# Patient Record
Sex: MALE | Race: ASIAN | NOT HISPANIC OR LATINO | URBAN - METROPOLITAN AREA
[De-identification: names, ages, dates, MRNs, and addresses within clinical notes are randomized per-mention and may not be internally consistent; named-entity substitution may affect disease eponyms.]

---

## 2018-01-16 ENCOUNTER — INPATIENT (INPATIENT)
Facility: HOSPITAL | Age: 63
LOS: 6 days | Discharge: HOME | End: 2018-01-23
Attending: INTERNAL MEDICINE | Admitting: INTERNAL MEDICINE
Payer: MEDICAID

## 2018-01-16 VITALS
TEMPERATURE: 98 F | OXYGEN SATURATION: 99 % | DIASTOLIC BLOOD PRESSURE: 73 MMHG | RESPIRATION RATE: 18 BRPM | HEART RATE: 99 BPM | SYSTOLIC BLOOD PRESSURE: 142 MMHG

## 2018-01-16 DIAGNOSIS — M25.469 EFFUSION, UNSPECIFIED KNEE: ICD-10-CM

## 2018-01-16 LAB
ALBUMIN SERPL ELPH-MCNC: 3.7 G/DL — SIGNIFICANT CHANGE UP (ref 3.3–5)
ALP SERPL-CCNC: 82 U/L — SIGNIFICANT CHANGE UP (ref 40–120)
ALT FLD-CCNC: 11 U/L — SIGNIFICANT CHANGE UP (ref 4–41)
AST SERPL-CCNC: 10 U/L — SIGNIFICANT CHANGE UP (ref 4–40)
BASOPHILS # BLD AUTO: 0.05 K/UL — SIGNIFICANT CHANGE UP (ref 0–0.2)
BASOPHILS NFR BLD AUTO: 0.4 % — SIGNIFICANT CHANGE UP (ref 0–2)
BILIRUB SERPL-MCNC: 0.4 MG/DL — SIGNIFICANT CHANGE UP (ref 0.2–1.2)
BODY FLUID TYPE: SIGNIFICANT CHANGE UP
BUN SERPL-MCNC: 37 MG/DL — HIGH (ref 7–23)
CALCIUM SERPL-MCNC: 8.8 MG/DL — SIGNIFICANT CHANGE UP (ref 8.4–10.5)
CHLORIDE SERPL-SCNC: 93 MMOL/L — LOW (ref 98–107)
CLARITY SPEC: SIGNIFICANT CHANGE UP
CO2 SERPL-SCNC: 27 MMOL/L — SIGNIFICANT CHANGE UP (ref 22–31)
COLOR FLD: YELLOW — SIGNIFICANT CHANGE UP
CREAT SERPL-MCNC: 2.86 MG/DL — HIGH (ref 0.5–1.3)
CRP SERPL-MCNC: 157.9 MG/L — HIGH
CRYSTALS FLD MICRO: SIGNIFICANT CHANGE UP
EOSINOPHIL # BLD AUTO: 0.07 K/UL — SIGNIFICANT CHANGE UP (ref 0–0.5)
EOSINOPHIL # FLD: 1 % — SIGNIFICANT CHANGE UP
EOSINOPHIL NFR BLD AUTO: 0.5 % — SIGNIFICANT CHANGE UP (ref 0–6)
ERYTHROCYTE [SEDIMENTATION RATE] IN BLOOD: 59 MM/HR — HIGH (ref 1–15)
GLUCOSE FLD-MCNC: 225 MG/DL — SIGNIFICANT CHANGE UP
GLUCOSE SERPL-MCNC: 221 MG/DL — HIGH (ref 70–99)
GRAM STN FLD: SIGNIFICANT CHANGE UP
HCT VFR BLD CALC: 42.2 % — SIGNIFICANT CHANGE UP (ref 39–50)
HGB BLD-MCNC: 14.6 G/DL — SIGNIFICANT CHANGE UP (ref 13–17)
IMM GRANULOCYTES # BLD AUTO: 0.05 # — SIGNIFICANT CHANGE UP
IMM GRANULOCYTES NFR BLD AUTO: 0.4 % — SIGNIFICANT CHANGE UP (ref 0–1.5)
LYMPHOCYTES # BLD AUTO: 15.7 % — SIGNIFICANT CHANGE UP (ref 13–44)
LYMPHOCYTES # BLD AUTO: 2.09 K/UL — SIGNIFICANT CHANGE UP (ref 1–3.3)
LYMPHOCYTES NFR FLD: 3 % — SIGNIFICANT CHANGE UP
MACROPHAGES # FLD: 5 % — SIGNIFICANT CHANGE UP
MCHC RBC-ENTMCNC: 31.2 PG — SIGNIFICANT CHANGE UP (ref 27–34)
MCHC RBC-ENTMCNC: 34.6 % — SIGNIFICANT CHANGE UP (ref 32–36)
MCV RBC AUTO: 90.2 FL — SIGNIFICANT CHANGE UP (ref 80–100)
MONOCYTES # BLD AUTO: 1.03 K/UL — HIGH (ref 0–0.9)
MONOCYTES # FLD: 26 % — SIGNIFICANT CHANGE UP
MONOCYTES NFR BLD AUTO: 7.8 % — SIGNIFICANT CHANGE UP (ref 2–14)
NEUTROPHILS # BLD AUTO: 10 K/UL — HIGH (ref 1.8–7.4)
NEUTROPHILS NFR BLD AUTO: 75.2 % — SIGNIFICANT CHANGE UP (ref 43–77)
NEUTS SEG NFR FLD MANUAL: 65 % — SIGNIFICANT CHANGE UP
NRBC # FLD: 0 — SIGNIFICANT CHANGE UP
PLATELET # BLD AUTO: 186 K/UL — SIGNIFICANT CHANGE UP (ref 150–400)
PMV BLD: 10 FL — SIGNIFICANT CHANGE UP (ref 7–13)
POTASSIUM SERPL-MCNC: 3.3 MMOL/L — LOW (ref 3.5–5.3)
POTASSIUM SERPL-SCNC: 3.3 MMOL/L — LOW (ref 3.5–5.3)
PROT FLD-MCNC: 3.6 G/DL — SIGNIFICANT CHANGE UP
PROT SERPL-MCNC: 7 G/DL — SIGNIFICANT CHANGE UP (ref 6–8.3)
RBC # BLD: 4.68 M/UL — SIGNIFICANT CHANGE UP (ref 4.2–5.8)
RBC # FLD: 13.7 % — SIGNIFICANT CHANGE UP (ref 10.3–14.5)
RCV VOL RI: 0 CELL/UL — SIGNIFICANT CHANGE UP (ref 0–5)
SODIUM SERPL-SCNC: 135 MMOL/L — SIGNIFICANT CHANGE UP (ref 135–145)
SPECIMEN SOURCE: SIGNIFICANT CHANGE UP
TOTAL CELLS COUNTED, BODY FLUID: 100 CELLS — SIGNIFICANT CHANGE UP
TOTAL NUCLEATED CELL COUNT, BODY FLUID: 1728 CELL/UL — HIGH (ref 0–5)
URATE SERPL-MCNC: 12.2 MG/DL — HIGH (ref 3.4–8.8)
WBC # BLD: 13.29 K/UL — HIGH (ref 3.8–10.5)
WBC # FLD AUTO: 13.29 K/UL — HIGH (ref 3.8–10.5)

## 2018-01-16 PROCEDURE — 73564 X-RAY EXAM KNEE 4 OR MORE: CPT | Mod: 26,50

## 2018-01-16 PROCEDURE — 99223 1ST HOSP IP/OBS HIGH 75: CPT

## 2018-01-16 RX ORDER — MORPHINE SULFATE 50 MG/1
4 CAPSULE, EXTENDED RELEASE ORAL ONCE
Qty: 0 | Refills: 0 | Status: DISCONTINUED | OUTPATIENT
Start: 2018-01-16 | End: 2018-01-16

## 2018-01-16 RX ADMIN — MORPHINE SULFATE 4 MILLIGRAM(S): 50 CAPSULE, EXTENDED RELEASE ORAL at 20:26

## 2018-01-16 RX ADMIN — MORPHINE SULFATE 4 MILLIGRAM(S): 50 CAPSULE, EXTENDED RELEASE ORAL at 16:10

## 2018-01-16 RX ADMIN — MORPHINE SULFATE 4 MILLIGRAM(S): 50 CAPSULE, EXTENDED RELEASE ORAL at 21:34

## 2018-01-16 RX ADMIN — Medication 60 MILLIGRAM(S): at 21:38

## 2018-01-16 NOTE — ED PROVIDER NOTE - SHIFT CHANGE DETAILS
I have signed over this patient to the above attending physician. Pertinent history, physical exam findings and workup thus far in the ED have been discussed. The pending tests and plan, including labs, tap results were signed over.  All questions from the above attending physician have been answered.

## 2018-01-16 NOTE — ED PROVIDER NOTE - CARE PLAN
Principal Discharge DX:	Knee swelling Principal Discharge DX:	Knee swelling  Assessment and plan of treatment:	admit for worsening knee pain  probable gout vs inflammatory changes  admit for inability to ambulate

## 2018-01-16 NOTE — ED PROVIDER NOTE - CONSTITUTIONAL, MLM
normal... Well appearing, well nourished, awake, alert, oriented to person, place, time/situation and in moderate apparent distress.

## 2018-01-16 NOTE — ED ADULT NURSE NOTE - OBJECTIVE STATEMENT
Patient received to rm 18 a&ox3 and ambulatory at baseline with c/o left knee and feet swelling that has been getting worse. Pt left knee is visibly swollen. Pt has hx of HTN. 20g IV placed in rt ac, labs sent, meds administered. Will continue to monitor.

## 2018-01-16 NOTE — ED ADULT NURSE REASSESSMENT NOTE - NS ED NURSE REASSESS COMMENT FT1
Pt. received into room 18A, A&O x3, Pt. is Arabic speaking but understands English. vitals stable. c/o pain to L knee. meds given as ordered. will continue to monitor. ST

## 2018-01-16 NOTE — ED PROVIDER NOTE - MEDICAL DECISION MAKING DETAILS
Left knee pain tenderness increased warmth with decreased ROM. WIll get Labs, Xray, ESR, CRP concern for septic joint, gouty arthritis.

## 2018-01-16 NOTE — ED PROVIDER NOTE - PLAN OF CARE
admit for worsening knee pain  probable gout vs inflammatory changes  admit for inability to ambulate

## 2018-01-16 NOTE — ED PROVIDER NOTE - ATTENDING CONTRIBUTION TO CARE
62m, c/o 4 days of left knee pain and swelling. no h/o gout, no h/o knee or other joint pain/swelling. no f/c. exam, vs wnl, nad. hs and lungs normal, abdo benign, left knee not erythematous, diffusely tender, decreased ROM, moderate joint effusion. rest of extremity normal. moderate PTP for septic knee. will get labs, tap.

## 2018-01-16 NOTE — ED PROVIDER NOTE - OBJECTIVE STATEMENT
62 YOM pmh DM, HTN, CABG, ?CHF, CVA with mild residual left extremity weakness presents to ed with Left lower knee pain and swelling x 4 days. Pt denies any fevers, chills, body aches. No hx of gout. Pt complaining of difficulty walking and bending the left knee. Pt also has mild right knee pain.

## 2018-01-16 NOTE — ED PROVIDER NOTE - MUSCULOSKELETAL, MLM
Left knee pain and tenderness with moderate effusion, increased warmth. Limited ROM secondary to pain. Mild right knee tenderness FROM.

## 2018-01-17 DIAGNOSIS — Z90.49 ACQUIRED ABSENCE OF OTHER SPECIFIED PARTS OF DIGESTIVE TRACT: Chronic | ICD-10-CM

## 2018-01-17 DIAGNOSIS — Z95.1 PRESENCE OF AORTOCORONARY BYPASS GRAFT: Chronic | ICD-10-CM

## 2018-01-17 DIAGNOSIS — I25.10 ATHEROSCLEROTIC HEART DISEASE OF NATIVE CORONARY ARTERY WITHOUT ANGINA PECTORIS: ICD-10-CM

## 2018-01-17 DIAGNOSIS — N18.9 CHRONIC KIDNEY DISEASE, UNSPECIFIED: ICD-10-CM

## 2018-01-17 DIAGNOSIS — M19.90 UNSPECIFIED OSTEOARTHRITIS, UNSPECIFIED SITE: ICD-10-CM

## 2018-01-17 DIAGNOSIS — I50.22 CHRONIC SYSTOLIC (CONGESTIVE) HEART FAILURE: ICD-10-CM

## 2018-01-17 DIAGNOSIS — E11.8 TYPE 2 DIABETES MELLITUS WITH UNSPECIFIED COMPLICATIONS: ICD-10-CM

## 2018-01-17 LAB
ALBUMIN SERPL ELPH-MCNC: 3.7 G/DL — SIGNIFICANT CHANGE UP (ref 3.3–5)
ALP SERPL-CCNC: 78 U/L — SIGNIFICANT CHANGE UP (ref 40–120)
ALT FLD-CCNC: 9 U/L — SIGNIFICANT CHANGE UP (ref 4–41)
AST SERPL-CCNC: 16 U/L — SIGNIFICANT CHANGE UP (ref 4–40)
BASOPHILS # BLD AUTO: 0.01 K/UL — SIGNIFICANT CHANGE UP (ref 0–0.2)
BASOPHILS NFR BLD AUTO: 0.1 % — SIGNIFICANT CHANGE UP (ref 0–2)
BILIRUB SERPL-MCNC: 0.4 MG/DL — SIGNIFICANT CHANGE UP (ref 0.2–1.2)
BUN SERPL-MCNC: 40 MG/DL — HIGH (ref 7–23)
BUN SERPL-MCNC: 46 MG/DL — HIGH (ref 7–23)
CALCIUM SERPL-MCNC: 9.2 MG/DL — SIGNIFICANT CHANGE UP (ref 8.4–10.5)
CALCIUM SERPL-MCNC: 9.4 MG/DL — SIGNIFICANT CHANGE UP (ref 8.4–10.5)
CHLORIDE SERPL-SCNC: 94 MMOL/L — LOW (ref 98–107)
CHLORIDE SERPL-SCNC: 96 MMOL/L — LOW (ref 98–107)
CO2 SERPL-SCNC: 24 MMOL/L — SIGNIFICANT CHANGE UP (ref 22–31)
CO2 SERPL-SCNC: 25 MMOL/L — SIGNIFICANT CHANGE UP (ref 22–31)
CREAT SERPL-MCNC: 2.74 MG/DL — HIGH (ref 0.5–1.3)
CREAT SERPL-MCNC: 2.76 MG/DL — HIGH (ref 0.5–1.3)
EOSINOPHIL # BLD AUTO: 0 K/UL — SIGNIFICANT CHANGE UP (ref 0–0.5)
EOSINOPHIL NFR BLD AUTO: 0 % — SIGNIFICANT CHANGE UP (ref 0–6)
GLUCOSE BLDC GLUCOMTR-MCNC: 164 MG/DL — HIGH (ref 70–99)
GLUCOSE BLDC GLUCOMTR-MCNC: 192 MG/DL — HIGH (ref 70–99)
GLUCOSE BLDC GLUCOMTR-MCNC: 229 MG/DL — HIGH (ref 70–99)
GLUCOSE SERPL-MCNC: 200 MG/DL — HIGH (ref 70–99)
GLUCOSE SERPL-MCNC: 226 MG/DL — HIGH (ref 70–99)
HCT VFR BLD CALC: 44.3 % — SIGNIFICANT CHANGE UP (ref 39–50)
HCT VFR BLD CALC: 44.4 % — SIGNIFICANT CHANGE UP (ref 39–50)
HGB BLD-MCNC: 15.1 G/DL — SIGNIFICANT CHANGE UP (ref 13–17)
HGB BLD-MCNC: 15.1 G/DL — SIGNIFICANT CHANGE UP (ref 13–17)
IMM GRANULOCYTES # BLD AUTO: 0.07 # — SIGNIFICANT CHANGE UP
IMM GRANULOCYTES NFR BLD AUTO: 0.4 % — SIGNIFICANT CHANGE UP (ref 0–1.5)
LYMPHOCYTES # BLD AUTO: 1.23 K/UL — SIGNIFICANT CHANGE UP (ref 1–3.3)
LYMPHOCYTES # BLD AUTO: 7.7 % — LOW (ref 13–44)
MAGNESIUM SERPL-MCNC: 1.9 MG/DL — SIGNIFICANT CHANGE UP (ref 1.6–2.6)
MAGNESIUM SERPL-MCNC: 2 MG/DL — SIGNIFICANT CHANGE UP (ref 1.6–2.6)
MCHC RBC-ENTMCNC: 30.4 PG — SIGNIFICANT CHANGE UP (ref 27–34)
MCHC RBC-ENTMCNC: 30.4 PG — SIGNIFICANT CHANGE UP (ref 27–34)
MCHC RBC-ENTMCNC: 34 % — SIGNIFICANT CHANGE UP (ref 32–36)
MCHC RBC-ENTMCNC: 34.1 % — SIGNIFICANT CHANGE UP (ref 32–36)
MCV RBC AUTO: 89.1 FL — SIGNIFICANT CHANGE UP (ref 80–100)
MCV RBC AUTO: 89.3 FL — SIGNIFICANT CHANGE UP (ref 80–100)
MONOCYTES # BLD AUTO: 0.49 K/UL — SIGNIFICANT CHANGE UP (ref 0–0.9)
MONOCYTES NFR BLD AUTO: 3 % — SIGNIFICANT CHANGE UP (ref 2–14)
NEUTROPHILS # BLD AUTO: 14.27 K/UL — HIGH (ref 1.8–7.4)
NEUTROPHILS NFR BLD AUTO: 88.8 % — HIGH (ref 43–77)
NRBC # FLD: 0 — SIGNIFICANT CHANGE UP
NRBC # FLD: 0 — SIGNIFICANT CHANGE UP
PHOSPHATE SERPL-MCNC: 2.9 MG/DL — SIGNIFICANT CHANGE UP (ref 2.5–4.5)
PLATELET # BLD AUTO: 199 K/UL — SIGNIFICANT CHANGE UP (ref 150–400)
PLATELET # BLD AUTO: 226 K/UL — SIGNIFICANT CHANGE UP (ref 150–400)
PMV BLD: 10.1 FL — SIGNIFICANT CHANGE UP (ref 7–13)
PMV BLD: 10.4 FL — SIGNIFICANT CHANGE UP (ref 7–13)
POTASSIUM SERPL-MCNC: 3.7 MMOL/L — SIGNIFICANT CHANGE UP (ref 3.5–5.3)
POTASSIUM SERPL-MCNC: 4.1 MMOL/L — SIGNIFICANT CHANGE UP (ref 3.5–5.3)
POTASSIUM SERPL-SCNC: 3.7 MMOL/L — SIGNIFICANT CHANGE UP (ref 3.5–5.3)
POTASSIUM SERPL-SCNC: 4.1 MMOL/L — SIGNIFICANT CHANGE UP (ref 3.5–5.3)
PROT SERPL-MCNC: 7.2 G/DL — SIGNIFICANT CHANGE UP (ref 6–8.3)
RBC # BLD: 4.97 M/UL — SIGNIFICANT CHANGE UP (ref 4.2–5.8)
RBC # BLD: 4.97 M/UL — SIGNIFICANT CHANGE UP (ref 4.2–5.8)
RBC # FLD: 13.6 % — SIGNIFICANT CHANGE UP (ref 10.3–14.5)
RBC # FLD: 13.6 % — SIGNIFICANT CHANGE UP (ref 10.3–14.5)
SODIUM SERPL-SCNC: 136 MMOL/L — SIGNIFICANT CHANGE UP (ref 135–145)
SODIUM SERPL-SCNC: 138 MMOL/L — SIGNIFICANT CHANGE UP (ref 135–145)
WBC # BLD: 13.9 K/UL — HIGH (ref 3.8–10.5)
WBC # BLD: 16.07 K/UL — HIGH (ref 3.8–10.5)
WBC # FLD AUTO: 13.9 K/UL — HIGH (ref 3.8–10.5)
WBC # FLD AUTO: 16.07 K/UL — HIGH (ref 3.8–10.5)

## 2018-01-17 RX ORDER — CLOPIDOGREL BISULFATE 75 MG/1
75 TABLET, FILM COATED ORAL DAILY
Qty: 0 | Refills: 0 | Status: DISCONTINUED | OUTPATIENT
Start: 2018-01-17 | End: 2018-01-19

## 2018-01-17 RX ORDER — INSULIN LISPRO 100/ML
VIAL (ML) SUBCUTANEOUS
Qty: 0 | Refills: 0 | Status: DISCONTINUED | OUTPATIENT
Start: 2018-01-17 | End: 2018-01-23

## 2018-01-17 RX ORDER — DEXTROSE 50 % IN WATER 50 %
25 SYRINGE (ML) INTRAVENOUS ONCE
Qty: 0 | Refills: 0 | Status: DISCONTINUED | OUTPATIENT
Start: 2018-01-17 | End: 2018-01-23

## 2018-01-17 RX ORDER — DEXTROSE 50 % IN WATER 50 %
12.5 SYRINGE (ML) INTRAVENOUS ONCE
Qty: 0 | Refills: 0 | Status: DISCONTINUED | OUTPATIENT
Start: 2018-01-17 | End: 2018-01-23

## 2018-01-17 RX ORDER — DEXTROSE 50 % IN WATER 50 %
25 SYRINGE (ML) INTRAVENOUS ONCE
Qty: 0 | Refills: 0 | Status: DISCONTINUED | OUTPATIENT
Start: 2018-01-17 | End: 2018-01-18

## 2018-01-17 RX ORDER — CARVEDILOL PHOSPHATE 80 MG/1
12.5 CAPSULE, EXTENDED RELEASE ORAL EVERY 12 HOURS
Qty: 0 | Refills: 0 | Status: DISCONTINUED | OUTPATIENT
Start: 2018-01-17 | End: 2018-01-23

## 2018-01-17 RX ORDER — ASPIRIN/CALCIUM CARB/MAGNESIUM 324 MG
81 TABLET ORAL DAILY
Qty: 0 | Refills: 0 | Status: DISCONTINUED | OUTPATIENT
Start: 2018-01-17 | End: 2018-01-19

## 2018-01-17 RX ORDER — DEXTROSE 50 % IN WATER 50 %
1 SYRINGE (ML) INTRAVENOUS ONCE
Qty: 0 | Refills: 0 | Status: DISCONTINUED | OUTPATIENT
Start: 2018-01-17 | End: 2018-01-23

## 2018-01-17 RX ORDER — ISOSORBIDE DINITRATE 5 MG/1
0 TABLET ORAL
Qty: 0 | Refills: 0 | COMMUNITY

## 2018-01-17 RX ORDER — INSULIN LISPRO 100/ML
VIAL (ML) SUBCUTANEOUS AT BEDTIME
Qty: 0 | Refills: 0 | Status: DISCONTINUED | OUTPATIENT
Start: 2018-01-17 | End: 2018-01-23

## 2018-01-17 RX ORDER — HYDROMORPHONE HYDROCHLORIDE 2 MG/ML
1 INJECTION INTRAMUSCULAR; INTRAVENOUS; SUBCUTANEOUS EVERY 6 HOURS
Qty: 0 | Refills: 0 | Status: DISCONTINUED | OUTPATIENT
Start: 2018-01-17 | End: 2018-01-23

## 2018-01-17 RX ORDER — GLUCAGON INJECTION, SOLUTION 0.5 MG/.1ML
1 INJECTION, SOLUTION SUBCUTANEOUS ONCE
Qty: 0 | Refills: 0 | Status: DISCONTINUED | OUTPATIENT
Start: 2018-01-17 | End: 2018-01-23

## 2018-01-17 RX ORDER — OXYCODONE AND ACETAMINOPHEN 5; 325 MG/1; MG/1
1 TABLET ORAL EVERY 6 HOURS
Qty: 0 | Refills: 0 | Status: DISCONTINUED | OUTPATIENT
Start: 2018-01-17 | End: 2018-01-23

## 2018-01-17 RX ORDER — HYDRALAZINE HCL 50 MG
100 TABLET ORAL EVERY 8 HOURS
Qty: 0 | Refills: 0 | Status: DISCONTINUED | OUTPATIENT
Start: 2018-01-17 | End: 2018-01-23

## 2018-01-17 RX ORDER — HEPARIN SODIUM 5000 [USP'U]/ML
5000 INJECTION INTRAVENOUS; SUBCUTANEOUS EVERY 8 HOURS
Qty: 0 | Refills: 0 | Status: DISCONTINUED | OUTPATIENT
Start: 2018-01-17 | End: 2018-01-19

## 2018-01-17 RX ADMIN — CARVEDILOL PHOSPHATE 12.5 MILLIGRAM(S): 80 CAPSULE, EXTENDED RELEASE ORAL at 06:07

## 2018-01-17 RX ADMIN — Medication 2: at 13:54

## 2018-01-17 RX ADMIN — Medication 100 MILLIGRAM(S): at 13:55

## 2018-01-17 RX ADMIN — HEPARIN SODIUM 5000 UNIT(S): 5000 INJECTION INTRAVENOUS; SUBCUTANEOUS at 23:51

## 2018-01-17 RX ADMIN — Medication 10 MILLIGRAM(S): at 06:07

## 2018-01-17 RX ADMIN — Medication 10 MILLIGRAM(S): at 19:33

## 2018-01-17 RX ADMIN — CLOPIDOGREL BISULFATE 75 MILLIGRAM(S): 75 TABLET, FILM COATED ORAL at 13:54

## 2018-01-17 RX ADMIN — CARVEDILOL PHOSPHATE 12.5 MILLIGRAM(S): 80 CAPSULE, EXTENDED RELEASE ORAL at 19:33

## 2018-01-17 RX ADMIN — HEPARIN SODIUM 5000 UNIT(S): 5000 INJECTION INTRAVENOUS; SUBCUTANEOUS at 16:38

## 2018-01-17 RX ADMIN — Medication 81 MILLIGRAM(S): at 13:54

## 2018-01-17 RX ADMIN — Medication: at 09:42

## 2018-01-17 RX ADMIN — Medication 100 MILLIGRAM(S): at 06:07

## 2018-01-17 RX ADMIN — Medication 30 MILLIGRAM(S): at 06:14

## 2018-01-17 RX ADMIN — Medication 1: at 17:54

## 2018-01-17 RX ADMIN — HEPARIN SODIUM 5000 UNIT(S): 5000 INJECTION INTRAVENOUS; SUBCUTANEOUS at 06:07

## 2018-01-17 RX ADMIN — Medication 100 MILLIGRAM(S): at 23:51

## 2018-01-17 NOTE — H&P ADULT - HISTORY OF PRESENT ILLNESS
61 y/o M with CAD s/p CABG, CHF, CKD, DM, HTN, HLD, BPH p/w L knee pain and swelling.  Pt noticed pain and swelling of L knee starting 3 days ago.  Pain worsened, and made it difficult for patient to ambulate.  Pt reports joint felt warm.  He did not have subjective fever or chills.  No other joints affected.  No prior similar symptoms.  No recent injury to LLE.      In the ED, pt was noted to have knee effusion, and arthrocentesis was performed with removal of 80 ml of fluid.  Pt was given morphine for pain which provided temporary relief, and prednisone. 61 y/o M with CAD s/p CABG, CHF, CKD, CVA with residual L sided weakness, DM, HTN, HLD, BPH p/w L knee pain and swelling.  Pt noticed pain and swelling of L knee starting 3 days ago.  Pain worsened, and made it difficult for patient to ambulate.  Pt reports joint felt warm.  He did not have subjective fever or chills.  No other joints affected.  No prior similar symptoms.  No recent injury to LLE.      In the ED, pt was noted to have knee effusion, and arthrocentesis was performed with removal of 80 ml of fluid.  Pt was given morphine for pain which provided temporary relief, and prednisone.

## 2018-01-17 NOTE — H&P ADULT - NSHPREVIEWOFSYSTEMS_GEN_ALL_CORE
REVIEW OF SYSTEMS    General:  Negative  Skin/Breast:  Negative  Ophthalmologic:  Negative  ENMT:  Negative  Respiratory and Thorax:  Negative  Cardiovascular:  Negative  Gastrointestinal:  Negative  Genitourinary:  Negative  Musculoskeletal:  acute L knee pain.  Chronic R knee pain  Neurological:  Negative  Psychiatric:  Negative  Hematology/Lymphatics:  Negative	  Endocrine:	  Negative  Allergic/Immunologic:	 Negative

## 2018-01-17 NOTE — H&P ADULT - PROBLEM SELECTOR PLAN 3
- Will need to confirm dose of torsemide and isosorbide with pt's pharmacy  - continue coreg Pt follows with CHF specialist at [facility].  - Will need to confirm dose of torsemide and isosorbide with pt's pharmacy.  Will order torsemide 10mg q12 for now.  - continue coreg

## 2018-01-17 NOTE — H&P ADULT - NSHPSOCIALHISTORY_GEN_ALL_CORE
former smoker, 3PPD, quit several years ago  Lives with wife and daughter former smoker, 3PPD, quit several years ago  Lives with wife and daughter  Ambulates with cane

## 2018-01-17 NOTE — H&P ADULT - NSHPLABSRESULTS_GEN_ALL_CORE
01-16    135  |  93<L>  |  37<H>  ----------------------------<  221<H>  3.3<L>   |  27  |  2.86<H>    Ca    8.8      16 Jan 2018 16:00    TPro  7.0  /  Alb  3.7  /  TBili  0.4  /  DBili  x   /  AST  10  /  ALT  11  /  AlkPhos  82  01-16                            14.6   13.29 )-----------( 186      ( 16 Jan 2018 16:00 )             42.2    < from: Xray Knee 4 Views, Bilateral (01.16.18 @ 15:32) >    No tracking soft tissue gas collections, radiopaque foreign bodies, or   gross radiographic evidence for osteomyelitis.    Nonspecific bilateral knee joint effusions left greater than right. Joint   fluid aspiration may provide additional diagnostic information as   warranted.    No fractures or dislocations.     Right patellofemoral and medial tibiofemoral compartment predominant and   mild left patellofemoral osteoarthritic change manifest predominantly by   presence of tiny joint margin osteophytes and degenerative subchondral   cystic changes. No joint margin erosions.     Generalized osteopenia otherwise no discrete lytic or blastic lesions.    Vascular calcifications.     < end of copied text > 01-16    135  |  93<L>  |  37<H>  ----------------------------<  221<H>  3.3<L>   |  27  |  2.86<H>    Ca    8.8      16 Jan 2018 16:00    TPro  7.0  /  Alb  3.7  /  TBili  0.4  /  DBili  x   /  AST  10  /  ALT  11  /  AlkPhos  82  01-16                            14.6   13.29 )-----------( 186      ( 16 Jan 2018 16:00 )             42.2        < from: Xray Knee 4 Views, Bilateral (01.16.18 @ 15:32) >    No tracking soft tissue gas collections, radiopaque foreign bodies, or   gross radiographic evidence for osteomyelitis.    Nonspecific bilateral knee joint effusions left greater than right. Joint   fluid aspiration may provide additional diagnostic information as   warranted.    No fractures or dislocations.     Right patellofemoral and medial tibiofemoral compartment predominant and   mild left patellofemoral osteoarthritic change manifest predominantly by   presence of tiny joint margin osteophytes and degenerative subchondral   cystic changes. No joint margin erosions.     Generalized osteopenia otherwise no discrete lytic or blastic lesions.    Vascular calcifications.     < end of copied text >

## 2018-01-17 NOTE — PROGRESS NOTE ADULT - SUBJECTIVE AND OBJECTIVE BOX
patient not known to me, assigned this AM by Dr Juarez to assume care  chart reviewed and events thus far noted  admitted overnight by full time hospitalist service  likely gout  already improved with prednisone  PT evaluation for disposition

## 2018-01-17 NOTE — H&P ADULT - PMH
BPH (benign prostatic hyperplasia)    CAD (coronary artery disease)    CHF (congestive heart failure)    Diabetes    HTN (hypertension)    Hyperlipidemia, unspecified hyperlipidemia type

## 2018-01-17 NOTE — H&P ADULT - NSHPPHYSICALEXAM_GEN_ALL_CORE
Vital Signs Last 24 Hrs  T(C): 36.9 (17 Jan 2018 00:08), Max: 36.9 (16 Jan 2018 23:29)  T(F): 98.4 (17 Jan 2018 00:08), Max: 98.4 (16 Jan 2018 23:29)  HR: 98 (17 Jan 2018 00:08) (72 - 99)  BP: 134/74 (17 Jan 2018 00:08) (120/71 - 142/73)  BP(mean): --  RR: 18 (17 Jan 2018 00:08) (18 - 18)  SpO2: 99% (17 Jan 2018 00:08) (99% - 100%)    PHYSICAL EXAM:  GENERAL: No Acute Distress  HEAD:  Atraumatic, Normocephalic  EYES: PERRL, conjunctiva and sclera clear  ENMT: Moist mucous membranes   NECK: Supple  CHEST/LUNG: Clear to auscultation bilaterally  HEART: Regular rate and rhythm; No murmurs, rubs, or gallops  ABDOMEN: Soft, Nontender, Nondistended; Bowel sounds normal  EXTREMITIES:   No clubbing, cyanosis, or pedal edema  VASCULAR: Normal DP pulses  PSYCH: Normal Affect, Normal Behavior  NEUROLOGY:   - Mental status A&O x 3,  SKIN: No rashes or lesions  MUSCULOSKELETAL: L knee s/p arthrocentesis, remains tender and warm, but no further effusion.

## 2018-01-17 NOTE — H&P ADULT - PROBLEM SELECTOR PLAN 1
Acute monoarticular arthritis.  Possibly new onset gout or CPPD.  Low suspicion for septic arthritis given low cell count  - will f/u synovial fluid cultures, and crystal analysis  - prednisone 30mg daily  - pain control with dilaudid/oxycodone.  Will avoid morphine 2/2 renal failure

## 2018-01-18 ENCOUNTER — TRANSCRIPTION ENCOUNTER (OUTPATIENT)
Age: 63
End: 2018-01-18

## 2018-01-18 DIAGNOSIS — D72.829 ELEVATED WHITE BLOOD CELL COUNT, UNSPECIFIED: ICD-10-CM

## 2018-01-18 DIAGNOSIS — N18.4 CHRONIC KIDNEY DISEASE, STAGE 4 (SEVERE): ICD-10-CM

## 2018-01-18 LAB
BUN SERPL-MCNC: 53 MG/DL — HIGH (ref 7–23)
CALCIUM SERPL-MCNC: 9.3 MG/DL — SIGNIFICANT CHANGE UP (ref 8.4–10.5)
CHLORIDE SERPL-SCNC: 95 MMOL/L — LOW (ref 98–107)
CO2 SERPL-SCNC: 22 MMOL/L — SIGNIFICANT CHANGE UP (ref 22–31)
CREAT SERPL-MCNC: 2.79 MG/DL — HIGH (ref 0.5–1.3)
GLUCOSE BLDC GLUCOMTR-MCNC: 133 MG/DL — HIGH (ref 70–99)
GLUCOSE BLDC GLUCOMTR-MCNC: 149 MG/DL — HIGH (ref 70–99)
GLUCOSE BLDC GLUCOMTR-MCNC: 154 MG/DL — HIGH (ref 70–99)
GLUCOSE BLDC GLUCOMTR-MCNC: 235 MG/DL — HIGH (ref 70–99)
GLUCOSE SERPL-MCNC: 139 MG/DL — HIGH (ref 70–99)
HBA1C BLD-MCNC: 6.9 % — HIGH (ref 4–5.6)
HCT VFR BLD CALC: 44.2 % — SIGNIFICANT CHANGE UP (ref 39–50)
HGB BLD-MCNC: 15.3 G/DL — SIGNIFICANT CHANGE UP (ref 13–17)
MCHC RBC-ENTMCNC: 30.5 PG — SIGNIFICANT CHANGE UP (ref 27–34)
MCHC RBC-ENTMCNC: 34.6 % — SIGNIFICANT CHANGE UP (ref 32–36)
MCV RBC AUTO: 88.2 FL — SIGNIFICANT CHANGE UP (ref 80–100)
NRBC # FLD: 0 — SIGNIFICANT CHANGE UP
PLATELET # BLD AUTO: 242 K/UL — SIGNIFICANT CHANGE UP (ref 150–400)
PMV BLD: 10.5 FL — SIGNIFICANT CHANGE UP (ref 7–13)
POTASSIUM SERPL-MCNC: 4.6 MMOL/L — SIGNIFICANT CHANGE UP (ref 3.5–5.3)
POTASSIUM SERPL-SCNC: 4.6 MMOL/L — SIGNIFICANT CHANGE UP (ref 3.5–5.3)
RBC # BLD: 5.01 M/UL — SIGNIFICANT CHANGE UP (ref 4.2–5.8)
RBC # FLD: 13.6 % — SIGNIFICANT CHANGE UP (ref 10.3–14.5)
SODIUM SERPL-SCNC: 135 MMOL/L — SIGNIFICANT CHANGE UP (ref 135–145)
WBC # BLD: 24.2 K/UL — HIGH (ref 3.8–10.5)
WBC # FLD AUTO: 24.2 K/UL — HIGH (ref 3.8–10.5)

## 2018-01-18 RX ORDER — SIMETHICONE 80 MG/1
80 TABLET, CHEWABLE ORAL THREE TIMES A DAY
Qty: 0 | Refills: 0 | Status: DISCONTINUED | OUTPATIENT
Start: 2018-01-18 | End: 2018-01-23

## 2018-01-18 RX ADMIN — Medication 100 MILLIGRAM(S): at 22:04

## 2018-01-18 RX ADMIN — CLOPIDOGREL BISULFATE 75 MILLIGRAM(S): 75 TABLET, FILM COATED ORAL at 11:23

## 2018-01-18 RX ADMIN — Medication 10 MILLIGRAM(S): at 19:11

## 2018-01-18 RX ADMIN — SIMETHICONE 80 MILLIGRAM(S): 80 TABLET, CHEWABLE ORAL at 07:03

## 2018-01-18 RX ADMIN — CARVEDILOL PHOSPHATE 12.5 MILLIGRAM(S): 80 CAPSULE, EXTENDED RELEASE ORAL at 06:52

## 2018-01-18 RX ADMIN — HEPARIN SODIUM 5000 UNIT(S): 5000 INJECTION INTRAVENOUS; SUBCUTANEOUS at 06:52

## 2018-01-18 RX ADMIN — Medication 2: at 13:19

## 2018-01-18 RX ADMIN — Medication 81 MILLIGRAM(S): at 11:23

## 2018-01-18 RX ADMIN — HEPARIN SODIUM 5000 UNIT(S): 5000 INJECTION INTRAVENOUS; SUBCUTANEOUS at 13:06

## 2018-01-18 RX ADMIN — Medication 1: at 17:52

## 2018-01-18 RX ADMIN — HEPARIN SODIUM 5000 UNIT(S): 5000 INJECTION INTRAVENOUS; SUBCUTANEOUS at 22:03

## 2018-01-18 RX ADMIN — CARVEDILOL PHOSPHATE 12.5 MILLIGRAM(S): 80 CAPSULE, EXTENDED RELEASE ORAL at 17:24

## 2018-01-18 RX ADMIN — Medication 10 MILLIGRAM(S): at 06:52

## 2018-01-18 RX ADMIN — Medication 30 MILLIGRAM(S): at 06:52

## 2018-01-18 RX ADMIN — Medication 100 MILLIGRAM(S): at 06:52

## 2018-01-18 RX ADMIN — Medication 100 MILLIGRAM(S): at 13:05

## 2018-01-18 NOTE — DISCHARGE NOTE ADULT - PATIENT PORTAL LINK FT
“You can access the [employer] Patient Portal, offered by [facility], by registering with the following website: [email or web address]”

## 2018-01-18 NOTE — PROGRESS NOTE ADULT - SUBJECTIVE AND OBJECTIVE BOX
Patient is a 62y old  Male who presents with a chief complaint of L knee pain (17 Jan 2018 01:22)      SUBJECTIVE / OVERNIGHT EVENTS: Overnight events noted    ROS:  Resp: No cough or sputum production  CVS: No CP or palpitations or orthopnea  GI: no N/V/D  : no dysuria, hematuria  Neuro: no weakness or paresthesias  Heme: No petechiae or easy bruising  Msk: No joint pain or swelling  Skin: No rash or itching  All other ROS negative       MEDICATIONS  (STANDING):  aspirin  chewable 81 milliGRAM(s) Oral daily  carvedilol 12.5 milliGRAM(s) Oral every 12 hours  clopidogrel Tablet 75 milliGRAM(s) Oral daily  dextrose 50% Injectable 12.5 Gram(s) IV Push once  dextrose 50% Injectable 25 Gram(s) IV Push once  heparin  Injectable 5000 Unit(s) SubCutaneous every 8 hours  hydrALAZINE 100 milliGRAM(s) Oral every 8 hours  insulin lispro (HumaLOG) corrective regimen sliding scale   SubCutaneous three times a day before meals  insulin lispro (HumaLOG) corrective regimen sliding scale   SubCutaneous at bedtime  torsemide 10 milliGRAM(s) Oral two times a day    MEDICATIONS  (PRN):  dextrose Gel 1 Dose(s) Oral once PRN Blood Glucose LESS THAN 70 milliGRAM(s)/deciliter  glucagon  Injectable 1 milliGRAM(s) IntraMuscular once PRN Glucose LESS THAN 70 milligrams/deciliter  HYDROmorphone  Injectable 1 milliGRAM(s) IV Push every 6 hours PRN Severe Pain (7 - 10)  oxyCODONE    5 mG/acetaminophen 325 mG 1 Tablet(s) Oral every 6 hours PRN Moderate Pain (4 - 6)  simethicone 80 milliGRAM(s) Chew three times a day PRN Gas        CAPILLARY BLOOD GLUCOSE      POCT Blood Glucose.: 235 mg/dL (18 Jan 2018 11:50)  POCT Blood Glucose.: 133 mg/dL (18 Jan 2018 08:54)  POCT Blood Glucose.: 164 mg/dL (17 Jan 2018 22:24)  POCT Blood Glucose.: 192 mg/dL (17 Jan 2018 17:45)    I&O's Summary      PHYSICAL EXAM:  GENERAL: NAD, well-developed  HEAD:  Atraumatic, Normocephalic  EYES: EOMI, PERRLA, conjunctiva and sclera clear  NECK: Supple, No JVD  CHEST/LUNG: no rhonchi, no wheeze, clear to auscultation bilaterally  HEART: S1 S2; No rubs or gallops, no murmurs  ABDOMEN: Soft, Nontender, Nondistended; Bowel sounds present  EXTREMITIES:  No clubbing or cyanosis, + Peripheral Pulses,  no edema  left knee much less swollen  less tender  PSYCH: AO x 3 appropriate affect  NEUROLOGY: non-focal, motor and sensory systems intact  SKIN: No rashes or lesions    LABS:                        15.3   24.20 )-----------( 242      ( 18 Jan 2018 06:18 )             44.2     01-18    135  |  95<L>  |  53<H>  ----------------------------<  139<H>  4.6   |  22  |  2.79<H>    Ca    9.3      18 Jan 2018 06:18  Phos  2.9     01-17  Mg     2.0     01-17    TPro  7.2  /  Alb  3.7  /  TBili  0.4  /  DBili  x   /  AST  16  /  ALT  9   /  AlkPhos  78  01-17                Consultant(s) Notes Reviewed:      Care Discussed with Consultants/Other Providers:    Contact Number, Dr Ching [phone]

## 2018-01-18 NOTE — DISCHARGE NOTE ADULT - SECONDARY DIAGNOSIS.
Knee swelling Type 2 diabetes mellitus with complication, without long-term current use of insulin HTN (hypertension) Hyperlipidemia, unspecified hyperlipidemia type CAD (coronary artery disease)

## 2018-01-18 NOTE — PROGRESS NOTE ADULT - PROBLEM SELECTOR PLAN 4
resume home meds  glucose will settle down in a few days no acute exacerbation noted  continue home meds on discharge

## 2018-01-18 NOTE — PROGRESS NOTE ADULT - PROBLEM SELECTOR PLAN 5
stage 4 CKD  will follow up with renal as outpatient resume home meds  glucose will settle down in a few days

## 2018-01-18 NOTE — DISCHARGE NOTE ADULT - PLAN OF CARE
improved with tolerable pain control and increased range of motion You had a knee aspiration in the emergency department.  The fluid was reviewed and you were given 3 days of steroids to assist in the arthritic findings and to assist in healing and pain reduction. Follow up with your PCP for further evaluation of knee swelling and or pain. Continue consistent carbohydrate diet.  Monitor blood glucose levels throughout the day before meals and at bedtime.  Record blood sugars and bring to outpatient providers appointment in order to be reviewed by your doctor for management modifications.  Be aware of diabetes management symptoms including feeling cool and clammy may be related to low glucose levels.  Feeling hot and dry may indicate high glucose levels.  If  you feel these symptoms, check your blood sugar.  Make regular podiatry appointments in order to have feet checked for wounds and toe nails cut by a doctor to prevent infections. Continue blood pressure medication regimen as directed. Monitor for any visual changes, headaches or dizziness.  Monitor blood pressure regularly.  Follow up with your PCP for further mangement for pj blood pressure. Continue cholesterol control medications. Continue DASH diet. Follow up with your PCP within 1 week of discharge for further management and monitoring of lipid and cholesterol panels. Continue aspirin and plavix as you were previously taking.  Follow up with your cardiologist within 2 weeks of discharge.

## 2018-01-18 NOTE — DISCHARGE NOTE ADULT - HOSPITAL COURSE
61 y/o M with CAD, DM, HTN, CVA, BPH, CKD p/w L knee pain and swelling with arthrocentesis showing some crystals, and nucleated cell count of 1700.      Problem/Plan - 1:  ·  Problem: Acute arthritis.  Plan: likely secondary to gout  few crystals seen in fluid  gram stain and culture negative  s/p 3 doses of prednisone  discontinue on discharge   will follow up with PCP on discharge.      Problem/Plan - 2:  ·  Problem: Leukocytosis. Plan: secondary to prednisone  will resolve off steroids.     Problem/Plan - 3:  ·  Problem: Coronary artery disease involving native coronary artery of native heart without angina pectoris. Plan: - continue Plavix and ASA.     Problem/Plan - 4:  ·  Problem: Chronic systolic congestive heart failure. Plan: no acute exacerbation noted  continue home meds on discharge.     Problem/Plan - 5:  ·  Problem: Type 2 diabetes mellitus with complication, without long-term current use of insulin. Plan: resume home meds  glucose will settle down in a few days.     Problem/Plan - 6:  Problem: CKD (chronic kidney disease), stage IV. Plan: stage 4 CKD  will follow up with renal as outpatient.    Attending Attestation:   disposition per PT eval (no skilled PT needs).    dispo; home with no skilled PT needs - patient supplied with rolling walker 63 y/o M with CAD, DM, HTN, CVA, BPH, CKD p/w L knee pain and swelling with arthrocentesis showing some crystals, and nucleated cell count of 1700.      Acute arthritis.  Plan: likely secondary to gout  few crystals seen in fluid  gram stain and culture negative  s/p 3 doses of prednisone  discontinue on discharge   will follow up with PCP on discharge.       Leukocytosis. Plan: secondary to prednisone  will resolve off steroids.  -resolved      Coronary artery disease involving native coronary artery of native heart without angina pectoris. Plan: - continue Plavix and ASA.     Chronic systolic congestive heart failure. Plan: no acute exacerbation noted  continue home meds on discharge.     Type 2 diabetes mellitus with complication, without long-term current use of insulin. Plan: resume home meds  glucose will settle down in a few days.    CKD (chronic kidney disease), stage IV. Plan: stage 4 CKD  will follow up with renal as outpatient.          dispo; home with no skilled PT needs - patient supplied with rolling walker

## 2018-01-18 NOTE — DISCHARGE NOTE ADULT - HOME CARE AGENCY
[place] [phone] for RN, PT SW. Resumption of care with [facility]  fopr 49hrs aide services

## 2018-01-18 NOTE — PROGRESS NOTE ADULT - PROBLEM SELECTOR PROBLEM 4
Type 2 diabetes mellitus with complication, without long-term current use of insulin Chronic systolic congestive heart failure

## 2018-01-18 NOTE — DISCHARGE NOTE ADULT - MEDICATION SUMMARY - MEDICATIONS TO TAKE
I will START or STAY ON the medications listed below when I get home from the hospital:    aspirin 81 mg oral tablet  -- 1 tab(s) by mouth once a day  -- Indication: For CAD (coronary artery disease)    oxyCODONE-acetaminophen 5 mg-325 mg oral tablet  -- 1 tab(s) by mouth every 6 hours, As needed, Moderate Pain (4 - 6) MDD:4 tablets  -- Indication: For Knee pain    tamsulosin 0.4 mg oral capsule  -- 1 cap(s) by mouth once a day  -- Indication: For BPH (benign prostatic hyperplasia)    isosorbide mononitrate 30 mg oral tablet, extended release  -- 1 tab(s) by mouth once a day (in the morning)  -- Indication: For HTN (hypertension)    gabapentin 800 mg oral tablet  -- 1 tab(s) by mouth 3 times a day  -- Indication: For neuropathic pain    glimepiride 2 mg oral tablet  -- 1 tab(s) by mouth once a day  -- Indication: For Type 2 diabetes mellitus with complication, without long-term current use of insulin    atorvastatin 40 mg oral tablet  -- 1 tab(s) by mouth once a day  -- Indication: For Hyperlipidemia, unspecified hyperlipidemia type    Plavix 75 mg oral tablet  -- 1 tab(s) by mouth once a day  -- Indication: For Coronary artery disease involving native coronary artery of native heart without angina pectoris    Coreg 12.5 mg oral tablet  -- 1 tab(s) by mouth 2 times a day  -- Indication: For HTN (hypertension)    torsemide 10 mg oral tablet  -- 1 tab(s) by mouth 2 times a day  -- Indication: For CHF (congestive heart failure)    docusate sodium 100 mg oral capsule  -- 1 cap(s) by mouth 3 times a day  -- Indication: For Constipation    montelukast 10 mg oral tablet  -- 1 tab(s) by mouth once a day  -- Indication: For Allergy    Klor-Con 10 mEq oral tablet, extended release  -- 1 tab(s) by mouth once a day  -- Indication: For Supplement    hydrALAZINE 100 mg oral tablet  -- 1 tab(s) by mouth 3 times a day  -- Indication: For Hypertension I will START or STAY ON the medications listed below when I get home from the hospital:    Protonix   -- 40 milligram(s) by mouth 2 times a day   -- Indication: For Ulcer    Sucralfate  -- 1 gram(s) by mouth 2 times a day   -- Indication: For Ulcer    oxyCODONE-acetaminophen 5 mg-325 mg oral tablet  -- 1 tab(s) by mouth every 6 hours, As needed, Moderate Pain (4 - 6) MDD:4 tablets  -- Indication: For Knee pain    aspirin 81 mg oral tablet  -- 1 tab(s) by mouth once a day  -- Indication: For CAD (coronary artery disease)    tamsulosin 0.4 mg oral capsule  -- 1 cap(s) by mouth once a day  -- Indication: For BPH (benign prostatic hyperplasia)    isosorbide mononitrate 30 mg oral tablet, extended release  -- 1 tab(s) by mouth once a day (in the morning)  -- Indication: For HTN (hypertension)    gabapentin 800 mg oral tablet  -- 1 tab(s) by mouth 3 times a day  -- Indication: For neuropathic pain    glimepiride 2 mg oral tablet  -- 1 tab(s) by mouth once a day  -- Indication: For Type 2 diabetes mellitus with complication, without long-term current use of insulin    atorvastatin 40 mg oral tablet  -- 1 tab(s) by mouth once a day  -- Indication: For Hyperlipidemia, unspecified hyperlipidemia type    Plavix 75 mg oral tablet  -- 1 tab(s) by mouth once a day  -- Indication: For Coronary artery disease involving native coronary artery of native heart without angina pectoris    Coreg 12.5 mg oral tablet  -- 1 tab(s) by mouth 2 times a day  -- Indication: For HTN (hypertension)    torsemide 10 mg oral tablet  -- 1 tab(s) by mouth 2 times a day  -- Indication: For CHF (congestive heart failure)    docusate sodium 100 mg oral capsule  -- 1 cap(s) by mouth 3 times a day  -- Indication: For Constipation    montelukast 10 mg oral tablet  -- 1 tab(s) by mouth once a day  -- Indication: For Allergy    Klor-Con 10 mEq oral tablet, extended release  -- 1 tab(s) by mouth once a day  -- Indication: For Supplement    hydrALAZINE 100 mg oral tablet  -- 1 tab(s) by mouth 3 times a day  -- Indication: For Hypertension I will START or STAY ON the medications listed below when I get home from the hospital:    aspirin 81 mg oral tablet  -- 1 tab(s) by mouth once a day  -- Indication: For CAD (coronary artery disease)    oxyCODONE-acetaminophen 5 mg-325 mg oral tablet  -- 1 tab(s) by mouth every 6 hours, As needed, Moderate Pain (4 - 6) MDD:4 tablets  -- Indication: For Knee pain    tamsulosin 0.4 mg oral capsule  -- 1 cap(s) by mouth once a day  -- Indication: For BPH (benign prostatic hyperplasia)    isosorbide mononitrate 30 mg oral tablet, extended release  -- 1 tab(s) by mouth once a day (in the morning)  -- Indication: For HTN (hypertension)    gabapentin 800 mg oral tablet  -- 1 tab(s) by mouth 3 times a day  -- Indication: For neuropathic pain    glimepiride 2 mg oral tablet  -- 1 tab(s) by mouth once a day  -- Indication: For Type 2 diabetes mellitus with complication, without long-term current use of insulin    atorvastatin 40 mg oral tablet  -- 1 tab(s) by mouth once a day  -- Indication: For Hyperlipidemia, unspecified hyperlipidemia type    Plavix 75 mg oral tablet  -- 1 tab(s) by mouth once a day  -- Indication: For Coronary artery disease involving native coronary artery of native heart without angina pectoris    Coreg 12.5 mg oral tablet  -- 1 tab(s) by mouth 2 times a day  -- Indication: For HTN (hypertension)    torsemide 10 mg oral tablet  -- 1 tab(s) by mouth 2 times a day  -- Indication: For CHF (congestive heart failure)    docusate sodium 100 mg oral capsule  -- 1 cap(s) by mouth 3 times a day  -- Indication: For Constipation    montelukast 10 mg oral tablet  -- 1 tab(s) by mouth once a day  -- Indication: For Allergy    Klor-Con 10 mEq oral tablet, extended release  -- 1 tab(s) by mouth once a day  -- Indication: For Supplement    Carafate 1 g oral tablet  -- 1 tab(s) by mouth 2 times a day MDD:2  -- Do not take dairy products, antacids, or iron preparations within one hour of this medication.  It is very important that you take or use this exactly as directed.  Do not skip doses or discontinue unless directed by your doctor.  Take medication on an empty stomach 1 hour before or 2 to 3 hours after a meal unless otherwise directed by your doctor.    -- Indication: For Ulcer     Protonix 40 mg oral delayed release tablet  -- 40 tab(s) by mouth 2 times a day MDD:1  -- It is very important that you take or use this exactly as directed.  Do not skip doses or discontinue unless directed by your doctor.  Obtain medical advice before taking any non-prescription drugs as some may affect the action of this medication.  Swallow whole.  Do not crush.    -- Indication: For Ulcer     hydrALAZINE 100 mg oral tablet  -- 1 tab(s) by mouth 3 times a day  -- Indication: For Hypertension

## 2018-01-18 NOTE — PROGRESS NOTE ADULT - PROBLEM SELECTOR PROBLEM 2
Coronary artery disease involving native coronary artery of native heart without angina pectoris Leukocytosis

## 2018-01-18 NOTE — PROGRESS NOTE ADULT - ASSESSMENT
61 y/o M with CAD, DM, HTN, CVA, BPH, CKD p/w L knee pain and swelling with arthrocentesis showing some crystals, and nucleated cell count of 1700.

## 2018-01-18 NOTE — DISCHARGE NOTE ADULT - CARE PLAN
Principal Discharge DX:	Acute arthritis  Goal:	improved with tolerable pain control and increased range of motion  Assessment and plan of treatment:	You had a knee aspiration in the emergency department.  The fluid was reviewed and you were given 3 days of steroids to assist in the arthritic findings and to assist in healing and pain reduction.  Secondary Diagnosis:	Knee swelling  Assessment and plan of treatment:	Follow up with your PCP for further evaluation of knee swelling and or pain.  Secondary Diagnosis:	Type 2 diabetes mellitus with complication, without long-term current use of insulin  Assessment and plan of treatment:	Continue consistent carbohydrate diet.  Monitor blood glucose levels throughout the day before meals and at bedtime.  Record blood sugars and bring to outpatient providers appointment in order to be reviewed by your doctor for management modifications.  Be aware of diabetes management symptoms including feeling cool and clammy may be related to low glucose levels.  Feeling hot and dry may indicate high glucose levels.  If  you feel these symptoms, check your blood sugar.  Make regular podiatry appointments in order to have feet checked for wounds and toe nails cut by a doctor to prevent infections.  Secondary Diagnosis:	HTN (hypertension)  Assessment and plan of treatment:	Continue blood pressure medication regimen as directed. Monitor for any visual changes, headaches or dizziness.  Monitor blood pressure regularly.  Follow up with your PCP for further mangement for pj blood pressure.  Secondary Diagnosis:	Hyperlipidemia, unspecified hyperlipidemia type  Assessment and plan of treatment:	Continue cholesterol control medications. Continue DASH diet. Follow up with your PCP within 1 week of discharge for further management and monitoring of lipid and cholesterol panels.  Secondary Diagnosis:	CAD (coronary artery disease)  Assessment and plan of treatment:	Continue aspirin and plavix as you were previously taking.  Follow up with your cardiologist within 2 weeks of discharge.

## 2018-01-18 NOTE — PROGRESS NOTE ADULT - PROBLEM SELECTOR PROBLEM 3
Chronic systolic congestive heart failure Coronary artery disease involving native coronary artery of native heart without angina pectoris

## 2018-01-18 NOTE — PROGRESS NOTE ADULT - PROBLEM SELECTOR PROBLEM 5
CKD (chronic kidney disease), stage IV Type 2 diabetes mellitus with complication, without long-term current use of insulin

## 2018-01-19 DIAGNOSIS — K92.0 HEMATEMESIS: ICD-10-CM

## 2018-01-19 DIAGNOSIS — K92.2 GASTROINTESTINAL HEMORRHAGE, UNSPECIFIED: ICD-10-CM

## 2018-01-19 LAB
BLD GP AB SCN SERPL QL: NEGATIVE — SIGNIFICANT CHANGE UP
BUN SERPL-MCNC: 63 MG/DL — HIGH (ref 7–23)
CALCIUM SERPL-MCNC: 9.2 MG/DL — SIGNIFICANT CHANGE UP (ref 8.4–10.5)
CHLORIDE SERPL-SCNC: 94 MMOL/L — LOW (ref 98–107)
CO2 SERPL-SCNC: 25 MMOL/L — SIGNIFICANT CHANGE UP (ref 22–31)
CREAT SERPL-MCNC: 2.61 MG/DL — HIGH (ref 0.5–1.3)
GLUCOSE BLDC GLUCOMTR-MCNC: 147 MG/DL — HIGH (ref 70–99)
GLUCOSE BLDC GLUCOMTR-MCNC: 169 MG/DL — HIGH (ref 70–99)
GLUCOSE BLDC GLUCOMTR-MCNC: 175 MG/DL — HIGH (ref 70–99)
GLUCOSE BLDC GLUCOMTR-MCNC: 175 MG/DL — HIGH (ref 70–99)
GLUCOSE SERPL-MCNC: 146 MG/DL — HIGH (ref 70–99)
HCT VFR BLD CALC: 45.1 % — SIGNIFICANT CHANGE UP (ref 39–50)
HCT VFR BLD CALC: 51.2 % — HIGH (ref 39–50)
HGB BLD-MCNC: 15.3 G/DL — SIGNIFICANT CHANGE UP (ref 13–17)
HGB BLD-MCNC: 17.4 G/DL — HIGH (ref 13–17)
MCHC RBC-ENTMCNC: 30.1 PG — SIGNIFICANT CHANGE UP (ref 27–34)
MCHC RBC-ENTMCNC: 30.1 PG — SIGNIFICANT CHANGE UP (ref 27–34)
MCHC RBC-ENTMCNC: 33.9 % — SIGNIFICANT CHANGE UP (ref 32–36)
MCHC RBC-ENTMCNC: 34 % — SIGNIFICANT CHANGE UP (ref 32–36)
MCV RBC AUTO: 88.6 FL — SIGNIFICANT CHANGE UP (ref 80–100)
MCV RBC AUTO: 88.8 FL — SIGNIFICANT CHANGE UP (ref 80–100)
NRBC # FLD: 0 — SIGNIFICANT CHANGE UP
NRBC # FLD: 0 — SIGNIFICANT CHANGE UP
PLATELET # BLD AUTO: 265 K/UL — SIGNIFICANT CHANGE UP (ref 150–400)
PLATELET # BLD AUTO: 291 K/UL — SIGNIFICANT CHANGE UP (ref 150–400)
PMV BLD: 10 FL — SIGNIFICANT CHANGE UP (ref 7–13)
PMV BLD: 9.6 FL — SIGNIFICANT CHANGE UP (ref 7–13)
POTASSIUM SERPL-MCNC: 3.7 MMOL/L — SIGNIFICANT CHANGE UP (ref 3.5–5.3)
POTASSIUM SERPL-SCNC: 3.7 MMOL/L — SIGNIFICANT CHANGE UP (ref 3.5–5.3)
RBC # BLD: 5.08 M/UL — SIGNIFICANT CHANGE UP (ref 4.2–5.8)
RBC # BLD: 5.78 M/UL — SIGNIFICANT CHANGE UP (ref 4.2–5.8)
RBC # FLD: 13.6 % — SIGNIFICANT CHANGE UP (ref 10.3–14.5)
RBC # FLD: 13.8 % — SIGNIFICANT CHANGE UP (ref 10.3–14.5)
RH IG SCN BLD-IMP: POSITIVE — SIGNIFICANT CHANGE UP
SODIUM SERPL-SCNC: 138 MMOL/L — SIGNIFICANT CHANGE UP (ref 135–145)
WBC # BLD: 20.51 K/UL — HIGH (ref 3.8–10.5)
WBC # BLD: 21.31 K/UL — HIGH (ref 3.8–10.5)
WBC # FLD AUTO: 20.51 K/UL — HIGH (ref 3.8–10.5)
WBC # FLD AUTO: 21.31 K/UL — HIGH (ref 3.8–10.5)

## 2018-01-19 PROCEDURE — 74176 CT ABD & PELVIS W/O CONTRAST: CPT | Mod: 26

## 2018-01-19 PROCEDURE — 74019 RADEX ABDOMEN 2 VIEWS: CPT | Mod: 26

## 2018-01-19 RX ORDER — PANTOPRAZOLE SODIUM 20 MG/1
40 TABLET, DELAYED RELEASE ORAL
Qty: 0 | Refills: 0 | Status: DISCONTINUED | OUTPATIENT
Start: 2018-01-20 | End: 2018-01-23

## 2018-01-19 RX ORDER — PANTOPRAZOLE SODIUM 20 MG/1
40 TABLET, DELAYED RELEASE ORAL
Qty: 0 | Refills: 0 | Status: DISCONTINUED | OUTPATIENT
Start: 2018-01-19 | End: 2018-01-19

## 2018-01-19 RX ORDER — PANTOPRAZOLE SODIUM 20 MG/1
80 TABLET, DELAYED RELEASE ORAL ONCE
Qty: 0 | Refills: 0 | Status: COMPLETED | OUTPATIENT
Start: 2018-01-19 | End: 2018-01-19

## 2018-01-19 RX ORDER — ONDANSETRON 8 MG/1
4 TABLET, FILM COATED ORAL ONCE
Qty: 0 | Refills: 0 | Status: COMPLETED | OUTPATIENT
Start: 2018-01-19 | End: 2018-01-19

## 2018-01-19 RX ADMIN — HYDROMORPHONE HYDROCHLORIDE 1 MILLIGRAM(S): 2 INJECTION INTRAMUSCULAR; INTRAVENOUS; SUBCUTANEOUS at 09:35

## 2018-01-19 RX ADMIN — HYDROMORPHONE HYDROCHLORIDE 1 MILLIGRAM(S): 2 INJECTION INTRAMUSCULAR; INTRAVENOUS; SUBCUTANEOUS at 17:30

## 2018-01-19 RX ADMIN — Medication 81 MILLIGRAM(S): at 14:09

## 2018-01-19 RX ADMIN — Medication 10 MILLIGRAM(S): at 17:50

## 2018-01-19 RX ADMIN — Medication 1: at 17:51

## 2018-01-19 RX ADMIN — HYDROMORPHONE HYDROCHLORIDE 1 MILLIGRAM(S): 2 INJECTION INTRAMUSCULAR; INTRAVENOUS; SUBCUTANEOUS at 09:20

## 2018-01-19 RX ADMIN — HYDROMORPHONE HYDROCHLORIDE 1 MILLIGRAM(S): 2 INJECTION INTRAMUSCULAR; INTRAVENOUS; SUBCUTANEOUS at 17:13

## 2018-01-19 RX ADMIN — Medication 100 MILLIGRAM(S): at 22:07

## 2018-01-19 RX ADMIN — HEPARIN SODIUM 5000 UNIT(S): 5000 INJECTION INTRAVENOUS; SUBCUTANEOUS at 05:52

## 2018-01-19 RX ADMIN — Medication 10 MILLIGRAM(S): at 05:53

## 2018-01-19 RX ADMIN — SIMETHICONE 80 MILLIGRAM(S): 80 TABLET, CHEWABLE ORAL at 05:57

## 2018-01-19 RX ADMIN — Medication 100 MILLIGRAM(S): at 05:53

## 2018-01-19 RX ADMIN — CARVEDILOL PHOSPHATE 12.5 MILLIGRAM(S): 80 CAPSULE, EXTENDED RELEASE ORAL at 17:50

## 2018-01-19 RX ADMIN — Medication 100 MILLIGRAM(S): at 14:09

## 2018-01-19 RX ADMIN — Medication: at 13:54

## 2018-01-19 RX ADMIN — PANTOPRAZOLE SODIUM 80 MILLIGRAM(S): 20 TABLET, DELAYED RELEASE ORAL at 14:48

## 2018-01-19 RX ADMIN — CLOPIDOGREL BISULFATE 75 MILLIGRAM(S): 75 TABLET, FILM COATED ORAL at 14:09

## 2018-01-19 RX ADMIN — CARVEDILOL PHOSPHATE 12.5 MILLIGRAM(S): 80 CAPSULE, EXTENDED RELEASE ORAL at 05:53

## 2018-01-19 RX ADMIN — ONDANSETRON 4 MILLIGRAM(S): 8 TABLET, FILM COATED ORAL at 09:20

## 2018-01-19 NOTE — PROGRESS NOTE ADULT - PROBLEM SELECTOR PLAN 2
likely secondary to gout  few crystals seen in fluid  gram stain and culture negative  s/p 3 doses of prednisone  discontinue on discharge   will follow up with PCP on discharge

## 2018-01-19 NOTE — PROGRESS NOTE ADULT - SUBJECTIVE AND OBJECTIVE BOX
Patient is a 62y old  Male who presents with a chief complaint of L knee pain    SUBJECTIVE / OVERNIGHT EVENTS: Overnight events noted    ROS:  Resp: No cough or sputum production  CVS: No CP or palpitations or orthopnea  GI: positive for coffee ground emesis x 2 episodes  : no dysuria, hematuria  Neuro: no weakness or paresthesias  Heme: No petechiae or easy bruising  Msk: No joint pain or swelling, left knee no pain today  Skin: No rash or itching  All other ROS negative       MEDICATIONS  (STANDING):  carvedilol 12.5 milliGRAM(s) Oral every 12 hours  dextrose 50% Injectable 12.5 Gram(s) IV Push once  dextrose 50% Injectable 25 Gram(s) IV Push once  hydrALAZINE 100 milliGRAM(s) Oral every 8 hours  insulin lispro (HumaLOG) corrective regimen sliding scale   SubCutaneous three times a day before meals  insulin lispro (HumaLOG) corrective regimen sliding scale   SubCutaneous at bedtime  pantoprazole  Injectable 40 milliGRAM(s) IV Push two times a day  torsemide 10 milliGRAM(s) Oral two times a day    MEDICATIONS  (PRN):  dextrose Gel 1 Dose(s) Oral once PRN Blood Glucose LESS THAN 70 milliGRAM(s)/deciliter  glucagon  Injectable 1 milliGRAM(s) IntraMuscular once PRN Glucose LESS THAN 70 milligrams/deciliter  HYDROmorphone  Injectable 1 milliGRAM(s) IV Push every 6 hours PRN Severe Pain (7 - 10)  oxyCODONE    5 mG/acetaminophen 325 mG 1 Tablet(s) Oral every 6 hours PRN Moderate Pain (4 - 6)  simethicone 80 milliGRAM(s) Chew three times a day PRN Gas        CAPILLARY BLOOD GLUCOSE      POCT Blood Glucose.: 169 mg/dL (19 Jan 2018 12:04)  POCT Blood Glucose.: 147 mg/dL (19 Jan 2018 09:27)  POCT Blood Glucose.: 149 mg/dL (18 Jan 2018 23:19)  POCT Blood Glucose.: 154 mg/dL (18 Jan 2018 17:47)    I&O's Summary    18 Jan 2018 07:01  -  19 Jan 2018 07:00  --------------------------------------------------------  IN: 0 mL / OUT: 500 mL / NET: -500 mL        PHYSICAL EXAM:  GENERAL: NAD, well-developed in some distress secondary to nausea  HEAD:  Atraumatic, Normocephalic  EYES: EOMI, PERRLA, conjunctiva and sclera clear  NECK: Supple, No JVD  CHEST/LUNG: no rhonchi, no wheeze, clear to auscultation bilaterally  HEART: S1 S2; No rubs or gallops, no murmurs  ABDOMEN: Soft, Nontender, Nondistended; Bowel sounds present  EXTREMITIES:  No clubbing or cyanosis, + Peripheral Pulses,  no edema  PSYCH: AO x 3 appropriate affect  NEUROLOGY: non-focal, motor and sensory systems intact  SKIN: No rashes or lesions    LABS:                        15.3   21.31 )-----------( 265      ( 18 Jan 2018 21:40 )             45.1     01-18    138  |  94<L>  |  63<H>  ----------------------------<  146<H>  3.7   |  25  |  2.61<H>    Ca    9.2      18 Jan 2018 21:40  Phos  2.9     01-17  Mg     2.0     01-17    TPro  7.2  /  Alb  3.7  /  TBili  0.4  /  DBili  x   /  AST  16  /  ALT  9   /  AlkPhos  78  01-17      Consultant(s) Notes Reviewed:      Care Discussed with Consultants/Other Providers:    Contact Number, Dr Ching [phone] Patient is a 62y old  Male who presents with a chief complaint of L knee pain    SUBJECTIVE / OVERNIGHT EVENTS: Overnight events noted    ROS:  Resp: No cough or sputum production  CVS: No CP or palpitations or orthopnea  GI: positive for coffee ground emesis x 2 episodes  : no dysuria, hematuria  Neuro: no weakness or paresthesias  Heme: No petechiae or easy bruising  Msk: No joint pain or swelling, left knee no pain today  Skin: No rash or itching  All other ROS negative       MEDICATIONS  (STANDING):  carvedilol 12.5 milliGRAM(s) Oral every 12 hours  dextrose 50% Injectable 12.5 Gram(s) IV Push once  dextrose 50% Injectable 25 Gram(s) IV Push once  hydrALAZINE 100 milliGRAM(s) Oral every 8 hours  insulin lispro (HumaLOG) corrective regimen sliding scale   SubCutaneous three times a day before meals  insulin lispro (HumaLOG) corrective regimen sliding scale   SubCutaneous at bedtime  pantoprazole  Injectable 40 milliGRAM(s) IV Push two times a day  torsemide 10 milliGRAM(s) Oral two times a day    MEDICATIONS  (PRN):  dextrose Gel 1 Dose(s) Oral once PRN Blood Glucose LESS THAN 70 milliGRAM(s)/deciliter  glucagon  Injectable 1 milliGRAM(s) IntraMuscular once PRN Glucose LESS THAN 70 milligrams/deciliter  HYDROmorphone  Injectable 1 milliGRAM(s) IV Push every 6 hours PRN Severe Pain (7 - 10)  oxyCODONE    5 mG/acetaminophen 325 mG 1 Tablet(s) Oral every 6 hours PRN Moderate Pain (4 - 6)  simethicone 80 milliGRAM(s) Chew three times a day PRN Gas        CAPILLARY BLOOD GLUCOSE      POCT Blood Glucose.: 169 mg/dL (19 Jan 2018 12:04)  POCT Blood Glucose.: 147 mg/dL (19 Jan 2018 09:27)  POCT Blood Glucose.: 149 mg/dL (18 Jan 2018 23:19)  POCT Blood Glucose.: 154 mg/dL (18 Jan 2018 17:47)    I&O's Summary    18 Jan 2018 07:01  -  19 Jan 2018 07:00  --------------------------------------------------------  IN: 0 mL / OUT: 500 mL / NET: -500 mL        PHYSICAL EXAM:  GENERAL: NAD, well-developed in some distress secondary to nausea  HEAD:  Atraumatic, Normocephalic  EYES: EOMI, PERRLA, conjunctiva and sclera clear  NECK: Supple, No JVD  CHEST/LUNG: no rhonchi, no wheeze, clear to auscultation bilaterally  HEART: S1 S2; No rubs or gallops, no murmurs  ABDOMEN: Soft, Nontender, ++ distension; Bowel sounds present  EXTREMITIES:  No clubbing or cyanosis, + Peripheral Pulses,  no edema  PSYCH: AO x 3 appropriate affect  NEUROLOGY: non-focal, motor and sensory systems intact  SKIN: No rashes or lesions    LABS:                        15.3   21.31 )-----------( 265      ( 18 Jan 2018 21:40 )             45.1     01-18    138  |  94<L>  |  63<H>  ----------------------------<  146<H>  3.7   |  25  |  2.61<H>    Ca    9.2      18 Jan 2018 21:40  Phos  2.9     01-17  Mg     2.0     01-17    TPro  7.2  /  Alb  3.7  /  TBili  0.4  /  DBili  x   /  AST  16  /  ALT  9   /  AlkPhos  78  01-17      Consultant(s) Notes Reviewed:      Care Discussed with Consultants/Other Providers:    Contact Number, Dr Ching [phone]

## 2018-01-19 NOTE — CONSULT NOTE ADULT - PROBLEM SELECTOR RECOMMENDATION 9
? unclear if coffee ground versus ileus  agree with iv proton pump inhibitor  hold asa/plavix  npo  CT abd/pelvis r/o ileus   will follow

## 2018-01-19 NOTE — CONSULT NOTE ADULT - SUBJECTIVE AND OBJECTIVE BOX
[facility]  Johnathan Galeano PA-C  [address] [place] [id number]  [phone]      Chief Complaint:  Patient is a 62y old  Male who presents with a chief complaint of L knee pain (18 Jan 2018 18:31)      HPI:    Allergies:  No Known Allergies      Medications:  carvedilol 12.5 milliGRAM(s) Oral every 12 hours  dextrose 50% Injectable 12.5 Gram(s) IV Push once  dextrose 50% Injectable 25 Gram(s) IV Push once  dextrose Gel 1 Dose(s) Oral once PRN  glucagon  Injectable 1 milliGRAM(s) IntraMuscular once PRN  hydrALAZINE 100 milliGRAM(s) Oral every 8 hours  HYDROmorphone  Injectable 1 milliGRAM(s) IV Push every 6 hours PRN  insulin lispro (HumaLOG) corrective regimen sliding scale   SubCutaneous three times a day before meals  insulin lispro (HumaLOG) corrective regimen sliding scale   SubCutaneous at bedtime  oxyCODONE    5 mG/acetaminophen 325 mG 1 Tablet(s) Oral every 6 hours PRN  simethicone 80 milliGRAM(s) Chew three times a day PRN  torsemide 10 milliGRAM(s) Oral two times a day      PMHX/PSHX:  BPH (benign prostatic hyperplasia)  CHF (congestive heart failure)  Hyperlipidemia, unspecified hyperlipidemia type  Diabetes  CAD (coronary artery disease)  HTN (hypertension)  S/P appendectomy  S/P CABG (coronary artery bypass graft)      Family history:  No pertinent family history in first degree relatives      Social History:     ROS:     General:  No wt loss, fevers, chills, night sweats, fatigue,   Eyes:  Good vision, no reported pain  ENT:  No sore throat, pain, runny nose, dysphagia  CV:  No pain, palpitations, hypo/hypertension  Resp:  No dyspnea, cough, tachypnea, wheezing  GI:  No pain, No nausea, No vomiting, No diarrhea, No constipation, No weight loss, No fever, No pruritis, No rectal bleeding, No tarry stools, No dysphagia,  :  No pain, bleeding, incontinence, nocturia  Muscle:  No pain, weakness  Neuro:  No weakness, tingling, memory problems  Psych:  No fatigue, insomnia, mood problems, depression  Endocrine:  No polyuria, polydipsia, cold/heat intolerance  Heme:  No petechiae, ecchymosis, easy bruisability  Skin:  No rash, tattoos, scars, edema      PHYSICAL EXAM:   Vital Signs:  Vital Signs Last 24 Hrs  T(C): 36.6 (19 Jan 2018 17:09), Max: 37.1 (18 Jan 2018 22:00)  T(F): 97.9 (19 Jan 2018 17:09), Max: 98.7 (18 Jan 2018 22:00)  HR: 95 (19 Jan 2018 17:09) (80 - 95)  BP: 117/74 (19 Jan 2018 17:09) (117/74 - 140/81)  BP(mean): --  RR: 17 (19 Jan 2018 17:09) (16 - 18)  SpO2: 96% (19 Jan 2018 17:09) (96% - 100%)  Daily     Daily     GENERAL:  Appears stated age, well-groomed, well-nourished, no distress  HEENT:  NC/AT,  conjunctivae clear and pink, no thyromegaly, nodules, adenopathy, no JVD, sclera -anicteric  CHEST:  Full & symmetric excursion, no increased effort, breath sounds clear  HEART:  Regular rhythm, S1, S2, no murmur/rub/S3/S4, no abdominal bruit, no edema  ABDOMEN:  Soft, non-tender, non-distended, normoactive bowel sounds,  no masses ,no hepato-splenomegaly, no signs of chronic liver disease  EXTEREMITIES:  no cyanosis,clubbing or edema  SKIN:  No rash/erythema/ecchymoses/petechiae/wounds/abscess/warm/dry  NEURO:  Alert, oriented, no asterixis, no tremor, no encephalopathy    LABS:                        17.4   20.51 )-----------( 291      ( 19 Jan 2018 14:06 )             51.2     01-18    138  |  94<L>  |  63<H>  ----------------------------<  146<H>  3.7   |  25  |  2.61<H>    Ca    9.2      18 Jan 2018 21:40                Imaging:

## 2018-01-20 LAB
BUN SERPL-MCNC: 79 MG/DL — HIGH (ref 7–23)
CALCIUM SERPL-MCNC: 8.5 MG/DL — SIGNIFICANT CHANGE UP (ref 8.4–10.5)
CHLORIDE SERPL-SCNC: 97 MMOL/L — LOW (ref 98–107)
CO2 SERPL-SCNC: 27 MMOL/L — SIGNIFICANT CHANGE UP (ref 22–31)
CREAT SERPL-MCNC: 3.17 MG/DL — HIGH (ref 0.5–1.3)
GLUCOSE BLDC GLUCOMTR-MCNC: 142 MG/DL — HIGH (ref 70–99)
GLUCOSE BLDC GLUCOMTR-MCNC: 149 MG/DL — HIGH (ref 70–99)
GLUCOSE BLDC GLUCOMTR-MCNC: 177 MG/DL — HIGH (ref 70–99)
GLUCOSE BLDC GLUCOMTR-MCNC: 233 MG/DL — HIGH (ref 70–99)
GLUCOSE SERPL-MCNC: 193 MG/DL — HIGH (ref 70–99)
HCT VFR BLD CALC: 46 % — SIGNIFICANT CHANGE UP (ref 39–50)
HGB BLD-MCNC: 16 G/DL — SIGNIFICANT CHANGE UP (ref 13–17)
MCHC RBC-ENTMCNC: 31.4 PG — SIGNIFICANT CHANGE UP (ref 27–34)
MCHC RBC-ENTMCNC: 34.8 % — SIGNIFICANT CHANGE UP (ref 32–36)
MCV RBC AUTO: 90.2 FL — SIGNIFICANT CHANGE UP (ref 80–100)
NRBC # FLD: 0 — SIGNIFICANT CHANGE UP
PLATELET # BLD AUTO: 245 K/UL — SIGNIFICANT CHANGE UP (ref 150–400)
PMV BLD: 9.9 FL — SIGNIFICANT CHANGE UP (ref 7–13)
POTASSIUM SERPL-MCNC: 3.3 MMOL/L — LOW (ref 3.5–5.3)
POTASSIUM SERPL-SCNC: 3.3 MMOL/L — LOW (ref 3.5–5.3)
RBC # BLD: 5.1 M/UL — SIGNIFICANT CHANGE UP (ref 4.2–5.8)
RBC # FLD: 13.8 % — SIGNIFICANT CHANGE UP (ref 10.3–14.5)
SODIUM SERPL-SCNC: 143 MMOL/L — SIGNIFICANT CHANGE UP (ref 135–145)
WBC # BLD: 24.49 K/UL — HIGH (ref 3.8–10.5)
WBC # FLD AUTO: 24.49 K/UL — HIGH (ref 3.8–10.5)

## 2018-01-20 RX ORDER — HEPARIN SODIUM 5000 [USP'U]/ML
5000 INJECTION INTRAVENOUS; SUBCUTANEOUS EVERY 8 HOURS
Qty: 0 | Refills: 0 | Status: DISCONTINUED | OUTPATIENT
Start: 2018-01-20 | End: 2018-01-23

## 2018-01-20 RX ORDER — SODIUM CHLORIDE 9 MG/ML
1000 INJECTION INTRAMUSCULAR; INTRAVENOUS; SUBCUTANEOUS
Qty: 0 | Refills: 0 | Status: DISCONTINUED | OUTPATIENT
Start: 2018-01-20 | End: 2018-01-21

## 2018-01-20 RX ADMIN — SODIUM CHLORIDE 75 MILLILITER(S): 9 INJECTION INTRAMUSCULAR; INTRAVENOUS; SUBCUTANEOUS at 09:16

## 2018-01-20 RX ADMIN — SIMETHICONE 80 MILLIGRAM(S): 80 TABLET, CHEWABLE ORAL at 21:24

## 2018-01-20 RX ADMIN — HEPARIN SODIUM 5000 UNIT(S): 5000 INJECTION INTRAVENOUS; SUBCUTANEOUS at 21:24

## 2018-01-20 RX ADMIN — CARVEDILOL PHOSPHATE 12.5 MILLIGRAM(S): 80 CAPSULE, EXTENDED RELEASE ORAL at 05:41

## 2018-01-20 RX ADMIN — Medication 10 MILLIGRAM(S): at 17:32

## 2018-01-20 RX ADMIN — HEPARIN SODIUM 5000 UNIT(S): 5000 INJECTION INTRAVENOUS; SUBCUTANEOUS at 13:06

## 2018-01-20 RX ADMIN — PANTOPRAZOLE SODIUM 40 MILLIGRAM(S): 20 TABLET, DELAYED RELEASE ORAL at 05:42

## 2018-01-20 RX ADMIN — PANTOPRAZOLE SODIUM 40 MILLIGRAM(S): 20 TABLET, DELAYED RELEASE ORAL at 17:32

## 2018-01-20 RX ADMIN — SODIUM CHLORIDE 75 MILLILITER(S): 9 INJECTION INTRAMUSCULAR; INTRAVENOUS; SUBCUTANEOUS at 17:33

## 2018-01-20 RX ADMIN — Medication 10 MILLIGRAM(S): at 05:41

## 2018-01-20 RX ADMIN — Medication 100 MILLIGRAM(S): at 05:42

## 2018-01-20 RX ADMIN — SIMETHICONE 80 MILLIGRAM(S): 80 TABLET, CHEWABLE ORAL at 12:14

## 2018-01-20 RX ADMIN — Medication 2: at 12:14

## 2018-01-20 RX ADMIN — SODIUM CHLORIDE 75 MILLILITER(S): 9 INJECTION INTRAMUSCULAR; INTRAVENOUS; SUBCUTANEOUS at 21:24

## 2018-01-20 RX ADMIN — Medication 100 MILLIGRAM(S): at 21:24

## 2018-01-20 RX ADMIN — CARVEDILOL PHOSPHATE 12.5 MILLIGRAM(S): 80 CAPSULE, EXTENDED RELEASE ORAL at 17:32

## 2018-01-20 RX ADMIN — Medication 100 MILLIGRAM(S): at 12:13

## 2018-01-20 NOTE — PROGRESS NOTE ADULT - PROBLEM SELECTOR PLAN 7
SHERRY on stage 4 CKD  likely secondary to vomiting and dehydration  started on IV NS  will follow up with renal as outpatient

## 2018-01-20 NOTE — PROGRESS NOTE ADULT - SUBJECTIVE AND OBJECTIVE BOX
[facility]  Johnathan Galeano PA-C  [address] [place] [address] [phone]      INTERVAL HPI/OVERNIGHT EVENTS:    no vomiting or abdomen pain  c/o hiccups    MEDICATIONS  (STANDING):  carvedilol 12.5 milliGRAM(s) Oral every 12 hours  dextrose 50% Injectable 12.5 Gram(s) IV Push once  dextrose 50% Injectable 25 Gram(s) IV Push once  heparin  Injectable 5000 Unit(s) SubCutaneous every 8 hours  hydrALAZINE 100 milliGRAM(s) Oral every 8 hours  insulin lispro (HumaLOG) corrective regimen sliding scale   SubCutaneous three times a day before meals  insulin lispro (HumaLOG) corrective regimen sliding scale   SubCutaneous at bedtime  pantoprazole  Injectable 40 milliGRAM(s) IV Push two times a day  sodium chloride 0.9%. 1000 milliLiter(s) (75 mL/Hr) IV Continuous <Continuous>  torsemide 10 milliGRAM(s) Oral two times a day    MEDICATIONS  (PRN):  dextrose Gel 1 Dose(s) Oral once PRN Blood Glucose LESS THAN 70 milliGRAM(s)/deciliter  glucagon  Injectable 1 milliGRAM(s) IntraMuscular once PRN Glucose LESS THAN 70 milligrams/deciliter  HYDROmorphone  Injectable 1 milliGRAM(s) IV Push every 6 hours PRN Severe Pain (7 - 10)  oxyCODONE    5 mG/acetaminophen 325 mG 1 Tablet(s) Oral every 6 hours PRN Moderate Pain (4 - 6)  simethicone 80 milliGRAM(s) Chew three times a day PRN Gas      Allergies    No Known Allergies    Intolerances        ROS:   General:  No wt loss, fevers, chills, night sweats, fatigue,   Eyes:  Good vision, no reported pain  ENT:  No sore throat, pain, runny nose, dysphagia  CV:  No pain, palpitations, hypo/hypertension  Resp:  No dyspnea, cough, tachypnea, wheezing  GI:  No pain, No nausea, No vomiting, No diarrhea, No constipation, No weight loss, No fever, No pruritis, No rectal bleeding, No tarry stools, No dysphagia,  :  No pain, bleeding, incontinence, nocturia  Muscle:  No pain, weakness  Neuro:  No weakness, tingling, memory problems  Psych:  No fatigue, insomnia, mood problems, depression  Endocrine:  No polyuria, polydipsia, cold/heat intolerance  Heme:  No petechiae, ecchymosis, easy bruisability  Skin:  No rash, tattoos, scars, edema      PHYSICAL EXAM:   Vital Signs:  Vital Signs Last 24 Hrs  T(C): 36.3 (20 Jan 2018 12:12), Max: 36.8 (20 Jan 2018 01:08)  T(F): 97.4 (20 Jan 2018 12:12), Max: 98.2 (20 Jan 2018 01:08)  HR: 91 (20 Jan 2018 12:12) (54 - 95)  BP: 114/72 (20 Jan 2018 12:12) (111/61 - 137/65)  BP(mean): --  RR: 18 (20 Jan 2018 12:12) (17 - 18)  SpO2: 100% (20 Jan 2018 12:12) (94% - 100%)  Daily     Daily     GENERAL:  Appears stated age, well-groomed, well-nourished, no distress  HEENT:  NC/AT,  conjunctivae clear and pink, no thyromegaly, nodules, adenopathy, no JVD, sclera -anicteric  CHEST:  Full & symmetric excursion, no increased effort, breath sounds clear  HEART:  Regular rhythm, S1, S2, no murmur/rub/S3/S4, no abdominal bruit, no edema  ABDOMEN:  Soft, non-tender, non-distended, normoactive bowel sounds,  no masses ,no hepato-splenomegaly, no signs of chronic liver disease  EXTEREMITIES:  no cyanosis,clubbing or edema  SKIN:  No rash/erythema/ecchymoses/petechiae/wounds/abscess/warm/dry  NEURO:  Alert, oriented, no asterixis, no tremor, no encephalopathy      LABS:                        16.0   24.49 )-----------( 245      ( 20 Jan 2018 05:30 )             46.0     01-20    143  |  97<L>  |  79<H>  ----------------------------<  193<H>  3.3<L>   |  27  |  3.17<H>    Ca    8.5      20 Jan 2018 05:30            RADIOLOGY & ADDITIONAL TESTS:

## 2018-01-20 NOTE — PROGRESS NOTE ADULT - SUBJECTIVE AND OBJECTIVE BOX
Patient is a 62y old  Male who presents with a chief complaint of L knee pain     SUBJECTIVE / OVERNIGHT EVENTS: Overnight events noted  appears to be more comfortable     ROS:  Resp: No cough or sputum production  CVS: No chest pain or palpitations or orthopnea  GI: no N/V/D, no BM yet  : no dysuria, hematuria  Neuro: no weakness or paresthesias  Heme: No petechiae or easy bruising  Msk: No joint pain or swelling  Skin: No rash or itching  All other ROS negative       MEDICATIONS  (STANDING):  carvedilol 12.5 milliGRAM(s) Oral every 12 hours  dextrose 50% Injectable 12.5 Gram(s) IV Push once  dextrose 50% Injectable 25 Gram(s) IV Push once  heparin  Injectable 5000 Unit(s) SubCutaneous every 8 hours  hydrALAZINE 100 milliGRAM(s) Oral every 8 hours  insulin lispro (HumaLOG) corrective regimen sliding scale   SubCutaneous three times a day before meals  insulin lispro (HumaLOG) corrective regimen sliding scale   SubCutaneous at bedtime  pantoprazole  Injectable 40 milliGRAM(s) IV Push two times a day  sodium chloride 0.9%. 1000 milliLiter(s) (75 mL/Hr) IV Continuous <Continuous>  torsemide 10 milliGRAM(s) Oral two times a day    MEDICATIONS  (PRN):  dextrose Gel 1 Dose(s) Oral once PRN Blood Glucose LESS THAN 70 milliGRAM(s)/deciliter  glucagon  Injectable 1 milliGRAM(s) IntraMuscular once PRN Glucose LESS THAN 70 milligrams/deciliter  HYDROmorphone  Injectable 1 milliGRAM(s) IV Push every 6 hours PRN Severe Pain (7 - 10)  oxyCODONE    5 mG/acetaminophen 325 mG 1 Tablet(s) Oral every 6 hours PRN Moderate Pain (4 - 6)  simethicone 80 milliGRAM(s) Chew three times a day PRN Gas        CAPILLARY BLOOD GLUCOSE      POCT Blood Glucose.: 149 mg/dL (20 Jan 2018 16:38)  POCT Blood Glucose.: 233 mg/dL (20 Jan 2018 12:04)  POCT Blood Glucose.: 177 mg/dL (20 Jan 2018 05:22)  POCT Blood Glucose.: 175 mg/dL (19 Jan 2018 22:03)  POCT Blood Glucose.: 175 mg/dL (19 Jan 2018 17:35)    I&O's Summary    19 Jan 2018 07:01  -  20 Jan 2018 07:00  --------------------------------------------------------  IN: 0 mL / OUT: 500 mL / NET: -500 mL    PHYSICAL EXAM:  GENERAL: NAD, well-developed in no distress  HEAD:  Atraumatic, Normocephalic  EYES: EOMI, PERRLA, conjunctiva and sclera clear  NECK: Supple, No JVD  CHEST/LUNG: no rhonchi, no wheeze, clear to auscultation bilaterally  HEART: S1 S2; No rubs or gallops, no murmurs  ABDOMEN: Soft, Nontender, less distension; Bowel sounds hypoactive  EXTREMITIES:  No clubbing or cyanosis, + Peripheral Pulses,  no edema  PSYCH: AO x 3 appropriate affect  NEUROLOGY: non-focal, motor and sensory systems intact  SKIN: No rashes or lesions  left knee not inflamed    LABS:                        16.0   24.49 )-----------( 245      ( 20 Jan 2018 05:30 )             46.0     01-20    143  |  97<L>  |  79<H>  ----------------------------<  193<H>  3.3<L>   |  27  |  3.17<H>    Ca    8.5      20 Jan 2018 05:30                  Consultant(s) Notes Reviewed:      Care Discussed with Consultants/Other Providers:    Contact Number, Dr Ching [phone]

## 2018-01-21 LAB
BUN SERPL-MCNC: 64 MG/DL — HIGH (ref 7–23)
CALCIUM SERPL-MCNC: 8.4 MG/DL — SIGNIFICANT CHANGE UP (ref 8.4–10.5)
CHLORIDE SERPL-SCNC: 98 MMOL/L — SIGNIFICANT CHANGE UP (ref 98–107)
CO2 SERPL-SCNC: 27 MMOL/L — SIGNIFICANT CHANGE UP (ref 22–31)
CREAT SERPL-MCNC: 2.79 MG/DL — HIGH (ref 0.5–1.3)
GLUCOSE BLDC GLUCOMTR-MCNC: 131 MG/DL — HIGH (ref 70–99)
GLUCOSE BLDC GLUCOMTR-MCNC: 132 MG/DL — HIGH (ref 70–99)
GLUCOSE BLDC GLUCOMTR-MCNC: 166 MG/DL — HIGH (ref 70–99)
GLUCOSE BLDC GLUCOMTR-MCNC: 197 MG/DL — HIGH (ref 70–99)
GLUCOSE SERPL-MCNC: 143 MG/DL — HIGH (ref 70–99)
HCT VFR BLD CALC: 45.7 % — SIGNIFICANT CHANGE UP (ref 39–50)
HGB BLD-MCNC: 15.6 G/DL — SIGNIFICANT CHANGE UP (ref 13–17)
MCHC RBC-ENTMCNC: 31.4 PG — SIGNIFICANT CHANGE UP (ref 27–34)
MCHC RBC-ENTMCNC: 34.1 % — SIGNIFICANT CHANGE UP (ref 32–36)
MCV RBC AUTO: 92 FL — SIGNIFICANT CHANGE UP (ref 80–100)
NRBC # FLD: 0 — SIGNIFICANT CHANGE UP
PLATELET # BLD AUTO: 227 K/UL — SIGNIFICANT CHANGE UP (ref 150–400)
PMV BLD: 10 FL — SIGNIFICANT CHANGE UP (ref 7–13)
POTASSIUM SERPL-MCNC: 3.4 MMOL/L — LOW (ref 3.5–5.3)
POTASSIUM SERPL-SCNC: 3.4 MMOL/L — LOW (ref 3.5–5.3)
RBC # BLD: 4.97 M/UL — SIGNIFICANT CHANGE UP (ref 4.2–5.8)
RBC # FLD: 13.7 % — SIGNIFICANT CHANGE UP (ref 10.3–14.5)
SODIUM SERPL-SCNC: 141 MMOL/L — SIGNIFICANT CHANGE UP (ref 135–145)
WBC # BLD: 19.86 K/UL — HIGH (ref 3.8–10.5)
WBC # FLD AUTO: 19.86 K/UL — HIGH (ref 3.8–10.5)

## 2018-01-21 RX ORDER — SODIUM CHLORIDE 9 MG/ML
1000 INJECTION INTRAMUSCULAR; INTRAVENOUS; SUBCUTANEOUS
Qty: 0 | Refills: 0 | Status: DISCONTINUED | OUTPATIENT
Start: 2018-01-21 | End: 2018-01-22

## 2018-01-21 RX ORDER — POTASSIUM CHLORIDE 20 MEQ
40 PACKET (EA) ORAL ONCE
Qty: 0 | Refills: 0 | Status: COMPLETED | OUTPATIENT
Start: 2018-01-21 | End: 2018-01-21

## 2018-01-21 RX ORDER — METOCLOPRAMIDE HCL 10 MG
5 TABLET ORAL ONCE
Qty: 0 | Refills: 0 | Status: COMPLETED | OUTPATIENT
Start: 2018-01-21 | End: 2018-01-21

## 2018-01-21 RX ADMIN — Medication 100 MILLIGRAM(S): at 05:27

## 2018-01-21 RX ADMIN — HEPARIN SODIUM 5000 UNIT(S): 5000 INJECTION INTRAVENOUS; SUBCUTANEOUS at 05:29

## 2018-01-21 RX ADMIN — PANTOPRAZOLE SODIUM 40 MILLIGRAM(S): 20 TABLET, DELAYED RELEASE ORAL at 05:30

## 2018-01-21 RX ADMIN — HEPARIN SODIUM 5000 UNIT(S): 5000 INJECTION INTRAVENOUS; SUBCUTANEOUS at 14:19

## 2018-01-21 RX ADMIN — HEPARIN SODIUM 5000 UNIT(S): 5000 INJECTION INTRAVENOUS; SUBCUTANEOUS at 21:15

## 2018-01-21 RX ADMIN — Medication 1: at 17:16

## 2018-01-21 RX ADMIN — Medication 1: at 08:47

## 2018-01-21 RX ADMIN — Medication 40 MILLIEQUIVALENT(S): at 14:19

## 2018-01-21 RX ADMIN — Medication 100 MILLIGRAM(S): at 14:19

## 2018-01-21 RX ADMIN — SODIUM CHLORIDE 50 MILLILITER(S): 9 INJECTION INTRAMUSCULAR; INTRAVENOUS; SUBCUTANEOUS at 21:15

## 2018-01-21 RX ADMIN — Medication 10 MILLIGRAM(S): at 05:27

## 2018-01-21 RX ADMIN — CARVEDILOL PHOSPHATE 12.5 MILLIGRAM(S): 80 CAPSULE, EXTENDED RELEASE ORAL at 05:27

## 2018-01-21 RX ADMIN — Medication 100 MILLIGRAM(S): at 21:15

## 2018-01-21 RX ADMIN — Medication 10 MILLIGRAM(S): at 17:16

## 2018-01-21 RX ADMIN — SODIUM CHLORIDE 75 MILLILITER(S): 9 INJECTION INTRAMUSCULAR; INTRAVENOUS; SUBCUTANEOUS at 05:27

## 2018-01-21 RX ADMIN — SODIUM CHLORIDE 50 MILLILITER(S): 9 INJECTION INTRAMUSCULAR; INTRAVENOUS; SUBCUTANEOUS at 10:00

## 2018-01-21 RX ADMIN — Medication 5 MILLIGRAM(S): at 15:30

## 2018-01-21 RX ADMIN — CARVEDILOL PHOSPHATE 12.5 MILLIGRAM(S): 80 CAPSULE, EXTENDED RELEASE ORAL at 17:16

## 2018-01-21 RX ADMIN — PANTOPRAZOLE SODIUM 40 MILLIGRAM(S): 20 TABLET, DELAYED RELEASE ORAL at 17:15

## 2018-01-21 NOTE — PROGRESS NOTE ADULT - SUBJECTIVE AND OBJECTIVE BOX
[facility]  Johnathan Galeano PA-C  [address] [place] [address] [phone]      INTERVAL HPI/OVERNIGHT EVENTS:    no vomiting or abdomen pain  c/o hiccups    MEDICATIONS  (STANDING):  carvedilol 12.5 milliGRAM(s) Oral every 12 hours  dextrose 50% Injectable 12.5 Gram(s) IV Push once  dextrose 50% Injectable 25 Gram(s) IV Push once  heparin  Injectable 5000 Unit(s) SubCutaneous every 8 hours  hydrALAZINE 100 milliGRAM(s) Oral every 8 hours  insulin lispro (HumaLOG) corrective regimen sliding scale   SubCutaneous three times a day before meals  insulin lispro (HumaLOG) corrective regimen sliding scale   SubCutaneous at bedtime  pantoprazole  Injectable 40 milliGRAM(s) IV Push two times a day  sodium chloride 0.9%. 1000 milliLiter(s) (75 mL/Hr) IV Continuous <Continuous>  torsemide 10 milliGRAM(s) Oral two times a day    MEDICATIONS  (PRN):  dextrose Gel 1 Dose(s) Oral once PRN Blood Glucose LESS THAN 70 milliGRAM(s)/deciliter  glucagon  Injectable 1 milliGRAM(s) IntraMuscular once PRN Glucose LESS THAN 70 milligrams/deciliter  HYDROmorphone  Injectable 1 milliGRAM(s) IV Push every 6 hours PRN Severe Pain (7 - 10)  oxyCODONE    5 mG/acetaminophen 325 mG 1 Tablet(s) Oral every 6 hours PRN Moderate Pain (4 - 6)  simethicone 80 milliGRAM(s) Chew three times a day PRN Gas      Allergies    No Known Allergies    Intolerances        ROS:   General:  No wt loss, fevers, chills, night sweats, fatigue,   Eyes:  Good vision, no reported pain  ENT:  No sore throat, pain, runny nose, dysphagia  CV:  No pain, palpitations, hypo/hypertension  Resp:  No dyspnea, cough, tachypnea, wheezing  GI:  No pain, No nausea, No vomiting, No diarrhea, No constipation, No weight loss, No fever, No pruritis, No rectal bleeding, No tarry stools, No dysphagia,  :  No pain, bleeding, incontinence, nocturia  Muscle:  No pain, weakness  Neuro:  No weakness, tingling, memory problems  Psych:  No fatigue, insomnia, mood problems, depression  Endocrine:  No polyuria, polydipsia, cold/heat intolerance  Heme:  No petechiae, ecchymosis, easy bruisability  Skin:  No rash, tattoos, scars, edema      PHYSICAL EXAM:   Vital Signs:  Vital Signs Last 24 Hrs  T(C): 36.3 (20 Jan 2018 12:12), Max: 36.8 (20 Jan 2018 01:08)  T(F): 97.4 (20 Jan 2018 12:12), Max: 98.2 (20 Jan 2018 01:08)  HR: 91 (20 Jan 2018 12:12) (54 - 95)  BP: 114/72 (20 Jan 2018 12:12) (111/61 - 137/65)  BP(mean): --  RR: 18 (20 Jan 2018 12:12) (17 - 18)  SpO2: 100% (20 Jan 2018 12:12) (94% - 100%)  Daily     Daily     GENERAL:  Appears stated age, well-groomed, well-nourished, no distress  HEENT:  NC/AT,  conjunctivae clear and pink, no thyromegaly, nodules, adenopathy, no JVD, sclera -anicteric  CHEST:  Full & symmetric excursion, no increased effort, breath sounds clear  HEART:  Regular rhythm, S1, S2, no murmur/rub/S3/S4, no abdominal bruit, no edema  ABDOMEN:  Soft, non-tender, non-distended, normoactive bowel sounds,  no masses ,no hepato-splenomegaly, no signs of chronic liver disease  EXTEREMITIES:  no cyanosis,clubbing or edema  SKIN:  No rash/erythema/ecchymoses/petechiae/wounds/abscess/warm/dry  NEURO:  Alert, oriented, no asterixis, no tremor, no encephalopathy      LABS:                        16.0   24.49 )-----------( 245      ( 20 Jan 2018 05:30 )             46.0     01-20    143  |  97<L>  |  79<H>  ----------------------------<  193<H>  3.3<L>   |  27  |  3.17<H>    Ca    8.5      20 Jan 2018 05:30            RADIOLOGY & ADDITIONAL TESTS:

## 2018-01-21 NOTE — PROGRESS NOTE ADULT - PROBLEM SELECTOR PLAN 7
SHERRY on stage 4 CKD resolving  creatinine close to baseline  reduce IVF to 50ml/hr  likely secondary to vomiting and dehydration SHERRY on stage 4 CKD resolving  creatinine close to baseline  reduce IVF to 50ml/hr  likely secondary to vomiting and dehydration  replete hypokalemia

## 2018-01-21 NOTE — PROGRESS NOTE ADULT - SUBJECTIVE AND OBJECTIVE BOX
Patient is a 62y old  Male who presents with a chief complaint of L knee pain (18 Jan 2018 18:31)    SUBJECTIVE / OVERNIGHT EVENTS: Overnight events noted      ROS:  Resp: No cough or sputum production  CVS: No chest pain or palpitations or orthopnea  GI: no N/V/D + BM dark no diarrhea  : no dysuria, hematuria  Neuro: no weakness or paresthesias  Heme: No petechiae or easy bruising  Msk: No joint pain or swelling  Skin: No rash or itching  All other ROS negative       MEDICATIONS  (STANDING):  carvedilol 12.5 milliGRAM(s) Oral every 12 hours  dextrose 50% Injectable 12.5 Gram(s) IV Push once  dextrose 50% Injectable 25 Gram(s) IV Push once  heparin  Injectable 5000 Unit(s) SubCutaneous every 8 hours  hydrALAZINE 100 milliGRAM(s) Oral every 8 hours  insulin lispro (HumaLOG) corrective regimen sliding scale   SubCutaneous three times a day before meals  insulin lispro (HumaLOG) corrective regimen sliding scale   SubCutaneous at bedtime  pantoprazole  Injectable 40 milliGRAM(s) IV Push two times a day  sodium chloride 0.9%. 1000 milliLiter(s) (50 mL/Hr) IV Continuous <Continuous>  torsemide 10 milliGRAM(s) Oral two times a day    MEDICATIONS  (PRN):  dextrose Gel 1 Dose(s) Oral once PRN Blood Glucose LESS THAN 70 milliGRAM(s)/deciliter  glucagon  Injectable 1 milliGRAM(s) IntraMuscular once PRN Glucose LESS THAN 70 milligrams/deciliter  HYDROmorphone  Injectable 1 milliGRAM(s) IV Push every 6 hours PRN Severe Pain (7 - 10)  oxyCODONE    5 mG/acetaminophen 325 mG 1 Tablet(s) Oral every 6 hours PRN Moderate Pain (4 - 6)  simethicone 80 milliGRAM(s) Chew three times a day PRN Gas        CAPILLARY BLOOD GLUCOSE      POCT Blood Glucose.: 197 mg/dL (21 Jan 2018 08:43)  POCT Blood Glucose.: 142 mg/dL (20 Jan 2018 21:35)  POCT Blood Glucose.: 149 mg/dL (20 Jan 2018 16:38)  POCT Blood Glucose.: 233 mg/dL (20 Jan 2018 12:04)    I&O's Summary    20 Jan 2018 07:01  -  21 Jan 2018 07:00  --------------------------------------------------------  IN: 0 mL / OUT: 1200 mL / NET: -1200 mL      PHYSICAL EXAM:  GENERAL: NAD, well-developed in no distress  HEAD:  Atraumatic, Normocephalic  EYES: EOMI, PERRLA, conjunctiva and sclera clear  NECK: Supple, No JVD  CHEST/LUNG: no rhonchi, no wheeze, clear to auscultation bilaterally  HEART: S1 S2; No rubs or gallops, no murmurs  ABDOMEN: Soft, Nontender, less distension; Bowel sounds present  EXTREMITIES:  No clubbing or cyanosis, + Peripheral Pulses,  no edema  PSYCH: AO x 3 appropriate affect  NEUROLOGY: non-focal, motor and sensory systems intact  SKIN: No rashes or lesions  left knee not inflamed    LABS:                        15.6   19.86 )-----------( 227      ( 21 Jan 2018 06:00 )             45.7     01-21    141  |  98  |  64<H>  ----------------------------<  143<H>  3.4<L>   |  27  |  2.79<H>    Ca    8.4      21 Jan 2018 06:46                  Consultant(s) Notes Reviewed:      Care Discussed with Consultants/Other Providers:    Contact Number, Dr Ching [phone]

## 2018-01-22 LAB
BACTERIA FLD CULT: SIGNIFICANT CHANGE UP
BUN SERPL-MCNC: 39 MG/DL — HIGH (ref 7–23)
BUN SERPL-MCNC: 43 MG/DL — HIGH (ref 7–23)
CALCIUM SERPL-MCNC: 6.8 MG/DL — LOW (ref 8.4–10.5)
CALCIUM SERPL-MCNC: 8.4 MG/DL — SIGNIFICANT CHANGE UP (ref 8.4–10.5)
CHLORIDE SERPL-SCNC: 105 MMOL/L — SIGNIFICANT CHANGE UP (ref 98–107)
CHLORIDE SERPL-SCNC: 111 MMOL/L — HIGH (ref 98–107)
CO2 SERPL-SCNC: 21 MMOL/L — LOW (ref 22–31)
CO2 SERPL-SCNC: 26 MMOL/L — SIGNIFICANT CHANGE UP (ref 22–31)
CREAT SERPL-MCNC: 2 MG/DL — HIGH (ref 0.5–1.3)
CREAT SERPL-MCNC: 2.33 MG/DL — HIGH (ref 0.5–1.3)
GLUCOSE BLDC GLUCOMTR-MCNC: 128 MG/DL — HIGH (ref 70–99)
GLUCOSE BLDC GLUCOMTR-MCNC: 129 MG/DL — HIGH (ref 70–99)
GLUCOSE BLDC GLUCOMTR-MCNC: 134 MG/DL — HIGH (ref 70–99)
GLUCOSE BLDC GLUCOMTR-MCNC: 178 MG/DL — HIGH (ref 70–99)
GLUCOSE SERPL-MCNC: 125 MG/DL — HIGH (ref 70–99)
GLUCOSE SERPL-MCNC: 143 MG/DL — HIGH (ref 70–99)
HCT VFR BLD CALC: 34.7 % — LOW (ref 39–50)
HGB BLD-MCNC: 11.5 G/DL — LOW (ref 13–17)
INR BLD: 1.26 — HIGH (ref 0.88–1.17)
MAGNESIUM SERPL-MCNC: 1.8 MG/DL — SIGNIFICANT CHANGE UP (ref 1.6–2.6)
MCHC RBC-ENTMCNC: 30 PG — SIGNIFICANT CHANGE UP (ref 27–34)
MCHC RBC-ENTMCNC: 33.1 % — SIGNIFICANT CHANGE UP (ref 32–36)
MCV RBC AUTO: 90.6 FL — SIGNIFICANT CHANGE UP (ref 80–100)
NRBC # FLD: 0 — SIGNIFICANT CHANGE UP
PHOSPHATE SERPL-MCNC: 2.9 MG/DL — SIGNIFICANT CHANGE UP (ref 2.5–4.5)
PLATELET # BLD AUTO: 175 K/UL — SIGNIFICANT CHANGE UP (ref 150–400)
PMV BLD: 9.6 FL — SIGNIFICANT CHANGE UP (ref 7–13)
POTASSIUM SERPL-MCNC: 3.1 MMOL/L — LOW (ref 3.5–5.3)
POTASSIUM SERPL-MCNC: 3.6 MMOL/L — SIGNIFICANT CHANGE UP (ref 3.5–5.3)
POTASSIUM SERPL-SCNC: 3.1 MMOL/L — LOW (ref 3.5–5.3)
POTASSIUM SERPL-SCNC: 3.6 MMOL/L — SIGNIFICANT CHANGE UP (ref 3.5–5.3)
PROTHROM AB SERPL-ACNC: 14.6 SEC — HIGH (ref 9.8–13.1)
RBC # BLD: 3.83 M/UL — LOW (ref 4.2–5.8)
RBC # FLD: 13.6 % — SIGNIFICANT CHANGE UP (ref 10.3–14.5)
SODIUM SERPL-SCNC: 147 MMOL/L — HIGH (ref 135–145)
SODIUM SERPL-SCNC: 147 MMOL/L — HIGH (ref 135–145)
WBC # BLD: 11.63 K/UL — HIGH (ref 3.8–10.5)
WBC # FLD AUTO: 11.63 K/UL — HIGH (ref 3.8–10.5)

## 2018-01-22 PROCEDURE — 93282 PRGRMG EVAL IMPLANTABLE DFB: CPT | Mod: 26

## 2018-01-22 RX ORDER — CHLORPROMAZINE HCL 10 MG
50 TABLET ORAL ONCE
Qty: 0 | Refills: 0 | Status: COMPLETED | OUTPATIENT
Start: 2018-01-22 | End: 2018-01-22

## 2018-01-22 RX ORDER — LANOLIN ALCOHOL/MO/W.PET/CERES
3 CREAM (GRAM) TOPICAL AT BEDTIME
Qty: 0 | Refills: 0 | Status: DISCONTINUED | OUTPATIENT
Start: 2018-01-22 | End: 2018-01-23

## 2018-01-22 RX ORDER — METOCLOPRAMIDE HCL 10 MG
5 TABLET ORAL ONCE
Qty: 0 | Refills: 0 | Status: COMPLETED | OUTPATIENT
Start: 2018-01-22 | End: 2018-01-22

## 2018-01-22 RX ADMIN — CARVEDILOL PHOSPHATE 12.5 MILLIGRAM(S): 80 CAPSULE, EXTENDED RELEASE ORAL at 05:24

## 2018-01-22 RX ADMIN — HEPARIN SODIUM 5000 UNIT(S): 5000 INJECTION INTRAVENOUS; SUBCUTANEOUS at 05:24

## 2018-01-22 RX ADMIN — PANTOPRAZOLE SODIUM 40 MILLIGRAM(S): 20 TABLET, DELAYED RELEASE ORAL at 17:56

## 2018-01-22 RX ADMIN — CARVEDILOL PHOSPHATE 12.5 MILLIGRAM(S): 80 CAPSULE, EXTENDED RELEASE ORAL at 17:55

## 2018-01-22 RX ADMIN — OXYCODONE AND ACETAMINOPHEN 1 TABLET(S): 5; 325 TABLET ORAL at 23:37

## 2018-01-22 RX ADMIN — HEPARIN SODIUM 5000 UNIT(S): 5000 INJECTION INTRAVENOUS; SUBCUTANEOUS at 22:58

## 2018-01-22 RX ADMIN — OXYCODONE AND ACETAMINOPHEN 1 TABLET(S): 5; 325 TABLET ORAL at 23:07

## 2018-01-22 RX ADMIN — Medication 100 MILLIGRAM(S): at 05:24

## 2018-01-22 RX ADMIN — Medication 10 MILLIGRAM(S): at 05:25

## 2018-01-22 RX ADMIN — Medication 10 MILLIGRAM(S): at 17:55

## 2018-01-22 RX ADMIN — Medication 3 MILLIGRAM(S): at 02:25

## 2018-01-22 RX ADMIN — Medication 100 MILLIGRAM(S): at 13:33

## 2018-01-22 RX ADMIN — Medication 50 MILLIGRAM(S): at 13:34

## 2018-01-22 RX ADMIN — PANTOPRAZOLE SODIUM 40 MILLIGRAM(S): 20 TABLET, DELAYED RELEASE ORAL at 05:25

## 2018-01-22 RX ADMIN — Medication 100 MILLIGRAM(S): at 22:58

## 2018-01-22 RX ADMIN — Medication 5 MILLIGRAM(S): at 02:26

## 2018-01-22 RX ADMIN — Medication 3 MILLIGRAM(S): at 22:58

## 2018-01-22 RX ADMIN — HEPARIN SODIUM 5000 UNIT(S): 5000 INJECTION INTRAVENOUS; SUBCUTANEOUS at 13:33

## 2018-01-22 NOTE — PROGRESS NOTE ADULT - SUBJECTIVE AND OBJECTIVE BOX
[facility]  Johnathan Galeano PA-C  [address] [place] [address] [phone]      INTERVAL HPI/OVERNIGHT EVENTS:    patient arrived to endoscopy but noted to have aicd which had no documentation of interrogation in past 6 months  called family, unsure of brand of aicd for hospital interrogation     MEDICATIONS  (STANDING):  carvedilol 12.5 milliGRAM(s) Oral every 12 hours  dextrose 50% Injectable 12.5 Gram(s) IV Push once  dextrose 50% Injectable 25 Gram(s) IV Push once  heparin  Injectable 5000 Unit(s) SubCutaneous every 8 hours  hydrALAZINE 100 milliGRAM(s) Oral every 8 hours  insulin lispro (HumaLOG) corrective regimen sliding scale   SubCutaneous three times a day before meals  insulin lispro (HumaLOG) corrective regimen sliding scale   SubCutaneous at bedtime  pantoprazole  Injectable 40 milliGRAM(s) IV Push two times a day  sodium chloride 0.9%. 1000 milliLiter(s) (75 mL/Hr) IV Continuous <Continuous>  torsemide 10 milliGRAM(s) Oral two times a day    MEDICATIONS  (PRN):  dextrose Gel 1 Dose(s) Oral once PRN Blood Glucose LESS THAN 70 milliGRAM(s)/deciliter  glucagon  Injectable 1 milliGRAM(s) IntraMuscular once PRN Glucose LESS THAN 70 milligrams/deciliter  HYDROmorphone  Injectable 1 milliGRAM(s) IV Push every 6 hours PRN Severe Pain (7 - 10)  oxyCODONE    5 mG/acetaminophen 325 mG 1 Tablet(s) Oral every 6 hours PRN Moderate Pain (4 - 6)  simethicone 80 milliGRAM(s) Chew three times a day PRN Gas      Allergies    No Known Allergies    Intolerances        ROS:   General:  No wt loss, fevers, chills, night sweats, fatigue,   Eyes:  Good vision, no reported pain  ENT:  No sore throat, pain, runny nose, dysphagia  CV:  No pain, palpitations, hypo/hypertension  Resp:  No dyspnea, cough, tachypnea, wheezing  GI:  No pain, No nausea, No vomiting, No diarrhea, No constipation, No weight loss, No fever, No pruritis, No rectal bleeding, No tarry stools, No dysphagia,  :  No pain, bleeding, incontinence, nocturia  Muscle:  No pain, weakness  Neuro:  No weakness, tingling, memory problems  Psych:  No fatigue, insomnia, mood problems, depression  Endocrine:  No polyuria, polydipsia, cold/heat intolerance  Heme:  No petechiae, ecchymosis, easy bruisability  Skin:  No rash, tattoos, scars, edema      PHYSICAL EXAM:   Vital Signs:  Vital Signs Last 24 Hrs  T(C): 36.3 (20 Jan 2018 12:12), Max: 36.8 (20 Jan 2018 01:08)  T(F): 97.4 (20 Jan 2018 12:12), Max: 98.2 (20 Jan 2018 01:08)  HR: 91 (20 Jan 2018 12:12) (54 - 95)  BP: 114/72 (20 Jan 2018 12:12) (111/61 - 137/65)  BP(mean): --  RR: 18 (20 Jan 2018 12:12) (17 - 18)  SpO2: 100% (20 Jan 2018 12:12) (94% - 100%)  Daily     Daily     GENERAL:  Appears stated age, well-groomed, well-nourished, no distress  HEENT:  NC/AT,  conjunctivae clear and pink, no thyromegaly, nodules, adenopathy, no JVD, sclera -anicteric  CHEST:  Full & symmetric excursion, no increased effort, breath sounds clear  HEART:  Regular rhythm, S1, S2, no murmur/rub/S3/S4, no abdominal bruit, no edema  ABDOMEN:  Soft, non-tender, non-distended, normoactive bowel sounds,  no masses ,no hepato-splenomegaly, no signs of chronic liver disease  EXTEREMITIES:  no cyanosis,clubbing or edema  SKIN:  No rash/erythema/ecchymoses/petechiae/wounds/abscess/warm/dry  NEURO:  Alert, oriented, no asterixis, no tremor, no encephalopathy      LABS:                        16.0   24.49 )-----------( 245      ( 20 Jan 2018 05:30 )             46.0     01-20    143  |  97<L>  |  79<H>  ----------------------------<  193<H>  3.3<L>   |  27  |  3.17<H>    Ca    8.5      20 Jan 2018 05:30            RADIOLOGY & ADDITIONAL TESTS:

## 2018-01-22 NOTE — PROGRESS NOTE ADULT - SUBJECTIVE AND OBJECTIVE BOX
Patient is a 62y old  Male who presents with a chief complaint of L knee pain (18 Jan 2018 18:31)      SUBJECTIVE / OVERNIGHT EVENTS: Overnight events noted  no fever    ROS:  Resp: No cough or sputum production  CVS: No chest pain or palpitations or orthopnea  GI: no N/V/D  : no dysuria, hematuria  Neuro: no weakness or paresthesias  Heme: No petechiae or easy bruising  Msk: No joint pain or swelling  Skin: No rash or itching  All other ROS negative       MEDICATIONS  (STANDING):  carvedilol 12.5 milliGRAM(s) Oral every 12 hours  chlorproMAZINE    Tablet 50 milliGRAM(s) Oral once  dextrose 50% Injectable 12.5 Gram(s) IV Push once  dextrose 50% Injectable 25 Gram(s) IV Push once  heparin  Injectable 5000 Unit(s) SubCutaneous every 8 hours  hydrALAZINE 100 milliGRAM(s) Oral every 8 hours  insulin lispro (HumaLOG) corrective regimen sliding scale   SubCutaneous three times a day before meals  insulin lispro (HumaLOG) corrective regimen sliding scale   SubCutaneous at bedtime  melatonin 3 milliGRAM(s) Oral at bedtime  pantoprazole  Injectable 40 milliGRAM(s) IV Push two times a day  torsemide 10 milliGRAM(s) Oral two times a day    MEDICATIONS  (PRN):  dextrose Gel 1 Dose(s) Oral once PRN Blood Glucose LESS THAN 70 milliGRAM(s)/deciliter  glucagon  Injectable 1 milliGRAM(s) IntraMuscular once PRN Glucose LESS THAN 70 milligrams/deciliter  HYDROmorphone  Injectable 1 milliGRAM(s) IV Push every 6 hours PRN Severe Pain (7 - 10)  oxyCODONE    5 mG/acetaminophen 325 mG 1 Tablet(s) Oral every 6 hours PRN Moderate Pain (4 - 6)  simethicone 80 milliGRAM(s) Chew three times a day PRN Gas        CAPILLARY BLOOD GLUCOSE      POCT Blood Glucose.: 134 mg/dL (22 Jan 2018 07:54)  POCT Blood Glucose.: 132 mg/dL (21 Jan 2018 21:10)  POCT Blood Glucose.: 166 mg/dL (21 Jan 2018 16:51)  POCT Blood Glucose.: 131 mg/dL (21 Jan 2018 12:00)    I&O's Summary    21 Jan 2018 07:01  -  22 Jan 2018 07:00  --------------------------------------------------------  IN: 0 mL / OUT: 300 mL / NET: -300 mL    PHYSICAL EXAM:  GENERAL: NAD, well-developed in no distress  HEAD:  Atraumatic, Normocephalic  EYES: EOMI, PERRLA, conjunctiva and sclera clear  NECK: Supple, No JVD  CHEST/LUNG: no rhonchi, no wheeze, clear to auscultation bilaterally  HEART: S1 S2; No rubs or gallops, no murmurs  ABDOMEN: Soft, Nontender, less distension; Bowel sounds present  EXTREMITIES:  No clubbing or cyanosis, + Peripheral Pulses,  no edema  PSYCH: AO x 3 appropriate affect  NEUROLOGY: non-focal, motor and sensory systems intact  SKIN: No rashes or lesions  left knee not inflamed    LABS:                        11.5   11.63 )-----------( 175      ( 22 Jan 2018 06:18 )             34.7     01-22    147<H>  |  105  |  43<H>  ----------------------------<  143<H>  3.6   |  26  |  2.33<H>    Ca    8.4      22 Jan 2018 08:46  Phos  2.9     01-22  Mg     1.8     01-22      PT/INR - ( 22 Jan 2018 06:18 )   PT: 14.6 SEC;   INR: 1.26          Consultant(s) Notes Reviewed:      Care Discussed with Consultants/Other Providers:    Contact Number, Dr Ching [phone]

## 2018-01-22 NOTE — PROGRESS NOTE ADULT - ASSESSMENT
63 y/o M with CAD, DM, HTN, CVA, BPH, CKD p/w L knee pain and swelling with arthrocentesis showing some crystals, and nucleated cell count of 1700.

## 2018-01-22 NOTE — CHART NOTE - NSCHARTNOTEFT_GEN_A_CORE
ELECTROPHYSIOLOGY  Device Interrogation Performed                                  Date/Time: 1/22/2018  13:50  :            [employer] single chamber ICD               Model:                  Evera XT                  Mode:          VVI                   Rate:     40      Ventricular Lead(s):  RV Lead: R wave amplitude:        19.5                  mv          Impedence:      456             Ohms      Threshold:    1.0            V@       0.4      ms   LV Lead:  R wave amplitude:                          mv          Impedence:                   Ohms      Threshold:                V@             ms     Battery Status:                     Good                    Underlying Rhythm:    Sinus rhythm 60's    Events/Observation: No high ventricular rate events;  No high atrial rate events;   possible fluid accumulation in 2017 June-December; one 12 beats NSVT on 4/24/2017     Impression/Plan:  p/w GIB; not pacer dependent  Normal sensing and pacing via iterative testing.  Excellent threshold capture.  No reprogramming.

## 2018-01-22 NOTE — PROVIDER CONTACT NOTE (OTHER) - ASSESSMENT
Patient alert and oriented x4, complaining of hiccups x several days. No distress at this time. Hiccups noted. States he "wants to go home" if symptoms cannot be controlled. Vital signs stable, no distress noted.

## 2018-01-23 VITALS
SYSTOLIC BLOOD PRESSURE: 118 MMHG | RESPIRATION RATE: 18 BRPM | DIASTOLIC BLOOD PRESSURE: 69 MMHG | TEMPERATURE: 98 F | OXYGEN SATURATION: 97 % | HEART RATE: 60 BPM

## 2018-01-23 LAB
BUN SERPL-MCNC: 35 MG/DL — HIGH (ref 7–23)
CALCIUM SERPL-MCNC: 8.4 MG/DL — SIGNIFICANT CHANGE UP (ref 8.4–10.5)
CHLORIDE SERPL-SCNC: 102 MMOL/L — SIGNIFICANT CHANGE UP (ref 98–107)
CO2 SERPL-SCNC: 26 MMOL/L — SIGNIFICANT CHANGE UP (ref 22–31)
CREAT SERPL-MCNC: 2.13 MG/DL — HIGH (ref 0.5–1.3)
GLUCOSE BLDC GLUCOMTR-MCNC: 105 MG/DL — HIGH (ref 70–99)
GLUCOSE BLDC GLUCOMTR-MCNC: 111 MG/DL — HIGH (ref 70–99)
GLUCOSE BLDC GLUCOMTR-MCNC: 128 MG/DL — HIGH (ref 70–99)
GLUCOSE SERPL-MCNC: 123 MG/DL — HIGH (ref 70–99)
HCT VFR BLD CALC: 41.4 % — SIGNIFICANT CHANGE UP (ref 39–50)
HGB BLD-MCNC: 13.8 G/DL — SIGNIFICANT CHANGE UP (ref 13–17)
MAGNESIUM SERPL-MCNC: 1.8 MG/DL — SIGNIFICANT CHANGE UP (ref 1.6–2.6)
MCHC RBC-ENTMCNC: 30.1 PG — SIGNIFICANT CHANGE UP (ref 27–34)
MCHC RBC-ENTMCNC: 33.3 % — SIGNIFICANT CHANGE UP (ref 32–36)
MCV RBC AUTO: 90.2 FL — SIGNIFICANT CHANGE UP (ref 80–100)
NRBC # FLD: 0 — SIGNIFICANT CHANGE UP
PHOSPHATE SERPL-MCNC: 2.9 MG/DL — SIGNIFICANT CHANGE UP (ref 2.5–4.5)
PLATELET # BLD AUTO: 209 K/UL — SIGNIFICANT CHANGE UP (ref 150–400)
PMV BLD: 9.7 FL — SIGNIFICANT CHANGE UP (ref 7–13)
POTASSIUM SERPL-MCNC: 3.7 MMOL/L — SIGNIFICANT CHANGE UP (ref 3.5–5.3)
POTASSIUM SERPL-SCNC: 3.7 MMOL/L — SIGNIFICANT CHANGE UP (ref 3.5–5.3)
RBC # BLD: 4.59 M/UL — SIGNIFICANT CHANGE UP (ref 4.2–5.8)
RBC # FLD: 13.2 % — SIGNIFICANT CHANGE UP (ref 10.3–14.5)
SODIUM SERPL-SCNC: 141 MMOL/L — SIGNIFICANT CHANGE UP (ref 135–145)
WBC # BLD: 10.43 K/UL — SIGNIFICANT CHANGE UP (ref 3.8–10.5)
WBC # FLD AUTO: 10.43 K/UL — SIGNIFICANT CHANGE UP (ref 3.8–10.5)

## 2018-01-23 PROCEDURE — 88312 SPECIAL STAINS GROUP 1: CPT | Mod: 26

## 2018-01-23 PROCEDURE — 88305 TISSUE EXAM BY PATHOLOGIST: CPT | Mod: 26

## 2018-01-23 RX ORDER — PANTOPRAZOLE SODIUM 20 MG/1
40 TABLET, DELAYED RELEASE ORAL
Qty: 2400 | Refills: 0 | OUTPATIENT
Start: 2018-01-23 | End: 2018-02-21

## 2018-01-23 RX ORDER — SUCRALFATE 1 G
1 TABLET ORAL
Qty: 60 | Refills: 0 | OUTPATIENT
Start: 2018-01-23 | End: 2018-02-21

## 2018-01-23 RX ORDER — PANTOPRAZOLE SODIUM 20 MG/1
40 TABLET, DELAYED RELEASE ORAL
Qty: 2400 | Refills: 0
Start: 2018-01-23 | End: 2018-02-21

## 2018-01-23 RX ORDER — SUCRALFATE 1 G
1 TABLET ORAL
Qty: 60 | Refills: 0
Start: 2018-01-23 | End: 2018-02-21

## 2018-01-23 RX ORDER — SUCRALFATE 1 G
1 TABLET ORAL
Qty: 0 | Refills: 0 | Status: DISCONTINUED | OUTPATIENT
Start: 2018-01-23 | End: 2018-01-23

## 2018-01-23 RX ADMIN — Medication 1 GRAM(S): at 18:18

## 2018-01-23 RX ADMIN — Medication 10 MILLIGRAM(S): at 07:02

## 2018-01-23 RX ADMIN — Medication 10 MILLIGRAM(S): at 17:43

## 2018-01-23 RX ADMIN — CARVEDILOL PHOSPHATE 12.5 MILLIGRAM(S): 80 CAPSULE, EXTENDED RELEASE ORAL at 07:02

## 2018-01-23 RX ADMIN — PANTOPRAZOLE SODIUM 40 MILLIGRAM(S): 20 TABLET, DELAYED RELEASE ORAL at 07:03

## 2018-01-23 RX ADMIN — HEPARIN SODIUM 5000 UNIT(S): 5000 INJECTION INTRAVENOUS; SUBCUTANEOUS at 07:02

## 2018-01-23 RX ADMIN — Medication 100 MILLIGRAM(S): at 07:02

## 2018-01-23 RX ADMIN — PANTOPRAZOLE SODIUM 40 MILLIGRAM(S): 20 TABLET, DELAYED RELEASE ORAL at 17:43

## 2018-01-23 RX ADMIN — CARVEDILOL PHOSPHATE 12.5 MILLIGRAM(S): 80 CAPSULE, EXTENDED RELEASE ORAL at 17:43

## 2018-01-23 NOTE — PROGRESS NOTE ADULT - SUBJECTIVE AND OBJECTIVE BOX
Patient is a 62y old  Male who presents with a chief complaint of L knee pain (18 Jan 2018 18:31)    SUBJECTIVE / OVERNIGHT EVENTS: Overnight events noted  states he wants to go home  no nausea, vomiting     ROS:  Resp: No cough or sputum production  CVS: No chest pain or palpitations or orthopnea  GI: no N/V/D  : no dysuria, hematuria  Neuro: no weakness or paresthesias  Heme: No petechiae or easy bruising  Msk: No joint pain or swelling  Skin: No rash or itching  All other ROS negative       MEDICATIONS  (STANDING):  carvedilol 12.5 milliGRAM(s) Oral every 12 hours  dextrose 50% Injectable 12.5 Gram(s) IV Push once  dextrose 50% Injectable 25 Gram(s) IV Push once  heparin  Injectable 5000 Unit(s) SubCutaneous every 8 hours  hydrALAZINE 100 milliGRAM(s) Oral every 8 hours  insulin lispro (HumaLOG) corrective regimen sliding scale   SubCutaneous three times a day before meals  insulin lispro (HumaLOG) corrective regimen sliding scale   SubCutaneous at bedtime  melatonin 3 milliGRAM(s) Oral at bedtime  pantoprazole  Injectable 40 milliGRAM(s) IV Push two times a day  sucralfate 1 Gram(s) Oral four times a day  torsemide 10 milliGRAM(s) Oral two times a day    MEDICATIONS  (PRN):  dextrose Gel 1 Dose(s) Oral once PRN Blood Glucose LESS THAN 70 milliGRAM(s)/deciliter  glucagon  Injectable 1 milliGRAM(s) IntraMuscular once PRN Glucose LESS THAN 70 milligrams/deciliter  HYDROmorphone  Injectable 1 milliGRAM(s) IV Push every 6 hours PRN Severe Pain (7 - 10)  oxyCODONE    5 mG/acetaminophen 325 mG 1 Tablet(s) Oral every 6 hours PRN Moderate Pain (4 - 6)  simethicone 80 milliGRAM(s) Chew three times a day PRN Gas        CAPILLARY BLOOD GLUCOSE      POCT Blood Glucose.: 105 mg/dL (23 Jan 2018 12:03)  POCT Blood Glucose.: 111 mg/dL (23 Jan 2018 07:41)  POCT Blood Glucose.: 178 mg/dL (22 Jan 2018 21:13)  POCT Blood Glucose.: 129 mg/dL (22 Jan 2018 16:40)    I&O's Summary    PHYSICAL EXAM:  GENERAL: NAD, well-developed in no distress  HEAD:  Atraumatic, Normocephalic  EYES: EOMI, PERRLA, conjunctiva and sclera clear  NECK: Supple, No JVD  CHEST/LUNG: no rhonchi, no wheeze, clear to auscultation bilaterally  HEART: S1 S2; No rubs or gallops, no murmurs  ABDOMEN: Soft, Nontender, less distension; Bowel sounds present  EXTREMITIES:  No clubbing or cyanosis, + Peripheral Pulses,  no edema  PSYCH: AO x 3 appropriate affect  NEUROLOGY: non-focal, motor and sensory systems intact  SKIN: No rashes or lesions  left knee not inflamed    LABS:                        13.8   10.43 )-----------( 209      ( 23 Jan 2018 05:30 )             41.4     01-23    141  |  102  |  35<H>  ----------------------------<  123<H>  3.7   |  26  |  2.13<H>    Ca    8.4      23 Jan 2018 05:30  Phos  2.9     01-23  Mg     1.8     01-23      PT/INR - ( 22 Jan 2018 06:18 )   PT: 14.6 SEC;   INR: 1.26                      Consultant(s) Notes Reviewed:      Care Discussed with Consultants/Other Providers:    Contact Number, Dr Ching [phone]

## 2018-01-23 NOTE — PROGRESS NOTE ADULT - PROVIDER SPECIALTY LIST ADULT
Gastroenterology
Gastroenterology
Internal Medicine
Gastroenterology

## 2018-01-23 NOTE — PROGRESS NOTE ADULT - PROBLEM SELECTOR PLAN 1
return to floor  aicd interrogation pending  clear liquid diet  npo p mn  push enteroscopy rescheduled for tuesday
ct scan reviewed  dilated stomach and duodenum  push enteroscopy monday  npo  iv fluid  cont ppi
ct scan reviewed  dilated stomach and duodenum  push enteroscopy monday  npo  iv fluid  cont proton pump inhibitor  discussed with dtr
CT abd negative SBO  positive for ileus  GI follow up   will follow recommendations
CT abd negative SBO  positive for ileus  GI follow up   will follow recommendations  keep on clears today  EGD tomorrow to r/o stricture
EGD noted  no stricture  Hb stable  cleared by GI for discharge   PPI BID and carafate
and ileus  likely resolving  will await EGD result  if negative advance diet to low residue and discontinue home tomorrow
likely secondary to gout  few crystals seen in fluid  gram stain and culture negative  s/p 3 doses of prednisone  discontinue on discharge   will follow up with PCP on discharge
likely secondary to prednisone induced gastritis  discontinued already  hold asa, Plavix, sq heparin  AXR d/w GI ? SBO  keep NPO await official read  IV protonix 80 stat then 40 po BID  GI evaluation  CT abdominal r/o SBO

## 2018-01-23 NOTE — PROGRESS NOTE ADULT - ATTENDING COMMENTS
disposition per PT eval (no skilled PT needs)
discussed with patient in detail, all questions answered
discussed with patient in detail, all questions answered  D/w ADS NP

## 2018-01-23 NOTE — PROGRESS NOTE ADULT - PROBLEM SELECTOR PROBLEM 6
Type 2 diabetes mellitus with complication, without long-term current use of insulin
CKD (chronic kidney disease), stage IV

## 2018-01-23 NOTE — PROGRESS NOTE ADULT - PROBLEM SELECTOR PROBLEM 1
Coffee ground emesis
Acute arthritis
UGI bleed

## 2018-01-23 NOTE — PROGRESS NOTE ADULT - PROBLEM SELECTOR PLAN 6
resume home meds  glucose will settle down in a few days
resume home meds  glucose will settle down in a few days
resume home meds on discharge   finger sticks with short acting insulin sliding scale for now
stage 4 CKD  will follow up with renal as outpatient

## 2018-01-26 LAB — SURGICAL PATHOLOGY STUDY: SIGNIFICANT CHANGE UP

## 2018-06-05 NOTE — PATIENT PROFILE ADULT. - CAREGIVER
[facility] 
 
 
 [place] [address] [address] [address] [address] [phone] Patient: Jesi Tilley MRN: [id number] TFB:[id number] Visit Information Date & Time Provider Department Dept. Phone Encounter #  
 6/5/2018  8:45 AM 20331[facility] [id number] Follow-up Instructions Return for Patient will keep regular follow Up . Your Appointments 6/14/2018 11:00 AM  
Follow Up with Grayson Whyte MD  
[facility] ([facility]) Appt Note: 1 year follow up [place] [address] [address] [address] [phone] [place] [phone]  
  
    
 8/27/2018  3:30 PM  
Follow Up with [id number] Caldera,  [address] (--) Appt Note: 3 months follow up Davy [id number] [place] [id number] [phone]  
  
   
 Davy [address] [place] [phone] Upcoming Health Maintenance Date Due Pneumococcal 19-64 Highest Risk (1 of 3 - PCV13) 9/11/1974 DTaP/Tdap/Td series (1 - Tdap) 9/11/1976 ZOSTER VACCINE AGE 60> 7/11/2015 BREAST CANCER SCRN MAMMOGRAM 12/8/2016 Influenza Age 5 to Adult 8/1/2018 COLONOSCOPY 5/26/2020 Allergies as of 6/5/2018  Review Complete On: 4/0/8248 By: Kiran Sahni. Joe Pereyra LPN Severity Noted Reaction Type Reactions Omnicef [Cefdinir] High 07/26/2011    Hives Keflex [Cephalexin] Medium 07/21/2017    Hives Codeine  06/23/2011    Other (comments) Severe headache Ketek [Telithromycin]  09/21/2017    Other (comments) Passed out Levaquin [Levofloxacin]  10/12/2012    Unknown (comments) Did not tolerate Morphine  06/23/2011    Other (comments) Severe headache  
 Sulfa (Sulfonamide Antibiotics)  06/23/2011    Hives Topamax [Topiramate]  01/11/2016    Other (comments) Made blood vessels red and pain in eyes Current Immunizations  Reviewed on 12/4/2017 Name Date Hep A Vaccine (Adult) 12/4/2017, 5/30/2017 Influenza Vaccine (Quad) PF 12/4/2017, 9/28/2016 Not reviewed this visit You Were Diagnosed With   
  
 Codes Comments Cough    -  Primary ICD-10-CM: I28 ICD-9-CM: 786.2 Scabies exposure     ICD-10-CM: Z20.89 ICD-9-CM: V01.89 Well woman exam     ICD-10-CM: W76.871 ICD-9-CM: V72.31 Encounter for screening mammogram for breast cancer     ICD-10-CM: Z12.31 
ICD-9-CM: V76.12 Prediabetes     ICD-10-CM: R73.03 
ICD-9-CM: 790.29 Malignant neoplasm of overlapping sites of body of uterus ([facility] [address]     ICD-10-CM: C54.8 ICD-9-CM: 182.8 Scabies     ICD-10-CM: B86 
ICD-9-CM: 133.0 Vitals BP Pulse Temp Resp Height(growth percentile) Weight(growth percentile) 139/84 (BP 1 Location: Right arm, BP Patient Position: Sitting) 80 97 °F (36.1 °C) (Oral) 17 5' 8\" (1.727 m) 238 lb (108 kg) BMI OB Status Smoking Status 36.19 kg/m2 Hysterectomy Former Smoker BMI and BSA Data Body Mass Index Body Surface Area  
 36.19 kg/m 2 2.28 m 2 Preferred Pharmacy Pharmacy Name Phone [id number] [address] [facility]. [facility], [address] [facility] [phone] Your Updated Medication List  
  
   
This list is accurate as of 6/5/18  9:23 AM.  Always use your most recent med list.  
  
  
  
  
 ALIGN 4 mg Cap Generic drug:  Bifidobacterium Infantis Take  by mouth daily. atorvastatin 10 mg tablet Commonly known as:  LIPITOR  
TAKE 1 TABLET BY MOUTH EVERY DAY  
  
 benzonatate 200 mg capsule Commonly known as:  TESSALON Take 1 Cap by mouth three (3) times daily as needed for Cough for up to 7 days. Blood-Glucose Meter monitoring kit Check blood sugar TID PRN with meals and/or bouts of dizziness  
  
 chlorpheniramine-HYDROcodone 10-8 mg/5 mL suspension Commonly known as:  Cheryn Smoke Take 5 mL by mouth nightly as needed for Cough or Congestion. Max Daily Amount: 5 mL. clobetasol 0.05 % ointment Commonly known as:  Tressia Lara Apply  to affected area two (2) times daily as needed for Skin Irritation or Itching. EPINEPHrine 0.3 mg/0.3 mL injection Commonly known as:  AUVI-Q  
0.3 mL by IntraMUSCular route once as needed for Anaphylaxis for up to 1 dose. glucose blood VI test strips strip Commonly known as:  blood glucose test  
Patient to check blood sugar TID PRN  
  
 * ivermectin 0.5 % Lotn Commonly known as:  SKLICE  
1 g by Apply Externally route once for 1 dose. * ivermectin 3 mg tablet Commonly known as:  STROMECTOL Take 8 tabs for one dose and then a second dose in two weeks. ketoconazole 2 % topical cream  
Commonly known as:  NIZORAL Apply thin layer to affected area bid Lancets Misc Check blood sugar TID PRN with meals and dizziness  
  
 levothyroxine 112 mcg tablet Commonly known as:  SYNTHROID Take 1 Tab by mouth Daily (before breakfast). nystatin-triamcinolone 100,000-0.1 unit/gram-% ointment Commonly known as:  Kolton Shank Apply to affected area bid OTHER Azelaic/Finast/Flutic/Minox(Scalp) 5/0.1% Apply to affected area of scalp daily 30 ml  
  
 permethrin 5 % topical cream  
Commonly known as:  ACTICIN  
apply sparingly as directed * Notice: This list has 2 medication(s) that are the same as other medications prescribed for you. Read the directions carefully, and ask your doctor or other care provider to review them with you. Prescriptions Printed Refills  
 chlorpheniramine-HYDROcodone (TUSSIONEX) 10-8 mg/5 mL suspension 0 Sig: Take 5 mL by mouth nightly as needed for Cough or Congestion. Max Daily Amount: 5 mL. Class: Print Route: Oral  
  
Prescriptions Sent to Pharmacy Refills  
 ivermectin (SKLICE) 0.5 % lotn 1 Si g by Apply Externally route once for 1 dose. Class: Normal  
 Pharmacy: [id number] Ph #: [phone] Route: Apply Externally  
 permethrin (ACTICIN) 5 % topical cream 1 Sig: apply sparingly as directed Class: Normal  
 Pharmacy: [id number] Ph #: [phone]  
 benzonatate (TESSALON) 200 mg capsule 0 Sig: Take 1 Cap by mouth three (3) times daily as needed for Cough for up to 7 days. Class: Normal  
 Pharmacy: [facility] Ph #: [phone] Route: Oral  
 ivermectin (STROMECTOL) 3 mg tablet 1 Sig: Take 8 tabs for one dose and then a second dose in two weeks. Class: Normal  
 Pharmacy: [facility] Ph #: [phone] Follow-up Instructions Return for Patient will keep regular follow Up . Patient Instructions Please contact our office if you have any questions about your visit today. 1.) Use cough medications as needed. 2.) Keep regular follow up. Introducing [facility]! Dear Omar Fulton: Thank you for requesting a [employer] account. Our records indicate that you already have an active [employer] account. You can access your account anytime at [email or web address] Did you know that you can access your hospital and ER discharge instructions at any time in [employer]? You can also review all of your test results from your hospital stay or ER visit. Additional Information If you have questions, please visit the Frequently Asked Questions section of the [employer] website at [email or web address] Remember, [employer] is NOT to be used for urgent needs. For medical emergencies, dial 911. Now available from your iPhone and Android! Please provide this summary of care documentation to your next provider. Your primary care clinician is listed as ELVIN BRITTON. If you have any questions after today's visit, please call [phone]. Declines

## 2018-06-14 ENCOUNTER — INPATIENT (INPATIENT)
Facility: HOSPITAL | Age: 63
LOS: 13 days | Discharge: NURSING, REHAB OR LONG-TERM CARE | End: 2018-06-28
Attending: HOSPITALIST | Admitting: HOSPITALIST
Payer: MEDICAID

## 2018-06-14 VITALS
DIASTOLIC BLOOD PRESSURE: 68 MMHG | TEMPERATURE: 98 F | HEART RATE: 79 BPM | SYSTOLIC BLOOD PRESSURE: 133 MMHG | RESPIRATION RATE: 17 BRPM | OXYGEN SATURATION: 97 %

## 2018-06-14 DIAGNOSIS — S82.102A UNSPECIFIED FRACTURE OF UPPER END OF LEFT TIBIA, INITIAL ENCOUNTER FOR CLOSED FRACTURE: ICD-10-CM

## 2018-06-14 DIAGNOSIS — Z95.1 PRESENCE OF AORTOCORONARY BYPASS GRAFT: Chronic | ICD-10-CM

## 2018-06-14 DIAGNOSIS — Z90.49 ACQUIRED ABSENCE OF OTHER SPECIFIED PARTS OF DIGESTIVE TRACT: Chronic | ICD-10-CM

## 2018-06-14 LAB
ALBUMIN SERPL ELPH-MCNC: 4.5 G/DL — SIGNIFICANT CHANGE UP (ref 3.3–5)
ALP SERPL-CCNC: 114 U/L — SIGNIFICANT CHANGE UP (ref 40–120)
ALT FLD-CCNC: 19 U/L — SIGNIFICANT CHANGE UP (ref 4–41)
AST SERPL-CCNC: 25 U/L — SIGNIFICANT CHANGE UP (ref 4–40)
BASOPHILS # BLD AUTO: 0.02 K/UL — SIGNIFICANT CHANGE UP (ref 0–0.2)
BASOPHILS NFR BLD AUTO: 0.1 % — SIGNIFICANT CHANGE UP (ref 0–2)
BILIRUB SERPL-MCNC: 0.6 MG/DL — SIGNIFICANT CHANGE UP (ref 0.2–1.2)
BUN SERPL-MCNC: 47 MG/DL — HIGH (ref 7–23)
CALCIUM SERPL-MCNC: 9.4 MG/DL — SIGNIFICANT CHANGE UP (ref 8.4–10.5)
CHLORIDE SERPL-SCNC: 93 MMOL/L — LOW (ref 98–107)
CO2 SERPL-SCNC: 27 MMOL/L — SIGNIFICANT CHANGE UP (ref 22–31)
CREAT SERPL-MCNC: 2.97 MG/DL — HIGH (ref 0.5–1.3)
EOSINOPHIL # BLD AUTO: 0.14 K/UL — SIGNIFICANT CHANGE UP (ref 0–0.5)
EOSINOPHIL NFR BLD AUTO: 0.9 % — SIGNIFICANT CHANGE UP (ref 0–6)
GLUCOSE SERPL-MCNC: 105 MG/DL — HIGH (ref 70–99)
HCT VFR BLD CALC: 43.8 % — SIGNIFICANT CHANGE UP (ref 39–50)
HGB BLD-MCNC: 14.6 G/DL — SIGNIFICANT CHANGE UP (ref 13–17)
IMM GRANULOCYTES # BLD AUTO: 0.06 # — SIGNIFICANT CHANGE UP
IMM GRANULOCYTES NFR BLD AUTO: 0.4 % — SIGNIFICANT CHANGE UP (ref 0–1.5)
LYMPHOCYTES # BLD AUTO: 1.69 K/UL — SIGNIFICANT CHANGE UP (ref 1–3.3)
LYMPHOCYTES # BLD AUTO: 11.2 % — LOW (ref 13–44)
MCHC RBC-ENTMCNC: 29.7 PG — SIGNIFICANT CHANGE UP (ref 27–34)
MCHC RBC-ENTMCNC: 33.3 % — SIGNIFICANT CHANGE UP (ref 32–36)
MCV RBC AUTO: 89.2 FL — SIGNIFICANT CHANGE UP (ref 80–100)
MONOCYTES # BLD AUTO: 0.82 K/UL — SIGNIFICANT CHANGE UP (ref 0–0.9)
MONOCYTES NFR BLD AUTO: 5.4 % — SIGNIFICANT CHANGE UP (ref 2–14)
NEUTROPHILS # BLD AUTO: 12.35 K/UL — HIGH (ref 1.8–7.4)
NEUTROPHILS NFR BLD AUTO: 82 % — HIGH (ref 43–77)
NRBC # FLD: 0 — SIGNIFICANT CHANGE UP
PLATELET # BLD AUTO: 157 K/UL — SIGNIFICANT CHANGE UP (ref 150–400)
PMV BLD: 10 FL — SIGNIFICANT CHANGE UP (ref 7–13)
POTASSIUM SERPL-MCNC: 4.4 MMOL/L — SIGNIFICANT CHANGE UP (ref 3.5–5.3)
POTASSIUM SERPL-SCNC: 4.4 MMOL/L — SIGNIFICANT CHANGE UP (ref 3.5–5.3)
PROT SERPL-MCNC: 7.3 G/DL — SIGNIFICANT CHANGE UP (ref 6–8.3)
RBC # BLD: 4.91 M/UL — SIGNIFICANT CHANGE UP (ref 4.2–5.8)
RBC # FLD: 13.4 % — SIGNIFICANT CHANGE UP (ref 10.3–14.5)
SODIUM SERPL-SCNC: 135 MMOL/L — SIGNIFICANT CHANGE UP (ref 135–145)
WBC # BLD: 15.08 K/UL — HIGH (ref 3.8–10.5)
WBC # FLD AUTO: 15.08 K/UL — HIGH (ref 3.8–10.5)

## 2018-06-14 PROCEDURE — 73552 X-RAY EXAM OF FEMUR 2/>: CPT | Mod: 26,LT

## 2018-06-14 PROCEDURE — 99223 1ST HOSP IP/OBS HIGH 75: CPT

## 2018-06-14 PROCEDURE — 73130 X-RAY EXAM OF HAND: CPT | Mod: 26,LT

## 2018-06-14 PROCEDURE — 73562 X-RAY EXAM OF KNEE 3: CPT | Mod: 26,LT

## 2018-06-14 PROCEDURE — 72170 X-RAY EXAM OF PELVIS: CPT | Mod: 26

## 2018-06-14 RX ORDER — TETANUS TOXOID, REDUCED DIPHTHERIA TOXOID AND ACELLULAR PERTUSSIS VACCINE, ADSORBED 5; 2.5; 8; 8; 2.5 [IU]/.5ML; [IU]/.5ML; UG/.5ML; UG/.5ML; UG/.5ML
0.5 SUSPENSION INTRAMUSCULAR ONCE
Qty: 0 | Refills: 0 | Status: COMPLETED | OUTPATIENT
Start: 2018-06-14 | End: 2018-06-14

## 2018-06-14 RX ORDER — OXYCODONE AND ACETAMINOPHEN 5; 325 MG/1; MG/1
1 TABLET ORAL ONCE
Qty: 0 | Refills: 0 | Status: DISCONTINUED | OUTPATIENT
Start: 2018-06-14 | End: 2018-06-14

## 2018-06-14 RX ADMIN — OXYCODONE AND ACETAMINOPHEN 1 TABLET(S): 5; 325 TABLET ORAL at 22:06

## 2018-06-14 RX ADMIN — OXYCODONE AND ACETAMINOPHEN 1 TABLET(S): 5; 325 TABLET ORAL at 20:36

## 2018-06-14 RX ADMIN — TETANUS TOXOID, REDUCED DIPHTHERIA TOXOID AND ACELLULAR PERTUSSIS VACCINE, ADSORBED 0.5 MILLILITER(S): 5; 2.5; 8; 8; 2.5 SUSPENSION INTRAMUSCULAR at 22:06

## 2018-06-14 NOTE — H&P ADULT - HISTORY OF PRESENT ILLNESS
62 y.o. man with history of DM II, CAD s/p CABG, CVA with left sided residual weakness who came to the ER for evaluation of left knee pain s/p fall. Patient states that while exiting a vehicle with his cane, he slipped and fell and injured his left knee. Thereafter, he started to experience 10/10 sharp knee pain radiating up the leg. In ER, patient had imaging study which showed no acute fractures and patient was admitted to the medicine service for pain control. No additional complaints at this time.

## 2018-06-14 NOTE — ED PROVIDER NOTE - ATTENDING CONTRIBUTION TO CARE
[place]: 63 yo male with a h/o CKD, DM, Heart failure, CVA W L sided weakness walks with a walker at baseline s/p mechanical fall c/o left hip and knee pain. NO head trauma or LOC. NO back or neck pain. No chest pain or SOB. No abdominal pain. NO weakness or paresthesias. left sided hip and knee pain worse with movement. . No headache, nausea or vomiting. Exam: GENERAL: well appearing, NAD, HEENT: MMM, PERRLA, CARDIO: +S1/S2, no murmurs, rubs or gallops, LUNGS: CTA B/L, no wheezing, rales or rhonchi, ABD: soft, nontender, BSx4 quadrants, no guarding or rigidity. EXT: + bony TTP of left greater trochanter, + TTP of proximal tibia on left, ROM limited by pain 2+ distal pulses x 4 extremities. VNI distally, FROM of left hand and wrist. no TTP. NEURO: AxOx3, . SKIN: + superficial abrasion to left palm. no active bleeding. A/P- 63 yo male s/p mechanical fall with limited mobility at baseline. Likely fractures. will obtain xrays, give pain control and likely admit since pt unable to ambulate safely.

## 2018-06-14 NOTE — ED PROVIDER NOTE - OBJECTIVE STATEMENT
63 Y/O M PMH "Kidney Issues," DM, Heart failure, CVA W L sided weakness walks with a walker C/O L hip and knee pain after his cane slipped. He fell on his left side and was able to brace himself with his left hand. He called EMS and did not ambulate since. He states his L hip and knee pain are 10/10 in severity. He denies any other symptoms such as CP, SOB, ABD PN, N/V/D/Dizz or any other acute symptoms.

## 2018-06-14 NOTE — H&P ADULT - NSHPPHYSICALEXAM_GEN_ALL_CORE
Vital Signs Last 24 Hrs  T(C): 36.7 (15 Antonio 2018 02:03), Max: 36.7 (14 Jun 2018 18:22)  T(F): 98 (15 Antonio 2018 02:03), Max: 98.1 (14 Jun 2018 18:22)  HR: 94 (15 Antonio 2018 02:03) (76 - 94)  BP: 131/74 (15 Antonio 2018 02:03) (131/74 - 133/78)  BP(mean): --  RR: 17 (15 Antonio 2018 02:03) (17 - 18)  SpO2: 95% (15 Antonio 2018 02:03) (95% - 100%)

## 2018-06-14 NOTE — ED PROVIDER NOTE - MEDICAL DECISION MAKING DETAILS
63 Y/O M PMH "Kidney Issues," DM, Heart failure, CVA W L sided weakness walks with a walker C/O L hip and knee pain after his cane slipped. Acute pain control attempted, x rays ordered to evaluate for acute fracture.

## 2018-06-14 NOTE — ED ADULT NURSE NOTE - OBJECTIVE STATEMENT
pt a*ox3 amb to intake c.o knee pain, +rom, no deformities, nad noted , vs as stated resps even and unlabored medicated per orders will ctm closely.

## 2018-06-14 NOTE — ED ADULT TRIAGE NOTE - CHIEF COMPLAINT QUOTE
pt s/p mechanical fall at 3pm c/o left knee and left palm pain. pt denies cp, sob, and dizziness at present and time of incident.

## 2018-06-14 NOTE — H&P ADULT - PROBLEM SELECTOR PLAN 1
- s/p mechanical fall  - Continue with leg brace for now  -  F/u official CT scan report  - Ambulate with assistance  - Fall precaution  -  Pain control with tylenol and morphine for mild to severe pain.

## 2018-06-14 NOTE — ED PROVIDER NOTE - CARE PLAN
Principal Discharge DX:	Closed fracture of proximal end of left tibia, unspecified fracture morphology, initial encounter  Secondary Diagnosis:	Inability to ambulate due to knee  Secondary Diagnosis:	CVA, old, hemiparesis

## 2018-06-15 DIAGNOSIS — M79.605 PAIN IN LEFT LEG: ICD-10-CM

## 2018-06-15 DIAGNOSIS — Z29.9 ENCOUNTER FOR PROPHYLACTIC MEASURES, UNSPECIFIED: ICD-10-CM

## 2018-06-15 DIAGNOSIS — N40.0 BENIGN PROSTATIC HYPERPLASIA WITHOUT LOWER URINARY TRACT SYMPTOMS: ICD-10-CM

## 2018-06-15 DIAGNOSIS — E11.22 TYPE 2 DIABETES MELLITUS WITH DIABETIC CHRONIC KIDNEY DISEASE: ICD-10-CM

## 2018-06-15 DIAGNOSIS — I10 ESSENTIAL (PRIMARY) HYPERTENSION: ICD-10-CM

## 2018-06-15 DIAGNOSIS — E78.5 HYPERLIPIDEMIA, UNSPECIFIED: ICD-10-CM

## 2018-06-15 DIAGNOSIS — I25.10 ATHEROSCLEROTIC HEART DISEASE OF NATIVE CORONARY ARTERY WITHOUT ANGINA PECTORIS: ICD-10-CM

## 2018-06-15 PROBLEM — E11.9 TYPE 2 DIABETES MELLITUS WITHOUT COMPLICATIONS: Chronic | Status: ACTIVE | Noted: 2018-01-17

## 2018-06-15 PROBLEM — I50.9 HEART FAILURE, UNSPECIFIED: Chronic | Status: ACTIVE | Noted: 2018-01-17

## 2018-06-15 LAB
BASOPHILS # BLD AUTO: 0.05 K/UL — SIGNIFICANT CHANGE UP (ref 0–0.2)
BASOPHILS NFR BLD AUTO: 0.4 % — SIGNIFICANT CHANGE UP (ref 0–2)
BUN SERPL-MCNC: 49 MG/DL — HIGH (ref 7–23)
CALCIUM SERPL-MCNC: 9.1 MG/DL — SIGNIFICANT CHANGE UP (ref 8.4–10.5)
CHLORIDE SERPL-SCNC: 95 MMOL/L — LOW (ref 98–107)
CO2 SERPL-SCNC: 26 MMOL/L — SIGNIFICANT CHANGE UP (ref 22–31)
CREAT SERPL-MCNC: 2.9 MG/DL — HIGH (ref 0.5–1.3)
EOSINOPHIL # BLD AUTO: 0.44 K/UL — SIGNIFICANT CHANGE UP (ref 0–0.5)
EOSINOPHIL NFR BLD AUTO: 3.9 % — SIGNIFICANT CHANGE UP (ref 0–6)
GLUCOSE BLDC GLUCOMTR-MCNC: 104 MG/DL — HIGH (ref 70–99)
GLUCOSE SERPL-MCNC: 114 MG/DL — HIGH (ref 70–99)
HBA1C BLD-MCNC: 6.5 % — HIGH (ref 4–5.6)
HCT VFR BLD CALC: 43.3 % — SIGNIFICANT CHANGE UP (ref 39–50)
HGB BLD-MCNC: 14.7 G/DL — SIGNIFICANT CHANGE UP (ref 13–17)
IMM GRANULOCYTES # BLD AUTO: 0.04 # — SIGNIFICANT CHANGE UP
IMM GRANULOCYTES NFR BLD AUTO: 0.4 % — SIGNIFICANT CHANGE UP (ref 0–1.5)
LYMPHOCYTES # BLD AUTO: 1.59 K/UL — SIGNIFICANT CHANGE UP (ref 1–3.3)
LYMPHOCYTES # BLD AUTO: 13.9 % — SIGNIFICANT CHANGE UP (ref 13–44)
MCHC RBC-ENTMCNC: 30.5 PG — SIGNIFICANT CHANGE UP (ref 27–34)
MCHC RBC-ENTMCNC: 33.9 % — SIGNIFICANT CHANGE UP (ref 32–36)
MCV RBC AUTO: 89.8 FL — SIGNIFICANT CHANGE UP (ref 80–100)
MONOCYTES # BLD AUTO: 0.68 K/UL — SIGNIFICANT CHANGE UP (ref 0–0.9)
MONOCYTES NFR BLD AUTO: 6 % — SIGNIFICANT CHANGE UP (ref 2–14)
NEUTROPHILS # BLD AUTO: 8.6 K/UL — HIGH (ref 1.8–7.4)
NEUTROPHILS NFR BLD AUTO: 75.4 % — SIGNIFICANT CHANGE UP (ref 43–77)
NRBC # FLD: 0 — SIGNIFICANT CHANGE UP
PLATELET # BLD AUTO: 154 K/UL — SIGNIFICANT CHANGE UP (ref 150–400)
PMV BLD: 10.4 FL — SIGNIFICANT CHANGE UP (ref 7–13)
POTASSIUM SERPL-MCNC: 4.1 MMOL/L — SIGNIFICANT CHANGE UP (ref 3.5–5.3)
POTASSIUM SERPL-SCNC: 4.1 MMOL/L — SIGNIFICANT CHANGE UP (ref 3.5–5.3)
RBC # BLD: 4.82 M/UL — SIGNIFICANT CHANGE UP (ref 4.2–5.8)
RBC # FLD: 13.4 % — SIGNIFICANT CHANGE UP (ref 10.3–14.5)
SODIUM SERPL-SCNC: 136 MMOL/L — SIGNIFICANT CHANGE UP (ref 135–145)
WBC # BLD: 11.4 K/UL — HIGH (ref 3.8–10.5)
WBC # FLD AUTO: 11.4 K/UL — HIGH (ref 3.8–10.5)

## 2018-06-15 PROCEDURE — 99233 SBSQ HOSP IP/OBS HIGH 50: CPT

## 2018-06-15 PROCEDURE — 76377 3D RENDER W/INTRP POSTPROCES: CPT | Mod: 26

## 2018-06-15 PROCEDURE — 72192 CT PELVIS W/O DYE: CPT | Mod: 26

## 2018-06-15 PROCEDURE — 73590 X-RAY EXAM OF LOWER LEG: CPT | Mod: 26,LT

## 2018-06-15 RX ORDER — INSULIN LISPRO 100/ML
VIAL (ML) SUBCUTANEOUS
Qty: 0 | Refills: 0 | Status: DISCONTINUED | OUTPATIENT
Start: 2018-06-15 | End: 2018-06-28

## 2018-06-15 RX ORDER — TAMSULOSIN HYDROCHLORIDE 0.4 MG/1
0.4 CAPSULE ORAL AT BEDTIME
Qty: 0 | Refills: 0 | Status: DISCONTINUED | OUTPATIENT
Start: 2018-06-15 | End: 2018-06-28

## 2018-06-15 RX ORDER — POTASSIUM CHLORIDE 20 MEQ
10 PACKET (EA) ORAL DAILY
Qty: 0 | Refills: 0 | Status: DISCONTINUED | OUTPATIENT
Start: 2018-06-15 | End: 2018-06-19

## 2018-06-15 RX ORDER — ISOSORBIDE MONONITRATE 60 MG/1
30 TABLET, EXTENDED RELEASE ORAL DAILY
Qty: 0 | Refills: 0 | Status: DISCONTINUED | OUTPATIENT
Start: 2018-06-15 | End: 2018-06-28

## 2018-06-15 RX ORDER — MONTELUKAST 4 MG/1
10 TABLET, CHEWABLE ORAL DAILY
Qty: 0 | Refills: 0 | Status: DISCONTINUED | OUTPATIENT
Start: 2018-06-15 | End: 2018-06-28

## 2018-06-15 RX ORDER — HYDROMORPHONE HYDROCHLORIDE 2 MG/ML
1 INJECTION INTRAMUSCULAR; INTRAVENOUS; SUBCUTANEOUS EVERY 4 HOURS
Qty: 0 | Refills: 0 | Status: DISCONTINUED | OUTPATIENT
Start: 2018-06-15 | End: 2018-06-15

## 2018-06-15 RX ORDER — HYDROMORPHONE HYDROCHLORIDE 2 MG/ML
0.5 INJECTION INTRAMUSCULAR; INTRAVENOUS; SUBCUTANEOUS EVERY 4 HOURS
Qty: 0 | Refills: 0 | Status: DISCONTINUED | OUTPATIENT
Start: 2018-06-15 | End: 2018-06-15

## 2018-06-15 RX ORDER — SENNA PLUS 8.6 MG/1
2 TABLET ORAL AT BEDTIME
Qty: 0 | Refills: 0 | Status: DISCONTINUED | OUTPATIENT
Start: 2018-06-15 | End: 2018-06-28

## 2018-06-15 RX ORDER — GABAPENTIN 400 MG/1
800 CAPSULE ORAL THREE TIMES A DAY
Qty: 0 | Refills: 0 | Status: DISCONTINUED | OUTPATIENT
Start: 2018-06-15 | End: 2018-06-16

## 2018-06-15 RX ORDER — DEXTROSE 50 % IN WATER 50 %
15 SYRINGE (ML) INTRAVENOUS ONCE
Qty: 0 | Refills: 0 | Status: DISCONTINUED | OUTPATIENT
Start: 2018-06-15 | End: 2018-06-28

## 2018-06-15 RX ORDER — GLUCAGON INJECTION, SOLUTION 0.5 MG/.1ML
1 INJECTION, SOLUTION SUBCUTANEOUS ONCE
Qty: 0 | Refills: 0 | Status: DISCONTINUED | OUTPATIENT
Start: 2018-06-15 | End: 2018-06-28

## 2018-06-15 RX ORDER — DOCUSATE SODIUM 100 MG
100 CAPSULE ORAL THREE TIMES A DAY
Qty: 0 | Refills: 0 | Status: DISCONTINUED | OUTPATIENT
Start: 2018-06-15 | End: 2018-06-28

## 2018-06-15 RX ORDER — DEXTROSE 50 % IN WATER 50 %
25 SYRINGE (ML) INTRAVENOUS ONCE
Qty: 0 | Refills: 0 | Status: DISCONTINUED | OUTPATIENT
Start: 2018-06-15 | End: 2018-06-28

## 2018-06-15 RX ORDER — CARVEDILOL PHOSPHATE 80 MG/1
12.5 CAPSULE, EXTENDED RELEASE ORAL EVERY 12 HOURS
Qty: 0 | Refills: 0 | Status: DISCONTINUED | OUTPATIENT
Start: 2018-06-15 | End: 2018-06-19

## 2018-06-15 RX ORDER — ASPIRIN/CALCIUM CARB/MAGNESIUM 324 MG
81 TABLET ORAL DAILY
Qty: 0 | Refills: 0 | Status: DISCONTINUED | OUTPATIENT
Start: 2018-06-15 | End: 2018-06-28

## 2018-06-15 RX ORDER — POLYETHYLENE GLYCOL 3350 17 G/17G
17 POWDER, FOR SOLUTION ORAL DAILY
Qty: 0 | Refills: 0 | Status: DISCONTINUED | OUTPATIENT
Start: 2018-06-15 | End: 2018-06-28

## 2018-06-15 RX ORDER — HEPARIN SODIUM 5000 [USP'U]/ML
5000 INJECTION INTRAVENOUS; SUBCUTANEOUS EVERY 12 HOURS
Qty: 0 | Refills: 0 | Status: DISCONTINUED | OUTPATIENT
Start: 2018-06-15 | End: 2018-06-15

## 2018-06-15 RX ORDER — ATORVASTATIN CALCIUM 80 MG/1
40 TABLET, FILM COATED ORAL AT BEDTIME
Qty: 0 | Refills: 0 | Status: DISCONTINUED | OUTPATIENT
Start: 2018-06-15 | End: 2018-06-28

## 2018-06-15 RX ORDER — CLOPIDOGREL BISULFATE 75 MG/1
75 TABLET, FILM COATED ORAL DAILY
Qty: 0 | Refills: 0 | Status: DISCONTINUED | OUTPATIENT
Start: 2018-06-15 | End: 2018-06-28

## 2018-06-15 RX ORDER — OXYCODONE HYDROCHLORIDE 5 MG/1
10 TABLET ORAL EVERY 4 HOURS
Qty: 0 | Refills: 0 | Status: DISCONTINUED | OUTPATIENT
Start: 2018-06-15 | End: 2018-06-19

## 2018-06-15 RX ORDER — HYDRALAZINE HCL 50 MG
100 TABLET ORAL THREE TIMES A DAY
Qty: 0 | Refills: 0 | Status: DISCONTINUED | OUTPATIENT
Start: 2018-06-15 | End: 2018-06-20

## 2018-06-15 RX ORDER — SODIUM CHLORIDE 9 MG/ML
1000 INJECTION, SOLUTION INTRAVENOUS
Qty: 0 | Refills: 0 | Status: DISCONTINUED | OUTPATIENT
Start: 2018-06-15 | End: 2018-06-20

## 2018-06-15 RX ORDER — SUCRALFATE 1 G
1 TABLET ORAL
Qty: 0 | Refills: 0 | Status: DISCONTINUED | OUTPATIENT
Start: 2018-06-15 | End: 2018-06-28

## 2018-06-15 RX ORDER — DEXTROSE 50 % IN WATER 50 %
12.5 SYRINGE (ML) INTRAVENOUS ONCE
Qty: 0 | Refills: 0 | Status: DISCONTINUED | OUTPATIENT
Start: 2018-06-15 | End: 2018-06-28

## 2018-06-15 RX ORDER — PANTOPRAZOLE SODIUM 20 MG/1
40 TABLET, DELAYED RELEASE ORAL
Qty: 0 | Refills: 0 | Status: DISCONTINUED | OUTPATIENT
Start: 2018-06-15 | End: 2018-06-28

## 2018-06-15 RX ORDER — HEPARIN SODIUM 5000 [USP'U]/ML
5000 INJECTION INTRAVENOUS; SUBCUTANEOUS EVERY 8 HOURS
Qty: 0 | Refills: 0 | Status: DISCONTINUED | OUTPATIENT
Start: 2018-06-15 | End: 2018-06-15

## 2018-06-15 RX ORDER — OXYCODONE HYDROCHLORIDE 5 MG/1
5 TABLET ORAL EVERY 4 HOURS
Qty: 0 | Refills: 0 | Status: DISCONTINUED | OUTPATIENT
Start: 2018-06-15 | End: 2018-06-19

## 2018-06-15 RX ORDER — ACETAMINOPHEN 500 MG
325 TABLET ORAL EVERY 4 HOURS
Qty: 0 | Refills: 0 | Status: DISCONTINUED | OUTPATIENT
Start: 2018-06-15 | End: 2018-06-28

## 2018-06-15 RX ADMIN — GABAPENTIN 800 MILLIGRAM(S): 400 CAPSULE ORAL at 06:06

## 2018-06-15 RX ADMIN — Medication 325 MILLIGRAM(S): at 12:45

## 2018-06-15 RX ADMIN — Medication 100 MILLIGRAM(S): at 06:05

## 2018-06-15 RX ADMIN — Medication 81 MILLIGRAM(S): at 11:53

## 2018-06-15 RX ADMIN — CARVEDILOL PHOSPHATE 12.5 MILLIGRAM(S): 80 CAPSULE, EXTENDED RELEASE ORAL at 06:06

## 2018-06-15 RX ADMIN — Medication 10 MILLIGRAM(S): at 18:31

## 2018-06-15 RX ADMIN — Medication 100 MILLIGRAM(S): at 14:47

## 2018-06-15 RX ADMIN — TAMSULOSIN HYDROCHLORIDE 0.4 MILLIGRAM(S): 0.4 CAPSULE ORAL at 21:39

## 2018-06-15 RX ADMIN — Medication 1 GRAM(S): at 18:31

## 2018-06-15 RX ADMIN — HYDROMORPHONE HYDROCHLORIDE 0.5 MILLIGRAM(S): 2 INJECTION INTRAMUSCULAR; INTRAVENOUS; SUBCUTANEOUS at 03:48

## 2018-06-15 RX ADMIN — CARVEDILOL PHOSPHATE 12.5 MILLIGRAM(S): 80 CAPSULE, EXTENDED RELEASE ORAL at 18:31

## 2018-06-15 RX ADMIN — ISOSORBIDE MONONITRATE 30 MILLIGRAM(S): 60 TABLET, EXTENDED RELEASE ORAL at 11:53

## 2018-06-15 RX ADMIN — Medication 10 MILLIEQUIVALENT(S): at 11:53

## 2018-06-15 RX ADMIN — PANTOPRAZOLE SODIUM 40 MILLIGRAM(S): 20 TABLET, DELAYED RELEASE ORAL at 06:06

## 2018-06-15 RX ADMIN — Medication 1 GRAM(S): at 06:06

## 2018-06-15 RX ADMIN — Medication 10 MILLIGRAM(S): at 06:06

## 2018-06-15 RX ADMIN — HYDROMORPHONE HYDROCHLORIDE 0.5 MILLIGRAM(S): 2 INJECTION INTRAMUSCULAR; INTRAVENOUS; SUBCUTANEOUS at 03:08

## 2018-06-15 RX ADMIN — MONTELUKAST 10 MILLIGRAM(S): 4 TABLET, CHEWABLE ORAL at 11:53

## 2018-06-15 RX ADMIN — OXYCODONE HYDROCHLORIDE 10 MILLIGRAM(S): 5 TABLET ORAL at 14:46

## 2018-06-15 RX ADMIN — OXYCODONE HYDROCHLORIDE 10 MILLIGRAM(S): 5 TABLET ORAL at 15:30

## 2018-06-15 RX ADMIN — Medication 100 MILLIGRAM(S): at 21:39

## 2018-06-15 RX ADMIN — Medication 100 MILLIGRAM(S): at 06:06

## 2018-06-15 RX ADMIN — OXYCODONE HYDROCHLORIDE 10 MILLIGRAM(S): 5 TABLET ORAL at 22:41

## 2018-06-15 RX ADMIN — GABAPENTIN 800 MILLIGRAM(S): 400 CAPSULE ORAL at 14:47

## 2018-06-15 RX ADMIN — HEPARIN SODIUM 5000 UNIT(S): 5000 INJECTION INTRAVENOUS; SUBCUTANEOUS at 18:31

## 2018-06-15 RX ADMIN — GABAPENTIN 800 MILLIGRAM(S): 400 CAPSULE ORAL at 21:39

## 2018-06-15 RX ADMIN — HEPARIN SODIUM 5000 UNIT(S): 5000 INJECTION INTRAVENOUS; SUBCUTANEOUS at 06:06

## 2018-06-15 RX ADMIN — ATORVASTATIN CALCIUM 40 MILLIGRAM(S): 80 TABLET, FILM COATED ORAL at 21:39

## 2018-06-15 RX ADMIN — OXYCODONE HYDROCHLORIDE 10 MILLIGRAM(S): 5 TABLET ORAL at 21:52

## 2018-06-15 RX ADMIN — CLOPIDOGREL BISULFATE 75 MILLIGRAM(S): 75 TABLET, FILM COATED ORAL at 11:53

## 2018-06-15 RX ADMIN — Medication 325 MILLIGRAM(S): at 11:53

## 2018-06-15 NOTE — PROGRESS NOTE ADULT - SUBJECTIVE AND OBJECTIVE BOX
Patient is a 62y old  Male who presents with a chief complaint of Left leg pain    SUBJECTIVE / OVERNIGHT EVENTS:    Patient states his whole leg still hurts  Hasn't ambulated   No CP, SOB, f/c, nausea/vomiting  tolerating diet    MEDICATIONS  (STANDING):  aspirin enteric coated 81 milliGRAM(s) Oral daily  atorvastatin 40 milliGRAM(s) Oral at bedtime  carvedilol 12.5 milliGRAM(s) Oral every 12 hours  clopidogrel Tablet 75 milliGRAM(s) Oral daily  docusate sodium 100 milliGRAM(s) Oral three times a day  gabapentin 800 milliGRAM(s) Oral three times a day  hydrALAZINE 100 milliGRAM(s) Oral three times a day  insulin lispro (HumaLOG) corrective regimen sliding scale   SubCutaneous three times a day before meals  isosorbide   mononitrate ER Tablet (IMDUR) 30 milliGRAM(s) Oral daily  montelukast 10 milliGRAM(s) Oral daily  pantoprazole    Tablet 40 milliGRAM(s) Oral before breakfast  potassium chloride    Tablet ER 10 milliEquivalent(s) Oral daily  sucralfate 1 Gram(s) Oral two times a day  tamsulosin 0.4 milliGRAM(s) Oral at bedtime  torsemide 10 milliGRAM(s) Oral two times a day    MEDICATIONS  (PRN):  acetaminophen   Tablet. 325 milliGRAM(s) Oral every 4 hours PRN Mild Pain (1 - 3)  dextrose 40% Gel 15 Gram(s) Oral once PRN Blood Glucose LESS THAN 70 milliGRAM(s)/deciliter  glucagon  Injectable 1 milliGRAM(s) IntraMuscular once PRN Glucose LESS THAN 70 milligrams/deciliter  HYDROmorphone  Injectable 0.5 milliGRAM(s) IV Push every 4 hours PRN Moderate Pain (4 - 6)    T(C): 36.6 (06-15-18 @ 11:50), Max: 36.7 (06-14-18 @ 18:22)  HR: 89 (06-15-18 @ 11:50) (76 - 94)  BP: 128/76 (06-15-18 @ 11:50) (118/63 - 133/78)  RR: 19 (06-15-18 @ 11:50) (17 - 19)  SpO2: 97% (06-15-18 @ 11:50) (95% - 100%)    CAPILLARY BLOOD GLUCOSE  POCT Blood Glucose.: 141 mg/dL (15 Antonio 2018 11:51)  POCT Blood Glucose.: 104 mg/dL (15 Antonio 2018 07:30)    PHYSICAL EXAM:  GENERAL: NAD, was sleeping on my arrival   HEAD:  Atraumatic, Normocephalic  EYES: conjunctiva and sclera clear  NECK: Supple, No JVD  CHEST/LUNG: Clear to auscultation bilaterally; No wheeze  HEART: Regular rate and rhythm; No murmurs, rubs, or gallops  ABDOMEN: Soft, Nontender, Nondistended; Bowel sounds present  EXTREMITIES:   warm and well perfused, No clubbing, cyanosis, or edema  PSYCH: AAOx3  NEUROLOGY: L sided LE weakness, full passive ROM of knee, no hip pain on external rotation, no skin tears/lacs, mild L joint effusion and TTP  SKIN: No rashes or lesions    LABS:                        14.7   11.40 )-----------( 154      ( 15 Antonio 2018 06:43 )             43.3     06-15    136  |  95<L>  |  49<H>  ----------------------------<  114<H>  4.1   |  26  |  2.90<H>    Ca    9.1      15 Antonio 2018 06:43    TPro  7.3  /  Alb  4.5  /  TBili  0.6  /  DBili  x   /  AST  25  /  ALT  19  /  AlkPhos  114  06-14    CT pelvis:  FINDINGS: No acute fracture or dislocation is demonstrated. There is mild   bilateral hip degenerative arthrosis. There is lower lumbar spondylosis   with mild anterolisthesis of L4 on L5 and mild retrolisthesis of L5 on   S1. There is severe left foraminal narrowing at L4-L5 and L5-S1.    There are vascular calcifications. There is diverticulosis.      Xray Knee and Pelvis     Cortical irregularity of the medial aspect of the proximal tibia without   significant overlying soft tissue swelling.  Joint space maintained.  Tricompartmental joint space narrowing and osteophyte formations.      No acute fracture or dislocation.  Joint space maintained.  Osteophyte formations in the left hip joint.  Vascular calcification.  Lucent lesion in the right lesser trochanter, unchanged from 1/19/2018.      Consultant(s) Notes Reviewed:  ortho    Care Discussed with Consultants/Other Providers: ortho PA

## 2018-06-15 NOTE — CONSULT NOTE ADULT - SUBJECTIVE AND OBJECTIVE BOX
61 yo M  Hx DMII, CVA w left sided weakness, CAD s/p CABG  c/o above s/p mechanical fall yesterday. Pt is community ambulator with cane. Pt co diffuse pain around left knee XR left knee negative for fx/ dislocation. Pt on ASA plavix     < from: Xray Knee 3 Views, Left (06.14.18 @ 21:00) >    EXAM:  RAD KNEE 3 VIEWS LEFT        PROCEDURE DATE:  Jun 14 2018         INTERPRETATION:  CLINICAL INFORMATION: Status post fall.    TECHNIQUE: 4 views of left knee.    COMPARISON: Left knee radiograph from 1/16/2018.    FINDINGS:     Cortical irregularity of the medial aspect of the proximal tibia without   significant overlying soft tissue swelling.  Joint space maintained.  Tricompartmental joint space narrowing and osteophyte formations.    IMPRESSION:     Cortical irregularity of the medial aspect of the proximal tibia without   significant overlying soft tissue swelling. Correlate with point   tenderness.      < end of copied text >      Left knee +swelling/ effusion skin intact no erythema or bruising                    Good ROM without pain non specific tenderness no point tenderness over bony prominences or joint line                     no pain with axial load negative anterior/posterior drawer sign,                    EHL/GC/TA 5/5 sensory intact, DP pulses 2+

## 2018-06-15 NOTE — PHYSICAL THERAPY INITIAL EVALUATION ADULT - GENERAL OBSERVATIONS, REHAB EVAL
Consult received, chart reviewed. Patient received supine in bed, NAD. Patient agreed to Evaluation from Physical Therapist.

## 2018-06-15 NOTE — PHYSICAL THERAPY INITIAL EVALUATION ADULT - CRITERIA FOR SKILLED THERAPEUTIC INTERVENTIONS
therapy frequency/anticipated discharge recommendation/predicted duration of therapy intervention/impairments found/risk reduction/prevention/rehab potential

## 2018-06-15 NOTE — CHART NOTE - NSCHARTNOTEFT_GEN_A_CORE
Patient seen, leg appears grossly normal, no gross deformity.  Pain is all on Left.  Patient states pain is "all over".  Imaging reviewed.  Medial tibial abnormality, pain present there on exam.  Ortho called and kindly evaluated patient state no need for immobilizer at this time, OK to WBAT.  Possibly has mild effussion/hemathrosis.  Can f/u with ortho as OP.

## 2018-06-15 NOTE — PROGRESS NOTE ADULT - PROBLEM SELECTOR PLAN 1
Patient with mechanical fall, no head trauma or LOC; had CT pelvis and x rays of hip and knee all negative   -no need for knee immobilizer, this was discussed with ortho   -fall precaution  -pain control with Tylenol and oxycodone  -f/u PT eval, WBAT, pt amenable to rehab

## 2018-06-15 NOTE — CONSULT NOTE ADULT - ASSESSMENT
A/P left knee pain likely hemearthrosis        WBAT         ICE, ELEVATE< COMPRESS with ace prn, REST         Pain meds as tolerated        If pain persists follow up Dr Go as outpt [phone]

## 2018-06-15 NOTE — PROGRESS NOTE ADULT - ASSESSMENT
61 yo M ambulatory with cane with DM2, CAD s/p CABG, history of CVA w/ residual L sided weakness s/p mechanical fall when getting out of car.

## 2018-06-15 NOTE — PHYSICAL THERAPY INITIAL EVALUATION ADULT - PERTINENT HX OF CURRENT PROBLEM, REHAB EVAL
Pt. admitted for left leg pain s/p fall. Per documentation, CT pelvis and x rays of hip and knee all negative. PMH of DM II, CAD s/p CABG, CVA with left sided residual weakness.

## 2018-06-15 NOTE — PHYSICAL THERAPY INITIAL EVALUATION ADULT - ADDITIONAL COMMENTS
Pt. reports owning DME of straight cane, rolling walker. Pt. reports he uses the cane outdoors and rolling walker indoors.     Pt. was left supine in bed post PT Evaluation, NAD. THOMAS Castellano made aware of pt. status and participation in PT.

## 2018-06-16 LAB
BUN SERPL-MCNC: 55 MG/DL — HIGH (ref 7–23)
CALCIUM SERPL-MCNC: 9.1 MG/DL — SIGNIFICANT CHANGE UP (ref 8.4–10.5)
CHLORIDE SERPL-SCNC: 95 MMOL/L — LOW (ref 98–107)
CO2 SERPL-SCNC: 25 MMOL/L — SIGNIFICANT CHANGE UP (ref 22–31)
CREAT SERPL-MCNC: 3.01 MG/DL — HIGH (ref 0.5–1.3)
GLUCOSE SERPL-MCNC: 123 MG/DL — HIGH (ref 70–99)
HBA1C BLD-MCNC: 6.4 % — HIGH (ref 4–5.6)
HCT VFR BLD CALC: 43.9 % — SIGNIFICANT CHANGE UP (ref 39–50)
HGB BLD-MCNC: 14.9 G/DL — SIGNIFICANT CHANGE UP (ref 13–17)
MCHC RBC-ENTMCNC: 29.5 PG — SIGNIFICANT CHANGE UP (ref 27–34)
MCHC RBC-ENTMCNC: 33.9 % — SIGNIFICANT CHANGE UP (ref 32–36)
MCV RBC AUTO: 86.9 FL — SIGNIFICANT CHANGE UP (ref 80–100)
NRBC # FLD: 0 — SIGNIFICANT CHANGE UP
PLATELET # BLD AUTO: 135 K/UL — LOW (ref 150–400)
PMV BLD: 10.3 FL — SIGNIFICANT CHANGE UP (ref 7–13)
POTASSIUM SERPL-MCNC: 4 MMOL/L — SIGNIFICANT CHANGE UP (ref 3.5–5.3)
POTASSIUM SERPL-SCNC: 4 MMOL/L — SIGNIFICANT CHANGE UP (ref 3.5–5.3)
RBC # BLD: 5.05 M/UL — SIGNIFICANT CHANGE UP (ref 4.2–5.8)
RBC # FLD: 13.8 % — SIGNIFICANT CHANGE UP (ref 10.3–14.5)
SODIUM SERPL-SCNC: 137 MMOL/L — SIGNIFICANT CHANGE UP (ref 135–145)
WBC # BLD: 12.35 K/UL — HIGH (ref 3.8–10.5)
WBC # FLD AUTO: 12.35 K/UL — HIGH (ref 3.8–10.5)

## 2018-06-16 PROCEDURE — 99233 SBSQ HOSP IP/OBS HIGH 50: CPT

## 2018-06-16 RX ORDER — GABAPENTIN 400 MG/1
300 CAPSULE ORAL
Qty: 0 | Refills: 0 | Status: DISCONTINUED | OUTPATIENT
Start: 2018-06-16 | End: 2018-06-28

## 2018-06-16 RX ADMIN — PANTOPRAZOLE SODIUM 40 MILLIGRAM(S): 20 TABLET, DELAYED RELEASE ORAL at 06:42

## 2018-06-16 RX ADMIN — Medication 100 MILLIGRAM(S): at 13:23

## 2018-06-16 RX ADMIN — OXYCODONE HYDROCHLORIDE 10 MILLIGRAM(S): 5 TABLET ORAL at 07:44

## 2018-06-16 RX ADMIN — Medication 81 MILLIGRAM(S): at 13:23

## 2018-06-16 RX ADMIN — Medication 100 MILLIGRAM(S): at 05:31

## 2018-06-16 RX ADMIN — MONTELUKAST 10 MILLIGRAM(S): 4 TABLET, CHEWABLE ORAL at 13:23

## 2018-06-16 RX ADMIN — Medication 10 MILLIEQUIVALENT(S): at 13:23

## 2018-06-16 RX ADMIN — CARVEDILOL PHOSPHATE 12.5 MILLIGRAM(S): 80 CAPSULE, EXTENDED RELEASE ORAL at 05:31

## 2018-06-16 RX ADMIN — SENNA PLUS 2 TABLET(S): 8.6 TABLET ORAL at 21:29

## 2018-06-16 RX ADMIN — GABAPENTIN 800 MILLIGRAM(S): 400 CAPSULE ORAL at 05:30

## 2018-06-16 RX ADMIN — Medication 2: at 12:25

## 2018-06-16 RX ADMIN — Medication 1 GRAM(S): at 05:32

## 2018-06-16 RX ADMIN — OXYCODONE HYDROCHLORIDE 10 MILLIGRAM(S): 5 TABLET ORAL at 04:10

## 2018-06-16 RX ADMIN — OXYCODONE HYDROCHLORIDE 10 MILLIGRAM(S): 5 TABLET ORAL at 03:16

## 2018-06-16 RX ADMIN — ISOSORBIDE MONONITRATE 30 MILLIGRAM(S): 60 TABLET, EXTENDED RELEASE ORAL at 13:23

## 2018-06-16 RX ADMIN — OXYCODONE HYDROCHLORIDE 10 MILLIGRAM(S): 5 TABLET ORAL at 18:00

## 2018-06-16 RX ADMIN — Medication 100 MILLIGRAM(S): at 05:32

## 2018-06-16 RX ADMIN — CARVEDILOL PHOSPHATE 12.5 MILLIGRAM(S): 80 CAPSULE, EXTENDED RELEASE ORAL at 17:18

## 2018-06-16 RX ADMIN — ATORVASTATIN CALCIUM 40 MILLIGRAM(S): 80 TABLET, FILM COATED ORAL at 21:28

## 2018-06-16 RX ADMIN — OXYCODONE HYDROCHLORIDE 10 MILLIGRAM(S): 5 TABLET ORAL at 17:19

## 2018-06-16 RX ADMIN — CLOPIDOGREL BISULFATE 75 MILLIGRAM(S): 75 TABLET, FILM COATED ORAL at 13:22

## 2018-06-16 RX ADMIN — GABAPENTIN 800 MILLIGRAM(S): 400 CAPSULE ORAL at 13:22

## 2018-06-16 RX ADMIN — Medication 10 MILLIGRAM(S): at 05:32

## 2018-06-16 RX ADMIN — OXYCODONE HYDROCHLORIDE 10 MILLIGRAM(S): 5 TABLET ORAL at 21:56

## 2018-06-16 RX ADMIN — Medication 1 GRAM(S): at 17:18

## 2018-06-16 RX ADMIN — OXYCODONE HYDROCHLORIDE 10 MILLIGRAM(S): 5 TABLET ORAL at 21:26

## 2018-06-16 RX ADMIN — Medication 10 MILLIGRAM(S): at 17:18

## 2018-06-16 RX ADMIN — POLYETHYLENE GLYCOL 3350 17 GRAM(S): 17 POWDER, FOR SOLUTION ORAL at 13:24

## 2018-06-16 RX ADMIN — OXYCODONE HYDROCHLORIDE 10 MILLIGRAM(S): 5 TABLET ORAL at 08:30

## 2018-06-16 RX ADMIN — Medication 100 MILLIGRAM(S): at 21:28

## 2018-06-16 RX ADMIN — TAMSULOSIN HYDROCHLORIDE 0.4 MILLIGRAM(S): 0.4 CAPSULE ORAL at 21:28

## 2018-06-16 NOTE — PROGRESS NOTE ADULT - SUBJECTIVE AND OBJECTIVE BOX
Patient is a 62y old  Male who presents with a chief complaint of Left leg pain (14 Jun 2018 23:41)        SUBJECTIVE / OVERNIGHT EVENTS: Still complaining of left knee pain and inability to ambulate. States ice pack somewhat helpful.      MEDICATIONS  (STANDING):  aspirin enteric coated 81 milliGRAM(s) Oral daily  atorvastatin 40 milliGRAM(s) Oral at bedtime  carvedilol 12.5 milliGRAM(s) Oral every 12 hours  clopidogrel Tablet 75 milliGRAM(s) Oral daily  dextrose 5%. 1000 milliLiter(s) (50 mL/Hr) IV Continuous <Continuous>  dextrose 50% Injectable 12.5 Gram(s) IV Push once  dextrose 50% Injectable 25 Gram(s) IV Push once  dextrose 50% Injectable 25 Gram(s) IV Push once  docusate sodium 100 milliGRAM(s) Oral three times a day  gabapentin 800 milliGRAM(s) Oral three times a day  hydrALAZINE 100 milliGRAM(s) Oral three times a day  insulin lispro (HumaLOG) corrective regimen sliding scale   SubCutaneous three times a day before meals  isosorbide   mononitrate ER Tablet (IMDUR) 30 milliGRAM(s) Oral daily  montelukast 10 milliGRAM(s) Oral daily  pantoprazole    Tablet 40 milliGRAM(s) Oral before breakfast  polyethylene glycol 3350 17 Gram(s) Oral daily  potassium chloride    Tablet ER 10 milliEquivalent(s) Oral daily  senna 2 Tablet(s) Oral at bedtime  sucralfate 1 Gram(s) Oral two times a day  tamsulosin 0.4 milliGRAM(s) Oral at bedtime  torsemide 10 milliGRAM(s) Oral two times a day    MEDICATIONS  (PRN):  acetaminophen   Tablet. 325 milliGRAM(s) Oral every 4 hours PRN Mild Pain (1 - 3)  dextrose 40% Gel 15 Gram(s) Oral once PRN Blood Glucose LESS THAN 70 milliGRAM(s)/deciliter  glucagon  Injectable 1 milliGRAM(s) IntraMuscular once PRN Glucose LESS THAN 70 milligrams/deciliter  oxyCODONE    IR 10 milliGRAM(s) Oral every 4 hours PRN Severe Pain (7 - 10)  oxyCODONE    IR 5 milliGRAM(s) Oral every 4 hours PRN Moderate Pain (4 - 6)      Vital Signs Last 24 Hrs  T(C): 37.1 (16 Jun 2018 05:12), Max: 37.1 (16 Jun 2018 05:12)  T(F): 98.7 (16 Jun 2018 05:12), Max: 98.7 (16 Jun 2018 05:12)  HR: 96 (16 Jun 2018 05:12) (88 - 96)  BP: 111/52 (16 Jun 2018 05:12) (111/52 - 131/76)  BP(mean): --  RR: 17 (16 Jun 2018 05:12) (17 - 19)  SpO2: 96% (16 Jun 2018 05:12) (96% - 97%)  CAPILLARY BLOOD GLUCOSE      POCT Blood Glucose.: 122 mg/dL (16 Jun 2018 07:35)  POCT Blood Glucose.: 130 mg/dL (15 Antonio 2018 22:22)  POCT Blood Glucose.: 118 mg/dL (15 Antonio 2018 18:30)  POCT Blood Glucose.: 141 mg/dL (15 Antonio 2018 11:51)    I&O's Summary        PHYSICAL EXAM  GENERAL: NAD, well-developed  HEAD:  Atraumatic, Normocephalic  EYES: EOMI, PERRLA, conjunctiva and sclera clear  NECK: Supple, No JVD  CHEST/LUNG: Clear to auscultation bilaterally; No wheeze  HEART: Regular rate and rhythm; No murmurs, rubs, or gallops  ABDOMEN: Soft, Nontender, Nondistended; Bowel sounds present  EXTREMITIES:  2+ Peripheral Pulses, No clubbing, cyanosis, or edema  MSK: pt unable to straighten LLE      LABS:                        14.9   12.35 )-----------( 135      ( 16 Jun 2018 07:05 )             43.9     06-16    137  |  95<L>  |  55<H>  ----------------------------<  123<H>  4.0   |  25  |  3.01<H>    Ca    9.1      16 Jun 2018 07:05    TPro  7.3  /  Alb  4.5  /  TBili  0.6  /  DBili  x   /  AST  25  /  ALT  19  /  AlkPhos  114  06-14                RADIOLOGY & ADDITIONAL TESTS:    Imaging Personally Reviewed:  Consultant(s) Notes Reviewed:    Care Discussed with Consultants/Other Providers:

## 2018-06-16 NOTE — PROGRESS NOTE ADULT - ASSESSMENT
63 yo M ambulatory with cane with DM2, CAD s/p CABG, history of CVA w/ residual L sided weakness s/p mechanical fall when getting out of car.

## 2018-06-17 LAB
BUN SERPL-MCNC: 70 MG/DL — HIGH (ref 7–23)
CALCIUM SERPL-MCNC: 9.1 MG/DL — SIGNIFICANT CHANGE UP (ref 8.4–10.5)
CHLORIDE SERPL-SCNC: 94 MMOL/L — LOW (ref 98–107)
CO2 SERPL-SCNC: 21 MMOL/L — LOW (ref 22–31)
CREAT SERPL-MCNC: 3.77 MG/DL — HIGH (ref 0.5–1.3)
GLUCOSE SERPL-MCNC: 139 MG/DL — HIGH (ref 70–99)
HCT VFR BLD CALC: 41.2 % — SIGNIFICANT CHANGE UP (ref 39–50)
HGB BLD-MCNC: 13.7 G/DL — SIGNIFICANT CHANGE UP (ref 13–17)
MAGNESIUM SERPL-MCNC: 2 MG/DL — SIGNIFICANT CHANGE UP (ref 1.6–2.6)
MCHC RBC-ENTMCNC: 30.2 PG — SIGNIFICANT CHANGE UP (ref 27–34)
MCHC RBC-ENTMCNC: 33.3 % — SIGNIFICANT CHANGE UP (ref 32–36)
MCV RBC AUTO: 90.9 FL — SIGNIFICANT CHANGE UP (ref 80–100)
NRBC # FLD: 0 — SIGNIFICANT CHANGE UP
PHOSPHATE SERPL-MCNC: 3.8 MG/DL — SIGNIFICANT CHANGE UP (ref 2.5–4.5)
PLATELET # BLD AUTO: 135 K/UL — LOW (ref 150–400)
PMV BLD: 10.1 FL — SIGNIFICANT CHANGE UP (ref 7–13)
POTASSIUM SERPL-MCNC: 4.3 MMOL/L — SIGNIFICANT CHANGE UP (ref 3.5–5.3)
POTASSIUM SERPL-SCNC: 4.3 MMOL/L — SIGNIFICANT CHANGE UP (ref 3.5–5.3)
RBC # BLD: 4.53 M/UL — SIGNIFICANT CHANGE UP (ref 4.2–5.8)
RBC # FLD: 13.9 % — SIGNIFICANT CHANGE UP (ref 10.3–14.5)
SODIUM SERPL-SCNC: 136 MMOL/L — SIGNIFICANT CHANGE UP (ref 135–145)
WBC # BLD: 16.12 K/UL — HIGH (ref 3.8–10.5)
WBC # FLD AUTO: 16.12 K/UL — HIGH (ref 3.8–10.5)

## 2018-06-17 PROCEDURE — 99233 SBSQ HOSP IP/OBS HIGH 50: CPT

## 2018-06-17 RX ORDER — LEVALBUTEROL 1.25 MG/.5ML
0.63 SOLUTION, CONCENTRATE RESPIRATORY (INHALATION) ONCE
Qty: 0 | Refills: 0 | Status: COMPLETED | OUTPATIENT
Start: 2018-06-17 | End: 2018-06-18

## 2018-06-17 RX ADMIN — POLYETHYLENE GLYCOL 3350 17 GRAM(S): 17 POWDER, FOR SOLUTION ORAL at 13:20

## 2018-06-17 RX ADMIN — CLOPIDOGREL BISULFATE 75 MILLIGRAM(S): 75 TABLET, FILM COATED ORAL at 13:20

## 2018-06-17 RX ADMIN — Medication 1 GRAM(S): at 17:22

## 2018-06-17 RX ADMIN — ATORVASTATIN CALCIUM 40 MILLIGRAM(S): 80 TABLET, FILM COATED ORAL at 23:02

## 2018-06-17 RX ADMIN — Medication 100 MILLIGRAM(S): at 13:20

## 2018-06-17 RX ADMIN — Medication 2: at 12:26

## 2018-06-17 RX ADMIN — Medication 10 MILLIEQUIVALENT(S): at 13:20

## 2018-06-17 RX ADMIN — Medication 100 MILLIGRAM(S): at 23:01

## 2018-06-17 RX ADMIN — CARVEDILOL PHOSPHATE 12.5 MILLIGRAM(S): 80 CAPSULE, EXTENDED RELEASE ORAL at 17:22

## 2018-06-17 RX ADMIN — Medication 10 MILLIGRAM(S): at 17:22

## 2018-06-17 RX ADMIN — OXYCODONE HYDROCHLORIDE 5 MILLIGRAM(S): 5 TABLET ORAL at 06:53

## 2018-06-17 RX ADMIN — OXYCODONE HYDROCHLORIDE 5 MILLIGRAM(S): 5 TABLET ORAL at 06:23

## 2018-06-17 RX ADMIN — PANTOPRAZOLE SODIUM 40 MILLIGRAM(S): 20 TABLET, DELAYED RELEASE ORAL at 06:25

## 2018-06-17 RX ADMIN — Medication 2: at 17:24

## 2018-06-17 RX ADMIN — GABAPENTIN 300 MILLIGRAM(S): 400 CAPSULE ORAL at 17:22

## 2018-06-17 RX ADMIN — TAMSULOSIN HYDROCHLORIDE 0.4 MILLIGRAM(S): 0.4 CAPSULE ORAL at 23:02

## 2018-06-17 RX ADMIN — OXYCODONE HYDROCHLORIDE 10 MILLIGRAM(S): 5 TABLET ORAL at 17:31

## 2018-06-17 RX ADMIN — Medication 100 MILLIGRAM(S): at 06:23

## 2018-06-17 RX ADMIN — Medication 1 GRAM(S): at 06:27

## 2018-06-17 RX ADMIN — MONTELUKAST 10 MILLIGRAM(S): 4 TABLET, CHEWABLE ORAL at 13:20

## 2018-06-17 RX ADMIN — SENNA PLUS 2 TABLET(S): 8.6 TABLET ORAL at 23:02

## 2018-06-17 RX ADMIN — GABAPENTIN 300 MILLIGRAM(S): 400 CAPSULE ORAL at 06:25

## 2018-06-17 RX ADMIN — Medication 81 MILLIGRAM(S): at 13:20

## 2018-06-17 NOTE — PROGRESS NOTE ADULT - SUBJECTIVE AND OBJECTIVE BOX
Patient is a 62y old  Male who presents with a chief complaint of Left leg pain (14 Jun 2018 23:41)        SUBJECTIVE / OVERNIGHT EVENTS: No event overnight. Pt slept well. No reported change in symptoms.      MEDICATIONS  (STANDING):  aspirin enteric coated 81 milliGRAM(s) Oral daily  atorvastatin 40 milliGRAM(s) Oral at bedtime  carvedilol 12.5 milliGRAM(s) Oral every 12 hours  clopidogrel Tablet 75 milliGRAM(s) Oral daily  dextrose 5%. 1000 milliLiter(s) (50 mL/Hr) IV Continuous <Continuous>  dextrose 50% Injectable 12.5 Gram(s) IV Push once  dextrose 50% Injectable 25 Gram(s) IV Push once  dextrose 50% Injectable 25 Gram(s) IV Push once  docusate sodium 100 milliGRAM(s) Oral three times a day  gabapentin 300 milliGRAM(s) Oral two times a day  hydrALAZINE 100 milliGRAM(s) Oral three times a day  insulin lispro (HumaLOG) corrective regimen sliding scale   SubCutaneous three times a day before meals  isosorbide   mononitrate ER Tablet (IMDUR) 30 milliGRAM(s) Oral daily  montelukast 10 milliGRAM(s) Oral daily  pantoprazole    Tablet 40 milliGRAM(s) Oral before breakfast  polyethylene glycol 3350 17 Gram(s) Oral daily  potassium chloride    Tablet ER 10 milliEquivalent(s) Oral daily  senna 2 Tablet(s) Oral at bedtime  sucralfate 1 Gram(s) Oral two times a day  tamsulosin 0.4 milliGRAM(s) Oral at bedtime  torsemide 10 milliGRAM(s) Oral two times a day    MEDICATIONS  (PRN):  acetaminophen   Tablet. 325 milliGRAM(s) Oral every 4 hours PRN Mild Pain (1 - 3)  dextrose 40% Gel 15 Gram(s) Oral once PRN Blood Glucose LESS THAN 70 milliGRAM(s)/deciliter  glucagon  Injectable 1 milliGRAM(s) IntraMuscular once PRN Glucose LESS THAN 70 milligrams/deciliter  oxyCODONE    IR 10 milliGRAM(s) Oral every 4 hours PRN Severe Pain (7 - 10)  oxyCODONE    IR 5 milliGRAM(s) Oral every 4 hours PRN Moderate Pain (4 - 6)      Vital Signs Last 24 Hrs  T(C): 37.4 (17 Jun 2018 06:14), Max: 37.4 (17 Jun 2018 06:14)  T(F): 99.4 (17 Jun 2018 06:14), Max: 99.4 (17 Jun 2018 06:14)  HR: 106 (17 Jun 2018 06:14) (98 - 107)  BP: 104/53 (17 Jun 2018 06:14) (104/53 - 117/59)  BP(mean): --  RR: 17 (17 Jun 2018 06:14) (16 - 17)  SpO2: 95% (17 Jun 2018 06:14) (95% - 96%)  CAPILLARY BLOOD GLUCOSE      POCT Blood Glucose.: 172 mg/dL (17 Jun 2018 11:51)  POCT Blood Glucose.: 137 mg/dL (17 Jun 2018 08:17)  POCT Blood Glucose.: 165 mg/dL (16 Jun 2018 21:25)  POCT Blood Glucose.: 140 mg/dL (16 Jun 2018 16:47)    I&O's Summary        PHYSICAL EXAM  GENERAL: NAD, well-developed  HEAD:  Atraumatic, Normocephalic  EYES: EOMI, PERRLA, conjunctiva and sclera clear  NECK: Supple, No JVD  CHEST/LUNG: Clear to auscultation bilaterally; No wheeze  HEART: Regular rate and rhythm; No murmurs, rubs, or gallops  ABDOMEN: Soft, Nontender, Nondistended; Bowel sounds present  EXTREMITIES:  2+ Peripheral Pulses, No clubbing, cyanosis, or edema  MSK: pt can extend his LLE fully while in bed    LABS:                        13.7   16.12 )-----------( 135      ( 17 Jun 2018 07:37 )             41.2     06-17    136  |  94<L>  |  70<H>  ----------------------------<  139<H>  4.3   |  21<L>  |  3.77<H>    Ca    9.1      17 Jun 2018 07:37  Phos  3.8     06-17  Mg     2.0     06-17                  RADIOLOGY & ADDITIONAL TESTS:    Imaging Personally Reviewed:  Consultant(s) Notes Reviewed:    Care Discussed with Consultants/Other Providers:

## 2018-06-18 DIAGNOSIS — N17.9 ACUTE KIDNEY FAILURE, UNSPECIFIED: ICD-10-CM

## 2018-06-18 LAB
ALBUMIN SERPL ELPH-MCNC: 3.5 G/DL — SIGNIFICANT CHANGE UP (ref 3.3–5)
ALP SERPL-CCNC: 80 U/L — SIGNIFICANT CHANGE UP (ref 40–120)
ALT FLD-CCNC: 11 U/L — SIGNIFICANT CHANGE UP (ref 4–41)
APTT BLD: 21 SEC — LOW (ref 27.5–37.4)
AST SERPL-CCNC: 25 U/L — SIGNIFICANT CHANGE UP (ref 4–40)
B PERT DNA SPEC QL NAA+PROBE: SIGNIFICANT CHANGE UP
BASE EXCESS BLDV CALC-SCNC: -3 MMOL/L — SIGNIFICANT CHANGE UP
BILIRUB SERPL-MCNC: 1.2 MG/DL — SIGNIFICANT CHANGE UP (ref 0.2–1.2)
BLOOD GAS VENOUS - CREATININE: 5.67 MG/DL — HIGH (ref 0.5–1.3)
BUN SERPL-MCNC: 91 MG/DL — HIGH (ref 7–23)
BUN SERPL-MCNC: 99 MG/DL — HIGH (ref 7–23)
C PNEUM DNA SPEC QL NAA+PROBE: NOT DETECTED — SIGNIFICANT CHANGE UP
CALCIUM SERPL-MCNC: 8.8 MG/DL — SIGNIFICANT CHANGE UP (ref 8.4–10.5)
CALCIUM SERPL-MCNC: 9 MG/DL — SIGNIFICANT CHANGE UP (ref 8.4–10.5)
CHLORIDE BLDV-SCNC: 99 MMOL/L — SIGNIFICANT CHANGE UP (ref 96–108)
CHLORIDE SERPL-SCNC: 91 MMOL/L — LOW (ref 98–107)
CHLORIDE SERPL-SCNC: 94 MMOL/L — LOW (ref 98–107)
CHLORIDE UR-SCNC: < 20 MMOL/L — SIGNIFICANT CHANGE UP
CO2 SERPL-SCNC: 21 MMOL/L — LOW (ref 22–31)
CO2 SERPL-SCNC: 23 MMOL/L — SIGNIFICANT CHANGE UP (ref 22–31)
CREAT ?TM UR-MCNC: 131.5 MG/DL — SIGNIFICANT CHANGE UP
CREAT SERPL-MCNC: 4.7 MG/DL — HIGH (ref 0.5–1.3)
CREAT SERPL-MCNC: 5.3 MG/DL — HIGH (ref 0.5–1.3)
FLUAV H1 2009 PAND RNA SPEC QL NAA+PROBE: NOT DETECTED — SIGNIFICANT CHANGE UP
FLUAV H1 RNA SPEC QL NAA+PROBE: NOT DETECTED — SIGNIFICANT CHANGE UP
FLUAV H3 RNA SPEC QL NAA+PROBE: NOT DETECTED — SIGNIFICANT CHANGE UP
FLUAV SUBTYP SPEC NAA+PROBE: SIGNIFICANT CHANGE UP
FLUBV RNA SPEC QL NAA+PROBE: NOT DETECTED — SIGNIFICANT CHANGE UP
GAS PNL BLDV: 127 MMOL/L — LOW (ref 136–146)
GLUCOSE BLDV-MCNC: 128 — HIGH (ref 70–99)
GLUCOSE SERPL-MCNC: 126 MG/DL — HIGH (ref 70–99)
GLUCOSE SERPL-MCNC: 145 MG/DL — HIGH (ref 70–99)
HADV DNA SPEC QL NAA+PROBE: NOT DETECTED — SIGNIFICANT CHANGE UP
HCO3 BLDV-SCNC: 22 MMOL/L — SIGNIFICANT CHANGE UP (ref 20–27)
HCOV 229E RNA SPEC QL NAA+PROBE: NOT DETECTED — SIGNIFICANT CHANGE UP
HCOV HKU1 RNA SPEC QL NAA+PROBE: NOT DETECTED — SIGNIFICANT CHANGE UP
HCOV NL63 RNA SPEC QL NAA+PROBE: NOT DETECTED — SIGNIFICANT CHANGE UP
HCOV OC43 RNA SPEC QL NAA+PROBE: NOT DETECTED — SIGNIFICANT CHANGE UP
HCT VFR BLD CALC: 37.7 % — LOW (ref 39–50)
HCT VFR BLD CALC: 39 % — SIGNIFICANT CHANGE UP (ref 39–50)
HCT VFR BLDV CALC: 40 % — SIGNIFICANT CHANGE UP (ref 39–51)
HGB BLD-MCNC: 12.8 G/DL — LOW (ref 13–17)
HGB BLD-MCNC: 13 G/DL — SIGNIFICANT CHANGE UP (ref 13–17)
HGB BLDV-MCNC: 13 G/DL — SIGNIFICANT CHANGE UP (ref 13–17)
HMPV RNA SPEC QL NAA+PROBE: NOT DETECTED — SIGNIFICANT CHANGE UP
HPIV1 RNA SPEC QL NAA+PROBE: NOT DETECTED — SIGNIFICANT CHANGE UP
HPIV2 RNA SPEC QL NAA+PROBE: NOT DETECTED — SIGNIFICANT CHANGE UP
HPIV3 RNA SPEC QL NAA+PROBE: NOT DETECTED — SIGNIFICANT CHANGE UP
HPIV4 RNA SPEC QL NAA+PROBE: NOT DETECTED — SIGNIFICANT CHANGE UP
INR BLD: 1.07 — SIGNIFICANT CHANGE UP (ref 0.88–1.17)
LACTATE BLDV-MCNC: 2 MMOL/L — SIGNIFICANT CHANGE UP (ref 0.5–2)
M PNEUMO DNA SPEC QL NAA+PROBE: NOT DETECTED — SIGNIFICANT CHANGE UP
MAGNESIUM SERPL-MCNC: 2 MG/DL — SIGNIFICANT CHANGE UP (ref 1.6–2.6)
MAGNESIUM SERPL-MCNC: 2 MG/DL — SIGNIFICANT CHANGE UP (ref 1.6–2.6)
MCHC RBC-ENTMCNC: 29.8 PG — SIGNIFICANT CHANGE UP (ref 27–34)
MCHC RBC-ENTMCNC: 30.2 PG — SIGNIFICANT CHANGE UP (ref 27–34)
MCHC RBC-ENTMCNC: 32.8 % — SIGNIFICANT CHANGE UP (ref 32–36)
MCHC RBC-ENTMCNC: 34.5 % — SIGNIFICANT CHANGE UP (ref 32–36)
MCV RBC AUTO: 87.7 FL — SIGNIFICANT CHANGE UP (ref 80–100)
MCV RBC AUTO: 90.9 FL — SIGNIFICANT CHANGE UP (ref 80–100)
NRBC # FLD: 0 — SIGNIFICANT CHANGE UP
NRBC # FLD: 0 — SIGNIFICANT CHANGE UP
PCO2 BLDV: 39 MMHG — LOW (ref 41–51)
PH BLDV: 7.36 PH — SIGNIFICANT CHANGE UP (ref 7.32–7.43)
PHOSPHATE SERPL-MCNC: 3.9 MG/DL — SIGNIFICANT CHANGE UP (ref 2.5–4.5)
PHOSPHATE SERPL-MCNC: 5 MG/DL — HIGH (ref 2.5–4.5)
PLATELET # BLD AUTO: 126 K/UL — LOW (ref 150–400)
PLATELET # BLD AUTO: 145 K/UL — LOW (ref 150–400)
PMV BLD: 10.4 FL — SIGNIFICANT CHANGE UP (ref 7–13)
PMV BLD: 10.5 FL — SIGNIFICANT CHANGE UP (ref 7–13)
PO2 BLDV: 43 MMHG — HIGH (ref 35–40)
POTASSIUM BLDV-SCNC: 4 MMOL/L — SIGNIFICANT CHANGE UP (ref 3.4–4.5)
POTASSIUM SERPL-MCNC: 4.1 MMOL/L — SIGNIFICANT CHANGE UP (ref 3.5–5.3)
POTASSIUM SERPL-MCNC: 4.5 MMOL/L — SIGNIFICANT CHANGE UP (ref 3.5–5.3)
POTASSIUM SERPL-SCNC: 4.1 MMOL/L — SIGNIFICANT CHANGE UP (ref 3.5–5.3)
POTASSIUM SERPL-SCNC: 4.5 MMOL/L — SIGNIFICANT CHANGE UP (ref 3.5–5.3)
POTASSIUM UR-SCNC: 39.5 MMOL/L — SIGNIFICANT CHANGE UP
PROT SERPL-MCNC: 6.9 G/DL — SIGNIFICANT CHANGE UP (ref 6–8.3)
PROTHROM AB SERPL-ACNC: 12.3 SEC — SIGNIFICANT CHANGE UP (ref 9.8–13.1)
RBC # BLD: 4.29 M/UL — SIGNIFICANT CHANGE UP (ref 4.2–5.8)
RBC # BLD: 4.3 M/UL — SIGNIFICANT CHANGE UP (ref 4.2–5.8)
RBC # FLD: 14 % — SIGNIFICANT CHANGE UP (ref 10.3–14.5)
RBC # FLD: 14.1 % — SIGNIFICANT CHANGE UP (ref 10.3–14.5)
RSV RNA SPEC QL NAA+PROBE: NOT DETECTED — SIGNIFICANT CHANGE UP
RV+EV RNA SPEC QL NAA+PROBE: NOT DETECTED — SIGNIFICANT CHANGE UP
SAO2 % BLDV: 73.8 % — SIGNIFICANT CHANGE UP (ref 60–85)
SODIUM SERPL-SCNC: 131 MMOL/L — LOW (ref 135–145)
SODIUM SERPL-SCNC: 134 MMOL/L — LOW (ref 135–145)
SODIUM UR-SCNC: < 20 MMOL/L — SIGNIFICANT CHANGE UP
UUN UR-MCNC: 559.9 MG/DL — SIGNIFICANT CHANGE UP
WBC # BLD: 16.02 K/UL — HIGH (ref 3.8–10.5)
WBC # BLD: 16.33 K/UL — HIGH (ref 3.8–10.5)
WBC # FLD AUTO: 16.02 K/UL — HIGH (ref 3.8–10.5)
WBC # FLD AUTO: 16.33 K/UL — HIGH (ref 3.8–10.5)

## 2018-06-18 PROCEDURE — 70450 CT HEAD/BRAIN W/O DYE: CPT | Mod: 26

## 2018-06-18 PROCEDURE — 71045 X-RAY EXAM CHEST 1 VIEW: CPT | Mod: 26

## 2018-06-18 PROCEDURE — 99233 SBSQ HOSP IP/OBS HIGH 50: CPT

## 2018-06-18 RX ORDER — SODIUM CHLORIDE 9 MG/ML
1000 INJECTION INTRAMUSCULAR; INTRAVENOUS; SUBCUTANEOUS
Qty: 0 | Refills: 0 | Status: DISCONTINUED | OUTPATIENT
Start: 2018-06-18 | End: 2018-06-18

## 2018-06-18 RX ORDER — IPRATROPIUM/ALBUTEROL SULFATE 18-103MCG
3 AEROSOL WITH ADAPTER (GRAM) INHALATION EVERY 6 HOURS
Qty: 0 | Refills: 0 | Status: DISCONTINUED | OUTPATIENT
Start: 2018-06-18 | End: 2018-06-24

## 2018-06-18 RX ORDER — SOD SULF/SODIUM/NAHCO3/KCL/PEG
1000 SOLUTION, RECONSTITUTED, ORAL ORAL ONCE
Qty: 0 | Refills: 0 | Status: COMPLETED | OUTPATIENT
Start: 2018-06-18 | End: 2018-06-18

## 2018-06-18 RX ORDER — SODIUM CHLORIDE 9 MG/ML
1000 INJECTION INTRAMUSCULAR; INTRAVENOUS; SUBCUTANEOUS
Qty: 0 | Refills: 0 | Status: DISCONTINUED | OUTPATIENT
Start: 2018-06-18 | End: 2018-06-19

## 2018-06-18 RX ORDER — ACETAMINOPHEN 500 MG
650 TABLET ORAL EVERY 6 HOURS
Qty: 0 | Refills: 0 | Status: DISCONTINUED | OUTPATIENT
Start: 2018-06-18 | End: 2018-06-28

## 2018-06-18 RX ADMIN — MONTELUKAST 10 MILLIGRAM(S): 4 TABLET, CHEWABLE ORAL at 12:43

## 2018-06-18 RX ADMIN — SODIUM CHLORIDE 100 MILLILITER(S): 9 INJECTION INTRAMUSCULAR; INTRAVENOUS; SUBCUTANEOUS at 21:05

## 2018-06-18 RX ADMIN — LEVALBUTEROL 0.63 MILLIGRAM(S): 1.25 SOLUTION, CONCENTRATE RESPIRATORY (INHALATION) at 00:26

## 2018-06-18 RX ADMIN — Medication 81 MILLIGRAM(S): at 12:43

## 2018-06-18 RX ADMIN — POLYETHYLENE GLYCOL 3350 17 GRAM(S): 17 POWDER, FOR SOLUTION ORAL at 12:41

## 2018-06-18 RX ADMIN — Medication 1 GRAM(S): at 06:06

## 2018-06-18 RX ADMIN — Medication 100 MILLIGRAM(S): at 21:23

## 2018-06-18 RX ADMIN — OXYCODONE HYDROCHLORIDE 10 MILLIGRAM(S): 5 TABLET ORAL at 06:34

## 2018-06-18 RX ADMIN — TAMSULOSIN HYDROCHLORIDE 0.4 MILLIGRAM(S): 0.4 CAPSULE ORAL at 21:23

## 2018-06-18 RX ADMIN — ATORVASTATIN CALCIUM 40 MILLIGRAM(S): 80 TABLET, FILM COATED ORAL at 21:23

## 2018-06-18 RX ADMIN — GABAPENTIN 300 MILLIGRAM(S): 400 CAPSULE ORAL at 06:06

## 2018-06-18 RX ADMIN — OXYCODONE HYDROCHLORIDE 10 MILLIGRAM(S): 5 TABLET ORAL at 12:39

## 2018-06-18 RX ADMIN — PANTOPRAZOLE SODIUM 40 MILLIGRAM(S): 20 TABLET, DELAYED RELEASE ORAL at 06:09

## 2018-06-18 RX ADMIN — Medication 1000 MILLILITER(S): at 15:48

## 2018-06-18 RX ADMIN — Medication 100 MILLIGRAM(S): at 06:06

## 2018-06-18 RX ADMIN — Medication 100 MILLIGRAM(S): at 12:42

## 2018-06-18 RX ADMIN — Medication 650 MILLIGRAM(S): at 21:24

## 2018-06-18 RX ADMIN — CLOPIDOGREL BISULFATE 75 MILLIGRAM(S): 75 TABLET, FILM COATED ORAL at 12:42

## 2018-06-18 RX ADMIN — Medication 10 MILLIEQUIVALENT(S): at 12:42

## 2018-06-18 RX ADMIN — OXYCODONE HYDROCHLORIDE 10 MILLIGRAM(S): 5 TABLET ORAL at 13:21

## 2018-06-18 RX ADMIN — OXYCODONE HYDROCHLORIDE 10 MILLIGRAM(S): 5 TABLET ORAL at 06:04

## 2018-06-18 RX ADMIN — Medication 2: at 12:41

## 2018-06-18 RX ADMIN — SENNA PLUS 2 TABLET(S): 8.6 TABLET ORAL at 21:23

## 2018-06-18 NOTE — PROGRESS NOTE ADULT - PROBLEM SELECTOR PLAN 1
Worsening Cr while here, taking opiates/no BM, concern for retention  -bladder scan 800+, s/p straight cath w/ ~700 out  -continue with straight cath protocol  -c/w flomax  -address constipation

## 2018-06-18 NOTE — CHART NOTE - NSCHARTNOTEFT_GEN_A_CORE
ADS NIGHT COVERAGE:    Notified by RN that pt c/o SOB and during assessment, heard wheezing. Pt seen at bedside. Pt admits to feeling short of breath, denies any other acute symptoms. Pt only has hx of CHF, no asthma or COPD. No respiratory issues noted upon admission. No previous CXR noted.     T(C): 37.4 (17 Jun 2018 21:26), Max: 37.4 (17 Jun 2018 06:14)  T(F): 99.3 (17 Jun 2018 21:26), Max: 99.4 (17 Jun 2018 06:14)  HR: 72 (18 Jun 2018 00:26) (72 - 106)  BP: 102/64 (17 Jun 2018 21:26) (102/64 - 112/60)  RR: 17 (17 Jun 2018 21:26) (16 - 118)  SpO2: 95% (18 Jun 2018 00:26) (95% - 96%)    Gen: Pt sitting upright in bed, mild distress, increased work of breathing noted  Card: Tachycardic, regular rhythm  Lungs: Expiratory wheezing heard B/L    -Xopenex x1 ordered  -CXR ordered   -NC O2 2L     Will continue to monitor.  RUBEN Rahman ADS PA-C  [id number] ADS NIGHT COVERAGE:    Notified by RN that pt c/o SOB and during assessment, heard wheezing. Pt seen at bedside. Pt admits to feeling short of breath, denies any other acute symptoms. Pt only has hx of CHF, no asthma or COPD. No respiratory issues noted upon admission. No previous CXR noted.     T(C): 37.4 (17 Jun 2018 21:26), Max: 37.4 (17 Jun 2018 06:14)  T(F): 99.3 (17 Jun 2018 21:26), Max: 99.4 (17 Jun 2018 06:14)  HR: 72 (18 Jun 2018 00:26) (72 - 106)  BP: 102/64 (17 Jun 2018 21:26) (102/64 - 112/60)  RR: 17 (17 Jun 2018 21:26) (16 - 118)  SpO2: 95% (18 Jun 2018 00:26) (95% - 96%)    Gen: Pt sitting upright in bed, mild distress, increased work of breathing noted  Card: Tachycardic, regular rhythm  Lungs: Expiratory wheezing heard B/L, no rales or rhonchi heard    -Xopenex x1 ordered  -CXR ordered   -NC O2 2L     Will continue to monitor.  RUBEN Rahmna ADS PA-C  [id number]

## 2018-06-18 NOTE — PROGRESS NOTE ADULT - SUBJECTIVE AND OBJECTIVE BOX
Patient is a 63y old  Male who presents with a chief complaint of Left leg pain    SUBJECTIVE / OVERNIGHT EVENTS:      Pain is a little better   Still having trouble bending, has not been OOB  States intermitent SOB due to "the bad weather" but denies allergies or asthma    MEDICATIONS  (STANDING):  aspirin enteric coated 81 milliGRAM(s) Oral daily  atorvastatin 40 milliGRAM(s) Oral at bedtime  carvedilol 12.5 milliGRAM(s) Oral every 12 hours  clopidogrel Tablet 75 milliGRAM(s) Oral daily  dextrose 5%. 1000 milliLiter(s) (50 mL/Hr) IV Continuous <Continuous>  dextrose 50% Injectable 12.5 Gram(s) IV Push once  dextrose 50% Injectable 25 Gram(s) IV Push once  dextrose 50% Injectable 25 Gram(s) IV Push once  docusate sodium 100 milliGRAM(s) Oral three times a day  gabapentin 300 milliGRAM(s) Oral two times a day  hydrALAZINE 100 milliGRAM(s) Oral three times a day  insulin lispro (HumaLOG) corrective regimen sliding scale   SubCutaneous three times a day before meals  isosorbide   mononitrate ER Tablet (IMDUR) 30 milliGRAM(s) Oral daily  montelukast 10 milliGRAM(s) Oral daily  pantoprazole    Tablet 40 milliGRAM(s) Oral before breakfast  polyethylene glycol 3350 17 Gram(s) Oral daily  polyethylene glycol/electrolyte Solution 1000 milliLiter(s) Oral once  potassium chloride    Tablet ER 10 milliEquivalent(s) Oral daily  senna 2 Tablet(s) Oral at bedtime  sucralfate 1 Gram(s) Oral two times a day  tamsulosin 0.4 milliGRAM(s) Oral at bedtime  torsemide 10 milliGRAM(s) Oral two times a day    MEDICATIONS  (PRN):  acetaminophen   Tablet. 325 milliGRAM(s) Oral every 4 hours PRN Mild Pain (1 - 3)  dextrose 40% Gel 15 Gram(s) Oral once PRN Blood Glucose LESS THAN 70 milliGRAM(s)/deciliter  glucagon  Injectable 1 milliGRAM(s) IntraMuscular once PRN Glucose LESS THAN 70 milligrams/deciliter  oxyCODONE    IR 10 milliGRAM(s) Oral every 4 hours PRN Severe Pain (7 - 10)  oxyCODONE    IR 5 milliGRAM(s) Oral every 4 hours PRN Moderate Pain (4 - 6)    T(C): 36.6 (06-18-18 @ 12:44), Max: 37.4 (06-17-18 @ 21:26)  HR: 94 (06-18-18 @ 12:44) (72 - 99)  BP: 107/56 (06-18-18 @ 12:44) (102/64 - 112/60)  RR: 17 (06-18-18 @ 12:44) (16 - 17)  SpO2: 97% (06-18-18 @ 12:44) (95% - 97%)    CAPILLARY BLOOD GLUCOSE  POCT Blood Glucose.: 199 mg/dL (18 Jun 2018 11:53)  POCT Blood Glucose.: 147 mg/dL (18 Jun 2018 07:43)  POCT Blood Glucose.: 167 mg/dL (17 Jun 2018 22:02)  POCT Blood Glucose.: 166 mg/dL (17 Jun 2018 17:23)    I&O's Summary    18 Jun 2018 07:01  -  18 Jun 2018 14:21  --------------------------------------------------------  IN: 0 mL / OUT: 720 mL / NET: -720 mL      PHYSICAL EXAM:  GENERAL: NAD  HEAD:  Atraumatic, Normocephalic  EYES: conjunctiva and sclera clear  NECK: Supple, No JVD  CHEST/LUNG: Clear to auscultation bilaterally; No wheeze  HEART: Regular rate and rhythm; No murmurs, rubs, or gallops  ABDOMEN: Soft, Nontender, Nondistended; Bowel sounds present  EXTREMITIES:   warm and well perfused, No clubbing, cyanosis, or edema  PSYCH: AAOx3  NEUROLOGY: L sided LE weakness, full passive ROM of knee, no hip pain on external rotation, no skin tears/lacs, mild L joint effusion and TTP  SKIN: No rashes or lesions      LABS:                        13.0   16.02 )-----------( 126      ( 18 Jun 2018 05:40 )             37.7     06-18    134<L>  |  94<L>  |  91<H>  ----------------------------<  145<H>  4.1   |  23  |  4.70<H>    Ca    8.8      18 Jun 2018 05:40  Phos  3.9     06-18  Mg     2.0     06-18

## 2018-06-18 NOTE — PROGRESS NOTE ADULT - PROBLEM SELECTOR PLAN 2
Patient with mechanical fall, no head trauma or LOC; had CT pelvis and x rays of hip and knee all negative   -no need for knee immobilizer, this was discussed with ortho   -fall precaution  -pain control with Tylenol and oxycodone & bowel regimen   -f/u PT eval, WBAT, pt amenable to rehab

## 2018-06-18 NOTE — CHART NOTE - NSCHARTNOTEFT_GEN_A_CORE
Patient is a 63y old  Male who presents with a chief complaint of Left leg pain (14 Jun 2018 23:41)    Called by RN to evaluate pt lethargic, wheezing    Vital Signs Last 24 Hrs  T(C): 36.6 (18 Jun 2018 12:44), Max: 37.4 (17 Jun 2018 21:26)  T(F): 97.9 (18 Jun 2018 12:44), Max: 99.3 (17 Jun 2018 21:26)  HR: 94 (18 Jun 2018 12:44) (72 - 99)  BP: 107/56 (18 Jun 2018 12:44) (102/64 - 107/56)  BP(mean): --  RR: 17 (18 Jun 2018 12:44) (17 - 17)  SpO2: 97% (18 Jun 2018 12:44) (95% - 97%)      PT/INR - ( 18 Jun 2018 19:00 )   PT: 12.3 SEC;   INR: 1.07          PTT - ( 18 Jun 2018 19:00 )  PTT:21.0 SEC                        12.8   16.33 )-----------( 145      ( 18 Jun 2018 19:00 )             39.0     06-18    134<L>  |  94<L>  |  91<H>  ----------------------------<  145<H>  4.1   |  23  |  4.70<H>    Ca    8.8      18 Jun 2018 05:40  Phos  3.9     06-18  Mg     2.0     06-18                                CAPILLARY BLOOD GLUCOSE      POCT Blood Glucose.: 123 mg/dL (18 Jun 2018 18:45)  POCT Blood Glucose.: 128 mg/dL (18 Jun 2018 17:21)  POCT Blood Glucose.: 199 mg/dL (18 Jun 2018 11:53)  POCT Blood Glucose.: 147 mg/dL (18 Jun 2018 07:43)  POCT Blood Glucose.: 167 mg/dL (17 Jun 2018 22:02)    acetaminophen   Tablet 650 milliGRAM(s) Oral every 6 hours PRN  acetaminophen   Tablet. 325 milliGRAM(s) Oral every 4 hours PRN  ALBUTerol/ipratropium for Nebulization 3 milliLiter(s) Nebulizer every 6 hours  aspirin enteric coated 81 milliGRAM(s) Oral daily  atorvastatin 40 milliGRAM(s) Oral at bedtime  carvedilol 12.5 milliGRAM(s) Oral every 12 hours  clopidogrel Tablet 75 milliGRAM(s) Oral daily  dextrose 40% Gel 15 Gram(s) Oral once PRN  dextrose 5%. 1000 milliLiter(s) IV Continuous <Continuous>  dextrose 50% Injectable 12.5 Gram(s) IV Push once  dextrose 50% Injectable 25 Gram(s) IV Push once  dextrose 50% Injectable 25 Gram(s) IV Push once  docusate sodium 100 milliGRAM(s) Oral three times a day  gabapentin 300 milliGRAM(s) Oral two times a day  glucagon  Injectable 1 milliGRAM(s) IntraMuscular once PRN  hydrALAZINE 100 milliGRAM(s) Oral three times a day  insulin lispro (HumaLOG) corrective regimen sliding scale   SubCutaneous three times a day before meals  isosorbide   mononitrate ER Tablet (IMDUR) 30 milliGRAM(s) Oral daily  montelukast 10 milliGRAM(s) Oral daily  oxyCODONE    IR 10 milliGRAM(s) Oral every 4 hours PRN  oxyCODONE    IR 5 milliGRAM(s) Oral every 4 hours PRN  pantoprazole    Tablet 40 milliGRAM(s) Oral before breakfast  polyethylene glycol 3350 17 Gram(s) Oral daily  potassium chloride    Tablet ER 10 milliEquivalent(s) Oral daily  senna 2 Tablet(s) Oral at bedtime  sodium chloride 0.9%. 1000 milliLiter(s) IV Continuous <Continuous>  sucralfate 1 Gram(s) Oral two times a day  tamsulosin 0.4 milliGRAM(s) Oral at bedtime      RADIOLOGY :    Assessment: Patient 63y with Closed fracture of proximal end of left tibia  BPH (benign prostatic hyperplasia)  CHF (congestive heart failure)  Hyperlipidemia, unspecified hyperlipidemia type  Diabetes  CAD (coronary artery disease)  HTN (hypertension)  S/P appendectomy  S/P CABG (coronary artery bypass graft)    Assessment:  Patient arousable, follows simple commands.  C/o right knee pain. Right knee with limited range of motion.  Lungs, b/l wheeze noted. Oxygen saturation 97% on 2L nasal cannula.   Patient noted clammy.    -CODE STROKE called by RN for change in mental status. Neurology team to bedside, DO NOT recommend a CT head at this time.    Plan:  - Dr San aware  - Labs ordered - CXR ordered  - Duonebs ordered ATC  - rectal temperature requested - Temperature 101.6F - tylenol ordered  - Feurea calculated at 22% - IVF ordered 100cc/her x 10 hours   - Bladder scan < 100cc  - RVP ordered Patient is a 63y old  Male who presents with a chief complaint of Left leg pain (14 Jun 2018 23:41)    Called by RN to evaluate pt lethargic, wheezing    Vital Signs Last 24 Hrs  T(C): 36.6 (18 Jun 2018 12:44), Max: 37.4 (17 Jun 2018 21:26)  T(F): 97.9 (18 Jun 2018 12:44), Max: 99.3 (17 Jun 2018 21:26)  HR: 94 (18 Jun 2018 12:44) (72 - 99)  BP: 107/56 (18 Jun 2018 12:44) (102/64 - 107/56)  BP(mean): --  RR: 17 (18 Jun 2018 12:44) (17 - 17)  SpO2: 97% (18 Jun 2018 12:44) (95% - 97%)      PT/INR - ( 18 Jun 2018 19:00 )   PT: 12.3 SEC;   INR: 1.07          PTT - ( 18 Jun 2018 19:00 )  PTT:21.0 SEC                        12.8   16.33 )-----------( 145      ( 18 Jun 2018 19:00 )             39.0     06-18    134<L>  |  94<L>  |  91<H>  ----------------------------<  145<H>  4.1   |  23  |  4.70<H>    Ca    8.8      18 Jun 2018 05:40  Phos  3.9     06-18  Mg     2.0     06-18                                CAPILLARY BLOOD GLUCOSE      POCT Blood Glucose.: 123 mg/dL (18 Jun 2018 18:45)  POCT Blood Glucose.: 128 mg/dL (18 Jun 2018 17:21)  POCT Blood Glucose.: 199 mg/dL (18 Jun 2018 11:53)  POCT Blood Glucose.: 147 mg/dL (18 Jun 2018 07:43)  POCT Blood Glucose.: 167 mg/dL (17 Jun 2018 22:02)    acetaminophen   Tablet 650 milliGRAM(s) Oral every 6 hours PRN  acetaminophen   Tablet. 325 milliGRAM(s) Oral every 4 hours PRN  ALBUTerol/ipratropium for Nebulization 3 milliLiter(s) Nebulizer every 6 hours  aspirin enteric coated 81 milliGRAM(s) Oral daily  atorvastatin 40 milliGRAM(s) Oral at bedtime  carvedilol 12.5 milliGRAM(s) Oral every 12 hours  clopidogrel Tablet 75 milliGRAM(s) Oral daily  dextrose 40% Gel 15 Gram(s) Oral once PRN  dextrose 5%. 1000 milliLiter(s) IV Continuous <Continuous>  dextrose 50% Injectable 12.5 Gram(s) IV Push once  dextrose 50% Injectable 25 Gram(s) IV Push once  dextrose 50% Injectable 25 Gram(s) IV Push once  docusate sodium 100 milliGRAM(s) Oral three times a day  gabapentin 300 milliGRAM(s) Oral two times a day  glucagon  Injectable 1 milliGRAM(s) IntraMuscular once PRN  hydrALAZINE 100 milliGRAM(s) Oral three times a day  insulin lispro (HumaLOG) corrective regimen sliding scale   SubCutaneous three times a day before meals  isosorbide   mononitrate ER Tablet (IMDUR) 30 milliGRAM(s) Oral daily  montelukast 10 milliGRAM(s) Oral daily  oxyCODONE    IR 10 milliGRAM(s) Oral every 4 hours PRN  oxyCODONE    IR 5 milliGRAM(s) Oral every 4 hours PRN  pantoprazole    Tablet 40 milliGRAM(s) Oral before breakfast  polyethylene glycol 3350 17 Gram(s) Oral daily  potassium chloride    Tablet ER 10 milliEquivalent(s) Oral daily  senna 2 Tablet(s) Oral at bedtime  sodium chloride 0.9%. 1000 milliLiter(s) IV Continuous <Continuous>  sucralfate 1 Gram(s) Oral two times a day  tamsulosin 0.4 milliGRAM(s) Oral at bedtime      RADIOLOGY :    Assessment: Patient 63y with Closed fracture of proximal end of left tibia  BPH (benign prostatic hyperplasia)  CHF (congestive heart failure)  Hyperlipidemia, unspecified hyperlipidemia type  Diabetes  CAD (coronary artery disease)  HTN (hypertension)  S/P appendectomy  S/P CABG (coronary artery bypass graft)    Assessment:  Patient arousable, follows simple commands.  C/o right knee pain. Right knee with limited range of motion.  Lungs, b/l wheeze noted. Oxygen saturation 97% on 2L nasal cannula.   Patient noted clammy.    -CODE STROKE called by RN for change in mental status. Neurology team to bedside, DO NOT recommend a CT head at this time.    Plan:  - Dr San aware  - Labs ordered - CXR ordered  - Duonebs ordered ATC  - rectal temperature requested - Temperature 101.6F - tylenol ordered  - Feurea calculated at 22.8% - IVF ordered 100cc/her x 10 hours   - Bladder scan < 100cc  - RVP ordered

## 2018-06-18 NOTE — CONSULT NOTE ADULT - ASSESSMENT
62 y.o. man with history of DM II, CAD s/p CABG, CHF, CVA with left sided residual weakness admitted for evaluation of left knee pain s/p fall p/w AMS. Pt is diaphoretic, tachycardic, tachypneic O2 sats in Low 90's on nasal canula, febrile to 101.6, wheezing, with crackles and decreased breath sounds on the left, generalized weakness and lethargy. No focal neurologic deficits. Pt with  leukocytosis, hyponatremia, uremia , and elevated creatinine on lab work.   NIHSS 10   Impression: Multifactorial metabolic encephalopathy. Pt possibly having CHF exacerbation vs PNA, with multiple metabolic derangements.     Plan:  -Infectious workup per medicine  - Correct hyponatremia and uremia  - CTH if pt remains lethargic or developed focal neurologic deficits. 62 y.o. man with history of DM II, CAD s/p CABG, CHF, chronic R MCA, right HERNAN, and left cerebellar infarcts with left sided residual weakness admitted for evaluation of left knee pain s/p fall p/w AMS. Pt is diaphoretic, tachycardic, tachypneic O2 sats in Low 90's on nasal canula, febrile to 101.6, wheezing, with crackles and decreased breath sounds on the left, generalized weakness and lethargy. No focal neurologic deficits. Pt with  leukocytosis, hyponatremia, uremia , and elevated creatinine on lab work.   NIHSS 10 MRS 2  Impression: Multifactorial metabolic encephalopathy. Pt possibly having CHF exacerbation vs PNA, with multiple metabolic derangements.     Plan:  -Infectious workup per medicine  - Correct hyponatremia and uremia  - CTH if pt remains lethargic or developed focal neurologic deficits.

## 2018-06-19 DIAGNOSIS — G93.40 ENCEPHALOPATHY, UNSPECIFIED: ICD-10-CM

## 2018-06-19 DIAGNOSIS — R50.9 FEVER, UNSPECIFIED: ICD-10-CM

## 2018-06-19 LAB
ALBUMIN SERPL ELPH-MCNC: 3.1 G/DL — LOW (ref 3.3–5)
ALP SERPL-CCNC: 95 U/L — SIGNIFICANT CHANGE UP (ref 40–120)
ALT FLD-CCNC: 17 U/L — SIGNIFICANT CHANGE UP (ref 4–41)
APPEARANCE UR: CLEAR — SIGNIFICANT CHANGE UP
AST SERPL-CCNC: 29 U/L — SIGNIFICANT CHANGE UP (ref 4–40)
BILIRUB SERPL-MCNC: 1.2 MG/DL — SIGNIFICANT CHANGE UP (ref 0.2–1.2)
BILIRUB UR-MCNC: NEGATIVE — SIGNIFICANT CHANGE UP
BLOOD UR QL VISUAL: NEGATIVE — SIGNIFICANT CHANGE UP
BUN SERPL-MCNC: 107 MG/DL — HIGH (ref 7–23)
CALCIUM SERPL-MCNC: 8.7 MG/DL — SIGNIFICANT CHANGE UP (ref 8.4–10.5)
CHLORIDE SERPL-SCNC: 91 MMOL/L — LOW (ref 98–107)
CO2 SERPL-SCNC: 20 MMOL/L — LOW (ref 22–31)
COLOR SPEC: YELLOW — SIGNIFICANT CHANGE UP
CREAT SERPL-MCNC: 5.45 MG/DL — HIGH (ref 0.5–1.3)
GLUCOSE SERPL-MCNC: 142 MG/DL — HIGH (ref 70–99)
GLUCOSE UR-MCNC: NEGATIVE — SIGNIFICANT CHANGE UP
HCT VFR BLD CALC: 36.3 % — LOW (ref 39–50)
HGB BLD-MCNC: 12.2 G/DL — LOW (ref 13–17)
KETONES UR-MCNC: NEGATIVE — SIGNIFICANT CHANGE UP
LEUKOCYTE ESTERASE UR-ACNC: NEGATIVE — SIGNIFICANT CHANGE UP
MCHC RBC-ENTMCNC: 30 PG — SIGNIFICANT CHANGE UP (ref 27–34)
MCHC RBC-ENTMCNC: 33.6 % — SIGNIFICANT CHANGE UP (ref 32–36)
MCV RBC AUTO: 89.4 FL — SIGNIFICANT CHANGE UP (ref 80–100)
NITRITE UR-MCNC: NEGATIVE — SIGNIFICANT CHANGE UP
NRBC # FLD: 0 — SIGNIFICANT CHANGE UP
PH UR: 6 — SIGNIFICANT CHANGE UP (ref 4.6–8)
PLATELET # BLD AUTO: 134 K/UL — LOW (ref 150–400)
PMV BLD: 10.3 FL — SIGNIFICANT CHANGE UP (ref 7–13)
POTASSIUM SERPL-MCNC: 4.1 MMOL/L — SIGNIFICANT CHANGE UP (ref 3.5–5.3)
POTASSIUM SERPL-SCNC: 4.1 MMOL/L — SIGNIFICANT CHANGE UP (ref 3.5–5.3)
PROT SERPL-MCNC: 6.5 G/DL — SIGNIFICANT CHANGE UP (ref 6–8.3)
PROT UR-MCNC: 30 MG/DL — HIGH
RBC # BLD: 4.06 M/UL — LOW (ref 4.2–5.8)
RBC # FLD: 14.1 % — SIGNIFICANT CHANGE UP (ref 10.3–14.5)
RBC CASTS # UR COMP ASSIST: SIGNIFICANT CHANGE UP (ref 0–?)
SODIUM SERPL-SCNC: 134 MMOL/L — LOW (ref 135–145)
SP GR SPEC: 1.01 — SIGNIFICANT CHANGE UP (ref 1–1.04)
SPECIMEN SOURCE: SIGNIFICANT CHANGE UP
UROBILINOGEN FLD QL: NORMAL MG/DL — SIGNIFICANT CHANGE UP
WBC # BLD: 13.16 K/UL — HIGH (ref 3.8–10.5)
WBC # FLD AUTO: 13.16 K/UL — HIGH (ref 3.8–10.5)

## 2018-06-19 PROCEDURE — 93971 EXTREMITY STUDY: CPT | Mod: 26,LT

## 2018-06-19 PROCEDURE — 99233 SBSQ HOSP IP/OBS HIGH 50: CPT

## 2018-06-19 RX ORDER — ACETAMINOPHEN 500 MG
1000 TABLET ORAL ONCE
Qty: 0 | Refills: 0 | Status: COMPLETED | OUTPATIENT
Start: 2018-06-19 | End: 2018-06-19

## 2018-06-19 RX ORDER — ACETAMINOPHEN 500 MG
1000 TABLET ORAL ONCE
Qty: 0 | Refills: 0 | Status: DISCONTINUED | OUTPATIENT
Start: 2018-06-19 | End: 2018-06-19

## 2018-06-19 RX ORDER — SODIUM CHLORIDE 9 MG/ML
500 INJECTION INTRAMUSCULAR; INTRAVENOUS; SUBCUTANEOUS ONCE
Qty: 0 | Refills: 0 | Status: COMPLETED | OUTPATIENT
Start: 2018-06-19 | End: 2018-06-19

## 2018-06-19 RX ORDER — CARVEDILOL PHOSPHATE 80 MG/1
12.5 CAPSULE, EXTENDED RELEASE ORAL EVERY 12 HOURS
Qty: 0 | Refills: 0 | Status: DISCONTINUED | OUTPATIENT
Start: 2018-06-19 | End: 2018-06-26

## 2018-06-19 RX ADMIN — MONTELUKAST 10 MILLIGRAM(S): 4 TABLET, CHEWABLE ORAL at 12:19

## 2018-06-19 RX ADMIN — Medication 100 MILLIGRAM(S): at 06:25

## 2018-06-19 RX ADMIN — Medication 1 GRAM(S): at 17:09

## 2018-06-19 RX ADMIN — Medication 1000 MILLIGRAM(S): at 18:49

## 2018-06-19 RX ADMIN — TAMSULOSIN HYDROCHLORIDE 0.4 MILLIGRAM(S): 0.4 CAPSULE ORAL at 22:24

## 2018-06-19 RX ADMIN — Medication 2: at 12:17

## 2018-06-19 RX ADMIN — Medication 6: at 17:09

## 2018-06-19 RX ADMIN — GABAPENTIN 300 MILLIGRAM(S): 400 CAPSULE ORAL at 17:09

## 2018-06-19 RX ADMIN — CLOPIDOGREL BISULFATE 75 MILLIGRAM(S): 75 TABLET, FILM COATED ORAL at 12:19

## 2018-06-19 RX ADMIN — Medication 2: at 08:29

## 2018-06-19 RX ADMIN — Medication 100 MILLIGRAM(S): at 22:24

## 2018-06-19 RX ADMIN — CARVEDILOL PHOSPHATE 12.5 MILLIGRAM(S): 80 CAPSULE, EXTENDED RELEASE ORAL at 17:09

## 2018-06-19 RX ADMIN — Medication 1 GRAM(S): at 06:27

## 2018-06-19 RX ADMIN — Medication 400 MILLIGRAM(S): at 18:09

## 2018-06-19 RX ADMIN — Medication 10 MILLIEQUIVALENT(S): at 12:19

## 2018-06-19 RX ADMIN — Medication 1000 MILLIGRAM(S): at 06:55

## 2018-06-19 RX ADMIN — SENNA PLUS 2 TABLET(S): 8.6 TABLET ORAL at 22:24

## 2018-06-19 RX ADMIN — SODIUM CHLORIDE 100 MILLILITER(S): 9 INJECTION INTRAMUSCULAR; INTRAVENOUS; SUBCUTANEOUS at 15:37

## 2018-06-19 RX ADMIN — Medication 81 MILLIGRAM(S): at 12:19

## 2018-06-19 RX ADMIN — ATORVASTATIN CALCIUM 40 MILLIGRAM(S): 80 TABLET, FILM COATED ORAL at 22:24

## 2018-06-19 RX ADMIN — Medication 3 MILLILITER(S): at 10:05

## 2018-06-19 RX ADMIN — Medication 3 MILLILITER(S): at 15:56

## 2018-06-19 RX ADMIN — Medication 3 MILLILITER(S): at 21:07

## 2018-06-19 RX ADMIN — GABAPENTIN 300 MILLIGRAM(S): 400 CAPSULE ORAL at 06:26

## 2018-06-19 RX ADMIN — PANTOPRAZOLE SODIUM 40 MILLIGRAM(S): 20 TABLET, DELAYED RELEASE ORAL at 06:26

## 2018-06-19 RX ADMIN — Medication 3 MILLILITER(S): at 04:24

## 2018-06-19 RX ADMIN — Medication 400 MILLIGRAM(S): at 06:25

## 2018-06-19 NOTE — PROGRESS NOTE ADULT - PROBLEM SELECTOR PLAN 2
Patient confused last night in context of fever. stroke code called   -CTH w/ old strokes  -doubt knew CVA, f/u neuro Patient confused last night in context of fever. stroke code called   -CTH w/ old strokes  -doubt knew CVA, neuro rec MRI and tele given extensive history

## 2018-06-19 NOTE — CHART NOTE - NSCHARTNOTEFT_GEN_A_CORE
ADS NIGHT COVERAGE:    S/w pt's family at bedside as per their request for update on pt's condition. All questions answered. Code stroke was called on pt earlier due to AMS.     Pt remained drowsy but arousable at time of exam.   Card: RRR  Lungs: CTA B/L  Neuro: A&Ox4, No slurred speech, no facial drooping noted. Pinpoint pupils noted B/L. Strength on RUE, RLE 5/5, LUE LLE 4/5. Sensation intact B/L.     -CTH ordered and neuro checks as pt's lethargy had not significantly improved and as per family request seeing as pt has hx of 2 old CVAs. CTH results reviewed - negative.     Will continue to monitor.  RUBEN DENT PA-C  [id number]

## 2018-06-19 NOTE — PROGRESS NOTE ADULT - PROBLEM SELECTOR PLAN 4
Patient with mechanical fall, no head trauma or LOC; had CT pelvis and x rays of hip and knee all negative   -no need for knee immobilizer, this was discussed with ortho   -fall precaution  -pain control with Tylenol   -off opiates 2/2 confusion   -f/u PT eval, WBAT, pt amenable to rehab

## 2018-06-19 NOTE — PROVIDER CONTACT NOTE (OTHER) - SITUATION
Pt presenting with increased lethargy, pinpoint fixed pupils, & LE weakness status post code stroke.

## 2018-06-19 NOTE — PROGRESS NOTE ADULT - PROBLEM SELECTOR PLAN 3
Worsening Cr while here, taking opiates/no BM, concern for retention  -bladder scan 800+, s/p straight cath w/ ~700 out  -continue with straight cath protocol  -c/w flomax  -holding diuretics  -c/w IVF Worsening Cr while here, taking opiates/no BM, concern for retention  -bladder scan 800+, s/p straight cath w/ ~700 out  -ya placed for rentention  -c/w flomax  -holding diuretics  -c/w IVF

## 2018-06-19 NOTE — PROGRESS NOTE ADULT - SUBJECTIVE AND OBJECTIVE BOX
Patient is a 63y old  Male who presents with a chief complaint of Left leg pain    SUBJECTIVE / OVERNIGHT EVENTS:    States L leg pain  No CP, no SOB, no nausea/vomiting  reports intermittent cough  states had BMs yesterday    MEDICATIONS  (STANDING):  ALBUTerol/ipratropium for Nebulization 3 milliLiter(s) Nebulizer every 6 hours  aspirin enteric coated 81 milliGRAM(s) Oral daily  atorvastatin 40 milliGRAM(s) Oral at bedtime  carvedilol 12.5 milliGRAM(s) Oral every 12 hours  clopidogrel Tablet 75 milliGRAM(s) Oral daily  dextrose 5%. 1000 milliLiter(s) (50 mL/Hr) IV Continuous <Continuous>  dextrose 50% Injectable 12.5 Gram(s) IV Push once  dextrose 50% Injectable 25 Gram(s) IV Push once  dextrose 50% Injectable 25 Gram(s) IV Push once  docusate sodium 100 milliGRAM(s) Oral three times a day  gabapentin 300 milliGRAM(s) Oral two times a day  hydrALAZINE 100 milliGRAM(s) Oral three times a day  insulin lispro (HumaLOG) corrective regimen sliding scale   SubCutaneous three times a day before meals  isosorbide   mononitrate ER Tablet (IMDUR) 30 milliGRAM(s) Oral daily  montelukast 10 milliGRAM(s) Oral daily  pantoprazole    Tablet 40 milliGRAM(s) Oral before breakfast  polyethylene glycol 3350 17 Gram(s) Oral daily  potassium chloride    Tablet ER 10 milliEquivalent(s) Oral daily  senna 2 Tablet(s) Oral at bedtime  sucralfate 1 Gram(s) Oral two times a day  tamsulosin 0.4 milliGRAM(s) Oral at bedtime    MEDICATIONS  (PRN):  acetaminophen   Tablet 650 milliGRAM(s) Oral every 6 hours PRN For Temp greater than 38 C (100.4 F)  acetaminophen   Tablet. 325 milliGRAM(s) Oral every 4 hours PRN Mild Pain (1 - 3)  dextrose 40% Gel 15 Gram(s) Oral once PRN Blood Glucose LESS THAN 70 milliGRAM(s)/deciliter  glucagon  Injectable 1 milliGRAM(s) IntraMuscular once PRN Glucose LESS THAN 70 milligrams/deciliter    T(C): 36.6 (18 @ 12:19), Max: 38.7 (18 @ 18:45)  HR: 90 (18 @ 15:56) (70 - 103)  BP: 103/65 (18 @ 12:19) (102/56 - 116/63)  RR: 17 (18 @ 12:19) (17 - 17)  SpO2: 98% (18 @ 12:19) (95% - 98%)    CAPILLARY BLOOD GLUCOSE  123 (2018 19:36)  POCT Blood Glucose.: 161 mg/dL (2018 11:56)  POCT Blood Glucose.: 153 mg/dL (2018 07:49)  POCT Blood Glucose.: 123 mg/dL (2018 18:45)  POCT Blood Glucose.: 128 mg/dL (2018 17:21)      I&O's Summary    2018 07:01  -  2018 07:00  --------------------------------------------------------  IN: 0 mL / OUT: 1070 mL / NET: -1070 mL      PHYSICAL EXAM:  GENERAL: NAD  HEAD:  Atraumatic, Normocephalic  EYES: conjunctiva and sclera clear  NECK: Supple, No JVD  CHEST/LUNG: Clear to auscultation bilaterally; No wheeze  HEART: Regular rate and rhythm; No murmurs, rubs, or gallops  ABDOMEN: Soft, Nontender, Nondistended; Bowel sounds present  EXTREMITIES:   warm and well perfused, No clubbing, cyanosis, or edema  PSYCH: AAOx3  NEUROLOGY: L sided LE weakness, lmited ROM of knee, no hip pain on external rotation, no skin tears/lacs, larger L joint effusion and TTP  SKIN: No rashes or lesions    LABS:                        12.2   13.16 )-----------( 134      ( 2018 05:50 )             36.3         134<L>  |  91<L>  |  107<H>  ----------------------------<  142<H>  4.1   |  20<L>  |  5.45<H>    Ca    8.7      2018 05:50  Phos  5.0     -18  Mg     2.0     -18    TPro  6.5  /  Alb  3.1<L>  /  TBili  1.2  /  DBili  x   /  AST  29  /  ALT  17  /  AlkPhos  95  06-19    PT/INR - ( 2018 19:00 )   PT: 12.3 SEC;   INR: 1.07          PTT - ( 2018 19:00 )  PTT:21.0 SEC      Urinalysis Basic - ( 2018 12:00 )    Color: YELLOW / Appearance: CLEAR / S.013 / pH: 6.0  Gluc: NEGATIVE / Ketone: NEGATIVE  / Bili: NEGATIVE / Urobili: NORMAL mg/dL   Blood: NEGATIVE / Protein: 30 mg/dL / Nitrite: NEGATIVE   Leuk Esterase: NEGATIVE / RBC: 0-2 / WBC x   Sq Epi: x / Non Sq Epi: x / Bacteria: x      Microbiology:   [facility]  @ 19:00  pH: 7.36/pCO2: 39/pO2: 43/HCO3: 22/lactate: 2.0    Imaging Personally Reviewed:    Consultant(s) Notes Reviewed:  neuro     Care Discussed with Consultants/Other Providers:

## 2018-06-19 NOTE — PROGRESS NOTE ADULT - PROBLEM SELECTOR PLAN 1
Developed fever yesterday evening a/w encephalopathy,  today patient only reports L knee pain, which is enlarged from prior.  Has mild cough, no sore throat, no runny nose.  No dysuria.    Last few days with SOB and wheezing--RVP neg.  -f/u blood culture  -UA and CXR unremarkable  -RVP negative  -check CT chest for source of fever given resp symptoms  -f/u with ortho re: enlarging L knee effusion

## 2018-06-19 NOTE — PROVIDER CONTACT NOTE (OTHER) - ASSESSMENT
Pt presented with fixed pinpoint pupils and lethargy. A&O x3, disoriented to situation. Pt's eyes not accommodating when RN conducting neuro exam.

## 2018-06-19 NOTE — PROVIDER CONTACT NOTE (OTHER) - BACKGROUND
Pt admitted 6/14/18 with closed fracture of proximal end of left tibia. PMH of BPH, CHF, Hyperlipidemia, Diabetes, CAD, HTN.

## 2018-06-20 DIAGNOSIS — N17.9 ACUTE KIDNEY FAILURE, UNSPECIFIED: ICD-10-CM

## 2018-06-20 LAB
BUN SERPL-MCNC: 115 MG/DL — HIGH (ref 7–23)
CALCIUM SERPL-MCNC: 9 MG/DL — SIGNIFICANT CHANGE UP (ref 8.4–10.5)
CHLORIDE SERPL-SCNC: 92 MMOL/L — LOW (ref 98–107)
CHLORIDE UR-SCNC: < 20 MMOL/L — SIGNIFICANT CHANGE UP
CK SERPL-CCNC: 239 U/L — HIGH (ref 30–200)
CO2 SERPL-SCNC: 20 MMOL/L — LOW (ref 22–31)
CREAT ?TM UR-MCNC: 76.2 MG/DL — SIGNIFICANT CHANGE UP
CREAT SERPL-MCNC: 5.75 MG/DL — HIGH (ref 0.5–1.3)
GLUCOSE SERPL-MCNC: 141 MG/DL — HIGH (ref 70–99)
HCT VFR BLD CALC: 35 % — LOW (ref 39–50)
HGB BLD-MCNC: 11.9 G/DL — LOW (ref 13–17)
MCHC RBC-ENTMCNC: 30 PG — SIGNIFICANT CHANGE UP (ref 27–34)
MCHC RBC-ENTMCNC: 34 % — SIGNIFICANT CHANGE UP (ref 32–36)
MCV RBC AUTO: 88.2 FL — SIGNIFICANT CHANGE UP (ref 80–100)
NRBC # FLD: 0 — SIGNIFICANT CHANGE UP
PLATELET # BLD AUTO: 151 K/UL — SIGNIFICANT CHANGE UP (ref 150–400)
PMV BLD: 10.7 FL — SIGNIFICANT CHANGE UP (ref 7–13)
POTASSIUM SERPL-MCNC: 3.8 MMOL/L — SIGNIFICANT CHANGE UP (ref 3.5–5.3)
POTASSIUM SERPL-SCNC: 3.8 MMOL/L — SIGNIFICANT CHANGE UP (ref 3.5–5.3)
POTASSIUM UR-SCNC: 26.5 MMOL/L — SIGNIFICANT CHANGE UP
RBC # BLD: 3.97 M/UL — LOW (ref 4.2–5.8)
RBC # FLD: 14.1 % — SIGNIFICANT CHANGE UP (ref 10.3–14.5)
SODIUM SERPL-SCNC: 134 MMOL/L — LOW (ref 135–145)
SODIUM UR-SCNC: 22 MMOL/L — SIGNIFICANT CHANGE UP
URATE SERPL-MCNC: 11.4 MG/DL — HIGH (ref 3.4–8.8)
URATE UR-MCNC: 12.5 MG/DL — SIGNIFICANT CHANGE UP
UUN UR-MCNC: 639.2 MG/DL — SIGNIFICANT CHANGE UP
WBC # BLD: 13.16 K/UL — HIGH (ref 3.8–10.5)
WBC # FLD AUTO: 13.16 K/UL — HIGH (ref 3.8–10.5)

## 2018-06-20 PROCEDURE — 76770 US EXAM ABDO BACK WALL COMP: CPT | Mod: 26

## 2018-06-20 PROCEDURE — 71250 CT THORAX DX C-: CPT | Mod: 26

## 2018-06-20 PROCEDURE — 99254 IP/OBS CNSLTJ NEW/EST MOD 60: CPT | Mod: GC

## 2018-06-20 PROCEDURE — 99233 SBSQ HOSP IP/OBS HIGH 50: CPT

## 2018-06-20 RX ORDER — HYDRALAZINE HCL 50 MG
50 TABLET ORAL EVERY 8 HOURS
Qty: 0 | Refills: 0 | Status: DISCONTINUED | OUTPATIENT
Start: 2018-06-20 | End: 2018-06-28

## 2018-06-20 RX ORDER — OXYCODONE HYDROCHLORIDE 5 MG/1
5 TABLET ORAL EVERY 8 HOURS
Qty: 0 | Refills: 0 | Status: DISCONTINUED | OUTPATIENT
Start: 2018-06-20 | End: 2018-06-27

## 2018-06-20 RX ORDER — OXYCODONE HYDROCHLORIDE 5 MG/1
5 TABLET ORAL ONCE
Qty: 0 | Refills: 0 | Status: DISCONTINUED | OUTPATIENT
Start: 2018-06-20 | End: 2018-06-20

## 2018-06-20 RX ORDER — HEPARIN SODIUM 5000 [USP'U]/ML
5000 INJECTION INTRAVENOUS; SUBCUTANEOUS
Qty: 0 | Refills: 0 | Status: COMPLETED | OUTPATIENT
Start: 2018-06-20 | End: 2018-06-20

## 2018-06-20 RX ORDER — SODIUM CHLORIDE 9 MG/ML
1000 INJECTION INTRAMUSCULAR; INTRAVENOUS; SUBCUTANEOUS
Qty: 0 | Refills: 0 | Status: DISCONTINUED | OUTPATIENT
Start: 2018-06-20 | End: 2018-06-21

## 2018-06-20 RX ADMIN — Medication 100 MILLIGRAM(S): at 13:24

## 2018-06-20 RX ADMIN — Medication 3 MILLILITER(S): at 09:14

## 2018-06-20 RX ADMIN — Medication 325 MILLIGRAM(S): at 13:24

## 2018-06-20 RX ADMIN — OXYCODONE HYDROCHLORIDE 5 MILLIGRAM(S): 5 TABLET ORAL at 08:35

## 2018-06-20 RX ADMIN — SODIUM CHLORIDE 100 MILLILITER(S): 9 INJECTION INTRAMUSCULAR; INTRAVENOUS; SUBCUTANEOUS at 21:11

## 2018-06-20 RX ADMIN — Medication 325 MILLIGRAM(S): at 05:47

## 2018-06-20 RX ADMIN — ATORVASTATIN CALCIUM 40 MILLIGRAM(S): 80 TABLET, FILM COATED ORAL at 21:11

## 2018-06-20 RX ADMIN — CLOPIDOGREL BISULFATE 75 MILLIGRAM(S): 75 TABLET, FILM COATED ORAL at 13:24

## 2018-06-20 RX ADMIN — Medication 2: at 08:22

## 2018-06-20 RX ADMIN — GABAPENTIN 300 MILLIGRAM(S): 400 CAPSULE ORAL at 17:53

## 2018-06-20 RX ADMIN — OXYCODONE HYDROCHLORIDE 5 MILLIGRAM(S): 5 TABLET ORAL at 22:02

## 2018-06-20 RX ADMIN — Medication 100 MILLIGRAM(S): at 21:11

## 2018-06-20 RX ADMIN — OXYCODONE HYDROCHLORIDE 5 MILLIGRAM(S): 5 TABLET ORAL at 21:11

## 2018-06-20 RX ADMIN — Medication 325 MILLIGRAM(S): at 13:54

## 2018-06-20 RX ADMIN — Medication 100 MILLIGRAM(S): at 05:47

## 2018-06-20 RX ADMIN — Medication 50 MILLIGRAM(S): at 21:11

## 2018-06-20 RX ADMIN — GABAPENTIN 300 MILLIGRAM(S): 400 CAPSULE ORAL at 05:47

## 2018-06-20 RX ADMIN — Medication 3 MILLILITER(S): at 22:18

## 2018-06-20 RX ADMIN — Medication 1 GRAM(S): at 05:47

## 2018-06-20 RX ADMIN — Medication 1 GRAM(S): at 17:53

## 2018-06-20 RX ADMIN — Medication 3 MILLILITER(S): at 16:10

## 2018-06-20 RX ADMIN — CARVEDILOL PHOSPHATE 12.5 MILLIGRAM(S): 80 CAPSULE, EXTENDED RELEASE ORAL at 17:53

## 2018-06-20 RX ADMIN — OXYCODONE HYDROCHLORIDE 5 MILLIGRAM(S): 5 TABLET ORAL at 09:05

## 2018-06-20 RX ADMIN — Medication 81 MILLIGRAM(S): at 13:24

## 2018-06-20 RX ADMIN — HEPARIN SODIUM 5000 UNIT(S): 5000 INJECTION INTRAVENOUS; SUBCUTANEOUS at 21:11

## 2018-06-20 RX ADMIN — Medication 30 MILLIGRAM(S): at 17:53

## 2018-06-20 RX ADMIN — SODIUM CHLORIDE 100 MILLILITER(S): 9 INJECTION INTRAMUSCULAR; INTRAVENOUS; SUBCUTANEOUS at 13:53

## 2018-06-20 RX ADMIN — PANTOPRAZOLE SODIUM 40 MILLIGRAM(S): 20 TABLET, DELAYED RELEASE ORAL at 05:50

## 2018-06-20 RX ADMIN — TAMSULOSIN HYDROCHLORIDE 0.4 MILLIGRAM(S): 0.4 CAPSULE ORAL at 21:11

## 2018-06-20 RX ADMIN — SENNA PLUS 2 TABLET(S): 8.6 TABLET ORAL at 21:11

## 2018-06-20 RX ADMIN — CARVEDILOL PHOSPHATE 12.5 MILLIGRAM(S): 80 CAPSULE, EXTENDED RELEASE ORAL at 05:47

## 2018-06-20 RX ADMIN — Medication 325 MILLIGRAM(S): at 06:45

## 2018-06-20 RX ADMIN — Medication 3 MILLILITER(S): at 04:17

## 2018-06-20 RX ADMIN — ISOSORBIDE MONONITRATE 30 MILLIGRAM(S): 60 TABLET, EXTENDED RELEASE ORAL at 13:24

## 2018-06-20 RX ADMIN — Medication 100 MILLIGRAM(S): at 08:35

## 2018-06-20 RX ADMIN — MONTELUKAST 10 MILLIGRAM(S): 4 TABLET, CHEWABLE ORAL at 13:24

## 2018-06-20 NOTE — CONSULT NOTE ADULT - PROBLEM SELECTOR RECOMMENDATION 9
SHERRY on CKD 4 with SCr trending 2.9->5.7 over the course of admission since 6/14 and worsening uremia. Pt reportedly had AMS and ? slurred speech on 6/18 prompting code stroke w/o evidence of new CVA though MRI pending. Pt is currently A&Ox3 w/o hyperkalemia or volume overload. No indication for dialysis at this time. Etiology of SHERRY likely 2/2 obstructive nephropathy as pt was found to have 700 cc's on straight cath on 6/18. Torres catheter placed around noon yesterday w/o accurate tracked urine output. Given hx of BPH and history concerning for the same, should have urologic eval and renal/bladder US. Pt also noted to have transient hypotension episode 96/50 last night which could have contributed to kidney injury via ATN, now hemodynamically stable. FeUrea on admission was less consistent with obstructive picture at 22.9% and may have been more prerenal at that time. Would repeat FeUrea now.   -start strict I/O's.  -renal and bladder US.  -c/w Torres catheter.  -Urology evaluation  -repeat U/A, urine urea, urine lytes, urine creatinine now.  -infectious workup per primary team with CT chest pending.    Plan pending discussion with attending:  Mahad Farmer  Medicine night admit, PGY-2  Pager [phone] / [phone] SHERRY on CKD 4 with SCr trending 2.9->5.7 over the course of admission since 6/14 and worsening uremia. Pt reportedly had AMS and ? slurred speech on 6/18 prompting code stroke w/o evidence of new CVA though MRI pending. Pt is currently A&Ox3 w/o hyperkalemia or volume overload. No indication for dialysis at this time. Etiology of SHERRY likely 2/2 obstructive nephropathy as pt was found to have 700 cc's on straight cath on 6/18. Torres catheter placed around noon yesterday w/o accurate tracked urine output. Given hx of BPH and history concerning for the same, should have urologic eval and renal/bladder US. Pt also noted to have transient hypotension episode 96/50 last night which could have contributed to kidney injury via ATN, now hemodynamically stable. FeUrea on admission was less consistent with obstructive picture at 22.9% and may have been more prerenal at that time. Would repeat FeUrea now. Pt has hx of CHF though no TTE seen in system; unlikely cardiorenal as no edema on exam or evidence of volume overload generally.  -start strict I/O's.  -renal and bladder US.  -c/w Torres catheter.  -Urology evaluation  -repeat U/A, urine urea, urine lytes, urine creatinine now.  -infectious workup per primary team with CT chest pending.  -trend Mg and phos with BMP's.     Plan pending discussion with attending:  Mahad Farmer  Medicine nephrology elective, PGY-2  Pager [phone] / [phone] SHERRY on CKD 4 with SCr trending 2.9->5.7 over the course of admission since 6/14 and worsening uremia. Pt reportedly had AMS and ? slurred speech on 6/18 prompting code stroke w/o evidence of new CVA though MRI pending. Pt is currently A&Ox3 w/o hyperkalemia or volume overload. No indication for dialysis at this time. Etiology of SHERRY likely 2/2 obstructive nephropathy as pt was found to have 700 cc's on straight cath on 6/18. Torres catheter placed around noon yesterday w/o accurate tracked urine output. Given hx of BPH and history concerning for the same, should have urologic eval and renal/bladder US. Pt also noted to have transient hypotension episode 96/50 last night which could have contributed to kidney injury via ATN, now hemodynamically stable. FeUrea on admission was less consistent with obstructive picture at 22.9% and may have been more prerenal at that time. Would repeat FeUrea now. Pt has hx of CHF though no TTE seen in system; unlikely cardiorenal as no edema on exam or evidence of volume overload generally except 1+ LLE edema likely 2/2 trauma (no DVT on US).  -start strict I/O's.  -renal and bladder US.  -c/w Torres catheter.  -Urology evaluation  -repeat U/A, urine urea, urine lytes, urine creatinine now.  -infectious workup per primary team with CT chest pending.  -trend Mg and phos with BMP's.     Plan pending discussion with attending:  Mahad Farmer  Medicine nephrology elective, PGY-2  Pager [phone] / [phone] SHERRY on CKD 4 with SCr trending 2.9->5.7 over the course of admission since 6/14 and worsening uremia. Pt reportedly had AMS and ? slurred speech on 6/18 prompting code stroke w/o evidence of new CVA though MRI pending. Pt is currently A&Ox3 w/o hyperkalemia or volume overload. No indication for dialysis at this time. Etiology of SHERRY likely 2/2 obstructive nephropathy as pt was found to have 700 cc's on straight cath on 6/18. Torres catheter placed around noon yesterday w/o accurate tracked urine output. Given hx of BPH and history concerning for the same, should have urologic eval and renal/bladder US. Pt also noted to have transient hypotension episode 96/50 last night which could have contributed to kidney injury via ATN, now hemodynamically stable. FeUrea on admission was less consistent with obstructive picture at 22.9% and may have been more prerenal at that time. Would repeat FeUrea now. Pt has hx of CHF though no TTE seen in system; unlikely cardiorenal as no edema on exam or evidence of volume overload generally except 1+ LLE edema likely 2/2 trauma (no DVT on US).  -start strict I/O's.  -renal and bladder US.  -c/w Torres catheter.  -Urology evaluation  -repeat U/A, urine urea, urine lytes, urine creatinine now.  -infectious workup per primary team with CT chest pending.  -trend Mg and phos with BMP's.   -agree with continuing to hold home torsemide 10mg BID for now (was on 6/15-18).    Plan pending discussion with attending:  Mahad Farmer  Medicine nephrology elective, PGY-2  Pager [phone] / [phone]

## 2018-06-20 NOTE — PROGRESS NOTE ADULT - PROBLEM SELECTOR PLAN 4
Patient with mechanical fall, no head trauma or LOC; had CT pelvis and x rays of hip and knee all negative   -no need for knee immobilizer, this was discussed with ortho   -fall precaution  -pain control with Tylenol   -off opiates 2/2 confusion   -f/u PT eval, WBAT, pt amenable to rehab Patient with mechanical fall, no head trauma or LOC; had CT pelvis and x rays of hip and knee all negative   -no need for knee immobilizer, this was discussed with ortho   -fall precaution  -pain control with Tylenol, low dose opiates   -f/u PT eval, WBAT, pt amenable to rehab

## 2018-06-20 NOTE — CONSULT NOTE ADULT - SUBJECTIVE AND OBJECTIVE BOX
[facility] Division of Kidney Diseases & Hypertension  INITIAL CONSULT NOTE  [phone]--------------------------------------------------------------------------------  HPI:  Pt is a 64yo M with PMH CKD 4, BPH, CHF, CAD s/p CABG, CVA with residual L-sided weakness, HTN, DM2 (A1C 6.4%), HLD admitted 6/14 for pain control for LLE pain after mechanical fall getting out of a car. Nephrology is consulted today for worsening serum BUN and Cr since admission. The patient reports that he called EMS after the fall because he was unable to ambulate 2/2 pain. Imaging in the hospital has not shown acute fracture, but orthopedics was concerned for hemarthrosis. Hospital course was further complicated by fevers, ? respiratory distress treated with Duoneb, though no clear infectious source at this time. He also had a code stroke called for dysarthia and ? AMS, per neurology likely 2/2 multifactorial metabolic encephalopathy in the setting of fever with MRI pending.     Regarding his kidney function, his SCr has uptrend 2.97 to 5.75 since admission, and BUN similarly 47 to 115. The patient has had no overt bleeding, chest pain, or confusion today but endorses fatigue. His baseline SCr in January 2018 appears to have been 2.0-2.3. For further workup of his worsening kidney function, a bladder scan was complaint 6/18 showing 800 cc volume with subsequent straight cath with 800 cc's out. A Torres was placed yesterday around noon. I/O's do not appear to be accurately recorded since Torres placement. Clear yellow urine is present in Torres bag. The patient denies using NSAID's recently and denies recent changes to his outpatient medications or starting new medications generally. He does report that he sometimes has difficulty starting and stopping his stream and confirms a prior diagnosis of BPH.     PAST HISTORY  --------------------------------------------------------------------------------  PAST MEDICAL & SURGICAL HISTORY:  BPH (benign prostatic hyperplasia)  CHF (congestive heart failure)  Hyperlipidemia, unspecified hyperlipidemia type  Diabetes  CAD (coronary artery disease)  HTN (hypertension)  S/P appendectomy  S/P CABG (coronary artery bypass graft)    FAMILY HISTORY:  No pertinent family history in first degree relatives    PAST SOCIAL HISTORY:    ALLERGIES & MEDICATIONS  --------------------------------------------------------------------------------  Allergies    No Known Allergies    Intolerances      Standing Inpatient Medications  ALBUTerol/ipratropium for Nebulization 3 milliLiter(s) Nebulizer every 6 hours  aspirin enteric coated 81 milliGRAM(s) Oral daily  atorvastatin 40 milliGRAM(s) Oral at bedtime  carvedilol 12.5 milliGRAM(s) Oral every 12 hours  clopidogrel Tablet 75 milliGRAM(s) Oral daily  dextrose 5%. 1000 milliLiter(s) IV Continuous <Continuous>  dextrose 50% Injectable 12.5 Gram(s) IV Push once  dextrose 50% Injectable 25 Gram(s) IV Push once  dextrose 50% Injectable 25 Gram(s) IV Push once  docusate sodium 100 milliGRAM(s) Oral three times a day  gabapentin 300 milliGRAM(s) Oral two times a day  hydrALAZINE 100 milliGRAM(s) Oral three times a day  insulin lispro (HumaLOG) corrective regimen sliding scale   SubCutaneous three times a day before meals  isosorbide   mononitrate ER Tablet (IMDUR) 30 milliGRAM(s) Oral daily  montelukast 10 milliGRAM(s) Oral daily  pantoprazole    Tablet 40 milliGRAM(s) Oral before breakfast  polyethylene glycol 3350 17 Gram(s) Oral daily  senna 2 Tablet(s) Oral at bedtime  sucralfate 1 Gram(s) Oral two times a day  tamsulosin 0.4 milliGRAM(s) Oral at bedtime    PRN Inpatient Medications  acetaminophen   Tablet 650 milliGRAM(s) Oral every 6 hours PRN  acetaminophen   Tablet. 325 milliGRAM(s) Oral every 4 hours PRN  dextrose 40% Gel 15 Gram(s) Oral once PRN  glucagon  Injectable 1 milliGRAM(s) IntraMuscular once PRN      REVIEW OF SYSTEMS  --------------------------------------------------------------------------------  Gen: +fatigue; o/w No  fevers/chills, weakness  Skin: No rashes  Head/Eyes/Ears/Mouth: No headache; Normal hearing; Normal vision w/o blurriness;   Respiratory: No dyspnea, cough, wheezing, hemoptysis  CV: No chest pain,   GI: No abdominal pain, diarrhea, constipation, nausea, vomiting  : +prior difficulty urinating, Torres x 1 day; o/w No increased frequency, dysuria, hematuria, nocturia  MSK: +L knee pain, L shoulder pain, L arm swelling; o/w No joint pain/swelling;   Neuro: +chronic L-sided weakness; o/w No dizziness/lightheadedness, weakness, seizures    All other systems were reviewed and are negative, except as noted.    VITALS/PHYSICAL EXAM  --------------------------------------------------------------------------------  T(C): 36.9 (06-20-18 @ 05:41), Max: 36.9 (06-20-18 @ 05:41)  HR: 93 (06-20-18 @ 09:14) (81 - 99)  BP: 113/65 (06-20-18 @ 08:34) (96/50 - 113/65)  RR: 18 (06-20-18 @ 05:41) (17 - 18)  SpO2: 96% (06-20-18 @ 09:14) (96% - 100%)  Wt(kg): --    Physical Exam:  	Gen: NAD, well-appearing, lying in bed with HOB elevated in NAD, breathing comfortably  	HEENT: PERRL, supple neck, poor dentition  	Pulm: end-expiratory wheezes RML and RUL  	CV:  S1S2,   	Abd: +BS, soft, no rigidity, no guarding  	Ext: 1+ LLE edema; pain with L knee and L shoulder active/passive motion; swollen L knee slightly warm compared to right  	Neuro: No focal deficits  	Skin: well-healed sternotomy scar; o/w Warm, without rashes  	Vascular access: piv's    LABS/STUDIES  --------------------------------------------------------------------------------              11.9   13.16 >-----------<  151      [06-20-18 @ 06:30]              35.0     134  |  92  |  115  ----------------------------<  141      [06-20-18 @ 06:30]  3.8   |  20  |  5.75        Ca     9.0     [06-20-18 @ 06:30]      Mg     2.0     [06-18-18 @ 19:00]      Phos  5.0     [06-18-18 @ 19:00]    TPro  6.5  /  Alb  3.1  /  TBili  1.2  /  DBili  x   /  AST  29  /  ALT  17  /  AlkPhos  95  [06-19-18 @ 05:50]    PT/INR: PT 12.3 , INR 1.07       [06-18-18 @ 19:00]  PTT: 21.0       [06-18-18 @ 19:00]    Uric acid 11.4      [06-20-18 @ 06:30]    Creatinine Trend:  SCr 5.75 [06-20 @ 06:30]  SCr 5.45 [06-19 @ 05:50]  SCr 5.30 [06-18 @ 19:00]  SCr 4.70 [06-18 @ 05:40]  SCr 3.77 [06-17 @ 07:37]    Urinalysis - [06-19-18 @ 12:00]      Color YELLOW / Appearance CLEAR / SG 1.013 / pH 6.0      Gluc NEGATIVE / Ketone NEGATIVE  / Bili NEGATIVE / Urobili NORMAL       Blood NEGATIVE / Protein 30 / Leuk Est NEGATIVE / Nitrite NEGATIVE      RBC 0-2 / WBC  / Hyaline  / Gran  / Sq Epi  / Non Sq Epi  / Bacteria     Urine Creatinine 131.50      [06-18-18 @ 11:58]  Urine Sodium < 20      [06-18-18 @ 11:58]  Urine Urea Nitrogen 559.9      [06-18-18 @ 11:58]  Urine Potassium 39.5      [06-18-18 @ 11:58]  Urine Chloride < 20      [06-18-18 @ 11:58]    HbA1c 6.4      [06-16-18 @ 07:05] [facility] Division of Kidney Diseases & Hypertension  INITIAL CONSULT NOTE  [phone]--------------------------------------------------------------------------------  HPI:  Pt is a 64yo M with PMH CKD 4, BPH, CHF, CAD s/p CABG, CVA with residual L-sided weakness, HTN, DM2 (A1C 6.4%), HLD admitted 6/14 for pain control for LLE pain after mechanical fall getting out of a car. Nephrology is consulted today for worsening serum BUN and Cr since admission. The patient reports that he called EMS after the fall because he was unable to ambulate 2/2 pain. Imaging in the hospital has not shown acute fracture, but orthopedics was concerned for hemarthrosis. Hospital course was further complicated by fevers, ? respiratory distress treated with Duoneb, though no clear infectious source at this time. He also had a code stroke called for dysarthia and ? AMS, per neurology likely 2/2 multifactorial metabolic encephalopathy in the setting of fever with MRI pending.     Regarding his kidney function, his SCr has uptrended 2.97 to 5.75 since admission, and BUN similarly 47 to 115. The patient has had no overt bleeding, chest pain, or confusion today but endorses fatigue. His baseline SCr in January 2018 appears to have been 2.0-2.3. For further workup of his worsening kidney function, a bladder scan was completed 6/18 showing 800 cc volume with subsequent straight cath with 700 cc's out. A Torres was placed yesterday around noon. I/O's do not appear to be accurately recorded since Torres placement. Clear yellow urine is present in Torres bag. The patient denies using NSAID's recently and denies recent changes to his outpatient medications or starting new medications generally. He does report that he sometimes has difficulty starting and stopping his stream and confirms a prior diagnosis of BPH.     PAST HISTORY  --------------------------------------------------------------------------------  PAST MEDICAL & SURGICAL HISTORY:  BPH (benign prostatic hyperplasia)  CHF (congestive heart failure)  Hyperlipidemia, unspecified hyperlipidemia type  Diabetes  CAD (coronary artery disease)  HTN (hypertension)  S/P appendectomy  S/P CABG (coronary artery bypass graft)    FAMILY HISTORY:  No pertinent family history in first degree relatives    PAST SOCIAL HISTORY:    ALLERGIES & MEDICATIONS  --------------------------------------------------------------------------------  Allergies    No Known Allergies    Intolerances      Standing Inpatient Medications  ALBUTerol/ipratropium for Nebulization 3 milliLiter(s) Nebulizer every 6 hours  aspirin enteric coated 81 milliGRAM(s) Oral daily  atorvastatin 40 milliGRAM(s) Oral at bedtime  carvedilol 12.5 milliGRAM(s) Oral every 12 hours  clopidogrel Tablet 75 milliGRAM(s) Oral daily  dextrose 5%. 1000 milliLiter(s) IV Continuous <Continuous>  dextrose 50% Injectable 12.5 Gram(s) IV Push once  dextrose 50% Injectable 25 Gram(s) IV Push once  dextrose 50% Injectable 25 Gram(s) IV Push once  docusate sodium 100 milliGRAM(s) Oral three times a day  gabapentin 300 milliGRAM(s) Oral two times a day  hydrALAZINE 100 milliGRAM(s) Oral three times a day  insulin lispro (HumaLOG) corrective regimen sliding scale   SubCutaneous three times a day before meals  isosorbide   mononitrate ER Tablet (IMDUR) 30 milliGRAM(s) Oral daily  montelukast 10 milliGRAM(s) Oral daily  pantoprazole    Tablet 40 milliGRAM(s) Oral before breakfast  polyethylene glycol 3350 17 Gram(s) Oral daily  senna 2 Tablet(s) Oral at bedtime  sucralfate 1 Gram(s) Oral two times a day  tamsulosin 0.4 milliGRAM(s) Oral at bedtime    PRN Inpatient Medications  acetaminophen   Tablet 650 milliGRAM(s) Oral every 6 hours PRN  acetaminophen   Tablet. 325 milliGRAM(s) Oral every 4 hours PRN  dextrose 40% Gel 15 Gram(s) Oral once PRN  glucagon  Injectable 1 milliGRAM(s) IntraMuscular once PRN      REVIEW OF SYSTEMS  --------------------------------------------------------------------------------  Gen: +fatigue; o/w No  fevers/chills, weakness  Skin: No rashes  Head/Eyes/Ears/Mouth: No headache; Normal hearing; Normal vision w/o blurriness;   Respiratory: No dyspnea, cough, wheezing, hemoptysis  CV: No chest pain,   GI: No abdominal pain, diarrhea, constipation, nausea, vomiting  : +prior difficulty urinating, Torres x 1 day; o/w No increased frequency, dysuria, hematuria, nocturia  MSK: +L knee pain, L shoulder pain, L arm swelling; o/w No joint pain/swelling;   Neuro: +chronic L-sided weakness; o/w No dizziness/lightheadedness, weakness, seizures    All other systems were reviewed and are negative, except as noted.    VITALS/PHYSICAL EXAM  --------------------------------------------------------------------------------  T(C): 36.9 (06-20-18 @ 05:41), Max: 36.9 (06-20-18 @ 05:41)  HR: 93 (06-20-18 @ 09:14) (81 - 99)  BP: 113/65 (06-20-18 @ 08:34) (96/50 - 113/65)  RR: 18 (06-20-18 @ 05:41) (17 - 18)  SpO2: 96% (06-20-18 @ 09:14) (96% - 100%)  Wt(kg): --    Physical Exam:  	Gen: NAD, well-appearing, lying in bed with HOB elevated in NAD, breathing comfortably  	HEENT: PERRL, supple neck, poor dentition  	Pulm: end-expiratory wheezes RML and RUL  	CV:  S1S2,   	Abd: +BS, soft, no rigidity, no guarding  	Ext: 1+ LLE edema; pain with L knee and L shoulder active/passive motion; swollen L knee slightly warm compared to right  	Neuro: No focal deficits  	Skin: well-healed sternotomy scar; o/w Warm, without rashes  	Vascular access: piv's    LABS/STUDIES  --------------------------------------------------------------------------------              11.9   13.16 >-----------<  151      [06-20-18 @ 06:30]              35.0     134  |  92  |  115  ----------------------------<  141      [06-20-18 @ 06:30]  3.8   |  20  |  5.75        Ca     9.0     [06-20-18 @ 06:30]      Mg     2.0     [06-18-18 @ 19:00]      Phos  5.0     [06-18-18 @ 19:00]    TPro  6.5  /  Alb  3.1  /  TBili  1.2  /  DBili  x   /  AST  29  /  ALT  17  /  AlkPhos  95  [06-19-18 @ 05:50]    PT/INR: PT 12.3 , INR 1.07       [06-18-18 @ 19:00]  PTT: 21.0       [06-18-18 @ 19:00]    Uric acid 11.4      [06-20-18 @ 06:30]    Creatinine Trend:  SCr 5.75 [06-20 @ 06:30]  SCr 5.45 [06-19 @ 05:50]  SCr 5.30 [06-18 @ 19:00]  SCr 4.70 [06-18 @ 05:40]  SCr 3.77 [06-17 @ 07:37]    Urinalysis - [06-19-18 @ 12:00]      Color YELLOW / Appearance CLEAR / SG 1.013 / pH 6.0      Gluc NEGATIVE / Ketone NEGATIVE  / Bili NEGATIVE / Urobili NORMAL       Blood NEGATIVE / Protein 30 / Leuk Est NEGATIVE / Nitrite NEGATIVE      RBC 0-2 / WBC  / Hyaline  / Gran  / Sq Epi  / Non Sq Epi  / Bacteria     Urine Creatinine 131.50      [06-18-18 @ 11:58]  Urine Sodium < 20      [06-18-18 @ 11:58]  Urine Urea Nitrogen 559.9      [06-18-18 @ 11:58]  Urine Potassium 39.5      [06-18-18 @ 11:58]  Urine Chloride < 20      [06-18-18 @ 11:58]    HbA1c 6.4      [06-16-18 @ 07:05] [facility] Division of Kidney Diseases & Hypertension  INITIAL CONSULT NOTE  [phone]--------------------------------------------------------------------------------  HPI:  Pt is a 62yo M with PMH CKD 4, BPH, CHF, CAD s/p CABG, CVA with residual L-sided weakness, HTN, DM2 (A1C 6.4%), HLD admitted 6/14 for pain control for LLE pain after mechanical fall getting out of a car. Nephrology is consulted today for worsening serum BUN and Cr since admission. The patient reports that he called EMS after the fall because he was unable to ambulate 2/2 pain. Imaging in the hospital has not shown acute fracture, but orthopedics was concerned for hemarthrosis. Hospital course was further complicated by fevers, ? respiratory distress treated with Duoneb, though no clear infectious source at this time. He also had a code stroke called for dysarthia and ? AMS, per neurology likely 2/2 multifactorial metabolic encephalopathy in the setting of fever with MRI pending.     Regarding his kidney function, his SCr has uptrended 2.97 to 5.75 since admission, and BUN similarly 47 to 115. The patient has had no overt bleeding, chest pain, or confusion today but endorses fatigue. His baseline SCr in January 2018 appears to have been 2.0-2.3. For further workup of his worsening kidney function, a bladder scan was completed 6/18 showing 800 cc volume with subsequent straight cath with 700 cc's out. A Torres was placed yesterday around noon. I/O's do not appear to be accurately recorded since Torres placement. Clear yellow urine is present in Torres bag. The patient denies using NSAID's recently and denies recent changes to his outpatient medications or starting new medications generally. He does report that he sometimes has difficulty starting and stopping his stream and confirms a prior diagnosis of BPH.     PAST HISTORY  --------------------------------------------------------------------------------  PAST MEDICAL & SURGICAL HISTORY:  BPH (benign prostatic hyperplasia)  CHF (congestive heart failure)  Hyperlipidemia, unspecified hyperlipidemia type  Diabetes  CAD (coronary artery disease)  HTN (hypertension)  S/P appendectomy  S/P CABG (coronary artery bypass graft)    FAMILY HISTORY:  No pertinent family history in first degree relatives    PAST SOCIAL HISTORY:  Lives with family  Non-smoker  No history of alcohol use  No history of substance abuse    ALLERGIES & MEDICATIONS  --------------------------------------------------------------------------------  Allergies    No Known Allergies    Intolerances      Standing Inpatient Medications  ALBUTerol/ipratropium for Nebulization 3 milliLiter(s) Nebulizer every 6 hours  aspirin enteric coated 81 milliGRAM(s) Oral daily  atorvastatin 40 milliGRAM(s) Oral at bedtime  carvedilol 12.5 milliGRAM(s) Oral every 12 hours  clopidogrel Tablet 75 milliGRAM(s) Oral daily  dextrose 5%. 1000 milliLiter(s) IV Continuous <Continuous>  dextrose 50% Injectable 12.5 Gram(s) IV Push once  dextrose 50% Injectable 25 Gram(s) IV Push once  dextrose 50% Injectable 25 Gram(s) IV Push once  docusate sodium 100 milliGRAM(s) Oral three times a day  gabapentin 300 milliGRAM(s) Oral two times a day  hydrALAZINE 100 milliGRAM(s) Oral three times a day  insulin lispro (HumaLOG) corrective regimen sliding scale   SubCutaneous three times a day before meals  isosorbide   mononitrate ER Tablet (IMDUR) 30 milliGRAM(s) Oral daily  montelukast 10 milliGRAM(s) Oral daily  pantoprazole    Tablet 40 milliGRAM(s) Oral before breakfast  polyethylene glycol 3350 17 Gram(s) Oral daily  senna 2 Tablet(s) Oral at bedtime  sucralfate 1 Gram(s) Oral two times a day  tamsulosin 0.4 milliGRAM(s) Oral at bedtime    PRN Inpatient Medications  acetaminophen   Tablet 650 milliGRAM(s) Oral every 6 hours PRN  acetaminophen   Tablet. 325 milliGRAM(s) Oral every 4 hours PRN  dextrose 40% Gel 15 Gram(s) Oral once PRN  glucagon  Injectable 1 milliGRAM(s) IntraMuscular once PRN      REVIEW OF SYSTEMS  --------------------------------------------------------------------------------  Gen: +fatigue; o/w No  fevers/chills, weakness  Skin: No rashes  Head/Eyes/Ears/Mouth: No headache; Normal hearing; Normal vision w/o blurriness;   Respiratory: No dyspnea, cough, wheezing, hemoptysis  CV: No chest pain,   GI: No abdominal pain, diarrhea, constipation, nausea, vomiting  : +prior difficulty urinating, Torres x 1 day; o/w No increased frequency, dysuria, hematuria, nocturia  MSK: +L knee pain, L shoulder pain, L arm swelling; o/w No joint pain/swelling;   Neuro: +chronic L-sided weakness; o/w No dizziness/lightheadedness, weakness, seizures    All other systems were reviewed and are negative, except as noted.    VITALS/PHYSICAL EXAM  --------------------------------------------------------------------------------  T(C): 36.9 (06-20-18 @ 05:41), Max: 36.9 (06-20-18 @ 05:41)  HR: 93 (06-20-18 @ 09:14) (81 - 99)  BP: 113/65 (06-20-18 @ 08:34) (96/50 - 113/65)  RR: 18 (06-20-18 @ 05:41) (17 - 18)  SpO2: 96% (06-20-18 @ 09:14) (96% - 100%)  Wt(kg): --    Physical Exam:  	Gen: NAD, well-appearing, lying in bed with HOB elevated in NAD, breathing comfortably  	HEENT: PERRL, supple neck, poor dentition  	Pulm: end-expiratory wheezes RML and RUL  	CV:  S1S2,   	Abd: +BS, soft, no rigidity, no guarding  	Ext: 1+ LLE edema; pain with L knee and L shoulder active/passive motion; swollen L knee slightly warm compared to right  	Neuro: No focal deficits  	Skin: well-healed sternotomy scar; o/w Warm, without rashes  	Vascular access: piv's    LABS/STUDIES  --------------------------------------------------------------------------------              11.9   13.16 >-----------<  151      [06-20-18 @ 06:30]              35.0     134  |  92  |  115  ----------------------------<  141      [06-20-18 @ 06:30]  3.8   |  20  |  5.75        Ca     9.0     [06-20-18 @ 06:30]      Mg     2.0     [06-18-18 @ 19:00]      Phos  5.0     [06-18-18 @ 19:00]    TPro  6.5  /  Alb  3.1  /  TBili  1.2  /  DBili  x   /  AST  29  /  ALT  17  /  AlkPhos  95  [06-19-18 @ 05:50]    PT/INR: PT 12.3 , INR 1.07       [06-18-18 @ 19:00]  PTT: 21.0       [06-18-18 @ 19:00]    Uric acid 11.4      [06-20-18 @ 06:30]    Creatinine Trend:  SCr 5.75 [06-20 @ 06:30]  SCr 5.45 [06-19 @ 05:50]  SCr 5.30 [06-18 @ 19:00]  SCr 4.70 [06-18 @ 05:40]  SCr 3.77 [06-17 @ 07:37]    Urinalysis - [06-19-18 @ 12:00]      Color YELLOW / Appearance CLEAR / SG 1.013 / pH 6.0      Gluc NEGATIVE / Ketone NEGATIVE  / Bili NEGATIVE / Urobili NORMAL       Blood NEGATIVE / Protein 30 / Leuk Est NEGATIVE / Nitrite NEGATIVE      RBC 0-2 / WBC  / Hyaline  / Gran  / Sq Epi  / Non Sq Epi  / Bacteria     Urine Creatinine 131.50      [06-18-18 @ 11:58]  Urine Sodium < 20      [06-18-18 @ 11:58]  Urine Urea Nitrogen 559.9      [06-18-18 @ 11:58]  Urine Potassium 39.5      [06-18-18 @ 11:58]  Urine Chloride < 20      [06-18-18 @ 11:58]    HbA1c 6.4      [06-16-18 @ 07:05] [facility] Division of Kidney Diseases & Hypertension  INITIAL CONSULT NOTE  [phone]--------------------------------------------------------------------------------  HPI:  Pt is a 62yo M with PMH CKD 4, BPH, CHF, CAD s/p CABG, CVA with residual L-sided weakness, HTN, DM2 (A1C 6.4%), HLD admitted 6/14 for pain control for LLE pain after mechanical fall getting out of a car. Nephrology is consulted today for worsening serum BUN and Cr since admission. The patient reports that he called EMS after the fall because he was unable to ambulate 2/2 pain. Imaging in the hospital has not shown acute fracture, but orthopedics was concerned for hemarthrosis. Hospital course was further complicated by fevers, ? respiratory distress treated with Duoneb, though no clear infectious source at this time. He also had a code stroke called for dysarthia and ? AMS, per neurology likely 2/2 multifactorial metabolic encephalopathy in the setting of fever with MRI pending.     Regarding his kidney function, his SCr has uptrended 2.97 to 5.75 since admission, and BUN similarly 47 to 115. The patient has had no overt bleeding, chest pain, or confusion today but endorses fatigue. His baseline SCr in January 2018 appears to have been 2.0-2.3. For further workup of his worsening kidney function, a bladder scan was completed 6/18 showing 800 cc volume with subsequent straight cath with 700 cc's out. A Torres was placed yesterday around noon. I/O's do not appear to be accurately recorded since Torres placement. Clear yellow urine is present in Torres bag. The patient denies using NSAID's recently and denies recent changes to his outpatient medications or starting new medications generally. He does report that he sometimes has difficulty starting and stopping his stream and confirms a prior diagnosis of BPH.     He was continued on outpatient torsemide dose 10mg BID from 6/15-6/18, now held.     PAST HISTORY  --------------------------------------------------------------------------------  PAST MEDICAL & SURGICAL HISTORY:  BPH (benign prostatic hyperplasia)  CHF (congestive heart failure)  Hyperlipidemia, unspecified hyperlipidemia type  Diabetes  CAD (coronary artery disease)  HTN (hypertension)  S/P appendectomy  S/P CABG (coronary artery bypass graft)    FAMILY HISTORY:  No pertinent family history in first degree relatives    PAST SOCIAL HISTORY:  Lives with family  Non-smoker  No history of alcohol use  No history of substance abuse    ALLERGIES & MEDICATIONS  --------------------------------------------------------------------------------  Allergies    No Known Allergies    Intolerances      Standing Inpatient Medications  ALBUTerol/ipratropium for Nebulization 3 milliLiter(s) Nebulizer every 6 hours  aspirin enteric coated 81 milliGRAM(s) Oral daily  atorvastatin 40 milliGRAM(s) Oral at bedtime  carvedilol 12.5 milliGRAM(s) Oral every 12 hours  clopidogrel Tablet 75 milliGRAM(s) Oral daily  dextrose 5%. 1000 milliLiter(s) IV Continuous <Continuous>  dextrose 50% Injectable 12.5 Gram(s) IV Push once  dextrose 50% Injectable 25 Gram(s) IV Push once  dextrose 50% Injectable 25 Gram(s) IV Push once  docusate sodium 100 milliGRAM(s) Oral three times a day  gabapentin 300 milliGRAM(s) Oral two times a day  hydrALAZINE 100 milliGRAM(s) Oral three times a day  insulin lispro (HumaLOG) corrective regimen sliding scale   SubCutaneous three times a day before meals  isosorbide   mononitrate ER Tablet (IMDUR) 30 milliGRAM(s) Oral daily  montelukast 10 milliGRAM(s) Oral daily  pantoprazole    Tablet 40 milliGRAM(s) Oral before breakfast  polyethylene glycol 3350 17 Gram(s) Oral daily  senna 2 Tablet(s) Oral at bedtime  sucralfate 1 Gram(s) Oral two times a day  tamsulosin 0.4 milliGRAM(s) Oral at bedtime    PRN Inpatient Medications  acetaminophen   Tablet 650 milliGRAM(s) Oral every 6 hours PRN  acetaminophen   Tablet. 325 milliGRAM(s) Oral every 4 hours PRN  dextrose 40% Gel 15 Gram(s) Oral once PRN  glucagon  Injectable 1 milliGRAM(s) IntraMuscular once PRN      REVIEW OF SYSTEMS  --------------------------------------------------------------------------------  Gen: +fatigue; o/w No  fevers/chills, weakness  Skin: No rashes  Head/Eyes/Ears/Mouth: No headache; Normal hearing; Normal vision w/o blurriness;   Respiratory: No dyspnea, cough, wheezing, hemoptysis  CV: No chest pain,   GI: No abdominal pain, diarrhea, constipation, nausea, vomiting  : +prior difficulty urinating, Torres x 1 day; o/w No increased frequency, dysuria, hematuria, nocturia  MSK: +L knee pain, L shoulder pain, L arm swelling; o/w No joint pain/swelling;   Neuro: +chronic L-sided weakness; o/w No dizziness/lightheadedness, weakness, seizures    All other systems were reviewed and are negative, except as noted.    VITALS/PHYSICAL EXAM  --------------------------------------------------------------------------------  T(C): 36.9 (06-20-18 @ 05:41), Max: 36.9 (06-20-18 @ 05:41)  HR: 93 (06-20-18 @ 09:14) (81 - 99)  BP: 113/65 (06-20-18 @ 08:34) (96/50 - 113/65)  RR: 18 (06-20-18 @ 05:41) (17 - 18)  SpO2: 96% (06-20-18 @ 09:14) (96% - 100%)  Wt(kg): --    Physical Exam:  	Gen: NAD, well-appearing, lying in bed with HOB elevated in NAD, breathing comfortably  	HEENT: PERRL, supple neck, poor dentition  	Pulm: end-expiratory wheezes RML and RUL  	CV:  S1S2,   	Abd: +BS, soft, no rigidity, no guarding  	Ext: 1+ LLE edema; pain with L knee and L shoulder active/passive motion; swollen L knee slightly warm compared to right  	Neuro: No focal deficits  	Skin: well-healed sternotomy scar; o/w Warm, without rashes  	Vascular access: piv's    LABS/STUDIES  --------------------------------------------------------------------------------              11.9   13.16 >-----------<  151      [06-20-18 @ 06:30]              35.0     134  |  92  |  115  ----------------------------<  141      [06-20-18 @ 06:30]  3.8   |  20  |  5.75        Ca     9.0     [06-20-18 @ 06:30]      Mg     2.0     [06-18-18 @ 19:00]      Phos  5.0     [06-18-18 @ 19:00]    TPro  6.5  /  Alb  3.1  /  TBili  1.2  /  DBili  x   /  AST  29  /  ALT  17  /  AlkPhos  95  [06-19-18 @ 05:50]    PT/INR: PT 12.3 , INR 1.07       [06-18-18 @ 19:00]  PTT: 21.0       [06-18-18 @ 19:00]    Uric acid 11.4      [06-20-18 @ 06:30]    Creatinine Trend:  SCr 5.75 [06-20 @ 06:30]  SCr 5.45 [06-19 @ 05:50]  SCr 5.30 [06-18 @ 19:00]  SCr 4.70 [06-18 @ 05:40]  SCr 3.77 [06-17 @ 07:37]    Urinalysis - [06-19-18 @ 12:00]      Color YELLOW / Appearance CLEAR / SG 1.013 / pH 6.0      Gluc NEGATIVE / Ketone NEGATIVE  / Bili NEGATIVE / Urobili NORMAL       Blood NEGATIVE / Protein 30 / Leuk Est NEGATIVE / Nitrite NEGATIVE      RBC 0-2 / WBC  / Hyaline  / Gran  / Sq Epi  / Non Sq Epi  / Bacteria     Urine Creatinine 131.50      [06-18-18 @ 11:58]  Urine Sodium < 20      [06-18-18 @ 11:58]  Urine Urea Nitrogen 559.9      [06-18-18 @ 11:58]  Urine Potassium 39.5      [06-18-18 @ 11:58]  Urine Chloride < 20      [06-18-18 @ 11:58]    HbA1c 6.4      [06-16-18 @ 07:05]

## 2018-06-20 NOTE — PROGRESS NOTE ADULT - SUBJECTIVE AND OBJECTIVE BOX
ORTHO     Called by primary team to re-evaluate pt co worsening left knee pain and increased swelling as well as right knee pain/swelling/ bilateral ankle pain and left shoulder pain. Pt had fever x 1 afebrile at this time.  Pt getting CT chest for resp sx. Pt s/p code stroke recently    Vital Signs Last 24 Hrs  T(C): 36.6 (20 Jun 2018 13:16), Max: 36.9 (20 Jun 2018 05:41)  T(F): 97.9 (20 Jun 2018 13:16), Max: 98.5 (20 Jun 2018 05:41)  HR: 95 (20 Jun 2018 16:10) (81 - 99)  BP: 115/70 (20 Jun 2018 13:16) (96/50 - 115/70)  BP(mean): --  RR: 17 (20 Jun 2018 13:16) (17 - 18)  SpO2: 96% (20 Jun 2018 16:10) (94% - 100%)    PE : left knee with effusion,  flexion to 75  with some pain, no point tenderness        right knee now with effusion with some pain, no point tenderness         Bilateral ankles with mild swelling L>R no point tenderness , some pain with ROM         Left shoulder no swelling forward flexion, abduction internal and external rotation full without pain               Labs :                      11.9   13.16 )-----------( 151      ( 20 Jun 2018 06:30 )             35.0                 06-20    134<L>  |  92<L>  |  115<H>  ----------------------------<  141<H>  3.8   |  20<L>  |  5.75<H>    Ca    9.0      20 Jun 2018 06:30  Phos  5.0     06-18  Mg     2.0     06-18    TPro  6.5  /  Alb  3.1<L>  /  TBili  1.2  /  DBili  x   /  AST  29  /  ALT  17  /  AlkPhos  95  06-19          A/P:  Given polyarthralgia and hx of traumatic effusion would recommend  rheumatology evaluation ,  favor non infectious  etiology  at this time , please consider obtaining serum uric acis level and ESR/CRP            PT- WBAT            Continue asa/plavix            Pain Control            D/W [facility] attending             Above discussed with primary team

## 2018-06-20 NOTE — PROGRESS NOTE ADULT - PROBLEM SELECTOR PLAN 2
Patient confused 6/18 in context of fever. stroke code called   -[facility] w/ old strokes  -doubt knew CVA, neuro rec MRI and tele given extensive history Patient confused 6/18 in context of fever. stroke code called   -[facility] w/ old strokes  -doubt knew CVA, neuro rec MRI and tele given extensive history  -improved back to prior baseline since

## 2018-06-20 NOTE — PROGRESS NOTE ADULT - PROBLEM SELECTOR PLAN 1
Developed fever 6/18 evening a/w encephalopathy,  patient only reports L knee pain, which is enlarged from prior.  Has mild cough, no sore throat, no runny nose.  No dysuria.    Last few days with SOB and wheezing--RVP neg.  -f/u blood culture, NG x 24 hour  -UA and CXR unremarkable  -RVP negative  -check CT chest for source of fever given resp symptoms  -f/u with ortho re: enlarging L knee effusion,  + hemarthrosis; gout flare, will start pred 40 mg (no NSAIDs 2/2 renal failure, no colchicine for same) Developed fever 6/18 evening a/w encephalopathy,  patient only reports L knee pain, which is enlarged from prior.  Has mild cough, no sore throat, no runny nose.  No dysuria.    Last few days with SOB and wheezing--RVP neg.  -f/u blood culture, NG x 24 hour  -UA and CXR unremarkable  -RVP negative  -check CT chest for source of fever given resp symptoms  -f/u with ortho re: enlarging L knee effusion,  + hemarthrosis; gout flare, will start pred 30 mg (no NSAIDs 2/2 renal failure, no colchicine for same)

## 2018-06-20 NOTE — CONSULT NOTE ADULT - ASSESSMENT
64yo M with PMH CKD 4, BPH, CHF, CAD s/p CABG, CVA with residual L-sided weakness, HTN, DM2 (A1C 6.4%), HLD admitted 6/14 for pain control for LLE pain after mechanical fall getting out of a car, nephrology consulted for HSERRY on CKD 64yo M with PMH CKD 4, BPH, CHF, CAD s/p CABG, CVA with residual L-sided weakness, HTN, DM2 (A1C 6.4%), HLD admitted 6/14 for pain control for LLE pain after mechanical fall getting out of a car, nephrology consulted for SHERRY on CKD with recent bladder US concerning for obstructive process.

## 2018-06-20 NOTE — PROGRESS NOTE ADULT - PROBLEM SELECTOR PLAN 3
Worsening Cr while here, taking opiates/no BM, concern for retention; s/p bowel regimen but still retaining, s/p ya placement on 6/19  -ya placed for rentention  -c/w flomax  -holding diuretics  -c/w IVF  -uric acid related renal dysfxn  -check renal US Worsening Cr while here, taking opiates/no BM, concern for retention; s/p bowel regimen w/ BMs but still retaining, s/p ya placement on 6/19; urine lytes initially were pre-renal (FeUrea 22%)  -ya placed for rentention  -c/w flomax  -holding diuretics  -c/w IVF  -uric acid related renal dysfxn  -check renal US  -will also add a CK to r/o rhabdo related SHERRY as pt w/ diffuse myalgias however UA neg for blood  -renal consulted as etiology is strange and seems to be progressing despite fluid challenge and relief of obstruction w/ ya

## 2018-06-20 NOTE — PROGRESS NOTE ADULT - SUBJECTIVE AND OBJECTIVE BOX
Patient is a 63y old  Male who presents with a chief complaint of Left leg pain    SUBJECTIVE / OVERNIGHT EVENTS:    Patient states still w/ diffuse body pain  no cough, CP, SOB  per PCA having BM    MEDICATIONS  (STANDING):  ALBUTerol/ipratropium for Nebulization 3 milliLiter(s) Nebulizer every 6 hours  aspirin enteric coated 81 milliGRAM(s) Oral daily  atorvastatin 40 milliGRAM(s) Oral at bedtime  carvedilol 12.5 milliGRAM(s) Oral every 12 hours  clopidogrel Tablet 75 milliGRAM(s) Oral daily  dextrose 5%. 1000 milliLiter(s) (50 mL/Hr) IV Continuous <Continuous>  dextrose 50% Injectable 12.5 Gram(s) IV Push once  dextrose 50% Injectable 25 Gram(s) IV Push once  dextrose 50% Injectable 25 Gram(s) IV Push once  docusate sodium 100 milliGRAM(s) Oral three times a day  gabapentin 300 milliGRAM(s) Oral two times a day  hydrALAZINE 100 milliGRAM(s) Oral three times a day  insulin lispro (HumaLOG) corrective regimen sliding scale   SubCutaneous three times a day before meals  isosorbide   mononitrate ER Tablet (IMDUR) 30 milliGRAM(s) Oral daily  montelukast 10 milliGRAM(s) Oral daily  pantoprazole    Tablet 40 milliGRAM(s) Oral before breakfast  polyethylene glycol 3350 17 Gram(s) Oral daily  senna 2 Tablet(s) Oral at bedtime  sucralfate 1 Gram(s) Oral two times a day  tamsulosin 0.4 milliGRAM(s) Oral at bedtime    MEDICATIONS  (PRN):  acetaminophen   Tablet 650 milliGRAM(s) Oral every 6 hours PRN For Temp greater than 38 C (100.4 F)  acetaminophen   Tablet. 325 milliGRAM(s) Oral every 4 hours PRN Mild Pain (1 - 3)  dextrose 40% Gel 15 Gram(s) Oral once PRN Blood Glucose LESS THAN 70 milliGRAM(s)/deciliter  glucagon  Injectable 1 milliGRAM(s) IntraMuscular once PRN Glucose LESS THAN 70 milligrams/deciliter    T(C): 36.9 (18 @ 05:41), Max: 36.9 (18 @ 05:41)  HR: 93 (18 @ 09:14) (81 - 99)  BP: 113/65 (18 @ 08:34) (96/50 - 113/65)  RR: 18 (18 @ 05:41) (17 - 18)  SpO2: 96% (18 @ 09:14) (96% - 100%)    CAPILLARY BLOOD GLUCOSE  POCT Blood Glucose.: 167 mg/dL (2018 07:38)  POCT Blood Glucose.: 190 mg/dL (2018 20:56)  POCT Blood Glucose.: 251 mg/dL (2018 17:02)  POCT Blood Glucose.: 161 mg/dL (2018 11:56)    PHYSICAL EXAM:  GENERAL: NAD, well-developed  HEAD:  Atraumatic, Normocephalic  EYES: EOMI, PERRLA, conjunctiva and sclera clear  NECK: Supple, No JVD  CHEST/LUNG: Clear to auscultation bilaterally; No wheeze  HEART: Regular rate and rhythm; No murmurs, rubs, or gallops  ABDOMEN: Soft, Nontender, Nondistended; Bowel sounds present  EXTREMITIES:   warm and well perfused, No clubbing, cyanosis, or edema  PSYCH: AAOx3  NEUROLOGY: non-focal  SKIN: No rashes or lesions    LABS:                        11.9   13.16 )-----------( 151      ( 2018 06:30 )             35.0     06-20    134<L>  |  92<L>  |  115<H>  ----------------------------<  141<H>  3.8   |  20<L>  |  5.75<H>    Ca    9.0      2018 06:30  Phos  5.0     06-18  Mg     2.0     -18    TPro  6.5  /  Alb  3.1<L>  /  TBili  1.2  /  DBili  x   /  AST  29  /  ALT  17  /  AlkPhos  95  06-19    PT/INR - ( 2018 19:00 )   PT: 12.3 SEC;   INR: 1.07          PTT - ( 2018 19:00 )  PTT:21.0 SEC      Urinalysis Basic - ( 2018 12:00 )    Color: YELLOW / Appearance: CLEAR / S.013 / pH: 6.0  Gluc: NEGATIVE / Ketone: NEGATIVE  / Bili: NEGATIVE / Urobili: NORMAL mg/dL   Blood: NEGATIVE / Protein: 30 mg/dL / Nitrite: NEGATIVE   Leuk Esterase: NEGATIVE / RBC: 0-2 / WBC x   Sq Epi: x / Non Sq Epi: x / Bacteria: x      Microbiology:   [facility]  @ 19:00  pH: 7.36/pCO2: 39/pO2: 43/HCO3: 22/lactate: 2.0      RADIOLOGY & ADDITIONAL TESTS:    Imaging Personally Reviewed:    Consultant(s) Notes Reviewed:      Care Discussed with Consultants/Other Providers: Patient is a 63y old  Male who presents with a chief complaint of Left leg pain    SUBJECTIVE / OVERNIGHT EVENTS:    Patient states still w/ diffuse body pain  no cough, CP, SOB  per PCA having BM    MEDICATIONS  (STANDING):  ALBUTerol/ipratropium for Nebulization 3 milliLiter(s) Nebulizer every 6 hours  aspirin enteric coated 81 milliGRAM(s) Oral daily  atorvastatin 40 milliGRAM(s) Oral at bedtime  carvedilol 12.5 milliGRAM(s) Oral every 12 hours  clopidogrel Tablet 75 milliGRAM(s) Oral daily  dextrose 5%. 1000 milliLiter(s) (50 mL/Hr) IV Continuous <Continuous>  dextrose 50% Injectable 12.5 Gram(s) IV Push once  dextrose 50% Injectable 25 Gram(s) IV Push once  dextrose 50% Injectable 25 Gram(s) IV Push once  docusate sodium 100 milliGRAM(s) Oral three times a day  gabapentin 300 milliGRAM(s) Oral two times a day  hydrALAZINE 100 milliGRAM(s) Oral three times a day  insulin lispro (HumaLOG) corrective regimen sliding scale   SubCutaneous three times a day before meals  isosorbide   mononitrate ER Tablet (IMDUR) 30 milliGRAM(s) Oral daily  montelukast 10 milliGRAM(s) Oral daily  pantoprazole    Tablet 40 milliGRAM(s) Oral before breakfast  polyethylene glycol 3350 17 Gram(s) Oral daily  senna 2 Tablet(s) Oral at bedtime  sucralfate 1 Gram(s) Oral two times a day  tamsulosin 0.4 milliGRAM(s) Oral at bedtime    MEDICATIONS  (PRN):  acetaminophen   Tablet 650 milliGRAM(s) Oral every 6 hours PRN For Temp greater than 38 C (100.4 F)  acetaminophen   Tablet. 325 milliGRAM(s) Oral every 4 hours PRN Mild Pain (1 - 3)  dextrose 40% Gel 15 Gram(s) Oral once PRN Blood Glucose LESS THAN 70 milliGRAM(s)/deciliter  glucagon  Injectable 1 milliGRAM(s) IntraMuscular once PRN Glucose LESS THAN 70 milligrams/deciliter    T(C): 36.9 (18 @ 05:41), Max: 36.9 (18 @ 05:41)  HR: 93 (18 @ 09:14) (81 - 99)  BP: 113/65 (18 @ 08:34) (96/50 - 113/65)  RR: 18 (18 @ 05:41) (17 - 18)  SpO2: 96% (18 @ 09:14) (96% - 100%)    CAPILLARY BLOOD GLUCOSE  POCT Blood Glucose.: 167 mg/dL (2018 07:38)  POCT Blood Glucose.: 190 mg/dL (2018 20:56)  POCT Blood Glucose.: 251 mg/dL (2018 17:02)  POCT Blood Glucose.: 161 mg/dL (2018 11:56)    PHYSICAL EXAM:  GENERAL: NAD  HEAD:  Atraumatic, Normocephalic  EYES: conjunctiva and sclera clear  NECK: Supple, No JVD  CHEST/LUNG: Clear to auscultation bilaterally; No wheeze  HEART: Regular rate and rhythm; No murmurs, rubs, or gallops  ABDOMEN: Soft, Nontender, Nondistended; Bowel sounds present  EXTREMITIES:   warm and well perfused, No clubbing, cyanosis, or edema  PSYCH: AAOx3  NEUROLOGY: L sided LE weakness, limited ROM of knee, no hip pain on external rotation, no skin tears/lacs, bilateral knee effusions, L>R, TTP   SKIN: No rashes or lesions      LABS:                        11.9   13.16 )-----------( 151      ( 2018 06:30 )             35.0     06-20    134<L>  |  92<L>  |  115<H>  ----------------------------<  141<H>  3.8   |  20<L>  |  5.75<H>    Ca    9.0      2018 06:30  Phos  5.0     06-18  Mg     2.0     06-18    TPro  6.5  /  Alb  3.1<L>  /  TBili  1.2  /  DBili  x   /  AST  29  /  ALT  17  /  AlkPhos  95  06-19    PT/INR - ( 2018 19:00 )   PT: 12.3 SEC;   INR: 1.07          PTT - ( 2018 19:00 )  PTT:21.0 SEC      Urinalysis Basic - ( 2018 12:00 )    Color: YELLOW / Appearance: CLEAR / S.013 / pH: 6.0  Gluc: NEGATIVE / Ketone: NEGATIVE  / Bili: NEGATIVE / Urobili: NORMAL mg/dL   Blood: NEGATIVE / Protein: 30 mg/dL / Nitrite: NEGATIVE   Leuk Esterase: NEGATIVE / RBC: 0-2 / WBC x   Sq Epi: x / Non Sq Epi: x / Bacteria: x      Microbiology:   [facility]  @ 19:00  pH: 7.36/pCO2: 39/pO2: 43/HCO3: 22/lactate: 2.0        Consultant(s) Notes Reviewed:      Care Discussed with Consultants/Other Providers:

## 2018-06-20 NOTE — PROGRESS NOTE ADULT - ASSESSMENT
63 yo M ambulatory with cane with DM2, CAD s/p CABG, history of CVA w/ residual L sided weakness s/p mechanical fall when getting out of car. 63 yo M ambulatory with cane with DM2, CAD s/p CABG, history of CVA w/ residual L sided weakness s/p mechanical fall when getting out of car course complicated by SHERRY on CKD with urinary retention s/p ya, toxic metabolic encephalopathy in setting of fever and opiates, fever w/ thus far negative infectious w/u possibly 2/2 gout flare.

## 2018-06-21 LAB
BODY FLUID TYPE: SIGNIFICANT CHANGE UP
BODY FLUID TYPE: SIGNIFICANT CHANGE UP
BUN SERPL-MCNC: 112 MG/DL — HIGH (ref 7–23)
CALCIUM SERPL-MCNC: 8.5 MG/DL — SIGNIFICANT CHANGE UP (ref 8.4–10.5)
CHLORIDE SERPL-SCNC: 93 MMOL/L — LOW (ref 98–107)
CLARITY SPEC: SIGNIFICANT CHANGE UP
CLARITY SPEC: SIGNIFICANT CHANGE UP
CO2 SERPL-SCNC: 18 MMOL/L — LOW (ref 22–31)
COLOR FLD: YELLOW — SIGNIFICANT CHANGE UP
COLOR FLD: YELLOW — SIGNIFICANT CHANGE UP
CREAT SERPL-MCNC: 5.03 MG/DL — HIGH (ref 0.5–1.3)
CRP SERPL-MCNC: 313.8 MG/L — HIGH
CRYSTALS FLD MICRO: PRESENT — SIGNIFICANT CHANGE UP
CRYSTALS FLD MICRO: SIGNIFICANT CHANGE UP
ERYTHROCYTE [SEDIMENTATION RATE] IN BLOOD: 85 MM/HR — HIGH (ref 1–15)
GLUCOSE SERPL-MCNC: 208 MG/DL — HIGH (ref 70–99)
GRAM STN FLD: SIGNIFICANT CHANGE UP
GRAM STN FLD: SIGNIFICANT CHANGE UP
HCT VFR BLD CALC: 32.2 % — LOW (ref 39–50)
HGB BLD-MCNC: 11 G/DL — LOW (ref 13–17)
MCHC RBC-ENTMCNC: 29.6 PG — SIGNIFICANT CHANGE UP (ref 27–34)
MCHC RBC-ENTMCNC: 34.2 % — SIGNIFICANT CHANGE UP (ref 32–36)
MCV RBC AUTO: 86.6 FL — SIGNIFICANT CHANGE UP (ref 80–100)
MONOCYTES # FLD: 10 % — SIGNIFICANT CHANGE UP
MONOCYTES # FLD: 9 % — SIGNIFICANT CHANGE UP
NEUTS SEG NFR FLD MANUAL: 90 % — SIGNIFICANT CHANGE UP
NEUTS SEG NFR FLD MANUAL: 91 % — SIGNIFICANT CHANGE UP
NRBC # FLD: 0 — SIGNIFICANT CHANGE UP
PLATELET # BLD AUTO: 174 K/UL — SIGNIFICANT CHANGE UP (ref 150–400)
PMV BLD: 10.8 FL — SIGNIFICANT CHANGE UP (ref 7–13)
POTASSIUM SERPL-MCNC: 4.2 MMOL/L — SIGNIFICANT CHANGE UP (ref 3.5–5.3)
POTASSIUM SERPL-SCNC: 4.2 MMOL/L — SIGNIFICANT CHANGE UP (ref 3.5–5.3)
RBC # BLD: 3.72 M/UL — LOW (ref 4.2–5.8)
RBC # FLD: 13.9 % — SIGNIFICANT CHANGE UP (ref 10.3–14.5)
RCV VOL RI: 4000 CELL/UL — HIGH (ref 0–5)
RCV VOL RI: 8000 CELL/UL — HIGH (ref 0–5)
SODIUM SERPL-SCNC: 132 MMOL/L — LOW (ref 135–145)
SPECIMEN SOURCE: SIGNIFICANT CHANGE UP
SPECIMEN SOURCE: SIGNIFICANT CHANGE UP
TOTAL CELLS COUNTED, BODY FLUID: 100 CELLS — SIGNIFICANT CHANGE UP
TOTAL CELLS COUNTED, BODY FLUID: 100 CELLS — SIGNIFICANT CHANGE UP
TOTAL NUCLEATED CELL COUNT, BODY FLUID: 9506 CELL/UL — HIGH (ref 0–5)
TOTAL NUCLEATED CELL COUNT, BODY FLUID: HIGH CELL/UL (ref 0–5)
URATE SERPL-MCNC: 11.6 MG/DL — HIGH (ref 3.4–8.8)
WBC # BLD: 10.29 K/UL — SIGNIFICANT CHANGE UP (ref 3.8–10.5)
WBC # FLD AUTO: 10.29 K/UL — SIGNIFICANT CHANGE UP (ref 3.8–10.5)

## 2018-06-21 PROCEDURE — 73030 X-RAY EXAM OF SHOULDER: CPT | Mod: 26,LT

## 2018-06-21 PROCEDURE — 99233 SBSQ HOSP IP/OBS HIGH 50: CPT

## 2018-06-21 PROCEDURE — 73562 X-RAY EXAM OF KNEE 3: CPT | Mod: 26,RT

## 2018-06-21 PROCEDURE — 99255 IP/OBS CONSLTJ NEW/EST HI 80: CPT | Mod: 25,GC

## 2018-06-21 PROCEDURE — 73610 X-RAY EXAM OF ANKLE: CPT | Mod: 26,50

## 2018-06-21 PROCEDURE — 20610 DRAIN/INJ JOINT/BURSA W/O US: CPT | Mod: 50,GC

## 2018-06-21 PROCEDURE — 99233 SBSQ HOSP IP/OBS HIGH 50: CPT | Mod: GC

## 2018-06-21 PROCEDURE — 88108 CYTOPATH CONCENTRATE TECH: CPT | Mod: 26

## 2018-06-21 RX ORDER — SODIUM CHLORIDE 9 MG/ML
1000 INJECTION INTRAMUSCULAR; INTRAVENOUS; SUBCUTANEOUS
Qty: 0 | Refills: 0 | Status: DISCONTINUED | OUTPATIENT
Start: 2018-06-21 | End: 2018-06-22

## 2018-06-21 RX ADMIN — CARVEDILOL PHOSPHATE 12.5 MILLIGRAM(S): 80 CAPSULE, EXTENDED RELEASE ORAL at 05:58

## 2018-06-21 RX ADMIN — ATORVASTATIN CALCIUM 40 MILLIGRAM(S): 80 TABLET, FILM COATED ORAL at 23:09

## 2018-06-21 RX ADMIN — ISOSORBIDE MONONITRATE 30 MILLIGRAM(S): 60 TABLET, EXTENDED RELEASE ORAL at 12:03

## 2018-06-21 RX ADMIN — MONTELUKAST 10 MILLIGRAM(S): 4 TABLET, CHEWABLE ORAL at 12:04

## 2018-06-21 RX ADMIN — CARVEDILOL PHOSPHATE 12.5 MILLIGRAM(S): 80 CAPSULE, EXTENDED RELEASE ORAL at 17:09

## 2018-06-21 RX ADMIN — SODIUM CHLORIDE 100 MILLILITER(S): 9 INJECTION INTRAMUSCULAR; INTRAVENOUS; SUBCUTANEOUS at 08:25

## 2018-06-21 RX ADMIN — Medication 20 MILLIGRAM(S): at 23:09

## 2018-06-21 RX ADMIN — Medication 50 MILLIGRAM(S): at 05:58

## 2018-06-21 RX ADMIN — Medication 81 MILLIGRAM(S): at 12:03

## 2018-06-21 RX ADMIN — Medication 30 MILLIGRAM(S): at 05:59

## 2018-06-21 RX ADMIN — PANTOPRAZOLE SODIUM 40 MILLIGRAM(S): 20 TABLET, DELAYED RELEASE ORAL at 05:58

## 2018-06-21 RX ADMIN — TAMSULOSIN HYDROCHLORIDE 0.4 MILLIGRAM(S): 0.4 CAPSULE ORAL at 23:10

## 2018-06-21 RX ADMIN — Medication 2: at 08:25

## 2018-06-21 RX ADMIN — Medication 1 GRAM(S): at 17:09

## 2018-06-21 RX ADMIN — SODIUM CHLORIDE 50 MILLILITER(S): 9 INJECTION INTRAMUSCULAR; INTRAVENOUS; SUBCUTANEOUS at 11:00

## 2018-06-21 RX ADMIN — SENNA PLUS 2 TABLET(S): 8.6 TABLET ORAL at 23:09

## 2018-06-21 RX ADMIN — CLOPIDOGREL BISULFATE 75 MILLIGRAM(S): 75 TABLET, FILM COATED ORAL at 12:04

## 2018-06-21 RX ADMIN — Medication 100 MILLIGRAM(S): at 05:58

## 2018-06-21 RX ADMIN — Medication 3 MILLILITER(S): at 16:11

## 2018-06-21 RX ADMIN — Medication 100 MILLIGRAM(S): at 23:09

## 2018-06-21 RX ADMIN — Medication 3 MILLILITER(S): at 21:42

## 2018-06-21 RX ADMIN — Medication 3 MILLILITER(S): at 03:51

## 2018-06-21 RX ADMIN — Medication 50 MILLIGRAM(S): at 23:09

## 2018-06-21 RX ADMIN — GABAPENTIN 300 MILLIGRAM(S): 400 CAPSULE ORAL at 17:09

## 2018-06-21 RX ADMIN — Medication 50 MILLIGRAM(S): at 14:00

## 2018-06-21 RX ADMIN — OXYCODONE HYDROCHLORIDE 5 MILLIGRAM(S): 5 TABLET ORAL at 07:00

## 2018-06-21 RX ADMIN — OXYCODONE HYDROCHLORIDE 5 MILLIGRAM(S): 5 TABLET ORAL at 06:04

## 2018-06-21 RX ADMIN — GABAPENTIN 300 MILLIGRAM(S): 400 CAPSULE ORAL at 05:58

## 2018-06-21 RX ADMIN — Medication 100 MILLIGRAM(S): at 14:31

## 2018-06-21 RX ADMIN — Medication 1 GRAM(S): at 05:59

## 2018-06-21 RX ADMIN — POLYETHYLENE GLYCOL 3350 17 GRAM(S): 17 POWDER, FOR SOLUTION ORAL at 12:04

## 2018-06-21 RX ADMIN — Medication 2: at 17:30

## 2018-06-21 NOTE — CONSULT NOTE ADULT - ASSESSMENT
63M w/ hx of gout, CVA, CAD p/w fall at home found to have L knee swelling and pain initially progressing to polyarticular joint pains involving shoulders, knees and ankles.   Pt has hyperuricemia w/ uric acid off 11.6, elevated inflammatory markers, and now also with SHERRY on CKD.   Pt started on Prednisone by medicine yesterday and notes improvement of his joint pains     Xrays of shoulders knees and ankle reviewed.     s/p diagnostic and therapeutic arthrocentesis of L knee today- removal of 75 cc of straw colored fluid- see Procedure note. Synovial fluid sent to lab for analysis     *recs pending d/w attending 63M w/ hx of gout, CVA, CAD p/w fall at home found to have L knee swelling and pain initially progressing to polyarticular joint pains involving shoulders, knees and ankles.   Pt has hyperuricemia w/ uric acid off 11.6, elevated inflammatory markers, and now also with SHERRY on CKD.   Pt started on Prednisone by medicine yesterday and notes improvement of his joint pains     Xrays of shoulders knees and ankle reviewed.     s/p diagnostic and therapeutic arthrocentesis of L knee today- removal of 75 cc of straw colored fluid- see Procedure note. Synovial fluid sent to lab for analysis.   Cell ct  9506 w/ intracellular MSU crystals seen on eval under microscope. Concern for gout flare.     On follow up evaluation w/ attending, diagnostic and therapeutic arthrocentesis of R knee w/ removal of 43 of straw colored fluid w/ intraarticular corticosteroid injection Depomedrol 40mg.     Pt s/p Prednisone 30mg x 2 doses. As pt has polyarticular involvement, would rec  - Solumedrol 20mg IV x 1 dose tomorrow AM.   - Will reevaluate and rec further Prednisone taper       will follow with you

## 2018-06-21 NOTE — PROGRESS NOTE ADULT - PROBLEM SELECTOR PLAN 2
Patient confused 6/18 in context of fever. stroke code called   -[facility]H w/ old strokes  -doubt knew CVA, neuro rec MRI and tele given extensive history  -improved back to prior baseline since

## 2018-06-21 NOTE — PROGRESS NOTE ADULT - PROBLEM SELECTOR PLAN 4
Patient with mechanical fall, no head trauma or LOC; had CT pelvis and x rays of hip and knee all negative   -no need for knee immobilizer, this was discussed with ortho   -fall precaution  -pain control with Tylenol, low dose opiates   -f/u PT eval, WBAT, pt amenable to rehab

## 2018-06-21 NOTE — PROGRESS NOTE ADULT - ASSESSMENT
63 yo M ambulatory with cane with DM2, CAD s/p CABG, history of CVA w/ residual L sided weakness s/p mechanical fall when getting out of car course complicated by SHERRY on CKD with urinary retention s/p ya, toxic metabolic encephalopathy in setting of fever and opiates, fever w/ thus far negative infectious w/u possibly 2/2 gout flare.

## 2018-06-21 NOTE — CONSULT NOTE ADULT - SUBJECTIVE AND OBJECTIVE BOX
INGE TERRANCE  [id number]    HISTORY OF PRESENT ILLNESS:  Pt is a 63yoM hx of CAD sp CABG, gout, HTN p/w L knee pain x s/p fall. Rheum called for eval of shoulder and knee pains x 6 days.    Pt states that 7 days ago, he had fall when trying to get out of his car and landed on both knees and due to pain, came to the hospital. Around the same time, he started having pain in both of his shoulders. States he did not fall on his shoulders. Also reports b/l ankle pain  Says he has had a history of gout and has been treating with Allopurinol (states he was treating 1 week ago w/ Allopurinol by his PMD)He has been told he has elevated uric acid levels and needs to be on long-term treatment but pt states he only takes Allopurinol when he has flares.   Subsequently states that his most recent flare was in December.   Denies any fevers/chills.  Does report he had red meat about 4 days ago, denies any alcohol or seafood intake.       pt has had imaging done of knee, shoulders and ankles. Had T 100.5 on 6/19/18. Seen by ortho, no intervention recommended.  Started on Pred 30mg by medicine and reports improvement today.     PAST MEDICAL & SURGICAL HISTORY:  BPH (benign prostatic hyperplasia)  CHF (congestive heart failure)  Hyperlipidemia, unspecified hyperlipidemia type  Diabetes  CAD (coronary artery disease)  HTN (hypertension)  S/P appendectomy  S/P CABG (coronary artery bypass graft)      Review of Systems:  Gen:  No fevers/chills, weight loss  CVS: No chest pain/palpitations  Resp: No SOB/wheezing  GI: No N/V/C/D/abdominal pain  MSK: +shoulder pain +knee pain +ankle pain   Skin: No new rashes  Neuro: No headaches    MEDICATIONS  (STANDING):  ALBUTerol/ipratropium for Nebulization 3 milliLiter(s) Nebulizer every 6 hours  aspirin enteric coated 81 milliGRAM(s) Oral daily  atorvastatin 40 milliGRAM(s) Oral at bedtime  carvedilol 12.5 milliGRAM(s) Oral every 12 hours  clopidogrel Tablet 75 milliGRAM(s) Oral daily  dextrose 50% Injectable 12.5 Gram(s) IV Push once  dextrose 50% Injectable 25 Gram(s) IV Push once  dextrose 50% Injectable 25 Gram(s) IV Push once  docusate sodium 100 milliGRAM(s) Oral three times a day  gabapentin 300 milliGRAM(s) Oral two times a day  hydrALAZINE 50 milliGRAM(s) Oral every 8 hours  insulin lispro (HumaLOG) corrective regimen sliding scale   SubCutaneous three times a day before meals  isosorbide   mononitrate ER Tablet (IMDUR) 30 milliGRAM(s) Oral daily  montelukast 10 milliGRAM(s) Oral daily  pantoprazole    Tablet 40 milliGRAM(s) Oral before breakfast  polyethylene glycol 3350 17 Gram(s) Oral daily  predniSONE   Tablet 30 milliGRAM(s) Oral daily  senna 2 Tablet(s) Oral at bedtime  sodium chloride 0.9%. 1000 milliLiter(s) (50 mL/Hr) IV Continuous <Continuous>  sucralfate 1 Gram(s) Oral two times a day  tamsulosin 0.4 milliGRAM(s) Oral at bedtime    MEDICATIONS  (PRN):  acetaminophen   Tablet 650 milliGRAM(s) Oral every 6 hours PRN For Temp greater than 38 C (100.4 F)  acetaminophen   Tablet. 325 milliGRAM(s) Oral every 4 hours PRN Mild Pain (1 - 3)  dextrose 40% Gel 15 Gram(s) Oral once PRN Blood Glucose LESS THAN 70 milliGRAM(s)/deciliter  glucagon  Injectable 1 milliGRAM(s) IntraMuscular once PRN Glucose LESS THAN 70 milligrams/deciliter  oxyCODONE    IR 5 milliGRAM(s) Oral every 8 hours PRN Severe Pain (7 - 10)      Allergies    No Known Allergies    Intolerances        PERTINENT MEDICATION HISTORY:    SOCIAL HISTORY: No toxic habits. Lives at home w/ his wife       FAMILY HISTORY:  No pertinent family history in first degree relatives      Vital Signs Last 24 Hrs  T(C): 36.7 (21 Jun 2018 13:19), Max: 36.7 (20 Jun 2018 17:50)  T(F): 98 (21 Jun 2018 13:19), Max: 98 (20 Jun 2018 17:50)  HR: 81 (21 Jun 2018 13:19) (80 - 96)  BP: 121/68 (21 Jun 2018 13:19) (106/73 - 121/68)  BP(mean): --  RR: 17 (21 Jun 2018 13:19) (17 - 18)  SpO2: 99% (21 Jun 2018 13:19) (95% - 99%)    Physical Exam:  General: No apparent distress  HEENT: EOMI, MMM  CVS: +S1/S2, RRR  Resp: CTA b/l. No crackles/wheezing  GI: Soft, NT/ND +BS  MSK:  Shoulders: pt with pain on palpation of shoulders w/ decreased ROM 2/2 pain  Elbows: wnl  Wrists: wnl  MCPs: wnl  PIPs: wnl  DIPs: wnl   Hips: wnl  Knees: b/l knees w/ effusions, L >R, cool to touch but with pain on flexion/extension   Ankle: pts ankles warm b/l, with mild pain on inversion and eversion   Neuro: AAOx3  Skin: no visible rashes    LABS:                        11.0   10.29 )-----------( 174      ( 21 Jun 2018 05:45 )             32.2     06-21    132<L>  |  93<L>  |  112<H>  ----------------------------<  208<H>  4.2   |  18<L>  |  5.03<H>    Ca    8.5      21 Jun 2018 05:45    Uric Acid, Serum (06.21.18 @ 05:45)    Uric Acid, Serum: 11.6 mg/dL      RADIOLOGY & ADDITIONAL STUDIES:  < from: Xray Shoulder 2 Views, Left (06.21.18 @ 09:55) >  IMPRESSION:  No fractures, dislocations, or AC separation.    Preserved joint spaces and smooth articular margins.    Maintained subacromial and coracoclavicular spaces.    No destructive osseous lesions or periostealreaction.    No periarticular soft tissue calcifications.    Sternal wires, some with discontinuity, mediastinal surgical clips and   left chest wall AICD present.    < end of copied text >    < from: Xray Knee 3 Views, Right (06.21.18 @ 09:53) >    IMPRESSION:  No tracking soft tissue gas collections, radiopaque foreign bodies, or   gross radiographic evidence for osteomyelitis.    Nonspecific large knee joint effusion.    No fractures or dislocations.    Mild tricompartment osteoarthritic change manifest predominantly by   presence of small joint margin osteophytes. Joint spaces otherwise   maintained and no joint margin erosions or intra-articular or   periarticular calcifications.    Generalized osteopenia otherwise no discrete lytic or blastic lesions.    Scant vascular calcifications.    < end of copied text >    < from: Xray Knee 3 Views, Left (06.14.18 @ 21:00) >  IMPRESSION:     Cortical irregularity of the medial aspect of the proximal tibia without   significant overlying soft tissue swelling. Correlate with point   tenderness.      < end of copied text > INGE CARLOS  [id number]    HISTORY OF PRESENT ILLNESS:  Pt is a 63yoM hx of CAD sp CABG, gout, HTN, CVA w/ L sided weakness p/w L knee pain x s/p fall. Rheum called for eval of shoulder and knee pains x 6 days.    Pt states that 7 days ago, he had fall when trying to get out of his car and landed on both knees and due to pain, came to the hospital. Around the same time, he started having pain in both of his shoulders. States he did not fall on his shoulders. Also reports b/l ankle pain  Says he has had a history of gout and has been treating with Allopurinol (states he was treating 1 week ago w/ Allopurinol by his PMD)He has been told he has elevated uric acid levels and needs to be on long-term treatment but pt states he only takes Allopurinol when he has flares.   Subsequently states that his most recent flare was in December.   Denies any fevers/chills.  Does report he had red meat about 4 days ago, denies any alcohol or seafood intake.     pt has had imaging done of knee, shoulders and ankles. Had T 100.5 on 6/19/18. Seen by ortho, no intervention recommended.  Code stroke called on 6/18 for AMS, CT Head negative, seen by neurology as well.   Pt also with progressive SHERRY on CKD with creatinine increased from ~3 to ~5, followed by nephrology   Started on Pred 30mg by medicine yesterday and reports improvement today.     PAST MEDICAL & SURGICAL HISTORY:  BPH (benign prostatic hyperplasia)  CHF (congestive heart failure)  Hyperlipidemia, unspecified hyperlipidemia type  Diabetes  CAD (coronary artery disease)  HTN (hypertension)  S/P appendectomy  S/P CABG (coronary artery bypass graft)      Review of Systems:  Gen:  No fevers/chills, weight loss  CVS: No chest pain/palpitations  Resp: No SOB/wheezing  GI: No N/V/C/D/abdominal pain  MSK: +shoulder pain +knee pain +ankle pain   Skin: No new rashes  Neuro: No headaches    MEDICATIONS  (STANDING):  ALBUTerol/ipratropium for Nebulization 3 milliLiter(s) Nebulizer every 6 hours  aspirin enteric coated 81 milliGRAM(s) Oral daily  atorvastatin 40 milliGRAM(s) Oral at bedtime  carvedilol 12.5 milliGRAM(s) Oral every 12 hours  clopidogrel Tablet 75 milliGRAM(s) Oral daily  dextrose 50% Injectable 12.5 Gram(s) IV Push once  dextrose 50% Injectable 25 Gram(s) IV Push once  dextrose 50% Injectable 25 Gram(s) IV Push once  docusate sodium 100 milliGRAM(s) Oral three times a day  gabapentin 300 milliGRAM(s) Oral two times a day  hydrALAZINE 50 milliGRAM(s) Oral every 8 hours  insulin lispro (HumaLOG) corrective regimen sliding scale   SubCutaneous three times a day before meals  isosorbide   mononitrate ER Tablet (IMDUR) 30 milliGRAM(s) Oral daily  montelukast 10 milliGRAM(s) Oral daily  pantoprazole    Tablet 40 milliGRAM(s) Oral before breakfast  polyethylene glycol 3350 17 Gram(s) Oral daily  predniSONE   Tablet 30 milliGRAM(s) Oral daily  senna 2 Tablet(s) Oral at bedtime  sodium chloride 0.9%. 1000 milliLiter(s) (50 mL/Hr) IV Continuous <Continuous>  sucralfate 1 Gram(s) Oral two times a day  tamsulosin 0.4 milliGRAM(s) Oral at bedtime    MEDICATIONS  (PRN):  acetaminophen   Tablet 650 milliGRAM(s) Oral every 6 hours PRN For Temp greater than 38 C (100.4 F)  acetaminophen   Tablet. 325 milliGRAM(s) Oral every 4 hours PRN Mild Pain (1 - 3)  dextrose 40% Gel 15 Gram(s) Oral once PRN Blood Glucose LESS THAN 70 milliGRAM(s)/deciliter  glucagon  Injectable 1 milliGRAM(s) IntraMuscular once PRN Glucose LESS THAN 70 milligrams/deciliter  oxyCODONE    IR 5 milliGRAM(s) Oral every 8 hours PRN Severe Pain (7 - 10)      Allergies    No Known Allergies    Intolerances        PERTINENT MEDICATION HISTORY:    SOCIAL HISTORY: No toxic habits. Lives at home w/ his wife       FAMILY HISTORY:  No pertinent family history in first degree relatives      Vital Signs Last 24 Hrs  T(C): 36.7 (21 Jun 2018 13:19), Max: 36.7 (20 Jun 2018 17:50)  T(F): 98 (21 Jun 2018 13:19), Max: 98 (20 Jun 2018 17:50)  HR: 81 (21 Jun 2018 13:19) (80 - 96)  BP: 121/68 (21 Jun 2018 13:19) (106/73 - 121/68)  BP(mean): --  RR: 17 (21 Jun 2018 13:19) (17 - 18)  SpO2: 99% (21 Jun 2018 13:19) (95% - 99%)    Physical Exam:  General: No apparent distress  HEENT: EOMI, MMM  CVS: +S1/S2, RRR  Resp: CTA b/l. No crackles/wheezing  GI: Soft, NT/ND +BS  MSK:  Shoulders: pt with mild pain on palpation of shoulders. Has full ROM on R shoulder, L shoulder has limited abduction from weakness from previous stroke.   Elbows: wnl  Wrists: wnl  MCPs: wnl  PIPs: wnl  DIPs: wnl   Hips: wnl  Knees: b/l knees w/ effusions, L >R, cool to touch but with pain on flexion/extension   Ankle: pts ankles warm b/l, with mild pain on inversion and eversion   Neuro: AAOx3  Skin: no visible rashes    LABS:                        11.0   10.29 )-----------( 174      ( 21 Jun 2018 05:45 )             32.2     06-21    132<L>  |  93<L>  |  112<H>  ----------------------------<  208<H>  4.2   |  18<L>  |  5.03<H>    Ca    8.5      21 Jun 2018 05:45    Uric Acid, Serum (06.21.18 @ 05:45)    Uric Acid, Serum: 11.6 mg/dL      RADIOLOGY & ADDITIONAL STUDIES:  < from: Xray Shoulder 2 Views, Left (06.21.18 @ 09:55) >  IMPRESSION:  No fractures, dislocations, or AC separation.    Preserved joint spaces and smooth articular margins.    Maintained subacromial and coracoclavicular spaces.    No destructive osseous lesions or periostealreaction.    No periarticular soft tissue calcifications.    Sternal wires, some with discontinuity, mediastinal surgical clips and   left chest wall AICD present.    < end of copied text >    < from: Xray Knee 3 Views, Right (06.21.18 @ 09:53) >    IMPRESSION:  No tracking soft tissue gas collections, radiopaque foreign bodies, or   gross radiographic evidence for osteomyelitis.    Nonspecific large knee joint effusion.    No fractures or dislocations.    Mild tricompartment osteoarthritic change manifest predominantly by   presence of small joint margin osteophytes. Joint spaces otherwise   maintained and no joint margin erosions or intra-articular or   periarticular calcifications.    Generalized osteopenia otherwise no discrete lytic or blastic lesions.    Scant vascular calcifications.    < end of copied text >    < from: Xray Knee 3 Views, Left (06.14.18 @ 21:00) >  IMPRESSION:     Cortical irregularity of the medial aspect of the proximal tibia without   significant overlying soft tissue swelling. Correlate with point   tenderness.      < end of copied text >

## 2018-06-21 NOTE — PROGRESS NOTE ADULT - PROBLEM SELECTOR PLAN 3
Worsening Cr while here, taking opiates/no BM, concern for retention; s/p bowel regimen w/ BMs but still retaining, s/p ya placement on 6/19; urine lytes initially were pre-renal (FeUrea 22%)  -ya placed for rentention  -c/w flomax  -holding diuretics  -c/w IVF  -renal US neg hydro  -CK neg  -renal c/s appreciated

## 2018-06-21 NOTE — PROGRESS NOTE ADULT - PROBLEM SELECTOR PLAN 1
Developed fever 6/18 evening a/w encephalopathy,  patient only reports L knee pain, which is enlarged from prior.  Has mild cough, no sore throat, no runny nose.  No dysuria.    Last few days with SOB and wheezing--RVP neg.  This point fever presumed 2/2 gout flare vs viral URI (not detected on RVP)  -f/u blood culture, NG x 48 hour  -UA and CXR unremarkable; CT chest w/ LAD no prior for comparison  -RVP negative  -ortho evaluated re: enlarging L knee effusion,  + hemarthrosis, no intervention  -rheum eval pending--started on prednisone 30 mg on 6/20, pain better

## 2018-06-21 NOTE — PROGRESS NOTE ADULT - PROBLEM SELECTOR PLAN 1
pending exam, pt at XR Etiology of SHERRY likely multifactorial, both obstructive nephropathy and possibly prerenal etiologies. Unlikely cardiorenal as not overloaded on exam. Likely obstructive component because had 700 cc's retained on 6/18. May also have infectious etiology leading to transient borderline hypotension on 6/19 with possible ATN. Urine studies show low urine Na, more consistent with prerenal etiology, so fluids were started. Serum Cr improved with fluids.  -c/w IV fluids at 100cc/hr.   -daily BMP's  -c/w Torres catheter as pt was retaining on 6/18.   -infectious vs rheumatologic workup pending given elevated ESR and CRP. No plan for arthrocentesis per most recent orthopedics note.     Plan pending discussion with attending:  Mahad Farmer  Medicine nephrology elective, PGY-2  Pager [phone] / [phone] Etiology of SHERRY likely multifactorial, both obstructive nephropathy and possibly prerenal etiologies. Unlikely cardiorenal as not overloaded on exam. Likely obstructive component because had 700 cc's retained on 6/18. May also have infectious etiology leading to transient borderline hypotension on 6/19 with possible ATN. Urine studies show low urine Na, more consistent with prerenal etiology, so fluids were started. Serum Cr improved with fluids.  -c/w IV fluids at 50cc/hr.   -daily BMP's  -c/w Torres catheter as pt was retaining on 6/18.   -infectious vs rheumatologic workup pending given elevated ESR and CRP. No plan for arthrocentesis per most recent orthopedics note.     Plan pending discussion with attending:  Mahad Farmer  Medicine nephrology elective, PGY-2  Pager [phone] / [phone] Etiology of SHERRY likely multifactorial, both obstructive nephropathy and possibly prerenal etiologies. Unlikely cardiorenal as not overloaded on exam. Likely obstructive component because had 700 cc's retained on 6/18. May also have infectious etiology leading to transient borderline hypotension on 6/19 with possible mild ischemic ATN. Urine studies show low urine Na, more consistent with prerenal etiology, so fluids were started. Serum Cr improved with fluids.  -c/w IV fluids at 50cc/hr.   -daily BMP's  -c/w Torres catheter as pt was retaining on 6/18.     Plan pending discussion with attending:  Mahad Farmer  Medicine nephrology elective, PGY-2  Pager [phone] / [phone]

## 2018-06-21 NOTE — PROGRESS NOTE ADULT - ASSESSMENT
64yo M with PMH CKD 4, BPH, CHF, CAD s/p CABG, CVA with residual L-sided weakness, HTN, DM2 (A1C 6.4%), HLD admitted 6/14 for pain control for LLE pain after mechanical fall getting out of a car, nephrology consulted for SHERRY on CKD with recent bladder US concerning for obstructive process, FeNa 1.1% with low urine Na.

## 2018-06-21 NOTE — PROCEDURE NOTE - ADDITIONAL PROCEDURE DETAILS
Lidocaine Lot#: [id number], exp 04/21 Arthocentesis of L knee removal of 75 cc fluid: Lidocaine Lot#: [id number], exp 04/21  Arthrocentesis of R knee removal of 43 cc fluid: Lidocaine Lot # [id number] exp 01/21, Depomedrol  Lot #[id number] exp 10/2018

## 2018-06-21 NOTE — PROGRESS NOTE ADULT - SUBJECTIVE AND OBJECTIVE BOX
Patient is a 63y old  Male who presents with a chief complaint of Left leg pain    SUBJECTIVE / OVERNIGHT EVENTS:    Pain is slightly better today, still worst in knees  No CP, SOB, f/c, n/v    MEDICATIONS  (STANDING):  ALBUTerol/ipratropium for Nebulization 3 milliLiter(s) Nebulizer every 6 hours  aspirin enteric coated 81 milliGRAM(s) Oral daily  atorvastatin 40 milliGRAM(s) Oral at bedtime  carvedilol 12.5 milliGRAM(s) Oral every 12 hours  clopidogrel Tablet 75 milliGRAM(s) Oral daily  docusate sodium 100 milliGRAM(s) Oral three times a day  gabapentin 300 milliGRAM(s) Oral two times a day  hydrALAZINE 50 milliGRAM(s) Oral every 8 hours  insulin lispro (HumaLOG) corrective regimen sliding scale   SubCutaneous three times a day before meals  isosorbide   mononitrate ER Tablet (IMDUR) 30 milliGRAM(s) Oral daily  montelukast 10 milliGRAM(s) Oral daily  pantoprazole    Tablet 40 milliGRAM(s) Oral before breakfast  polyethylene glycol 3350 17 Gram(s) Oral daily  predniSONE   Tablet 30 milliGRAM(s) Oral daily  senna 2 Tablet(s) Oral at bedtime  sodium chloride 0.9%. 1000 milliLiter(s) (50 mL/Hr) IV Continuous <Continuous>  sucralfate 1 Gram(s) Oral two times a day  tamsulosin 0.4 milliGRAM(s) Oral at bedtime    MEDICATIONS  (PRN):  acetaminophen   Tablet 650 milliGRAM(s) Oral every 6 hours PRN For Temp greater than 38 C (100.4 F)  acetaminophen   Tablet. 325 milliGRAM(s) Oral every 4 hours PRN Mild Pain (1 - 3)  dextrose 40% Gel 15 Gram(s) Oral once PRN Blood Glucose LESS THAN 70 milliGRAM(s)/deciliter  glucagon  Injectable 1 milliGRAM(s) IntraMuscular once PRN Glucose LESS THAN 70 milligrams/deciliter  oxyCODONE    IR 5 milliGRAM(s) Oral every 8 hours PRN Severe Pain (7 - 10)    T(C): 36.6 (18 @ 21:07), Max: 36.7 (18 @ 17:50)  HR: 90 (06-21-18 @ 03:51) (89 - 96)  BP: 114/61 (18 @ 21:07) (106/73 - 115/70)  RR: 18 (18 @ 21:07) (17 - 18)  SpO2: 98% (18 @ 03:51) (94% - 98%)    CAPILLARY BLOOD GLUCOSE  POCT Blood Glucose.: 179 mg/dL (2018 07:29)  POCT Blood Glucose.: 160 mg/dL (2018 21:34)  POCT Blood Glucose.: 128 mg/dL (2018 17:35)  POCT Blood Glucose.: 153 mg/dL (2018 11:55)    I&O's Summary    2018 07:01  -  2018 07:00  --------------------------------------------------------  IN: 0 mL / OUT: 2000 mL / NET: -2000 mL    PHYSICAL EXAM:  GENERAL: NAD  HEAD:  Atraumatic, Normocephalic  EYES: conjunctiva and sclera clear  NECK: Supple, No JVD  CHEST/LUNG: Clear to auscultation bilaterally; No wheeze  HEART: Regular rate and rhythm; No murmurs, rubs, or gallops  ABDOMEN: Soft, Nontender, Nondistended; Bowel sounds present  EXTREMITIES:   warm and well perfused, No clubbing, cyanosis, or edema  PSYCH: AAOx3  NEUROLOGY: generaliized upper body weakness, LLE weakness, limited ROM of knee, no hip pain on external rotation, no skin tears/lacs, bilateral knee effusions, L>R, TTP   SKIN: No rashes or lesions    LABS:                        11.0   10.29 )-----------( 174      ( 2018 05:45 )             32.2     -    132<L>  |  93<L>  |  112<H>  ----------------------------<  208<H>  4.2   |  18<L>  |  5.03<H>    Ca    8.5      2018 05:45      CARDIAC MARKERS ( 2018 06:30 )  x     / x     / 239 u/L / x     / x          Urinalysis Basic - ( 2018 12:00 )  Color: YELLOW / Appearance: CLEAR / S.013 / pH: 6.0  Gluc: NEGATIVE / Ketone: NEGATIVE  / Bili: NEGATIVE / Urobili: NORMAL mg/dL   Blood: NEGATIVE / Protein: 30 mg/dL / Nitrite: NEGATIVE   Leuk Esterase: NEGATIVE / RBC: 0-2 / WBC x   Sq Epi: x / Non Sq Epi: x / Bacteria: x      Consultant(s) Notes Reviewed:  ortho, renal    Care Discussed with Consultants/Other Providers: Dr. Abraham

## 2018-06-21 NOTE — PROGRESS NOTE ADULT - SUBJECTIVE AND OBJECTIVE BOX
Orthopedic Surgery Progress Note  S: Continues to have pain in bilateral knees, L>R, L shoulder, and bilateral ankles. No nausea, vomiting, chest pain, shortness of breath, lightheadedness, or dizziness. Denies fever/chills. Patient has not ambulated since his hospitalization 1 week ago.    O:  Vital Signs Last 24 Hrs  T(C): 36.6 (20 Jun 2018 21:07), Max: 36.7 (20 Jun 2018 08:34)  T(F): 97.8 (20 Jun 2018 21:07), Max: 98 (20 Jun 2018 08:34)  HR: 90 (21 Jun 2018 03:51) (89 - 96)  BP: 114/61 (20 Jun 2018 21:07) (106/73 - 115/70)  RR: 18 (20 Jun 2018 21:07) (17 - 18)  SpO2: 98% (21 Jun 2018 03:51) (94% - 100%)    Gen: NAD  RLE  R knee PROM 0-65 with pain on flexion  Non-TTP  R ankle with good PROM, minimal pain  Fires TA/EHL/GS  WWP distally, DP palpable    LLE  R knee PROM 0-65 with pain on flexion  Non-TTP  R ankle with good PROM, minimal pain  Fires TA/EHL/GS  WWP distally, DP palpable                        11.0   10.29 )-----------( 174      ( 21 Jun 2018 05:45 )             32.2                         11.9   13.16 )-----------( 151      ( 20 Jun 2018 06:30 )             35.0     06-20    134<L>  |  92<L>  |  115<H>  ----------------------------<  141<H>  3.8   |  20<L>  |  5.75<H>      A/P:  Given polyarthralgia and hx of traumatic effusion would recommend  rheumatology evaluation ,  favor non infectious  etiology  at this time , please consider obtaining serum uric acis level and ESR/CRP            PT- WBAT            Continue asa/plavix            Pain Control            D/W Dr [facility] attending             Above discussed with primary team     Jamie Warren MD Orthopedic Surgery Progress Note  S: Continues to have pain in bilateral knees, L>R, L shoulder, and bilateral ankles. No nausea, vomiting, chest pain, shortness of breath, lightheadedness, or dizziness. Denies fever/chills. Patient has not ambulated since his hospitalization 1 week ago.    O:  Vital Signs Last 24 Hrs  T(C): 36.6 (20 Jun 2018 21:07), Max: 36.7 (20 Jun 2018 08:34)  T(F): 97.8 (20 Jun 2018 21:07), Max: 98 (20 Jun 2018 08:34)  HR: 90 (21 Jun 2018 03:51) (89 - 96)  BP: 114/61 (20 Jun 2018 21:07) (106/73 - 115/70)  RR: 18 (20 Jun 2018 21:07) (17 - 18)  SpO2: 98% (21 Jun 2018 03:51) (94% - 100%)    Gen: NAD  RLE  R knee PROM 0-65 with pain on flexion  Mild effusion, no erythema  Non-TTP  R ankle with good PROM, minimal pain  Fires TA/EHL/GS  WWP distally, DP palpable    LLE  R knee PROM 0-65 with pain on flexion  Mild effusion, no erythema  Non-TTP  R ankle with good PROM, minimal pain  Fires TA/EHL/GS, but with residual weakness due to prior stroke  WWP distally, DP palpable    LUE  Shoulder with full painless PROM  Fires EPL/FDP/IO  SILT in R/M/U dist  WWP distally, radial pulse palpable                        11.0   10.29 )-----------( 174      ( 21 Jun 2018 05:45 )             32.2                         11.9   13.16 )-----------( 151      ( 20 Jun 2018 06:30 )             35.0     06-20    134<L>  |  92<L>  |  115<H>  ----------------------------<  141<H>  3.8   |  20<L>  |  5.75<H>    CRP: 313  ESR pending  uric acid 11.6    A/P:  62M with polyarthralgia and hx of traumatic effusion, favor non infectious etiology at this time              PT/WBAT            Continue asa/plavix            Pain Control            FU xrays of L shoulder, R knee, bilateral ankles           No orthopaedic intervention at this time           Recommend rheum evaluation           FU ESR   	    Jamie Warren MD

## 2018-06-21 NOTE — CONSULT NOTE ADULT - ATTENDING COMMENTS
Seen and examined on rounds with housestaff.  Patient with a history of multiple cortical strokes presenting with lethargy and encephalopathy last night.  Currently reporting persisting slurring of speech over the last 2 days.  Denies new weakness or language dysfunction.  In light of vascular risk factors including multiple cortical strokes, would check an MRI brain, MRA head and neck, telemetry to monitor for irregular heart rhythm.  Reports lower extremity pain possibly secondary to fall, can try IV tylenol for pain relief.
patient seen and examined by me personally with family at beside   agree with a/p as above   patient aware of our plan and in agreement  now s/p bilateral knee arthrocentesis, injection of steroids into R knee successful  continue as above   will f/u in AM
I saw and evaluated the patient.  On my examination he had wheezing and CT scan had come back without any evidence of pulmonary edema.  I doubt that this is a volume overloaded state.  On the other hand the constellation of: decreased po intake, borderline low bp, cool extremities, history of diuretic use, low FeNa argues in favor of a hypovolemic state.  I agree with a continued trial of fluid.  If no improvement will send additional work up at that time.  Presently there is no indication for for RRT.  Patient is non oliguric.

## 2018-06-21 NOTE — PROGRESS NOTE ADULT - SUBJECTIVE AND OBJECTIVE BOX
[facility] Division of Kidney Diseases & Hypertension  FOLLOW UP NOTE  [phone]--------------------------------------------------------------------------------  Chief Complaint:Closed fracture of proximal end of left tibia      24 hour events/subjective:  Patient at XR, exam pending      PAST HISTORY  --------------------------------------------------------------------------------  No significant changes to PMH, PSH, FHx, SHx, unless otherwise noted    ALLERGIES & MEDICATIONS  --------------------------------------------------------------------------------  Allergies    No Known Allergies    Intolerances      Standing Inpatient Medications  ALBUTerol/ipratropium for Nebulization 3 milliLiter(s) Nebulizer every 6 hours  aspirin enteric coated 81 milliGRAM(s) Oral daily  atorvastatin 40 milliGRAM(s) Oral at bedtime  carvedilol 12.5 milliGRAM(s) Oral every 12 hours  clopidogrel Tablet 75 milliGRAM(s) Oral daily  dextrose 50% Injectable 12.5 Gram(s) IV Push once  dextrose 50% Injectable 25 Gram(s) IV Push once  dextrose 50% Injectable 25 Gram(s) IV Push once  docusate sodium 100 milliGRAM(s) Oral three times a day  gabapentin 300 milliGRAM(s) Oral two times a day  hydrALAZINE 50 milliGRAM(s) Oral every 8 hours  insulin lispro (HumaLOG) corrective regimen sliding scale   SubCutaneous three times a day before meals  isosorbide   mononitrate ER Tablet (IMDUR) 30 milliGRAM(s) Oral daily  montelukast 10 milliGRAM(s) Oral daily  pantoprazole    Tablet 40 milliGRAM(s) Oral before breakfast  polyethylene glycol 3350 17 Gram(s) Oral daily  predniSONE   Tablet 30 milliGRAM(s) Oral daily  senna 2 Tablet(s) Oral at bedtime  sodium chloride 0.9%. 1000 milliLiter(s) IV Continuous <Continuous>  sucralfate 1 Gram(s) Oral two times a day  tamsulosin 0.4 milliGRAM(s) Oral at bedtime    PRN Inpatient Medications  acetaminophen   Tablet 650 milliGRAM(s) Oral every 6 hours PRN  acetaminophen   Tablet. 325 milliGRAM(s) Oral every 4 hours PRN  dextrose 40% Gel 15 Gram(s) Oral once PRN  glucagon  Injectable 1 milliGRAM(s) IntraMuscular once PRN  oxyCODONE    IR 5 milliGRAM(s) Oral every 8 hours PRN      REVIEW OF SYSTEMS  --------------------------------------------------------------------------------  **      All other systems were reviewed and are negative, except as noted.    VITALS/PHYSICAL EXAM  --------------------------------------------------------------------------------  T(C): 36.6 (06-20-18 @ 21:07), Max: 36.7 (06-20-18 @ 17:50)  HR: 90 (06-21-18 @ 03:51) (89 - 96)  BP: 114/61 (06-20-18 @ 21:07) (106/73 - 115/70)  RR: 18 (06-20-18 @ 21:07) (17 - 18)  SpO2: 98% (06-21-18 @ 03:51) (94% - 98%)  Wt(kg): --        06-20-18 @ 07:01  -  06-21-18 @ 07:00  --------------------------------------------------------  IN: 0 mL / OUT: 2000 mL / NET: -2000 mL      Physical Exam:  ***      LABS/STUDIES  --------------------------------------------------------------------------------              11.0   10.29 >-----------<  174      [06-21-18 @ 05:45]              32.2     132  |  93  |  112  ----------------------------<  208      [06-21-18 @ 05:45]  4.2   |  18  |  5.03        Ca     8.5     [06-21-18 @ 05:45]          Uric acid 11.6      [06-21-18 @ 05:45]        [06-20-18 @ 06:30]    Creatinine Trend:  SCr 5.03 [06-21 @ 05:45]  SCr 5.75 [06-20 @ 06:30]  SCr 5.45 [06-19 @ 05:50]  SCr 5.30 [06-18 @ 19:00]  SCr 4.70 [06-18 @ 05:40]    Urinalysis - [06-19-18 @ 12:00]      Color YELLOW / Appearance CLEAR / SG 1.013 / pH 6.0      Gluc NEGATIVE / Ketone NEGATIVE  / Bili NEGATIVE / Urobili NORMAL       Blood NEGATIVE / Protein 30 / Leuk Est NEGATIVE / Nitrite NEGATIVE      RBC 0-2 / WBC  / Hyaline  / Gran  / Sq Epi  / Non Sq Epi  / Bacteria     Urine Creatinine 76.20      [06-20-18 @ 13:00]  Urine Sodium 22      [06-20-18 @ 13:00]  Urine Urea Nitrogen 639.2      [06-20-18 @ 13:00]  Urine Potassium 26.5      [06-20-18 @ 13:00]  Urine Chloride < 20      [06-20-18 @ 13:00]    HbA1c 6.4      [06-16-18 @ 07:05] [facility] Division of Kidney Diseases & Hypertension  FOLLOW UP NOTE  [phone]--------------------------------------------------------------------------------  Chief Complaint: Closed fracture of proximal end of left tibia      24 hour events/subjective:  Patient seen and examined on return from XR. He feels better today than yesterday with no fatigue. He has had no issues with his Torres catheter and denies worsening pain. O/W ROS as below.     PAST HISTORY  --------------------------------------------------------------------------------  No significant changes to PMH, PSH, FHx, SHx, unless otherwise noted    ALLERGIES & MEDICATIONS  --------------------------------------------------------------------------------  Allergies  No Known Allergies    Intolerances: n/a    Standing Inpatient Medications  ALBUTerol/ipratropium for Nebulization 3 milliLiter(s) Nebulizer every 6 hours  aspirin enteric coated 81 milliGRAM(s) Oral daily  atorvastatin 40 milliGRAM(s) Oral at bedtime  carvedilol 12.5 milliGRAM(s) Oral every 12 hours  clopidogrel Tablet 75 milliGRAM(s) Oral daily  dextrose 50% Injectable 12.5 Gram(s) IV Push once  dextrose 50% Injectable 25 Gram(s) IV Push once  dextrose 50% Injectable 25 Gram(s) IV Push once  docusate sodium 100 milliGRAM(s) Oral three times a day  gabapentin 300 milliGRAM(s) Oral two times a day  hydrALAZINE 50 milliGRAM(s) Oral every 8 hours  insulin lispro (HumaLOG) corrective regimen sliding scale   SubCutaneous three times a day before meals  isosorbide   mononitrate ER Tablet (IMDUR) 30 milliGRAM(s) Oral daily  montelukast 10 milliGRAM(s) Oral daily  pantoprazole    Tablet 40 milliGRAM(s) Oral before breakfast  polyethylene glycol 3350 17 Gram(s) Oral daily  predniSONE   Tablet 30 milliGRAM(s) Oral daily  senna 2 Tablet(s) Oral at bedtime  sodium chloride 0.9%. 1000 milliLiter(s) IV Continuous <Continuous>  sucralfate 1 Gram(s) Oral two times a day  tamsulosin 0.4 milliGRAM(s) Oral at bedtime    PRN Inpatient Medications  acetaminophen   Tablet 650 milliGRAM(s) Oral every 6 hours PRN  acetaminophen   Tablet. 325 milliGRAM(s) Oral every 4 hours PRN  dextrose 40% Gel 15 Gram(s) Oral once PRN  glucagon  Injectable 1 milliGRAM(s) IntraMuscular once PRN  oxyCODONE    IR 5 milliGRAM(s) Oral every 8 hours PRN      REVIEW OF SYSTEMS  --------------------------------------------------------------------------------  Gen: resolved fatigue; o/w No  fevers/chills, weakness  Skin: No rashes  Head/Eyes/Ears/Mouth: No headache; Normal hearing; Normal vision w/o blurriness;   Respiratory: No dyspnea, cough, wheezing, hemoptysis  CV: No chest pain,   GI: No abdominal pain, diarrhea, constipation, nausea, vomiting  : +hx difficulty urinating, Torres x 2 days; o/w No increased frequency, dysuria, hematuria, nocturia  MSK: +L knee pain, L shoulder pain, L arm swelling; o/w No joint pain/swelling;   Neuro: +chronic L-sided weakness; o/w No dizziness/lightheadedness, weakness, seizures    All other systems were reviewed and are negative, except as noted.    VITALS/PHYSICAL EXAM  --------------------------------------------------------------------------------  T(C): 36.6 (06-20-18 @ 21:07), Max: 36.7 (06-20-18 @ 17:50)  HR: 90 (06-21-18 @ 03:51) (89 - 96)  BP: 114/61 (06-20-18 @ 21:07) (106/73 - 115/70)  RR: 18 (06-20-18 @ 21:07) (17 - 18)  SpO2: 98% (06-21-18 @ 03:51) (94% - 98%)  Wt(kg): --        06-20-18 @ 07:01  -  06-21-18 @ 07:00  --------------------------------------------------------  IN: 0 mL / OUT: 2000 mL / NET: -2000 mL      Physical Exam:  	Gen: NAD, well-appearing, lying in bed with HOB elevated in NAD, breathing comfortably  	HEENT: PERRL, supple neck, poor dentition  	Pulm: end-expiratory wheezes RML and RUL  	CV:  S1S2,   	Abd: +BS, soft, no rigidity, no guarding  	Ext: 1+ LLE edema; pain with L knee and L shoulder active/passive motion  	Neuro: No focal deficits  	Skin: well-healed sternotomy scar; o/w Warm, without rashes  	Vascular access: piv's    LABS/STUDIES  --------------------------------------------------------------------------------              11.0   10.29 >-----------<  174      [06-21-18 @ 05:45]              32.2     132  |  93  |  112  ----------------------------<  208      [06-21-18 @ 05:45]  4.2   |  18  |  5.03        Ca     8.5     [06-21-18 @ 05:45]          Uric acid 11.6      [06-21-18 @ 05:45]        [06-20-18 @ 06:30]    Creatinine Trend:  SCr 5.03 [06-21 @ 05:45]  SCr 5.75 [06-20 @ 06:30]  SCr 5.45 [06-19 @ 05:50]  SCr 5.30 [06-18 @ 19:00]  SCr 4.70 [06-18 @ 05:40]    Urinalysis - [06-19-18 @ 12:00]      Color YELLOW / Appearance CLEAR / SG 1.013 / pH 6.0      Gluc NEGATIVE / Ketone NEGATIVE  / Bili NEGATIVE / Urobili NORMAL       Blood NEGATIVE / Protein 30 / Leuk Est NEGATIVE / Nitrite NEGATIVE      RBC 0-2 / WBC  / Hyaline  / Gran  / Sq Epi  / Non Sq Epi  / Bacteria     Urine Creatinine 76.20      [06-20-18 @ 13:00]  Urine Sodium 22      [06-20-18 @ 13:00]  Urine Urea Nitrogen 639.2      [06-20-18 @ 13:00]  Urine Potassium 26.5      [06-20-18 @ 13:00]  Urine Chloride < 20      [06-20-18 @ 13:00]    HbA1c 6.4      [06-16-18 @ 07:05] [facility] Division of Kidney Diseases & Hypertension  FOLLOW UP NOTE  [phone]--------------------------------------------------------------------------------  Chief Complaint: Closed fracture of proximal end of left tibia      24 hour events/subjective:  Patient seen and examined on return from XR. He feels better today than yesterday with no fatigue. He has had no issues with his Torres catheter and denies worsening pain. ROS as below.     PAST HISTORY  --------------------------------------------------------------------------------  No significant changes to PMH, PSH, FHx, SHx, unless otherwise noted    ALLERGIES & MEDICATIONS  --------------------------------------------------------------------------------  Allergies  No Known Allergies    Intolerances: n/a    Standing Inpatient Medications  ALBUTerol/ipratropium for Nebulization 3 milliLiter(s) Nebulizer every 6 hours  aspirin enteric coated 81 milliGRAM(s) Oral daily  atorvastatin 40 milliGRAM(s) Oral at bedtime  carvedilol 12.5 milliGRAM(s) Oral every 12 hours  clopidogrel Tablet 75 milliGRAM(s) Oral daily  dextrose 50% Injectable 12.5 Gram(s) IV Push once  dextrose 50% Injectable 25 Gram(s) IV Push once  dextrose 50% Injectable 25 Gram(s) IV Push once  docusate sodium 100 milliGRAM(s) Oral three times a day  gabapentin 300 milliGRAM(s) Oral two times a day  hydrALAZINE 50 milliGRAM(s) Oral every 8 hours  insulin lispro (HumaLOG) corrective regimen sliding scale   SubCutaneous three times a day before meals  isosorbide   mononitrate ER Tablet (IMDUR) 30 milliGRAM(s) Oral daily  montelukast 10 milliGRAM(s) Oral daily  pantoprazole    Tablet 40 milliGRAM(s) Oral before breakfast  polyethylene glycol 3350 17 Gram(s) Oral daily  predniSONE   Tablet 30 milliGRAM(s) Oral daily  senna 2 Tablet(s) Oral at bedtime  sodium chloride 0.9%. 1000 milliLiter(s) IV Continuous <Continuous>  sucralfate 1 Gram(s) Oral two times a day  tamsulosin 0.4 milliGRAM(s) Oral at bedtime    PRN Inpatient Medications  acetaminophen   Tablet 650 milliGRAM(s) Oral every 6 hours PRN  acetaminophen   Tablet. 325 milliGRAM(s) Oral every 4 hours PRN  dextrose 40% Gel 15 Gram(s) Oral once PRN  glucagon  Injectable 1 milliGRAM(s) IntraMuscular once PRN  oxyCODONE    IR 5 milliGRAM(s) Oral every 8 hours PRN      REVIEW OF SYSTEMS  --------------------------------------------------------------------------------  Gen: resolved fatigue; o/w No  fevers/chills, weakness  Skin: No rashes  Head/Eyes/Ears/Mouth: No headache; Normal hearing; Normal vision w/o blurriness;   Respiratory: No dyspnea  CV: No chest pain,   GI: No abdominal pain, diarrhea, constipation, nausea, vomiting  : +hx difficulty urinating, Torres x 2 days; o/w No increased frequency, dysuria, hematuria, nocturia  MSK: +L knee pain, L shoulder pain, L arm swelling; o/w No joint pain/swelling;   Neuro: +chronic L-sided weakness; o/w No dizziness/lightheadedness, weakness, seizures    All other systems were reviewed and are negative, except as noted.    VITALS/PHYSICAL EXAM  --------------------------------------------------------------------------------  T(C): 36.6 (06-20-18 @ 21:07), Max: 36.7 (06-20-18 @ 17:50)  HR: 90 (06-21-18 @ 03:51) (89 - 96)  BP: 114/61 (06-20-18 @ 21:07) (106/73 - 115/70)  RR: 18 (06-20-18 @ 21:07) (17 - 18)  SpO2: 98% (06-21-18 @ 03:51) (94% - 98%)  Wt(kg): --        06-20-18 @ 07:01  -  06-21-18 @ 07:00  --------------------------------------------------------  IN: 0 mL / OUT: 2000 mL / NET: -2000 mL      Physical Exam:    	Gen: NAD, well-appearing, lying in bed with HOB elevated in NAD, breathing comfortably  	HEENT: PERRL, supple neck, poor dentition  	Pulm: end-expiratory wheezes RML and RUL  	CV:  S1S2,   	Abd: +BS, soft, no rigidity, no guarding  	Ext: 1+ LLE edema; pain with L knee and L shoulder active/passive motion  	Skin: well-healed sternotomy scar; o/w Warm, without rashes  	Vascular access: piv's    LABS/STUDIES  --------------------------------------------------------------------------------              11.0   10.29 >-----------<  174      [06-21-18 @ 05:45]              32.2     132  |  93  |  112  ----------------------------<  208      [06-21-18 @ 05:45]  4.2   |  18  |  5.03        Ca     8.5     [06-21-18 @ 05:45]          Uric acid 11.6      [06-21-18 @ 05:45]        [06-20-18 @ 06:30]    Creatinine Trend:  SCr 5.03 [06-21 @ 05:45]  SCr 5.75 [06-20 @ 06:30]  SCr 5.45 [06-19 @ 05:50]  SCr 5.30 [06-18 @ 19:00]  SCr 4.70 [06-18 @ 05:40]    Urinalysis - [06-19-18 @ 12:00]      Color YELLOW / Appearance CLEAR / SG 1.013 / pH 6.0      Gluc NEGATIVE / Ketone NEGATIVE  / Bili NEGATIVE / Urobili NORMAL       Blood NEGATIVE / Protein 30 / Leuk Est NEGATIVE / Nitrite NEGATIVE      RBC 0-2 / WBC  / Hyaline  / Gran  / Sq Epi  / Non Sq Epi  / Bacteria     Urine Creatinine 76.20      [06-20-18 @ 13:00]  Urine Sodium 22      [06-20-18 @ 13:00]  Urine Urea Nitrogen 639.2      [06-20-18 @ 13:00]  Urine Potassium 26.5      [06-20-18 @ 13:00]  Urine Chloride < 20      [06-20-18 @ 13:00]    HbA1c 6.4      [06-16-18 @ 07:05]

## 2018-06-22 DIAGNOSIS — N17.0 ACUTE KIDNEY FAILURE WITH TUBULAR NECROSIS: ICD-10-CM

## 2018-06-22 DIAGNOSIS — E87.2 ACIDOSIS: ICD-10-CM

## 2018-06-22 DIAGNOSIS — M10.39 GOUT DUE TO RENAL IMPAIRMENT, MULTIPLE SITES: ICD-10-CM

## 2018-06-22 LAB
BUN SERPL-MCNC: 114 MG/DL — HIGH (ref 7–23)
CALCIUM SERPL-MCNC: 8.9 MG/DL — SIGNIFICANT CHANGE UP (ref 8.4–10.5)
CHLORIDE SERPL-SCNC: 96 MMOL/L — LOW (ref 98–107)
CO2 SERPL-SCNC: 16 MMOL/L — LOW (ref 22–31)
CREAT SERPL-MCNC: 4.18 MG/DL — HIGH (ref 0.5–1.3)
GLUCOSE SERPL-MCNC: 235 MG/DL — HIGH (ref 70–99)
HCT VFR BLD CALC: 33.3 % — LOW (ref 39–50)
HGB BLD-MCNC: 11.3 G/DL — LOW (ref 13–17)
MCHC RBC-ENTMCNC: 29.4 PG — SIGNIFICANT CHANGE UP (ref 27–34)
MCHC RBC-ENTMCNC: 33.9 % — SIGNIFICANT CHANGE UP (ref 32–36)
MCV RBC AUTO: 86.7 FL — SIGNIFICANT CHANGE UP (ref 80–100)
NRBC # FLD: 0 — SIGNIFICANT CHANGE UP
PLATELET # BLD AUTO: 199 K/UL — SIGNIFICANT CHANGE UP (ref 150–400)
PMV BLD: 11 FL — SIGNIFICANT CHANGE UP (ref 7–13)
POTASSIUM SERPL-MCNC: 4.4 MMOL/L — SIGNIFICANT CHANGE UP (ref 3.5–5.3)
POTASSIUM SERPL-SCNC: 4.4 MMOL/L — SIGNIFICANT CHANGE UP (ref 3.5–5.3)
RBC # BLD: 3.84 M/UL — LOW (ref 4.2–5.8)
RBC # FLD: 13.9 % — SIGNIFICANT CHANGE UP (ref 10.3–14.5)
RHEUMATOID FACT SERPL-ACNC: 14 IU/ML — HIGH (ref 0–13)
SODIUM SERPL-SCNC: 133 MMOL/L — LOW (ref 135–145)
WBC # BLD: 8.97 K/UL — SIGNIFICANT CHANGE UP (ref 3.8–10.5)
WBC # FLD AUTO: 8.97 K/UL — SIGNIFICANT CHANGE UP (ref 3.8–10.5)

## 2018-06-22 PROCEDURE — 99233 SBSQ HOSP IP/OBS HIGH 50: CPT | Mod: GC

## 2018-06-22 PROCEDURE — 99233 SBSQ HOSP IP/OBS HIGH 50: CPT

## 2018-06-22 RX ORDER — SODIUM CHLORIDE 9 MG/ML
1000 INJECTION INTRAMUSCULAR; INTRAVENOUS; SUBCUTANEOUS
Qty: 0 | Refills: 0 | Status: DISCONTINUED | OUTPATIENT
Start: 2018-06-22 | End: 2018-06-24

## 2018-06-22 RX ORDER — HEPARIN SODIUM 5000 [USP'U]/ML
5000 INJECTION INTRAVENOUS; SUBCUTANEOUS EVERY 12 HOURS
Qty: 0 | Refills: 0 | Status: DISCONTINUED | OUTPATIENT
Start: 2018-06-22 | End: 2018-06-28

## 2018-06-22 RX ADMIN — Medication 3 MILLILITER(S): at 15:08

## 2018-06-22 RX ADMIN — Medication 1 GRAM(S): at 17:27

## 2018-06-22 RX ADMIN — CLOPIDOGREL BISULFATE 75 MILLIGRAM(S): 75 TABLET, FILM COATED ORAL at 12:45

## 2018-06-22 RX ADMIN — MONTELUKAST 10 MILLIGRAM(S): 4 TABLET, CHEWABLE ORAL at 12:46

## 2018-06-22 RX ADMIN — Medication 50 MILLIGRAM(S): at 05:25

## 2018-06-22 RX ADMIN — OXYCODONE HYDROCHLORIDE 5 MILLIGRAM(S): 5 TABLET ORAL at 15:11

## 2018-06-22 RX ADMIN — Medication 50 MILLIGRAM(S): at 15:12

## 2018-06-22 RX ADMIN — SODIUM CHLORIDE 50 MILLILITER(S): 9 INJECTION INTRAMUSCULAR; INTRAVENOUS; SUBCUTANEOUS at 16:04

## 2018-06-22 RX ADMIN — SENNA PLUS 2 TABLET(S): 8.6 TABLET ORAL at 22:09

## 2018-06-22 RX ADMIN — OXYCODONE HYDROCHLORIDE 5 MILLIGRAM(S): 5 TABLET ORAL at 06:53

## 2018-06-22 RX ADMIN — Medication 100 MILLIGRAM(S): at 05:25

## 2018-06-22 RX ADMIN — Medication 6: at 09:20

## 2018-06-22 RX ADMIN — Medication 3 MILLILITER(S): at 03:57

## 2018-06-22 RX ADMIN — Medication 100 MILLIGRAM(S): at 15:12

## 2018-06-22 RX ADMIN — Medication 81 MILLIGRAM(S): at 12:45

## 2018-06-22 RX ADMIN — POLYETHYLENE GLYCOL 3350 17 GRAM(S): 17 POWDER, FOR SOLUTION ORAL at 12:46

## 2018-06-22 RX ADMIN — GABAPENTIN 300 MILLIGRAM(S): 400 CAPSULE ORAL at 17:27

## 2018-06-22 RX ADMIN — CARVEDILOL PHOSPHATE 12.5 MILLIGRAM(S): 80 CAPSULE, EXTENDED RELEASE ORAL at 05:25

## 2018-06-22 RX ADMIN — Medication 100 MILLIGRAM(S): at 22:09

## 2018-06-22 RX ADMIN — Medication 4: at 12:45

## 2018-06-22 RX ADMIN — PANTOPRAZOLE SODIUM 40 MILLIGRAM(S): 20 TABLET, DELAYED RELEASE ORAL at 05:26

## 2018-06-22 RX ADMIN — Medication 1 GRAM(S): at 05:25

## 2018-06-22 RX ADMIN — Medication 50 MILLIGRAM(S): at 22:09

## 2018-06-22 RX ADMIN — Medication 3 MILLILITER(S): at 21:26

## 2018-06-22 RX ADMIN — ISOSORBIDE MONONITRATE 30 MILLIGRAM(S): 60 TABLET, EXTENDED RELEASE ORAL at 15:11

## 2018-06-22 RX ADMIN — GABAPENTIN 300 MILLIGRAM(S): 400 CAPSULE ORAL at 05:25

## 2018-06-22 RX ADMIN — Medication 3 MILLILITER(S): at 09:37

## 2018-06-22 RX ADMIN — CARVEDILOL PHOSPHATE 12.5 MILLIGRAM(S): 80 CAPSULE, EXTENDED RELEASE ORAL at 17:27

## 2018-06-22 RX ADMIN — ATORVASTATIN CALCIUM 40 MILLIGRAM(S): 80 TABLET, FILM COATED ORAL at 22:09

## 2018-06-22 RX ADMIN — OXYCODONE HYDROCHLORIDE 5 MILLIGRAM(S): 5 TABLET ORAL at 07:23

## 2018-06-22 RX ADMIN — Medication 6: at 17:27

## 2018-06-22 RX ADMIN — OXYCODONE HYDROCHLORIDE 5 MILLIGRAM(S): 5 TABLET ORAL at 15:41

## 2018-06-22 RX ADMIN — TAMSULOSIN HYDROCHLORIDE 0.4 MILLIGRAM(S): 0.4 CAPSULE ORAL at 22:09

## 2018-06-22 NOTE — PROGRESS NOTE ADULT - SUBJECTIVE AND OBJECTIVE BOX
Patient is a 63y old  Male who presents with a chief complaint of Left leg pain    SUBJECTIVE / OVERNIGHT EVENTS:    Feels better  no fever, no CP, no SOB  no nausea/vomting    MEDICATIONS  (STANDING):  ALBUTerol/ipratropium for Nebulization 3 milliLiter(s) Nebulizer every 6 hours  aspirin enteric coated 81 milliGRAM(s) Oral daily  atorvastatin 40 milliGRAM(s) Oral at bedtime  carvedilol 12.5 milliGRAM(s) Oral every 12 hours  clopidogrel Tablet 75 milliGRAM(s) Oral daily  dextrose 50% Injectable 12.5 Gram(s) IV Push once  dextrose 50% Injectable 25 Gram(s) IV Push once  dextrose 50% Injectable 25 Gram(s) IV Push once  docusate sodium 100 milliGRAM(s) Oral three times a day  gabapentin 300 milliGRAM(s) Oral two times a day  hydrALAZINE 50 milliGRAM(s) Oral every 8 hours  insulin lispro (HumaLOG) corrective regimen sliding scale   SubCutaneous three times a day before meals  isosorbide   mononitrate ER Tablet (IMDUR) 30 milliGRAM(s) Oral daily  montelukast 10 milliGRAM(s) Oral daily  pantoprazole    Tablet 40 milliGRAM(s) Oral before breakfast  polyethylene glycol 3350 17 Gram(s) Oral daily  senna 2 Tablet(s) Oral at bedtime  sodium chloride 0.9%. 1000 milliLiter(s) (50 mL/Hr) IV Continuous <Continuous>  sucralfate 1 Gram(s) Oral two times a day  tamsulosin 0.4 milliGRAM(s) Oral at bedtime    MEDICATIONS  (PRN):  acetaminophen   Tablet 650 milliGRAM(s) Oral every 6 hours PRN For Temp greater than 38 C (100.4 F)  acetaminophen   Tablet. 325 milliGRAM(s) Oral every 4 hours PRN Mild Pain (1 - 3)  dextrose 40% Gel 15 Gram(s) Oral once PRN Blood Glucose LESS THAN 70 milliGRAM(s)/deciliter  glucagon  Injectable 1 milliGRAM(s) IntraMuscular once PRN Glucose LESS THAN 70 milligrams/deciliter  oxyCODONE    IR 5 milliGRAM(s) Oral every 8 hours PRN Severe Pain (7 - 10)    T(C): 36.3 (06-22-18 @ 14:14), Max: 36.6 (06-21-18 @ 21:16)  HR: 70 (06-22-18 @ 15:09) (62 - 93)  BP: 117/68 (06-22-18 @ 14:14) (106/69 - 118/59)  RR: 18 (06-22-18 @ 14:14) (17 - 18)  SpO2: 98% (06-22-18 @ 15:09) (96% - 100%)    CAPILLARY BLOOD GLUCOSE  POCT Blood Glucose.: 227 mg/dL (22 Jun 2018 12:04)  POCT Blood Glucose.: 252 mg/dL (22 Jun 2018 09:04)  POCT Blood Glucose.: 214 mg/dL (21 Jun 2018 21:08)  POCT Blood Glucose.: 187 mg/dL (21 Jun 2018 17:12)    I&O's Summary    21 Jun 2018 07:01  -  22 Jun 2018 07:00  --------------------------------------------------------  IN: 600 mL / OUT: 1500 mL / NET: -900 mL    PHYSICAL EXAM:  GENERAL: NAD  HEAD:  Atraumatic, Normocephalic  EYES: conjunctiva and sclera clear  NECK: Supple, No JVD, upper airway wheeze auscultated best over neck  CHEST/LUNG: Clear to auscultation bilaterally; No wheeze  HEART: Regular rate and rhythm; No murmurs, rubs, or gallops  ABDOMEN: Soft, Nontender, Nondistended; Bowel sounds present  EXTREMITIES:   warm and well perfused, No clubbing, cyanosis, or edema  PSYCH: AAOx3  NEUROLOGY: generalized upper body weakness, LLE weakness, limited ROM of knee, no hip pain on external rotation, no skin tears/lacs, bilateral knee effusions, L>R, TTP   SKIN: No rashes or lesions    LABS:                        11.3   8.97  )-----------( 199      ( 22 Jun 2018 05:15 )             33.3     06-22    133<L>  |  96<L>  |  114<H>  ----------------------------<  235<H>  4.4   |  16<L>  |  4.18<H>    Ca    8.9      22 Jun 2018 05:15        Consultant(s) Notes Reviewed:  rheum, renal    Care Discussed with Consultants/Other Providers: Dr. Abraham

## 2018-06-22 NOTE — PROGRESS NOTE ADULT - PROBLEM SELECTOR PLAN 1
Patient with SHERRY in setting of obstructive uropathy and low BP. Latest Scr. has improved to 4.18 today. Patient is non oliguric. Urine sediment under microscopy showed presence of muddy brown casts which is suggestive of ATN. Patient with ischemic ATN from low BP? Can stop IV hydration and Sc.r is improving and patient is eating. Monitor BMP daily. Avoid nephrotoxins. Patient with SHERRY in setting of obstructive uropathy and low BP. Latest Scr. has improved to 4.18 today. Patient is non oliguric. Urine sediment under microscopy showed presence of muddy brown casts which is suggestive of ATN. Patient with ischemic ATN from low BP with hypovolemia? Can stop IV hydration as Sc.r is improving and patient is eating. Monitor BMP daily. Avoid nephrotoxins.

## 2018-06-22 NOTE — PROGRESS NOTE ADULT - ASSESSMENT
64yo M with PMH CKD 4, BPH, CHF, CAD s/p CABG, CVA with residual L-sided weakness, HTN, DM2 (A1C 6.4%), HLD admitted 6/14 for pain control for LLE pain after mechanical fall getting out of a car, nephrology consulted for SHERRY on CKD in setting of obstructive uropathy, low BP, and decreased PO intake.

## 2018-06-22 NOTE — PROGRESS NOTE ADULT - SUBJECTIVE AND OBJECTIVE BOX
[facility] Division of Kidney Diseases & Hypertension  FOLLOW UP NOTE  [phone]--------------------------------------------------------------------------------    HPI: 62yo M with PMH CKD 4, BPH, CHF, CAD s/p CABG, CVA with residual L-sided weakness, HTN, DM2 (A1C 6.4%), HLD admitted 6/14 for pain control for LLE pain after mechanical fall getting out of a car. Nephrology was consulted for elevated creatinine. His baseline SCr in January 2018 appears to have been 2.0-2.3. However Scr on admission was 2.97 which then worsened to 5.75 on 6/20/18. Patient was started on IV fluids and Sc.r improved to 4.18 today. Currently patient still has complaint of left knee pain but has slightly improved from yesterday.    PAST HISTORY  --------------------------------------------------------------------------------  No significant changes to PMH, PSH, FHx, SHx, unless otherwise noted    ALLERGIES & MEDICATIONS  --------------------------------------------------------------------------------  Allergies    No Known Allergies    Intolerances      Standing Inpatient Medications  ALBUTerol/ipratropium for Nebulization 3 milliLiter(s) Nebulizer every 6 hours  aspirin enteric coated 81 milliGRAM(s) Oral daily  atorvastatin 40 milliGRAM(s) Oral at bedtime  carvedilol 12.5 milliGRAM(s) Oral every 12 hours  clopidogrel Tablet 75 milliGRAM(s) Oral daily  dextrose 50% Injectable 12.5 Gram(s) IV Push once  dextrose 50% Injectable 25 Gram(s) IV Push once  dextrose 50% Injectable 25 Gram(s) IV Push once  docusate sodium 100 milliGRAM(s) Oral three times a day  gabapentin 300 milliGRAM(s) Oral two times a day  hydrALAZINE 50 milliGRAM(s) Oral every 8 hours  insulin lispro (HumaLOG) corrective regimen sliding scale   SubCutaneous three times a day before meals  isosorbide   mononitrate ER Tablet (IMDUR) 30 milliGRAM(s) Oral daily  montelukast 10 milliGRAM(s) Oral daily  pantoprazole    Tablet 40 milliGRAM(s) Oral before breakfast  polyethylene glycol 3350 17 Gram(s) Oral daily  senna 2 Tablet(s) Oral at bedtime  sodium chloride 0.9%. 1000 milliLiter(s) IV Continuous <Continuous>  sucralfate 1 Gram(s) Oral two times a day  tamsulosin 0.4 milliGRAM(s) Oral at bedtime    PRN Inpatient Medications  acetaminophen   Tablet 650 milliGRAM(s) Oral every 6 hours PRN  acetaminophen   Tablet. 325 milliGRAM(s) Oral every 4 hours PRN  dextrose 40% Gel 15 Gram(s) Oral once PRN  glucagon  Injectable 1 milliGRAM(s) IntraMuscular once PRN  oxyCODONE    IR 5 milliGRAM(s) Oral every 8 hours PRN      REVIEW OF SYSTEMS  --------------------------------------------------------------------------------  Gen: No  fevers/chills  Head/Eyes/Ears/Mouth: No headache  Respiratory: No dyspnea  CV: No chest pain  GI: No abdominal pain, nausea, vomiting  MSK:  ; left knee pain +; no edema      All other systems were reviewed and are negative, except as noted.    VITALS/PHYSICAL EXAM  --------------------------------------------------------------------------------  T(C): 36.4 (06-22-18 @ 05:23), Max: 36.7 (06-21-18 @ 13:19)  HR: 75 (06-22-18 @ 09:37) (62 - 93)  BP: 114/64 (06-22-18 @ 05:23) (106/69 - 121/68)  RR: 18 (06-22-18 @ 05:23) (17 - 18)  SpO2: 98% (06-22-18 @ 09:37) (96% - 99%)  Wt(kg): --        06-21-18 @ 07:01  -  06-22-18 @ 07:00  --------------------------------------------------------  IN: 600 mL / OUT: 1500 mL / NET: -900 mL      Physical Exam:  	Gen: NAD, well-appearing  	HEENT: PERRL, supple neck, clear oropharynx  	Pulm: CTA B/L  	CV: RRR, S1S2;  	Back: No spinal or CVA tenderness  	Abd: +BS, soft, nontender/nondistended  	: No suprapubic tenderness                      Extremities: no bilateral LE edema noted.                       Neuro: No focal deficits, intact gait  	Skin: Warm, without rashes  	Vascular access:    LABS/STUDIES  --------------------------------------------------------------------------------              11.3   8.97  >-----------<  199      [06-22-18 @ 05:15]              33.3     133  |  96  |  114  ----------------------------<  235      [06-22-18 @ 05:15]  4.4   |  16  |  4.18        Ca     8.9     [06-22-18 @ 05:15]          Uric acid 11.6      [06-21-18 @ 05:45]    Creatinine Trend:  SCr 4.18 [06-22 @ 05:15]  SCr 5.03 [06-21 @ 05:45]  SCr 5.75 [06-20 @ 06:30]  SCr 5.45 [06-19 @ 05:50]  SCr 5.30 [06-18 @ 19:00]    Urinalysis - [06-19-18 @ 12:00]      Color YELLOW / Appearance CLEAR / SG 1.013 / pH 6.0      Gluc NEGATIVE / Ketone NEGATIVE  / Bili NEGATIVE / Urobili NORMAL       Blood NEGATIVE / Protein 30 / Leuk Est NEGATIVE / Nitrite NEGATIVE      RBC 0-2 / WBC  / Hyaline  / Gran  / Sq Epi  / Non Sq Epi  / Bacteria     Urine Creatinine 76.20      [06-20-18 @ 13:00]  Urine Sodium 22      [06-20-18 @ 13:00]  Urine Urea Nitrogen 639.2      [06-20-18 @ 13:00]  Urine Potassium 26.5      [06-20-18 @ 13:00]  Urine Chloride < 20      [06-20-18 @ 13:00]    HbA1c 6.4      [06-16-18 @ 07:05]      Rheumatoid Factor 14.0      [06-22-18 @ 05:15] [facility] Division of Kidney Diseases & Hypertension  FOLLOW UP NOTE  [phone]--------------------------------------------------------------------------------    HPI: 64yo M with PMH CKD 4, BPH, CHF, CAD s/p CABG, CVA with residual L-sided weakness, HTN, DM2 (A1C 6.4%), HLD admitted 6/14 for pain control for LLE pain after mechanical fall getting out of a car. Nephrology was consulted for elevated creatinine. His baseline SCr in January 2018 appears to have been 2.0-2.3. However Scr on admission was 2.97 which then worsened to 5.75 on 6/20/18. Patient was started on IV fluids and Sc.r improved to 4.18 today. Currently patient still has complaint of left knee pain but has slightly improved from yesterday.    PAST HISTORY  --------------------------------------------------------------------------------  No significant changes to PMH, PSH, FHx, SHx, unless otherwise noted    ALLERGIES & MEDICATIONS  --------------------------------------------------------------------------------  Allergies    No Known Allergies    Intolerances      Standing Inpatient Medications  ALBUTerol/ipratropium for Nebulization 3 milliLiter(s) Nebulizer every 6 hours  aspirin enteric coated 81 milliGRAM(s) Oral daily  atorvastatin 40 milliGRAM(s) Oral at bedtime  carvedilol 12.5 milliGRAM(s) Oral every 12 hours  clopidogrel Tablet 75 milliGRAM(s) Oral daily  dextrose 50% Injectable 12.5 Gram(s) IV Push once  dextrose 50% Injectable 25 Gram(s) IV Push once  dextrose 50% Injectable 25 Gram(s) IV Push once  docusate sodium 100 milliGRAM(s) Oral three times a day  gabapentin 300 milliGRAM(s) Oral two times a day  hydrALAZINE 50 milliGRAM(s) Oral every 8 hours  insulin lispro (HumaLOG) corrective regimen sliding scale   SubCutaneous three times a day before meals  isosorbide   mononitrate ER Tablet (IMDUR) 30 milliGRAM(s) Oral daily  montelukast 10 milliGRAM(s) Oral daily  pantoprazole    Tablet 40 milliGRAM(s) Oral before breakfast  polyethylene glycol 3350 17 Gram(s) Oral daily  senna 2 Tablet(s) Oral at bedtime  sodium chloride 0.9%. 1000 milliLiter(s) IV Continuous <Continuous>  sucralfate 1 Gram(s) Oral two times a day  tamsulosin 0.4 milliGRAM(s) Oral at bedtime    PRN Inpatient Medications  acetaminophen   Tablet 650 milliGRAM(s) Oral every 6 hours PRN  acetaminophen   Tablet. 325 milliGRAM(s) Oral every 4 hours PRN  dextrose 40% Gel 15 Gram(s) Oral once PRN  glucagon  Injectable 1 milliGRAM(s) IntraMuscular once PRN  oxyCODONE    IR 5 milliGRAM(s) Oral every 8 hours PRN      REVIEW OF SYSTEMS  --------------------------------------------------------------------------------  Gen: No  fevers/chills  Head/Eyes/Ears/Mouth: No headache  Respiratory: No dyspnea  CV: No chest pain  GI: No abdominal pain, nausea, vomiting  MSK:  ; left knee pain +; no edema      All other systems were reviewed and are negative, except as noted.    VITALS/PHYSICAL EXAM  --------------------------------------------------------------------------------  T(C): 36.4 (06-22-18 @ 05:23), Max: 36.7 (06-21-18 @ 13:19)  HR: 75 (06-22-18 @ 09:37) (62 - 93)  BP: 114/64 (06-22-18 @ 05:23) (106/69 - 121/68)  RR: 18 (06-22-18 @ 05:23) (17 - 18)  SpO2: 98% (06-22-18 @ 09:37) (96% - 99%)  Wt(kg): --        06-21-18 @ 07:01  -  06-22-18 @ 07:00  --------------------------------------------------------  IN: 600 mL / OUT: 1500 mL / NET: -900 mL      Physical Exam:  	Gen: NAD, well-appearing  	HEENT: no JVD  	Pulm: B/L wheezes heard over lung areas.   	CV: RRR, S1S2;  	Abd: +BS, soft  	: Torres catheter present.                Extremities: Trace bilateral LE edema noted. =    LABS/STUDIES  --------------------------------------------------------------------------------              11.3   8.97  >-----------<  199      [06-22-18 @ 05:15]              33.3     133  |  96  |  114  ----------------------------<  235      [06-22-18 @ 05:15]  4.4   |  16  |  4.18        Ca     8.9     [06-22-18 @ 05:15]          Uric acid 11.6      [06-21-18 @ 05:45]    Creatinine Trend:  SCr 4.18 [06-22 @ 05:15]  SCr 5.03 [06-21 @ 05:45]  SCr 5.75 [06-20 @ 06:30]  SCr 5.45 [06-19 @ 05:50]  SCr 5.30 [06-18 @ 19:00]    Urinalysis - [06-19-18 @ 12:00]      Color YELLOW / Appearance CLEAR / SG 1.013 / pH 6.0      Gluc NEGATIVE / Ketone NEGATIVE  / Bili NEGATIVE / Urobili NORMAL       Blood NEGATIVE / Protein 30 / Leuk Est NEGATIVE / Nitrite NEGATIVE      RBC 0-2 / WBC  / Hyaline  / Gran  / Sq Epi  / Non Sq Epi  / Bacteria     Urine Creatinine 76.20      [06-20-18 @ 13:00]  Urine Sodium 22      [06-20-18 @ 13:00]  Urine Urea Nitrogen 639.2      [06-20-18 @ 13:00]  Urine Potassium 26.5      [06-20-18 @ 13:00]  Urine Chloride < 20      [06-20-18 @ 13:00]    HbA1c 6.4      [06-16-18 @ 07:05]      Rheumatoid Factor 14.0      [06-22-18 @ 05:15] [facility] Division of Kidney Diseases & Hypertension  FOLLOW UP NOTE  [phone]--------------------------------------------------------------------------------    HPI: 64yo M with PMH CKD 4, BPH, CHF, CAD s/p CABG, CVA with residual L-sided weakness, HTN, DM2 (A1C 6.4%), HLD admitted 6/14 for pain control for LLE pain after mechanical fall getting out of a car. Nephrology was consulted for elevated creatinine. His baseline SCr in January 2018 appears to have been 2.0-2.3. However Scr on admission was 2.97 which then worsened to 5.75 on 6/20/18. Patient was started on IV fluids and Sc.r improved to 4.18 today. Currently patient still has complaint of left knee pain but has slightly improved from yesterday.    PAST HISTORY  --------------------------------------------------------------------------------  No significant changes to PMH, PSH, FHx, SHx, unless otherwise noted    ALLERGIES & MEDICATIONS  --------------------------------------------------------------------------------  Allergies    No Known Allergies    Intolerances      Standing Inpatient Medications  ALBUTerol/ipratropium for Nebulization 3 milliLiter(s) Nebulizer every 6 hours  aspirin enteric coated 81 milliGRAM(s) Oral daily  atorvastatin 40 milliGRAM(s) Oral at bedtime  carvedilol 12.5 milliGRAM(s) Oral every 12 hours  clopidogrel Tablet 75 milliGRAM(s) Oral daily  dextrose 50% Injectable 12.5 Gram(s) IV Push once  dextrose 50% Injectable 25 Gram(s) IV Push once  dextrose 50% Injectable 25 Gram(s) IV Push once  docusate sodium 100 milliGRAM(s) Oral three times a day  gabapentin 300 milliGRAM(s) Oral two times a day  hydrALAZINE 50 milliGRAM(s) Oral every 8 hours  insulin lispro (HumaLOG) corrective regimen sliding scale   SubCutaneous three times a day before meals  isosorbide   mononitrate ER Tablet (IMDUR) 30 milliGRAM(s) Oral daily  montelukast 10 milliGRAM(s) Oral daily  pantoprazole    Tablet 40 milliGRAM(s) Oral before breakfast  polyethylene glycol 3350 17 Gram(s) Oral daily  senna 2 Tablet(s) Oral at bedtime  sodium chloride 0.9%. 1000 milliLiter(s) IV Continuous <Continuous>  sucralfate 1 Gram(s) Oral two times a day  tamsulosin 0.4 milliGRAM(s) Oral at bedtime    PRN Inpatient Medications  acetaminophen   Tablet 650 milliGRAM(s) Oral every 6 hours PRN  acetaminophen   Tablet. 325 milliGRAM(s) Oral every 4 hours PRN  dextrose 40% Gel 15 Gram(s) Oral once PRN  glucagon  Injectable 1 milliGRAM(s) IntraMuscular once PRN  oxyCODONE    IR 5 milliGRAM(s) Oral every 8 hours PRN      REVIEW OF SYSTEMS  --------------------------------------------------------------------------------  Gen: No  fevers/chills  Head/Eyes/Ears/Mouth: No headache  Respiratory: No dyspnea  CV: No chest pain  GI: No abdominal pain, nausea, vomiting  MSK:  ; left knee pain +; no edema      All other systems were reviewed and are negative, except as noted.    VITALS/PHYSICAL EXAM  --------------------------------------------------------------------------------  T(C): 36.4 (06-22-18 @ 05:23), Max: 36.7 (06-21-18 @ 13:19)  HR: 75 (06-22-18 @ 09:37) (62 - 93)  BP: 114/64 (06-22-18 @ 05:23) (106/69 - 121/68)  RR: 18 (06-22-18 @ 05:23) (17 - 18)  SpO2: 98% (06-22-18 @ 09:37) (96% - 99%)  Wt(kg): --        06-21-18 @ 07:01  -  06-22-18 @ 07:00  --------------------------------------------------------  IN: 600 mL / OUT: 1500 mL / NET: -900 mL      Physical Exam:  	Gen: NAD, well-appearing  	HEENT: no JVD  	Pulm: B/L wheezes heard over lung areas.   	CV: RRR, S1S2;  	Abd: +BS, soft  	: Torres catheter present.                Extremities: Trace bilateral LE edema noted.    LABS/STUDIES  --------------------------------------------------------------------------------              11.3   8.97  >-----------<  199      [06-22-18 @ 05:15]              33.3     133  |  96  |  114  ----------------------------<  235      [06-22-18 @ 05:15]  4.4   |  16  |  4.18        Ca     8.9     [06-22-18 @ 05:15]          Uric acid 11.6      [06-21-18 @ 05:45]    Creatinine Trend:  SCr 4.18 [06-22 @ 05:15]  SCr 5.03 [06-21 @ 05:45]  SCr 5.75 [06-20 @ 06:30]  SCr 5.45 [06-19 @ 05:50]  SCr 5.30 [06-18 @ 19:00]    Urinalysis - [06-19-18 @ 12:00]      Color YELLOW / Appearance CLEAR / SG 1.013 / pH 6.0      Gluc NEGATIVE / Ketone NEGATIVE  / Bili NEGATIVE / Urobili NORMAL       Blood NEGATIVE / Protein 30 / Leuk Est NEGATIVE / Nitrite NEGATIVE      RBC 0-2 / WBC  / Hyaline  / Gran  / Sq Epi  / Non Sq Epi  / Bacteria     Urine Creatinine 76.20      [06-20-18 @ 13:00]  Urine Sodium 22      [06-20-18 @ 13:00]  Urine Urea Nitrogen 639.2      [06-20-18 @ 13:00]  Urine Potassium 26.5      [06-20-18 @ 13:00]  Urine Chloride < 20      [06-20-18 @ 13:00]    HbA1c 6.4      [06-16-18 @ 07:05]      Rheumatoid Factor 14.0      [06-22-18 @ 05:15]

## 2018-06-22 NOTE — PROGRESS NOTE ADULT - PROBLEM SELECTOR PLAN 1
Developed fever 6/18 evening a/w encephalopathy,  patient only reports L knee pain, which had enlarged from prior.  Has mild cough, no sore throat, no runny nose.  No dysuria.  New SOB and wheezing--RVP neg.  This point fever presumed 2/2 gout flare.    -f/u blood culture, NG x 48 hour  -UA and CXR unremarkable; CT chest w/ LAD no prior for comparison  -RVP negative  -ortho evaluated re: enlarging L knee effusion, no intervention  -rheum eval apprecaited s/p b/l knee athrocentesis c/w gout, started on prednisone 30 mg on 6/20 and 6/21 & solumedrol 20 mg x 1 on 6/22--pending recs re: steroids Developed fever 6/18 evening a/w encephalopathy,  patient only reports L knee pain, which had enlarged from prior.  At this point fever presumed 2/2 gout flare.    -f/u blood culture, NG x 48 hour  -UA and CXR unremarkable; CT chest w/ LAD no prior for comparison (needs OP interval f/u 1-3 months)   -RVP negative  -ortho evaluated re: enlarging L knee effusion, no intervention  -rheum eval apprecaited s/p b/l knee athrocentesis c/w gout, started on prednisone 30 mg on 6/20 and 6/21 & solumedrol 20 mg x 1 on 6/22--pending recs re: steroids duration/amount Developed fever 6/18 evening a/w encephalopathy,  patient only reports L knee pain, which had enlarged from prior.  At this point fever presumed 2/2 gout flare.    -f/u blood culture, NG x 48 hour  -UA and CXR unremarkable; CT chest w/ LAD no prior for comparison (needs OP interval f/u 1-3 months)   -RVP negative  -ortho evaluated re: enlarging L knee effusion, no intervention  -rheum eval appreciated s/p b/l knee arthrocentesis c/w gout, started on prednisone 30 mg on 6/20 and 6/21 & solumedrol 20 mg x 1 on 6/22--pending recs re: steroids duration/amount

## 2018-06-22 NOTE — PROGRESS NOTE ADULT - ASSESSMENT
63 yo M ambulatory with cane with DM2, CAD s/p CABG, history of CVA w/ residual L sided weakness s/p mechanical fall when getting out of car course complicated by SHERRY on CKD with urinary retention s/p ya, toxic metabolic encephalopathy in setting of fever and opiates, fever w/ thus far negative infectious thought 2/2 polyarthritic gout flare.

## 2018-06-22 NOTE — PROGRESS NOTE ADULT - SUBJECTIVE AND OBJECTIVE BOX
Orthopedic Surgery Progress Note  S: Pain is well controlled.  Arthocentesis by rheum yesterday with crystals. Pain improving with steroids.    O:  Vital Signs Last 24 Hrs  T(C): 36.4 (22 Jun 2018 05:23), Max: 36.7 (21 Jun 2018 07:30)  T(F): 97.5 (22 Jun 2018 05:23), Max: 98 (21 Jun 2018 07:30)  HR: 62 (22 Jun 2018 05:23) (62 - 93)  BP: 114/64 (22 Jun 2018 05:23) (106/69 - 121/68)  RR: 18 (22 Jun 2018 05:23) (17 - 18)  SpO2: 97% (22 Jun 2018 05:23) (95% - 99%)    Gen: NAD  RLE  R knee PROM 0-75 with pain on max flexion  Mild effusion, no erythema  Non-TTP  R ankle with good PROM, minimal pain  Fires TA/EHL/GS  WWP distally, DP palpable    LLE  R knee PROM 0-70 with pain on max flexion  Mild effusion, no erythema  Non-TTP  R ankle with good PROM, minimal pain  Fires TA/EHL/GS, but with residual weakness due to prior stroke  WWP distally, DP palpable    LUE  Shoulder with full painless PROM  Fires EPL/FDP/IO  SILT in R/M/U dist  WWP distally, radial pulse palpable                        11.0   10.29 )-----------( 174      ( 21 Jun 2018 05:45 )             32.2     06-21    132<L>  |  93<L>  |  112<H>  ----------------------------<  208<H>  4.2   |  18<L>  |  5.03<H>    A/P:  62M with polyarthralgia, arthrocentesis of knee by rheum + for crystals              PT/WBAT            Pain Control            No orthopaedic intervention at this time           Please contact service with further questions about this patient    Jamie Warren MD

## 2018-06-22 NOTE — PROGRESS NOTE ADULT - PROBLEM SELECTOR PLAN 2
In setting of renal failure: Serum CO2 noted to be low at 16. Check VBG to assess pH. Monitor serum CO2 level.

## 2018-06-22 NOTE — PROGRESS NOTE ADULT - ASSESSMENT
63M w/ hx of gout, CVA, CAD p/w fall at home found to have L knee swelling and pain initially progressing to polyarticular joint pains involving shoulders, knees and ankles.   Pt has hyperuricemia w/ uric acid off 11.6, elevated inflammatory markers, and now also with SHERRY on CKD.     b/l knee arthrocentesis performed on 6/21/18  L knee: removal of 75cc of fluid, cell ct [id number] w/ MSU crystals seen, c/w gout flare  R knee: removal of 43cc of fluid, [place] [id number], crystals pending, likely same process as L knee. IACS injection given    Pt started on Prednisone on 6/20- received Pred 30mg x 2 doses, Solumedrol 20mg IV this AM w/ mild improvement of joint pains     *final recs pending d/w attending 63M w/ hx of gout, CVA, CAD p/w fall at home found to have L knee swelling and pain initially progressing to polyarticular joint pains involving shoulders, knees and ankles.   Pt has hyperuricemia w/ uric acid off 11.6, elevated inflammatory markers, and now also with SHERRY on CKD.     b/l knee arthrocentesis performed on 6/21/18  L knee: removal of 75cc of fluid, cell ct [id number] w/ MSU crystals seen, c/w gout flare  R knee: removal of 43cc of fluid, cell [place] [id number], crystals pending, likely same process as L knee. IACS injection given    Pt started on Prednisone on 6/20- received Pred 30mg x 2 doses, Solumedrol 20mg IV this AM w/ mild improvement of joint pains     Would c/w Prednisone taper as follows:  - Pred 20mg x 5 days, 15mg x 5 days, 10mg x 5 days, 5mg x 5 days  - Physical Therapy- OOB and encourage ambulation    Pt can f/u outpt for appropriate gout management in 2-3 weeks after discharge- [address] [place] [address] [address] [place] [address] , # [phone]    will sign off, please recall if needed

## 2018-06-22 NOTE — PROGRESS NOTE ADULT - PROBLEM SELECTOR PLAN 2
Worsening Cr while here, taking opiates/no BM, concern for retention; s/p bowel regimen w/ BMs but still retaining, s/p ya placement on 6/19; urine lytes initially pre-renal (FeUrea 22%)  -ya placed for rentention  -c/w flomax  -holding diuretics  -c/w IVF to end by tomorrow  -renal US neg hydro  -renal c/s appreciated  -attempt TOV tomorrow Worsening Cr while here, taking opiates/no BM, concern for retention; s/p bowel regimen w/ BMs but still retaining urine, s/p ya placement on 6/19; urine lytes pre-renal (FeUrea 22%) started on IVF w/ minimal response; renal spun urine w/ muddy brown cast c/w ATN  -ya placed for rentention, consider TOV tomorrow if Cr still downtrending  -c/w flomax  -holding diuretics  -c/w IVF to end by tomorrow  -renal US neg hydro  -renal c/s appreciated

## 2018-06-22 NOTE — PROGRESS NOTE ADULT - SUBJECTIVE AND OBJECTIVE BOX
INGE TERRANCE  [id number]    INTERVAL HPI/OVERNIGHT EVENTS:  No acute events overnight. Pt reports feeling slightly better today with his joint pains, nicol R knee. s/p b/l knee arthrocentesis yesterday   No fevers/chills     MEDICATIONS  (STANDING):  ALBUTerol/ipratropium for Nebulization 3 milliLiter(s) Nebulizer every 6 hours  aspirin enteric coated 81 milliGRAM(s) Oral daily  atorvastatin 40 milliGRAM(s) Oral at bedtime  carvedilol 12.5 milliGRAM(s) Oral every 12 hours  clopidogrel Tablet 75 milliGRAM(s) Oral daily  dextrose 50% Injectable 12.5 Gram(s) IV Push once  dextrose 50% Injectable 25 Gram(s) IV Push once  dextrose 50% Injectable 25 Gram(s) IV Push once  docusate sodium 100 milliGRAM(s) Oral three times a day  gabapentin 300 milliGRAM(s) Oral two times a day  hydrALAZINE 50 milliGRAM(s) Oral every 8 hours  insulin lispro (HumaLOG) corrective regimen sliding scale   SubCutaneous three times a day before meals  isosorbide   mononitrate ER Tablet (IMDUR) 30 milliGRAM(s) Oral daily  montelukast 10 milliGRAM(s) Oral daily  pantoprazole    Tablet 40 milliGRAM(s) Oral before breakfast  polyethylene glycol 3350 17 Gram(s) Oral daily  senna 2 Tablet(s) Oral at bedtime  sodium chloride 0.9%. 1000 milliLiter(s) (50 mL/Hr) IV Continuous <Continuous>  sucralfate 1 Gram(s) Oral two times a day  tamsulosin 0.4 milliGRAM(s) Oral at bedtime    MEDICATIONS  (PRN):  acetaminophen   Tablet 650 milliGRAM(s) Oral every 6 hours PRN For Temp greater than 38 C (100.4 F)  acetaminophen   Tablet. 325 milliGRAM(s) Oral every 4 hours PRN Mild Pain (1 - 3)  dextrose 40% Gel 15 Gram(s) Oral once PRN Blood Glucose LESS THAN 70 milliGRAM(s)/deciliter  glucagon  Injectable 1 milliGRAM(s) IntraMuscular once PRN Glucose LESS THAN 70 milligrams/deciliter  oxyCODONE    IR 5 milliGRAM(s) Oral every 8 hours PRN Severe Pain (7 - 10)      Allergies    No Known Allergies    Intolerances        Review of Systems:   General: No fevers/chills  MSK: b/l knees less improved especially on R side. L side also with decreased pain     Vital Signs Last 24 Hrs  T(C): 36.4 (22 Jun 2018 05:23), Max: 36.6 (21 Jun 2018 21:16)  T(F): 97.5 (22 Jun 2018 05:23), Max: 97.9 (21 Jun 2018 21:16)  HR: 75 (22 Jun 2018 09:37) (62 - 93)  BP: 114/64 (22 Jun 2018 05:23) (106/69 - 118/59)  BP(mean): --  RR: 18 (22 Jun 2018 05:23) (17 - 18)  SpO2: 98% (22 Jun 2018 09:37) (96% - 98%)    Physical Exam:  General: NAD  MSK: b/l knees cool to touch with small effusion noted b/l knees. Mild pain on flexion/extension but improved compared to yesterday. b/l ankles  to palpation   Neuro: AAOx3    LABS:                        11.3   8.97  )-----------( 199      ( 22 Jun 2018 05:15 )             33.3     06-22    133<L>  |  96<L>  |  114<H>  ----------------------------<  235<H>  4.4   |  16<L>  |  4.18<H>    Ca    8.9      22 Jun 2018 05:15    L knee:  Cell Count, Body Fluid (06.21.18 @ 13:46)    Body Fluid Type: SYNOVIAL    Color - Body Fluid: YELLOW    Clarity Body Fluid: CLOUDY    Total Nucleated Cell Count, Body Fluid: 9506 cell/uL    Total RBC Count: 8000: Red Cell count correlates with the number and proportion of  cells on cytospin preparation. cell/uL  Crystals, Body Fluid: PRESENT: Needle shaped crystals, consistent with urate crystals  readily seen. Please correlate with relevant clinical and  laboratory data.  MELINA Veliz MD  06/21/18 1916:  [facility] previously reported as: PRESENT    LEFT FOR PATHOLOGIST REVIEW. (06.21.18 @ 13:46)    R knee:  Cell Count, Body Fluid (06.21.18 @ 15:47)    Body Fluid Type: SYNOVIAL    Color - Body Fluid: YELLOW    Clarity Body Fluid: TURBID    Total Nucleated Cell Count, Body Fluid: 94405 cell/uL    Total RBC Count: 4000: Red Cell count correlates with the number and proportion of  cells on cytospin preparation. cell/uL            RADIOLOGY & ADDITIONAL TESTS:

## 2018-06-22 NOTE — PROGRESS NOTE ADULT - PROBLEM SELECTOR PLAN 3
Patient confused 6/18 in context of fever. stroke code called   -CTH w/ old strokes  -doubt knew CVA, neuro rec MRI and tele given extensive history--call neuro re: utility of MRI at this point Patient confused 6/18 in context of fever. stroke code called, seen by neuro  -CTH w/ old strokes  -doubt knew CVA, neuro rec MRI and tele given extensive history--need to d/w neuro re: utility of MRI at this point

## 2018-06-23 LAB
BACTERIA BLD CULT: SIGNIFICANT CHANGE UP
BASE EXCESS BLDV CALC-SCNC: -6.1 MMOL/L — SIGNIFICANT CHANGE UP
BASOPHILS # BLD AUTO: 0.01 K/UL — SIGNIFICANT CHANGE UP (ref 0–0.2)
BASOPHILS NFR BLD AUTO: 0.1 % — SIGNIFICANT CHANGE UP (ref 0–2)
BUN SERPL-MCNC: 109 MG/DL — HIGH (ref 7–23)
CALCIUM SERPL-MCNC: 8.7 MG/DL — SIGNIFICANT CHANGE UP (ref 8.4–10.5)
CHLORIDE SERPL-SCNC: 101 MMOL/L — SIGNIFICANT CHANGE UP (ref 98–107)
CO2 SERPL-SCNC: 18 MMOL/L — LOW (ref 22–31)
CREAT SERPL-MCNC: 3.81 MG/DL — HIGH (ref 0.5–1.3)
EOSINOPHIL # BLD AUTO: 0.03 K/UL — SIGNIFICANT CHANGE UP (ref 0–0.5)
EOSINOPHIL NFR BLD AUTO: 0.2 % — SIGNIFICANT CHANGE UP (ref 0–6)
GAS PNL BLDV: 134 MMOL/L — LOW (ref 136–146)
GLUCOSE BLDV-MCNC: 156 — HIGH (ref 70–99)
GLUCOSE SERPL-MCNC: 174 MG/DL — HIGH (ref 70–99)
HCO3 BLDV-SCNC: 19 MMOL/L — LOW (ref 20–27)
HCT VFR BLD CALC: 35.8 % — LOW (ref 39–50)
HCT VFR BLDV CALC: 38.9 % — LOW (ref 39–51)
HGB BLD-MCNC: 12.1 G/DL — LOW (ref 13–17)
HGB BLDV-MCNC: 12.7 G/DL — LOW (ref 13–17)
IMM GRANULOCYTES # BLD AUTO: 0.07 # — SIGNIFICANT CHANGE UP
IMM GRANULOCYTES NFR BLD AUTO: 0.6 % — SIGNIFICANT CHANGE UP (ref 0–1.5)
LYMPHOCYTES # BLD AUTO: 1.39 K/UL — SIGNIFICANT CHANGE UP (ref 1–3.3)
LYMPHOCYTES # BLD AUTO: 11.3 % — LOW (ref 13–44)
MAGNESIUM SERPL-MCNC: 2.4 MG/DL — SIGNIFICANT CHANGE UP (ref 1.6–2.6)
MCHC RBC-ENTMCNC: 29.5 PG — SIGNIFICANT CHANGE UP (ref 27–34)
MCHC RBC-ENTMCNC: 33.8 % — SIGNIFICANT CHANGE UP (ref 32–36)
MCV RBC AUTO: 87.3 FL — SIGNIFICANT CHANGE UP (ref 80–100)
MONOCYTES # BLD AUTO: 0.95 K/UL — HIGH (ref 0–0.9)
MONOCYTES NFR BLD AUTO: 7.7 % — SIGNIFICANT CHANGE UP (ref 2–14)
NEUTROPHILS # BLD AUTO: 9.88 K/UL — HIGH (ref 1.8–7.4)
NEUTROPHILS NFR BLD AUTO: 80.1 % — HIGH (ref 43–77)
NRBC # FLD: 0 — SIGNIFICANT CHANGE UP
PCO2 BLDV: 39 MMHG — LOW (ref 41–51)
PH BLDV: 7.31 PH — LOW (ref 7.32–7.43)
PLATELET # BLD AUTO: 255 K/UL — SIGNIFICANT CHANGE UP (ref 150–400)
PMV BLD: 10.5 FL — SIGNIFICANT CHANGE UP (ref 7–13)
PO2 BLDV: 46 MMHG — HIGH (ref 35–40)
POTASSIUM BLDV-SCNC: 4.1 MMOL/L — SIGNIFICANT CHANGE UP (ref 3.4–4.5)
POTASSIUM SERPL-MCNC: 4.4 MMOL/L — SIGNIFICANT CHANGE UP (ref 3.5–5.3)
POTASSIUM SERPL-SCNC: 4.4 MMOL/L — SIGNIFICANT CHANGE UP (ref 3.5–5.3)
RBC # BLD: 4.1 M/UL — LOW (ref 4.2–5.8)
RBC # FLD: 13.9 % — SIGNIFICANT CHANGE UP (ref 10.3–14.5)
SAO2 % BLDV: 72.4 % — SIGNIFICANT CHANGE UP (ref 60–85)
SODIUM SERPL-SCNC: 135 MMOL/L — SIGNIFICANT CHANGE UP (ref 135–145)
WBC # BLD: 12.33 K/UL — HIGH (ref 3.8–10.5)
WBC # FLD AUTO: 12.33 K/UL — HIGH (ref 3.8–10.5)

## 2018-06-23 PROCEDURE — 99233 SBSQ HOSP IP/OBS HIGH 50: CPT

## 2018-06-23 RX ORDER — ACETAMINOPHEN 500 MG
1000 TABLET ORAL ONCE
Qty: 0 | Refills: 0 | Status: COMPLETED | OUTPATIENT
Start: 2018-06-23 | End: 2018-06-23

## 2018-06-23 RX ADMIN — Medication 50 MILLIGRAM(S): at 12:54

## 2018-06-23 RX ADMIN — Medication 100 MILLIGRAM(S): at 21:12

## 2018-06-23 RX ADMIN — Medication 100 MILLIGRAM(S): at 07:01

## 2018-06-23 RX ADMIN — Medication 400 MILLIGRAM(S): at 14:01

## 2018-06-23 RX ADMIN — ATORVASTATIN CALCIUM 40 MILLIGRAM(S): 80 TABLET, FILM COATED ORAL at 21:12

## 2018-06-23 RX ADMIN — PANTOPRAZOLE SODIUM 40 MILLIGRAM(S): 20 TABLET, DELAYED RELEASE ORAL at 07:01

## 2018-06-23 RX ADMIN — Medication 81 MILLIGRAM(S): at 12:56

## 2018-06-23 RX ADMIN — ISOSORBIDE MONONITRATE 30 MILLIGRAM(S): 60 TABLET, EXTENDED RELEASE ORAL at 12:56

## 2018-06-23 RX ADMIN — Medication 1 GRAM(S): at 07:01

## 2018-06-23 RX ADMIN — GABAPENTIN 300 MILLIGRAM(S): 400 CAPSULE ORAL at 17:29

## 2018-06-23 RX ADMIN — SENNA PLUS 2 TABLET(S): 8.6 TABLET ORAL at 21:12

## 2018-06-23 RX ADMIN — Medication 2: at 08:14

## 2018-06-23 RX ADMIN — GABAPENTIN 300 MILLIGRAM(S): 400 CAPSULE ORAL at 07:01

## 2018-06-23 RX ADMIN — Medication 3 MILLILITER(S): at 22:00

## 2018-06-23 RX ADMIN — Medication 50 MILLIGRAM(S): at 07:01

## 2018-06-23 RX ADMIN — OXYCODONE HYDROCHLORIDE 5 MILLIGRAM(S): 5 TABLET ORAL at 19:30

## 2018-06-23 RX ADMIN — Medication 50 MILLIGRAM(S): at 21:12

## 2018-06-23 RX ADMIN — OXYCODONE HYDROCHLORIDE 5 MILLIGRAM(S): 5 TABLET ORAL at 02:56

## 2018-06-23 RX ADMIN — OXYCODONE HYDROCHLORIDE 5 MILLIGRAM(S): 5 TABLET ORAL at 18:50

## 2018-06-23 RX ADMIN — OXYCODONE HYDROCHLORIDE 5 MILLIGRAM(S): 5 TABLET ORAL at 10:49

## 2018-06-23 RX ADMIN — Medication 4: at 17:27

## 2018-06-23 RX ADMIN — Medication 3 MILLILITER(S): at 03:07

## 2018-06-23 RX ADMIN — Medication 3 MILLILITER(S): at 10:05

## 2018-06-23 RX ADMIN — HEPARIN SODIUM 5000 UNIT(S): 5000 INJECTION INTRAVENOUS; SUBCUTANEOUS at 17:29

## 2018-06-23 RX ADMIN — OXYCODONE HYDROCHLORIDE 5 MILLIGRAM(S): 5 TABLET ORAL at 02:25

## 2018-06-23 RX ADMIN — MONTELUKAST 10 MILLIGRAM(S): 4 TABLET, CHEWABLE ORAL at 12:57

## 2018-06-23 RX ADMIN — OXYCODONE HYDROCHLORIDE 5 MILLIGRAM(S): 5 TABLET ORAL at 11:50

## 2018-06-23 RX ADMIN — CARVEDILOL PHOSPHATE 12.5 MILLIGRAM(S): 80 CAPSULE, EXTENDED RELEASE ORAL at 17:30

## 2018-06-23 RX ADMIN — Medication 20 MILLIGRAM(S): at 07:01

## 2018-06-23 RX ADMIN — HEPARIN SODIUM 5000 UNIT(S): 5000 INJECTION INTRAVENOUS; SUBCUTANEOUS at 07:01

## 2018-06-23 RX ADMIN — Medication 3 MILLILITER(S): at 15:22

## 2018-06-23 RX ADMIN — Medication 2: at 11:59

## 2018-06-23 RX ADMIN — CLOPIDOGREL BISULFATE 75 MILLIGRAM(S): 75 TABLET, FILM COATED ORAL at 12:57

## 2018-06-23 RX ADMIN — Medication 1000 MILLIGRAM(S): at 14:58

## 2018-06-23 RX ADMIN — Medication 1 GRAM(S): at 17:29

## 2018-06-23 RX ADMIN — CARVEDILOL PHOSPHATE 12.5 MILLIGRAM(S): 80 CAPSULE, EXTENDED RELEASE ORAL at 07:01

## 2018-06-23 RX ADMIN — TAMSULOSIN HYDROCHLORIDE 0.4 MILLIGRAM(S): 0.4 CAPSULE ORAL at 21:12

## 2018-06-23 NOTE — PROGRESS NOTE ADULT - PROBLEM SELECTOR PLAN 1
Developed fever 6/18 evening a/w encephalopathy,  patient only reports L knee pain, which had enlarged from prior.  At this point fever presumed 2/2 gout flare.    -f/u blood culture, NG x 48 hour  -UA and CXR unremarkable; CT chest w/ LAD no prior for comparison (needs OP interval f/u 1-3 months)   -RVP negative  -ortho evaluated re: enlarging L knee effusion, no intervention  -rheum eval appreciated s/p b/l knee arthrocentesis c/w gout, started on prednisone 30 mg on 6/20 and 6/21 & solumedrol 20 mg x 1 on 6/22 now on taper as per rheum

## 2018-06-23 NOTE — PROGRESS NOTE ADULT - PROBLEM SELECTOR PLAN 2
Cr improving   Pt w/ ATN now resolving. Renal spun urine w/ muddy brown cast c/w ATN. Initial injury unclear pre-renal (FeUrea 22%) vs obstructive pt improving after ya placed 6/19; and prior pt was started on IVF w/ minimal response;   -Will plan for TOV tonight  -c/w flomax  -renal US neg hydro  - Diuretics currently being held. No further IVF at this time

## 2018-06-23 NOTE — PROGRESS NOTE ADULT - ASSESSMENT
61 yo M ambulatory with cane with DM2, CAD s/p CABG, history of CVA w/ residual L sided weakness s/p mechanical fall when getting out of car course complicated by SHERRY on CKD with urinary retention s/p ya, toxic metabolic encephalopathy in setting of fever and opiates, fever w/ thus far negative infectious thought 2/2 polyarthritic gout flare.

## 2018-06-23 NOTE — PROGRESS NOTE ADULT - PROBLEM SELECTOR PLAN 3
Patient was confused 6/18 in context of fever. stroke code called, seen by neuro  -CTH w/ old strokes. Mental status now back to baseline  -doubt knew CVA, neuro rec MRI and tele given extensive history--need to f/u neuro re: utility of MRI at this point

## 2018-06-23 NOTE — PROGRESS NOTE ADULT - SUBJECTIVE AND OBJECTIVE BOX
Patient is a 63y old  Male who presents with a chief complaint of Left leg pain (14 Jun 2018 23:41)      SUBJECTIVE / OVERNIGHT EVENTS: No overnight events. Pt reports he has constant L knee pain w/ poor ROM. He reports R knee pain and ankle pain improved but L knee still hurts alot. Also complaining of L shoulder discomfort. Reports he is getting weaker daily. Eating. Having BMs. Denies chest pain, palpitations, dyspnea    MEDICATIONS  (STANDING): reviewed  ALBUTerol/ipratropium for Nebulization 3 milliLiter(s) Nebulizer every 6 hours  aspirin enteric coated 81 milliGRAM(s) Oral daily  atorvastatin 40 milliGRAM(s) Oral at bedtime  carvedilol 12.5 milliGRAM(s) Oral every 12 hours  clopidogrel Tablet 75 milliGRAM(s) Oral daily  dextrose 50% Injectable 12.5 Gram(s) IV Push once  dextrose 50% Injectable 25 Gram(s) IV Push once  dextrose 50% Injectable 25 Gram(s) IV Push once  docusate sodium 100 milliGRAM(s) Oral three times a day  gabapentin 300 milliGRAM(s) Oral two times a day  heparin  Injectable 5000 Unit(s) SubCutaneous every 12 hours  hydrALAZINE 50 milliGRAM(s) Oral every 8 hours  insulin lispro (HumaLOG) corrective regimen sliding scale   SubCutaneous three times a day before meals  isosorbide   mononitrate ER Tablet (IMDUR) 30 milliGRAM(s) Oral daily  montelukast 10 milliGRAM(s) Oral daily  pantoprazole    Tablet 40 milliGRAM(s) Oral before breakfast  polyethylene glycol 3350 17 Gram(s) Oral daily  predniSONE   Tablet 20 milliGRAM(s) Oral daily  senna 2 Tablet(s) Oral at bedtime  sodium chloride 0.9%. 1000 milliLiter(s) (50 mL/Hr) IV Continuous <Continuous>  sucralfate 1 Gram(s) Oral two times a day  tamsulosin 0.4 milliGRAM(s) Oral at bedtime    MEDICATIONS  (PRN):  acetaminophen   Tablet 650 milliGRAM(s) Oral every 6 hours PRN For Temp greater than 38 C (100.4 F)  acetaminophen   Tablet. 325 milliGRAM(s) Oral every 4 hours PRN Mild Pain (1 - 3)  dextrose 40% Gel 15 Gram(s) Oral once PRN Blood Glucose LESS THAN 70 milliGRAM(s)/deciliter  glucagon  Injectable 1 milliGRAM(s) IntraMuscular once PRN Glucose LESS THAN 70 milligrams/deciliter  oxyCODONE    IR 5 milliGRAM(s) Oral every 8 hours PRN Severe Pain (7 - 10)        CAPILLARY BLOOD GLUCOSE      POCT Blood Glucose.: 208 mg/dL (23 Jun 2018 17:07)  POCT Blood Glucose.: 187 mg/dL (23 Jun 2018 11:53)  POCT Blood Glucose.: 156 mg/dL (23 Jun 2018 07:35)  POCT Blood Glucose.: 178 mg/dL (22 Jun 2018 20:52)    I&O's Summary    22 Jun 2018 07:01  -  23 Jun 2018 07:00  --------------------------------------------------------  IN: 450 mL / OUT: 2500 mL / NET: -2050 mL    23 Jun 2018 07:01  -  23 Jun 2018 17:32  --------------------------------------------------------  IN: 0 mL / OUT: 925 mL / NET: -925 mL    Vital Signs Last 24 Hrs  T(C): 36.6 (23 Jun 2018 12:33), Max: 36.6 (23 Jun 2018 12:33)  T(F): 97.9 (23 Jun 2018 12:33), Max: 97.9 (23 Jun 2018 12:33)  HR: 78 (23 Jun 2018 15:22) (67 - 83)  BP: 118/68 (23 Jun 2018 12:33) (114/59 - 123/79)  BP(mean): --  RR: 18 (23 Jun 2018 12:33) (18 - 18)  SpO2: 98% (23 Jun 2018 12:33) (96% - 100%)    PHYSICAL EXAM:  GENERAL: Well-developed, well nourished, No acute distress  HEENT: EOMI, conjunctiva and sclera clear  NECK: Supple, No JVD, no LAD  CHEST/LUNG: Clear to auscultation bilaterally; no wheezes, rhonchi, or rales  HEART: S1/S2, Regular rate and rhythm  ABDOMEN: Nondistended, NABS, soft,  nontender  EXTREMITIES: L knee edematous no erythema or warmth + effusion, ROM limited by pain but able to flex, L ankle mildly edematous non- tender to palpation. R knee FROM, R shoulder able to abduct and raise above head good ROM,   2+ Peripheral Pulses, no clubbing, cyanosis, or edema  PSYCH: AAOx3, normal affect  NEUROLOGY: CN II-XII intact, 5/5 strength in upper and lower extremities  SKIN: No rashes or lesions    LABS:  Cr downtrending                        12.1   12.33 )-----------( 255      ( 23 Jun 2018 06:57 )             35.8     06-23    135  |  101  |  109<H>  ----------------------------<  174<H>  4.4   |  18<L>  |  3.81<H>    Ca    8.7      23 Jun 2018 06:57  Mg     2.4     06-23        RADIOLOGY & ADDITIONAL TESTS:    Imaging Personally Reviewed: Xray L shoulder, R knee reviewed    Consultant(s) Notes Reviewed:  Rheum. neurology, nephrology    Care Discussed with Consultants/Other Providers:

## 2018-06-23 NOTE — PROGRESS NOTE ADULT - PROBLEM SELECTOR PLAN 4
Knee pain likely 2/2 gout  Patient with mechanical fall, no head trauma or LOC; had CT pelvis and x rays of hip and knee all negative   -no need for knee immobilizer, this was discussed with ortho   -fall precaution  -pain control with Tylenol, low dose opiates   -f/u PT eval, WBAT, pt amenable to rehab

## 2018-06-24 DIAGNOSIS — I50.9 HEART FAILURE, UNSPECIFIED: ICD-10-CM

## 2018-06-24 LAB
BUN SERPL-MCNC: 100 MG/DL — HIGH (ref 7–23)
CALCIUM SERPL-MCNC: 8.6 MG/DL — SIGNIFICANT CHANGE UP (ref 8.4–10.5)
CHLORIDE SERPL-SCNC: 100 MMOL/L — SIGNIFICANT CHANGE UP (ref 98–107)
CO2 SERPL-SCNC: 18 MMOL/L — LOW (ref 22–31)
CREAT SERPL-MCNC: 3.35 MG/DL — HIGH (ref 0.5–1.3)
GLUCOSE SERPL-MCNC: 228 MG/DL — HIGH (ref 70–99)
HCT VFR BLD CALC: 37.7 % — LOW (ref 39–50)
HGB BLD-MCNC: 12.4 G/DL — LOW (ref 13–17)
MAGNESIUM SERPL-MCNC: 2.3 MG/DL — SIGNIFICANT CHANGE UP (ref 1.6–2.6)
MCHC RBC-ENTMCNC: 30 PG — SIGNIFICANT CHANGE UP (ref 27–34)
MCHC RBC-ENTMCNC: 32.9 % — SIGNIFICANT CHANGE UP (ref 32–36)
MCV RBC AUTO: 91.3 FL — SIGNIFICANT CHANGE UP (ref 80–100)
NRBC # FLD: 0 — SIGNIFICANT CHANGE UP
PLATELET # BLD AUTO: 277 K/UL — SIGNIFICANT CHANGE UP (ref 150–400)
PMV BLD: 10.5 FL — SIGNIFICANT CHANGE UP (ref 7–13)
POTASSIUM SERPL-MCNC: 4.2 MMOL/L — SIGNIFICANT CHANGE UP (ref 3.5–5.3)
POTASSIUM SERPL-SCNC: 4.2 MMOL/L — SIGNIFICANT CHANGE UP (ref 3.5–5.3)
RBC # BLD: 4.13 M/UL — LOW (ref 4.2–5.8)
RBC # FLD: 13.8 % — SIGNIFICANT CHANGE UP (ref 10.3–14.5)
SODIUM SERPL-SCNC: 135 MMOL/L — SIGNIFICANT CHANGE UP (ref 135–145)
WBC # BLD: 10.04 K/UL — SIGNIFICANT CHANGE UP (ref 3.8–10.5)
WBC # FLD AUTO: 10.04 K/UL — SIGNIFICANT CHANGE UP (ref 3.8–10.5)

## 2018-06-24 PROCEDURE — 99233 SBSQ HOSP IP/OBS HIGH 50: CPT

## 2018-06-24 RX ORDER — IPRATROPIUM/ALBUTEROL SULFATE 18-103MCG
3 AEROSOL WITH ADAPTER (GRAM) INHALATION EVERY 4 HOURS
Qty: 0 | Refills: 0 | Status: DISCONTINUED | OUTPATIENT
Start: 2018-06-24 | End: 2018-06-28

## 2018-06-24 RX ADMIN — Medication 100 MILLIGRAM(S): at 05:28

## 2018-06-24 RX ADMIN — Medication 1 GRAM(S): at 05:28

## 2018-06-24 RX ADMIN — OXYCODONE HYDROCHLORIDE 5 MILLIGRAM(S): 5 TABLET ORAL at 07:01

## 2018-06-24 RX ADMIN — Medication 3 MILLILITER(S): at 04:03

## 2018-06-24 RX ADMIN — TAMSULOSIN HYDROCHLORIDE 0.4 MILLIGRAM(S): 0.4 CAPSULE ORAL at 22:08

## 2018-06-24 RX ADMIN — SENNA PLUS 2 TABLET(S): 8.6 TABLET ORAL at 22:08

## 2018-06-24 RX ADMIN — PANTOPRAZOLE SODIUM 40 MILLIGRAM(S): 20 TABLET, DELAYED RELEASE ORAL at 05:28

## 2018-06-24 RX ADMIN — GABAPENTIN 300 MILLIGRAM(S): 400 CAPSULE ORAL at 05:28

## 2018-06-24 RX ADMIN — Medication 100 MILLIGRAM(S): at 12:21

## 2018-06-24 RX ADMIN — ISOSORBIDE MONONITRATE 30 MILLIGRAM(S): 60 TABLET, EXTENDED RELEASE ORAL at 12:23

## 2018-06-24 RX ADMIN — Medication 1 GRAM(S): at 17:31

## 2018-06-24 RX ADMIN — POLYETHYLENE GLYCOL 3350 17 GRAM(S): 17 POWDER, FOR SOLUTION ORAL at 12:21

## 2018-06-24 RX ADMIN — CARVEDILOL PHOSPHATE 12.5 MILLIGRAM(S): 80 CAPSULE, EXTENDED RELEASE ORAL at 05:28

## 2018-06-24 RX ADMIN — OXYCODONE HYDROCHLORIDE 5 MILLIGRAM(S): 5 TABLET ORAL at 15:48

## 2018-06-24 RX ADMIN — OXYCODONE HYDROCHLORIDE 5 MILLIGRAM(S): 5 TABLET ORAL at 07:30

## 2018-06-24 RX ADMIN — Medication 100 MILLIGRAM(S): at 22:08

## 2018-06-24 RX ADMIN — Medication 50 MILLIGRAM(S): at 05:28

## 2018-06-24 RX ADMIN — Medication 20 MILLIGRAM(S): at 17:31

## 2018-06-24 RX ADMIN — CARVEDILOL PHOSPHATE 12.5 MILLIGRAM(S): 80 CAPSULE, EXTENDED RELEASE ORAL at 17:30

## 2018-06-24 RX ADMIN — GABAPENTIN 300 MILLIGRAM(S): 400 CAPSULE ORAL at 17:31

## 2018-06-24 RX ADMIN — CLOPIDOGREL BISULFATE 75 MILLIGRAM(S): 75 TABLET, FILM COATED ORAL at 12:23

## 2018-06-24 RX ADMIN — Medication 3 MILLILITER(S): at 13:09

## 2018-06-24 RX ADMIN — OXYCODONE HYDROCHLORIDE 5 MILLIGRAM(S): 5 TABLET ORAL at 16:48

## 2018-06-24 RX ADMIN — MONTELUKAST 10 MILLIGRAM(S): 4 TABLET, CHEWABLE ORAL at 12:22

## 2018-06-24 RX ADMIN — HEPARIN SODIUM 5000 UNIT(S): 5000 INJECTION INTRAVENOUS; SUBCUTANEOUS at 17:31

## 2018-06-24 RX ADMIN — Medication 3 MILLILITER(S): at 21:31

## 2018-06-24 RX ADMIN — Medication 10 MILLIGRAM(S): at 17:29

## 2018-06-24 RX ADMIN — Medication 2: at 17:28

## 2018-06-24 RX ADMIN — Medication 50 MILLIGRAM(S): at 22:08

## 2018-06-24 RX ADMIN — Medication 3 MILLILITER(S): at 16:55

## 2018-06-24 RX ADMIN — Medication 81 MILLIGRAM(S): at 12:23

## 2018-06-24 RX ADMIN — Medication 4: at 12:17

## 2018-06-24 RX ADMIN — Medication 50 MILLIGRAM(S): at 12:22

## 2018-06-24 RX ADMIN — Medication 2: at 08:21

## 2018-06-24 RX ADMIN — HEPARIN SODIUM 5000 UNIT(S): 5000 INJECTION INTRAVENOUS; SUBCUTANEOUS at 05:28

## 2018-06-24 RX ADMIN — ATORVASTATIN CALCIUM 40 MILLIGRAM(S): 80 TABLET, FILM COATED ORAL at 22:08

## 2018-06-24 RX ADMIN — Medication 20 MILLIGRAM(S): at 05:28

## 2018-06-24 RX ADMIN — Medication 3 MILLILITER(S): at 09:38

## 2018-06-24 NOTE — PROGRESS NOTE ADULT - PROBLEM SELECTOR PROBLEM 9
Hyperlipidemia, unspecified hyperlipidemia type Type 2 diabetes mellitus with stage 4 chronic kidney disease, without long-term current use of insulin

## 2018-06-24 NOTE — PROGRESS NOTE ADULT - SUBJECTIVE AND OBJECTIVE BOX
Patient is a 63y old  Male who presents with a chief complaint of Left leg pain (14 Jun 2018 23:41)      SUBJECTIVE / OVERNIGHT EVENTS: Passed TOV overnight. Still complaining of L knee pain. Eating well.     MEDICATIONS  (STANDING):  ALBUTerol/ipratropium for Nebulization 3 milliLiter(s) Nebulizer every 4 hours  aspirin enteric coated 81 milliGRAM(s) Oral daily  atorvastatin 40 milliGRAM(s) Oral at bedtime  carvedilol 12.5 milliGRAM(s) Oral every 12 hours  clopidogrel Tablet 75 milliGRAM(s) Oral daily  dextrose 50% Injectable 12.5 Gram(s) IV Push once  dextrose 50% Injectable 25 Gram(s) IV Push once  dextrose 50% Injectable 25 Gram(s) IV Push once  docusate sodium 100 milliGRAM(s) Oral three times a day  gabapentin 300 milliGRAM(s) Oral two times a day  heparin  Injectable 5000 Unit(s) SubCutaneous every 12 hours  hydrALAZINE 50 milliGRAM(s) Oral every 8 hours  insulin lispro (HumaLOG) corrective regimen sliding scale   SubCutaneous three times a day before meals  isosorbide   mononitrate ER Tablet (IMDUR) 30 milliGRAM(s) Oral daily  montelukast 10 milliGRAM(s) Oral daily  pantoprazole    Tablet 40 milliGRAM(s) Oral before breakfast  polyethylene glycol 3350 17 Gram(s) Oral daily  predniSONE   Tablet 20 milliGRAM(s) Oral daily  senna 2 Tablet(s) Oral at bedtime  sucralfate 1 Gram(s) Oral two times a day  tamsulosin 0.4 milliGRAM(s) Oral at bedtime  torsemide 10 milliGRAM(s) Oral two times a day    MEDICATIONS  (PRN):  acetaminophen   Tablet 650 milliGRAM(s) Oral every 6 hours PRN For Temp greater than 38 C (100.4 F)  acetaminophen   Tablet. 325 milliGRAM(s) Oral every 4 hours PRN Mild Pain (1 - 3)  dextrose 40% Gel 15 Gram(s) Oral once PRN Blood Glucose LESS THAN 70 milliGRAM(s)/deciliter  glucagon  Injectable 1 milliGRAM(s) IntraMuscular once PRN Glucose LESS THAN 70 milligrams/deciliter  oxyCODONE    IR 5 milliGRAM(s) Oral every 8 hours PRN Severe Pain (7 - 10)        CAPILLARY BLOOD GLUCOSE      POCT Blood Glucose.: 205 mg/dL (24 Jun 2018 11:59)  POCT Blood Glucose.: 173 mg/dL (24 Jun 2018 07:31)  POCT Blood Glucose.: 178 mg/dL (23 Jun 2018 21:08)  POCT Blood Glucose.: 208 mg/dL (23 Jun 2018 17:07)    I&O's Summary    23 Jun 2018 07:01  -  24 Jun 2018 07:00  --------------------------------------------------------  IN: 440 mL / OUT: 2700 mL / NET: -2260 mL    24 Jun 2018 07:01  -  24 Jun 2018 15:42  --------------------------------------------------------  IN: 640 mL / OUT: 900 mL / NET: -260 mL    Vital Signs Last 24 Hrs  T(C): 36.4 (24 Jun 2018 12:22), Max: 36.5 (23 Jun 2018 21:05)  T(F): 97.6 (24 Jun 2018 12:22), Max: 97.7 (23 Jun 2018 21:05)  HR: 78 (24 Jun 2018 13:09) (66 - 87)  BP: 136/82 (24 Jun 2018 12:22) (112/60 - 136/82)  BP(mean): --  RR: 18 (24 Jun 2018 12:22) (17 - 18)  SpO2: 99% (24 Jun 2018 13:09) (96% - 100%) 2L NC    PHYSICAL EXAM:  GENERAL: Well-developed, well nourished, No acute distress  HEAD:  Atraumatic, Normocephalic  EYES: EOMI,  conjunctiva and sclera clear  NECK: Supple, No JVD, no LAD  CHEST/LUNG: no tachypnea or resp distress, b/l wheezing  HEART: S1/S2, Regular rate and rhythm;   ABDOMEN: Nondistended, NABS, soft,  nontender, no organomegaly, no peritoneal signs  EXTREMITIES:  L knee edematous, poor ROM, no erythema or warmth, 2+ Peripheral Pulses, no clubbing, cyanosis  PSYCH: AAOx3, normal affect  NEUROLOGY: moving extremities purposefully except LLE limited by pain      LABS: reviewed                        12.4   10.04 )-----------( 277      ( 24 Jun 2018 04:59 )             37.7     06-24    135  |  100  |  100<H>  ----------------------------<  228<H>  4.2   |  18<L>  |  3.35<H>    Ca    8.6      24 Jun 2018 04:59  Mg     2.3     06-24    Microbiology  Culture - Body Fluid with Gram Stain (06.21.18 @ 17:26)    Gram Stain:   NOS^No Organisms Seen  WBC^White Blood Cells  QNTY CELLS IN GRAM STAIN: MANY (4+)    Culture - Body Fluid:   NO ORGANISMS ISOLATED AT 24 HOURS  NO ORGANISMS ISOLATED AT 48 HRS.    Specimen Source: SYNOVIAL FLUID    Culture - Body Fluid with Gram Stain (06.21.18 @ 14:40)    Gram Stain:   NOS^No Organisms Seen  WBC^White Blood Cells  QNTY CELLS IN GRAM STAIN: MANY (4+)    Culture - Body Fluid:   NO ORGANISMS ISOLATED AT 24 HOURS  NO ORGANISMS ISOLATED AT 48 HRS.    Specimen Source: SYNOVIAL FLUID        RADIOLOGY & ADDITIONAL TESTS:    Imaging Personally Reviewed:    Culture - Blood (06.18.18 @ 21:01)    Culture - Blood:   NO ORGANISMS ISOLATED    Specimen Source: BLOOD    Consultant(s) Notes Reviewed:      Care Discussed with Consultants/Other Providers: Patient is a 63y old  Male who presents with a chief complaint of Left leg pain (14 Jun 2018 23:41)      SUBJECTIVE / OVERNIGHT EVENTS: Passed TOV overnight. Still complaining of L knee pain. Eating well.     MEDICATIONS  (STANDING):  ALBUTerol/ipratropium for Nebulization 3 milliLiter(s) Nebulizer every 4 hours  aspirin enteric coated 81 milliGRAM(s) Oral daily  atorvastatin 40 milliGRAM(s) Oral at bedtime  carvedilol 12.5 milliGRAM(s) Oral every 12 hours  clopidogrel Tablet 75 milliGRAM(s) Oral daily  dextrose 50% Injectable 12.5 Gram(s) IV Push once  dextrose 50% Injectable 25 Gram(s) IV Push once  dextrose 50% Injectable 25 Gram(s) IV Push once  docusate sodium 100 milliGRAM(s) Oral three times a day  gabapentin 300 milliGRAM(s) Oral two times a day  heparin  Injectable 5000 Unit(s) SubCutaneous every 12 hours  hydrALAZINE 50 milliGRAM(s) Oral every 8 hours  insulin lispro (HumaLOG) corrective regimen sliding scale   SubCutaneous three times a day before meals  isosorbide   mononitrate ER Tablet (IMDUR) 30 milliGRAM(s) Oral daily  montelukast 10 milliGRAM(s) Oral daily  pantoprazole    Tablet 40 milliGRAM(s) Oral before breakfast  polyethylene glycol 3350 17 Gram(s) Oral daily  predniSONE   Tablet 20 milliGRAM(s) Oral daily  senna 2 Tablet(s) Oral at bedtime  sucralfate 1 Gram(s) Oral two times a day  tamsulosin 0.4 milliGRAM(s) Oral at bedtime  torsemide 10 milliGRAM(s) Oral two times a day    MEDICATIONS  (PRN):  acetaminophen   Tablet 650 milliGRAM(s) Oral every 6 hours PRN For Temp greater than 38 C (100.4 F)  acetaminophen   Tablet. 325 milliGRAM(s) Oral every 4 hours PRN Mild Pain (1 - 3)  dextrose 40% Gel 15 Gram(s) Oral once PRN Blood Glucose LESS THAN 70 milliGRAM(s)/deciliter  glucagon  Injectable 1 milliGRAM(s) IntraMuscular once PRN Glucose LESS THAN 70 milligrams/deciliter  oxyCODONE    IR 5 milliGRAM(s) Oral every 8 hours PRN Severe Pain (7 - 10)        CAPILLARY BLOOD GLUCOSE      POCT Blood Glucose.: 205 mg/dL (24 Jun 2018 11:59)  POCT Blood Glucose.: 173 mg/dL (24 Jun 2018 07:31)  POCT Blood Glucose.: 178 mg/dL (23 Jun 2018 21:08)  POCT Blood Glucose.: 208 mg/dL (23 Jun 2018 17:07)    I&O's Summary    23 Jun 2018 07:01  -  24 Jun 2018 07:00  --------------------------------------------------------  IN: 440 mL / OUT: 2700 mL / NET: -2260 mL    24 Jun 2018 07:01  -  24 Jun 2018 15:42  --------------------------------------------------------  IN: 640 mL / OUT: 900 mL / NET: -260 mL    Vital Signs Last 24 Hrs  T(C): 36.4 (24 Jun 2018 12:22), Max: 36.5 (23 Jun 2018 21:05)  T(F): 97.6 (24 Jun 2018 12:22), Max: 97.7 (23 Jun 2018 21:05)  HR: 78 (24 Jun 2018 13:09) (66 - 87)  BP: 136/82 (24 Jun 2018 12:22) (112/60 - 136/82)  BP(mean): --  RR: 18 (24 Jun 2018 12:22) (17 - 18)  SpO2: 99% (24 Jun 2018 13:09) (96% - 100%) 2L NC    PHYSICAL EXAM:  GENERAL: Well-developed, well nourished, No acute distress  HEAD:  Atraumatic, Normocephalic  EYES: EOMI,  conjunctiva and sclera clear  NECK: Supple, No JVD, no LAD  CHEST/LUNG: no tachypnea or resp distress, b/l wheezing  HEART: S1/S2, Regular rate and rhythm;   ABDOMEN: Nondistended, NABS, soft,  nontender, no organomegaly, no peritoneal signs  EXTREMITIES:  L knee edematous, poor ROM, no erythema or warmth, 2+ Peripheral Pulses, no clubbing, cyanosis  PSYCH: normal affect  NEUROLOGY: moving extremities purposefully except LLE limited by pain      LABS: reviewed                        12.4   10.04 )-----------( 277      ( 24 Jun 2018 04:59 )             37.7     06-24    135  |  100  |  100<H>  ----------------------------<  228<H>  4.2   |  18<L>  |  3.35<H>    Ca    8.6      24 Jun 2018 04:59  Mg     2.3     06-24    Microbiology  Culture - Body Fluid with Gram Stain (06.21.18 @ 17:26)    Gram Stain:   NOS^No Organisms Seen  WBC^White Blood Cells  QNTY CELLS IN GRAM STAIN: MANY (4+)    Culture - Body Fluid:   NO ORGANISMS ISOLATED AT 24 HOURS  NO ORGANISMS ISOLATED AT 48 HRS.    Specimen Source: SYNOVIAL FLUID    Culture - Body Fluid with Gram Stain (06.21.18 @ 14:40)    Gram Stain:   NOS^No Organisms Seen  WBC^White Blood Cells  QNTY CELLS IN GRAM STAIN: MANY (4+)    Culture - Body Fluid:   NO ORGANISMS ISOLATED AT 24 HOURS  NO ORGANISMS ISOLATED AT 48 HRS.    Specimen Source: SYNOVIAL FLUID        RADIOLOGY & ADDITIONAL TESTS:    Imaging Personally Reviewed:    Culture - Blood (06.18.18 @ 21:01)    Culture - Blood:   NO ORGANISMS ISOLATED    Specimen Source: BLOOD    Consultant(s) Notes Reviewed:      Care Discussed with Consultants/Other Providers:

## 2018-06-24 NOTE — PROGRESS NOTE ADULT - PROBLEM SELECTOR PROBLEM 8
Type 2 diabetes mellitus with stage 4 chronic kidney disease, without long-term current use of insulin Coronary artery disease involving native coronary artery of native heart without angina pectoris

## 2018-06-24 NOTE — PROGRESS NOTE ADULT - PROBLEM SELECTOR PLAN 1
Developed fever 6/18 evening a/w encephalopathy,  patient only reports L knee pain, which had enlarged from prior.  At this point fever presumed 2/2 gout flare.    -Blood cxs and synovial fluid cx neg to date  -UA and CXR unremarkable; CT chest w/ LAD no prior for comparison (needs OP interval f/u 1-3 months)   -RVP negative  -ortho evaluated re: enlarging L knee effusion, no intervention  -rheum eval appreciated s/p b/l knee arthrocentesis c/w gout, started on prednisone taper as per rheum. Rheum recommends restarting taper today to see if it may help L knee pain. Pt s/p steroid injection in b/l knees w/o much improvement in L knee

## 2018-06-24 NOTE — PROGRESS NOTE ADULT - PROBLEM SELECTOR PLAN 4
Knee pain likely 2/2 gout  Patient with mechanical fall, no head trauma or LOC; had CT pelvis and x rays of hip and knee all negative   -Rheum recommends restarting taper today to see if it may help L knee pain. Pt s/p steroid injection in b/l knees w/o much improvement in L knee  -no need for knee immobilizer, this was discussed with ortho   -fall precaution  -pain control with Tylenol, low dose opiates   -f/u PT eval, WBAT, pt amenable to rehab Cr improving   Pt w/ ATN now resolving. Renal spun urine w/ muddy brown cast c/w ATN. Initial injury unclear pre-renal (FeUrea 22%) vs obstructive pt improving after ya placed 6/19;  -c/w flomax. Ya removed and pt passed TOV  -renal US neg hydro  - Diuretics resumed today as pt wheezing and now requiring supplemental O2

## 2018-06-24 NOTE — PROGRESS NOTE ADULT - PROBLEM SELECTOR PROBLEM 5
Benign prostatic hyperplasia, unspecified whether lower urinary tract symptoms present Encephalopathy

## 2018-06-24 NOTE — PROGRESS NOTE ADULT - PROBLEM SELECTOR PLAN 6
- Continue with home antihypertensive treatment, decreased hydral due to soft BP  - Low sodium diet -c/w tamsulosin  - Pt passed TOV. Monitor urine output

## 2018-06-24 NOTE — PROGRESS NOTE ADULT - PROBLEM SELECTOR PLAN 9
- Continue with statin therapy  - Low cholesterol diet Well controlled. HbA1c <7%  - FSBS with moderate dose lispro coverage Well controlled. HbA1c <7%  - FGs likely elevated in setting of prednisone. Monitor for now. May need to add lantus  - FSBS with moderate dose lispro coverage

## 2018-06-24 NOTE — PROGRESS NOTE ADULT - PROBLEM SELECTOR PLAN 2
Cr improving   Pt w/ ATN now resolving. Renal spun urine w/ muddy brown cast c/w ATN. Initial injury unclear pre-renal (FeUrea 22%) vs obstructive pt improving after ya placed 6/19;  -c/w flomax. Ya removed and pt passed TOV  -renal US neg hydro  - Diuretics resumed today as pt wheezing and now requiring supplemental O2 Knee pain likely 2/2 gout  Patient with mechanical fall, no head trauma or LOC; had CT pelvis and x rays of hip and knee all negative   -Rheum recommends restarting taper today to see if it may help L knee pain. Pt s/p steroid injection in b/l knees w/o much improvement in L knee  -no need for knee immobilizer, this was discussed with ortho   -fall precaution  -pain control with Tylenol, low dose opiates   -f/u PT eval, WBAT, pt amenable to rehab

## 2018-06-24 NOTE — PROGRESS NOTE ADULT - PROBLEM SELECTOR PLAN 5
-c/w tamsulosin  - Pt passed TOV. Monitor urine output Patient was confused 6/18 in context of fever. stroke code called, seen by neuro  -CTH w/ old strokes. Mental status now back to baseline  -doubt knew CVA, neuro rec MRI and tele given extensive history--need to f/u neuro re: utility of MRI at this point

## 2018-06-24 NOTE — PROGRESS NOTE ADULT - PROBLEM SELECTOR PLAN 3
Patient was confused 6/18 in context of fever. stroke code called, seen by neuro  -CTH w/ old strokes. Mental status now back to baseline  -doubt knew CVA, neuro rec MRI and tele given extensive history--need to f/u neuro re: utility of MRI at this point Unknown if pt has systolic or diastolic CHF  Plan to resume home torsemide as pt now on supplemental O2 and b/l wheezing, suggesting mild exacerbation  Wean O2 as tolerated  Monitor Cr

## 2018-06-24 NOTE — PROGRESS NOTE ADULT - PROBLEM SELECTOR PROBLEM 6
Essential hypertension Benign prostatic hyperplasia, unspecified whether lower urinary tract symptoms present

## 2018-06-24 NOTE — PROGRESS NOTE ADULT - PROBLEM SELECTOR PLAN 7
-c/w asa and plavix - Continue with home antihypertensive treatment, decreased hydral due to soft BP  - Low sodium diet

## 2018-06-25 LAB
BUN SERPL-MCNC: 89 MG/DL — HIGH (ref 7–23)
CALCIUM SERPL-MCNC: 8.8 MG/DL — SIGNIFICANT CHANGE UP (ref 8.4–10.5)
CCP AB SER-ACNC: SIGNIFICANT CHANGE UP
CHLORIDE SERPL-SCNC: 101 MMOL/L — SIGNIFICANT CHANGE UP (ref 98–107)
CO2 SERPL-SCNC: 19 MMOL/L — LOW (ref 22–31)
CREAT SERPL-MCNC: 3.1 MG/DL — HIGH (ref 0.5–1.3)
GLUCOSE BLDC GLUCOMTR-MCNC: 141 MG/DL — HIGH (ref 70–99)
GLUCOSE BLDC GLUCOMTR-MCNC: 149 MG/DL — HIGH (ref 70–99)
GLUCOSE SERPL-MCNC: 208 MG/DL — HIGH (ref 70–99)
HCT VFR BLD CALC: 38.8 % — LOW (ref 39–50)
HGB BLD-MCNC: 12.7 G/DL — LOW (ref 13–17)
MAGNESIUM SERPL-MCNC: 2 MG/DL — SIGNIFICANT CHANGE UP (ref 1.6–2.6)
MCHC RBC-ENTMCNC: 29.9 PG — SIGNIFICANT CHANGE UP (ref 27–34)
MCHC RBC-ENTMCNC: 32.7 % — SIGNIFICANT CHANGE UP (ref 32–36)
MCV RBC AUTO: 91.3 FL — SIGNIFICANT CHANGE UP (ref 80–100)
NRBC # FLD: 0 — SIGNIFICANT CHANGE UP
PLATELET # BLD AUTO: 310 K/UL — SIGNIFICANT CHANGE UP (ref 150–400)
PMV BLD: 10.5 FL — SIGNIFICANT CHANGE UP (ref 7–13)
POTASSIUM SERPL-MCNC: 4.6 MMOL/L — SIGNIFICANT CHANGE UP (ref 3.5–5.3)
POTASSIUM SERPL-SCNC: 4.6 MMOL/L — SIGNIFICANT CHANGE UP (ref 3.5–5.3)
RBC # BLD: 4.25 M/UL — SIGNIFICANT CHANGE UP (ref 4.2–5.8)
RBC # FLD: 13.6 % — SIGNIFICANT CHANGE UP (ref 10.3–14.5)
SODIUM SERPL-SCNC: 137 MMOL/L — SIGNIFICANT CHANGE UP (ref 135–145)
WBC # BLD: 10.47 K/UL — SIGNIFICANT CHANGE UP (ref 3.8–10.5)
WBC # FLD AUTO: 10.47 K/UL — SIGNIFICANT CHANGE UP (ref 3.8–10.5)

## 2018-06-25 PROCEDURE — 70450 CT HEAD/BRAIN W/O DYE: CPT | Mod: 26

## 2018-06-25 PROCEDURE — 99233 SBSQ HOSP IP/OBS HIGH 50: CPT

## 2018-06-25 RX ADMIN — Medication 50 MILLIGRAM(S): at 13:43

## 2018-06-25 RX ADMIN — Medication 50 MILLIGRAM(S): at 06:03

## 2018-06-25 RX ADMIN — Medication 3 MILLILITER(S): at 21:04

## 2018-06-25 RX ADMIN — Medication 10 MILLIGRAM(S): at 18:38

## 2018-06-25 RX ADMIN — OXYCODONE HYDROCHLORIDE 5 MILLIGRAM(S): 5 TABLET ORAL at 06:33

## 2018-06-25 RX ADMIN — ATORVASTATIN CALCIUM 40 MILLIGRAM(S): 80 TABLET, FILM COATED ORAL at 23:55

## 2018-06-25 RX ADMIN — GABAPENTIN 300 MILLIGRAM(S): 400 CAPSULE ORAL at 06:03

## 2018-06-25 RX ADMIN — Medication 1 GRAM(S): at 06:03

## 2018-06-25 RX ADMIN — Medication 100 MILLIGRAM(S): at 06:03

## 2018-06-25 RX ADMIN — HEPARIN SODIUM 5000 UNIT(S): 5000 INJECTION INTRAVENOUS; SUBCUTANEOUS at 18:37

## 2018-06-25 RX ADMIN — CARVEDILOL PHOSPHATE 12.5 MILLIGRAM(S): 80 CAPSULE, EXTENDED RELEASE ORAL at 18:38

## 2018-06-25 RX ADMIN — CARVEDILOL PHOSPHATE 12.5 MILLIGRAM(S): 80 CAPSULE, EXTENDED RELEASE ORAL at 06:04

## 2018-06-25 RX ADMIN — OXYCODONE HYDROCHLORIDE 5 MILLIGRAM(S): 5 TABLET ORAL at 06:03

## 2018-06-25 RX ADMIN — Medication 3 MILLILITER(S): at 01:17

## 2018-06-25 RX ADMIN — Medication 3 MILLILITER(S): at 04:47

## 2018-06-25 RX ADMIN — MONTELUKAST 10 MILLIGRAM(S): 4 TABLET, CHEWABLE ORAL at 12:50

## 2018-06-25 RX ADMIN — Medication 3 MILLILITER(S): at 08:53

## 2018-06-25 RX ADMIN — Medication 10 MILLIGRAM(S): at 06:04

## 2018-06-25 RX ADMIN — CLOPIDOGREL BISULFATE 75 MILLIGRAM(S): 75 TABLET, FILM COATED ORAL at 12:50

## 2018-06-25 RX ADMIN — POLYETHYLENE GLYCOL 3350 17 GRAM(S): 17 POWDER, FOR SOLUTION ORAL at 12:50

## 2018-06-25 RX ADMIN — GABAPENTIN 300 MILLIGRAM(S): 400 CAPSULE ORAL at 18:37

## 2018-06-25 RX ADMIN — TAMSULOSIN HYDROCHLORIDE 0.4 MILLIGRAM(S): 0.4 CAPSULE ORAL at 23:55

## 2018-06-25 RX ADMIN — ISOSORBIDE MONONITRATE 30 MILLIGRAM(S): 60 TABLET, EXTENDED RELEASE ORAL at 12:50

## 2018-06-25 RX ADMIN — Medication 100 MILLIGRAM(S): at 13:43

## 2018-06-25 RX ADMIN — Medication 3 MILLILITER(S): at 16:23

## 2018-06-25 RX ADMIN — OXYCODONE HYDROCHLORIDE 5 MILLIGRAM(S): 5 TABLET ORAL at 23:56

## 2018-06-25 RX ADMIN — Medication 81 MILLIGRAM(S): at 12:50

## 2018-06-25 RX ADMIN — Medication 4: at 12:49

## 2018-06-25 RX ADMIN — Medication 3 MILLILITER(S): at 12:09

## 2018-06-25 RX ADMIN — Medication 30 MILLIGRAM(S): at 06:36

## 2018-06-25 RX ADMIN — HEPARIN SODIUM 5000 UNIT(S): 5000 INJECTION INTRAVENOUS; SUBCUTANEOUS at 06:04

## 2018-06-25 RX ADMIN — Medication 50 MILLIGRAM(S): at 23:55

## 2018-06-25 RX ADMIN — Medication 4: at 08:10

## 2018-06-25 RX ADMIN — Medication 1 GRAM(S): at 18:38

## 2018-06-25 RX ADMIN — PANTOPRAZOLE SODIUM 40 MILLIGRAM(S): 20 TABLET, DELAYED RELEASE ORAL at 06:04

## 2018-06-25 NOTE — PROGRESS NOTE ADULT - SUBJECTIVE AND OBJECTIVE BOX
Patient is a 63y old  Male who presents with a chief complaint of Left leg pain (14 Jun 2018 23:41)      SUBJECTIVE / OVERNIGHT EVENTS: feels well, denies SOB or CP. had run of SVT overnight, asymptomatic. voiding freely, knee pain much improved.     ROS:  No HA/DZ  No Vision changes   No CP, SOB  No N/V/D  No Edema  No Rash  NO weakness, numbness    MEDICATIONS  (STANDING):  ALBUTerol/ipratropium for Nebulization 3 milliLiter(s) Nebulizer every 4 hours  aspirin enteric coated 81 milliGRAM(s) Oral daily  atorvastatin 40 milliGRAM(s) Oral at bedtime  carvedilol 12.5 milliGRAM(s) Oral every 12 hours  clopidogrel Tablet 75 milliGRAM(s) Oral daily  dextrose 50% Injectable 12.5 Gram(s) IV Push once  dextrose 50% Injectable 25 Gram(s) IV Push once  dextrose 50% Injectable 25 Gram(s) IV Push once  docusate sodium 100 milliGRAM(s) Oral three times a day  gabapentin 300 milliGRAM(s) Oral two times a day  heparin  Injectable 5000 Unit(s) SubCutaneous every 12 hours  hydrALAZINE 50 milliGRAM(s) Oral every 8 hours  insulin lispro (HumaLOG) corrective regimen sliding scale   SubCutaneous three times a day before meals  isosorbide   mononitrate ER Tablet (IMDUR) 30 milliGRAM(s) Oral daily  montelukast 10 milliGRAM(s) Oral daily  pantoprazole    Tablet 40 milliGRAM(s) Oral before breakfast  polyethylene glycol 3350 17 Gram(s) Oral daily  predniSONE   Tablet 30 milliGRAM(s) Oral daily  senna 2 Tablet(s) Oral at bedtime  sucralfate 1 Gram(s) Oral two times a day  tamsulosin 0.4 milliGRAM(s) Oral at bedtime  torsemide 10 milliGRAM(s) Oral two times a day    MEDICATIONS  (PRN):  acetaminophen   Tablet 650 milliGRAM(s) Oral every 6 hours PRN For Temp greater than 38 C (100.4 F)  acetaminophen   Tablet. 325 milliGRAM(s) Oral every 4 hours PRN Mild Pain (1 - 3)  dextrose 40% Gel 15 Gram(s) Oral once PRN Blood Glucose LESS THAN 70 milliGRAM(s)/deciliter  glucagon  Injectable 1 milliGRAM(s) IntraMuscular once PRN Glucose LESS THAN 70 milligrams/deciliter  oxyCODONE    IR 5 milliGRAM(s) Oral every 8 hours PRN Severe Pain (7 - 10)      T(C): 36.4 (06-25-18 @ 12:48)  HR: 86 (06-25-18 @ 12:48)  BP: 128/73 (06-25-18 @ 12:48)  RR: 18 (06-25-18 @ 12:48)  SpO2: 98% (06-25-18 @ 12:48)  CAPILLARY BLOOD GLUCOSE      POCT Blood Glucose.: 216 mg/dL (25 Jun 2018 11:59)  POCT Blood Glucose.: 213 mg/dL (25 Jun 2018 07:41)  POCT Blood Glucose.: 185 mg/dL (24 Jun 2018 20:35)  POCT Blood Glucose.: 189 mg/dL (24 Jun 2018 17:07)    I&O's Summary    24 Jun 2018 07:01  -  25 Jun 2018 07:00  --------------------------------------------------------  IN: 1130 mL / OUT: 1875 mL / NET: -745 mL        PHYSICAL EXAM:  GENERAL: NAD, well-developed, AOx3  HEAD:  Atraumatic, Normocephalic  EYES: EOMI, PERRL, conjunctiva and sclera clear  NECK: Supple, No JVD  CHEST/LUNG: Clear to auscultation bilaterally  HEART: Regular rate and rhythm; No murmurs, rubs, or gallops, No Edema  ABDOMEN: Soft, Nontender, Nondistended; Bowel sounds present  EXTREMITIES:  2+ Peripheral Pulses, No clubbing, cyanosis  PSYCH: No SI/HI  NEUROLOGY: non-focal  SKIN: No rashes or lesions  Mild knee warmth b/l, intact ROM     LABS:                        12.7   10.47 )-----------( 310      ( 25 Jun 2018 05:16 )             38.8     06-25    137  |  101  |  89<H>  ----------------------------<  208<H>  4.6   |  19<L>  |  3.10<H>    Ca    8.8      25 Jun 2018 05:16  Mg     2.0     06-25                    RADIOLOGY & ADDITIONAL TESTS:    Imaging Personally Reviewed: CT chest - s/p AICD, + lymphadenopathy     Consultant(s) Notes Reviewed:      Care Discussed with Consultants/Other Providers:

## 2018-06-25 NOTE — PROGRESS NOTE ADULT - PROBLEM SELECTOR PLAN 3
Unknown if pt has systolic or diastolic CHF  resumed home torsemide as pt was on supplemental O2 and b/l wheezing, suggesting mild CHF exacerbation  Wean O2 as tolerated  also with run of of SVT, hx of AICD, cards follow up today   Monitor Cr

## 2018-06-25 NOTE — PROGRESS NOTE ADULT - PROBLEM SELECTOR PLAN 7
- Continue with home antihypertensive treatment, decreased hydral due to soft BP  - Low sodium diet  - controlled

## 2018-06-25 NOTE — CHART NOTE - NSCHARTNOTEFT_GEN_A_CORE
Spoke with neurology. MRI/MRA head/neck not needed. Repeat CT head recommended/ordered. Will continue to monitor.

## 2018-06-25 NOTE — PROGRESS NOTE ADULT - PROBLEM SELECTOR PLAN 2
Knee pain likely 2/2 gout  Patient with mechanical fall, no head trauma or LOC; had CT pelvis and x rays of hip and knee all negative   -Rheum recommends steroid taper  -no need for knee immobilizer, this was discussed with ortho   -fall precaution  -pain control with Tylenol, low dose opiates   -for rehab

## 2018-06-25 NOTE — DIETITIAN INITIAL EVALUATION ADULT. - OTHER INFO
Pt seen for Length of stay. Pt 62 yo male appears alert, oriented. Per Pt his appetite usually good; No chew/swallow problem voiced; no nausea/vomiting/diarrhea reported @ present. At home Pt avoids Salt & sugar reported. Pt also stated she lost weight: ~26# 2/2 rehab stay. Food preferences discussed. Better food choices discussed as well. RDN remains available, Pt made aware. Pt seen for Length of stay. Pt 62 yo male appears alert, oriented. Per Pt his appetite usually good; No chew/swallow problem voiced; no nausea/vomiting/diarrhea reported @ present. At home Pt tries to avoid salt & sugar reported. Pt also stated he lost weight: ~26# 2/2 rehab stay. Food preferences discussed. Better food choices discussed as well. RDN remains available, Pt made aware.

## 2018-06-25 NOTE — DIETITIAN INITIAL EVALUATION ADULT. - NS AS NUTRI INTERV MEALS SNACK
Diets modified for specific foods and ingredients/Other (specify)/1. Suggest: Continue current PO diet order which appears appropriate @ present;             2. Encourage & assist Pt with meals; Monitor PO diet tolerance;             3. Monitor labs, weights, hydration status;

## 2018-06-25 NOTE — PROGRESS NOTE ADULT - PROBLEM SELECTOR PLAN 5
Patient was confused 6/18 in context of fever. stroke code called, seen by neuro  -CTH w/ old strokes. Mental status now back to baseline wo any focal s/s   -doubt new CVA, neuro rec MRI and tele given extensive history  - MRI DCs due to AICD - will DW neuro if needs further w/u

## 2018-06-25 NOTE — DIETITIAN INITIAL EVALUATION ADULT. - PERTINENT LABORATORY DATA
(6/25) H/H 12.7/38.8 L, BUN 89 H, Creat 3.10 H, Glu 208 H;            (6/19) Albumin 3.1 L;        (6/16) HbA1c 6.4% H

## 2018-06-25 NOTE — PROGRESS NOTE ADULT - PROBLEM SELECTOR PLAN 4
Cr improving   Pt w/ ATN now resolving. Renal spun urine w/ muddy brown cast c/w ATN. Initial injury unclear pre-renal (FeUrea 22%) vs obstructive. pt improving after Torres placed 6/19;  -c/w flomax. Torres removed and pt passed TOV  -renal US neg hydro  - Diuretics resumed as pt wheezing and now requiring supplemental O2  - retention likely due to known BPH - outpt  follow up

## 2018-06-25 NOTE — PROGRESS NOTE ADULT - PROBLEM SELECTOR PLAN 1
with associated fever, but infx w/u unremarkable  s/p knee tap consistent with gout s/p intraarticular steroid injection with minimal relief, started om steroid taper now with improvement of flare symptoms. cw steroid taper per rheum. fever likely due to inflammatory process, monitor off abx. jorge ortho input - no interventions

## 2018-06-26 LAB
BUN SERPL-MCNC: 82 MG/DL — HIGH (ref 7–23)
CALCIUM SERPL-MCNC: 8.9 MG/DL — SIGNIFICANT CHANGE UP (ref 8.4–10.5)
CHLORIDE SERPL-SCNC: 97 MMOL/L — LOW (ref 98–107)
CO2 SERPL-SCNC: 19 MMOL/L — LOW (ref 22–31)
CREAT SERPL-MCNC: 2.93 MG/DL — HIGH (ref 0.5–1.3)
GLUCOSE BLDC GLUCOMTR-MCNC: 172 MG/DL — HIGH (ref 70–99)
GLUCOSE BLDC GLUCOMTR-MCNC: 193 MG/DL — HIGH (ref 70–99)
GLUCOSE BLDC GLUCOMTR-MCNC: 207 MG/DL — HIGH (ref 70–99)
GLUCOSE BLDC GLUCOMTR-MCNC: 207 MG/DL — HIGH (ref 70–99)
GLUCOSE SERPL-MCNC: 120 MG/DL — HIGH (ref 70–99)
HCT VFR BLD CALC: 40.2 % — SIGNIFICANT CHANGE UP (ref 39–50)
HGB BLD-MCNC: 13.7 G/DL — SIGNIFICANT CHANGE UP (ref 13–17)
MAGNESIUM SERPL-MCNC: 1.9 MG/DL — SIGNIFICANT CHANGE UP (ref 1.6–2.6)
MCHC RBC-ENTMCNC: 29.6 PG — SIGNIFICANT CHANGE UP (ref 27–34)
MCHC RBC-ENTMCNC: 34.1 % — SIGNIFICANT CHANGE UP (ref 32–36)
MCV RBC AUTO: 86.8 FL — SIGNIFICANT CHANGE UP (ref 80–100)
NRBC # FLD: 0 — SIGNIFICANT CHANGE UP
PLATELET # BLD AUTO: 311 K/UL — SIGNIFICANT CHANGE UP (ref 150–400)
PMV BLD: 9.8 FL — SIGNIFICANT CHANGE UP (ref 7–13)
POTASSIUM SERPL-MCNC: 4.3 MMOL/L — SIGNIFICANT CHANGE UP (ref 3.5–5.3)
POTASSIUM SERPL-SCNC: 4.3 MMOL/L — SIGNIFICANT CHANGE UP (ref 3.5–5.3)
RBC # BLD: 4.63 M/UL — SIGNIFICANT CHANGE UP (ref 4.2–5.8)
RBC # FLD: 13.6 % — SIGNIFICANT CHANGE UP (ref 10.3–14.5)
SODIUM SERPL-SCNC: 136 MMOL/L — SIGNIFICANT CHANGE UP (ref 135–145)
WBC # BLD: 14.69 K/UL — HIGH (ref 3.8–10.5)
WBC # FLD AUTO: 14.69 K/UL — HIGH (ref 3.8–10.5)

## 2018-06-26 PROCEDURE — 99232 SBSQ HOSP IP/OBS MODERATE 35: CPT | Mod: GC

## 2018-06-26 PROCEDURE — 99233 SBSQ HOSP IP/OBS HIGH 50: CPT

## 2018-06-26 PROCEDURE — 93282 PRGRMG EVAL IMPLANTABLE DFB: CPT | Mod: 26,GC

## 2018-06-26 RX ORDER — CARVEDILOL PHOSPHATE 80 MG/1
25 CAPSULE, EXTENDED RELEASE ORAL EVERY 12 HOURS
Qty: 0 | Refills: 0 | Status: DISCONTINUED | OUTPATIENT
Start: 2018-06-26 | End: 2018-06-28

## 2018-06-26 RX ADMIN — Medication 4: at 17:35

## 2018-06-26 RX ADMIN — CARVEDILOL PHOSPHATE 12.5 MILLIGRAM(S): 80 CAPSULE, EXTENDED RELEASE ORAL at 06:37

## 2018-06-26 RX ADMIN — Medication 3 MILLILITER(S): at 21:02

## 2018-06-26 RX ADMIN — Medication 3 MILLILITER(S): at 04:46

## 2018-06-26 RX ADMIN — ATORVASTATIN CALCIUM 40 MILLIGRAM(S): 80 TABLET, FILM COATED ORAL at 23:02

## 2018-06-26 RX ADMIN — Medication 1 GRAM(S): at 06:37

## 2018-06-26 RX ADMIN — GABAPENTIN 300 MILLIGRAM(S): 400 CAPSULE ORAL at 17:32

## 2018-06-26 RX ADMIN — Medication 81 MILLIGRAM(S): at 13:02

## 2018-06-26 RX ADMIN — Medication 50 MILLIGRAM(S): at 06:37

## 2018-06-26 RX ADMIN — OXYCODONE HYDROCHLORIDE 5 MILLIGRAM(S): 5 TABLET ORAL at 07:30

## 2018-06-26 RX ADMIN — GABAPENTIN 300 MILLIGRAM(S): 400 CAPSULE ORAL at 06:37

## 2018-06-26 RX ADMIN — CARVEDILOL PHOSPHATE 25 MILLIGRAM(S): 80 CAPSULE, EXTENDED RELEASE ORAL at 17:32

## 2018-06-26 RX ADMIN — HEPARIN SODIUM 5000 UNIT(S): 5000 INJECTION INTRAVENOUS; SUBCUTANEOUS at 06:38

## 2018-06-26 RX ADMIN — Medication 1 GRAM(S): at 17:32

## 2018-06-26 RX ADMIN — Medication 3 MILLILITER(S): at 00:05

## 2018-06-26 RX ADMIN — CLOPIDOGREL BISULFATE 75 MILLIGRAM(S): 75 TABLET, FILM COATED ORAL at 13:03

## 2018-06-26 RX ADMIN — Medication 10 MILLIGRAM(S): at 17:33

## 2018-06-26 RX ADMIN — TAMSULOSIN HYDROCHLORIDE 0.4 MILLIGRAM(S): 0.4 CAPSULE ORAL at 23:02

## 2018-06-26 RX ADMIN — Medication 30 MILLIGRAM(S): at 06:37

## 2018-06-26 RX ADMIN — OXYCODONE HYDROCHLORIDE 5 MILLIGRAM(S): 5 TABLET ORAL at 07:39

## 2018-06-26 RX ADMIN — Medication 50 MILLIGRAM(S): at 23:02

## 2018-06-26 RX ADMIN — Medication 4: at 13:01

## 2018-06-26 RX ADMIN — Medication 2: at 08:32

## 2018-06-26 RX ADMIN — HEPARIN SODIUM 5000 UNIT(S): 5000 INJECTION INTRAVENOUS; SUBCUTANEOUS at 17:32

## 2018-06-26 RX ADMIN — ISOSORBIDE MONONITRATE 30 MILLIGRAM(S): 60 TABLET, EXTENDED RELEASE ORAL at 13:02

## 2018-06-26 RX ADMIN — OXYCODONE HYDROCHLORIDE 5 MILLIGRAM(S): 5 TABLET ORAL at 00:26

## 2018-06-26 RX ADMIN — Medication 10 MILLIGRAM(S): at 06:37

## 2018-06-26 RX ADMIN — PANTOPRAZOLE SODIUM 40 MILLIGRAM(S): 20 TABLET, DELAYED RELEASE ORAL at 06:37

## 2018-06-26 RX ADMIN — Medication 3 MILLILITER(S): at 09:24

## 2018-06-26 RX ADMIN — Medication 50 MILLIGRAM(S): at 13:03

## 2018-06-26 RX ADMIN — Medication 3 MILLILITER(S): at 16:17

## 2018-06-26 RX ADMIN — Medication 3 MILLILITER(S): at 12:55

## 2018-06-26 RX ADMIN — MONTELUKAST 10 MILLIGRAM(S): 4 TABLET, CHEWABLE ORAL at 13:02

## 2018-06-26 NOTE — CONSULT NOTE ADULT - CONSULT REASON
AT
Altered mental status: Code stroke
SHERRY on CKD
left knee pain x 1 d
shoulder and knee pain x 6 days

## 2018-06-26 NOTE — PROGRESS NOTE ADULT - PROBLEM SELECTOR PLAN 5
Patient was confused 6/18 in context of fever. stroke code called, seen by neuro  -CTH w/ old strokes. Mental status now back to baseline wo any focal s/s   -doubt new CVA, neuro rec MRI but unable due to AICD - rec repeat CTH which is neg for acute process. no further w/u per neuro cw Antiplt regimen

## 2018-06-26 NOTE — PROGRESS NOTE ADULT - PROBLEM SELECTOR PLAN 1
with associated fever, but infx w/u unremarkable  s/p knee tap consistent with gout s/p intraarticular steroid injection with minimal relief, started on steroid taper now with improvement of flare symptoms. cw steroid taper per rheum. fever likely due to inflammatory process, monitor off abx. jorge ortho input - no interventions with associated fever, but infx w/u unremarkable  s/p knee tap consistent with gout s/p intraarticular steroid injection with minimal relief, started on steroid taper now with improvement of flare symptoms. cw steroid taper per rheum. fever likely due to inflammatory process, monitor off abx, leukocytosis reactive to steroids. jorge ortho input - no interventions

## 2018-06-26 NOTE — CONSULT NOTE ADULT - SUBJECTIVE AND OBJECTIVE BOX
CHIEF COMPLAINT:Patient is a 63y old  Male who presents with a chief complaint of Left leg pain (14 Jun 2018 23:41)      HISTORY OF PRESENT ILLNESS:  63 yo M ambulatory with cane with DM2, CAD s/p CABG, history of CVA w/ residual L sided weakness s/p mechanical fall when getting out of car course complicated by SHERRY on CKD with urinary retention s/p ya, toxic metabolic encephalopathy in setting of fever and opiates, fever w/ thus far negative infectious thought 2/2 polyarthritic gout flare. noted to have tachycardia , cardiology is called for eval  pt denies any cp or sob     PAST MEDICAL & SURGICAL HISTORY:  BPH (benign prostatic hyperplasia)  CHF (congestive heart failure)  Hyperlipidemia, unspecified hyperlipidemia type  Diabetes  CAD (coronary artery disease)  HTN (hypertension)  S/P appendectomy  S/P CABG (coronary artery bypass graft)          MEDICATIONS:  aspirin enteric coated 81 milliGRAM(s) Oral daily  carvedilol 12.5 milliGRAM(s) Oral every 12 hours  clopidogrel Tablet 75 milliGRAM(s) Oral daily  heparin  Injectable 5000 Unit(s) SubCutaneous every 12 hours  hydrALAZINE 50 milliGRAM(s) Oral every 8 hours  isosorbide   mononitrate ER Tablet (IMDUR) 30 milliGRAM(s) Oral daily  tamsulosin 0.4 milliGRAM(s) Oral at bedtime  torsemide 10 milliGRAM(s) Oral two times a day      ALBUTerol/ipratropium for Nebulization 3 milliLiter(s) Nebulizer every 4 hours  montelukast 10 milliGRAM(s) Oral daily    acetaminophen   Tablet 650 milliGRAM(s) Oral every 6 hours PRN  acetaminophen   Tablet. 325 milliGRAM(s) Oral every 4 hours PRN  gabapentin 300 milliGRAM(s) Oral two times a day  oxyCODONE    IR 5 milliGRAM(s) Oral every 8 hours PRN    docusate sodium 100 milliGRAM(s) Oral three times a day  pantoprazole    Tablet 40 milliGRAM(s) Oral before breakfast  polyethylene glycol 3350 17 Gram(s) Oral daily  senna 2 Tablet(s) Oral at bedtime  sucralfate 1 Gram(s) Oral two times a day    atorvastatin 40 milliGRAM(s) Oral at bedtime  dextrose 40% Gel 15 Gram(s) Oral once PRN  dextrose 50% Injectable 12.5 Gram(s) IV Push once  dextrose 50% Injectable 25 Gram(s) IV Push once  dextrose 50% Injectable 25 Gram(s) IV Push once  glucagon  Injectable 1 milliGRAM(s) IntraMuscular once PRN  insulin lispro (HumaLOG) corrective regimen sliding scale   SubCutaneous three times a day before meals  predniSONE   Tablet 30 milliGRAM(s) Oral daily        FAMILY HISTORY:  No pertinent family history in first degree relatives      Non-contributory    SOCIAL HISTORY:    No tobacco, drugs or etoh    Allergies    No Known Allergies    Intolerances    	    REVIEW OF SYSTEMS:  as above  The rest of the 14 points ROS reviewed and except above they are unremarkable.        PHYSICAL EXAM:  T(C): 36.4 (06-26-18 @ 05:26), Max: 36.6 (06-25-18 @ 21:22)  HR: 90 (06-26-18 @ 05:26) (75 - 90)  BP: 124/76 (06-26-18 @ 05:26) (124/76 - 128/73)  RR: 17 (06-26-18 @ 05:26) (17 - 18)  SpO2: 100% (06-26-18 @ 05:26) (95% - 100%)  Wt(kg): --  I&O's Summary      JVP: Normal  Neck: supple  Lung: clear   CV: S1 S2 , Murmur:  Abd: soft  Ext: No edema  neuro: Awake / alert  Psych: flat affect  Skin: normal     LABS/DATA:    TELEMETRY: 	sinus, PVCs , episode of atrial tachycardia     ECG:  	   	  CARDIAC MARKERS:                                      13.7   14.69 )-----------( 311      ( 26 Jun 2018 06:00 )             40.2     06-26    136  |  97<L>  |  82<H>  ----------------------------<  120<H>  4.3   |  19<L>  |  2.93<H>    Ca    8.9      26 Jun 2018 06:00  Mg     1.9     06-26      proBNP:   Lipid Profile:   HgA1c:   TSH:

## 2018-06-26 NOTE — PROGRESS NOTE ADULT - SUBJECTIVE AND OBJECTIVE BOX
Patient is a 63y old  Male who presents with a chief complaint of Left leg pain (14 Jun 2018 23:41)      SUBJECTIVE / OVERNIGHT EVENTS: feels well, no further Tele events. knee much continues to improve. remains afebrile     ROS:  No HA/DZ  No Vision changes   No CP, SOB  No N/V/D  No Edema  No Rash  NO weakness, numbness    MEDICATIONS  (STANDING):  ALBUTerol/ipratropium for Nebulization 3 milliLiter(s) Nebulizer every 4 hours  aspirin enteric coated 81 milliGRAM(s) Oral daily  atorvastatin 40 milliGRAM(s) Oral at bedtime  carvedilol 12.5 milliGRAM(s) Oral every 12 hours  clopidogrel Tablet 75 milliGRAM(s) Oral daily  dextrose 50% Injectable 12.5 Gram(s) IV Push once  dextrose 50% Injectable 25 Gram(s) IV Push once  dextrose 50% Injectable 25 Gram(s) IV Push once  docusate sodium 100 milliGRAM(s) Oral three times a day  gabapentin 300 milliGRAM(s) Oral two times a day  heparin  Injectable 5000 Unit(s) SubCutaneous every 12 hours  hydrALAZINE 50 milliGRAM(s) Oral every 8 hours  insulin lispro (HumaLOG) corrective regimen sliding scale   SubCutaneous three times a day before meals  isosorbide   mononitrate ER Tablet (IMDUR) 30 milliGRAM(s) Oral daily  montelukast 10 milliGRAM(s) Oral daily  pantoprazole    Tablet 40 milliGRAM(s) Oral before breakfast  polyethylene glycol 3350 17 Gram(s) Oral daily  predniSONE   Tablet 30 milliGRAM(s) Oral daily  senna 2 Tablet(s) Oral at bedtime  sucralfate 1 Gram(s) Oral two times a day  tamsulosin 0.4 milliGRAM(s) Oral at bedtime  torsemide 10 milliGRAM(s) Oral two times a day    MEDICATIONS  (PRN):  acetaminophen   Tablet 650 milliGRAM(s) Oral every 6 hours PRN For Temp greater than 38 C (100.4 F)  acetaminophen   Tablet. 325 milliGRAM(s) Oral every 4 hours PRN Mild Pain (1 - 3)  dextrose 40% Gel 15 Gram(s) Oral once PRN Blood Glucose LESS THAN 70 milliGRAM(s)/deciliter  glucagon  Injectable 1 milliGRAM(s) IntraMuscular once PRN Glucose LESS THAN 70 milligrams/deciliter  oxyCODONE    IR 5 milliGRAM(s) Oral every 8 hours PRN Severe Pain (7 - 10)      T(C): 36.4 (06-26-18 @ 05:26)  HR: 82 (06-26-18 @ 09:24)  BP: 124/76 (06-26-18 @ 05:26)  RR: 17 (06-26-18 @ 05:26)  SpO2: 100% (06-26-18 @ 05:26)  CAPILLARY BLOOD GLUCOSE      POCT Blood Glucose.: 193 mg/dL (26 Jun 2018 07:54)  POCT Blood Glucose.: 149 mg/dL (25 Jun 2018 21:20)  POCT Blood Glucose.: 141 mg/dL (25 Jun 2018 18:02)  POCT Blood Glucose.: 216 mg/dL (25 Jun 2018 11:59)    I&O's Summary      PHYSICAL EXAM:  GENERAL: NAD, well-developed, AOx3  HEAD:  Atraumatic, Normocephalic  EYES: EOMI, PERRL, conjunctiva and sclera clear  NECK: Supple, No JVD  CHEST/LUNG: Clear to auscultation bilaterally  HEART: Regular rate and rhythm; No murmurs, rubs, or gallops, No Edema  ABDOMEN: Soft, Nontender, Nondistended; Bowel sounds present  EXTREMITIES:  2+ Peripheral Pulses, No clubbing, cyanosis  PSYCH: No SI/HI  NEUROLOGY: non-focal  SKIN: No rashes or lesions    LABS:                        13.7   14.69 )-----------( 311      ( 26 Jun 2018 06:00 )             40.2     06-26    136  |  97<L>  |  82<H>  ----------------------------<  120<H>  4.3   |  19<L>  |  2.93<H>    Ca    8.9      26 Jun 2018 06:00  Mg     1.9     06-26                    RADIOLOGY & ADDITIONAL TESTS:    Imaging Personally Reviewed: CTH - old MCA infarct     Consultant(s) Notes Reviewed:      Care Discussed with Consultants/Other Providers: Cards - Dr Hernandez

## 2018-06-26 NOTE — CONSULT NOTE ADULT - ASSESSMENT
Episode of PAT  stable  uptitrate BB    CHF chronic unspecified   cont BB  off ace/arb due to padilla   appears euvolemic   obtain echo to eval EF suspect to be low    HTN  stable    DM  Monitor finger stick. Insulin coverage. Diabetic education and Diabetic diet. Consider nutrition consultation.     cad history of cabg  cont asa  statin Episode of PAT  stable  uptitrate BB    CHF chronic systolic   cont BB  off ace/arb due to padilla   appears euvolemic   obtain echo   ep eval to interrogate his ICD    HTN  stable    DM  Monitor finger stick. Insulin coverage. Diabetic education and Diabetic diet. Consider nutrition consultation.     cad history of cabg  cont asa  statin

## 2018-06-26 NOTE — CHART NOTE - NSCHARTNOTEFT_GEN_A_CORE
ELECTROPHYSIOLOGY  Device Interrogation Performed                                  Date/Time:   06/26/2018  :         [employer]onic           Model:    Single lead ICD      Mode:    VVI             Rate:   40 bpm      Ventricular Lead(s):  RV Lead: R wave amplitude:    17.3 mv          Impedence:  456 Ohms      Threshold: 0.25 V@  0.4 ms        Battery Status:   Good              Underlying Rhythm:    sinus rhythm, HR 70s     V pacing<0.1 % of the time     Events/Observation:  NSVT 4 beats, on 06/06/2018    Impression/Plan:  Normal ICD function.   Normal sensing and pacing via iterative testing. Good battery status. Excellent threshold capture.  No reprogramming.       SVT HR 150s noted on telemetry was too short to be detected by ICD Device.  No other recent events.     Cont current management.      BEBO Chávez EP ELECTROPHYSIOLOGY  Device Interrogation Performed                       Date/Time:   06/26/2018  :    [employer]tronic           Model:    Single lead ICD      Mode:    VVI             Rate:   40 bpm      Ventricular Lead(s):  RV Lead: R wave amplitude:    17.3 mv      Impedence:  456 Ohms      Threshold: 0.25 V@  0.4 ms        Battery Status:   Good              Underlying Rhythm:    sinus rhythm, HR 70s     V pacing<0.1 % of the time     Events/Observation:  NSVT 4 beats, on 06/06/2018    Impression/Plan:  Normal ICD function.   Normal sensing and pacing via iterative testing. Good battery status. Excellent threshold capture.  No reprogramming.       SVT HR 150s noted on telemetry was too short to be detected by ICD Device.  No other recent events.     Cont current management.      BEBO Chávez EP ELECTROPHYSIOLOGY  Device Interrogation Performed                       Date/Time:   06/26/2018  :    [employer]onic           Model:    Single lead ICD      Mode:    VVI             Rate:   40 bpm      Ventricular Lead(s):  RV Lead: R wave amplitude:    17.3 mv      Impedence:  456 Ohms      Threshold: 0.25 V@  0.4 ms        Battery Status:   Good              Underlying Rhythm:    sinus rhythm, HR 70s     V pacing<0.1 % of the time     Events/Observation:  NSVT 4 beats, on 06/06/2018    Impression/Plan:  Normal ICD function.   Normal sensing and pacing via iterative testing. Good battery status. Excellent threshold capture.  No reprogramming.       SVT HR 150s noted on telemetry was too short to be detected by ICD Device.  No other recent events.   Optivol level elevated c/w fluid overload.      Cont current management.      BEBO Chávez EP ELECTROPHYSIOLOGY  Device Interrogation Performed                       Date/Time:   06/26/2018  :    [employer]onic           Model:    Single lead ICD      Mode:    VVI             Rate:   40 bpm      Ventricular Lead(s):  RV Lead: R wave amplitude:    17.3 mv      Impedence:  456 Ohms      Threshold: 0.25 V@  0.4 ms        Battery Status:   Good              Underlying Rhythm:    sinus rhythm, HR 70s     V pacing<0.1 % of the time     Events/Observation:  NSVT 4 beats, on 06/06/2018    Impression/Plan:  Normal ICD function.   Normal sensing and pacing via iterative testing. Good battery status. Excellent threshold capture.  No reprogramming.       SVT HR 150s noted on telemetry was too short to be detected by ICD Device.  No other recent events.   Optivol level elevated c/w fluid overload.      Cont current management.      BEBO Peck MD

## 2018-06-26 NOTE — PROGRESS NOTE ADULT - ASSESSMENT
62yo M with PMH CKD 4, BPH, CHF, CAD s/p CABG, CVA with residual L-sided weakness, HTN, DM2 (A1C 6.4%), HLD admitted 6/14 for pain control for LLE pain after mechanical fall getting out of a car, nephrology consulted for SHERRY on CKD in setting of obstructive uropathy, low BP, and decreased PO intake.

## 2018-06-26 NOTE — PROGRESS NOTE ADULT - PROBLEM SELECTOR PLAN 2
In setting of renal failure: Serum CO2 noted to be low at 19 which is improving. Monitor serum CO2 level.

## 2018-06-26 NOTE — PROGRESS NOTE ADULT - SUBJECTIVE AND OBJECTIVE BOX
[facility] Division of Kidney Diseases & Hypertension  FOLLOW UP NOTE  [phone]--------------------------------------------------------------------------------    HPI: 62yo M with PMH CKD 4, BPH, CHF, CAD s/p CABG, CVA with residual L-sided weakness, HTN, DM2 (A1C 6.4%), HLD admitted 6/14 for pain control for LLE pain after mechanical fall getting out of a car. Nephrology was consulted for elevated creatinine. His baseline SCr in January 2018 appears to have been 2.0-2.3. However Scr on admission was 2.97 which then worsened to 5.75 on 6/20/18. Patient was started on IV fluids and Sc.r improved to 2.93 today. Currently patient denies c/o SOB, CP, abdominal pain, nausea, vomiting    PAST HISTORY  --------------------------------------------------------------------------------  No significant changes to PMH, PSH, FHx, SHx, unless otherwise noted    ALLERGIES & MEDICATIONS  --------------------------------------------------------------------------------  Allergies    No Known Allergies    Intolerances      Standing Inpatient Medications  ALBUTerol/ipratropium for Nebulization 3 milliLiter(s) Nebulizer every 4 hours  aspirin enteric coated 81 milliGRAM(s) Oral daily  atorvastatin 40 milliGRAM(s) Oral at bedtime  carvedilol 25 milliGRAM(s) Oral every 12 hours  clopidogrel Tablet 75 milliGRAM(s) Oral daily  dextrose 50% Injectable 12.5 Gram(s) IV Push once  dextrose 50% Injectable 25 Gram(s) IV Push once  dextrose 50% Injectable 25 Gram(s) IV Push once  docusate sodium 100 milliGRAM(s) Oral three times a day  gabapentin 300 milliGRAM(s) Oral two times a day  heparin  Injectable 5000 Unit(s) SubCutaneous every 12 hours  hydrALAZINE 50 milliGRAM(s) Oral every 8 hours  insulin lispro (HumaLOG) corrective regimen sliding scale   SubCutaneous three times a day before meals  isosorbide   mononitrate ER Tablet (IMDUR) 30 milliGRAM(s) Oral daily  montelukast 10 milliGRAM(s) Oral daily  pantoprazole    Tablet 40 milliGRAM(s) Oral before breakfast  polyethylene glycol 3350 17 Gram(s) Oral daily  predniSONE   Tablet 30 milliGRAM(s) Oral daily  senna 2 Tablet(s) Oral at bedtime  sucralfate 1 Gram(s) Oral two times a day  tamsulosin 0.4 milliGRAM(s) Oral at bedtime  torsemide 10 milliGRAM(s) Oral two times a day    PRN Inpatient Medications  acetaminophen   Tablet 650 milliGRAM(s) Oral every 6 hours PRN  acetaminophen   Tablet. 325 milliGRAM(s) Oral every 4 hours PRN  dextrose 40% Gel 15 Gram(s) Oral once PRN  glucagon  Injectable 1 milliGRAM(s) IntraMuscular once PRN  oxyCODONE    IR 5 milliGRAM(s) Oral every 8 hours PRN      REVIEW OF SYSTEMS  --------------------------------------------------------------------------------  Gen: No  fevers/chills  Head/Eyes/Ears/Mouth: No headache  Respiratory: No dyspnea  CV: No chest pain  GI: No abdominal pain, nausea, vomiting  MSK:  ; no edema      VITALS/PHYSICAL EXAM  --------------------------------------------------------------------------------  T(C): 36.4 (06-26-18 @ 05:26), Max: 36.6 (06-25-18 @ 21:22)  HR: 84 (06-26-18 @ 13:03) (75 - 90)  BP: 127/72 (06-26-18 @ 13:03) (124/76 - 128/73)  RR: 17 (06-26-18 @ 05:26) (17 - 18)  SpO2: 100% (06-26-18 @ 05:26) (95% - 100%)  Wt(kg): --        Physical Exam:  	  Gen: NAD, well-appearing  	HEENT: no JVD  	Pulm: B/L wheezes heard over lung areas; improved from before    	CV: RRR, S1S2;  	Abd: +BS, soft               Extremities: No LE edema noted.      LABS/STUDIES  --------------------------------------------------------------------------------              13.7   14.69 >-----------<  311      [06-26-18 @ 06:00]              40.2     136  |  97  |  82  ----------------------------<  120      [06-26-18 @ 06:00]  4.3   |  19  |  2.93        Ca     8.9     [06-26-18 @ 06:00]      Mg     1.9     [06-26-18 @ 06:00]            Creatinine Trend:  SCr 2.93 [06-26 @ 06:00]  SCr 3.10 [06-25 @ 05:16]  SCr 3.35 [06-24 @ 04:59]  SCr 3.81 [06-23 @ 06:57]  SCr 4.18 [06-22 @ 05:15]      Urine Creatinine 76.20      [06-20-18 @ 13:00]  Urine Sodium 22      [06-20-18 @ 13:00]  Urine Urea Nitrogen 639.2      [06-20-18 @ 13:00]  Urine Potassium 26.5      [06-20-18 @ 13:00]  Urine Chloride < 20      [06-20-18 @ 13:00]        Rheumatoid Factor 14.0      [06-22-18 @ 05:15]

## 2018-06-26 NOTE — PROGRESS NOTE ADULT - PROBLEM SELECTOR PLAN 4
superimposed on CKD 4 due to obstructive uropathy   Cr improving - close to baseline   ATN now resolving.   -c/w Flomax. Torres removed and pt passed TOV  -renal US neg hydro  - Diuretics resumed as pt wheezing and now requiring supplemental O2 - renal fx remains stable - no ace-i / arb due to CKD  - retention likely due to known BPH - outpt  follow up

## 2018-06-26 NOTE — PROGRESS NOTE ADULT - PROBLEM SELECTOR PLAN 1
Patient with SHERRY in setting of obstructive uropathy and low BP. Latest Scr. has improved to 2.93 today. Patient is non oliguric. Urine sediment under microscopy showed presence of muddy brown casts which is suggestive of ATN. Patient with ischemic ATN from low BP with hypovolemia? Monitor BMP daily. Avoid nephrotoxins.

## 2018-06-26 NOTE — PROGRESS NOTE ADULT - PROBLEM SELECTOR PLAN 3
Unknown if pt has systolic or diastolic CHF - TTE pending   resumed home torsemide as pt was on supplemental O2 and b/l wheezing, suggesting mild CHF exacerbation  Weaned O2 and now appears clinically euvolemic  run of of SVT, hx of AICD, cards follow up appreciated - up-titrate coreg to 25 BID - called EP for device interrogation - awaiting TTE

## 2018-06-27 ENCOUNTER — TRANSCRIPTION ENCOUNTER (OUTPATIENT)
Age: 63
End: 2018-06-27

## 2018-06-27 LAB
BACTERIA FLD CULT: SIGNIFICANT CHANGE UP
BACTERIA FLD CULT: SIGNIFICANT CHANGE UP
BASOPHILS # BLD AUTO: 0.01 K/UL — SIGNIFICANT CHANGE UP (ref 0–0.2)
BASOPHILS NFR BLD AUTO: 0.1 % — SIGNIFICANT CHANGE UP (ref 0–2)
BUN SERPL-MCNC: 85 MG/DL — HIGH (ref 7–23)
CALCIUM SERPL-MCNC: 8.8 MG/DL — SIGNIFICANT CHANGE UP (ref 8.4–10.5)
CHLORIDE SERPL-SCNC: 100 MMOL/L — SIGNIFICANT CHANGE UP (ref 98–107)
CO2 SERPL-SCNC: 19 MMOL/L — LOW (ref 22–31)
CREAT SERPL-MCNC: 2.86 MG/DL — HIGH (ref 0.5–1.3)
EOSINOPHIL # BLD AUTO: 0.21 K/UL — SIGNIFICANT CHANGE UP (ref 0–0.5)
EOSINOPHIL NFR BLD AUTO: 1.2 % — SIGNIFICANT CHANGE UP (ref 0–6)
GLUCOSE BLDC GLUCOMTR-MCNC: 146 MG/DL — HIGH (ref 70–99)
GLUCOSE BLDC GLUCOMTR-MCNC: 196 MG/DL — HIGH (ref 70–99)
GLUCOSE BLDC GLUCOMTR-MCNC: 216 MG/DL — HIGH (ref 70–99)
GLUCOSE BLDC GLUCOMTR-MCNC: 250 MG/DL — HIGH (ref 70–99)
GLUCOSE SERPL-MCNC: 188 MG/DL — HIGH (ref 70–99)
HCT VFR BLD CALC: 39.4 % — SIGNIFICANT CHANGE UP (ref 39–50)
HGB BLD-MCNC: 13.4 G/DL — SIGNIFICANT CHANGE UP (ref 13–17)
IMM GRANULOCYTES # BLD AUTO: 0.2 # — SIGNIFICANT CHANGE UP
IMM GRANULOCYTES NFR BLD AUTO: 1.2 % — SIGNIFICANT CHANGE UP (ref 0–1.5)
LYMPHOCYTES # BLD AUTO: 12.2 % — LOW (ref 13–44)
LYMPHOCYTES # BLD AUTO: 2.05 K/UL — SIGNIFICANT CHANGE UP (ref 1–3.3)
MAGNESIUM SERPL-MCNC: 1.7 MG/DL — SIGNIFICANT CHANGE UP (ref 1.6–2.6)
MCHC RBC-ENTMCNC: 29.2 PG — SIGNIFICANT CHANGE UP (ref 27–34)
MCHC RBC-ENTMCNC: 34 % — SIGNIFICANT CHANGE UP (ref 32–36)
MCV RBC AUTO: 85.8 FL — SIGNIFICANT CHANGE UP (ref 80–100)
MONOCYTES # BLD AUTO: 1.06 K/UL — HIGH (ref 0–0.9)
MONOCYTES NFR BLD AUTO: 6.3 % — SIGNIFICANT CHANGE UP (ref 2–14)
NEUTROPHILS # BLD AUTO: 13.32 K/UL — HIGH (ref 1.8–7.4)
NEUTROPHILS NFR BLD AUTO: 79 % — HIGH (ref 43–77)
NRBC # FLD: 0 — SIGNIFICANT CHANGE UP
PLATELET # BLD AUTO: 335 K/UL — SIGNIFICANT CHANGE UP (ref 150–400)
PMV BLD: 10 FL — SIGNIFICANT CHANGE UP (ref 7–13)
POTASSIUM SERPL-MCNC: 4.2 MMOL/L — SIGNIFICANT CHANGE UP (ref 3.5–5.3)
POTASSIUM SERPL-SCNC: 4.2 MMOL/L — SIGNIFICANT CHANGE UP (ref 3.5–5.3)
RBC # BLD: 4.59 M/UL — SIGNIFICANT CHANGE UP (ref 4.2–5.8)
RBC # FLD: 13.7 % — SIGNIFICANT CHANGE UP (ref 10.3–14.5)
SODIUM SERPL-SCNC: 136 MMOL/L — SIGNIFICANT CHANGE UP (ref 135–145)
WBC # BLD: 16.85 K/UL — HIGH (ref 3.8–10.5)
WBC # FLD AUTO: 16.85 K/UL — HIGH (ref 3.8–10.5)

## 2018-06-27 PROCEDURE — 99233 SBSQ HOSP IP/OBS HIGH 50: CPT

## 2018-06-27 RX ORDER — MAGNESIUM OXIDE 400 MG ORAL TABLET 241.3 MG
400 TABLET ORAL
Qty: 0 | Refills: 0 | Status: COMPLETED | OUTPATIENT
Start: 2018-06-27 | End: 2018-06-27

## 2018-06-27 RX ADMIN — ATORVASTATIN CALCIUM 40 MILLIGRAM(S): 80 TABLET, FILM COATED ORAL at 22:27

## 2018-06-27 RX ADMIN — MAGNESIUM OXIDE 400 MG ORAL TABLET 400 MILLIGRAM(S): 241.3 TABLET ORAL at 17:44

## 2018-06-27 RX ADMIN — Medication 1 GRAM(S): at 06:09

## 2018-06-27 RX ADMIN — CARVEDILOL PHOSPHATE 25 MILLIGRAM(S): 80 CAPSULE, EXTENDED RELEASE ORAL at 18:27

## 2018-06-27 RX ADMIN — Medication 3 MILLILITER(S): at 21:59

## 2018-06-27 RX ADMIN — Medication 81 MILLIGRAM(S): at 12:32

## 2018-06-27 RX ADMIN — MONTELUKAST 10 MILLIGRAM(S): 4 TABLET, CHEWABLE ORAL at 12:32

## 2018-06-27 RX ADMIN — MAGNESIUM OXIDE 400 MG ORAL TABLET 400 MILLIGRAM(S): 241.3 TABLET ORAL at 12:32

## 2018-06-27 RX ADMIN — Medication 50 MILLIGRAM(S): at 22:27

## 2018-06-27 RX ADMIN — Medication 4: at 17:45

## 2018-06-27 RX ADMIN — Medication 10 MILLIGRAM(S): at 17:45

## 2018-06-27 RX ADMIN — GABAPENTIN 300 MILLIGRAM(S): 400 CAPSULE ORAL at 17:45

## 2018-06-27 RX ADMIN — TAMSULOSIN HYDROCHLORIDE 0.4 MILLIGRAM(S): 0.4 CAPSULE ORAL at 22:27

## 2018-06-27 RX ADMIN — CLOPIDOGREL BISULFATE 75 MILLIGRAM(S): 75 TABLET, FILM COATED ORAL at 12:32

## 2018-06-27 RX ADMIN — HEPARIN SODIUM 5000 UNIT(S): 5000 INJECTION INTRAVENOUS; SUBCUTANEOUS at 17:45

## 2018-06-27 RX ADMIN — Medication 50 MILLIGRAM(S): at 12:34

## 2018-06-27 RX ADMIN — OXYCODONE HYDROCHLORIDE 5 MILLIGRAM(S): 5 TABLET ORAL at 06:08

## 2018-06-27 RX ADMIN — Medication 10 MILLIGRAM(S): at 06:08

## 2018-06-27 RX ADMIN — GABAPENTIN 300 MILLIGRAM(S): 400 CAPSULE ORAL at 06:08

## 2018-06-27 RX ADMIN — Medication 3 MILLILITER(S): at 12:13

## 2018-06-27 RX ADMIN — Medication 1 GRAM(S): at 17:45

## 2018-06-27 RX ADMIN — Medication 2: at 08:27

## 2018-06-27 RX ADMIN — Medication 3 MILLILITER(S): at 08:18

## 2018-06-27 RX ADMIN — OXYCODONE HYDROCHLORIDE 5 MILLIGRAM(S): 5 TABLET ORAL at 22:28

## 2018-06-27 RX ADMIN — Medication 4: at 12:31

## 2018-06-27 RX ADMIN — HEPARIN SODIUM 5000 UNIT(S): 5000 INJECTION INTRAVENOUS; SUBCUTANEOUS at 06:09

## 2018-06-27 RX ADMIN — OXYCODONE HYDROCHLORIDE 5 MILLIGRAM(S): 5 TABLET ORAL at 06:38

## 2018-06-27 RX ADMIN — Medication 30 MILLIGRAM(S): at 06:09

## 2018-06-27 RX ADMIN — Medication 3 MILLILITER(S): at 16:05

## 2018-06-27 RX ADMIN — PANTOPRAZOLE SODIUM 40 MILLIGRAM(S): 20 TABLET, DELAYED RELEASE ORAL at 06:09

## 2018-06-27 RX ADMIN — OXYCODONE HYDROCHLORIDE 5 MILLIGRAM(S): 5 TABLET ORAL at 22:58

## 2018-06-27 RX ADMIN — ISOSORBIDE MONONITRATE 30 MILLIGRAM(S): 60 TABLET, EXTENDED RELEASE ORAL at 12:32

## 2018-06-27 NOTE — DISCHARGE NOTE ADULT - MEDICATION SUMMARY - MEDICATIONS TO TAKE
I will START or STAY ON the medications listed below when I get home from the hospital:    predniSONE  -- 30 mg by mouth daily x 1 more day, then  20mg by mouth daily x 5 days, then  15mg by mouth daily x 5 days, then  10mg by mouth daily x 5 days, then  5mg by mouth daily x 5 days then stop  -- Indication: For gout    acetaminophen 325 mg oral tablet  -- 1 tab(s) by mouth every 4 hours, As needed, Mild Pain (1 - 3)  -- Indication: For Pain    aspirin 81 mg oral delayed release tablet  -- 1 tab(s) by mouth once a day  -- Indication: For CAD    tamsulosin 0.4 mg oral capsule  -- 1 cap(s) by mouth once a day  -- Indication: For BPH    isosorbide mononitrate 30 mg oral tablet, extended release  -- 1 tab(s) by mouth once a day (in the morning)  -- Indication: For Angina    gabapentin 300 mg oral capsule  -- 1 cap(s) by mouth 2 times a day  -- Indication: For neuropathy    glimepiride 2 mg oral tablet  -- 1 tab(s) by mouth once a day  -- Indication: For DM    atorvastatin 40 mg oral tablet  -- 1 tab(s) by mouth once a day  -- Indication: For Hyperlipidemia,    Plavix 75 mg oral tablet  -- 1 tab(s) by mouth once a day  -- Indication: For CAD    carvedilol 25 mg oral tablet  -- 1 tab(s) by mouth every 12 hours  -- Indication: For HTN    ipratropium-albuterol 0.5 mg-2.5 mg/3 mLinhalation solution  -- 3 milliliter(s) inhaled every 4 hours, As Needed  -- Indication: For Asthma    torsemide 10 mg oral tablet  -- 1 tab(s) by mouth 2 times a day  -- Indication: For CHF    senna oral tablet  -- 2 tab(s) by mouth once a day (at bedtime)  -- Indication: For Constipation    docusate sodium 100 mg oral capsule  -- 1 cap(s) by mouth 3 times a day  -- Indication: For Constipation    montelukast 10 mg oral tablet  -- 1 tab(s) by mouth once a day  -- Indication: For Asthma    Klor-Con 10 mEq oral tablet, extended release  -- 1 tab(s) by mouth once a day  -- Indication: For supplement while on Torsemide    Carafate 1 g oral tablet  -- 1 tab(s) by mouth 2 times a day MDD:2  -- Do not take dairy products, antacids, or iron preparations within one hour of this medication.  It is very important that you take or use this exactly as directed.  Do not skip doses or discontinue unless directed by your doctor.  Take medication on an empty stomach 1 hour before or 2 to 3 hours after a meal unless otherwise directed by your doctor.    -- Indication: For GERD    Protonix 40 mg oral delayed release tablet  -- 40 tab(s) by mouth 2 times a day MDD:1  -- It is very important that you take or use this exactly as directed.  Do not skip doses or discontinue unless directed by your doctor.  Obtain medical advice before taking any non-prescription drugs as some may affect the action of this medication.  Swallow whole.  Do not crush.    -- Indication: For GERD    hydrALAZINE 50 mg oral tablet  -- 1 tab(s) by mouth every 8 hours  -- Indication: For HTN

## 2018-06-27 NOTE — DISCHARGE NOTE ADULT - MEDICATION SUMMARY - MEDICATIONS TO CHANGE
I will SWITCH the dose or number of times a day I take the medications listed below when I get home from the hospital:    hydrALAZINE 100 mg oral tablet  -- 1 tab(s) by mouth 3 times a day    gabapentin 800 mg oral tablet  -- 1 tab(s) by mouth 3 times a day    Coreg 12.5 mg oral tablet  -- 1 tab(s) by mouth 2 times a day

## 2018-06-27 NOTE — DISCHARGE NOTE ADULT - CARE PLAN
Principal Discharge DX:	Polyarticular gout  Goal:	complete Prednisone taper  Assessment and plan of treatment:	follow up with your PMD in one week - call for appointment  Secondary Diagnosis:	SHERRY (acute kidney injury)  Secondary Diagnosis:	CHF (congestive heart failure)  Secondary Diagnosis:	BPH (benign prostatic hyperplasia)

## 2018-06-27 NOTE — DISCHARGE NOTE ADULT - HOSPITAL COURSE
63 yo M ambulatory with cane with DM2, CAD s/p CABG, history of CVA w/ residual L sided weakness s/p mechanical fall when getting out of car course complicated by SHERRY on CKD with urinary retention s/p Torres toxic metabolic encephalopathy in setting of fever and opiates, fever w/ thus far negative infectious thought 2/2 polyarthritic gout flare.      Problem/Plan - 1:  ·  Problem: Acute gout due to renal impairment involving multiple sites.  Plan: with associated fever, but infx w/u unremarkable  s/p knee tap consistent with gout s/p intraarticular steroid injection with minimal relief, started on steroid taper now with improvement of flare symptoms. cw steroid taper per rheum. fever likely due to inflammatory process, monitor off abx, leukocytosis reactive to steroids. jorge ortho input - no interventions.      Problem/Plan - 2:  ·  Problem: Acute leg pain, left.  Plan: Knee pain likely 2/2 gout  Patient with mechanical fall, no head trauma or LOC; had CT pelvis and x rays of hip and knee all negative   -Rheum recommends steroid taper  -no need for knee immobilizer, this was discussed with ortho   -fall precaution  -pain control with Tylenol, low dose opiates   -for rehab.      Problem/Plan - 3:  ·  Problem: Chronic congestive heart failure, unspecified heart failure type.  Plan: Unknown if pt has systolic or diastolic CHF - TTE pending   resumed home torsemide as pt was on supplemental O2 and b/l wheezing, suggesting mild CHF exacerbation  Weaned O2 and now appears clinically euvolemic  run of of SVT, hx of AICD, cards follow up appreciated - up-titrate coreg to 25 BID - s/p EP for device interrogation with normal findings - awaiting TTE - can be as out-pt.     Problem/Plan - 4:  ·  Problem: ATN (acute tubular necrosis).  Plan: superimposed on CKD 4 due to obstructive uropathy   Cr improving - close to baseline   ATN now resolving.   -c/w Flomax. Torres removed and pt passed TOV  -renal US neg hydro  - Diuretics resumed as pt wheezing and now requiring supplemental O2 - renal fx remains stable - no ace-i / arb due to CKD  - retention likely due to known BPH - outpt  follow up.      Problem/Plan - 5:  ·  Problem: Encephalopathy.  Plan: Patient was confused 6/18 in context of fever. stroke code called, seen by neuro  -CTH w/ old strokes. Mental status now back to baseline wo any focal s/s   -doubt new CVA, neuro rec MRI but unable due to AICD - rec repeat CTH which is neg for acute process. no further w/u per neuro cw Anti-plt regimen.     Problem/Plan - 6:  Problem: Benign prostatic hyperplasia, unspecified whether lower urinary tract symptoms present. Plan: -c/w tamsulosin  - Pt passed TOV. Monitor urine output  outpt  follow up.     Problem/Plan - 7:  ·  Problem: Essential hypertension.  Plan: - controlled.      Problem/Plan - 8:  ·  Problem: Coronary artery disease involving native coronary artery of native heart without angina pectoris.  Plan: -c/w asa and Plavix.      Problem/Plan - 9:  ·  Problem: Type 2 diabetes mellitus with stage 4 chronic kidney disease, without long-term current use of insulin.  Plan: Well controlled. HbA1c <7%  - overall well controlled, will monitor FS on steroids.      Problem/Plan - 10:  Problem: Hyperlipidemia, unspecified hyperlipidemia type. Plan; - Continue with statin therapy  - Low cholesterol diet. 61 yo M ambulatory with cane with DM2, CAD s/p CABG, history of CVA w/ residual L sided weakness s/p mechanical fall when getting out of car course complicated by SHERRY on CKD with urinary retention s/p Ya toxic metabolic encephalopathy in setting of fever and opiates, fever w/ thus far negative infectious thought 2/2 polyarthritic gout flare.   + Left knee pain S/P fall- no fractures on imaging, WBAT/elevate, pending D/C to rehab  + SHERRY- renal following (house), likely secondary to ATN and obstructive uropathy secondary to BPH, S/P ya with positive TOV, started on Flomax, S/P IVF with improvement in renal function  + Constipation- S/P Moviprep and bowel regimen, improved  + AMS/lethargy- neuro following (house), S/P code stroke 6/18 with negative CT head, unable to do MRI given ICD, likely metabolic encephalopathy, repeated CT head -- no acute changes   + Polyarticular gout flare- rheum and ortho following (house), S/P bilateral knee arthrocentesis, on Prednisone taper  + Urinary retention- S/P ya placement 6/19  > chronic systolic CHF, on PO torsemide   + SVT/PAT on tele- h/o AICD, increased Cored to 25 BID, ECHO     EKG: NSR  WBC: 15.08  BUN/Cr: 47/2.97  RVP: Negative    6/14 X-ray left hand: No acute fracture or dislocation. Well-corticated osseous structure adjacent to the ulnar styloid process likely represents old fracture.  6/14 X-ray left femur: No acute fracture or dislocation.  6/14 X-ray left knee: Cortical irregularity of the medial aspect of the proximal tibia without   significant overlying soft tissue swelling. Correlate with point tenderness.  6/14 X-ray pelvis: No acute fracture or dislocation.    6/15 X-ray left tibia/fibula: No tracking soft tissue gas collections, radiopaque foreign bodies, or gross radiographic evidence for osteomyelitis. No fractures, dislocations, or osseous or joint deformities. Mild patellofemoral compartment degenerative change. Preserved tibiofemoral and tibiotalar joint spaces. No joint margin erosions. Patellar and calcaneal enthesophytes. No discrete lytic or blastic lesions.  6/15 CT pelvis: No acute fracture or dislocation.    6/18 CXR: Low lung volumes. No focal lung consolidation or pleural effusion seen.  6/18 CT head: No acute intracranial hemorrhage, mass effect or midline shift. Chronic infarcts noted.    6/19 LLE doppler: No evidence of left lower extremity deep venous thrombosis. Left calf   veins not identified.    6/20 Renal US: No hydronephrosis.  6/20 CT chest: Mediastinal lymphadenopathy of indeterminate etiology. The differential   is broad and includes both benign as well as malignant entities. If there are previous, outside CT exams of the chest, they should be submitted for comparison. In the absence of any outside imaging, a repeat CT chest with contrast should be performed in 1-3 months. No pneumonia.    6/21 X-ray left shoulder: No fractures, dislocations, or AC separation. Preserved joint spaces and smooth articular margins. Maintained subacromial and coracoclavicular spaces. No destructive osseous lesions or periosteal reaction. No periarticular soft tissue calcifications. Sternal wires, some with discontinuity, mediastinal surgical clips and left chest wall AICD present.  6/21 X-ray right knee: No tracking soft tissue gas collections, radiopaque foreign bodies, or gross radiographic evidence for osteomyelitis. Nonspecific large knee joint effusion. No fractures or dislocations. Mild tricompartment osteoarthritic change manifest predominantly by presence of small joint margin osteophytes. Joint spaces otherwise maintained and no joint margin erosions or intra-articular or periarticular calcifications. Generalized osteopenia otherwise no discrete lytic or blastic lesions. Scant vascular calcifications.  6/21 X-ray ankles: Mild soft tissue swelling. No tracking soft tissue gas collections, radiopaque foreign bodies, or gross radiographic evidence for osteomyelitis. Appearance of chronic osseous fragmentation adjacent to both medial malleolar tips left more conspicuous than right. Small left plantar and posterior calcaneal enthesophytes. Prominent upturned hypertrophic bone formation at the posterior superior aspect of the right calcaneus apparent on the lateral view could be related to an old "tongue" type avulsion fracture malunion deformity. Associated slightly convexly bulged overlying soft tissue contour with thickened distal Achilles tendon shadow. No dislocations or acute appearing fractures. Bilaterally congruent ankle mortises with smooth and intact talar domes. Preserved visualized midfoot and hindfoot joint spaces and no joint margin erosions. Vascular calcifications.    6/25 Repeat CT head (unable to do MRI given ICD); No change from 6/18/2018 in remote right MCA and left cerebellar  infarcts,  right lateral ventricle ex vacuo enlargement, volume loss and microvascular disease, no hemorrhage or midline shift.  6/26 ICD interrogation: Normal ICD function.    Discussed with MD - pt stable for discharge to rehab, pt with no complaints, discharge medications reviewed with MD.

## 2018-06-27 NOTE — PROGRESS NOTE ADULT - SUBJECTIVE AND OBJECTIVE BOX
Subjective: Patient seen and examined. No new events except as noted.     SUBJECTIVE/ROS:  feels better       MEDICATIONS:  MEDICATIONS  (STANDING):  ALBUTerol/ipratropium for Nebulization 3 milliLiter(s) Nebulizer every 4 hours  aspirin enteric coated 81 milliGRAM(s) Oral daily  atorvastatin 40 milliGRAM(s) Oral at bedtime  carvedilol 25 milliGRAM(s) Oral every 12 hours  clopidogrel Tablet 75 milliGRAM(s) Oral daily  dextrose 50% Injectable 12.5 Gram(s) IV Push once  dextrose 50% Injectable 25 Gram(s) IV Push once  dextrose 50% Injectable 25 Gram(s) IV Push once  docusate sodium 100 milliGRAM(s) Oral three times a day  gabapentin 300 milliGRAM(s) Oral two times a day  heparin  Injectable 5000 Unit(s) SubCutaneous every 12 hours  hydrALAZINE 50 milliGRAM(s) Oral every 8 hours  insulin lispro (HumaLOG) corrective regimen sliding scale   SubCutaneous three times a day before meals  isosorbide   mononitrate ER Tablet (IMDUR) 30 milliGRAM(s) Oral daily  montelukast 10 milliGRAM(s) Oral daily  pantoprazole    Tablet 40 milliGRAM(s) Oral before breakfast  polyethylene glycol 3350 17 Gram(s) Oral daily  predniSONE   Tablet 30 milliGRAM(s) Oral daily  senna 2 Tablet(s) Oral at bedtime  sucralfate 1 Gram(s) Oral two times a day  tamsulosin 0.4 milliGRAM(s) Oral at bedtime  torsemide 10 milliGRAM(s) Oral two times a day      PHYSICAL EXAM:  T(C): 36.6 (06-27-18 @ 06:03), Max: 36.7 (06-26-18 @ 17:37)  HR: 82 (06-27-18 @ 06:03) (73 - 93)  BP: 106/66 (06-27-18 @ 06:03) (106/66 - 131/73)  RR: 17 (06-27-18 @ 06:03) (17 - 18)  SpO2: 98% (06-27-18 @ 06:03) (98% - 100%)  Wt(kg): --  I&O's Summary      JVP: Normal  Neck: supple  Lung: clear   CV: S1 S2 , Murmur:  Abd: soft  Ext: No edema  neuro: Awake / alert  Psych: flat affect  Skin: normal     LABS/DATA:    CARDIAC MARKERS:                                13.4   16.85 )-----------( 335      ( 27 Jun 2018 07:00 )             39.4     06-26    136  |  97<L>  |  82<H>  ----------------------------<  120<H>  4.3   |  19<L>  |  2.93<H>    Ca    8.9      26 Jun 2018 06:00  Mg     1.9     06-26      proBNP:   Lipid Profile:   HgA1c:   TSH:     TELE:  EKG:

## 2018-06-27 NOTE — PROGRESS NOTE ADULT - ASSESSMENT
Episode of PAT  stable  cont BB    CHF chronic systolic   cont BB  off ace/arb due to padilla   appears euvolemic   obtain echo   ICD interrogation noted     HTN  stable    DM  Monitor finger stick. Insulin coverage. Diabetic education and Diabetic diet. Consider nutrition consultation.     cad history of cabg  cont asa  statin

## 2018-06-27 NOTE — PROGRESS NOTE ADULT - PROBLEM SELECTOR PLAN 1
with associated fever, but infx w/u unremarkable  s/p knee tap consistent with gout s/p intraarticular steroid injection with minimal relief, started on steroid taper now with improvement of flare symptoms. cw steroid taper per rheum. fever likely due to inflammatory process, monitor off abx, leukocytosis reactive to steroids. jorge ortho input - no interventions

## 2018-06-27 NOTE — DISCHARGE NOTE ADULT - COMMUNITY RESOURCES
[facility] address: [address] [address] tel # [phone], 6pm  via [facility] tel # [phone]

## 2018-06-27 NOTE — DISCHARGE NOTE ADULT - PATIENT PORTAL LINK FT
You can access the [facility], offered by [facility], by registering with the following website: [email or web address]

## 2018-06-27 NOTE — DISCHARGE NOTE ADULT - ADDITIONAL INSTRUCTIONS
follow up with your PMD in one week - call for appointment  follow up with Cardiology in one week - call for appointment  follow up with Rheumatology in one week - call for appointment follow up with your PMD in one week - call for appointment  follow up with Cardiology in one week - call for appointment  follow up with Rheumatology in one week - call for appointment    you will need an Echocardiogram - call your PMD for appointment

## 2018-06-27 NOTE — PROGRESS NOTE ADULT - SUBJECTIVE AND OBJECTIVE BOX
Patient is a 63y old  Male who presents with a chief complaint of Left leg pain (14 Jun 2018 23:41)      SUBJECTIVE / OVERNIGHT EVENTS: no events feels well. knee pain almost resolved. urinating wo issues. remains afebrile     ROS:  No HA/DZ  No Vision changes   No CP, SOB  No N/V/D  No Edema  No Rash  NO weakness, numbness    MEDICATIONS  (STANDING):  ALBUTerol/ipratropium for Nebulization 3 milliLiter(s) Nebulizer every 4 hours  aspirin enteric coated 81 milliGRAM(s) Oral daily  atorvastatin 40 milliGRAM(s) Oral at bedtime  carvedilol 25 milliGRAM(s) Oral every 12 hours  clopidogrel Tablet 75 milliGRAM(s) Oral daily  dextrose 50% Injectable 12.5 Gram(s) IV Push once  dextrose 50% Injectable 25 Gram(s) IV Push once  dextrose 50% Injectable 25 Gram(s) IV Push once  docusate sodium 100 milliGRAM(s) Oral three times a day  gabapentin 300 milliGRAM(s) Oral two times a day  heparin  Injectable 5000 Unit(s) SubCutaneous every 12 hours  hydrALAZINE 50 milliGRAM(s) Oral every 8 hours  insulin lispro (HumaLOG) corrective regimen sliding scale   SubCutaneous three times a day before meals  isosorbide   mononitrate ER Tablet (IMDUR) 30 milliGRAM(s) Oral daily  magnesium oxide 400 milliGRAM(s) Oral three times a day with meals  montelukast 10 milliGRAM(s) Oral daily  pantoprazole    Tablet 40 milliGRAM(s) Oral before breakfast  polyethylene glycol 3350 17 Gram(s) Oral daily  predniSONE   Tablet 30 milliGRAM(s) Oral daily  senna 2 Tablet(s) Oral at bedtime  sucralfate 1 Gram(s) Oral two times a day  tamsulosin 0.4 milliGRAM(s) Oral at bedtime  torsemide 10 milliGRAM(s) Oral two times a day    MEDICATIONS  (PRN):  acetaminophen   Tablet 650 milliGRAM(s) Oral every 6 hours PRN For Temp greater than 38 C (100.4 F)  acetaminophen   Tablet. 325 milliGRAM(s) Oral every 4 hours PRN Mild Pain (1 - 3)  dextrose 40% Gel 15 Gram(s) Oral once PRN Blood Glucose LESS THAN 70 milliGRAM(s)/deciliter  glucagon  Injectable 1 milliGRAM(s) IntraMuscular once PRN Glucose LESS THAN 70 milligrams/deciliter  oxyCODONE    IR 5 milliGRAM(s) Oral every 8 hours PRN Severe Pain (7 - 10)      T(C): 36.6 (06-27-18 @ 06:03)  HR: 72 (06-27-18 @ 08:18)  BP: 106/66 (06-27-18 @ 06:03)  RR: 17 (06-27-18 @ 06:03)  SpO2: 97% (06-27-18 @ 08:18)  CAPILLARY BLOOD GLUCOSE      POCT Blood Glucose.: 196 mg/dL (27 Jun 2018 07:38)  POCT Blood Glucose.: 172 mg/dL (26 Jun 2018 21:09)  POCT Blood Glucose.: 207 mg/dL (26 Jun 2018 17:06)  POCT Blood Glucose.: 207 mg/dL (26 Jun 2018 12:03)    I&O's Summary      PHYSICAL EXAM:  GENERAL: NAD, well-developed, AOx3  HEAD:  Atraumatic, Normocephalic  EYES: EOMI, PERRL, conjunctiva and sclera clear  NECK: Supple, No JVD  CHEST/LUNG: Clear to auscultation bilaterally  HEART: Regular rate and rhythm; No murmurs, rubs, or gallops, No Edema  ABDOMEN: Soft, Nontender, Nondistended; Bowel sounds present  EXTREMITIES:  2+ Peripheral Pulses, No clubbing, cyanosis  PSYCH: No SI/HI  NEUROLOGY: non-focal  SKIN: No rashes or lesions    LABS:                        13.4   16.85 )-----------( 335      ( 27 Jun 2018 07:00 )             39.4     06-27    136  |  100  |  85<H>  ----------------------------<  188<H>  4.2   |  19<L>  |  2.86<H>    Ca    8.8      27 Jun 2018 07:00  Mg     1.7     06-27                    RADIOLOGY & ADDITIONAL TESTS:    Imaging Personally Reviewed:    Consultant(s) Notes Reviewed:      Care Discussed with Consultants/Other Providers:

## 2018-06-27 NOTE — PROGRESS NOTE ADULT - PROBLEM SELECTOR PLAN 3
Unknown if pt has systolic or diastolic CHF - TTE pending   resumed home torsemide as pt was on supplemental O2 and b/l wheezing, suggesting mild CHF exacerbation  Weaned O2 and now appears clinically euvolemic  run of of SVT, hx of AICD, cards follow up appreciated - up-titrate coreg to 25 BID - called EP for device interrogation - awaiting TTE Unknown if pt has systolic or diastolic CHF - TTE pending   resumed home torsemide as pt was on supplemental O2 and b/l wheezing, suggesting mild CHF exacerbation  Weaned O2 and now appears clinically euvolemic  run of of SVT, hx of AICD, cards follow up appreciated - up-titrate coreg to 25 BID - s/p EP for device interrogation with normal findings - awaiting TTE - can be as out-pt

## 2018-06-27 NOTE — DISCHARGE NOTE ADULT - PROVIDER TOKENS
TOKEN:'[id number]',FREE:[LAST:[PMD],PHONE:[(   )    -],FAX:[(   )    -],ADDRESS:[call for appointment]]

## 2018-06-27 NOTE — DISCHARGE NOTE ADULT - CARE PROVIDER_API CALL
Miles Hernandez), Cardiovascular Disease; Internal Medicine  [address] [address]  Phone: [phone]  Fax: [phone]    PMD,   call for appointment  Phone: (   )    -  Fax: (   )    -

## 2018-06-27 NOTE — PROGRESS NOTE ADULT - PROBLEM SELECTOR PLAN 5
Patient was confused 6/18 in context of fever. stroke code called, seen by neuro  -CTH w/ old strokes. Mental status now back to baseline wo any focal s/s   -doubt new CVA, neuro rec MRI but unable due to AICD - rec repeat CTH which is neg for acute process. no further w/u per neuro cw Antiplt regimen Patient was confused 6/18 in context of fever. stroke code called, seen by neuro  -CTH w/ old strokes. Mental status now back to baseline wo any focal s/s   -doubt new CVA, neuro rec MRI but unable due to AICD - rec repeat CTH which is neg for acute process. no further w/u per neuro cw Anti-plt regimen

## 2018-06-28 VITALS
DIASTOLIC BLOOD PRESSURE: 69 MMHG | RESPIRATION RATE: 17 BRPM | OXYGEN SATURATION: 100 % | SYSTOLIC BLOOD PRESSURE: 114 MMHG | TEMPERATURE: 98 F | HEART RATE: 88 BPM

## 2018-06-28 LAB
BUN SERPL-MCNC: 85 MG/DL — HIGH (ref 7–23)
CALCIUM SERPL-MCNC: 8.6 MG/DL — SIGNIFICANT CHANGE UP (ref 8.4–10.5)
CHLORIDE SERPL-SCNC: 95 MMOL/L — LOW (ref 98–107)
CO2 SERPL-SCNC: 20 MMOL/L — LOW (ref 22–31)
CREAT SERPL-MCNC: 2.9 MG/DL — HIGH (ref 0.5–1.3)
GLUCOSE BLDC GLUCOMTR-MCNC: 159 MG/DL — HIGH (ref 70–99)
GLUCOSE BLDC GLUCOMTR-MCNC: 206 MG/DL — HIGH (ref 70–99)
GLUCOSE BLDC GLUCOMTR-MCNC: 252 MG/DL — HIGH (ref 70–99)
GLUCOSE SERPL-MCNC: 148 MG/DL — HIGH (ref 70–99)
HCT VFR BLD CALC: 40.2 % — SIGNIFICANT CHANGE UP (ref 39–50)
HGB BLD-MCNC: 13.8 G/DL — SIGNIFICANT CHANGE UP (ref 13–17)
MAGNESIUM SERPL-MCNC: 1.5 MG/DL — LOW (ref 1.6–2.6)
MCHC RBC-ENTMCNC: 29.3 PG — SIGNIFICANT CHANGE UP (ref 27–34)
MCHC RBC-ENTMCNC: 34.3 % — SIGNIFICANT CHANGE UP (ref 32–36)
MCV RBC AUTO: 85.4 FL — SIGNIFICANT CHANGE UP (ref 80–100)
NRBC # FLD: 0 — SIGNIFICANT CHANGE UP
PLATELET # BLD AUTO: 332 K/UL — SIGNIFICANT CHANGE UP (ref 150–400)
PMV BLD: 9.7 FL — SIGNIFICANT CHANGE UP (ref 7–13)
POTASSIUM SERPL-MCNC: 3.8 MMOL/L — SIGNIFICANT CHANGE UP (ref 3.5–5.3)
POTASSIUM SERPL-SCNC: 3.8 MMOL/L — SIGNIFICANT CHANGE UP (ref 3.5–5.3)
RBC # BLD: 4.71 M/UL — SIGNIFICANT CHANGE UP (ref 4.2–5.8)
RBC # FLD: 13.9 % — SIGNIFICANT CHANGE UP (ref 10.3–14.5)
SODIUM SERPL-SCNC: 134 MMOL/L — LOW (ref 135–145)
WBC # BLD: 18.28 K/UL — HIGH (ref 3.8–10.5)
WBC # FLD AUTO: 18.28 K/UL — HIGH (ref 3.8–10.5)

## 2018-06-28 PROCEDURE — 99239 HOSP IP/OBS DSCHRG MGMT >30: CPT

## 2018-06-28 PROCEDURE — 93282 PRGRMG EVAL IMPLANTABLE DFB: CPT | Mod: 26,GC

## 2018-06-28 RX ORDER — MAGNESIUM OXIDE 400 MG ORAL TABLET 241.3 MG
400 TABLET ORAL
Qty: 0 | Refills: 0 | Status: DISCONTINUED | OUTPATIENT
Start: 2018-06-28 | End: 2018-06-28

## 2018-06-28 RX ORDER — CARVEDILOL PHOSPHATE 80 MG/1
1 CAPSULE, EXTENDED RELEASE ORAL
Qty: 0 | Refills: 0 | DISCHARGE
Start: 2018-06-28

## 2018-06-28 RX ORDER — ACETAMINOPHEN 500 MG
1 TABLET ORAL
Qty: 0 | Refills: 0 | DISCHARGE
Start: 2018-06-28

## 2018-06-28 RX ORDER — HYDRALAZINE HCL 50 MG
1 TABLET ORAL
Qty: 0 | Refills: 0 | COMMUNITY

## 2018-06-28 RX ORDER — CARVEDILOL PHOSPHATE 80 MG/1
1 CAPSULE, EXTENDED RELEASE ORAL
Qty: 0 | Refills: 0 | COMMUNITY

## 2018-06-28 RX ORDER — ASPIRIN/CALCIUM CARB/MAGNESIUM 324 MG
1 TABLET ORAL
Qty: 0 | Refills: 0 | COMMUNITY

## 2018-06-28 RX ORDER — SENNA PLUS 8.6 MG/1
2 TABLET ORAL
Qty: 0 | Refills: 0 | DISCHARGE
Start: 2018-06-28

## 2018-06-28 RX ORDER — GABAPENTIN 400 MG/1
1 CAPSULE ORAL
Qty: 0 | Refills: 0 | DISCHARGE
Start: 2018-06-28

## 2018-06-28 RX ORDER — IPRATROPIUM/ALBUTEROL SULFATE 18-103MCG
3 AEROSOL WITH ADAPTER (GRAM) INHALATION
Qty: 0 | Refills: 0 | DISCHARGE
Start: 2018-06-28

## 2018-06-28 RX ORDER — HYDRALAZINE HCL 50 MG
1 TABLET ORAL
Qty: 0 | Refills: 0 | DISCHARGE
Start: 2018-06-28

## 2018-06-28 RX ORDER — GABAPENTIN 400 MG/1
1 CAPSULE ORAL
Qty: 0 | Refills: 0 | COMMUNITY

## 2018-06-28 RX ORDER — ASPIRIN/CALCIUM CARB/MAGNESIUM 324 MG
1 TABLET ORAL
Qty: 0 | Refills: 0 | DISCHARGE
Start: 2018-06-28

## 2018-06-28 RX ADMIN — PANTOPRAZOLE SODIUM 40 MILLIGRAM(S): 20 TABLET, DELAYED RELEASE ORAL at 06:00

## 2018-06-28 RX ADMIN — Medication 10 MILLIGRAM(S): at 18:24

## 2018-06-28 RX ADMIN — Medication 3 MILLILITER(S): at 00:44

## 2018-06-28 RX ADMIN — CARVEDILOL PHOSPHATE 25 MILLIGRAM(S): 80 CAPSULE, EXTENDED RELEASE ORAL at 06:00

## 2018-06-28 RX ADMIN — MAGNESIUM OXIDE 400 MG ORAL TABLET 400 MILLIGRAM(S): 241.3 TABLET ORAL at 18:23

## 2018-06-28 RX ADMIN — GABAPENTIN 300 MILLIGRAM(S): 400 CAPSULE ORAL at 06:00

## 2018-06-28 RX ADMIN — ISOSORBIDE MONONITRATE 30 MILLIGRAM(S): 60 TABLET, EXTENDED RELEASE ORAL at 12:38

## 2018-06-28 RX ADMIN — CLOPIDOGREL BISULFATE 75 MILLIGRAM(S): 75 TABLET, FILM COATED ORAL at 12:38

## 2018-06-28 RX ADMIN — HEPARIN SODIUM 5000 UNIT(S): 5000 INJECTION INTRAVENOUS; SUBCUTANEOUS at 18:23

## 2018-06-28 RX ADMIN — CARVEDILOL PHOSPHATE 25 MILLIGRAM(S): 80 CAPSULE, EXTENDED RELEASE ORAL at 18:23

## 2018-06-28 RX ADMIN — MONTELUKAST 10 MILLIGRAM(S): 4 TABLET, CHEWABLE ORAL at 12:38

## 2018-06-28 RX ADMIN — Medication 3 MILLILITER(S): at 17:30

## 2018-06-28 RX ADMIN — Medication 3 MILLILITER(S): at 13:11

## 2018-06-28 RX ADMIN — Medication 81 MILLIGRAM(S): at 12:38

## 2018-06-28 RX ADMIN — Medication 3 MILLILITER(S): at 09:06

## 2018-06-28 RX ADMIN — Medication 1 GRAM(S): at 06:00

## 2018-06-28 RX ADMIN — Medication 1 GRAM(S): at 18:24

## 2018-06-28 RX ADMIN — Medication 10 MILLIGRAM(S): at 08:22

## 2018-06-28 RX ADMIN — HEPARIN SODIUM 5000 UNIT(S): 5000 INJECTION INTRAVENOUS; SUBCUTANEOUS at 06:00

## 2018-06-28 RX ADMIN — Medication 30 MILLIGRAM(S): at 08:00

## 2018-06-28 RX ADMIN — Medication 6: at 18:22

## 2018-06-28 RX ADMIN — MAGNESIUM OXIDE 400 MG ORAL TABLET 400 MILLIGRAM(S): 241.3 TABLET ORAL at 12:38

## 2018-06-28 RX ADMIN — Medication 3 MILLILITER(S): at 04:37

## 2018-06-28 RX ADMIN — GABAPENTIN 300 MILLIGRAM(S): 400 CAPSULE ORAL at 18:24

## 2018-06-28 RX ADMIN — Medication 4: at 12:37

## 2018-06-28 RX ADMIN — Medication 2: at 08:20

## 2018-06-28 NOTE — PROGRESS NOTE ADULT - SUBJECTIVE AND OBJECTIVE BOX
Subjective: Patient seen and examined. No new events except as noted.     SUBJECTIVE/ROS:  feels ok       MEDICATIONS:  MEDICATIONS  (STANDING):  ALBUTerol/ipratropium for Nebulization 3 milliLiter(s) Nebulizer every 4 hours  aspirin enteric coated 81 milliGRAM(s) Oral daily  atorvastatin 40 milliGRAM(s) Oral at bedtime  carvedilol 25 milliGRAM(s) Oral every 12 hours  clopidogrel Tablet 75 milliGRAM(s) Oral daily  dextrose 50% Injectable 12.5 Gram(s) IV Push once  dextrose 50% Injectable 25 Gram(s) IV Push once  dextrose 50% Injectable 25 Gram(s) IV Push once  docusate sodium 100 milliGRAM(s) Oral three times a day  gabapentin 300 milliGRAM(s) Oral two times a day  heparin  Injectable 5000 Unit(s) SubCutaneous every 12 hours  hydrALAZINE 50 milliGRAM(s) Oral every 8 hours  insulin lispro (HumaLOG) corrective regimen sliding scale   SubCutaneous three times a day before meals  isosorbide   mononitrate ER Tablet (IMDUR) 30 milliGRAM(s) Oral daily  montelukast 10 milliGRAM(s) Oral daily  pantoprazole    Tablet 40 milliGRAM(s) Oral before breakfast  polyethylene glycol 3350 17 Gram(s) Oral daily  predniSONE   Tablet 30 milliGRAM(s) Oral daily  senna 2 Tablet(s) Oral at bedtime  sucralfate 1 Gram(s) Oral two times a day  tamsulosin 0.4 milliGRAM(s) Oral at bedtime  torsemide 10 milliGRAM(s) Oral two times a day      PHYSICAL EXAM:  T(C): 36.4 (06-27-18 @ 21:00), Max: 36.8 (06-27-18 @ 13:25)  HR: 76 (06-28-18 @ 09:08) (62 - 98)  BP: 116/67 (06-28-18 @ 08:23) (113/71 - 127/64)  RR: 18 (06-28-18 @ 05:48) (17 - 18)  SpO2: 97% (06-28-18 @ 09:08) (95% - 100%)  Wt(kg): --  I&O's Summary    27 Jun 2018 07:01  -  28 Jun 2018 07:00  --------------------------------------------------------  IN: 0 mL / OUT: 400 mL / NET: -400 mL    28 Jun 2018 07:01  -  28 Jun 2018 10:26  --------------------------------------------------------  IN: 0 mL / OUT: 125 mL / NET: -125 mL          JVP: Normal  Neck: supple  Lung: clear   CV: S1 S2 , Murmur:  Abd: soft  Ext: No edema  neuro: Awake / alert  Psych: flat affect  Skin: normal       LABS/DATA:    CARDIAC MARKERS:                                13.8   18.28 )-----------( 332      ( 28 Jun 2018 07:24 )             40.2     06-28    134<L>  |  95<L>  |  85<H>  ----------------------------<  148<H>  3.8   |  20<L>  |  2.90<H>    Ca    8.6      28 Jun 2018 07:24  Mg     1.5     06-28      proBNP:   Lipid Profile:   HgA1c:   TSH:     TELE:  EKG:

## 2018-06-28 NOTE — PROGRESS NOTE ADULT - PROBLEM SELECTOR PLAN 3
Unknown if pt has systolic or diastolic CHF - TTE pending   resumed home torsemide as pt was on supplemental O2 and b/l wheezing, suggesting mild CHF exacerbation  Weaned O2 and now appears clinically euvolemic  run of of SVT, hx of AICD, cards follow up appreciated - up-titrate coreg to 25 BID - s/p EP for device interrogation with normal findings - awaiting TTE - can be as out-pt

## 2018-06-28 NOTE — PROGRESS NOTE ADULT - PROBLEM SELECTOR PROBLEM 1
Acute leg pain, left
Acute kidney injury superimposed on chronic kidney disease
Acute gout due to renal impairment involving multiple sites
Acute kidney injury superimposed on chronic kidney disease
Acute leg pain, left
Fever, unspecified fever cause
Fever, unspecified fever cause
SHERRY (acute kidney injury)
Acute kidney injury superimposed on chronic kidney disease
Acute gout due to renal impairment involving multiple sites
Acute gout due to renal impairment involving multiple sites
Acute leg pain, left
Fever, unspecified fever cause
Acute gout due to renal impairment involving multiple sites
Acute gout due to renal impairment involving multiple sites

## 2018-06-28 NOTE — PROGRESS NOTE ADULT - PROVIDER SPECIALTY LIST ADULT
Cardiology
Cardiology
Hospitalist
Nephrology
Nephrology
Orthopedics
Rheumatology
Hospitalist
Hospitalist
Nephrology
Hospitalist

## 2018-06-28 NOTE — PROGRESS NOTE ADULT - ATTENDING COMMENTS
Dispo to rehab - per staff pt not participating much - reinforced to pt the importance of participating in rehab. dw CM and SW - will follow
Dispo to rehab  - dw CM and SW - awaiting placement   Time spent on Dc planning 35 min
Dispo to rehab - per staff pt not participating much - reinforced to pt the importance of participating in rehab. dw CM and SW - will follow  Time spent on Dc planning 35 min

## 2018-06-28 NOTE — PROGRESS NOTE ADULT - PROBLEM SELECTOR PLAN 5
Patient was confused 6/18 in context of fever. stroke code called, seen by neuro  -CTH w/ old strokes. Mental status now back to baseline wo any focal s/s   -doubt new CVA, neuro rec MRI but unable due to AICD - rec repeat CTH which is neg for acute process. no further w/u per neuro cw Anti-plt regimen

## 2018-06-28 NOTE — PROGRESS NOTE ADULT - ASSESSMENT
Episode of PAT  stable  cont BB  on tele now has frequent PACs    CHF chronic systolic   cont BB  off ace/arb due to padilla   appears euvolemic   obtain echo   ICD interrogation noted     HTN  stable    DM  Monitor finger stick. Insulin coverage. Diabetic education and Diabetic diet. Consider nutrition consultation.     cad history of cabg  cont asa  statin

## 2018-06-28 NOTE — PROGRESS NOTE ADULT - SUBJECTIVE AND OBJECTIVE BOX
Patient is a 63y old  Male who presents with a chief complaint of Left leg pain (14 Jun 2018 23:41)      SUBJECTIVE / OVERNIGHT EVENTS: feels well, no complaints - remains afebrile   Tele - PACs    ROS:  No HA/DZ  No Vision changes   No CP, SOB  No N/V/D  No Edema  No Rash  NO weakness, numbness    MEDICATIONS  (STANDING):  ALBUTerol/ipratropium for Nebulization 3 milliLiter(s) Nebulizer every 4 hours  aspirin enteric coated 81 milliGRAM(s) Oral daily  atorvastatin 40 milliGRAM(s) Oral at bedtime  carvedilol 25 milliGRAM(s) Oral every 12 hours  clopidogrel Tablet 75 milliGRAM(s) Oral daily  dextrose 50% Injectable 12.5 Gram(s) IV Push once  dextrose 50% Injectable 25 Gram(s) IV Push once  dextrose 50% Injectable 25 Gram(s) IV Push once  docusate sodium 100 milliGRAM(s) Oral three times a day  gabapentin 300 milliGRAM(s) Oral two times a day  heparin  Injectable 5000 Unit(s) SubCutaneous every 12 hours  hydrALAZINE 50 milliGRAM(s) Oral every 8 hours  insulin lispro (HumaLOG) corrective regimen sliding scale   SubCutaneous three times a day before meals  isosorbide   mononitrate ER Tablet (IMDUR) 30 milliGRAM(s) Oral daily  montelukast 10 milliGRAM(s) Oral daily  pantoprazole    Tablet 40 milliGRAM(s) Oral before breakfast  polyethylene glycol 3350 17 Gram(s) Oral daily  predniSONE   Tablet 30 milliGRAM(s) Oral daily  senna 2 Tablet(s) Oral at bedtime  sucralfate 1 Gram(s) Oral two times a day  tamsulosin 0.4 milliGRAM(s) Oral at bedtime  torsemide 10 milliGRAM(s) Oral two times a day    MEDICATIONS  (PRN):  acetaminophen   Tablet 650 milliGRAM(s) Oral every 6 hours PRN For Temp greater than 38 C (100.4 F)  acetaminophen   Tablet. 325 milliGRAM(s) Oral every 4 hours PRN Mild Pain (1 - 3)  dextrose 40% Gel 15 Gram(s) Oral once PRN Blood Glucose LESS THAN 70 milliGRAM(s)/deciliter  glucagon  Injectable 1 milliGRAM(s) IntraMuscular once PRN Glucose LESS THAN 70 milligrams/deciliter      T(C): 36.4 (06-27-18 @ 21:00)  HR: 76 (06-28-18 @ 09:08)  BP: 116/67 (06-28-18 @ 08:23)  RR: 18 (06-28-18 @ 05:48)  SpO2: 97% (06-28-18 @ 09:08)  CAPILLARY BLOOD GLUCOSE      POCT Blood Glucose.: 159 mg/dL (28 Jun 2018 07:29)  POCT Blood Glucose.: 146 mg/dL (27 Jun 2018 21:05)  POCT Blood Glucose.: 250 mg/dL (27 Jun 2018 16:47)  POCT Blood Glucose.: 216 mg/dL (27 Jun 2018 12:25)    I&O's Summary    27 Jun 2018 07:01  -  28 Jun 2018 07:00  --------------------------------------------------------  IN: 0 mL / OUT: 400 mL / NET: -400 mL    28 Jun 2018 07:01  -  28 Jun 2018 10:45  --------------------------------------------------------  IN: 0 mL / OUT: 125 mL / NET: -125 mL        PHYSICAL EXAM:  GENERAL: NAD, well-developed, AOx3  HEAD:  Atraumatic, Normocephalic  EYES: EOMI, PERRL, conjunctiva and sclera clear  NECK: Supple, No JVD  CHEST/LUNG: Clear to auscultation bilaterally  HEART: Regular rate and rhythm; No murmurs, rubs, or gallops, No Edema  ABDOMEN: Soft, Nontender, Nondistended; Bowel sounds present  EXTREMITIES:  2+ Peripheral Pulses, No clubbing, cyanosis  PSYCH: No SI/HI  NEUROLOGY: non-focal  SKIN: No rashes or lesions    LABS:                        13.8   18.28 )-----------( 332      ( 28 Jun 2018 07:24 )             40.2     06-28    134<L>  |  95<L>  |  85<H>  ----------------------------<  148<H>  3.8   |  20<L>  |  2.90<H>    Ca    8.6      28 Jun 2018 07:24  Mg     1.5     06-28                    RADIOLOGY & ADDITIONAL TESTS:    Imaging Personally Reviewed:    Consultant(s) Notes Reviewed:      Care Discussed with Consultants/Other Providers: betina Hernandez

## 2018-09-08 NOTE — PROGRESS NOTE ADULT - PROBLEM SELECTOR PLAN 6
abdominal pain - Continue with home antihypertensive treatment, decreased hydral due to soft BP  - Low sodium diet

## 2020-02-21 NOTE — PATIENT PROFILE ADULT. - NS PRO OT REFERRAL QUES 2 YN
[FreeTextEntry1] : 79M with L knee OA and degenerative meniscus.  \par -Pt given script for PT\par -offered steroid injection, patient states pain is not that severe to warrant anything invasive\par -follow up in 2 months
no

## 2021-08-19 NOTE — PROVIDER CONTACT NOTE (OTHER) - ACTION/TREATMENT ORDERED:
PATIENT IS S/P [facility] ADMISSION FOR CHEST PAIN    DATE OF DISCHARGE - 8/7/2021    REVIEWED HOSPITAL COURSE, DISCHARGE INSTUCTIONS AND MEDICATIONS    PATIENT FEELS BETTER  DENIES ANY CP, SOB, PALPITATIONS  NO NVD    CARDIOLOGY FOLLOW UP IS SCHEDULED cont to monitor

## 2021-09-01 ENCOUNTER — OUTPATIENT (OUTPATIENT)
Dept: OUTPATIENT SERVICES | Facility: HOSPITAL | Age: 66
LOS: 1 days | End: 2021-09-01
Payer: MEDICARE

## 2021-09-01 DIAGNOSIS — Z95.1 PRESENCE OF AORTOCORONARY BYPASS GRAFT: Chronic | ICD-10-CM

## 2021-09-01 DIAGNOSIS — Z90.49 ACQUIRED ABSENCE OF OTHER SPECIFIED PARTS OF DIGESTIVE TRACT: Chronic | ICD-10-CM

## 2021-09-10 ENCOUNTER — INPATIENT (INPATIENT)
Facility: HOSPITAL | Age: 66
LOS: 3 days | Discharge: HOME | End: 2021-09-14
Attending: STUDENT IN AN ORGANIZED HEALTH CARE EDUCATION/TRAINING PROGRAM | Admitting: STUDENT IN AN ORGANIZED HEALTH CARE EDUCATION/TRAINING PROGRAM
Payer: MEDICARE

## 2021-09-10 VITALS
DIASTOLIC BLOOD PRESSURE: 58 MMHG | RESPIRATION RATE: 18 BRPM | HEIGHT: 69 IN | OXYGEN SATURATION: 98 % | TEMPERATURE: 98 F | HEART RATE: 88 BPM | SYSTOLIC BLOOD PRESSURE: 101 MMHG

## 2021-09-10 DIAGNOSIS — N18.9 CHRONIC KIDNEY DISEASE, UNSPECIFIED: ICD-10-CM

## 2021-09-10 DIAGNOSIS — Z90.49 ACQUIRED ABSENCE OF OTHER SPECIFIED PARTS OF DIGESTIVE TRACT: Chronic | ICD-10-CM

## 2021-09-10 DIAGNOSIS — Z29.9 ENCOUNTER FOR PROPHYLACTIC MEASURES, UNSPECIFIED: ICD-10-CM

## 2021-09-10 DIAGNOSIS — R77.8 OTHER SPECIFIED ABNORMALITIES OF PLASMA PROTEINS: ICD-10-CM

## 2021-09-10 DIAGNOSIS — I50.9 HEART FAILURE, UNSPECIFIED: ICD-10-CM

## 2021-09-10 DIAGNOSIS — I63.9 CEREBRAL INFARCTION, UNSPECIFIED: ICD-10-CM

## 2021-09-10 DIAGNOSIS — R56.9 UNSPECIFIED CONVULSIONS: ICD-10-CM

## 2021-09-10 DIAGNOSIS — I50.20 UNSPECIFIED SYSTOLIC (CONGESTIVE) HEART FAILURE: ICD-10-CM

## 2021-09-10 DIAGNOSIS — E87.1 HYPO-OSMOLALITY AND HYPONATREMIA: ICD-10-CM

## 2021-09-10 DIAGNOSIS — D72.829 ELEVATED WHITE BLOOD CELL COUNT, UNSPECIFIED: ICD-10-CM

## 2021-09-10 DIAGNOSIS — Z95.1 PRESENCE OF AORTOCORONARY BYPASS GRAFT: Chronic | ICD-10-CM

## 2021-09-10 DIAGNOSIS — E11.9 TYPE 2 DIABETES MELLITUS WITHOUT COMPLICATIONS: ICD-10-CM

## 2021-09-10 LAB
ALBUMIN SERPL ELPH-MCNC: 3.6 G/DL — SIGNIFICANT CHANGE UP (ref 3.3–5)
ALP SERPL-CCNC: 75 U/L — SIGNIFICANT CHANGE UP (ref 40–120)
ALT FLD-CCNC: 27 U/L — SIGNIFICANT CHANGE UP (ref 4–41)
ANION GAP SERPL CALC-SCNC: 13 MMOL/L — SIGNIFICANT CHANGE UP (ref 7–14)
APPEARANCE UR: CLEAR — SIGNIFICANT CHANGE UP
AST SERPL-CCNC: 35 U/L — SIGNIFICANT CHANGE UP (ref 4–40)
BACTERIA # UR AUTO: NEGATIVE — SIGNIFICANT CHANGE UP
BASOPHILS # BLD AUTO: 0.07 K/UL — SIGNIFICANT CHANGE UP (ref 0–0.2)
BASOPHILS NFR BLD AUTO: 0.4 % — SIGNIFICANT CHANGE UP (ref 0–2)
BILIRUB SERPL-MCNC: 0.4 MG/DL — SIGNIFICANT CHANGE UP (ref 0.2–1.2)
BILIRUB UR-MCNC: NEGATIVE — SIGNIFICANT CHANGE UP
BUN SERPL-MCNC: 28 MG/DL — HIGH (ref 7–23)
CALCIUM SERPL-MCNC: 8.7 MG/DL — SIGNIFICANT CHANGE UP (ref 8.4–10.5)
CHLORIDE SERPL-SCNC: 92 MMOL/L — LOW (ref 98–107)
CO2 SERPL-SCNC: 21 MMOL/L — LOW (ref 22–31)
COLOR SPEC: YELLOW — SIGNIFICANT CHANGE UP
CREAT SERPL-MCNC: 2.54 MG/DL — HIGH (ref 0.5–1.3)
DIFF PNL FLD: ABNORMAL
EOSINOPHIL # BLD AUTO: 0.13 K/UL — SIGNIFICANT CHANGE UP (ref 0–0.5)
EOSINOPHIL NFR BLD AUTO: 0.7 % — SIGNIFICANT CHANGE UP (ref 0–6)
EPI CELLS # UR: 1 /HPF — SIGNIFICANT CHANGE UP (ref 0–5)
GLUCOSE SERPL-MCNC: 139 MG/DL — HIGH (ref 70–99)
GLUCOSE UR QL: NEGATIVE — SIGNIFICANT CHANGE UP
HCT VFR BLD CALC: 31.6 % — LOW (ref 39–50)
HGB BLD-MCNC: 10.9 G/DL — LOW (ref 13–17)
HYALINE CASTS # UR AUTO: 2 /LPF — SIGNIFICANT CHANGE UP (ref 0–7)
IANC: 14.34 K/UL — HIGH (ref 1.5–8.5)
IMM GRANULOCYTES NFR BLD AUTO: 0.9 % — SIGNIFICANT CHANGE UP (ref 0–1.5)
KETONES UR-MCNC: ABNORMAL
LEUKOCYTE ESTERASE UR-ACNC: NEGATIVE — SIGNIFICANT CHANGE UP
LYMPHOCYTES # BLD AUTO: 1.66 K/UL — SIGNIFICANT CHANGE UP (ref 1–3.3)
LYMPHOCYTES # BLD AUTO: 9.5 % — LOW (ref 13–44)
MAGNESIUM SERPL-MCNC: 1.3 MG/DL — LOW (ref 1.6–2.6)
MCHC RBC-ENTMCNC: 31.1 PG — SIGNIFICANT CHANGE UP (ref 27–34)
MCHC RBC-ENTMCNC: 34.5 GM/DL — SIGNIFICANT CHANGE UP (ref 32–36)
MCV RBC AUTO: 90.3 FL — SIGNIFICANT CHANGE UP (ref 80–100)
MONOCYTES # BLD AUTO: 1.18 K/UL — HIGH (ref 0–0.9)
MONOCYTES NFR BLD AUTO: 6.7 % — SIGNIFICANT CHANGE UP (ref 2–14)
NEUTROPHILS # BLD AUTO: 14.34 K/UL — HIGH (ref 1.8–7.4)
NEUTROPHILS NFR BLD AUTO: 81.8 % — HIGH (ref 43–77)
NITRITE UR-MCNC: NEGATIVE — SIGNIFICANT CHANGE UP
NRBC # BLD: 0 /100 WBCS — SIGNIFICANT CHANGE UP
NRBC # FLD: 0 K/UL — SIGNIFICANT CHANGE UP
NT-PROBNP SERPL-SCNC: 5957 PG/ML — HIGH
PH UR: 6 — SIGNIFICANT CHANGE UP (ref 5–8)
PHOSPHATE SERPL-MCNC: 3.1 MG/DL — SIGNIFICANT CHANGE UP (ref 2.5–4.5)
PLATELET # BLD AUTO: 213 K/UL — SIGNIFICANT CHANGE UP (ref 150–400)
POTASSIUM SERPL-MCNC: 4.3 MMOL/L — SIGNIFICANT CHANGE UP (ref 3.5–5.3)
POTASSIUM SERPL-SCNC: 4.3 MMOL/L — SIGNIFICANT CHANGE UP (ref 3.5–5.3)
PROT SERPL-MCNC: 5.9 G/DL — LOW (ref 6–8.3)
PROT UR-MCNC: ABNORMAL
RBC # BLD: 3.5 M/UL — LOW (ref 4.2–5.8)
RBC # FLD: 15.9 % — HIGH (ref 10.3–14.5)
RBC CASTS # UR COMP ASSIST: 3 /HPF — SIGNIFICANT CHANGE UP (ref 0–4)
SARS-COV-2 RNA SPEC QL NAA+PROBE: SIGNIFICANT CHANGE UP
SODIUM SERPL-SCNC: 126 MMOL/L — LOW (ref 135–145)
SP GR SPEC: 1.01 — SIGNIFICANT CHANGE UP (ref 1–1.05)
TROPONIN T, HIGH SENSITIVITY RESULT: 101 NG/L — CRITICAL HIGH
TROPONIN T, HIGH SENSITIVITY RESULT: 95 NG/L — CRITICAL HIGH
UROBILINOGEN FLD QL: SIGNIFICANT CHANGE UP
WBC # BLD: 17.53 K/UL — HIGH (ref 3.8–10.5)
WBC # FLD AUTO: 17.53 K/UL — HIGH (ref 3.8–10.5)
WBC UR QL: 1 /HPF — SIGNIFICANT CHANGE UP (ref 0–5)

## 2021-09-10 PROCEDURE — 99285 EMERGENCY DEPT VISIT HI MDM: CPT

## 2021-09-10 PROCEDURE — 71045 X-RAY EXAM CHEST 1 VIEW: CPT | Mod: 26

## 2021-09-10 RX ORDER — POTASSIUM CHLORIDE 20 MEQ
1 PACKET (EA) ORAL
Qty: 0 | Refills: 0 | DISCHARGE

## 2021-09-10 RX ORDER — BUDESONIDE AND FORMOTEROL FUMARATE DIHYDRATE 160; 4.5 UG/1; UG/1
2 AEROSOL RESPIRATORY (INHALATION)
Refills: 0 | Status: DISCONTINUED | OUTPATIENT
Start: 2021-09-10 | End: 2021-09-14

## 2021-09-10 RX ORDER — INSULIN LISPRO 100/ML
VIAL (ML) SUBCUTANEOUS
Refills: 0 | Status: DISCONTINUED | OUTPATIENT
Start: 2021-09-10 | End: 2021-09-14

## 2021-09-10 RX ORDER — GLUCAGON INJECTION, SOLUTION 0.5 MG/.1ML
1 INJECTION, SOLUTION SUBCUTANEOUS ONCE
Refills: 0 | Status: DISCONTINUED | OUTPATIENT
Start: 2021-09-10 | End: 2021-09-14

## 2021-09-10 RX ORDER — ISOSORBIDE MONONITRATE 60 MG/1
30 TABLET, EXTENDED RELEASE ORAL DAILY
Refills: 0 | Status: DISCONTINUED | OUTPATIENT
Start: 2021-09-10 | End: 2021-09-10

## 2021-09-10 RX ORDER — DEXTROSE 50 % IN WATER 50 %
15 SYRINGE (ML) INTRAVENOUS ONCE
Refills: 0 | Status: DISCONTINUED | OUTPATIENT
Start: 2021-09-10 | End: 2021-09-14

## 2021-09-10 RX ORDER — SPIRONOLACTONE 25 MG/1
25 TABLET, FILM COATED ORAL DAILY
Refills: 0 | Status: DISCONTINUED | OUTPATIENT
Start: 2021-09-10 | End: 2021-09-14

## 2021-09-10 RX ORDER — CARVEDILOL PHOSPHATE 80 MG/1
3.12 CAPSULE, EXTENDED RELEASE ORAL EVERY 12 HOURS
Refills: 0 | Status: DISCONTINUED | OUTPATIENT
Start: 2021-09-10 | End: 2021-09-14

## 2021-09-10 RX ORDER — ALLOPURINOL 300 MG
300 TABLET ORAL DAILY
Refills: 0 | Status: DISCONTINUED | OUTPATIENT
Start: 2021-09-10 | End: 2021-09-11

## 2021-09-10 RX ORDER — DEXTROSE 50 % IN WATER 50 %
25 SYRINGE (ML) INTRAVENOUS ONCE
Refills: 0 | Status: DISCONTINUED | OUTPATIENT
Start: 2021-09-10 | End: 2021-09-14

## 2021-09-10 RX ORDER — ISOSORBIDE MONONITRATE 60 MG/1
30 TABLET, EXTENDED RELEASE ORAL DAILY
Refills: 0 | Status: DISCONTINUED | OUTPATIENT
Start: 2021-09-10 | End: 2021-09-14

## 2021-09-10 RX ORDER — MONTELUKAST 4 MG/1
1 TABLET, CHEWABLE ORAL
Qty: 0 | Refills: 0 | DISCHARGE

## 2021-09-10 RX ORDER — LEVOTHYROXINE SODIUM 125 MCG
25 TABLET ORAL DAILY
Refills: 0 | Status: DISCONTINUED | OUTPATIENT
Start: 2021-09-10 | End: 2021-09-14

## 2021-09-10 RX ORDER — ATORVASTATIN CALCIUM 80 MG/1
20 TABLET, FILM COATED ORAL AT BEDTIME
Refills: 0 | Status: DISCONTINUED | OUTPATIENT
Start: 2021-09-10 | End: 2021-09-14

## 2021-09-10 RX ORDER — ATORVASTATIN CALCIUM 80 MG/1
1 TABLET, FILM COATED ORAL
Qty: 0 | Refills: 0 | DISCHARGE

## 2021-09-10 RX ORDER — SODIUM CHLORIDE 9 MG/ML
1000 INJECTION, SOLUTION INTRAVENOUS
Refills: 0 | Status: DISCONTINUED | OUTPATIENT
Start: 2021-09-10 | End: 2021-09-14

## 2021-09-10 RX ORDER — FUROSEMIDE 40 MG
40 TABLET ORAL ONCE
Refills: 0 | Status: COMPLETED | OUTPATIENT
Start: 2021-09-10 | End: 2021-09-10

## 2021-09-10 RX ORDER — ALBUTEROL 90 UG/1
2 AEROSOL, METERED ORAL EVERY 6 HOURS
Refills: 0 | Status: DISCONTINUED | OUTPATIENT
Start: 2021-09-10 | End: 2021-09-14

## 2021-09-10 RX ORDER — SPIRONOLACTONE 25 MG/1
25 TABLET, FILM COATED ORAL DAILY
Refills: 0 | Status: DISCONTINUED | OUTPATIENT
Start: 2021-09-10 | End: 2021-09-10

## 2021-09-10 RX ADMIN — Medication 40 MILLIGRAM(S): at 19:30

## 2021-09-10 RX ADMIN — Medication 300 MILLIGRAM(S): at 22:32

## 2021-09-10 RX ADMIN — ATORVASTATIN CALCIUM 20 MILLIGRAM(S): 80 TABLET, FILM COATED ORAL at 22:32

## 2021-09-10 NOTE — H&P ADULT - NSHPSOCIALHISTORY_GEN_ALL_CORE
tobacco use: former, quit 7 years ago  alcohol use denies  drug use denies    lives at home w/family

## 2021-09-10 NOTE — H&P ADULT - NSHPLABSRESULTS_GEN_ALL_CORE
LABS:                          10.9   17.53 )-----------( 213      ( 10 Sep 2021 17:36 )             31.6     -10    126<L>  |  92<L>  |  28<H>  ----------------------------<  139<H>  4.3   |  21<L>  |  2.54<H>    Ca    8.7      10 Sep 2021 17:36  Phos  3.1     -10  Mg     1.30     09-10    TPro  5.9<L>  /  Alb  3.6  /  TBili  0.4  /  DBili  x   /  AST  35  /  ALT  27  /  AlkPhos  75  10    LIVER FUNCTIONS - ( 10 Sep 2021 17:36 )  Alb: 3.6 g/dL / Pro: 5.9 g/dL / ALK PHOS: 75 U/L / ALT: 27 U/L / AST: 35 U/L / GGT: x             Urinalysis Basic - ( 10 Sep 2021 19:20 )    Color: Yellow / Appearance: Clear / S.013 / pH: x  Gluc: x / Ketone: Trace  / Bili: Negative / Urobili: <2 mg/dL   Blood: x / Protein: 30 mg/dL / Nitrite: Negative   Leuk Esterase: Negative / RBC: 3 /HPF / WBC 1 /HPF   Sq Epi: x / Non Sq Epi: 1 /HPF / Bacteria: Negative    < from: Xray Chest 1 View- PORTABLE-Urgent (Xray Chest 1 View- PORTABLE-Urgent .) (09.10.21 @ 18:56) >      INTERPRETATION:  No focal consolidations    ******PRELIMINARY REPORT******      < end of copied text >    EKG: NSR 88; LABS:                          10.9   17.53 )-----------( 213      ( 10 Sep 2021 17:36 )             31.6     -10    126<L>  |  92<L>  |  28<H>  ----------------------------<  139<H>  4.3   |  21<L>  |  2.54<H>    Ca    8.7      10 Sep 2021 17:36  Phos  3.1     -10  Mg     1.30     09-10    TPro  5.9<L>  /  Alb  3.6  /  TBili  0.4  /  DBili  x   /  AST  35  /  ALT  27  /  AlkPhos  75  10    LIVER FUNCTIONS - ( 10 Sep 2021 17:36 )  Alb: 3.6 g/dL / Pro: 5.9 g/dL / ALK PHOS: 75 U/L / ALT: 27 U/L / AST: 35 U/L / GGT: x             Urinalysis Basic - ( 10 Sep 2021 19:20 )    Color: Yellow / Appearance: Clear / S.013 / pH: x  Gluc: x / Ketone: Trace  / Bili: Negative / Urobili: <2 mg/dL   Blood: x / Protein: 30 mg/dL / Nitrite: Negative   Leuk Esterase: Negative / RBC: 3 /HPF / WBC 1 /HPF   Sq Epi: x / Non Sq Epi: 1 /HPF / Bacteria: Negative    < from: Xray Chest 1 View- PORTABLE-Urgent (Xray Chest 1 View- PORTABLE-Urgent .) (09.10.21 @ 18:56) >      INTERPRETATION:  No focal consolidations    ******PRELIMINARY REPORT******      < end of copied text >    EKG: NSR 88; LAD; normal LA; prolonged QRS w/non-specific IVCD; 1mm ST elevation V2-V4; TWI I, aVL LABS:                        10. )-----------( 213      ( 10 Sep 2021 17:36 )             31.6     -10    126<L>  |  92<L>  |  28<H>  ----------------------------<  139<H>  4.3   |  21<L>  |  2.54<H>    Ca    8.7      10 Sep 2021 17:36  Phos  3.1     09-10  Mg     1.30     09-10    TPro  5.9<L>  /  Alb  3.6  /  TBili  0.4  /  DBili  x   /  AST  35  /  ALT  27  /  AlkPhos  75  10    LIVER FUNCTIONS - ( 10 Sep 2021 17:36 )  Alb: 3.6 g/dL / Pro: 5.9 g/dL / ALK PHOS: 75 U/L / ALT: 27 U/L / AST: 35 U/L / GGT: x           Troponin T, High Sensitivity Result: 95 ng/L (09.10.21 @ 19:20)  Troponin T, High Sensitivity Result: 101 ng/L (09.10.21 @ 17:36)    Creatine Kinase, Serum: 57 U/L (21 @ 01:38)  CKMB Mass Assay (21 @ 01:38)    CKMB Units: 4.8 ng/mL    CPK Mass Assay %: 8.4 %    Serum Pro-Brain Natriuretic Peptide: 5957 pg/mL (09.10.21 @ 17:36)    02:47 - VBG - pH: 7.38  | pCO2: 39    | pO2: 32    | Lactate: 1.4      Urinalysis Basic - ( 10 Sep 2021 19:20 )  Color: Yellow / Appearance: Clear / S.013 / pH: x  Gluc: x / Ketone: Trace  / Bili: Negative / Urobili: <2 mg/dL   Blood: x / Protein: 30 mg/dL / Nitrite: Negative   Leuk Esterase: Negative / RBC: 3 /HPF / WBC 1 /HPF   Sq Epi: x / Non Sq Epi: 1 /HPF / Bacteria: Negative    Sodium, Random Urine: <20 mmol/L (21 @ 00:32)  Osmolality, Random Urine: 248 mosm/kg (21 @ 00:32)    CXR personally reviewed - no focal consolidations, RLL linear ?atelectasis; L CW cardiac device    EKG: NSR 88; LAD; normal AR; prolonged QRS w/non-specific IVCD; 1mm ST elevation V2-V4; TWI I, aVL LABS:                        10. )-----------( 213      ( 10 Sep 2021 17:36 )             31.6     -10    126<L>  |  92<L>  |  28<H>  ----------------------------<  139<H>  4.3   |  21<L>  |  2.54<H>    Ca    8.7      10 Sep 2021 17:36  Phos  3.1     09-10  Mg     1.30     09-10    TPro  5.9<L>  /  Alb  3.6  /  TBili  0.4  /  DBili  x   /  AST  35  /  ALT  27  /  AlkPhos  75  10    LIVER FUNCTIONS - ( 10 Sep 2021 17:36 )  Alb: 3.6 g/dL / Pro: 5.9 g/dL / ALK PHOS: 75 U/L / ALT: 27 U/L / AST: 35 U/L / GGT: x           Troponin T, High Sensitivity Result: 95 ng/L (09.10.21 @ 19:20)  Troponin T, High Sensitivity Result: 101 ng/L (09.10.21 @ 17:36)    Creatine Kinase, Serum: 57 U/L (21 @ 01:38)  CKMB Mass Assay (21 @ 01:38)    CKMB Units: 4.8 ng/mL    CPK Mass Assay %: 8.4 %    Serum Pro-Brain Natriuretic Peptide: 5957 pg/mL (09.10.21 @ 17:36)    02:47 - VBG - pH: 7.38  | pCO2: 39    | pO2: 32    | Lactate: 1.4      Urinalysis Basic - ( 10 Sep 2021 19:20 )  Color: Yellow / Appearance: Clear / S.013 / pH: x  Gluc: x / Ketone: Trace  / Bili: Negative / Urobili: <2 mg/dL   Blood: x / Protein: 30 mg/dL / Nitrite: Negative   Leuk Esterase: Negative / RBC: 3 /HPF / WBC 1 /HPF   Sq Epi: x / Non Sq Epi: 1 /HPF / Bacteria: Negative    Sodium, Random Urine: <20 mmol/L (21 @ 00:32)  Osmolality, Random Urine: 248 mosm/kg (21 @ 00:32)    CXR personally reviewed - no focal consolidations, RLL linear ?atelectasis; L CW cardiac device    EKG personally reviewed: NSR 88, PVCs; LAD; normal MO; prolonged QRS w/non-specific IVCD; ST elevation V2-V4; TWI I, aVL; QTc 521ms (no prior EKGs available for comparison)

## 2021-09-10 NOTE — H&P ADULT - HISTORY OF PRESENT ILLNESS
History obtained from patient, patient's daughters, and records in pt's physical chart ([facility] d/c paperwork)    66M w/CABG, HFrEF (15-20% per outside records) s/p AICD on 2LNC at home, CKD, CVA w/residual L-sided weakness, and gout coming in for b/l LE weakness w/LE tremors that pt noted earlier today. States he was in the shower when he experienced sudden onset LE tremors and had to lower himself down to the floor without head trauma; daughters went to see him and found him on the floor with his eyes rolling back, unresponsive w/LUE tremors for ~4 minutes (unsure if there were LLE tremors); following this, pt was responsive but confused and zoning in and out. Prior to this episode, pt denies any chest pain, palpitations, or dyspnea. Additionally denies any recent fevers, cough, abd pain n/v/d. Of note, daughter reports pt had a similar episode several years ago, at which time pt was admitted to [facility] for a neuro workup, which was negative.    Records from [facility] reviewed (in physical chart): Pt was hospitalized from 9/3-9/10 for AHRF 2/2 ADHF w/new home O2 requirements; additionally was noted to have significant leukocytosis w/imaging showing PNA for which he was treated w/vancomycin and cefepime and discharged on cefpodoxime/doxycycline for 7 additional days. Course also significant for hyponatremia (132 on admission, 128 on d/c).    In the ED, afebrile, HR 80s, , SaO2 98 on 2LNC. s/p lasix 40mg IVP History obtained from patient, patient's daughters, and records in pt's physical chart ([facility] d/c paperwork). Offered pt  services for Arabic; pt declined and asked to speak in English.    66M w/CABG, HFrEF (15-20% per outside records) s/p AICD on 2LNC at home, CKD, CVA w/residual L-sided weakness, and gout coming in for b/l LE weakness w/LE tremors that pt noted earlier today. States he was in the shower when he experienced sudden onset LE tremors and had to lower himself down to the floor without head trauma; daughters went to see him and found him on the floor with his eyes rolling back, unresponsive w/LUE tremors for ~4 minutes (unsure if there were LLE tremors); following this, pt was responsive but confused and zoning in and out. Prior to this episode, pt denies any chest pain, palpitations, or dyspnea. Additionally denies any recent fevers, cough, abd pain n/v/d. Of note, daughter reports pt had a similar episode several years ago, at which time pt was admitted to [facility] for a neuro workup, which was negative.    Records from [facility] reviewed (in physical chart): Pt was hospitalized from 9/3-9/10 for AHRF 2/2 ADHF w/new home O2 requirements; additionally was noted to have significant leukocytosis w/imaging showing PNA for which he was treated w/vancomycin and cefepime and discharged on cefpodoxime/doxycycline for 7 additional days. Course also significant for hyponatremia (132 on admission, 128 on d/c).    In the ED, afebrile, HR 80s, , SaO2 98 on 2LNC. s/p lasix 40mg IVP

## 2021-09-10 NOTE — H&P ADULT - ASSESSMENT
66M w/CABG, HFrEF (15-20% per outside records) s/p AICD on 2LNC at home, CKD, CVA w/residual L-sided weakness, and gout, discharged earlier today from [facility] for AHRF 2/2 ADHF and PNA, coming in for b/l LE weakness followed by seizure-like activity w/post-ictal period likely 2/2 hyponatremia vs. less likely intracranial pathology given current neuro exam w/course also significant for leukocytosis without localizing infectious source

## 2021-09-10 NOTE — ED ADULT NURSE REASSESSMENT NOTE - NS ED NURSE REASSESS COMMENT FT1
Break cover RN. pt arrives with 20 g iv placed in L ac, by EMS, no redness swelling noted, flushing well. Placed on CM, NSR. Pt denies chest pain reports weakness. Will continue to monitor.

## 2021-09-10 NOTE — H&P ADULT - PROBLEM SELECTOR PLAN 2
likely 2/2 hypervolemia given crackles at lung bases w/elevated BNP 6k (from 5k on admission 9/3 at [facility]); Na trend at [facility]: 132 (9/9/21) and 128 (9/10/21); given c/f hyponatremia induced seizure, consider 50cc/ bolus of hypertonic saline  - s/p lasix 40 IVP in ED  - continue lasix 40 IVP QD  - fluid restrict 1L  - pt not on ACE/ARB  - nephro consult likely 2/2 hypervolemia given crackles at lung bases w/elevated BNP 6k (from 5k on admission 9/3 at [facility]); Na trend at [facility]: 132 (9/9/21) and 128 (9/10/21); given c/f hyponatremia induced seizure, consider 50cc/ bolus of hypertonic saline  - s/p lasix 40 IVP in ED  - continue lasix 40 IVP QD  - fluid restrict 1L  - urine studies though pt on diuretics  - pt not on ACE/ARB  - nephro consult likely 2/2 hypervolemia given crackles at lung bases w/elevated BNP 6k (from 5k on admission 9/3 at [facility]); Na trend at [facility]: 132 (9/9/21) and 128 (9/10/21); given c/f hyponatremia induced seizure, consider 50cc/ bolus of hypertonic saline  - s/p lasix 40 IVP in ED  - continue lasix 40 IVP QD  - fluid restrict 1L  - urine studies though pt on diuretics  - pt not on ACE/ARB  - Q6h BMP  - nephro consult likely 2/2 hypervolemia given crackles at lung bases w/elevated BNP 6k (from 5k on admission 9/3 at [facility]); Na trend at [facility]: 132 (9/9/21) and 128 (9/10/21)  - s/p lasix 40 IVP in ED  - continue lasix 40 IVP QD  - fluid restrict 1L  - urine studies though pt on diuretics  - pt not on ACE/ARB  - Q6h BMP  - nephro consult likely 2/2 hypervolemia given crackles at lung bases w/elevated BNP 6k (from 5k on admission 9/3 at [facility]); Na trend at [facility]: 132 (9/9/21) and 128 (9/10/21)  - s/p lasix 40 IVP in ED  - continue lasix 40 IVP QD  - fluid restrict 1L  - urine studies though pt on diuretics  - pt not on ACE/ARB  - Q6h BMP  - nephro consult in AM if no improvement in Na w/diuresis

## 2021-09-10 NOTE — ED ADULT TRIAGE NOTE - CHIEF COMPLAINT QUOTE
brought in from home by ems from home for syncopal episode. pt was discharged from [facility] 2 hours ago after stay for hyperkalemia. pt states "felt weak and trembling in the shower., but I held the brigette in the shower." denies fall or injury. denies LOC. as per ems was hypotensive 80/50. 20 g left ac placed by ems, received 150ml nc. ems fs 175. hx heart failure, multiple MIs, multiple TIA, pacemaker, copd on 2LNC at baseline. pt has left sided residual weakness and facial droop from prior stroke. no new neuro deficits. brought in from home by ems from home for syncopal episode. pt was discharged from [facility] 2 hours ago after stay for hyperkalemia. pt states "felt weak and trembling in the shower., but I held the brigette in the shower." denies fall or injury. denies LOC. as per ems was hypotensive 80/50. 20 g left ac placed by ems, received 150ml ns. ems fs 175. hx heart failure, multiple MIs, multiple TIA, pacemaker, copd on 2LNC at baseline. pt has left sided residual weakness and facial droop from prior stroke. no new neuro deficits.

## 2021-09-10 NOTE — H&P ADULT - ATTENDING COMMENTS
66M w/HFrEF s/p AICD/CABG, on chronic O2, CKD, CVA with L residual weakness, presenting with LE weakness/sz-like activity. CT head still pending, called CT tech, patient to go for CT shortly, f/u imaging. 66M w/HFrEF (EF 15-20%)s/p AICD/CABG, CKD, CVA with L residual weakness, DM, HTN, HLD, BPH, presenting with LE weakness/sz-like activity. CT head still pending, called CT tech, patient to go for CT shortly, f/u imaging.     OSH paperwork in chart reviewed. Patient admitted 9/4-9/10/21 for shortness of breath with associated dizziness upon standing, diagnosed with acute respiratory failure w/hypoxia requiring BiPAP secondary to acute on chronic CHF and SHERRY superimposed on CKD3. Patient was diagnosed with PNA, treatedwith vanc/cefepime and discharged on cefpodoxime/doxycycline as well as home O2; strep PNA, legionella and MSSA/MRSA reportedly negative. Patient has a prior history of hyponatremia. Patient reportedly awaiting a heart transplant at [facility]. Labs reviewed: Cr range 2.57->2.43, Mg 1.1, Na 131->128, H/H 10.6/32.6. WBC 12.74->15.12 (labs from 9/8-9/10 only, reportedly WBC27.9->13.4) [employer]  #[id number] utilized.    66M w/HFrEF (EF 15-20%)s/p AICD/CABG, CKD, CVA with L residual weakness, DM, HTN, HLD, BPH, presenting with LE weakness/sz-like activity. Patient denies any falls, states may recall not responding to family for some time but denies LOC or head trauma. CT head still pending, called CT tech, patient reportedly to go for CT shortly, f/u imaging, restart APT.     OSH paperwork in chart reviewed. Patient admitted 9/4-9/10/21 for shortness of breath with associated dizziness upon standing, diagnosed with acute respiratory failure w/hypoxia requiring BiPAP secondary to acute on chronic CHF and SHERRY superimposed on CKD3. Patient was diagnosed with PNA, treatedwith vanc/cefepime and discharged on cefpodoxime/doxycycline as well as home O2; strep PNA, legionella and MSSA/MRSA reportedly negative. Patient has a prior history of hyponatremia. Patient reportedly awaiting a heart transplant at [facility]. Labs reviewed: Cr range 2.57->2.43, Mg 1.1, Na 131->128, H/H 10.6/32.6. WBC 12.74->15.12 (labs from 9/8-9/10 only, reportedly WBC27.9->13.4)    Per patient, no longer on transplant list due to ?SHERRY on CKD? [employer]  #[id number] utilized.    66M w/HFrEF (EF 15-20%)s/p AICD/CABG, CKD, CVA with L residual weakness, on DAPT (no cardiac interventions>1yr), NIDDM2, HTN, HLD, BPH, presenting with LE weakness/sz-like activity. Patient denies any falls, states may recall not responding to family for some time but denies LOC or head trauma. CT head still pending, called CT tech, patient reportedly to go for CT shortly, f/u imaging, restart APT.     OSH paperwork in chart reviewed. Patient admitted 9/4-9/10/21 for shortness of breath with associated dizziness upon standing, diagnosed with acute respiratory failure w/hypoxia requiring BiPAP secondary to acute on chronic CHF and SHERRY superimposed on CKD3. Patient was diagnosed with PNA, treatedwith vanc/cefepime and discharged on cefpodoxime/doxycycline as well as home O2; strep PNA, legionella and MSSA/MRSA reportedly negative. Patient has a prior history of hyponatremia. Patient reportedly awaiting a heart transplant at [facility]. Labs reviewed: Cr range 2.57->2.43, Mg 1.1, Na 131->128, H/H 10.6/32.6. WBC 12.74->15.12 (labs from 9/8-9/10 only, reportedly WBC27.9->13.4)    Per patient, no longer on transplant list due to ?SHERRY on CKD? [employer]  #[id number] utilized.    66M w/HFrEF (EF 15-20%)s/p AICD/CABG, CKD, CVA with L residual weakness, on DAPT (no cardiac interventions>1yr), NIDDM2, HTN, HLD, BPH, presenting with LE weakness/sz-like activity. Patient denies any falls, states may recall not responding to family for some time but denies LOC or head trauma. CT head still pending, called CT tech, patient reportedly to go for CT shortly, f/u imaging, restart APT. Appreciate CCU recs, f/u cards recs this AM.    OSH paperwork in chart reviewed. Patient admitted 9/4-9/10/21 for shortness of breath with associated dizziness upon standing, diagnosed with acute respiratory failure w/hypoxia requiring BiPAP secondary to acute on chronic CHF and SHERRY superimposed on CKD3. Patient was diagnosed with PNA, treatedwith vanc/cefepime and discharged on cefpodoxime/doxycycline as well as home O2; strep PNA, legionella and MSSA/MRSA reportedly negative. Patient has a prior history of hyponatremia. Patient reportedly awaiting a heart transplant at [facility]. Labs reviewed: Cr range 2.57->2.43, Mg 1.1, Na 131->128, H/H 10.6/32.6. WBC 12.74->15.12 (labs from 9/8-9/10 only, reportedly WBC27.9->13.4)    Per patient, no longer on transplant list due to ?SHERRY on CKD?

## 2021-09-10 NOTE — H&P ADULT - NSHPREVIEWOFSYSTEMS_GEN_ALL_CORE
REVIEW OF SYSTEMS:    CONSTITUTIONAL: (+) weakness; No fevers or chills  EYES: No visual changes, blurry vision  ENT: No throat pain ; No rhinorrhea  NECK: No pain or stiffness  RESPIRATORY: No cough, wheezing; No shortness of breath  CARDIOVASCULAR: No chest pain or palpitations  GASTROINTESTINAL: No abdominal or epigastric pain. No nausea, vomiting, or hematemesis; No diarrhea or constipation. No melena or hematochezia.  GENITOURINARY: No dysuria, frequency or hematuria  NEUROLOGICAL: No numbness or weakness  SKIN: No itching, rashes  Psychiatric: No anxiety, depression, SI

## 2021-09-10 NOTE — ED PROVIDER NOTE - CLINICAL SUMMARY MEDICAL DECISION MAKING FREE TEXT BOX
ID: [id number] - 66 male, Hx: HTN, HLD, CHF - presents with onset of generalized weakness that began today. Pt reports he was in the shower when he all of a sudden could lift himself up from the shower seat. Denies any syncopal event (contrary to triage note), headache, head trauma. Exam, presentation, and history concerning for anemia vs. SHERRY/Dehydration/electrolyte dyscrasias vs. atypical MI; eval is NOT consistent with ICH (no blunt trauma on exam, no endorsement of trauma/falls), CVA (CN2-12, PERRL, EOMI, 5/5 Strength in B/L UE and LE). Plan: CBC, CMP, EKG, CXR, Trop, UA, UCx, Re-eval. VSS, non-toxic.

## 2021-09-10 NOTE — H&P ADULT - PROBLEM SELECTOR PLAN 7
on glimepiride at home  - [facility] inpatient on glimepiride at home  - ISS inpatient  - A1c in AM NIDDM2  on glimepiride at home  - ISS inpatient  - A1c in AM

## 2021-09-10 NOTE — H&P ADULT - PROBLEM SELECTOR PLAN 1
described as LUE twitching, eyes rolling back, unresponsive for ~4 minutes w/subsequent post-ictal period; hx of previous R MCA stroke; likely triggered by electrolyte derangements; currently neuro exam at baseline A&Ox3, 5/5 strength in all extremities except LUE w/4/5 strength (known residual weakness from previous CVA; low concern for new acute CVA  - hyponatremia management as below  - CT Head to r/o other acute intracranial pathology though low suspcion  - hold AC/antiplatelets pending CTH  - EEG  - if persistent seizures, will need neuro c/s described as LUE twitching, eyes rolling back, unresponsive for ~4 minutes w/subsequent post-ictal period; hx of previous R MCA stroke; likely triggered by electrolyte derangements; currently neuro exam at baseline A&Ox3, 5/5 strength in all extremities except LUE w/4/5 strength (known residual weakness from previous CVA; low concern for new acute CVA  - hyponatremia management as below  - CT Head to r/o other acute intracranial pathology though low suspcion  - hold AC/antiplatelets pending CTH  - EEG  - bedside swallow eval  - if persistent seizures, will need neuro c/s described as LUE twitching, eyes rolling back, unresponsive for ~4 minutes w/subsequent post-ictal period; hx of previous R MCA stroke; possibly triggered by electrolyte derangements; currently neuro exam at baseline A&Ox3, 5/5 strength in all extremities except LUE w/4/5 strength (known residual weakness from previous CVA; low concern for new acute CVA  - hyponatremia management as below  - CT Head to r/o other acute intracranial pathology though low suspicion  - hold AC/antiplatelets pending CTH  - EEG  - bedside swallow eval  - if persistent seizures, will need neuro c/s

## 2021-09-10 NOTE — ED PROVIDER NOTE - NSICDXPASTMEDICALHX_GEN_ALL_CORE_FT
PAST MEDICAL HISTORY:  BPH (benign prostatic hyperplasia)     CAD (coronary artery disease)     CHF (congestive heart failure)     Diabetes     HTN (hypertension)     Hyperlipidemia, unspecified hyperlipidemia type

## 2021-09-10 NOTE — ED PROVIDER NOTE - NS ED ROS FT
Constitution: No Fever or chills, No Weight Loss,   Eyes: No visual changes  HEENT: No cough, No Discharge, No Rhinorrhea, No URI symptoms  Cardio: No Chest pain, No Palpitations, No Dyspnea  Resp: No SOB, No Wheezing  GI: No abdominal pain, No Nausea, No Vomiting, No Constipation, No Diarrhea  : No burning upon urination, trouble urinating, no foul odor from urine  MSK: No Back pain, No Numbness, No Tingling, No Weakness  Neuro: (+) Generalized Weakness; No Headache  Skin: No rashes, No Bruising, No Swelling

## 2021-09-10 NOTE — H&P ADULT - PROBLEM/PLAN-3
Term  AGA    -Low intermediate TSB, 7.5 @ 38 hrs  -Weight loss 8 %. Breastfeeding and now supplementing with ~ 20-40 ml of formula.   DISPLAY PLAN FREE TEXT

## 2021-09-10 NOTE — H&P ADULT - NSHPPHYSICALEXAM_GEN_ALL_CORE
VITALS:   T(C): 36.6 (09-10-21 @ 18:37), Max: 36.6 (09-10-21 @ 18:37)  HR: 88 (09-10-21 @ 16:27) (88 - 88)  BP: 107/70 (09-10-21 @ 16:27) (101/58 - 107/70)  RR: 17 (09-10-21 @ 16:27) (17 - 18)  SpO2: 98% (09-10-21 @ 16:27) (98% - 98%)    GENERAL: NAD, lying in bed comfortably  EYES: EOMI, conjunctiva and sclera clear  ENT: Moist mucous membranes  NECK: Supple, No JVD  CHEST/LUNG: breathing on RA; Speaking in full sentences; normal WOB; crackles in b/l bases; No wheezing  HEART: Regular rate and rhythm; No murmurs  ABDOMEN: BSx4; Soft, nontender, nondistended  EXTREMITIES:  2+ Peripheral Pulses, brisk capillary refill. No cyanosis or edema  NERVOUS SYSTEM:  A&Ox3, follows commands; answers questions appropriately; 5/5 strength in b/l LE; 5/5 strength in RUE; 4/5 strength LUE  SKIN: No rashes or lesions  Psych: Normal speech, normal behavior, normal affect VITALS:   T(C): 36.6 (09-10-21 @ 18:37), Max: 36.6 (09-10-21 @ 18:37)  HR: 88 (09-10-21 @ 16:27) (88 - 88)  BP: 107/70 (09-10-21 @ 16:27) (101/58 - 107/70)  RR: 17 (09-10-21 @ 16:27) (17 - 18)  SpO2: 98% (09-10-21 @ 16:27) (98% - 98%)    GENERAL: NAD, lying in bed comfortably  EYES: EOMI, conjunctiva and sclera clear  ENT: Moist mucous membranes  NECK: Supple, No JVD  CHEST/LUNG: breathing on RA; Speaking in full sentences; normal WOB; crackles in b/l bases; No wheezing  HEART: Regular rate and rhythm; No murmurs  ABDOMEN: BSx4; Soft, nontender, nondistended  EXTREMITIES:  2+ Peripheral Pulses, brisk capillary refill. No cyanosis or edema  NERVOUS SYSTEM:  A&Ox3, follows commands; answers questions appropriately; 5/5 strength in b/l LE; 5/5 strength in RUE; 4/5 strength LUE; sensation grossly intact; FTN, should shrug LUE limited; CN II-XII grossly intact  SKIN: No rashes or lesions  Psych: Normal speech, normal behavior, normal affect, [attending exam: A&Oxperson, place, year, month reportedly "August"]

## 2021-09-10 NOTE — H&P ADULT - PROBLEM SELECTOR PLAN 8
continue home ASA/Plavix pending CTH results (if no ICH, can restart)  - on home rosuvastatin; continue atorvastatin inpatient

## 2021-09-10 NOTE — H&P ADULT - PROBLEM SELECTOR PLAN 3
17k without fever, tachycardia, tachypnea; no cough, abd pain, n/v/d, dysuria to suggest infectious etiology; pt was admitted to [facility] and noted to have PNA s/p tx; CXR today clear  - continue to trend  - monitor off abx; if febrile-> BCs and start empiric coverage 17k without fever, tachycardia, tachypnea; no cough, abd pain, n/v/d, dysuria to suggest infectious etiology; pt was admitted to [facility] and noted to have PNA s/p tx; CXR today clear  - pt was to complete total of 7d course of abx w/cefpodoxime and doxy per dc paperwork; will continue ceftriaxone and azithro while inpatient

## 2021-09-10 NOTE — ED PROVIDER NOTE - EKG ADDITIONAL QUESTION - PERFORMED INDEPENDENT VISUALIZATION
33 yo F with PMH significant for Migraine. Depression admitted to 5N for eval. of worsening of depression and passive suicidal ideation.    # Depression/Passive Suicidal ideation/ Psych problems  - Manage as per primary psych team     # Suprapubic pain  # UA contaminated but WBC > 50 and Pt is c/o subjective fever and suprapubic pain  - continue with keflex   - FU with UC    # Hx of Migraine  - Pt reported that she takes meds daily  for migraine but don't remember name  - C/w Home meds, pt reported that friend will bring medicine tomorrow or Pt will call to obtain info about meds name and dose     # DVT Ppx  - Encourage Ambulation
35 yo F with PMH significant for Migraine. Depression admitted to 5N for eval. of worsening of depression and passive suicidal ideation.    # Depression/Passive Suicidal ideation/ Psych problems  - Manage as per primary psych team     # Suprapubic pain  # UA contaminated but WBC > 50 and Pt is c/o subjective fever and suprapubic pain  - continue with keflex 3/5, stop after 5 days   - UC normal     # Hx of Migraine  - Pt reported that she takes meds daily  for migraine but don't remember name  - C/w Home meds, pt reported that friend will bring medicine tomorrow or Pt will call to obtain info about meds name and dose     # DVT Ppx  - Encourage Ambulation    #DIspo- Signing off call with questions or reconsult if needed
Yes

## 2021-09-10 NOTE — ED PROVIDER NOTE - OBJECTIVE STATEMENT
ID: [id number] - 66 male, Hx: HTN, HLD, CHF - presents with onset of generalized weakness that began today. Pt reports he was in the shower when he all of a sudden could lift himself up from the shower seat. Denies any syncopal event (contrary to triage note), headache, head trauma. Denies any preceding CP, SOB, abdominal pain, nausea, vomiting, diarrhea, constipation, bloody stools, dysuric symptoms. Does not know his meds or any A/C use.  ID: [id number] - 66 male, Hx: HTN, HLD, CHF, CABG - presents with onset of generalized weakness that began today. Pt reports he was in the shower when he all of a sudden could lift himself up from the shower seat. Denies any syncopal event (contrary to triage note), headache, head trauma. Denies any preceding CP, SOB, abdominal pain, nausea, vomiting, diarrhea, constipation, bloody stools, dysuric symptoms. Does not know his meds or any A/C use.    PMD: MD Roxana

## 2021-09-10 NOTE — H&P ADULT - PROBLEM SELECTOR PLAN 4
hypervolemic on exam w/crackles at b/l bases; pro-BNP 6k from 5k on 9/3 at [facility]  - lasix 40mg IVP BID  - strict I&Os  - continue home aldactone and isordil  - hold hydralazine for now, SBP 100s; can re-add as able  - pt not on a BB (unclear why) or ACE/ARB potentially due to SHERRY on CKD at [facility] hypervolemic on exam w/crackles at b/l bases; pro-BNP 6k from 5k on 9/3 at [facility]  - lasix 40mg IVP BID  - strict I&Os  - continue home aldactone and isordil  - hold hydralazine for now, SBP 100s; can re-add as able  - pt not on a BB (unclear why) or ACE/ARB potentially due to SHERRY on CKD at [facility]  - cardiology in AM (seen by Dr. Hernandez previous admission) hypervolemic on exam w/crackles at b/l bases; pro-BNP 6k from 5k on 9/3 at [facility]  - lasix 40mg IVP BID  - strict I&Os  - continue home aldactone, isordil, and coreg  - hold hydralazine for now, SBP 100s; can re-add as able  - pt not on ACE/ARB potentially due to SHERRY on CKD at [facility]  - cardiology in AM (seen by Dr. Hernandez previous admission) hypervolemic on exam w/crackles at b/l bases; pro-BNP 6k from 5k on 9/3 at [facility]  - lasix 40mg IVP BID  - strict I&Os  - continue home aldactone, isordil, and coreg  - hold hydralazine for now, SBP 100s; can re-add as able  - pt not on ACE/ARB potentially due to SHERRY on CKD at [facility]  - cardiology c/s

## 2021-09-10 NOTE — H&P ADULT - PROBLEM SELECTOR PLAN 5
101-> 95; without chest pain or worsening dyspnea; EKG showing VALENTINE V2-V4 iso non-specific IVCD (no previous to compare); however, given pt without CP, trop are likely iso demand ischemia iso CAD; EKG findings likely old given pt without CP and downtrending troponin 101-> 95; without chest pain or worsening dyspnea; EKG showing 1mm VALENTINE V2-V4 iso non-specific IVCD (no previous to compare); however, given pt without CP, trop are likely iso demand ischemia iso CAD; EKG findings likely old given pt without CP and downtrending troponin  - TSH, lipids in AM  - will call cards given EKG findings without any old to compare against 101-> 95; without chest pain or worsening dyspnea; EKG showing 2mm VALENTINE V2-V4 iso non-specific IVCD (no previous to compare); however, given pt without CP, trop are likely iso demand ischemia iso CAD; EKG findings likely old given pt without CP and downtrending troponin  - TSH, lipids in AM  - will call cards given EKG findings without any old to compare against

## 2021-09-10 NOTE — ED PROVIDER NOTE - PHYSICAL EXAMINATION
GEN - NAD; non-toxic; A+Ox3, speaking full sentences, unsteady gait   HENT - NC/AT, No visible Ecchymosis, No Abrasions, No Lac/Tears, MMM, no discharge  EYES - EOMI, PERRL, no conjunctival pallor, no scleral icterus  NECK - Neck supple, No LAD, No Swelling  PULM - CTA B/L,  symmetric breath sounds  CV -  S1 S2, no murmurs 2+ Pulses B/L UE  GI - (-) Mclean's, (-) Rovsings, (-) McBurneys; NT/ND, soft, no guarding, no rebound, no masses    MSK/EXT- no edema, no gross deformity, warm and well perfused, no calf tenderness/swelling/erythema   SKIN - no rash or bruising  NEUROLOGIC - alert, CN2-12 intact, sensation intact, moving all 4 ext with 5/5 Strength GEN - NAD; non-toxic; A+Ox3, speaking full sentences, unsteady gait   HENT - NC/AT, No visible Ecchymosis, No Abrasions, No Lac/Tears, MMM, no discharge  EYES - EOMI, PERRL, no conjunctival pallor, no scleral icterus  NECK - Neck supple, No LAD, No Swelling  PULM - CTA B/L,  symmetric breath sounds  CV -  S1 S2, no murmurs 2+ Pulses B/L UE  GI - (-) Mclean's, (-) Rovsings, (-) McBurneys; NT/ND, soft, no guarding, no rebound, no masses    MSK/EXT- no edema, no gross deformity, warm and well perfused, no calf tenderness/swelling/erythema   SKIN - no rash or bruising  NEUROLOGIC - alert, CN2-12 intact, sensation intact, moving RUE/RLE 5/5 Strength; LUE LLE with 4/5 Strength (baseline as per pt).

## 2021-09-10 NOTE — ED ADULT NURSE NOTE - CHIEF COMPLAINT QUOTE
brought in from home by ems from home for syncopal episode. pt was discharged from [facility] 2 hours ago after stay for hyperkalemia. pt states "felt weak and trembling in the shower., but I held the brigette in the shower." denies fall or injury. denies LOC. as per ems was hypotensive 80/50. 20 g left ac placed by ems, received 150ml ns. ems fs 175. hx heart failure, multiple MIs, multiple TIA, pacemaker, copd on 2LNC at baseline. pt has left sided residual weakness and facial droop from prior stroke. no new neuro deficits.

## 2021-09-10 NOTE — ED PROVIDER NOTE - PROGRESS NOTE DETAILS
Resident Mckayla: preliminary evaluation concerning for CHF Exacerbation and Hyponatremia - will diurese. Leukocytosis without any focal infectious symptoms endorsed. Will wait on Urine to tx - as rectal negative for acute fever. BCx and UCx obtained. Case endorsed to Hospitalist (MD Sukhjinder) on Tele.

## 2021-09-10 NOTE — ED ADULT NURSE NOTE - OBJECTIVE STATEMENT
Pt. is A&Ox3 brought in by EMS from home for syncope episode. Pt. was d/c from [facility] 2 hours ago for hyperkalemia. States he was showering, felt weak, and trembling in shower. States he did not fall and not on blood thinners. Denies LOC. As per EMS, pt. hypotensive on arrival. Noted 20G on L AC, flushing without resistance, dressing dry and intact. History of HF, TIA, pacemaker, and COPD on 2L NC at home. Pt. has left sided residual weakness and facial droop from prior stroke. no new neuro deficits. Pt. placed on CM, HR 88. Safety maintained.

## 2021-09-10 NOTE — ED PROVIDER NOTE - ATTENDING CONTRIBUTION TO CARE
DR. IYER, ATTENDING MD-  I performed a face to face bedside interview with the patient regarding history of present illness, review of symptoms and past medical history. I completed an independent physical exam.  I have discussed the patient's plan of care with the resident.   Documentation as above in the note.    67 y/o male h/o chf copd tia's bibems with generalized fatigue and weakness.  No syncope contrary to triage note.  Pt states he was recently dc from [facility] after admission for hyperkalemia.  Eval for anemia, lyte abn, occult infection, acs.  Obtain cbc cmp trop ekg cxr covid swab likely admission for further care and evaluation.

## 2021-09-10 NOTE — H&P ADULT - PROBLEM SELECTOR PLAN 6
Cr upon d/c at [facility] 2.5, currently at baseline  - avoid nephrotoxins CKD4  Cr upon d/c at [facility] 2.5, currently at baseline  - avoid nephrotoxins SHERRY on CKD3; Cr upon d/c at [facility] 2.5, continue to monitor/trend  - avoid nephrotoxins, renally dose meds SHERRY on CKD3; Cr upon d/c at [facility] 2.5, continue to monitor/trend  - avoid nephrotoxins, renally dose meds  - allopurinol on hold given recent hospitalization noting SHERRY on CKD3 with stable renal function since discharge

## 2021-09-11 DIAGNOSIS — R56.9 UNSPECIFIED CONVULSIONS: ICD-10-CM

## 2021-09-11 LAB
ANION GAP SERPL CALC-SCNC: 14 MMOL/L — SIGNIFICANT CHANGE UP (ref 7–14)
ANION GAP SERPL CALC-SCNC: 17 MMOL/L — HIGH (ref 7–14)
ANION GAP SERPL CALC-SCNC: 19 MMOL/L — HIGH (ref 7–14)
BASE EXCESS BLDV CALC-SCNC: -1.8 MMOL/L — SIGNIFICANT CHANGE UP (ref -2–3)
BLOOD GAS VENOUS COMPREHENSIVE RESULT: SIGNIFICANT CHANGE UP
BUN SERPL-MCNC: 29 MG/DL — HIGH (ref 7–23)
BUN SERPL-MCNC: 29 MG/DL — HIGH (ref 7–23)
BUN SERPL-MCNC: 31 MG/DL — HIGH (ref 7–23)
CALCIUM SERPL-MCNC: 9.2 MG/DL — SIGNIFICANT CHANGE UP (ref 8.4–10.5)
CALCIUM SERPL-MCNC: 9.3 MG/DL — SIGNIFICANT CHANGE UP (ref 8.4–10.5)
CALCIUM SERPL-MCNC: 9.5 MG/DL — SIGNIFICANT CHANGE UP (ref 8.4–10.5)
CHLORIDE BLDV-SCNC: 96 MMOL/L — SIGNIFICANT CHANGE UP (ref 96–108)
CHLORIDE SERPL-SCNC: 93 MMOL/L — LOW (ref 98–107)
CHLORIDE SERPL-SCNC: 93 MMOL/L — LOW (ref 98–107)
CHLORIDE SERPL-SCNC: 95 MMOL/L — LOW (ref 98–107)
CO2 BLDV-SCNC: 24.3 MMOL/L — SIGNIFICANT CHANGE UP (ref 22–26)
CO2 SERPL-SCNC: 19 MMOL/L — LOW (ref 22–31)
CO2 SERPL-SCNC: 21 MMOL/L — LOW (ref 22–31)
CO2 SERPL-SCNC: 22 MMOL/L — SIGNIFICANT CHANGE UP (ref 22–31)
CREAT SERPL-MCNC: 2.47 MG/DL — HIGH (ref 0.5–1.3)
CREAT SERPL-MCNC: 2.6 MG/DL — HIGH (ref 0.5–1.3)
CREAT SERPL-MCNC: 2.87 MG/DL — HIGH (ref 0.5–1.3)
CULTURE RESULTS: NO GROWTH — SIGNIFICANT CHANGE UP
GAS PNL BLDV: 124 MMOL/L — LOW (ref 136–145)
GLUCOSE BLDV-MCNC: 104 MG/DL — HIGH (ref 70–99)
GLUCOSE SERPL-MCNC: 107 MG/DL — HIGH (ref 70–99)
GLUCOSE SERPL-MCNC: 124 MG/DL — HIGH (ref 70–99)
GLUCOSE SERPL-MCNC: 205 MG/DL — HIGH (ref 70–99)
HCO3 BLDV-SCNC: 23 MMOL/L — SIGNIFICANT CHANGE UP (ref 22–29)
HCT VFR BLD CALC: 32.7 % — LOW (ref 39–50)
HCT VFR BLDA CALC: 38 % — LOW (ref 39–51)
HGB BLD CALC-MCNC: 12.5 G/DL — LOW (ref 13–17)
HGB BLD-MCNC: 11.2 G/DL — LOW (ref 13–17)
LACTATE BLDV-MCNC: 1.4 MMOL/L — SIGNIFICANT CHANGE UP (ref 0.5–2)
MAGNESIUM SERPL-MCNC: 1.2 MG/DL — LOW (ref 1.6–2.6)
MAGNESIUM SERPL-MCNC: 1.8 MG/DL — SIGNIFICANT CHANGE UP (ref 1.6–2.6)
MAGNESIUM SERPL-MCNC: 1.8 MG/DL — SIGNIFICANT CHANGE UP (ref 1.6–2.6)
MCHC RBC-ENTMCNC: 30.4 PG — SIGNIFICANT CHANGE UP (ref 27–34)
MCHC RBC-ENTMCNC: 34.3 GM/DL — SIGNIFICANT CHANGE UP (ref 32–36)
MCV RBC AUTO: 88.9 FL — SIGNIFICANT CHANGE UP (ref 80–100)
NRBC # BLD: 0 /100 WBCS — SIGNIFICANT CHANGE UP
NRBC # FLD: 0 K/UL — SIGNIFICANT CHANGE UP
OSMOLALITY UR: 248 MOSM/KG — SIGNIFICANT CHANGE UP (ref 50–1200)
PCO2 BLDV: 39 MMHG — LOW (ref 42–55)
PH BLDV: 7.38 — SIGNIFICANT CHANGE UP (ref 7.32–7.43)
PHOSPHATE SERPL-MCNC: 2.9 MG/DL — SIGNIFICANT CHANGE UP (ref 2.5–4.5)
PHOSPHATE SERPL-MCNC: 3.5 MG/DL — SIGNIFICANT CHANGE UP (ref 2.5–4.5)
PHOSPHATE SERPL-MCNC: 3.6 MG/DL — SIGNIFICANT CHANGE UP (ref 2.5–4.5)
PLATELET # BLD AUTO: 219 K/UL — SIGNIFICANT CHANGE UP (ref 150–400)
PO2 BLDV: 32 MMHG — SIGNIFICANT CHANGE UP
POTASSIUM BLDV-SCNC: 3.8 MMOL/L — SIGNIFICANT CHANGE UP (ref 3.5–5.1)
POTASSIUM SERPL-MCNC: 3.7 MMOL/L — SIGNIFICANT CHANGE UP (ref 3.5–5.3)
POTASSIUM SERPL-MCNC: 3.9 MMOL/L — SIGNIFICANT CHANGE UP (ref 3.5–5.3)
POTASSIUM SERPL-MCNC: 4 MMOL/L — SIGNIFICANT CHANGE UP (ref 3.5–5.3)
POTASSIUM SERPL-SCNC: 3.7 MMOL/L — SIGNIFICANT CHANGE UP (ref 3.5–5.3)
POTASSIUM SERPL-SCNC: 3.9 MMOL/L — SIGNIFICANT CHANGE UP (ref 3.5–5.3)
POTASSIUM SERPL-SCNC: 4 MMOL/L — SIGNIFICANT CHANGE UP (ref 3.5–5.3)
RBC # BLD: 3.68 M/UL — LOW (ref 4.2–5.8)
RBC # FLD: 15.6 % — HIGH (ref 10.3–14.5)
SAO2 % BLDV: 49.4 % — SIGNIFICANT CHANGE UP
SODIUM SERPL-SCNC: 129 MMOL/L — LOW (ref 135–145)
SODIUM SERPL-SCNC: 130 MMOL/L — LOW (ref 135–145)
SODIUM SERPL-SCNC: 134 MMOL/L — LOW (ref 135–145)
SODIUM UR-SCNC: <20 MMOL/L — SIGNIFICANT CHANGE UP
SPECIMEN SOURCE: SIGNIFICANT CHANGE UP
WBC # BLD: 14.42 K/UL — HIGH (ref 3.8–10.5)
WBC # FLD AUTO: 14.42 K/UL — HIGH (ref 3.8–10.5)

## 2021-09-11 PROCEDURE — 70450 CT HEAD/BRAIN W/O DYE: CPT | Mod: 26

## 2021-09-11 PROCEDURE — 99223 1ST HOSP IP/OBS HIGH 75: CPT

## 2021-09-11 PROCEDURE — 12345: CPT | Mod: NC

## 2021-09-11 RX ORDER — INFLUENZA VIRUS VACCINE 15; 15; 15; 15 UG/.5ML; UG/.5ML; UG/.5ML; UG/.5ML
0.5 SUSPENSION INTRAMUSCULAR ONCE
Refills: 0 | Status: DISCONTINUED | OUTPATIENT
Start: 2021-09-11 | End: 2021-09-14

## 2021-09-11 RX ORDER — MAGNESIUM SULFATE 500 MG/ML
2 VIAL (ML) INJECTION ONCE
Refills: 0 | Status: COMPLETED | OUTPATIENT
Start: 2021-09-11 | End: 2021-09-11

## 2021-09-11 RX ORDER — INSULIN LISPRO 100/ML
VIAL (ML) SUBCUTANEOUS AT BEDTIME
Refills: 0 | Status: DISCONTINUED | OUTPATIENT
Start: 2021-09-11 | End: 2021-09-14

## 2021-09-11 RX ORDER — ASPIRIN/CALCIUM CARB/MAGNESIUM 324 MG
81 TABLET ORAL DAILY
Refills: 0 | Status: DISCONTINUED | OUTPATIENT
Start: 2021-09-11 | End: 2021-09-14

## 2021-09-11 RX ORDER — AZITHROMYCIN 500 MG/1
500 TABLET, FILM COATED ORAL DAILY
Refills: 0 | Status: COMPLETED | OUTPATIENT
Start: 2021-09-11 | End: 2021-09-13

## 2021-09-11 RX ORDER — UREA 15 G
15 POWDER IN PACKET (EA) ORAL
Refills: 0 | Status: COMPLETED | OUTPATIENT
Start: 2021-09-11 | End: 2021-09-14

## 2021-09-11 RX ORDER — CEFTRIAXONE 500 MG/1
1000 INJECTION, POWDER, FOR SOLUTION INTRAMUSCULAR; INTRAVENOUS EVERY 24 HOURS
Refills: 0 | Status: COMPLETED | OUTPATIENT
Start: 2021-09-11 | End: 2021-09-13

## 2021-09-11 RX ADMIN — SPIRONOLACTONE 25 MILLIGRAM(S): 25 TABLET, FILM COATED ORAL at 08:47

## 2021-09-11 RX ADMIN — CEFTRIAXONE 100 MILLIGRAM(S): 500 INJECTION, POWDER, FOR SOLUTION INTRAMUSCULAR; INTRAVENOUS at 01:59

## 2021-09-11 RX ADMIN — AZITHROMYCIN 500 MILLIGRAM(S): 500 TABLET, FILM COATED ORAL at 18:23

## 2021-09-11 RX ADMIN — ATORVASTATIN CALCIUM 20 MILLIGRAM(S): 80 TABLET, FILM COATED ORAL at 21:46

## 2021-09-11 RX ADMIN — Medication 50 GRAM(S): at 05:51

## 2021-09-11 RX ADMIN — BUDESONIDE AND FORMOTEROL FUMARATE DIHYDRATE 2 PUFF(S): 160; 4.5 AEROSOL RESPIRATORY (INHALATION) at 21:46

## 2021-09-11 RX ADMIN — Medication 25 MICROGRAM(S): at 05:51

## 2021-09-11 RX ADMIN — Medication 1: at 13:07

## 2021-09-11 RX ADMIN — Medication 15 GRAM(S): at 18:19

## 2021-09-11 RX ADMIN — CARVEDILOL PHOSPHATE 3.12 MILLIGRAM(S): 80 CAPSULE, EXTENDED RELEASE ORAL at 18:24

## 2021-09-11 RX ADMIN — BUDESONIDE AND FORMOTEROL FUMARATE DIHYDRATE 2 PUFF(S): 160; 4.5 AEROSOL RESPIRATORY (INHALATION) at 08:53

## 2021-09-11 RX ADMIN — CARVEDILOL PHOSPHATE 3.12 MILLIGRAM(S): 80 CAPSULE, EXTENDED RELEASE ORAL at 08:46

## 2021-09-11 NOTE — CONSULT NOTE ADULT - SUBJECTIVE AND OBJECTIVE BOX
CHIEF COMPLAINT:Patient is a 66y old  Male who presents with a chief complaint of weakness (10 Sep 2021 22:00)      HISTORY OF PRESENT ILLNESS:    66 male with history as below, HTN, DM II, CAD s/p CABG 2014 HFrEF (15-20% per outside records) s/p AICD, CKD, CVA w/ residual L-sided weakness, gout, and recent hospital admission at outside hospital and was discharged recenlty from [facility] for ADHF and PNA, coming in for b/l LE weakness followed by seizure-like activity w/ post-ictal period? .  labs remarkable for leukocytosis, hyponatremia, hypomagnesia s/p 40 lasix ivp x 1 in ED. Cardiology called for concern for elevated troponin, ekg and hypotension  pt appears comfortable   denies any chest pain, sob, palpitation, dizziness or syncope.        PAST MEDICAL & SURGICAL HISTORY:  HTN (hypertension)    CAD (coronary artery disease)    Diabetes    Hyperlipidemia, unspecified hyperlipidemia type    CHF (congestive heart failure)    BPH (benign prostatic hyperplasia)    S/P CABG (coronary artery bypass graft)    S/P appendectomy            MEDICATIONS:  carvedilol 3.125 milliGRAM(s) Oral every 12 hours  isosorbide   mononitrate ER Tablet (IMDUR) 30 milliGRAM(s) Oral daily  spironolactone 25 milliGRAM(s) Oral daily    azithromycin   Tablet 500 milliGRAM(s) Oral daily  cefTRIAXone   IVPB 1000 milliGRAM(s) IV Intermittent every 24 hours    ALBUTerol    90 MICROgram(s) HFA Inhaler 2 Puff(s) Inhalation every 6 hours PRN  budesonide 160 MICROgram(s)/formoterol 4.5 MICROgram(s) Inhaler 2 Puff(s) Inhalation two times a day        atorvastatin 20 milliGRAM(s) Oral at bedtime  dextrose 40% Gel 15 Gram(s) Oral once  dextrose 50% Injectable 25 Gram(s) IV Push once  glucagon  Injectable 1 milliGRAM(s) IntraMuscular once  insulin lispro (ADMELOG) corrective regimen sliding scale   SubCutaneous at bedtime  insulin lispro (ADMELOG) corrective regimen sliding scale   SubCutaneous three times a day before meals  levothyroxine 25 MICROGram(s) Oral daily    dextrose 5%. 1000 milliLiter(s) IV Continuous <Continuous>  influenza   Vaccine 0.5 milliLiter(s) IntraMuscular once      FAMILY HISTORY:  No pertinent family history in first degree relatives        Non-contributory    SOCIAL HISTORY:    No tobacco, drugs or etoh    Allergies    No Known Allergies    Intolerances    	    REVIEW OF SYSTEMS:  as above  The rest of the 14 points ROS reviewed and except above they are unremarkable.        PHYSICAL EXAM:  T(C): 36.8 (09-11-21 @ 05:30), Max: 36.8 (09-10-21 @ 22:34)  HR: 107 (09-11-21 @ 08:45) (88 - 107)  BP: 112/64 (09-11-21 @ 08:45) (88/54 - 126/65)  RR: 16 (09-11-21 @ 08:45) (16 - 18)  SpO2: 100% (09-11-21 @ 08:45) (98% - 100%)  Wt(kg): --  I&O's Summary    11 Sep 2021 07:01  -  11 Sep 2021 11:13  --------------------------------------------------------  IN: 150 mL / OUT: 300 mL / NET: -150 mL        Appearance: Normal	  HEENT:   no gross abnormality   Cardiovascular: Normal S1 S2,    Murmur:   Neck: JVP normal  Respiratory: Lungs clear   Gastrointestinal:  Soft, Non-tender  Skin: normal   Neuro: No gross deficits.   Psychiatry:  Mood & affect flat  Ext: No edema    LABS/DATA:    TELEMETRY: 	    ECG:  	sinus, PVCs   	  CARDIAC MARKERS:                        95 <<== 09-10-21 @ 19:20                101 <<== 09-10-21 @ 17:36                              10.9   17.53 )-----------( 213      ( 10 Sep 2021 17:36 )             31.6     09-11    129<L>  |  93<L>  |  29<H>  ----------------------------<  107<H>  4.0   |  19<L>  |  2.47<H>    Ca    9.2      11 Sep 2021 01:33  Phos  3.5     09-11  Mg     1.20     09-11    TPro  5.9<L>  /  Alb  3.6  /  TBili  0.4  /  DBili  x   /  AST  35  /  ALT  27  /  AlkPhos  75  09-10    proBNP: Serum Pro-Brain Natriuretic Peptide: 5957 pg/mL (09-10 @ 17:36)    Lipid Profile:   HgA1c:   TSH:

## 2021-09-11 NOTE — PROGRESS NOTE ADULT - PROBLEM SELECTOR PLAN 4
hypervolemic on exam w/crackles at b/l bases; pro-BNP 5957 from 5k on 9/3 at [facility]  -Cards consult appreciated  - strict I&Os  - continue home aldactone, isordil, and coreg, hold lasix (given a dose in ED)  - hold hydralazine for now, SBP 100s; can re-add as able  - pt not on ACE/ARB potentially due to SHERRY on CKD at [facility]  - EP to interrogate ICD on Monday  -Echo  -resume asa -H/o CAD s/p CABG 2014 HFrEF (15-20% per outside records) s/p AICD, CKD, CVA w/ residual L-sided   -pro-BNP 5957 from 5k on 9/3 at [facility]  -Cards consult appreciated  - strict I&Os  - continue home aldactone, isordil, and coreg, hold lasix (given iv lasix 40mg in ED), appear to close to euvolemia, so would not diurese aggressively per cardiology  - hold hydralazine for now, SBP 100s; can re-add as able  - pt not on ACE/ARB potentially due to SHERRY on CKD at [facility]  - EP to interrogate ICD on Monday  -Echo  -resume asa

## 2021-09-11 NOTE — PROGRESS NOTE ADULT - PROBLEM SELECTOR PLAN 2
likely 2/2 hypervolemia given crackles at lung bases w/elevated BNP 6k (from 5k on admission 9/3 at [facility]); Na trend at [facility]: 132 (9/9/21) and 128 (9/10/21)  - s/p lasix 40 IVP in ED  -BP soft, no peripheral edema, Hold lasix  -trend BMP   -start urena likely 2/2 hypervolemia given crackles at lung bases w/elevated BNP 6k (from 5k on admission 9/3 at [facility]); Na trend at [facility]: 132 (9/9/21) and 128 (9/10/21)  - s/p lasix 40 IV x1 in ED  -BP soft, no peripheral edema, Hold lasix  -trend BMP   -start urena 15 g bid for 3 days

## 2021-09-11 NOTE — PATIENT PROFILE ADULT - NSPROMEDSBROUGHTTOHOSP_GEN_A_NUR
Bedside report received from CDU nurse. Assumed care of pt. Pt awake, laying in bed. A/Ox4, VSS. No complaints at this time. Cup of ice water given. Pt ambulated to bathroom with stand by assistance. POC reviewed and white board updated. Tele box on. Monitor room notified. Pt is in a-fib in the 70's. Call light in reach. Bed locked in lowest position with 2 upper bed rails up. Pt is refusing bed alarm. Pt educated on safety precautions and his need for a bed alarm. Pt continues to refuse. Charge RN up dated. While RN was reviewing plan of care, pt was not sure why he was transferring to this floor. RN educated pt on CDU floor and his need to transfer to telemetry floor. Pt verbalized understanding but stated that he is going home tomorrow. RN explained to pt that it would be up to the attending physician tomorrow to discharge him or not. Pt adamant that he is going home tomorrow. Pt was given extensive education on plan of care related to his CHF exacerbation and what the goals of care are for him and the echocardiogram that would need to be completed prior to discharge. Pt verbalized understanding but was reluctant to accept teaching.      no

## 2021-09-11 NOTE — PROGRESS NOTE ADULT - PROBLEM SELECTOR PLAN 6
SHERRY on CKD3; Cr upon d/c at [facility] 2.5, continue to monitor/trend  - avoid nephrotoxins, renally dose meds  - allopurinol on hold given recent hospitalization noting SHERRY on CKD3 with stable renal function since discharge SHERRY on CKD3; Cr upon d/c at [facility] 2.5, continue to monitor/trend  - avoid nephrotoxins, renally dose meds  - allopurinol on hold given recent hospitalization w/ sherry on ckd3

## 2021-09-11 NOTE — PHYSICAL THERAPY INITIAL EVALUATION ADULT - ADDITIONAL COMMENTS
Patient lives with wife and daughter in private home, there are 3 steps to enter and no steps to bedroom/bathroom. Patient was independent in dressing but required assist with bathing. Patient was using rolling walker prior to admission for ambulation. Patient states his family is home to assist if needed. Patient owns commode, grab bars, shower chair.

## 2021-09-11 NOTE — PROGRESS NOTE ADULT - PROBLEM SELECTOR PLAN 5
101-> 95; without chest pain or worsening dyspnea; EKG showing 2mm VALENTINE V2-V4 iso non-specific IVCD (no previous to compare); however, given pt without CP, trop are likely iso demand ischemia iso CAD; EKG findings likely old given pt without CP and downtrending troponin  - cardiology consult appreciated, resume asa 81mg  -BP soft, resume low dose hydralazine if BP improves  -check Echo

## 2021-09-11 NOTE — PROGRESS NOTE ADULT - PROBLEM SELECTOR PLAN 1
described as LUE twitching, eyes rolling back, unresponsive for ~4 minutes w/subsequent post-ictal period; hx of previous R MCA stroke; possibly triggered by electrolyte derangements; currently neuro exam at baseline A&Ox3, 5/5 strength in all extremities except LUE w/4/5 strength (known residual weakness from previous CVA; low concern for new acute CVA  - hyponatremia management   - CT Head chronic R MCA, no acute path, resume asa   - EEG  - regular diet  - if persistent seizures, will need neuro c/s described as LUE twitching, eyes rolling back, unresponsive for ~4 minutes w/subsequent post-ictal period; hx of previous R MCA stroke; possibly triggered by electrolyte derangements; currently neuro exam at baseline A&Ox3, 5/5 strength in all extremities except LUE w/4/5 strength (known residual weakness from previous CVA; low concern for new acute CVA  - hyponatremia management   - CT Head chronic R MCA, no acute path, resume asa   -vEEG  - regular diet  - if persistent seizures, will need neuro c/s  -PT recs home with home PT on DC

## 2021-09-11 NOTE — CHART NOTE - NSCHARTNOTEFT_GEN_A_CORE
67yo M w/ CAD s/p CABG, HFrEF (15-20% per outside records) s/p AICD, CKD, CVA w/ residual L-sided weakness, gout, and recent hospital admission at outside hospital and was discharged earlier today from [facility] for ADHF and PNA, coming in for b/l LE weakness followed by seizure-like activity w/ post-ictal period? .  labs remarkable for leukocytosis, hyponatremia, hypomagnesia s/p 40 lasix ivp x 1 in ED. Cardiology called for concern for elevated troponin, ekg and hypotension    # elevated troponin  ACS unlikely, no CP, no ekg changes. Cardiac enzymes elevated HsT 101->95 most likely demand in setting of CKD and CHF   Unclear etiology of symptoms, r/o metabolic derangements and neuro causes. replace Mg >2,  Currently symptoms resolved. Patient denies any chest pain or SOB. Lying comfortably in stretcher with no complaints. Appears near euvolemic in no respiratory distress, sPO2 97% on room air however is hyponatremic may be 2/2 hypervolemia in setting of ckd and chf, would recc not to diurese aggressively s/p lasix ivp x 1 in ED.      # Hypotension 88/56  resolved, I just re-checked /67 check proper cuff placement  f/u vbg w/ lactate  denies any complaints    Patient last seen by Dr. shore     Please call with any questions or if situation changes, # [phone]

## 2021-09-11 NOTE — PHYSICAL THERAPY INITIAL EVALUATION ADULT - PERTINENT HX OF CURRENT PROBLEM, REHAB EVAL
66M w/CABG, HFrEF (15-20% per outside records) s/p AICD on 2LNC at home, CKD, CVA w/residual L-sided weakness, and gout, [facility] for [facility]HRF 2/2 ADHF and PNA, coming in for b/l LE weakness followed by seizure-like activity w/post-ictal period likely 2/2 hyponatremia vs. less likely intracranial pathology given current neuro exam w/course also significant for leukocytosis without localizing infectious source

## 2021-09-11 NOTE — CONSULT NOTE ADULT - ASSESSMENT
? Sz  ? due to electrolyte abnormality   rule out pseudo sz from tachyarrhythmia such as VT   please call EP for ICD interrogation   neurology eval   EEG  head CT    chronic systolic CHF  stable clinically  warm to touch  obtain echo   cont BB  hydralazine on hold  can resume low dose if BP stable  off ace /arb due to CKD    CKD   stable crt     hyponatremia   consider renal eval     cad history of CABG  low dose asa recommended   cont statin   cont BB    PNA  consider chest CT  on anbx   plan as per primary team      Advanced care planning was discussed with patient and family.  Risks, benefits and alternatives of the cardiac treatments and medical therapy including procedures were discussed in detail and all questions were answered. Importance of compliance with medical therapy and lifestyle modification to improve cardiovascular health were addressed. Appropriate forms and patient educational materials were reviewed. 30 minutes face to face spent.

## 2021-09-11 NOTE — PROGRESS NOTE ADULT - PROBLEM SELECTOR PLAN 3
17k without fever, tachycardia, tachypnea; no cough, abd pain, n/v/d, dysuria to suggest infectious etiology; pt was admitted to [facility] and noted to have PNA s/p tx;  - pt was to complete total of 7d course of abx w/cefpodoxime and doxy per dc paperwork; will continue ceftriaxone and azithro while inpatient  -CXR possible right infiltrate  will get CT chest 17k without fever, tachycardia, tachypnea; no cough, abd pain, n/v/d, dysuria to suggest infectious etiology; pt was admitted to [facility] and noted to have PNA s/p tx;  - pt was to complete total of 7d course of abx w/cefpodoxime and doxy per dc paperwork; will continue ceftriaxone and azithro while inpatient  -CXR ? right infiltrate  will get CT chest

## 2021-09-11 NOTE — PROGRESS NOTE ADULT - SUBJECTIVE AND OBJECTIVE BOX
Dr. Melissa Peguero  Pager [phone]    PROGRESS NOTE:     Patient is a 66y old  Male who presents with a chief complaint of weakness (11 Sep 2021 11:12)      SUBJECTIVE / OVERNIGHT EVENTS: pt denies chest pain or sob, c/o leg weakness and wobbly after discharge from [facility]  ADDITIONAL REVIEW OF SYSTEMS: afebrile     MEDICATIONS  (STANDING):  aspirin  chewable 81 milliGRAM(s) Oral daily  atorvastatin 20 milliGRAM(s) Oral at bedtime  azithromycin   Tablet 500 milliGRAM(s) Oral daily  budesonide 160 MICROgram(s)/formoterol 4.5 MICROgram(s) Inhaler 2 Puff(s) Inhalation two times a day  carvedilol 3.125 milliGRAM(s) Oral every 12 hours  cefTRIAXone   IVPB 1000 milliGRAM(s) IV Intermittent every 24 hours  dextrose 40% Gel 15 Gram(s) Oral once  dextrose 5%. 1000 milliLiter(s) (50 mL/Hr) IV Continuous <Continuous>  dextrose 50% Injectable 25 Gram(s) IV Push once  glucagon  Injectable 1 milliGRAM(s) IntraMuscular once  influenza   Vaccine 0.5 milliLiter(s) IntraMuscular once  insulin lispro (ADMELOG) corrective regimen sliding scale   SubCutaneous at bedtime  insulin lispro (ADMELOG) corrective regimen sliding scale   SubCutaneous three times a day before meals  isosorbide   mononitrate ER Tablet (IMDUR) 30 milliGRAM(s) Oral daily  levothyroxine 25 MICROGram(s) Oral daily  spironolactone 25 milliGRAM(s) Oral daily    MEDICATIONS  (PRN):  ALBUTerol    90 MICROgram(s) HFA Inhaler 2 Puff(s) Inhalation every 6 hours PRN Shortness of Breath and/or Wheezing      CAPILLARY BLOOD GLUCOSE      POCT Blood Glucose.: 177 mg/dL (11 Sep 2021 12:33)  POCT Blood Glucose.: 105 mg/dL (11 Sep 2021 08:30)  POCT Blood Glucose.: 113 mg/dL (10 Sep 2021 23:05)    I&O's Summary    11 Sep 2021 07:01  -  11 Sep 2021 16:03  --------------------------------------------------------  IN: 150 mL / OUT: 300 mL / NET: -150 mL        PHYSICAL EXAM:  Vital Signs Last 24 Hrs  T(C): 36.8 (11 Sep 2021 12:35), Max: 36.8 (10 Sep 2021 22:34)  T(F): 98.2 (11 Sep 2021 12:35), Max: 98.2 (10 Sep 2021 22:34)  HR: 99 (11 Sep 2021 12:35) (88 - 107)  BP: 106/63 (11 Sep 2021 12:35) (88/54 - 126/65)  BP(mean): 82 (10 Sep 2021 16:27) (82 - 82)  RR: 17 (11 Sep 2021 12:35) (16 - 18)  SpO2: 100% (11 Sep 2021 12:35) (98% - 100%)  CONSTITUTIONAL: NAD, well-developed  chest: left upper chest ICD  RESPIRATORY: Normal respiratory effort; lungs mainly clear  CARDIOVASCULAR: Regular rate and rhythm, normal S1 and S2, no murmur/rub/gallop; No lower extremity edema; Peripheral pulses are 2+ bilaterally  ABDOMEN: Nontender to palpation, normoactive bowel sounds, no rebound/guarding;  MUSCULOSKELETAL: no clubbing or cyanosis of digits; no joint swelling or tenderness to palpation  PSYCH: A+O to person, place, and time; affect appropriate    LABS:                        11.2   14.42 )-----------( 219      ( 11 Sep 2021 12:06 )             32.7     09-11    130<L>  |  95<L>  |  29<H>  ----------------------------<  205<H>  3.7   |  21<L>  |  2.60<H>    Ca    9.3      11 Sep 2021 12:06  Phos  2.9     09-11  Mg     1.80     09-11    TPro  5.9<L>  /  Alb  3.6  /  TBili  0.4  /  DBili  x   /  AST  35  /  ALT  27  /  AlkPhos  75  09-10      CARDIAC MARKERS ( 11 Sep 2021 01:38 )  x     / x     / 57 U/L / x     / 4.8 ng/mL      Urinalysis Basic - ( 10 Sep 2021 19:20 )    Color: Yellow / Appearance: Clear / S.013 / pH: x  Gluc: x / Ketone: Trace  / Bili: Negative / Urobili: <2 mg/dL   Blood: x / Protein: 30 mg/dL / Nitrite: Negative   Leuk Esterase: Negative / RBC: 3 /HPF / WBC 1 /HPF   Sq Epi: x / Non Sq Epi: 1 /HPF / Bacteria: Negative          RADIOLOGY & ADDITIONAL TESTS:  Results Reviewed:   Imaging Personally Reviewed:  r< from: CT Head No Cont (21 @ 11:09) >  IMPRESSION:  Chronic right MCA territory infarction.  No acute hemorrhage or mass effect.    < from: Xray Chest 1 View- PORTABLE-Urgent (Xray Chest 1 View- PORTABLE-Urgent .) (09.10.21 @ 18:56) >  IMPRESSION:  Questionable right infiltrate. Follow-up study is recommended as clinically warranted.    Electrocardiogram Personally Reviewed:    COORDINATION OF CARE:  Care Discussed with Consultants/Other Providers [Y/N]: Dr. Hernandez, cardiology consult  Prior or Outpatient Records Reviewed [Y/N]:   Dr. Melissa Peguero  Pager [phone]    PROGRESS NOTE:     Patient is a 66y old  Male who presents with a chief complaint of weakness (11 Sep 2021 11:12)      SUBJECTIVE / OVERNIGHT EVENTS: pt denies chest pain or sob, c/o leg weakness and wobbly after discharge from [facility]  ADDITIONAL REVIEW OF SYSTEMS: afebrile     MEDICATIONS  (STANDING):  aspirin  chewable 81 milliGRAM(s) Oral daily  atorvastatin 20 milliGRAM(s) Oral at bedtime  azithromycin   Tablet 500 milliGRAM(s) Oral daily  budesonide 160 MICROgram(s)/formoterol 4.5 MICROgram(s) Inhaler 2 Puff(s) Inhalation two times a day  carvedilol 3.125 milliGRAM(s) Oral every 12 hours  cefTRIAXone   IVPB 1000 milliGRAM(s) IV Intermittent every 24 hours  dextrose 40% Gel 15 Gram(s) Oral once  dextrose 5%. 1000 milliLiter(s) (50 mL/Hr) IV Continuous <Continuous>  dextrose 50% Injectable 25 Gram(s) IV Push once  glucagon  Injectable 1 milliGRAM(s) IntraMuscular once  influenza   Vaccine 0.5 milliLiter(s) IntraMuscular once  insulin lispro (ADMELOG) corrective regimen sliding scale   SubCutaneous at bedtime  insulin lispro (ADMELOG) corrective regimen sliding scale   SubCutaneous three times a day before meals  isosorbide   mononitrate ER Tablet (IMDUR) 30 milliGRAM(s) Oral daily  levothyroxine 25 MICROGram(s) Oral daily  spironolactone 25 milliGRAM(s) Oral daily    MEDICATIONS  (PRN):  ALBUTerol    90 MICROgram(s) HFA Inhaler 2 Puff(s) Inhalation every 6 hours PRN Shortness of Breath and/or Wheezing      CAPILLARY BLOOD GLUCOSE      POCT Blood Glucose.: 177 mg/dL (11 Sep 2021 12:33)  POCT Blood Glucose.: 105 mg/dL (11 Sep 2021 08:30)  POCT Blood Glucose.: 113 mg/dL (10 Sep 2021 23:05)    I&O's Summary    11 Sep 2021 07:01  -  11 Sep 2021 16:03  --------------------------------------------------------  IN: 150 mL / OUT: 300 mL / NET: -150 mL        PHYSICAL EXAM:  Vital Signs Last 24 Hrs  T(C): 36.8 (11 Sep 2021 12:35), Max: 36.8 (10 Sep 2021 22:34)  T(F): 98.2 (11 Sep 2021 12:35), Max: 98.2 (10 Sep 2021 22:34)  HR: 99 (11 Sep 2021 12:35) (88 - 107)  BP: 106/63 (11 Sep 2021 12:35) (88/54 - 126/65)  BP(mean): 82 (10 Sep 2021 16:27) (82 - 82)  RR: 17 (11 Sep 2021 12:35) (16 - 18)  SpO2: 100% (11 Sep 2021 12:35) (98% - 100%)  CONSTITUTIONAL: NAD, well-developed  chest: left upper chest ICD  RESPIRATORY: Normal respiratory effort; lungs mainly clear  CARDIOVASCULAR: Regular rate and rhythm, normal S1 and S2, no murmur/rub/gallop; No lower extremity edema; Peripheral pulses are 2+ bilaterally  ABDOMEN: Nontender to palpation, normoactive bowel sounds, no rebound/guarding;  MUSCULOSKELETAL: no clubbing or cyanosis of digits; no joint swelling or tenderness to palpation  PSYCH: A+O to person, place, and time; affect appropriate  Neuro: residual left sided weakness    LABS:                        11.2   14.42 )-----------( 219      ( 11 Sep 2021 12:06 )             32.7     09-11    130<L>  |  95<L>  |  29<H>  ----------------------------<  205<H>  3.7   |  21<L>  |  2.60<H>    Ca    9.3      11 Sep 2021 12:06  Phos  2.9     09-11  Mg     1.80     09-11    TPro  5.9<L>  /  Alb  3.6  /  TBili  0.4  /  DBili  x   /  AST  35  /  ALT  27  /  AlkPhos  75  09-10      CARDIAC MARKERS ( 11 Sep 2021 01:38 )  x     / x     / 57 U/L / x     / 4.8 ng/mL      Urinalysis Basic - ( 10 Sep 2021 19:20 )    Color: Yellow / Appearance: Clear / S.013 / pH: x  Gluc: x / Ketone: Trace  / Bili: Negative / Urobili: <2 mg/dL   Blood: x / Protein: 30 mg/dL / Nitrite: Negative   Leuk Esterase: Negative / RBC: 3 /HPF / WBC 1 /HPF   Sq Epi: x / Non Sq Epi: 1 /HPF / Bacteria: Negative          RADIOLOGY & ADDITIONAL TESTS:  Results Reviewed:   Imaging Personally Reviewed:  r< from: CT Head No Cont (21 @ 11:09) >  IMPRESSION:  Chronic right MCA territory infarction.  No acute hemorrhage or mass effect.    < from: Xray Chest 1 View- PORTABLE-Urgent (Xray Chest 1 View- PORTABLE-Urgent .) (09.10.21 @ 18:56) >  IMPRESSION:  Questionable right infiltrate. Follow-up study is recommended as clinically warranted.    Electrocardiogram Personally Reviewed:    COORDINATION OF CARE:  Care Discussed with Consultants/Other Providers [Y/N]: Dr. Hernandez, cardiology consult  Prior or Outpatient Records Reviewed [Y/N]:

## 2021-09-12 LAB
ANION GAP SERPL CALC-SCNC: 16 MMOL/L — HIGH (ref 7–14)
ANION GAP SERPL CALC-SCNC: 17 MMOL/L — HIGH (ref 7–14)
BUN SERPL-MCNC: 56 MG/DL — HIGH (ref 7–23)
BUN SERPL-MCNC: 71 MG/DL — HIGH (ref 7–23)
CALCIUM SERPL-MCNC: 9.1 MG/DL — SIGNIFICANT CHANGE UP (ref 8.4–10.5)
CALCIUM SERPL-MCNC: 9.4 MG/DL — SIGNIFICANT CHANGE UP (ref 8.4–10.5)
CHLORIDE SERPL-SCNC: 94 MMOL/L — LOW (ref 98–107)
CHLORIDE SERPL-SCNC: 96 MMOL/L — LOW (ref 98–107)
CO2 SERPL-SCNC: 19 MMOL/L — LOW (ref 22–31)
CO2 SERPL-SCNC: 21 MMOL/L — LOW (ref 22–31)
CREAT SERPL-MCNC: 2.59 MG/DL — HIGH (ref 0.5–1.3)
CREAT SERPL-MCNC: 2.78 MG/DL — HIGH (ref 0.5–1.3)
GLUCOSE SERPL-MCNC: 130 MG/DL — HIGH (ref 70–99)
GLUCOSE SERPL-MCNC: 163 MG/DL — HIGH (ref 70–99)
HCT VFR BLD CALC: 34.1 % — LOW (ref 39–50)
HGB BLD-MCNC: 11.7 G/DL — LOW (ref 13–17)
LEGIONELLA AG UR QL: NEGATIVE — SIGNIFICANT CHANGE UP
MAGNESIUM SERPL-MCNC: 1.7 MG/DL — SIGNIFICANT CHANGE UP (ref 1.6–2.6)
MAGNESIUM SERPL-MCNC: 1.7 MG/DL — SIGNIFICANT CHANGE UP (ref 1.6–2.6)
MCHC RBC-ENTMCNC: 30.8 PG — SIGNIFICANT CHANGE UP (ref 27–34)
MCHC RBC-ENTMCNC: 34.3 GM/DL — SIGNIFICANT CHANGE UP (ref 32–36)
MCV RBC AUTO: 89.7 FL — SIGNIFICANT CHANGE UP (ref 80–100)
NRBC # BLD: 0 /100 WBCS — SIGNIFICANT CHANGE UP
NRBC # FLD: 0 K/UL — SIGNIFICANT CHANGE UP
PHOSPHATE SERPL-MCNC: 3.4 MG/DL — SIGNIFICANT CHANGE UP (ref 2.5–4.5)
PHOSPHATE SERPL-MCNC: 3.4 MG/DL — SIGNIFICANT CHANGE UP (ref 2.5–4.5)
PLATELET # BLD AUTO: 244 K/UL — SIGNIFICANT CHANGE UP (ref 150–400)
POTASSIUM SERPL-MCNC: 3.8 MMOL/L — SIGNIFICANT CHANGE UP (ref 3.5–5.3)
POTASSIUM SERPL-MCNC: 3.9 MMOL/L — SIGNIFICANT CHANGE UP (ref 3.5–5.3)
POTASSIUM SERPL-SCNC: 3.8 MMOL/L — SIGNIFICANT CHANGE UP (ref 3.5–5.3)
POTASSIUM SERPL-SCNC: 3.9 MMOL/L — SIGNIFICANT CHANGE UP (ref 3.5–5.3)
RBC # BLD: 3.8 M/UL — LOW (ref 4.2–5.8)
RBC # FLD: 16.1 % — HIGH (ref 10.3–14.5)
SODIUM SERPL-SCNC: 131 MMOL/L — LOW (ref 135–145)
SODIUM SERPL-SCNC: 132 MMOL/L — LOW (ref 135–145)
WBC # BLD: 13.89 K/UL — HIGH (ref 3.8–10.5)
WBC # FLD AUTO: 13.89 K/UL — HIGH (ref 3.8–10.5)

## 2021-09-12 PROCEDURE — 99233 SBSQ HOSP IP/OBS HIGH 50: CPT

## 2021-09-12 PROCEDURE — 71250 CT THORAX DX C-: CPT | Mod: 26

## 2021-09-12 RX ADMIN — ISOSORBIDE MONONITRATE 30 MILLIGRAM(S): 60 TABLET, EXTENDED RELEASE ORAL at 11:58

## 2021-09-12 RX ADMIN — ATORVASTATIN CALCIUM 20 MILLIGRAM(S): 80 TABLET, FILM COATED ORAL at 20:55

## 2021-09-12 RX ADMIN — SPIRONOLACTONE 25 MILLIGRAM(S): 25 TABLET, FILM COATED ORAL at 05:13

## 2021-09-12 RX ADMIN — CARVEDILOL PHOSPHATE 3.12 MILLIGRAM(S): 80 CAPSULE, EXTENDED RELEASE ORAL at 17:25

## 2021-09-12 RX ADMIN — Medication 81 MILLIGRAM(S): at 11:58

## 2021-09-12 RX ADMIN — BUDESONIDE AND FORMOTEROL FUMARATE DIHYDRATE 2 PUFF(S): 160; 4.5 AEROSOL RESPIRATORY (INHALATION) at 20:55

## 2021-09-12 RX ADMIN — Medication 25 MICROGRAM(S): at 05:13

## 2021-09-12 RX ADMIN — CEFTRIAXONE 100 MILLIGRAM(S): 500 INJECTION, POWDER, FOR SOLUTION INTRAMUSCULAR; INTRAVENOUS at 01:10

## 2021-09-12 RX ADMIN — BUDESONIDE AND FORMOTEROL FUMARATE DIHYDRATE 2 PUFF(S): 160; 4.5 AEROSOL RESPIRATORY (INHALATION) at 10:43

## 2021-09-12 RX ADMIN — CARVEDILOL PHOSPHATE 3.12 MILLIGRAM(S): 80 CAPSULE, EXTENDED RELEASE ORAL at 05:13

## 2021-09-12 RX ADMIN — AZITHROMYCIN 500 MILLIGRAM(S): 500 TABLET, FILM COATED ORAL at 11:57

## 2021-09-12 RX ADMIN — Medication 15 GRAM(S): at 17:24

## 2021-09-12 RX ADMIN — Medication 15 GRAM(S): at 05:14

## 2021-09-12 NOTE — PROGRESS NOTE ADULT - PROBLEM SELECTOR PLAN 2
likely 2/2 hypervolemia given crackles at lung bases w/elevated BNP 6k (from 5k on admission 9/3 at [facility]); Na trend at [facility]: 132 (9/9/21) and 128 (9/10/21)  - s/p lasix 40 IV x1 in ED  -BP soft, no peripheral edema, Hold lasix, Pt reports he takes torsemide 50mg bid at home  -trend BMP   -start urena 15 g bid for 3 days

## 2021-09-12 NOTE — PROGRESS NOTE ADULT - SUBJECTIVE AND OBJECTIVE BOX
Dr. Melissa Peguero  Pager [phone]    PROGRESS NOTE:     Patient is a 66y old  Male who presents with a chief complaint of weakness (12 Sep 2021 07:30)      SUBJECTIVE / OVERNIGHT EVENTS: pt feels better, leg weakness improved  ADDITIONAL REVIEW OF SYSTEMS: denies chest pain, breathing okay on RA    MEDICATIONS  (STANDING):  aspirin  chewable 81 milliGRAM(s) Oral daily  atorvastatin 20 milliGRAM(s) Oral at bedtime  azithromycin   Tablet 500 milliGRAM(s) Oral daily  budesonide 160 MICROgram(s)/formoterol 4.5 MICROgram(s) Inhaler 2 Puff(s) Inhalation two times a day  carvedilol 3.125 milliGRAM(s) Oral every 12 hours  cefTRIAXone   IVPB 1000 milliGRAM(s) IV Intermittent every 24 hours  dextrose 40% Gel 15 Gram(s) Oral once  dextrose 5%. 1000 milliLiter(s) (50 mL/Hr) IV Continuous <Continuous>  dextrose 50% Injectable 25 Gram(s) IV Push once  glucagon  Injectable 1 milliGRAM(s) IntraMuscular once  influenza   Vaccine 0.5 milliLiter(s) IntraMuscular once  insulin lispro (ADMELOG) corrective regimen sliding scale   SubCutaneous at bedtime  insulin lispro (ADMELOG) corrective regimen sliding scale   SubCutaneous three times a day before meals  isosorbide   mononitrate ER Tablet (IMDUR) 30 milliGRAM(s) Oral daily  levothyroxine 25 MICROGram(s) Oral daily  spironolactone 25 milliGRAM(s) Oral daily  urea Oral Powder 15 Gram(s) Oral two times a day    MEDICATIONS  (PRN):  ALBUTerol    90 MICROgram(s) HFA Inhaler 2 Puff(s) Inhalation every 6 hours PRN Shortness of Breath and/or Wheezing      CAPILLARY BLOOD GLUCOSE      POCT Blood Glucose.: 122 mg/dL (12 Sep 2021 08:34)  POCT Blood Glucose.: 164 mg/dL (11 Sep 2021 21:26)  POCT Blood Glucose.: 134 mg/dL (11 Sep 2021 18:03)  POCT Blood Glucose.: 177 mg/dL (11 Sep 2021 12:33)    I&O's Summary    11 Sep 2021 07:01  -  12 Sep 2021 07:00  --------------------------------------------------------  IN: 150 mL / OUT: 300 mL / NET: -150 mL    12 Sep 2021 07:01  -  12 Sep 2021 11:01  --------------------------------------------------------  IN: 0 mL / OUT: 150 mL / NET: -150 mL        PHYSICAL EXAM:  Vital Signs Last 24 Hrs  T(C): 36.7 (12 Sep 2021 05:00), Max: 36.8 (11 Sep 2021 12:35)  T(F): 98.1 (12 Sep 2021 05:00), Max: 98.2 (11 Sep 2021 12:35)  HR: 100 (12 Sep 2021 05:00) (97 - 101)  BP: 113/63 (12 Sep 2021 05:00) (104/61 - 113/63)  BP(mean): --  RR: 18 (12 Sep 2021 05:00) (17 - 18)  SpO2: 100% (12 Sep 2021 05:00) (98% - 100%)  CONSTITUTIONAL: NAD, well-developed  chest: left upper chest ICD  RESPIRATORY: Normal respiratory effort; lungs mainly clear, few basilar crackle  CARDIOVASCULAR: Regular rate and rhythm, normal S1 and S2, no murmur/rub/gallop; No lower extremity edema; Peripheral pulses are 2+ bilaterally  ABDOMEN: Nontender to palpation, normoactive bowel sounds, no rebound/guarding;  MUSCULOSKELETAL: no clubbing or cyanosis of digits; no joint swelling or tenderness to palpation  PSYCH: A+O to person, place, and time; affect appropriate  Neuro: residual left sided weakness      LABS:                        11.7   13.89 )-----------( 244      ( 12 Sep 2021 07:31 )             34.1     09-12    132<L>  |  96<L>  |  71<H>  ----------------------------<  130<H>  3.8   |  19<L>  |  2.59<H>    Ca    9.4      12 Sep 2021 08:27  Phos  3.4     09-12  Mg     1.70     09-12    TPro  5.9<L>  /  Alb  3.6  /  TBili  0.4  /  DBili  x   /  AST  35  /  ALT  27  /  AlkPhos  75  09-10      CARDIAC MARKERS ( 11 Sep 2021 01:38 )  x     / x     / 57 U/L / x     / 4.8 ng/mL      Urinalysis Basic - ( 10 Sep 2021 19:20 )    Color: Yellow / Appearance: Clear / S.013 / pH: x  Gluc: x / Ketone: Trace  / Bili: Negative / Urobili: <2 mg/dL   Blood: x / Protein: 30 mg/dL / Nitrite: Negative   Leuk Esterase: Negative / RBC: 3 /HPF / WBC 1 /HPF   Sq Epi: x / Non Sq Epi: 1 /HPF / Bacteria: Negative        Culture - Blood (collected 10 Sep 2021 21:05)  Source: .Blood Blood-Venous  Preliminary Report (11 Sep 2021 22:02):    No growth to date.    Culture - Blood (collected 10 Sep 2021 21:01)  Source: .Blood Blood-Peripheral  Preliminary Report (11 Sep 2021 22:02):    No growth to date.    Culture - Urine (collected 10 Sep 2021 20:00)  Source: Clean Catch Clean Catch (Midstream)  Final Report (11 Sep 2021 17:52):    No growth        RADIOLOGY & ADDITIONAL TESTS:  Results Reviewed:   Imaging Personally Reviewed:  Electrocardiogram Personally Reviewed:    COORDINATION OF CARE:  Care Discussed with Consultants/Other Providers [Y/N]:  Prior or Outpatient Records Reviewed [Y/N]:

## 2021-09-12 NOTE — PROGRESS NOTE ADULT - PROBLEM SELECTOR PLAN 3
17k without fever, tachycardia, tachypnea; no cough, abd pain, n/v/d, dysuria to suggest infectious etiology; pt was admitted to [facility] and noted to have PNA s/p tx;  - pt was to complete total of 7d course of abx w/cefpodoxime and doxy per dc paperwork; will continue ceftriaxone and azithro while inpt, urine legionella neg  -CXR ? right infiltrate, ordered CT chest

## 2021-09-12 NOTE — PROGRESS NOTE ADULT - PROBLEM SELECTOR PLAN 4
-H/o CAD s/p CABG 2014 HFrEF (15-20% per outside records) s/p AICD, CKD, CVA w/ residual L-sided   -pro-BNP 5957 from 5k on 9/3 at [facility]  -Cards consult appreciated  - strict I&Os  - continue home aldactone, isordil, and coreg, hold lasix (given iv lasix 40mg in ED), appear to close to euvolemia, so would not diurese aggressively per cardiology  - hold hydralazine for now, SBP 100s; can re-add as able  - pt not on ACE/ARB potentially due to SHERRY on CKD at [facility]  - EP to interrogate ICD on Monday  -Echo pending  -resume asa

## 2021-09-12 NOTE — PROGRESS NOTE ADULT - PROBLEM SELECTOR PLAN 6
SHERRY on CKD3; Cr upon d/c at [facility] 2.5, continue to monitor/trend  - avoid nephrotoxins, renally dose meds  - allopurinol on hold given recent hospitalization w/ sherry on ckd3

## 2021-09-12 NOTE — PROGRESS NOTE ADULT - SUBJECTIVE AND OBJECTIVE BOX
DATE OF SERVICE: 09-12-21 @ 07:30    Subjective: Patient seen and examined. No new events except as noted.     SUBJECTIVE/ROS:  feels better  No chest pain, dyspnea, palpitation, or dizziness.       MEDICATIONS:  MEDICATIONS  (STANDING):  aspirin  chewable 81 milliGRAM(s) Oral daily  atorvastatin 20 milliGRAM(s) Oral at bedtime  azithromycin   Tablet 500 milliGRAM(s) Oral daily  budesonide 160 MICROgram(s)/formoterol 4.5 MICROgram(s) Inhaler 2 Puff(s) Inhalation two times a day  carvedilol 3.125 milliGRAM(s) Oral every 12 hours  cefTRIAXone   IVPB 1000 milliGRAM(s) IV Intermittent every 24 hours  dextrose 40% Gel 15 Gram(s) Oral once  dextrose 5%. 1000 milliLiter(s) (50 mL/Hr) IV Continuous <Continuous>  dextrose 50% Injectable 25 Gram(s) IV Push once  glucagon  Injectable 1 milliGRAM(s) IntraMuscular once  influenza   Vaccine 0.5 milliLiter(s) IntraMuscular once  insulin lispro (ADMELOG) corrective regimen sliding scale   SubCutaneous at bedtime  insulin lispro (ADMELOG) corrective regimen sliding scale   SubCutaneous three times a day before meals  isosorbide   mononitrate ER Tablet (IMDUR) 30 milliGRAM(s) Oral daily  levothyroxine 25 MICROGram(s) Oral daily  spironolactone 25 milliGRAM(s) Oral daily  urea Oral Powder 15 Gram(s) Oral two times a day      PHYSICAL EXAM:  T(C): 36.7 (09-12-21 @ 05:00), Max: 36.8 (09-11-21 @ 12:35)  HR: 100 (09-12-21 @ 05:00) (97 - 107)  BP: 113/63 (09-12-21 @ 05:00) (104/61 - 113/63)  RR: 18 (09-12-21 @ 05:00) (16 - 18)  SpO2: 100% (09-12-21 @ 05:00) (98% - 100%)  Wt(kg): --  I&O's Summary    11 Sep 2021 07:01  -  12 Sep 2021 07:00  --------------------------------------------------------  IN: 150 mL / OUT: 300 mL / NET: -150 mL            JVP: Normal  Neck: supple  Lung: clear   CV: S1 S2 , Murmur:  Abd: soft  Ext: No edema  neuro: Awake / alert  Psych: flat affect  Skin: normal``    LABS/DATA:    CARDIAC MARKERS:  CARDIAC MARKERS ( 11 Sep 2021 01:38 )  x     / x     / 57 U/L / x     / 4.8 ng/mL                                11.2   14.42 )-----------( 219      ( 11 Sep 2021 12:06 )             32.7     09-12    131<L>  |  94<L>  |  56<H>  ----------------------------<  163<H>  3.9   |  21<L>  |  2.78<H>    Ca    9.1      12 Sep 2021 00:23  Phos  3.4     09-12  Mg     1.70     09-12    TPro  5.9<L>  /  Alb  3.6  /  TBili  0.4  /  DBili  x   /  AST  35  /  ALT  27  /  AlkPhos  75  09-10    proBNP:   Lipid Profile:   HgA1c:   TSH:     TELE:  EKG:      < from: CT Head No Cont (09.11.21 @ 11:09) >    IMPRESSION:  Chronic right MCA territory infarction.  No acute hemorrhage or mass effect.    --- End of Report ---    < end of copied text >

## 2021-09-12 NOTE — PROGRESS NOTE ADULT - PROBLEM SELECTOR PLAN 1
described as LUE twitching, eyes rolling back, unresponsive for ~4 minutes w/subsequent post-ictal period; hx of previous R MCA stroke; possibly triggered by electrolyte derangements; currently neuro exam at baseline A&Ox3, 5/5 strength in all extremities except LUE w/4/5 strength (known residual weakness from previous CVA; low concern for new acute CVA  -pt denies syncope/tongue biting/urinary incontinence, doubt seizure activity, pending EEG  - hyponatremia management as below  - CT Head chronic R MCA, no acute path, resume asa  - hold off on neuro consult pending EEG  -PT recs home with home PT on DC

## 2021-09-13 LAB
A1C WITH ESTIMATED AVERAGE GLUCOSE RESULT: 6.8 % — HIGH (ref 4–5.6)
ANION GAP SERPL CALC-SCNC: 12 MMOL/L — SIGNIFICANT CHANGE UP (ref 7–14)
BUN SERPL-MCNC: 92 MG/DL — HIGH (ref 7–23)
CALCIUM SERPL-MCNC: 9.8 MG/DL — SIGNIFICANT CHANGE UP (ref 8.4–10.5)
CHLORIDE SERPL-SCNC: 99 MMOL/L — SIGNIFICANT CHANGE UP (ref 98–107)
CO2 SERPL-SCNC: 21 MMOL/L — LOW (ref 22–31)
CREAT SERPL-MCNC: 2.69 MG/DL — HIGH (ref 0.5–1.3)
ESTIMATED AVERAGE GLUCOSE: 148 — SIGNIFICANT CHANGE UP
GLUCOSE BLDC GLUCOMTR-MCNC: 128 MG/DL — HIGH (ref 70–99)
GLUCOSE BLDC GLUCOMTR-MCNC: 218 MG/DL — HIGH (ref 70–99)
GLUCOSE SERPL-MCNC: 172 MG/DL — HIGH (ref 70–99)
HCT VFR BLD CALC: 36.4 % — LOW (ref 39–50)
HGB BLD-MCNC: 12 G/DL — LOW (ref 13–17)
MAGNESIUM SERPL-MCNC: 1.8 MG/DL — SIGNIFICANT CHANGE UP (ref 1.6–2.6)
MCHC RBC-ENTMCNC: 30.2 PG — SIGNIFICANT CHANGE UP (ref 27–34)
MCHC RBC-ENTMCNC: 33 GM/DL — SIGNIFICANT CHANGE UP (ref 32–36)
MCV RBC AUTO: 91.7 FL — SIGNIFICANT CHANGE UP (ref 80–100)
NRBC # BLD: 0 /100 WBCS — SIGNIFICANT CHANGE UP
NRBC # FLD: 0 K/UL — SIGNIFICANT CHANGE UP
PHOSPHATE SERPL-MCNC: 3 MG/DL — SIGNIFICANT CHANGE UP (ref 2.5–4.5)
PLATELET # BLD AUTO: 258 K/UL — SIGNIFICANT CHANGE UP (ref 150–400)
POTASSIUM SERPL-MCNC: 4 MMOL/L — SIGNIFICANT CHANGE UP (ref 3.5–5.3)
POTASSIUM SERPL-SCNC: 4 MMOL/L — SIGNIFICANT CHANGE UP (ref 3.5–5.3)
RBC # BLD: 3.97 M/UL — LOW (ref 4.2–5.8)
RBC # FLD: 16 % — HIGH (ref 10.3–14.5)
SODIUM SERPL-SCNC: 132 MMOL/L — LOW (ref 135–145)
WBC # BLD: 13.4 K/UL — HIGH (ref 3.8–10.5)
WBC # FLD AUTO: 13.4 K/UL — HIGH (ref 3.8–10.5)

## 2021-09-13 PROCEDURE — 93306 TTE W/DOPPLER COMPLETE: CPT | Mod: 26

## 2021-09-13 PROCEDURE — 93281 PM DEVICE PROGR EVAL MULTI: CPT | Mod: 26

## 2021-09-13 PROCEDURE — 99233 SBSQ HOSP IP/OBS HIGH 50: CPT

## 2021-09-13 RX ORDER — HEPARIN SODIUM 5000 [USP'U]/ML
5000 INJECTION INTRAVENOUS; SUBCUTANEOUS EVERY 12 HOURS
Refills: 0 | Status: DISCONTINUED | OUTPATIENT
Start: 2021-09-13 | End: 2021-09-14

## 2021-09-13 RX ORDER — MAGNESIUM SULFATE 500 MG/ML
2 VIAL (ML) INJECTION ONCE
Refills: 0 | Status: COMPLETED | OUTPATIENT
Start: 2021-09-13 | End: 2021-09-13

## 2021-09-13 RX ADMIN — Medication 1: at 12:51

## 2021-09-13 RX ADMIN — BUDESONIDE AND FORMOTEROL FUMARATE DIHYDRATE 2 PUFF(S): 160; 4.5 AEROSOL RESPIRATORY (INHALATION) at 21:20

## 2021-09-13 RX ADMIN — ATORVASTATIN CALCIUM 20 MILLIGRAM(S): 80 TABLET, FILM COATED ORAL at 21:20

## 2021-09-13 RX ADMIN — CARVEDILOL PHOSPHATE 3.12 MILLIGRAM(S): 80 CAPSULE, EXTENDED RELEASE ORAL at 17:36

## 2021-09-13 RX ADMIN — CARVEDILOL PHOSPHATE 3.12 MILLIGRAM(S): 80 CAPSULE, EXTENDED RELEASE ORAL at 05:05

## 2021-09-13 RX ADMIN — BUDESONIDE AND FORMOTEROL FUMARATE DIHYDRATE 2 PUFF(S): 160; 4.5 AEROSOL RESPIRATORY (INHALATION) at 10:50

## 2021-09-13 RX ADMIN — HEPARIN SODIUM 5000 UNIT(S): 5000 INJECTION INTRAVENOUS; SUBCUTANEOUS at 18:19

## 2021-09-13 RX ADMIN — Medication 25 MICROGRAM(S): at 05:05

## 2021-09-13 RX ADMIN — Medication 15 GRAM(S): at 17:34

## 2021-09-13 RX ADMIN — AZITHROMYCIN 500 MILLIGRAM(S): 500 TABLET, FILM COATED ORAL at 12:58

## 2021-09-13 RX ADMIN — CEFTRIAXONE 100 MILLIGRAM(S): 500 INJECTION, POWDER, FOR SOLUTION INTRAMUSCULAR; INTRAVENOUS at 01:09

## 2021-09-13 RX ADMIN — Medication 15 GRAM(S): at 05:06

## 2021-09-13 RX ADMIN — Medication 50 GRAM(S): at 16:11

## 2021-09-13 RX ADMIN — Medication 81 MILLIGRAM(S): at 12:58

## 2021-09-13 NOTE — PROGRESS NOTE ADULT - PROBLEM SELECTOR PLAN 3
17k without fever, tachycardia, tachypnea; no cough, abd pain, n/v/d, dysuria to suggest infectious etiology; pt was admitted to [facility] and noted to have PNA s/p tx;  - pt was to complete total of 7d course of abx w/cefpodoxime and doxy per dc paperwork; will continue ceftriaxone and azithro while inpt, urine legionella neg  -CXR ? right infiltrate -> f/u CT chest 17k without fever, tachycardia, tachypnea; no cough, abd pain, n/v/d, dysuria to suggest infectious etiology; pt was admitted to [facility] and noted to have PNA s/p tx;  - pt was to complete total of 7d course of abx w/cefpodoxime and doxy per dc paperwork; Now has completed  ceftriaxone and azithro while inpt  -CXR ? right infiltrate -> CT chest w/ RUL opacity

## 2021-09-13 NOTE — PROGRESS NOTE ADULT - SUBJECTIVE AND OBJECTIVE BOX
DATE OF SERVICE: 09-13-21 @ 09:52    Subjective: Patient seen and examined. No new events except as noted.     SUBJECTIVE/ROS:        MEDICATIONS:  MEDICATIONS  (STANDING):  aspirin  chewable 81 milliGRAM(s) Oral daily  atorvastatin 20 milliGRAM(s) Oral at bedtime  azithromycin   Tablet 500 milliGRAM(s) Oral daily  budesonide 160 MICROgram(s)/formoterol 4.5 MICROgram(s) Inhaler 2 Puff(s) Inhalation two times a day  carvedilol 3.125 milliGRAM(s) Oral every 12 hours  dextrose 40% Gel 15 Gram(s) Oral once  dextrose 5%. 1000 milliLiter(s) (50 mL/Hr) IV Continuous <Continuous>  dextrose 50% Injectable 25 Gram(s) IV Push once  glucagon  Injectable 1 milliGRAM(s) IntraMuscular once  influenza   Vaccine 0.5 milliLiter(s) IntraMuscular once  insulin lispro (ADMELOG) corrective regimen sliding scale   SubCutaneous at bedtime  insulin lispro (ADMELOG) corrective regimen sliding scale   SubCutaneous three times a day before meals  isosorbide   mononitrate ER Tablet (IMDUR) 30 milliGRAM(s) Oral daily  levothyroxine 25 MICROGram(s) Oral daily  spironolactone 25 milliGRAM(s) Oral daily  urea Oral Powder 15 Gram(s) Oral two times a day      PHYSICAL EXAM:  T(C): 36.7 (09-13-21 @ 05:00), Max: 36.8 (09-12-21 @ 17:25)  HR: 86 (09-13-21 @ 05:00) (82 - 97)  BP: 103/61 (09-13-21 @ 05:00) (100/61 - 116/60)  RR: 18 (09-13-21 @ 05:00) (18 - 18)  SpO2: 99% (09-13-21 @ 05:00) (98% - 99%)  Wt(kg): --  I&O's Summary    12 Sep 2021 07:01  -  13 Sep 2021 07:00  --------------------------------------------------------  IN: 972 mL / OUT: 1000 mL / NET: -28 mL    13 Sep 2021 07:01  -  13 Sep 2021 09:52  --------------------------------------------------------  IN: 0 mL / OUT: 150 mL / NET: -150 mL            JVP: Normal  Neck: supple  Lung: clear   CV: S1 S2 , Murmur:  Abd: soft  Ext: No edema  neuro: Awake / alert  Psych: flat affect  Skin: normal``    LABS/DATA:    CARDIAC MARKERS:  CARDIAC MARKERS ( 11 Sep 2021 01:38 )  x     / x     / 57 U/L / x     / 4.8 ng/mL                                11.7   13.89 )-----------( 244      ( 12 Sep 2021 07:31 )             34.1     09-12    132<L>  |  96<L>  |  71<H>  ----------------------------<  130<H>  3.8   |  19<L>  |  2.59<H>    Ca    9.4      12 Sep 2021 08:27  Phos  3.4     09-12  Mg     1.70     09-12      proBNP:   Lipid Profile:   HgA1c:   TSH:     TELE:  EKG:

## 2021-09-13 NOTE — PROGRESS NOTE ADULT - PROBLEM SELECTOR PLAN 2
likely 2/2 hypervolemia given crackles at lung bases w/elevated BNP 6k (from 5k on admission 9/3 at [facility]); Na trend at [facility]: 132 (9/9/21) and 128 (9/10/21)  - s/p lasix 40 IV x1 in ED  -BP soft, no peripheral edema, Hold lasix, Pt reports he takes torsemide 50mg bid at home  -trend BMP   -start urena 15 g bid for 3 days set to complete 9/14

## 2021-09-13 NOTE — PROGRESS NOTE ADULT - SUBJECTIVE AND OBJECTIVE BOX
**************************************************************  Gustavo Kerr, PGY3  Internal Medicine   pager: [phone] [facility]: [phone]  ***************************************************************    PROGRESS NOTE:     Patient is a 66y old  Male who presents with a chief complaint of weakness (13 Sep 2021 09:52)  SUBJECTIVE / OVERNIGHT EVENTS:  -seen and examined at bedside. Patient this morning says he is getting stronger, and does not feel as weak as he did upon arrival. Denies any abdominal pain, nausea, vomiting, shortness of breath, chest pain currently.     ADDITIONAL REVIEW OF SYSTEMS: 10 point ROS negative except per HPI    MEDICATIONS  (STANDING):  aspirin  chewable 81 milliGRAM(s) Oral daily  atorvastatin 20 milliGRAM(s) Oral at bedtime  azithromycin   Tablet 500 milliGRAM(s) Oral daily  budesonide 160 MICROgram(s)/formoterol 4.5 MICROgram(s) Inhaler 2 Puff(s) Inhalation two times a day  carvedilol 3.125 milliGRAM(s) Oral every 12 hours  dextrose 40% Gel 15 Gram(s) Oral once  dextrose 5%. 1000 milliLiter(s) (50 mL/Hr) IV Continuous <Continuous>  dextrose 50% Injectable 25 Gram(s) IV Push once  glucagon  Injectable 1 milliGRAM(s) IntraMuscular once  influenza   Vaccine 0.5 milliLiter(s) IntraMuscular once  insulin lispro (ADMELOG) corrective regimen sliding scale   SubCutaneous at bedtime  insulin lispro (ADMELOG) corrective regimen sliding scale   SubCutaneous three times a day before meals  isosorbide   mononitrate ER Tablet (IMDUR) 30 milliGRAM(s) Oral daily  levothyroxine 25 MICROGram(s) Oral daily  spironolactone 25 milliGRAM(s) Oral daily  urea Oral Powder 15 Gram(s) Oral two times a day    MEDICATIONS  (PRN):  ALBUTerol    90 MICROgram(s) HFA Inhaler 2 Puff(s) Inhalation every 6 hours PRN Shortness of Breath and/or Wheezing      CAPILLARY BLOOD GLUCOSE      POCT Blood Glucose.: 129 mg/dL (13 Sep 2021 09:10)  POCT Blood Glucose.: 116 mg/dL (12 Sep 2021 22:06)  POCT Blood Glucose.: 101 mg/dL (12 Sep 2021 17:17)  POCT Blood Glucose.: 112 mg/dL (12 Sep 2021 12:38)    I&O's Summary    12 Sep 2021 07:01  -  13 Sep 2021 07:00  --------------------------------------------------------  IN: 972 mL / OUT: 1000 mL / NET: -28 mL    13 Sep 2021 07:01  -  13 Sep 2021 10:55  --------------------------------------------------------  IN: 0 mL / OUT: 150 mL / NET: -150 mL        PHYSICAL EXAM:  Vital Signs Last 24 Hrs  T(C): 36.7 (13 Sep 2021 05:00), Max: 36.8 (12 Sep 2021 17:25)  T(F): 98.1 (13 Sep 2021 05:00), Max: 98.2 (12 Sep 2021 17:25)  HR: 86 (13 Sep 2021 05:00) (82 - 97)  BP: 103/61 (13 Sep 2021 05:00) (100/61 - 116/60)  BP(mean): --  RR: 18 (13 Sep 2021 05:00) (18 - 18)  SpO2: 99% (13 Sep 2021 05:00) (98% - 99%)    CONSTITUTIONAL: NAD, well-developed  chest: left upper chest ICD  RESPIRATORY: Normal respiratory effort; lungs mainly clear  CARDIOVASCULAR: Regular rate and rhythm, normal S1 and S2, no murmur/rub/gallop; No lower extremity edema; Peripheral pulses are 2+ bilaterally  ABDOMEN: Nontender to palpation, normoactive bowel sounds, no rebound/guarding;  MUSCULOSKELETAL: no clubbing or cyanosis of digits; no joint swelling or tenderness to palpation  PSYCH: A+O to person, place, and time; affect appropriate  Neuro: residual left sided weakness    LABS:                        12.0   13.40 )-----------( 258      ( 13 Sep 2021 10:26 )             36.4     09-13    132<L>  |  99  |  92<H>  ----------------------------<  172<H>  4.0   |  21<L>  |  2.69<H>    Ca    9.8      13 Sep 2021 10:26  Phos  3.0     09-13  Mg     1.80     09-13                Culture - Blood (collected 10 Sep 2021 21:05)  Source: .Blood Blood-Venous  Preliminary Report (11 Sep 2021 22:02):    No growth to date.    Culture - Blood (collected 10 Sep 2021 21:01)  Source: .Blood Blood-Peripheral  Preliminary Report (11 Sep 2021 22:02):    No growth to date.    Culture - Urine (collected 10 Sep 2021 20:00)  Source: Clean Catch Clean Catch (Midstream)  Final Report (11 Sep 2021 17:52):    No growth        RADIOLOGY & ADDITIONAL TESTS:  Results Reviewed:   Imaging Personally Reviewed:  Electrocardiogram Personally Reviewed:    COORDINATION OF CARE:  Care Discussed with Consultants/Other Providers [Y/N]:  Prior or Outpatient Records Reviewed [Y/N]:

## 2021-09-13 NOTE — PROGRESS NOTE ADULT - PROBLEM SELECTOR PLAN 6
CKD3; Cr upon d/c at [facility] 2.5, continue to monitor/trend, In 2018, patient had Cr 2.5-3.1  - avoid nephrotoxins, renally dose meds  - allopurinol on hold given recent hospitalization w/ padilla on ckd3 CKD Stage 4; Cr upon d/c at [facility] 2.5, continue to monitor/trend, In 2018, patient had Cr 2.5-3.1  - avoid nephrotoxins, renally dose meds  - allopurinol on hold given recent hospitalization w/ padilla on ckd4

## 2021-09-13 NOTE — PROGRESS NOTE ADULT - PROBLEM SELECTOR PLAN 1
described as LUE twitching, eyes rolling back, unresponsive for ~4 minutes w/subsequent post-ictal period; hx of previous R MCA stroke; possibly triggered by electrolyte derangements; currently neuro exam at baseline A&Ox3, 5/5 strength in all extremities except LUE w/4/5 strength (known residual weakness from previous CVA; low concern for new acute CVA  - pt denies syncope/tongue biting/urinary incontinence, doubt seizure activity, pending EEG  - hyponatremia management as below  - CT Head chronic R MCA, no acute path, resume asa  - hold off on neuro consult pending EEG  - PT recs home with home PT on DC

## 2021-09-13 NOTE — CHART NOTE - NSCHARTNOTEFT_GEN_A_CORE
EEG reviewed until ~1700.    No seizures recorded.    Final report to be completed at the completion of the study tomorrow morning.      Cameron Reilly MD  Epilepsy Fellow    Reading Room: [phone]  On Call Service After Hours: [phone]

## 2021-09-13 NOTE — PROGRESS NOTE ADULT - PROBLEM SELECTOR PLAN 9
DVT ppx: SQH pending CTH results  Diet: DASH/CC w/1L fluid restriction  Dispo- pending CT chest, and ICD interrogation DVT ppx: SQH pending CTH results  Diet: DASH/CC w/1L fluid restriction  Dispo- pending ICD interrogation and improvement in hyponatremia  Handoff: Called Dr. Schmidt's office at [address] [place] [address], and made appoinment for 9/17 1015AM and w/ cardiology (Dr. Hernandez) 9/20 1pm. Awaiting callback from Dr. Schmidt's office to give update.

## 2021-09-13 NOTE — PROCEDURE NOTE - ADDITIONAL PROCEDURE DETAILS
Device sensing and pacing well.  Ventricular capture threshold evaluated via iterative testing.   One VHR episode x 7 beats most c/w PAT given no change in morphology.  Treated VT/VF -> 0 epiosdes  Optivol fluid level is not elevated.  No reprogramming.

## 2021-09-13 NOTE — PROGRESS NOTE ADULT - PROBLEM SELECTOR PLAN 4
-H/o CAD s/p CABG 2014 HFrEF (15-20% per outside records) s/p AICD, CKD, CVA w/ residual L-sided   -pro-BNP 5957 from 5k on 9/3 at [facility]  -Cards consult appreciated  - strict I&Os  - continue home aldactone, isordil, and coreg, hold lasix (given iv lasix 40mg in ED), appear to close to euvolemia, so would not diurese aggressively per cardiology  - hold hydralazine for now, SBP 100s; can re-add as able  - pt not on ACE/ARB potentially due to SHERRY on CKD at [facility]  - EP to interrogate ICD on Monday  -Echo pending  -resume asa -H/o CAD s/p CABG 2014 HFrEF (15-20% per outside records) s/p AICD, CKD, CVA w/ residual L-sided   -pro-BNP 5957 from 5k on 9/3 at [facility]  -Cards consult appreciated  - strict I&Os  - continue home aldactone, isordil, and coreg, hold lasix (given iv lasix 40mg in ED), appear to close to euvolemia, so would not diurese aggressively per cardiology  - hold hydralazine for now, SBP 100s; can re-add as able  - pt not on ACE/ARB potentially due to SHERRY on CKD at [facility]  - called EP on 9/13 to interrogate ICD   - Echo pending  - resume asa -H/o CAD s/p CABG 2014 HFrEF (15-20% per outside records) s/p AICD, CKD, CVA w/ residual L-sided   -pro-BNP 5957 from 5k on 9/3 at [facility]  -Cards consult appreciated  - strict I&Os  - continue home aldactone, isordil, and coreg, hold lasix (given iv lasix 40mg in ED), appear to close to euvolemia, so would not diurese aggressively per cardiology  - hold hydralazine for now, SBP 100s; can re-add as able  - pt not on ACE/ARB potentially due to SHERRY on CKD at [facility]  - called EP on 9/13 to interrogate ICD   - Echo w/ global LV dysfunction, EF 21  - resume asa

## 2021-09-14 ENCOUNTER — TRANSCRIPTION ENCOUNTER (OUTPATIENT)
Age: 66
End: 2021-09-14

## 2021-09-14 VITALS
HEART RATE: 95 BPM | DIASTOLIC BLOOD PRESSURE: 61 MMHG | TEMPERATURE: 98 F | OXYGEN SATURATION: 100 % | RESPIRATION RATE: 19 BRPM | SYSTOLIC BLOOD PRESSURE: 102 MMHG

## 2021-09-14 LAB
ANION GAP SERPL CALC-SCNC: 15 MMOL/L — HIGH (ref 7–14)
BUN SERPL-MCNC: 90 MG/DL — HIGH (ref 7–23)
CALCIUM SERPL-MCNC: 9.4 MG/DL — SIGNIFICANT CHANGE UP (ref 8.4–10.5)
CHLORIDE SERPL-SCNC: 100 MMOL/L — SIGNIFICANT CHANGE UP (ref 98–107)
CO2 SERPL-SCNC: 19 MMOL/L — LOW (ref 22–31)
CREAT SERPL-MCNC: 2.57 MG/DL — HIGH (ref 0.5–1.3)
GLUCOSE BLDC GLUCOMTR-MCNC: 129 MG/DL — HIGH (ref 70–99)
GLUCOSE BLDC GLUCOMTR-MCNC: 133 MG/DL — HIGH (ref 70–99)
GLUCOSE BLDC GLUCOMTR-MCNC: 135 MG/DL — HIGH (ref 70–99)
GLUCOSE SERPL-MCNC: 105 MG/DL — HIGH (ref 70–99)
HCT VFR BLD CALC: 35.2 % — LOW (ref 39–50)
HGB BLD-MCNC: 11.5 G/DL — LOW (ref 13–17)
MAGNESIUM SERPL-MCNC: 2.1 MG/DL — SIGNIFICANT CHANGE UP (ref 1.6–2.6)
MCHC RBC-ENTMCNC: 30.7 PG — SIGNIFICANT CHANGE UP (ref 27–34)
MCHC RBC-ENTMCNC: 32.7 GM/DL — SIGNIFICANT CHANGE UP (ref 32–36)
MCV RBC AUTO: 93.9 FL — SIGNIFICANT CHANGE UP (ref 80–100)
NRBC # BLD: 0 /100 WBCS — SIGNIFICANT CHANGE UP
NRBC # FLD: 0 K/UL — SIGNIFICANT CHANGE UP
PHOSPHATE SERPL-MCNC: 3.1 MG/DL — SIGNIFICANT CHANGE UP (ref 2.5–4.5)
PLATELET # BLD AUTO: 246 K/UL — SIGNIFICANT CHANGE UP (ref 150–400)
POTASSIUM SERPL-MCNC: 3.8 MMOL/L — SIGNIFICANT CHANGE UP (ref 3.5–5.3)
POTASSIUM SERPL-SCNC: 3.8 MMOL/L — SIGNIFICANT CHANGE UP (ref 3.5–5.3)
RBC # BLD: 3.75 M/UL — LOW (ref 4.2–5.8)
RBC # FLD: 16.1 % — HIGH (ref 10.3–14.5)
SODIUM SERPL-SCNC: 134 MMOL/L — LOW (ref 135–145)
WBC # BLD: 11.55 K/UL — HIGH (ref 3.8–10.5)
WBC # FLD AUTO: 11.55 K/UL — HIGH (ref 3.8–10.5)

## 2021-09-14 PROCEDURE — 99239 HOSP IP/OBS DSCHRG MGMT >30: CPT

## 2021-09-14 RX ORDER — ALLOPURINOL 300 MG
1 TABLET ORAL
Qty: 0 | Refills: 0 | DISCHARGE

## 2021-09-14 RX ORDER — LEVOTHYROXINE SODIUM 125 MCG
1 TABLET ORAL
Qty: 0 | Refills: 0 | DISCHARGE
Start: 2021-09-14

## 2021-09-14 RX ORDER — ALBUTEROL 90 UG/1
2 AEROSOL, METERED ORAL
Qty: 0 | Refills: 0 | DISCHARGE
Start: 2021-09-14

## 2021-09-14 RX ORDER — CARVEDILOL PHOSPHATE 80 MG/1
1 CAPSULE, EXTENDED RELEASE ORAL
Qty: 0 | Refills: 0 | DISCHARGE
Start: 2021-09-14

## 2021-09-14 RX ORDER — BUDESONIDE AND FORMOTEROL FUMARATE DIHYDRATE 160; 4.5 UG/1; UG/1
2 AEROSOL RESPIRATORY (INHALATION)
Qty: 0 | Refills: 0 | DISCHARGE

## 2021-09-14 RX ORDER — BUDESONIDE AND FORMOTEROL FUMARATE DIHYDRATE 160; 4.5 UG/1; UG/1
0 AEROSOL RESPIRATORY (INHALATION)
Qty: 0 | Refills: 0 | DISCHARGE
Start: 2021-09-14

## 2021-09-14 RX ORDER — ISOSORBIDE MONONITRATE 60 MG/1
1 TABLET, EXTENDED RELEASE ORAL
Qty: 0 | Refills: 0 | DISCHARGE

## 2021-09-14 RX ORDER — CEFPODOXIME PROXETIL 100 MG
1 TABLET ORAL
Qty: 0 | Refills: 0 | DISCHARGE

## 2021-09-14 RX ORDER — SPIRONOLACTONE 25 MG/1
1 TABLET, FILM COATED ORAL
Qty: 0 | Refills: 0 | DISCHARGE
Start: 2021-09-14

## 2021-09-14 RX ORDER — SPIRONOLACTONE 25 MG/1
1 TABLET, FILM COATED ORAL
Qty: 0 | Refills: 0 | DISCHARGE

## 2021-09-14 RX ORDER — LEVOTHYROXINE SODIUM 125 MCG
1 TABLET ORAL
Qty: 0 | Refills: 0 | DISCHARGE

## 2021-09-14 RX ORDER — ALBUTEROL 90 UG/1
2 AEROSOL, METERED ORAL
Qty: 0 | Refills: 0 | DISCHARGE

## 2021-09-14 RX ORDER — BUDESONIDE AND FORMOTEROL FUMARATE DIHYDRATE 160; 4.5 UG/1; UG/1
1 AEROSOL RESPIRATORY (INHALATION)
Qty: 0 | Refills: 0 | DISCHARGE
Start: 2021-09-14

## 2021-09-14 RX ADMIN — CARVEDILOL PHOSPHATE 3.12 MILLIGRAM(S): 80 CAPSULE, EXTENDED RELEASE ORAL at 05:31

## 2021-09-14 RX ADMIN — HEPARIN SODIUM 5000 UNIT(S): 5000 INJECTION INTRAVENOUS; SUBCUTANEOUS at 05:31

## 2021-09-14 RX ADMIN — BUDESONIDE AND FORMOTEROL FUMARATE DIHYDRATE 2 PUFF(S): 160; 4.5 AEROSOL RESPIRATORY (INHALATION) at 08:51

## 2021-09-14 RX ADMIN — CARVEDILOL PHOSPHATE 3.12 MILLIGRAM(S): 80 CAPSULE, EXTENDED RELEASE ORAL at 17:59

## 2021-09-14 RX ADMIN — Medication 81 MILLIGRAM(S): at 12:43

## 2021-09-14 RX ADMIN — Medication 15 GRAM(S): at 05:31

## 2021-09-14 RX ADMIN — Medication 25 MICROGRAM(S): at 05:31

## 2021-09-14 RX ADMIN — HEPARIN SODIUM 5000 UNIT(S): 5000 INJECTION INTRAVENOUS; SUBCUTANEOUS at 17:58

## 2021-09-14 RX ADMIN — SPIRONOLACTONE 25 MILLIGRAM(S): 25 TABLET, FILM COATED ORAL at 13:11

## 2021-09-14 RX ADMIN — ISOSORBIDE MONONITRATE 30 MILLIGRAM(S): 60 TABLET, EXTENDED RELEASE ORAL at 15:55

## 2021-09-14 NOTE — PROGRESS NOTE ADULT - SUBJECTIVE AND OBJECTIVE BOX
**************************************************************  Gustavo Kerr, PGY3  Internal Medicine   pager: NS: [phone] [facility]: [phone]  ***************************************************************    PROGRESS NOTE:     Patient is a 66y old  Male who presents with a chief complaint of weakness (14 Sep 2021 07:01)  SUBJECTIVE / OVERNIGHT EVENTS:  -Seen and examined at bedside. No acute events overnight. This morning patient reports he is less weak, denies shortness of breath, headache, nausea, vomiting, abdominal pain.     ADDITIONAL REVIEW OF SYSTEMS: 10 point ROS negative except per HPI    MEDICATIONS  (STANDING):  aspirin  chewable 81 milliGRAM(s) Oral daily  atorvastatin 20 milliGRAM(s) Oral at bedtime  budesonide 160 MICROgram(s)/formoterol 4.5 MICROgram(s) Inhaler 2 Puff(s) Inhalation two times a day  carvedilol 3.125 milliGRAM(s) Oral every 12 hours  dextrose 40% Gel 15 Gram(s) Oral once  dextrose 5%. 1000 milliLiter(s) (50 mL/Hr) IV Continuous <Continuous>  dextrose 50% Injectable 25 Gram(s) IV Push once  glucagon  Injectable 1 milliGRAM(s) IntraMuscular once  heparin   Injectable 5000 Unit(s) SubCutaneous every 12 hours  influenza   Vaccine 0.5 milliLiter(s) IntraMuscular once  insulin lispro (ADMELOG) corrective regimen sliding scale   SubCutaneous at bedtime  insulin lispro (ADMELOG) corrective regimen sliding scale   SubCutaneous three times a day before meals  isosorbide   mononitrate ER Tablet (IMDUR) 30 milliGRAM(s) Oral daily  levothyroxine 25 MICROGram(s) Oral daily  spironolactone 25 milliGRAM(s) Oral daily    MEDICATIONS  (PRN):  ALBUTerol    90 MICROgram(s) HFA Inhaler 2 Puff(s) Inhalation every 6 hours PRN Shortness of Breath and/or Wheezing      CAPILLARY BLOOD GLUCOSE      POCT Blood Glucose.: 129 mg/dL (14 Sep 2021 08:26)  POCT Blood Glucose.: 218 mg/dL (13 Sep 2021 21:24)  POCT Blood Glucose.: 128 mg/dL (13 Sep 2021 17:17)  POCT Blood Glucose.: 198 mg/dL (13 Sep 2021 12:07)    I&O's Summary    13 Sep 2021 07:01  -  14 Sep 2021 07:00  --------------------------------------------------------  IN: 980 mL / OUT: 1350 mL / NET: -370 mL        PHYSICAL EXAM:  Vital Signs Last 24 Hrs  T(C): 36.8 (14 Sep 2021 05:30), Max: 36.8 (14 Sep 2021 05:30)  T(F): 98.2 (14 Sep 2021 05:30), Max: 98.2 (14 Sep 2021 05:30)  HR: 83 (14 Sep 2021 05:30) (76 - 100)  BP: 104/58 (14 Sep 2021 05:30) (100/69 - 107/65)  BP(mean): --  RR: 18 (14 Sep 2021 05:30) (18 - 18)  SpO2: 99% (14 Sep 2021 05:30) (99% - 99%)    CONSTITUTIONAL: NAD, well-developed  chest: left upper chest ICD  RESPIRATORY: Normal respiratory effort; lungs mainly clear  CARDIOVASCULAR: Regular rate and rhythm, normal S1 and S2, no murmur/rub/gallop; No lower extremity edema; Peripheral pulses are 2+ bilaterally  ABDOMEN: Nontender to palpation, normoactive bowel sounds, no rebound/guarding;  MUSCULOSKELETAL: no clubbing or cyanosis of digits; no joint swelling or tenderness to palpation. LLE strength 4/5 and RLE strength 5/5.   PSYCH: A+O to person, place, and time; affect appropriate  Neuro: Less side weakness unchanged from prior    LABS:                        11.5   11.55 )-----------( 246      ( 14 Sep 2021 06:46 )             35.2     09-14    134<L>  |  100  |  90<H>  ----------------------------<  105<H>  3.8   |  19<L>  |  2.57<H>    Ca    9.4      14 Sep 2021 06:46  Phos  3.1     09-14  Mg     2.10     09-14                  RADIOLOGY & ADDITIONAL TESTS:  Results Reviewed: EEG  Imaging Personally Reviewed: TTE      COORDINATION OF CARE:  Care Discussed with Consultants/Other Providers [Y/N]:  Prior or Outpatient Records Reviewed [Y/N]:   **************************************************************  Gustavo Kerr, PGY3  Internal Medicine   pager: NS: [phone] [facility]: [phone]  ***************************************************************    PROGRESS NOTE:     Patient is a 66y old  Male who presents with a chief complaint of weakness (14 Sep 2021 07:01)  SUBJECTIVE / OVERNIGHT EVENTS:  -Seen and examined at bedside. No acute events overnight. This morning patient reports he is less weak, denies shortness of breath, headache, nausea, vomiting, abdominal pain.     ADDITIONAL REVIEW OF SYSTEMS: 10 point ROS negative except per HPI    MEDICATIONS  (STANDING):  aspirin  chewable 81 milliGRAM(s) Oral daily  atorvastatin 20 milliGRAM(s) Oral at bedtime  budesonide 160 MICROgram(s)/formoterol 4.5 MICROgram(s) Inhaler 2 Puff(s) Inhalation two times a day  carvedilol 3.125 milliGRAM(s) Oral every 12 hours  dextrose 40% Gel 15 Gram(s) Oral once  dextrose 5%. 1000 milliLiter(s) (50 mL/Hr) IV Continuous <Continuous>  dextrose 50% Injectable 25 Gram(s) IV Push once  glucagon  Injectable 1 milliGRAM(s) IntraMuscular once  heparin   Injectable 5000 Unit(s) SubCutaneous every 12 hours  influenza   Vaccine 0.5 milliLiter(s) IntraMuscular once  insulin lispro (ADMELOG) corrective regimen sliding scale   SubCutaneous at bedtime  insulin lispro (ADMELOG) corrective regimen sliding scale   SubCutaneous three times a day before meals  isosorbide   mononitrate ER Tablet (IMDUR) 30 milliGRAM(s) Oral daily  levothyroxine 25 MICROGram(s) Oral daily  spironolactone 25 milliGRAM(s) Oral daily    MEDICATIONS  (PRN):  ALBUTerol    90 MICROgram(s) HFA Inhaler 2 Puff(s) Inhalation every 6 hours PRN Shortness of Breath and/or Wheezing      CAPILLARY BLOOD GLUCOSE      POCT Blood Glucose.: 129 mg/dL (14 Sep 2021 08:26)  POCT Blood Glucose.: 218 mg/dL (13 Sep 2021 21:24)  POCT Blood Glucose.: 128 mg/dL (13 Sep 2021 17:17)  POCT Blood Glucose.: 198 mg/dL (13 Sep 2021 12:07)    I&O's Summary    13 Sep 2021 07:01  -  14 Sep 2021 07:00  --------------------------------------------------------  IN: 980 mL / OUT: 1350 mL / NET: -370 mL        PHYSICAL EXAM:  Vital Signs Last 24 Hrs  T(C): 36.8 (14 Sep 2021 05:30), Max: 36.8 (14 Sep 2021 05:30)  T(F): 98.2 (14 Sep 2021 05:30), Max: 98.2 (14 Sep 2021 05:30)  HR: 83 (14 Sep 2021 05:30) (76 - 100)  BP: 104/58 (14 Sep 2021 05:30) (100/69 - 107/65)  BP(mean): --  RR: 18 (14 Sep 2021 05:30) (18 - 18)  SpO2: 99% (14 Sep 2021 05:30) (99% - 99%)    CONSTITUTIONAL: NAD, well-developed  chest: left upper chest ICD  RESPIRATORY: Normal respiratory effort; lungs mainly clear  CARDIOVASCULAR: Regular rate and rhythm, normal S1 and S2, no murmur/rub/gallop; No lower extremity edema; Peripheral pulses are 2+ bilaterally  ABDOMEN: Nontender to palpation, normoactive bowel sounds, no rebound/guarding;  MUSCULOSKELETAL: no clubbing or cyanosis of digits; no joint swelling or tenderness to palpation. LLE strength 4/5 and RLE strength 5/5.   PSYCH: A+O to person, place, and time; affect appropriate  Neuro: Left side weakness unchanged from prior    LABS:                        11.5   11.55 )-----------( 246      ( 14 Sep 2021 06:46 )             35.2     09-14    134<L>  |  100  |  90<H>  ----------------------------<  105<H>  3.8   |  19<L>  |  2.57<H>    Ca    9.4      14 Sep 2021 06:46  Phos  3.1     09-14  Mg     2.10     09-14      RADIOLOGY & ADDITIONAL TESTS:    < from: TTE with Doppler (w/Cont) (09.13.21 @ 09:11) >  CONCLUSIONS:  1. Normal trileaflet aortic valve. Minimal aortic  regurgitation.  2. Mildly dilated left atrium.  LA volume index = 41 cc/m2.  3. Mild left ventricular enlargement.  4. Endocardial visualization enhanced with intravenous  injection of echo contrast (Definity). Severe global left  ventricular systolic dysfunction.  5. Moderate diastolic dysfunction (Stage II).  6. Right ventricular enlargement with normal right  ventricular systolic function.      Results Reviewed: EEG  Imaging Personally Reviewed: TTE      COORDINATION OF CARE:  Care Discussed with Consultants/Other Providers [Y/N]:  Prior or Outpatient Records Reviewed [Y/N]:

## 2021-09-14 NOTE — PROGRESS NOTE ADULT - ASSESSMENT
66M w/CABG, HFrEF (15-20% per outside records) s/p AICD on 2LNC at home, CKD, CVA w/residual L-sided weakness, and gout, discharged from [facility] for AHRF 2/2 ADHF and PNA, coming in for b/l LE weakness followed by seizure-like activity w/post-ictal period likely 2/2 hyponatremia vs. less likely intracranial pathology (negative EEG) vs less likely cardiac etiology (TTE unchanged, AICD interrogation w/o any events)
? Sz  ? due to electrolyte abnormality   rule out pseudo sz from tachyarrhythmia such as VT   please call EP for ICD interrogation   neurology eval   EEG  head CT noted old infarct     chronic systolic CHF  stable clinically  warm to touch  obtain echo   cont BB  hydralazine on hold  can resume low dose if BP stable  off ace /arb due to CKD    CKD   stable crt     hyponatremia   consider renal eval     cad history of CABG  low dose asa recommended   cont statin   cont BB    PNA  consider chest CT  on anbx   plan as per primary team      Advanced care planning was discussed with patient and family.  Risks, benefits and alternatives of the cardiac treatments and medical therapy including procedures were discussed in detail and all questions were answered. Importance of compliance with medical therapy and lifestyle modification to improve cardiovascular health were addressed. Appropriate forms and patient educational materials were reviewed. 30 minutes face to face spent.  
? Sz  ICD interrogation revealed no evidence of VT or Vfib  fu with neurology     chronic systolic CHF  stable clinically  warm to touch  obtain echo   cont BB  off ace /arb due to CKD    CKD   stable crt     cad history of CABG  low dose asa recommended   cont statin   cont BB        
? Sz  ? due to electrolyte abnormality   rule out pseudo sz from tachyarrhythmia such as VT   please call EP for ICD interrogation   neurology eval   EEG  head CT noted old infarct     chronic systolic CHF  stable clinically  warm to touch  obtain echo   cont BB  hydralazine on hold  can resume low dose if BP stable  off ace /arb due to CKD    CKD   stable crt     hyponatremia   consider renal eval     cad history of CABG  low dose asa recommended   cont statin   cont BB    PNA  consider chest CT  on anbx   plan as per primary team     
66M w/CABG, HFrEF (15-20% per outside records) s/p AICD on 2LNC at home, CKD, CVA w/residual L-sided weakness, and gout, discharged earlier today from [facility] for AHRF 2/2 ADHF and PNA, coming in for b/l LE weakness followed by seizure-like activity w/post-ictal period likely 2/2 hyponatremia vs. less likely intracranial pathology given current neuro exam w/course also significant for leukocytosis without localizing infectious source
66M w/CABG, HFrEF (15-20% per outside records) s/p AICD on 2LNC at home, CKD, CVA w/residual L-sided weakness, and gout, discharged earlier today from [facility] for AHRF 2/2 ADHF and PNA, coming in for b/l LE weakness followed by seizure-like activity w/post-ictal period likely 2/2 hyponatremia vs. less likely intracranial pathology given current neuro exam w/course also significant for leukocytosis without localizing infectious source
66M w/CABG, HFrEF (15-20% per outside records) s/p AICD on 2LNC at home, CKD, CVA w/residual L-sided weakness, and gout, discharged from [facility] for AHRF 2/2 ADHF and PNA, coming in for b/l LE weakness followed by seizure-like activity w/post-ictal period likely 2/2 hyponatremia vs. less likely intracranial pathology. Found to have leukocytosis without localizing infectious source, now pending CT chest.

## 2021-09-14 NOTE — PROGRESS NOTE ADULT - PROBLEM SELECTOR PLAN 4
-H/o CAD s/p CABG 2014 HFrEF (15-20% per outside records) s/p AICD, CKD, CVA w/ residual L-sided   -pro-BNP 5957 from 5k on 9/3 at [facility]  -Cards consult appreciated  - strict I&Os  - continue home aldactone, isordil, and coreg, hold lasix (given iv lasix 40mg in ED), appear to close to euvolemia, so would not diurese aggressively per cardiology  - hold hydralazine for now, SBP 100s; can re-add as able  - pt not on ACE/ARB potentially due to SHERRY on CKD at [facility]  - appreciate EP assistance: no events on AICD, interrogated 9/14  - Echo w/ global LV dysfunction, EF 21  - resume asa -H/o CAD s/p CABG 2014 HFrEF (15-20% per outside records) s/p AICD, CKD, CVA w/ residual L-sided   -pro-BNP 5957 from 5k on 9/3 at [facility]  -Cards consult appreciated  - strict I&Os  - continue home aldactone, isordil, and coreg, hold lasix (given iv lasix 40mg in ED), appear to close to euvolemia, so would not diurese aggressively per cardiology  - hold hydralazine for now, SBP 100s; can re-add as able  - pt not on ACE/ARB potentially due to SHRERY on CKD at [facility]  - appreciate EP assistance: no events on AICD, interrogated 9/14  - TTE revewed- global LV systolic dysfunction, EF 21%, stage II diastolic dysfunction

## 2021-09-14 NOTE — PROGRESS NOTE ADULT - PROBLEM SELECTOR PLAN 9
DVT ppx: SQH  Diet: DASH/CC w/1L fluid restriction  Dispo-  DC w/ home PT on 9/14  Handoff: Called Dr. Schmidt's office at [address] [place] [address], and made appoinment for 9/17 1015AM and I spoke with Dr Schmidt on 9/14 for update. Patient also has f/u w/ cardiology (Dr. Hernandez) 9/20 1pm. DVT ppx: SQH  Diet: DASH/CC w/1L fluid restriction  Dispo-  DC w/ home PT on 9/14  Handoff: Called Dr. Schmidt's office at [address] [place] [address], and made appoinment for 9/17 1015AM and I spoke with Dr Schmidt on 9/14 for update. Patient also has f/u w/ cardiology (Dr. Hernandez) 9/20 at 1pm.

## 2021-09-14 NOTE — DISCHARGE NOTE PROVIDER - NSDCCPTREATMENT_GEN_ALL_CORE_FT
PRINCIPAL PROCEDURE  Procedure: CT head wo con  Findings and Treatment: FINDINGS:  Right parietal encephalomalacia with ex vacuo dilatation of the right lateral ventricle. There is prominence of the ventricles and sulci associated with age-related volume loss. Consider MRI as clinically warranted.  Chronic right MCA territory infarct. Chronic left inferior cerebellar hemisphere infarct.  No acute hemorrhage, edema or mass effect.  The calvarium is intact. The visualized intraorbital compartments, paranasal sinuses and tympanomastoid cavities appear free of acute disease.  IMPRESSION:  Chronic right MCA territory infarction.  No acute hemorrhage or mass effect.  --- End of Report ---        SECONDARY PROCEDURE  Procedure: Transthoracic echo  Findings and Treatment: Dimensions:     Normal Values:  LA:     4.2 cm    2.0 - 4.0 cm  Ao:     3.7 cm    2.0 - 3.8 cm  SEPTUM: 0.8 cm    0.6 - 1.2 cm  PWT:    1.0 cm    0.6 - 1.1 cm  LVIDd:  6.0 cm    3.0 - 5.6 cm  LVIDs:  5.4 cm    1.8 - 4.0 cm  Derived Variables:  LVMI: 114 g/m2  RWT: 0.33  Fractional short: 10 %  Ejection Fraction (Teicholtz): 21 %  ------------------------------------------------------------------------  OBSERVATIONS:  Mitral Valve: Normal mitral valve. Mild mitral  regurgitation.  Aortic Root: Normal aortic root.  Aortic Valve: Normal trileaflet aortic valve. Minimal  aortic regurgitation.  Left Atrium: Mildly dilated left atrium.  LA volume index =  41 cc/m2.  Left Ventricle: Endocardial visualization enhanced with  intravenous injection of echo contrast (Definity). Severe  global left ventricular systolic dysfunction. Mild left  ventricular enlargement. Moderate diastolic dysfunction  (Stage II).  Right Heart: Normal right atrium. Right ventricular  enlargement with normal right ventricular systolic  function. Normal tricuspid valve. Mild tricuspid  regurgitation. Normal pulmonic valve.  Pericardium/PleuraNormal pericardium with no pericardial  effusion.  ------------------------------------------------------------------------  CONCLUSIONS:  1. Normal trileaflet aortic valve. Minimal aortic  regurgitation.  2. Mildly dilated left atrium.  LA volume index = 41 cc/m2.  3. Mild left ventricular enlargement.  4. Endocardial visualization enhanced with intravenous  injection of echo contrast (Definity). Severe global left  ventricular systolic dysfunction.  5. Moderate diastolic dysfunction (Stage II).  6. Right ventricular enlargement with normal right

## 2021-09-14 NOTE — DISCHARGE NOTE NURSING/CASE MANAGEMENT/SOCIAL WORK - CONTRAINDICATIONS & PRECAUTIONS (SELECT ALL THAT APPLY)
Problem: Patient Centered Care  Goal: Patient preferences are identified and integrated in the patient's plan of care  Description  Interventions:  - What would you like us to know as we care for you?  I had a stroke 10 years ago    - Provide timely, comp ADULT  Goal: Verbalizes/displays adequate comfort level or patient's stated pain goal  Description  INTERVENTIONS:  - Encourage pt to monitor pain and request assistance  - Assess pain using appropriate pain scale  - Administer analgesics based on type and Patient/surrogate refused vaccine...

## 2021-09-14 NOTE — DISCHARGE NOTE NURSING/CASE MANAGEMENT/SOCIAL WORK - NSDCVIVACCINE_GEN_ALL_CORE_FT
Tdap; 14-Jun-2018 22:06; Anjelica Hoff (RN); [employer]; [id number]; IntraMuscular; Deltoid Right.; 0.5 milliLiter(s); VIS (VIS Published: 09-May-2013, VIS Presented: 14-Jun-2018);

## 2021-09-14 NOTE — PROGRESS NOTE ADULT - PROBLEM SELECTOR PLAN 3
17k without fever, tachycardia, tachypnea; no cough, abd pain, n/v/d, dysuria to suggest infectious etiology; pt was admitted to [facility] and noted to have PNA s/p tx;  - pt was to complete total of 7d course of abx w/cefpodoxime and doxy per dc paperwork; Now has completed  ceftriaxone and azithro while inpt  -CXR ? right infiltrate -> CT chest w/ RUL opacity

## 2021-09-14 NOTE — PROGRESS NOTE ADULT - PROBLEM SELECTOR PROBLEM 4
HFrEF (heart failure with reduced ejection fraction)

## 2021-09-14 NOTE — PROGRESS NOTE ADULT - PROBLEM SELECTOR PLAN 6
CKD Stage 4; Cr upon d/c at [facility] 2.5, continue to monitor/trend, In 2018, patient had Cr 2.5-3.1  - avoid nephrotoxins, renally dose meds  - allopurinol on hold given recent hospitalization w/ padilla on ckd4

## 2021-09-14 NOTE — PROGRESS NOTE ADULT - PROBLEM SELECTOR PROBLEM 8
Cerebrovascular accident (CVA)

## 2021-09-14 NOTE — PROVIDER CONTACT NOTE (OTHER) - ACTION/TREATMENT ORDERED:
hold morning dose of spironolactone, will cont to monitor
Provider made aware. will come and see patient at bedside
Hold morning dose of spironolactone

## 2021-09-14 NOTE — PROGRESS NOTE ADULT - SUBJECTIVE AND OBJECTIVE BOX
DATE OF SERVICE: 09-14-21 @ 07:01    Subjective: Patient seen and examined. No new events except as noted.     SUBJECTIVE/ROS:  nad       MEDICATIONS:  MEDICATIONS  (STANDING):  aspirin  chewable 81 milliGRAM(s) Oral daily  atorvastatin 20 milliGRAM(s) Oral at bedtime  budesonide 160 MICROgram(s)/formoterol 4.5 MICROgram(s) Inhaler 2 Puff(s) Inhalation two times a day  carvedilol 3.125 milliGRAM(s) Oral every 12 hours  dextrose 40% Gel 15 Gram(s) Oral once  dextrose 5%. 1000 milliLiter(s) (50 mL/Hr) IV Continuous <Continuous>  dextrose 50% Injectable 25 Gram(s) IV Push once  glucagon  Injectable 1 milliGRAM(s) IntraMuscular once  heparin   Injectable 5000 Unit(s) SubCutaneous every 12 hours  influenza   Vaccine 0.5 milliLiter(s) IntraMuscular once  insulin lispro (ADMELOG) corrective regimen sliding scale   SubCutaneous at bedtime  insulin lispro (ADMELOG) corrective regimen sliding scale   SubCutaneous three times a day before meals  isosorbide   mononitrate ER Tablet (IMDUR) 30 milliGRAM(s) Oral daily  levothyroxine 25 MICROGram(s) Oral daily  spironolactone 25 milliGRAM(s) Oral daily      PHYSICAL EXAM:  T(C): 36.8 (09-14-21 @ 05:30), Max: 36.8 (09-14-21 @ 05:30)  HR: 83 (09-14-21 @ 05:30) (76 - 100)  BP: 104/58 (09-14-21 @ 05:30) (100/69 - 107/65)  RR: 18 (09-14-21 @ 05:30) (18 - 18)  SpO2: 99% (09-14-21 @ 05:30) (99% - 99%)  Wt(kg): --  I&O's Summary    13 Sep 2021 07:01  -  14 Sep 2021 07:00  --------------------------------------------------------  IN: 980 mL / OUT: 1350 mL / NET: -370 mL        Weight (kg): 73.4 (09-14 @ 05:30)      JVP: Normal  Neck: supple  Lung: clear   CV: S1 S2 , Murmur:  Abd: soft  Ext: No edema  neuro: Awake / alert  Psych: flat affect  Skin: normal``    LABS/DATA:    CARDIAC MARKERS:                                12.0   13.40 )-----------( 258      ( 13 Sep 2021 10:26 )             36.4     09-13    132<L>  |  99  |  92<H>  ----------------------------<  172<H>  4.0   |  21<L>  |  2.69<H>    Ca    9.8      13 Sep 2021 10:26  Phos  3.0     09-13  Mg     1.80     09-13      proBNP:   Lipid Profile:   HgA1c:   TSH:     TELE:  EKG:      < from: TTE with Doppler (w/Cont) (09.13.21 @ 09:11) >  CONCLUSIONS:  1. Normal trileaflet aortic valve. Minimal aortic  regurgitation.  2. Mildly dilated left atrium.  LA volume index = 41 cc/m2.  3. Mild left ventricular enlargement.  4. Endocardial visualization enhanced with intravenous  injection of echo contrast (Definity). Severe global left  ventricular systolic dysfunction.  5. Moderate diastolic dysfunction (Stage II).  6. Right ventricular enlargement with normal right  ventricular systolic function.  ------------------------------------------------------------------------  Confirmed on  9/13/2021 - 14:27:35 by JEREMY Torrez  ------------------------------------------------------------------------    < end of copied text >

## 2021-09-14 NOTE — PROGRESS NOTE ADULT - ATTENDING COMMENTS
66M w/CABG, chronic systolic CHF (EF 21%) s/p AICD on 2LNC PRN at home, CKD Stage IV, CVA w/residual L-sided weakness, and gout, discharged from [facility] for AHRF 2/2 ADHF and PNA, coming in for b/l LE weakness followed by seizure-like activity w/post-ictal period likely 2/2 hyponatremia vs. less likely intracranial pathology. Found to have leukocytosis without localizing infectious source.    -Cards following for CHF management, pt clinically euvolemic, diuretics on hold  -CKD Stage IV stable, monitor Cr, renally dose all meds  -Will complete 7-day course of abx today with CTX and azithromycin for CAP  -EEG pending to r/o seizure; CTH with no acute changes  -EP consulted for ICD interrogation    D/w Dr. Kerr
66M w/CABG, chronic systolic CHF (EF 21%) s/p AICD on 2LNC PRN at home, CKD Stage IV, CVA w/residual L-sided weakness, and gout, discharged from [facility] for AHRF 2/2 ADHF and PNA, coming in for b/l LE weakness followed by seizure-like activity w/post-ictal period likely 2/2 hyponatremia vs. less likely intracranial pathology. Found to have leukocytosis without localizing infectious source.    -Cards following for CHF management, pt clinically euvolemic, diuretics on hold, will continue to hold on discharge; ICD interrogation 9/13 showed optivol fluid level is not elevated  -CKD Stage IV stable, monitor Cr, renally dose all meds  -Completed 7-day course of abx with CTX and azithromycin for CAP  -EEG negative for seizure; CTH with no acute changes  -GOC d/w patient at bedside today with Dr. Kerr- patient wishes to remain full code, appointed his daughter as his HCP- will have forms filled out prior to d/c today    D/w Dr. Kerr

## 2021-09-14 NOTE — DISCHARGE NOTE NURSING/CASE MANAGEMENT/SOCIAL WORK - PATIENT PORTAL LINK FT
You can access the FollowMyHealth Patient Portal offered by [facility] by registering at the following website: [email or web address]. By joining [employer]’s FollowMyHealth portal, you will also be able to view your health information using other applications (apps) compatible with our system.

## 2021-09-14 NOTE — PROGRESS NOTE ADULT - CONVERSATION DETAILS
Discussed resuscitation and intubation with patient in Bangla. I described that if his heart were to stop beating, resuscitation would include chest compressions and intubation and placing patient on a ventilator in attempts to bring the patient back to life. Described that this is a painful procedure and may be unsuccessful. Patient able to describe understanding and desires to remain FULL-CODE.     Additionally spoke to patient about Health Care Proxy, and he wishes to list his daughter, Ketty Lewis as HCP.

## 2021-09-14 NOTE — DISCHARGE NOTE PROVIDER - NSDCFUADDAPPT_GEN_ALL_CORE_FT
Follow up with Dr. Schmidt's office [address] [place] [address], and made appoinment for 9/17 1015AM   follow up with Cardiology, Dr. Hernandez 9/20 1pm.

## 2021-09-14 NOTE — PROGRESS NOTE ADULT - PROBLEM SELECTOR PLAN 2
likely 2/2 hypervolemia given crackles at lung bases w/elevated BNP 6k (from 5k on admission 9/3 at [facility]); Na trend at [facility]: 132 (9/9/21) and 128 (9/10/21)  - s/p lasix 40 IV x1 in ED  -BP soft, no peripheral edema, Hold lasix, Pt reports he takes torsemide 50mg bid at home  -trend BMP   -complete urena 15 g bid for 3 days set to complete 9/14

## 2021-09-14 NOTE — EEG REPORT - NS EEG TEXT BOX
REPORT OF CONTINUOUS VIDEO EEG   [facility]: [address], [place] [address], Ph#: [phone] [facility]: [address], [place] [address], Ph#: [phone] Office: [address] [place] [address] Ph#: [phone]  Patient Name: INGE CARLOS   Age: 66 year, : 1955 MRN #: -, Jose: [facility] A501 A Referring Physician: LEONARDO GERONIMO EEG #: [id number]  Study Date: 2021   Start Time: 3:38:50 PM    End Date: 21         End Time: 800 AM    Study Duration: ~16.5H  Study Information:  EEG Recording Technique: The patient underwent continuous Video-EEG monitoring, using Telemetry System hardware on the XLTek Digital System. EEG and video data were stored on a computer hard drive with important events saved in digital archive files. The material was reviewed by a physician (electroencephalographer / epileptologist) on a daily basis. Donis and seizure detection algorithms were utilized and reviewed. An EEG Technician attended to the patient, and was available throughout daytime work hours.  The epilepsy center neurologist was available in person or on call 24-hours per day.  EEG Placement and Labeling of Electrodes: The EEG was performed utilizing 20 channel referential EEG connections (coronal over temporal over parasagittal montage) using all standard 10-20 electrode placements with EKG, with additional electrodes placed in the inferior temporal region using the modified 10-10 montage electrode placements for elective admissions, or if deemed necessary. Recording was at a sampling rate of 256 samples per second per channel. Time synchronized digital video recording was done simultaneously with EEG recording. A low light infrared camera was used for low light recording.   History:  VEEG AT BEDSIDE 501 A 56 YEAR OLD MALE  COR: AWAKE / DROWSY P/W: HEART FAILURE PMH: BPH, CHF, HLD, DIABETES, CAD, HTN, CABG, HFrEF, AICD, CKD HV:NOT COMPLETED DUE TO AGE PHOTIC: COMPLETED A&OX4 CONCERN FOR SEIZURE  Medication Lipitor Proventil BUDESONIDE Synthroid Imdur ALDACTONE INFLUENZA VACCINE Coreg ZITHROMAX Rocephin AdmeLOG  Interpretation:  [Abbreviation Key:  PDR=alpha rhythm/posterior dominant rhythm. A-P=anterior posterior gradient.  Amplitude: ‘very low’:<20; ‘low’:20-50; ‘medium’:; ‘high’:>200uV.  Persistence for periodic/rhythmic patterns (% of epoch) ‘rare’:<1%; ‘occasional’:1-10%; ‘frequent’:10-50%; ‘abundant’:50-90%; ‘continuous’:>90%.  Persistence for sporadic discharges: ‘rare’:<1/hr; ‘occasional’:1/min-1/hr; ‘frequent’:>1/min; ‘abundant’:>1/10 sec.  GRDA=generalized rhythmic delta activity, LRDA=lateralized rhythmic delta activity, TIRDA=temporal intermittent rhythmic delta activity, FIRDA=frontal intermittent rhythmic activity. LPD=PLED=lateralized periodic discharges, GPD=generalized periodic discharges, BiPDs=BiPLEDs=bilateral independent periodic epileptiform discharges, SIRPID=stimulus induced rhythmic, periodic, or ictal appearing discharges.  Modifiers: +F=with fast component, +S=with spike component, +R=with rhythmic component.  S-B=burst suppression pattern.  PFA: paroxysmal fast activity. Max=maximal. N1-drowsy, N2-stage II sleep, N3-slow wave sleep.  HV=hyperventilation, PS=photic stimulation]   Daily EEG Visual Analysis  FINDINGS: The background was continuous, spontaneously variable and reactive. During wakefulness, the posterior dominant rhythm consisted of symmetric, well-modulated 8.5 Hz activity, with amplitude to 30 uV, that attenuated to eye opening. Low amplitude frontal beta was noted in wakefulness.    Background Slowing: Generalized slowing: None was present. Focal Slowing: None was present.  Sleep Background: Drowsiness was characterized by fragmentation, attenuation, and slowing of the background activity.   Sleep was characterized by the presence of vertex waves, symmetric spindles, and K-complexes.  Other Non-Epileptiform Findings: None were present.  Interictal Epileptiform Activity:  None were present.  Events: Clinical events: None recorded. Seizures: None recorded.  Activation Procedures:  Hyperventilation was not performed.   Photic stimulation was not performed.  Artifacts: Intermittent myogenic and movement artifacts were noted.  ECG: The heart rate on single channel ECG was predominantly between 100-110 BPM.  EEG Summary / Classification:  Normal EEG in the awake / drowsy / asleep states.  EEG Impression / Clinical Correlate:  Normal prolonged EEG study. No epileptic pattern or seizure seen.  Preliminary Fellow Report, final report pending attending review  Cameron Reilly MD Epilepsy Fellow  Reading Room: [phone] On Call Service After Hours: [phone]    REPORT OF CONTINUOUS VIDEO EEG   [facility]: [address], [place] [address], Ph#: [phone] [facility]: [address], [place] [address], Ph#: [phone] Office: [address] [place] [address] Ph#: [phone]  Patient Name: INGE CARLOS   Age: 66 year, : 1955 MRN #: -, Jose: [facility] A501 A Referring Physician: LEONARDO GERONIMO EEG #: [id number]  Study Date: 2021   Start Time: 3:38:50 PM    End Date: 21         End Time: 800 AM    Study Duration: ~16.5H  Study Information:  EEG Recording Technique: The patient underwent continuous Video-EEG monitoring, using Telemetry System hardware on the XLTek Digital System. EEG and video data were stored on a computer hard drive with important events saved in digital archive files. The material was reviewed by a physician (electroencephalographer / epileptologist) on a daily basis. Donis and seizure detection algorithms were utilized and reviewed. An EEG Technician attended to the patient, and was available throughout daytime work hours.  The epilepsy center neurologist was available in person or on call 24-hours per day.  EEG Placement and Labeling of Electrodes: The EEG was performed utilizing 20 channel referential EEG connections (coronal over temporal over parasagittal montage) using all standard 10-20 electrode placements with EKG, with additional electrodes placed in the inferior temporal region using the modified 10-10 montage electrode placements for elective admissions, or if deemed necessary. Recording was at a sampling rate of 256 samples per second per channel. Time synchronized digital video recording was done simultaneously with EEG recording. A low light infrared camera was used for low light recording.   History:  VEEG AT BEDSIDE 501 A 56 YEAR OLD MALE  COR: AWAKE / DROWSY P/W: HEART FAILURE PMH: BPH, CHF, HLD, DIABETES, CAD, HTN, CABG, HFrEF, AICD, CKD HV:NOT COMPLETED DUE TO AGE PHOTIC: COMPLETED A&OX4 CONCERN FOR SEIZURE  Medication Lipitor Proventil BUDESONIDE Synthroid Imdur ALDACTONE INFLUENZA VACCINE Coreg ZITHROMAX Rocephin AdmeLOG  Interpretation:  [Abbreviation Key:  PDR=alpha rhythm/posterior dominant rhythm. A-P=anterior posterior gradient.  Amplitude: ‘very low’:<20; ‘low’:20-50; ‘medium’:; ‘high’:>200uV.  Persistence for periodic/rhythmic patterns (% of epoch) ‘rare’:<1%; ‘occasional’:1-10%; ‘frequent’:10-50%; ‘abundant’:50-90%; ‘continuous’:>90%.  Persistence for sporadic discharges: ‘rare’:<1/hr; ‘occasional’:1/min-1/hr; ‘frequent’:>1/min; ‘abundant’:>1/10 sec.  GRDA=generalized rhythmic delta activity, LRDA=lateralized rhythmic delta activity, TIRDA=temporal intermittent rhythmic delta activity, FIRDA=frontal intermittent rhythmic activity. LPD=PLED=lateralized periodic discharges, GPD=generalized periodic discharges, BiPDs=BiPLEDs=bilateral independent periodic epileptiform discharges, SIRPID=stimulus induced rhythmic, periodic, or ictal appearing discharges.  Modifiers: +F=with fast component, +S=with spike component, +R=with rhythmic component.  S-B=burst suppression pattern.  PFA: paroxysmal fast activity. Max=maximal. N1-drowsy, N2-stage II sleep, N3-slow wave sleep.  HV=hyperventilation, PS=photic stimulation]   Daily EEG Visual Analysis  FINDINGS: The background was continuous, spontaneously variable and reactive. During wakefulness, the posterior dominant rhythm consisted of symmetric, well-modulated 8.5 Hz activity, with amplitude to 30 uV, that attenuated to eye opening. Low amplitude frontal beta was noted in wakefulness.    Background Slowing: Generalized slowing: None was present. Focal Slowing: None was present.  Sleep Background: Drowsiness was characterized by fragmentation, attenuation, and slowing of the background activity.   Sleep was characterized by the presence of vertex waves, symmetric spindles, and K-complexes.  Other Non-Epileptiform Findings: None were present.  Interictal Epileptiform Activity:  None were present.  Events: Clinical events: None recorded. Seizures: None recorded.  Activation Procedures:  Hyperventilation was not performed.   Photic stimulation was not performed.  Artifacts: Intermittent myogenic and movement artifacts were noted.  ECG: The heart rate on single channel ECG was predominantly between 100-110 BPM.  EEG Summary / Classification:  Normal EEG in the awake / drowsy / asleep states.  EEG Impression / Clinical Correlate:  Normal prolonged EEG study. No epileptic pattern or seizure seen.   Cameron Reilly MD Epilepsy Fellow  Reading Room: [phone] On Call Service After Hours: [phone]

## 2021-09-14 NOTE — PROGRESS NOTE ADULT - PROBLEM SELECTOR PLAN 8
continue home ASA, hold plavix for now  -[facility] no acute path, old R MCA infarct  - on home rosuvastatin; continue atorvastatin inpatient
continue home ASA, hold plavix for now  -[facility] no acute path, old R MCA infarct  - on home rosuvastatin; continue atorvastatin inpatient
continue home ASA, hold plavix for now  -[facility] no acute path, old R MCA infarct  - on home rosuvastatin; continue atorvastatin inpatient
continue home ASA, hold plavix for now  -[facility] no acute path, old R MCA infarct  - on home rosuvastatin; continue atorvastatin inpatient

## 2021-09-14 NOTE — PROGRESS NOTE ADULT - PROBLEM SELECTOR PLAN 5
101-> 95; without chest pain or worsening dyspnea; EKG showing 2mm VALENTINE V2-V4 iso non-specific IVCD (no previous to compare); however, given pt without CP, trop are likely iso demand ischemia iso CAD; EKG findings likely old given pt without CP and downtrending troponin  - cardiology consult appreciated, resume asa 81mg  -BP soft, resume low dose hydralazine if BP improve

## 2021-09-14 NOTE — DISCHARGE NOTE NURSING/CASE MANAGEMENT/SOCIAL WORK - NSDCFUADDAPPT_GEN_ALL_CORE_FT
Follow up with Dr. Schmidt's office [address] [place] [address], and made appoinment for 9/17 1015AM   follow up with Cardiology, Dr. Hernandez 9/20 1pm.

## 2021-09-14 NOTE — DISCHARGE NOTE PROVIDER - NSDCMRMEDTOKEN_GEN_ALL_CORE_FT
Albuterol (Eqv-ProAir HFA) 90 mcg/inh inhalation aerosol: 2 puff(s) inhaled every 6 hours, As Needed  allopurinol 300 mg oral tablet: 1 tab(s) orally once a day  aspirin 81 mg oral delayed release tablet: 1 tab(s) orally once a day  carvedilol 3.125 mg oral tablet: 1 tab(s) orally 2 times a day  cefpodoxime 200 mg oral tablet: 1 tab(s) orally every 12 hours  docusate sodium 100 mg oral capsule: 1 cap(s) orally 3 times a day  doxycycline hyclate 100 mg oral capsule: 1 cap(s) orally 2 times a day  ezetimibe 10 mg oral tablet: 1 tab(s) orally once a day  glimepiride 2 mg oral tablet: 1 tab(s) orally once a day  hydrALAZINE 50 mg oral tablet: 1 tab(s) orally every 8 hours  ipratropium-albuterol 0.5 mg-2.5 mg/3 mLinhalation solution: 3 milliliter(s) inhaled every 4 hours, As Needed  isosorbide mononitrate 30 mg oral tablet, extended release: 1 tab(s) orally once a day (in the morning)  isosorbide mononitrate 30 mg oral tablet, extended release: 1 tab(s) orally once a day (in the morning)  levothyroxine 25 mcg (0.025 mg) oral capsule: 1 cap(s) orally once a day  Plavix 75 mg oral tablet: 1 tab(s) orally once a day  rosuvastatin 5 mg oral tablet: 1 tab(s) orally once a day  senna oral tablet: 2 tab(s) orally once a day (at bedtime)  spironolactone 25 mg oral tablet: 1 tab(s) orally once a day  Symbicort 160 mcg-4.5 mcg/inh inhalation aerosol: 2 puff(s) inhaled 2 times a day  tamsulosin 0.4 mg oral capsule: 1 cap(s) orally once a day  torsemide 20 mg oral tablet: 2 tab(s) orally once a day   albuterol 90 mcg/inh inhalation aerosol: 2 puff(s) inhaled every 6 hours, As needed, Shortness of Breath and/or Wheezing  aspirin 81 mg oral delayed release tablet: 1 tab(s) orally once a day  budesonide-formoterol 160 mcg-4.5 mcg/inh inhalation aerosol:  inhaled   carvedilol 3.125 mg oral tablet: 1 tab(s) orally every 12 hours  docusate sodium 100 mg oral capsule: 1 cap(s) orally 3 times a day  doxycycline hyclate 100 mg oral capsule: 1 cap(s) orally 2 times a day  ezetimibe 10 mg oral tablet: 1 tab(s) orally once a day  glimepiride 2 mg oral tablet: 1 tab(s) orally once a day  isosorbide mononitrate 30 mg oral tablet, extended release: 1 tab(s) orally once a day (in the morning)  levothyroxine 25 mcg (0.025 mg) oral tablet: 1 tab(s) orally once a day  Plavix 75 mg oral tablet: 1 tab(s) orally once a day  rosuvastatin 5 mg oral tablet: 1 tab(s) orally once a day  senna oral tablet: 2 tab(s) orally once a day (at bedtime)  spironolactone 25 mg oral tablet: 1 tab(s) orally once a day  tamsulosin 0.4 mg oral capsule: 1 cap(s) orally once a day   albuterol 90 mcg/inh inhalation aerosol: 2 puff(s) inhaled every 6 hours, As needed, Shortness of Breath and/or Wheezing  aspirin 81 mg oral delayed release tablet: 1 tab(s) orally once a day  budesonide-formoterol 160 mcg-4.5 mcg/inh inhalation aerosol:  inhaled   carvedilol 3.125 mg oral tablet: 1 tab(s) orally every 12 hours  docusate sodium 100 mg oral capsule: 1 cap(s) orally 3 times a day  ezetimibe 10 mg oral tablet: 1 tab(s) orally once a day  glimepiride 2 mg oral tablet: 1 tab(s) orally once a day  isosorbide mononitrate 30 mg oral tablet, extended release: 1 tab(s) orally once a day (in the morning)  levothyroxine 25 mcg (0.025 mg) oral tablet: 1 tab(s) orally once a day  Plavix 75 mg oral tablet: 1 tab(s) orally once a day  rosuvastatin 5 mg oral tablet: 1 tab(s) orally once a day  senna oral tablet: 2 tab(s) orally once a day (at bedtime)  spironolactone 25 mg oral tablet: 1 tab(s) orally once a day  tamsulosin 0.4 mg oral capsule: 1 cap(s) orally once a day

## 2021-09-14 NOTE — DISCHARGE NOTE PROVIDER - CARE PROVIDER_API CALL
Kelvin Schmidt  INTERNAL MEDICINE  [address] [place] [address]  Phone: [phone]  Fax: [phone]  Established Patient  Follow Up Time: 1 week    Miles Hernandez  CARDIOVASCULAR DISEASE  [address]  [place] [address]  Phone: [phone]  Fax: [phone]  Follow Up Time: 1 week

## 2021-09-14 NOTE — PROVIDER CONTACT NOTE (OTHER) - ASSESSMENT
Pt resting in bed, asymptomatic at this time
BP 88/54, asymptomatic
Pt resting in bed, asymptomatic at this time

## 2021-09-14 NOTE — PROGRESS NOTE ADULT - PROBLEM SELECTOR PLAN 7
NIDDM2  on glimepiride at home  - [facility] inpatient NIDDM2, A1c 6.8%  on glimepiride at home- holding while inpatient  - [facility] inpatient

## 2021-09-14 NOTE — DISCHARGE NOTE PROVIDER - PROVIDER TOKENS
PROVIDER:[TOKEN:[[id number]],FOLLOWUP:[1 week],ESTABLISHEDPATIENT:[T]],PROVIDER:[TOKEN:[[id number]],FOLLOWUP:[1 week]]

## 2021-09-14 NOTE — DISCHARGE NOTE PROVIDER - HOSPITAL COURSE
66M w/CABG, HFrEF (15-20% per outside records) s/p AICD on 2LNC at home, CKD, CVA w/residual L-sided weakness, and gout coming in for b/l LE weakness w/LE tremors that pt noted earlier today. States he was in the shower when he experienced sudden onset LE tremors and had to lower himself down to the floor without head trauma; daughters went to see him and found him on the floor with his eyes rolling back, unresponsive w/LUE tremors for ~4 minutes (unsure if there were LLE tremors); following this, pt was responsive but confused and zoning in and out.    Records from [facility] reviewed (in physical chart): Pt was hospitalized from 9/3-9/10 for AHRF 2/2 ADHF w/new home O2 requirements; additionally was noted to have significant leukocytosis w/imaging showing PNA for which he was treated w/vancomycin and cefepime and discharged on cefpodoxime/doxycycline for 7 additional days. Course also significant for hyponatremia (132 on admission, 128 on d/c).    He was admitted for hyponatremia workup and management and workup of seizures. Hyponatremia was corrected and he is s/p UreNa. ICD was interrogated and had no significant events and optivol fluid level was not elevated. EEG was done and was negative for seizures. CT head had no acute changes.  He was found to have leukocytosis, and completed 7-day course of ctx and azithromycin for CAP.     In terms of his CHF management, he remained euvolemic but diuretics were on hold and will be held until he sees cardiology and PCP (signout given to PCP Dr. Schmidt).     Patient has follow up with Dr. Schmidt's at [address] [place] [address] on 9/17 1015AM and w/ cardiology (Dr. Hernandez) 9/20 1pm.

## 2021-09-14 NOTE — DISCHARGE NOTE PROVIDER - NSDCCPCAREPLAN_GEN_ALL_CORE_FT
PRINCIPAL DISCHARGE DIAGNOSIS  Diagnosis: Hyponatremia  Assessment and Plan of Treatment: You had low sodium levels to 126, likely from excess fluid in your body. We gave you lasix to remove fluid from your body. We also then gave you some salt in order to imrove your sodium to a safe level. We think this is the reason why you had weakness in your legs and why you seemed to have a seizure. We also did a CT scan of the brain and an EEG, both of which were normal.   -Please follow up with your primary care doctor, Dr. Schmidt. You have an appointment on 9/17 at 1015AM.      SECONDARY DISCHARGE DIAGNOSES  Diagnosis: HFrEF (heart failure with reduced ejection fraction)  Assessment and Plan of Treatment: For your heart failure management, we got a repeat echocardiogram (sonogram) which was unchanged. We also checked your AICD device to see if there were any events that may have caused you to seize and this was normal.  -We have been holding your water pills, torsemide and your blood pressure medication, hydralazine because of low blood pressure. Please follow up with your primary care doctor to see if you are able to recontinue it, depending on your blood pressure.  -You have a follow up with cardiology, Dr. Hernandez on Monday 9/20.

## 2021-09-14 NOTE — PROGRESS NOTE ADULT - PROBLEM SELECTOR PLAN 1
described as LUE twitching, eyes rolling back, unresponsive for ~4 minutes w/subsequent post-ictal period; hx of previous R MCA stroke; possibly triggered by electrolyte derangements; currently neuro exam at baseline A&Ox3, 5/5 strength in all extremities except LUE w/4/5 strength (known residual weakness from previous CVA; low concern for new acute CVA  - hyponatremia management as below  - CT Head chronic R MCA, no acute path, resume asa  - EEG without any seizures  - PT recs home with home PT on DC

## 2021-09-15 LAB
CULTURE RESULTS: SIGNIFICANT CHANGE UP
CULTURE RESULTS: SIGNIFICANT CHANGE UP
SPECIMEN SOURCE: SIGNIFICANT CHANGE UP
SPECIMEN SOURCE: SIGNIFICANT CHANGE UP

## 2021-09-15 NOTE — EEG REPORT - NS EEG TEXT BOX
REPORT OF CONTINUOUS VIDEO EEG   [facility]: [address], [place] [address], Ph#: [phone] [facility]: [address], [place] [address], Ph#: [phone] Office: [address] [place] [address], [place] [address] Ph#: [phone]  Patient Name: INGE CARLOS   Age: 66 year, : 1955 MRN #: -, Jose: [facility] [id number] A Referring Physician: LEONARDO GERONIMO EEG #: [id number]  Study Date: 2021   Start Time: 830    End Date: 21         End Time: 1720    Study Duration: ~4H (off electrodes ~915 to ~1345)  Study Information:  EEG Recording Technique: The patient underwent continuous Video-EEG monitoring, using Telemetry System hardware on the XLTek Digital System. EEG and video data were stored on a computer hard drive with important events saved in digital archive files. The material was reviewed by a physician (electroencephalographer / epileptologist) on a daily basis. Donis and seizure detection algorithms were utilized and reviewed. An EEG Technician attended to the patient, and was available throughout daytime work hours.  The epilepsy center neurologist was available in person or on call 24-hours per day.  EEG Placement and Labeling of Electrodes: The EEG was performed utilizing 20 channel referential EEG connections (coronal over temporal over parasagittal montage) using all standard 10-20 electrode placements with EKG, with additional electrodes placed in the inferior temporal region using the modified 10-10 montage electrode placements for elective admissions, or if deemed necessary. Recording was at a sampling rate of 256 samples per second per channel. Time synchronized digital video recording was done simultaneously with EEG recording. A low light infrared camera was used for low light recording.   History:  VEEG AT BEDSIDE 501 A 56 YEAR OLD MALE  COR: AWAKE / DROWSY P/W: HEART FAILURE PMH: BPH, CHF, HLD, DIABETES, CAD, HTN, CABG, HFrEF, AICD, CKD HV:NOT COMPLETED DUE TO AGE PHOTIC: COMPLETED A&OX4 CONCERN FOR SEIZURE  Medication Lipitor Proventil BUDESONIDE Synthroid Imdur ALDACTONE INFLUENZA VACCINE Coreg ZITHROMAX Rocephin AdmeLOG  Interpretation:  [Abbreviation Key:  PDR=alpha rhythm/posterior dominant rhythm. A-P=anterior posterior gradient.  Amplitude: ‘very low’:<20; ‘low’:20-50; ‘medium’:; ‘high’:>200uV.  Persistence for periodic/rhythmic patterns (% of epoch) ‘rare’:<1%; ‘occasional’:1-10%; ‘frequent’:10-50%; ‘abundant’:50-90%; ‘continuous’:>90%.  Persistence for sporadic discharges: ‘rare’:<1/hr; ‘occasional’:1/min-1/hr; ‘frequent’:>1/min; ‘abundant’:>1/10 sec.  GRDA=generalized rhythmic delta activity, LRDA=lateralized rhythmic delta activity, TIRDA=temporal intermittent rhythmic delta activity, FIRDA=frontal intermittent rhythmic activity. LPD=PLED=lateralized periodic discharges, GPD=generalized periodic discharges, BiPDs=BiPLEDs=bilateral independent periodic epileptiform discharges, SIRPID=stimulus induced rhythmic, periodic, or ictal appearing discharges.  Modifiers: +F=with fast component, +S=with spike component, +R=with rhythmic component.  S-B=burst suppression pattern.  PFA: paroxysmal fast activity. Max=maximal. N1-drowsy, N2-stage II sleep, N3-slow wave sleep.  HV=hyperventilation, PS=photic stimulation]   Daily EEG Visual Analysis  FINDINGS: The background was continuous, spontaneously variable and reactive. During wakefulness, the posterior dominant rhythm consisted of symmetric, well-modulated 8.5 Hz activity, with amplitude to 30 uV, that attenuated to eye opening. Low amplitude frontal beta was noted in wakefulness.    Background Slowing: Generalized slowing: None was present. Focal Slowing: None was present.  Sleep Background: Drowsiness was characterized by fragmentation, attenuation, and slowing of the background activity.   Stage II sleep transients were not recorded.  Other Non-Epileptiform Findings: None were present.  Interictal Epileptiform Activity:  None were present.  Events: Clinical events: None recorded. Seizures: None recorded.  Activation Procedures:  Hyperventilation was not performed.   Photic stimulation was not performed.  Artifacts: Intermittent myogenic and movement artifacts were noted.  ECG: The heart rate on single channel ECG was predominantly between 100-110 BPM.  EEG Summary / Classification:  Normal EEG in the awake / drowsy states.  EEG Impression / Clinical Correlate:  Normal prolonged EEG study. No epileptic pattern or seizure seen.   Cameron Reilly MD Epilepsy Fellow  Reading Room: [phone] On Call Service After Hours: [phone]

## 2021-09-20 DIAGNOSIS — Z71.89 OTHER SPECIFIED COUNSELING: ICD-10-CM

## 2021-10-01 PROCEDURE — G9005: CPT

## 2021-10-18 ENCOUNTER — INPATIENT (INPATIENT)
Facility: HOSPITAL | Age: 66
LOS: 4 days | Discharge: HOME HEALTH | End: 2021-10-23
Attending: STUDENT IN AN ORGANIZED HEALTH CARE EDUCATION/TRAINING PROGRAM | Admitting: STUDENT IN AN ORGANIZED HEALTH CARE EDUCATION/TRAINING PROGRAM
Payer: MEDICARE

## 2021-10-18 VITALS
HEART RATE: 110 BPM | RESPIRATION RATE: 16 BRPM | SYSTOLIC BLOOD PRESSURE: 124 MMHG | OXYGEN SATURATION: 100 % | DIASTOLIC BLOOD PRESSURE: 77 MMHG | HEIGHT: 69 IN

## 2021-10-18 DIAGNOSIS — A41.9 SEPSIS, UNSPECIFIED ORGANISM: ICD-10-CM

## 2021-10-18 DIAGNOSIS — Z29.9 ENCOUNTER FOR PROPHYLACTIC MEASURES, UNSPECIFIED: ICD-10-CM

## 2021-10-18 DIAGNOSIS — J18.9 PNEUMONIA, UNSPECIFIED ORGANISM: ICD-10-CM

## 2021-10-18 DIAGNOSIS — J44.1 CHRONIC OBSTRUCTIVE PULMONARY DISEASE WITH (ACUTE) EXACERBATION: ICD-10-CM

## 2021-10-18 DIAGNOSIS — D64.9 ANEMIA, UNSPECIFIED: ICD-10-CM

## 2021-10-18 DIAGNOSIS — R77.8 OTHER SPECIFIED ABNORMALITIES OF PLASMA PROTEINS: ICD-10-CM

## 2021-10-18 DIAGNOSIS — I50.20 UNSPECIFIED SYSTOLIC (CONGESTIVE) HEART FAILURE: ICD-10-CM

## 2021-10-18 DIAGNOSIS — D72.829 ELEVATED WHITE BLOOD CELL COUNT, UNSPECIFIED: ICD-10-CM

## 2021-10-18 DIAGNOSIS — N18.9 CHRONIC KIDNEY DISEASE, UNSPECIFIED: ICD-10-CM

## 2021-10-18 DIAGNOSIS — Z95.1 PRESENCE OF AORTOCORONARY BYPASS GRAFT: Chronic | ICD-10-CM

## 2021-10-18 DIAGNOSIS — Z79.899 OTHER LONG TERM (CURRENT) DRUG THERAPY: ICD-10-CM

## 2021-10-18 DIAGNOSIS — Z90.49 ACQUIRED ABSENCE OF OTHER SPECIFIED PARTS OF DIGESTIVE TRACT: Chronic | ICD-10-CM

## 2021-10-18 DIAGNOSIS — E11.9 TYPE 2 DIABETES MELLITUS WITHOUT COMPLICATIONS: ICD-10-CM

## 2021-10-18 LAB
ALBUMIN SERPL ELPH-MCNC: 3.4 G/DL — SIGNIFICANT CHANGE UP (ref 3.3–5)
ALP SERPL-CCNC: 145 U/L — HIGH (ref 40–120)
ALT FLD-CCNC: 23 U/L — SIGNIFICANT CHANGE UP (ref 4–41)
ANION GAP SERPL CALC-SCNC: 14 MMOL/L — SIGNIFICANT CHANGE UP (ref 7–14)
ANION GAP SERPL CALC-SCNC: 16 MMOL/L — HIGH (ref 7–14)
APPEARANCE UR: CLEAR — SIGNIFICANT CHANGE UP
AST SERPL-CCNC: 20 U/L — SIGNIFICANT CHANGE UP (ref 4–40)
B PERT DNA SPEC QL NAA+PROBE: SIGNIFICANT CHANGE UP
B PERT+PARAPERT DNA PNL SPEC NAA+PROBE: SIGNIFICANT CHANGE UP
BACTERIA # UR AUTO: NEGATIVE — SIGNIFICANT CHANGE UP
BASE EXCESS BLDV CALC-SCNC: -1 MMOL/L — SIGNIFICANT CHANGE UP (ref -2–3)
BASOPHILS # BLD AUTO: 0.02 K/UL — SIGNIFICANT CHANGE UP (ref 0–0.2)
BASOPHILS # BLD AUTO: 0.05 K/UL — SIGNIFICANT CHANGE UP (ref 0–0.2)
BASOPHILS NFR BLD AUTO: 0.1 % — SIGNIFICANT CHANGE UP (ref 0–2)
BASOPHILS NFR BLD AUTO: 0.3 % — SIGNIFICANT CHANGE UP (ref 0–2)
BILIRUB SERPL-MCNC: 0.4 MG/DL — SIGNIFICANT CHANGE UP (ref 0.2–1.2)
BILIRUB UR-MCNC: NEGATIVE — SIGNIFICANT CHANGE UP
BLOOD GAS VENOUS COMPREHENSIVE RESULT: SIGNIFICANT CHANGE UP
BORDETELLA PARAPERTUSSIS (RAPRVP): SIGNIFICANT CHANGE UP
BUN SERPL-MCNC: 36 MG/DL — HIGH (ref 7–23)
BUN SERPL-MCNC: 38 MG/DL — HIGH (ref 7–23)
C PNEUM DNA SPEC QL NAA+PROBE: SIGNIFICANT CHANGE UP
CA-I SERPL-SCNC: 1.23 MMOL/L — SIGNIFICANT CHANGE UP (ref 1.15–1.33)
CALCIUM SERPL-MCNC: 8.6 MG/DL — SIGNIFICANT CHANGE UP (ref 8.4–10.5)
CALCIUM SERPL-MCNC: 9.1 MG/DL — SIGNIFICANT CHANGE UP (ref 8.4–10.5)
CHLORIDE BLDV-SCNC: 97 MMOL/L — SIGNIFICANT CHANGE UP (ref 96–108)
CHLORIDE SERPL-SCNC: 94 MMOL/L — LOW (ref 98–107)
CHLORIDE SERPL-SCNC: 94 MMOL/L — LOW (ref 98–107)
CO2 BLDV-SCNC: 28 MMOL/L — HIGH (ref 22–26)
CO2 SERPL-SCNC: 21 MMOL/L — LOW (ref 22–31)
CO2 SERPL-SCNC: 23 MMOL/L — SIGNIFICANT CHANGE UP (ref 22–31)
COLOR SPEC: COLORLESS — SIGNIFICANT CHANGE UP
COVID-19 SPIKE DOMAIN AB INTERP: POSITIVE
COVID-19 SPIKE DOMAIN ANTIBODY RESULT: 84.7 U/ML — HIGH
CREAT SERPL-MCNC: 2.55 MG/DL — HIGH (ref 0.5–1.3)
CREAT SERPL-MCNC: 2.57 MG/DL — HIGH (ref 0.5–1.3)
DIFF PNL FLD: NEGATIVE — SIGNIFICANT CHANGE UP
EOSINOPHIL # BLD AUTO: 0.02 K/UL — SIGNIFICANT CHANGE UP (ref 0–0.5)
EOSINOPHIL # BLD AUTO: 1.24 K/UL — HIGH (ref 0–0.5)
EOSINOPHIL NFR BLD AUTO: 0.1 % — SIGNIFICANT CHANGE UP (ref 0–6)
EOSINOPHIL NFR BLD AUTO: 8 % — HIGH (ref 0–6)
EPI CELLS # UR: SIGNIFICANT CHANGE UP
FERRITIN SERPL-MCNC: 196 NG/ML — SIGNIFICANT CHANGE UP (ref 30–400)
FLUAV SUBTYP SPEC NAA+PROBE: SIGNIFICANT CHANGE UP
FLUBV RNA SPEC QL NAA+PROBE: SIGNIFICANT CHANGE UP
FOLATE SERPL-MCNC: 17 NG/ML — SIGNIFICANT CHANGE UP (ref 3.1–17.5)
GAS PNL BLDV: 131 MMOL/L — LOW (ref 136–145)
GAS PNL BLDV: SIGNIFICANT CHANGE UP
GLUCOSE BLDC GLUCOMTR-MCNC: 146 MG/DL — HIGH (ref 70–99)
GLUCOSE BLDC GLUCOMTR-MCNC: 218 MG/DL — HIGH (ref 70–99)
GLUCOSE BLDC GLUCOMTR-MCNC: 224 MG/DL — HIGH (ref 70–99)
GLUCOSE BLDC GLUCOMTR-MCNC: 228 MG/DL — HIGH (ref 70–99)
GLUCOSE BLDC GLUCOMTR-MCNC: 247 MG/DL — HIGH (ref 70–99)
GLUCOSE BLDV-MCNC: 262 MG/DL — HIGH (ref 70–99)
GLUCOSE SERPL-MCNC: 148 MG/DL — HIGH (ref 70–99)
GLUCOSE SERPL-MCNC: 215 MG/DL — HIGH (ref 70–99)
GLUCOSE UR QL: NEGATIVE — SIGNIFICANT CHANGE UP
HADV DNA SPEC QL NAA+PROBE: SIGNIFICANT CHANGE UP
HCO3 BLDV-SCNC: 26 MMOL/L — SIGNIFICANT CHANGE UP (ref 22–29)
HCOV 229E RNA SPEC QL NAA+PROBE: SIGNIFICANT CHANGE UP
HCOV HKU1 RNA SPEC QL NAA+PROBE: SIGNIFICANT CHANGE UP
HCOV NL63 RNA SPEC QL NAA+PROBE: SIGNIFICANT CHANGE UP
HCOV OC43 RNA SPEC QL NAA+PROBE: SIGNIFICANT CHANGE UP
HCT VFR BLD CALC: 28.3 % — LOW (ref 39–50)
HCT VFR BLD CALC: 29.5 % — LOW (ref 39–50)
HCT VFR BLDA CALC: 29 % — LOW (ref 39–51)
HCV AB S/CO SERPL IA: 0.33 S/CO — SIGNIFICANT CHANGE UP (ref 0–0.99)
HCV AB SERPL-IMP: SIGNIFICANT CHANGE UP
HGB BLD CALC-MCNC: 9.8 G/DL — LOW (ref 13–17)
HGB BLD-MCNC: 9.2 G/DL — LOW (ref 13–17)
HGB BLD-MCNC: 9.5 G/DL — LOW (ref 13–17)
HMPV RNA SPEC QL NAA+PROBE: SIGNIFICANT CHANGE UP
HPIV1 RNA SPEC QL NAA+PROBE: SIGNIFICANT CHANGE UP
HPIV2 RNA SPEC QL NAA+PROBE: SIGNIFICANT CHANGE UP
HPIV3 RNA SPEC QL NAA+PROBE: SIGNIFICANT CHANGE UP
HPIV4 RNA SPEC QL NAA+PROBE: SIGNIFICANT CHANGE UP
IANC: 11.78 K/UL — HIGH (ref 1.5–8.5)
IANC: 12.57 K/UL — HIGH (ref 1.5–8.5)
IMM GRANULOCYTES NFR BLD AUTO: 0.4 % — SIGNIFICANT CHANGE UP (ref 0–1.5)
IMM GRANULOCYTES NFR BLD AUTO: 0.5 % — SIGNIFICANT CHANGE UP (ref 0–1.5)
IRON SATN MFR SERPL: 12 % — LOW (ref 14–50)
IRON SATN MFR SERPL: 24 UG/DL — LOW (ref 45–165)
KETONES UR-MCNC: NEGATIVE — SIGNIFICANT CHANGE UP
LACTATE BLDV-MCNC: 1.2 MMOL/L — SIGNIFICANT CHANGE UP (ref 0.5–2)
LEGIONELLA AG UR QL: NEGATIVE — SIGNIFICANT CHANGE UP
LEUKOCYTE ESTERASE UR-ACNC: NEGATIVE — SIGNIFICANT CHANGE UP
LYMPHOCYTES # BLD AUTO: 0.7 K/UL — LOW (ref 1–3.3)
LYMPHOCYTES # BLD AUTO: 1.24 K/UL — SIGNIFICANT CHANGE UP (ref 1–3.3)
LYMPHOCYTES # BLD AUTO: 5.2 % — LOW (ref 13–44)
LYMPHOCYTES # BLD AUTO: 8 % — LOW (ref 13–44)
M PNEUMO DNA SPEC QL NAA+PROBE: SIGNIFICANT CHANGE UP
MAGNESIUM SERPL-MCNC: 1.7 MG/DL — SIGNIFICANT CHANGE UP (ref 1.6–2.6)
MCHC RBC-ENTMCNC: 30.7 PG — SIGNIFICANT CHANGE UP (ref 27–34)
MCHC RBC-ENTMCNC: 30.9 PG — SIGNIFICANT CHANGE UP (ref 27–34)
MCHC RBC-ENTMCNC: 32.2 GM/DL — SIGNIFICANT CHANGE UP (ref 32–36)
MCHC RBC-ENTMCNC: 32.5 GM/DL — SIGNIFICANT CHANGE UP (ref 32–36)
MCV RBC AUTO: 95 FL — SIGNIFICANT CHANGE UP (ref 80–100)
MCV RBC AUTO: 95.5 FL — SIGNIFICANT CHANGE UP (ref 80–100)
MONOCYTES # BLD AUTO: 0.1 K/UL — SIGNIFICANT CHANGE UP (ref 0–0.9)
MONOCYTES # BLD AUTO: 1.15 K/UL — HIGH (ref 0–0.9)
MONOCYTES NFR BLD AUTO: 0.7 % — LOW (ref 2–14)
MONOCYTES NFR BLD AUTO: 7.4 % — SIGNIFICANT CHANGE UP (ref 2–14)
NEUTROPHILS # BLD AUTO: 11.78 K/UL — HIGH (ref 1.8–7.4)
NEUTROPHILS # BLD AUTO: 12.57 K/UL — HIGH (ref 1.8–7.4)
NEUTROPHILS NFR BLD AUTO: 75.8 % — SIGNIFICANT CHANGE UP (ref 43–77)
NEUTROPHILS NFR BLD AUTO: 93.5 % — HIGH (ref 43–77)
NITRITE UR-MCNC: NEGATIVE — SIGNIFICANT CHANGE UP
NRBC # BLD: 0 /100 WBCS — SIGNIFICANT CHANGE UP
NRBC # BLD: 0 /100 WBCS — SIGNIFICANT CHANGE UP
NRBC # FLD: 0 K/UL — SIGNIFICANT CHANGE UP
NRBC # FLD: 0 K/UL — SIGNIFICANT CHANGE UP
NT-PROBNP SERPL-SCNC: 8177 PG/ML — HIGH
PCO2 BLDV: 56 MMHG — HIGH (ref 42–55)
PH BLDV: 7.28 — LOW (ref 7.32–7.43)
PH UR: 6 — SIGNIFICANT CHANGE UP (ref 5–8)
PHOSPHATE SERPL-MCNC: 4.1 MG/DL — SIGNIFICANT CHANGE UP (ref 2.5–4.5)
PLATELET # BLD AUTO: 220 K/UL — SIGNIFICANT CHANGE UP (ref 150–400)
PLATELET # BLD AUTO: 243 K/UL — SIGNIFICANT CHANGE UP (ref 150–400)
PO2 BLDV: 43 MMHG — SIGNIFICANT CHANGE UP
POTASSIUM BLDV-SCNC: 4.2 MMOL/L — SIGNIFICANT CHANGE UP (ref 3.5–5.1)
POTASSIUM SERPL-MCNC: 4 MMOL/L — SIGNIFICANT CHANGE UP (ref 3.5–5.3)
POTASSIUM SERPL-MCNC: 4.1 MMOL/L — SIGNIFICANT CHANGE UP (ref 3.5–5.3)
POTASSIUM SERPL-SCNC: 4 MMOL/L — SIGNIFICANT CHANGE UP (ref 3.5–5.3)
POTASSIUM SERPL-SCNC: 4.1 MMOL/L — SIGNIFICANT CHANGE UP (ref 3.5–5.3)
PROCALCITONIN SERPL-MCNC: 0.19 NG/ML — HIGH (ref 0.02–0.1)
PROT SERPL-MCNC: 6.3 G/DL — SIGNIFICANT CHANGE UP (ref 6–8.3)
PROT UR-MCNC: ABNORMAL
RAPID RVP RESULT: SIGNIFICANT CHANGE UP
RBC # BLD: 2.98 M/UL — LOW (ref 4.2–5.8)
RBC # BLD: 3.09 M/UL — LOW (ref 4.2–5.8)
RBC # FLD: 16.7 % — HIGH (ref 10.3–14.5)
RBC # FLD: 16.8 % — HIGH (ref 10.3–14.5)
RBC CASTS # UR COMP ASSIST: 0 /HPF — SIGNIFICANT CHANGE UP (ref 0–4)
RSV RNA SPEC QL NAA+PROBE: SIGNIFICANT CHANGE UP
RV+EV RNA SPEC QL NAA+PROBE: SIGNIFICANT CHANGE UP
SAO2 % BLDV: 66.9 % — SIGNIFICANT CHANGE UP
SARS-COV-2 IGG+IGM SERPL QL IA: 84.7 U/ML — HIGH
SARS-COV-2 IGG+IGM SERPL QL IA: POSITIVE
SARS-COV-2 RNA SPEC QL NAA+PROBE: SIGNIFICANT CHANGE UP
SODIUM SERPL-SCNC: 131 MMOL/L — LOW (ref 135–145)
SODIUM SERPL-SCNC: 131 MMOL/L — LOW (ref 135–145)
SP GR SPEC: 1.01 — SIGNIFICANT CHANGE UP (ref 1–1.05)
TIBC SERPL-MCNC: 193 UG/DL — LOW (ref 220–430)
TROPONIN T, HIGH SENSITIVITY RESULT: 90 NG/L — CRITICAL HIGH
TROPONIN T, HIGH SENSITIVITY RESULT: 91 NG/L — CRITICAL HIGH
UIBC SERPL-MCNC: 169 UG/DL — SIGNIFICANT CHANGE UP (ref 110–370)
UROBILINOGEN FLD QL: SIGNIFICANT CHANGE UP
VANCOMYCIN FLD-MCNC: 16.5 UG/ML — SIGNIFICANT CHANGE UP
VIT B12 SERPL-MCNC: 1320 PG/ML — HIGH (ref 200–900)
WBC # BLD: 13.46 K/UL — HIGH (ref 3.8–10.5)
WBC # BLD: 15.53 K/UL — HIGH (ref 3.8–10.5)
WBC # FLD AUTO: 13.46 K/UL — HIGH (ref 3.8–10.5)
WBC # FLD AUTO: 15.53 K/UL — HIGH (ref 3.8–10.5)
WBC UR QL: 0 /HPF — SIGNIFICANT CHANGE UP (ref 0–5)

## 2021-10-18 PROCEDURE — 99223 1ST HOSP IP/OBS HIGH 75: CPT

## 2021-10-18 PROCEDURE — 71250 CT THORAX DX C-: CPT | Mod: 26

## 2021-10-18 PROCEDURE — 93010 ELECTROCARDIOGRAM REPORT: CPT

## 2021-10-18 PROCEDURE — 99285 EMERGENCY DEPT VISIT HI MDM: CPT | Mod: 25

## 2021-10-18 PROCEDURE — 71045 X-RAY EXAM CHEST 1 VIEW: CPT | Mod: 26

## 2021-10-18 RX ORDER — ASPIRIN/CALCIUM CARB/MAGNESIUM 324 MG
81 TABLET ORAL DAILY
Refills: 0 | Status: DISCONTINUED | OUTPATIENT
Start: 2021-10-18 | End: 2021-10-23

## 2021-10-18 RX ORDER — CLOPIDOGREL BISULFATE 75 MG/1
75 TABLET, FILM COATED ORAL DAILY
Refills: 0 | Status: DISCONTINUED | OUTPATIENT
Start: 2021-10-18 | End: 2021-10-23

## 2021-10-18 RX ORDER — FUROSEMIDE 40 MG
40 TABLET ORAL ONCE
Refills: 0 | Status: COMPLETED | OUTPATIENT
Start: 2021-10-18 | End: 2021-10-18

## 2021-10-18 RX ORDER — INSULIN LISPRO 100/ML
VIAL (ML) SUBCUTANEOUS AT BEDTIME
Refills: 0 | Status: DISCONTINUED | OUTPATIENT
Start: 2021-10-18 | End: 2021-10-22

## 2021-10-18 RX ORDER — ACETAMINOPHEN 500 MG
650 TABLET ORAL EVERY 6 HOURS
Refills: 0 | Status: DISCONTINUED | OUTPATIENT
Start: 2021-10-18 | End: 2021-10-23

## 2021-10-18 RX ORDER — PIPERACILLIN AND TAZOBACTAM 4; .5 G/20ML; G/20ML
3.38 INJECTION, POWDER, LYOPHILIZED, FOR SOLUTION INTRAVENOUS ONCE
Refills: 0 | Status: DISCONTINUED | OUTPATIENT
Start: 2021-10-18 | End: 2021-10-18

## 2021-10-18 RX ORDER — IPRATROPIUM/ALBUTEROL SULFATE 18-103MCG
3 AEROSOL WITH ADAPTER (GRAM) INHALATION
Refills: 0 | Status: COMPLETED | OUTPATIENT
Start: 2021-10-18 | End: 2021-10-18

## 2021-10-18 RX ORDER — ACETAMINOPHEN 500 MG
1000 TABLET ORAL ONCE
Refills: 0 | Status: COMPLETED | OUTPATIENT
Start: 2021-10-18 | End: 2021-10-18

## 2021-10-18 RX ORDER — INSULIN LISPRO 100/ML
VIAL (ML) SUBCUTANEOUS
Refills: 0 | Status: DISCONTINUED | OUTPATIENT
Start: 2021-10-18 | End: 2021-10-22

## 2021-10-18 RX ORDER — PIPERACILLIN AND TAZOBACTAM 4; .5 G/20ML; G/20ML
INJECTION, POWDER, LYOPHILIZED, FOR SOLUTION INTRAVENOUS
Refills: 0 | Status: DISCONTINUED | OUTPATIENT
Start: 2021-10-18 | End: 2021-10-18

## 2021-10-18 RX ORDER — AZITHROMYCIN 500 MG/1
500 TABLET, FILM COATED ORAL ONCE
Refills: 0 | Status: COMPLETED | OUTPATIENT
Start: 2021-10-18 | End: 2021-10-18

## 2021-10-18 RX ORDER — LANOLIN ALCOHOL/MO/W.PET/CERES
3 CREAM (GRAM) TOPICAL AT BEDTIME
Refills: 0 | Status: DISCONTINUED | OUTPATIENT
Start: 2021-10-18 | End: 2021-10-23

## 2021-10-18 RX ORDER — DEXTROSE 50 % IN WATER 50 %
25 SYRINGE (ML) INTRAVENOUS ONCE
Refills: 0 | Status: DISCONTINUED | OUTPATIENT
Start: 2021-10-18 | End: 2021-10-23

## 2021-10-18 RX ORDER — HEPARIN SODIUM 5000 [USP'U]/ML
5000 INJECTION INTRAVENOUS; SUBCUTANEOUS EVERY 12 HOURS
Refills: 0 | Status: DISCONTINUED | OUTPATIENT
Start: 2021-10-18 | End: 2021-10-23

## 2021-10-18 RX ORDER — VANCOMYCIN HCL 1 G
1000 VIAL (EA) INTRAVENOUS ONCE
Refills: 0 | Status: COMPLETED | OUTPATIENT
Start: 2021-10-18 | End: 2021-10-18

## 2021-10-18 RX ORDER — ONDANSETRON 8 MG/1
4 TABLET, FILM COATED ORAL EVERY 8 HOURS
Refills: 0 | Status: DISCONTINUED | OUTPATIENT
Start: 2021-10-18 | End: 2021-10-19

## 2021-10-18 RX ORDER — SODIUM CHLORIDE 9 MG/ML
1000 INJECTION, SOLUTION INTRAVENOUS
Refills: 0 | Status: DISCONTINUED | OUTPATIENT
Start: 2021-10-18 | End: 2021-10-23

## 2021-10-18 RX ORDER — TAMSULOSIN HYDROCHLORIDE 0.4 MG/1
0.4 CAPSULE ORAL AT BEDTIME
Refills: 0 | Status: DISCONTINUED | OUTPATIENT
Start: 2021-10-18 | End: 2021-10-23

## 2021-10-18 RX ORDER — FUROSEMIDE 40 MG
80 TABLET ORAL DAILY
Refills: 0 | Status: DISCONTINUED | OUTPATIENT
Start: 2021-10-18 | End: 2021-10-18

## 2021-10-18 RX ORDER — LEVOTHYROXINE SODIUM 125 MCG
25 TABLET ORAL DAILY
Refills: 0 | Status: DISCONTINUED | OUTPATIENT
Start: 2021-10-18 | End: 2021-10-23

## 2021-10-18 RX ORDER — SENNA PLUS 8.6 MG/1
2 TABLET ORAL AT BEDTIME
Refills: 0 | Status: DISCONTINUED | OUTPATIENT
Start: 2021-10-18 | End: 2021-10-23

## 2021-10-18 RX ORDER — PIPERACILLIN AND TAZOBACTAM 4; .5 G/20ML; G/20ML
3.38 INJECTION, POWDER, LYOPHILIZED, FOR SOLUTION INTRAVENOUS EVERY 8 HOURS
Refills: 0 | Status: DISCONTINUED | OUTPATIENT
Start: 2021-10-18 | End: 2021-10-23

## 2021-10-18 RX ORDER — ATORVASTATIN CALCIUM 80 MG/1
20 TABLET, FILM COATED ORAL AT BEDTIME
Refills: 0 | Status: DISCONTINUED | OUTPATIENT
Start: 2021-10-18 | End: 2021-10-23

## 2021-10-18 RX ORDER — HYDRALAZINE HCL 50 MG
50 TABLET ORAL THREE TIMES A DAY
Refills: 0 | Status: DISCONTINUED | OUTPATIENT
Start: 2021-10-18 | End: 2021-10-23

## 2021-10-18 RX ORDER — IPRATROPIUM/ALBUTEROL SULFATE 18-103MCG
3 AEROSOL WITH ADAPTER (GRAM) INHALATION EVERY 6 HOURS
Refills: 0 | Status: DISCONTINUED | OUTPATIENT
Start: 2021-10-18 | End: 2021-10-20

## 2021-10-18 RX ORDER — SPIRONOLACTONE 25 MG/1
25 TABLET, FILM COATED ORAL DAILY
Refills: 0 | Status: DISCONTINUED | OUTPATIENT
Start: 2021-10-18 | End: 2021-10-22

## 2021-10-18 RX ORDER — DEXTROSE 50 % IN WATER 50 %
12.5 SYRINGE (ML) INTRAVENOUS ONCE
Refills: 0 | Status: DISCONTINUED | OUTPATIENT
Start: 2021-10-18 | End: 2021-10-23

## 2021-10-18 RX ORDER — GLUCAGON INJECTION, SOLUTION 0.5 MG/.1ML
1 INJECTION, SOLUTION SUBCUTANEOUS ONCE
Refills: 0 | Status: DISCONTINUED | OUTPATIENT
Start: 2021-10-18 | End: 2021-10-23

## 2021-10-18 RX ORDER — AZITHROMYCIN 500 MG/1
TABLET, FILM COATED ORAL
Refills: 0 | Status: DISCONTINUED | OUTPATIENT
Start: 2021-10-18 | End: 2021-10-19

## 2021-10-18 RX ORDER — ISOSORBIDE MONONITRATE 60 MG/1
30 TABLET, EXTENDED RELEASE ORAL DAILY
Refills: 0 | Status: DISCONTINUED | OUTPATIENT
Start: 2021-10-18 | End: 2021-10-23

## 2021-10-18 RX ORDER — BUDESONIDE AND FORMOTEROL FUMARATE DIHYDRATE 160; 4.5 UG/1; UG/1
2 AEROSOL RESPIRATORY (INHALATION)
Refills: 0 | Status: DISCONTINUED | OUTPATIENT
Start: 2021-10-18 | End: 2021-10-23

## 2021-10-18 RX ORDER — PIPERACILLIN AND TAZOBACTAM 4; .5 G/20ML; G/20ML
3.38 INJECTION, POWDER, LYOPHILIZED, FOR SOLUTION INTRAVENOUS ONCE
Refills: 0 | Status: COMPLETED | OUTPATIENT
Start: 2021-10-18 | End: 2021-10-18

## 2021-10-18 RX ORDER — CARVEDILOL PHOSPHATE 80 MG/1
3.12 CAPSULE, EXTENDED RELEASE ORAL EVERY 12 HOURS
Refills: 0 | Status: DISCONTINUED | OUTPATIENT
Start: 2021-10-18 | End: 2021-10-21

## 2021-10-18 RX ORDER — AZITHROMYCIN 500 MG/1
500 TABLET, FILM COATED ORAL EVERY 24 HOURS
Refills: 0 | Status: DISCONTINUED | OUTPATIENT
Start: 2021-10-19 | End: 2021-10-19

## 2021-10-18 RX ORDER — DEXTROSE 50 % IN WATER 50 %
15 SYRINGE (ML) INTRAVENOUS ONCE
Refills: 0 | Status: DISCONTINUED | OUTPATIENT
Start: 2021-10-18 | End: 2021-10-23

## 2021-10-18 RX ADMIN — Medication 3 MILLILITER(S): at 11:28

## 2021-10-18 RX ADMIN — Medication 250 MILLIGRAM(S): at 03:46

## 2021-10-18 RX ADMIN — SENNA PLUS 2 TABLET(S): 8.6 TABLET ORAL at 22:57

## 2021-10-18 RX ADMIN — Medication 1000 MILLIGRAM(S): at 10:30

## 2021-10-18 RX ADMIN — Medication 2: at 12:29

## 2021-10-18 RX ADMIN — Medication 20 MILLIGRAM(S): at 23:05

## 2021-10-18 RX ADMIN — PIPERACILLIN AND TAZOBACTAM 25 GRAM(S): 4; .5 INJECTION, POWDER, LYOPHILIZED, FOR SOLUTION INTRAVENOUS at 12:48

## 2021-10-18 RX ADMIN — Medication 40 MILLIGRAM(S): at 00:55

## 2021-10-18 RX ADMIN — Medication 3 MILLILITER(S): at 01:02

## 2021-10-18 RX ADMIN — Medication 50 MILLIGRAM(S): at 22:57

## 2021-10-18 RX ADMIN — HEPARIN SODIUM 5000 UNIT(S): 5000 INJECTION INTRAVENOUS; SUBCUTANEOUS at 07:04

## 2021-10-18 RX ADMIN — Medication 40 MILLIGRAM(S): at 00:54

## 2021-10-18 RX ADMIN — Medication 80 MILLIGRAM(S): at 13:18

## 2021-10-18 RX ADMIN — Medication 3 MILLILITER(S): at 17:38

## 2021-10-18 RX ADMIN — ATORVASTATIN CALCIUM 20 MILLIGRAM(S): 80 TABLET, FILM COATED ORAL at 22:57

## 2021-10-18 RX ADMIN — BUDESONIDE AND FORMOTEROL FUMARATE DIHYDRATE 2 PUFF(S): 160; 4.5 AEROSOL RESPIRATORY (INHALATION) at 23:37

## 2021-10-18 RX ADMIN — BUDESONIDE AND FORMOTEROL FUMARATE DIHYDRATE 2 PUFF(S): 160; 4.5 AEROSOL RESPIRATORY (INHALATION) at 10:29

## 2021-10-18 RX ADMIN — Medication 3 MILLILITER(S): at 23:05

## 2021-10-18 RX ADMIN — Medication 3 MILLILITER(S): at 00:47

## 2021-10-18 RX ADMIN — CLOPIDOGREL BISULFATE 75 MILLIGRAM(S): 75 TABLET, FILM COATED ORAL at 11:19

## 2021-10-18 RX ADMIN — Medication 25 MICROGRAM(S): at 07:04

## 2021-10-18 RX ADMIN — Medication 3 MILLILITER(S): at 01:18

## 2021-10-18 RX ADMIN — PIPERACILLIN AND TAZOBACTAM 25 GRAM(S): 4; .5 INJECTION, POWDER, LYOPHILIZED, FOR SOLUTION INTRAVENOUS at 22:58

## 2021-10-18 RX ADMIN — HEPARIN SODIUM 5000 UNIT(S): 5000 INJECTION INTRAVENOUS; SUBCUTANEOUS at 17:59

## 2021-10-18 RX ADMIN — PIPERACILLIN AND TAZOBACTAM 200 GRAM(S): 4; .5 INJECTION, POWDER, LYOPHILIZED, FOR SOLUTION INTRAVENOUS at 03:14

## 2021-10-18 RX ADMIN — CARVEDILOL PHOSPHATE 3.12 MILLIGRAM(S): 80 CAPSULE, EXTENDED RELEASE ORAL at 19:34

## 2021-10-18 RX ADMIN — TAMSULOSIN HYDROCHLORIDE 0.4 MILLIGRAM(S): 0.4 CAPSULE ORAL at 22:57

## 2021-10-18 RX ADMIN — AZITHROMYCIN 500 MILLIGRAM(S): 500 TABLET, FILM COATED ORAL at 07:55

## 2021-10-18 RX ADMIN — Medication 81 MILLIGRAM(S): at 11:28

## 2021-10-18 RX ADMIN — Medication 400 MILLIGRAM(S): at 01:18

## 2021-10-18 RX ADMIN — Medication 2: at 08:25

## 2021-10-18 RX ADMIN — Medication 2: at 17:53

## 2021-10-18 RX ADMIN — ISOSORBIDE MONONITRATE 30 MILLIGRAM(S): 60 TABLET, EXTENDED RELEASE ORAL at 11:19

## 2021-10-18 NOTE — PROGRESS NOTE ADULT - PROBLEM SELECTOR PLAN 5
91--> 90. Troponin leak likely in setting of demand ischemia and CKD. Denies active chest pain or worsening of SOB.   -Telemetry monitoring  -Will hold hydralazine at this time due to soft BPs On glimepiride 2 mg at home. A1C 6.8 on last admission  -Low dose ISS  -FSG q meals and bedtime

## 2021-10-18 NOTE — ED ADULT NURSE NOTE - NSIMPLEMENTINTERV_GEN_ALL_ED
Implemented All Fall with Harm Risk Interventions:  [place] to call system. Call bell, personal items and telephone within reach. Instruct patient to call for assistance. Room bathroom lighting operational. Non-slip footwear when patient is off stretcher. Physically safe environment: no spills, clutter or unnecessary equipment. Stretcher in lowest position, wheels locked, appropriate side rails in place. Provide visual cue, wrist band, yellow gown, etc. Monitor gait and stability. Monitor for mental status changes and reorient to person, place, and time. Review medications for side effects contributing to fall risk. Reinforce activity limits and safety measures with patient and family. Provide visual clues: red socks.

## 2021-10-18 NOTE — PROGRESS NOTE ADULT - PROBLEM SELECTOR PLAN 2
Admitted with temp 100.3 rectal with tachypneic, found to septic 2/2 ADHF vs PNA vs COPD exacerbation.  -Trial of BiPAP to NC in AM  #Plan above for HF  #Plan below for COPD exacerbation  #PNA  - Worsening leukocytosis in comparison to last admission. CXR non-specific b/l opacities.  - Patient was prescribed a 7 day course of cefuroxime but continued to spike fevers with non-productive cough  - Continue Vanc by level, Zosyn (renally dosed) and azithromycin. F/u procal and CT Chest  - F/u MRSA swab  - F/u Legionella urine antigen  - F/u UA/UCx Admitted with temp 100.3 rectal with tachypneic, found to septic 2/2 ADHF vs PNA.  -Trial of BiPAP on admission. Recurrent PNA raising suspicion for possible post obstructive process or aspiration PNA given complaints of chest tightness with eating/drinking.  #Plan above for HF  #PNA  - Worsening leukocytosis in comparison to last admission. CXR non-specific b/l opacities.  - Patient was prescribed a 7 day course of cefuroxime but continued to spike fevers with non-productive cough  - Continue Vanc by level, Zosyn (renally dosed) and azithromycin.   - F/u CT Chest read  - Speech eval and treat  - F/u MRSA swab  - Sputum cultures if able  - F/u Legionella urine antigen

## 2021-10-18 NOTE — H&P ADULT - ASSESSMENT
66M w/CABG, HFrEF (15-20% per outside records) s/p AICD on 2LNC at home, COPD/asthma, CKD, CVA w/residual L-sided weakness, and gout, recently prescribed PO antibiotics for presumed PNA, coming in for generalized weakness, fevers and UE and LE extremity swelling.

## 2021-10-18 NOTE — CONSULT NOTE ADULT - SUBJECTIVE AND OBJECTIVE BOX
Date of Admission:    CHIEF COMPLAINT:    HISTORY OF PRESENT ILLNESS:      Allergies    No Known Allergies    Intolerances    	    MEDICATIONS:  aspirin enteric coated 81 milliGRAM(s) Oral daily  carvedilol 3.125 milliGRAM(s) Oral every 12 hours  clopidogrel Tablet 75 milliGRAM(s) Oral daily  furosemide   Injectable 80 milliGRAM(s) IV Push daily  heparin   Injectable 5000 Unit(s) SubCutaneous every 12 hours  isosorbide   mononitrate ER Tablet (IMDUR) 30 milliGRAM(s) Oral daily  tamsulosin 0.4 milliGRAM(s) Oral at bedtime    azithromycin  IVPB      piperacillin/tazobactam IVPB.. 3.375 Gram(s) IV Intermittent every 8 hours    albuterol/ipratropium for Nebulization 3 milliLiter(s) Nebulizer every 6 hours  budesonide 160 MICROgram(s)/formoterol 4.5 MICROgram(s) Inhaler 2 Puff(s) Inhalation two times a day    acetaminophen   Tablet .. 650 milliGRAM(s) Oral every 6 hours PRN  melatonin 3 milliGRAM(s) Oral at bedtime PRN  ondansetron Injectable 4 milliGRAM(s) IV Push every 8 hours PRN    aluminum hydroxide/magnesium hydroxide/simethicone Suspension 30 milliLiter(s) Oral every 4 hours PRN  senna 2 Tablet(s) Oral at bedtime    atorvastatin 20 milliGRAM(s) Oral at bedtime  dextrose 40% Gel 15 Gram(s) Oral once  dextrose 50% Injectable 25 Gram(s) IV Push once  dextrose 50% Injectable 12.5 Gram(s) IV Push once  dextrose 50% Injectable 25 Gram(s) IV Push once  glucagon  Injectable 1 milliGRAM(s) IntraMuscular once  insulin lispro (ADMELOG) corrective regimen sliding scale   SubCutaneous three times a day before meals  insulin lispro (ADMELOG) corrective regimen sliding scale   SubCutaneous at bedtime  levothyroxine 25 MICROGram(s) Oral daily    dextrose 5%. 1000 milliLiter(s) IV Continuous <Continuous>  dextrose 5%. 1000 milliLiter(s) IV Continuous <Continuous>      PAST MEDICAL & SURGICAL HISTORY:  HTN (hypertension)    CAD (coronary artery disease)    Diabetes    Hyperlipidemia, unspecified hyperlipidemia type    CHF (congestive heart failure)    BPH (benign prostatic hyperplasia)    S/P CABG (coronary artery bypass graft)    S/P appendectomy    HOME MEDS:  	levothyroxine 25 mcg (0.025 mg) oral tablet: Last Dose Taken:  , 1 tab(s) orally once a day  · 	spironolactone 25 mg oral tablet: Last Dose Taken:  , 1 tab(s) orally once a day  · 	budesonide-formoterol 160 mcg-4.5 mcg/inh inhalation aerosol: Last Dose Taken:  ,  inhaled   · 	albuterol 90 mcg/inh inhalation aerosol: Last Dose Taken:  , 2 puff(s) inhaled every 6 hours, As needed, Shortness of Breath and/or Wheezing  · 	carvedilol 3.125 mg oral tablet: Last Dose Taken:  , 1 tab(s) orally every 12 hours  · 	senna oral tablet: Last Dose Taken:  , 2 tab(s) orally once a day (at bedtime)  · 	aspirin 81 mg oral delayed release tablet: Last Dose Taken:  , 1 tab(s) orally once a day  · 	Plavix 75 mg oral tablet: Last Dose Taken:  , 1 tab(s) orally once a day  · 	glimepiride 2 mg oral tablet: Last Dose Taken:  , 1 tab(s) orally once a day  · 	tamsulosin 0.4 mg oral capsule: Last Dose Taken:  , 1 cap(s) orally once a day  · 	docusate sodium 100 mg oral capsule: Last Dose Taken:  , 1 cap(s) orally 3 times a day  · 	ezetimibe 10 mg oral tablet: Last Dose Taken:  , 1 tab(s) orally once a day  · 	isosorbide mononitrate 30 mg oral tablet, extended release: Last Dose Taken:  , 1 tab(s) orally once a day (in the morning)  · 	rosuvastatin 5 mg oral tablet: Last Dose Taken:  , 1 tab(s) orally once a day  · 	hydrALAZINE 50 mg oral tablet: Last Dose Taken:  , 1 tab(s) orally 3 times a day  · 	torsemide 20 mg oral tablet: Last Dose Taken:  , 1 tab(s) orally 2 times a day    FAMILY HISTORY:      SOCIAL HISTORY:    [ ] Non-smoker  [ ] Smoker  [ ] Alcohol      REVIEW OF SYSTEMS:  See HPI. Otherwise, 10 point ROS done and otherwise negative.      T(C): 36.9 (10-18-21 @ 12:59), Max: 37.9 (10-18-21 @ 00:50)  HR: 88 (10-18-21 @ 12:59) (88 - 112)  BP: 106/72 (10-18-21 @ 12:59) (103/79 - 131/103)  RR: 20 (10-18-21 @ 12:59) (15 - 28)  SpO2: 100% (10-18-21 @ 12:59) (100% - 100%)  Wt(kg): --  I&O's Summary      Physical Exam:  General: NAD  Cardiovascular: Normal S1 S2, No JVD, No murmurs, No edema  Respiratory: decreased breath sounds  Gastrointestinal:  Soft, Non-tender, + BS	  Skin: warm and dry, No rashes, No ecchymoses, No cyanosis	  Extremities:  No clubbing, cyanosis or edema  Vascular: Peripheral pulses palpable 2+ bilaterally    LABS:	   	    CBC Full  -  ( 18 Oct 2021 07:34 )  WBC Count : 13.46 K/uL  Hemoglobin : 9.2 g/dL  Hematocrit : 28.3 %  Platelet Count - Automated : 220 K/uL  Mean Cell Volume : 95.0 fL  Mean Cell Hemoglobin : 30.9 pg  Mean Cell Hemoglobin Concentration : 32.5 gm/dL  Auto Neutrophil # : 12.57 K/uL  Auto Lymphocyte # : 0.70 K/uL  Auto Monocyte # : 0.10 K/uL  Auto Eosinophil # : 0.02 K/uL  Auto Basophil # : 0.02 K/uL  Auto Neutrophil % : 93.5 %  Auto Lymphocyte % : 5.2 %  Auto Monocyte % : 0.7 %  Auto Eosinophil % : 0.1 %  Auto Basophil % : 0.1 %    10-18    131<L>  |  94<L>  |  38<H>  ----------------------------<  215<H>  4.1   |  21<L>  |  2.55<H>  10-18    131<L>  |  94<L>  |  36<H>  ----------------------------<  148<H>  4.0   |  23  |  2.57<H>    Ca    8.6      18 Oct 2021 07:34  Ca    9.1      18 Oct 2021 01:38  Phos  4.1     10-18  Mg     1.70     10-18    TPro  6.3  /  Alb  3.4  /  TBili  0.4  /  DBili  x   /  AST  20  /  ALT  23  /  AlkPhos  145<H>  10-18      proBNP: Serum Pro-Brain Natriuretic Peptide: 8177 pg/mL (10-18 @ 01:38)    < from: TTE with Doppler (w/Cont) (09.13.21 @ 09:11) >    Patient name: INGE CARLOS  YOB: 1955   Age: 66 (M)   MR#: [id number]  Study Date: 9/13/2021  [facility]  Study quality: Technically good  Referring Physician: Juan Slaughter MD  Blood Pressure: 107/54 mmHg  Height: 175 cm  Weight: 73 kg  BSA: 1.9 m2  ------------------------------------------------------------------------  PROCEDURE: Transthoracic echocardiogram with 2-D, M-Mode  and complete spectral and color flow Doppler.  Intravenous ultrasoundenhancing agent was administered for  improved left ventricular endocardial border definition.  Following the intravenous injection of ultrasound enhancing  agent, harmonic imaging was performed.  INDICATION: Cardiomyopathy, unspecified (I42.9)  ------------------------------------------------------------------------  DIMENSIONS:  Dimensions:     Normal Values:  LA:     4.2 cm    2.0 - 4.0 cm  Ao:     3.7 cm    2.0 - 3.8 cm  SEPTUM: 0.8 cm    0.6 - 1.2 cm  PWT:    1.0 cm    0.6 - 1.1 cm  LVIDd:  6.0 cm    3.0 - 5.6 cm  LVIDs:  5.4 cm    1.8 - 4.0 cm  Derived Variables:  LVMI: 114 g/m2  RWT: 0.33  Fractional short: 10 %  Ejection Fraction (Teicholtz): 21 %  ------------------------------------------------------------------------  OBSERVATIONS:  Mitral Valve: Normal mitral valve. Mild mitral  regurgitation.  Aortic Root: Normal aortic root.  Aortic Valve: Normal trileaflet aortic valve. Minimal  aortic regurgitation.  Left Atrium: Mildly dilated left atrium.  LA volume index =  41 cc/m2.  Left Ventricle: Endocardial visualization enhanced with  intravenous injection of echo contrast (Definity). Severe  global left ventricular systolic dysfunction. Mild left  ventricular enlargement. Moderate diastolic dysfunction  (Stage II).  Right Heart: Normal right atrium. Right ventricular  enlargement with normal right ventricular systolic  function. Normal tricuspid valve. Mild tricuspid  regurgitation. Normal pulmonic valve.  Pericardium/PleuraNormal pericardium with no pericardial  effusion.  ------------------------------------------------------------------------  CONCLUSIONS:  1. Normal trileaflet aortic valve. Minimal aortic  regurgitation.  2. Mildly dilated left atrium.  LA volume index = 41 cc/m2.  3. Mild left ventricular enlargement.  4. Endocardial visualization enhanced with intravenous  injection of echo contrast (Definity). Severe global left  ventricular systolic dysfunction.  5. Moderate diastolic dysfunction (Stage II).  6. Right ventricular enlargement with normal right  ventricular systolic function.  ------------------------------------------------------------------------  Confirmed on  9/13/2021 - 14:27:35 by JEREMY Torrez  ------------------------------------------------    < end of copied text >   Date of Admission:  10/18/21  CHIEF COMPLAINT:  shortness of breath  HISTORY OF PRESENT ILLNESS:  66M CABG on ASA/Plavix, HFrEF (21% 9/20201 TTE) s/p AICD on 2LNC at home, CKD, CVA with mild left deficits, asthma/COPD (on 2L NC), recurrent PNA infections presents with acute worsening SOB x 1 day. Patient was having 4-5 days of fever, pleuritic chest pain, cough, SOB, wheezing. Patient was prescribed 7 day course of PO Cefuroxime by PCP which patient took for 3-4 days with minimal relief of respiratory symptoms. On day of admission, patient felt extremely weak with sensation of something stuck in his throat, worsening SOB and b/l LE and UE swelling. At that point, daughter called EMS who brought patient to ED. Per EMS, was placed on NRB satting 92%, then transitioned to CPAP with improvement in hypoxia and respiratory distress. In ED, patient placed on BiPAP with improvement in respiratory symptoms    Allergies    No Known Allergies    Intolerances    	    MEDICATIONS:  aspirin enteric coated 81 milliGRAM(s) Oral daily  carvedilol 3.125 milliGRAM(s) Oral every 12 hours  clopidogrel Tablet 75 milliGRAM(s) Oral daily  furosemide   Injectable 80 milliGRAM(s) IV Push daily  heparin   Injectable 5000 Unit(s) SubCutaneous every 12 hours  isosorbide   mononitrate ER Tablet (IMDUR) 30 milliGRAM(s) Oral daily  tamsulosin 0.4 milliGRAM(s) Oral at bedtime    azithromycin  IVPB      piperacillin/tazobactam IVPB.. 3.375 Gram(s) IV Intermittent every 8 hours    albuterol/ipratropium for Nebulization 3 milliLiter(s) Nebulizer every 6 hours  budesonide 160 MICROgram(s)/formoterol 4.5 MICROgram(s) Inhaler 2 Puff(s) Inhalation two times a day    acetaminophen   Tablet .. 650 milliGRAM(s) Oral every 6 hours PRN  melatonin 3 milliGRAM(s) Oral at bedtime PRN  ondansetron Injectable 4 milliGRAM(s) IV Push every 8 hours PRN    aluminum hydroxide/magnesium hydroxide/simethicone Suspension 30 milliLiter(s) Oral every 4 hours PRN  senna 2 Tablet(s) Oral at bedtime    atorvastatin 20 milliGRAM(s) Oral at bedtime  dextrose 40% Gel 15 Gram(s) Oral once  dextrose 50% Injectable 25 Gram(s) IV Push once  dextrose 50% Injectable 12.5 Gram(s) IV Push once  dextrose 50% Injectable 25 Gram(s) IV Push once  glucagon  Injectable 1 milliGRAM(s) IntraMuscular once  insulin lispro (ADMELOG) corrective regimen sliding scale   SubCutaneous three times a day before meals  insulin lispro (ADMELOG) corrective regimen sliding scale   SubCutaneous at bedtime  levothyroxine 25 MICROGram(s) Oral daily    dextrose 5%. 1000 milliLiter(s) IV Continuous <Continuous>  dextrose 5%. 1000 milliLiter(s) IV Continuous <Continuous>      PAST MEDICAL & SURGICAL HISTORY:  HTN (hypertension)    CAD (coronary artery disease)    Diabetes    Hyperlipidemia, unspecified hyperlipidemia type    CHF (congestive heart failure)    BPH (benign prostatic hyperplasia)    S/P CABG (coronary artery bypass graft)    S/P appendectomy    HOME MEDS:  	levothyroxine 25 mcg (0.025 mg) oral tablet: Last Dose Taken:  , 1 tab(s) orally once a day  · 	spironolactone 25 mg oral tablet: Last Dose Taken:  , 1 tab(s) orally once a day  · 	budesonide-formoterol 160 mcg-4.5 mcg/inh inhalation aerosol: Last Dose Taken:  ,  inhaled   · 	albuterol 90 mcg/inh inhalation aerosol: Last Dose Taken:  , 2 puff(s) inhaled every 6 hours, As needed, Shortness of Breath and/or Wheezing  · 	carvedilol 3.125 mg oral tablet: Last Dose Taken:  , 1 tab(s) orally every 12 hours  · 	senna oral tablet: Last Dose Taken:  , 2 tab(s) orally once a day (at bedtime)  · 	aspirin 81 mg oral delayed release tablet: Last Dose Taken:  , 1 tab(s) orally once a day  · 	Plavix 75 mg oral tablet: Last Dose Taken:  , 1 tab(s) orally once a day  · 	glimepiride 2 mg oral tablet: Last Dose Taken:  , 1 tab(s) orally once a day  · 	tamsulosin 0.4 mg oral capsule: Last Dose Taken:  , 1 cap(s) orally once a day  · 	docusate sodium 100 mg oral capsule: Last Dose Taken:  , 1 cap(s) orally 3 times a day  · 	ezetimibe 10 mg oral tablet: Last Dose Taken:  , 1 tab(s) orally once a day  · 	isosorbide mononitrate 30 mg oral tablet, extended release: Last Dose Taken:  , 1 tab(s) orally once a day (in the morning)  · 	rosuvastatin 5 mg oral tablet: Last Dose Taken:  , 1 tab(s) orally once a day  · 	hydrALAZINE 50 mg oral tablet: Last Dose Taken:  , 1 tab(s) orally 3 times a day  · 	torsemide 20 mg oral tablet: Last Dose Taken:  , 1 tab(s) orally 2 times a day    FAMILY HISTORY:      SOCIAL HISTORY:    [ ] Non-smoker  [ ] Smoker  [ ] Alcohol      REVIEW OF SYSTEMS:  See HPI. Otherwise, 10 point ROS done and otherwise negative.      T(C): 36.9 (10-18-21 @ 12:59), Max: 37.9 (10-18-21 @ 00:50)  HR: 88 (10-18-21 @ 12:59) (88 - 112)  BP: 106/72 (10-18-21 @ 12:59) (103/79 - 131/103)  RR: 20 (10-18-21 @ 12:59) (15 - 28)  SpO2: 100% (10-18-21 @ 12:59) (100% - 100%)  Wt(kg): --  I&O's Summary      Physical Exam:  General: distress due to breathing  Cardiovascular: Normal S1 S2, No JVD, No murmurs, No edema  Respiratory: wheeze  Gastrointestinal:  Soft, Non-tender, + BS	  Skin: warm and dry, No rashes, No ecchymoses, No cyanosis	  Extremities:  No clubbing, cyanosis or edema  Vascular: Peripheral pulses palpable 2+ bilaterally    LABS:	   	    CBC Full  -  ( 18 Oct 2021 07:34 )  WBC Count : 13.46 K/uL  Hemoglobin : 9.2 g/dL  Hematocrit : 28.3 %  Platelet Count - Automated : 220 K/uL  Mean Cell Volume : 95.0 fL  Mean Cell Hemoglobin : 30.9 pg  Mean Cell Hemoglobin Concentration : 32.5 gm/dL  Auto Neutrophil # : 12.57 K/uL  Auto Lymphocyte # : 0.70 K/uL  Auto Monocyte # : 0.10 K/uL  Auto Eosinophil # : 0.02 K/uL  Auto Basophil # : 0.02 K/uL  Auto Neutrophil % : 93.5 %  Auto Lymphocyte % : 5.2 %  Auto Monocyte % : 0.7 %  Auto Eosinophil % : 0.1 %  Auto Basophil % : 0.1 %    10-18    131<L>  |  94<L>  |  38<H>  ----------------------------<  215<H>  4.1   |  21<L>  |  2.55<H>  10-18    131<L>  |  94<L>  |  36<H>  ----------------------------<  148<H>  4.0   |  23  |  2.57<H>    Ca    8.6      18 Oct 2021 07:34  Ca    9.1      18 Oct 2021 01:38  Phos  4.1     10-18  Mg     1.70     10-18    TPro  6.3  /  Alb  3.4  /  TBili  0.4  /  DBili  x   /  AST  20  /  ALT  23  /  AlkPhos  145<H>  10-18      proBNP: Serum Pro-Brain Natriuretic Peptide: 8177 pg/mL (10-18 @ 01:38)    < from: TTE with Doppler (w/Cont) (09.13.21 @ 09:11) >    Patient name: INGE CARLOS  YOB: 1955   Age: 66 (M)   MR#: [id number]  Study Date: 9/13/2021  [facility]  Study quality: Technically good  Referring Physician: Juan Slaughter MD  Blood Pressure: 107/54 mmHg  Height: 175 cm  Weight: 73 kg  BSA: 1.9 m2  ------------------------------------------------------------------------  PROCEDURE: Transthoracic echocardiogram with 2-D, M-Mode  and complete spectral and color flow Doppler.  Intravenous ultrasoundenhancing agent was administered for  improved left ventricular endocardial border definition.  Following the intravenous injection of ultrasound enhancing  agent, harmonic imaging was performed.  INDICATION: Cardiomyopathy, unspecified (I42.9)  ------------------------------------------------------------------------  DIMENSIONS:  Dimensions:     Normal Values:  LA:     4.2 cm    2.0 - 4.0 cm  Ao:     3.7 cm    2.0 - 3.8 cm  SEPTUM: 0.8 cm    0.6 - 1.2 cm  PWT:    1.0 cm    0.6 - 1.1 cm  LVIDd:  6.0 cm    3.0 - 5.6 cm  LVIDs:  5.4 cm    1.8 - 4.0 cm  Derived Variables:  LVMI: 114 g/m2  RWT: 0.33  Fractional short: 10 %  Ejection Fraction (Teicholtz): 21 %  ------------------------------------------------------------------------  OBSERVATIONS:  Mitral Valve: Normal mitral valve. Mild mitral  regurgitation.  Aortic Root: Normal aortic root.  Aortic Valve: Normal trileaflet aortic valve. Minimal  aortic regurgitation.  Left Atrium: Mildly dilated left atrium.  LA volume index =  41 cc/m2.  Left Ventricle: Endocardial visualization enhanced with  intravenous injection of echo contrast (Definity). Severe  global left ventricular systolic dysfunction. Mild left  ventricular enlargement. Moderate diastolic dysfunction  (Stage II).  Right Heart: Normal right atrium. Right ventricular  enlargement with normal right ventricular systolic  function. Normal tricuspid valve. Mild tricuspid  regurgitation. Normal pulmonic valve.  Pericardium/PleuraNormal pericardium with no pericardial  effusion.  ------------------------------------------------------------------------  CONCLUSIONS:  1. Normal trileaflet aortic valve. Minimal aortic  regurgitation.  2. Mildly dilated left atrium.  LA volume index = 41 cc/m2.  3. Mild left ventricular enlargement.  4. Endocardial visualization enhanced with intravenous  injection of echo contrast (Definity). Severe global left  ventricular systolic dysfunction.  5. Moderate diastolic dysfunction (Stage II).  6. Right ventricular enlargement with normal right  ventricular systolic function.  ------------------------------------------------------------------------  Confirmed on  9/13/2021 - 14:27:35 by Shena Inman M.D. RPVI  ------------------------------------------------    < end of copied text >

## 2021-10-18 NOTE — PROGRESS NOTE ADULT - SUBJECTIVE AND OBJECTIVE BOX
Pt using accessory muscles to breath on exam in 10L nonrebreather. Able to complete sentences. Oriented x 3, however significantly dyspneic while speaking. States he has had no recent changes to his medications and was adherent with the antibiotics started as an outpatient for COPD exac (vs PNA?) as well as adherent with his home torsemide of 10 mg BID. Reports he has not had significant urine output while being in the ED. Was on BIPAP overnight.      Pt using accessory muscles to breath on exam in 10L nonrebreather. Able to complete sentences. Oriented x 3, however significantly dyspneic while speaking. States he has had no recent changes to his medications and was adherent with the antibiotics started as an outpatient for COPD exac (vs PNA?) as well as adherent with his home torsemide of 10 mg BID. Reports he has not had significant urine output while being in the ED. Was on BIPAP overnight. He is complaining of chest tightness when he swallows. No overt coughing when he tries to eat however.     11am 10/18: Attempted to reach patient's daughter Ketty Lewis at [phone] however no answer and voicemail was full. Unclear exactly how much torsemide patient was taking at home- he reports 20 mg BID, however there is a discrepancy between what was prescribed (100mg tablets?).     Vital Signs Last 24 Hrs  T(C): 36.7 (18 Oct 2021 11:24), Max: 37.9 (18 Oct 2021 00:50)  T(F): 98.1 (18 Oct 2021 11:24), Max: 100.3 (18 Oct 2021 00:50)  HR: 91 (18 Oct 2021 11:24) (91 - 112)  BP: 113/72 (18 Oct 2021 11:24) (103/79 - 131/103)  BP(mean): 94 (18 Oct 2021 01:06) (94 - 94)  RR: 24 (18 Oct 2021 11:24) (15 - 28)  SpO2: 100% (18 Oct 2021 11:24) (100% - 100%)    CONSTITUTIONAL: Well-groomed, in mod distress on 10L nonrebreather   EYES: No conjunctival or scleral injection, non-icteric; PERRLA and symmetric  ENMT: No external nasal lesions; nasal mucosa not inflamed;  no pharyngeal injection or exudates, oral mucosa dry, hoarse speech  NECK: Trachea midline without palpable neck mass; thyroid not enlarged and non-tender, JVD visualized  RESPIRATORY: Using accessory muscles to breath on 10L, crackles auscletated in anteriorly lung fields diffusely and faint exp wheeze diffusely.  CARDIOVASCULAR: +S1S2, RRR, no M/G/R; no carotid bruits; pedal pulses full and symmetric; warm extremities; RLE and RUE asymmetric and enlarged compared to L side   GASTROINTESTINAL: No palpable masses or tenderness, +BS throughout, no rebound/guarding; protuberant abdomen  MUSCULOSKELETAL: No digital clubbing or cyanosis; slightly diminshed strength of RUE and RLE (residual deficits from prior CVA per pt)  SKIN: No rashes or ulcers noted; no subcutaneous nodules or induration palpable  NEUROLOGIC: CN II-XII grossly intact  PSYCHIATRIC: A+O x 3; mood and affect appropriate; appropriate insight and judgment        LABS:                        9.2    13.46 )-----------( 220      ( 18 Oct 2021 07:34 )             28.3     10-18    131<L>  |  94<L>  |  38<H>  ----------------------------<  215<H>  4.1   |  21<L>  |  2.55<H>    Ca    8.6      18 Oct 2021 07:34  Phos  4.1     10-18  Mg     1.70     10-18    TPro  6.3  /  Alb  3.4  /  TBili  0.4  /  DBili  x   /  AST  20  /  ALT  23  /  AlkPhos  145<H>  10-18          Urinalysis Basic - ( 18 Oct 2021 07:34 )    Color: Colorless / Appearance: Clear / S.009 / pH: x  Gluc: x / Ketone: Negative  / Bili: Negative / Urobili: <2 mg/dL   Blood: x / Protein: Trace / Nitrite: Negative   Leuk Esterase: Negative / RBC: 0 /HPF / WBC 0 /HPF   Sq Epi: x / Non Sq Epi: Occasional / Bacteria: Negative    Troponin peaked at 91.    Blood Gas Profile - Venous (10.18.21 @ 11:05)   pH, Venous: 7.28   pCO2, Venous: 56 mmHg   pO2, Venous: 43 mmHg   HCO3, Venous: 26 mmol/L   Base Excess, Venous: -1.0 mmol/L   Oxygen Saturation, Venous: 66.9 %   Total CO2, Venous: 28.0 mmol/L     RADIOLOGY & ADDITIONAL TESTS:    Imaging Personally Reviewed:  CXR w signifiant pulmonary edema. CT chest read pending, however ground glass opacities present diffusely and worse from Sept CT chest. No overt lobar consolidation. Small pleural effusion on L side.

## 2021-10-18 NOTE — ED PROVIDER NOTE - CLINICAL SUMMARY MEDICAL DECISION MAKING FREE TEXT BOX
Adarsh, PGY3 - 66M pmh includes CABG, HFrEF (15-20% per outside records) s/p AICD on 2LNC at home, CKD, CVA, CHF/COPD, reportedly chronic pnas last treated 1 wk ago, p/w SOB x 1 day. Ddx includes CHF exacerbation, COPD exacerbation, pneumonia, ACS. Plan: labs, cxr, steroids, duonebs, lasix, reeval, TBA

## 2021-10-18 NOTE — H&P ADULT - PROBLEM SELECTOR PLAN 5
On glimepiride 2 mg at home. A1C 6.8 on last admission  -Low dose ISS  -FSG q meals and bedtime 91--> 90. Troponin leak likely in setting of demand ischemia and CKD. Denies active chest pain or worsening of SOB.   -Telemetry monitoring  -Will hold hydralazine at this time due to soft BPs

## 2021-10-18 NOTE — H&P ADULT - NSHPPHYSICALEXAM_GEN_ALL_CORE
PHYSICAL EXAM:  GENERAL: NAD male, on BiPAP  HEAD: Atraumatic, Normocephalic  EYES: EOMI, PERRL, conjunctiva and sclera clear  ENMT: on BiPAP  NECK: Supple, No JVD  CHEST/LUNG: Coarse breath sounds b/l  HEART: Regular rate and rhythm; S1 and S2; No murmurs, rubs, or gallops  ABDOMEN: Soft, Nontender, Nondistended: Bowel sounds present  EXTREMITIES: Palpable Peripheral Pulses, No clubbing, cyanosis, or edema  LYMPH: No lymphadenopathy noted  SKIN: No rashes or lesions  MSK: no joint swelling or erythema  NEURO: AOx3, non focal exam

## 2021-10-18 NOTE — H&P ADULT - NSHPLABSRESULTS_GEN_ALL_CORE
9.5    15.53 )-----------( 243      ( 18 Oct 2021 01:38 )             29.5     10-18    131<L>  |  94<L>  |  36<H>  ----------------------------<  148<H>  4.0   |  23  |  2.57<H>    Ca    9.1      18 Oct 2021 01:38    TPro  6.3  /  Alb  3.4  /  TBili  0.4  /  DBili  x   /  AST  20  /  ALT  23  /  AlkPhos  145<H>  10-18    Troponin T, High Sensitivity Result: 90  Troponin T, High Sensitivity Result: 91  Serum Pro-Brain Natriuretic Peptide: 8177    Blood Gas Profile - Venous (10.18.21 @ 01:38)   pH, Venous: 7.29   pCO2, Venous: 49 mmHg   pO2, Venous: 43 mmHg   HCO3, Venous: 24 mmol/L   Base Excess, Venous: -3.1 mmol/L   Oxygen Saturation, Venous: 70.2 %   Total CO2, Venous: 25.1 mmol/L   Blood Gas Venous - Lactate: 1.1 mmol/L (10.18.21 @ 01:38)    Xray Chest 1 View- PORTABLE-Urgent (10.18.21 @ 01:18)   FINDINGS:    Cardiac silhouette is normal. Left chest wall AICD CABG.    Nonspecific patchy bilateral opacities. No pneumothorax    Unremarkable skeletal structures.    IMPRESSION:    Nonspecific patchy bilateral opacities

## 2021-10-18 NOTE — SWALLOW BEDSIDE ASSESSMENT ADULT - ORAL PHASE
Within functional limits For mechanical soft solid though cleared with liquid wash/Within functional limits/Palatal stasis/Lingual stasis

## 2021-10-18 NOTE — ED PROVIDER NOTE - OBJECTIVE STATEMENT
66M CABG, HFrEF (15-20% per outside records) s/p AICD on 2LNC at home, CKD, CVA p/w SOB today, a/w some chest discomfort (pt points towards throat). As per EMS, was placed on NRB satting 92%, then transitioned to CPAP with improvement in hypoxia and respiratory distress. Pt reportedly with h/o "chronic pneumonias," just completed course of abx last week.  Also reportedly with h/o asthma/COPD, initially had some wheezing on EMS exam.     PCP Kelvin Schmidt 66M CABG, HFrEF (15-20% per outside records) s/p AICD on 2LNC at home, CKD, CVA p/w SOB today, a/w some chest discomfort (pt points towards throat). As per EMS, was placed on NRB satting 92%, then transitioned to CPAP with improvement in hypoxia and respiratory distress. Pt reportedly with h/o "chronic pneumonias," just started a course of abx last week.  Also reportedly with h/o asthma/COPD, initially had some wheezing on EMS exam.     PCP Kelvin Schmidt 66M CABG on ASA/Plavix, HFrEF (15-20% per outside records) s/p AICD on 2LNC at home, CKD, CVA, asthma/COPD p/w SOB today, a/w some chest discomfort (pt points towards throat). As per EMS, was placed on NRB satting 92%, then transitioned to CPAP with improvement in hypoxia and respiratory distress. Pt reportedly with h/o "chronic pneumonias," just started a course of abx last week.  Also reportedly with h/o asthma/COPD, initially had some wheezing on EMS exam.     Further collateral later obtained from daughter after initial evalulation. Reports fever x 3-4 days (100 degrees), was prescribed Cefuroxime, taking for 3-4 days. Today pt wasn't feeling "overall pain," also felt some CP described as "food stuck in the esophagus." SOB and leg swelling x 2 days. Uses home O2 nightly (unsure how much) due to h/o CHF and multiple recent hospital admission. Vaccinated for COVID. Daughter states pt has been on BIPAP before.    PCP Kelvin Schmidt 66M CABG on ASA/Plavix, HFrEF (15-20% per outside records) s/p AICD on 2LNC at home, CKD, CVA, asthma/COPD p/w SOB today, a/w some chest discomfort (pt points towards throat). As per EMS, was placed on NRB satting 92%, then transitioned to CPAP with improvement in hypoxia and respiratory distress. Pt reportedly with h/o "chronic pneumonias," just started a course of abx last week.  Also reportedly with h/o asthma/COPD, initially had some wheezing on EMS exam.     Further collateral later obtained from daughter after initial evaluation. Reports fever x 3-4 days (100 degrees), was prescribed Cefuroxime, taking for 3-4 days. Today pt was feeling "overall pain," also felt some CP described as "food stuck in the esophagus." SOB and leg swelling x 2 days. Uses home O2 nightly (unsure how much) due to h/o CHF and multiple recent hospital admission. Vaccinated for COVID. Daughter states pt has been on BIPAP before.    PCP Kelvin Schmidt

## 2021-10-18 NOTE — H&P ADULT - ATTENDING COMMENTS
65 yo M with PMhx of CAD, HFrEF, HTN, CKD 4, DMII, and hypothyroidism presented to the ED with worsening SOB, admitted for acute hypoxic respiratory failure and sepsis 2/2 PNA.     #Acute hypoxic respiratory failure   -Multifactorial in etiology, including COPD exacerbation, possible PNA, and CHF   -C/w zosyn,vanco and azithromycin. F/u with MRSA PCR, sputum culture, and legionella antigen   -Pt also has fever, cough, and worsening, concerning for COPD exacerbation. C/w steroid 40 mg x4 days   -S/p lasix, monitor off diuretics for now as pt euvolemic on exam   -aspiration precaution   -Wean off BiPAP     #Sepsis   low grade fever, tachycardia, and leukocytosis with opacities on CXR, concerning for PNA   -C/w broad abx as above. F/u with MRSA PCR, sputum culture, and legionella antigen   -COVID/RVP were negative   -F/u with bcx  -De-escalate abx based on cultures

## 2021-10-18 NOTE — PROGRESS NOTE ADULT - PROBLEM SELECTOR PLAN 4
Normocytic anemia on labs  F/u iron studies  F/u B12, folate -CKD Stage 4; Cr upon d/c at 2.57, continue to monitor/trend, In 2018, patient had Cr 2.5-3.1  - avoid nephrotoxins, renally dose meds

## 2021-10-18 NOTE — ED PROVIDER NOTE - PHYSICAL EXAMINATION
I have reviewed the triage vital signs.  Const: AAOx3, in NAD  Eyes: no conjunctival injection  HENT: NCAT, Neck supple, oral mucosa moist  CV: RRR, +S1, S2  Resp: moderate respiratory distress on CPAP, bibasilar rales  GI: Abdomen soft, NTND, no guarding  Extremities: Mild peripheral edema,  2+ radial and DP pulses  Skin: Warm, well perfused, no rash  MSK: No gross deformities appreciated  Neuro: No focal sensory or motor deficits  Psych: Appropriate mood and affect

## 2021-10-18 NOTE — H&P ADULT - NSHPREVIEWOFSYSTEMS_GEN_ALL_CORE
REVIEW OF SYSTEMS:    CONSTITUTIONAL: +Generalized weakness, fevers, chills, fatigue. No night sweats, chills, weight loss, loss of appetite  HEENT: No headache, visual changes, hearing changes, dysphagia or odynophagia    NECK: No neck pain or stiffness  RESPIRATORY: + SOB, cough, wheezing. No hemoptysis or sputum production   CARDIOVASCULAR: + Chest pain. No palpitations, orthopnea  GASTROINTESTINAL: No abdominal pain, nausea, vomiting, hematemesis, diarrhea or constipation. No melena or hematochezia.  GENITOURINARY: No dysuria, frequency or hematuria  NEUROLOGICAL: No numbness, weakness, paresthesias  MSK: No joint swelling or pain  HEME: No easy bruising, bleeding or LAD  SKIN: No itching, rashes  PSYCH: No mood changes, no SI/HI

## 2021-10-18 NOTE — PROGRESS NOTE ADULT - PROBLEM SELECTOR PLAN 6
-CKD Stage 4; Cr upon d/c at 2.57, continue to monitor/trend, In 2018, patient had Cr 2.5-3.1  - avoid nephrotoxins, renally dose meds DVT ppx: HSQ  Diet: DASH/CC  Dispo: PT

## 2021-10-18 NOTE — H&P ADULT - PROBLEM SELECTOR PLAN 4
-CKD Stage 4; Cr upon d/c at 2.57, continue to monitor/trend, In 2018, patient had Cr 2.5-3.1  - avoid nephrotoxins, renally dose meds F/u iron studies  F/u B12, folate Normocytic anemia on labs  F/u iron studies  F/u B12, folate

## 2021-10-18 NOTE — SWALLOW BEDSIDE ASSESSMENT ADULT - ADDITIONAL RECOMMENDATIONS
1. ENT Consult is recommended at MD's discretion given patient's harsh vocal quality. 2. Monitor respiratory status closely as patient may be at risk for aspiration due to swallowing/breathing coordination due to high oxygen requirement.   3. This department to follow up as schedule permits.

## 2021-10-18 NOTE — H&P ADULT - HISTORY OF PRESENT ILLNESS
66M CABG on ASA/Plavix, HFrEF (15-20% per outside records) s/p AICD on 2LNC at home, CKD, CVA, asthma/COPD p/w SOB today, a/w some chest discomfort (pt points towards throat). As per EMS, was placed on NRB satting 92%, then transitioned to CPAP with improvement in hypoxia and respiratory distress. Pt reportedly with h/o "chronic pneumonias," just started a course of abx last week.  Also reportedly with h/o asthma/COPD, initially had some wheezing on EMS exam.     	Further collateral later obtained from daughter after initial evaluation. Reports fever x 3-4 days (100 degrees), was prescribed Cefuroxime, taking for 3-4 days. Today pt was feeling "overall pain," also felt some CP described as "food stuck in the esophagus." SOB and leg swelling x 2 days. Uses home O2 nightly (unsure how much) due to h/o CHF and multiple recent hospital admission. Vaccinated for COVID. Daughter states pt has been on BIPAP before.    	PCP Kelvin Schmidt 66M CABG on ASA/Plavix, HFrEF (21% 9/20201 TTE) s/p AICD on 2LNC at home, CKD, CVA with mild left deficits, asthma/COPD (on 2L NC), recurrent PNA infections presents with acute worsening SOB x 1 day. Patient was having 4-5 days of fever, pleuritic chest pain, cough, SOB, wheezing. Patient was prescribed 7 day course of PO Cefuroxime by PCP which patient took for 3-4 days with minimal relief of respiratory symptoms. On day of admission, patient felt extremely weak with sensation of something stuck in his throat, worsening SOB and b/l LE and UE swelling. At that point, daughter called EMS who brought patient to ED. Per EMS, was placed on NRB satting 92%, then transitioned to CPAP with improvement in hypoxia and respiratory distress. In ED, patient placed on BiPAP 66M CABG on ASA/Plavix, HFrEF (21% 9/20201 TTE) s/p AICD on 2LNC at home, CKD, CVA with mild left deficits, asthma/COPD (on 2L NC), recurrent PNA infections presents with acute worsening SOB x 1 day. Patient was having 4-5 days of fever, pleuritic chest pain, cough, SOB, wheezing. Patient was prescribed 7 day course of PO Cefuroxime by PCP which patient took for 3-4 days with minimal relief of respiratory symptoms. On day of admission, patient felt extremely weak with sensation of something stuck in his throat, worsening SOB and b/l LE and UE swelling. At that point, daughter called EMS who brought patient to ED. Per EMS, was placed on NRB satting 92%, then transitioned to CPAP with improvement in hypoxia and respiratory distress. In ED, patient placed on BiPAP with improvement in respiratory symptoms

## 2021-10-18 NOTE — PROGRESS NOTE ADULT - ASSESSMENT
66M w/CABG, HFrEF (15-20% per outside records) s/p AICD on 2LNC at home, COPD/asthma, CKD, CVA w/residual L-sided weakness, and gout, recently prescribed PO antibiotics for presumed PNA, coming in for generalized weakness, fevers and UE and LE extremity swelling.    66M w/CABG, HFrEF (15-20% per outside records) s/p AICD on 2LNC at home, COPD/asthma, CKD, CVA w/residual L-sided weakness, and gout, recently prescribed PO antibiotics for presumed PNA, coming in for generalized weakness, fevers and UE and LE extremity swelling. He is being treated for HFrEF exacerbation as well as recurrent PNA.

## 2021-10-18 NOTE — ED PROVIDER NOTE - ATTENDING CONTRIBUTION TO CARE
65 y/o M with h/o CAD s/p CABG, HFrEF (15-20% per outside records) s/p AICD on 2LNC at home, CKD, stroke w/ residual L sided weakness, and gout BIB EMS for respiratory distress.  Per EMS, pt started on abx last week by PMD for pneumonia.  Pt with progressively worsening SOB and today family noted labored breathing prompting EMS to be called.  On arrival, pt noted to be hypoxic despite NRB and subsequently started on CPAP with improvement in hypoxia and work of breathing.  Pt also endorsing substernal chest tightness, which he had reported to daughter as "different" from previous cardiac pain.  No fever, vomiting, leg pain or swelling.  Pt is sitting in stretcher, awake and alert, nontoxic.  Low grade fever, tachycardia, mild tachypnea with subcostal retractions.  CPAP transitioned to BIPAP at bedside.  Lungs with decreased air entry throughout, diffuse exp wheeze and bibasilar rales.  Cards nl S1/S2, tachy reg, no MRG.  Abd soft obese ntnd.  Trace pedal edema, no unilateral swelling or calf tenderness.  Plan for labs, incl cardiacs, blood gas, blood cx, cxr, will give duonebs and steroid given wheeze and decreased air entry with ?h/o asthma/copd per old records, diruese, broaden abx coverage for poss worsening pna.  Plan for admission.

## 2021-10-18 NOTE — PROGRESS NOTE ADULT - PROBLEM SELECTOR PLAN 7
On glimepiride 2 mg at home. A1C 6.8 on last admission  -Low dose ISS  -FSG q meals and bedtime - Pharmacy unable to verify exactly what dose of torsemide pt was taking. Attempted to reach [facility] in [place] however their "system is down" and cannot verify. Daughter unable to reach as above.

## 2021-10-18 NOTE — PROGRESS NOTE ADULT - PROBLEM SELECTOR PLAN 3
S/p IV Solumedrol.  -Will continue bronchodilators  -Will hold off on steroids given active infection Normocytic anemia on labs 2/2 iron deficiency.  - Consider IV iron supplementation once patient's PNA clears given HFrEF diagnosis.

## 2021-10-18 NOTE — H&P ADULT - PROBLEM SELECTOR PLAN 7
Will clarify if patient taking Lasix, Aldactone and Hydralazine in AM On glimepiride 2 mg at home. A1C 6.8 on last admission  -Low dose ISS  -FSG q meals and bedtime

## 2021-10-18 NOTE — H&P ADULT - PROBLEM SELECTOR PLAN 6
07-Nov-2019 DVT ppx: HSQ  Diet: DASH/CC  Dispo: PT -CKD Stage 4; Cr upon d/c at 2.57, continue to monitor/trend, In 2018, patient had Cr 2.5-3.1  - avoid nephrotoxins, renally dose meds

## 2021-10-18 NOTE — ED ADULT TRIAGE NOTE - CHIEF COMPLAINT QUOTE
Pt. brought directly to tx/ area # 10 by EMS. Pt. is on CPAP. Had respiratory distress secondary to pneumonia. NAD noted. Pt. was reported to be 84 % R/A at the home. Unable to get temperature in triage.

## 2021-10-18 NOTE — H&P ADULT - PROBLEM SELECTOR PLAN 1
Worsening leukocytosis in comparison to last admission. CXR non-specific b/l opacities. S/p IV Solumedrol,   -Patient was prescribed a 7 day course of cefuroxime but continued to spike fevers with non-productive cough  -CT Chest Non contrast to address b/l opacities  -IV Azithromycin and Ceftriaxone -H/o CAD s/p CABG 2014 HFrEF (15-20% per outside records) s/p AICD, CKD, CVA w/ residual L-sided   -pro-BNP 8177, up from 5957 on last admissin  -Cards consult appreciated  - strict I&Os, daily weights  - Will c/w home isordil, and coreg  - Per daughter, aldactone was discontinued but unclear if restarted. Will need to clarify in AM  - S/p IV Lasix 40 IV push. Will hold off on further torsemide for now.   - hold hydralazine for now due to soft BPs  - pt not on ACE/ARB potentially due to CKD  - Echo w/ global LV dysfunction, EF 21  - C/w ASA 81

## 2021-10-18 NOTE — ED ADULT NURSE NOTE - OBJECTIVE STATEMENT
break coverage RN - Pt received as notification to room 10, A&Ox3 Hx of CABG, HF s/p AICD on 2LNC at home, CKD, CVA p/w SOB today, a/w some chest discomfort. As per EMS, was placed on NRB satting 92%, then transitioned to CPAP with improvement in hypoxia and respiratory distress. Pt reportedly with h/o "chronic pneumonias," just completed course of abx last week.  Also reportedly with h/o asthma/COPD, initially had some wheezing on EMS exam. arrives tachypneic, placed on BiPAP upon arrival to room. Sinus tachycardiac on cardiac monitor. 18G IV placed to R AC. O2 sat on BiPAP 100%. Labs drawn and sent. Will continue to monitor.

## 2021-10-18 NOTE — H&P ADULT - PROBLEM SELECTOR PLAN 3
91--> 90. Troponin leak likely in setting of demand ischemia and CKD. Denies active chest pain or worsening of SOB.   -Telemetry monitoring  -Will hold hydralazine at this time due to soft BPs S/p IV Solumedrol.  -Will continue bronchodilators  -Will hold off on steroids given active infection

## 2021-10-18 NOTE — ED PROVIDER NOTE - NS ED MD DISPO ADMITTING SERVICE
MED Island Pedicle Flap-Requiring Vessel Identification Text: The defect edges were debeveled with a #15 scalpel blade.  Given the location of the defect, shape of the defect and the proximity to free margins an island pedicle advancement flap was deemed most appropriate.  Using a sterile surgical marker, an appropriate advancement flap was drawn, based on the axial vessel mentioned above, incorporating the defect, outlining the appropriate donor tissue and placing the expected incisions within the relaxed skin tension lines where possible.    The area thus outlined was incised deep to adipose tissue with a #15 scalpel blade.  The skin margins were undermined to an appropriate distance in all directions around the primary defect and laterally outward around the island pedicle utilizing iris scissors.  There was minimal undermining beneath the pedicle flap.

## 2021-10-18 NOTE — CONSULT NOTE ADULT - ASSESSMENT
66M CABG on ASA/Plavix, HFrEF (21% 9/20201 TTE) s/p AICD on 2LNC at home, CKD, CVA with mild left deficits, asthma/COPD (on 2L NC), recurrent PNA infections presents with acute worsening SOB x 1 day. Patient was having 4-5 days of fever, pleuritic chest pain, cough, SOB, wheezing. Patient was prescribed 7 day course of PO Cefuroxime by PCP which patient took for 3-4 days with minimal relief of respiratory symptoms. On day of admission, patient felt extremely weak with sensation of something stuck in his throat, worsening SOB and b/l LE and UE swelling. At that point, daughter called EMS who brought patient to ED. Per EMS, was placed on NRB satting 92%, then transitioned to CPAP with improvement in hypoxia and respiratory distress. In ED, patient placed on BiPAP with improvement in respiratory symptoms    PLAN:  Acute Decompensated HF in Setting of possible PNA   66M CABG on ASA/Plavix, HFrEF (21% 9/20201 TTE) s/p AICD on 2LNC at home, CKD, CVA with mild left deficits, asthma/COPD (on 2L NC), recurrent PNA infections presents with acute worsening SOB x 1 day. Patient was having 4-5 days of fever, pleuritic chest pain, cough, SOB, wheezing. Patient was prescribed 7 day course of PO Cefuroxime by PCP which patient took for 3-4 days with minimal relief of respiratory symptoms. On day of admission, patient felt extremely weak with sensation of something stuck in his throat, worsening SOB and b/l LE and UE swelling. At that point, daughter called EMS who brought patient to ED. Per EMS, was placed on NRB satting 92%, then transitioned to CPAP with improvement in hypoxia and respiratory distress. In ED, patient placed on BiPAP with improvement in respiratory symptoms    PLAN:  SOB  -patient does not appear grossly volume overloaded, likely his SOB is precipitated by underlying infection  -he remains SOB using accessory muscles to breath  -JVD is slightly elevated  -lungs are with wheezing throughout  -would continue his heart failure GDMT   -coreg 3.125 bid  -torsemide 2omg po bid  -isordil 30 po qd  -hydral 50 tid  -vero 25 qd  ECHO reviewed, EF is 21%, patient has increased workload 2/2 to underlying infection, serum pro BNP is elevated to >8000 and lactate is normal at 1.2  -he is requiring supplemental O2 , would titrate down the O2 to nasal canula as tolerated  -CXR reviewed    CAD/CABG:  -continue asa and plavix and crestor 5    COPD, now with PNA  -CXR reviewed  -would obtain a chest CT  -support oxygenation  -f/u with cultures  -continue to treat with antibiotics  -RVP panel and COVID are negative   66M CABG on ASA/Plavix, HFrEF (21% 9/20201 TTE) s/p AICD on 2LNC at home, CKD, CVA with mild left deficits, asthma/COPD (on 2L NC), recurrent PNA infections presents with acute worsening SOB x 1 day. Patient was having 4-5 days of fever, pleuritic chest pain, cough, SOB, wheezing. Patient was prescribed 7 day course of PO Cefuroxime by PCP which patient took for 3-4 days with minimal relief of respiratory symptoms. On day of admission, patient felt extremely weak with sensation of something stuck in his throat, worsening SOB and b/l LE and UE swelling. At that point, daughter called EMS who brought patient to ED. Per EMS, was placed on NRB satting 92%, then transitioned to CPAP with improvement in hypoxia and respiratory distress. In ED, patient placed on BiPAP with improvement in respiratory symptoms    PLAN:  SOB  -he remains SOB using accessory muscles to breath  -JVD is slightly elevated  -lungs are with wheezing throughout  -would continue his heart failure GDMT   -coreg 3.125 bid  -torsemide 2omg po bid  -isordil 30 po qd  -hydral 50 tid  -vero 25 qd  ECHO reviewed, EF is 21%, patient has increased workload 2/2 to underlying infection, serum pro BNP is elevated to >8000 and lactate is normal at 1.2  -he is requiring supplemental O2 , would titrate down the O2 to nasal canula as tolerated  -CXR reviewed    CAD/CABG:  -continue asa and plavix and crestor 5    COPD, now with PNA  -CXR reviewed  -would obtain a chest CT  -support oxygenation  -f/u with cultures  -continue to treat with antibiotics  -RVP panel and COVID are negative

## 2021-10-18 NOTE — H&P ADULT - PROBLEM SELECTOR PLAN 2
-H/o CAD s/p CABG 2014 HFrEF (15-20% per outside records) s/p AICD, CKD, CVA w/ residual L-sided   -pro-BNP 8177, up from 5957 on last admissin  -Cards consult appreciated  - strict I&Os, daily weights  - Will c/w home isordil, and coreg  - Per daughter, aldactone was discontinued but unclear if restarted. Will need to clarify in AM  - S/p IV Lasix 40 IV push Need to clarify Lasix dose in AM   - hold hydralazine for now due to soft BPs  - pt not on ACE/ARB potentially due to CKD  - Echo w/ global LV dysfunction, EF 21  - C/w ASA 81 Admitted with temp 100.3 rectal with tachypneic, found to septic 2/2 ADHF vs PNA vs COPD exacerbation.  -Plan above for HF  #PNA  - Worsening leukocytosis in comparison to last admission. CXR non-specific b/l opacities.  - Patient was prescribed a 7 day course of cefuroxime but continued to spike fevers with non-productive cough  - Continue Vanc and Zosyn. F/u procal and CT Chest  - Plan below for COPD exacerbation Admitted with temp 100.3 rectal with tachypneic, found to septic 2/2 ADHF vs PNA vs COPD exacerbation.  -Trial of BiPAP to NC in AM  #Plan above for HF  #Plan below for COPD exacerbation  #PNA  - Worsening leukocytosis in comparison to last admission. CXR non-specific b/l opacities.  - Patient was prescribed a 7 day course of cefuroxime but continued to spike fevers with non-productive cough  - Continue Vanc and Zosyn. F/u procal and CT Chest  - Plan below for COPD exacerbation Admitted with temp 100.3 rectal with tachypneic, found to septic 2/2 ADHF vs PNA vs COPD exacerbation.  -Trial of BiPAP to NC in AM  #Plan above for HF  #Plan below for COPD exacerbation  #PNA  - Worsening leukocytosis in comparison to last admission. CXR non-specific b/l opacities.  - Patient was prescribed a 7 day course of cefuroxime but continued to spike fevers with non-productive cough  - Continue Vanc by level, Zosyn (renally dosed) and azithromycin. F/u procal and CT Chest  -F/u MRSA swab  -F/u Legionella urine antigen  - Plan below for COPD exacerbation Admitted with temp 100.3 rectal with tachypneic, found to septic 2/2 ADHF vs PNA vs COPD exacerbation.  -Trial of BiPAP to NC in AM  #Plan above for HF  #Plan below for COPD exacerbation  #PNA  - Worsening leukocytosis in comparison to last admission. CXR non-specific b/l opacities.  - Patient was prescribed a 7 day course of cefuroxime but continued to spike fevers with non-productive cough  - Continue Vanc by level, Zosyn (renally dosed) and azithromycin. F/u procal and CT Chest  - F/u MRSA swab  - F/u Legionella urine antigen  - F/u UA/UCx

## 2021-10-18 NOTE — PROGRESS NOTE ADULT - PROBLEM SELECTOR PLAN 1
-H/o CAD s/p CABG 2014 HFrEF (15-20% per outside records) s/p AICD, CKD, CVA w/ residual L-sided   -pro-BNP 8177, up from 5957 on last admissin  -Cards consult appreciated  - strict I&Os, daily weights  - Will c/w home isordil, and coreg  - Per daughter, aldactone was discontinued but unclear if restarted. Will need to clarify in AM  - S/p IV Lasix 40 IV push. Will hold off on further torsemide for now.   - hold hydralazine for now due to soft BPs  - pt not on ACE/ARB potentially due to CKD  - Echo w/ global LV dysfunction, EF 21  - C/w ASA 81 -H/o CAD s/p CABG 2014 HFrEF (15-20% per outside records) s/p AICD, CKD, CVA w/ residual L-sided   -pro-BNP 8177, up from 5957 on last admission  - Troponins peaked   -Cards consult appreciated  - strict I&Os, daily standing weights  - Will c/w home isordil, and coreg  - Per daughter, aldactone was discontinued but unclear if restarted. Per DC summary, this was started at time of discharge in Sept 2021  - S/p IV Lasix 40 IV push, I/O data unavailable, but pt reports no significant urine output, CXR w significant pulm edema.   - F/u repeat VBG this am  - Redose lasix 80 mg IV once 10/12 given lack of response to 40 mg, and track UOP.  - hold hydralazine for now due to soft BPs  - pt not on ACE/ARB potentially due to CKD  - Echo w/ global LV dysfunction, EF 21  - C/w ASA 81

## 2021-10-19 LAB
ANION GAP SERPL CALC-SCNC: 13 MMOL/L — SIGNIFICANT CHANGE UP (ref 7–14)
BUN SERPL-MCNC: 39 MG/DL — HIGH (ref 7–23)
CALCIUM SERPL-MCNC: 9 MG/DL — SIGNIFICANT CHANGE UP (ref 8.4–10.5)
CHLORIDE SERPL-SCNC: 96 MMOL/L — LOW (ref 98–107)
CO2 SERPL-SCNC: 26 MMOL/L — SIGNIFICANT CHANGE UP (ref 22–31)
CREAT SERPL-MCNC: 2.59 MG/DL — HIGH (ref 0.5–1.3)
CULTURE RESULTS: NO GROWTH — SIGNIFICANT CHANGE UP
GLUCOSE BLDC GLUCOMTR-MCNC: 104 MG/DL — HIGH (ref 70–99)
GLUCOSE BLDC GLUCOMTR-MCNC: 166 MG/DL — HIGH (ref 70–99)
GLUCOSE BLDC GLUCOMTR-MCNC: 211 MG/DL — HIGH (ref 70–99)
GLUCOSE BLDC GLUCOMTR-MCNC: 252 MG/DL — HIGH (ref 70–99)
GLUCOSE SERPL-MCNC: 114 MG/DL — HIGH (ref 70–99)
HCT VFR BLD CALC: 27.6 % — LOW (ref 39–50)
HGB BLD-MCNC: 8.8 G/DL — LOW (ref 13–17)
MAGNESIUM SERPL-MCNC: 1.8 MG/DL — SIGNIFICANT CHANGE UP (ref 1.6–2.6)
MCHC RBC-ENTMCNC: 31 PG — SIGNIFICANT CHANGE UP (ref 27–34)
MCHC RBC-ENTMCNC: 31.9 GM/DL — LOW (ref 32–36)
MCV RBC AUTO: 97.2 FL — SIGNIFICANT CHANGE UP (ref 80–100)
NRBC # BLD: 0 /100 WBCS — SIGNIFICANT CHANGE UP
NRBC # FLD: 0 K/UL — SIGNIFICANT CHANGE UP
PHOSPHATE SERPL-MCNC: 5 MG/DL — HIGH (ref 2.5–4.5)
PLATELET # BLD AUTO: 232 K/UL — SIGNIFICANT CHANGE UP (ref 150–400)
POTASSIUM SERPL-MCNC: 4 MMOL/L — SIGNIFICANT CHANGE UP (ref 3.5–5.3)
POTASSIUM SERPL-SCNC: 4 MMOL/L — SIGNIFICANT CHANGE UP (ref 3.5–5.3)
RBC # BLD: 2.84 M/UL — LOW (ref 4.2–5.8)
RBC # FLD: 16.7 % — HIGH (ref 10.3–14.5)
SODIUM SERPL-SCNC: 135 MMOL/L — SIGNIFICANT CHANGE UP (ref 135–145)
SPECIMEN SOURCE: SIGNIFICANT CHANGE UP
WBC # BLD: 12.86 K/UL — HIGH (ref 3.8–10.5)
WBC # FLD AUTO: 12.86 K/UL — HIGH (ref 3.8–10.5)

## 2021-10-19 PROCEDURE — 99233 SBSQ HOSP IP/OBS HIGH 50: CPT

## 2021-10-19 PROCEDURE — 99223 1ST HOSP IP/OBS HIGH 75: CPT

## 2021-10-19 RX ORDER — BUMETANIDE 0.25 MG/ML
2 INJECTION INTRAMUSCULAR; INTRAVENOUS
Refills: 0 | Status: DISCONTINUED | OUTPATIENT
Start: 2021-10-19 | End: 2021-10-19

## 2021-10-19 RX ORDER — BUMETANIDE 0.25 MG/ML
2 INJECTION INTRAMUSCULAR; INTRAVENOUS
Refills: 0 | Status: DISCONTINUED | OUTPATIENT
Start: 2021-10-19 | End: 2021-10-20

## 2021-10-19 RX ADMIN — ATORVASTATIN CALCIUM 20 MILLIGRAM(S): 80 TABLET, FILM COATED ORAL at 22:09

## 2021-10-19 RX ADMIN — Medication 3 MILLILITER(S): at 12:02

## 2021-10-19 RX ADMIN — PIPERACILLIN AND TAZOBACTAM 25 GRAM(S): 4; .5 INJECTION, POWDER, LYOPHILIZED, FOR SOLUTION INTRAVENOUS at 17:43

## 2021-10-19 RX ADMIN — Medication 3 MILLILITER(S): at 22:15

## 2021-10-19 RX ADMIN — Medication 40 MILLIGRAM(S): at 06:08

## 2021-10-19 RX ADMIN — Medication 50 MILLIGRAM(S): at 22:09

## 2021-10-19 RX ADMIN — PIPERACILLIN AND TAZOBACTAM 25 GRAM(S): 4; .5 INJECTION, POWDER, LYOPHILIZED, FOR SOLUTION INTRAVENOUS at 09:37

## 2021-10-19 RX ADMIN — Medication 50 MILLIGRAM(S): at 13:45

## 2021-10-19 RX ADMIN — AZITHROMYCIN 255 MILLIGRAM(S): 500 TABLET, FILM COATED ORAL at 07:14

## 2021-10-19 RX ADMIN — Medication 20 MILLIGRAM(S): at 07:14

## 2021-10-19 RX ADMIN — Medication 3: at 12:11

## 2021-10-19 RX ADMIN — CLOPIDOGREL BISULFATE 75 MILLIGRAM(S): 75 TABLET, FILM COATED ORAL at 12:02

## 2021-10-19 RX ADMIN — BUDESONIDE AND FORMOTEROL FUMARATE DIHYDRATE 2 PUFF(S): 160; 4.5 AEROSOL RESPIRATORY (INHALATION) at 09:37

## 2021-10-19 RX ADMIN — BUMETANIDE 2 MILLIGRAM(S): 0.25 INJECTION INTRAMUSCULAR; INTRAVENOUS at 12:03

## 2021-10-19 RX ADMIN — SENNA PLUS 2 TABLET(S): 8.6 TABLET ORAL at 22:10

## 2021-10-19 RX ADMIN — Medication 50 MILLIGRAM(S): at 07:14

## 2021-10-19 RX ADMIN — Medication 25 MICROGRAM(S): at 06:08

## 2021-10-19 RX ADMIN — Medication 3 MILLILITER(S): at 17:44

## 2021-10-19 RX ADMIN — BUMETANIDE 2 MILLIGRAM(S): 0.25 INJECTION INTRAMUSCULAR; INTRAVENOUS at 17:44

## 2021-10-19 RX ADMIN — Medication 81 MILLIGRAM(S): at 12:02

## 2021-10-19 RX ADMIN — Medication 2: at 17:44

## 2021-10-19 RX ADMIN — TAMSULOSIN HYDROCHLORIDE 0.4 MILLIGRAM(S): 0.4 CAPSULE ORAL at 22:10

## 2021-10-19 RX ADMIN — HEPARIN SODIUM 5000 UNIT(S): 5000 INJECTION INTRAVENOUS; SUBCUTANEOUS at 17:43

## 2021-10-19 RX ADMIN — HEPARIN SODIUM 5000 UNIT(S): 5000 INJECTION INTRAVENOUS; SUBCUTANEOUS at 06:09

## 2021-10-19 RX ADMIN — Medication 3 MILLILITER(S): at 06:10

## 2021-10-19 RX ADMIN — PIPERACILLIN AND TAZOBACTAM 25 GRAM(S): 4; .5 INJECTION, POWDER, LYOPHILIZED, FOR SOLUTION INTRAVENOUS at 23:18

## 2021-10-19 RX ADMIN — SPIRONOLACTONE 25 MILLIGRAM(S): 25 TABLET, FILM COATED ORAL at 07:14

## 2021-10-19 NOTE — PROGRESS NOTE ADULT - PROBLEM SELECTOR PLAN 4
-CKD Stage 4; Cr upon d/c at 2.57, continue to monitor/trend, In 2018, patient had Cr 2.5-3.1  - avoid nephrotoxins, renally dose meds

## 2021-10-19 NOTE — CONSULT NOTE ADULT - SUBJECTIVE AND OBJECTIVE BOX
CHIEF COMPLAINT:Patient is a 66y old  Male who presents with a chief complaint of PNA, HF exacerbation (18 Oct 2021 13:23)      HISTORY OF PRESENT ILLNESS:  66 male wiht history of cad s/p cabg , chronic systolic CHF s/p ICd, CKd, CVA copd, history of PNA infections, admitted with sob, fever, cp , cough , wheezing   found to have hypoxic respiratory failure in respiratory distress placed on BiPAP in ED with improvement   no cp   no dizziness  no syncope     PAST MEDICAL & SURGICAL HISTORY:  HTN (hypertension)    CAD (coronary artery disease)    Diabetes    Hyperlipidemia, unspecified hyperlipidemia type    CHF (congestive heart failure)    BPH (benign prostatic hyperplasia)    S/P CABG (coronary artery bypass graft)    S/P appendectomy            MEDICATIONS:  aspirin enteric coated 81 milliGRAM(s) Oral daily  carvedilol 3.125 milliGRAM(s) Oral every 12 hours  clopidogrel Tablet 75 milliGRAM(s) Oral daily  heparin   Injectable 5000 Unit(s) SubCutaneous every 12 hours  hydrALAZINE 50 milliGRAM(s) Oral three times a day  isosorbide   mononitrate ER Tablet (IMDUR) 30 milliGRAM(s) Oral daily  spironolactone 25 milliGRAM(s) Oral daily  tamsulosin 0.4 milliGRAM(s) Oral at bedtime  torsemide 20 milliGRAM(s) Oral two times a day    azithromycin  IVPB      azithromycin  IVPB 500 milliGRAM(s) IV Intermittent every 24 hours  piperacillin/tazobactam IVPB.. 3.375 Gram(s) IV Intermittent every 8 hours    albuterol/ipratropium for Nebulization 3 milliLiter(s) Nebulizer every 6 hours  budesonide 160 MICROgram(s)/formoterol 4.5 MICROgram(s) Inhaler 2 Puff(s) Inhalation two times a day    acetaminophen   Tablet .. 650 milliGRAM(s) Oral every 6 hours PRN  melatonin 3 milliGRAM(s) Oral at bedtime PRN  ondansetron Injectable 4 milliGRAM(s) IV Push every 8 hours PRN    aluminum hydroxide/magnesium hydroxide/simethicone Suspension 30 milliLiter(s) Oral every 4 hours PRN  senna 2 Tablet(s) Oral at bedtime    atorvastatin 20 milliGRAM(s) Oral at bedtime  dextrose 40% Gel 15 Gram(s) Oral once  dextrose 50% Injectable 25 Gram(s) IV Push once  dextrose 50% Injectable 12.5 Gram(s) IV Push once  dextrose 50% Injectable 25 Gram(s) IV Push once  glucagon  Injectable 1 milliGRAM(s) IntraMuscular once  insulin lispro (ADMELOG) corrective regimen sliding scale   SubCutaneous three times a day before meals  insulin lispro (ADMELOG) corrective regimen sliding scale   SubCutaneous at bedtime  levothyroxine 25 MICROGram(s) Oral daily  predniSONE   Tablet 40 milliGRAM(s) Oral daily    dextrose 5%. 1000 milliLiter(s) IV Continuous <Continuous>  dextrose 5%. 1000 milliLiter(s) IV Continuous <Continuous>      FAMILY HISTORY:      Non-contributory    SOCIAL HISTORY:    No tobacco, drugs or etoh    Allergies    No Known Allergies    Intolerances    	    REVIEW OF SYSTEMS:  as above  The rest of the 14 points ROS reviewed and except above they are unremarkable.        PHYSICAL EXAM:  T(C): 36.5 (10-19-21 @ 06:00), Max: 37.2 (10-18-21 @ 08:29)  HR: 92 (10-19-21 @ 07:12) (55 - 94)  BP: 107/67 (10-19-21 @ 07:12) (102/71 - 118/73)  RR: 15 (10-19-21 @ 06:00) (14 - 28)  SpO2: 100% (10-19-21 @ 06:00) (100% - 100%)  Wt(kg): --  I&O's Summary    18 Oct 2021 07:01  -  19 Oct 2021 07:00  --------------------------------------------------------  IN: 0 mL / OUT: 1300 mL / NET: -1300 mL        JVP: Normal  Neck: supple  Lung: crackles   CV: S1 S2 , Murmur:  Abd: soft  Ext: No edema  neuro: Awake / alert  Psych: flat affect  Skin: normal      LABS/DATA:    TELEMETRY: 	    ECG:  	   	  CARDIAC MARKERS:    < from: TTE with Doppler (w/Cont) (09.13.21 @ 09:11) >  CONCLUSIONS:  1. Normal trileaflet aortic valve. Minimal aortic  regurgitation.  2. Mildly dilated left atrium.  LA volume index = 41 cc/m2.  3. Mild left ventricular enlargement.  4. Endocardial visualization enhanced with intravenous  injection of echo contrast (Definity). Severe global left  ventricular systolic dysfunction.  5. Moderate diastolic dysfunction (Stage II).  6. Right ventricular enlargement with normal right  ventricular systolic function.  ------------------------------------------------------------------------  Confirmed on  9/13/2021 - 14:27:35 by Shena Inman M.D. RPVI  ------------------------------------------------------------------------    < end of copied text >                      90 <<== 10-18-21 @ 04:01                91 <<== 10-18-21 @ 01:38                              8.8    12.86 )-----------( 232      ( 19 Oct 2021 05:42 )             27.6     10-19    135  |  96<L>  |  39<H>  ----------------------------<  114<H>  4.0   |  26  |  2.59<H>    Ca    9.0      19 Oct 2021 05:42  Phos  5.0     10-19  Mg     1.80     10-19    TPro  6.3  /  Alb  3.4  /  TBili  0.4  /  DBili  x   /  AST  20  /  ALT  23  /  AlkPhos  145<H>  10-18    proBNP: Serum Pro-Brain Natriuretic Peptide: 8177 pg/mL (10-18 @ 01:38)    Lipid Profile:   HgA1c:   TSH:

## 2021-10-19 NOTE — CONSULT NOTE ADULT - ASSESSMENT
acute hypoxic respiratory failure  due to acute CHF secondary to PNA    acute systolic CHF  cont current meds  change diuretics to IV bumex 2 mg bid  fu with CHF eval and recommendation   s/p ICD   cont BB  cont hydralazine/nitrates    CAD history of cabg   on dapt  on statin   eventual ischemic eval , stress test can be considered    CKd  monitor lytes, crt    Dm II  Monitor finger stick. Insulin coverage. Diabetic education and Diabetic diet. Consider nutrition consultation.    PNA  on anbx  plan as per primary team

## 2021-10-19 NOTE — PROGRESS NOTE ADULT - ASSESSMENT
66M CABG on ASA/Plavix, HFrEF (21% 9/20201 TTE) s/p AICD on 2LNC at home, CKD, CVA with mild left deficits, asthma/COPD (on 2L NC), recurrent PNA infections presents with acute worsening SOB x 1 day. Patient was having 4-5 days of fever, pleuritic chest pain, cough, SOB, wheezing. Patient was prescribed 7 day course of PO Cefuroxime by PCP which patient took for 3-4 days with minimal relief of respiratory symptoms. On day of admission, patient felt extremely weak with sensation of something stuck in his throat, worsening SOB and b/l LE and UE swelling. At that point, daughter called EMS who brought patient to ED. Per EMS, was placed on NRB satting 92%, then transitioned to CPAP with improvement in hypoxia and respiratory distress. In ED, patient placed on BiPAP with improvement in respiratory symptoms  Transitioned to 6 liter nasal cannula with improvement in symptoms.    PLAN:  SOB  -patient with underlying infection with some improvement today, weaned to 6 liter nasal cannula  -JVP 8 cm  -lungs with bibasilar crackles, CT with pulm edema, bilateral pleural effusions and ground glass opacities  -would continue his heart failure GDMT B/P 105/71 and  bpm  -coreg 3.125 bid  -started on bumex 2 mg IV BID this am, monitor BUN/creat  -isordil 30 po qd  -hydral 50 tid  -BUN/creat increased to 39/ 2.59 from 38/2.55 and is on vero 25 qd- may need to hold  - serum pro BNP 10/18 was 8177, lactate 1.2  ECHO reviewed, EF is 21%,     CAD/CABG:  - no active chest pain  -continue asa and plavix and crestor 5    COPD, now with PNA  -CXR reviewed  -chest CT with pulm edema, ground glass opacities, bilateral pleural effusions  -support oxygenation  -f/u with cultures  -continue to treat with antibiotics  -RVP panel and COVID are negative    Further recommendations to follow

## 2021-10-19 NOTE — CONSULT NOTE ADULT - SUBJECTIVE AND OBJECTIVE BOX
CC: Shortness of breath    HPI:  66M CABG on ASA/Plavix, HFrEF (21%  TTE) s/p AICD on 2LNC at home, CKD, CVA with mild left deficits, asthma/COPD (on 2L NC), recurrent PNA infections presents with acute worsening SOB x 1 day. Patient was having 4-5 days of fever, pleuritic chest pain, cough, SOB, wheezing. Patient was prescribed 7 day course of PO Cefuroxime by PCP which patient took for 3-4 days with minimal relief of respiratory symptoms. On day of admission, patient felt extremely weak with sensation of something stuck in his throat, worsening SOB and b/l LE and UE swelling. At that point, daughter called EMS who brought patient to ED. Per EMS, was placed on NRB satting 92%, then transitioned to CPAP with improvement in hypoxia and respiratory distress. In ED, patient placed on BiPAP with improvement in respiratory symptoms (18 Oct 2021 04:49)    Patient seen and examined at bedside. He has been titrated down to NC. Pulmonary consulted for evaluation of recurrent pneumonias. Patient reports that he has been hospitalized at multiple hospitals for respiratory distress and reports that at one hospital he was on "life support" for 20 days. He endorses having a fever at home. He was given 3-4 days of antibiotics without improvement, prompting him to come to the hospital. He reports that he currently feels much better. He does endorse eating a salty diet at home and has had some issues with swallowing.    PAST MEDICAL & SURGICAL HISTORY:  HTN (hypertension)    CAD (coronary artery disease)    Diabetes    Hyperlipidemia, unspecified hyperlipidemia type    CHF (congestive heart failure)    BPH (benign prostatic hyperplasia)    S/P CABG (coronary artery bypass graft)    S/P appendectomy        FAMILY HISTORY: Non-contributory      SOCIAL HISTORY:  Smoking: Former smoker, quit.  EtOH Use: None reported  Marital Status:   Occupation: Retired after having CVA at 38 years-old  Exposures: None reported  Recent Travel: None reported    Allergies    No Known Allergies    Intolerances        HOME MEDICATIONS:    REVIEW OF SYSTEMS:  Constitutional: No fevers or chills  currently.  Eyes: No itching or discharge from the eyes  ENT: No ear pain. No ear discharge. No nasal congestion. +Difficulty swallowing  CV: No chest pain. No palpitations. No lightheadedness or dizziness.   Resp: +Dyspnea, improved. +Cough  GI: No nausea. No vomiting. No diarrhea.  MSK: No joint pain or pain in any extremities  Integumentary: No skin lesions. No pedal edema.  Neurological: +L side weakness, stable. No sensory changes.  [x] All other systems negative  [ ] Unable to assess ROS because ________    OBJECTIVE:  ICU Vital Signs Last 24 Hrs  T(C): 36.5 (19 Oct 2021 06:00), Max: 36.9 (18 Oct 2021 12:59)  T(F): 97.7 (19 Oct 2021 06:00), Max: 98.4 (18 Oct 2021 12:59)  HR: 100 (19 Oct 2021 11:42) (55 - 100)  BP: 97/57 (19 Oct 2021 08:56) (97/57 - 118/73)  BP(mean): --  ABP: --  ABP(mean): --  RR: 15 (19 Oct 2021 08:56) (14 - 20)  SpO2: 100% (19 Oct 2021 11:42) (98% - 100%)        10-18 @ 07:01  -  10-19 @ 07:00  --------------------------------------------------------  IN: 0 mL / OUT: 1300 mL / NET: -1300 mL      CAPILLARY BLOOD GLUCOSE      POCT Blood Glucose.: 252 mg/dL (19 Oct 2021 11:39)      PHYSICAL EXAM:  General: Awake, alert, oriented X 3.   HEENT: Atraumatic, normocephalic.   Lymph Nodes: No palpable lymphadenopathy  Neck: No JVD. No carotid bruit.   Respiratory: Normal chest expansion. Bilateral crackles.  Cardiovascular: S1 S2 normal. No murmurs, rubs or gallops.   Abdomen: Soft, non-tender, non-distended. No organomegaly.  Extremities: Warm to touch. Peripheral pulse palpable. No pedal edema.   Skin: No rashes or skin lesions  Neurological: +Weakness L side. No new focal deficits.  Psychiatry: Appropriate mood and affect.    HOSPITAL MEDICATIONS:  MEDICATIONS  (STANDING):  albuterol/ipratropium for Nebulization 3 milliLiter(s) Nebulizer every 6 hours  aspirin enteric coated 81 milliGRAM(s) Oral daily  atorvastatin 20 milliGRAM(s) Oral at bedtime  azithromycin  IVPB      azithromycin  IVPB 500 milliGRAM(s) IV Intermittent every 24 hours  budesonide 160 MICROgram(s)/formoterol 4.5 MICROgram(s) Inhaler 2 Puff(s) Inhalation two times a day  buMETAnide Injectable 2 milliGRAM(s) IV Push two times a day  carvedilol 3.125 milliGRAM(s) Oral every 12 hours  clopidogrel Tablet 75 milliGRAM(s) Oral daily  dextrose 40% Gel 15 Gram(s) Oral once  dextrose 5%. 1000 milliLiter(s) (50 mL/Hr) IV Continuous <Continuous>  dextrose 5%. 1000 milliLiter(s) (100 mL/Hr) IV Continuous <Continuous>  dextrose 50% Injectable 25 Gram(s) IV Push once  dextrose 50% Injectable 12.5 Gram(s) IV Push once  dextrose 50% Injectable 25 Gram(s) IV Push once  glucagon  Injectable 1 milliGRAM(s) IntraMuscular once  heparin   Injectable 5000 Unit(s) SubCutaneous every 12 hours  hydrALAZINE 50 milliGRAM(s) Oral three times a day  insulin lispro (ADMELOG) corrective regimen sliding scale   SubCutaneous three times a day before meals  insulin lispro (ADMELOG) corrective regimen sliding scale   SubCutaneous at bedtime  isosorbide   mononitrate ER Tablet (IMDUR) 30 milliGRAM(s) Oral daily  levothyroxine 25 MICROGram(s) Oral daily  piperacillin/tazobactam IVPB.. 3.375 Gram(s) IV Intermittent every 8 hours  predniSONE   Tablet 40 milliGRAM(s) Oral daily  senna 2 Tablet(s) Oral at bedtime  spironolactone 25 milliGRAM(s) Oral daily  tamsulosin 0.4 milliGRAM(s) Oral at bedtime    MEDICATIONS  (PRN):  acetaminophen   Tablet .. 650 milliGRAM(s) Oral every 6 hours PRN Temp greater or equal to 38C (100.4F), Mild Pain (1 - 3)  aluminum hydroxide/magnesium hydroxide/simethicone Suspension 30 milliLiter(s) Oral every 4 hours PRN Dyspepsia  melatonin 3 milliGRAM(s) Oral at bedtime PRN Insomnia  ondansetron Injectable 4 milliGRAM(s) IV Push every 8 hours PRN Nausea and/or Vomiting      LABS:                        8.8    12.86 )-----------( 232      ( 19 Oct 2021 05:42 )             27.6     10-19    135  |  96<L>  |  39<H>  ----------------------------<  114<H>  4.0   |  26  |  2.59<H>    Ca    9.0      19 Oct 2021 05:42  Phos  5.0     10-19  Mg     1.80     10-19    TPro  6.3  /  Alb  3.4  /  TBili  0.4  /  DBili  x   /  AST  20  /  ALT  23  /  AlkPhos  145<H>  10-18      Urinalysis Basic - ( 18 Oct 2021 07:34 )    Color: Colorless / Appearance: Clear / S.009 / pH: x  Gluc: x / Ketone: Negative  / Bili: Negative / Urobili: <2 mg/dL   Blood: x / Protein: Trace / Nitrite: Negative   Leuk Esterase: Negative / RBC: 0 /HPF / WBC 0 /HPF   Sq Epi: x / Non Sq Epi: Occasional / Bacteria: Negative        Venous Blood Gas:  10-18 @ 11:05  7.28/56/43/26/66.9  VBG Lactate: 1.2  Venous Blood Gas:  10-18 @ 01:38  7.29/49/43/24/70.2  VBG Lactate: 1.1      MICROBIOLOGY:     RADIOLOGY:  [x] Reviewed and interpreted by me  CT Chest 10/18: Bilateral ground glass opacities with some consolidation on the R, interlobular septal thickening. Areas of mosaic attenuation. Mediastinal lymphadenopathy (my read)    Point of Care Ultrasound Findings;    PFT:    EKG:

## 2021-10-19 NOTE — ED ADULT NURSE REASSESSMENT NOTE - NS ED NURSE REASSESS COMMENT FT1
Break cover RN. PT spo2 100 % on NRBM. Respirations even and unlabored. PT resting in bed, appears to be in NAD, comfortable, Call bell within reach will continue to monitor.
Pt arrives back from ct scan was on NRB, pt tachypneic placed back on bipap. ,repeat vbg drawn.  pt with call bell in hand .awaiting floor bed.
Pt in room 10. Pt is calm and resting. Pt currently on BIPAP; tolerating well. VSS. Will CTM.
Pt tolerating NRB o2 sat 100 %.  b/p wnl pt is afebrile denies chest pain.
VS as noted, NP made aware of pt. blood pressure. As per NP Olivia Burrell, reschedule medication for 7 am and reassess blood pressure.
patient A&O4, appears to be resting in bed comfortably. No complaints at this time. BP medication given, tolerated well. Vital signs as charted, IV site clean dry and intact. Breathing even and unlabored, pallor/diaphoresis not noted. will continue to monitor.
patient received from night RN at 0800, patient appears to be resting in bed comfortably. No complaints at this time. Breathing even and unlabored, pallor/diaphoresis not noted. O2 sat 98% on NRB mask, tolerating well. Denies chest pain, shortness of breath, nausea or vomiting. IV site clean dry and intact. All safety maintained, will continue to monitor.
pt awake and alert x 3 on bipap pt resting does not look tachypneic with bipap in place . pt awaiting floor bed .
patient A&O4, resting in bed comfortably, no complaints noted at this time. Vital signs as charted, medication administered per EMAR and tolerated well. Breathing even and unlabored, pallor/diaphoresis not noted. IV site clean dry and intact. continuing to monitor, all safety maintained.
patient appears to be resting in bed comfortably, no complaints at this time, calm and cooperative. Denies chest pain, shortness of breath, nausea or vomiting. IV site clean dry and intact. Medication administered and tolerated well per EMAR. Will continue to monitor.

## 2021-10-19 NOTE — PROGRESS NOTE ADULT - PROBLEM SELECTOR PLAN 7
- Pharmacy unable to verify exactly what dose of torsemide pt was taking. Attempted to reach [facility] in [place] however their "system is down" and cannot verify. Daughter unable to reach as above. - Pharmacy unable to verify exactly what dose of torsemide pt was taking. Attempted to reach [facility] in [place] however their "system is down" and cannot verify. Daughter confirms it was 20 mg torsemide BID at home.

## 2021-10-19 NOTE — PROGRESS NOTE ADULT - PROBLEM SELECTOR PLAN 1
-H/o CAD s/p CABG 2014 HFrEF (15-20% per outside records) s/p AICD, CKD, CVA w/ residual L-sided   -pro-BNP 8177, up from 5957 on last admission  - Troponins peaked   -Cards consult appreciated  - strict I&Os, daily standing weights  - Will c/w home isordil, and coreg  - Per daughter, aldactone was discontinued but unclear if restarted. Per DC summary, this was started at time of discharge in Sept 2021  - S/p IV Lasix 40 IV push, I/O data unavailable, but pt reports no significant urine output, CXR w significant pulm edema.   - F/u repeat VBG this am  - Redose lasix 80 mg IV once 10/12 given lack of response to 40 mg, and track UOP.  - hold hydralazine for now due to soft BPs  - pt not on ACE/ARB potentially due to CKD  - Echo w/ global LV dysfunction, EF 21  - C/w ASA 81 -H/o CAD s/p CABG 2014 HFrEF (15-20% per outside records) s/p AICD, CKD, CVA w/ residual L-sided   -pro-BNP 8177, up from 5957 on last admission  - Troponins peaked   -Cards consulted   - strict I&Os, daily standing weights  - Will c/w home isordil, hydralazine, and coreg  - Per daughter, aldactone was discontinued but unclear if restarted. Per DC summary, this was started at time of discharge in Sept 2021  - S/p IV Lasix 40 IV push, I/O data unavailable, but pt reports no significant urine output, CXR w significant pulm edema. Responded to 80 mg IV lasix. Cards recommended transitioning to 2mg bumex BID IV on 10/19.  - Cont to closely monitor BUN/Cr while on IV bumex  - pt not on ACE/ARB potentially due to CKD  - Echo w/ global LV dysfunction, EF 21  - C/w ASA 81

## 2021-10-19 NOTE — PROGRESS NOTE ADULT - SUBJECTIVE AND OBJECTIVE BOX
Pt alert and oriented in bed. Still in ED. Did not require Bipap overnight. Currently on 7 L nrb. "Feeling better." Frustrated that he keeps getting recurrent PNA and requiring hospitalization.     10/19 10 am: Able to reach daughter Ketty Lewis to obtain collateral. States her dad is adherent with his medications, including the torsemide and cefuroxime taht he was on in the days prior to admission. Was started on oxygen after discharge from his last hospitalization in Sept. Uses 2L nightly o2. Utilizes walker at home for the past 3 years. Pt was also hospitalized at [facility] about 2 months ago in the ICU for PNA. He does not have an outpatient Pulmonologist   Pt alert and oriented in bed. Still in ED. Did not require Bipap overnight. Currently on 7 L nrb. "Feeling better." Did not pass speech study, therefore still on dysphagia diet. No longer having chest tightness. Frustrated that he keeps getting recurrent PNA and requiring hospitalization.     10/19 10 am: Able to reach daughter Ketty Lewis to obtain collateral. States her dad is adherent with his medications, including the torsemide and cefuroxime that he was on in the days prior to admission. Was started on oxygen after discharge from his last hospitalization in Sept. Uses 2L nightly o2. Utilizes walker at home for the past 3 years. Pt was also hospitalized at [facility] about 2 months ago in the ICU for PNA. He does not have an outpatient Pulmonologist.   Pt alert and oriented in bed. Still in ED. Did not require Bipap overnight. Currently on 7 L nrb. "Feeling better." Did not pass speech study, therefore still on dysphagia diet. No longer having chest tightness. Frustrated that he keeps getting recurrent PNA and requiring hospitalization.     10/19 10 am: Able to reach daughter Ketty Lewis to obtain collateral. States her dad is adherent with his medications, including the torsemide and cefuroxime that he was on in the days prior to admission. Was started on oxygen after discharge from his last hospitalization in Sept. Uses 2L nightly o2. Utilizes walker at home for the past 3 years. Pt was also hospitalized at [facility] about 2 months ago in the ICU for PNA. He does not have an outpatient Pulmonologist.    10/19 Phone call to pt's PCP (Dr. Morillo) [phone] to obtain additional PMH. Office is closed on Tues.     Vital Signs Last 24 Hrs  T(C): 36.5 (19 Oct 2021 06:00), Max: 36.9 (18 Oct 2021 12:59)  T(F): 97.7 (19 Oct 2021 06:00), Max: 98.4 (18 Oct 2021 12:59)  HR: 99 (19 Oct 2021 08:56) (55 - 99)  BP: 97/57 (19 Oct 2021 08:56) (97/57 - 118/73)  BP(mean): --  RR: 15 (19 Oct 2021 08:56) (14 - 24)  SpO2: 98% (19 Oct 2021 08:56) (98% - 100%)    CONSTITUTIONAL: Well-groomed, in no apparent distress, speaking in full sentences, improved from prior  EYES: No conjunctival or scleral injection, non-icteric; PERRLA and symmetric  ENMT: No external nasal lesions; nasal mucosa not inflamed; no pharyngeal injection or exudates  NECK: Trachea midline without palpable neck mass, unable to appreciate JVD  RESPIRATORY: Currently on 7L o2, diffuse crackles in anterior and posterior lung fields, wheezing improved.   CARDIOVASCULAR: +S1S2, RRR, no M/G/R; no carotid bruits; pedal pulses full and symmetric, warm extremities, LUE and LLE w non pitting edema  GASTROINTESTINAL: No palpable masses or tenderness, +BS throughout, no rebound/guarding  MUSCULOSKELETAL: No digital clubbing or cyanosis;  SKIN: No rashes or ulcers noted; no subcutaneous nodules or induration palpable  NEUROLOGIC: CN II-XII grossly intact  PSYCHIATRIC: A+O x 3; mood and affect appropriate; appropriate insight and judgment     Pt alert and oriented in bed. Still in ED. Did not require Bipap overnight. Currently on 7 L nrb. "Feeling better." Did not pass speech study, therefore still on dysphagia diet. No longer having chest tightness. Frustrated that he keeps getting recurrent PNA and requiring hospitalization.     10/19 10 am: Able to reach daughter Ketty Lewis to obtain collateral. States her dad is adherent with his medications, including the torsemide and cefuroxime that he was on in the days prior to admission. Was started on oxygen after discharge from his last hospitalization in Sept. Uses 2L nightly o2. Utilizes walker at home for the past 3 years. Pt was also hospitalized at [facility] about 2 months ago in the ICU for PNA. He does not have an outpatient Pulmonologist.    10/19 Phone call to pt's PCP (Dr. Morillo) [phone] to obtain additional PMH. Office is closed on Tues.     Vital Signs Last 24 Hrs  T(C): 36.5 (19 Oct 2021 06:00), Max: 36.9 (18 Oct 2021 12:59)  T(F): 97.7 (19 Oct 2021 06:00), Max: 98.4 (18 Oct 2021 12:59)  HR: 99 (19 Oct 2021 08:56) (55 - 99)  BP: 97/57 (19 Oct 2021 08:56) (97/57 - 118/73)  BP(mean): --  RR: 15 (19 Oct 2021 08:56) (14 - 24)  SpO2: 98% (19 Oct 2021 08:56) (98% - 100%)    CONSTITUTIONAL: Well-groomed, in no apparent distress, speaking in full sentences without dyspea, improved from prior  EYES: No conjunctival or scleral injection, non-icteric; PERRLA and symmetric  ENMT: No external nasal lesions; nasal mucosa not inflamed; no pharyngeal injection or exudates  NECK: Trachea midline without palpable neck mass, unable to appreciate JVD  RESPIRATORY: Currently on 7L o2, diffuse crackles in anterior and posterior lung fields, wheezing improved.   CARDIOVASCULAR: +S1S2, RRR, no M/G/R; no carotid bruits; pedal pulses full and symmetric, warm extremities, LUE and LLE w non pitting edema  GASTROINTESTINAL: No palpable masses or tenderness, +BS throughout, no rebound/guarding  MUSCULOSKELETAL: No digital clubbing or cyanosis;  SKIN: No rashes or ulcers noted; no subcutaneous nodules or induration palpable  NEUROLOGIC: CN II-XII grossly intact  PSYCHIATRIC: A+O x 3; mood and affect appropriate; appropriate insight and judgment

## 2021-10-19 NOTE — PROGRESS NOTE ADULT - SUBJECTIVE AND OBJECTIVE BOX
Subjective:    Medications:  acetaminophen   Tablet .. 650 milliGRAM(s) Oral every 6 hours PRN  albuterol/ipratropium for Nebulization 3 milliLiter(s) Nebulizer every 6 hours  aluminum hydroxide/magnesium hydroxide/simethicone Suspension 30 milliLiter(s) Oral every 4 hours PRN  aspirin enteric coated 81 milliGRAM(s) Oral daily  atorvastatin 20 milliGRAM(s) Oral at bedtime  azithromycin  IVPB      azithromycin  IVPB 500 milliGRAM(s) IV Intermittent every 24 hours  budesonide 160 MICROgram(s)/formoterol 4.5 MICROgram(s) Inhaler 2 Puff(s) Inhalation two times a day  buMETAnide Injectable 2 milliGRAM(s) IV Push two times a day  carvedilol 3.125 milliGRAM(s) Oral every 12 hours  clopidogrel Tablet 75 milliGRAM(s) Oral daily  dextrose 40% Gel 15 Gram(s) Oral once  dextrose 5%. 1000 milliLiter(s) IV Continuous <Continuous>  dextrose 5%. 1000 milliLiter(s) IV Continuous <Continuous>  dextrose 50% Injectable 25 Gram(s) IV Push once  dextrose 50% Injectable 12.5 Gram(s) IV Push once  dextrose 50% Injectable 25 Gram(s) IV Push once  glucagon  Injectable 1 milliGRAM(s) IntraMuscular once  heparin   Injectable 5000 Unit(s) SubCutaneous every 12 hours  hydrALAZINE 50 milliGRAM(s) Oral three times a day  insulin lispro (ADMELOG) corrective regimen sliding scale   SubCutaneous three times a day before meals  insulin lispro (ADMELOG) corrective regimen sliding scale   SubCutaneous at bedtime  isosorbide   mononitrate ER Tablet (IMDUR) 30 milliGRAM(s) Oral daily  levothyroxine 25 MICROGram(s) Oral daily  melatonin 3 milliGRAM(s) Oral at bedtime PRN  ondansetron Injectable 4 milliGRAM(s) IV Push every 8 hours PRN  piperacillin/tazobactam IVPB.. 3.375 Gram(s) IV Intermittent every 8 hours  predniSONE   Tablet 40 milliGRAM(s) Oral daily  senna 2 Tablet(s) Oral at bedtime  spironolactone 25 milliGRAM(s) Oral daily  tamsulosin 0.4 milliGRAM(s) Oral at bedtime      Physical Exam:    Vitals:  Vital Signs Last 24 Hrs  T(C): 36.5 (19 Oct 2021 06:00), Max: 36.9 (18 Oct 2021 12:59)  T(F): 97.7 (19 Oct 2021 06:00), Max: 98.4 (18 Oct 2021 12:59)  HR: 99 (19 Oct 2021 08:56) (55 - 99)  BP: 97/57 (19 Oct 2021 08:56) (97/57 - 118/73)  BP(mean): --  RR: 15 (19 Oct 2021 08:56) (14 - 28)  SpO2: 98% (19 Oct 2021 08:56) (98% - 100%)    Daily     Daily     I&O's Summary    18 Oct 2021 07:01  -  19 Oct 2021 07:00  --------------------------------------------------------  IN: 0 mL / OUT: 1300 mL / NET: -1300 mL        Tele:    General: No distress. Comfortable.  HEENT: EOM intact.  Neck: Neck supple. JVP not elevated. No masses  Chest: Clear to auscultation bilaterally  CV: RRR, Normal S1 and S2. No murmurs, rub, or gallops. Radial pulses normal. No LE edema  Abdomen: Soft, non-distended, non-tender  Skin: No rashes or skin breakdown  Neurology: Alert and oriented times three. Sensation intact  Psych: Affect normal    Labs:                        8.8    12.86 )-----------( 232      ( 19 Oct 2021 05:42 )             27.6     10-19    135  |  96<L>  |  39<H>  ----------------------------<  114<H>  4.0   |  26  |  2.59<H>    Ca    9.0      19 Oct 2021 05:42  Phos  5.0     10-19  Mg     1.80     10-19    TPro  6.3  /  Alb  3.4  /  TBili  0.4  /  DBili  x   /  AST  20  /  ALT  23  /  AlkPhos  145<H>  10-18          Serum Pro-Brain Natriuretic Peptide: 8177 pg/mL (10-18 @ 01:38)        < from: TTE with Doppler (w/Cont) (09.13.21 @ 09:11) >  Patient name: INGE CARLOS  YOB: 1955   Age: 66 (M)   MR#: [id number]  Study Date: 9/13/2021  [facility]  Study quality: Technically good  Referring Physician: Juan Slaughter MD  Blood Pressure: 107/54 mmHg  Height: 175 cm  Weight: 73 kg  BSA: 1.9 m2  ------------------------------------------------------------------------  PROCEDURE: Transthoracic echocardiogram with 2-D, M-Mode  and complete spectral and color flow Doppler.  Intravenous ultrasoundenhancing agent was administered for  improved left ventricular endocardial border definition.  Following the intravenous injection of ultrasound enhancing  agent, harmonic imaging was performed.  INDICATION: Cardiomyopathy, unspecified (I42.9)  ------------------------------------------------------------------------  DIMENSIONS:  Dimensions:     Normal Values:  LA:     4.2 cm    2.0 - 4.0 cm  Ao:     3.7 cm    2.0 - 3.8 cm  SEPTUM: 0.8 cm    0.6 - 1.2 cm  PWT:    1.0 cm    0.6 - 1.1 cm  LVIDd:  6.0 cm    3.0 - 5.6 cm  LVIDs:  5.4 cm    1.8 - 4.0 cm  Derived Variables:  LVMI: 114 g/m2  RWT: 0.33  Fractional short: 10 %  Ejection Fraction (Ranjeeticholtz): 21 %  ------------------------------------------------------------------------  OBSERVATIONS:  Mitral Valve: Normal mitral valve. Mild mitral  regurgitation.  Aortic Root: Normal aortic root.  Aortic Valve: Normal trileaflet aortic valve. Minimal  aortic regurgitation.  Left Atrium: Mildly dilated left atrium.  LA volume index =  41 cc/m2.  Left Ventricle: Endocardial visualization enhanced with  intravenous injection of echo contrast (Definity). Severe  global left ventricular systolic dysfunction. Mild left  ventricular enlargement. Moderate diastolic dysfunction  (Stage II).  Right Heart: Normal right atrium. Right ventricular  enlargement with normal right ventricular systolic  function. Normal tricuspid valve. Mild tricuspid  regurgitation. Normal pulmonic valve.  Pericardium/PleuraNormal pericardium with no pericardial  effusion.  ------------------------------------------------------------------------  CONCLUSIONS:  1. Normal trileaflet aortic valve. Minimal aortic  regurgitation.  2. Mildly dilated left atrium.  LA volume index = 41 cc/m2.  3. Mild left ventricular enlargement.  4. Endocardial visualization enhanced with intravenous  injection of echo contrast (Definity). Severe global left  ventricular systolic dysfunction.  5. Moderate diastolic dysfunction (Stage II).  6. Right ventricular enlargement with normal right  ventricular systolic function.  ------------------------------------------------------------------------  Confirmed on  9/13/2021 - 14:27:35 by JREEMY Torrez  ----------------------------------------------------------    < from: Xray Chest 1 View- PORTABLE-Urgent (10.18.21 @ 01:18) >  EXAM:  XR CHEST PORTABLE URGENT 1V        PROCEDURE DATE:  Oct 18 2021         INTERPRETATION:  CLINICAL INFORMATION: Chest Pain.    TECHNIQUE: Frontal radiograph of the chest    COMPARISON: 9/10/2021.    FINDINGS:    Cardiac silhouette is normal. Left chest wall AICD CABG.    Nonspecific patchy bilateral opacities. No pneumothorax    Unremarkable skeletal structures.    IMPRESSION: Bilateral mild increase in lung markings consistent with mild congestion unchanged from the last exam.             Subjective: Patient seen in ED. States he is feeling better today and was weaned from NRM to 6 liter N/C. Denies chest pain, palpitations and no orthopnea last night.   CT 10/18/21 with pulm edema, B/L pleural effusions.     Medications:  acetaminophen   Tablet .. 650 milliGRAM(s) Oral every 6 hours PRN  albuterol/ipratropium for Nebulization 3 milliLiter(s) Nebulizer every 6 hours  aluminum hydroxide/magnesium hydroxide/simethicone Suspension 30 milliLiter(s) Oral every 4 hours PRN  aspirin enteric coated 81 milliGRAM(s) Oral daily  atorvastatin 20 milliGRAM(s) Oral at bedtime  azithromycin  IVPB      azithromycin  IVPB 500 milliGRAM(s) IV Intermittent every 24 hours  budesonide 160 MICROgram(s)/formoterol 4.5 MICROgram(s) Inhaler 2 Puff(s) Inhalation two times a day  buMETAnide Injectable 2 milliGRAM(s) IV Push two times a day  carvedilol 3.125 milliGRAM(s) Oral every 12 hours  clopidogrel Tablet 75 milliGRAM(s) Oral daily  dextrose 40% Gel 15 Gram(s) Oral once  dextrose 5%. 1000 milliLiter(s) IV Continuous <Continuous>  dextrose 5%. 1000 milliLiter(s) IV Continuous <Continuous>  dextrose 50% Injectable 25 Gram(s) IV Push once  dextrose 50% Injectable 12.5 Gram(s) IV Push once  dextrose 50% Injectable 25 Gram(s) IV Push once  glucagon  Injectable 1 milliGRAM(s) IntraMuscular once  heparin   Injectable 5000 Unit(s) SubCutaneous every 12 hours  hydrALAZINE 50 milliGRAM(s) Oral three times a day  insulin lispro (ADMELOG) corrective regimen sliding scale   SubCutaneous three times a day before meals  insulin lispro (ADMELOG) corrective regimen sliding scale   SubCutaneous at bedtime  isosorbide   mononitrate ER Tablet (IMDUR) 30 milliGRAM(s) Oral daily  levothyroxine 25 MICROGram(s) Oral daily  melatonin 3 milliGRAM(s) Oral at bedtime PRN  ondansetron Injectable 4 milliGRAM(s) IV Push every 8 hours PRN  piperacillin/tazobactam IVPB.. 3.375 Gram(s) IV Intermittent every 8 hours  predniSONE   Tablet 40 milliGRAM(s) Oral daily  senna 2 Tablet(s) Oral at bedtime  spironolactone 25 milliGRAM(s) Oral daily  tamsulosin 0.4 milliGRAM(s) Oral at bedtime    Vital Signs Last 24 Hrs  T(C): 36.5 (19 Oct 2021 06:00), Max: 36.9 (18 Oct 2021 12:59)  T(F): 97.7 (19 Oct 2021 06:00), Max: 98.4 (18 Oct 2021 12:59)  HR: 99 (19 Oct 2021 08:56) (55 - 99)  BP: 97/57 (19 Oct 2021 08:56) (97/57 - 118/73)  BP(mean): --  RR: 15 (19 Oct 2021 08:56) (14 - 28)  SpO2: 98% (19 Oct 2021 08:56) (98% - 100%)    Daily     Daily     I&O's Summary    18 Oct 2021 07:01  -  19 Oct 2021 07:00  --------------------------------------------------------  IN: 0 mL / OUT: 1300 mL / NET: -1300 mL          General: No distress. Comfortable.  HEENT: normocephalic  Neck: Neck supple. JVP not elevated.   Chest: Bibasilar crackles with 100% NRM to 6 liter NC  CV: RRR, Normal S1 and S2.  Radial pulses normal. No LE edema  Abdomen: Soft, non-distended, non-tender  Skin: No rashes or skin breakdown  Neurology: Alert and oriented times three. Sensation intact  Psych: Affect normal    Labs:                        8.8    12.86 )-----------( 232      ( 19 Oct 2021 05:42 )             27.6     10-19    135  |  96<L>  |  39<H>  ----------------------------<  114<H>  4.0   |  26  |  2.59<H>    Ca    9.0      19 Oct 2021 05:42  Phos  5.0     10-19  Mg     1.80     10-19    TPro  6.3  /  Alb  3.4  /  TBili  0.4  /  DBili  x   /  AST  20  /  ALT  23  /  AlkPhos  145<H>  10-18          Serum Pro-Brain Natriuretic Peptide: 8177 pg/mL (10-18 @ 01:38)        < from: TTE with Doppler (w/Cont) (09.13.21 @ 09:11) >  Patient name: INGE CARLOS  YOB: 1955   Age: 66 (M)   MR#: [id number]  Study Date: 9/13/2021  [facility]  Study quality: Technically good  Referring Physician: Juan Slaughter MD  Blood Pressure: 107/54 mmHg  Height: 175 cm  Weight: 73 kg  BSA: 1.9 m2  ------------------------------------------------------------------------  PROCEDURE: Transthoracic echocardiogram with 2-D, M-Mode  and complete spectral and color flow Doppler.  Intravenous ultrasoundenhancing agent was administered for  improved left ventricular endocardial border definition.  Following the intravenous injection of ultrasound enhancing  agent, harmonic imaging was performed.  INDICATION: Cardiomyopathy, unspecified (I42.9)  ------------------------------------------------------------------------  DIMENSIONS:  Dimensions:     Normal Values:  LA:     4.2 cm    2.0 - 4.0 cm  Ao:     3.7 cm    2.0 - 3.8 cm  SEPTUM: 0.8 cm    0.6 - 1.2 cm  PWT:    1.0 cm    0.6 - 1.1 cm  LVIDd:  6.0 cm    3.0 - 5.6 cm  LVIDs:  5.4 cm    1.8 - 4.0 cm  Derived Variables:  LVMI: 114 g/m2  RWT: 0.33  Fractional short: 10 %  Ejection Fraction (Teicholtz): 21 %  ------------------------------------------------------------------------  OBSERVATIONS:  Mitral Valve: Normal mitral valve. Mild mitral  regurgitation.  Aortic Root: Normal aortic root.  Aortic Valve: Normal trileaflet aortic valve. Minimal  aortic regurgitation.  Left Atrium: Mildly dilated left atrium.  LA volume index =  41 cc/m2.  Left Ventricle: Endocardial visualization enhanced with  intravenous injection of echo contrast (Definity). Severe  global left ventricular systolic dysfunction. Mild left  ventricular enlargement. Moderate diastolic dysfunction  (Stage II).  Right Heart: Normal right atrium. Right ventricular  enlargement with normal right ventricular systolic  function. Normal tricuspid valve. Mild tricuspid  regurgitation. Normal pulmonic valve.  Pericardium/PleuraNormal pericardium with no pericardial  effusion.  ------------------------------------------------------------------------  CONCLUSIONS:  1. Normal trileaflet aortic valve. Minimal aortic  regurgitation.  2. Mildly dilated left atrium.  LA volume index = 41 cc/m2.  3. Mild left ventricular enlargement.  4. Endocardial visualization enhanced with intravenous  injection of echo contrast (Definity). Severe global left  ventricular systolic dysfunction.  5. Moderate diastolic dysfunction (Stage II).  6. Right ventricular enlargement with normal right  ventricular systolic function.  ------------------------------------------------------------------------  Confirmed on  9/13/2021 - 14:27:35 by JEREMY Torrez  ----------------------------------------------------------    < from: Xray Chest 1 View- PORTABLE-Urgent (10.18.21 @ 01:18) >  EXAM:  XR CHEST PORTABLE URGENT 1V        PROCEDURE DATE:  Oct 18 2021         INTERPRETATION:  CLINICAL INFORMATION: Chest Pain.    TECHNIQUE: Frontal radiograph of the chest    COMPARISON: 9/10/2021.    FINDINGS:    Cardiac silhouette is normal. Left chest wall AICD CABG.    Nonspecific patchy bilateral opacities. No pneumothorax    Unremarkable skeletal structures.    IMPRESSION: Bilateral mild increase in lung markings consistent with mild congestion unchanged from the last exam.    < from: CT Chest No Cont (10.18.21 @ 10:27) >  EXAM:  CT CHEST        PROCEDURE DATE:  Oct 18 2021         INTERPRETATION:  CLINICAL INFORMATION: Pneumonia.    COMPARISON: Chest CT 9/12/2021.    CONTRAST/COMPLICATIONS:  IV Contrast: None  Oral Contrast: None  Complications: None    PROCEDURE:  CT of the Chest was performed.  Sagittal and coronal reformats were performed.    FINDINGS:    LUNGS AND AIRWAYS: Patent central airways, with the examination performed during the expiratory phase of respiration.  There are groundglass opacities with more consolidative nodular opacities in the right upper lobe and superior segment of the right lower lobe, majority of which are new since September 12, 2021. There is interlobular septal thickening and diffuse foci of mosaic attenuation. There is diffuse bronchiolar wall thickening.  PLEURA: Trace bilateral pleural effusions.  MEDIASTINUM AND MORTEZA: Multiple enlarged mediastinal lymph nodes have mildly increased in size when compared to the prior study. The largest is a prevascular lymph node which measures 2.6 x 2.0 cm.  VESSELS: Atherosclerotic calcifications of the aorta and at the origin of the great vessels.  HEART: Enlarged. No pericardial effusion. Status post median sternotomy and CABG.  CHEST WALL AND LOWER NECK: Left chest wall ICD is seen with the distal lead tip in the right ventricle.  VISUALIZED UPPER ABDOMEN: Within normal limits.  BONES: Degenerative changes.    IMPRESSION:  Findings of pulmonary edema including cardiomegaly, groundglass opacities, trace bilateral pleural effusions, and diffuse interlobular septal thickening. Presence of more consolidative opacities in the right upper and lower lobes may also be on the basis of pulmonary edema however superimposed infection is not excluded. Recommend follow-up chest CT in 3 months for further characterization.    Mediastinal lymphadenopathy, with many lymph nodes slightly increased in size when compared to prior, may also be on the basis of pulmonary edema. Recommend attention on follow-up chest CT.    Persistent areas of mosaic attenuation, likely on the basis of chronic small airway disease and air trapping.              < end of copied text >

## 2021-10-19 NOTE — PROGRESS NOTE ADULT - ASSESSMENT
66M w/CABG, HFrEF (15-20% per outside records) s/p AICD on 2LNC at home, COPD/asthma, CKD, CVA w/residual L-sided weakness, and gout, recently prescribed PO antibiotics for presumed PNA, coming in for generalized weakness, fevers and UE and LE extremity swelling. He is being treated for HFrEF exacerbation as well as recurrent PNA.          66M w/CABG, HFrEF (15-20% per outside records) s/p AICD on 2LNC at home, COPD/asthma, CKD, CVA w/residual L-sided weakness, and gout, recently prescribed PO antibiotics for presumed PNA, coming in for generalized weakness, fevers and UE and LE extremity swelling. He is being treated for HFrEF exacerbation as well as recurrent PNA. Suspected that the PNA triggered the CHF exacerbation. Also being treated empirically for COPD exacerbation given significant wheezing on exam on admission. Pulm consulted given recurrent PNA and hospitalizations as well as CT chest findings.         66M w/CABG, HFrEF (15-20% per outside records) s/p AICD on 2LNC at home, COPD/asthma, CKD, CVA w/residual L-sided weakness, and gout, recently prescribed PO antibiotics for presumed PNA, coming in for generalized weakness, fevers and UE and LE extremity swelling. He is being treated for HFrEF exacerbation as well as recurrent PNA. Previously taking torsemide 20 mg BID prior to admission. Suspected that the PNA triggered the CHF exacerbation. Also being treated empirically for COPD exacerbation given significant wheezing on exam on admission. Pulm consulted given recurrent PNA and hospitalizations as well as CT chest findings.

## 2021-10-19 NOTE — PROGRESS NOTE ADULT - ATTENDING COMMENTS
Acutely decompensated systolic HF with pulmonary edema, likely because of outpatient underdosing of diuretics.  Increase Bumex to 4 mg iv bid.  Continue other HF meds.  He is a patient of Dr. Kathy Buckner at [facility]. He should f/u with him after d/c.

## 2021-10-19 NOTE — CONSULT NOTE ADULT - ATTENDING COMMENTS
66 year-old M with PMH HFrEF, CVA, COPD, CKD, CAD s/p CABG, p/w acute hypoxic respiratory failure. Pt sx and CT more suggestive of HF along with his elevated BNP and rapid improvement from yesterday. Agree with cont diuresis and his HF medications. Already transitioned from bipap to NC and appears comfortable. Ct does show focal areas of GGO that may be compatible w/ infection and can cont abx for a short course of tx. f/u cx.  Pt reports at least 6 episodes of respiratory failure in the past 2 years w/o clear trigger but agree with optimizing cardiac status but also f/u whether recurrent aspiration plays a role. f/u final swallow eval. No wheezing on exam now and 5 day course of pred will likely suffice    -cont diuresis as per Cardiology  - f/u cx  - complete course of abx for poss PNA  - cont symbicort and prn BD tx  - pred 40mg x 5 days  - bipap prn but if comfortable can maintain on NC  - f/u swallow eval  Outpt f/u for PFTs

## 2021-10-19 NOTE — PROGRESS NOTE ADULT - PROBLEM SELECTOR PLAN 2
Admitted with temp 100.3 rectal with tachypneic, found to septic 2/2 ADHF vs PNA.  -Trial of BiPAP on admission. Recurrent PNA raising suspicion for possible post obstructive process or aspiration PNA given complaints of chest tightness with eating/drinking.  #Plan above for HF  #PNA  - Worsening leukocytosis in comparison to last admission. CXR non-specific b/l opacities.  - Patient was prescribed a 7 day course of cefuroxime but continued to spike fevers with non-productive cough  - Continue Vanc by level, Zosyn (renally dosed) and azithromycin.   - F/u CT Chest read  - Speech eval and treat  - F/u MRSA swab  - Sputum cultures if able  - F/u Legionella urine antigen Admitted with temp 100.3 rectal with tachypneic, found to septic 2/2 ADHF vs PNA.  -Trial of BiPAP on admission. Recurrent PNA raising suspicion for possible post obstructive process or aspiration PNA given complaints of chest tightness with eating/drinking.  #Plan above for HF  #PNA  - Worsening leukocytosis in comparison to last admission. CXR non-specific b/l opacities.  - Patient was prescribed a 7 day course of cefuroxime but continued to spike fevers with non-productive cough  - Continue Vanc by level, Zosyn (renally dosed). Disc azithro 10/19.  - F/u CT Chest read  - Speech eval and treat  - F/u MRSA swab ***  - Sputum cultures if able  - F/u Legionella urine antigen Admitted with temp 100.3 rectal with tachypneic, found to septic 2/2 ADHF vs PNA.  -Trial of BiPAP on admission. Recurrent PNA raising suspicion for possible post obstructive process or aspiration PNA given complaints of chest tightness with eating/drinking. Blood cultures w NGTD. RVP/covid neg.  #Plan above for HF  #PNA  - Worsening leukocytosis in comparison to last admission. CXR non-specific b/l opacities.  - Patient was prescribed a 7 day course of cefuroxime but continued to spike fevers with non-productive cough  - Continue Vanc by level, Zosyn (renally dosed). Disc azithro 10/19.  - F/u CT Chest read  - Speech eval and treat  - Sputum cultures if able  - Pulm consult

## 2021-10-19 NOTE — CONSULT NOTE ADULT - ASSESSMENT
VIEW ALL (Pediatric) 66 year-old M with PMH HFrEF, CVA, COPD, CKD, CAD s/p CABG, and multiple hospitalizations over the past year who presented to the hospital with shortness of breath, admitted for hypoxic respiratory failure in the setting of PNA vs HF. Pulmonary consulted for evaluation of recurrent pneumonia and abnormal CT Chest.    Hypoxic Respiratory Failure  - Likely multifactorial, potentially in the setting of infection and acute on chronic HF  - Patient has improved dramatically with antibiotics and diuresis, would continue diuretics per HF  - CT Chest could be indicative of fluid overload given interlobular septal thickening and groundglass, would repeat CT Chest in 6-8 weeks after discharge  - Mediastinal lymphadenopathy could be reactive, would repeat imaging as above in 6-8 weeks  - Legionella antigen negative, would consider discontinuing azithromycin  - Agree with speech and swallow evaluation  - Wean O2 as tolerated, titrated down to 3L NC at bedside  - Patient should follow up with pulmonary after discharge. Please email: [email or web address] to setup an appointment prior to discharge. Include the patient's name, , MRN and contact information in the email.      [facility]  [address] [place] [address]  [phone]    Herve Sneed, PGY-6  Pulmonary and Critical Care Medicine  [id number]

## 2021-10-19 NOTE — PROGRESS NOTE ADULT - PROBLEM SELECTOR PLAN 3
Normocytic anemia on labs 2/2 iron deficiency.  - Consider IV iron supplementation once patient's PNA clears given HFrEF diagnosis.

## 2021-10-19 NOTE — PROGRESS NOTE ADULT - CONVERSATION DETAILS
Utilized  (Sunshine) to discuss GOC. Specifically addressed code status with him. He is requesting full code status at this time. Understands that he can change his mind at any time.

## 2021-10-20 DIAGNOSIS — J44.1 CHRONIC OBSTRUCTIVE PULMONARY DISEASE WITH (ACUTE) EXACERBATION: ICD-10-CM

## 2021-10-20 LAB
ALBUMIN SERPL ELPH-MCNC: 3.6 G/DL — SIGNIFICANT CHANGE UP (ref 3.3–5)
ALP SERPL-CCNC: 103 U/L — SIGNIFICANT CHANGE UP (ref 40–120)
ALT FLD-CCNC: 19 U/L — SIGNIFICANT CHANGE UP (ref 4–41)
ANION GAP SERPL CALC-SCNC: 13 MMOL/L — SIGNIFICANT CHANGE UP (ref 7–14)
ANION GAP SERPL CALC-SCNC: 17 MMOL/L — HIGH (ref 7–14)
AST SERPL-CCNC: 21 U/L — SIGNIFICANT CHANGE UP (ref 4–40)
BILIRUB DIRECT SERPL-MCNC: <0.2 MG/DL — SIGNIFICANT CHANGE UP (ref 0–0.2)
BILIRUB INDIRECT FLD-MCNC: >0.2 MG/DL — SIGNIFICANT CHANGE UP (ref 0–1)
BILIRUB SERPL-MCNC: 0.4 MG/DL — SIGNIFICANT CHANGE UP (ref 0.2–1.2)
BUN SERPL-MCNC: 38 MG/DL — HIGH (ref 7–23)
BUN SERPL-MCNC: 38 MG/DL — HIGH (ref 7–23)
CALCIUM SERPL-MCNC: 8.7 MG/DL — SIGNIFICANT CHANGE UP (ref 8.4–10.5)
CALCIUM SERPL-MCNC: 9 MG/DL — SIGNIFICANT CHANGE UP (ref 8.4–10.5)
CHLORIDE SERPL-SCNC: 92 MMOL/L — LOW (ref 98–107)
CHLORIDE SERPL-SCNC: 92 MMOL/L — LOW (ref 98–107)
CO2 SERPL-SCNC: 24 MMOL/L — SIGNIFICANT CHANGE UP (ref 22–31)
CO2 SERPL-SCNC: 26 MMOL/L — SIGNIFICANT CHANGE UP (ref 22–31)
CREAT SERPL-MCNC: 2.41 MG/DL — HIGH (ref 0.5–1.3)
CREAT SERPL-MCNC: 2.44 MG/DL — HIGH (ref 0.5–1.3)
GLUCOSE BLDC GLUCOMTR-MCNC: 219 MG/DL — HIGH (ref 70–99)
GLUCOSE BLDC GLUCOMTR-MCNC: 235 MG/DL — HIGH (ref 70–99)
GLUCOSE BLDC GLUCOMTR-MCNC: 243 MG/DL — HIGH (ref 70–99)
GLUCOSE BLDC GLUCOMTR-MCNC: 252 MG/DL — HIGH (ref 70–99)
GLUCOSE SERPL-MCNC: 143 MG/DL — HIGH (ref 70–99)
GLUCOSE SERPL-MCNC: 255 MG/DL — HIGH (ref 70–99)
HCT VFR BLD CALC: 31.2 % — LOW (ref 39–50)
HGB BLD-MCNC: 9.9 G/DL — LOW (ref 13–17)
LACTATE SERPL-SCNC: 1.6 MMOL/L — SIGNIFICANT CHANGE UP (ref 0.5–2)
LACTATE SERPL-SCNC: 2.5 MMOL/L — HIGH (ref 0.5–2)
MAGNESIUM SERPL-MCNC: 1.8 MG/DL — SIGNIFICANT CHANGE UP (ref 1.6–2.6)
MAGNESIUM SERPL-MCNC: 2 MG/DL — SIGNIFICANT CHANGE UP (ref 1.6–2.6)
MCHC RBC-ENTMCNC: 30 PG — SIGNIFICANT CHANGE UP (ref 27–34)
MCHC RBC-ENTMCNC: 31.7 GM/DL — LOW (ref 32–36)
MCV RBC AUTO: 94.5 FL — SIGNIFICANT CHANGE UP (ref 80–100)
MRSA PCR RESULT.: SIGNIFICANT CHANGE UP
NRBC # BLD: 0 /100 WBCS — SIGNIFICANT CHANGE UP
NRBC # FLD: 0 K/UL — SIGNIFICANT CHANGE UP
PHOSPHATE SERPL-MCNC: 3.2 MG/DL — SIGNIFICANT CHANGE UP (ref 2.5–4.5)
PHOSPHATE SERPL-MCNC: 3.7 MG/DL — SIGNIFICANT CHANGE UP (ref 2.5–4.5)
PLATELET # BLD AUTO: 279 K/UL — SIGNIFICANT CHANGE UP (ref 150–400)
POTASSIUM SERPL-MCNC: 3.8 MMOL/L — SIGNIFICANT CHANGE UP (ref 3.5–5.3)
POTASSIUM SERPL-MCNC: 4.2 MMOL/L — SIGNIFICANT CHANGE UP (ref 3.5–5.3)
POTASSIUM SERPL-SCNC: 3.8 MMOL/L — SIGNIFICANT CHANGE UP (ref 3.5–5.3)
POTASSIUM SERPL-SCNC: 4.2 MMOL/L — SIGNIFICANT CHANGE UP (ref 3.5–5.3)
PROT SERPL-MCNC: 6.3 G/DL — SIGNIFICANT CHANGE UP (ref 6–8.3)
RBC # BLD: 3.3 M/UL — LOW (ref 4.2–5.8)
RBC # FLD: 16.7 % — HIGH (ref 10.3–14.5)
S AUREUS DNA NOSE QL NAA+PROBE: SIGNIFICANT CHANGE UP
SODIUM SERPL-SCNC: 131 MMOL/L — LOW (ref 135–145)
SODIUM SERPL-SCNC: 133 MMOL/L — LOW (ref 135–145)
WBC # BLD: 12.98 K/UL — HIGH (ref 3.8–10.5)
WBC # FLD AUTO: 12.98 K/UL — HIGH (ref 3.8–10.5)

## 2021-10-20 PROCEDURE — 99233 SBSQ HOSP IP/OBS HIGH 50: CPT

## 2021-10-20 RX ORDER — IPRATROPIUM/ALBUTEROL SULFATE 18-103MCG
3 AEROSOL WITH ADAPTER (GRAM) INHALATION EVERY 12 HOURS
Refills: 0 | Status: DISCONTINUED | OUTPATIENT
Start: 2021-10-20 | End: 2021-10-23

## 2021-10-20 RX ORDER — FERROUS SULFATE 325(65) MG
325 TABLET ORAL
Refills: 0 | Status: DISCONTINUED | OUTPATIENT
Start: 2021-10-20 | End: 2021-10-23

## 2021-10-20 RX ORDER — MAGNESIUM SULFATE 500 MG/ML
1 VIAL (ML) INJECTION ONCE
Refills: 0 | Status: DISCONTINUED | OUTPATIENT
Start: 2021-10-20 | End: 2021-10-20

## 2021-10-20 RX ORDER — BUMETANIDE 0.25 MG/ML
2 INJECTION INTRAMUSCULAR; INTRAVENOUS
Refills: 0 | Status: DISCONTINUED | OUTPATIENT
Start: 2021-10-20 | End: 2021-10-23

## 2021-10-20 RX ORDER — MAGNESIUM SULFATE 500 MG/ML
1 VIAL (ML) INJECTION ONCE
Refills: 0 | Status: COMPLETED | OUTPATIENT
Start: 2021-10-20 | End: 2021-10-20

## 2021-10-20 RX ORDER — POTASSIUM CHLORIDE 20 MEQ
40 PACKET (EA) ORAL ONCE
Refills: 0 | Status: COMPLETED | OUTPATIENT
Start: 2021-10-20 | End: 2021-10-20

## 2021-10-20 RX ADMIN — PIPERACILLIN AND TAZOBACTAM 25 GRAM(S): 4; .5 INJECTION, POWDER, LYOPHILIZED, FOR SOLUTION INTRAVENOUS at 12:31

## 2021-10-20 RX ADMIN — ISOSORBIDE MONONITRATE 30 MILLIGRAM(S): 60 TABLET, EXTENDED RELEASE ORAL at 12:29

## 2021-10-20 RX ADMIN — HEPARIN SODIUM 5000 UNIT(S): 5000 INJECTION INTRAVENOUS; SUBCUTANEOUS at 05:20

## 2021-10-20 RX ADMIN — Medication 50 MILLIGRAM(S): at 09:15

## 2021-10-20 RX ADMIN — Medication 325 MILLIGRAM(S): at 12:47

## 2021-10-20 RX ADMIN — Medication 650 MILLIGRAM(S): at 14:45

## 2021-10-20 RX ADMIN — Medication 81 MILLIGRAM(S): at 12:28

## 2021-10-20 RX ADMIN — Medication 3 MILLILITER(S): at 03:52

## 2021-10-20 RX ADMIN — BUDESONIDE AND FORMOTEROL FUMARATE DIHYDRATE 2 PUFF(S): 160; 4.5 AEROSOL RESPIRATORY (INHALATION) at 09:08

## 2021-10-20 RX ADMIN — Medication 2: at 09:19

## 2021-10-20 RX ADMIN — BUMETANIDE 2 MILLIGRAM(S): 0.25 INJECTION INTRAMUSCULAR; INTRAVENOUS at 05:14

## 2021-10-20 RX ADMIN — CARVEDILOL PHOSPHATE 3.12 MILLIGRAM(S): 80 CAPSULE, EXTENDED RELEASE ORAL at 21:56

## 2021-10-20 RX ADMIN — SENNA PLUS 2 TABLET(S): 8.6 TABLET ORAL at 21:55

## 2021-10-20 RX ADMIN — CLOPIDOGREL BISULFATE 75 MILLIGRAM(S): 75 TABLET, FILM COATED ORAL at 12:29

## 2021-10-20 RX ADMIN — PIPERACILLIN AND TAZOBACTAM 25 GRAM(S): 4; .5 INJECTION, POWDER, LYOPHILIZED, FOR SOLUTION INTRAVENOUS at 20:20

## 2021-10-20 RX ADMIN — CARVEDILOL PHOSPHATE 3.12 MILLIGRAM(S): 80 CAPSULE, EXTENDED RELEASE ORAL at 09:14

## 2021-10-20 RX ADMIN — Medication 650 MILLIGRAM(S): at 13:24

## 2021-10-20 RX ADMIN — BUMETANIDE 2 MILLIGRAM(S): 0.25 INJECTION INTRAMUSCULAR; INTRAVENOUS at 17:41

## 2021-10-20 RX ADMIN — Medication 650 MILLIGRAM(S): at 21:56

## 2021-10-20 RX ADMIN — Medication 3 MILLILITER(S): at 09:08

## 2021-10-20 RX ADMIN — Medication 3: at 17:50

## 2021-10-20 RX ADMIN — Medication 25 MICROGRAM(S): at 05:19

## 2021-10-20 RX ADMIN — SPIRONOLACTONE 25 MILLIGRAM(S): 25 TABLET, FILM COATED ORAL at 09:15

## 2021-10-20 RX ADMIN — Medication 40 MILLIGRAM(S): at 05:19

## 2021-10-20 RX ADMIN — HEPARIN SODIUM 5000 UNIT(S): 5000 INJECTION INTRAVENOUS; SUBCUTANEOUS at 17:04

## 2021-10-20 RX ADMIN — BUDESONIDE AND FORMOTEROL FUMARATE DIHYDRATE 2 PUFF(S): 160; 4.5 AEROSOL RESPIRATORY (INHALATION) at 21:57

## 2021-10-20 RX ADMIN — TAMSULOSIN HYDROCHLORIDE 0.4 MILLIGRAM(S): 0.4 CAPSULE ORAL at 21:57

## 2021-10-20 RX ADMIN — PIPERACILLIN AND TAZOBACTAM 25 GRAM(S): 4; .5 INJECTION, POWDER, LYOPHILIZED, FOR SOLUTION INTRAVENOUS at 04:40

## 2021-10-20 RX ADMIN — Medication 50 MILLIGRAM(S): at 21:56

## 2021-10-20 RX ADMIN — Medication 40 MILLIEQUIVALENT(S): at 14:37

## 2021-10-20 RX ADMIN — Medication 100 GRAM(S): at 16:57

## 2021-10-20 RX ADMIN — Medication 3 MILLILITER(S): at 19:09

## 2021-10-20 RX ADMIN — Medication 2: at 12:40

## 2021-10-20 RX ADMIN — ATORVASTATIN CALCIUM 20 MILLIGRAM(S): 80 TABLET, FILM COATED ORAL at 21:57

## 2021-10-20 RX ADMIN — Medication 50 MILLIGRAM(S): at 13:25

## 2021-10-20 RX ADMIN — Medication 3 MILLIGRAM(S): at 21:55

## 2021-10-20 NOTE — PROGRESS NOTE ADULT - ASSESSMENT
acute hypoxic respiratory failure  due to acute CHF secondary to PNA    acute systolic CHF  cont current meds  IV bumex   fu with CHF recommendation   s/p ICD   cont BB  cont hydralazine/nitrates    CAD history of cabg   on dapt  on statin   eventual ischemic eval , stress test can be considered as outpt given PNA     CKd  monitor lytes, crt    Dm II  Monitor finger stick. Insulin coverage. Diabetic education and Diabetic diet. Consider nutrition consultation.    PNA  on anbx  plan as per primary team

## 2021-10-20 NOTE — PROGRESS NOTE ADULT - SUBJECTIVE AND OBJECTIVE BOX
Pt alert and oriented in bed. Still in ED. Did not require Bipap overnight. Currently recieving duoneb treatment. HR persistently elevated in the 100 range overnight. UOP 2500 cc.  L nrb. "Feeling better." Continues on dysphagia diet. Denies any chest pain. Has not yet gotten out of bed since being hospitalized.     10/19 Phone call to pt's PCP (Dr. Morillo) [phone] to obtain additional PMH. Office is closed on Tues.    Vital Signs Last 24 Hrs  T(C): 36.5 (19 Oct 2021 06:00), Max: 36.9 (18 Oct 2021 12:59)  T(F): 97.7 (19 Oct 2021 06:00), Max: 98.4 (18 Oct 2021 12:59)  HR: 99 (19 Oct 2021 08:56) (55 - 99)  BP: 97/57 (19 Oct 2021 08:56) (97/57 - 118/73)  BP(mean): --  RR: 15 (19 Oct 2021 08:56) (14 - 24)  SpO2: 98% (19 Oct 2021 08:56) (98% - 100%)    CONSTITUTIONAL: Well-groomed, in no apparent distress, dyspnea while speaking, improved from prior  EYES: No conjunctival or scleral injection, non-icteric; PERRLA and symmetric  ENMT: No external nasal lesions; nasal mucosa not inflamed; no pharyngeal injection or exudates  NECK: Trachea midline without palpable neck mass, unable to appreciate JVD  RESPIRATORY: diffuse crackles in anterior and posterior lung fields, wheezing improved.   CARDIOVASCULAR: +S1S2, RRR, no M/G/R; no carotid bruits; pedal pulses full and symmetric, warm extremities, LUE and LLE w non pitting edema  GASTROINTESTINAL: No palpable masses or tenderness, +BS throughout, no rebound/guarding  MUSCULOSKELETAL: No digital clubbing or cyanosis;  SKIN: No rashes or ulcers noted; no subcutaneous nodules or induration palpable  NEUROLOGIC: CN II-XII grossly intact  PSYCHIATRIC: A+O x 3; mood and affect appropriate; appropriate insight and judgment      < from: CT Chest No Cont (10.18.21 @ 10:27) >    There are groundglass opacities with more consolidative nodular opacities in the right upper lobe and superior segment of the right lower lobe, majority of which are new since September 12, 2021. There is interlobular septal thickening and diffuse foci of mosaic attenuation. There is diffuse bronchiolar wall thickening.  PLEURA: Trace bilateral pleural effusions.  MEDIASTINUM AND MORTEZA: Multiple enlarged mediastinal lymph nodes have mildly increased in size when compared to the prior study. The largest is a prevascular lymph node which measures 2.6 x 2.0 cm.  VESSELS: Atherosclerotic calcifications of the aorta and at the origin of the great vessels.  HEART: Enlarged. No pericardial effusion. Status post median sternotomy and CABG.  CHEST WALL AND LOWER NECK: Left chest wall ICD is seen with the distal lead tip in the right ventricle.  VISUALIZED UPPER ABDOMEN: Within normal limits.  BONES: Degenerative changes.    IMPRESSION:  Findings of pulmonary edema including cardiomegaly, groundglass opacities, trace bilateral pleural effusions, and diffuse interlobular septal thickening. Presence of more consolidative opacities in the right upper and lower lobes may also be on the basis of pulmonary edema however superimposed infection is not excluded. Recommend follow-up chest CT in 3 months for further characterization.    Mediastinal lymphadenopathy, with many lymph nodes slightly increased in size when compared to prior, may also be on the basis of pulmonary edema. Recommend attention on follow-up chest CT.    Persistent areas of mosaic attenuation, likely on the basis of chronic small airway disease and air trapping.      < end of copied text >

## 2021-10-20 NOTE — PROGRESS NOTE ADULT - SUBJECTIVE AND OBJECTIVE BOX
CHIEF COMPLAINT: PNA, HF    Interval Events: Feeling much better this AM, sitting up in bed eating breakfast. Denies shortness of breath.    REVIEW OF SYSTEMS:  Constitutional: No fevers or chills.   Eyes: No itching or discharge from the eyes  ENT: No ear pain. No ear discharge. No nasal congestion.   CV: No chest pain. No palpitations. No lightheadedness or dizziness.   Resp: No dyspnea at rest. No pleuritic pain.  GI: No nausea. No vomiting. No diarrhea.  MSK: No joint pain or pain in any extremities  Integumentary: No skin lesions. No pedal edema.  Neurological: No gross motor weakness. No sensory changes.  [x] All other systems negative  [ ] Unable to assess ROS because ________    OBJECTIVE:  ICU Vital Signs Last 24 Hrs  T(C): 36.7 (20 Oct 2021 09:00), Max: 36.8 (19 Oct 2021 22:07)  T(F): 98.1 (20 Oct 2021 09:00), Max: 98.2 (19 Oct 2021 22:07)  HR: 110 (20 Oct 2021 10:21) (90 - 111)  BP: 132/63 (20 Oct 2021 09:00) (102/67 - 132/63)  BP(mean): --  ABP: --  ABP(mean): --  RR: 18 (20 Oct 2021 09:00) (17 - 19)  SpO2: 99% (20 Oct 2021 10:21) (99% - 100%)        10-19 @ 07:01  -  10-20 @ 07:00  --------------------------------------------------------  IN: 0 mL / OUT: 1200 mL / NET: -1200 mL      CAPILLARY BLOOD GLUCOSE      POCT Blood Glucose.: 219 mg/dL (20 Oct 2021 12:39)      PHYSICAL EXAM:  General: Awake, alert, oriented X 3.   HEENT: Atraumatic, normocephalic.   Lymph Nodes: No palpable lymphadenopathy  Neck: No JVD. No carotid bruit.   Respiratory: Normal chest expansion. Bibasilar crackles.  Cardiovascular: S1 S2 normal. No murmurs, rubs or gallops.   Abdomen: Soft, non-tender, non-distended. No organomegaly.  Extremities: Warm to touch. Peripheral pulse palpable. No pedal edema.   Skin: No rashes or skin lesions  Neurological: Motor and sensory examination equal and normal in all four extremities.  Psychiatry: Appropriate mood and affect.    HOSPITAL MEDICATIONS:  MEDICATIONS  (STANDING):  albuterol/ipratropium for Nebulization 3 milliLiter(s) Nebulizer every 12 hours  aspirin enteric coated 81 milliGRAM(s) Oral daily  atorvastatin 20 milliGRAM(s) Oral at bedtime  budesonide 160 MICROgram(s)/formoterol 4.5 MICROgram(s) Inhaler 2 Puff(s) Inhalation two times a day  buMETAnide Injectable 2 milliGRAM(s) IV Push two times a day  carvedilol 3.125 milliGRAM(s) Oral every 12 hours  clopidogrel Tablet 75 milliGRAM(s) Oral daily  dextrose 40% Gel 15 Gram(s) Oral once  dextrose 5%. 1000 milliLiter(s) (50 mL/Hr) IV Continuous <Continuous>  dextrose 5%. 1000 milliLiter(s) (100 mL/Hr) IV Continuous <Continuous>  dextrose 50% Injectable 25 Gram(s) IV Push once  dextrose 50% Injectable 12.5 Gram(s) IV Push once  dextrose 50% Injectable 25 Gram(s) IV Push once  ferrous    sulfate 325 milliGRAM(s) Oral <User Schedule>  glucagon  Injectable 1 milliGRAM(s) IntraMuscular once  heparin   Injectable 5000 Unit(s) SubCutaneous every 12 hours  hydrALAZINE 50 milliGRAM(s) Oral three times a day  insulin lispro (ADMELOG) corrective regimen sliding scale   SubCutaneous three times a day before meals  insulin lispro (ADMELOG) corrective regimen sliding scale   SubCutaneous at bedtime  isosorbide   mononitrate ER Tablet (IMDUR) 30 milliGRAM(s) Oral daily  levothyroxine 25 MICROGram(s) Oral daily  magnesium sulfate Injectable 1 Gram(s) IV Push once  piperacillin/tazobactam IVPB.. 3.375 Gram(s) IV Intermittent every 8 hours  predniSONE   Tablet 40 milliGRAM(s) Oral daily  senna 2 Tablet(s) Oral at bedtime  spironolactone 25 milliGRAM(s) Oral daily  tamsulosin 0.4 milliGRAM(s) Oral at bedtime    MEDICATIONS  (PRN):  acetaminophen   Tablet .. 650 milliGRAM(s) Oral every 6 hours PRN Temp greater or equal to 38C (100.4F), Mild Pain (1 - 3)  aluminum hydroxide/magnesium hydroxide/simethicone Suspension 30 milliLiter(s) Oral every 4 hours PRN Dyspepsia  melatonin 3 milliGRAM(s) Oral at bedtime PRN Insomnia      LABS:                        9.9    12.98 )-----------( 279      ( 20 Oct 2021 06:24 )             31.2     10-20    133<L>  |  92<L>  |  38<H>  ----------------------------<  143<H>  3.8   |  24  |  2.41<H>    Ca    9.0      20 Oct 2021 06:24  Phos  3.7     10-20  Mg     1.80     10-20                MICROBIOLOGY:     RADIOLOGY:  [ ] Reviewed and interpreted by me    Point of Care Ultrasound Findings:    PFT:    EKG:

## 2021-10-20 NOTE — PROGRESS NOTE ADULT - ASSESSMENT
66M CABG on ASA/Plavix, HFrEF (21% 9/20201 TTE) s/p AICD on 2LNC at home, CKD, CVA with mild left deficits, asthma/COPD (on 2L NC), recurrent PNA infections presents with acute worsening SOB x 1 day. Patient was having 4-5 days of fever, pleuritic chest pain, cough, SOB, wheezing. Patient was prescribed 7 day course of PO Cefuroxime by PCP which patient took for 3-4 days with minimal relief of respiratory symptoms. On day of admission, patient felt extremely weak with sensation of something stuck in his throat, worsening SOB and b/l LE and UE swelling. At that point, daughter called EMS who brought patient to ED. Per EMS, was placed on NRB satting 92%, then transitioned to CPAP with improvement in hypoxia and respiratory distress. In ED, patient placed on BiPAP with improvement in respiratory symptoms  Transitioned to 6 liter nasal cannula with improvement in symptoms, currently on 2L.    PLAN:  Acute systolic CHF exacerbation/ Pneumonia  -patient with underlying infection with  improvement today, weaned to 2 liter nasal cannula  -JVP 7 cm  -lungs with bibasilar crackles, CT with pulm edema, bilateral pleural effusions and ground glass opacities  -would continue his heart failure GDMT B/P 132/63  and   bpm, sinus tachy with PVCs and triplets  - Mg 1 mg IVP ordered today  - keep K>4 and Mg >2  - daily weight  -coreg 3.125 bid  -continue  bumex 2 mg IV BID, monitor BUN/creat  -isordil 30 po qd  -hydral 50 tid  -BUN/creat decreased  to 38/ 2.41 from 39/2.59    - would continue  vero 25 qd  - serum pro BNP 10/18 was 8177, lactate 1.2  - ECHO reviewed, EF is 21%,     CAD/CABG:  - no active chest pain  -continue asa and Plavix and Crestor 5    COPD, now with PNA  -CXR reviewed  -chest CT with pulm edema, ground glass opacities, bilateral pleural effusions  -support oxygenation  -f/u with cultures  -continue to treat with antibiotics  -RVP panel and COVID are negative    Further recommendations to follow   66M CABG on ASA/Plavix, HFrEF (21% 9/20201 TTE) s/p AICD on 2LNC at home, CKD, CVA with mild left deficits, asthma/COPD (on 2L NC), recurrent PNA infections presents with acute worsening SOB x 1 day. Patient was having 4-5 days of fever, pleuritic chest pain, cough, SOB, wheezing. Patient was prescribed 7 day course of PO Cefuroxime by PCP which patient took for 3-4 days with minimal relief of respiratory symptoms. On day of admission, patient felt extremely weak with sensation of something stuck in his throat, worsening SOB and b/l LE and UE swelling. At that point, daughter called EMS who brought patient to ED. Per EMS, was placed on NRB satting 92%, then transitioned to CPAP with improvement in hypoxia and respiratory distress. In ED, patient placed on BiPAP with improvement in respiratory symptoms  Transitioned to 6 liter nasal cannula with improvement in symptoms, currently on 2L.    PLAN:  1 Acute systolic CHF exacerbation  -patient with underlying infection with  improvement today, weaned to 2 liter nasal cannula  -JVP 7 cm  -lungs with bibasilar crackles, CT with pulm edema, bilateral pleural effusions and ground glass opacities  -would continue his heart failure GDMT B/P 132/63  and   bpm, sinus tachy with PVCs and triplets  - Mg 1 mg IVP ordered today  - keep K>4 and Mg >2  - daily weight  -coreg 3.125 bid  -continue  bumex 2 mg IV BID, monitor BUN/creat  -isordil 30 po qd  -hydral 50 tid  -BUN/creat decreased  to 38/ 2.41 from 39/2.59    - would continue  vero 25 qd  - serum pro BNP 10/18 was 8177, lactate 1.2  - please repeat Pro BNP tomorrow   - ECHO reviewed, EF is 21%,     2 CAD/CABG:  - no active chest pain  -continue asa and Plavix and Crestor 5    3 COPD, now with PNA  - CXR reviewed  -chest CT with pulm edema, ground glass opacities, bilateral pleural effusions  -support oxygenation  - all  cultures are negative up to date   -continue to treat with antibiotics  -RVP panel and COVID are negative    Further recommendations to follow   66M CABG on ASA/Plavix, HFrEF (21% 9/20201 TTE) s/p AICD on 2LNC at home, CKD, CVA with mild left deficits, asthma/COPD (on 2L NC), recurrent PNA infections presents with acute worsening SOB x 1 day. Patient was having 4-5 days of fever, pleuritic chest pain, cough, SOB, wheezing. Patient was prescribed 7 day course of PO Cefuroxime by PCP which patient took for 3-4 days with minimal relief of respiratory symptoms. On day of admission, patient felt extremely weak with sensation of something stuck in his throat, worsening SOB and b/l LE and UE swelling. At that point, daughter called EMS who brought patient to ED. Per EMS, was placed on NRB satting 92%, then transitioned to CPAP with improvement in hypoxia and respiratory distress. In ED, patient placed on BiPAP with improvement in respiratory symptoms  Transitioned to 6 liter nasal cannula with improvement in symptoms, currently on 2L.    PLAN:  1 Acute systolic CHF exacerbation  -patient with underlying infection with  improvement today, weaned to 2 liter nasal cannula  -JVP 7 cm  -lungs with bibasilar crackles, CT with pulm edema, bilateral pleural effusions and ground glass opacities  -would continue his heart failure GDMT B/P 132/63  and   bpm, sinus tachy with PVCs and triplets  - ECHO reviewed, EF is 21%, LVIDd:  6.0 cm    - Mg 1 mg IVP and KCL 40 mcg ordered today  - keep K>4 and Mg >2  - daily weight  -coreg 3.125 bid  -continue  bumex 2 mg IV BID, monitor BUN/creat  -continue Imdur  30 po qd, hydralazine  50 tid  - BUN/creat decreased  to 38/ 2.41 from 39/2.59    - would continue  vero 25 qd  - serum pro BNP 10/18 was 8177, lactate 1.2  - please repeat Pro BNP tomorrow       2 CAD/CABG:  - no active chest pain  -continue asa and Plavix and Crestor 5    3 COPD, now with PNA  - CXR reviewed  - chest CT with pulm edema, ground glass opacities, bilateral pleural effusions  - support oxygenation   all  cultures are negative up to date   -continue to treat with antibiotics  -RVP panel and COVID are negative  - Pulmonary following      Further recommendations to follow   66M CABG on ASA/Plavix, HFrEF (21% 9/20201 TTE) s/p AICD on 2LNC at home, CKD, CVA with mild left deficits, asthma/COPD (on 2L NC), recurrent PNA infections presents with acute worsening SOB x 1 day. Patient was having 4-5 days of fever, pleuritic chest pain, cough, SOB, wheezing. Patient was prescribed 7 day course of PO Cefuroxime by PCP which patient took for 3-4 days with minimal relief of respiratory symptoms. On day of admission, patient felt extremely weak with sensation of something stuck in his throat, worsening SOB and b/l LE and UE swelling. At that point, daughter called EMS who brought patient to ED. Per EMS, was placed on NRB satting 92%, then transitioned to CPAP with improvement in hypoxia and respiratory distress. In ED, patient placed on BiPAP with improvement in respiratory symptoms  Transitioned to 6 liter nasal cannula with improvement in symptoms, currently on 2L.    PLAN:  1 Acute systolic CHF exacerbation  -patient with underlying infection with  improvement today, weaned to 2 liter nasal cannula--> off O2 by 5 pm  -JVP 7 cm  - lungs with bibasilar crackles, CT with pulm edema, bilateral pleural effusions and ground glass opacities  - would continue his heart failure GDMT B/P 132/63  and   bpm, sinus tachy with PVCs and triplets  - ECHO reviewed, EF is 21%, LVIDd:  6.0 cm    - Mg 1 mg IVP and KCL 40 mcg ordered today  - keep K>4 and Mg >2  - daily weight  -coreg 3.125 bid  -continue  bumex 2 mg IV BID,  - renal function improving with  diuretics, BUN/creat decreased  to 38/ 2.41 from 39/2.59    -continue Imdur  30 po qd, hydralazine  50 tid  - would continue  vero 25 qd  - serum pro BNP 10/18 was 8177, lactate 1.2  - please repeat Pro BNP tomorrow         2 CAD/CABG:  - no active chest pain  -continue asa and Plavix and Crestor 5  - no need for ischemic work up at this time     3 COPD, now with PNA  - CXR reviewed  - chest CT with pulm edema, ground glass opacities, bilateral pleural effusions  - support oxygenation   all  cultures are negative up to date   -continue to treat with antibiotics  -RVP panel and COVID are negative  - Pulmonary following      Further recommendations to follow

## 2021-10-20 NOTE — PROGRESS NOTE ADULT - SUBJECTIVE AND OBJECTIVE BOX
DATE OF SERVICE: 10-20-21 @ 06:39    Subjective: Patient seen and examined. No new events except as noted.     SUBJECTIVE/ROS:    feels better today     MEDICATIONS:  MEDICATIONS  (STANDING):  albuterol/ipratropium for Nebulization 3 milliLiter(s) Nebulizer every 6 hours  aspirin enteric coated 81 milliGRAM(s) Oral daily  atorvastatin 20 milliGRAM(s) Oral at bedtime  budesonide 160 MICROgram(s)/formoterol 4.5 MICROgram(s) Inhaler 2 Puff(s) Inhalation two times a day  buMETAnide Injectable 2 milliGRAM(s) IV Push two times a day  carvedilol 3.125 milliGRAM(s) Oral every 12 hours  clopidogrel Tablet 75 milliGRAM(s) Oral daily  dextrose 40% Gel 15 Gram(s) Oral once  dextrose 5%. 1000 milliLiter(s) (50 mL/Hr) IV Continuous <Continuous>  dextrose 5%. 1000 milliLiter(s) (100 mL/Hr) IV Continuous <Continuous>  dextrose 50% Injectable 25 Gram(s) IV Push once  dextrose 50% Injectable 12.5 Gram(s) IV Push once  dextrose 50% Injectable 25 Gram(s) IV Push once  glucagon  Injectable 1 milliGRAM(s) IntraMuscular once  heparin   Injectable 5000 Unit(s) SubCutaneous every 12 hours  hydrALAZINE 50 milliGRAM(s) Oral three times a day  insulin lispro (ADMELOG) corrective regimen sliding scale   SubCutaneous three times a day before meals  insulin lispro (ADMELOG) corrective regimen sliding scale   SubCutaneous at bedtime  isosorbide   mononitrate ER Tablet (IMDUR) 30 milliGRAM(s) Oral daily  levothyroxine 25 MICROGram(s) Oral daily  piperacillin/tazobactam IVPB.. 3.375 Gram(s) IV Intermittent every 8 hours  predniSONE   Tablet 40 milliGRAM(s) Oral daily  senna 2 Tablet(s) Oral at bedtime  spironolactone 25 milliGRAM(s) Oral daily  tamsulosin 0.4 milliGRAM(s) Oral at bedtime      PHYSICAL EXAM:  T(C): 36.6 (10-20-21 @ 05:00), Max: 36.8 (10-19-21 @ 22:07)  HR: 108 (10-20-21 @ 05:00) (88 - 108)  BP: 102/67 (10-20-21 @ 05:00) (97/57 - 118/78)  RR: 18 (10-20-21 @ 05:00) (15 - 19)  SpO2: 100% (10-20-21 @ 05:00) (98% - 100%)  Wt(kg): --  I&O's Summary    18 Oct 2021 07:01  -  19 Oct 2021 07:00  --------------------------------------------------------  IN: 0 mL / OUT: 1300 mL / NET: -1300 mL    19 Oct 2021 07:01  -  20 Oct 2021 06:39  --------------------------------------------------------  IN: 0 mL / OUT: 1200 mL / NET: -1200 mL            JVP: Normal  Neck: supple  Lung: clear   CV: S1 S2 , Murmur:  Abd: soft  Ext: No edema  neuro: Awake / alert  Psych: flat affect  Skin: normal``    LABS/DATA:    CARDIAC MARKERS:                                8.8    12.86 )-----------( 232      ( 19 Oct 2021 05:42 )             27.6     10-19    135  |  96<L>  |  39<H>  ----------------------------<  114<H>  4.0   |  26  |  2.59<H>    Ca    9.0      19 Oct 2021 05:42  Phos  5.0     10-19  Mg     1.80     10-19      proBNP:   Lipid Profile:   HgA1c:   TSH:     TELE:  EKG:

## 2021-10-20 NOTE — PROGRESS NOTE ADULT - PROBLEM SELECTOR PLAN 3
Will decr frequency of duonebs to q 12 hours given ongoing tachycardia and improved wheezing.   - On day 4/5 of prednisone 40 mg daily

## 2021-10-20 NOTE — PHYSICAL THERAPY INITIAL EVALUATION ADULT - DIAGNOSIS, PT EVAL
Pt admitted for PNA and Heart Failure Exacerbation; pt presents with decreased strength, decreased balance, and decreased aerobic capacity/endurance.

## 2021-10-20 NOTE — PROGRESS NOTE ADULT - PROBLEM SELECTOR PLAN 4
Normocytic anemia on labs 2/2 iron deficiency.  - Oral iron initiated q 48 hrs. Consider IV iron supplementation once patient's PNA clears given HFrEF diagnosis.

## 2021-10-20 NOTE — PROGRESS NOTE ADULT - PROBLEM SELECTOR PLAN 2
Admitted with temp 100.3 rectal with tachypneic, found to septic 2/2 ADHF vs PNA. Patient was prescribed a 7 day course of cefuroxime but continued to spike fevers with non-productive coughHas been afebrile since admission.  Last utilized bipap 10/18. Recurrent PNA raising suspicion for possible post obstructive process or aspiration PNA given complaints of chest tightness with eating/drinking. Blood cultures w NGTD. RVP/covid neg. Swallow eval positive. Pt currently on dysphagia diet.  -CT chest wo concerning for GGO throughout; no cavitating lesions. Mediastinal LAD. Results are above.    #Plan above for HF  #Leukocytosis, POA  #PNA  - Discont vanc (though pt does have MRSA risk factors given hx of PNA in the past 3 months, his CT findings are not consistent with MRSA PNA and nephrotoxicity risk is high for vanc/zosyn combo in the setting of known CKD requiring aggressive diuresis)  - Cont zosyn for a 7 day course (day 4). Significant risk factors for pseudomonas given hx.  - Disc azithro 10/19.  - Speech eval and treat  - Sputum cultures ordered, not done  - Pulm consulted

## 2021-10-20 NOTE — PROGRESS NOTE ADULT - SUBJECTIVE AND OBJECTIVE BOX
Patient is seen and examined sitting at the bed. States he feels better, currently on 2L O2 sat 98% down  from 6L. No fever for 24hrs    Medications:  acetaminophen   Tablet .. 650 milliGRAM(s) Oral every 6 hours PRN  albuterol/ipratropium for Nebulization 3 milliLiter(s) Nebulizer every 6 hours  aluminum hydroxide/magnesium hydroxide/simethicone Suspension 30 milliLiter(s) Oral every 4 hours PRN  aspirin enteric coated 81 milliGRAM(s) Oral daily  atorvastatin 20 milliGRAM(s) Oral at bedtime  budesonide 160 MICROgram(s)/formoterol 4.5 MICROgram(s) Inhaler 2 Puff(s) Inhalation two times a day  buMETAnide Injectable 2 milliGRAM(s) IV Push two times a day  carvedilol 3.125 milliGRAM(s) Oral every 12 hours  clopidogrel Tablet 75 milliGRAM(s) Oral daily  dextrose 40% Gel 15 Gram(s) Oral once  dextrose 5%. 1000 milliLiter(s) IV Continuous <Continuous>  dextrose 5%. 1000 milliLiter(s) IV Continuous <Continuous>  dextrose 50% Injectable 25 Gram(s) IV Push once  dextrose 50% Injectable 12.5 Gram(s) IV Push once  dextrose 50% Injectable 25 Gram(s) IV Push once  ferrous    sulfate 325 milliGRAM(s) Oral <User Schedule>  glucagon  Injectable 1 milliGRAM(s) IntraMuscular once  heparin   Injectable 5000 Unit(s) SubCutaneous every 12 hours  hydrALAZINE 50 milliGRAM(s) Oral three times a day  insulin lispro (ADMELOG) corrective regimen sliding scale   SubCutaneous three times a day before meals  insulin lispro (ADMELOG) corrective regimen sliding scale   SubCutaneous at bedtime  isosorbide   mononitrate ER Tablet (IMDUR) 30 milliGRAM(s) Oral daily  levothyroxine 25 MICROGram(s) Oral daily  magnesium sulfate Injectable 1 Gram(s) IV Push once  melatonin 3 milliGRAM(s) Oral at bedtime PRN  piperacillin/tazobactam IVPB.. 3.375 Gram(s) IV Intermittent every 8 hours  predniSONE   Tablet 40 milliGRAM(s) Oral daily  senna 2 Tablet(s) Oral at bedtime  spironolactone 25 milliGRAM(s) Oral daily  tamsulosin 0.4 milliGRAM(s) Oral at bedtime      Vitals:  Vital Signs Last 24 Hrs  T(C): 36.7 (20 Oct 2021 09:00), Max: 36.8 (19 Oct 2021 22:07)  T(F): 98.1 (20 Oct 2021 09:00), Max: 98.2 (19 Oct 2021 22:07)  HR: 108 (20 Oct 2021 09:00) (90 - 108)  BP: 132/63 (20 Oct 2021 09:00) (102/67 - 132/63)  BP(mean): --  RR: 18 (20 Oct 2021 09:00) (17 - 19)  SpO2: 99% (20 Oct 2021 09:00) (99% - 100%)    Daily     Daily Weight in k.3 (20 Oct 2021 05:00)    I&O's Detail    19 Oct 2021 07:01  -  20 Oct 2021 07:00  --------------------------------------------------------  IN:  Total IN: 0 mL    OUT:    Voided (mL): 1200 mL  Total OUT: 1200 mL    Total NET: -1200 mL          Physical Exam:     General: No distress. Comfortable.  HEENT: EOM intact.  Neck: Neck supple. JVP not elevated. No masses  Chest: Bibasilar crackles b/l  CV: Normal S1 and S2. No murmurs, rub, or gallops. Radial pulses normal.  Abdomen: Soft, non-distended, non-tender  Skin: No rashes or skin breakdown  Neurology: Alert and oriented times three. Sensation intact  Psych: Affect normal    Labs:                        9.9    12.98 )-----------( 279      ( 20 Oct 2021 06:24 )             31.2     10-20    133<L>  |  92<L>  |  38<H>  ----------------------------<  143<H>  3.8   |  24  |  2.41<H>    Ca    9.0      20 Oct 2021 06:24  Phos  3.7     10-20  Mg     1.80     10-20         TTE with Doppler (w/Cont) (21 @ 09:11) >  ------------------------------------------------------------------------  DIMENSIONS:  Dimensions:     Normal Values:  LA:     4.2 cm    2.0 - 4.0 cm  Ao:     3.7 cm    2.0 - 3.8 cm  SEPTUM: 0.8 cm    0.6 - 1.2 cm  PWT:    1.0 cm    0.6 - 1.1 cm  LVIDd:  6.0 cm    3.0 - 5.6 cm  LVIDs:  5.4 cm    1.8 - 4.0 cm  Derived Variables:  LVMI: 114 g/m2  RWT: 0.33  Fractional short: 10 %  Ejection Fraction (Teicholtz): 21 %  ------------------------------------------------------------------------  OBSERVATIONS:  Mitral Valve: Normal mitral valve. Mild mitral  regurgitation.  Aortic Root: Normal aortic root.  Aortic Valve: Normal trileaflet aortic valve. Minimal  aortic regurgitation.  Left Atrium: Mildly dilated left atrium.  LA volume index =  41 cc/m2.  Left Ventricle: Endocardial visualization enhanced with  intravenous injection of echo contrast (Definity). Severe  global left ventricular systolic dysfunction. Mild left  ventricular enlargement. Moderate diastolic dysfunction  (Stage II).  Right Heart: Normal right atrium. Right ventricular  enlargement with normal right ventricular systolic  function. Normal tricuspid valve. Mild tricuspid  regurgitation. Normal pulmonic valve.  Pericardium/PleuraNormal pericardium with no pericardial  effusion.  ------------------------------------------------------------------------  CONCLUSIONS:  1. Normal trileaflet aortic valve. Minimal aortic  regurgitation.  2. Mildly dilated left atrium.  LA volume index = 41 cc/m2.  3. Mild left ventricular enlargement.  4. Endocardial visualization enhanced with intravenous  injection of echo contrast (Definity). Severe global left  ventricular systolic dysfunction.  5. Moderate diastolic dysfunction (Stage II).  6. Right ventricular enlargement with normal right  ventricular systolic function.         CT Chest No Cont (10.18.21 @ 10:27) >      LUNGS AND AIRWAYS: Patent central airways, with the examination performed during the expiratory phase of respiration.  There are groundglass opacities with more consolidative nodular opacities in the right upper lobe and superior segment of the right lower lobe, majority of which are new since 2021. There is interlobular septal thickening and diffuse foci of mosaic attenuation. There is diffuse bronchiolar wall thickening.  PLEURA: Trace bilateral pleural effusions.  MEDIASTINUM AND MORTEZA: Multiple enlarged mediastinal lymph nodes have mildly increased in size when compared to the prior study. The largest is a prevascular lymph node which measures 2.6 x 2.0 cm.  VESSELS: Atherosclerotic calcifications of the aorta and at the origin of the great vessels.  HEART: Enlarged. No pericardial effusion. Status post median sternotomy and CABG.  CHEST WALL AND LOWER NECK: Left chest wall ICD is seen with the distal lead tip in the right ventricle.  VISUALIZED UPPER ABDOMEN: Within normal limits.  BONES: Degenerative changes.    IMPRESSION:  Findings of pulmonary edema including cardiomegaly, groundglass opacities, trace bilateral pleural effusions, and diffuse interlobular septal thickening. Presence of more consolidative opacities in the right upper and lower lobes may also be on the basis of pulmonary edema however superimposed infection is not excluded. Recommend follow-up chest CT in 3 months for further characterization.    Mediastinal lymphadenopathy, with many lymph nodes slightly increased in size when compared to prior, may also be on the basis of pulmonary edema. Recommend attention on follow-up chest CT.    Persistent areas of mosaic attenuation, likely on the basis of chronic small airway disease and air trapping.      Serum Pro-Brain Natriuretic Peptide (10.18.21 @ 01:38)    Serum Pro-Brain Natriuretic Peptide: 8177:

## 2021-10-20 NOTE — SWALLOW BEDSIDE ASSESSMENT ADULT - ASR SWALLOW RECOMMEND DIAG
Cinesophogram is recommended given patient history of recurrent pneumonia, recent chest imagine and leukocytosis./VFSS/MBS Cinesophogram is recommended given patient history of recurrent pneumonia, recent chest imaging and leukocytosis./VFSS/MBS

## 2021-10-20 NOTE — SWALLOW BEDSIDE ASSESSMENT ADULT - DIET PRIOR TO ADMI
Regular diet with thin liquids, per patient report
Regular solids with thin liquids, per patient report.

## 2021-10-20 NOTE — PHYSICAL THERAPY INITIAL EVALUATION ADULT - PATIENT PROFILE REVIEW, REHAB EVAL
No Active Activity Order; spoke with CAROLE BONDS and RN Robbie and RN Golden Buckner prior to PT evaluation--> Pt OK for PT consult/OOB activity./yes

## 2021-10-20 NOTE — PROGRESS NOTE ADULT - ASSESSMENT
66 year-old M with PMH HFrEF, CVA, COPD, CKD, CAD s/p CABG, and multiple hospitalizations over the past year who presented to the hospital with shortness of breath, admitted for hypoxic respiratory failure in the setting of PNA vs HF. Pulmonary consulted for evaluation of recurrent pneumonia and abnormal CT Chest.    Hypoxic Respiratory Failure  - Likely multifactorial, potentially in the setting of infection and acute on chronic HF  - Patient has improved dramatically with antibiotics and diuresis, would continue diuretics per HF  - CT Chest could be indicative of fluid overload given interlobular septal thickening and groundglass, would repeat CT Chest in 6-8 weeks after discharge  - Mediastinal lymphadenopathy could be reactive, would repeat imaging as above in 6-8 weeks  - Agree with speech and swallow evaluation  - Wean O2 as tolerated  - Patient should follow up with pulmonary after discharge. Please email: [email or web address] to setup an appointment prior to discharge. Include the patient's name, , MRN and contact information in the email.      Pulmonary/Sleep Clinic  [address] [place] [address]  [phone]    Herve Sneed, PGY-6  Pulmonary and Critical Care Medicine  [id number] 66 year-old M with PMH HFrEF, CVA, COPD, CKD, CAD s/p CABG, and multiple hospitalizations over the past year who presented to the hospital with shortness of breath, admitted for hypoxic respiratory failure in the setting of PNA vs HF. Pulmonary consulted for evaluation of recurrent pneumonia and abnormal CT Chest.    Hypoxic Respiratory Failure  - Likely multifactorial, potentially in the setting of infection and acute on chronic HF  - Patient has improved dramatically with antibiotics and diuresis, would continue diuretics per HF  - Can use 5 day course of prednisone 40mg  - CT Chest could be indicative of fluid overload given interlobular septal thickening and groundglass, would repeat CT Chest in 6-8 weeks after discharge  - Mediastinal lymphadenopathy could be reactive, would repeat imaging as above in 6-8 weeks  - Agree with speech and swallow evaluation  - Wean O2 as tolerated  - Patient should follow up with pulmonary after discharge. Please email: [email or web address] to setup an appointment prior to discharge. Include the patient's name, , MRN and contact information in the email.      Pulmonary/Sleep Clinic  [address] [place] [address]  [phone]    Herve Sneed, PGY-6  Pulmonary and Critical Care Medicine  [id number]

## 2021-10-20 NOTE — PROGRESS NOTE ADULT - ATTENDING COMMENTS
Agree with above  History of vasculopathy a/w CVA at young age (with residual L deficits), ICM a/w HFrEF (21% s/p ICD), ?COPD (ex-smoker) p/w cough and fever  Has had recurrent admissions for ?pneumonia  CT shows ground-glass opacities with apical scarring and some evidence of tree-in-bud (but less than seen on prior exams)  Symptomatically improving with antibiotics and diuresis  Short course of steroids  Would get repeat imaging to document resolution of opacities and lymphadenopathy  Pulmonary F/U as O/P  Titrate FiO2 as tolerated

## 2021-10-20 NOTE — SWALLOW BEDSIDE ASSESSMENT ADULT - SWALLOW EVAL: DIAGNOSIS
1. Mild oral stage deficits given thin liquids, nectar thickened liquids, puree and mechanical soft solid marked by adequate bolus containment, bolus manipulation, prolonged mastication with adequate ability to break down solid textures and adequate anterior-posterior transport. Patient observed with increased work of breathing during mastication of mechanical soft solid and expressed to SLP feeling 'short of breath'. Piecemeal swallow observed for mechanical soft (~2 swallows). 2. Pharyngeal stage marked by observed initiation of the pharyngeal swallow trigger and hyolaryngeal excursion judged via laryngeal palpation. Patient observed with wet vocal quality and delayed cough repsonse following thin liquids, possibly indicative of aspiration/penetration. No overt s/sx of aspiration/penetration observed for nectar thickened liquids, puree and mechanical soft solids.
1. Mild oral stage deficits given thin liquids, nectar thickened liquids, honey thickened liquids, puree and mechanical soft solid marked by adequate bolus containment, bolus manipulation, timely mastication with ability to break down solid textures, and adequate anterior-posterior transport. Mild lingual and palatal residue observed post primary swallow for mechanical soft solid though cleared with clinician cue to initiation liquid wash. 2. Pharyngeal phase marked by observed initiation of the pharyngeal swallow trigger and hyolaryngeal excursion judged via laryngeal palpation. Patient observed with multiple swallows and wet vocal quality following thin and nectar thickened liquid trials, possibly indicative of pharyngeal residue vs laryngeal aspiration/penetration. No overt s/sx of laryngeal aspiration/penetration observed for puree, mechanical soft solids, honey thickened liquids.

## 2021-10-20 NOTE — SWALLOW BEDSIDE ASSESSMENT ADULT - ORAL PHASE
Within functional limits Increased work of breathing/inability coordinating respiration/swallow/Decreased anterior-posterior movement of the bolus/Delayed oral transit time

## 2021-10-20 NOTE — PROGRESS NOTE ADULT - ASSESSMENT
66M w/CABG, HFrEF (15-20%) s/p AICD on 2LNC at home, COPD/asthma, CKD, CVA w/residual L-sided weakness, and gout, recently prescribed PO antibiotics for presumed PNA prior to admission, admitted for generalized weakness, fevers and UE and LE extremity swelling. He is being treated for HFrEF exacerbation as well as recurrent PNA. Previously taking torsemide 20 mg BID prior to admission. Suspected that the PNA triggered the CHF exacerbation as pt has been adherent w HF medications. Also being treated empirically for COPD exacerbation given significant wheezing on exam on admission. Pulm consulted given recurrent PNA and hospitalizations as well as CT chest findings. AHF consulted. Cardiology also consulted.

## 2021-10-20 NOTE — PROGRESS NOTE ADULT - PROBLEM SELECTOR PLAN 7
DVT ppx: HSQ  Diet: DASH/CC  Dispo: PT  Attn at follow up: Needs repeat CT chest in 2022. See pulm -Please email: [email or web address] to setup an appointment prior to discharge. Include the patient's name, , MRN and contact information in the email.

## 2021-10-20 NOTE — PROGRESS NOTE ADULT - PROBLEM SELECTOR PLAN 1
-H/o CAD s/p CABG 2014 HFrEF (15-20% per outside records) s/p AICD, CKD, CVA w/ residual L-sided. Last LHC done at [facility] ("many years ago" per pt).  pro-BNP 8177, up from 5957 on last admission. Troponins peaked at 90.  - Cards consulted, no current plans for ischemic work up this admission   - AHF consulted, appreciate recs.  - Concerning that pt is still tachycardic in the 100s with a new AG 10/20.   - Check lactic acid, BMP 6 pm  - strict I&Os, daily standing weights  - Will c/w home isordil, hydralazine, and coreg  - S/p IV Lasix 40 IV push, I/O data unavailable, but pt reports no significant urine output, CXR w significant pulm edema. Responded to 80 mg IV lasix. Cards recommended transitioning to 2mg bumex BID IV on 10/19.  - Cont to closely monitor BUN/Cr while on IV bumex  - pt not on ACE/ARB potentially due to CKD  - Echo w/ global LV dysfunction, EF 21  - C/w ASA 81

## 2021-10-20 NOTE — PHYSICAL THERAPY INITIAL EVALUATION ADULT - ADDITIONAL COMMENTS
Pt reports that he lives in a private house with his wife and daughter with 3 steps to enter; (+)bilateral handrails; bedroom/bathroom is on the first floor. Prior to hospital admission pt ambulated both independently and with assistance using a rolling walker. Pt was getting Home PT 3x/week and denies any recent falls. He uses O2 @ night    Pt left comfortable seated @edge of bed, NAD, all lines intact, all precautions maintained, with call bell in reach, and RN aware of PT evaluation.

## 2021-10-20 NOTE — SWALLOW BEDSIDE ASSESSMENT ADULT - COMMENTS
As per Pulmonology Note on 10/19/21, "66 year-old M with PMH HFrEF, CVA, COPD, CKD, CAD s/p CABG, and multiple hospitalizations over the past year who presented to the hospital with shortness of breath, admitted for hypoxic respiratory failure in the setting of PNA vs HF. Pulmonary consulted for evaluation of recurrent pneumonia and abnormal CT Chest."    CT Chest 10/18/21: "Findings of pulmonary edema including cardiomegaly, groundglass opacities, trace bilateral pleural effusions, and diffuse interlobular septal thickening. Presence of more consolidative opacities in the right upper and lower lobes may also be on the basis of pulmonary edema however superimposed infection is not excluded"    WBC Elevated     Patient is familiar to this department as the patient was seen on 10/18/21 for a clinical assessment of swallow function in the EDU with recommendation of puree and honey thickened liquids. Please see full report for details.     Patient received upright in bed, awake and alert for a re-assessment of swallow function with supplemental oxygen in place via nasal canula (2L). Patient observed with harsh vocal quality prior to provision of PO trials. Patient continues to endorse globus sensation.
As per Hospitalist Note on 10/18/21, "66M w/CABG, HFrEF (15-20% per outside records) s/p AICD on 2LNC at home, COPD/asthma, CKD, CVA w/residual L-sided weakness, and gout, recently prescribed PO antibiotics for presumed PNA, coming in for generalized weakness, fevers and UE and LE extremity swelling. He is being treated for HFrEF exacerbation as well as recurrent PNA. "    WBC elevated, XR Chest 10/18/21: 'Bilateral mild increase in lung markings consistent with mild congestion unchanged from the last exam.'    Patient received upright in bed, awake and alert for a clinical assessment of swallow function. Patient with non-rebreather on at time of evaluation; RN placed patient on 6L of O2 via nasal canula prior to provision of PO trials. SpO2 remained between 98% and 100% throughout ~15 minute evaluation. Patient endorsed chest pain and globus sensation when swallowing solids. Patient also observed with harsh vocal quality.

## 2021-10-20 NOTE — PHYSICAL THERAPY INITIAL EVALUATION ADULT - PRECAUTIONS/LIMITATIONS, REHAB EVAL
+2L of O2 through nasal cannula/cardiac precautions/fall precautions/oxygen therapy device and L/min

## 2021-10-21 LAB
ANION GAP SERPL CALC-SCNC: 15 MMOL/L — HIGH (ref 7–14)
BUN SERPL-MCNC: 35 MG/DL — HIGH (ref 7–23)
CALCIUM SERPL-MCNC: 8.8 MG/DL — SIGNIFICANT CHANGE UP (ref 8.4–10.5)
CHLORIDE SERPL-SCNC: 95 MMOL/L — LOW (ref 98–107)
CO2 SERPL-SCNC: 26 MMOL/L — SIGNIFICANT CHANGE UP (ref 22–31)
CREAT ?TM UR-MCNC: 25 MG/DL — SIGNIFICANT CHANGE UP
CREAT SERPL-MCNC: 2.35 MG/DL — HIGH (ref 0.5–1.3)
GLUCOSE BLDC GLUCOMTR-MCNC: 196 MG/DL — HIGH (ref 70–99)
GLUCOSE BLDC GLUCOMTR-MCNC: 267 MG/DL — HIGH (ref 70–99)
GLUCOSE BLDC GLUCOMTR-MCNC: 287 MG/DL — HIGH (ref 70–99)
GLUCOSE BLDC GLUCOMTR-MCNC: 295 MG/DL — HIGH (ref 70–99)
GLUCOSE SERPL-MCNC: 131 MG/DL — HIGH (ref 70–99)
HCT VFR BLD CALC: 32.1 % — LOW (ref 39–50)
HGB BLD-MCNC: 9.9 G/DL — LOW (ref 13–17)
MAGNESIUM SERPL-MCNC: 1.9 MG/DL — SIGNIFICANT CHANGE UP (ref 1.6–2.6)
MCHC RBC-ENTMCNC: 29.9 PG — SIGNIFICANT CHANGE UP (ref 27–34)
MCHC RBC-ENTMCNC: 30.8 GM/DL — LOW (ref 32–36)
MCV RBC AUTO: 97 FL — SIGNIFICANT CHANGE UP (ref 80–100)
NRBC # BLD: 0 /100 WBCS — SIGNIFICANT CHANGE UP
NRBC # FLD: 0 K/UL — SIGNIFICANT CHANGE UP
NT-PROBNP SERPL-SCNC: HIGH PG/ML
OSMOLALITY UR: 223 MOSM/KG — SIGNIFICANT CHANGE UP (ref 50–1200)
PHOSPHATE SERPL-MCNC: 3 MG/DL — SIGNIFICANT CHANGE UP (ref 2.5–4.5)
PLATELET # BLD AUTO: 288 K/UL — SIGNIFICANT CHANGE UP (ref 150–400)
POTASSIUM SERPL-MCNC: 3.5 MMOL/L — SIGNIFICANT CHANGE UP (ref 3.5–5.3)
POTASSIUM SERPL-SCNC: 3.5 MMOL/L — SIGNIFICANT CHANGE UP (ref 3.5–5.3)
RBC # BLD: 3.31 M/UL — LOW (ref 4.2–5.8)
RBC # FLD: 16.8 % — HIGH (ref 10.3–14.5)
SODIUM SERPL-SCNC: 136 MMOL/L — SIGNIFICANT CHANGE UP (ref 135–145)
SODIUM UR-SCNC: 69 MMOL/L — SIGNIFICANT CHANGE UP
WBC # BLD: 10.77 K/UL — HIGH (ref 3.8–10.5)
WBC # FLD AUTO: 10.77 K/UL — HIGH (ref 3.8–10.5)

## 2021-10-21 PROCEDURE — 99233 SBSQ HOSP IP/OBS HIGH 50: CPT

## 2021-10-21 PROCEDURE — 74230 X-RAY XM SWLNG FUNCJ C+: CPT | Mod: 26

## 2021-10-21 RX ORDER — POTASSIUM CHLORIDE 20 MEQ
40 PACKET (EA) ORAL EVERY 4 HOURS
Refills: 0 | Status: COMPLETED | OUTPATIENT
Start: 2021-10-21 | End: 2021-10-21

## 2021-10-21 RX ORDER — MAGNESIUM OXIDE 400 MG ORAL TABLET 241.3 MG
400 TABLET ORAL DAILY
Refills: 0 | Status: DISCONTINUED | OUTPATIENT
Start: 2021-10-22 | End: 2021-10-23

## 2021-10-21 RX ORDER — MAGNESIUM SULFATE 500 MG/ML
1 VIAL (ML) INJECTION ONCE
Refills: 0 | Status: COMPLETED | OUTPATIENT
Start: 2021-10-21 | End: 2021-10-21

## 2021-10-21 RX ORDER — MAGNESIUM SULFATE 500 MG/ML
2 VIAL (ML) INJECTION ONCE
Refills: 0 | Status: DISCONTINUED | OUTPATIENT
Start: 2021-10-21 | End: 2021-10-21

## 2021-10-21 RX ORDER — CARVEDILOL PHOSPHATE 80 MG/1
3.12 CAPSULE, EXTENDED RELEASE ORAL EVERY 12 HOURS
Refills: 0 | Status: DISCONTINUED | OUTPATIENT
Start: 2021-10-21 | End: 2021-10-23

## 2021-10-21 RX ADMIN — Medication 50 MILLIGRAM(S): at 06:19

## 2021-10-21 RX ADMIN — Medication 40 MILLIGRAM(S): at 06:19

## 2021-10-21 RX ADMIN — BUDESONIDE AND FORMOTEROL FUMARATE DIHYDRATE 2 PUFF(S): 160; 4.5 AEROSOL RESPIRATORY (INHALATION) at 10:10

## 2021-10-21 RX ADMIN — HEPARIN SODIUM 5000 UNIT(S): 5000 INJECTION INTRAVENOUS; SUBCUTANEOUS at 17:49

## 2021-10-21 RX ADMIN — BUMETANIDE 2 MILLIGRAM(S): 0.25 INJECTION INTRAMUSCULAR; INTRAVENOUS at 06:18

## 2021-10-21 RX ADMIN — Medication 325 MILLIGRAM(S): at 11:46

## 2021-10-21 RX ADMIN — Medication 50 MILLIGRAM(S): at 14:45

## 2021-10-21 RX ADMIN — PIPERACILLIN AND TAZOBACTAM 25 GRAM(S): 4; .5 INJECTION, POWDER, LYOPHILIZED, FOR SOLUTION INTRAVENOUS at 20:58

## 2021-10-21 RX ADMIN — Medication 30 MILLILITER(S): at 20:57

## 2021-10-21 RX ADMIN — Medication 3 MILLILITER(S): at 22:46

## 2021-10-21 RX ADMIN — Medication 1: at 10:07

## 2021-10-21 RX ADMIN — BUDESONIDE AND FORMOTEROL FUMARATE DIHYDRATE 2 PUFF(S): 160; 4.5 AEROSOL RESPIRATORY (INHALATION) at 21:00

## 2021-10-21 RX ADMIN — PIPERACILLIN AND TAZOBACTAM 25 GRAM(S): 4; .5 INJECTION, POWDER, LYOPHILIZED, FOR SOLUTION INTRAVENOUS at 03:55

## 2021-10-21 RX ADMIN — Medication 81 MILLIGRAM(S): at 11:47

## 2021-10-21 RX ADMIN — Medication 3 MILLILITER(S): at 09:59

## 2021-10-21 RX ADMIN — Medication 3: at 12:57

## 2021-10-21 RX ADMIN — SENNA PLUS 2 TABLET(S): 8.6 TABLET ORAL at 21:03

## 2021-10-21 RX ADMIN — TAMSULOSIN HYDROCHLORIDE 0.4 MILLIGRAM(S): 0.4 CAPSULE ORAL at 21:04

## 2021-10-21 RX ADMIN — HEPARIN SODIUM 5000 UNIT(S): 5000 INJECTION INTRAVENOUS; SUBCUTANEOUS at 06:20

## 2021-10-21 RX ADMIN — CLOPIDOGREL BISULFATE 75 MILLIGRAM(S): 75 TABLET, FILM COATED ORAL at 11:47

## 2021-10-21 RX ADMIN — Medication 50 MILLIGRAM(S): at 21:04

## 2021-10-21 RX ADMIN — CARVEDILOL PHOSPHATE 3.12 MILLIGRAM(S): 80 CAPSULE, EXTENDED RELEASE ORAL at 17:46

## 2021-10-21 RX ADMIN — Medication 40 MILLIEQUIVALENT(S): at 14:45

## 2021-10-21 RX ADMIN — PIPERACILLIN AND TAZOBACTAM 25 GRAM(S): 4; .5 INJECTION, POWDER, LYOPHILIZED, FOR SOLUTION INTRAVENOUS at 11:46

## 2021-10-21 RX ADMIN — Medication 1: at 23:02

## 2021-10-21 RX ADMIN — Medication 100 GRAM(S): at 13:00

## 2021-10-21 RX ADMIN — SPIRONOLACTONE 25 MILLIGRAM(S): 25 TABLET, FILM COATED ORAL at 06:20

## 2021-10-21 RX ADMIN — BUMETANIDE 2 MILLIGRAM(S): 0.25 INJECTION INTRAMUSCULAR; INTRAVENOUS at 17:47

## 2021-10-21 RX ADMIN — Medication 40 MILLIEQUIVALENT(S): at 10:10

## 2021-10-21 RX ADMIN — Medication 25 MICROGRAM(S): at 06:20

## 2021-10-21 RX ADMIN — ATORVASTATIN CALCIUM 20 MILLIGRAM(S): 80 TABLET, FILM COATED ORAL at 21:03

## 2021-10-21 RX ADMIN — Medication 100 GRAM(S): at 11:47

## 2021-10-21 RX ADMIN — Medication 30 MILLILITER(S): at 14:45

## 2021-10-21 RX ADMIN — Medication 3: at 17:48

## 2021-10-21 NOTE — PROGRESS NOTE ADULT - PROBLEM SELECTOR PLAN 3
Will decr frequency of duonebs to q 12 hours given ongoing tachycardia and improved wheezing.   - On day 5/5 of prednisone 40 mg daily

## 2021-10-21 NOTE — PROGRESS NOTE ADULT - SUBJECTIVE AND OBJECTIVE BOX
DATE OF SERVICE: 10-21-21 @ 06:38    Subjective: Patient seen and examined. No new events except as noted.     SUBJECTIVE/ROS:  feels better       MEDICATIONS:  MEDICATIONS  (STANDING):  albuterol/ipratropium for Nebulization 3 milliLiter(s) Nebulizer every 12 hours  aspirin enteric coated 81 milliGRAM(s) Oral daily  atorvastatin 20 milliGRAM(s) Oral at bedtime  budesonide 160 MICROgram(s)/formoterol 4.5 MICROgram(s) Inhaler 2 Puff(s) Inhalation two times a day  buMETAnide Injectable 2 milliGRAM(s) IV Push two times a day  carvedilol 3.125 milliGRAM(s) Oral every 12 hours  clopidogrel Tablet 75 milliGRAM(s) Oral daily  dextrose 40% Gel 15 Gram(s) Oral once  dextrose 5%. 1000 milliLiter(s) (50 mL/Hr) IV Continuous <Continuous>  dextrose 5%. 1000 milliLiter(s) (100 mL/Hr) IV Continuous <Continuous>  dextrose 50% Injectable 25 Gram(s) IV Push once  dextrose 50% Injectable 25 Gram(s) IV Push once  dextrose 50% Injectable 12.5 Gram(s) IV Push once  ferrous    sulfate 325 milliGRAM(s) Oral <User Schedule>  glucagon  Injectable 1 milliGRAM(s) IntraMuscular once  heparin   Injectable 5000 Unit(s) SubCutaneous every 12 hours  hydrALAZINE 50 milliGRAM(s) Oral three times a day  insulin lispro (ADMELOG) corrective regimen sliding scale   SubCutaneous three times a day before meals  insulin lispro (ADMELOG) corrective regimen sliding scale   SubCutaneous at bedtime  isosorbide   mononitrate ER Tablet (IMDUR) 30 milliGRAM(s) Oral daily  levothyroxine 25 MICROGram(s) Oral daily  piperacillin/tazobactam IVPB.. 3.375 Gram(s) IV Intermittent every 8 hours  predniSONE   Tablet 40 milliGRAM(s) Oral daily  senna 2 Tablet(s) Oral at bedtime  spironolactone 25 milliGRAM(s) Oral daily  tamsulosin 0.4 milliGRAM(s) Oral at bedtime      PHYSICAL EXAM:  T(C): 36.7 (10-20-21 @ 21:54), Max: 36.9 (10-20-21 @ 13:00)  HR: 106 (10-20-21 @ 21:54) (92 - 111)  BP: 123/66 (10-20-21 @ 21:54) (107/70 - 132/63)  RR: 18 (10-20-21 @ 21:54) (18 - 18)  SpO2: 100% (10-20-21 @ 21:54) (98% - 100%)  Wt(kg): --  I&O's Summary    19 Oct 2021 07:01  -  20 Oct 2021 07:00  --------------------------------------------------------  IN: 0 mL / OUT: 1200 mL / NET: -1200 mL    20 Oct 2021 07:01  -  21 Oct 2021 06:38  --------------------------------------------------------  IN: 350 mL / OUT: 400 mL / NET: -50 mL            JVP: Normal  Neck: supple  Lung: clear   CV: S1 S2 , Murmur:  Abd: soft  Ext: No edema  neuro: Awake / alert  Psych: flat affect  Skin: normal``    LABS/DATA:    CARDIAC MARKERS:                                9.9    12.98 )-----------( 279      ( 20 Oct 2021 06:24 )             31.2     10-20    131<L>  |  92<L>  |  38<H>  ----------------------------<  255<H>  4.2   |  26  |  2.44<H>    Ca    8.7      20 Oct 2021 18:07  Phos  3.2     10-20  Mg     2.00     10-20    TPro  6.3  /  Alb  3.6  /  TBili  0.4  /  DBili  <0.2  /  AST  21  /  ALT  19  /  AlkPhos  103  10-20    proBNP:   Lipid Profile:   HgA1c:   TSH:     TELE:  EKG:

## 2021-10-21 NOTE — SWALLOW VFSS/MBS ASSESSMENT ADULT - COMMENTS
As per Pulmonology Fellow Note 10/20/21, "66 year-old M with PMH HFrEF, CVA, COPD, CKD, CAD s/p CABG, and multiple hospitalizations over the past year who presented to the hospital with shortness of breath, admitted for hypoxic respiratory failure in the setting of PNA vs HF. Pulmonary consulted for evaluation of recurrent pneumonia and abnormal CT Chest."    Patient is familiar to this department as the patient has been seen for a clinical assessment of swallow function on 10/18/21 in the EDU with recommendation of a puree diet with honey thickened liquids and a re-assessment of swallow function on 10/20/21 on 4S with recommendation of a puree diet with nectar thickened liquids and a cinesophogram. Please refer to full reports for details.     Patient received upright in Radiology Suite, awake, alert and communicative with supplemental oxygen in place via nasal canula. Patient denies symptoms of dysphagia.

## 2021-10-21 NOTE — SWALLOW VFSS/MBS ASSESSMENT ADULT - DIAGNOSTIC IMPRESSIONS
1. Mild oral stage deficits given thin liquids, nectar thickened liquids, honey thickened liquids, puree and mechanical soft texture marked by adequate bolus containment, adequate bolus manipulation, prolonged mastication with adequate ability to break down solid textures and adequate anterior-posterior transport. Trace/mild lingual/palatal stasis observed post primary swallow given mechanical soft solid, though cleared with spontaneous dry swallow.   2. Mild pharyngeal stage deficits given thin liquids, nectar thickened liquids, honey thickened liquids, puree, and mechanical soft marked by adequate initiation of the pharyngeal swallow trigger, reduce tongue base retraction resulting in trace/mild residue at the base of tongue and valleculae, adequate epiglottic deflection, adequate hyolaryngeal excursion, adequate laryngeal elevation, adequate pharyngeal constriction and adequate laryngeal vestibule closure. Trace/mild pharyngeal clearance deficits noted at the base of tongue and valleculae, however spontaneous re-swallow noted to clear pharyngeal residue. There was Trace Laryngeal Penetration during the swallow given Nectar Thickened Liquids and Thin Liquids with retrieval and adequate airway protection maintained. There was No Aspiration observed before, during or after the swallow.

## 2021-10-21 NOTE — PROGRESS NOTE ADULT - PROBLEM SELECTOR PLAN 1
-H/o CAD s/p CABG 2014 HFrEF (15-20% per outside records) s/p AICD, CKD, CVA w/ residual L-sided. Last LHC done at [facility] ("many years ago" per pt).  [id number] on 10/21<- pro-BNP 8177, up from 5957 on last admission. Troponins peaked at 90. Lactic acidosis on 10/20 now resolved.  - Cards consulted, no current plans for ischemic work up this admission   - AHF consulted, appreciate recs.  - Daily BMP, mag. Replete lytes to a goal mag of 2, K of 4.  - strict I&Os, daily standing weights  - Will c/w home isordil, hydralazine, and coreg  - S/p IV Lasix 40 IV push, I/O data unavailable, but pt reports no significant urine output, CXR w significant pulm edema. Responded to 80 mg IV lasix. Cards recommended transitioning to 2mg bumex BID IV on 10/19.  - Cont to closely monitor BUN/Cr while on IV bumex  - pt not on ACE/ARB potentially due to CKD  - Echo w/ global LV dysfunction, EF 21  - C/w ASA 81 -H/o CAD s/p CABG 2014 HFrEF (15-20% per outside records) s/p AICD, CKD, CVA w/ residual L-sided. Last LHC done at [facility] ("many years ago" per pt).  87095 on 10/21<- pro-BNP 8177, up from 5957 on last admission. Troponins peaked at 90. Lactic acidosis on 10/20 now resolved.  - Cards consulted, no current plans for ischemic work up this admission   - AHF consulted, appreciate recs.  - Daily BMP, mag. Replete lytes to a goal mag of 2, K of 4.  - strict I&Os, daily standing weights  - Will c/w home isordil, hydralazine, and coreg  - S/p IV Lasix 40 IV push, I/O data unavailable, but pt reports no significant urine output, CXR w significant pulm edema. Responded to 80 mg IV lasix. Cards recommended transitioning to 2mg bumex BID IV on 10/19, responding well with 4500cc UOP 10/20.  - Cont to closely monitor BUN/Cr while on IV bumex  - pt not on ACE/ARB potentially due to CKD  - Echo w/ global LV dysfunction, EF 21  - C/w ASA 81

## 2021-10-21 NOTE — PROGRESS NOTE ADULT - ASSESSMENT
acute hypoxic respiratory failure  due to acute CHF secondary to PNA    acute systolic CHF  cont current meds  IV bumex as ordered   fu with CHF recommendation   s/p ICD   cont BB  cont hydralazine/nitrates    CAD history of cabg   on dapt  on statin   eventual ischemic eval , stress test can be considered as outpt given PNA     CKd  monitor lytes, crt    Dm II  Monitor finger stick. Insulin coverage. Diabetic education and Diabetic diet. Consider nutrition consultation.    PNA  on anbx  plan as per primary team   fu with pulm

## 2021-10-21 NOTE — PROGRESS NOTE ADULT - PROBLEM SELECTOR PLAN 2
Admitted with temp 100.3 rectal with tachypneic, found to septic 2/2 ADHF vs PNA. Patient was prescribed a 7 day course of cefuroxime but continued to spike fevers with non-productive coughHas been afebrile since admission.  Last utilized bipap 10/18. Recurrent PNA raising suspicion for possible post obstructive process or aspiration PNA given complaints of chest tightness with eating/drinking. Blood cultures w NGTD. RVP/covid neg. Swallow eval positive. Pt currently on dysphagia diet.  -CT chest wo concerning for GGO throughout; no cavitating lesions. Mediastinal LAD. Results are above.    #Plan above for HF  #Leukocytosis, POA  #PNA  - Discont vanc (though pt does have MRSA risk factors given hx of PNA in the past 3 months, his CT findings are not consistent with MRSA PNA and nephrotoxicity risk is high for vanc/zosyn combo in the setting of known CKD requiring aggressive diuresis)  - Cont zosyn for a 7 day course (day 4). Significant risk factors for pseudomonas given hx.  - Disc azithro 10/19.  - Speech eval and treat  - Sputum cultures ordered, not done  - Pulm consulted Admitted with temp 100.3 rectal with tachypneic, found to septic 2/2 ADHF vs PNA. Patient was prescribed a 7 day course of cefuroxime but continued to spike fevers with non-productive coughHas been afebrile since admission.  Last utilized bipap 10/18. Recurrent PNA raising suspicion for possible post obstructive process or aspiration PNA given complaints of chest tightness with eating/drinking. Blood cultures w NGTD. RVP/covid neg. Swallow eval positive. Pt currently on dysphagia diet.  -CT chest wo concerning for GGO throughout; no cavitating lesions. Mediastinal LAD. Results are above.    #Plan above for HF  #Leukocytosis, POA, improving  #PNA  - Discont vanc (though pt does have MRSA risk factors given hx of PNA in the past 3 months, his CT findings are not consistent with MRSA PNA and nephrotoxicity risk is high for vanc/zosyn combo in the setting of known CKD requiring aggressive diuresis)  - Cont zosyn for a 7 day course (day 5). Significant risk factors for pseudomonas given hx.  - Disc azithro 10/19.  - Speech eval and treat  - Sputum cultures ordered, not done  - Pulm consulted, signed off

## 2021-10-21 NOTE — PROGRESS NOTE ADULT - SUBJECTIVE AND OBJECTIVE BOX
Medications:  acetaminophen   Tablet .. 650 milliGRAM(s) Oral every 6 hours PRN  albuterol/ipratropium for Nebulization 3 milliLiter(s) Nebulizer every 12 hours  aluminum hydroxide/magnesium hydroxide/simethicone Suspension 30 milliLiter(s) Oral every 4 hours PRN  aspirin enteric coated 81 milliGRAM(s) Oral daily  atorvastatin 20 milliGRAM(s) Oral at bedtime  budesonide 160 MICROgram(s)/formoterol 4.5 MICROgram(s) Inhaler 2 Puff(s) Inhalation two times a day  buMETAnide Injectable 2 milliGRAM(s) IV Push two times a day  carvedilol 3.125 milliGRAM(s) Oral every 12 hours  clopidogrel Tablet 75 milliGRAM(s) Oral daily  dextrose 40% Gel 15 Gram(s) Oral once  dextrose 5%. 1000 milliLiter(s) IV Continuous <Continuous>  dextrose 5%. 1000 milliLiter(s) IV Continuous <Continuous>  dextrose 50% Injectable 25 Gram(s) IV Push once  dextrose 50% Injectable 12.5 Gram(s) IV Push once  dextrose 50% Injectable 25 Gram(s) IV Push once  ferrous    sulfate 325 milliGRAM(s) Oral <User Schedule>  glucagon  Injectable 1 milliGRAM(s) IntraMuscular once  heparin   Injectable 5000 Unit(s) SubCutaneous every 12 hours  hydrALAZINE 50 milliGRAM(s) Oral three times a day  insulin lispro (ADMELOG) corrective regimen sliding scale   SubCutaneous three times a day before meals  insulin lispro (ADMELOG) corrective regimen sliding scale   SubCutaneous at bedtime  isosorbide   mononitrate ER Tablet (IMDUR) 30 milliGRAM(s) Oral daily  levothyroxine 25 MICROGram(s) Oral daily  melatonin 3 milliGRAM(s) Oral at bedtime PRN  piperacillin/tazobactam IVPB.. 3.375 Gram(s) IV Intermittent every 8 hours  potassium chloride    Tablet ER 40 milliEquivalent(s) Oral every 4 hours  predniSONE   Tablet 40 milliGRAM(s) Oral daily  senna 2 Tablet(s) Oral at bedtime  spironolactone 25 milliGRAM(s) Oral daily  tamsulosin 0.4 milliGRAM(s) Oral at bedtime      Vitals:  Vital Signs Last 24 Hrs  T(C): 36.3 (21 Oct 2021 10:15), Max: 36.9 (20 Oct 2021 13:00)  T(F): 97.3 (21 Oct 2021 10:15), Max: 98.5 (20 Oct 2021 13:00)  HR: 95 (21 Oct 2021 10:15) (92 - 106)  BP: 105/65 (21 Oct 2021 10:15) (105/65 - 123/66)  BP(mean): --  RR: 18 (21 Oct 2021 10:15) (18 - 18)  SpO2: 100% (21 Oct 2021 10:15) (98% - 100%)    Daily     Daily Weight in k.4 (21 Oct 2021 06:15)    I&O's Detail    20 Oct 2021 07:01  -  21 Oct 2021 07:00  --------------------------------------------------------  IN:    Oral Fluid: 350 mL  Total IN: 350 mL    OUT:    Voided (mL): 2400 mL  Total OUT: 2400 mL    Total NET: -2050 mL      21 Oct 2021 07:01  -  21 Oct 2021 10:23  --------------------------------------------------------  IN:  Total IN: 0 mL    OUT:    Voided (mL): 250 mL  Total OUT: 250 mL    Total NET: -250 mL        Patient is seen and examined sitting on the bed, NAD. Was on O2 last night. Currently is off oxygen sat 98% on RA.   Physical Exam:     General: No distress. Comfortable.  HEENT: EOM intact.  Neck: Neck supple. JVP not elevated. No masses  Chest: Bibasilar crackles b/l at bases  CV: Normal S1 and S2. No murmurs, rub, or gallops. Radial pulses normal.  Abdomen: Soft, non-distended, non-tender  Skin: No rashes or skin breakdown  Peripheral: trace of b/l LE edema   Neurology: Alert and oriented times three. Sensation intact  Psych: Affect normal    Labs:                        9.9    10.77 )-----------( 288      ( 21 Oct 2021 06:55 )             32.1     10-    136  |  95<L>  |  35<H>  ----------------------------<  131<H>  3.5   |  26  |  2.35<H>    Ca    8.8      21 Oct 2021 06:55  Phos  3.0     10-  Mg     1.90     10-21    TPro  6.3  /  Alb  3.6  /  TBili  0.4  /  DBili  <0.2  /  AST    /  ALT  19  /  AlkPhos  103  10-20      Serum Pro-Brain Natriuretic Peptide (10.21.21 @ 06:55)    Serum Pro-Brain Natriuretic Peptide: [id number]:     < from: TTE with Doppler (w/Cont) (21 @ 09:11) >    ------------------------------------------------------------------------  DIMENSIONS:  Dimensions:     Normal Values:  LA:     4.2 cm    2.0 - 4.0 cm  Ao:     3.7 cm    2.0 - 3.8 cm  SEPTUM: 0.8 cm    0.6 - 1.2 cm  PWT:    1.0 cm    0.6 - 1.1 cm  LVIDd:  6.0 cm    3.0 - 5.6 cm  LVIDs:  5.4 cm    1.8 - 4.0 cm  Derived Variables:  LVMI: 114 g/m2  RWT: 0.33  Fractional short: 10 %  Ejection Fraction (Teicholtz): 21 %  ------------------------------------------------------------------------  OBSERVATIONS:  Mitral Valve: Normal mitral valve. Mild mitral  regurgitation.  Aortic Root: Normal aortic root.  Aortic Valve: Normal trileaflet aortic valve. Minimal  aortic regurgitation.  Left Atrium: Mildly dilated left atrium.  LA volume index =  41 cc/m2.  Left Ventricle: Endocardial visualization enhanced with  intravenous injection of echo contrast (Definity). Severe  global left ventricular systolic dysfunction. Mild left  ventricular enlargement. Moderate diastolic dysfunction  (Stage II).  Right Heart: Normal right atrium. Right ventricular  enlargement with normal right ventricular systolic  function. Normal tricuspid valve. Mild tricuspid  regurgitation. Normal pulmonic valve.  Pericardium/PleuraNormal pericardium with no pericardial  effusion.  ------------------------------------------------------------------------  CONCLUSIONS:  1. Normal trileaflet aortic valve. Minimal aortic  regurgitation.  2. Mildly dilated left atrium.  LA volume index = 41 cc/m2.  3. Mild left ventricular enlargement.  4. Endocardial visualization enhanced with intravenous  injection of echo contrast (Definity). Severe global left  ventricular systolic dysfunction.  5. Moderate diastolic dysfunction (Stage II).  6. Right ventricular enlargement with normal right  ventricular systolic function.  ------------------------------------------------------------------------  Confirmed on  2021 - 14:27:35 by JEREMY Torrez  ----------------------------------    < end of copied text >      < from: CT Chest No Cont (10.18.21 @ 10:27) >  FINDINGS:    LUNGS AND AIRWAYS: Patent central airways, with the examination performed during the expiratory phase of respiration.  There are groundglass opacities with more consolidative nodular opacities in the right upper lobe and superior segment of the right lower lobe, majority of which are new since 2021. There is interlobular septal thickening and diffuse foci of mosaic attenuation. There is diffuse bronchiolar wall thickening.  PLEURA: Trace bilateral pleural effusions.  MEDIASTINUM AND MORTEZA: Multiple enlarged mediastinal lymph nodes have mildly increased in size when compared to the prior study. The largest is a prevascular lymph node which measures 2.6 x 2.0 cm.  VESSELS: Atherosclerotic calcifications of the aorta and at the origin of the great vessels.  HEART: Enlarged. No pericardial effusion. Status post median sternotomy and CABG.  CHEST WALL AND LOWER NECK: Left chest wall ICD is seen with the distal lead tip in the right ventricle.  VISUALIZED UPPER ABDOMEN: Within normal limits.  BONES: Degenerative changes.    IMPRESSION:  Findings of pulmonary edema including cardiomegaly, groundglass opacities, trace bilateral pleural effusions, and diffuse interlobular septal thickening. Presence of more consolidative opacities in the right upper and lower lobes may also be on the basis of pulmonary edema however superimposed infection is not excluded. Recommend follow-up chest CT in 3 months for further characterization.    Mediastinal lymphadenopathy, with many lymph nodes slightly increased in size when compared to prior, may also be on the basis of pulmonary edema. Recommend attention on follow-up chest CT.    Persistent areas of mosaic attenuation, likely on the basis of chronic small airway disease and air trapping.    --- End of Report ---       Patient is seen and examined sitting on the bed, NAD. Was on O2 last night. Currently is off oxygen sat 98% on RA.       Medications:  acetaminophen   Tablet .. 650 milliGRAM(s) Oral every 6 hours PRN  albuterol/ipratropium for Nebulization 3 milliLiter(s) Nebulizer every 12 hours  aluminum hydroxide/magnesium hydroxide/simethicone Suspension 30 milliLiter(s) Oral every 4 hours PRN  aspirin enteric coated 81 milliGRAM(s) Oral daily  atorvastatin 20 milliGRAM(s) Oral at bedtime  budesonide 160 MICROgram(s)/formoterol 4.5 MICROgram(s) Inhaler 2 Puff(s) Inhalation two times a day  buMETAnide Injectable 2 milliGRAM(s) IV Push two times a day  carvedilol 3.125 milliGRAM(s) Oral every 12 hours  clopidogrel Tablet 75 milliGRAM(s) Oral daily  dextrose 40% Gel 15 Gram(s) Oral once  dextrose 5%. 1000 milliLiter(s) IV Continuous <Continuous>  dextrose 5%. 1000 milliLiter(s) IV Continuous <Continuous>  dextrose 50% Injectable 25 Gram(s) IV Push once  dextrose 50% Injectable 12.5 Gram(s) IV Push once  dextrose 50% Injectable 25 Gram(s) IV Push once  ferrous    sulfate 325 milliGRAM(s) Oral <User Schedule>  glucagon  Injectable 1 milliGRAM(s) IntraMuscular once  heparin   Injectable 5000 Unit(s) SubCutaneous every 12 hours  hydrALAZINE 50 milliGRAM(s) Oral three times a day  insulin lispro (ADMELOG) corrective regimen sliding scale   SubCutaneous three times a day before meals  insulin lispro (ADMELOG) corrective regimen sliding scale   SubCutaneous at bedtime  isosorbide   mononitrate ER Tablet (IMDUR) 30 milliGRAM(s) Oral daily  levothyroxine 25 MICROGram(s) Oral daily  melatonin 3 milliGRAM(s) Oral at bedtime PRN  piperacillin/tazobactam IVPB.. 3.375 Gram(s) IV Intermittent every 8 hours  potassium chloride    Tablet ER 40 milliEquivalent(s) Oral every 4 hours  predniSONE   Tablet 40 milliGRAM(s) Oral daily  senna 2 Tablet(s) Oral at bedtime  spironolactone 25 milliGRAM(s) Oral daily  tamsulosin 0.4 milliGRAM(s) Oral at bedtime      Vitals:  Vital Signs Last 24 Hrs  T(C): 36.3 (21 Oct 2021 10:15), Max: 36.9 (20 Oct 2021 13:00)  T(F): 97.3 (21 Oct 2021 10:15), Max: 98.5 (20 Oct 2021 13:00)  HR: 95 (21 Oct 2021 10:15) (92 - 106)  BP: 105/65 (21 Oct 2021 10:15) (105/65 - 123/66)  BP(mean): --  RR: 18 (21 Oct 2021 10:15) (18 - 18)  SpO2: 100% (21 Oct 2021 10:15) (98% - 100%)    Daily     Daily Weight in k.4 (21 Oct 2021 06:15)    I&O's Detail    20 Oct 2021 07:01  -  21 Oct 2021 07:00  --------------------------------------------------------  IN:    Oral Fluid: 350 mL  Total IN: 350 mL    OUT:    Voided (mL): 2400 mL  Total OUT: 2400 mL    Total NET: -2050 mL      21 Oct 2021 07:01  -  21 Oct 2021 10:23  --------------------------------------------------------  IN:  Total IN: 0 mL    OUT:    Voided (mL): 250 mL  Total OUT: 250 mL    Total NET: -250 mL      Physical Exam:     General: No distress. Comfortable.  HEENT: EOM intact.  Neck: Neck supple. JVP not elevated. No masses  Chest: Bibasilar crackles b/l at bases  CV: Normal S1 and S2. No murmurs, rub, or gallops. Radial pulses normal.  Abdomen: Soft, non-distended, non-tender  Skin: No rashes or skin breakdown  Peripheral: trace of b/l LE edema   Neurology: Alert and oriented times three. Sensation intact  Psych: Affect normal    Labs:                        9.9    10.77 )-----------( 288      ( 21 Oct 2021 06:55 )             32.1     10-    136  |  95<L>  |  35<H>  ----------------------------<  131<H>  3.5   |  26  |  2.35<H>    Ca    8.8      21 Oct 2021 06:55  Phos  3.0     10-  Mg     1.90     10-21    TPro  6.3  /  Alb  3.6  /  TBili  0.4  /  DBili  <0.2  /  AST    /  ALT  19  /  AlkPhos  103  10-20      Serum Pro-Brain Natriuretic Peptide (10.21.21 @ 06:55)    Serum Pro-Brain Natriuretic Peptide: [id number]:     < from: TTE with Doppler (w/Cont) (21 @ 09:11) >    ------------------------------------------------------------------------  DIMENSIONS:  Dimensions:     Normal Values:  LA:     4.2 cm    2.0 - 4.0 cm  Ao:     3.7 cm    2.0 - 3.8 cm  SEPTUM: 0.8 cm    0.6 - 1.2 cm  PWT:    1.0 cm    0.6 - 1.1 cm  LVIDd:  6.0 cm    3.0 - 5.6 cm  LVIDs:  5.4 cm    1.8 - 4.0 cm  Derived Variables:  LVMI: 114 g/m2  RWT: 0.33  Fractional short: 10 %  Ejection Fraction (Teicholtz): 21 %  ------------------------------------------------------------------------  OBSERVATIONS:  Mitral Valve: Normal mitral valve. Mild mitral  regurgitation.  Aortic Root: Normal aortic root.  Aortic Valve: Normal trileaflet aortic valve. Minimal  aortic regurgitation.  Left Atrium: Mildly dilated left atrium.  LA volume index =  41 cc/m2.  Left Ventricle: Endocardial visualization enhanced with  intravenous injection of echo contrast (Definity). Severe  global left ventricular systolic dysfunction. Mild left  ventricular enlargement. Moderate diastolic dysfunction  (Stage II).  Right Heart: Normal right atrium. Right ventricular  enlargement with normal right ventricular systolic  function. Normal tricuspid valve. Mild tricuspid  regurgitation. Normal pulmonic valve.  Pericardium/PleuraNormal pericardium with no pericardial  effusion.  ------------------------------------------------------------------------  CONCLUSIONS:  1. Normal trileaflet aortic valve. Minimal aortic  regurgitation.  2. Mildly dilated left atrium.  LA volume index = 41 cc/m2.  3. Mild left ventricular enlargement.  4. Endocardial visualization enhanced with intravenous  injection of echo contrast (Definity). Severe global left  ventricular systolic dysfunction.  5. Moderate diastolic dysfunction (Stage II).  6. Right ventricular enlargement with normal right  ventricular systolic function.  ------------------------------------------------------------------------  Confirmed on  2021 - 14:27:35 by JREEMY Torrez  ----------------------------------    < end of copied text >      < from: CT Chest No Cont (10.18.21 @ 10:27) >  FINDINGS:    LUNGS AND AIRWAYS: Patent central airways, with the examination performed during the expiratory phase of respiration.  There are groundglass opacities with more consolidative nodular opacities in the right upper lobe and superior segment of the right lower lobe, majority of which are new since 2021. There is interlobular septal thickening and diffuse foci of mosaic attenuation. There is diffuse bronchiolar wall thickening.  PLEURA: Trace bilateral pleural effusions.  MEDIASTINUM AND MORTEZA: Multiple enlarged mediastinal lymph nodes have mildly increased in size when compared to the prior study. The largest is a prevascular lymph node which measures 2.6 x 2.0 cm.  VESSELS: Atherosclerotic calcifications of the aorta and at the origin of the great vessels.  HEART: Enlarged. No pericardial effusion. Status post median sternotomy and CABG.  CHEST WALL AND LOWER NECK: Left chest wall ICD is seen with the distal lead tip in the right ventricle.  VISUALIZED UPPER ABDOMEN: Within normal limits.  BONES: Degenerative changes.    IMPRESSION:  Findings of pulmonary edema including cardiomegaly, groundglass opacities, trace bilateral pleural effusions, and diffuse interlobular septal thickening. Presence of more consolidative opacities in the right upper and lower lobes may also be on the basis of pulmonary edema however superimposed infection is not excluded. Recommend follow-up chest CT in 3 months for further characterization.    Mediastinal lymphadenopathy, with many lymph nodes slightly increased in size when compared to prior, may also be on the basis of pulmonary edema. Recommend attention on follow-up chest CT.    Persistent areas of mosaic attenuation, likely on the basis of chronic small airway disease and air trapping.    --- End of Report ---

## 2021-10-21 NOTE — SWALLOW VFSS/MBS ASSESSMENT ADULT - ORAL PREP COMMENTS
Prolonged though functional mastication of mechanical soft texture with adequate ability to break down texture.

## 2021-10-21 NOTE — PROGRESS NOTE ADULT - SUBJECTIVE AND OBJECTIVE BOX
Pt alert and oriented in bed, now on 2L o2. Did not require Bipap overnight.    ICU Vital Signs Last 24 Hrs  T(C): 36.7 (21 Oct 2021 06:15), Max: 36.9 (20 Oct 2021 13:00)  T(F): 98 (21 Oct 2021 06:15), Max: 98.5 (20 Oct 2021 13:00)  HR: 106 (21 Oct 2021 06:15) (92 - 111)  BP: 113/72 (21 Oct 2021 06:15) (107/70 - 132/63)  RR: 18 (21 Oct 2021 06:15) (18 - 18)  SpO2: 100% (21 Oct 2021 06:15) (98% - 100%)    Serum Pro-Brain Natriuretic Peptide: 68734: Serum Pro-Brain Natriuretic Peptide: 8177: Serum Pro-Brain Natriuretic Peptide: 5957:   CONSTITUTIONAL: Well-groomed, in no apparent distress, dyspnea while speaking, improved from prior  EYES: No conjunctival or scleral injection, non-icteric; PERRLA and symmetric  ENMT: No external nasal lesions; nasal mucosa not inflamed; no pharyngeal injection or exudates  NECK: Trachea midline without palpable neck mass  RESPIRATORY: diffuse crackles in anterior and posterior lung fields, wheezing improved.   CARDIOVASCULAR: +S1S2, RRR, no M/G/R; no carotid bruits; pedal pulses full and symmetric, warm extremities, LUE and LLE w non pitting edema  GASTROINTESTINAL: No palpable masses or tenderness, +BS throughout, no rebound/guarding  MUSCULOSKELETAL: No digital clubbing or cyanosis;  SKIN: No rashes or ulcers noted; no subcutaneous nodules or induration palpable  NEUROLOGIC: CN II-XII grossly intact  PSYCHIATRIC: A+O x 3; mood and affect appropriate; appropriate insight and judgment      < from: CT Chest No Cont (10.18.21 @ 10:27) >    There are groundglass opacities with more consolidative nodular opacities in the right upper lobe and superior segment of the right lower lobe, majority of which are new since September 12, 2021. There is interlobular septal thickening and diffuse foci of mosaic attenuation. There is diffuse bronchiolar wall thickening.  PLEURA: Trace bilateral pleural effusions.  MEDIASTINUM AND MORTEZA: Multiple enlarged mediastinal lymph nodes have mildly increased in size when compared to the prior study. The largest is a prevascular lymph node which measures 2.6 x 2.0 cm.  VESSELS: Atherosclerotic calcifications of the aorta and at the origin of the great vessels.  HEART: Enlarged. No pericardial effusion. Status post median sternotomy and CABG.  CHEST WALL AND LOWER NECK: Left chest wall ICD is seen with the distal lead tip in the right ventricle.  VISUALIZED UPPER ABDOMEN: Within normal limits.  BONES: Degenerative changes.    IMPRESSION:  Findings of pulmonary edema including cardiomegaly, groundglass opacities, trace bilateral pleural effusions, and diffuse interlobular septal thickening. Presence of more consolidative opacities in the right upper and lower lobes may also be on the basis of pulmonary edema however superimposed infection is not excluded. Recommend follow-up chest CT in 3 months for further characterization.    Mediastinal lymphadenopathy, with many lymph nodes slightly increased in size when compared to prior, may also be on the basis of pulmonary edema. Recommend attention on follow-up chest CT.    Persistent areas of mosaic attenuation, likely on the basis of chronic small airway disease and air trapping.      < end of copied text >      ProBNP elevated to 787[address] 10/21/21<--- 8000 on admissino<--- approx 5000 in Sept 2021.   Pt alert and oriented in bed, now off oxygen, eating breakfast (dysphagia diet). Did not require Bipap overnight. Wanting to go home. UOP 4500 cc over 24 hours on 10/20. Still has not yet been seen by PT.     Denies chest pain, cough, abdominal pain, dysuria, fevers, chills.     Phone call to daughter Ketty Lewis 10/21 to udpate 10 am.     T(C): 36.7 (21 Oct 2021 06:15), Max: 36.9 (20 Oct 2021 13:00)  T(F): 98 (21 Oct 2021 06:15), Max: 98.5 (20 Oct 2021 13:00)  HR: 106 (21 Oct 2021 06:15) (92 - 111)  BP: 113/72 (21 Oct 2021 06:15) (107/70 - 132/63)  RR: 18 (21 Oct 2021 06:15) (18 - 18)  SpO2: 100% (21 Oct 2021 06:15) (98% - 100%)    CONSTITUTIONAL: Well-groomed, in no apparent distress, dyspnea while speaking, improved from prior  EYES: No conjunctival or scleral injection, non-icteric; PERRLA and symmetric  ENMT: No external nasal lesions; nasal mucosa not inflamed; no pharyngeal injection or exudates  NECK: Trachea midline without palpable neck mass  RESPIRATORY: diffuse crackles in anterior and posterior lung fields, wheezing improved.   CARDIOVASCULAR: +S1S2, RRR, no M/G/R; no carotid bruits; pedal pulses full and symmetric, warm extremities, LUE and LLE w non pitting edema  GASTROINTESTINAL: No palpable masses or tenderness, +BS throughout, no rebound/guarding  MUSCULOSKELETAL: No digital clubbing or cyanosis;  SKIN: No rashes or ulcers noted; no subcutaneous nodules or induration palpable  NEUROLOGIC: CN II-XII grossly intact  PSYCHIATRIC: A+O x 3; mood and affect appropriate; appropriate insight and judgment    Labs and imaging:   Serum Pro-Brain Natriuretic Peptide 10/21/21: 30614: <-- 8177 on admission. Sept 2021: Serum Pro-Brain Natriuretic Peptide: 5957:                           9.9    10.77 )-----------( 288      ( 21 Oct 2021 06:55 )             32.1     10-21    136  |  95<L>  |  35<H>  ----------------------------<  131<H>  3.5   |  26  |  2.35<H>    Ca    8.8      21 Oct 2021 06:55  Phos  3.0     10-21  Mg     1.90     10-21    TPro  6.3  /  Alb  3.6  /  TBili  0.4  /  DBili  <0.2  /  AST  21  /  ALT  19  /  AlkPhos  103  10-20    < from: CT Chest No Cont (10.18.21 @ 10:27) >    There are groundglass opacities with more consolidative nodular opacities in the right upper lobe and superior segment of the right lower lobe, majority of which are new since September 12, 2021. There is interlobular septal thickening and diffuse foci of mosaic attenuation. There is diffuse bronchiolar wall thickening.  PLEURA: Trace bilateral pleural effusions.  MEDIASTINUM AND MORTEZA: Multiple enlarged mediastinal lymph nodes have mildly increased in size when compared to the prior study. The largest is a prevascular lymph node which measures 2.6 x 2.0 cm.  VESSELS: Atherosclerotic calcifications of the aorta and at the origin of the great vessels.  HEART: Enlarged. No pericardial effusion. Status post median sternotomy and CABG.  CHEST WALL AND LOWER NECK: Left chest wall ICD is seen with the distal lead tip in the right ventricle.  VISUALIZED UPPER ABDOMEN: Within normal limits.  BONES: Degenerative changes.    IMPRESSION:  Findings of pulmonary edema including cardiomegaly, groundglass opacities, trace bilateral pleural effusions, and diffuse interlobular septal thickening. Presence of more consolidative opacities in the right upper and lower lobes may also be on the basis of pulmonary edema however superimposed infection is not excluded. Recommend follow-up chest CT in 3 months for further characterization.    Mediastinal lymphadenopathy, with many lymph nodes slightly increased in size when compared to prior, may also be on the basis of pulmonary edema. Recommend attention on follow-up chest CT.    Persistent areas of mosaic attenuation, likely on the basis of chronic small airway disease and air trapping.      < end of copied text >      ProBNP elevated to 7[address] 10/21/21<--- 8000 on admissino<--- approx 5000 in Sept 2021.   Pt alert and oriented in bed, now off oxygen, eating breakfast (dysphagia diet). Did not require Bipap overnight. Wanting to go home. UOP 4500 cc over 24 hours on 10/20. Still has not yet been seen by PT.     Denies chest pain, cough, abdominal pain, dysuria, fevers, chills.     Phone call to daughter Ketty Lewis 10/21 to udpate 10 am. Updated her on patients clinical improvement. Reinforced the message that he does need to stay for a few more days to complete his course of antibiotics, and is still on IV bumex, diuresing well. She is in agreement.    T(C): 36.7 (21 Oct 2021 06:15), Max: 36.9 (20 Oct 2021 13:00)  T(F): 98 (21 Oct 2021 06:15), Max: 98.5 (20 Oct 2021 13:00)  HR: 106 (21 Oct 2021 06:15) (92 - 111)  BP: 113/72 (21 Oct 2021 06:15) (107/70 - 132/63)  RR: 18 (21 Oct 2021 06:15) (18 - 18)  SpO2: 100% (21 Oct 2021 06:15) (98% - 100%)    CONSTITUTIONAL: Well-groomed, in no apparent distress, dyspnea while speaking, improved from prior  EYES: No conjunctival or scleral injection, non-icteric; PERRLA and symmetric  ENMT: No external nasal lesions; nasal mucosa not inflamed; no pharyngeal injection or exudates  NECK: Trachea midline without palpable neck mass  RESPIRATORY: diffuse crackles in anterior and posterior lung fields, wheezing improved.   CARDIOVASCULAR: +S1S2, RRR, no M/G/R; no carotid bruits; pedal pulses full and symmetric, warm extremities, LUE and LLE w non pitting edema  GASTROINTESTINAL: No palpable masses or tenderness, +BS throughout, no rebound/guarding  MUSCULOSKELETAL: No digital clubbing or cyanosis;  SKIN: No rashes or ulcers noted; no subcutaneous nodules or induration palpable  NEUROLOGIC: CN II-XII grossly intact  PSYCHIATRIC: A+O x 3; mood and affect appropriate; appropriate insight and judgment    Labs and imaging:   Serum Pro-Brain Natriuretic Peptide 10/21/21: 98111: <-- 8177 on admission. Sept 2021: Serum Pro-Brain Natriuretic Peptide: 5957:                           9.9    10.77 )-----------( 288      ( 21 Oct 2021 06:55 )             32.1     10-21    136  |  95<L>  |  35<H>  ----------------------------<  131<H>  3.5   |  26  |  2.35<H>    Ca    8.8      21 Oct 2021 06:55  Phos  3.0     10-21  Mg     1.90     10-21    TPro  6.3  /  Alb  3.6  /  TBili  0.4  /  DBili  <0.2  /  AST  21  /  ALT  19  /  AlkPhos  103  10-20    < from: CT Chest No Cont (10.18.21 @ 10:27) >    There are groundglass opacities with more consolidative nodular opacities in the right upper lobe and superior segment of the right lower lobe, majority of which are new since September 12, 2021. There is interlobular septal thickening and diffuse foci of mosaic attenuation. There is diffuse bronchiolar wall thickening.  PLEURA: Trace bilateral pleural effusions.  MEDIASTINUM AND MORTEZA: Multiple enlarged mediastinal lymph nodes have mildly increased in size when compared to the prior study. The largest is a prevascular lymph node which measures 2.6 x 2.0 cm.  VESSELS: Atherosclerotic calcifications of the aorta and at the origin of the great vessels.  HEART: Enlarged. No pericardial effusion. Status post median sternotomy and CABG.  CHEST WALL AND LOWER NECK: Left chest wall ICD is seen with the distal lead tip in the right ventricle.  VISUALIZED UPPER ABDOMEN: Within normal limits.  BONES: Degenerative changes.    IMPRESSION:  Findings of pulmonary edema including cardiomegaly, groundglass opacities, trace bilateral pleural effusions, and diffuse interlobular septal thickening. Presence of more consolidative opacities in the right upper and lower lobes may also be on the basis of pulmonary edema however superimposed infection is not excluded. Recommend follow-up chest CT in 3 months for further characterization.    Mediastinal lymphadenopathy, with many lymph nodes slightly increased in size when compared to prior, may also be on the basis of pulmonary edema. Recommend attention on follow-up chest CT.    Persistent areas of mosaic attenuation, likely on the basis of chronic small airway disease and air trapping.      < end of copied text >      ProBNP elevated to 59078 10/21/21<--- 8000 on admissino<--- approx 5000 in Sept 2021.

## 2021-10-21 NOTE — PROGRESS NOTE ADULT - PROBLEM SELECTOR PLAN 7
DVT ppx: HSQ  Diet: DASH/CC  Dispo: PT  Attn at follow up: Needs repeat CT chest in 2022. See pulm -Please email: [email or web address] to setup an appointment prior to discharge. Include the patient's name, , MRN and contact information in the email.

## 2021-10-21 NOTE — PROGRESS NOTE ADULT - ASSESSMENT
66M w/CABG, HFrEF (15-20%) s/p AICD on 2LNC at home, COPD/asthma, CKD, CVA w/residual L-sided weakness, and gout, recently prescribed PO antibiotics for presumed PNA prior to admission, admitted for generalized weakness, fevers and UE and LE extremity swelling. He is being treated for HFrEF exacerbation as well as recurrent PNA. Previously taking torsemide 20 mg BID prior to admission, however historically had been on doses much higher than this prescribed by primary Cardiologist. Suspect that the PNA triggered the CHF exacerbation given fevers and leukocytosis on admission. Also being treated empirically for COPD exacerbation given significant wheezing on exam on admission. Pulm consulted given recurrent PNA and hospitalizations as well as CT chest findings. No current plans for bronch, but will need outpatient follow up. AHF consulted, recommending continued IV diuresis. Cardiology also consulted.

## 2021-10-21 NOTE — PROGRESS NOTE ADULT - ASSESSMENT
66M CABG on ASA/Plavix, HFrEF (21% 9/20201 TTE) s/p AICD on 2LNC at home, CKD, CVA with mild left deficits, asthma/COPD (on 2L NC), recurrent PNA infections presents with acute worsening SOB x 1 day. Patient was having 4-5 days of fever, pleuritic chest pain, cough, SOB, wheezing. Patient was prescribed 7 day course of PO Cefuroxime by PCP which patient took for 3-4 days with minimal relief of respiratory symptoms. On day of admission, patient felt extremely weak with sensation of something stuck in his throat, worsening SOB and b/l LE and UE swelling. At that point, daughter called EMS who brought patient to ED. Per EMS, was placed on NRB satting 92%, then transitioned to CPAP with improvement in hypoxia and respiratory distress. In ED, patient placed on BiPAP with improvement in respiratory symptoms  Transitioned to 6 liter nasal cannula with improvement in symptoms, currently on 2L.    PLAN:  1 Acute on chronic  systolic CHF exacerbation/ ICMP  -patient with underlying infection with  improvement today, was on O2 last night. Current is off oxygen sat 96%  - Patient home dose of Torsemide was decreased from 100 mg daily ( was doing well on this dose)  to 20 mg bid as outpatient, unclear why?  - lungs with bibasilar crackles at bases , warm extremities, trace LE edema  - CT with pulm edema, bilateral pleural effusions and ground glass opacities  - ECHO reviewed, EF is 21%, LVIDd:  6.0 cm    - continue his heart failure GDMT,  B/P 105/65  and sinus tachy with PVCs    - Mg 1 mg IVP x2  and KCL 40 mcg x 2  ordered   - started Mg Oxide 400 mg daily   - keep K>4 and Mg >2   - daily weight  -continue  bumex 2 mg IV BID,  - renal function improving with  diuretics, BUN/creat decreased  to 35/ 2.35 from  38/ 2.41  -continue Imdur  30 po qd, hydralazine  50 tid, -coreg 3.125 bid  - would continue  vero 25 qd  - serum pro BNP 10/18 was 8177  -10/21: proBNP 72031      2 CAD/CABG:  - trop 121-->95   - likely in setting of CHF exacerbation,  - no need for ischemic work up at this time, NST can be done as outpt.    - no active chest pain  -continue asa and Plavix and Crestor 5      3 COPD, now with PNA  - CXR reviewed  - chest CT with pulm edema, ground glass opacities, bilateral pleural effusions  - support oxygenation   all  cultures are negative up to date   -continue with antibiotics  - RVP panel and COVID are negative  - Pulmonary following      Further recommendations to follow

## 2021-10-22 ENCOUNTER — TRANSCRIPTION ENCOUNTER (OUTPATIENT)
Age: 66
End: 2021-10-22

## 2021-10-22 LAB
ANION GAP SERPL CALC-SCNC: 14 MMOL/L — SIGNIFICANT CHANGE UP (ref 7–14)
BUN SERPL-MCNC: 35 MG/DL — HIGH (ref 7–23)
CALCIUM SERPL-MCNC: 9.3 MG/DL — SIGNIFICANT CHANGE UP (ref 8.4–10.5)
CHLORIDE SERPL-SCNC: 92 MMOL/L — LOW (ref 98–107)
CO2 SERPL-SCNC: 27 MMOL/L — SIGNIFICANT CHANGE UP (ref 22–31)
CREAT SERPL-MCNC: 2.29 MG/DL — HIGH (ref 0.5–1.3)
GLUCOSE BLDC GLUCOMTR-MCNC: 167 MG/DL — HIGH (ref 70–99)
GLUCOSE BLDC GLUCOMTR-MCNC: 172 MG/DL — HIGH (ref 70–99)
GLUCOSE BLDC GLUCOMTR-MCNC: 209 MG/DL — HIGH (ref 70–99)
GLUCOSE BLDC GLUCOMTR-MCNC: 216 MG/DL — HIGH (ref 70–99)
GLUCOSE SERPL-MCNC: 243 MG/DL — HIGH (ref 70–99)
HCT VFR BLD CALC: 36.6 % — LOW (ref 39–50)
HGB BLD-MCNC: 11.7 G/DL — LOW (ref 13–17)
MAGNESIUM SERPL-MCNC: 2.3 MG/DL — SIGNIFICANT CHANGE UP (ref 1.6–2.6)
MCHC RBC-ENTMCNC: 30.2 PG — SIGNIFICANT CHANGE UP (ref 27–34)
MCHC RBC-ENTMCNC: 32 GM/DL — SIGNIFICANT CHANGE UP (ref 32–36)
MCV RBC AUTO: 94.6 FL — SIGNIFICANT CHANGE UP (ref 80–100)
NRBC # BLD: 0 /100 WBCS — SIGNIFICANT CHANGE UP
NRBC # FLD: 0 K/UL — SIGNIFICANT CHANGE UP
PHOSPHATE SERPL-MCNC: 2 MG/DL — LOW (ref 2.5–4.5)
PLATELET # BLD AUTO: 324 K/UL — SIGNIFICANT CHANGE UP (ref 150–400)
POTASSIUM SERPL-MCNC: 5.1 MMOL/L — SIGNIFICANT CHANGE UP (ref 3.5–5.3)
POTASSIUM SERPL-SCNC: 5.1 MMOL/L — SIGNIFICANT CHANGE UP (ref 3.5–5.3)
RBC # BLD: 3.87 M/UL — LOW (ref 4.2–5.8)
RBC # FLD: 17.1 % — HIGH (ref 10.3–14.5)
SODIUM SERPL-SCNC: 133 MMOL/L — LOW (ref 135–145)
WBC # BLD: 15.62 K/UL — HIGH (ref 3.8–10.5)
WBC # FLD AUTO: 15.62 K/UL — HIGH (ref 3.8–10.5)

## 2021-10-22 PROCEDURE — 99232 SBSQ HOSP IP/OBS MODERATE 35: CPT

## 2021-10-22 PROCEDURE — 93306 TTE W/DOPPLER COMPLETE: CPT | Mod: 26

## 2021-10-22 PROCEDURE — 99233 SBSQ HOSP IP/OBS HIGH 50: CPT

## 2021-10-22 RX ORDER — SPIRONOLACTONE 25 MG/1
25 TABLET, FILM COATED ORAL DAILY
Refills: 0 | Status: DISCONTINUED | OUTPATIENT
Start: 2021-10-22 | End: 2021-10-22

## 2021-10-22 RX ORDER — INSULIN LISPRO 100/ML
VIAL (ML) SUBCUTANEOUS
Refills: 0 | Status: DISCONTINUED | OUTPATIENT
Start: 2021-10-22 | End: 2021-10-23

## 2021-10-22 RX ORDER — SPIRONOLACTONE 25 MG/1
12.5 TABLET, FILM COATED ORAL DAILY
Refills: 0 | Status: DISCONTINUED | OUTPATIENT
Start: 2021-10-23 | End: 2021-10-23

## 2021-10-22 RX ADMIN — SPIRONOLACTONE 25 MILLIGRAM(S): 25 TABLET, FILM COATED ORAL at 15:04

## 2021-10-22 RX ADMIN — BUDESONIDE AND FORMOTEROL FUMARATE DIHYDRATE 2 PUFF(S): 160; 4.5 AEROSOL RESPIRATORY (INHALATION) at 21:21

## 2021-10-22 RX ADMIN — SENNA PLUS 2 TABLET(S): 8.6 TABLET ORAL at 21:25

## 2021-10-22 RX ADMIN — Medication 81 MILLIGRAM(S): at 11:49

## 2021-10-22 RX ADMIN — Medication 1: at 08:45

## 2021-10-22 RX ADMIN — CARVEDILOL PHOSPHATE 3.12 MILLIGRAM(S): 80 CAPSULE, EXTENDED RELEASE ORAL at 18:20

## 2021-10-22 RX ADMIN — PIPERACILLIN AND TAZOBACTAM 25 GRAM(S): 4; .5 INJECTION, POWDER, LYOPHILIZED, FOR SOLUTION INTRAVENOUS at 03:31

## 2021-10-22 RX ADMIN — BUDESONIDE AND FORMOTEROL FUMARATE DIHYDRATE 2 PUFF(S): 160; 4.5 AEROSOL RESPIRATORY (INHALATION) at 08:53

## 2021-10-22 RX ADMIN — CLOPIDOGREL BISULFATE 75 MILLIGRAM(S): 75 TABLET, FILM COATED ORAL at 11:50

## 2021-10-22 RX ADMIN — MAGNESIUM OXIDE 400 MG ORAL TABLET 400 MILLIGRAM(S): 241.3 TABLET ORAL at 11:51

## 2021-10-22 RX ADMIN — BUMETANIDE 2 MILLIGRAM(S): 0.25 INJECTION INTRAMUSCULAR; INTRAVENOUS at 18:19

## 2021-10-22 RX ADMIN — Medication 4: at 18:20

## 2021-10-22 RX ADMIN — ISOSORBIDE MONONITRATE 30 MILLIGRAM(S): 60 TABLET, EXTENDED RELEASE ORAL at 11:50

## 2021-10-22 RX ADMIN — Medication 4: at 12:54

## 2021-10-22 RX ADMIN — Medication 50 MILLIGRAM(S): at 15:04

## 2021-10-22 RX ADMIN — HEPARIN SODIUM 5000 UNIT(S): 5000 INJECTION INTRAVENOUS; SUBCUTANEOUS at 05:15

## 2021-10-22 RX ADMIN — Medication 25 MICROGRAM(S): at 05:14

## 2021-10-22 RX ADMIN — Medication 50 MILLIGRAM(S): at 21:24

## 2021-10-22 RX ADMIN — PIPERACILLIN AND TAZOBACTAM 25 GRAM(S): 4; .5 INJECTION, POWDER, LYOPHILIZED, FOR SOLUTION INTRAVENOUS at 21:22

## 2021-10-22 RX ADMIN — PIPERACILLIN AND TAZOBACTAM 25 GRAM(S): 4; .5 INJECTION, POWDER, LYOPHILIZED, FOR SOLUTION INTRAVENOUS at 12:56

## 2021-10-22 RX ADMIN — BUMETANIDE 2 MILLIGRAM(S): 0.25 INJECTION INTRAMUSCULAR; INTRAVENOUS at 09:27

## 2021-10-22 RX ADMIN — Medication 40 MILLIGRAM(S): at 05:14

## 2021-10-22 RX ADMIN — CARVEDILOL PHOSPHATE 3.12 MILLIGRAM(S): 80 CAPSULE, EXTENDED RELEASE ORAL at 08:50

## 2021-10-22 RX ADMIN — Medication 3 MILLILITER(S): at 09:57

## 2021-10-22 RX ADMIN — Medication 3 MILLILITER(S): at 22:08

## 2021-10-22 RX ADMIN — ATORVASTATIN CALCIUM 20 MILLIGRAM(S): 80 TABLET, FILM COATED ORAL at 21:24

## 2021-10-22 RX ADMIN — TAMSULOSIN HYDROCHLORIDE 0.4 MILLIGRAM(S): 0.4 CAPSULE ORAL at 21:23

## 2021-10-22 RX ADMIN — HEPARIN SODIUM 5000 UNIT(S): 5000 INJECTION INTRAVENOUS; SUBCUTANEOUS at 18:20

## 2021-10-22 NOTE — PROGRESS NOTE ADULT - PROBLEM SELECTOR PLAN 7
DVT ppx: HSQ  Diet: DASH/CC  Dispo: PT  Attn at follow up: Needs repeat CT chest in 2022. See pulm -Please email: [email or web address] to setup an appointment prior to discharge. Include the patient's name, , MRN and contact information in the email.

## 2021-10-22 NOTE — DISCHARGE NOTE PROVIDER - NSDCFUADDAPPT_GEN_ALL_CORE_FT
Pulmonlogy emailed at [email or web address]  on 10/22 for follow up appointment. contact Info for Daughter Joey and patient were provided.

## 2021-10-22 NOTE — PROGRESS NOTE ADULT - SUBJECTIVE AND OBJECTIVE BOX
Pt alert and oriented in bed, now off oxygen, eating breakfast (dysphagia diet). Did not require Bipap overnight. Wanting to go home. UOP 4500 cc over 24 hours on 10/20. Still has not yet been seen by PT.     Denies chest pain, cough, abdominal pain, dysuria, fevers, chills.       ICU Vital Signs Last 24 Hrs  T(C): 36.6 (22 Oct 2021 05:12), Max: 36.6 (22 Oct 2021 05:12)  T(F): 97.8 (22 Oct 2021 05:12), Max: 97.8 (22 Oct 2021 05:12)  HR: 102 (22 Oct 2021 05:12) (79 - 108)  BP: 92/62 (22 Oct 2021 05:12) (92/62 - 122/71)  RR: 18 (22 Oct 2021 05:12) (18 - 18)  SpO2: 99% (22 Oct 2021 05:12) (86% - 100%)      CONSTITUTIONAL: Well-groomed, in no apparent distress, dyspnea while speaking, improved from prior  EYES: No conjunctival or scleral injection, non-icteric; PERRLA and symmetric  ENMT: No external nasal lesions; nasal mucosa not inflamed; no pharyngeal injection or exudates  NECK: Trachea midline without palpable neck mass  RESPIRATORY: diffuse crackles in anterior and posterior lung fields, wheezing improved.   CARDIOVASCULAR: +S1S2, RRR, no M/G/R; no carotid bruits; pedal pulses full and symmetric, warm extremities, LUE and LLE w non pitting edema  GASTROINTESTINAL: No palpable masses or tenderness, +BS throughout, no rebound/guarding  MUSCULOSKELETAL: No digital clubbing or cyanosis;  SKIN: No rashes or ulcers noted; no subcutaneous nodules or induration palpable  NEUROLOGIC: CN II-XII grossly intact  PSYCHIATRIC: A+O x 3; mood and affect appropriate; appropriate insight and judgment    Labs and imaging:   Serum Pro-Brain Natriuretic Peptide 10/21/21: 30760: <-- 8177 on admission. Sept 2021: Serum Pro-Brain Natriuretic Peptide: 5957:                           9.9    10.77 )-----------( 288      ( 21 Oct 2021 06:55 )             32.1     10-21    136  |  95<L>  |  35<H>  ----------------------------<  131<H>  3.5   |  26  |  2.35<H>    Ca    8.8      21 Oct 2021 06:55  Phos  3.0     10-21  Mg     1.90     10-21    TPro  6.3  /  Alb  3.6  /  TBili  0.4  /  DBili  <0.2  /  AST  21  /  ALT  19  /  AlkPhos  103  10-20    < from: CT Chest No Cont (10.18.21 @ 10:27) >    There are groundglass opacities with more consolidative nodular opacities in the right upper lobe and superior segment of the right lower lobe, majority of which are new since September 12, 2021. There is interlobular septal thickening and diffuse foci of mosaic attenuation. There is diffuse bronchiolar wall thickening.  PLEURA: Trace bilateral pleural effusions.  MEDIASTINUM AND MORTEZA: Multiple enlarged mediastinal lymph nodes have mildly increased in size when compared to the prior study. The largest is a prevascular lymph node which measures 2.6 x 2.0 cm.  VESSELS: Atherosclerotic calcifications of the aorta and at the origin of the great vessels.  HEART: Enlarged. No pericardial effusion. Status post median sternotomy and CABG.  CHEST WALL AND LOWER NECK: Left chest wall ICD is seen with the distal lead tip in the right ventricle.  VISUALIZED UPPER ABDOMEN: Within normal limits.  BONES: Degenerative changes.    IMPRESSION:  Findings of pulmonary edema including cardiomegaly, groundglass opacities, trace bilateral pleural effusions, and diffuse interlobular septal thickening. Presence of more consolidative opacities in the right upper and lower lobes may also be on the basis of pulmonary edema however superimposed infection is not excluded. Recommend follow-up chest CT in 3 months for further characterization.    Mediastinal lymphadenopathy, with many lymph nodes slightly increased in size when compared to prior, may also be on the basis of pulmonary edema. Recommend attention on follow-up chest CT.    Persistent areas of mosaic attenuation, likely on the basis of chronic small airway disease and air trapping.      < end of copied text >      ProBNP elevated to [address] 10/21/21<--- 8000 on admissino<--- approx 5000 in Sept 2021.   Pt alert and oriented in bed, now off oxygen, eating breakfast (dysphagia diet). Did not require Bipap overnight. Wanting to go home. UOP 7000 cc over 24 hours on 10/21.     Labs still pending collection this am.    Denies chest pain, cough, abdominal pain, dysuria, fevers, chills.       ICU Vital Signs Last 24 Hrs  T(C): 36.6 (22 Oct 2021 05:12), Max: 36.6 (22 Oct 2021 05:12)  T(F): 97.8 (22 Oct 2021 05:12), Max: 97.8 (22 Oct 2021 05:12)  HR: 102 (22 Oct 2021 05:12) (79 - 108)  BP: 92/62 (22 Oct 2021 05:12) (92/62 - 122/71)  RR: 18 (22 Oct 2021 05:12) (18 - 18)  SpO2: 99% (22 Oct 2021 05:12) (86% - 100%)      CONSTITUTIONAL: Well-groomed, in no apparent distress, dyspnea while speaking, improved from prior  EYES: No conjunctival or scleral injection, non-icteric; PERRLA and symmetric  ENMT: No external nasal lesions; nasal mucosa not inflamed; no pharyngeal injection or exudates  NECK: Trachea midline without palpable neck mass  RESPIRATORY: diffuse crackles in anterior and posterior lung fields, wheezing improved.   CARDIOVASCULAR: +S1S2, RRR, no M/G/R; no carotid bruits; pedal pulses full and symmetric, warm extremities, LUE and LLE w non pitting edema  GASTROINTESTINAL: No palpable masses or tenderness, +BS throughout, no rebound/guarding  MUSCULOSKELETAL: No digital clubbing or cyanosis;  SKIN: No rashes or ulcers noted; no subcutaneous nodules or induration palpable  NEUROLOGIC: CN II-XII grossly intact  PSYCHIATRIC: A+O x 3; mood and affect appropriate; appropriate insight and judgment    Labs and imaging:   Serum Pro-Brain Natriuretic Peptide 10/21/21: 02439: <-- 8177 on admission. Sept 2021: Serum Pro-Brain Natriuretic Peptide: 5957:     TTE 10/21:             CONCLUSIONS:  1. Tethered mitral valve leaflets with normal opening.  Mild-moderate mitral regurgitation.  2. Moderately dilated left atrium.  LA volume index = 47  cc/m2.  3. Normal left ventricular internal dimensions and wall  thicknesses.  4. Severe global left ventricular systolic dysfunction.  5. Right ventricular enlargement with decreased right  ventricular systolic function.  A device wire is noted in  the right heart.  *** Compared with echocardiogram of 9/13/2021, more mitral  regurgitation was noted on the current study. Pt alert and oriented in bed, on 2L, irritable in bed. Wanting to go home-- willing to wait until Saturday to continue diureses and abx but no longer than that. UOP 7000 cc over 24 hours on 10/21. Attempted to discuss goals of care with him again this morning (also addressed this on day of admission with aid of ), but he did not want to discuss at this time. He is aware that leaving the hospital early before being properly diuresed and before completing abx course could result in a readmission.    Labs still pending collection this am.    Denies chest pain, cough, abdominal pain, dysuria, fevers, chills.     Phone call to daughter 10/22: ***      T(C): 36.6 (22 Oct 2021 05:12), Max: 36.6 (22 Oct 2021 05:12)  T(F): 97.8 (22 Oct 2021 05:12), Max: 97.8 (22 Oct 2021 05:12)  HR: 102 (22 Oct 2021 05:12) (79 - 108)  BP: 92/62 (22 Oct 2021 05:12) (92/62 - 122/71)  RR: 18 (22 Oct 2021 05:12) (18 - 18)  SpO2: 99% (22 Oct 2021 05:12) (86% - 100%)      CONSTITUTIONAL: Well-groomed, in no apparent distress, dyspnea while speaking, improved from prior  EYES: No conjunctival or scleral injection, non-icteric; PERRLA and symmetric  ENMT: No external nasal lesions; nasal mucosa not inflamed; no pharyngeal injection or exudates  NECK: Trachea midline without palpable neck mass  RESPIRATORY: diffuse crackles in anterior and posterior lung fields, wheezing improved.   CARDIOVASCULAR: +S1S2, RRR, no M/G/R; no carotid bruits; pedal pulses full and symmetric, warm extremities, LUE and LLE w non pitting edema  GASTROINTESTINAL: No palpable masses or tenderness, +BS throughout, no rebound/guarding  MUSCULOSKELETAL: No digital clubbing or cyanosis;  SKIN: No rashes or ulcers noted; no subcutaneous nodules or induration palpable  NEUROLOGIC: CN II-XII grossly intact  PSYCHIATRIC: A+O x 3; mood and affect appropriate; appropriate insight and judgment    Labs and imaging:   Serum Pro-Brain Natriuretic Peptide 10/21/21: 67503: <-- 8177 on admission. Sept 2021: Serum Pro-Brain Natriuretic Peptide: 5957:     TTE 10/21:             CONCLUSIONS:  1. Tethered mitral valve leaflets with normal opening.  Mild-moderate mitral regurgitation.  2. Moderately dilated left atrium.  LA volume index = 47  cc/m2.  3. Normal left ventricular internal dimensions and wall  thicknesses.  4. Severe global left ventricular systolic dysfunction.  5. Right ventricular enlargement with decreased right  ventricular systolic function.  A device wire is noted in  the right heart.  *** Compared with echocardiogram of 9/13/2021, more mitral  regurgitation was noted on the current study. Pt alert and oriented in bed, on 2L, irritable in bed. Wanting to go home-- willing to wait until Saturday to continue diureses and abx but no longer than that. UOP 7000 cc over 24 hours on 10/21. Attempted to discuss goals of care with him again this morning (also addressed this on day of admission with aid of ), but he did not want to discuss at this time. He is aware that leaving the hospital early before being properly diuresed and before completing abx course could result in a readmission.    Labs still pending collection this am.    Denies chest pain, cough, abdominal pain, dysuria, fevers, chills.     Phone call to daughter 10/22: Explained that patient is ready to leave, recognizing that it could result in readmission. She wants to hold off on consulting Palliative care at this time.       T(C): 36.6 (22 Oct 2021 05:12), Max: 36.6 (22 Oct 2021 05:12)  T(F): 97.8 (22 Oct 2021 05:12), Max: 97.8 (22 Oct 2021 05:12)  HR: 102 (22 Oct 2021 05:12) (79 - 108)  BP: 92/62 (22 Oct 2021 05:12) (92/62 - 122/71)  RR: 18 (22 Oct 2021 05:12) (18 - 18)  SpO2: 99% (22 Oct 2021 05:12) (86% - 100%)      CONSTITUTIONAL: Well-groomed, in no apparent distress, dyspnea while speaking, improved from prior  EYES: No conjunctival or scleral injection, non-icteric; PERRLA and symmetric  ENMT: No external nasal lesions; nasal mucosa not inflamed; no pharyngeal injection or exudates  NECK: Trachea midline without palpable neck mass  RESPIRATORY: diffuse crackles in anterior and posterior lung fields, wheezing improved.   CARDIOVASCULAR: +S1S2, RRR, no M/G/R; no carotid bruits; pedal pulses full and symmetric, warm extremities, LUE and LLE w non pitting edema  GASTROINTESTINAL: No palpable masses or tenderness, +BS throughout, no rebound/guarding  MUSCULOSKELETAL: No digital clubbing or cyanosis;  SKIN: No rashes or ulcers noted; no subcutaneous nodules or induration palpable  NEUROLOGIC: CN II-XII grossly intact  PSYCHIATRIC: A+O x 3; mood and affect appropriate; appropriate insight and judgment    Labs and imaging:   Serum Pro-Brain Natriuretic Peptide 10/21/21: 43043: <-- 8177 on admission. Sept 2021: Serum Pro-Brain Natriuretic Peptide: 5957:     TTE 10/21:             CONCLUSIONS:  1. Tethered mitral valve leaflets with normal opening.  Mild-moderate mitral regurgitation.  2. Moderately dilated left atrium.  LA volume index = 47  cc/m2.  3. Normal left ventricular internal dimensions and wall  thicknesses.  4. Severe global left ventricular systolic dysfunction.  5. Right ventricular enlargement with decreased right  ventricular systolic function.  A device wire is noted in  the right heart.  *** Compared with echocardiogram of 9/13/2021, more mitral  regurgitation was noted on the current study.

## 2021-10-22 NOTE — DISCHARGE NOTE PROVIDER - NSDCCPCAREPLAN_GEN_ALL_CORE_FT
PRINCIPAL DISCHARGE DIAGNOSIS  Diagnosis: Pneumonia  Assessment and Plan of Treatment: You came into the hospital with weakness , fevers and arm and leg swelling. You were seen by the Pulmonary doctor and were treated with IV antibiotics for presumed pneumonia. Blood cultures were negative. You were also treatmed with steroids for 5 days.      SECONDARY DISCHARGE DIAGNOSES  Diagnosis: CHF exacerbation  Assessment and Plan of Treatment: You came into the hospital and were found to have a heart failure exacerbation . An Ultrasound of your heart was done which showed reccurent heart failure with worsening Mitral regurgitation. You were seen by heart failure specialist and were started on IV diurectics.  You have an appointment with  on 11/1/2021 at 10:00am     PRINCIPAL DISCHARGE DIAGNOSIS  Diagnosis: Pneumonia  Assessment and Plan of Treatment: You came into the hospital with weakness , fevers and arm and leg swelling. You were seen by the Pulmonary doctor and were treated with IV antibiotics for presumed pneumonia. Blood cultures were negative. You were also treatmed with steroids for 5 days.  You Did Not Complete the Full Course   and asked to  leave the hospital against medical advice. Patient is A&O x3 and has full capacity to make his or her own medical decisions. Pt was informed of their medical conditions, benefits, and alternatives to treatment as well as the risks of refusing treatment and the seriousness of leaving against medical advice such as the risks of heart attack, stroke, seizure, and even death were fully explained to the patient.  After expressing full understanding, patient then signs out against medical advice witnessed by the nurse.  Attending made aware.        SECONDARY DISCHARGE DIAGNOSES  Diagnosis: CKD (chronic kidney disease)  Assessment and Plan of Treatment: Continue blood pressure, cholesterol and diabetic medications. Goal of hemoglobin A1C (HgbA1C) < 7%.  Avoid nephrotoxic drugs such as nonsteroidal anti-inflammatory agents (NSAIDs).   Please follow up with your nephrologist to monitor your kidney function, continue with low protein and potassium diet.      Diagnosis: Non-insulin dependent type 2 diabetes mellitus  Assessment and Plan of Treatment: Monitor finger sticks pre-meal and bedtime, low salt, fat and carbohydrate diet, minimize glucose intake.  Exercise daily for at least 30 minutes and weight loss.  Follow up with primary care physician and endocrinologist for routine Hemoglobin A1C checks and management.  Follow up with your ophthalmologist for routine yearly vision exams.      Diagnosis: CHF exacerbation  Assessment and Plan of Treatment: You came into the hospital and were found to have a heart failure exacerbation . An Ultrasound of your heart was done which showed reccurent heart failure with worsening Mitral regurgitation. You were seen by heart failure specialist and were started on IV diurectics.  You have an appointment with  on 11/1/2021 at 10:00am     PRINCIPAL DISCHARGE DIAGNOSIS  Diagnosis: Pneumonia  Assessment and Plan of Treatment: You came into the hospital with weakness , fevers and arm and leg swelling. You were seen by the Pulmonary doctor and were treated with IV antibiotics for presumed pneumonia. Blood cultures were negative. You were also treatmed with steroids for 5 days.  will Need  repeat CT Chest in 6-8 weeks after discharge  [facility] in 1-2 weeks, Call For an appointment         SECONDARY DISCHARGE DIAGNOSES  Diagnosis: CHF exacerbation  Assessment and Plan of Treatment: You came into the hospital and were found to have a heart failure exacerbation . An Ultrasound of your heart was done which showed reccurent heart failure with worsening Mitral regurgitation. You were seen by heart failure specialist and were started on IV diurectics.  You have an appointment with  on 11/1/2021 at 10:00am    Diagnosis: CKD (chronic kidney disease)  Assessment and Plan of Treatment: Continue blood pressure, cholesterol and diabetic medications. Goal of hemoglobin A1C (HgbA1C) < 7%.  Avoid nephrotoxic drugs such as nonsteroidal anti-inflammatory agents (NSAIDs).   Please follow up with your nephrologist to monitor your kidney function, continue with low protein and potassium diet.      Diagnosis: Non-insulin dependent type 2 diabetes mellitus  Assessment and Plan of Treatment: Monitor finger sticks pre-meal and bedtime, low salt, fat and carbohydrate diet, minimize glucose intake.  Exercise daily for at least 30 minutes and weight loss.  Follow up with primary care physician and endocrinologist for routine Hemoglobin A1C checks and management.  Follow up with your ophthalmologist for routine yearly vision exams.

## 2021-10-22 NOTE — DISCHARGE NOTE PROVIDER - HOSPITAL COURSE
66M w/CABG, HFrEF (15-20%) s/p AICD on 2LNC at home, COPD/asthma, CKD, CVA w/residual L-sided weakness, and gout, recently prescribed PO antibiotics for presumed PNA prior to admission, admitted for generalized weakness, fevers and UE and LE extremity swelling. He is being treated for HFrEF exacerbation as well as recurrent PNA. Previously taking torsemide 20 mg BID prior to admission, however historically had been on doses much higher than this prescribed by primary Cardiologist. Suspect that the PNA triggered the CHF exacerbation given fevers and leukocytosis on admission. Also being treated empirically for COPD exacerbation given significant wheezing on exam on admission. Pulm consulted given recurrent PNA and hospitalizations as well as CT chest findings. No current plans for bronch, but will need outpatient follow up. AHF consulted, recommending continued IV diuresis. Cardiology also consulted. Plan to transition to Oral Bumex today, s/p Echo: Echo w/ global LV dysfunction, EF 22 and worsening mitral regurgitation  pt Received 5 days of Zosyn  Called by nurse to evaluate patient who wishes to leave the hospital against medical advice. Patient is A&O x3 and has full capacity to make his or her own medical decisions. Pt was informed of their medical conditions, benefits, and alternatives to treatment as well as the risks of refusing treatment and the seriousness of leaving against medical advice such as the risks of heart attack, stroke, seizure, and even death were fully explained to the patient.  After expressing full understanding, patient then signs out against medical advice witnessed by the nurse.  Attending made aware.                                         Dispo: PT  Attn at follow up: Needs repeat CT chest in 2022. See pulm -Please email: [email or web address] to setup an appointment prior to discharge. Include the patient's name, , MRN and contact information in the email.     66M w/CABG, HFrEF (15-20%) s/p AICD on 2LNC at home, COPD/asthma, CKD, CVA w/residual L-sided weakness, and gout, recently prescribed PO antibiotics for presumed PNA prior to admission, admitted for generalized weakness, fevers and UE and LE extremity swelling. He is being treated for HFrEF exacerbation as well as recurrent PNA. Previously taking torsemide 20 mg BID prior to admission, however historically had been on doses much higher than this prescribed by primary Cardiologist. Suspect that the PNA triggered the CHF exacerbation given fevers and leukocytosis on admission. Also being treated empirically for COPD exacerbation given significant wheezing on exam on admission. Pulm consulted given recurrent PNA and hospitalizations as well as CT chest findings. No current plans for bronch, but will need outpatient follow up. AHF consulted, recommending continued IV diuresis. Cardiology also consulted. Pt transitioned to Oral Bumex today, s/p Echo: Echo w/ global LV dysfunction, EF 22 and worsening mitral regurgitation  pt Received 5 days of Zosyn  case discussed with attending, Pt is Stable for Discharge Home  Plan to Follow up with Dr Buckner on  at 10 am   plan for Follow up at [facility] in 1-2 weeks                                         Dispo: PT  Attn at follow up: Needs repeat CT chest in 2022. See pulm -Please email: [email or web address] to setup an appointment prior to discharge. Include the patient's name, , MRN and contact information in the email.

## 2021-10-22 NOTE — PROGRESS NOTE ADULT - PROBLEM SELECTOR PLAN 2
Admitted with temp 100.3 rectal with tachypneic, found to septic 2/2 ADHF vs PNA. Patient was prescribed a 7 day course of cefuroxime but continued to spike fevers with non-productive coughHas been afebrile since admission.  Last utilized bipap 10/18. Recurrent PNA raising suspicion for possible post obstructive process or aspiration PNA given complaints of chest tightness with eating/drinking. Blood cultures w NGTD. RVP/covid neg. Swallow eval positive. Pt currently on dysphagia diet.  -CT chest wo concerning for GGO throughout; no cavitating lesions. Mediastinal LAD. Results are above.    #Plan above for HF  #Leukocytosis, POA, improving  #PNA  - Discont vanc (though pt does have MRSA risk factors given hx of PNA in the past 3 months, his CT findings are not consistent with MRSA PNA and nephrotoxicity risk is high for vanc/zosyn combo in the setting of known CKD requiring aggressive diuresis)  - Cont zosyn for a 7 day course (day 5). Significant risk factors for pseudomonas given hx.  - Disc azithro 10/19.  - Speech eval and treat  - Sputum cultures ordered, not done  - Pulm consulted, signed off Admitted with temp 100.3 rectal with tachypneic, found to septic 2/2 ADHF vs PNA. Patient was prescribed a 7 day course of cefuroxime but continued to spike fevers with non-productive coughHas been afebrile since admission.  Last utilized bipap 10/18. Recurrent PNA raising suspicion for possible post obstructive process or aspiration PNA given complaints of chest tightness with eating/drinking. Blood cultures w NGTD. RVP/covid neg. Swallow eval positive. Pt currently on dysphagia diet.  -CT chest wo concerning for GGO throughout; no cavitating lesions. Mediastinal LAD. Results are above.    #Plan above for HF  #Leukocytosis, POA, improving  #PNA  - Discont vanc (though pt does have MRSA risk factors given hx of PNA in the past 3 months, his CT findings are not consistent with MRSA PNA and nephrotoxicity risk is high for vanc/zosyn combo in the setting of known CKD requiring aggressive diuresis)  - Cont zosyn for a 7 day course (day 5), ,end date 10/24. Significant risk factors for pseudomonas given hx.  - Disc azithro 10/19.  - Speech eval and treat  - Sputum cultures ordered, not done  - Pulm consulted, signed off

## 2021-10-22 NOTE — PROGRESS NOTE ADULT - SUBJECTIVE AND OBJECTIVE BOX
CHIEF COMPLAINT: PNA    Interval Events: No acute events, feels well this AM.    REVIEW OF SYSTEMS:  Constitutional: No fevers or chills.   Eyes: No itching or discharge from the eyes  ENT: No ear pain. No ear discharge. No nasal congestion.   CV: No chest pain. No palpitations. No lightheadedness or dizziness.   Resp: No dyspnea at rest. No dyspnea on exertion. No cough.  GI: No nausea. No vomiting. No diarrhea.  MSK: No joint pain or pain in any extremities  Integumentary: No skin lesions. No pedal edema.  Neurological: No gross motor weakness. No sensory changes.  [x] All other systems negative  [ ] Unable to assess ROS because ________    OBJECTIVE:  ICU Vital Signs Last 24 Hrs  T(C): 36.6 (22 Oct 2021 05:12), Max: 36.6 (22 Oct 2021 05:12)  T(F): 97.8 (22 Oct 2021 05:12), Max: 97.8 (22 Oct 2021 05:12)  HR: 102 (22 Oct 2021 05:12) (79 - 108)  BP: 92/62 (22 Oct 2021 05:12) (92/62 - 122/71)  BP(mean): --  ABP: --  ABP(mean): --  RR: 18 (22 Oct 2021 05:12) (18 - 18)  SpO2: 99% (22 Oct 2021 05:12) (86% - 100%)        10-21 @ 07:01  -  10-22 @ 07:00  --------------------------------------------------------  IN: 1300 mL / OUT: 4100 mL / NET: -2800 mL      CAPILLARY BLOOD GLUCOSE      POCT Blood Glucose.: 172 mg/dL (22 Oct 2021 08:40)      PHYSICAL EXAM:  General: Awake, alert, oriented X 3.   HEENT: Atraumatic, normocephalic.   Lymph Nodes: No palpable lymphadenopathy  Neck: No JVD. No carotid bruit.   Respiratory: Normal chest expansion. Clear anteriorly.  Cardiovascular: S1 S2 normal. No murmurs, rubs or gallops.   Abdomen: Soft, non-tender, non-distended. No organomegaly.  Extremities: Warm to touch. Peripheral pulse palpable. No pedal edema.   Skin: No rashes or skin lesions  Neurological: Motor and sensory examination equal and normal in all four extremities.  Psychiatry: Appropriate mood and affect.    HOSPITAL MEDICATIONS:  MEDICATIONS  (STANDING):  albuterol/ipratropium for Nebulization 3 milliLiter(s) Nebulizer every 12 hours  aspirin enteric coated 81 milliGRAM(s) Oral daily  atorvastatin 20 milliGRAM(s) Oral at bedtime  budesonide 160 MICROgram(s)/formoterol 4.5 MICROgram(s) Inhaler 2 Puff(s) Inhalation two times a day  buMETAnide Injectable 2 milliGRAM(s) IV Push two times a day  carvedilol 3.125 milliGRAM(s) Oral every 12 hours  clopidogrel Tablet 75 milliGRAM(s) Oral daily  dextrose 40% Gel 15 Gram(s) Oral once  dextrose 5%. 1000 milliLiter(s) (50 mL/Hr) IV Continuous <Continuous>  dextrose 5%. 1000 milliLiter(s) (100 mL/Hr) IV Continuous <Continuous>  dextrose 50% Injectable 25 Gram(s) IV Push once  dextrose 50% Injectable 12.5 Gram(s) IV Push once  dextrose 50% Injectable 25 Gram(s) IV Push once  ferrous    sulfate 325 milliGRAM(s) Oral <User Schedule>  glucagon  Injectable 1 milliGRAM(s) IntraMuscular once  heparin   Injectable 5000 Unit(s) SubCutaneous every 12 hours  hydrALAZINE 50 milliGRAM(s) Oral three times a day  insulin lispro (ADMELOG) corrective regimen sliding scale   SubCutaneous three times a day before meals  insulin lispro (ADMELOG) corrective regimen sliding scale   SubCutaneous at bedtime  isosorbide   mononitrate ER Tablet (IMDUR) 30 milliGRAM(s) Oral daily  levothyroxine 25 MICROGram(s) Oral daily  magnesium oxide 400 milliGRAM(s) Oral daily  piperacillin/tazobactam IVPB.. 3.375 Gram(s) IV Intermittent every 8 hours  senna 2 Tablet(s) Oral at bedtime  spironolactone 25 milliGRAM(s) Oral daily  tamsulosin 0.4 milliGRAM(s) Oral at bedtime    MEDICATIONS  (PRN):  acetaminophen   Tablet .. 650 milliGRAM(s) Oral every 6 hours PRN Temp greater or equal to 38C (100.4F), Mild Pain (1 - 3)  aluminum hydroxide/magnesium hydroxide/simethicone Suspension 30 milliLiter(s) Oral every 4 hours PRN Dyspepsia  melatonin 3 milliGRAM(s) Oral at bedtime PRN Insomnia      LABS:                        9.9    10.77 )-----------( 288      ( 21 Oct 2021 06:55 )             32.1     10-21    136  |  95<L>  |  35<H>  ----------------------------<  131<H>  3.5   |  26  |  2.35<H>    Ca    8.8      21 Oct 2021 06:55  Phos  3.0     10-21  Mg     1.90     10-21                MICROBIOLOGY:     RADIOLOGY:  [ ] Reviewed and interpreted by me    Point of Care Ultrasound Findings:    PFT:    EKG:

## 2021-10-22 NOTE — PROGRESS NOTE ADULT - ASSESSMENT
66 year old Male with PMH of HTN, HLD, COPD (home O2 2L) and multiple admissions for PNA, CVA, CKD 3, CABG (ASA/Plavix), HFrEF (22%;LVIDd 6.1) s/p ICD.    Patient presented to ED with c/o fever, pleuritic chest pain, productive cough/wheezing, progressive worsening SOB, cough/wheezing X 5days; prescribed 7 day course of Cefuroxime by PCP which patient took for 3-4 days with minimal relief of respiratory symptoms. He was placed on BiPAP and now transitioned back to 2L. Pulmonary following patient, Chest CT revealed new ground glass opacities/consolidation in RUL and currently on Zosyn (last dose 10/25) and Prednisone 40mg X 5days. BNP increased from 8177 to 12,239 with signs of fluid overload over the last week and started on IV Bumex.      IV Bumex 2mg twice daily   Coreg 3.125mg twice daily   Hydralazine 50mg three times daily   Isosorbide 30mg three times daily   Spironolactone 25 mg daily; Please hold today and will continue to monitor SCr and K.  Strict I/O  Please obtain Daily Standing Weights  Monitor Lytes Replete K>4.0 and Mg>2.0  Management per Primary team  Appreciate Pulmonary/Cardiology Recommendations  Follow up Appointment with Dr. Dobson on     66 year old Male with PMH of HTN, HLD, COPD (home O2 2L) and multiple admissions for PNA, CVA, CKD 3, CABG (ASA/Plavix), HFrEF (22%;LVIDd 6.1) s/p ICD.    Patient presented to ED with c/o fever, pleuritic chest pain, productive cough/wheezing, progressive worsening SOB, cough/wheezing X 5days; prescribed 7 day course of Cefuroxime by PCP which patient took for 3-4 days with minimal relief of respiratory symptoms. He was placed on BiPAP and now transitioned back to 2L. Pulmonary following patient, Chest CT revealed new ground glass opacities/consolidation in RUL and currently on Zosyn (last dose 10/25) and Prednisone 40mg X 5days. BNP increased from 8177 to 12,239 with signs of fluid overload over the last week and started on IV Bumex.      IV Bumex 2mg twice daily; Please switch to PO    Coreg 3.125mg twice daily   Hydralazine 50mg three times daily   Isosorbide 30mg three times daily   Spironolactone 25 mg daily; Please hold today and will continue to monitor SCr and K.  Strict I/O  Please obtain Daily Standing Weights  Monitor Lytes Replete K>4.0 and Mg>2.0  Management per Primary team  Appreciate Pulmonary/Cardiology Recommendations  HF Follow up Appointment with Dr. Buckner at [facility] (confirmed with daughter Ketty)    66 year old Male with PMH of HTN, HLD, COPD (home O2 2L) and multiple admissions for PNA, CVA, CKD 3, CABG (ASA/Plavix), HFrEF (22%;LVIDd 6.1) s/p ICD.    Patient presented to ED with c/o fever, pleuritic chest pain, productive cough/wheezing, progressive worsening SOB, cough/wheezing X 5days; prescribed 7 day course of Cefuroxime by PCP which patient took for 3-4 days with minimal relief of respiratory symptoms. He was placed on BiPAP and now transitioned back to 2L. Pulmonary following patient, Chest CT revealed new ground glass opacities/consolidation in RUL and currently on Zosyn (last dose 10/25) and Prednisone 40mg X 5days. BNP increased from 8177 to 12,239 with signs of fluid overload over the last week and started on IV Bumex.      IV Bumex 2mg twice daily; Please switch to PO    Coreg 3.125mg twice daily   Hydralazine 50mg three times daily   Isosorbide 30mg three times daily   Spironolactone 25 mg daily; Please decrease to 12.5mg (will continue to monitor SCr and K).  Strict I/O  Please obtain Daily Standing Weights  Monitor Lytes Replete K>4.0 and Mg>2.0  Management per Primary team  Appreciate Pulmonary/Cardiology Recommendations  HF Follow up Appointment with Dr. Buckner at [facility] (confirmed with daughter Ketty)

## 2021-10-22 NOTE — PROGRESS NOTE ADULT - SUBJECTIVE AND OBJECTIVE BOX
Interval History:  Patient resting comfortably in bed   Denies CP/SOB/palpitations/dizziness.  No acute events overnight.    Medications:  acetaminophen   Tablet .. 650 milliGRAM(s) Oral every 6 hours PRN  albuterol/ipratropium for Nebulization 3 milliLiter(s) Nebulizer every 12 hours  aluminum hydroxide/magnesium hydroxide/simethicone Suspension 30 milliLiter(s) Oral every 4 hours PRN  aspirin enteric coated 81 milliGRAM(s) Oral daily  atorvastatin 20 milliGRAM(s) Oral at bedtime  budesonide 160 MICROgram(s)/formoterol 4.5 MICROgram(s) Inhaler 2 Puff(s) Inhalation two times a day  buMETAnide Injectable 2 milliGRAM(s) IV Push two times a day  carvedilol 3.125 milliGRAM(s) Oral every 12 hours  clopidogrel Tablet 75 milliGRAM(s) Oral daily  dextrose 40% Gel 15 Gram(s) Oral once  dextrose 5%. 1000 milliLiter(s) IV Continuous <Continuous>  dextrose 5%. 1000 milliLiter(s) IV Continuous <Continuous>  dextrose 50% Injectable 25 Gram(s) IV Push once  dextrose 50% Injectable 12.5 Gram(s) IV Push once  dextrose 50% Injectable 25 Gram(s) IV Push once  ferrous    sulfate 325 milliGRAM(s) Oral <User Schedule>  glucagon  Injectable 1 milliGRAM(s) IntraMuscular once  heparin   Injectable 5000 Unit(s) SubCutaneous every 12 hours  hydrALAZINE 50 milliGRAM(s) Oral three times a day  insulin lispro (ADMELOG) corrective regimen sliding scale   SubCutaneous three times a day before meals  isosorbide   mononitrate ER Tablet (IMDUR) 30 milliGRAM(s) Oral daily  levothyroxine 25 MICROGram(s) Oral daily  magnesium oxide 400 milliGRAM(s) Oral daily  melatonin 3 milliGRAM(s) Oral at bedtime PRN  piperacillin/tazobactam IVPB.. 3.375 Gram(s) IV Intermittent every 8 hours  senna 2 Tablet(s) Oral at bedtime  spironolactone 25 milliGRAM(s) Oral daily  tamsulosin 0.4 milliGRAM(s) Oral at bedtime      Vitals:  T(C): 36.5 (10-22-21 @ 08:20), Max: 36.6 (10-22-21 @ 05:12)  HR: 88 (10-22-21 @ 09:57) (88 - 108)  BP: 100/60 (10-22-21 @ 08:20) (92/62 - 122/71)  BP(mean): --  RR: 18 (10-22-21 @ 08:20) (18 - 18)  SpO2: 99% (10-22-21 @ 08:20) (94% - 100%)    Daily     Daily         I&O's Summary    21 Oct 2021 07:01  -  22 Oct 2021 07:00  --------------------------------------------------------  IN: 1300 mL / OUT: 4100 mL / NET: -2800 mL    22 Oct 2021 07:01  -  22 Oct 2021 11:12  --------------------------------------------------------  IN: 200 mL / OUT: 0 mL / NET: 200 mL        Physical Exam:  Appearance: No Acute Distress  HEENT: PERRL  Neck: No JVD  Cardiovascular: Normal S1 S2, No murmurs/rubs/gallops  Respiratory: Clear to auscultation bilaterally  Gastrointestinal: Soft, Non-tender	  Skin: No cyanosis	  Neurologic: Non-focal  Extremities: No LE edema  Psychiatry: A & O x 3, Mood & affect appropriate    Labs:                        11.7   15.62 )-----------( 324      ( 22 Oct 2021 09:55 )             36.6     10-22    133<L>  |  92<L>  |  35<H>  ----------------------------<  243<H>  5.1   |  27  |  2.29<H>    Ca    9.3      22 Oct 2021 09:55  Phos  2.0     10-22  Mg     2.30     10-22        TELEMETRY:    Echocardiogram:  < from: Transthoracic Echocardiogram (10.22.21 @ 07:39) >  ------------------------------------------------------------------------  DIMENSIONS:  Dimensions:     Normal Values:  LA:     4.6 cm    2.0 - 4.0 cm  Ao:     3.4 cm    2.0 - 3.8 cm  SEPTUM: 0.8 cm    0.6 - 1.2 cm  PWT:   0.8 cm    0.6 - 1.1 cm  LVIDd:  5.9 cm    3.0 - 5.6 cm  LVIDs:  5.3 cm    1.8 - 4.0 cm  Derived Variables:  LVMI: 95 g/m2  RWT: 0.27  Fractional short: 10 %  Ejection Fraction (Velazquez Rule): 22 %  ------------------------------------------------------------------------  OBSERVATIONS:  Mitral Valve: Tethered mitral valve leaflets with normal  opening. Mild-moderate mitral regurgitation.  Aortic Root: Normal aortic root.  Aortic Valve: Calcified trileaflet aortic valve with normal  opening.  Left Atrium: Moderately dilated left atrium.  LA volume  index = 47 cc/m2.  Left Ventricle: Severe global left ventricular systolic  dysfunction. Normal left ventricular internal dimensions  and wall thicknesses.  Right Heart: Normal right atrium. Rightventricular  enlargement with decreased right ventricular systolic  function.  A device wire is noted in the right heart.  Normal tricuspid valve.  Mild tricuspid regurgitation.  Normal pulmonic valve. Mild pulmonic regurgitation.  Pericardium/PleuraNormal pericardium with no pericardial  effusion.  ------------------------------------------------------------------------  CONCLUSIONS:  1. Tethered mitral valve leaflets with normal opening.  Mild-moderate mitral regurgitation.  2. Moderately dilated left atrium.  LA volume index = 47  cc/m2.  3. Normal left ventricular internal dimensions and wall  thicknesses.  4. Severe global left ventricular systolic dysfunction.  5. Right ventricular enlargement with decreased right  ventricular systolic function.  A device wire is noted in  the right heart.  *** Compared with echocardiogram of 9/13/2021, more mitral  regurgitation was noted on the current study.         Interval History:  Patient resting comfortably in bed   Denies CP/SOB/palpitations/dizziness.  States he is feeling much better and would like to go home     No acute events overnight.    Medications:  acetaminophen   Tablet .. 650 milliGRAM(s) Oral every 6 hours PRN  albuterol/ipratropium for Nebulization 3 milliLiter(s) Nebulizer every 12 hours  aluminum hydroxide/magnesium hydroxide/simethicone Suspension 30 milliLiter(s) Oral every 4 hours PRN  aspirin enteric coated 81 milliGRAM(s) Oral daily  atorvastatin 20 milliGRAM(s) Oral at bedtime  budesonide 160 MICROgram(s)/formoterol 4.5 MICROgram(s) Inhaler 2 Puff(s) Inhalation two times a day  buMETAnide Injectable 2 milliGRAM(s) IV Push two times a day  carvedilol 3.125 milliGRAM(s) Oral every 12 hours  clopidogrel Tablet 75 milliGRAM(s) Oral daily  dextrose 40% Gel 15 Gram(s) Oral once  dextrose 5%. 1000 milliLiter(s) IV Continuous <Continuous>  dextrose 5%. 1000 milliLiter(s) IV Continuous <Continuous>  dextrose 50% Injectable 25 Gram(s) IV Push once  dextrose 50% Injectable 12.5 Gram(s) IV Push once  dextrose 50% Injectable 25 Gram(s) IV Push once  ferrous    sulfate 325 milliGRAM(s) Oral <User Schedule>  glucagon  Injectable 1 milliGRAM(s) IntraMuscular once  heparin   Injectable 5000 Unit(s) SubCutaneous every 12 hours  hydrALAZINE 50 milliGRAM(s) Oral three times a day  insulin lispro (ADMELOG) corrective regimen sliding scale   SubCutaneous three times a day before meals  isosorbide   mononitrate ER Tablet (IMDUR) 30 milliGRAM(s) Oral daily  levothyroxine 25 MICROGram(s) Oral daily  magnesium oxide 400 milliGRAM(s) Oral daily  melatonin 3 milliGRAM(s) Oral at bedtime PRN  piperacillin/tazobactam IVPB.. 3.375 Gram(s) IV Intermittent every 8 hours  senna 2 Tablet(s) Oral at bedtime  spironolactone 25 milliGRAM(s) Oral daily  tamsulosin 0.4 milliGRAM(s) Oral at bedtime      Vitals:  T(C): 36.5 (10-22-21 @ 08:20), Max: 36.6 (10-22-21 @ 05:12)  HR: 88 (10-22-21 @ 09:57) (88 - 108)  BP: 100/60 (10-22-21 @ 08:20) (92/62 - 122/71)  BP(mean): --  RR: 18 (10-22-21 @ 08:20) (18 - 18)  SpO2: 99% (10-22-21 @ 08:20) (94% - 100%)    Daily     Daily         I&O's Summary    21 Oct 2021 07:01  -  22 Oct 2021 07:00  --------------------------------------------------------  IN: 1300 mL / OUT: 4100 mL / NET: -2800 mL    22 Oct 2021 07:01  -  22 Oct 2021 11:12  --------------------------------------------------------  IN: 200 mL / OUT: 0 mL / NET: 200 mL        Physical Exam:  Appearance: No Acute Distress  Neck: JVD  Cardiovascular: Normal S1 S2, No murmurs/rubs/gallops  Respiratory: Clear to auscultation diminished  RLL and fine rales LLL bilaterally  Gastrointestinal: Soft, Non-tender	  Skin: No cyanosis	  Neurologic: Non-focal  Extremities: Trace LE edema  Psychiatry: A & O x 3, Mood & affect appropriate    Labs:                        11.7   15.62 )-----------( 324      ( 22 Oct 2021 09:55 )             36.6     10-22    133<L>  |  92<L>  |  35<H>  ----------------------------<  243<H>  5.1   |  27  |  2.29<H>    Ca    9.3      22 Oct 2021 09:55  Phos  2.0     10-22  Mg     2.30     10-22        TELEMETRY:    Echocardiogram:  < from: Transthoracic Echocardiogram (10.22.21 @ 07:39) >  ------------------------------------------------------------------------  DIMENSIONS:  Dimensions:     Normal Values:  LA:     4.6 cm    2.0 - 4.0 cm  Ao:     3.4 cm    2.0 - 3.8 cm  SEPTUM: 0.8 cm    0.6 - 1.2 cm  PWT:   0.8 cm    0.6 - 1.1 cm  LVIDd:  5.9 cm    3.0 - 5.6 cm  LVIDs:  5.3 cm    1.8 - 4.0 cm  Derived Variables:  LVMI: 95 g/m2  RWT: 0.27  Fractional short: 10 %  Ejection Fraction (Velazquez Rule): 22 %  ------------------------------------------------------------------------  OBSERVATIONS:  Mitral Valve: Tethered mitral valve leaflets with normal  opening. Mild-moderate mitral regurgitation.  Aortic Root: Normal aortic root.  Aortic Valve: Calcified trileaflet aortic valve with normal  opening.  Left Atrium: Moderately dilated left atrium.  LA volume  index = 47 cc/m2.  Left Ventricle: Severe global left ventricular systolic  dysfunction. Normal left ventricular internal dimensions  and wall thicknesses.  Right Heart: Normal right atrium. Rightventricular  enlargement with decreased right ventricular systolic  function.  A device wire is noted in the right heart.  Normal tricuspid valve.  Mild tricuspid regurgitation.  Normal pulmonic valve. Mild pulmonic regurgitation.  Pericardium/PleuraNormal pericardium with no pericardial  effusion.  ------------------------------------------------------------------------  CONCLUSIONS:  1. Tethered mitral valve leaflets with normal opening.  Mild-moderate mitral regurgitation.  2. Moderately dilated left atrium.  LA volume index = 47  cc/m2.  3. Normal left ventricular internal dimensions and wall  thicknesses.  4. Severe global left ventricular systolic dysfunction.  5. Right ventricular enlargement with decreased right  ventricular systolic function.  A device wire is noted in  the right heart.  *** Compared with echocardiogram of 9/13/2021, more mitral  regurgitation was noted on the current study.         Interval History:  Patient resting comfortably in bed   Denies CP/SOB/palpitations/dizziness.  States he is feeling much better and would like to go home     No acute events overnight.    Medications:  acetaminophen   Tablet .. 650 milliGRAM(s) Oral every 6 hours PRN  albuterol/ipratropium for Nebulization 3 milliLiter(s) Nebulizer every 12 hours  aluminum hydroxide/magnesium hydroxide/simethicone Suspension 30 milliLiter(s) Oral every 4 hours PRN  aspirin enteric coated 81 milliGRAM(s) Oral daily  atorvastatin 20 milliGRAM(s) Oral at bedtime  budesonide 160 MICROgram(s)/formoterol 4.5 MICROgram(s) Inhaler 2 Puff(s) Inhalation two times a day  buMETAnide Injectable 2 milliGRAM(s) IV Push two times a day  carvedilol 3.125 milliGRAM(s) Oral every 12 hours  clopidogrel Tablet 75 milliGRAM(s) Oral daily  dextrose 40% Gel 15 Gram(s) Oral once  dextrose 5%. 1000 milliLiter(s) IV Continuous <Continuous>  dextrose 5%. 1000 milliLiter(s) IV Continuous <Continuous>  dextrose 50% Injectable 25 Gram(s) IV Push once  dextrose 50% Injectable 12.5 Gram(s) IV Push once  dextrose 50% Injectable 25 Gram(s) IV Push once  ferrous    sulfate 325 milliGRAM(s) Oral <User Schedule>  glucagon  Injectable 1 milliGRAM(s) IntraMuscular once  heparin   Injectable 5000 Unit(s) SubCutaneous every 12 hours  hydrALAZINE 50 milliGRAM(s) Oral three times a day  insulin lispro (ADMELOG) corrective regimen sliding scale   SubCutaneous three times a day before meals  isosorbide   mononitrate ER Tablet (IMDUR) 30 milliGRAM(s) Oral daily  levothyroxine 25 MICROGram(s) Oral daily  magnesium oxide 400 milliGRAM(s) Oral daily  melatonin 3 milliGRAM(s) Oral at bedtime PRN  piperacillin/tazobactam IVPB.. 3.375 Gram(s) IV Intermittent every 8 hours  senna 2 Tablet(s) Oral at bedtime  spironolactone 25 milliGRAM(s) Oral daily  tamsulosin 0.4 milliGRAM(s) Oral at bedtime      Vitals:  T(C): 36.5 (10-22-21 @ 08:20), Max: 36.6 (10-22-21 @ 05:12)  HR: 88 (10-22-21 @ 09:57) (88 - 108)  BP: 100/60 (10-22-21 @ 08:20) (92/62 - 122/71)  BP(mean): --  RR: 18 (10-22-21 @ 08:20) (18 - 18)  SpO2: 99% (10-22-21 @ 08:20) (94% - 100%)    Daily     Daily         I&O's Summary    21 Oct 2021 07:01  -  22 Oct 2021 07:00  --------------------------------------------------------  IN: 1300 mL / OUT: 4100 mL / NET: -2800 mL    22 Oct 2021 07:01  -  22 Oct 2021 11:12  --------------------------------------------------------  IN: 200 mL / OUT: 0 mL / NET: 200 mL        Physical Exam:  Appearance: No Acute Distress  Neck: JVD  Cardiovascular: Normal S1 S2, No murmurs/rubs/gallops  Respiratory: Clear to auscultation diminished  RLL and fine rales LLL bilaterally  Gastrointestinal: Soft, Non-tender	  Skin: No cyanosis	  Neurologic: Non-focal  Extremities: Trace LE edema  Psychiatry: A & O x 3, Mood & affect appropriate    Labs:                        11.7   15.62 )-----------( 324      ( 22 Oct 2021 09:55 )             36.6     10-22    133<L>  |  92<L>  |  35<H>  ----------------------------<  243<H>  5.1   |  27  |  2.29<H>    Ca    9.3      22 Oct 2021 09:55  Phos  2.0     10-22  Mg     2.30     10-22        TELEMETRY: Sinus Rhythm (HR 95-95) with occasional PVCs     Echocardiogram:  < from: Transthoracic Echocardiogram (10.22.21 @ 07:39) >  ------------------------------------------------------------------------  DIMENSIONS:  Dimensions:     Normal Values:  LA:     4.6 cm    2.0 - 4.0 cm  Ao:     3.4 cm    2.0 - 3.8 cm  SEPTUM: 0.8 cm    0.6 - 1.2 cm  PWT:   0.8 cm    0.6 - 1.1 cm  LVIDd:  5.9 cm    3.0 - 5.6 cm  LVIDs:  5.3 cm    1.8 - 4.0 cm  Derived Variables:  LVMI: 95 g/m2  RWT: 0.27  Fractional short: 10 %  Ejection Fraction (Velazquez Rule): 22 %  ------------------------------------------------------------------------  OBSERVATIONS:  Mitral Valve: Tethered mitral valve leaflets with normal  opening. Mild-moderate mitral regurgitation.  Aortic Root: Normal aortic root.  Aortic Valve: Calcified trileaflet aortic valve with normal  opening.  Left Atrium: Moderately dilated left atrium.  LA volume  index = 47 cc/m2.  Left Ventricle: Severe global left ventricular systolic  dysfunction. Normal left ventricular internal dimensions  and wall thicknesses.  Right Heart: Normal right atrium. Rightventricular  enlargement with decreased right ventricular systolic  function.  A device wire is noted in the right heart.  Normal tricuspid valve.  Mild tricuspid regurgitation.  Normal pulmonic valve. Mild pulmonic regurgitation.  Pericardium/PleuraNormal pericardium with no pericardial  effusion.  ------------------------------------------------------------------------  CONCLUSIONS:  1. Tethered mitral valve leaflets with normal opening.  Mild-moderate mitral regurgitation.  2. Moderately dilated left atrium.  LA volume index = 47  cc/m2.  3. Normal left ventricular internal dimensions and wall  thicknesses.  4. Severe global left ventricular systolic dysfunction.  5. Right ventricular enlargement with decreased right  ventricular systolic function.  A device wire is noted in  the right heart.  *** Compared with echocardiogram of 9/13/2021, more mitral  regurgitation was noted on the current study.

## 2021-10-22 NOTE — DISCHARGE NOTE PROVIDER - CARE PROVIDER_API CALL
,   [facility]  Phone: [phone]  Fax: [phone]   )    -  Scheduled Appointment: 11/01/2021 10:00 PM   ,   [facility]  Phone: [phone]  Fax: (   )    -  Scheduled Appointment: 11/01/2021 10:00 AM

## 2021-10-22 NOTE — PROGRESS NOTE ADULT - ATTENDING COMMENTS
66 year-old M with PMH HFrEF, CVA, COPD, CKD, CAD s/p CABG, p/w acute hypoxic respiratory failure. Pt sx and CT more suggestive of HF along with his elevated BNP and rapid improvement from yesterday. Agree with cont diuresis and his HF medications. Remains on ra/NC and appears comfortable.    Pt reports at least 6 episodes of respiratory failure in the past 2 years w/o clear trigger but agree with optimizing cardiac status but has some evidence of dysphagia and cont modified diet and aspiration precautions that may contrubuting to his recurrences.     -cont diuresis as per Cardiology  - complete course of abx for poss PNA  - cont symbicort and prn BD tx  - s/p pred 40mg x 5 d  - can maintain on NC as needed maintain sat 88-92%  - aspiration precautions  Outpt f/u for PFTs, pt offered f/u in Pulmonary clinic here

## 2021-10-22 NOTE — PROGRESS NOTE ADULT - ATTENDING COMMENTS
Change Bumex to 2 mg po bid.  Can d/c home tomorrow from HF standpoint.  He should f/u with Dr. Kathy Buckner at [facility].

## 2021-10-22 NOTE — DISCHARGE NOTE PROVIDER - PROVIDER TOKENS
FREE:[LAST:[],PHONE:[phone]],FAX:[(   )    -],ADDRESS:[[facility] Heart Specialist],SCHEDULEDAPPT:[11/01/2021],SCHEDULEDAPPTTIME:[10:00 PM]] FREE:[LAST:[],PHONE:[phone]],FAX:[(   )    -],ADDRESS:[[facility] Heart Specialist],SCHEDULEDAPPT:[11/01/2021],SCHEDULEDAPPTTIME:[10:00 AM]]

## 2021-10-22 NOTE — PROGRESS NOTE ADULT - PROBLEM SELECTOR PLAN 1
-H/o CAD s/p CABG 2014 HFrEF (15-20% per outside records) s/p AICD, CKD, CVA w/ residual L-sided. Last LHC done at [facility] ("many years ago" per pt).  05444 on 10/21<- pro-BNP 8177, up from 5957 on last admission. Troponins peaked at 90. Lactic acidosis on 10/20 now resolved.  - Cards consulted, no current plans for ischemic work up this admission   - AHF consulted, appreciate recs.  - Daily BMP, mag. Replete lytes to a goal mag of 2, K of 4.  - strict I&Os, daily standing weights  - Will c/w home isordil, hydralazine, and coreg  - S/p IV Lasix 40 IV push, I/O data unavailable, but pt reports no significant urine output, CXR w significant pulm edema. Responded to 80 mg IV lasix. Cards recommended transitioning to 2mg bumex BID IV on 10/19, responding well with 4500cc UOP 10/20.  - Cont to closely monitor BUN/Cr while on IV bumex  - pt not on ACE/ARB potentially due to CKD  - Echo w/ global LV dysfunction, EF 21  - C/w ASA 81 # Moderate mitral regurgitation  -H/o CAD s/p CABG 2014 HFrEF (20%) s/p AICD, CKD, CVA w/ residual L-sided. Last LHC done at [facility] ("many years ago" per pt).  82838 on 10/21<- pro-BNP 8177, up from 5957 on last admission. Troponins peaked at 90. Lactic acidosis on 10/20 now resolved.  - Cards consulted, no current plans for ischemic work up this admission   - AHF consulted, appreciate recs.  - Daily BMP, mag. Replete lytes to a goal mag of 2, K of 4.  - strict I&Os, daily standing weights  - Will c/w home isordil, hydralazine, and coreg  - S/p IV Lasix 40 IV push, I/O data unavailable, but pt reports no significant urine output, CXR w significant pulm edema. Responded to 80 mg IV lasix. Cards recommended transitioning to 2mg bumex BID IV on 10/19, responding well with 4500cc UOP 10/20. 7L UOP 10/22  - Cont to closely monitor BUN/Cr while on IV bumex  - pt not on ACE/ARB potentially due to CKD  - Echo w/ global LV dysfunction, EF 22 and worsening mitral regurg  - C/w ASA 81 # Moderate mitral regurgitation  -H/o CAD s/p CABG 2014 HFrEF (20%) s/p AICD, CKD, CVA w/ residual L-sided. Last LHC done at [facility] ("many years ago" per pt).  01847 on 10/21<- pro-BNP 8177, up from 5957 on last admission. Troponins peaked at 90. Lactic acidosis on 10/20 now resolved.  - Cards consulted, no current plans for ischemic work up this admission   - AHF consulted, appreciate recs.  - Daily BMP, mag. Replete lytes to a goal mag of 2, K of 4.  - strict I&Os, daily standing weights  - Will c/w home isordil, hydralazine, and coreg  - S/p IV Lasix 40 IV push, I/O data unavailable, but pt reports no significant urine output, CXR w significant pulm edema. Responded to 80 mg IV lasix. Cards recommended transitioning to 2mg bumex BID IV on 10/19, responding well with 4500cc UOP 10/20. 7L UOP 10/22.   - **Need oral diuretic plan from F for 10/23-- pt adamant about leaving on Saturday.   - Cont to closely monitor BUN/Cr while on IV bumex  - pt not on ACE/ARB potentially due to CKD  - Echo w/ global LV dysfunction, EF 22 and worsening mitral regurg  - C/w ASA 81

## 2021-10-22 NOTE — DISCHARGE NOTE PROVIDER - NSDCMRMEDTOKEN_GEN_ALL_CORE_FT
albuterol 90 mcg/inh inhalation aerosol: 2 puff(s) inhaled every 6 hours, As needed, Shortness of Breath and/or Wheezing  aspirin 81 mg oral delayed release tablet: 1 tab(s) orally once a day  budesonide-formoterol 160 mcg-4.5 mcg/inh inhalation aerosol:  inhaled   carvedilol 3.125 mg oral tablet: 1 tab(s) orally every 12 hours  docusate sodium 100 mg oral capsule: 1 cap(s) orally 3 times a day  ezetimibe 10 mg oral tablet: 1 tab(s) orally once a day  glimepiride 2 mg oral tablet: 1 tab(s) orally once a day  hydrALAZINE 50 mg oral tablet: 1 tab(s) orally 3 times a day  isosorbide mononitrate 30 mg oral tablet, extended release: 1 tab(s) orally once a day (in the morning)  levothyroxine 25 mcg (0.025 mg) oral tablet: 1 tab(s) orally once a day  Plavix 75 mg oral tablet: 1 tab(s) orally once a day  rosuvastatin 5 mg oral tablet: 1 tab(s) orally once a day  senna oral tablet: 2 tab(s) orally once a day (at bedtime)  spironolactone 25 mg oral tablet: 1 tab(s) orally once a day  tamsulosin 0.4 mg oral capsule: 1 cap(s) orally once a day  torsemide 20 mg oral tablet: 1 tab(s) orally 2 times a day   albuterol 90 mcg/inh inhalation aerosol: 2 puff(s) inhaled every 6 hours, As needed, Shortness of Breath and/or Wheezing  aspirin 81 mg oral delayed release tablet: 1 tab(s) orally once a day  budesonide-formoterol 160 mcg-4.5 mcg/inh inhalation aerosol:  inhaled   bumetanide 2 mg oral tablet: 1 tab(s) orally 2 times a day  carvedilol 3.125 mg oral tablet: 1 tab(s) orally every 12 hours  docusate sodium 100 mg oral capsule: 1 cap(s) orally 3 times a day  ezetimibe 10 mg oral tablet: 1 tab(s) orally once a day  glimepiride 2 mg oral tablet: 1 tab(s) orally once a day  hydrALAZINE 50 mg oral tablet: 1 tab(s) orally 3 times a day  isosorbide mononitrate 30 mg oral tablet, extended release: 1 tab(s) orally once a day (in the morning)  levothyroxine 25 mcg (0.025 mg) oral tablet: 1 tab(s) orally once a day  Plavix 75 mg oral tablet: 1 tab(s) orally once a day  rosuvastatin 5 mg oral tablet: 1 tab(s) orally once a day  senna oral tablet: 2 tab(s) orally once a day (at bedtime)  spironolactone 25 mg oral tablet: 1 tab(s) orally once a day  tamsulosin 0.4 mg oral capsule: 1 cap(s) orally once a day  torsemide 20 mg oral tablet: 1 tab(s) orally 2 times a day   albuterol 90 mcg/inh inhalation aerosol: 2 puff(s) inhaled every 6 hours, As needed, Shortness of Breath and/or Wheezing  aspirin 81 mg oral delayed release tablet: 1 tab(s) orally once a day  budesonide-formoterol 160 mcg-4.5 mcg/inh inhalation aerosol:  inhaled   bumetanide 2 mg oral tablet: 1 tab(s) orally 2 times a day  carvedilol 3.125 mg oral tablet: 1 tab(s) orally every 12 hours  ezetimibe 10 mg oral tablet: 1 tab(s) orally once a day  ferrous sulfate 325 mg (65 mg elemental iron) oral tablet: 1 tab(s) orally every other day  glimepiride 2 mg oral tablet: 1 tab(s) orally once a day  hydrALAZINE 50 mg oral tablet: 1 tab(s) orally 3 times a day  isosorbide dinitrate 30 mg oral tablet: 1 tab(s) orally 3 times a day  levothyroxine 25 mcg (0.025 mg) oral tablet: 1 tab(s) orally once a day  Plavix 75 mg oral tablet: 1 tab(s) orally once a day  rosuvastatin 5 mg oral tablet: 1 tab(s) orally once a day  senna oral tablet: 2 tab(s) orally once a day (at bedtime)  spironolactone 25 mg oral tablet: 0.5 tab(s) orally once a day  tamsulosin 0.4 mg oral capsule: 1 cap(s) orally once a day

## 2021-10-22 NOTE — PROGRESS NOTE ADULT - ASSESSMENT
66 year-old M with PMH HFrEF, CVA, COPD, CKD, CAD s/p CABG, and multiple hospitalizations over the past year who presented to the hospital with shortness of breath, admitted for hypoxic respiratory failure in the setting of PNA vs HF. Pulmonary consulted for evaluation of recurrent pneumonia and abnormal CT Chest.    Hypoxic Respiratory Failure  - Likely multifactorial, potentially in the setting of infection and acute on chronic HF  - Patient has improved dramatically with antibiotics and diuresis, would continue diuretics per HF  - Can use 5 day course of prednisone 40mg  - CT Chest could be indicative of fluid overload given interlobular septal thickening and groundglass, would repeat CT Chest in 6-8 weeks after discharge  - Mediastinal lymphadenopathy could be reactive, would repeat imaging as above in 6-8 weeks  - Appreciate speech and swallow evaluation, follow recommended diet  - Wean O2 as tolerated  - Patient should follow up with pulmonary after discharge. Please email: [email or web address] to setup an appointment prior to discharge. Include the patient's name, , MRN and contact information in the email.      Pulmonary/Sleep Clinic  [address] [place] [address]  [phone]    Pulmonary will sign off. Please call with questions.    Herve Sneed, PGY-6  Pulmonary and Critical Care Medicine  [id number] 66 year-old M with PMH HFrEF, CVA, COPD, CKD, CAD s/p CABG, and multiple hospitalizations over the past year who presented to the hospital with shortness of breath, admitted for hypoxic respiratory failure in the setting of PNA vs HF. Pulmonary consulted for evaluation of recurrent pneumonia and abnormal CT Chest.    Hypoxic Respiratory Failure  - Likely multifactorial, potentially in the setting of infection and acute on chronic HF  - Patient has improved dramatically with antibiotics and diuresis, would continue diuretics per HF  - Can use 5 day course of prednisone 40mg  - CT Chest could be indicative of fluid overload given interlobular septal thickening and groundglass, would repeat CT Chest in 6-8 weeks after discharge  - Mediastinal lymphadenopathy could be reactive, would repeat imaging as above in 6-8 weeks  - Appreciate speech and swallow evaluation, follow recommended diet  - Patient is currently his home O2 (2L)  - Patient should follow up with pulmonary after discharge. Please email: [email or web address] to setup an appointment prior to discharge. Include the patient's name, , MRN and contact information in the email.      Pulmonary/Sleep Clinic  [address] [place] [address]  [phone]    Pulmonary will sign off. Please call with questions.    Herve Sneed, PGY-6  Pulmonary and Critical Care Medicine  [id number]

## 2021-10-22 NOTE — PROGRESS NOTE ADULT - ASSESSMENT
acute hypoxic respiratory failure  due to acute CHF secondary to PNA    acute systolic CHF  cont current meds  IV bumex   fu with CHF recommendation   s/p ICD   cont BB  cont hydralazine/nitrates  repeat echo noted     CAD history of cabg   on dapt  on statin   eventual ischemic eval , stress test can be considered as outpt given PNA     CKd  monitor lytes, crt    Dm II  Monitor finger stick. Insulin coverage. Diabetic education and Diabetic diet. Consider nutrition consultation.    PNA  on anbx  plan as per primary team   fu with pulm

## 2021-10-22 NOTE — PROGRESS NOTE ADULT - SUBJECTIVE AND OBJECTIVE BOX
DATE OF SERVICE: 10-22-21 @ 09:11    Subjective: Patient seen and examined. No new events except as noted.     SUBJECTIVE/ROS:  feels much better       MEDICATIONS:  MEDICATIONS  (STANDING):  albuterol/ipratropium for Nebulization 3 milliLiter(s) Nebulizer every 12 hours  aspirin enteric coated 81 milliGRAM(s) Oral daily  atorvastatin 20 milliGRAM(s) Oral at bedtime  budesonide 160 MICROgram(s)/formoterol 4.5 MICROgram(s) Inhaler 2 Puff(s) Inhalation two times a day  buMETAnide Injectable 2 milliGRAM(s) IV Push two times a day  carvedilol 3.125 milliGRAM(s) Oral every 12 hours  clopidogrel Tablet 75 milliGRAM(s) Oral daily  dextrose 40% Gel 15 Gram(s) Oral once  dextrose 5%. 1000 milliLiter(s) (50 mL/Hr) IV Continuous <Continuous>  dextrose 5%. 1000 milliLiter(s) (100 mL/Hr) IV Continuous <Continuous>  dextrose 50% Injectable 25 Gram(s) IV Push once  dextrose 50% Injectable 12.5 Gram(s) IV Push once  dextrose 50% Injectable 25 Gram(s) IV Push once  ferrous    sulfate 325 milliGRAM(s) Oral <User Schedule>  glucagon  Injectable 1 milliGRAM(s) IntraMuscular once  heparin   Injectable 5000 Unit(s) SubCutaneous every 12 hours  hydrALAZINE 50 milliGRAM(s) Oral three times a day  insulin lispro (ADMELOG) corrective regimen sliding scale   SubCutaneous three times a day before meals  insulin lispro (ADMELOG) corrective regimen sliding scale   SubCutaneous at bedtime  isosorbide   mononitrate ER Tablet (IMDUR) 30 milliGRAM(s) Oral daily  levothyroxine 25 MICROGram(s) Oral daily  magnesium oxide 400 milliGRAM(s) Oral daily  piperacillin/tazobactam IVPB.. 3.375 Gram(s) IV Intermittent every 8 hours  senna 2 Tablet(s) Oral at bedtime  spironolactone 25 milliGRAM(s) Oral daily  tamsulosin 0.4 milliGRAM(s) Oral at bedtime      PHYSICAL EXAM:  T(C): 36.6 (10-22-21 @ 05:12), Max: 36.6 (10-22-21 @ 05:12)  HR: 102 (10-22-21 @ 05:12) (79 - 108)  BP: 92/62 (10-22-21 @ 05:12) (92/62 - 122/71)  RR: 18 (10-22-21 @ 05:12) (18 - 18)  SpO2: 99% (10-22-21 @ 05:12) (86% - 100%)  Wt(kg): --  I&O's Summary    21 Oct 2021 07:01  -  22 Oct 2021 07:00  --------------------------------------------------------  IN: 1300 mL / OUT: 4100 mL / NET: -2800 mL            JVP: Normal  Neck: supple  Lung: clear   CV: S1 S2 , Murmur:  Abd: soft  Ext: No edema  neuro: Awake / alert  Psych: flat affect  Skin: normal``    LABS/DATA:    CARDIAC MARKERS:                                9.9    10.77 )-----------( 288      ( 21 Oct 2021 06:55 )             32.1     10-21    136  |  95<L>  |  35<H>  ----------------------------<  131<H>  3.5   |  26  |  2.35<H>    Ca    8.8      21 Oct 2021 06:55  Phos  3.0     10-21  Mg     1.90     10-21      proBNP:   Lipid Profile:   HgA1c:   TSH:     TELE:  EKG:

## 2021-10-22 NOTE — DISCHARGE NOTE PROVIDER - DISCHARGE DIET
Other Diet Instructions Low Sodium Diet/Consistent Carbohydrate Diabetic Diets/Other Diet Instructions

## 2021-10-23 ENCOUNTER — TRANSCRIPTION ENCOUNTER (OUTPATIENT)
Age: 66
End: 2021-10-23

## 2021-10-23 VITALS — WEIGHT: 166.23 LBS

## 2021-10-23 LAB
ANION GAP SERPL CALC-SCNC: 13 MMOL/L — SIGNIFICANT CHANGE UP (ref 7–14)
BUN SERPL-MCNC: 37 MG/DL — HIGH (ref 7–23)
CALCIUM SERPL-MCNC: 9.4 MG/DL — SIGNIFICANT CHANGE UP (ref 8.4–10.5)
CHLORIDE SERPL-SCNC: 90 MMOL/L — LOW (ref 98–107)
CO2 SERPL-SCNC: 29 MMOL/L — SIGNIFICANT CHANGE UP (ref 22–31)
CREAT SERPL-MCNC: 2.48 MG/DL — HIGH (ref 0.5–1.3)
CULTURE RESULTS: SIGNIFICANT CHANGE UP
CULTURE RESULTS: SIGNIFICANT CHANGE UP
GLUCOSE BLDC GLUCOMTR-MCNC: 141 MG/DL — HIGH (ref 70–99)
GLUCOSE BLDC GLUCOMTR-MCNC: 148 MG/DL — HIGH (ref 70–99)
GLUCOSE SERPL-MCNC: 132 MG/DL — HIGH (ref 70–99)
HCT VFR BLD CALC: 36.3 % — LOW (ref 39–50)
HGB BLD-MCNC: 11.6 G/DL — LOW (ref 13–17)
MAGNESIUM SERPL-MCNC: 2.3 MG/DL — SIGNIFICANT CHANGE UP (ref 1.6–2.6)
MCHC RBC-ENTMCNC: 30.4 PG — SIGNIFICANT CHANGE UP (ref 27–34)
MCHC RBC-ENTMCNC: 32 GM/DL — SIGNIFICANT CHANGE UP (ref 32–36)
MCV RBC AUTO: 95.3 FL — SIGNIFICANT CHANGE UP (ref 80–100)
NRBC # BLD: 0 /100 WBCS — SIGNIFICANT CHANGE UP
NRBC # FLD: 0 K/UL — SIGNIFICANT CHANGE UP
PHOSPHATE SERPL-MCNC: 2.7 MG/DL — SIGNIFICANT CHANGE UP (ref 2.5–4.5)
PLATELET # BLD AUTO: 332 K/UL — SIGNIFICANT CHANGE UP (ref 150–400)
POTASSIUM SERPL-MCNC: 4.4 MMOL/L — SIGNIFICANT CHANGE UP (ref 3.5–5.3)
POTASSIUM SERPL-SCNC: 4.4 MMOL/L — SIGNIFICANT CHANGE UP (ref 3.5–5.3)
RBC # BLD: 3.81 M/UL — LOW (ref 4.2–5.8)
RBC # FLD: 17.1 % — HIGH (ref 10.3–14.5)
SODIUM SERPL-SCNC: 132 MMOL/L — LOW (ref 135–145)
SPECIMEN SOURCE: SIGNIFICANT CHANGE UP
SPECIMEN SOURCE: SIGNIFICANT CHANGE UP
WBC # BLD: 16.2 K/UL — HIGH (ref 3.8–10.5)
WBC # FLD AUTO: 16.2 K/UL — HIGH (ref 3.8–10.5)

## 2021-10-23 PROCEDURE — 99239 HOSP IP/OBS DSCHRG MGMT >30: CPT

## 2021-10-23 RX ORDER — ISOSORBIDE DINITRATE 5 MG/1
30 TABLET ORAL THREE TIMES A DAY
Refills: 0 | Status: DISCONTINUED | OUTPATIENT
Start: 2021-10-23 | End: 2021-10-23

## 2021-10-23 RX ORDER — BUMETANIDE 0.25 MG/ML
2 INJECTION INTRAMUSCULAR; INTRAVENOUS
Refills: 0 | Status: DISCONTINUED | OUTPATIENT
Start: 2021-10-23 | End: 2021-10-23

## 2021-10-23 RX ORDER — ISOSORBIDE MONONITRATE 60 MG/1
1 TABLET, EXTENDED RELEASE ORAL
Qty: 0 | Refills: 0 | DISCHARGE

## 2021-10-23 RX ORDER — DOCUSATE SODIUM 100 MG
1 CAPSULE ORAL
Qty: 0 | Refills: 0 | DISCHARGE

## 2021-10-23 RX ORDER — BUMETANIDE 0.25 MG/ML
1 INJECTION INTRAMUSCULAR; INTRAVENOUS
Qty: 60 | Refills: 0
Start: 2021-10-23 | End: 2021-11-21

## 2021-10-23 RX ORDER — SPIRONOLACTONE 25 MG/1
0.5 TABLET, FILM COATED ORAL
Qty: 15 | Refills: 0
Start: 2021-10-23 | End: 2021-11-21

## 2021-10-23 RX ORDER — FERROUS SULFATE 325(65) MG
1 TABLET ORAL
Qty: 0 | Refills: 0 | DISCHARGE
Start: 2021-10-23

## 2021-10-23 RX ORDER — ISOSORBIDE DINITRATE 5 MG/1
1 TABLET ORAL
Qty: 90 | Refills: 0
Start: 2021-10-23 | End: 2021-11-21

## 2021-10-23 RX ADMIN — Medication 50 MILLIGRAM(S): at 05:53

## 2021-10-23 RX ADMIN — CARVEDILOL PHOSPHATE 3.12 MILLIGRAM(S): 80 CAPSULE, EXTENDED RELEASE ORAL at 05:52

## 2021-10-23 RX ADMIN — Medication 30 MILLILITER(S): at 02:41

## 2021-10-23 RX ADMIN — Medication 325 MILLIGRAM(S): at 09:24

## 2021-10-23 RX ADMIN — SPIRONOLACTONE 12.5 MILLIGRAM(S): 25 TABLET, FILM COATED ORAL at 05:53

## 2021-10-23 RX ADMIN — BUMETANIDE 2 MILLIGRAM(S): 0.25 INJECTION INTRAMUSCULAR; INTRAVENOUS at 05:46

## 2021-10-23 RX ADMIN — BUDESONIDE AND FORMOTEROL FUMARATE DIHYDRATE 2 PUFF(S): 160; 4.5 AEROSOL RESPIRATORY (INHALATION) at 09:25

## 2021-10-23 RX ADMIN — Medication 3 MILLILITER(S): at 10:06

## 2021-10-23 RX ADMIN — Medication 25 MICROGRAM(S): at 05:45

## 2021-10-23 NOTE — PROGRESS NOTE ADULT - PROBLEM SELECTOR PROBLEM 2
Sepsis with acute hypoxic respiratory failure

## 2021-10-23 NOTE — DISCHARGE NOTE NURSING/CASE MANAGEMENT/SOCIAL WORK - PATIENT PORTAL LINK FT
You can access the FollowMyHealth Patient Portal offered by [facility] by registering at the following website: [email or web address]. By joining [facility]’s FollowMyHealth portal, you will also be able to view your health information using other applications (apps) compatible with our system.

## 2021-10-23 NOTE — PROVIDER CONTACT NOTE (OTHER) - REASON
Pt's /67 hold hydralazine, spironolactone, carvedilol
Pt home O2 has no oxygen
Pt had 4 beats of V-tach
Pt's BP 92/62 hold hydralazine, spironolactone, carvedilol, bumex

## 2021-10-23 NOTE — PROVIDER CONTACT NOTE (OTHER) - ASSESSMENT
Pt's BP 92/62, . Pt is asymptomatic and currently resting.
Patient is A&Ox4, refusing to leave by ambulance at 6p.
Pt's /67, . Pt is asymptomatic and currently resting.
Pt had 4 beats of V-tach. Vital signs are stable, /67 and . Pt is asymptomatic and currently resting.

## 2021-10-23 NOTE — DIETITIAN INITIAL EVALUATION ADULT. - PROBLEM SELECTOR PLAN 5
91--> 90. Troponin leak likely in setting of demand ischemia and CKD. Denies active chest pain or worsening of SOB.   -Telemetry monitoring  -Will hold hydralazine at this time due to soft BPs

## 2021-10-23 NOTE — PROGRESS NOTE ADULT - PROVIDER SPECIALTY LIST ADULT
Cardiology
Heart Failure
Hospitalist
Heart Failure
Hospitalist
Pulmonology
Cardiology
Cardiology
Heart Failure
Cardiology
Heart Failure
Pulmonology
Hospitalist

## 2021-10-23 NOTE — PROGRESS NOTE ADULT - PROBLEM SELECTOR PROBLEM 8
Prophylactic measure
Medication management

## 2021-10-23 NOTE — DIETITIAN INITIAL EVALUATION ADULT. - PERTINENT LABORATORY DATA
10-23 Na 132 mmol/L<L> Glu 132 mg/dL<H> K+ 4.4 mmol/L Cr 2.48 mg/dL<H> BUN 37 mg/dL<H> Phos 2.7 mg/dL  10-23 @ 12:52 POCT 148 mg/dL  10-23 @ 09:02 POCT 141 mg/dL  10-22 @ 21:07 POCT 167 mg/dL  10-22 @ 17:40 POCT 209 mg/dL    (9/13/21) A1c- 6.8 (H)

## 2021-10-23 NOTE — DIETITIAN INITIAL EVALUATION ADULT. - OTHER INFO
66M admitted with Pneumonia and PMH of CABG, HFrEF (21% 9/20201 TTE) s/p AICD, CKD, CVA with mild left deficits, asthma/COPD.  Pt AOx4 and reports good po intake ~75% meals per day with No GI distress (nausea/ vomiting/ diarrhea/ constipation.).  Pt with skin intact and no edema per nursing flow sheet. Last BM noted 10/22/21 per flow sheet.  Current wt- 166.2# 910/21/21), UBW- 162# per pt 3 months ago.  Labs reviewed.  A1c- 6.8%. Noted low Fe levels- receiving Fe supplementation.

## 2021-10-23 NOTE — PROVIDER CONTACT NOTE (OTHER) - BACKGROUND
Admitted for Heart failure

## 2021-10-23 NOTE — DIETITIAN INITIAL EVALUATION ADULT. - PERTINENT MEDS FT
MEDICATIONS  (STANDING):  albuterol/ipratropium for Nebulization 3 milliLiter(s) Nebulizer every 12 hours  aspirin enteric coated 81 milliGRAM(s) Oral daily  atorvastatin 20 milliGRAM(s) Oral at bedtime  budesonide 160 MICROgram(s)/formoterol 4.5 MICROgram(s) Inhaler 2 Puff(s) Inhalation two times a day  buMETAnide 2 milliGRAM(s) Oral two times a day  carvedilol 3.125 milliGRAM(s) Oral every 12 hours  clopidogrel Tablet 75 milliGRAM(s) Oral daily  dextrose 40% Gel 15 Gram(s) Oral once  dextrose 5%. 1000 milliLiter(s) (50 mL/Hr) IV Continuous <Continuous>  dextrose 5%. 1000 milliLiter(s) (100 mL/Hr) IV Continuous <Continuous>  dextrose 50% Injectable 25 Gram(s) IV Push once  dextrose 50% Injectable 12.5 Gram(s) IV Push once  dextrose 50% Injectable 25 Gram(s) IV Push once  ferrous    sulfate 325 milliGRAM(s) Oral <User Schedule>  glucagon  Injectable 1 milliGRAM(s) IntraMuscular once  heparin   Injectable 5000 Unit(s) SubCutaneous every 12 hours  hydrALAZINE 50 milliGRAM(s) Oral three times a day  insulin lispro (ADMELOG) corrective regimen sliding scale   SubCutaneous three times a day before meals  isosorbide   dinitrate Tablet (ISORDIL) 30 milliGRAM(s) Oral three times a day  levothyroxine 25 MICROGram(s) Oral daily  magnesium oxide 400 milliGRAM(s) Oral daily  piperacillin/tazobactam IVPB.. 3.375 Gram(s) IV Intermittent every 8 hours  senna 2 Tablet(s) Oral at bedtime  spironolactone 12.5 milliGRAM(s) Oral daily  tamsulosin 0.4 milliGRAM(s) Oral at bedtime

## 2021-10-23 NOTE — DIETITIAN INITIAL EVALUATION ADULT. - PROBLEM SELECTOR PLAN 3
S/p IV Solumedrol.  -Will continue bronchodilators  -Will hold off on steroids given active infection

## 2021-10-23 NOTE — PROGRESS NOTE ADULT - PROBLEM SELECTOR PLAN 1
clinically improved  appreciate cardiology input, cleared for discharge home on po bumex  will need close follow up

## 2021-10-23 NOTE — PROGRESS NOTE ADULT - PROBLEM SELECTOR PLAN 8
- Pharmacy unable to verify exactly what dose of torsemide pt was taking.  Daughter confirms it was 20 mg torsemide BID at home.
- Pharmacy unable to verify exactly what dose of torsemide pt was taking.  Daughter confirms it was 20 mg torsemide BID at home.
DVT ppx: HSQ  Diet: DASH/CC  Dispo: PT
- Pharmacy unable to verify exactly what dose of torsemide pt was taking.  Daughter confirms it was 20 mg torsemide BID at home.
- Pharmacy unable to verify exactly what dose of torsemide pt was taking. Attempted to reach [facility] in [place] however their "system is down" and cannot verify. Daughter confirms it was 20 mg torsemide BID at home.

## 2021-10-23 NOTE — DISCHARGE NOTE NURSING/CASE MANAGEMENT/SOCIAL WORK - NSDCFUADDAPPT_GEN_ALL_CORE_FT
Pulmonlogy emailed at [email or web address]  on 10/22 for follow up appointment. contact Info for Daughter Joey and patient were provided.

## 2021-10-23 NOTE — PROGRESS NOTE ADULT - PROBLEM SELECTOR PLAN 2
completed 5 day course of zosyn  afebrile, elevated WBC secondary to steroids in setting of treatment for COPD exacerbation.  Bcx negative  o/p pulmonary follow up

## 2021-10-23 NOTE — DIETITIAN INITIAL EVALUATION ADULT. - PROBLEM SELECTOR PLAN 1
-H/o CAD s/p CABG 2014 HFrEF (15-20% per outside records) s/p AICD, CKD, CVA w/ residual L-sided   -pro-BNP 8177, up from 5957 on last admissin  -Cards consult appreciated  - strict I&Os, daily weights  - Will c/w home isordil, and coreg  - Per daughter, aldactone was discontinued but unclear if restarted. Will need to clarify in AM  - S/p IV Lasix 40 IV push. Will hold off on further torsemide for now.   - hold hydralazine for now due to soft BPs  - pt not on ACE/ARB potentially due to CKD  - Echo w/ global LV dysfunction, EF 21  - C/w ASA 81

## 2021-10-23 NOTE — PROGRESS NOTE ADULT - SUBJECTIVE AND OBJECTIVE BOX
Medicine Progress Note    Patient is a 66y old  Male who presents with a chief complaint of PNA, HF exacerbation (23 Oct 2021 15:17)      SUBJECTIVE / OVERNIGHT EVENTS: pt is ready to go home    ADDITIONAL REVIEW OF SYSTEMS:    MEDICATIONS  (STANDING):  albuterol/ipratropium for Nebulization 3 milliLiter(s) Nebulizer every 12 hours  aspirin enteric coated 81 milliGRAM(s) Oral daily  atorvastatin 20 milliGRAM(s) Oral at bedtime  budesonide 160 MICROgram(s)/formoterol 4.5 MICROgram(s) Inhaler 2 Puff(s) Inhalation two times a day  buMETAnide 2 milliGRAM(s) Oral two times a day  carvedilol 3.125 milliGRAM(s) Oral every 12 hours  clopidogrel Tablet 75 milliGRAM(s) Oral daily  dextrose 40% Gel 15 Gram(s) Oral once  dextrose 5%. 1000 milliLiter(s) (50 mL/Hr) IV Continuous <Continuous>  dextrose 5%. 1000 milliLiter(s) (100 mL/Hr) IV Continuous <Continuous>  dextrose 50% Injectable 25 Gram(s) IV Push once  dextrose 50% Injectable 12.5 Gram(s) IV Push once  dextrose 50% Injectable 25 Gram(s) IV Push once  ferrous    sulfate 325 milliGRAM(s) Oral <User Schedule>  glucagon  Injectable 1 milliGRAM(s) IntraMuscular once  heparin   Injectable 5000 Unit(s) SubCutaneous every 12 hours  hydrALAZINE 50 milliGRAM(s) Oral three times a day  insulin lispro (ADMELOG) corrective regimen sliding scale   SubCutaneous three times a day before meals  isosorbide   dinitrate Tablet (ISORDIL) 30 milliGRAM(s) Oral three times a day  levothyroxine 25 MICROGram(s) Oral daily  magnesium oxide 400 milliGRAM(s) Oral daily  piperacillin/tazobactam IVPB.. 3.375 Gram(s) IV Intermittent every 8 hours  senna 2 Tablet(s) Oral at bedtime  spironolactone 12.5 milliGRAM(s) Oral daily  tamsulosin 0.4 milliGRAM(s) Oral at bedtime    MEDICATIONS  (PRN):  acetaminophen   Tablet .. 650 milliGRAM(s) Oral every 6 hours PRN Temp greater or equal to 38C (100.4F), Mild Pain (1 - 3)  aluminum hydroxide/magnesium hydroxide/simethicone Suspension 30 milliLiter(s) Oral every 4 hours PRN Dyspepsia  melatonin 3 milliGRAM(s) Oral at bedtime PRN Insomnia    CAPILLARY BLOOD GLUCOSE      POCT Blood Glucose.: 148 mg/dL (23 Oct 2021 12:52)  POCT Blood Glucose.: 141 mg/dL (23 Oct 2021 09:02)  POCT Blood Glucose.: 167 mg/dL (22 Oct 2021 21:07)    I&O's Summary    22 Oct 2021 07:01  -  23 Oct 2021 07:00  --------------------------------------------------------  IN: 750 mL / OUT: 1500 mL / NET: -750 mL        PHYSICAL EXAM:  Vital Signs Last 24 Hrs  T(C): 36.4 (23 Oct 2021 09:42), Max: 36.6 (23 Oct 2021 00:15)  T(F): 97.5 (23 Oct 2021 09:42), Max: 97.9 (23 Oct 2021 00:15)  HR: 86 (23 Oct 2021 09:42) (86 - 101)  BP: 105/67 (23 Oct 2021 09:42) (105/67 - 118/78)  BP(mean): --  RR: 18 (23 Oct 2021 09:42) (18 - 18)  SpO2: 100% (23 Oct 2021 09:42) (99% - 100%)  CONSTITUTIONAL: NAD, well-developed, well-groomed  ENMT: Moist oral mucosa, no pharyngeal injection or exudates; normal dentition  RESPIRATORY: trace bibasilar crackles, occasional expiratory wheeze  CARDIOVASCULAR: Regular rate and rhythm, normal S1 and S2, no murmur/rub/gallop; No lower extremity edema; Peripheral pulses are 2+ bilaterally  ABDOMEN: Nontender to palpation, normoactive bowel sounds, no rebound/guarding; No hepatosplenomegaly  PSYCH: A+O to person, place, and time; affect appropriate  NEUROLOGY: CN 2-12 are intact and symmetric; no gross sensory deficits   SKIN: No rashes; no palpable lesions    LABS:                        11.6   16.20 )-----------( 332      ( 23 Oct 2021 06:57 )             36.3     10-23    132<L>  |  90<L>  |  37<H>  ----------------------------<  132<H>  4.4   |  29  |  2.48<H>    Ca    9.4      23 Oct 2021 06:57  Phos  2.7     10-23  Mg     2.30     10-23                SARS-CoV-2: NotDetec (18 Oct 2021 02:07)  COVID-19 PCR: Jaetec (10 Sep 2021 18:32)      RADIOLOGY & ADDITIONAL TESTS:  Imaging from Last 24 Hours:    Electrocardiogram/QTc Interval:    COORDINATION OF CARE:  Care Discussed with Consultants/Other Providers:

## 2021-10-23 NOTE — PROVIDER CONTACT NOTE (OTHER) - RECOMMENDATIONS
Continue to monitor pt for any further changes in heart rhythm and status
Notify Tele Physician Assistant
hold hydralazine, spironolactone, carvedilol and reschedule it for 9AM
hold hydralazine, spironolactone, carvedilol and reschedule it for 9AM

## 2021-10-23 NOTE — PROVIDER CONTACT NOTE (OTHER) - SITUATION
Pt's BP 92/62 hold hydralazine, spironolactone, carvedilol, bumex
Patient supposed to be discharged on home O2. daughter brought in an empty oxygen tank. daughter and patient educated on hypoxia and the effects. Emelyn Molina Made aware
Pt had 4 beats of V-tach
Pt's /67 hold hydralazine, spironolactone, carvedilol

## 2021-10-23 NOTE — PROGRESS NOTE ADULT - PROBLEM SELECTOR PLAN 7
DVT ppx: HSQ  Diet: DASH/CC  Dispo: PT  Attn at follow up: Needs repeat CT chest in 2022. See pulm -Please email: [email or web address] to setup an appointment prior to discharge. Include the patient's name, , MRN and contact information in the email.

## 2021-10-23 NOTE — CHART NOTE - NSCHARTNOTEFT_GEN_A_CORE
Chart review revealing that patient last seen by Dr. Hernandez (Cardiology) in Sept 2021. Dr. Hernandez consulted for further management recommendations. Consult appreciated.    Emile Lim PA-C  Medicine ACP, pgr [phone]  Available on Microsoft Teams
Notified by RN that pt's daughter brought an empty tank of oxygen to transport her father Home. Pt and daughter unwilling to wait for our own transportation with Oxygen. Pt Understands the risk of profound hypoxia that can potentially be life threatening. She states that its only a short 10 minute ride. she is willing to take the risk.

## 2021-10-23 NOTE — PROGRESS NOTE ADULT - SUBJECTIVE AND OBJECTIVE BOX
DATE OF SERVICE: 10-23-21 @ 09:26    Subjective: Patient seen and examined. No new events except as noted.     SUBJECTIVE/ROS:        MEDICATIONS:  MEDICATIONS  (STANDING):  albuterol/ipratropium for Nebulization 3 milliLiter(s) Nebulizer every 12 hours  aspirin enteric coated 81 milliGRAM(s) Oral daily  atorvastatin 20 milliGRAM(s) Oral at bedtime  budesonide 160 MICROgram(s)/formoterol 4.5 MICROgram(s) Inhaler 2 Puff(s) Inhalation two times a day  buMETAnide 2 milliGRAM(s) Oral two times a day  carvedilol 3.125 milliGRAM(s) Oral every 12 hours  clopidogrel Tablet 75 milliGRAM(s) Oral daily  dextrose 40% Gel 15 Gram(s) Oral once  dextrose 5%. 1000 milliLiter(s) (50 mL/Hr) IV Continuous <Continuous>  dextrose 5%. 1000 milliLiter(s) (100 mL/Hr) IV Continuous <Continuous>  dextrose 50% Injectable 25 Gram(s) IV Push once  dextrose 50% Injectable 12.5 Gram(s) IV Push once  dextrose 50% Injectable 25 Gram(s) IV Push once  ferrous    sulfate 325 milliGRAM(s) Oral <User Schedule>  glucagon  Injectable 1 milliGRAM(s) IntraMuscular once  heparin   Injectable 5000 Unit(s) SubCutaneous every 12 hours  hydrALAZINE 50 milliGRAM(s) Oral three times a day  insulin lispro (ADMELOG) corrective regimen sliding scale   SubCutaneous three times a day before meals  isosorbide   mononitrate ER Tablet (IMDUR) 30 milliGRAM(s) Oral daily  levothyroxine 25 MICROGram(s) Oral daily  magnesium oxide 400 milliGRAM(s) Oral daily  piperacillin/tazobactam IVPB.. 3.375 Gram(s) IV Intermittent every 8 hours  senna 2 Tablet(s) Oral at bedtime  spironolactone 12.5 milliGRAM(s) Oral daily  tamsulosin 0.4 milliGRAM(s) Oral at bedtime      PHYSICAL EXAM:  T(C): 36.3 (10-23-21 @ 05:42), Max: 36.6 (10-22-21 @ 12:19)  HR: 96 (10-23-21 @ 05:42) (85 - 101)  BP: 110/73 (10-23-21 @ 05:42) (105/62 - 119/78)  RR: 18 (10-23-21 @ 05:42) (18 - 18)  SpO2: 100% (10-23-21 @ 05:42) (99% - 100%)  Wt(kg): --  I&O's Summary    22 Oct 2021 07:01  -  23 Oct 2021 07:00  --------------------------------------------------------  IN: 750 mL / OUT: 1500 mL / NET: -750 mL            JVP: Normal  Neck: supple  Lung: clear   CV: S1 S2 , Murmur:  Abd: soft  Ext: No edema  neuro: Awake / alert  Psych: flat affect  Skin: normal``    LABS/DATA:    CARDIAC MARKERS:                                11.6   16.20 )-----------( 332      ( 23 Oct 2021 06:57 )             36.3     10-23    132<L>  |  90<L>  |  37<H>  ----------------------------<  132<H>  4.4   |  29  |  2.48<H>    Ca    9.4      23 Oct 2021 06:57  Phos  2.7     10-23  Mg     2.30     10-23      proBNP:   Lipid Profile:   HgA1c:   TSH:     TELE:  EKG:

## 2021-10-23 NOTE — PROVIDER CONTACT NOTE (OTHER) - ACTION/TREATMENT ORDERED:
Provider made aware. Hydralazine, spironolactone, carvedilol rescheduled for 9PM as per provider's order. Will continue to monitor.
Provider made aware. Okay for patient to leave, patient and daughter both A&Ox4
Provider made aware. Hydralazine, spironolactone, carvedilol rescheduled for 9PM as per provider's order. Administer bumex as ordered per provider. Will continue to monitor.
Provider made aware. Will continue to monitor

## 2021-10-23 NOTE — DIETITIAN INITIAL EVALUATION ADULT. - PROBLEM SELECTOR PLAN 2
Admitted with temp 100.3 rectal with tachypneic, found to septic 2/2 ADHF vs PNA vs COPD exacerbation.  -Trial of BiPAP to NC in AM  #Plan above for HF  #Plan below for COPD exacerbation  #PNA  - Worsening leukocytosis in comparison to last admission. CXR non-specific b/l opacities.  - Patient was prescribed a 7 day course of cefuroxime but continued to spike fevers with non-productive cough  - Continue Vanc by level, Zosyn (renally dosed) and azithromycin. F/u procal and CT Chest  - F/u MRSA swab  - F/u Legionella urine antigen  - F/u UA/UCx

## 2021-10-23 NOTE — PROGRESS NOTE ADULT - PROBLEM SELECTOR PROBLEM 1
HFrEF (heart failure with reduced ejection fraction)

## 2021-10-23 NOTE — PROGRESS NOTE ADULT - REASON FOR ADMISSION
PNA, HF exacerbation

## 2021-10-27 ENCOUNTER — APPOINTMENT (OUTPATIENT)
Dept: CARE COORDINATION | Facility: HOME HEALTH | Age: 66
End: 2021-10-27
Payer: MEDICARE

## 2021-10-27 VITALS
TEMPERATURE: 98.8 F | RESPIRATION RATE: 18 BRPM | SYSTOLIC BLOOD PRESSURE: 120 MMHG | OXYGEN SATURATION: 96 % | HEART RATE: 96 BPM | DIASTOLIC BLOOD PRESSURE: 59 MMHG

## 2021-10-27 DIAGNOSIS — N40.0 BENIGN PROSTATIC HYPERPLASIA WITHOUT LOWER URINARY TRACT SYMPMS: ICD-10-CM

## 2021-10-27 DIAGNOSIS — A41.9 SEPSIS, UNSPECIFIED ORGANISM: ICD-10-CM

## 2021-10-27 DIAGNOSIS — D64.9 ANEMIA, UNSPECIFIED: ICD-10-CM

## 2021-10-27 DIAGNOSIS — E03.9 HYPOTHYROIDISM, UNSPECIFIED: ICD-10-CM

## 2021-10-27 DIAGNOSIS — J18.9 PNEUMONIA, UNSPECIFIED ORGANISM: ICD-10-CM

## 2021-10-27 PROBLEM — Z00.00 ENCOUNTER FOR PREVENTIVE HEALTH EXAMINATION: Status: ACTIVE | Noted: 2021-10-27

## 2021-10-27 PROCEDURE — 99344 HOME/RES VST NEW MOD MDM 60: CPT

## 2021-10-27 RX ORDER — ALBUTEROL SULFATE 90 UG/1
108 (90 BASE) INHALANT RESPIRATORY (INHALATION)
Refills: 0 | Status: ACTIVE | COMMUNITY
Start: 2021-10-27

## 2021-10-27 RX ORDER — CHLORHEXIDINE GLUCONATE 4 %
325 (65 FE) LIQUID (ML) TOPICAL
Refills: 0 | Status: ACTIVE | COMMUNITY
Start: 2021-10-27

## 2021-10-27 RX ORDER — GLIMEPIRIDE 2 MG/1
2 TABLET ORAL
Qty: 90 | Refills: 3 | Status: ACTIVE | COMMUNITY
Start: 2021-10-27

## 2021-10-27 RX ORDER — EZETIMIBE 10 MG/1
10 TABLET ORAL DAILY
Refills: 0 | Status: ACTIVE | COMMUNITY
Start: 2021-10-27

## 2021-10-27 RX ORDER — BUDESONIDE AND FORMOTEROL FUMARATE DIHYDRATE 160; 4.5 UG/1; UG/1
160-4.5 AEROSOL RESPIRATORY (INHALATION)
Refills: 0 | Status: ACTIVE | COMMUNITY
Start: 2021-10-27

## 2021-10-27 NOTE — HISTORY OF PRESENT ILLNESS
[Post-hospitalization from ___ Hospital] : Post-hospitalization from [unfilled] Hospital [Admitted on: ___] : The patient was admitted on [unfilled] [Discharged on ___] : discharged on [unfilled] [Patient Contacted By: ____] : and contacted by [unfilled] [FreeTextEntry2] : 66M w/CABG, HFrEF (15-20%) s/p AICD on 2LNC at home, COPD/asthma, CKD, CVA w/residual L-sided weakness, and gout, recently prescribed PO antibiotics for presumed PNA prior to admission, admitted for generalized weakness, fevers and UE and LE extremity swelling.\par \par Since dc pt states he is feeling well, and denies any SOB, CP, PND, orthopnea.  Wears home o2 to sleep, wife states he does get SOB when lying flat without the oxygen, but with he is fine.  He has been weighing himself daily and has been consistently 156lbs since DC.  F/u appt with Dr. Buckner on 11/1 at 10am.

## 2022-05-11 NOTE — H&P ADULT - ASSESSMENT
No cyanosis, no pallor, no jaundice, no rash 63 y/o M with CAD, DM, HTN, CVA, BPH, CKD p/w L knee pain and swelling with arthrocentesis showing some crystals, and nucleated cell count of 1700.

## 2022-05-21 ENCOUNTER — INPATIENT (INPATIENT)
Facility: HOSPITAL | Age: 67
LOS: 5 days | Discharge: HOME HEALTH | End: 2022-05-27
Attending: INTERNAL MEDICINE | Admitting: INTERNAL MEDICINE
Payer: MEDICARE

## 2022-05-21 VITALS
HEART RATE: 102 BPM | DIASTOLIC BLOOD PRESSURE: 77 MMHG | OXYGEN SATURATION: 87 % | RESPIRATION RATE: 18 BRPM | SYSTOLIC BLOOD PRESSURE: 143 MMHG | HEIGHT: 69 IN | TEMPERATURE: 97 F

## 2022-05-21 DIAGNOSIS — E87.1 HYPO-OSMOLALITY AND HYPONATREMIA: ICD-10-CM

## 2022-05-21 DIAGNOSIS — E11.9 TYPE 2 DIABETES MELLITUS WITHOUT COMPLICATIONS: ICD-10-CM

## 2022-05-21 DIAGNOSIS — Z95.1 PRESENCE OF AORTOCORONARY BYPASS GRAFT: Chronic | ICD-10-CM

## 2022-05-21 DIAGNOSIS — N18.9 CHRONIC KIDNEY DISEASE, UNSPECIFIED: ICD-10-CM

## 2022-05-21 DIAGNOSIS — R77.8 OTHER SPECIFIED ABNORMALITIES OF PLASMA PROTEINS: ICD-10-CM

## 2022-05-21 DIAGNOSIS — Z90.49 ACQUIRED ABSENCE OF OTHER SPECIFIED PARTS OF DIGESTIVE TRACT: Chronic | ICD-10-CM

## 2022-05-21 DIAGNOSIS — Z87.09 PERSONAL HISTORY OF OTHER DISEASES OF THE RESPIRATORY SYSTEM: ICD-10-CM

## 2022-05-21 DIAGNOSIS — R06.00 DYSPNEA, UNSPECIFIED: ICD-10-CM

## 2022-05-21 DIAGNOSIS — I25.10 ATHEROSCLEROTIC HEART DISEASE OF NATIVE CORONARY ARTERY WITHOUT ANGINA PECTORIS: ICD-10-CM

## 2022-05-21 DIAGNOSIS — Z29.9 ENCOUNTER FOR PROPHYLACTIC MEASURES, UNSPECIFIED: ICD-10-CM

## 2022-05-21 DIAGNOSIS — J18.9 PNEUMONIA, UNSPECIFIED ORGANISM: ICD-10-CM

## 2022-05-21 DIAGNOSIS — I50.22 CHRONIC SYSTOLIC (CONGESTIVE) HEART FAILURE: ICD-10-CM

## 2022-05-21 DIAGNOSIS — J44.1 CHRONIC OBSTRUCTIVE PULMONARY DISEASE WITH (ACUTE) EXACERBATION: ICD-10-CM

## 2022-05-21 DIAGNOSIS — Z86.73 PERSONAL HISTORY OF TRANSIENT ISCHEMIC ATTACK (TIA), AND CEREBRAL INFARCTION WITHOUT RESIDUAL DEFICITS: ICD-10-CM

## 2022-05-21 LAB
ALBUMIN SERPL ELPH-MCNC: 3.9 G/DL — SIGNIFICANT CHANGE UP (ref 3.3–5)
ALP SERPL-CCNC: 94 U/L — SIGNIFICANT CHANGE UP (ref 40–120)
ALT FLD-CCNC: 29 U/L — SIGNIFICANT CHANGE UP (ref 4–41)
ANION GAP SERPL CALC-SCNC: 11 MMOL/L — SIGNIFICANT CHANGE UP (ref 7–14)
APTT BLD: 31.4 SEC — SIGNIFICANT CHANGE UP (ref 27–36.3)
AST SERPL-CCNC: 43 U/L — HIGH (ref 4–40)
B PERT DNA SPEC QL NAA+PROBE: SIGNIFICANT CHANGE UP
B PERT+PARAPERT DNA PNL SPEC NAA+PROBE: SIGNIFICANT CHANGE UP
BASOPHILS # BLD AUTO: 0.04 K/UL — SIGNIFICANT CHANGE UP (ref 0–0.2)
BASOPHILS NFR BLD AUTO: 0.3 % — SIGNIFICANT CHANGE UP (ref 0–2)
BILIRUB SERPL-MCNC: 0.7 MG/DL — SIGNIFICANT CHANGE UP (ref 0.2–1.2)
BLOOD GAS ARTERIAL COMPREHENSIVE RESULT: SIGNIFICANT CHANGE UP
BORDETELLA PARAPERTUSSIS (RAPRVP): SIGNIFICANT CHANGE UP
BUN SERPL-MCNC: 56 MG/DL — HIGH (ref 7–23)
C PNEUM DNA SPEC QL NAA+PROBE: SIGNIFICANT CHANGE UP
CALCIUM SERPL-MCNC: 8.8 MG/DL — SIGNIFICANT CHANGE UP (ref 8.4–10.5)
CHLORIDE SERPL-SCNC: 90 MMOL/L — LOW (ref 98–107)
CO2 SERPL-SCNC: 26 MMOL/L — SIGNIFICANT CHANGE UP (ref 22–31)
CREAT SERPL-MCNC: 2.6 MG/DL — HIGH (ref 0.5–1.3)
EGFR: 26 ML/MIN/1.73M2 — LOW
EOSINOPHIL # BLD AUTO: 0.24 K/UL — SIGNIFICANT CHANGE UP (ref 0–0.5)
EOSINOPHIL NFR BLD AUTO: 2 % — SIGNIFICANT CHANGE UP (ref 0–6)
FLUAV SUBTYP SPEC NAA+PROBE: SIGNIFICANT CHANGE UP
FLUBV RNA SPEC QL NAA+PROBE: SIGNIFICANT CHANGE UP
GLUCOSE BLDC GLUCOMTR-MCNC: 168 MG/DL — HIGH (ref 70–99)
GLUCOSE SERPL-MCNC: 161 MG/DL — HIGH (ref 70–99)
HADV DNA SPEC QL NAA+PROBE: SIGNIFICANT CHANGE UP
HCOV 229E RNA SPEC QL NAA+PROBE: SIGNIFICANT CHANGE UP
HCOV HKU1 RNA SPEC QL NAA+PROBE: SIGNIFICANT CHANGE UP
HCOV NL63 RNA SPEC QL NAA+PROBE: SIGNIFICANT CHANGE UP
HCOV OC43 RNA SPEC QL NAA+PROBE: SIGNIFICANT CHANGE UP
HCT VFR BLD CALC: 35 % — LOW (ref 39–50)
HGB BLD-MCNC: 11.2 G/DL — LOW (ref 13–17)
HMPV RNA SPEC QL NAA+PROBE: SIGNIFICANT CHANGE UP
HPIV1 RNA SPEC QL NAA+PROBE: SIGNIFICANT CHANGE UP
HPIV2 RNA SPEC QL NAA+PROBE: SIGNIFICANT CHANGE UP
HPIV3 RNA SPEC QL NAA+PROBE: SIGNIFICANT CHANGE UP
HPIV4 RNA SPEC QL NAA+PROBE: SIGNIFICANT CHANGE UP
IANC: 10.14 K/UL — HIGH (ref 1.8–7.4)
IMM GRANULOCYTES NFR BLD AUTO: 0.4 % — SIGNIFICANT CHANGE UP (ref 0–1.5)
INR BLD: 1.12 RATIO — SIGNIFICANT CHANGE UP (ref 0.88–1.16)
LYMPHOCYTES # BLD AUTO: 0.88 K/UL — LOW (ref 1–3.3)
LYMPHOCYTES # BLD AUTO: 7.3 % — LOW (ref 13–44)
M PNEUMO DNA SPEC QL NAA+PROBE: SIGNIFICANT CHANGE UP
MAGNESIUM SERPL-MCNC: 2.3 MG/DL — SIGNIFICANT CHANGE UP (ref 1.6–2.6)
MCHC RBC-ENTMCNC: 31 PG — SIGNIFICANT CHANGE UP (ref 27–34)
MCHC RBC-ENTMCNC: 32 GM/DL — SIGNIFICANT CHANGE UP (ref 32–36)
MCV RBC AUTO: 97 FL — SIGNIFICANT CHANGE UP (ref 80–100)
MONOCYTES # BLD AUTO: 0.76 K/UL — SIGNIFICANT CHANGE UP (ref 0–0.9)
MONOCYTES NFR BLD AUTO: 6.3 % — SIGNIFICANT CHANGE UP (ref 2–14)
NEUTROPHILS # BLD AUTO: 10.14 K/UL — HIGH (ref 1.8–7.4)
NEUTROPHILS NFR BLD AUTO: 83.7 % — HIGH (ref 43–77)
NRBC # BLD: 0 /100 WBCS — SIGNIFICANT CHANGE UP
NRBC # FLD: 0 K/UL — SIGNIFICANT CHANGE UP
NT-PROBNP SERPL-SCNC: 7518 PG/ML — HIGH
PLATELET # BLD AUTO: 154 K/UL — SIGNIFICANT CHANGE UP (ref 150–400)
POTASSIUM SERPL-MCNC: 4.5 MMOL/L — SIGNIFICANT CHANGE UP (ref 3.5–5.3)
POTASSIUM SERPL-SCNC: 4.5 MMOL/L — SIGNIFICANT CHANGE UP (ref 3.5–5.3)
PROCALCITONIN SERPL-MCNC: 0.05 NG/ML — SIGNIFICANT CHANGE UP (ref 0.02–0.1)
PROT SERPL-MCNC: 7.2 G/DL — SIGNIFICANT CHANGE UP (ref 6–8.3)
PROTHROM AB SERPL-ACNC: 13 SEC — SIGNIFICANT CHANGE UP (ref 10.5–13.4)
RAPID RVP RESULT: SIGNIFICANT CHANGE UP
RBC # BLD: 3.61 M/UL — LOW (ref 4.2–5.8)
RBC # FLD: 15 % — HIGH (ref 10.3–14.5)
RSV RNA SPEC QL NAA+PROBE: SIGNIFICANT CHANGE UP
RV+EV RNA SPEC QL NAA+PROBE: SIGNIFICANT CHANGE UP
SARS-COV-2 RNA SPEC QL NAA+PROBE: SIGNIFICANT CHANGE UP
SODIUM SERPL-SCNC: 127 MMOL/L — LOW (ref 135–145)
TROPONIN T, HIGH SENSITIVITY RESULT: 77 NG/L — CRITICAL HIGH
TROPONIN T, HIGH SENSITIVITY RESULT: 77 NG/L — CRITICAL HIGH
WBC # BLD: 12.11 K/UL — HIGH (ref 3.8–10.5)
WBC # FLD AUTO: 12.11 K/UL — HIGH (ref 3.8–10.5)

## 2022-05-21 PROCEDURE — 99285 EMERGENCY DEPT VISIT HI MDM: CPT | Mod: 25

## 2022-05-21 PROCEDURE — 99223 1ST HOSP IP/OBS HIGH 75: CPT | Mod: GC

## 2022-05-21 PROCEDURE — 71045 X-RAY EXAM CHEST 1 VIEW: CPT | Mod: 26

## 2022-05-21 PROCEDURE — 93010 ELECTROCARDIOGRAM REPORT: CPT

## 2022-05-21 RX ORDER — BUDESONIDE AND FORMOTEROL FUMARATE DIHYDRATE 160; 4.5 UG/1; UG/1
2 AEROSOL RESPIRATORY (INHALATION)
Refills: 0 | Status: DISCONTINUED | OUTPATIENT
Start: 2022-05-21 | End: 2022-05-27

## 2022-05-21 RX ORDER — GABAPENTIN 400 MG/1
200 CAPSULE ORAL THREE TIMES A DAY
Refills: 0 | Status: DISCONTINUED | OUTPATIENT
Start: 2022-05-21 | End: 2022-05-27

## 2022-05-21 RX ORDER — AZITHROMYCIN 500 MG/1
500 TABLET, FILM COATED ORAL ONCE
Refills: 0 | Status: COMPLETED | OUTPATIENT
Start: 2022-05-21 | End: 2022-05-21

## 2022-05-21 RX ORDER — FUROSEMIDE 40 MG
40 TABLET ORAL ONCE
Refills: 0 | Status: COMPLETED | OUTPATIENT
Start: 2022-05-21 | End: 2022-05-21

## 2022-05-21 RX ORDER — ROSUVASTATIN CALCIUM 5 MG/1
1 TABLET ORAL
Qty: 0 | Refills: 0 | DISCHARGE

## 2022-05-21 RX ORDER — TAMSULOSIN HYDROCHLORIDE 0.4 MG/1
0.4 CAPSULE ORAL AT BEDTIME
Refills: 0 | Status: DISCONTINUED | OUTPATIENT
Start: 2022-05-21 | End: 2022-05-21

## 2022-05-21 RX ORDER — HEPARIN SODIUM 5000 [USP'U]/ML
5000 INJECTION INTRAVENOUS; SUBCUTANEOUS EVERY 8 HOURS
Refills: 0 | Status: DISCONTINUED | OUTPATIENT
Start: 2022-05-21 | End: 2022-05-21

## 2022-05-21 RX ORDER — ALBUTEROL 90 UG/1
2 AEROSOL, METERED ORAL EVERY 6 HOURS
Refills: 0 | Status: DISCONTINUED | OUTPATIENT
Start: 2022-05-21 | End: 2022-05-27

## 2022-05-21 RX ORDER — CEFTRIAXONE 500 MG/1
1000 INJECTION, POWDER, FOR SOLUTION INTRAMUSCULAR; INTRAVENOUS ONCE
Refills: 0 | Status: COMPLETED | OUTPATIENT
Start: 2022-05-21 | End: 2022-05-21

## 2022-05-21 RX ORDER — DEXTROSE 50 % IN WATER 50 %
12.5 SYRINGE (ML) INTRAVENOUS ONCE
Refills: 0 | Status: DISCONTINUED | OUTPATIENT
Start: 2022-05-21 | End: 2022-05-27

## 2022-05-21 RX ORDER — DEXTROSE 50 % IN WATER 50 %
25 SYRINGE (ML) INTRAVENOUS ONCE
Refills: 0 | Status: DISCONTINUED | OUTPATIENT
Start: 2022-05-21 | End: 2022-05-27

## 2022-05-21 RX ORDER — ISOSORBIDE DINITRATE 5 MG/1
20 TABLET ORAL THREE TIMES A DAY
Refills: 0 | Status: DISCONTINUED | OUTPATIENT
Start: 2022-05-21 | End: 2022-05-21

## 2022-05-21 RX ORDER — ASPIRIN/CALCIUM CARB/MAGNESIUM 324 MG
81 TABLET ORAL DAILY
Refills: 0 | Status: DISCONTINUED | OUTPATIENT
Start: 2022-05-21 | End: 2022-05-27

## 2022-05-21 RX ORDER — HEPARIN SODIUM 5000 [USP'U]/ML
5000 INJECTION INTRAVENOUS; SUBCUTANEOUS EVERY 8 HOURS
Refills: 0 | Status: DISCONTINUED | OUTPATIENT
Start: 2022-05-21 | End: 2022-05-27

## 2022-05-21 RX ORDER — NITROGLYCERIN 6.5 MG
0.4 CAPSULE, EXTENDED RELEASE ORAL ONCE
Refills: 0 | Status: COMPLETED | OUTPATIENT
Start: 2022-05-21 | End: 2022-05-21

## 2022-05-21 RX ORDER — GLUCAGON INJECTION, SOLUTION 0.5 MG/.1ML
1 INJECTION, SOLUTION SUBCUTANEOUS ONCE
Refills: 0 | Status: DISCONTINUED | OUTPATIENT
Start: 2022-05-21 | End: 2022-05-27

## 2022-05-21 RX ORDER — DEXTROSE 50 % IN WATER 50 %
15 SYRINGE (ML) INTRAVENOUS ONCE
Refills: 0 | Status: DISCONTINUED | OUTPATIENT
Start: 2022-05-21 | End: 2022-05-27

## 2022-05-21 RX ORDER — INSULIN LISPRO 100/ML
VIAL (ML) SUBCUTANEOUS
Refills: 0 | Status: DISCONTINUED | OUTPATIENT
Start: 2022-05-21 | End: 2022-05-27

## 2022-05-21 RX ORDER — ISOSORBIDE DINITRATE 5 MG/1
20 TABLET ORAL THREE TIMES A DAY
Refills: 0 | Status: DISCONTINUED | OUTPATIENT
Start: 2022-05-21 | End: 2022-05-27

## 2022-05-21 RX ORDER — SODIUM CHLORIDE 9 MG/ML
1000 INJECTION, SOLUTION INTRAVENOUS
Refills: 0 | Status: DISCONTINUED | OUTPATIENT
Start: 2022-05-21 | End: 2022-05-27

## 2022-05-21 RX ORDER — CARVEDILOL PHOSPHATE 80 MG/1
3.12 CAPSULE, EXTENDED RELEASE ORAL EVERY 12 HOURS
Refills: 0 | Status: DISCONTINUED | OUTPATIENT
Start: 2022-05-21 | End: 2022-05-27

## 2022-05-21 RX ORDER — SIMETHICONE 80 MG/1
80 TABLET, CHEWABLE ORAL ONCE
Refills: 0 | Status: COMPLETED | OUTPATIENT
Start: 2022-05-21 | End: 2022-05-22

## 2022-05-21 RX ORDER — TAMSULOSIN HYDROCHLORIDE 0.4 MG/1
0.4 CAPSULE ORAL AT BEDTIME
Refills: 0 | Status: DISCONTINUED | OUTPATIENT
Start: 2022-05-21 | End: 2022-05-27

## 2022-05-21 RX ORDER — FAMOTIDINE 10 MG/ML
10 INJECTION INTRAVENOUS DAILY
Refills: 0 | Status: DISCONTINUED | OUTPATIENT
Start: 2022-05-21 | End: 2022-05-27

## 2022-05-21 RX ORDER — CARVEDILOL PHOSPHATE 80 MG/1
3.12 CAPSULE, EXTENDED RELEASE ORAL EVERY 12 HOURS
Refills: 0 | Status: DISCONTINUED | OUTPATIENT
Start: 2022-05-21 | End: 2022-05-21

## 2022-05-21 RX ORDER — ATORVASTATIN CALCIUM 80 MG/1
80 TABLET, FILM COATED ORAL AT BEDTIME
Refills: 0 | Status: DISCONTINUED | OUTPATIENT
Start: 2022-05-21 | End: 2022-05-27

## 2022-05-21 RX ORDER — FERROUS SULFATE 325(65) MG
325 TABLET ORAL DAILY
Refills: 0 | Status: DISCONTINUED | OUTPATIENT
Start: 2022-05-21 | End: 2022-05-27

## 2022-05-21 RX ORDER — LEVOTHYROXINE SODIUM 125 MCG
25 TABLET ORAL DAILY
Refills: 0 | Status: DISCONTINUED | OUTPATIENT
Start: 2022-05-21 | End: 2022-05-27

## 2022-05-21 RX ORDER — FUROSEMIDE 40 MG
40 TABLET ORAL
Refills: 0 | Status: DISCONTINUED | OUTPATIENT
Start: 2022-05-21 | End: 2022-05-22

## 2022-05-21 RX ORDER — CLOPIDOGREL BISULFATE 75 MG/1
75 TABLET, FILM COATED ORAL DAILY
Refills: 0 | Status: DISCONTINUED | OUTPATIENT
Start: 2022-05-21 | End: 2022-05-27

## 2022-05-21 RX ORDER — SPIRONOLACTONE 25 MG/1
25 TABLET, FILM COATED ORAL DAILY
Refills: 0 | Status: DISCONTINUED | OUTPATIENT
Start: 2022-05-21 | End: 2022-05-25

## 2022-05-21 RX ORDER — SENNA PLUS 8.6 MG/1
2 TABLET ORAL AT BEDTIME
Refills: 0 | Status: DISCONTINUED | OUTPATIENT
Start: 2022-05-21 | End: 2022-05-27

## 2022-05-21 RX ADMIN — AZITHROMYCIN 255 MILLIGRAM(S): 500 TABLET, FILM COATED ORAL at 17:47

## 2022-05-21 RX ADMIN — Medication 125 MILLIGRAM(S): at 16:09

## 2022-05-21 RX ADMIN — Medication 1: at 19:54

## 2022-05-21 RX ADMIN — CEFTRIAXONE 100 MILLIGRAM(S): 500 INJECTION, POWDER, FOR SOLUTION INTRAMUSCULAR; INTRAVENOUS at 19:06

## 2022-05-21 RX ADMIN — Medication 0.4 MILLIGRAM(S): at 14:41

## 2022-05-21 RX ADMIN — Medication 40 MILLIGRAM(S): at 18:09

## 2022-05-21 NOTE — H&P ADULT - NSHPSOCIALHISTORY_GEN_ALL_CORE
Lives with  Works/Retired  Ambulates w/  Alcohol:   Tobacco:   Illicit Drug use: Lives with wife and daughter  Alcohol: denied  Tobacco: denied  Illicit Drug use: denied

## 2022-05-21 NOTE — H&P ADULT - PROBLEM SELECTOR PLAN 8
Pt ate applesauce with no issues, denies nausea, has not had an episode of vomiting or diarrhea while here in the ER. Pt continually asks for staff to call her daughter every two minutes, despite having talked to her daughter twice this evening. Pt also continues to ask where the clock is and why she isn't in a room yet. Continual redirection and reassurance given to pt.    On _________ at home    Start Mild SSI  CC diet Hx of CVA w/ L side residual weakness   On Rosuvastatin 5mg at home, ASA 81  - C/w ASA 81  - Start atorvastatin therapeutic interchange Hx of CVA w/ L side residual weakness   On Rosuvastatin 5mg at home, ASA 81  - C/w ASA 81  - Start atorvastatin therapeutic interchange (80)  - hold ezetimibe inpt

## 2022-05-21 NOTE — ED PROVIDER NOTE - OBJECTIVE STATEMENT
65yo M with hx of CABG, HFrEF (15-20%) s/p AICD on 2LNC at home, COPD/asthma, CKD, CVA w/residual L-sided weakness, and gout presenting with complaints of shortness of breath. began having shortness of breath last night with left sided non-radiating chest pain. sob has been progressively worsening since. no cough, fevers, n/v, abd pain. denies orthopnea. has been taking medications as follows. cardiologist is Dr. Buckner at [facility].

## 2022-05-21 NOTE — H&P ADULT - PROBLEM SELECTOR PLAN 1
Patient endorsing dyspnea and L sided chest pain   Differential includes acute on chronic HFrEF, COPD exacerbation, PE, less likely ACS, viral PNA, bacterial PNA  - Patient grossly overloaded on exam with crackles, peripheral edema   - Given LE edema L>R cannot r/o PE  - RVP w/ COVID negative  - On 2LNC at home  - Currently on Bipap   - F/u VA Duplex LLE  - F/u ABG Patient endorsing dyspnea  Differential includes acute on chronic HFrEF, PE, Bacterial PNA  Unlikely COPD, patient without wheezing, RVP negative.   Unlikely   - Patient grossly overloaded on exam with crackles, peripheral edema   - Given LE edema L>R cannot r/o PE  - RVP w/ COVID negative  - On 2LNC at home  - Currently on Bipap   - F/u VA Duplex LLE  - F/u ABG  - Diuresis per below,  - Abx empirically CTX   - f/u Procal. Patient endorsing dyspnea  Differential includes acute on chronic HFrEF, PE, Bacterial PNA  Unlikely COPD, patient without wheezing, RVP negative.   Unlikely   - Patient grossly overloaded on exam with crackles, peripheral edema   - Given LE edema L>R cannot r/o PE  - RVP w/ COVID negative  - On 2LNC at home  - Currently on Bipap   - F/u VA Duplex LLE, if + for DVt, start hep gtt.   - F/u ABG  - Diuresis per below,  - Abx empirically CTX   - f/u Procal.

## 2022-05-21 NOTE — H&P ADULT - HISTORY OF PRESENT ILLNESS
66M PMHx CABG, HFrEF (15-20%) s/p AICD on 2LNC at home, COPD/asthma, CKD, CVA w/residual L-sided weakness, and gout,  presenting for progressively worsening shortness of breath since last night and left sided chest pain. in moderate respiratory distress, hypoxic on baseline 2L. normotensive, with L>R lower extremity edema. concern for CHF exacerbation, COPD exacerbation, pna. will obtain LE doppler to assess for DVT given difference in swelling. ekg unchanged from prior in october 2021. has been on bipap before with improvement. will start bipap, dose of sublingual nitro, labs and admit.    In the ED VS T: 97.6 F HR: 96 (92 - 103) BP: 123/88 (123/81 - 145/89) RR: 17 (17 - 20) SpO2: 100%   Labs notable for WBC 12.11, Trop 77->77, Na 127, Cr 2.6, Pro-BNP 7518  RVP + COVID negative  CXR showed Hazy rightlower lung opacity which may reflect layering effusion, atelectasis, or pneumonia.  Patient given Azithromycin 500mg IVPB, CTX 1g, MethylPrednisolone 125mg IV x 1, Nitroglycerin 0.4 x 1   66M CAD s/p CABG on ASA/Plavix, HFrEF (22% 10/2022 TTE) s/p AICD on 2LNC at home, CKD, CVA with mild left deficits, asthma/COPD (on 2L NC), recurrent PNA infections presents with acute worsening SOB and b/l LE swelling. Patient was in usual state of health until the night prior to presentation, where he was short of breath. He says he noticed worsening of swelling in his lower extremities that are slightly tender. He also noticed a slight cough, but denies any sputum production. He denies any fevers, chills, CP, abdominal pain, changes in urinary or bowel habits.     Patient reports occasional use of albuterol inhaler for asthma, has no smoking hx, no known allergies.     In the ED VS T: 97.6 F HR: 96 (92 - 103) BP: 123/88 (123/81 - 145/89) RR: 18 SpO2: 87% on RA  Patient was placed on BiPAP 10/5 50% and saturated 100%.    Labs notable for WBC 12.11, Trop 77->77, Na 127, Cr 2.6, Pro-BNP 7518  RVP and COVID negative   CXR showed Hazy right lower lung opacity which may reflect layering effusion, atelectasis, or pneumonia.  Patient given Azithromycin 500mg IVPB, CTX 1g, MethylPrednisolone 125mg IV x 1, Nitroglycerin 0.4 x 1, 40 IV lasix.

## 2022-05-21 NOTE — H&P ADULT - PROBLEM SELECTOR PLAN 7
On glimepride 2mg daily at home  - Hold oral antihyperglycemic agents while inpatient  - Start Mild SSI  - CC diet when off BiPap

## 2022-05-21 NOTE — H&P ADULT - ATTENDING COMMENTS
66 y.o. M with multiple medical problems, CAD, CHF EF 20%, s/p AICD, COPD on 2L home O2, CKD, DM2, CVA admitted with dyspnea, CP, hypoxemia, O2 sat 100% on BiPAP.  physical exam showed crackles b/l lungs and leg edema, CXR showed right lower lobe opacity, clinical picture more suggestive of decompensated CHF, also need to r/o DVT/PE and PNA. Will give Lasix 40mg IVP Q12H and monitor on tele, strict  I&O's, daily weight,, check 2D echo, obtain Cardiology consult in AM.  Will continue BiPAP overnight. Will check leg doppler to r/o DVT.  Will continue Ceftriaxone and Zithromax until PNA is ruled out.   (+) hyponatremia 127, hypervolemic,  will give Lasix diuresis and free water restriction. Monitor BMP in AM.     D/W HS

## 2022-05-21 NOTE — H&P ADULT - ASSESSMENT
66M CAD s/p CABG on ASA/Plavix, HFrEF (22% 10/2022 TTE) s/p AICD on 2LNC at home, CKD, CVA with mild left deficits, asthma/COPD (on 2L NC), recurrent PNA infections presents with acute worsening SOB and b/l LE swelling admitted for likely CHF exacerbation vs. PE vs. PNA.

## 2022-05-21 NOTE — H&P ADULT - PROBLEM SELECTOR PLAN 6
CKD 3  B/l SCr ~ 2.5  SCr 2.6 on admission  Avoid nephrotoxic agents  Monitor SCr CKD 3  B/l SCr ~ 2.5  - SCr 2.6 on admission  - Avoid nephrotoxic agents  - Monitor SCr

## 2022-05-21 NOTE — ED PROVIDER NOTE - ATTENDING CONTRIBUTION TO CARE
Attending Statement: I have reviewed and agree with all pertinent clinical information, including history and physical exam and agree with treatment plan of the PA, except as noted.  65yo M with hx of CABG, HFrEF (15-20%) s/p AICD on 2LNC at home, COPD/asthma, CKD, CVA w/residual L-sided weakness, and gout co SOB and CP. Endorse KINNEY, left sided chest pressure, non radiating, constant pain. +Nonexertional. no fever no cough no n/v/d no abdominal pain.  Vital signs noted. +tachycardic, +WOB +retractions. talking in short sentences. hypoxic to 82 on NC, placed on NRB, soft nontender abdomen. no  rebound. no guarding. no sign of trauma. no CVAT no calf tenderness, +pedal edema left greater than right.   plan ekg, labs, cxr, BIPAP, tele monitor, TBA

## 2022-05-21 NOTE — ED ADULT NURSE NOTE - OBJECTIVE STATEMENT
Pt received to RM 8: A&Ox4, PMH of HF on bumex, presents to ED c/o chest pain, cough and SOB. Pt respirations are even and labored, sating at 100% on 15 L NRB, 86% on RA, 20 G to R AC placed, labs sent. Pt received to RM 8: A&Ox4, PMH of HF on bumex, CABG, AICD, COPD/asthma normally on 2 L NC, presents to ED c/o chest pain, cough and SOB. Pt respirations are even and labored, sating at 100% on 15 L NRB, 86% on RA, 20 G to R AC placed, labs sent. Pt on BIPAP now for labored breathing.

## 2022-05-21 NOTE — ED PROVIDER NOTE - CLINICAL SUMMARY MEDICAL DECISION MAKING FREE TEXT BOX
pt with hx of CABG, HFrEF (15-20%) s/p AICD on 2LNC at home, COPD/asthma, CKD, CVA w/residual L-sided weakness, and gout,  presenting for progressively worsening shortness of breath since last night and left sided chest pain. in moderate respiratory distress, hypoxic on baseline 2L. normotensive, with L>R lower extremity edema. concern for CHF exacerbation, COPD exacerbation, pna. will obtain LE doppler to assess for DVT given difference in swelling. ekg unchanged from prior in october 2021. has been on bipap before with improvement. will start bipap, dose of sublingual nitro, labs and admit.

## 2022-05-21 NOTE — H&P ADULT - NSHPREVIEWOFSYSTEMS_GEN_ALL_CORE
CONSTITUTIONAL:  No weight loss, fever, chills, weakness or fatigue.  HEENT:  Eyes:  No visual loss, blurred vision, double vision or yellow sclerae. Ears, Nose, Throat:  No hearing loss, sneezing, congestion, runny nose or sore throat.  SKIN:  No rash or itching.  CARDIOVASCULAR:  No chest pain, chest pressure or chest discomfort. No palpitations.  RESPIRATORY:  No shortness of breath, cough or sputum.  GASTROINTESTINAL:  No anorexia, nausea, vomiting or diarrhea. No abdominal pain or blood.  GENITOURINARY:  Denies hematuria, dysuria.   NEUROLOGICAL:  No headache, dizziness, syncope, paralysis, ataxia, numbness or tingling in the extremities. No change in bowel or bladder control.  MUSCULOSKELETAL:  No muscle, back pain, joint pain or stiffness.  HEMATOLOGIC:  No anemia, bleeding or bruising.  LYMPHATICS:  No enlarged nodes.   PSYCHIATRIC:  No history of depression or anxiety.  ENDOCRINOLOGIC:  No reports of sweating, cold or heat intolerance. No polyuria or polydipsia.  ALLERGIES:  No history of asthma, hives, eczema or rhinitis. CONSTITUTIONAL:  No weight loss, fever, chills, weakness or fatigue.  HEENT:  Eyes:  No visual loss, blurred vision, double vision or yellow sclerae. Ears, Nose, Throat:  No hearing loss, sneezing, congestion, runny nose or sore throat.  SKIN:  No rash or itching.  CARDIOVASCULAR:  No chest pain, chest pressure or chest discomfort. No palpitations.  RESPIRATORY:  + SOB +cough, no sputum.  GASTROINTESTINAL:  + abdominal bloating.   GENITOURINARY:  Denies hematuria, dysuria.   NEUROLOGICAL:  + L sided weakness.   MUSCULOSKELETAL:  No muscle, back pain, joint pain or stiffness.  HEMATOLOGIC:  No anemia, bleeding or bruising.  LYMPHATICS:  No enlarged nodes.   PSYCHIATRIC:  No history of depression or anxiety.  ENDOCRINOLOGIC:  No reports of sweating, cold or heat intolerance. No polyuria or polydipsia.  ALLERGIES:  No history of asthma, hives, eczema or rhinitis.

## 2022-05-21 NOTE — H&P ADULT - NSHPPHYSICALEXAM_GEN_ALL_CORE
GENERAL APPEARANCE: Well developed, NAD  HEENT:  PERRL, EOMI. hearing grossly intact.  NECK: Neck supple, non-tender no lymphadenopathy, masses or thyromegaly.  CARDIAC: Normal S1 and S2. no mrg. RRR  LUNGS: Clear to auscultation B/L, no rales, rhonchi, or wheezing  ABDOMEN: Soft , NTND, bowel sounds normal. No guarding or rebound.   MUSCULOSKELETAL: ROM intact.  No joint erythema or tenderness.   EXTREMITIES: No edema. Peripheral pulses intact.   NEUROLOGICAL: Non focal. Strength and sensation symmetric and intact throughout.   SKIN: Warm and dry , Well perfused  PSYCHIATRIC: AOx3 , Normal mood and affect ICU Vital Signs Last 24 Hrs  T(C): 36.4 (21 May 2022 15:59), Max: 36.4 (21 May 2022 15:59)  T(F): 97.6 (21 May 2022 15:59), Max: 97.6 (21 May 2022 15:59)  HR: 90 (21 May 2022 18:18) (90 - 103)  BP: 121/83 (21 May 2022 18:18) (121/83 - 145/89)  BP(mean): --  ABP: --  ABP(mean): --  RR: 18 (21 May 2022 18:18) (17 - 20)  SpO2: 100% (21 May 2022 18:18) (87% - 100%)    GENERAL APPEARANCE: well developed in mild respiratory distress   HEENT:  PERRL, EOMI. hearing grossly intact.  NECK: Neck supple, non-tender no lymphadenopathy, masses or thyromegaly.  CARDIAC: Normal S1 and S2. no mrg. RRR  LUNGS: Bilateral basilar crackles to mid level posteriorly.   ABDOMEN: Distended, nontender, normal BS.   MUSCULOSKELETAL: ROM intact.  No joint erythema or tenderness.   EXTREMITIES: 1-2+ b/l LE edema, some tenderness, up to mid calves. Cool lower extremities. Cool hands.   NEUROLOGICAL: Right side 5/5 strength. 4/5 strength left upper and lower extremities.   SKIN: Warm and dry , Well perfused  PSYCHIATRIC: AOx3 , Normal mood and affect ICU Vital Signs Last 24 Hrs  T(C): 36.4 (21 May 2022 15:59), Max: 36.4 (21 May 2022 15:59)  T(F): 97.6 (21 May 2022 15:59), Max: 97.6 (21 May 2022 15:59)  HR: 90 (21 May 2022 18:18) (90 - 103)  BP: 121/83 (21 May 2022 18:18) (121/83 - 145/89)  BP(mean): --  ABP: --  ABP(mean): --  RR: 18 (21 May 2022 18:18) (17 - 20)  SpO2: 100% (21 May 2022 18:18) (87% - 100%)    GENERAL APPEARANCE: well developed in mild respiratory distress   HEENT:  PERRL, EOMI. hearing grossly intact.  NECK: Neck supple, non-tender no lymphadenopathy, masses or thyromegaly.  CARDIAC: Normal S1 and S2. no mrg. RRR  LUNGS: Bilateral basilar crackles to mid level posteriorly, L worse than R.    ABDOMEN: Distended, nontender, normal BS.   MUSCULOSKELETAL: ROM intact.  No joint erythema or tenderness.   EXTREMITIES: 1-2+ b/l LE edema: Left worse than right, some tenderness, up to mid calves. Cool lower extremities. Cool hands.   NEUROLOGICAL: Right side 5/5 strength. 4/5 strength left upper and lower extremities.   SKIN: Warm and dry , Well perfused  PSYCHIATRIC: AOx3 , Normal mood and affect

## 2022-05-21 NOTE — ED PROVIDER NOTE - PROGRESS NOTE DETAILS
pt on bipap. pt improved on BIPAP, pending cxr/us/ repeat trop and tba . sign out to night team  creatinine at baseline for pt. Kat Person MD, PGY-2: pt clinically stable, signout given to hospitalist.

## 2022-05-21 NOTE — H&P ADULT - NSHPLABSRESULTS_GEN_ALL_CORE
11.2   12.11 )-----------( 154      ( 21 May 2022 14:33 )             35.0     Auto Eosinophil # 0.24  / Auto Eosinophil % 2.0   / Auto Neutrophil # 10.14 / Auto Neutrophil % 83.7  / BANDS % x        05-21    127<L>  |  90<L>  |  56<H>  ----------------------------<  161<H>  4.5   |  26  |  2.60<H>    Ca    8.8      21 May 2022 14:33  Mg     2.30     05-21  TPro  7.2  /  Alb  3.9  /  TBili  0.7  /  DBili  x   /  AST  43<H>  /  ALT  29  /  AlkPhos  94  05-21    PT/INR - ( 21 May 2022 14:33 )   PT: 13.0 sec;   INR: 1.12 ratio         PTT - ( 21 May 2022 14:33 )  PTT:31.4 sec            RESPIRATORY  VENT:    ABG:     VBG: 11.2   12.11 )-----------( 154      ( 21 May 2022 14:33 )             35.0     Auto Eosinophil # 0.24  / Auto Eosinophil % 2.0   / Auto Neutrophil # 10.14 / Auto Neutrophil % 83.7  / BANDS % x        05-21    127<L>  |  90<L>  |  56<H>  ----------------------------<  161<H>  4.5   |  26  |  2.60<H>    Ca    8.8      21 May 2022 14:33  Mg     2.30     05-21  TPro  7.2  /  Alb  3.9  /  TBili  0.7  /  DBili  x   /  AST  43<H>  /  ALT  29  /  AlkPhos  94  05-21    PT/INR - ( 21 May 2022 14:33 )   PT: 13.0 sec;   INR: 1.12 ratio         PTT - ( 21 May 2022 14:33 )  PTT:31.4 sec    Trop 77--> 77    < from: Xray Chest 1 View- PORTABLE-Urgent (05.21.22 @ 17:07) >      IMPRESSION:    Hazy rightlower lung opacity which may reflect layering effusion,   atelectasis, or pneumonia.    < end of copied text >

## 2022-05-21 NOTE — H&P ADULT - PROBLEM SELECTOR PLAN 9
On Rosuvastatin 5mg at home  Start atorvastatin therapeutic interchange DVT PPx: SubQ hep given CKD    Diet: NPO while on BiPap, once off will start CC/Nephro    Dispo: pending clinical course    Ethics: DVT PPx: SubQ hep given CKD    Diet: NPO while on BiPap, once off will start CC/Nephro    Dispo: pending clinical course

## 2022-05-21 NOTE — H&P ADULT - PROBLEM SELECTOR PLAN 4
Hyponatremia to 127 on admission  Likely i/s/o low PO intake vs hypervolemic hyponatremia given underlying HFrEF/CKD  - Send Serum osm  - Send urine Na, urine osm  - Trend BMP Hyponatremia to 127 on admission  Likely i/s/o Hypervolemic hyponatremia given underlying HFrEF/CKD  - Send Serum osm  - Send urine Na, urine osm  - Trend BMP

## 2022-05-21 NOTE — H&P ADULT - PROBLEM SELECTOR PLAN 2
TTE on 10/22/21 showed EF 22%, mild-mod MR, Severe global LV systolic dysfunction, RV enlargement w/ decreased ventricular systolic function  Patient w/ CAD, S/P CABG  Dyspnea possibly 2/2 acute on chronic HFrEF  Pro-BNP 7518 on admission  - Obtain TTE  - continue home aldactone, isordil, and coreg TTE on 10/22/21 showed EF 22%, mild-mod MR, Severe global LV systolic dysfunction, RV enlargement w/ decreased ventricular systolic function  Patient w/ CAD, S/P CABG  Dyspnea possibly 2/2 acute on chronic HFrEF  Pro-BNP 7518 on admission  - Obtain TTE  - lasix 2IV BID for now, monitor response.   - Strict I/Os.   - continue home aldactone and coreg  - Hold isordil and hydral for now, can resume if needed. TTE on 10/22/21 showed EF 22%, mild-mod MR, Severe global LV systolic dysfunction, RV enlargement w/ decreased ventricular systolic function  Patient w/ CAD, S/P CABG  Dyspnea possibly 2/2 acute on chronic HFrEF  Pro-BNP 7518 on admission  - Obtain TTE  - lasix 40IV BID for now, monitor response. adjust PRN  - 1L fluid restriction when start diet.   - Strict I/Os.   - continue home aldactone and coreg and isordil  - Hold hydral for now, can resume if needed.  - c ardiology consult in AM  - c/w BiPAP for now, wean off as tolerated in AM.

## 2022-05-21 NOTE — ED ADULT NURSE NOTE - NSIMPLEMENTINTERV_GEN_ALL_ED
Implemented All Universal Safety Interventions:  [place] to call system. Call bell, personal items and telephone within reach. Instruct patient to call for assistance. Room bathroom lighting operational. Non-slip footwear when patient is off stretcher. Physically safe environment: no spills, clutter or unnecessary equipment. Stretcher in lowest position, wheels locked, appropriate side rails in place.

## 2022-05-21 NOTE — ED ADULT NURSE REASSESSMENT NOTE - NS ED NURSE REASSESS COMMENT FT1
received report from THOMAS Read. Pt AxOx3 and awake, on BIPAP and tolerating well. O2 sat 100%. RR even and unlabored. Pallor/diaphoresis not noted. Verbalizing improvement in breathing with BIPAP. Pt NSR on monitor. Awaiting results and dispo status.
Received report from Day THOMAS Graves: Pt A&Ox4, No NAD or complaints at this moment, respirations are even and unlabored, CM in progress= NSR, Denies cp, sob, palpitations, dizziness, lightheadedness, n/v/d, fever, chills, cough, HA, blurry vision. Safety precautions implemented as per protocol, awaiting further MD orders, will continue to monitor.

## 2022-05-21 NOTE — H&P ADULT - PROBLEM SELECTOR PLAN 5
TTE on 10/22/21 showed EF 22%, mild-mod MR, Severe global LV systolic dysfunction, RV enlargement w/ decreased ventricular systolic function Troponin mildly elevated on admission, but stable 77->77  Patient has CKD 3  Trend 1 more set of cardiac enzymes, will stop trending if stable Troponin mildly elevated on admission, but stable 77->77  Patient has CKD 3  - unlikely ACS, patient with higher troponin levels in past labs on past admissions. Troponin mildly elevated on admission, but stable 77->77  Patient has CKD 3  - unlikely ACS, patient with higher troponin levels in past labs on past admissions. No need to trend.

## 2022-05-21 NOTE — ED ADULT TRIAGE NOTE - CHIEF COMPLAINT QUOTE
Pt c/o sob, cough, chest pain,  O2 86% RA, diminished lung sounds with labored breathing.  Alb/atro x 1 given.  Non rebreather 15

## 2022-05-21 NOTE — H&P ADULT - PROBLEM SELECTOR PLAN 3
Hyponatremia to 127 on admission  Likely i/s/o low PO intake vs hypervolemic hyponatremia given underlying HFrEF/CKD  Send Serum osm  Send urine Na, urine osm    Trend BMP Patient endorsing increase in cough, but no increase in sputum, favor acute on chronic HFrEF vs COPD exacerbation  Patient on _____ at home   - RVP w/ COVID negative  - S/p Solumedrol, CTX, Azithromycin in ED  - CXR showed possible RLL hazy opacity ?consolidation   - Hold antibiotics for now   - F/u Procalcitonin  - Duonebs prn Patient endorsing increase in cough, but no increase in sputum, favor acute on chronic HFrEF vs COPD exacerbation  Patient on 2L NC at home   - RVP w/ COVID negative  - S/p Solumedrol, CTX, Azithromycin in ED  - CXR showed possible RLL hazy opacity ?consolidation   - Hold antibiotics for now   - F/u Procalcitonin  - Duonebs prn

## 2022-05-21 NOTE — ED PROVIDER NOTE - RESPIRATORY, MLM
diminished breath sounds with upper airway transmission, tachypneic, speaking in short 2-3 word sentences, saturating 83% on baseline 2L O2 using accessory muscles

## 2022-05-22 DIAGNOSIS — J96.91 RESPIRATORY FAILURE, UNSPECIFIED WITH HYPOXIA: ICD-10-CM

## 2022-05-22 LAB
ALBUMIN SERPL ELPH-MCNC: 3.7 G/DL — SIGNIFICANT CHANGE UP (ref 3.3–5)
ALP SERPL-CCNC: 83 U/L — SIGNIFICANT CHANGE UP (ref 40–120)
ALT FLD-CCNC: 25 U/L — SIGNIFICANT CHANGE UP (ref 4–41)
ANION GAP SERPL CALC-SCNC: 13 MMOL/L — SIGNIFICANT CHANGE UP (ref 7–14)
ANION GAP SERPL CALC-SCNC: 20 MMOL/L — HIGH (ref 7–14)
AST SERPL-CCNC: 31 U/L — SIGNIFICANT CHANGE UP (ref 4–40)
BASE EXCESS BLDV CALC-SCNC: 1.4 MMOL/L — SIGNIFICANT CHANGE UP (ref -2–3)
BASE EXCESS BLDV CALC-SCNC: 1.9 MMOL/L — SIGNIFICANT CHANGE UP (ref -2–3)
BASOPHILS # BLD AUTO: 0.01 K/UL — SIGNIFICANT CHANGE UP (ref 0–0.2)
BASOPHILS NFR BLD AUTO: 0.1 % — SIGNIFICANT CHANGE UP (ref 0–2)
BILIRUB SERPL-MCNC: 0.5 MG/DL — SIGNIFICANT CHANGE UP (ref 0.2–1.2)
BLOOD GAS VENOUS COMPREHENSIVE RESULT: SIGNIFICANT CHANGE UP
BUN SERPL-MCNC: 62 MG/DL — HIGH (ref 7–23)
BUN SERPL-MCNC: 70 MG/DL — HIGH (ref 7–23)
CA-I SERPL-SCNC: 1.05 MMOL/L — LOW (ref 1.15–1.33)
CALCIUM SERPL-MCNC: 8.9 MG/DL — SIGNIFICANT CHANGE UP (ref 8.4–10.5)
CALCIUM SERPL-MCNC: 9 MG/DL — SIGNIFICANT CHANGE UP (ref 8.4–10.5)
CHLORIDE BLDV-SCNC: 92 MMOL/L — LOW (ref 96–108)
CHLORIDE BLDV-SCNC: 93 MMOL/L — LOW (ref 96–108)
CHLORIDE SERPL-SCNC: 93 MMOL/L — LOW (ref 98–107)
CHLORIDE SERPL-SCNC: 95 MMOL/L — LOW (ref 98–107)
CO2 BLDV-SCNC: 30 MMOL/L — HIGH (ref 22–26)
CO2 BLDV-SCNC: 32.1 MMOL/L — HIGH (ref 22–26)
CO2 SERPL-SCNC: 19 MMOL/L — LOW (ref 22–31)
CO2 SERPL-SCNC: 26 MMOL/L — SIGNIFICANT CHANGE UP (ref 22–31)
CREAT SERPL-MCNC: 2.57 MG/DL — HIGH (ref 0.5–1.3)
CREAT SERPL-MCNC: 2.81 MG/DL — HIGH (ref 0.5–1.3)
EGFR: 24 ML/MIN/1.73M2 — LOW
EGFR: 27 ML/MIN/1.73M2 — LOW
EOSINOPHIL # BLD AUTO: 0 K/UL — SIGNIFICANT CHANGE UP (ref 0–0.5)
EOSINOPHIL NFR BLD AUTO: 0 % — SIGNIFICANT CHANGE UP (ref 0–6)
GAS PNL BLDV: 127 MMOL/L — LOW (ref 136–145)
GAS PNL BLDV: 131 MMOL/L — LOW (ref 136–145)
GAS PNL BLDV: SIGNIFICANT CHANGE UP
GLUCOSE BLDC GLUCOMTR-MCNC: 131 MG/DL — HIGH (ref 70–99)
GLUCOSE BLDC GLUCOMTR-MCNC: 152 MG/DL — HIGH (ref 70–99)
GLUCOSE BLDC GLUCOMTR-MCNC: 154 MG/DL — HIGH (ref 70–99)
GLUCOSE BLDC GLUCOMTR-MCNC: 177 MG/DL — HIGH (ref 70–99)
GLUCOSE BLDC GLUCOMTR-MCNC: 181 MG/DL — HIGH (ref 70–99)
GLUCOSE BLDC GLUCOMTR-MCNC: 225 MG/DL — HIGH (ref 70–99)
GLUCOSE BLDV-MCNC: 143 MG/DL — HIGH (ref 70–99)
GLUCOSE BLDV-MCNC: 199 MG/DL — HIGH (ref 70–99)
GLUCOSE SERPL-MCNC: 144 MG/DL — HIGH (ref 70–99)
GLUCOSE SERPL-MCNC: 176 MG/DL — HIGH (ref 70–99)
HAV IGM SER-ACNC: SIGNIFICANT CHANGE UP
HBV CORE IGM SER-ACNC: SIGNIFICANT CHANGE UP
HBV SURFACE AG SER-ACNC: SIGNIFICANT CHANGE UP
HCO3 BLDV-SCNC: 28 MMOL/L — SIGNIFICANT CHANGE UP (ref 22–29)
HCO3 BLDV-SCNC: 30 MMOL/L — HIGH (ref 22–29)
HCT VFR BLD CALC: 37.9 % — LOW (ref 39–50)
HCT VFR BLDA CALC: 33 % — LOW (ref 39–51)
HCT VFR BLDA CALC: 37 % — LOW (ref 39–51)
HCV AB S/CO SERPL IA: 0.34 S/CO — SIGNIFICANT CHANGE UP (ref 0–0.99)
HCV AB SERPL-IMP: SIGNIFICANT CHANGE UP
HGB BLD CALC-MCNC: 10.9 G/DL — LOW (ref 13–17)
HGB BLD CALC-MCNC: 12.2 G/DL — LOW (ref 13–17)
HGB BLD-MCNC: 12.1 G/DL — LOW (ref 13–17)
HIV 1+2 AB+HIV1 P24 AG SERPL QL IA: SIGNIFICANT CHANGE UP
IANC: 7.64 K/UL — HIGH (ref 1.8–7.4)
IMM GRANULOCYTES NFR BLD AUTO: 0.3 % — SIGNIFICANT CHANGE UP (ref 0–1.5)
LACTATE BLDV-MCNC: 1.7 MMOL/L — SIGNIFICANT CHANGE UP (ref 0.5–2)
LACTATE BLDV-MCNC: 1.8 MMOL/L — SIGNIFICANT CHANGE UP (ref 0.5–2)
LYMPHOCYTES # BLD AUTO: 0.76 K/UL — LOW (ref 1–3.3)
LYMPHOCYTES # BLD AUTO: 8.9 % — LOW (ref 13–44)
MAGNESIUM SERPL-MCNC: 2.2 MG/DL — SIGNIFICANT CHANGE UP (ref 1.6–2.6)
MAGNESIUM SERPL-MCNC: 2.3 MG/DL — SIGNIFICANT CHANGE UP (ref 1.6–2.6)
MCHC RBC-ENTMCNC: 31 PG — SIGNIFICANT CHANGE UP (ref 27–34)
MCHC RBC-ENTMCNC: 31.9 GM/DL — LOW (ref 32–36)
MCV RBC AUTO: 97.2 FL — SIGNIFICANT CHANGE UP (ref 80–100)
MONOCYTES # BLD AUTO: 0.14 K/UL — SIGNIFICANT CHANGE UP (ref 0–0.9)
MONOCYTES NFR BLD AUTO: 1.6 % — LOW (ref 2–14)
NEUTROPHILS # BLD AUTO: 7.64 K/UL — HIGH (ref 1.8–7.4)
NEUTROPHILS NFR BLD AUTO: 89.1 % — HIGH (ref 43–77)
NRBC # BLD: 0 /100 WBCS — SIGNIFICANT CHANGE UP
NRBC # FLD: 0 K/UL — SIGNIFICANT CHANGE UP
PCO2 BLDV: 55 MMHG — SIGNIFICANT CHANGE UP (ref 42–55)
PCO2 BLDV: 64 MMHG — HIGH (ref 42–55)
PH BLDV: 7.28 — LOW (ref 7.32–7.43)
PH BLDV: 7.32 — SIGNIFICANT CHANGE UP (ref 7.32–7.43)
PHOSPHATE SERPL-MCNC: 3.3 MG/DL — SIGNIFICANT CHANGE UP (ref 2.5–4.5)
PHOSPHATE SERPL-MCNC: 4.5 MG/DL — SIGNIFICANT CHANGE UP (ref 2.5–4.5)
PLATELET # BLD AUTO: 133 K/UL — LOW (ref 150–400)
PO2 BLDV: 43 MMHG — SIGNIFICANT CHANGE UP
PO2 BLDV: 47 MMHG — SIGNIFICANT CHANGE UP
POTASSIUM BLDV-SCNC: 4.1 MMOL/L — SIGNIFICANT CHANGE UP (ref 3.5–5.1)
POTASSIUM BLDV-SCNC: 4.4 MMOL/L — SIGNIFICANT CHANGE UP (ref 3.5–5.1)
POTASSIUM SERPL-MCNC: 4.5 MMOL/L — SIGNIFICANT CHANGE UP (ref 3.5–5.3)
POTASSIUM SERPL-MCNC: 4.7 MMOL/L — SIGNIFICANT CHANGE UP (ref 3.5–5.3)
POTASSIUM SERPL-SCNC: 4.5 MMOL/L — SIGNIFICANT CHANGE UP (ref 3.5–5.3)
POTASSIUM SERPL-SCNC: 4.7 MMOL/L — SIGNIFICANT CHANGE UP (ref 3.5–5.3)
PROT SERPL-MCNC: 7 G/DL — SIGNIFICANT CHANGE UP (ref 6–8.3)
RBC # BLD: 3.9 M/UL — LOW (ref 4.2–5.8)
RBC # FLD: 15.2 % — HIGH (ref 10.3–14.5)
SAO2 % BLDV: 68.1 % — SIGNIFICANT CHANGE UP
SAO2 % BLDV: 73.9 % — SIGNIFICANT CHANGE UP
SODIUM SERPL-SCNC: 132 MMOL/L — LOW (ref 135–145)
SODIUM SERPL-SCNC: 134 MMOL/L — LOW (ref 135–145)
WBC # BLD: 8.58 K/UL — SIGNIFICANT CHANGE UP (ref 3.8–10.5)
WBC # FLD AUTO: 8.58 K/UL — SIGNIFICANT CHANGE UP (ref 3.8–10.5)

## 2022-05-22 PROCEDURE — 99233 SBSQ HOSP IP/OBS HIGH 50: CPT | Mod: GC

## 2022-05-22 PROCEDURE — 71045 X-RAY EXAM CHEST 1 VIEW: CPT | Mod: 26

## 2022-05-22 PROCEDURE — 93306 TTE W/DOPPLER COMPLETE: CPT | Mod: 26

## 2022-05-22 PROCEDURE — 93971 EXTREMITY STUDY: CPT | Mod: 26,LT

## 2022-05-22 PROCEDURE — 73030 X-RAY EXAM OF SHOULDER: CPT | Mod: 26,RT

## 2022-05-22 RX ORDER — IPRATROPIUM/ALBUTEROL SULFATE 18-103MCG
3 AEROSOL WITH ADAPTER (GRAM) INHALATION EVERY 6 HOURS
Refills: 0 | Status: DISCONTINUED | OUTPATIENT
Start: 2022-05-22 | End: 2022-05-27

## 2022-05-22 RX ORDER — FUROSEMIDE 40 MG
80 TABLET ORAL
Refills: 0 | Status: COMPLETED | OUTPATIENT
Start: 2022-05-22 | End: 2022-05-23

## 2022-05-22 RX ORDER — FUROSEMIDE 40 MG
40 TABLET ORAL ONCE
Refills: 0 | Status: COMPLETED | OUTPATIENT
Start: 2022-05-22 | End: 2022-05-22

## 2022-05-22 RX ADMIN — SIMETHICONE 80 MILLIGRAM(S): 80 TABLET, CHEWABLE ORAL at 09:29

## 2022-05-22 RX ADMIN — Medication 1: at 18:20

## 2022-05-22 RX ADMIN — Medication 40 MILLIGRAM(S): at 14:29

## 2022-05-22 RX ADMIN — SENNA PLUS 2 TABLET(S): 8.6 TABLET ORAL at 22:20

## 2022-05-22 RX ADMIN — Medication 40 MILLIGRAM(S): at 14:31

## 2022-05-22 RX ADMIN — HEPARIN SODIUM 5000 UNIT(S): 5000 INJECTION INTRAVENOUS; SUBCUTANEOUS at 22:20

## 2022-05-22 RX ADMIN — HEPARIN SODIUM 5000 UNIT(S): 5000 INJECTION INTRAVENOUS; SUBCUTANEOUS at 07:41

## 2022-05-22 RX ADMIN — GABAPENTIN 200 MILLIGRAM(S): 400 CAPSULE ORAL at 22:19

## 2022-05-22 RX ADMIN — HEPARIN SODIUM 5000 UNIT(S): 5000 INJECTION INTRAVENOUS; SUBCUTANEOUS at 14:32

## 2022-05-22 RX ADMIN — ATORVASTATIN CALCIUM 80 MILLIGRAM(S): 80 TABLET, FILM COATED ORAL at 22:18

## 2022-05-22 RX ADMIN — Medication 3 MILLILITER(S): at 18:00

## 2022-05-22 RX ADMIN — Medication 3 MILLILITER(S): at 22:09

## 2022-05-22 RX ADMIN — BUDESONIDE AND FORMOTEROL FUMARATE DIHYDRATE 2 PUFF(S): 160; 4.5 AEROSOL RESPIRATORY (INHALATION) at 23:30

## 2022-05-22 RX ADMIN — Medication 80 MILLIGRAM(S): at 22:18

## 2022-05-22 RX ADMIN — ISOSORBIDE DINITRATE 20 MILLIGRAM(S): 5 TABLET ORAL at 14:32

## 2022-05-22 RX ADMIN — FAMOTIDINE 10 MILLIGRAM(S): 10 INJECTION INTRAVENOUS at 18:31

## 2022-05-22 RX ADMIN — CARVEDILOL PHOSPHATE 3.12 MILLIGRAM(S): 80 CAPSULE, EXTENDED RELEASE ORAL at 18:31

## 2022-05-22 RX ADMIN — BUDESONIDE AND FORMOTEROL FUMARATE DIHYDRATE 2 PUFF(S): 160; 4.5 AEROSOL RESPIRATORY (INHALATION) at 09:29

## 2022-05-22 RX ADMIN — Medication 81 MILLIGRAM(S): at 18:31

## 2022-05-22 RX ADMIN — CLOPIDOGREL BISULFATE 75 MILLIGRAM(S): 75 TABLET, FILM COATED ORAL at 18:31

## 2022-05-22 RX ADMIN — Medication 325 MILLIGRAM(S): at 18:31

## 2022-05-22 RX ADMIN — Medication 2: at 14:24

## 2022-05-22 RX ADMIN — ISOSORBIDE DINITRATE 20 MILLIGRAM(S): 5 TABLET ORAL at 23:30

## 2022-05-22 RX ADMIN — Medication 3 MILLILITER(S): at 11:23

## 2022-05-22 RX ADMIN — Medication 40 MILLIGRAM(S): at 07:41

## 2022-05-22 RX ADMIN — GABAPENTIN 200 MILLIGRAM(S): 400 CAPSULE ORAL at 14:31

## 2022-05-22 RX ADMIN — TAMSULOSIN HYDROCHLORIDE 0.4 MILLIGRAM(S): 0.4 CAPSULE ORAL at 22:19

## 2022-05-22 NOTE — PROGRESS NOTE ADULT - PROBLEM SELECTOR PLAN 9
DVT PPx: SubQ hep given CKD    Diet: NPO while on BiPap, once off will start CC/Nephro    Dispo: pending clinical course DVT PPx: SubQ hep given CKD    Diet: CC DASH RENAL Diet     Dispo: pending clinical course

## 2022-05-22 NOTE — PATIENT PROFILE ADULT - NSPROHMDIABETMGMTSTRAT_GEN_A_NUR
activity/blood glucose testing/coping strategies/diet modification/insulin therapy/routine screenings/weight management

## 2022-05-22 NOTE — PROGRESS NOTE ADULT - PROBLEM SELECTOR PLAN 1
Patient endorsing dyspnea, O2 87% on RA on arrival.   Differential includes acute on chronic HFrEF, PE, Bacterial PNA  Unlikely COPD, patient without wheezing, RVP negative.   - Patient grossly overloaded on exam with crackles, peripheral edema   - Given LE edema L>R cannot r/o PE  - RVP w/ COVID negative  - On 2LNC at home  - Currently on Bipap   - F/u [facility] Duplex LLE, if + for DVt, start hep gtt.   - F/u ABG  - Diuresis per below,  - Abx empirically CTX   - f/u Procal. Patient endorsing dyspnea, O2 87% on RA on arrival.   Differential includes acute on chronic HFrEF, PE, Bacterial PNA  Unlikely COPD, patient without sputum production. RVP negative. However with wheezing on exam. (on 2L at home)  Patient grossly overloaded on exam with crackles, peripheral edema   Given LE edema L>R cannot r/o PE  - weaned off BiPAP 5/22   - F/u [facility] Duplex LLE, if + for DVt, start hep gtt.   - intermittent VBG/ABG to monitor respiratory status. Some CO2 retention.   - Diuresis per below

## 2022-05-22 NOTE — PROGRESS NOTE ADULT - PROBLEM SELECTOR PLAN 2
TTE on 10/22/21 showed EF 22%, mild-mod MR, Severe global LV systolic dysfunction, RV enlargement w/ decreased ventricular systolic function  Patient w/ CAD, S/P CABG  Dyspnea possibly 2/2 acute on chronic HFrEF  Pro-BNP 7518 on admission  - Obtain TTE  - lasix 40IV BID for now, monitor response. adjust PRN  - 1L fluid restriction when start diet.   - Strict I/Os.   - continue home aldactone and coreg and isordil  - Hold hydral for now, can resume if needed.  - c ardiology consult in AM  - c/w BiPAP for now, wean off as tolerated in AM. TTE on 10/22/21 showed EF 22%, mild-mod MR, Severe global LV systolic dysfunction, RV enlargement w/ decreased ventricular systolic function  Patient w/ CAD, S/P CABG  Dyspnea possibly 2/2 acute on chronic HFrEF  Pro-BNP 7518 on admission  Off bipap 5/22   - f/u TTE (ordered)   - lasix 40IV BID for now, monitor response. adjust PRN  - 1L fluid restriction, DASH diet   - Strict I/Os.   - continue home aldactone and coreg and isordil  - Hold hydral for now, can resume if needed.  - cardiology consulted, will f/u recs

## 2022-05-22 NOTE — PATIENT PROFILE ADULT - FALL HARM RISK - HARM RISK INTERVENTIONS
Assistance with ambulation/Assistance OOB with selected safe patient handling equipment/Communicate Risk of Fall with Harm to all staff/Monitor for mental status changes/Monitor gait and stability/Move patient closer to nurses' station/Provide patient with walking aids - walker, cane, crutches/Reinforce activity limits and safety measures with patient and family/Reorient to person, place and time as needed/Review medications for side effects contributing to fall risk/Sit up slowly, dangle for a short time, stand at bedside before walking/Tailored Fall Risk Interventions/Visual Cue: Yellow wristband and red socks/Bed in lowest position, wheels locked, appropriate side rails in place/Call bell, personal items and telephone in reach/Instruct patient to call for assistance before getting out of bed or chair/Non-slip footwear when patient is out of bed/[place] to call system/Physically safe environment - no spills, clutter or unnecessary equipment/Purposeful Proactive Rounding/Room/bathroom lighting operational, light cord in reach

## 2022-05-22 NOTE — PROGRESS NOTE ADULT - PROBLEM SELECTOR PLAN 7
On glimepride 2mg daily at home  - Hold oral antihyperglycemic agents while inpatient  - Start Mild SSI  - CC diet when off BiPap On glimepride 2mg daily at home  - Hold oral antihyperglycemic agents while inpatient  - Start Mild SSI  - CC DASH RENAl Diet

## 2022-05-22 NOTE — PROGRESS NOTE ADULT - SUBJECTIVE AND OBJECTIVE BOX
Subjective:    INTERVAL HPI/OVERNIGHT EVENTS:  - bipap overnight. FiO@ lowered to 40% from 50% last evening.   No acute events overnight   Afebrile, hemodynamically stable     Telemetry:    T(F): 97.8 (05-22-22 @ 01:00), Max: 97.8 (05-22-22 @ 01:00)  HR: 94 (05-22-22 @ 04:47) (89 - 103)  BP: 133/80 (05-22-22 @ 01:00) (107/92 - 145/89)  RR: 18 (05-22-22 @ 01:00) (17 - 22)  SpO2: 100% (05-22-22 @ 04:47) (87% - 100%)  I&O's Summary        Medications:  ALBUTerol    90 MICROgram(s) HFA Inhaler 2 Puff(s) Inhalation every 6 hours PRN  aspirin enteric coated 81 milliGRAM(s) Oral daily  atorvastatin 80 milliGRAM(s) Oral at bedtime  budesonide 160 MICROgram(s)/formoterol 4.5 MICROgram(s) Inhaler 2 Puff(s) Inhalation two times a day  carvedilol 3.125 milliGRAM(s) Oral every 12 hours  clopidogrel Tablet 75 milliGRAM(s) Oral daily  dextrose 5%. 1000 milliLiter(s) (50 mL/Hr) IV Continuous <Continuous>  dextrose 5%. 1000 milliLiter(s) (100 mL/Hr) IV Continuous <Continuous>  dextrose 50% Injectable 25 Gram(s) IV Push once  dextrose 50% Injectable 12.5 Gram(s) IV Push once  dextrose 50% Injectable 25 Gram(s) IV Push once  dextrose Oral Gel 15 Gram(s) Oral once PRN  famotidine    Tablet 10 milliGRAM(s) Oral daily  ferrous    sulfate 325 milliGRAM(s) Oral daily  furosemide   Injectable 40 milliGRAM(s) IV Push two times a day  gabapentin 200 milliGRAM(s) Oral three times a day  glucagon  Injectable 1 milliGRAM(s) IntraMuscular once  heparin   Injectable 5000 Unit(s) SubCutaneous every 8 hours  insulin lispro (ADMELOG) corrective regimen sliding scale   SubCutaneous three times a day before meals  isosorbide   dinitrate Tablet (ISORDIL) 20 milliGRAM(s) Oral three times a day  levothyroxine 25 MICROGram(s) Oral daily  senna 2 Tablet(s) Oral at bedtime  simethicone 80 milliGRAM(s) Chew once  spironolactone 25 milliGRAM(s) Oral daily  tamsulosin 0.4 milliGRAM(s) Oral at bedtime      PHYSICAL EXAM:  GENERAL APPEARANCE: well developed in mild respiratory distress   HEENT:  PERRL, EOMI. hearing grossly intact.  NECK: Neck supple, non-tender no lymphadenopathy, masses or thyromegaly.  CARDIAC: Normal S1 and S2. no mrg. RRR  LUNGS: Bilateral basilar crackles to mid level posteriorly, L worse than R.    ABDOMEN: Distended, nontender, normal BS.   MUSCULOSKELETAL: ROM intact.  No joint erythema or tenderness.   EXTREMITIES: 1-2+ b/l LE edema: Left worse than right, some tenderness, up to mid calves. Cool lower extremities. Cool hands.   NEUROLOGICAL: Right side 5/5 strength. 4/5 strength left upper and lower extremities.   SKIN: Warm and dry , Well perfused  PSYCHIATRIC: AOx3 , Normal mood and affect    Notable Labs:    Labs:                          11.2   12.11 )-----------( 154      ( 21 May 2022 14:33 )             35.0     05-21    127<L>  |  90<L>  |  56<H>  ----------------------------<  161<H>  4.5   |  26  |  2.60<H>    Ca    8.8      21 May 2022 14:33  Mg     2.30     05-21    TPro  7.2  /  Alb  3.9  /  TBili  0.7  /  DBili  x   /  AST  43<H>  /  ALT  29  /  AlkPhos  94  05-21    LIVER FUNCTIONS - ( 21 May 2022 14:33 )  Alb: 3.9 g/dL / Pro: 7.2 g/dL / ALK PHOS: 94 U/L / ALT: 29 U/L / AST: 43 U/L / GGT: x           PT/INR - ( 21 May 2022 14:33 )   PT: 13.0 sec;   INR: 1.12 ratio         PTT - ( 21 May 2022 14:33 )  PTT:31.4 sec  CAPILLARY BLOOD GLUCOSE      POCT Blood Glucose.: 152 mg/dL (22 May 2022 07:24)  POCT Blood Glucose.: 154 mg/dL (22 May 2022 01:14)  POCT Blood Glucose.: 168 mg/dL (21 May 2022 19:53)  POCT Blood Glucose.: 163 mg/dL (21 May 2022 13:55)    ABG - ( 21 May 2022 18:40 )  pH, Arterial: 7.33  pH, Blood: x     /  pCO2: 52    /  pO2: 173   / HCO3: 27    / Base Excess: 0.8   /  SaO2: 98.8                        Micro:      RADIOLOGY & ADDITIONAL TESTS:    EKG:      Echo:      Xray:      US/CT/MRI:   Subjective:    INTERVAL HPI/OVERNIGHT EVENTS:  - bipap overnight. FiO@ lowered to 40% from 50% last evening.   - This AM, patient off bipap, on 2L NC and saturating %.   - Patient says feels improved, reports R shoulder pain from a fall a few days prior.   - patient requesting diet today  - denies fevers, chills, CP.   No acute events overnight   Afebrile, hemodynamically stable     Telemetry:    T(F): 97.8 (05-22-22 @ 01:00), Max: 97.8 (05-22-22 @ 01:00)  HR: 94 (05-22-22 @ 04:47) (89 - 103)  BP: 133/80 (05-22-22 @ 01:00) (107/92 - 145/89)  RR: 18 (05-22-22 @ 01:00) (17 - 22)  SpO2: 100% (05-22-22 @ 04:47) (87% - 100%)  I&O's Summary        Medications:  ALBUTerol    90 MICROgram(s) HFA Inhaler 2 Puff(s) Inhalation every 6 hours PRN  aspirin enteric coated 81 milliGRAM(s) Oral daily  atorvastatin 80 milliGRAM(s) Oral at bedtime  budesonide 160 MICROgram(s)/formoterol 4.5 MICROgram(s) Inhaler 2 Puff(s) Inhalation two times a day  carvedilol 3.125 milliGRAM(s) Oral every 12 hours  clopidogrel Tablet 75 milliGRAM(s) Oral daily  dextrose 5%. 1000 milliLiter(s) (50 mL/Hr) IV Continuous <Continuous>  dextrose 5%. 1000 milliLiter(s) (100 mL/Hr) IV Continuous <Continuous>  dextrose 50% Injectable 25 Gram(s) IV Push once  dextrose 50% Injectable 12.5 Gram(s) IV Push once  dextrose 50% Injectable 25 Gram(s) IV Push once  dextrose Oral Gel 15 Gram(s) Oral once PRN  famotidine    Tablet 10 milliGRAM(s) Oral daily  ferrous    sulfate 325 milliGRAM(s) Oral daily  furosemide   Injectable 40 milliGRAM(s) IV Push two times a day  gabapentin 200 milliGRAM(s) Oral three times a day  glucagon  Injectable 1 milliGRAM(s) IntraMuscular once  heparin   Injectable 5000 Unit(s) SubCutaneous every 8 hours  insulin lispro (ADMELOG) corrective regimen sliding scale   SubCutaneous three times a day before meals  isosorbide   dinitrate Tablet (ISORDIL) 20 milliGRAM(s) Oral three times a day  levothyroxine 25 MICROGram(s) Oral daily  senna 2 Tablet(s) Oral at bedtime  simethicone 80 milliGRAM(s) Chew once  spironolactone 25 milliGRAM(s) Oral daily  tamsulosin 0.4 milliGRAM(s) Oral at bedtime      PHYSICAL EXAM:  GENERAL APPEARANCE: well developed in mild respiratory distress   HEENT:  PERRL, EOMI. hearing grossly intact.  NECK: Mild JVD appreciated, difficult due to habitus.   CARDIAC: Normal S1 and S2. no mrg. RRR  LUNGS: Scattered expiratory wheezes diffusely. Bilateral basilar crackles to mid level posteriorly  ABDOMEN: Distended, nontender, normal BS.   MUSCULOSKELETAL: ROM intact.  No joint erythema or tenderness.   EXTREMITIES: 1+ b/l LE edema: Left worse than right, some tenderness, up to mid calves.   NEUROLOGICAL: Right side 5/5 strength. 4/5 strength left upper and lower extremities.   PSYCHIATRIC: AOx3 , Normal mood and affect    Notable Labs:    Labs:                          11.2   12.11 )-----------( 154      ( 21 May 2022 14:33 )             35.0     05-21    127<L>  |  90<L>  |  56<H>  ----------------------------<  161<H>  4.5   |  26  |  2.60<H>    Ca    8.8      21 May 2022 14:33  Mg     2.30     05-21    TPro  7.2  /  Alb  3.9  /  TBili  0.7  /  DBili  x   /  AST  43<H>  /  ALT  29  /  AlkPhos  94  05-21    LIVER FUNCTIONS - ( 21 May 2022 14:33 )  Alb: 3.9 g/dL / Pro: 7.2 g/dL / ALK PHOS: 94 U/L / ALT: 29 U/L / AST: 43 U/L / GGT: x           PT/INR - ( 21 May 2022 14:33 )   PT: 13.0 sec;   INR: 1.12 ratio         PTT - ( 21 May 2022 14:33 )  PTT:31.4 sec  CAPILLARY BLOOD GLUCOSE      POCT Blood Glucose.: 152 mg/dL (22 May 2022 07:24)  POCT Blood Glucose.: 154 mg/dL (22 May 2022 01:14)  POCT Blood Glucose.: 168 mg/dL (21 May 2022 19:53)  POCT Blood Glucose.: 163 mg/dL (21 May 2022 13:55)    ABG - ( 21 May 2022 18:40 )  pH, Arterial: 7.33  pH, Blood: x     /  pCO2: 52    /  pO2: 173   / HCO3: 27    / Base Excess: 0.8   /  SaO2: 98.8                        Micro:      RADIOLOGY & ADDITIONAL TESTS:     Repeat CXR pending read 5/22

## 2022-05-22 NOTE — PROGRESS NOTE ADULT - PROBLEM SELECTOR PLAN 3
Patient endorsing increase in cough, but no increase in sputum, favor acute on chronic HFrEF vs COPD exacerbation  Patient on 2L NC at home   - RVP w/ COVID negative  - S/p Solumedrol, CTX, Azithromycin in ED  - CXR showed possible RLL hazy opacity ?consolidation   - Hold antibiotics for now   - F/u Procalcitonin  - Duonebs prn Patient endorsing increase in cough, but no increase in sputum, favor acute on chronic HFrEF vs COPD exacerbation  Patient on 2L NC at home   Some wheezing intermittently on exam.   RVP w/ COVID negative  S/p Solumedrol, CTX, Azithromycin in ED  - CXR showed possible RLL hazy opacity ?consolidation, present on repeat CXR 5/22   - DCed abx 5/22    - Duonebs q6 standing   - Solumedrol 40 x1 on 5/22 and prednisone 40 qd x4d to cover for wheezing

## 2022-05-22 NOTE — PROGRESS NOTE ADULT - PROBLEM SELECTOR PLAN 4
Hyponatremia to 127 on admission  Likely i/s/o Hypervolemic hyponatremia given underlying HFrEF/CKD  - Send Serum osm  - Send urine Na, urine osm  - Trend BMP Resolving w diuresis.   Hyponatremia to 127 on admission  Likely i/s/o Hypervolemic hyponatremia given underlying HFrEF/CKD  - Trend BMP

## 2022-05-23 ENCOUNTER — TRANSCRIPTION ENCOUNTER (OUTPATIENT)
Age: 67
End: 2022-05-23

## 2022-05-23 DIAGNOSIS — N18.9 CHRONIC KIDNEY DISEASE, UNSPECIFIED: ICD-10-CM

## 2022-05-23 LAB
ALBUMIN SERPL ELPH-MCNC: 3.7 G/DL — SIGNIFICANT CHANGE UP (ref 3.3–5)
ALP SERPL-CCNC: 74 U/L — SIGNIFICANT CHANGE UP (ref 40–120)
ALT FLD-CCNC: 19 U/L — SIGNIFICANT CHANGE UP (ref 4–41)
ANION GAP SERPL CALC-SCNC: 14 MMOL/L — SIGNIFICANT CHANGE UP (ref 7–14)
AST SERPL-CCNC: 21 U/L — SIGNIFICANT CHANGE UP (ref 4–40)
BASE EXCESS BLDV CALC-SCNC: 4.8 MMOL/L — HIGH (ref -2–3)
BILIRUB SERPL-MCNC: 0.5 MG/DL — SIGNIFICANT CHANGE UP (ref 0.2–1.2)
BLOOD GAS VENOUS COMPREHENSIVE RESULT: SIGNIFICANT CHANGE UP
BUN SERPL-MCNC: 74 MG/DL — HIGH (ref 7–23)
CALCIUM SERPL-MCNC: 8.8 MG/DL — SIGNIFICANT CHANGE UP (ref 8.4–10.5)
CHLORIDE BLDV-SCNC: 94 MMOL/L — LOW (ref 96–108)
CHLORIDE SERPL-SCNC: 93 MMOL/L — LOW (ref 98–107)
CO2 BLDV-SCNC: 32.9 MMOL/L — HIGH (ref 22–26)
CO2 SERPL-SCNC: 25 MMOL/L — SIGNIFICANT CHANGE UP (ref 22–31)
CREAT SERPL-MCNC: 2.87 MG/DL — HIGH (ref 0.5–1.3)
EGFR: 23 ML/MIN/1.73M2 — LOW
GAS PNL BLDV: 129 MMOL/L — LOW (ref 136–145)
GAS PNL BLDV: SIGNIFICANT CHANGE UP
GLUCOSE BLDC GLUCOMTR-MCNC: 168 MG/DL — HIGH (ref 70–99)
GLUCOSE BLDC GLUCOMTR-MCNC: 178 MG/DL — HIGH (ref 70–99)
GLUCOSE BLDC GLUCOMTR-MCNC: 221 MG/DL — HIGH (ref 70–99)
GLUCOSE BLDV-MCNC: 139 MG/DL — HIGH (ref 70–99)
GLUCOSE SERPL-MCNC: 140 MG/DL — HIGH (ref 70–99)
HCO3 BLDV-SCNC: 31 MMOL/L — HIGH (ref 22–29)
HCT VFR BLD CALC: 35.6 % — LOW (ref 39–50)
HCT VFR BLDA CALC: 34 % — LOW (ref 39–51)
HGB BLD CALC-MCNC: 11.4 G/DL — LOW (ref 13–17)
HGB BLD-MCNC: 11.3 G/DL — LOW (ref 13–17)
LACTATE BLDV-MCNC: 1.5 MMOL/L — SIGNIFICANT CHANGE UP (ref 0.5–2)
MAGNESIUM SERPL-MCNC: 2.2 MG/DL — SIGNIFICANT CHANGE UP (ref 1.6–2.6)
MCHC RBC-ENTMCNC: 30.5 PG — SIGNIFICANT CHANGE UP (ref 27–34)
MCHC RBC-ENTMCNC: 31.7 GM/DL — LOW (ref 32–36)
MCV RBC AUTO: 96.2 FL — SIGNIFICANT CHANGE UP (ref 80–100)
NRBC # BLD: 0 /100 WBCS — SIGNIFICANT CHANGE UP
NRBC # FLD: 0 K/UL — SIGNIFICANT CHANGE UP
PCO2 BLDV: 54 MMHG — SIGNIFICANT CHANGE UP (ref 42–55)
PH BLDV: 7.37 — SIGNIFICANT CHANGE UP (ref 7.32–7.43)
PHOSPHATE SERPL-MCNC: 4.3 MG/DL — SIGNIFICANT CHANGE UP (ref 2.5–4.5)
PLATELET # BLD AUTO: 146 K/UL — LOW (ref 150–400)
PO2 BLDV: 51 MMHG — SIGNIFICANT CHANGE UP
POTASSIUM BLDV-SCNC: 4.3 MMOL/L — SIGNIFICANT CHANGE UP (ref 3.5–5.1)
POTASSIUM SERPL-MCNC: 4.4 MMOL/L — SIGNIFICANT CHANGE UP (ref 3.5–5.3)
POTASSIUM SERPL-SCNC: 4.4 MMOL/L — SIGNIFICANT CHANGE UP (ref 3.5–5.3)
PROT SERPL-MCNC: 6.6 G/DL — SIGNIFICANT CHANGE UP (ref 6–8.3)
RBC # BLD: 3.7 M/UL — LOW (ref 4.2–5.8)
RBC # FLD: 15.2 % — HIGH (ref 10.3–14.5)
SAO2 % BLDV: 80.2 % — SIGNIFICANT CHANGE UP
SODIUM SERPL-SCNC: 132 MMOL/L — LOW (ref 135–145)
WBC # BLD: 13.04 K/UL — HIGH (ref 3.8–10.5)
WBC # FLD AUTO: 13.04 K/UL — HIGH (ref 3.8–10.5)

## 2022-05-23 PROCEDURE — 99233 SBSQ HOSP IP/OBS HIGH 50: CPT | Mod: GC

## 2022-05-23 RX ORDER — GABAPENTIN 400 MG/1
0 CAPSULE ORAL
Qty: 0 | Refills: 2 | DISCHARGE

## 2022-05-23 RX ORDER — HYDRALAZINE HCL 50 MG
25 TABLET ORAL THREE TIMES A DAY
Refills: 0 | Status: DISCONTINUED | OUTPATIENT
Start: 2022-05-23 | End: 2022-05-27

## 2022-05-23 RX ORDER — SPIRONOLACTONE 25 MG/1
1 TABLET, FILM COATED ORAL
Qty: 30 | Refills: 0
Start: 2022-05-23 | End: 2022-06-21

## 2022-05-23 RX ORDER — GABAPENTIN 400 MG/1
2 CAPSULE ORAL
Qty: 0 | Refills: 0 | DISCHARGE
Start: 2022-05-23

## 2022-05-23 RX ADMIN — ISOSORBIDE DINITRATE 20 MILLIGRAM(S): 5 TABLET ORAL at 22:39

## 2022-05-23 RX ADMIN — HEPARIN SODIUM 5000 UNIT(S): 5000 INJECTION INTRAVENOUS; SUBCUTANEOUS at 22:40

## 2022-05-23 RX ADMIN — TAMSULOSIN HYDROCHLORIDE 0.4 MILLIGRAM(S): 0.4 CAPSULE ORAL at 22:40

## 2022-05-23 RX ADMIN — HEPARIN SODIUM 5000 UNIT(S): 5000 INJECTION INTRAVENOUS; SUBCUTANEOUS at 11:16

## 2022-05-23 RX ADMIN — Medication 81 MILLIGRAM(S): at 11:18

## 2022-05-23 RX ADMIN — Medication 3 MILLILITER(S): at 16:48

## 2022-05-23 RX ADMIN — HEPARIN SODIUM 5000 UNIT(S): 5000 INJECTION INTRAVENOUS; SUBCUTANEOUS at 05:41

## 2022-05-23 RX ADMIN — Medication 80 MILLIGRAM(S): at 18:20

## 2022-05-23 RX ADMIN — BUDESONIDE AND FORMOTEROL FUMARATE DIHYDRATE 2 PUFF(S): 160; 4.5 AEROSOL RESPIRATORY (INHALATION) at 11:16

## 2022-05-23 RX ADMIN — Medication 3 MILLILITER(S): at 21:42

## 2022-05-23 RX ADMIN — CLOPIDOGREL BISULFATE 75 MILLIGRAM(S): 75 TABLET, FILM COATED ORAL at 11:18

## 2022-05-23 RX ADMIN — Medication 1: at 18:20

## 2022-05-23 RX ADMIN — Medication 325 MILLIGRAM(S): at 11:18

## 2022-05-23 RX ADMIN — Medication 2: at 13:12

## 2022-05-23 RX ADMIN — ISOSORBIDE DINITRATE 20 MILLIGRAM(S): 5 TABLET ORAL at 11:20

## 2022-05-23 RX ADMIN — ATORVASTATIN CALCIUM 80 MILLIGRAM(S): 80 TABLET, FILM COATED ORAL at 22:40

## 2022-05-23 RX ADMIN — CARVEDILOL PHOSPHATE 3.12 MILLIGRAM(S): 80 CAPSULE, EXTENDED RELEASE ORAL at 05:45

## 2022-05-23 RX ADMIN — CARVEDILOL PHOSPHATE 3.12 MILLIGRAM(S): 80 CAPSULE, EXTENDED RELEASE ORAL at 18:20

## 2022-05-23 RX ADMIN — Medication 40 MILLIGRAM(S): at 05:44

## 2022-05-23 RX ADMIN — Medication 1: at 08:40

## 2022-05-23 RX ADMIN — GABAPENTIN 200 MILLIGRAM(S): 400 CAPSULE ORAL at 11:17

## 2022-05-23 RX ADMIN — Medication 3 MILLILITER(S): at 04:24

## 2022-05-23 RX ADMIN — GABAPENTIN 200 MILLIGRAM(S): 400 CAPSULE ORAL at 05:44

## 2022-05-23 RX ADMIN — GABAPENTIN 200 MILLIGRAM(S): 400 CAPSULE ORAL at 22:39

## 2022-05-23 RX ADMIN — FAMOTIDINE 10 MILLIGRAM(S): 10 INJECTION INTRAVENOUS at 11:19

## 2022-05-23 RX ADMIN — BUDESONIDE AND FORMOTEROL FUMARATE DIHYDRATE 2 PUFF(S): 160; 4.5 AEROSOL RESPIRATORY (INHALATION) at 22:39

## 2022-05-23 RX ADMIN — Medication 80 MILLIGRAM(S): at 05:41

## 2022-05-23 RX ADMIN — Medication 25 MILLIGRAM(S): at 22:40

## 2022-05-23 RX ADMIN — SENNA PLUS 2 TABLET(S): 8.6 TABLET ORAL at 22:40

## 2022-05-23 RX ADMIN — Medication 25 MILLIGRAM(S): at 11:24

## 2022-05-23 RX ADMIN — SPIRONOLACTONE 25 MILLIGRAM(S): 25 TABLET, FILM COATED ORAL at 05:40

## 2022-05-23 RX ADMIN — Medication 3 MILLILITER(S): at 09:52

## 2022-05-23 RX ADMIN — ISOSORBIDE DINITRATE 20 MILLIGRAM(S): 5 TABLET ORAL at 05:40

## 2022-05-23 RX ADMIN — Medication 25 MICROGRAM(S): at 05:46

## 2022-05-23 NOTE — DISCHARGE NOTE PROVIDER - PROVIDER TOKENS
FREE:[LAST:[Roxana],FIRST:[Robin],PHONE:[phone]],FAX:[(   )    -],ADDRESS:[[address] [place] [address]],FOLLOWUP:[1 week],ESTABLISHEDPATIENT:[T]] FREE:[LAST:[Roxana],FIRST:[Robin],PHONE:[phone]],FAX:[(   )    -],ADDRESS:[[address] [place] [address]],FOLLOWUP:[1 week],ESTABLISHEDPATIENT:[T]],PROVIDER:[TOKEN:[[id number]]] FREE:[LAST:[Roxana],FIRST:[Robin],PHONE:[phone]],FAX:[(   )    -],ADDRESS:[[address] [place] [address]],FOLLOWUP:[1 week],ESTABLISHEDPATIENT:[T]],PROVIDER:[TOKEN:[[id number]]]

## 2022-05-23 NOTE — DISCHARGE NOTE PROVIDER - NSDCMRMEDTOKEN_GEN_ALL_CORE_FT
albuterol 90 mcg/inh inhalation aerosol: 2 puff(s) inhaled every 6 hours, As needed, Shortness of Breath and/or Wheezing  aspirin 81 mg oral delayed release tablet: 1 tab(s) orally once a day  budesonide-formoterol 160 mcg-4.5 mcg/inh inhalation aerosol:  inhaled   bumetanide 2 mg oral tablet: 1 tab(s) orally 2 times a day  carvedilol 3.125 mg oral tablet: 1 tab(s) orally every 12 hours  ezetimibe 10 mg oral tablet: 1 tab(s) orally once a day  FAMOTIDINE 40 MG TABLET: TAKE 1 TABLET BY MOUTH EVERY DAY  ferrous sulfate 325 mg (65 mg elemental iron) oral tablet: 1 tab(s) orally every other day  gabapentin 100 mg oral capsule: 2 cap(s) orally 3 times a day  glimepiride 2 mg oral tablet: 1 tab(s) orally once a day  hydrALAZINE 50 mg oral tablet: 1 tab(s) orally 3 times a day  isosorbide dinitrate 30 mg oral tablet: 1 tab(s) orally 3 times a day  levothyroxine 25 mcg (0.025 mg) oral tablet: 1 tab(s) orally once a day  Plavix 75 mg oral tablet: 1 tab(s) orally once a day  ROSUVASTATIN CALCIUM 20 MG TAB: TAKE 1 TABLET ONCE A DAY (AT BEDTIME) ORALLY  senna oral tablet: 2 tab(s) orally once a day (at bedtime)  spironolactone 25 mg oral tablet: 0.5 tab(s) orally once a day  tamsulosin 0.4 mg oral capsule: 1 cap(s) orally once a day   albuterol 90 mcg/inh inhalation aerosol: 2 puff(s) inhaled every 6 hours, As needed, Shortness of Breath and/or Wheezing  Aldactone 25 mg oral tablet: 0.5 tab(s) orally once a day   aspirin 81 mg oral delayed release tablet: 1 tab(s) orally once a day  budesonide-formoterol 160 mcg-4.5 mcg/inh inhalation aerosol: 1  inhaled 2 times a day  bumetanide 1 mg oral tablet: 3 tab(s) orally once a day   carvedilol 3.125 mg oral tablet: 1 tab(s) orally every 12 hours  ezetimibe 10 mg oral tablet: 1 tab(s) orally once a day  FAMOTIDINE 40 MG TABLET: 1 each orally once a day  ferrous sulfate 325 mg (65 mg elemental iron) oral tablet: 1 tab(s) orally every other day  gabapentin 100 mg oral capsule: 2 cap(s) orally 3 times a day  glimepiride 2 mg oral tablet: 1 tab(s) orally once a day  hydrALAZINE 50 mg oral tablet: 1 tab(s) orally 3 times a day  isosorbide dinitrate 30 mg oral tablet: 1 tab(s) orally 3 times a day  levothyroxine 25 mcg (0.025 mg) oral tablet: 1 tab(s) orally once a day  Plavix 75 mg oral tablet: 1 tab(s) orally once a day  ROSUVASTATIN CALCIUM 20 MG TAB: TAKE 1 TABLET ONCE A DAY (AT BEDTIME) ORALLY  senna oral tablet: 2 tab(s) orally once a day (at bedtime)  tamsulosin 0.4 mg oral capsule: 1 cap(s) orally once a day   albuterol 90 mcg/inh inhalation aerosol: 2 puff(s) inhaled every 6 hours, As needed, Shortness of Breath and/or Wheezing  Aldactone 25 mg oral tablet: 0.5 tab(s) orally once a day   aspirin 81 mg oral delayed release tablet: 1 tab(s) orally once a day  budesonide-formoterol 160 mcg-4.5 mcg/inh inhalation aerosol: 1  inhaled 2 times a day  bumetanide 1 mg oral tablet: 3 tab(s) orally once a day   carvedilol 3.125 mg oral tablet: 1 tab(s) orally every 12 hours  ezetimibe 10 mg oral tablet: 1 tab(s) orally once a day  FAMOTIDINE 40 MG TABLET: 1 each orally once a day  ferrous sulfate 325 mg (65 mg elemental iron) oral tablet: 1 tab(s) orally every other day  gabapentin 100 mg oral capsule: 2 cap(s) orally 3 times a day  glimepiride 2 mg oral tablet: 1 tab(s) orally once a day  hydrALAZINE 50 mg oral tablet: 1 tab(s) orally 3 times a day  isosorbide dinitrate 20 mg oral tablet: 1 tab(s) orally 3 times a day  levothyroxine 25 mcg (0.025 mg) oral tablet: 1 tab(s) orally once a day  Plavix 75 mg oral tablet: 1 tab(s) orally once a day  ROSUVASTATIN CALCIUM 20 MG TAB: TAKE 1 TABLET ONCE A DAY (AT BEDTIME) ORALLY  senna oral tablet: 2 tab(s) orally once a day (at bedtime)  tamsulosin 0.4 mg oral capsule: 1 cap(s) orally once a day

## 2022-05-23 NOTE — DISCHARGE NOTE PROVIDER - NSDCCPTREATMENT_GEN_ALL_CORE_FT
PRINCIPAL PROCEDURE  Procedure: US vein extremity lower bilateral  Findings and Treatment: FINDINGS:  There is normal compressibility of the left common femoral, femoral and   popliteal veins.  The contralateral common femoral vein is patent.  Doppler examination shows normal spontaneous and phasic flow.  Limited visualization of the calf veins. No calf vein thrombosis is   detected.  IMPRESSION:  No evidence of left lower extremity deep venous thrombosis.  Limited visualization of the calf veins.         PRINCIPAL PROCEDURE  Procedure: US vein extremity lower bilateral  Findings and Treatment: FINDINGS:  There is normal compressibility of the left common femoral, femoral and   popliteal veins.  The contralateral common femoral vein is patent.  Doppler examination shows normal spontaneous and phasic flow.  Limited visualization of the calf veins. No calf vein thrombosis is   detected.  IMPRESSION:  No evidence of left lower extremity deep venous thrombosis.  Limited visualization of the calf veins.        SECONDARY PROCEDURE  Procedure: Catheterization, right heart  Findings and Treatment:      PRINCIPAL PROCEDURE  Procedure: US vein extremity lower bilateral  Findings and Treatment: FINDINGS:  There is normal compressibility of the left common femoral, femoral and   popliteal veins.  The contralateral common femoral vein is patent.  Doppler examination shows normal spontaneous and phasic flow.  Limited visualization of the calf veins. No calf vein thrombosis is   detected.  IMPRESSION:  No evidence of left lower extremity deep venous thrombosis.  Limited visualization of the calf veins.        SECONDARY PROCEDURE  Procedure: Catheterization, right heart  Findings and Treatment: You underwent a procedure called a right heart catheterization in which a catheter was placed into a vein in your groin and threaded up to the right side of your heart in order to help better elucidate how well your heart was working. Your medications were adjusted, please follow-up with your PCP and Dr. Vogel as an outpatient.

## 2022-05-23 NOTE — PROGRESS NOTE ADULT - PROBLEM SELECTOR PLAN 4
Slightly uptrending SCr w aggressive diuresis; Patient w CKD 3; B/l SCr ~ 2.5  - SCr 2.6 on admission  - Avoid nephrotoxic agents, dose meds per GFR  - Monitor SCr especially during diuresis. Slightly uptrending SCr w aggressive diuresis; Patient w CKD 3; B/l SCr ~ 2.5  - SCr 2.6 on admission  - Avoid nephrotoxic agents, dose meds per GFR  - Monitor SCr especially during diuresis.  - continue diuresis given clinical improvement on diuretics, monitor SCr

## 2022-05-23 NOTE — PROGRESS NOTE ADULT - PROBLEM SELECTOR PLAN 3
Patient endorsing increase in cough, but no increase in sputum, favor acute on chronic HFrEF vs COPD exacerbation  Patient on 2L NC at home   Some wheezing intermittently on exam.   RVP w/ COVID negative  S/p Solumedrol, CTX, Azithromycin in ED  - CXR showed possible RLL hazy opacity ?consolidation, present on repeat CXR 5/22   - DCed abx 5/22    - Duonebs q6 standing   - Solumedrol 40 x1 on 5/22 and prednisone 40 qd x4d to cover for wheezing

## 2022-05-23 NOTE — PROGRESS NOTE ADULT - PROBLEM SELECTOR PLAN 6
Troponin mildly elevated on admission, but stable 77->77  Patient has CKD 3  - unlikely ACS, patient with higher troponin levels in past labs on past admissions. No need to trend. Likely baseline elevated troponin, patient with history of elevated troponin in setting of no concern for ACS.   Troponin mildly elevated on admission, but stable 77->77  Patient has CKD 3  - unlikely ACS, patient with higher troponin levels in past labs on past admissions. No need to trend.

## 2022-05-23 NOTE — DISCHARGE NOTE PROVIDER - NSDCFUADDAPPT_GEN_ALL_CORE_FT
Please follow up with your primary care doctor and cardiologist to review your hospitalization and medications.    Please bring this paperwork to all appointments.

## 2022-05-23 NOTE — PROGRESS NOTE ADULT - PROBLEM SELECTOR PLAN 7
On glimepride 2mg daily at home  - Hold oral antihyperglycemic agents while inpatient  - Start Mild SSI  - CC DASH RENAl Diet On glimepride 2mg daily at home  - Hold oral antihyperglycemic agents while inpatient  - Mild SSI  - CC DASH RENAL Diet

## 2022-05-23 NOTE — DISCHARGE NOTE PROVIDER - CARE PROVIDERS DIRECT ADDRESSES
,DirectAddress_Unknown ,DirectAddress_Unknown,DirectAddress_Unknown ,DirectAddress_Unknown,[email or web address]

## 2022-05-23 NOTE — PROGRESS NOTE ADULT - PROBLEM SELECTOR PLAN 2
TTE on 10/22/21 showed EF 22%, mild-mod MR, Severe global LV systolic dysfunction, RV enlargement w/ decreased ventricular systolic function  Patient w/ CAD, S/P CABG  Dyspnea possibly 2/2 acute on chronic HFrEF  Pro-BNP 7518 on admission  Off bipap 5/22   CXR not much improvement on 5/22   - f/u TTE (ordered)   - lasix increased 5/22 to 80 IV BID   - 1L fluid restriction, DASH diet   - Strict I/Os, daily weights   - continue home aldactone and coreg and isordil  - Hold hydral for now, can resume if needed.  - cardiology consulted, will f/u recs TTE on 10/22/21 showed EF 22%, mild-mod MR, Severe global LV systolic dysfunction, RV enlargement w/ decreased ventricular systolic function  Patient w/ CAD, S/P CABG  Dyspnea possibly 2/2 acute on chronic HFrEF  Pro-BNP 7518 on admission  Off bipap 5/22   CXR not much improvement on 5/22   - f/u TTE (ordered)   - lasix increased 5/22 to 80 IV BID   - 1L fluid restriction, DASH diet   - Strict I/Os, daily weights   - continue home aldactone and coreg and isordil  - Hold hydral for now, can resume if needed.  - cardiology consulted, recs appreciated.

## 2022-05-23 NOTE — DISCHARGE NOTE PROVIDER - HOSPITAL COURSE
HPI:  66M CAD s/p CABG on ASA/Plavix, HFrEF (22% 10/2022 TTE) s/p AICD on 2LNC at home, CKD, CVA with mild left deficits, asthma/COPD (on 2L NC), recurrent PNA infections presents with acute worsening SOB and b/l LE swelling. Patient was in usual state of health until the night prior to presentation, where he was short of breath. He says he noticed worsening of swelling in his lower extremities that are slightly tender. He also noticed a slight cough, but denies any sputum production. He denies any fevers, chills, CP, abdominal pain, changes in urinary or bowel habits.     Patient reports occasional use of albuterol inhaler for asthma, has no smoking hx, no known allergies.     In the ED VS T: 97.6 F HR: 96 (92 - 103) BP: 123/88 (123/81 - 145/89) RR: 18 SpO2: 87% on RA  Patient was placed on BiPAP 10/5 50% and saturated 100%.    Labs notable for WBC 12.11, Trop 77->77, Na 127, Cr 2.6, Pro-BNP 7518  RVP and COVID negative   CXR showed Hazy right lower lung opacity which may reflect layering effusion, atelectasis, or pneumonia.  Patient given Azithromycin 500mg IVPB, CTX 1g, MethylPrednisolone 125mg IV x 1, Nitroglycerin 0.4 x 1, 40 IV lasix.    (21 May 2022 17:54)    Hospital Course:    Patient was given IV diuretics for presumed CHF exacerbation was on BiPAP for respiratory distress and hypoxia. Was weaned off after one day. For COPD, was given duonebs, and 5d course steroids. Was put on abx but DCed after 1d for low suspicion pna. LE Doppler negative for DVTs. SHERRY on CKD monitored inpatient. Cardiology consulted for assistance with diuretics and DC recs.     Patient is hemodynamically stable and med optimized for discharge. Needs cardiology f/u for diuretic regimen adjustments and monitoring. Case d/w attending.    HPI:  66M CAD s/p CABG on ASA/Plavix, HFrEF (22% 10/2022 TTE) s/p AICD on 2LNC at home, CKD, CVA with mild left deficits, asthma/COPD (on 2L NC), recurrent PNA infections presents with acute worsening SOB and b/l LE swelling. Patient was in usual state of health until the night prior to presentation, where he was short of breath. He says he noticed worsening of swelling in his lower extremities that are slightly tender. He also noticed a slight cough, but denies any sputum production. He denies any fevers, chills, CP, abdominal pain, changes in urinary or bowel habits.     Patient reports occasional use of albuterol inhaler for asthma, has no smoking hx, no known allergies.     In the ED VS T: 97.6 F HR: 96 (92 - 103) BP: 123/88 (123/81 - 145/89) RR: 18 SpO2: 87% on RA  Patient was placed on BiPAP 10/5 50% and saturated 100%.    Labs notable for WBC 12.11, Trop 77->77, Na 127, Cr 2.6, Pro-BNP 7518  RVP and COVID negative   CXR showed Hazy right lower lung opacity which may reflect layering effusion, atelectasis, or pneumonia.  Patient given Azithromycin 500mg IVPB, CTX 1g, MethylPrednisolone 125mg IV x 1, Nitroglycerin 0.4 x 1, 40 IV lasix.    (21 May 2022 17:54)    Hospital Course:    Patient was given IV diuretics for presumed CHF exacerbation was on BiPAP for respiratory distress and hypoxia. Was weaned off after one day. For COPD, was given duonebs, and 5d course steroids. Was put on abx but DCed after 1d for low suspicion pna. LE Doppler negative for DVTs. SHERRY on CKD monitored inpatient. Cardiology consulted for assistance with diuretics and DC recs. HF team consulted, RHC performed 5/26 with XXXX findings.    Patient is hemodynamically stable and med optimized for discharge. Needs cardiology f/u for diuretic regimen adjustments and monitoring. Case d/w attending.    HPI:  66M CAD s/p CABG on ASA/Plavix, HFrEF (22% 10/2022 TTE) s/p AICD on 2LNC at home, CKD, CVA with mild left deficits, asthma/COPD (on 2L NC), recurrent PNA infections presents with acute worsening SOB and b/l LE swelling. Patient was in usual state of health until the night prior to presentation, where he was short of breath. He says he noticed worsening of swelling in his lower extremities that are slightly tender. He also noticed a slight cough, but denies any sputum production. He denies any fevers, chills, CP, abdominal pain, changes in urinary or bowel habits.     Patient reports occasional use of albuterol inhaler for asthma, has no smoking hx, no known allergies.     In the ED VS T: 97.6 F HR: 96 (92 - 103) BP: 123/88 (123/81 - 145/89) RR: 18 SpO2: 87% on RA  Patient was placed on BiPAP 10/5 50% and saturated 100%.    Labs notable for WBC 12.11, Trop 77->77, Na 127, Cr 2.6, Pro-BNP 7518  RVP and COVID negative   CXR showed Hazy right lower lung opacity which may reflect layering effusion, atelectasis, or pneumonia.  Patient given Azithromycin 500mg IVPB, CTX 1g, MethylPrednisolone 125mg IV x 1, Nitroglycerin 0.4 x 1, 40 IV lasix.    (21 May 2022 17:54)    Hospital Course:    Patient was given IV diuretics for presumed CHF exacerbation was on BiPAP for respiratory distress and hypoxia. Was weaned off after one day. For COPD, was given duonebs, and 5d course steroids. Was put on antibiotics but discontinued after 1d for low suspicion of pneumonia. LE Doppler negative for DVTs. SHERRY on CKD monitored inpatient. Cardiology consulted for assistance with diuretics and DC recs. HF team consulted, RHC performed 5/26 RA: 9, PCW: 24, CO: 3.97, CI: 2.22, PA SAT: 56%, SVR: 1510. RFV access site. Hydralazine increased to 50mg TID for increased afterload reduction, spironolactone reduced to 12.5mg qd.    Patient is hemodynamically stable and med optimized for discharge. Needs cardiology f/u for diuretic regimen adjustments and monitoring. Case d/w attending.

## 2022-05-23 NOTE — DISCHARGE NOTE PROVIDER - DISCHARGE DIET
DASH Diet/Soft and Bite-Sized Diet/Consistent Carbohydrate Diabetic Diets/Renal Diets (for dialysis)/Other Diet Instructions

## 2022-05-23 NOTE — DISCHARGE NOTE PROVIDER - NSDCCAREPROVSEEN_GEN_ALL_CORE_FT
[facility] Medicine Team 2 [facility] Medicine Team 2  Dr. Hernandez ([facility])  [facility] Heart Failure Team [facility] Medicine Team 2  Dr. Hernandez ([facility])  [facility] Heart Failure Team (Dr. Dobson)

## 2022-05-23 NOTE — PROGRESS NOTE ADULT - SUBJECTIVE AND OBJECTIVE BOX
Subjective:    INTERVAL HPI/OVERNIGHT EVENTS:   - increased lasix to 80 IV BID.   - will f/u cards eval   No acute events overnight  Afebrile, hemodynamically stable     Telemetry:    T(F): 98.2 (05-23-22 @ 05:45), Max: 98.9 (05-22-22 @ 14:00)  HR: 89 (05-23-22 @ 05:45) (86 - 104)  BP: 131/80 (05-23-22 @ 05:45) (109/76 - 133/78)  RR: 16 (05-23-22 @ 05:45) (16 - 22)  SpO2: 96% (05-23-22 @ 05:45) (93% - 100%)  I&O's Summary    22 May 2022 07:01  -  23 May 2022 07:00  --------------------------------------------------------  IN: 0 mL / OUT: 1300 mL / NET: -1300 mL          Medications:  ALBUTerol    90 MICROgram(s) HFA Inhaler 2 Puff(s) Inhalation every 6 hours PRN  albuterol/ipratropium for Nebulization 3 milliLiter(s) Nebulizer every 6 hours  aspirin enteric coated 81 milliGRAM(s) Oral daily  atorvastatin 80 milliGRAM(s) Oral at bedtime  budesonide 160 MICROgram(s)/formoterol 4.5 MICROgram(s) Inhaler 2 Puff(s) Inhalation two times a day  carvedilol 3.125 milliGRAM(s) Oral every 12 hours  clopidogrel Tablet 75 milliGRAM(s) Oral daily  dextrose 5%. 1000 milliLiter(s) (50 mL/Hr) IV Continuous <Continuous>  dextrose 5%. 1000 milliLiter(s) (100 mL/Hr) IV Continuous <Continuous>  dextrose 50% Injectable 25 Gram(s) IV Push once  dextrose 50% Injectable 12.5 Gram(s) IV Push once  dextrose 50% Injectable 25 Gram(s) IV Push once  dextrose Oral Gel 15 Gram(s) Oral once PRN  famotidine    Tablet 10 milliGRAM(s) Oral daily  ferrous    sulfate 325 milliGRAM(s) Oral daily  furosemide   Injectable 80 milliGRAM(s) IV Push two times a day  gabapentin 200 milliGRAM(s) Oral three times a day  glucagon  Injectable 1 milliGRAM(s) IntraMuscular once  heparin   Injectable 5000 Unit(s) SubCutaneous every 8 hours  insulin lispro (ADMELOG) corrective regimen sliding scale   SubCutaneous three times a day before meals  isosorbide   dinitrate Tablet (ISORDIL) 20 milliGRAM(s) Oral three times a day  levothyroxine 25 MICROGram(s) Oral daily  predniSONE   Tablet 40 milliGRAM(s) Oral daily  senna 2 Tablet(s) Oral at bedtime  spironolactone 25 milliGRAM(s) Oral daily  tamsulosin 0.4 milliGRAM(s) Oral at bedtime      PHYSICAL EXAM:  GENERAL APPEARANCE: well developed in mild respiratory distress   HEENT:  PERRL, EOMI. hearing grossly intact.  NECK: Mild JVD appreciated, difficult due to habitus.   CARDIAC: Normal S1 and S2. no mrg. RRR  LUNGS: Scattered expiratory wheezes diffusely. Bilateral basilar crackles to mid level posteriorly  ABDOMEN: Distended, nontender, normal BS.   MUSCULOSKELETAL: ROM intact.  No joint erythema or tenderness.   EXTREMITIES: 1+ b/l LE edema: Left worse than right, some tenderness, up to mid calves.   NEUROLOGICAL: Right side 5/5 strength. 4/5 strength left upper and lower extremities.   PSYCHIATRIC: AOx3 , Normal mood and affect    Notable Labs:    Labs:                          11.3   13.04 )-----------( 146      ( 23 May 2022 06:06 )             35.6     05-23    132<L>  |  93<L>  |  74<H>  ----------------------------<  140<H>  4.4   |  25  |  2.87<H>    Ca    8.8      23 May 2022 06:06  Phos  4.3     05-23  Mg     2.20     05-23    TPro  6.6  /  Alb  3.7  /  TBili  0.5  /  DBili  x   /  AST  21  /  ALT  19  /  AlkPhos  74  05-23    LIVER FUNCTIONS - ( 23 May 2022 06:06 )  Alb: 3.7 g/dL / Pro: 6.6 g/dL / ALK PHOS: 74 U/L / ALT: 19 U/L / AST: 21 U/L / GGT: x           PT/INR - ( 21 May 2022 14:33 )   PT: 13.0 sec;   INR: 1.12 ratio         PTT - ( 21 May 2022 14:33 )  PTT:31.4 sec  CAPILLARY BLOOD GLUCOSE      POCT Blood Glucose.: 181 mg/dL (22 May 2022 22:20)  POCT Blood Glucose.: 177 mg/dL (22 May 2022 18:10)  POCT Blood Glucose.: 225 mg/dL (22 May 2022 14:03)  POCT Blood Glucose.: 131 mg/dL (22 May 2022 09:28)    ABG - ( 21 May 2022 18:40 )  pH, Arterial: 7.33  pH, Blood: x     /  pCO2: 52    /  pO2: 173   / HCO3: 27    / Base Excess: 0.8   /  SaO2: 98.8                        Micro:      RADIOLOGY & ADDITIONAL TESTS:    EKG:      Echo:      Xray:      US/CT/MRI:   Subjective:    INTERVAL HPI/OVERNIGHT EVENTS:   - increased lasix to 80 IV BID.   - will f/u cards eval   - patient off O2, didn't use bipap overnight.   - sitting in chair, eating this AM. No resp distress at baseline.   - denies cough, fever, chill, cp   No acute events overnight  Afebrile, hemodynamically stable     Telemetry:    T(F): 98.2 (05-23-22 @ 05:45), Max: 98.9 (05-22-22 @ 14:00)  HR: 89 (05-23-22 @ 05:45) (86 - 104)  BP: 131/80 (05-23-22 @ 05:45) (109/76 - 133/78)  RR: 16 (05-23-22 @ 05:45) (16 - 22)  SpO2: 96% (05-23-22 @ 05:45) (93% - 100%)  I&O's Summary    22 May 2022 07:01  -  23 May 2022 07:00  --------------------------------------------------------  IN: 0 mL / OUT: 1300 mL / NET: -1300 mL          Medications:  ALBUTerol    90 MICROgram(s) HFA Inhaler 2 Puff(s) Inhalation every 6 hours PRN  albuterol/ipratropium for Nebulization 3 milliLiter(s) Nebulizer every 6 hours  aspirin enteric coated 81 milliGRAM(s) Oral daily  atorvastatin 80 milliGRAM(s) Oral at bedtime  budesonide 160 MICROgram(s)/formoterol 4.5 MICROgram(s) Inhaler 2 Puff(s) Inhalation two times a day  carvedilol 3.125 milliGRAM(s) Oral every 12 hours  clopidogrel Tablet 75 milliGRAM(s) Oral daily  dextrose 5%. 1000 milliLiter(s) (50 mL/Hr) IV Continuous <Continuous>  dextrose 5%. 1000 milliLiter(s) (100 mL/Hr) IV Continuous <Continuous>  dextrose 50% Injectable 25 Gram(s) IV Push once  dextrose 50% Injectable 12.5 Gram(s) IV Push once  dextrose 50% Injectable 25 Gram(s) IV Push once  dextrose Oral Gel 15 Gram(s) Oral once PRN  famotidine    Tablet 10 milliGRAM(s) Oral daily  ferrous    sulfate 325 milliGRAM(s) Oral daily  furosemide   Injectable 80 milliGRAM(s) IV Push two times a day  gabapentin 200 milliGRAM(s) Oral three times a day  glucagon  Injectable 1 milliGRAM(s) IntraMuscular once  heparin   Injectable 5000 Unit(s) SubCutaneous every 8 hours  insulin lispro (ADMELOG) corrective regimen sliding scale   SubCutaneous three times a day before meals  isosorbide   dinitrate Tablet (ISORDIL) 20 milliGRAM(s) Oral three times a day  levothyroxine 25 MICROGram(s) Oral daily  predniSONE   Tablet 40 milliGRAM(s) Oral daily  senna 2 Tablet(s) Oral at bedtime  spironolactone 25 milliGRAM(s) Oral daily  tamsulosin 0.4 milliGRAM(s) Oral at bedtime      PHYSICAL EXAM:  GENERAL APPEARANCE: well developed in mild respiratory distress   HEENT:  PERRL, EOMI. hearing grossly intact.  NECK: Mild JVD appreciated, difficult due to habitus.   CARDIAC: Normal S1 and S2. no mrg. RRR  LUNGS: Bilateral basilar crackles to mid level posteriorly  ABDOMEN: Distended, nontender, normal BS.   MUSCULOSKELETAL: ROM intact.  No joint erythema or tenderness.   EXTREMITIES: 1+ b/l LE edema: Left worse than right, some tenderness, up to mid calves, L worse than R.   NEUROLOGICAL: Right side 5/5 strength. 4/5 strength left upper and lower extremities.   PSYCHIATRIC: AOx3 , Normal mood and affect    Notable Labs:    Labs:                          11.3   13.04 )-----------( 146      ( 23 May 2022 06:06 )             35.6     05-23    132<L>  |  93<L>  |  74<H>  ----------------------------<  140<H>  4.4   |  25  |  2.87<H>    Ca    8.8      23 May 2022 06:06  Phos  4.3     05-23  Mg     2.20     05-23    TPro  6.6  /  Alb  3.7  /  TBili  0.5  /  DBili  x   /  AST  21  /  ALT  19  /  AlkPhos  74  05-23    LIVER FUNCTIONS - ( 23 May 2022 06:06 )  Alb: 3.7 g/dL / Pro: 6.6 g/dL / ALK PHOS: 74 U/L / ALT: 19 U/L / AST: 21 U/L / GGT: x           PT/INR - ( 21 May 2022 14:33 )   PT: 13.0 sec;   INR: 1.12 ratio         PTT - ( 21 May 2022 14:33 )  PTT:31.4 sec  CAPILLARY BLOOD GLUCOSE      POCT Blood Glucose.: 181 mg/dL (22 May 2022 22:20)  POCT Blood Glucose.: 177 mg/dL (22 May 2022 18:10)  POCT Blood Glucose.: 225 mg/dL (22 May 2022 14:03)  POCT Blood Glucose.: 131 mg/dL (22 May 2022 09:28)    ABG - ( 21 May 2022 18:40 )  pH, Arterial: 7.33  pH, Blood: x     /  pCO2: 52    /  pO2: 173   / HCO3: 27    / Base Excess: 0.8   /  SaO2: 98.8                        Micro:      RADIOLOGY & ADDITIONAL TESTS:    < from: Xray Chest 1 View- PORTABLE-Urgent (Xray Chest 1 View- PORTABLE-Urgent .) (05.22.22 @ 12:23) >  IMPRESSION:  Follow-up with bilateral small effusions likely due to CHF.    < end of copied text >  < from: Xray Shoulder 2 Views, Right (05.22.22 @ 18:27) >  OSSEOUS STRUCTURES    Fractures:  None.    JOINTS    Joint Space(s):  Small glenohumeral and acromioclavicular joint   osteophytes are noted.    VISUALIZED LUNGS:  Mild interstitial prominence and haziness is again   seen which may reflect pulmonary congestion in the appropriate setting.    VISUALIZED MEDIASTINUM:  Surgical clips and cardiac AICD lead are noted.    IMPRESSION:  1. No acute osseous abnormalities.    < end of copied text >

## 2022-05-23 NOTE — PROGRESS NOTE ADULT - PROBLEM SELECTOR PLAN 9
DVT PPx: SubQ hep given CKD    Diet: CC DASH RENAL Diet     Dispo: pending clinical course DVT PPx: SubQ hep given CKD    Diet: CC DASH RENAL Diet     Dispo: pending clinical course, transition to home dose diuretics, cardiology guidance.

## 2022-05-23 NOTE — DISCHARGE NOTE PROVIDER - CARE PROVIDER_API CALL
Robin Schmidt  [address] [place] [address]  Phone: [phone]  Fax: (   )    -  Established Patient  Follow Up Time: 1 week   Robin Schmidt  [address] [place]  [address]  Phone: [phone]  Fax: (   )    -  Established Patient  Follow Up Time: 1 week    Miles Hernandez  CARDIOVASCULAR DISEASE  [address] [address]  Phone: [phone]  Fax: [phone]  Follow Up Time:    Robin Schmidt  [address], [place]  [address]  Phone: [phone]  Fax: (   )    -  Established Patient  Follow Up Time: 1 week    Garo Dobson)  Adv [facility]  [address] [place] [address]  Phone: [phone]  Fax: [phone]  Follow Up Time:

## 2022-05-23 NOTE — CONSULT NOTE ADULT - ASSESSMENT
SOB   Acute Systolic CHF   s/p ICD  cont BB  on imdur  add hydralazine  agree with IV lasix for now     CAD  s/p Cabg  cont asa  add statin   cont BB    HTN  cont current meds    DM II  Monitor finger stick. Insulin coverage. Diabetic education and Diabetic diet. Consider nutrition consultation.

## 2022-05-23 NOTE — CONSULT NOTE ADULT - SUBJECTIVE AND OBJECTIVE BOX
CHIEF COMPLAINT:Patient is a 66y old  Male who presents with a chief complaint of CHF exacerbation (23 May 2022 08:22)      HISTORY OF PRESENT ILLNESS:    66 male with history as below,  CAD s/p CABG on ASA/Plavix, HFrEF (22% 10/2022 TTE) s/p AICD on 2LNC at home, CKD, CVA with mild left deficits, asthma/COPD (on 2L NC), recurrent PNA infections presents with acute worsening SOB and b/l LE swelling   no cp   no dizziness  no syncope     PAST MEDICAL & SURGICAL HISTORY:  HTN (hypertension)      CAD (coronary artery disease)      Diabetes      Hyperlipidemia, unspecified hyperlipidemia type      CHF (congestive heart failure)      BPH (benign prostatic hyperplasia)      S/P CABG (coronary artery bypass graft)      S/P appendectomy              MEDICATIONS:  aspirin enteric coated 81 milliGRAM(s) Oral daily  carvedilol 3.125 milliGRAM(s) Oral every 12 hours  clopidogrel Tablet 75 milliGRAM(s) Oral daily  furosemide   Injectable 80 milliGRAM(s) IV Push two times a day  heparin   Injectable 5000 Unit(s) SubCutaneous every 8 hours  isosorbide   dinitrate Tablet (ISORDIL) 20 milliGRAM(s) Oral three times a day  spironolactone 25 milliGRAM(s) Oral daily  tamsulosin 0.4 milliGRAM(s) Oral at bedtime      ALBUTerol    90 MICROgram(s) HFA Inhaler 2 Puff(s) Inhalation every 6 hours PRN  albuterol/ipratropium for Nebulization 3 milliLiter(s) Nebulizer every 6 hours  budesonide 160 MICROgram(s)/formoterol 4.5 MICROgram(s) Inhaler 2 Puff(s) Inhalation two times a day    gabapentin 200 milliGRAM(s) Oral three times a day    famotidine    Tablet 10 milliGRAM(s) Oral daily  senna 2 Tablet(s) Oral at bedtime    atorvastatin 80 milliGRAM(s) Oral at bedtime  dextrose 50% Injectable 25 Gram(s) IV Push once  dextrose 50% Injectable 12.5 Gram(s) IV Push once  dextrose 50% Injectable 25 Gram(s) IV Push once  dextrose Oral Gel 15 Gram(s) Oral once PRN  glucagon  Injectable 1 milliGRAM(s) IntraMuscular once  insulin lispro (ADMELOG) corrective regimen sliding scale   SubCutaneous three times a day before meals  levothyroxine 25 MICROGram(s) Oral daily  predniSONE   Tablet 40 milliGRAM(s) Oral daily    dextrose 5%. 1000 milliLiter(s) IV Continuous <Continuous>  dextrose 5%. 1000 milliLiter(s) IV Continuous <Continuous>  ferrous    sulfate 325 milliGRAM(s) Oral daily      FAMILY HISTORY:      Non-contributory    SOCIAL HISTORY:    No tobacco, drugs or etoh    Allergies    No Known Allergies    Intolerances    	    REVIEW OF SYSTEMS:  as above  The rest of the 14 points ROS reviewed and except above they are unremarkable.        PHYSICAL EXAM:  T(C): 36.8 (05-23-22 @ 05:45), Max: 37.2 (05-22-22 @ 14:00)  HR: 90 (05-23-22 @ 09:52) (86 - 104)  BP: 131/80 (05-23-22 @ 05:45) (124/78 - 133/78)  RR: 16 (05-23-22 @ 05:45) (16 - 22)  SpO2: 97% (05-23-22 @ 09:52) (96% - 100%)  Wt(kg): --  I&O's Summary    22 May 2022 07:01  -  23 May 2022 07:00  --------------------------------------------------------  IN: 0 mL / OUT: 1300 mL / NET: -1300 mL      JVP: Normal  Neck: supple  Lung: pos crackles   CV: S1 S2 , Murmur:  Abd: soft  Ext: No edema  neuro: Awake / alert  Psych: flat affect  Skin: normal      LABS/DATA:    TELEMETRY: 	    ECG:  	   	  CARDIAC MARKERS:                        77 <<== 05-21-22 @ 16:04                77 <<== 05-21-22 @ 14:33                              11.3   13.04 )-----------( 146      ( 23 May 2022 06:06 )             35.6     05-23    132<L>  |  93<L>  |  74<H>  ----------------------------<  140<H>  4.4   |  25  |  2.87<H>    Ca    8.8      23 May 2022 06:06  Phos  4.3     05-23  Mg     2.20     05-23    TPro  6.6  /  Alb  3.7  /  TBili  0.5  /  DBili  x   /  AST  21  /  ALT  19  /  AlkPhos  74  05-23    proBNP: Serum Pro-Brain Natriuretic Peptide: 7518 pg/mL (05-21 @ 14:33)    Lipid Profile:   HgA1c:   TSH:

## 2022-05-23 NOTE — DISCHARGE NOTE PROVIDER - NSDCCPCAREPLAN_GEN_ALL_CORE_FT
PRINCIPAL DISCHARGE DIAGNOSIS  Diagnosis: CHF exacerbation  Assessment and Plan of Treatment: You came to the hospital becase of trouble breathing. This was likely due to fluid in your lungs because of heart failure. You were put on a BiPAP machine (mask for breathing help), and were eventualy taken off. You were given a lot of diuretic medications, which made you lose fluid from urinating it out. You improved dramatically and no longer required extra oxygen or masks. Please return to the ER if you experience sudden shortness of breath, or if you notice you are gaining a pound a day for 2-3 days in a row.  Please take your medications as prescribed and follow with your primary care doctor and cardiologist within 1-2 weeks after discharge.      SECONDARY DISCHARGE DIAGNOSES  Diagnosis: COPD exacerbation  Assessment and Plan of Treatment: You were given nebulizers for your COPD to help treat the occasional wheezing and shortness of breath. Please continue taking your home inhalers for COPD and speak to your doctor about managing your condition.     PRINCIPAL DISCHARGE DIAGNOSIS  Diagnosis: CHF exacerbation  Assessment and Plan of Treatment: You came to the hospital becase of trouble breathing. This was likely due to fluid in your lungs because of heart failure. You were put on a BiPAP machine (mask for breathing help), and were eventualy taken off. You were given a lot of diuretic medications, which made you lose fluid from urinating it out. You improved dramatically and no longer required extra oxygen or masks. Please return to the ER if you experience sudden shortness of breath, or if you notice you are gaining a pound a day for 2-3 days in a row.  Please take your medications as prescribed and follow with your primary care doctor and cardiologist within 1-2 weeks after discharge.      SECONDARY DISCHARGE DIAGNOSES  Diagnosis: COPD exacerbation  Assessment and Plan of Treatment: You were given nebulizers and steroids for your COPD to help treat the occasional wheezing and shortness of breath. Please continue taking your home inhalers for COPD and speak to your doctor about managing your condition.     PRINCIPAL DISCHARGE DIAGNOSIS  Diagnosis: CHF exacerbation  Assessment and Plan of Treatment: You came to the hospital becase of trouble breathing. This was likely due to fluid in your lungs because of heart failure. You were put on a BiPAP machine (mask for breathing help), and were eventualy taken off. You were given a lot of diuretic medications, which made you lose fluid from urinating it out. You improved dramatically and no longer required extra oxygen or masks. Please return to the ER if you experience sudden shortness of breath, or if you notice you are gaining a pound a day for 2-3 days in a row.  Your medications were adjusted (you will now be on 12.5mg spironolactone once daily, hydralazine 50mg three times a day and bumetanide (bumex) 3mg once daily). Please see medication list for further clarification.  Please take your medications as prescribed and follow with your primary care doctor and cardiologist (Dr. Dobson) within 1-2 weeks after discharge.      SECONDARY DISCHARGE DIAGNOSES  Diagnosis: COPD exacerbation  Assessment and Plan of Treatment: You were given nebulizers and steroids for your COPD to help treat the occasional wheezing and shortness of breath. Please continue taking your home inhalers for COPD and speak to your doctor about managing your condition.

## 2022-05-23 NOTE — PROGRESS NOTE ADULT - PROBLEM SELECTOR PLAN 1
Patient endorsing dyspnea, O2 87% on RA on arrival.   Differential includes acute on chronic HFrEF, PE, Bacterial PNA  Unlikely COPD, patient without sputum production. RVP negative. However with wheezing on exam. (on 2L at home)  Patient grossly overloaded on exam with crackles, peripheral edema   Given LE edema L>R cannot r/o PE  - weaned off BiPAP 5/22   - F/u [facility] Duplex LLE, if + for DVt, start hep gtt.   - intermittent VBG/ABG to monitor respiratory status. Some CO2 retention.   - Diuresis per below Resolved 5/23, patient on RA.   Patient endorsed dyspnea, O2 87% on RA on arrival.   Differential includes acute on chronic HFrEF, PE, Bacterial PNA  Unlikely COPD, patient without sputum production. RVP negative. However with wheezing on exam. (on 2L at home)  Patient grossly overloaded on exam with crackles, peripheral edema   Given LE edema L>R cannot r/o PE  - weaned off BiPAP 5/22   - Doppler neg for DVT LE.   - intermittent VBG/ABG to monitor respiratory status. Some CO2 retention.   - Diuresis per below

## 2022-05-24 LAB
ALBUMIN SERPL ELPH-MCNC: 3.6 G/DL — SIGNIFICANT CHANGE UP (ref 3.3–5)
ALP SERPL-CCNC: 66 U/L — SIGNIFICANT CHANGE UP (ref 40–120)
ALT FLD-CCNC: 19 U/L — SIGNIFICANT CHANGE UP (ref 4–41)
ANION GAP SERPL CALC-SCNC: 12 MMOL/L — SIGNIFICANT CHANGE UP (ref 7–14)
AST SERPL-CCNC: 21 U/L — SIGNIFICANT CHANGE UP (ref 4–40)
BASOPHILS # BLD AUTO: 0.01 K/UL — SIGNIFICANT CHANGE UP (ref 0–0.2)
BASOPHILS NFR BLD AUTO: 0.1 % — SIGNIFICANT CHANGE UP (ref 0–2)
BILIRUB SERPL-MCNC: 0.5 MG/DL — SIGNIFICANT CHANGE UP (ref 0.2–1.2)
BUN SERPL-MCNC: 75 MG/DL — HIGH (ref 7–23)
CALCIUM SERPL-MCNC: 8.7 MG/DL — SIGNIFICANT CHANGE UP (ref 8.4–10.5)
CHLORIDE SERPL-SCNC: 91 MMOL/L — LOW (ref 98–107)
CO2 SERPL-SCNC: 29 MMOL/L — SIGNIFICANT CHANGE UP (ref 22–31)
CREAT SERPL-MCNC: 2.89 MG/DL — HIGH (ref 0.5–1.3)
EGFR: 23 ML/MIN/1.73M2 — LOW
EOSINOPHIL # BLD AUTO: 0 K/UL — SIGNIFICANT CHANGE UP (ref 0–0.5)
EOSINOPHIL NFR BLD AUTO: 0 % — SIGNIFICANT CHANGE UP (ref 0–6)
GLUCOSE BLDC GLUCOMTR-MCNC: 121 MG/DL — HIGH (ref 70–99)
GLUCOSE BLDC GLUCOMTR-MCNC: 185 MG/DL — HIGH (ref 70–99)
GLUCOSE BLDC GLUCOMTR-MCNC: 211 MG/DL — HIGH (ref 70–99)
GLUCOSE BLDC GLUCOMTR-MCNC: 228 MG/DL — HIGH (ref 70–99)
GLUCOSE SERPL-MCNC: 107 MG/DL — HIGH (ref 70–99)
HCT VFR BLD CALC: 33.6 % — LOW (ref 39–50)
HGB BLD-MCNC: 11.1 G/DL — LOW (ref 13–17)
IANC: 9.28 K/UL — HIGH (ref 1.8–7.4)
IMM GRANULOCYTES NFR BLD AUTO: 0.4 % — SIGNIFICANT CHANGE UP (ref 0–1.5)
LYMPHOCYTES # BLD AUTO: 1.17 K/UL — SIGNIFICANT CHANGE UP (ref 1–3.3)
LYMPHOCYTES # BLD AUTO: 10.2 % — LOW (ref 13–44)
MAGNESIUM SERPL-MCNC: 2 MG/DL — SIGNIFICANT CHANGE UP (ref 1.6–2.6)
MCHC RBC-ENTMCNC: 31.4 PG — SIGNIFICANT CHANGE UP (ref 27–34)
MCHC RBC-ENTMCNC: 33 GM/DL — SIGNIFICANT CHANGE UP (ref 32–36)
MCV RBC AUTO: 94.9 FL — SIGNIFICANT CHANGE UP (ref 80–100)
MONOCYTES # BLD AUTO: 0.99 K/UL — HIGH (ref 0–0.9)
MONOCYTES NFR BLD AUTO: 8.6 % — SIGNIFICANT CHANGE UP (ref 2–14)
NEUTROPHILS # BLD AUTO: 9.28 K/UL — HIGH (ref 1.8–7.4)
NEUTROPHILS NFR BLD AUTO: 80.7 % — HIGH (ref 43–77)
NRBC # BLD: 0 /100 WBCS — SIGNIFICANT CHANGE UP
NRBC # FLD: 0 K/UL — SIGNIFICANT CHANGE UP
NT-PROBNP SERPL-SCNC: HIGH PG/ML
PHOSPHATE SERPL-MCNC: 3.9 MG/DL — SIGNIFICANT CHANGE UP (ref 2.5–4.5)
PLATELET # BLD AUTO: 154 K/UL — SIGNIFICANT CHANGE UP (ref 150–400)
POTASSIUM SERPL-MCNC: 3.7 MMOL/L — SIGNIFICANT CHANGE UP (ref 3.5–5.3)
POTASSIUM SERPL-SCNC: 3.7 MMOL/L — SIGNIFICANT CHANGE UP (ref 3.5–5.3)
PROT SERPL-MCNC: 6.1 G/DL — SIGNIFICANT CHANGE UP (ref 6–8.3)
RBC # BLD: 3.54 M/UL — LOW (ref 4.2–5.8)
RBC # FLD: 15.3 % — HIGH (ref 10.3–14.5)
SODIUM SERPL-SCNC: 132 MMOL/L — LOW (ref 135–145)
WBC # BLD: 11.5 K/UL — HIGH (ref 3.8–10.5)
WBC # FLD AUTO: 11.5 K/UL — HIGH (ref 3.8–10.5)

## 2022-05-24 PROCEDURE — 99233 SBSQ HOSP IP/OBS HIGH 50: CPT | Mod: GC

## 2022-05-24 RX ORDER — BUMETANIDE 0.25 MG/ML
2 INJECTION INTRAMUSCULAR; INTRAVENOUS EVERY 12 HOURS
Refills: 0 | Status: DISCONTINUED | OUTPATIENT
Start: 2022-05-24 | End: 2022-05-25

## 2022-05-24 RX ORDER — POTASSIUM CHLORIDE 20 MEQ
40 PACKET (EA) ORAL ONCE
Refills: 0 | Status: COMPLETED | OUTPATIENT
Start: 2022-05-24 | End: 2022-05-24

## 2022-05-24 RX ADMIN — ISOSORBIDE DINITRATE 20 MILLIGRAM(S): 5 TABLET ORAL at 19:02

## 2022-05-24 RX ADMIN — Medication 25 MILLIGRAM(S): at 13:15

## 2022-05-24 RX ADMIN — ATORVASTATIN CALCIUM 80 MILLIGRAM(S): 80 TABLET, FILM COATED ORAL at 22:15

## 2022-05-24 RX ADMIN — SENNA PLUS 2 TABLET(S): 8.6 TABLET ORAL at 22:14

## 2022-05-24 RX ADMIN — TAMSULOSIN HYDROCHLORIDE 0.4 MILLIGRAM(S): 0.4 CAPSULE ORAL at 22:15

## 2022-05-24 RX ADMIN — Medication 3 MILLILITER(S): at 14:39

## 2022-05-24 RX ADMIN — GABAPENTIN 200 MILLIGRAM(S): 400 CAPSULE ORAL at 13:15

## 2022-05-24 RX ADMIN — Medication 40 MILLIGRAM(S): at 06:07

## 2022-05-24 RX ADMIN — Medication 2: at 18:57

## 2022-05-24 RX ADMIN — GABAPENTIN 200 MILLIGRAM(S): 400 CAPSULE ORAL at 22:14

## 2022-05-24 RX ADMIN — Medication 81 MILLIGRAM(S): at 12:39

## 2022-05-24 RX ADMIN — Medication 2: at 13:14

## 2022-05-24 RX ADMIN — BUDESONIDE AND FORMOTEROL FUMARATE DIHYDRATE 2 PUFF(S): 160; 4.5 AEROSOL RESPIRATORY (INHALATION) at 09:24

## 2022-05-24 RX ADMIN — Medication 40 MILLIEQUIVALENT(S): at 09:23

## 2022-05-24 RX ADMIN — Medication 3 MILLILITER(S): at 03:21

## 2022-05-24 RX ADMIN — Medication 25 MILLIGRAM(S): at 22:15

## 2022-05-24 RX ADMIN — CARVEDILOL PHOSPHATE 3.12 MILLIGRAM(S): 80 CAPSULE, EXTENDED RELEASE ORAL at 19:00

## 2022-05-24 RX ADMIN — ISOSORBIDE DINITRATE 20 MILLIGRAM(S): 5 TABLET ORAL at 12:39

## 2022-05-24 RX ADMIN — CARVEDILOL PHOSPHATE 3.12 MILLIGRAM(S): 80 CAPSULE, EXTENDED RELEASE ORAL at 06:07

## 2022-05-24 RX ADMIN — HEPARIN SODIUM 5000 UNIT(S): 5000 INJECTION INTRAVENOUS; SUBCUTANEOUS at 22:00

## 2022-05-24 RX ADMIN — CLOPIDOGREL BISULFATE 75 MILLIGRAM(S): 75 TABLET, FILM COATED ORAL at 12:39

## 2022-05-24 RX ADMIN — Medication 3 MILLILITER(S): at 22:44

## 2022-05-24 RX ADMIN — ISOSORBIDE DINITRATE 20 MILLIGRAM(S): 5 TABLET ORAL at 06:06

## 2022-05-24 RX ADMIN — Medication 25 MILLIGRAM(S): at 06:07

## 2022-05-24 RX ADMIN — BUMETANIDE 2 MILLIGRAM(S): 0.25 INJECTION INTRAMUSCULAR; INTRAVENOUS at 19:00

## 2022-05-24 RX ADMIN — HEPARIN SODIUM 5000 UNIT(S): 5000 INJECTION INTRAVENOUS; SUBCUTANEOUS at 06:07

## 2022-05-24 RX ADMIN — Medication 325 MILLIGRAM(S): at 12:39

## 2022-05-24 RX ADMIN — BUDESONIDE AND FORMOTEROL FUMARATE DIHYDRATE 2 PUFF(S): 160; 4.5 AEROSOL RESPIRATORY (INHALATION) at 22:12

## 2022-05-24 RX ADMIN — Medication 25 MICROGRAM(S): at 06:06

## 2022-05-24 RX ADMIN — GABAPENTIN 200 MILLIGRAM(S): 400 CAPSULE ORAL at 06:06

## 2022-05-24 RX ADMIN — SPIRONOLACTONE 25 MILLIGRAM(S): 25 TABLET, FILM COATED ORAL at 06:06

## 2022-05-24 RX ADMIN — HEPARIN SODIUM 5000 UNIT(S): 5000 INJECTION INTRAVENOUS; SUBCUTANEOUS at 13:14

## 2022-05-24 RX ADMIN — FAMOTIDINE 10 MILLIGRAM(S): 10 INJECTION INTRAVENOUS at 12:39

## 2022-05-24 RX ADMIN — Medication 3 MILLILITER(S): at 09:32

## 2022-05-24 NOTE — PROGRESS NOTE ADULT - PROBLEM SELECTOR PLAN 7
On glimepride 2mg daily at home  - Hold oral antihyperglycemic agents while inpatient  - Mild SSI  - CC DASH RENAL Diet

## 2022-05-24 NOTE — PROGRESS NOTE ADULT - SUBJECTIVE AND OBJECTIVE BOX
DATE OF SERVICE: 05-24-22 @ 07:54    Subjective: Patient seen and examined. No new events except as noted.     SUBJECTIVE/ROS:  feels better  less sob       MEDICATIONS:  MEDICATIONS  (STANDING):  albuterol/ipratropium for Nebulization 3 milliLiter(s) Nebulizer every 6 hours  aspirin enteric coated 81 milliGRAM(s) Oral daily  atorvastatin 80 milliGRAM(s) Oral at bedtime  budesonide 160 MICROgram(s)/formoterol 4.5 MICROgram(s) Inhaler 2 Puff(s) Inhalation two times a day  carvedilol 3.125 milliGRAM(s) Oral every 12 hours  clopidogrel Tablet 75 milliGRAM(s) Oral daily  dextrose 5%. 1000 milliLiter(s) (50 mL/Hr) IV Continuous <Continuous>  dextrose 5%. 1000 milliLiter(s) (100 mL/Hr) IV Continuous <Continuous>  dextrose 50% Injectable 25 Gram(s) IV Push once  dextrose 50% Injectable 12.5 Gram(s) IV Push once  dextrose 50% Injectable 25 Gram(s) IV Push once  famotidine    Tablet 10 milliGRAM(s) Oral daily  ferrous    sulfate 325 milliGRAM(s) Oral daily  gabapentin 200 milliGRAM(s) Oral three times a day  glucagon  Injectable 1 milliGRAM(s) IntraMuscular once  heparin   Injectable 5000 Unit(s) SubCutaneous every 8 hours  hydrALAZINE 25 milliGRAM(s) Oral three times a day  insulin lispro (ADMELOG) corrective regimen sliding scale   SubCutaneous three times a day before meals  isosorbide   dinitrate Tablet (ISORDIL) 20 milliGRAM(s) Oral three times a day  levothyroxine 25 MICROGram(s) Oral daily  predniSONE   Tablet 40 milliGRAM(s) Oral daily  senna 2 Tablet(s) Oral at bedtime  spironolactone 25 milliGRAM(s) Oral daily  tamsulosin 0.4 milliGRAM(s) Oral at bedtime      PHYSICAL EXAM:  T(C): 36.4 (05-24-22 @ 06:03), Max: 36.6 (05-23-22 @ 11:12)  HR: 74 (05-24-22 @ 07:27) (74 - 97)  BP: 110/67 (05-24-22 @ 06:03) (108/60 - 114/56)  RR: 19 (05-24-22 @ 06:03) (18 - 19)  SpO2: 98% (05-24-22 @ 07:27) (92% - 100%)  Wt(kg): --  I&O's Summary    23 May 2022 07:01  -  24 May 2022 07:00  --------------------------------------------------------  IN: 0 mL / OUT: 1150 mL / NET: -1150 mL            JVP: Normal  Neck: supple  Lung: pos crackles   CV: S1 S2 , Murmur:  Abd: soft  Ext: No edema  neuro: Awake / alert  Psych: flat affect  Skin: normal``    LABS/DATA:    CARDIAC MARKERS:                                11.1   11.50 )-----------( 154      ( 24 May 2022 06:50 )             33.6     05-24    132<L>  |  91<L>  |  75<H>  ----------------------------<  107<H>  3.7   |  29  |  2.89<H>    Ca    8.7      24 May 2022 06:50  Phos  3.9     05-24  Mg     2.00     05-24    TPro  6.1  /  Alb  3.6  /  TBili  0.5  /  DBili  x   /  AST  21  /  ALT  19  /  AlkPhos  66  05-24    proBNP: Serum Pro-Brain Natriuretic Peptide: 64352 pg/mL (05-24 @ 06:50)    Lipid Profile:   HgA1c:   TSH:     TELE:  EKG:

## 2022-05-24 NOTE — PROGRESS NOTE ADULT - PROBLEM SELECTOR PLAN 4
Slightly uptrending SCr w aggressive diuresis; Patient w CKD 3; B/l SCr ~ 2.5  - SCr 2.6 on admission  - Avoid nephrotoxic agents, dose meds per GFR  - Monitor SCr especially during diuresis.  - continue diuresis given clinical improvement on diuretics, monitor SCr

## 2022-05-24 NOTE — PHYSICAL THERAPY INITIAL EVALUATION ADULT - GAIT DISTANCE, PT EVAL
60 feet. SpO2 83% on room air during ambulation. Pt. denied SOB. Returned to 97% with rest and 1L O2. RN made aware.

## 2022-05-24 NOTE — PROGRESS NOTE ADULT - CONVERSATION DETAILS
Spoke w patient's Ketty Lewis on patient's phone.    Patient is full code.     Discussion was regarding CPR if patient had cardiac arrest. Patient and daughter would want CPR in that setting.    If medical team decided intubation was appropriate due to medical condition, patient and daughter would consent to intubation.     FULL CODE

## 2022-05-24 NOTE — PROGRESS NOTE ADULT - ASSESSMENT
SOB   Acute Systolic CHF   s/p ICD  cont BB  on imdur  cont hydralazine  pt still volume overloaded   though his crt is going a bit , this is possible due to cardiorenal syndrome   his proBNP is high   recommend change diuretics to bumex 2 mg bid     CAD  s/p Cabg  cont asa  add statin   cont BB    HTN  cont current meds    DM II  Monitor finger stick. Insulin coverage. Diabetic education and Diabetic diet. Consider nutrition consultation.

## 2022-05-24 NOTE — PHYSICAL THERAPY INITIAL EVALUATION ADULT - GENERAL OBSERVATIONS, REHAB EVAL
Consult received, chart reviewed. Patient received seated in chair, no apparent distress, +NC, +pulse ox.

## 2022-05-24 NOTE — PROGRESS NOTE ADULT - PROBLEM SELECTOR PLAN 2
TTE on 10/22/21 showed EF 22%, mild-mod MR, Severe global LV systolic dysfunction, RV enlargement w/ decreased ventricular systolic function  Patient w/ CAD, S/P CABG  Dyspnea possibly 2/2 acute on chronic HFrEF  Pro-BNP 7518 on admission  Off bipap 5/22   CXR not much improvement on 5/22   - f/u TTE (ordered)   - lasix increased 5/22 to 80 IV BID   - 1L fluid restriction, DASH diet   - Strict I/Os, daily weights   - continue home aldactone and coreg and isordil  - Hold hydral for now, can resume if needed.  - cardiology consulted, recs appreciated. TTE on 10/22/21 showed EF 22%, mild-mod MR, Severe global LV systolic dysfunction, RV enlargement w/ decreased ventricular systolic function  Patient w/ CAD, S/P CABG  Dyspnea possibly 2/2 acute on chronic HFrEF  Pro-BNP 7518 on admission  Off bipap 5/22   CXR not much improvement on 5/22   - f/u TTE (ordered)   - bumex 2mg PO BID started 5/24  - 1L fluid restriction, DASH diet   - Strict I/Os, daily weights   - continue home aldactone and coreg and isordil  - c/w hydral   - cardiology consulted, recs appreciated.

## 2022-05-24 NOTE — PHYSICAL THERAPY INITIAL EVALUATION ADULT - PERTINENT HX OF CURRENT PROBLEM, REHAB EVAL
Pt. admitted for worsening SOB and bilateral LE swelling. Respiratory failure with hypoxia. Per documentation, LE doppler negative for DVT.

## 2022-05-24 NOTE — PROGRESS NOTE ADULT - SUBJECTIVE AND OBJECTIVE BOX
Subjective:    INTERVAL HPI/OVERNIGHT EVENTS:   - 80 IV lasix last night, will reassess and decide regimen. Decide on PO vs IV.   No acute events overnight  Afebrile, hemodynamically stable     Telemetry:    T(F): 97.5 (05-24-22 @ 06:03), Max: 97.8 (05-23-22 @ 11:12)  HR: 74 (05-24-22 @ 07:27) (74 - 97)  BP: 110/67 (05-24-22 @ 06:03) (108/60 - 114/56)  RR: 19 (05-24-22 @ 06:03) (18 - 19)  SpO2: 98% (05-24-22 @ 07:27) (92% - 100%)  I&O's Summary    23 May 2022 07:01  -  24 May 2022 07:00  --------------------------------------------------------  IN: 0 mL / OUT: 1150 mL / NET: -1150 mL          Medications:  ALBUTerol    90 MICROgram(s) HFA Inhaler 2 Puff(s) Inhalation every 6 hours PRN  albuterol/ipratropium for Nebulization 3 milliLiter(s) Nebulizer every 6 hours  aspirin enteric coated 81 milliGRAM(s) Oral daily  atorvastatin 80 milliGRAM(s) Oral at bedtime  budesonide 160 MICROgram(s)/formoterol 4.5 MICROgram(s) Inhaler 2 Puff(s) Inhalation two times a day  carvedilol 3.125 milliGRAM(s) Oral every 12 hours  clopidogrel Tablet 75 milliGRAM(s) Oral daily  dextrose 5%. 1000 milliLiter(s) (50 mL/Hr) IV Continuous <Continuous>  dextrose 5%. 1000 milliLiter(s) (100 mL/Hr) IV Continuous <Continuous>  dextrose 50% Injectable 25 Gram(s) IV Push once  dextrose 50% Injectable 12.5 Gram(s) IV Push once  dextrose 50% Injectable 25 Gram(s) IV Push once  dextrose Oral Gel 15 Gram(s) Oral once PRN  famotidine    Tablet 10 milliGRAM(s) Oral daily  ferrous    sulfate 325 milliGRAM(s) Oral daily  gabapentin 200 milliGRAM(s) Oral three times a day  glucagon  Injectable 1 milliGRAM(s) IntraMuscular once  heparin   Injectable 5000 Unit(s) SubCutaneous every 8 hours  hydrALAZINE 25 milliGRAM(s) Oral three times a day  insulin lispro (ADMELOG) corrective regimen sliding scale   SubCutaneous three times a day before meals  isosorbide   dinitrate Tablet (ISORDIL) 20 milliGRAM(s) Oral three times a day  levothyroxine 25 MICROGram(s) Oral daily  predniSONE   Tablet 40 milliGRAM(s) Oral daily  senna 2 Tablet(s) Oral at bedtime  spironolactone 25 milliGRAM(s) Oral daily  tamsulosin 0.4 milliGRAM(s) Oral at bedtime      PHYSICAL EXAM:  GENERAL APPEARANCE: well developed in mild respiratory distress   HEENT:  PERRL, EOMI. hearing grossly intact.  NECK: Mild JVD appreciated, difficult due to habitus.   CARDIAC: Normal S1 and S2. no mrg. RRR  LUNGS: Bilateral basilar crackles to mid level posteriorly  ABDOMEN: Distended, nontender, normal BS.   MUSCULOSKELETAL: ROM intact.  No joint erythema or tenderness.   EXTREMITIES: 1+ b/l LE edema: Left worse than right, some tenderness, up to mid calves, L worse than R.   NEUROLOGICAL: Right side 5/5 strength. 4/5 strength left upper and lower extremities.   PSYCHIATRIC: AOx3 , Normal mood and affect    Notable Labs:    Labs:                          11.1   11.50 )-----------( 154      ( 24 May 2022 06:50 )             33.6     05-24    132<L>  |  91<L>  |  75<H>  ----------------------------<  107<H>  3.7   |  29  |  2.89<H>    Ca    8.7      24 May 2022 06:50  Phos  3.9     05-24  Mg     2.00     05-24    TPro  6.1  /  Alb  3.6  /  TBili  0.5  /  DBili  x   /  AST  21  /  ALT  19  /  AlkPhos  66  05-24    LIVER FUNCTIONS - ( 24 May 2022 06:50 )  Alb: 3.6 g/dL / Pro: 6.1 g/dL / ALK PHOS: 66 U/L / ALT: 19 U/L / AST: 21 U/L / GGT: x             CAPILLARY BLOOD GLUCOSE      POCT Blood Glucose.: 178 mg/dL (23 May 2022 18:18)  POCT Blood Glucose.: 221 mg/dL (23 May 2022 12:18)  POCT Blood Glucose.: 168 mg/dL (23 May 2022 08:25)                Micro:      RADIOLOGY & ADDITIONAL TESTS:    NNI   Subjective:    INTERVAL HPI/OVERNIGHT EVENTS:   - 80 IV lasix last night,  - bumex 2 PO BID today  - patient feels well, no n/v/ No CP, SOB at baseline./   No acute events overnight  Afebrile, hemodynamically stable     Telemetry:    T(F): 97.5 (05-24-22 @ 06:03), Max: 97.8 (05-23-22 @ 11:12)  HR: 74 (05-24-22 @ 07:27) (74 - 97)  BP: 110/67 (05-24-22 @ 06:03) (108/60 - 114/56)  RR: 19 (05-24-22 @ 06:03) (18 - 19)  SpO2: 98% (05-24-22 @ 07:27) (92% - 100%)  I&O's Summary    23 May 2022 07:01  -  24 May 2022 07:00  --------------------------------------------------------  IN: 0 mL / OUT: 1150 mL / NET: -1150 mL          Medications:  ALBUTerol    90 MICROgram(s) HFA Inhaler 2 Puff(s) Inhalation every 6 hours PRN  albuterol/ipratropium for Nebulization 3 milliLiter(s) Nebulizer every 6 hours  aspirin enteric coated 81 milliGRAM(s) Oral daily  atorvastatin 80 milliGRAM(s) Oral at bedtime  budesonide 160 MICROgram(s)/formoterol 4.5 MICROgram(s) Inhaler 2 Puff(s) Inhalation two times a day  carvedilol 3.125 milliGRAM(s) Oral every 12 hours  clopidogrel Tablet 75 milliGRAM(s) Oral daily  dextrose 5%. 1000 milliLiter(s) (50 mL/Hr) IV Continuous <Continuous>  dextrose 5%. 1000 milliLiter(s) (100 mL/Hr) IV Continuous <Continuous>  dextrose 50% Injectable 25 Gram(s) IV Push once  dextrose 50% Injectable 12.5 Gram(s) IV Push once  dextrose 50% Injectable 25 Gram(s) IV Push once  dextrose Oral Gel 15 Gram(s) Oral once PRN  famotidine    Tablet 10 milliGRAM(s) Oral daily  ferrous    sulfate 325 milliGRAM(s) Oral daily  gabapentin 200 milliGRAM(s) Oral three times a day  glucagon  Injectable 1 milliGRAM(s) IntraMuscular once  heparin   Injectable 5000 Unit(s) SubCutaneous every 8 hours  hydrALAZINE 25 milliGRAM(s) Oral three times a day  insulin lispro (ADMELOG) corrective regimen sliding scale   SubCutaneous three times a day before meals  isosorbide   dinitrate Tablet (ISORDIL) 20 milliGRAM(s) Oral three times a day  levothyroxine 25 MICROGram(s) Oral daily  predniSONE   Tablet 40 milliGRAM(s) Oral daily  senna 2 Tablet(s) Oral at bedtime  spironolactone 25 milliGRAM(s) Oral daily  tamsulosin 0.4 milliGRAM(s) Oral at bedtime      PHYSICAL EXAM:  GENERAL APPEARANCE: well developed in mild respiratory distress   HEENT:  PERRL, EOMI. hearing grossly intact.  NECK: Mild JVD appreciated, difficult due to habitus.   CARDIAC: Normal S1 and S2. no mrg. RRR  LUNGS: Bilateral basilar crackles to mid level posteriorly, occasional wheezes.   ABDOMEN: Distended, nontender, normal BS.   MUSCULOSKELETAL: ROM intact.  No joint erythema or tenderness.   EXTREMITIES: No edema in LE.    NEUROLOGICAL: Right side 5/5 strength. 4/5 strength left upper and lower extremities.   PSYCHIATRIC: AOx3 , Normal mood and affect    Notable Labs:    Labs:                          11.1   11.50 )-----------( 154      ( 24 May 2022 06:50 )             33.6     05-24    132<L>  |  91<L>  |  75<H>  ----------------------------<  107<H>  3.7   |  29  |  2.89<H>    Ca    8.7      24 May 2022 06:50  Phos  3.9     05-24  Mg     2.00     05-24    TPro  6.1  /  Alb  3.6  /  TBili  0.5  /  DBili  x   /  AST  21  /  ALT  19  /  AlkPhos  66  05-24    LIVER FUNCTIONS - ( 24 May 2022 06:50 )  Alb: 3.6 g/dL / Pro: 6.1 g/dL / ALK PHOS: 66 U/L / ALT: 19 U/L / AST: 21 U/L / GGT: x             CAPILLARY BLOOD GLUCOSE      POCT Blood Glucose.: 178 mg/dL (23 May 2022 18:18)  POCT Blood Glucose.: 221 mg/dL (23 May 2022 12:18)  POCT Blood Glucose.: 168 mg/dL (23 May 2022 08:25)                Micro:      RADIOLOGY & ADDITIONAL TESTS:    NNI   Subjective:    INTERVAL HPI/OVERNIGHT EVENTS:   - 80 IV lasix last night,  - bumex 2 PO BID today  - patient feels well, no n/v/ No CP, SOB at baseline./   No acute events overnight  Afebrile, hemodynamically stable     Telemetry:    T(F): 97.5 (05-24-22 @ 06:03), Max: 97.8 (05-23-22 @ 11:12)  HR: 74 (05-24-22 @ 07:27) (74 - 97)  BP: 110/67 (05-24-22 @ 06:03) (108/60 - 114/56)  RR: 19 (05-24-22 @ 06:03) (18 - 19)  SpO2: 98% (05-24-22 @ 07:27) (92% - 100%)  I&O's Summary    23 May 2022 07:01  -  24 May 2022 07:00  --------------------------------------------------------  IN: 0 mL / OUT: 1150 mL / NET: -1150 mL          Medications:  ALBUTerol    90 MICROgram(s) HFA Inhaler 2 Puff(s) Inhalation every 6 hours PRN  albuterol/ipratropium for Nebulization 3 milliLiter(s) Nebulizer every 6 hours  aspirin enteric coated 81 milliGRAM(s) Oral daily  atorvastatin 80 milliGRAM(s) Oral at bedtime  budesonide 160 MICROgram(s)/formoterol 4.5 MICROgram(s) Inhaler 2 Puff(s) Inhalation two times a day  carvedilol 3.125 milliGRAM(s) Oral every 12 hours  clopidogrel Tablet 75 milliGRAM(s) Oral daily  dextrose 5%. 1000 milliLiter(s) (50 mL/Hr) IV Continuous <Continuous>  dextrose 5%. 1000 milliLiter(s) (100 mL/Hr) IV Continuous <Continuous>  dextrose 50% Injectable 25 Gram(s) IV Push once  dextrose 50% Injectable 12.5 Gram(s) IV Push once  dextrose 50% Injectable 25 Gram(s) IV Push once  dextrose Oral Gel 15 Gram(s) Oral once PRN  famotidine    Tablet 10 milliGRAM(s) Oral daily  ferrous    sulfate 325 milliGRAM(s) Oral daily  gabapentin 200 milliGRAM(s) Oral three times a day  glucagon  Injectable 1 milliGRAM(s) IntraMuscular once  heparin   Injectable 5000 Unit(s) SubCutaneous every 8 hours  hydrALAZINE 25 milliGRAM(s) Oral three times a day  insulin lispro (ADMELOG) corrective regimen sliding scale   SubCutaneous three times a day before meals  isosorbide   dinitrate Tablet (ISORDIL) 20 milliGRAM(s) Oral three times a day  levothyroxine 25 MICROGram(s) Oral daily  predniSONE   Tablet 40 milliGRAM(s) Oral daily  senna 2 Tablet(s) Oral at bedtime  spironolactone 25 milliGRAM(s) Oral daily  tamsulosin 0.4 milliGRAM(s) Oral at bedtime      PHYSICAL EXAM:  GENERAL APPEARANCE: well developed in mild respiratory distress   HEENT:  PERRL, EOMI. hearing grossly intact.  NECK: Mild JVD appreciated, difficult due to habitus.   CARDIAC: Normal S1 and S2. no mrg. RRR  LUNGS: Bilateral basilar crackles to mid level posteriorly, occasional wheezes.   ABDOMEN: Distended, nontender, normal BS.   MUSCULOSKELETAL: ROM intact.  No joint erythema or tenderness.   EXTREMITIES: No edema in LE.    NEUROLOGICAL: Right side 5/5 strength. 4/5 strength left upper and lower extremities.   PSYCHIATRIC: AOx3 , Normal mood and affect    Notable Labs:    Labs:                          11.1   11.50 )-----------( 154      ( 24 May 2022 06:50 )             33.6     05-24    132<L>  |  91<L>  |  75<H>  ----------------------------<  107<H>  3.7   |  29  |  2.89<H>    Ca    8.7      24 May 2022 06:50  Phos  3.9     05-24  Mg     2.00     05-24    TPro  6.1  /  Alb  3.6  /  TBili  0.5  /  DBili  x   /  AST  21  /  ALT  19  /  AlkPhos  66  05-24    LIVER FUNCTIONS - ( 24 May 2022 06:50 )  Alb: 3.6 g/dL / Pro: 6.1 g/dL / ALK PHOS: 66 U/L / ALT: 19 U/L / AST: 21 U/L / GGT: x             CAPILLARY BLOOD GLUCOSE      POCT Blood Glucose.: 178 mg/dL (23 May 2022 18:18)  POCT Blood Glucose.: 221 mg/dL (23 May 2022 12:18)  POCT Blood Glucose.: 168 mg/dL (23 May 2022 08:25)    Micro:    RADIOLOGY & ADDITIONAL TESTS: Reviewed    NNI

## 2022-05-24 NOTE — PROGRESS NOTE ADULT - PROBLEM SELECTOR PLAN 6
Likely baseline elevated troponin, patient with history of elevated troponin in setting of no concern for ACS.   Troponin mildly elevated on admission, but stable 77->77  Patient has CKD 3  - unlikely ACS, patient with higher troponin levels in past labs on past admissions. No need to trend.

## 2022-05-24 NOTE — PHYSICAL THERAPY INITIAL EVALUATION ADULT - ADDITIONAL COMMENTS
Pt. reports ambulating with a rollator. Pt. owns home O2.     Pt. was left seated in chair post PT Evaluation, no apparent distress, all lines intact, call montague within reach. Hermelinda GUZMAN made aware of pt. status and participation in PT.

## 2022-05-24 NOTE — PROGRESS NOTE ADULT - PROBLEM SELECTOR PLAN 9
DVT PPx: SubQ hep given CKD    Diet: CC DASH RENAL Diet     Dispo: pending clinical course, transition to home dose diuretics, cardiology guidance. DVT PPx: SubQ hep given CKD    Diet: CC DASH RENAL Diet     Dispo: pending clinical course, transition to home dose diuretics, cardiology guidance.  STANLEY- ninfa PCP f/u appt made  GOC- DVT PPx: SubQ hep given CKD    Diet: CC DASH RENAL Diet     Dispo: pending clinical course, transition to home dose diuretics, cardiology guidance.  STANLEY- needs PCP f/u appt made  GOC- Full code

## 2022-05-24 NOTE — PROGRESS NOTE ADULT - PROBLEM SELECTOR PLAN 1
Resolved 5/23, patient on RA.   Patient endorsed dyspnea, O2 87% on RA on arrival.   Differential includes acute on chronic HFrEF, PE, Bacterial PNA  Unlikely COPD, patient without sputum production. RVP negative. However with wheezing on exam. (on 2L at home)  Patient grossly overloaded on exam with crackles, peripheral edema   Given LE edema L>R cannot r/o PE  - weaned off BiPAP 5/22   - Doppler neg for DVT LE.   - intermittent VBG/ABG to monitor respiratory status. Some CO2 retention.   - Diuresis per below

## 2022-05-25 LAB
ALBUMIN SERPL ELPH-MCNC: 3.5 G/DL — SIGNIFICANT CHANGE UP (ref 3.3–5)
ALP SERPL-CCNC: 69 U/L — SIGNIFICANT CHANGE UP (ref 40–120)
ALT FLD-CCNC: 19 U/L — SIGNIFICANT CHANGE UP (ref 4–41)
ANION GAP SERPL CALC-SCNC: 11 MMOL/L — SIGNIFICANT CHANGE UP (ref 7–14)
AST SERPL-CCNC: 19 U/L — SIGNIFICANT CHANGE UP (ref 4–40)
BASOPHILS # BLD AUTO: 0.01 K/UL — SIGNIFICANT CHANGE UP (ref 0–0.2)
BASOPHILS NFR BLD AUTO: 0.1 % — SIGNIFICANT CHANGE UP (ref 0–2)
BILIRUB SERPL-MCNC: 0.6 MG/DL — SIGNIFICANT CHANGE UP (ref 0.2–1.2)
BUN SERPL-MCNC: 77 MG/DL — HIGH (ref 7–23)
CALCIUM SERPL-MCNC: 8.9 MG/DL — SIGNIFICANT CHANGE UP (ref 8.4–10.5)
CHLORIDE SERPL-SCNC: 94 MMOL/L — LOW (ref 98–107)
CO2 SERPL-SCNC: 28 MMOL/L — SIGNIFICANT CHANGE UP (ref 22–31)
CREAT SERPL-MCNC: 2.98 MG/DL — HIGH (ref 0.5–1.3)
EGFR: 22 ML/MIN/1.73M2 — LOW
EOSINOPHIL # BLD AUTO: 0.02 K/UL — SIGNIFICANT CHANGE UP (ref 0–0.5)
EOSINOPHIL NFR BLD AUTO: 0.2 % — SIGNIFICANT CHANGE UP (ref 0–6)
GLUCOSE BLDC GLUCOMTR-MCNC: 128 MG/DL — HIGH (ref 70–99)
GLUCOSE BLDC GLUCOMTR-MCNC: 197 MG/DL — HIGH (ref 70–99)
GLUCOSE BLDC GLUCOMTR-MCNC: 201 MG/DL — HIGH (ref 70–99)
GLUCOSE BLDC GLUCOMTR-MCNC: 255 MG/DL — HIGH (ref 70–99)
GLUCOSE SERPL-MCNC: 123 MG/DL — HIGH (ref 70–99)
HCT VFR BLD CALC: 35 % — LOW (ref 39–50)
HGB BLD-MCNC: 11.5 G/DL — LOW (ref 13–17)
IANC: 9.16 K/UL — HIGH (ref 1.8–7.4)
IMM GRANULOCYTES NFR BLD AUTO: 0.5 % — SIGNIFICANT CHANGE UP (ref 0–1.5)
LYMPHOCYTES # BLD AUTO: 1.46 K/UL — SIGNIFICANT CHANGE UP (ref 1–3.3)
LYMPHOCYTES # BLD AUTO: 12.3 % — LOW (ref 13–44)
MAGNESIUM SERPL-MCNC: 2 MG/DL — SIGNIFICANT CHANGE UP (ref 1.6–2.6)
MCHC RBC-ENTMCNC: 31.3 PG — SIGNIFICANT CHANGE UP (ref 27–34)
MCHC RBC-ENTMCNC: 32.9 GM/DL — SIGNIFICANT CHANGE UP (ref 32–36)
MCV RBC AUTO: 95.1 FL — SIGNIFICANT CHANGE UP (ref 80–100)
MONOCYTES # BLD AUTO: 1.12 K/UL — HIGH (ref 0–0.9)
MONOCYTES NFR BLD AUTO: 9.5 % — SIGNIFICANT CHANGE UP (ref 2–14)
NEUTROPHILS # BLD AUTO: 9.16 K/UL — HIGH (ref 1.8–7.4)
NEUTROPHILS NFR BLD AUTO: 77.4 % — HIGH (ref 43–77)
NRBC # BLD: 0 /100 WBCS — SIGNIFICANT CHANGE UP
NRBC # FLD: 0 K/UL — SIGNIFICANT CHANGE UP
PHOSPHATE SERPL-MCNC: 3.3 MG/DL — SIGNIFICANT CHANGE UP (ref 2.5–4.5)
PLATELET # BLD AUTO: 154 K/UL — SIGNIFICANT CHANGE UP (ref 150–400)
POTASSIUM SERPL-MCNC: 4.2 MMOL/L — SIGNIFICANT CHANGE UP (ref 3.5–5.3)
POTASSIUM SERPL-SCNC: 4.2 MMOL/L — SIGNIFICANT CHANGE UP (ref 3.5–5.3)
PROT SERPL-MCNC: 6.1 G/DL — SIGNIFICANT CHANGE UP (ref 6–8.3)
RBC # BLD: 3.68 M/UL — LOW (ref 4.2–5.8)
RBC # FLD: 15.3 % — HIGH (ref 10.3–14.5)
SODIUM SERPL-SCNC: 133 MMOL/L — LOW (ref 135–145)
WBC # BLD: 11.83 K/UL — HIGH (ref 3.8–10.5)
WBC # FLD AUTO: 11.83 K/UL — HIGH (ref 3.8–10.5)

## 2022-05-25 PROCEDURE — 99233 SBSQ HOSP IP/OBS HIGH 50: CPT | Mod: GC

## 2022-05-25 PROCEDURE — 99223 1ST HOSP IP/OBS HIGH 75: CPT

## 2022-05-25 RX ORDER — SPIRONOLACTONE 25 MG/1
12.5 TABLET, FILM COATED ORAL DAILY
Refills: 0 | Status: DISCONTINUED | OUTPATIENT
Start: 2022-05-25 | End: 2022-05-27

## 2022-05-25 RX ORDER — BUMETANIDE 0.25 MG/ML
2 INJECTION INTRAMUSCULAR; INTRAVENOUS EVERY 12 HOURS
Refills: 0 | Status: DISCONTINUED | OUTPATIENT
Start: 2022-05-25 | End: 2022-05-27

## 2022-05-25 RX ADMIN — Medication 81 MILLIGRAM(S): at 12:06

## 2022-05-25 RX ADMIN — ATORVASTATIN CALCIUM 80 MILLIGRAM(S): 80 TABLET, FILM COATED ORAL at 21:21

## 2022-05-25 RX ADMIN — HEPARIN SODIUM 5000 UNIT(S): 5000 INJECTION INTRAVENOUS; SUBCUTANEOUS at 06:43

## 2022-05-25 RX ADMIN — Medication 3: at 18:21

## 2022-05-25 RX ADMIN — TAMSULOSIN HYDROCHLORIDE 0.4 MILLIGRAM(S): 0.4 CAPSULE ORAL at 21:21

## 2022-05-25 RX ADMIN — Medication 25 MICROGRAM(S): at 06:43

## 2022-05-25 RX ADMIN — Medication 3 MILLILITER(S): at 10:48

## 2022-05-25 RX ADMIN — ISOSORBIDE DINITRATE 20 MILLIGRAM(S): 5 TABLET ORAL at 12:05

## 2022-05-25 RX ADMIN — Medication 25 MILLIGRAM(S): at 06:41

## 2022-05-25 RX ADMIN — SPIRONOLACTONE 25 MILLIGRAM(S): 25 TABLET, FILM COATED ORAL at 12:06

## 2022-05-25 RX ADMIN — BUDESONIDE AND FORMOTEROL FUMARATE DIHYDRATE 2 PUFF(S): 160; 4.5 AEROSOL RESPIRATORY (INHALATION) at 09:37

## 2022-05-25 RX ADMIN — BUMETANIDE 2 MILLIGRAM(S): 0.25 INJECTION INTRAMUSCULAR; INTRAVENOUS at 17:53

## 2022-05-25 RX ADMIN — GABAPENTIN 200 MILLIGRAM(S): 400 CAPSULE ORAL at 21:22

## 2022-05-25 RX ADMIN — CARVEDILOL PHOSPHATE 3.12 MILLIGRAM(S): 80 CAPSULE, EXTENDED RELEASE ORAL at 06:42

## 2022-05-25 RX ADMIN — ISOSORBIDE DINITRATE 20 MILLIGRAM(S): 5 TABLET ORAL at 17:34

## 2022-05-25 RX ADMIN — HEPARIN SODIUM 5000 UNIT(S): 5000 INJECTION INTRAVENOUS; SUBCUTANEOUS at 21:21

## 2022-05-25 RX ADMIN — BUDESONIDE AND FORMOTEROL FUMARATE DIHYDRATE 2 PUFF(S): 160; 4.5 AEROSOL RESPIRATORY (INHALATION) at 21:21

## 2022-05-25 RX ADMIN — Medication 3 MILLILITER(S): at 19:17

## 2022-05-25 RX ADMIN — Medication 40 MILLIGRAM(S): at 06:41

## 2022-05-25 RX ADMIN — CLOPIDOGREL BISULFATE 75 MILLIGRAM(S): 75 TABLET, FILM COATED ORAL at 12:06

## 2022-05-25 RX ADMIN — CARVEDILOL PHOSPHATE 3.12 MILLIGRAM(S): 80 CAPSULE, EXTENDED RELEASE ORAL at 17:34

## 2022-05-25 RX ADMIN — GABAPENTIN 200 MILLIGRAM(S): 400 CAPSULE ORAL at 06:41

## 2022-05-25 RX ADMIN — GABAPENTIN 200 MILLIGRAM(S): 400 CAPSULE ORAL at 17:20

## 2022-05-25 RX ADMIN — Medication 25 MILLIGRAM(S): at 17:28

## 2022-05-25 RX ADMIN — ISOSORBIDE DINITRATE 20 MILLIGRAM(S): 5 TABLET ORAL at 06:42

## 2022-05-25 RX ADMIN — Medication 325 MILLIGRAM(S): at 12:05

## 2022-05-25 RX ADMIN — Medication 2: at 12:48

## 2022-05-25 RX ADMIN — FAMOTIDINE 10 MILLIGRAM(S): 10 INJECTION INTRAVENOUS at 12:05

## 2022-05-25 RX ADMIN — Medication 3 MILLILITER(S): at 03:59

## 2022-05-25 RX ADMIN — Medication 25 MILLIGRAM(S): at 21:21

## 2022-05-25 RX ADMIN — Medication 3 MILLILITER(S): at 15:40

## 2022-05-25 RX ADMIN — BUMETANIDE 2 MILLIGRAM(S): 0.25 INJECTION INTRAMUSCULAR; INTRAVENOUS at 06:41

## 2022-05-25 RX ADMIN — HEPARIN SODIUM 5000 UNIT(S): 5000 INJECTION INTRAVENOUS; SUBCUTANEOUS at 17:20

## 2022-05-25 NOTE — DIETITIAN INITIAL EVALUATION ADULT - OTHER INFO
Per chart, pt is 66 year old male PMHx CAD s/p CABG (on ASA/Plavix), HFrEF (22% 10/2022) s/p AICD, CKD, CVA with mild L deficits, asthma/COPD (on 2L NC), recurrent PNA infections, type 2 DM presenting with acute worsening SOB, bilateral LE swelling admitted for likely CHF exacerbation vs. PNA.     Pt on NC at time of visit. Pt confirms NKFA, no chewing/swallowing difficulties. Pt lives at home with wife and daughter, who assist with meal preparation. Pt reports adherence to low sodium diet, monitors daily weights in the morning. Pertinent medications PTA inclusive of spironolactone and glimepiride 2 mg qD. No HbA1c available at this time.     Pt reports good appetite/PO intake in house, tolerating diet; breakfast tray visible with 100% PO intake at time of visit. Pt does not vocalize any food preferences at this time. Pt hyponatremic on admission, now improved; continues on 1.2L fluid restriction. Fingersticks mildly elevated, managed with sliding scale insulin; continues on Prednisone. No noted GI distress, ordered for senna 2 tablets qHS.

## 2022-05-25 NOTE — PROGRESS NOTE ADULT - PROBLEM SELECTOR PLAN 2
TTE on 10/22/21 showed EF 22%, mild-mod MR, Severe global LV systolic dysfunction, RV enlargement w/ decreased ventricular systolic function  Patient w/ CAD, S/P CABG  Dyspnea possibly 2/2 acute on chronic HFrEF  Pro-BNP 7518 on admission  Off bipap 5/22   CXR not much improvement on 5/22   TTE 5/22 EF 15-20%.   - bumex 2mg PO BID started 5/24  - 1L fluid restriction, DASH diet   - Strict I/Os, daily weights   - continue home aldactone and coreg and isordil  - c/w hydral   - cardiology consulted, recs appreciated.  - 5/025 cardiology recommending HF consult given diuresis with worsening SHERRY, will consult and f/u recs Patient w/ CAD, S/P CABG  Pro-BNP 7518 on admission [id number] on 5/24.   Off bipap 5/22   CXR not much improvement on 5/22   TTE 5/22 EF 15-20%.  severe LV dysfunction globally.   GDMT: Home meds carvedilol, spironolactone, lasix. On hydralazine as well. Not on entresto/Ace 2/2 CKD. Not on SGLT2 likely 2/2 CKD.  - bumex 2mg PO BID started 5/24  - 1L fluid restriction, DASH diet   - Strict I/Os, daily weights   - continue home aldactone and coreg and isordil  - c/w hydral   - cardiology consulted, recs appreciated.  - 5/025 cardiology recommending HF consult given diuresis with worsening SHERRY, will consult and f/u recs Patient w/ CAD, S/P CABG  Pro-BNP 7518 on admission [id number] on 5/24.   Off bipap 5/22   CXR not much improvement on 5/22   TTE 5/22 EF 15-20%.  severe LV dysfunction globally.   GDMT: Home meds carvedilol, spironolactone, lasix. On hydralazine as well. Not on entresto/Ace 2/2 CKD. Not on SGLT2 likely 2/2 CKD.  - bumex 2mg PO BID started 5/24  - 1L fluid restriction, DASH diet   - Strict I/Os, daily weights   - continue home aldactone and coreg and isordil  - c/w hydral   - cardiology consulted, recs appreciated.  - 5/025 cardiology recommending HF consult given diuresis with worsening SHERRY, consulted, will f/u recs

## 2022-05-25 NOTE — PROGRESS NOTE ADULT - SUBJECTIVE AND OBJECTIVE BOX
DATE OF SERVICE: 05-25-22 @ 07:00    Subjective: Patient seen and examined. No new events except as noted.     SUBJECTIVE/ROS:        MEDICATIONS:  MEDICATIONS  (STANDING):  albuterol/ipratropium for Nebulization 3 milliLiter(s) Nebulizer every 6 hours  aspirin enteric coated 81 milliGRAM(s) Oral daily  atorvastatin 80 milliGRAM(s) Oral at bedtime  budesonide 160 MICROgram(s)/formoterol 4.5 MICROgram(s) Inhaler 2 Puff(s) Inhalation two times a day  buMETAnide 2 milliGRAM(s) Oral every 12 hours  carvedilol 3.125 milliGRAM(s) Oral every 12 hours  clopidogrel Tablet 75 milliGRAM(s) Oral daily  dextrose 5%. 1000 milliLiter(s) (50 mL/Hr) IV Continuous <Continuous>  dextrose 5%. 1000 milliLiter(s) (100 mL/Hr) IV Continuous <Continuous>  dextrose 50% Injectable 25 Gram(s) IV Push once  dextrose 50% Injectable 12.5 Gram(s) IV Push once  dextrose 50% Injectable 25 Gram(s) IV Push once  famotidine    Tablet 10 milliGRAM(s) Oral daily  ferrous    sulfate 325 milliGRAM(s) Oral daily  gabapentin 200 milliGRAM(s) Oral three times a day  glucagon  Injectable 1 milliGRAM(s) IntraMuscular once  heparin   Injectable 5000 Unit(s) SubCutaneous every 8 hours  hydrALAZINE 25 milliGRAM(s) Oral three times a day  insulin lispro (ADMELOG) corrective regimen sliding scale   SubCutaneous three times a day before meals  isosorbide   dinitrate Tablet (ISORDIL) 20 milliGRAM(s) Oral three times a day  levothyroxine 25 MICROGram(s) Oral daily  predniSONE   Tablet 40 milliGRAM(s) Oral daily  senna 2 Tablet(s) Oral at bedtime  spironolactone 25 milliGRAM(s) Oral daily  tamsulosin 0.4 milliGRAM(s) Oral at bedtime      PHYSICAL EXAM:  T(C): 36.4 (05-25-22 @ 06:15), Max: 36.6 (05-24-22 @ 09:25)  HR: 75 (05-25-22 @ 06:15) (65 - 96)  BP: 109/61 (05-25-22 @ 06:15) (106/62 - 116/68)  RR: 16 (05-25-22 @ 06:15) (16 - 24)  SpO2: 100% (05-25-22 @ 06:15) (83% - 100%)  Wt(kg): --  I&O's Summary    24 May 2022 07:01  -  25 May 2022 07:00  --------------------------------------------------------  IN: 720 mL / OUT: 1900 mL / NET: -1180 mL            JVP: Normal  Neck: supple  Lung: clear   CV: S1 S2 , Murmur:  Abd: soft  Ext: No edema  neuro: Awake / alert  Psych: flat affect  Skin: normal``    LABS/DATA:    CARDIAC MARKERS:                                11.5   11.83 )-----------( 154      ( 25 May 2022 05:54 )             35.0     05-24    132<L>  |  91<L>  |  75<H>  ----------------------------<  107<H>  3.7   |  29  |  2.89<H>    Ca    8.7      24 May 2022 06:50  Phos  3.9     05-24  Mg     2.00     05-24    TPro  6.1  /  Alb  3.6  /  TBili  0.5  /  DBili  x   /  AST  21  /  ALT  19  /  AlkPhos  66  05-24    proBNP:   Lipid Profile:   HgA1c:   TSH:     TELE:  EKG:

## 2022-05-25 NOTE — PROGRESS NOTE ADULT - SUBJECTIVE AND OBJECTIVE BOX
Subjective:    INTERVAL HPI/OVERNIGHT EVENTS:   - on bumex 2mg PO BID since yest.   No acute events overnight  Afebrile, hemodynamically stable     Telemetry:    T(F): 97.6 (05-25-22 @ 06:15), Max: 97.9 (05-24-22 @ 20:59)  HR: 75 (05-25-22 @ 06:15) (65 - 96)  BP: 109/61 (05-25-22 @ 06:15) (106/62 - 116/68)  RR: 16 (05-25-22 @ 06:15) (16 - 24)  SpO2: 100% (05-25-22 @ 06:15) (83% - 100%)  I&O's Summary    24 May 2022 07:01  -  25 May 2022 07:00  --------------------------------------------------------  IN: 720 mL / OUT: 1900 mL / NET: -1180 mL          Medications:  ALBUTerol    90 MICROgram(s) HFA Inhaler 2 Puff(s) Inhalation every 6 hours PRN  albuterol/ipratropium for Nebulization 3 milliLiter(s) Nebulizer every 6 hours  aspirin enteric coated 81 milliGRAM(s) Oral daily  atorvastatin 80 milliGRAM(s) Oral at bedtime  budesonide 160 MICROgram(s)/formoterol 4.5 MICROgram(s) Inhaler 2 Puff(s) Inhalation two times a day  buMETAnide 2 milliGRAM(s) Oral every 12 hours  carvedilol 3.125 milliGRAM(s) Oral every 12 hours  clopidogrel Tablet 75 milliGRAM(s) Oral daily  dextrose 5%. 1000 milliLiter(s) (50 mL/Hr) IV Continuous <Continuous>  dextrose 5%. 1000 milliLiter(s) (100 mL/Hr) IV Continuous <Continuous>  dextrose 50% Injectable 25 Gram(s) IV Push once  dextrose 50% Injectable 12.5 Gram(s) IV Push once  dextrose 50% Injectable 25 Gram(s) IV Push once  dextrose Oral Gel 15 Gram(s) Oral once PRN  famotidine    Tablet 10 milliGRAM(s) Oral daily  ferrous    sulfate 325 milliGRAM(s) Oral daily  gabapentin 200 milliGRAM(s) Oral three times a day  glucagon  Injectable 1 milliGRAM(s) IntraMuscular once  heparin   Injectable 5000 Unit(s) SubCutaneous every 8 hours  hydrALAZINE 25 milliGRAM(s) Oral three times a day  insulin lispro (ADMELOG) corrective regimen sliding scale   SubCutaneous three times a day before meals  isosorbide   dinitrate Tablet (ISORDIL) 20 milliGRAM(s) Oral three times a day  levothyroxine 25 MICROGram(s) Oral daily  predniSONE   Tablet 40 milliGRAM(s) Oral daily  senna 2 Tablet(s) Oral at bedtime  spironolactone 25 milliGRAM(s) Oral daily  tamsulosin 0.4 milliGRAM(s) Oral at bedtime      PHYSICAL EXAM:  GENERAL APPEARANCE: well developed in mild respiratory distress   HEENT:  PERRL, EOMI. hearing grossly intact.  NECK: Mild JVD appreciated, difficult due to habitus.   CARDIAC: Normal S1 and S2. no mrg. RRR  LUNGS: Bilateral basilar crackles to mid level posteriorly, occasional wheezes.   ABDOMEN: Distended, nontender, normal BS.   MUSCULOSKELETAL: ROM intact.  No joint erythema or tenderness.   EXTREMITIES: No edema in LE.    NEUROLOGICAL: Right side 5/5 strength. 4/5 strength left upper and lower extremities.   PSYCHIATRIC: AOx3 , Normal mood and affect    Notable Labs:    Labs:                          11.5   11.83 )-----------( 154      ( 25 May 2022 05:54 )             35.0     05-25    133<L>  |  94<L>  |  77<H>  ----------------------------<  123<H>  4.2   |  28  |  2.98<H>    Ca    8.9      25 May 2022 05:54  Phos  3.3     05-25  Mg     2.00     05-25    TPro  6.1  /  Alb  3.5  /  TBili  0.6  /  DBili  x   /  AST  19  /  ALT  19  /  AlkPhos  69  05-25    LIVER FUNCTIONS - ( 25 May 2022 05:54 )  Alb: 3.5 g/dL / Pro: 6.1 g/dL / ALK PHOS: 69 U/L / ALT: 19 U/L / AST: 19 U/L / GGT: x             CAPILLARY BLOOD GLUCOSE      POCT Blood Glucose.: 185 mg/dL (24 May 2022 21:10)  POCT Blood Glucose.: 228 mg/dL (24 May 2022 18:04)  POCT Blood Glucose.: 211 mg/dL (24 May 2022 12:50)  POCT Blood Glucose.: 121 mg/dL (24 May 2022 08:30)                Micro:      RADIOLOGY & ADDITIONAL TESTS:    NNI Subjective:    INTERVAL HPI/OVERNIGHT EVENTS:   - on bumex 2mg PO BID since yest.   - breathing well this AM  No acute events overnight  Afebrile, hemodynamically stable     Telemetry: no events     T(F): 97.6 (05-25-22 @ 06:15), Max: 97.9 (05-24-22 @ 20:59)  HR: 75 (05-25-22 @ 06:15) (65 - 96)  BP: 109/61 (05-25-22 @ 06:15) (106/62 - 116/68)  RR: 16 (05-25-22 @ 06:15) (16 - 24)  SpO2: 100% (05-25-22 @ 06:15) (83% - 100%)  I&O's Summary    24 May 2022 07:01  -  25 May 2022 07:00  --------------------------------------------------------  IN: 720 mL / OUT: 1900 mL / NET: -1180 mL          Medications:  ALBUTerol    90 MICROgram(s) HFA Inhaler 2 Puff(s) Inhalation every 6 hours PRN  albuterol/ipratropium for Nebulization 3 milliLiter(s) Nebulizer every 6 hours  aspirin enteric coated 81 milliGRAM(s) Oral daily  atorvastatin 80 milliGRAM(s) Oral at bedtime  budesonide 160 MICROgram(s)/formoterol 4.5 MICROgram(s) Inhaler 2 Puff(s) Inhalation two times a day  buMETAnide 2 milliGRAM(s) Oral every 12 hours  carvedilol 3.125 milliGRAM(s) Oral every 12 hours  clopidogrel Tablet 75 milliGRAM(s) Oral daily  dextrose 5%. 1000 milliLiter(s) (50 mL/Hr) IV Continuous <Continuous>  dextrose 5%. 1000 milliLiter(s) (100 mL/Hr) IV Continuous <Continuous>  dextrose 50% Injectable 25 Gram(s) IV Push once  dextrose 50% Injectable 12.5 Gram(s) IV Push once  dextrose 50% Injectable 25 Gram(s) IV Push once  dextrose Oral Gel 15 Gram(s) Oral once PRN  famotidine    Tablet 10 milliGRAM(s) Oral daily  ferrous    sulfate 325 milliGRAM(s) Oral daily  gabapentin 200 milliGRAM(s) Oral three times a day  glucagon  Injectable 1 milliGRAM(s) IntraMuscular once  heparin   Injectable 5000 Unit(s) SubCutaneous every 8 hours  hydrALAZINE 25 milliGRAM(s) Oral three times a day  insulin lispro (ADMELOG) corrective regimen sliding scale   SubCutaneous three times a day before meals  isosorbide   dinitrate Tablet (ISORDIL) 20 milliGRAM(s) Oral three times a day  levothyroxine 25 MICROGram(s) Oral daily  predniSONE   Tablet 40 milliGRAM(s) Oral daily  senna 2 Tablet(s) Oral at bedtime  spironolactone 25 milliGRAM(s) Oral daily  tamsulosin 0.4 milliGRAM(s) Oral at bedtime      PHYSICAL EXAM:  GENERAL APPEARANCE: well developed in mild respiratory distress   HEENT:  PERRL, EOMI. hearing grossly intact.  NECK: Mild JVD appreciated, difficult due to habitus.   CARDIAC: Normal S1 and S2. no mrg. RRR  LUNGS: Bilateral basilar crackles to mid level posteriorly  ABDOMEN: Distended, nontender, normal BS.   MUSCULOSKELETAL: ROM intact.  No joint erythema or tenderness.   EXTREMITIES: No edema in LE.    NEUROLOGICAL: Right side 5/5 strength. 4/5 strength left upper and lower extremities.   PSYCHIATRIC: AOx3 , Normal mood and affect    Notable Labs:    Labs:                          11.5   11.83 )-----------( 154      ( 25 May 2022 05:54 )             35.0     05-25    133<L>  |  94<L>  |  77<H>  ----------------------------<  123<H>  4.2   |  28  |  2.98<H>    Ca    8.9      25 May 2022 05:54  Phos  3.3     05-25  Mg     2.00     05-25    TPro  6.1  /  Alb  3.5  /  TBili  0.6  /  DBili  x   /  AST  19  /  ALT  19  /  AlkPhos  69  05-25    LIVER FUNCTIONS - ( 25 May 2022 05:54 )  Alb: 3.5 g/dL / Pro: 6.1 g/dL / ALK PHOS: 69 U/L / ALT: 19 U/L / AST: 19 U/L / GGT: x             CAPILLARY BLOOD GLUCOSE      POCT Blood Glucose.: 185 mg/dL (24 May 2022 21:10)  POCT Blood Glucose.: 228 mg/dL (24 May 2022 18:04)  POCT Blood Glucose.: 211 mg/dL (24 May 2022 12:50)  POCT Blood Glucose.: 121 mg/dL (24 May 2022 08:30)                Micro:      RADIOLOGY & ADDITIONAL TESTS:    NNI

## 2022-05-25 NOTE — DIETITIAN INITIAL EVALUATION ADULT - OTHER CALCULATIONS
No dosing weight available at this time. Chart weight (5/22) 172 lbs.   Ideal Body Weight: 160 lbs / 72.7 kg +/-10%

## 2022-05-25 NOTE — PROGRESS NOTE ADULT - ASSESSMENT
SOB   Acute Systolic CHF   s/p ICD  cont BB  on imdur  cont hydralazine  pt still volume overloaded   though his crt is going a bit , this is possible due to cardiorenal syndrome   his proBNP is high   cont bumex 2 mg bid   fu labs today     CAD  s/p Cabg  cont asa  statin   cont BB    HTN  cont current meds    DM II  Monitor finger stick. Insulin coverage.      SOB   Acute Systolic CHF   s/p ICD  cont BB  on imdur  cont hydralazine  pt still volume overloaded   though his crt is going a bit , this is possible due to cardiorenal syndrome   his proBNP is high   cont bumex 2 mg bid   but crt is increasing   CHF consult recommended ? [facility]    CAD  s/p Cabg  cont asa  statin   cont BB    HTN  cont current meds    DM II  Monitor finger stick. Insulin coverage.

## 2022-05-25 NOTE — CONSULT NOTE ADULT - SUBJECTIVE AND OBJECTIVE BOX
Patient is a 66y old  Male who presents with a chief complaint of CHF exacerbation (24 May 2022 08:08)      HPI:    PAST MEDICAL & SURGICAL HISTORY:  HTN (hypertension)      CAD (coronary artery disease)      Diabetes      Hyperlipidemia, unspecified hyperlipidemia type      CHF (congestive heart failure)      BPH (benign prostatic hyperplasia)      S/P CABG (coronary artery bypass graft)      S/P appendectomy          FAMILY HISTORY:      Home Medications:  albuterol 90 mcg/inh inhalation aerosol: 2 puff(s) inhaled every 6 hours, As needed, Shortness of Breath and/or Wheezing (21 May 2022 18:43)  aspirin 81 mg oral delayed release tablet: 1 tab(s) orally once a day (21 May 2022 18:43)  budesonide-formoterol 160 mcg-4.5 mcg/inh inhalation aerosol:  inhaled  (21 May 2022 18:43)  carvedilol 3.125 mg oral tablet: 1 tab(s) orally every 12 hours (21 May 2022 18:43)  ezetimibe 10 mg oral tablet: 1 tab(s) orally once a day (21 May 2022 18:43)  FAMOTIDINE 40 MG TABLET: TAKE 1 TABLET BY MOUTH EVERY DAY (21 May 2022 18:43)  ferrous sulfate 325 mg (65 mg elemental iron) oral tablet: 1 tab(s) orally every other day (21 May 2022 18:43)  gabapentin 100 mg oral capsule: 2 cap(s) orally 3 times a day (23 May 2022 17:49)  glimepiride 2 mg oral tablet: 1 tab(s) orally once a day (21 May 2022 18:43)  hydrALAZINE 50 mg oral tablet: 1 tab(s) orally 3 times a day (21 May 2022 18:43)  levothyroxine 25 mcg (0.025 mg) oral tablet: 1 tab(s) orally once a day (21 May 2022 18:43)  Plavix 75 mg oral tablet: 1 tab(s) orally once a day (21 May 2022 18:43)  ROSUVASTATIN CALCIUM 20 MG TAB: TAKE 1 TABLET ONCE A DAY (AT BEDTIME) ORALLY (21 May 2022 18:43)  senna oral tablet: 2 tab(s) orally once a day (at bedtime) (21 May 2022 18:43)  tamsulosin 0.4 mg oral capsule: 1 cap(s) orally once a day (21 May 2022 18:43)      Medications:  ALBUTerol    90 MICROgram(s) HFA Inhaler 2 Puff(s) Inhalation every 6 hours PRN  albuterol/ipratropium for Nebulization 3 milliLiter(s) Nebulizer every 6 hours  aspirin enteric coated 81 milliGRAM(s) Oral daily  atorvastatin 80 milliGRAM(s) Oral at bedtime  budesonide 160 MICROgram(s)/formoterol 4.5 MICROgram(s) Inhaler 2 Puff(s) Inhalation two times a day  buMETAnide 2 milliGRAM(s) Oral every 12 hours  carvedilol 3.125 milliGRAM(s) Oral every 12 hours  clopidogrel Tablet 75 milliGRAM(s) Oral daily  dextrose 5%. 1000 milliLiter(s) IV Continuous <Continuous>  dextrose 5%. 1000 milliLiter(s) IV Continuous <Continuous>  dextrose 50% Injectable 25 Gram(s) IV Push once  dextrose 50% Injectable 12.5 Gram(s) IV Push once  dextrose 50% Injectable 25 Gram(s) IV Push once  dextrose Oral Gel 15 Gram(s) Oral once PRN  famotidine    Tablet 10 milliGRAM(s) Oral daily  ferrous    sulfate 325 milliGRAM(s) Oral daily  gabapentin 200 milliGRAM(s) Oral three times a day  glucagon  Injectable 1 milliGRAM(s) IntraMuscular once  heparin   Injectable 5000 Unit(s) SubCutaneous every 8 hours  hydrALAZINE 25 milliGRAM(s) Oral three times a day  insulin lispro (ADMELOG) corrective regimen sliding scale   SubCutaneous three times a day before meals  isosorbide   dinitrate Tablet (ISORDIL) 20 milliGRAM(s) Oral three times a day  levothyroxine 25 MICROGram(s) Oral daily  predniSONE   Tablet 40 milliGRAM(s) Oral daily  senna 2 Tablet(s) Oral at bedtime  spironolactone 25 milliGRAM(s) Oral daily  tamsulosin 0.4 milliGRAM(s) Oral at bedtime      Review of systems:  10 point review of systems completed and are negative unless noted in HPI    Vitals:  T(C): 36.5 (22 @ 09:00), Max: 36.6 (22 @ 17:48)  HR: 86 (22 @ 10:55) (65 - 97)  BP: 112/61 (22 @ 09:00) (106/62 - 116/68)  BP(mean): --  RR: 18 (22 @ 09:00) (16 - 18)  SpO2: 100% (22 @ 10:55) (95% - 100%)    Daily     Daily         I&O's Summary    24 May 2022 07:01  -  25 May 2022 07:00  --------------------------------------------------------  IN: 720 mL / OUT: 1900 mL / NET: -1180 mL    25 May 2022 07:01  -  25 May 2022 12:31  --------------------------------------------------------  IN: 0 mL / OUT: 1000 mL / NET: -1000 mL        Physical Exam:  Appearance: No Acute Distress  Neck: JVD  Cardiovascular: Normal S1 S2  Respiratory: Clear to auscultation diminished bilateral bases   Gastrointestinal: Soft, Non-tender	  Skin: No cyanosis	  Neurologic: Non-focal  Extremities: BLE edema  Psychiatry: A & O x 3, Mood & affect appropriate    Labs:                        11.5   11.83 )-----------( 154      ( 25 May 2022 05:54 )             35.0         133<L>  |  94<L>  |  77<H>  ----------------------------<  123<H>  4.2   |  28  |  2.98<H>    Ca    8.9      25 May 2022 05:54  Phos  3.3       Mg     2.00         TPro  6.1  /  Alb  3.5  /  TBili  0.6  /  DBili  x   /  AST  19  /  ALT  19  /  AlkPhos  69          TELEMETRY: Sinus Rhythm      Echocardiogram:  TTE with Doppler (w/Cont) (22 @ 13:09)     DIMENSIONS:  Dimensions:     Normal Values:  LA:     5.0 cm    2.0 - 4.0 cm  Ao:     2.5 cm    2.0 - 3.8 cm  SEPTUM: 1.0 cm    0.6 - 1.2 cm  PWT:    1.0 cm    0.6 - 1.1 cm  LVIDd:  5.5 cm    3.0 - 5.6 cm  LVIDs:  5.1 cm    1.8 - 4.0 cm  Derived Variables:  LVMI: 110 g/m2  RWT: 0.36  Fractional short: 7 %  Ejection Fraction (Visual Estimate): 15-20 %  ------------------------------------------------------------------------  OBSERVATIONS:  Mitral Valve: Tethered mitral valve leaflets with normal  opening. Mild mitral regurgitation.  Aortic Root: Normal aortic root.  Aortic Valve: Normal trileaflet aortic valve.  Left Atrium: Moderately dilated left atrium.  LA volume  index = 42 cc/m2.  Left Ventricle: Severe global left ventricular systolic  dysfunction. Left ventricular enlargement. (DT:130 ms).  Right Heart: Moderate right atrial enlargement. The right  ventricle is not well visualized appears enlarged. A device  wire is noted in the right heart. Normal tricuspid valve.  Mild tricuspid regurgitation. Normal pulmonic valve. Mild  pulmonic regurgitation.  Pericardium/PleuraNormal pericardium with no pericardial  effusion. Right pleural effusion.  Hemodynamic: Estimated right ventricular systolic pressure  equals 36 mm Hg, assuming right atrial pressure equals 10  mm Hg, consistent with borderline pulmonary hypertension.  ------------------------------------------------------------------------  CONCLUSIONS:  1. Moderately dilated left atrium.  LA volume index = 42  cc/m2.  2. Left ventricular enlargement.  3. Severe global left ventricular systolic dysfunction.  4. Moderate right atrial enlargement.  5. The right ventricle is not well visualized appears  enlarged. A device wire is noted in the right heart.      EK Lead ECG (22 @ 14:30)     Ventricular Rate 97 BPM    Atrial Rate 97 BPM    P-R Interval 182 ms    QRS Duration 150 ms    Q-T Interval 396 ms    QTC Calculation(Bazett) 502 ms    P Axis 70 degrees    R Axis -44 degrees    T Axis 109 degrees    Diagnosis Line Sinus rhythm with Fusion complexes and Premature atrial complexes with Aberrant conduction  Left axis deviation  Non-specific intra-ventricular conduction block  Abnormal ECG      Xray Chest 1 View- PORTABLE-Urgent (Xray Chest 1 View- PORTABLE-Urgent .) (22 @ 12:23)     INTERPRETATION:    Lungs are free of focal abnormalities. Bilateral hazy hemithoraces right   greater than left consistent with small layering effusions probably   secondary to mild edema. Heart size is stable with left-sided AICD.      COMPARISON:  May 21    IMPRESSION:  Follow-up with bilateral small effusions likely due to CHF.         Patient is a 66y old  Male who presents with a chief complaint of CHF exacerbation (24 May 2022 08:08)      HPI:  66 year old Male with PMH of HTN, HLD, CKD, CVA,  COPD (home O2 2L NC), recurrent PNA, CAD s/p CABG  (on ASA/Plavix) and  HFrEF (EF 20%;LVIDd5.5 LVE and CHRISTINA) s/p ICD. patient presented with c/o acute SOB/KINNEY, nonproductive cough, and BLE swelling.    Pertinent Labs:  WBC 12.11, Trop 77->77, Na 127, Cr 2.6, Pro-BNP 7,518  RVP and COVID negative   CXR showed hazy right lower lung opacity which may reflect layering effusion, atelectasis, or pneumonia  In the ED, he was placed on BiPAP and received IV Lasix 40mg, Azithromycin 500mg IVPB, CTX 1g, Methylprednisolone  125mg IV x 1, Nitroglycerin.          PAST MEDICAL & SURGICAL HISTORY:  HTN (hypertension)      CAD (coronary artery disease)      Diabetes      Hyperlipidemia, unspecified hyperlipidemia type      CHF (congestive heart failure)      BPH (benign prostatic hyperplasia)      S/P CABG (coronary artery bypass graft)      S/P appendectomy          FAMILY HISTORY:      Home Medications:  albuterol 90 mcg/inh inhalation aerosol: 2 puff(s) inhaled every 6 hours, As needed, Shortness of Breath and/or Wheezing (21 May 2022 18:43)  aspirin 81 mg oral delayed release tablet: 1 tab(s) orally once a day (21 May 2022 18:43)  budesonide-formoterol 160 mcg-4.5 mcg/inh inhalation aerosol:  inhaled  (21 May 2022 18:43)  carvedilol 3.125 mg oral tablet: 1 tab(s) orally every 12 hours (21 May 2022 18:43)  ezetimibe 10 mg oral tablet: 1 tab(s) orally once a day (21 May 2022 18:43)  FAMOTIDINE 40 MG TABLET: TAKE 1 TABLET BY MOUTH EVERY DAY (21 May 2022 18:43)  ferrous sulfate 325 mg (65 mg elemental iron) oral tablet: 1 tab(s) orally every other day (21 May 2022 18:43)  gabapentin 100 mg oral capsule: 2 cap(s) orally 3 times a day (23 May 2022 17:49)  glimepiride 2 mg oral tablet: 1 tab(s) orally once a day (21 May 2022 18:43)  hydrALAZINE 50 mg oral tablet: 1 tab(s) orally 3 times a day (21 May 2022 18:43)  levothyroxine 25 mcg (0.025 mg) oral tablet: 1 tab(s) orally once a day (21 May 2022 18:43)  Plavix 75 mg oral tablet: 1 tab(s) orally once a day (21 May 2022 18:43)  ROSUVASTATIN CALCIUM 20 MG TAB: TAKE 1 TABLET ONCE A DAY (AT BEDTIME) ORALLY (21 May 2022 18:43)  senna oral tablet: 2 tab(s) orally once a day (at bedtime) (21 May 2022 18:43)  tamsulosin 0.4 mg oral capsule: 1 cap(s) orally once a day (21 May 2022 18:43)      Medications:  ALBUTerol    90 MICROgram(s) HFA Inhaler 2 Puff(s) Inhalation every 6 hours PRN  albuterol/ipratropium for Nebulization 3 milliLiter(s) Nebulizer every 6 hours  aspirin enteric coated 81 milliGRAM(s) Oral daily  atorvastatin 80 milliGRAM(s) Oral at bedtime  budesonide 160 MICROgram(s)/formoterol 4.5 MICROgram(s) Inhaler 2 Puff(s) Inhalation two times a day  buMETAnide 2 milliGRAM(s) Oral every 12 hours  carvedilol 3.125 milliGRAM(s) Oral every 12 hours  clopidogrel Tablet 75 milliGRAM(s) Oral daily  dextrose 5%. 1000 milliLiter(s) IV Continuous <Continuous>  dextrose 5%. 1000 milliLiter(s) IV Continuous <Continuous>  dextrose 50% Injectable 25 Gram(s) IV Push once  dextrose 50% Injectable 12.5 Gram(s) IV Push once  dextrose 50% Injectable 25 Gram(s) IV Push once  dextrose Oral Gel 15 Gram(s) Oral once PRN  famotidine    Tablet 10 milliGRAM(s) Oral daily  ferrous    sulfate 325 milliGRAM(s) Oral daily  gabapentin 200 milliGRAM(s) Oral three times a day  glucagon  Injectable 1 milliGRAM(s) IntraMuscular once  heparin   Injectable 5000 Unit(s) SubCutaneous every 8 hours  hydrALAZINE 25 milliGRAM(s) Oral three times a day  insulin lispro (ADMELOG) corrective regimen sliding scale   SubCutaneous three times a day before meals  isosorbide   dinitrate Tablet (ISORDIL) 20 milliGRAM(s) Oral three times a day  levothyroxine 25 MICROGram(s) Oral daily  predniSONE   Tablet 40 milliGRAM(s) Oral daily  senna 2 Tablet(s) Oral at bedtime  spironolactone 25 milliGRAM(s) Oral daily  tamsulosin 0.4 milliGRAM(s) Oral at bedtime      Review of systems:  10 point review of systems completed and are negative unless noted in HPI    Vitals:  T(C): 36.5 (22 @ 09:00), Max: 36.6 (22 @ 17:48)  HR: 86 (22 @ 10:55) (65 - 97)  BP: 112/61 (22 @ 09:00) (106/62 - 116/68)  BP(mean): --  RR: 18 (22 @ 09:00) (16 - 18)  SpO2: 100% (22 @ 10:55) (95% - 100%)    Daily     Daily         I&O's Summary    24 May 2022 07:  -  25 May 2022 07:00  --------------------------------------------------------  IN: 720 mL / OUT: 1900 mL / NET: -1180 mL    25 May 2022 07:  -  25 May 2022 12:31  --------------------------------------------------------  IN: 0 mL / OUT: 1000 mL / NET: -1000 mL        Physical Exam:  Appearance: No Acute Distress  Neck: JVD  Cardiovascular: Normal S1 S2  Respiratory: Clear to auscultation diminished bilateral bases   Gastrointestinal: Soft, Non-tender	  Skin: No cyanosis	  Neurologic: Non-focal  Extremities: BLE edema  Psychiatry: A & O x 3, Mood & affect appropriate    Labs:                        11.5   11.83 )-----------( 154      ( 25 May 2022 05:54 )             35.0         133<L>  |  94<L>  |  77<H>  ----------------------------<  123<H>  4.2   |  28  |  2.98<H>    Ca    8.9      25 May 2022 05:54  Phos  3.3       Mg     2.00         TPro  6.1  /  Alb  3.5  /  TBili  0.6  /  DBili  x   /  AST  19  /  ALT  19  /  AlkPhos  69          TELEMETRY: Sinus Rhythm      Echocardiogram:  TTE with Doppler (w/Cont) (22 @ 13:09)     DIMENSIONS:  Dimensions:     Normal Values:  LA:     5.0 cm    2.0 - 4.0 cm  Ao:     2.5 cm    2.0 - 3.8 cm  SEPTUM: 1.0 cm    0.6 - 1.2 cm  PWT:    1.0 cm    0.6 - 1.1 cm  LVIDd:  5.5 cm    3.0 - 5.6 cm  LVIDs:  5.1 cm    1.8 - 4.0 cm  Derived Variables:  LVMI: 110 g/m2  RWT: 0.36  Fractional short: 7 %  Ejection Fraction (Visual Estimate): 15-20 %  ------------------------------------------------------------------------  OBSERVATIONS:  Mitral Valve: Tethered mitral valve leaflets with normal  opening. Mild mitral regurgitation.  Aortic Root: Normal aortic root.  Aortic Valve: Normal trileaflet aortic valve.  Left Atrium: Moderately dilated left atrium.  LA volume  index = 42 cc/m2.  Left Ventricle: Severe global left ventricular systolic  dysfunction. Left ventricular enlargement. (DT:130 ms).  Right Heart: Moderate right atrial enlargement. The right  ventricle is not well visualized appears enlarged. A device  wire is noted in the right heart. Normal tricuspid valve.  Mild tricuspid regurgitation. Normal pulmonic valve. Mild  pulmonic regurgitation.  Pericardium/PleuraNormal pericardium with no pericardial  effusion. Right pleural effusion.  Hemodynamic: Estimated right ventricular systolic pressure  equals 36 mm Hg, assuming right atrial pressure equals 10  mm Hg, consistent with borderline pulmonary hypertension.  ------------------------------------------------------------------------  CONCLUSIONS:  1. Moderately dilated left atrium.  LA volume index = 42  cc/m2.  2. Left ventricular enlargement.  3. Severe global left ventricular systolic dysfunction.  4. Moderate right atrial enlargement.  5. The right ventricle is not well visualized appears  enlarged. A device wire is noted in the right heart.      EK Lead ECG (22 @ 14:30)     Ventricular Rate 97 BPM    Atrial Rate 97 BPM    P-R Interval 182 ms    QRS Duration 150 ms    Q-T Interval 396 ms    QTC Calculation(Bazett) 502 ms    P Axis 70 degrees    R Axis -44 degrees    T Axis 109 degrees    Diagnosis Line Sinus rhythm with Fusion complexes and Premature atrial complexes with Aberrant conduction  Left axis deviation  Non-specific intra-ventricular conduction block  Abnormal ECG      Xray Chest 1 View- PORTABLE-Urgent (Xray Chest 1 View- PORTABLE-Urgent .) (22 @ 12:23)     INTERPRETATION:    Lungs are free of focal abnormalities. Bilateral hazy hemithoraces right   greater than left consistent with small layering effusions probably   secondary to mild edema. Heart size is stable with left-sided AICD.      COMPARISON:  May 21    IMPRESSION:  Follow-up with bilateral small effusions likely due to CHF.         Patient is a 66y old  Male who presents with a chief complaint of CHF exacerbation (24 May 2022 08:08)      HPI:  66 year old Male with PMH of HTN, HLD, CKD (baseline SCr 2.3-2.9),  CVA,  COPD (home O2 2L NC), recurrent PNA, CAD s/p CABG  (on ASA/Plavix) and  HFrEF (EF 20%;LVIDd5.5 LVE and CHRISTINA) s/p ICD. patient presented with c/o acute SOB/KINNEY, nonproductive cough, and BLE swelling.    Pertinent Labs:  WBC 12.11, Trop 77->77, Na 127, Cr 2.6, Pro-BNP 7,518  RVP and COVID negative   CXR showed hazy right lower lung opacity which may reflect layering effusion, atelectasis, or pneumonia  In the ED, he was placed on BiPAP and received IV Lasix 40mg, Azithromycin 500mg IVPB, CTX 1g, Methylprednisolone  125mg IV x 1, Nitroglycerin.          PAST MEDICAL & SURGICAL HISTORY:  HTN (hypertension)      CAD (coronary artery disease)      Diabetes      Hyperlipidemia, unspecified hyperlipidemia type      CHF (congestive heart failure)      BPH (benign prostatic hyperplasia)      S/P CABG (coronary artery bypass graft)      S/P appendectomy          FAMILY HISTORY:      Home Medications:  albuterol 90 mcg/inh inhalation aerosol: 2 puff(s) inhaled every 6 hours, As needed, Shortness of Breath and/or Wheezing (21 May 2022 18:43)  aspirin 81 mg oral delayed release tablet: 1 tab(s) orally once a day (21 May 2022 18:43)  budesonide-formoterol 160 mcg-4.5 mcg/inh inhalation aerosol:  inhaled  (21 May 2022 18:43)  carvedilol 3.125 mg oral tablet: 1 tab(s) orally every 12 hours (21 May 2022 18:43)  ezetimibe 10 mg oral tablet: 1 tab(s) orally once a day (21 May 2022 18:43)  FAMOTIDINE 40 MG TABLET: TAKE 1 TABLET BY MOUTH EVERY DAY (21 May 2022 18:43)  ferrous sulfate 325 mg (65 mg elemental iron) oral tablet: 1 tab(s) orally every other day (21 May 2022 18:43)  gabapentin 100 mg oral capsule: 2 cap(s) orally 3 times a day (23 May 2022 17:49)  glimepiride 2 mg oral tablet: 1 tab(s) orally once a day (21 May 2022 18:43)  hydrALAZINE 50 mg oral tablet: 1 tab(s) orally 3 times a day (21 May 2022 18:43)  levothyroxine 25 mcg (0.025 mg) oral tablet: 1 tab(s) orally once a day (21 May 2022 18:43)  Plavix 75 mg oral tablet: 1 tab(s) orally once a day (21 May 2022 18:43)  ROSUVASTATIN CALCIUM 20 MG TAB: TAKE 1 TABLET ONCE A DAY (AT BEDTIME) ORALLY (21 May 2022 18:43)  senna oral tablet: 2 tab(s) orally once a day (at bedtime) (21 May 2022 18:43)  tamsulosin 0.4 mg oral capsule: 1 cap(s) orally once a day (21 May 2022 18:43)      Medications:  ALBUTerol    90 MICROgram(s) HFA Inhaler 2 Puff(s) Inhalation every 6 hours PRN  albuterol/ipratropium for Nebulization 3 milliLiter(s) Nebulizer every 6 hours  aspirin enteric coated 81 milliGRAM(s) Oral daily  atorvastatin 80 milliGRAM(s) Oral at bedtime  budesonide 160 MICROgram(s)/formoterol 4.5 MICROgram(s) Inhaler 2 Puff(s) Inhalation two times a day  buMETAnide 2 milliGRAM(s) Oral every 12 hours  carvedilol 3.125 milliGRAM(s) Oral every 12 hours  clopidogrel Tablet 75 milliGRAM(s) Oral daily  dextrose 5%. 1000 milliLiter(s) IV Continuous <Continuous>  dextrose 5%. 1000 milliLiter(s) IV Continuous <Continuous>  dextrose 50% Injectable 25 Gram(s) IV Push once  dextrose 50% Injectable 12.5 Gram(s) IV Push once  dextrose 50% Injectable 25 Gram(s) IV Push once  dextrose Oral Gel 15 Gram(s) Oral once PRN  famotidine    Tablet 10 milliGRAM(s) Oral daily  ferrous    sulfate 325 milliGRAM(s) Oral daily  gabapentin 200 milliGRAM(s) Oral three times a day  glucagon  Injectable 1 milliGRAM(s) IntraMuscular once  heparin   Injectable 5000 Unit(s) SubCutaneous every 8 hours  hydrALAZINE 25 milliGRAM(s) Oral three times a day  insulin lispro (ADMELOG) corrective regimen sliding scale   SubCutaneous three times a day before meals  isosorbide   dinitrate Tablet (ISORDIL) 20 milliGRAM(s) Oral three times a day  levothyroxine 25 MICROGram(s) Oral daily  predniSONE   Tablet 40 milliGRAM(s) Oral daily  senna 2 Tablet(s) Oral at bedtime  spironolactone 25 milliGRAM(s) Oral daily  tamsulosin 0.4 milliGRAM(s) Oral at bedtime      Review of systems:  10 point review of systems completed and are negative unless noted in HPI    Vitals:  T(C): 36.5 (22 @ 09:00), Max: 36.6 (22 @ 17:48)  HR: 86 (22 @ 10:55) (65 - 97)  BP: 112/61 (22 @ 09:00) (106/62 - 116/68)  BP(mean): --  RR: 18 (22 @ 09:00) (16 - 18)  SpO2: 100% (22 @ 10:55) (95% - 100%)    Daily     Daily         I&O's Summary    24 May 2022 07:  -  25 May 2022 07:00  --------------------------------------------------------  IN: 720 mL / OUT: 1900 mL / NET: -1180 mL    25 May 2022 07:  -  25 May 2022 12:31  --------------------------------------------------------  IN: 0 mL / OUT: 1000 mL / NET: -1000 mL        Physical Exam:  Appearance: No Acute Distress  Neck: JVD  Cardiovascular: Normal S1 S2  Respiratory: Clear to auscultation diminished bilateral bases   Gastrointestinal: Soft, Non-tender	  Skin: No cyanosis	  Neurologic: Non-focal  Extremities: BLE edema  Psychiatry: A & O x 3, Mood & affect appropriate    Labs:                        11.5   11.83 )-----------( 154      ( 25 May 2022 05:54 )             35.0         133<L>  |  94<L>  |  77<H>  ----------------------------<  123<H>  4.2   |  28  |  2.98<H>    Ca    8.9      25 May 2022 05:54  Phos  3.3       Mg     2.00         TPro  6.1  /  Alb  3.5  /  TBili  0.6  /  DBili  x   /  AST  19  /  ALT  19  /  AlkPhos  69          TELEMETRY: Sinus Rhythm      Echocardiogram:  TTE with Doppler (w/Cont) (22 @ 13:09)     DIMENSIONS:  Dimensions:     Normal Values:  LA:     5.0 cm    2.0 - 4.0 cm  Ao:     2.5 cm    2.0 - 3.8 cm  SEPTUM: 1.0 cm    0.6 - 1.2 cm  PWT:    1.0 cm    0.6 - 1.1 cm  LVIDd:  5.5 cm    3.0 - 5.6 cm  LVIDs:  5.1 cm    1.8 - 4.0 cm  Derived Variables:  LVMI: 110 g/m2  RWT: 0.36  Fractional short: 7 %  Ejection Fraction (Visual Estimate): 15-20 %  ------------------------------------------------------------------------  OBSERVATIONS:  Mitral Valve: Tethered mitral valve leaflets with normal  opening. Mild mitral regurgitation.  Aortic Root: Normal aortic root.  Aortic Valve: Normal trileaflet aortic valve.  Left Atrium: Moderately dilated left atrium.  LA volume  index = 42 cc/m2.  Left Ventricle: Severe global left ventricular systolic  dysfunction. Left ventricular enlargement. (DT:130 ms).  Right Heart: Moderate right atrial enlargement. The right  ventricle is not well visualized appears enlarged. A device  wire is noted in the right heart. Normal tricuspid valve.  Mild tricuspid regurgitation. Normal pulmonic valve. Mild  pulmonic regurgitation.  Pericardium/PleuraNormal pericardium with no pericardial  effusion. Right pleural effusion.  Hemodynamic: Estimated right ventricular systolic pressure  equals 36 mm Hg, assuming right atrial pressure equals 10  mm Hg, consistent with borderline pulmonary hypertension.  ------------------------------------------------------------------------  CONCLUSIONS:  1. Moderately dilated left atrium.  LA volume index = 42  cc/m2.  2. Left ventricular enlargement.  3. Severe global left ventricular systolic dysfunction.  4. Moderate right atrial enlargement.  5. The right ventricle is not well visualized appears  enlarged. A device wire is noted in the right heart.      EK Lead ECG (22 @ 14:30)     Ventricular Rate 97 BPM    Atrial Rate 97 BPM    P-R Interval 182 ms    QRS Duration 150 ms    Q-T Interval 396 ms    QTC Calculation(Bazett) 502 ms    P Axis 70 degrees    R Axis -44 degrees    T Axis 109 degrees    Diagnosis Line Sinus rhythm with Fusion complexes and Premature atrial complexes with Aberrant conduction  Left axis deviation  Non-specific intra-ventricular conduction block  Abnormal ECG      Xray Chest 1 View- PORTABLE-Urgent (Xray Chest 1 View- PORTABLE-Urgent .) (22 @ 12:23)     INTERPRETATION:    Lungs are free of focal abnormalities. Bilateral hazy hemithoraces right   greater than left consistent with small layering effusions probably   secondary to mild edema. Heart size is stable with left-sided AICD.      COMPARISON:  May 21    IMPRESSION:  Follow-up with bilateral small effusions likely due to CHF.

## 2022-05-25 NOTE — PROGRESS NOTE ADULT - PROBLEM SELECTOR PLAN 9
DVT PPx: SubQ hep given CKD    Diet: CC DASH RENAL Diet     Dispo: pending clinical course, transition to home dose diuretics, cardiology guidance, workup of SHERRY   STARZ- needs PCP f/u appt made, patient's daughter Ketty informed team she will schedule appt and let us know when it is.   GOC- Full code

## 2022-05-25 NOTE — DIETITIAN INITIAL EVALUATION ADULT - PERTINENT LABORATORY DATA
(5/25) Na 133<L>, BUN 77<H>, Cr 2.98<H>, <H>, K+ 4.2, Phos 3.3, Mg 2.00, Alk Phos 69, ALT/SGPT 19, AST/SGOT 19  POCT: (5/25) 128, (5/24) 121-228

## 2022-05-25 NOTE — PROGRESS NOTE ADULT - PROBLEM SELECTOR PLAN 4
Uptrending SCr w aggressive diuresis; Patient w CKD 3; B/l SCr ~ 2.5  - SCr 2.6 on admission  - Avoid nephrotoxic agents, dose meds per GFR  - Monitor SCr especially during diuresis.  - continue diuresis given clinical improvement on diuretics, monitor SCr

## 2022-05-25 NOTE — CONSULT NOTE ADULT - NS ATTEND AMEND GEN_ALL_CORE FT
Change Bumex to 2 mg iv bid.  Decrease spironolactone to 12.5 mg po qd.  Will arrange RHC tomorrow.    Spoke to pt's HF cardiologist, Kathy Buckner. He has had poor f/u. Deemed not to be an OHT candidate.

## 2022-05-25 NOTE — DIETITIAN INITIAL EVALUATION ADULT - ADD RECOMMEND
1) Recommend continue current diet, which remains appropriate at this time. Fluid restriction per medical discretion.  2) Provided pt with written HF nutrition education. Topics include: limiting sodium intake, increased consumption of fruits/vegetables/whole grains, limiting intake of saturated fat, monitoring fluid intake, obtaining daily weights and monitoring changes with appropriate communication with MD. RDN contact information left at bedside.   3) Obtain daily weights.  4) Obtain current HbA1c.

## 2022-05-25 NOTE — CONSULT NOTE ADULT - ASSESSMENT
66 year old Male with PMH of HTN, HLD, CKD, CVA,  COPD (home O2 2L NC), recurrent PNA, CAD s/p CABG  (on ASA/Plavix) and  HFrEF (EF 20%;LVIDd5.5 LVE and CHRISTINA) s/p ICD. patient presented with c/o acute SOB/KINNEY, nonproductive cough, and BLE swelling.    Pertinent Labs:  WBC 12.11, Trop 77->77, Na 127, Cr 2.6, Pro-BNP 7,518  RVP and COVID negative   CXR showed hazy right lower lung opacity which may reflect layering effusion, atelectasis, or pneumonia  In the ED, he was placed on BiPAP and received IV Lasix 40mg, Azithromycin 500mg IVPB, CTX 1g, Methylprednisolone  125mg IV x 1, Nitroglycerin.      Bumex 2mg twice daily   Coreg 3.125mg twice daily   Hydralazine 25mg TID  ISDN 20mg TID  Spironolactone 25mg daily   Strict I/O  Daily standing weights  Monitor lytes replete K>4.0 and Mg>2.0  Trend SCr  Management per primary team  Appreciate Cardiology recommendations  HF counseling provided_  Pending final recommendations from HF Attending   66 year old Male with PMH of HTN, HLD, CKD (baseline SCr 2.3-2.9), CVA,  COPD (home O2 2L NC), recurrent PNA, CAD s/p CABG  (on ASA/Plavix) and  HFrEF (EF 20%;LVIDd5.5 LVE and CHRISTINA) s/p ICD. patient presented with c/o acute SOB/KINNEY, nonproductive cough, and BLE swelling.    Pertinent Labs:  WBC 12.11, Trop 77->77, Na 127, Cr 2.6, Pro-BNP 7,518  RVP and COVID negative   CXR showed hazy right lower lung opacity which may reflect layering effusion, atelectasis, or pneumonia  In the ED, he was placed on BiPAP and received IV Lasix 40mg, Azithromycin 500mg IVPB, CTX 1g, Methylprednisolone  125mg IV x 1, Nitroglycerin.      Bumex 2mg twice daily   Coreg 3.125mg twice daily   Hydralazine 25mg TID  ISDN 20mg TID  Spironolactone 25mg daily   Strict I/O  Daily standing weights  Monitor lytes replete K>4.0 and Mg>2.0  Trend SCr  Management per primary team  Appreciate Cardiology recommendations  HF counseling provided_  Pending final recommendations from HF Attending   66 year old Male with PMH of HTN, HLD, CKD (baseline SCr 2.3-2.9), CVA,  COPD (home O2 2L NC), recurrent PNA, CAD s/p CABG  (on ASA/Plavix) and  HFrEF (EF 20%;LVIDd5.5 LVE and CHRISTINA) s/p ICD. patient presented with c/o acute SOB/KINNEY, nonproductive cough, and BLE swelling.    Pertinent Labs:  WBC 12.11, Trop 77->77, Na 127, Cr 2.6, Pro-BNP 7,518  RVP and COVID negative   CXR showed hazy right lower lung opacity which may reflect layering effusion, atelectasis, or pneumonia  In the ED, he was placed on BiPAP and received IV Lasix 40mg, Azithromycin 500mg IVPB, CTX 1g, Methylprednisolone  125mg IV x 1, Nitroglycerin.      Bumex 2mg twice daily; Please switch to IV Bumex 2mg twice daily   Coreg 3.125mg twice daily   Hydralazine 25mg TID  ISDN 20mg TID  Spironolactone 25mg daily; Please decrease to 12.5mg daily   Strict I/O  Daily standing weights  Monitor lytes replete K>4.0 and Mg>2.0  Trend SCr  Management per primary team  Appreciate Cardiology recommendations  HF counseling provided_  Pending final recommendations from HF Attending   66 year old Male with PMH of HTN, HLD, CKD (baseline SCr 2.3-2.9), CVA,  COPD (home O2 2L NC), recurrent PNA, CAD s/p CABG  (on ASA/Plavix) and  HFrEF (EF 20%;LVIDd5.5 LVE and CHRISTINA) s/p ICD. patient presented with c/o acute SOB/KINNEY, nonproductive cough, and BLE swelling.    Pertinent Labs:  WBC 12.11, Trop 77->77, Na 127, Cr 2.6, Pro-BNP 7,518  RVP and COVID negative   CXR showed hazy right lower lung opacity which may reflect layering effusion, atelectasis, or pneumonia  In the ED, he was placed on BiPAP and received IV Lasix 40mg, Azithromycin 500mg IVPB, CTX 1g, Methylprednisolone  125mg IV x 1, Nitroglycerin.      Bumex 2mg twice daily; Please switch to IV Bumex 2mg twice daily   Coreg 3.125mg twice daily   Hydralazine 25mg TID  ISDN 20mg TID  Spironolactone 25mg daily; Please decrease to 12.5mg daily   Strict I/O  Daily standing weights  Monitor lytes replete K>4.0 and Mg>2.0  Trend SCr  Management per primary team  Appreciate Cardiology recommendations  HF counseling provided  [facility] tomorrow 5/26  Pending final recommendations from HF Attending

## 2022-05-25 NOTE — DIETITIAN INITIAL EVALUATION ADULT - NS FNS DIET ORDER
DASH/TLC {Sodium & Cholesterol Restricted}  Consistent Carbohydrate {Evening Snack}  1200mL Fluid Restriction

## 2022-05-25 NOTE — DIETITIAN INITIAL EVALUATION ADULT - PERTINENT MEDS FT
atorvastatin, famotidine Tablet, ferrous sulfate, ADMELOG corrective regimen sliding scale, levothyroxine, predniSONE tablet, senna, spironolactone

## 2022-05-26 LAB
ANION GAP SERPL CALC-SCNC: 14 MMOL/L — SIGNIFICANT CHANGE UP (ref 7–14)
APTT BLD: 28.1 SEC — SIGNIFICANT CHANGE UP (ref 27–36.3)
BASOPHILS # BLD AUTO: 0.01 K/UL — SIGNIFICANT CHANGE UP (ref 0–0.2)
BASOPHILS NFR BLD AUTO: 0.1 % — SIGNIFICANT CHANGE UP (ref 0–2)
BUN SERPL-MCNC: 78 MG/DL — HIGH (ref 7–23)
CALCIUM SERPL-MCNC: 9 MG/DL — SIGNIFICANT CHANGE UP (ref 8.4–10.5)
CHLORIDE SERPL-SCNC: 93 MMOL/L — LOW (ref 98–107)
CO2 SERPL-SCNC: 27 MMOL/L — SIGNIFICANT CHANGE UP (ref 22–31)
CREAT SERPL-MCNC: 2.9 MG/DL — HIGH (ref 0.5–1.3)
EGFR: 23 ML/MIN/1.73M2 — LOW
EOSINOPHIL # BLD AUTO: 0.02 K/UL — SIGNIFICANT CHANGE UP (ref 0–0.5)
EOSINOPHIL NFR BLD AUTO: 0.2 % — SIGNIFICANT CHANGE UP (ref 0–6)
GLUCOSE BLDC GLUCOMTR-MCNC: 117 MG/DL — HIGH (ref 70–99)
GLUCOSE BLDC GLUCOMTR-MCNC: 131 MG/DL — HIGH (ref 70–99)
GLUCOSE BLDC GLUCOMTR-MCNC: 181 MG/DL — HIGH (ref 70–99)
GLUCOSE BLDC GLUCOMTR-MCNC: 222 MG/DL — HIGH (ref 70–99)
GLUCOSE SERPL-MCNC: 113 MG/DL — HIGH (ref 70–99)
HCT VFR BLD CALC: 35.9 % — LOW (ref 39–50)
HGB BLD-MCNC: 11.9 G/DL — LOW (ref 13–17)
IANC: 9.97 K/UL — HIGH (ref 1.8–7.4)
IMM GRANULOCYTES NFR BLD AUTO: 0.5 % — SIGNIFICANT CHANGE UP (ref 0–1.5)
INR BLD: 1.18 RATIO — HIGH (ref 0.88–1.16)
LYMPHOCYTES # BLD AUTO: 1.58 K/UL — SIGNIFICANT CHANGE UP (ref 1–3.3)
LYMPHOCYTES # BLD AUTO: 12.4 % — LOW (ref 13–44)
MAGNESIUM SERPL-MCNC: 2 MG/DL — SIGNIFICANT CHANGE UP (ref 1.6–2.6)
MCHC RBC-ENTMCNC: 31.6 PG — SIGNIFICANT CHANGE UP (ref 27–34)
MCHC RBC-ENTMCNC: 33.1 GM/DL — SIGNIFICANT CHANGE UP (ref 32–36)
MCV RBC AUTO: 95.2 FL — SIGNIFICANT CHANGE UP (ref 80–100)
MONOCYTES # BLD AUTO: 1.13 K/UL — HIGH (ref 0–0.9)
MONOCYTES NFR BLD AUTO: 8.8 % — SIGNIFICANT CHANGE UP (ref 2–14)
NEUTROPHILS # BLD AUTO: 9.97 K/UL — HIGH (ref 1.8–7.4)
NEUTROPHILS NFR BLD AUTO: 78 % — HIGH (ref 43–77)
NRBC # BLD: 0 /100 WBCS — SIGNIFICANT CHANGE UP
NRBC # FLD: 0 K/UL — SIGNIFICANT CHANGE UP
PHOSPHATE SERPL-MCNC: 3.4 MG/DL — SIGNIFICANT CHANGE UP (ref 2.5–4.5)
PLATELET # BLD AUTO: 154 K/UL — SIGNIFICANT CHANGE UP (ref 150–400)
POTASSIUM SERPL-MCNC: 4.1 MMOL/L — SIGNIFICANT CHANGE UP (ref 3.5–5.3)
POTASSIUM SERPL-SCNC: 4.1 MMOL/L — SIGNIFICANT CHANGE UP (ref 3.5–5.3)
PROTHROM AB SERPL-ACNC: 13.7 SEC — HIGH (ref 10.5–13.4)
RBC # BLD: 3.77 M/UL — LOW (ref 4.2–5.8)
RBC # FLD: 15.4 % — HIGH (ref 10.3–14.5)
SODIUM SERPL-SCNC: 134 MMOL/L — LOW (ref 135–145)
WBC # BLD: 12.77 K/UL — HIGH (ref 3.8–10.5)
WBC # FLD AUTO: 12.77 K/UL — HIGH (ref 3.8–10.5)

## 2022-05-26 PROCEDURE — 93451 RIGHT HEART CATH: CPT | Mod: 26

## 2022-05-26 PROCEDURE — 99233 SBSQ HOSP IP/OBS HIGH 50: CPT

## 2022-05-26 PROCEDURE — 99233 SBSQ HOSP IP/OBS HIGH 50: CPT | Mod: GC

## 2022-05-26 RX ADMIN — GABAPENTIN 200 MILLIGRAM(S): 400 CAPSULE ORAL at 21:26

## 2022-05-26 RX ADMIN — CLOPIDOGREL BISULFATE 75 MILLIGRAM(S): 75 TABLET, FILM COATED ORAL at 11:05

## 2022-05-26 RX ADMIN — TAMSULOSIN HYDROCHLORIDE 0.4 MILLIGRAM(S): 0.4 CAPSULE ORAL at 21:25

## 2022-05-26 RX ADMIN — Medication 40 MILLIGRAM(S): at 06:08

## 2022-05-26 RX ADMIN — ISOSORBIDE DINITRATE 20 MILLIGRAM(S): 5 TABLET ORAL at 06:08

## 2022-05-26 RX ADMIN — CARVEDILOL PHOSPHATE 3.12 MILLIGRAM(S): 80 CAPSULE, EXTENDED RELEASE ORAL at 18:26

## 2022-05-26 RX ADMIN — ISOSORBIDE DINITRATE 20 MILLIGRAM(S): 5 TABLET ORAL at 11:04

## 2022-05-26 RX ADMIN — Medication 81 MILLIGRAM(S): at 11:06

## 2022-05-26 RX ADMIN — BUDESONIDE AND FORMOTEROL FUMARATE DIHYDRATE 2 PUFF(S): 160; 4.5 AEROSOL RESPIRATORY (INHALATION) at 21:24

## 2022-05-26 RX ADMIN — GABAPENTIN 200 MILLIGRAM(S): 400 CAPSULE ORAL at 11:04

## 2022-05-26 RX ADMIN — Medication 3 MILLILITER(S): at 22:34

## 2022-05-26 RX ADMIN — BUDESONIDE AND FORMOTEROL FUMARATE DIHYDRATE 2 PUFF(S): 160; 4.5 AEROSOL RESPIRATORY (INHALATION) at 11:04

## 2022-05-26 RX ADMIN — Medication 325 MILLIGRAM(S): at 11:05

## 2022-05-26 RX ADMIN — Medication 2: at 12:55

## 2022-05-26 RX ADMIN — Medication 25 MILLIGRAM(S): at 21:26

## 2022-05-26 RX ADMIN — Medication 25 MILLIGRAM(S): at 06:09

## 2022-05-26 RX ADMIN — Medication 3 MILLILITER(S): at 04:56

## 2022-05-26 RX ADMIN — ISOSORBIDE DINITRATE 20 MILLIGRAM(S): 5 TABLET ORAL at 21:27

## 2022-05-26 RX ADMIN — HEPARIN SODIUM 5000 UNIT(S): 5000 INJECTION INTRAVENOUS; SUBCUTANEOUS at 06:08

## 2022-05-26 RX ADMIN — CARVEDILOL PHOSPHATE 3.12 MILLIGRAM(S): 80 CAPSULE, EXTENDED RELEASE ORAL at 06:08

## 2022-05-26 RX ADMIN — ATORVASTATIN CALCIUM 80 MILLIGRAM(S): 80 TABLET, FILM COATED ORAL at 21:24

## 2022-05-26 RX ADMIN — FAMOTIDINE 10 MILLIGRAM(S): 10 INJECTION INTRAVENOUS at 11:04

## 2022-05-26 RX ADMIN — GABAPENTIN 200 MILLIGRAM(S): 400 CAPSULE ORAL at 06:07

## 2022-05-26 RX ADMIN — SENNA PLUS 2 TABLET(S): 8.6 TABLET ORAL at 21:24

## 2022-05-26 RX ADMIN — BUMETANIDE 2 MILLIGRAM(S): 0.25 INJECTION INTRAMUSCULAR; INTRAVENOUS at 06:07

## 2022-05-26 RX ADMIN — BUMETANIDE 2 MILLIGRAM(S): 0.25 INJECTION INTRAMUSCULAR; INTRAVENOUS at 18:26

## 2022-05-26 RX ADMIN — HEPARIN SODIUM 5000 UNIT(S): 5000 INJECTION INTRAVENOUS; SUBCUTANEOUS at 21:27

## 2022-05-26 RX ADMIN — Medication 3 MILLILITER(S): at 09:30

## 2022-05-26 RX ADMIN — SPIRONOLACTONE 12.5 MILLIGRAM(S): 25 TABLET, FILM COATED ORAL at 06:06

## 2022-05-26 RX ADMIN — Medication 25 MICROGRAM(S): at 06:07

## 2022-05-26 NOTE — PROGRESS NOTE ADULT - PROBLEM SELECTOR PLAN 2
Patient w/ CAD, S/P CABG  Pro-BNP 7518 on admission 85981 on 5/24.   Off bipap 5/22   CXR not much improvement on 5/22   TTE 5/22 EF 15-20%.  severe LV dysfunction globally.   GDMT: Home meds carvedilol, spironolactone, lasix. On hydralazine as well. Not on entresto/Ace 2/2 CKD. Not on SGLT2 likely 2/2 CKD.  - bumex 2mg IV BID  - 1L fluid restriction, DASH diet   - Strict I/Os, daily weights   - continue home aldactone and coreg and isordil  - c/w hydral   - cardiology consulted, recs appreciated.  - 5/25 cardiology recommending HF consult given diuresis with worsening SHERRY, consulted  - CHF: planning for cath 5/26 ([facility]). Patient reportedly w poor f/u not OHT candidate.

## 2022-05-26 NOTE — PROGRESS NOTE ADULT - ASSESSMENT
SOB   Acute Systolic CHF   s/p ICD  cont BB  on imdur  cont hydralazine  pt still volume overloaded   though his crt is going a bit , this is possible due to cardiorenal syndrome   his proBNP is high   cont bumex 2 mg bid   appreciate CHF input, plan for RHC     CAD  s/p Cabg  cont asa  statin   cont BB    HTN  cont current meds    DM II  Monitor finger stick. Insulin coverage.

## 2022-05-26 NOTE — PROGRESS NOTE ADULT - PROBLEM SELECTOR PLAN 9
DVT PPx: SubQ hep given CKD    Diet: CC DASH RENAL Diet     Dispo: pending clinical course, transition to home dose diuretics, cardiology guidance, workup of SHERRY   STANLEY- PCP/Cards appt made by daughter Ketty, on Thursday 6/2  [facility]- Full code

## 2022-05-26 NOTE — PROGRESS NOTE ADULT - ASSESSMENT
66 year old Male with PMH of HTN, HLD, CKD (baseline SCr 2.3-2.9), CVA,  COPD (home O2 2L NC), recurrent PNA, CAD s/p CABG  (on ASA/Plavix) and  HFrEF (EF 20%;LVIDd5.5 LVE and CHRISTINA) s/p ICD. patient presented with c/o acute SOB/KINNEY, nonproductive cough, and BLE swelling.    Pertinent Labs:  WBC 12.11, Trop 77->77, Na 127, Cr 2.6, Pro-BNP 7,518  RVP and COVID negative   CXR showed hazy right lower lung opacity which may reflect layering effusion, atelectasis, or pneumonia  In the ED, he was placed on BiPAP and received IV Lasix 40mg, Azithromycin 500mg IVPB, CTX 1g, Methylprednisolone  125mg IV x 1, Nitroglycerin.      IV Bumex 2mg twice daily   Coreg 3.125mg twice daily   Hydralazine 25mg TID  ISDN 20mg TID  Spironolactone 12.5mg daily   Strict I/O  Daily standing weights  Monitor lytes replete K>4.0 and Mg>2.0  Trend SCr  Management per primary team  Appreciate Cardiology/nephrology  recommendations  RHC today, 5/26  Pending final recommendations from HF Attending   66 year old Male with PMH of HTN, HLD, CKD (baseline SCr 2.3-2.9), CVA,  COPD (home O2 2L NC), recurrent PNA, CAD s/p CABG  (on ASA/Plavix) and  HFrEF (EF 20%;LVIDd5.5 LVE and CHRISTINA) s/p ICD. patient presented with c/o acute SOB/KINNEY, nonproductive cough, and BLE swelling.    Pertinent Labs:  WBC 12.11, Trop 77->77, Na 127, Cr 2.6, Pro-BNP 7,518  RVP and COVID negative   CXR showed hazy right lower lung opacity which may reflect layering effusion, atelectasis, or pneumonia  In the ED, he was placed on BiPAP and received IV Lasix 40mg, Azithromycin 500mg IVPB, CTX 1g, Methylprednisolone  125mg IV x 1, Nitroglycerin.      5/26 RHC: RA-9 PA 54/27 PCWP 24 CO/CI 3.97/2.22 SVR 1510      IV Bumex 2mg twice daily   Coreg 3.125mg twice daily   Hydralazine 25mg TID; Please increase to 50mg TID  ISDN 20mg TID  Spironolactone 12.5mg daily   Strict I/O  Daily standing weights  Monitor lytes replete K>4.0 and Mg>2.0  Trend SCr  Management per primary team  Appreciate Cardiology/Nephrology  recommendations  RHC today, 5/26  Pending final recommendations from HF Attending

## 2022-05-26 NOTE — PROGRESS NOTE ADULT - SUBJECTIVE AND OBJECTIVE BOX
Subjective:    INTERVAL HPI/OVERNIGHT EVENTS:   - CHF eval appreciated, potential RHC today   No acute events overnight  Afebrile, hemodynamically stable     Telemetry:    T(F): 98.7 (05-26-22 @ 06:36), Max: 98.7 (05-26-22 @ 06:36)  HR: 93 (05-26-22 @ 06:36) (86 - 97)  BP: 122/67 (05-26-22 @ 06:36) (106/58 - 122/67)  RR: 18 (05-26-22 @ 06:36) (17 - 18)  SpO2: 100% (05-26-22 @ 06:36) (97% - 100%)  I&O's Summary    25 May 2022 07:01  -  26 May 2022 07:00  --------------------------------------------------------  IN: 0 mL / OUT: 1900 mL / NET: -1900 mL      Weight (kg): 64.7 (05-26-22 @ 06:36)    Medications:  ALBUTerol    90 MICROgram(s) HFA Inhaler 2 Puff(s) Inhalation every 6 hours PRN  albuterol/ipratropium for Nebulization 3 milliLiter(s) Nebulizer every 6 hours  aspirin enteric coated 81 milliGRAM(s) Oral daily  atorvastatin 80 milliGRAM(s) Oral at bedtime  budesonide 160 MICROgram(s)/formoterol 4.5 MICROgram(s) Inhaler 2 Puff(s) Inhalation two times a day  buMETAnide Injectable 2 milliGRAM(s) IV Push every 12 hours  carvedilol 3.125 milliGRAM(s) Oral every 12 hours  clopidogrel Tablet 75 milliGRAM(s) Oral daily  dextrose 5%. 1000 milliLiter(s) (50 mL/Hr) IV Continuous <Continuous>  dextrose 5%. 1000 milliLiter(s) (100 mL/Hr) IV Continuous <Continuous>  dextrose 50% Injectable 25 Gram(s) IV Push once  dextrose 50% Injectable 12.5 Gram(s) IV Push once  dextrose 50% Injectable 25 Gram(s) IV Push once  dextrose Oral Gel 15 Gram(s) Oral once PRN  famotidine    Tablet 10 milliGRAM(s) Oral daily  ferrous    sulfate 325 milliGRAM(s) Oral daily  gabapentin 200 milliGRAM(s) Oral three times a day  glucagon  Injectable 1 milliGRAM(s) IntraMuscular once  heparin   Injectable 5000 Unit(s) SubCutaneous every 8 hours  hydrALAZINE 25 milliGRAM(s) Oral three times a day  insulin lispro (ADMELOG) corrective regimen sliding scale   SubCutaneous three times a day before meals  isosorbide   dinitrate Tablet (ISORDIL) 20 milliGRAM(s) Oral three times a day  levothyroxine 25 MICROGram(s) Oral daily  senna 2 Tablet(s) Oral at bedtime  spironolactone 12.5 milliGRAM(s) Oral daily  tamsulosin 0.4 milliGRAM(s) Oral at bedtime      PHYSICAL EXAM:  GENERAL APPEARANCE: well developed in mild respiratory distress   HEENT:  PERRL, EOMI. hearing grossly intact.  NECK: Mild JVD appreciated, difficult due to habitus.   CARDIAC: Normal S1 and S2. no mrg. RRR  LUNGS: Bilateral basilar crackles to mid level posteriorly  ABDOMEN: Distended, nontender, normal BS.   MUSCULOSKELETAL: ROM intact.  No joint erythema or tenderness.   EXTREMITIES: No edema in LE.    NEUROLOGICAL: Right side 5/5 strength. 4/5 strength left upper and lower extremities.   PSYCHIATRIC: AOx3 , Normal mood and affect    Notable Labs:    Labs:                          11.5   11.83 )-----------( 154      ( 25 May 2022 05:54 )             35.0     05-25    133<L>  |  94<L>  |  77<H>  ----------------------------<  123<H>  4.2   |  28  |  2.98<H>    Ca    8.9      25 May 2022 05:54  Phos  3.3     05-25  Mg     2.00     05-25    TPro  6.1  /  Alb  3.5  /  TBili  0.6  /  DBili  x   /  AST  19  /  ALT  19  /  AlkPhos  69  05-25    LIVER FUNCTIONS - ( 25 May 2022 05:54 )  Alb: 3.5 g/dL / Pro: 6.1 g/dL / ALK PHOS: 69 U/L / ALT: 19 U/L / AST: 19 U/L / GGT: x             CAPILLARY BLOOD GLUCOSE      POCT Blood Glucose.: 197 mg/dL (25 May 2022 21:20)  POCT Blood Glucose.: 255 mg/dL (25 May 2022 18:10)  POCT Blood Glucose.: 201 mg/dL (25 May 2022 12:34)  POCT Blood Glucose.: 128 mg/dL (25 May 2022 08:57)                Micro:      RADIOLOGY & ADDITIONAL TESTS:    NNI   Subjective: No shortness of breath, pain or discomfort.    INTERVAL HPI/OVERNIGHT EVENTS:   - CHF eval appreciated, potential RHC today   No acute events overnight  Afebrile, hemodynamically stable     Telemetry:    T(F): 98.7 (05-26-22 @ 06:36), Max: 98.7 (05-26-22 @ 06:36)  HR: 93 (05-26-22 @ 06:36) (86 - 97)  BP: 122/67 (05-26-22 @ 06:36) (106/58 - 122/67)  RR: 18 (05-26-22 @ 06:36) (17 - 18)  SpO2: 100% (05-26-22 @ 06:36) (97% - 100%)  I&O's Summary    25 May 2022 07:01  -  26 May 2022 07:00  --------------------------------------------------------  IN: 0 mL / OUT: 1900 mL / NET: -1900 mL      Weight (kg): 64.7 (05-26-22 @ 06:36)    Medications:  ALBUTerol    90 MICROgram(s) HFA Inhaler 2 Puff(s) Inhalation every 6 hours PRN  albuterol/ipratropium for Nebulization 3 milliLiter(s) Nebulizer every 6 hours  aspirin enteric coated 81 milliGRAM(s) Oral daily  atorvastatin 80 milliGRAM(s) Oral at bedtime  budesonide 160 MICROgram(s)/formoterol 4.5 MICROgram(s) Inhaler 2 Puff(s) Inhalation two times a day  buMETAnide Injectable 2 milliGRAM(s) IV Push every 12 hours  carvedilol 3.125 milliGRAM(s) Oral every 12 hours  clopidogrel Tablet 75 milliGRAM(s) Oral daily  dextrose 5%. 1000 milliLiter(s) (50 mL/Hr) IV Continuous <Continuous>  dextrose 5%. 1000 milliLiter(s) (100 mL/Hr) IV Continuous <Continuous>  dextrose 50% Injectable 25 Gram(s) IV Push once  dextrose 50% Injectable 12.5 Gram(s) IV Push once  dextrose 50% Injectable 25 Gram(s) IV Push once  dextrose Oral Gel 15 Gram(s) Oral once PRN  famotidine    Tablet 10 milliGRAM(s) Oral daily  ferrous    sulfate 325 milliGRAM(s) Oral daily  gabapentin 200 milliGRAM(s) Oral three times a day  glucagon  Injectable 1 milliGRAM(s) IntraMuscular once  heparin   Injectable 5000 Unit(s) SubCutaneous every 8 hours  hydrALAZINE 25 milliGRAM(s) Oral three times a day  insulin lispro (ADMELOG) corrective regimen sliding scale   SubCutaneous three times a day before meals  isosorbide   dinitrate Tablet (ISORDIL) 20 milliGRAM(s) Oral three times a day  levothyroxine 25 MICROGram(s) Oral daily  senna 2 Tablet(s) Oral at bedtime  spironolactone 12.5 milliGRAM(s) Oral daily  tamsulosin 0.4 milliGRAM(s) Oral at bedtime      PHYSICAL EXAM:  GENERAL APPEARANCE: well developed in mild respiratory distress   HEENT:  PERRL, EOMI. hearing grossly intact.  NECK: Mild JVD appreciated, difficult due to habitus.   CARDIAC: Normal S1 and S2. no mrg. RRR  LUNGS: Bilateral basilar crackles to mid level posteriorly  ABDOMEN: Distended, nontender, normal BS.   MUSCULOSKELETAL: ROM intact.  No joint erythema or tenderness.   EXTREMITIES: No edema in LE.    NEUROLOGICAL: Right side 5/5 strength. 4/5 strength left upper and lower extremities.   PSYCHIATRIC: AOx3 , Normal mood and affect    Notable Labs:    Labs:                          11.5   11.83 )-----------( 154      ( 25 May 2022 05:54 )             35.0     05-25    133<L>  |  94<L>  |  77<H>  ----------------------------<  123<H>  4.2   |  28  |  2.98<H>    Ca    8.9      25 May 2022 05:54  Phos  3.3     05-25  Mg     2.00     05-25    TPro  6.1  /  Alb  3.5  /  TBili  0.6  /  DBili  x   /  AST  19  /  ALT  19  /  AlkPhos  69  05-25    LIVER FUNCTIONS - ( 25 May 2022 05:54 )  Alb: 3.5 g/dL / Pro: 6.1 g/dL / ALK PHOS: 69 U/L / ALT: 19 U/L / AST: 19 U/L / GGT: x             CAPILLARY BLOOD GLUCOSE      POCT Blood Glucose.: 197 mg/dL (25 May 2022 21:20)  POCT Blood Glucose.: 255 mg/dL (25 May 2022 18:10)  POCT Blood Glucose.: 201 mg/dL (25 May 2022 12:34)  POCT Blood Glucose.: 128 mg/dL (25 May 2022 08:57)                Micro:      RADIOLOGY & ADDITIONAL TESTS:    NNI

## 2022-05-26 NOTE — PROGRESS NOTE ADULT - SUBJECTIVE AND OBJECTIVE BOX
DATE OF SERVICE: 05-26-22 @ 07:42    Subjective: Patient seen and examined. No new events except as noted.     SUBJECTIVE/ROS:  feels ok       MEDICATIONS:  MEDICATIONS  (STANDING):  albuterol/ipratropium for Nebulization 3 milliLiter(s) Nebulizer every 6 hours  aspirin enteric coated 81 milliGRAM(s) Oral daily  atorvastatin 80 milliGRAM(s) Oral at bedtime  budesonide 160 MICROgram(s)/formoterol 4.5 MICROgram(s) Inhaler 2 Puff(s) Inhalation two times a day  buMETAnide Injectable 2 milliGRAM(s) IV Push every 12 hours  carvedilol 3.125 milliGRAM(s) Oral every 12 hours  clopidogrel Tablet 75 milliGRAM(s) Oral daily  dextrose 5%. 1000 milliLiter(s) (50 mL/Hr) IV Continuous <Continuous>  dextrose 5%. 1000 milliLiter(s) (100 mL/Hr) IV Continuous <Continuous>  dextrose 50% Injectable 25 Gram(s) IV Push once  dextrose 50% Injectable 12.5 Gram(s) IV Push once  dextrose 50% Injectable 25 Gram(s) IV Push once  famotidine    Tablet 10 milliGRAM(s) Oral daily  ferrous    sulfate 325 milliGRAM(s) Oral daily  gabapentin 200 milliGRAM(s) Oral three times a day  glucagon  Injectable 1 milliGRAM(s) IntraMuscular once  heparin   Injectable 5000 Unit(s) SubCutaneous every 8 hours  hydrALAZINE 25 milliGRAM(s) Oral three times a day  insulin lispro (ADMELOG) corrective regimen sliding scale   SubCutaneous three times a day before meals  isosorbide   dinitrate Tablet (ISORDIL) 20 milliGRAM(s) Oral three times a day  levothyroxine 25 MICROGram(s) Oral daily  senna 2 Tablet(s) Oral at bedtime  spironolactone 12.5 milliGRAM(s) Oral daily  tamsulosin 0.4 milliGRAM(s) Oral at bedtime      PHYSICAL EXAM:  T(C): 37.1 (05-26-22 @ 06:36), Max: 37.1 (05-26-22 @ 06:36)  HR: 93 (05-26-22 @ 06:36) (86 - 97)  BP: 122/67 (05-26-22 @ 06:36) (106/58 - 122/67)  RR: 18 (05-26-22 @ 06:36) (17 - 18)  SpO2: 100% (05-26-22 @ 06:36) (97% - 100%)  Wt(kg): --  I&O's Summary    25 May 2022 07:01  -  26 May 2022 07:00  --------------------------------------------------------  IN: 0 mL / OUT: 1900 mL / NET: -1900 mL        Weight (kg): 64.7 (05-26 @ 06:36)      JVP: elevated   Neck: supple  Lung: clear   CV: S1 S2 , Murmur:  Abd: soft  Ext: No edema  neuro: Awake / alert  Psych: flat affect  Skin: normal``    LABS/DATA:    CARDIAC MARKERS:                                11.5   11.83 )-----------( 154      ( 25 May 2022 05:54 )             35.0     05-25    133<L>  |  94<L>  |  77<H>  ----------------------------<  123<H>  4.2   |  28  |  2.98<H>    Ca    8.9      25 May 2022 05:54  Phos  3.3     05-25  Mg     2.00     05-25    TPro  6.1  /  Alb  3.5  /  TBili  0.6  /  DBili  x   /  AST  19  /  ALT  19  /  AlkPhos  69  05-25    proBNP:   Lipid Profile:   HgA1c:   TSH:     TELE:  EKG:

## 2022-05-27 ENCOUNTER — TRANSCRIPTION ENCOUNTER (OUTPATIENT)
Age: 67
End: 2022-05-27

## 2022-05-27 VITALS
DIASTOLIC BLOOD PRESSURE: 69 MMHG | SYSTOLIC BLOOD PRESSURE: 124 MMHG | TEMPERATURE: 98 F | OXYGEN SATURATION: 96 % | RESPIRATION RATE: 19 BRPM | HEART RATE: 62 BPM

## 2022-05-27 LAB
ALBUMIN SERPL ELPH-MCNC: 3.8 G/DL — SIGNIFICANT CHANGE UP (ref 3.3–5)
ALP SERPL-CCNC: 72 U/L — SIGNIFICANT CHANGE UP (ref 40–120)
ALT FLD-CCNC: 22 U/L — SIGNIFICANT CHANGE UP (ref 4–41)
ANION GAP SERPL CALC-SCNC: 12 MMOL/L — SIGNIFICANT CHANGE UP (ref 7–14)
AST SERPL-CCNC: 20 U/L — SIGNIFICANT CHANGE UP (ref 4–40)
BASOPHILS # BLD AUTO: 0.01 K/UL — SIGNIFICANT CHANGE UP (ref 0–0.2)
BASOPHILS NFR BLD AUTO: 0.1 % — SIGNIFICANT CHANGE UP (ref 0–2)
BILIRUB SERPL-MCNC: 0.7 MG/DL — SIGNIFICANT CHANGE UP (ref 0.2–1.2)
BUN SERPL-MCNC: 70 MG/DL — HIGH (ref 7–23)
CALCIUM SERPL-MCNC: 9.3 MG/DL — SIGNIFICANT CHANGE UP (ref 8.4–10.5)
CHLORIDE SERPL-SCNC: 95 MMOL/L — LOW (ref 98–107)
CO2 SERPL-SCNC: 28 MMOL/L — SIGNIFICANT CHANGE UP (ref 22–31)
CREAT SERPL-MCNC: 2.54 MG/DL — HIGH (ref 0.5–1.3)
EGFR: 27 ML/MIN/1.73M2 — LOW
EOSINOPHIL # BLD AUTO: 0.05 K/UL — SIGNIFICANT CHANGE UP (ref 0–0.5)
EOSINOPHIL NFR BLD AUTO: 0.4 % — SIGNIFICANT CHANGE UP (ref 0–6)
GLUCOSE BLDC GLUCOMTR-MCNC: 127 MG/DL — HIGH (ref 70–99)
GLUCOSE BLDC GLUCOMTR-MCNC: 208 MG/DL — HIGH (ref 70–99)
GLUCOSE SERPL-MCNC: 97 MG/DL — SIGNIFICANT CHANGE UP (ref 70–99)
HCT VFR BLD CALC: 41.9 % — SIGNIFICANT CHANGE UP (ref 39–50)
HGB BLD-MCNC: 13.4 G/DL — SIGNIFICANT CHANGE UP (ref 13–17)
IANC: 9.47 K/UL — HIGH (ref 1.8–7.4)
IMM GRANULOCYTES NFR BLD AUTO: 0.4 % — SIGNIFICANT CHANGE UP (ref 0–1.5)
LYMPHOCYTES # BLD AUTO: 1.74 K/UL — SIGNIFICANT CHANGE UP (ref 1–3.3)
LYMPHOCYTES # BLD AUTO: 14.1 % — SIGNIFICANT CHANGE UP (ref 13–44)
MAGNESIUM SERPL-MCNC: 2.1 MG/DL — SIGNIFICANT CHANGE UP (ref 1.6–2.6)
MCHC RBC-ENTMCNC: 31.2 PG — SIGNIFICANT CHANGE UP (ref 27–34)
MCHC RBC-ENTMCNC: 32 GM/DL — SIGNIFICANT CHANGE UP (ref 32–36)
MCV RBC AUTO: 97.4 FL — SIGNIFICANT CHANGE UP (ref 80–100)
MONOCYTES # BLD AUTO: 1.04 K/UL — HIGH (ref 0–0.9)
MONOCYTES NFR BLD AUTO: 8.4 % — SIGNIFICANT CHANGE UP (ref 2–14)
NEUTROPHILS # BLD AUTO: 9.47 K/UL — HIGH (ref 1.8–7.4)
NEUTROPHILS NFR BLD AUTO: 76.6 % — SIGNIFICANT CHANGE UP (ref 43–77)
NRBC # BLD: 0 /100 WBCS — SIGNIFICANT CHANGE UP
NRBC # FLD: 0 K/UL — SIGNIFICANT CHANGE UP
PHOSPHATE SERPL-MCNC: 3.6 MG/DL — SIGNIFICANT CHANGE UP (ref 2.5–4.5)
PLATELET # BLD AUTO: 158 K/UL — SIGNIFICANT CHANGE UP (ref 150–400)
POTASSIUM SERPL-MCNC: 3.9 MMOL/L — SIGNIFICANT CHANGE UP (ref 3.5–5.3)
POTASSIUM SERPL-SCNC: 3.9 MMOL/L — SIGNIFICANT CHANGE UP (ref 3.5–5.3)
PROT SERPL-MCNC: 6.6 G/DL — SIGNIFICANT CHANGE UP (ref 6–8.3)
RBC # BLD: 4.3 M/UL — SIGNIFICANT CHANGE UP (ref 4.2–5.8)
RBC # FLD: 15.3 % — HIGH (ref 10.3–14.5)
SODIUM SERPL-SCNC: 135 MMOL/L — SIGNIFICANT CHANGE UP (ref 135–145)
WBC # BLD: 12.36 K/UL — HIGH (ref 3.8–10.5)
WBC # FLD AUTO: 12.36 K/UL — HIGH (ref 3.8–10.5)

## 2022-05-27 PROCEDURE — 99232 SBSQ HOSP IP/OBS MODERATE 35: CPT

## 2022-05-27 PROCEDURE — 99239 HOSP IP/OBS DSCHRG MGMT >30: CPT

## 2022-05-27 RX ORDER — HYDRALAZINE HCL 50 MG
50 TABLET ORAL THREE TIMES A DAY
Refills: 0 | Status: DISCONTINUED | OUTPATIENT
Start: 2022-05-27 | End: 2022-05-27

## 2022-05-27 RX ORDER — SPIRONOLACTONE 25 MG/1
0.5 TABLET, FILM COATED ORAL
Qty: 15 | Refills: 0
Start: 2022-05-27 | End: 2022-06-25

## 2022-05-27 RX ORDER — HYDRALAZINE HCL 50 MG
1 TABLET ORAL
Qty: 0 | Refills: 0 | DISCHARGE

## 2022-05-27 RX ORDER — HYDRALAZINE HCL 50 MG
1 TABLET ORAL
Qty: 90 | Refills: 0
Start: 2022-05-27 | End: 2022-06-25

## 2022-05-27 RX ORDER — FAMOTIDINE 10 MG/ML
0 INJECTION INTRAVENOUS
Qty: 0 | Refills: 0 | DISCHARGE

## 2022-05-27 RX ORDER — POTASSIUM CHLORIDE 20 MEQ
40 PACKET (EA) ORAL ONCE
Refills: 0 | Status: COMPLETED | OUTPATIENT
Start: 2022-05-27 | End: 2022-05-27

## 2022-05-27 RX ORDER — BUMETANIDE 0.25 MG/ML
3 INJECTION INTRAMUSCULAR; INTRAVENOUS
Qty: 90 | Refills: 0
Start: 2022-05-27 | End: 2022-06-25

## 2022-05-27 RX ORDER — ISOSORBIDE DINITRATE 5 MG/1
1 TABLET ORAL
Qty: 90 | Refills: 0
Start: 2022-05-27 | End: 2022-06-25

## 2022-05-27 RX ADMIN — Medication 3 MILLILITER(S): at 14:21

## 2022-05-27 RX ADMIN — ISOSORBIDE DINITRATE 20 MILLIGRAM(S): 5 TABLET ORAL at 05:07

## 2022-05-27 RX ADMIN — BUMETANIDE 2 MILLIGRAM(S): 0.25 INJECTION INTRAMUSCULAR; INTRAVENOUS at 17:19

## 2022-05-27 RX ADMIN — SPIRONOLACTONE 12.5 MILLIGRAM(S): 25 TABLET, FILM COATED ORAL at 05:07

## 2022-05-27 RX ADMIN — Medication 2: at 13:02

## 2022-05-27 RX ADMIN — ISOSORBIDE DINITRATE 20 MILLIGRAM(S): 5 TABLET ORAL at 17:16

## 2022-05-27 RX ADMIN — CARVEDILOL PHOSPHATE 3.12 MILLIGRAM(S): 80 CAPSULE, EXTENDED RELEASE ORAL at 17:54

## 2022-05-27 RX ADMIN — CARVEDILOL PHOSPHATE 3.12 MILLIGRAM(S): 80 CAPSULE, EXTENDED RELEASE ORAL at 05:07

## 2022-05-27 RX ADMIN — CLOPIDOGREL BISULFATE 75 MILLIGRAM(S): 75 TABLET, FILM COATED ORAL at 12:59

## 2022-05-27 RX ADMIN — HEPARIN SODIUM 5000 UNIT(S): 5000 INJECTION INTRAVENOUS; SUBCUTANEOUS at 05:08

## 2022-05-27 RX ADMIN — Medication 40 MILLIEQUIVALENT(S): at 09:34

## 2022-05-27 RX ADMIN — Medication 81 MILLIGRAM(S): at 12:58

## 2022-05-27 RX ADMIN — BUDESONIDE AND FORMOTEROL FUMARATE DIHYDRATE 2 PUFF(S): 160; 4.5 AEROSOL RESPIRATORY (INHALATION) at 09:34

## 2022-05-27 RX ADMIN — HEPARIN SODIUM 5000 UNIT(S): 5000 INJECTION INTRAVENOUS; SUBCUTANEOUS at 13:01

## 2022-05-27 RX ADMIN — GABAPENTIN 200 MILLIGRAM(S): 400 CAPSULE ORAL at 05:05

## 2022-05-27 RX ADMIN — Medication 325 MILLIGRAM(S): at 12:59

## 2022-05-27 RX ADMIN — FAMOTIDINE 10 MILLIGRAM(S): 10 INJECTION INTRAVENOUS at 12:59

## 2022-05-27 RX ADMIN — Medication 25 MILLIGRAM(S): at 05:07

## 2022-05-27 RX ADMIN — Medication 25 MICROGRAM(S): at 05:06

## 2022-05-27 RX ADMIN — GABAPENTIN 200 MILLIGRAM(S): 400 CAPSULE ORAL at 13:00

## 2022-05-27 RX ADMIN — Medication 3 MILLILITER(S): at 04:34

## 2022-05-27 RX ADMIN — BUMETANIDE 2 MILLIGRAM(S): 0.25 INJECTION INTRAMUSCULAR; INTRAVENOUS at 05:08

## 2022-05-27 RX ADMIN — Medication 3 MILLILITER(S): at 09:57

## 2022-05-27 NOTE — PROGRESS NOTE ADULT - ASSESSMENT
SOB   Acute Systolic CHF   s/p ICD  cont current meds  RHC noted   elevated filling pressures  cont diuresis   fu with CHF for further recommendation     CAD  s/p Cabg  cont asa  statin   cont BB    HTN  cont current meds    DM II  Monitor finger stick. Insulin coverage.

## 2022-05-27 NOTE — PROGRESS NOTE ADULT - SUBJECTIVE AND OBJECTIVE BOX
DATE OF SERVICE: 05-27-22 @ 07:09    Subjective: Patient seen and examined. No new events except as noted.     SUBJECTIVE/ROS:  feels better       MEDICATIONS:  MEDICATIONS  (STANDING):  albuterol/ipratropium for Nebulization 3 milliLiter(s) Nebulizer every 6 hours  aspirin enteric coated 81 milliGRAM(s) Oral daily  atorvastatin 80 milliGRAM(s) Oral at bedtime  budesonide 160 MICROgram(s)/formoterol 4.5 MICROgram(s) Inhaler 2 Puff(s) Inhalation two times a day  buMETAnide Injectable 2 milliGRAM(s) IV Push every 12 hours  carvedilol 3.125 milliGRAM(s) Oral every 12 hours  clopidogrel Tablet 75 milliGRAM(s) Oral daily  dextrose 5%. 1000 milliLiter(s) (50 mL/Hr) IV Continuous <Continuous>  dextrose 5%. 1000 milliLiter(s) (100 mL/Hr) IV Continuous <Continuous>  dextrose 50% Injectable 25 Gram(s) IV Push once  dextrose 50% Injectable 12.5 Gram(s) IV Push once  dextrose 50% Injectable 25 Gram(s) IV Push once  famotidine    Tablet 10 milliGRAM(s) Oral daily  ferrous    sulfate 325 milliGRAM(s) Oral daily  gabapentin 200 milliGRAM(s) Oral three times a day  glucagon  Injectable 1 milliGRAM(s) IntraMuscular once  heparin   Injectable 5000 Unit(s) SubCutaneous every 8 hours  hydrALAZINE 25 milliGRAM(s) Oral three times a day  insulin lispro (ADMELOG) corrective regimen sliding scale   SubCutaneous three times a day before meals  isosorbide   dinitrate Tablet (ISORDIL) 20 milliGRAM(s) Oral three times a day  levothyroxine 25 MICROGram(s) Oral daily  senna 2 Tablet(s) Oral at bedtime  spironolactone 12.5 milliGRAM(s) Oral daily  tamsulosin 0.4 milliGRAM(s) Oral at bedtime      PHYSICAL EXAM:  T(C): 36.9 (05-27-22 @ 05:00), Max: 36.9 (05-26-22 @ 18:20)  HR: 94 (05-27-22 @ 05:00) (80 - 94)  BP: 117/73 (05-27-22 @ 05:00) (114/62 - 133/69)  RR: 18 (05-27-22 @ 05:00) (18 - 18)  SpO2: 99% (05-27-22 @ 05:00) (96% - 100%)  Wt(kg): --  I&O's Summary    26 May 2022 07:01  -  27 May 2022 07:00  --------------------------------------------------------  IN: 630 mL / OUT: 1750 mL / NET: -1120 mL            JVP: Normal  Neck: supple  Lung: clear   CV: S1 S2 , Murmur:  Abd: soft  Ext: No edema  neuro: Awake / alert  Psych: flat affect  Skin: normal``    LABS/DATA:    CARDIAC MARKERS:                                11.9   12.77 )-----------( 154      ( 26 May 2022 06:45 )             35.9     05-26    134<L>  |  93<L>  |  78<H>  ----------------------------<  113<H>  4.1   |  27  |  2.90<H>    Ca    9.0      26 May 2022 06:45  Phos  3.4     05-26  Mg     2.00     05-26      proBNP:   Lipid Profile:   HgA1c:   TSH:     TELE:  EKG:

## 2022-05-27 NOTE — PROGRESS NOTE ADULT - PROBLEM SELECTOR PLAN 8
Hx of CVA w/ L side residual weakness   On Rosuvastatin 5mg at home, ASA 81  - C/w ASA 81  - Start atorvastatin therapeutic interchange (80)  - hold ezetimibe inpt

## 2022-05-27 NOTE — PROGRESS NOTE ADULT - PROVIDER SPECIALTY LIST ADULT
Cardiology
Heart Failure
Cardiology
Heart Failure
Internal Medicine

## 2022-05-27 NOTE — PROGRESS NOTE ADULT - SUBJECTIVE AND OBJECTIVE BOX
Interval History:  Patient resting comfortably in bed   He reports feeling much better today and no difficulty breathing     Denies CP/SOB/palpitations/dizziness  No acute events overnight      Medications:  ALBUTerol    90 MICROgram(s) HFA Inhaler 2 Puff(s) Inhalation every 6 hours PRN  albuterol/ipratropium for Nebulization 3 milliLiter(s) Nebulizer every 6 hours  aspirin enteric coated 81 milliGRAM(s) Oral daily  atorvastatin 80 milliGRAM(s) Oral at bedtime  budesonide 160 MICROgram(s)/formoterol 4.5 MICROgram(s) Inhaler 2 Puff(s) Inhalation two times a day  buMETAnide Injectable 2 milliGRAM(s) IV Push every 12 hours  carvedilol 3.125 milliGRAM(s) Oral every 12 hours  clopidogrel Tablet 75 milliGRAM(s) Oral daily  dextrose 5%. 1000 milliLiter(s) IV Continuous <Continuous>  dextrose 5%. 1000 milliLiter(s) IV Continuous <Continuous>  dextrose 50% Injectable 25 Gram(s) IV Push once  dextrose 50% Injectable 12.5 Gram(s) IV Push once  dextrose 50% Injectable 25 Gram(s) IV Push once  dextrose Oral Gel 15 Gram(s) Oral once PRN  famotidine    Tablet 10 milliGRAM(s) Oral daily  ferrous    sulfate 325 milliGRAM(s) Oral daily  gabapentin 200 milliGRAM(s) Oral three times a day  glucagon  Injectable 1 milliGRAM(s) IntraMuscular once  heparin   Injectable 5000 Unit(s) SubCutaneous every 8 hours  hydrALAZINE 25 milliGRAM(s) Oral three times a day  insulin lispro (ADMELOG) corrective regimen sliding scale   SubCutaneous three times a day before meals  isosorbide   dinitrate Tablet (ISORDIL) 20 milliGRAM(s) Oral three times a day  levothyroxine 25 MICROGram(s) Oral daily  senna 2 Tablet(s) Oral at bedtime  spironolactone 12.5 milliGRAM(s) Oral daily  tamsulosin 0.4 milliGRAM(s) Oral at bedtime      Vitals:  T(C): 36.7 (22 @ 11:06), Max: 36.9 (22 @ 18:20)  HR: 72 (22 @ 11:06) (72 - 94)  BP: 105/54 (22 @ 11:06) (105/54 - 133/69)  BP(mean): --  RR: 17 (22 @ 11:06) (17 - 18)  SpO2: 97% (22 @ 11:06) (97% - 100%)    Daily     Daily Weight in k.5 (27 May 2022 05:00)    Weight (kg): 64.7 ( @ 06:36)    I&O's Summary    26 May 2022 07:01  -  27 May 2022 07:00  --------------------------------------------------------  IN: 630 mL / OUT: 1750 mL / NET: -1120 mL        Physical Exam:  Appearance: No Acute Distress  Neck: No JVD  Cardiovascular: Normal S1 S2  Respiratory: Clear to auscultation bilaterally with fine rales RLL  Gastrointestinal: Soft, Non-tender	  Skin: No cyanosis	  Neurologic: Non-focal  Extremities: No LE edema  Psychiatry: A & O x 3, Mood & affect appropriate    Labs:                        13.4   12.36 )-----------( 158      ( 27 May 2022 06:35 )             41.9         135  |  95<L>  |  70<H>  ----------------------------<  97  3.9   |  28  |  2.54<H>    Ca    9.3      27 May 2022 06:35  Phos  3.6       Mg     2.10         TPro  6.6  /  Alb  3.8  /  TBili  0.7  /  DBili  x   /  AST  20  /  ALT  22  /  AlkPhos  72      PT/INR - ( 26 May 2022 06:45 )   PT: 13.7 sec;   INR: 1.18 ratio         PTT - ( 26 May 2022 06:45 )  PTT:28.1 sec    TELEMETRY: Sinus Rhythm      Echocardiogram:  < from: TTE with Doppler (w/Cont) (22 @ 13:09) >  DIMENSIONS:  Dimensions:     Normal Values:  LA:     5.0 cm    2.0 - 4.0 cm  Ao:     2.5 cm    2.0 - 3.8 cm  SEPTUM: 1.0 cm    0.6 - 1.2 cm  PWT:    1.0 cm    0.6 - 1.1 cm  LVIDd:  5.5 cm    3.0 - 5.6 cm  LVIDs:  5.1 cm    1.8 - 4.0 cm  Derived Variables:  LVMI: 110 g/m2  RWT: 0.36  Fractional short: 7 %  Ejection Fraction (Visual Estimate): 15-20 %  ------------------------------------------------------------------------  OBSERVATIONS:  Mitral Valve: Tethered mitral valve leaflets with normal  opening. Mild mitral regurgitation.  Aortic Root: Normal aortic root.  Aortic Valve: Normal trileaflet aortic valve.  Left Atrium: Moderately dilated left atrium.  LA volume  index = 42 cc/m2.  Left Ventricle: Severe global left ventricular systolic  dysfunction. Left ventricular enlargement. (DT:130 ms).  Right Heart: Moderate right atrial enlargement. The right  ventricle is not well visualized appears enlarged. A device  wire is noted in the right heart. Normal tricuspid valve.  Mild tricuspid regurgitation. Normal pulmonic valve. Mild  pulmonic regurgitation.  Pericardium/PleuraNormal pericardium with no pericardial  effusion. Right pleural effusion.  Hemodynamic: Estimated right ventricular systolic pressure  equals 36 mm Hg, assuming right atrial pressure equals 10  mm Hg, consistent with borderline pulmonary hypertension.  ------------------------------------------------------------------------  CONCLUSIONS:  1. Moderately dilated left atrium.  LA volume index = 42  cc/m2.  2. Left ventricular enlargement.  3. Severe global left ventricular systolic dysfunction.  4. Moderate right atrial enlargement.  5. The right ventricle is not well visualized appears  enlarged. A device wire is noted in the right heart.

## 2022-05-27 NOTE — PROGRESS NOTE ADULT - PROBLEM SELECTOR PROBLEM 6
Elevated troponin
CKD (chronic kidney disease)
Elevated troponin

## 2022-05-27 NOTE — PROGRESS NOTE ADULT - PROBLEM SELECTOR PROBLEM 1
Respiratory failure with hypoxia

## 2022-05-27 NOTE — PROGRESS NOTE ADULT - PROBLEM SELECTOR PROBLEM 4
Acute kidney injury superimposed on CKD
Hyponatremia
Acute kidney injury superimposed on CKD
Acute kidney injury superimposed on CKD

## 2022-05-27 NOTE — DISCHARGE NOTE NURSING/CASE MANAGEMENT/SOCIAL WORK - PATIENT PORTAL LINK FT
You can access the FollowMyHealth Patient Portal offered by [facility] by registering at the following website: [email or web address]. By joining [employer]’s FollowMyHealth portal, you will also be able to view your health information using other applications (apps) compatible with our system.

## 2022-05-27 NOTE — PROGRESS NOTE ADULT - ASSESSMENT
66 year old Male with PMH of HTN, HLD, CKD (baseline SCr 2.3-2.9), CVA,  COPD (home O2 2L NC), recurrent PNA, CAD s/p CABG  (on ASA/Plavix) and  HFrEF (EF 20%;LVIDd5.5 LVE and CHRISTINA) s/p ICD. patient presented with c/o acute SOB/KINNEY, nonproductive cough, and BLE swelling.    Pertinent Labs:  WBC 12.11, Trop 77->77, Na 127, Cr 2.6, Pro-BNP 7,518  RVP and COVID negative   CXR showed hazy right lower lung opacity which may reflect layering effusion, atelectasis, or pneumonia  In the ED, he was placed on BiPAP and received IV Lasix 40mg, Azithromycin 500mg IVPB, CTX 1g, Methylprednisolone  125mg IV x 1, Nitroglycerin.      5/26 RHC: RA-9 PA 54/27 PCWP 24 CO/CI 3.97/2.22 SVR 1510      IV Bumex 2mg twice daily   Coreg 3.125mg twice daily   Hydralazine 25mg TID; Please increase to 50mg TID  ISDN 20mg TID  Spironolactone 12.5mg daily   Strict I/O  Daily standing weights  Monitor lytes replete K>4.0 and Mg>2.0  Trend SCr  Management per primary team  Appreciate Cardiology/Nephrology  recommendations  RHC completed- final report pending   Follow up appointment with Dr. Dobson on 6/9 at 1pm  Pending final recommendations from HF Attending   66 year old Male with PMH of HTN, HLD, CKD (baseline SCr 2.3-2.9), CVA,  COPD (home O2 2L NC), recurrent PNA, CAD s/p CABG  (on ASA/Plavix) and  HFrEF (EF 20%;LVIDd5.5 LVE and CHRISTINA) s/p ICD. patient presented with c/o acute SOB/KINNEY, nonproductive cough, and BLE swelling.    Pertinent Labs:  WBC 12.11, Trop 77->77, Na 127, Cr 2.6, Pro-BNP 7,518  RVP and COVID negative   CXR showed hazy right lower lung opacity which may reflect layering effusion, atelectasis, or pneumonia  In the ED, he was placed on BiPAP and received IV Lasix 40mg, Azithromycin 500mg IVPB, CTX 1g, Methylprednisolone  125mg IV x 1, Nitroglycerin.      5/26 RHC: RA-9 PA 54/27 PCWP 24 CO/CI 3.97/2.22 SVR 1510      IV Bumex 2mg twice daily   Coreg 3.125mg twice daily   Hydralazine 25mg TID; Please increase to 50mg TID  ISDN 20mg TID  Spironolactone 12.5mg daily   Strict I/O  Daily standing weights  Monitor lytes replete K>4.0 and Mg>2.0  Trend SCr  Management per primary team  Appreciate Cardiology/Nephrology  recommendations  RHC completed- final report pending   Follow up appointment with Dr. Dobson on 6/9 at 1pm  Pending final recommendations from HF attending   66 year old Male with PMH of HTN, HLD, CKD (baseline SCr 2.3-2.9), CVA,  COPD (home O2 2L NC), recurrent PNA, CAD s/p CABG  (on ASA/Plavix) and  HFrEF (EF 20%;LVIDd5.5 LVE and CHRISTINA) s/p ICD. patient presented with c/o acute SOB/KINNEY, nonproductive cough, and BLE swelling.    Pertinent Labs:  WBC 12.11, Trop 77->77, Na 127, Cr 2.6, Pro-BNP 7,518  RVP and COVID negative   CXR showed hazy right lower lung opacity which may reflect layering effusion, atelectasis, or pneumonia  In the ED, he was placed on BiPAP and received IV Lasix 40mg, Azithromycin 500mg IVPB, CTX 1g, Methylprednisolone  125mg IV x 1, Nitroglycerin.      5/26 RHC: RA-9 PA 54/27 PCWP 24 CO/CI 3.97/2.22 SVR 1510      IV Bumex 2mg twice daily; Please switch to PO 3mg daily   Coreg 3.125mg twice daily   Hydralazine 25mg TID; Please increase to 50mg TID  ISDN 20mg TID  Spironolactone 12.5mg daily   Strict I/O  Daily standing weights  Monitor lytes replete K>4.0 and Mg>2.0  Trend SCr  Management per primary team  Appreciate Cardiology/Nephrology  recommendations  RHC completed- final report pending   Follow up appointment with Dr. Dobson on 6/9 at 1pm  Pending final recommendations from HF attending

## 2022-05-27 NOTE — PROGRESS NOTE ADULT - SUBJECTIVE AND OBJECTIVE BOX
Subjective:    INTERVAL HPI/OVERNIGHT EVENTS:   - s/p RHC yesterday  No acute events overnight  Afebrile, hemodynamically stable     Telemetry:    T(F): 98.4 (05-27-22 @ 05:00), Max: 98.5 (05-26-22 @ 21:25)  HR: 94 (05-27-22 @ 05:00) (80 - 94)  BP: 117/73 (05-27-22 @ 05:00) (114/62 - 133/69)  RR: 18 (05-27-22 @ 05:00) (18 - 18)  SpO2: 99% (05-27-22 @ 05:00) (96% - 100%)  I&O's Summary    26 May 2022 07:01  -  27 May 2022 07:00  --------------------------------------------------------  IN: 630 mL / OUT: 1750 mL / NET: -1120 mL      Weight (kg): 64.7 (05-26-22 @ 06:36)    Medications:  ALBUTerol    90 MICROgram(s) HFA Inhaler 2 Puff(s) Inhalation every 6 hours PRN  albuterol/ipratropium for Nebulization 3 milliLiter(s) Nebulizer every 6 hours  aspirin enteric coated 81 milliGRAM(s) Oral daily  atorvastatin 80 milliGRAM(s) Oral at bedtime  budesonide 160 MICROgram(s)/formoterol 4.5 MICROgram(s) Inhaler 2 Puff(s) Inhalation two times a day  buMETAnide Injectable 2 milliGRAM(s) IV Push every 12 hours  carvedilol 3.125 milliGRAM(s) Oral every 12 hours  clopidogrel Tablet 75 milliGRAM(s) Oral daily  dextrose 5%. 1000 milliLiter(s) (50 mL/Hr) IV Continuous <Continuous>  dextrose 5%. 1000 milliLiter(s) (100 mL/Hr) IV Continuous <Continuous>  dextrose 50% Injectable 25 Gram(s) IV Push once  dextrose 50% Injectable 12.5 Gram(s) IV Push once  dextrose 50% Injectable 25 Gram(s) IV Push once  dextrose Oral Gel 15 Gram(s) Oral once PRN  famotidine    Tablet 10 milliGRAM(s) Oral daily  ferrous    sulfate 325 milliGRAM(s) Oral daily  gabapentin 200 milliGRAM(s) Oral three times a day  glucagon  Injectable 1 milliGRAM(s) IntraMuscular once  heparin   Injectable 5000 Unit(s) SubCutaneous every 8 hours  hydrALAZINE 25 milliGRAM(s) Oral three times a day  insulin lispro (ADMELOG) corrective regimen sliding scale   SubCutaneous three times a day before meals  isosorbide   dinitrate Tablet (ISORDIL) 20 milliGRAM(s) Oral three times a day  levothyroxine 25 MICROGram(s) Oral daily  senna 2 Tablet(s) Oral at bedtime  spironolactone 12.5 milliGRAM(s) Oral daily  tamsulosin 0.4 milliGRAM(s) Oral at bedtime    PHYSICAL EXAM:  GENERAL APPEARANCE: well developed in mild respiratory distress   HEENT:  PERRL, EOMI. hearing grossly intact.  NECK: Mild JVD appreciated, difficult due to habitus.   CARDIAC: Normal S1 and S2. no mrg. RRR  LUNGS: Bilateral basilar crackles to mid level posteriorly  ABDOMEN: Distended, nontender, normal BS.   MUSCULOSKELETAL: ROM intact.  No joint erythema or tenderness.   EXTREMITIES: No edema in LE.    NEUROLOGICAL: Right side 5/5 strength. 4/5 strength left upper and lower extremities.   PSYCHIATRIC: AOx3 , Normal mood and affect    Notable Labs:    Labs:                          11.9   12.77 )-----------( 154      ( 26 May 2022 06:45 )             35.9     05-26    134<L>  |  93<L>  |  78<H>  ----------------------------<  113<H>  4.1   |  27  |  2.90<H>    Ca    9.0      26 May 2022 06:45  Phos  3.4     05-26  Mg     2.00     05-26        PT/INR - ( 26 May 2022 06:45 )   PT: 13.7 sec;   INR: 1.18 ratio         PTT - ( 26 May 2022 06:45 )  PTT:28.1 sec  CAPILLARY BLOOD GLUCOSE      POCT Blood Glucose.: 181 mg/dL (26 May 2022 21:48)  POCT Blood Glucose.: 131 mg/dL (26 May 2022 19:06)  POCT Blood Glucose.: 222 mg/dL (26 May 2022 12:35)  POCT Blood Glucose.: 117 mg/dL (26 May 2022 08:18)    Micro:      RADIOLOGY & ADDITIONAL TESTS:    RHC:    Subjective:    INTERVAL HPI/OVERNIGHT EVENTS:   - s/p RHC yesterday, reportedly elevated filling pressures   - patient feels well today, laying almost flat, on 2L NC.   No acute events overnight  Afebrile, hemodynamically stable     Telemetry:    T(F): 98.4 (05-27-22 @ 05:00), Max: 98.5 (05-26-22 @ 21:25)  HR: 94 (05-27-22 @ 05:00) (80 - 94)  BP: 117/73 (05-27-22 @ 05:00) (114/62 - 133/69)  RR: 18 (05-27-22 @ 05:00) (18 - 18)  SpO2: 99% (05-27-22 @ 05:00) (96% - 100%)  I&O's Summary    26 May 2022 07:01  -  27 May 2022 07:00  --------------------------------------------------------  IN: 630 mL / OUT: 1750 mL / NET: -1120 mL      Weight (kg): 64.7 (05-26-22 @ 06:36)    Medications:  ALBUTerol    90 MICROgram(s) HFA Inhaler 2 Puff(s) Inhalation every 6 hours PRN  albuterol/ipratropium for Nebulization 3 milliLiter(s) Nebulizer every 6 hours  aspirin enteric coated 81 milliGRAM(s) Oral daily  atorvastatin 80 milliGRAM(s) Oral at bedtime  budesonide 160 MICROgram(s)/formoterol 4.5 MICROgram(s) Inhaler 2 Puff(s) Inhalation two times a day  buMETAnide Injectable 2 milliGRAM(s) IV Push every 12 hours  carvedilol 3.125 milliGRAM(s) Oral every 12 hours  clopidogrel Tablet 75 milliGRAM(s) Oral daily  dextrose 5%. 1000 milliLiter(s) (50 mL/Hr) IV Continuous <Continuous>  dextrose 5%. 1000 milliLiter(s) (100 mL/Hr) IV Continuous <Continuous>  dextrose 50% Injectable 25 Gram(s) IV Push once  dextrose 50% Injectable 12.5 Gram(s) IV Push once  dextrose 50% Injectable 25 Gram(s) IV Push once  dextrose Oral Gel 15 Gram(s) Oral once PRN  famotidine    Tablet 10 milliGRAM(s) Oral daily  ferrous    sulfate 325 milliGRAM(s) Oral daily  gabapentin 200 milliGRAM(s) Oral three times a day  glucagon  Injectable 1 milliGRAM(s) IntraMuscular once  heparin   Injectable 5000 Unit(s) SubCutaneous every 8 hours  hydrALAZINE 25 milliGRAM(s) Oral three times a day  insulin lispro (ADMELOG) corrective regimen sliding scale   SubCutaneous three times a day before meals  isosorbide   dinitrate Tablet (ISORDIL) 20 milliGRAM(s) Oral three times a day  levothyroxine 25 MICROGram(s) Oral daily  senna 2 Tablet(s) Oral at bedtime  spironolactone 12.5 milliGRAM(s) Oral daily  tamsulosin 0.4 milliGRAM(s) Oral at bedtime    PHYSICAL EXAM:  GENERAL APPEARANCE: well developed in mild respiratory distress   HEENT:  PERRL, EOMI. hearing grossly intact.  NECK: Mild JVD appreciated, difficult due to habitus.   CARDIAC: Normal S1 and S2. no mrg. RRR  LUNGS: Bilateral basilar crackles to mid level posteriorly  ABDOMEN: Distended, nontender, normal BS.   MUSCULOSKELETAL: ROM intact.  No joint erythema or tenderness.   EXTREMITIES: No edema in LE.  Left hand slightly cool to touch.   NEUROLOGICAL: Right side 5/5 strength. 4/5 strength left upper and lower extremities.   PSYCHIATRIC: AOx4 , Normal mood and affect    Notable Labs:    Labs:                          13.4   12.36 )-----------( 158      ( 27 May 2022 06:35 )             41.9     05-27    135  |  95<L>  |  70<H>  ----------------------------<  97  3.9   |  28  |  2.54<H>    Ca    9.3      27 May 2022 06:35  Phos  3.6     05-27  Mg     2.10     05-27    TPro  6.6  /  Alb  3.8  /  TBili  0.7  /  DBili  x   /  AST  20  /  ALT  22  /  AlkPhos  72  05-27    LIVER FUNCTIONS - ( 27 May 2022 06:35 )  Alb: 3.8 g/dL / Pro: 6.6 g/dL / ALK PHOS: 72 U/L / ALT: 22 U/L / AST: 20 U/L / GGT: x           PT/INR - ( 26 May 2022 06:45 )   PT: 13.7 sec;   INR: 1.18 ratio         PTT - ( 26 May 2022 06:45 )  PTT:28.1 sec  CAPILLARY BLOOD GLUCOSE      POCT Blood Glucose.: 127 mg/dL (27 May 2022 08:33)  POCT Blood Glucose.: 181 mg/dL (26 May 2022 21:48)  POCT Blood Glucose.: 131 mg/dL (26 May 2022 19:06)  POCT Blood Glucose.: 222 mg/dL (26 May 2022 12:35)                Micro:      RADIOLOGY & ADDITIONAL TESTS:    RHC: report not in, but reportedly elevated filling pressures.

## 2022-05-27 NOTE — PROGRESS NOTE ADULT - PROBLEM SELECTOR PROBLEM 8
History of CVA (cerebrovascular accident)

## 2022-05-27 NOTE — DISCHARGE NOTE NURSING/CASE MANAGEMENT/SOCIAL WORK - NSDCPEFALRISK_GEN_ALL_CORE
For information on Fall & Injury Prevention, visit: [email or web address] OR  [email or web address] OR  https://www.cdc.gov/steadi/patient.html

## 2022-05-27 NOTE — PROGRESS NOTE ADULT - PROBLEM SELECTOR PLAN 5
Resolving w diuresis.   Hyponatremia to 127 on admission  Likely i/s/o Hypervolemic hyponatremia given underlying HFrEF/CKD  - Trend BMP
Troponin mildly elevated on admission, but stable 77->77  Patient has CKD 3  - unlikely ACS, patient with higher troponin levels in past labs on past admissions. No need to trend.
Resolving w diuresis.   Hyponatremia to 127 on admission  Likely i/s/o Hypervolemic hyponatremia given underlying HFrEF/CKD  - Trend BMP

## 2022-05-27 NOTE — PROGRESS NOTE ADULT - NS ATTEND AMEND GEN_ALL_CORE FT
Continue current regimen.  RHC today.
Change Bumex to 3 mg po qd.  Aim to d/c home today or tomorrow.

## 2022-05-27 NOTE — PROGRESS NOTE ADULT - PROBLEM SELECTOR PLAN 4
Uptrending SCr w aggressive diuresis; Patient w CKD 3; B/l SCr ~ 2.5  - SCr 2.6 on admission  - Avoid nephrotoxic agents, dose meds per GFR  - Monitor SCr especially during diuresis.  - continue diuresis given clinical improvement on diuretics, monitor SCr Resolved on 5/27.   Patient w CKD 3; B/l SCr ~ 2.5  - SCr 2.6 on admission  - Avoid nephrotoxic agents, dose meds per GFR  - Monitor SCr especially during diuresis.  - continue diuresis given clinical improvement on diuretics, monitor SCr

## 2022-05-27 NOTE — PROGRESS NOTE ADULT - ATTENDING COMMENTS
67 yo M with CAD s/p CABG on DAPT, HFrEF (22% 10/22), s/p ICD, CVA w/ residual L deficits, CKD, asthma/COPD on baseline 2L O2, presented with acute dyspnea and peripheral edema with concern for HF exacerbation with possible component of COPD/asthma flare. Breathing has improved with nebs, steroids and diuresis. Peripheral edema resolved. Lungs still w/ bibasilar crackles. Cr 2.98 ->2.9, has hx CKD. Appreciate Cardiology and HF recs, plan for RHC today, follow up results.
67 yo M with CAD s/p CABG on DAPT, HFrEF (22% 10/22), s/p ICD, CVA w/ residual L deficits, CKD, asthma/COPD on baseline 2L O2, presented with acute dyspnea and peripheral edema with concern for HF exacerbation with possible component of COPD/asthma flare. Breathing has improved with nebs, steroids and diuresis. Peripheral edema resolved. Lungs still w/ bibasilar crackles but improved from prior. Cr slowly uptrending, GFR approx. 23 -> 22 today. Appreciate Cardiology recs and HF consults. Plan for RHC tomorrow.
67 yo M with CAD s/p CABG on DAPT, HFrEF (22% 10/22), s/p ICD, CVA w/ residual L deficits, CKD, asthma/COPD on baseline 2L O2, presented with acute dyspnea and peripheral edema concerning for HF exacerbation with possible component of COPD/asthma flare. Breathing has improved with nebs, steroids and diuresis. Peripheral edema resolved. S/p RHC yesterday. Diuresis increased per heart failure. Optimized for DC home today with close outpatient follow up. DC planning 36 minutes.
67 yo M with CAD s/p CABG on DAPT, HFrEF (22% 10/22), s/p ICD, CVA w/ residual L deficits, CKD, asthma/COPD on baseline 2L O2, presented with acute dyspnea and peripheral edema with concern for HF exacerbation with possible component of COPD/asthma flare. Started on IV diuresis and steroids. Clinically improving. Extremities warm, well perfused. Still with bibasilar crackles but improved from prior. Appreciate Cardiology input. Transition to bumex today. Continue to monitor BMP closely. Follow up PT estefany.
Improving with Lasix diuresis, off BiPAP, Sat well on N/C O2. Decreased leg swelling. Clinically in HF. Will continue Lasix 40mg IVP Q12H and repeat CXR.   Suspicion for PNA is low. Will hold off Abx and observe. Will resume abx if pt develops signs of pneumonia.  Cardiology consult.    D/W HS
67 yo M with CAD s/p CABG on DAPT, HFrEF (22% 10/22), s/p ICD, CVA w/ residual L deficits, CKD, asthma/COPD on baseline 2L O2, presented with acute dyspnea and peripheral edema with concern for HF exacerbation with possible component of COPD/asthma flare. Started on IV diuresis and steroids. Clinically improving. Extremities warm, well perfused. Edema and dyspnea improving. Still with bibasilar crackles on exam. C/w IV diuresis, monitor BMP closely.

## 2022-05-27 NOTE — PROGRESS NOTE ADULT - PROBLEM SELECTOR PLAN 9
DVT PPx: SubQ hep given CKD    Diet: CC DASH RENAL Diet     Dispo: pending clinical course, transition to home dose diuretics, cardiology guidance, workup of SHERRY   STANLEY- PCP/Cards appt made by daughter Ketty, on Thursday 6/2  [facility]- Full code DVT PPx: SubQ hep given CKD    Diet: CC DASH RENAL Diet     Dispo: Likely today 5/27, pending CHF recs   STANLEY- PCP/Cards appt made by daughter Ketty, on Thursday 6/2  [facility]- Full code

## 2022-05-27 NOTE — PROGRESS NOTE ADULT - PROBLEM SELECTOR PLAN 2
Patient w/ CAD, S/P CABG  Pro-BNP 7518 on admission [id number] on 5/24.   Off bipap 5/22   CXR not much improvement on 5/22   TTE 5/22 EF 15-20%.  severe LV dysfunction globally.   GDMT: Home meds carvedilol, spironolactone, lasix. On hydralazine as well. Not on entresto/Ace 2/2 CKD. Not on SGLT2 likely 2/2 CKD.  - S/p RHC on 5/26, will f/u results   - bumex 2mg IV BID  - 1L fluid restriction, DASH diet   - Strict I/Os, daily weights   - continue home aldactone and coreg and isordil  - c/w hydral   - cardiology consulted, recs appreciated.  - 5/25 cardiology recommending HF consult given diuresis with worsening SHERRY, consulted  - [facility] Patient reportedly w poor f/u not OHT candidate. Patient w/ CAD, S/P CABG  Pro-BNP 7518 on admission [id number] on 5/24.   Off bipap 5/22   CXR not much improvement on 5/22   TTE 5/22 EF 15-20%.  severe LV dysfunction globally.   GDMT: Home meds carvedilol, spironolactone, lasix. On hydralazine as well. Not on entresto/Ace 2/2 CKD. Not on SGLT2 likely 2/2 CKD.  RHC 5/26 with elevated filling pressures.   - S/p RHC on 5/26, will f/u results   - bumex 2mg IV BID  - 1L fluid restriction, DASH diet   - Strict I/Os, daily weights   - continue home aldactone and coreg and isordil  - c/w hydral   - cardiology consulted, recs appreciated.  - 5/25 cardiology recommending HF consult given diuresis with worsening SHERRY, consulted  - CHF: cath 5/26 ([facility]). Patient reportedly w poor f/u not OHT candidate.

## 2022-06-09 ENCOUNTER — LABORATORY RESULT (OUTPATIENT)
Age: 67
End: 2022-06-09

## 2022-06-09 ENCOUNTER — NON-APPOINTMENT (OUTPATIENT)
Age: 67
End: 2022-06-09

## 2022-06-09 ENCOUNTER — APPOINTMENT (OUTPATIENT)
Dept: CARDIOLOGY | Facility: CLINIC | Age: 67
End: 2022-06-09
Payer: MEDICARE

## 2022-06-09 VITALS
SYSTOLIC BLOOD PRESSURE: 116 MMHG | HEART RATE: 87 BPM | HEIGHT: 70 IN | BODY MASS INDEX: 22.9 KG/M2 | WEIGHT: 160 LBS | OXYGEN SATURATION: 96 % | DIASTOLIC BLOOD PRESSURE: 71 MMHG

## 2022-06-09 DIAGNOSIS — E78.5 HYPERLIPIDEMIA, UNSPECIFIED: ICD-10-CM

## 2022-06-09 PROCEDURE — 99214 OFFICE O/P EST MOD 30 MIN: CPT

## 2022-06-09 PROCEDURE — 93000 ELECTROCARDIOGRAM COMPLETE: CPT

## 2022-06-09 PROCEDURE — 36415 COLL VENOUS BLD VENIPUNCTURE: CPT

## 2022-06-10 LAB
25(OH)D3 SERPL-MCNC: 62.2 NG/ML
ALBUMIN SERPL ELPH-MCNC: 4 G/DL
ALP BLD-CCNC: 90 U/L
ALT SERPL-CCNC: 20 U/L
ANION GAP SERPL CALC-SCNC: 15 MMOL/L
AST SERPL-CCNC: 25 U/L
BASOPHILS # BLD AUTO: 0.05 K/UL
BASOPHILS NFR BLD AUTO: 0.5 %
BILIRUB SERPL-MCNC: 0.6 MG/DL
BUN SERPL-MCNC: 44 MG/DL
CALCIUM SERPL-MCNC: 9.2 MG/DL
CHLORIDE SERPL-SCNC: 97 MMOL/L
CHOLEST SERPL-MCNC: 98 MG/DL
CO2 SERPL-SCNC: 23 MMOL/L
CREAT SERPL-MCNC: 2.81 MG/DL
EGFR: 24 ML/MIN/1.73M2
EOSINOPHIL # BLD AUTO: 0.32 K/UL
EOSINOPHIL NFR BLD AUTO: 3.4 %
ESTIMATED AVERAGE GLUCOSE: 131 MG/DL
HBA1C MFR BLD HPLC: 6.2 %
HCT VFR BLD CALC: 36.8 %
HDLC SERPL-MCNC: 49 MG/DL
HGB BLD-MCNC: 11.9 G/DL
IMM GRANULOCYTES NFR BLD AUTO: 0.3 %
LDLC SERPL CALC-MCNC: 28 MG/DL
LYMPHOCYTES # BLD AUTO: 1.23 K/UL
LYMPHOCYTES NFR BLD AUTO: 13 %
MAGNESIUM SERPL-MCNC: 2 MG/DL
MAN DIFF?: NORMAL
MCHC RBC-ENTMCNC: 30.3 PG
MCHC RBC-ENTMCNC: 32.3 GM/DL
MCV RBC AUTO: 93.6 FL
MONOCYTES # BLD AUTO: 0.6 K/UL
MONOCYTES NFR BLD AUTO: 6.3 %
NEUTROPHILS # BLD AUTO: 7.22 K/UL
NEUTROPHILS NFR BLD AUTO: 76.5 %
NONHDLC SERPL-MCNC: 48 MG/DL
NT-PROBNP SERPL-MCNC: 5525 PG/ML
PLATELET # BLD AUTO: 161 K/UL
POTASSIUM SERPL-SCNC: 4 MMOL/L
PROT SERPL-MCNC: 6.9 G/DL
RBC # BLD: 3.93 M/UL
RBC # FLD: 14.6 %
SODIUM SERPL-SCNC: 135 MMOL/L
TRIGL SERPL-MCNC: 101 MG/DL
TSH SERPL-ACNC: 7.66 UIU/ML
URATE SERPL-MCNC: 7.8 MG/DL
WBC # FLD AUTO: 9.45 K/UL

## 2022-06-10 NOTE — HISTORY OF PRESENT ILLNESS
[FreeTextEntry1] : Joey Lewis is 66M with PMH of HTN, HLD, CKD (baseline SCr 2.3-2.9), CVA with L side weakness ( 1993), COPD (home O2 2L NC), former smoker, recurrent PNA, CAD s/p CABG ( 2015 [facility]) (on ASA/Plavix) and HFrEF (EF 20%;LVIDd5.5 LVE and CHRISTINA) s/p Medtronic ICD. ( 2015) Presented with c/o acute SOB/KINNEY, nonproductive cough, and BLE swelling with ADHF. 5/26/22 RHC: RA-9 PA 54/27 PCWP 24 CO/CI 3.97/2.22 SVR 1510. Pt is here for a post hospital follow up after admission 5/21-6/27/22.PCP Dr. Schmidt ( [phone]). \par \par Course in hospital Pertinent Labs: In the ED VS T: 97.6 F HR: 96 (92 - 103) BP: 123/88 (123/81 - 145/89) RR: 18\par SpO2: 87% on RA. Patient was placed on BiPAP 10/5 50% and saturated 100%.\par Labs notable for WBC 12.11, Trop 77->77, Na 127, Cr 2.6, Pro-BNP 7518WBC 12.11, Trop 77->77, Na 127, Cr 2.6, Pro-BNP 7,518 RVP and\par COVID negative\par CXR showed hazy right lower lung opacity which may reflect layering effusion,\par atelectasis, or pneumonia\par In the ED, he was placed on BiPAP and received IV Lasix 40mg, Azithromycin\par 500mg IVPB, CTX 1g, Methylprednisolone 125mg IV x 1, Nitroglycerin.\par 5/26 RHC: RA-9 PA 54/27 PCWP 24 CO/CI 3.97/2.22 SVR 1510\par \par Pt is here with his wife for a post hospital follow up. States d/c weight was 160 lbs and is 160 lbs in office today.\par Pt states he is feeling well since d/c. He was d/c on new meds hydral 50 mg tid and iso 20 mg tid, bumex changed to 3 mg daily from 2 mg bid, \par \par States he can walk approx 2 blocks with his walker. Not able to climb stairs ( prior LUE/LLE weakness from CVA 1993). He sleeps with 2 pillows with no orthopnea. Reports he is following a low salt diet and is drinking less than 2 liters of fluid per day.\par \par Denies chest pain, palpitations, dizziness/LH, syncope and no ICD shocks. Of note, ICD followed at [facility] by Dr. Rice, last check 9/2021.Interrogated today, Medtronic single chamber ICD set at VVI 40 with last NSCT 3/16 for 2 seconds at 200's, Optivol stable but was elevated over threshold December to January 2022. \par \par Patient name: Joey Lewis\par YOB: 1955   Age: 66 (M)   MR#: [id number]\par Study Date: 5/22/2022\[facility]\par Study quality: Technically Difficult\par Referring Physician: Allen Nash MD\par Blood Pressure: 109/76 mmHg\par Height: 175 cm\par Weight: 78 kg\par BSA: 1.9 m2\par ------------------------------------------------------------------------\par PROCEDURE: Transthoracic echocardiogram with 2-D, M-Mode\par and complete spectral and color flow Doppler.\par Intravenous ultrasound enhancing agent was administered for\par improved left ventricular endocardial border definition. \par Following the intravenous injection of ultrasound enhancing\par agent, harmonic imaging was performed.\par INDICATION: Heart failure, unspecified (I50.9)\par ------------------------------------------------------------------------\par DIMENSIONS:\par Dimensions:     Normal Values:\par LA:     5.0 cm    2.0 - 4.0 cm\par Ao:     2.5 cm    2.0 - 3.8 cm\par SEPTUM: 1.0 cm    0.6 - 1.2 cm\par PWT:    1.0 cm    0.6 - 1.1 cm\par LVIDd:  5.5 cm    3.0 - 5.6 cm\par LVIDs:  5.1 cm    1.8 - 4.0 cm\par Derived Variables:\par LVMI: 110 g/m2\par RWT: 0.36\par Fractional short: 7 %\par Ejection Fraction (Visual Estimate): 15-20 %\par ------------------------------------------------------------------------\par OBSERVATIONS:\par Mitral Valve: Tethered mitral valve leaflets with normal\par opening. Mild mitral regurgitation. \par Aortic Root: Normal aortic root.\par Aortic Valve: Normal trileaflet aortic valve.\par Left Atrium: Moderately dilated left atrium.  LA volume\par index = 42 cc/m2.\par Left Ventricle: Severe global left ventricular systolic\par dysfunction. Left ventricular enlargement. (DT:130 ms).\par Right Heart: Moderate right atrial enlargement. The right\par ventricle is not well visualized appears enlarged. A device\par wire is noted in the right heart. Normal tricuspid valve.\par Mild tricuspid regurgitation. Normal pulmonic valve. Mild\par pulmonic regurgitation.\par Pericardium/PleuraNormal pericardium with no pericardial\par effusion. Right pleural effusion.\par Hemodynamic: Estimated right ventricular systolic pressure\par equals 36 mm Hg, assuming right atrial pressure equals 10\par mm Hg, consistent with borderline pulmonary hypertension.\par ------------------------------------------------------------------------\par CONCLUSIONS:\par 1. Moderately dilated left atrium.  LA volume index = 42\par cc/m2.\par 2. Left ventricular enlargement.\par 3. Severe global left ventricular systolic dysfunction.\par 4. Moderate right atrial enlargement.\par 5. The right ventricle is not well visualized appears\par enlarged. A device wire is noted in the right heart.\par ------------------------------------------------------------------------\par \par \par \par \par \par

## 2022-06-10 NOTE — CARDIOLOGY SUMMARY
[de-identified] : 5/22/22 TTE: LVEF 15-20%, LVIDD 5.5 cm, mild MR, severe LV systolic dysfunction, mod LAE, mod CHRISTINA, mild TR [de-identified] : 6/9/22 NSR 87, IVCD, LAFB,, TWI 1, AVL, NSST with PVC [de-identified] : 5/26/22  RHC: RA-9 PA 54/27 PCWP 24 CO/CI 3.97/2.22 SVR 1510\par  [de-identified] : 5/21/22 CXR showed hazy right lower lung opacity which may reflect layering effusion,\par atelectasis, or pneumonia

## 2022-06-10 NOTE — PHYSICAL EXAM
[Well Nourished] : well nourished [No Acute Distress] : no acute distress [Normal Conjunctiva] : normal conjunctiva [Normal S1, S2] : normal S1, S2 [Soft] : abdomen soft [Non Tender] : non-tender [No Rash] : no rash [Normal] : alert and oriented, normal memory [de-identified] : JVP 6-8 cm  [de-identified] : Left chest ICD [de-identified] : bilateral fine crackles- uses home O2 2 liters [de-identified] : slow gait, uses a walkser, mild LUE/LLE weakness [de-identified] : trace edema LLE

## 2022-06-10 NOTE — DISCUSSION/SUMMARY
[Patient] : the patient [___ Week(s)] : in [unfilled] week(s) [FreeTextEntry2] : wife and called daughter [FreeTextEntry1] : 1. S/P acute on chronic systolic HF\par - continue  Bumex 3mg daily with additional 1 mg as needed for weight gain of 2-3 lbs in 2-3 days\par - increase isosorbide to 30 mg q 8 from 20 mg q 8 hours\par - continue Coreg 3.125mg twice daily\par - continue Hydralazine 50mg TID continue \par - Spironolactone 12.5mg daily\par - daily weight and blood pressure log, pt was given a digiti scale for weights Strict I/O\par - labs done today, will call with results\par - make appt for in person device check, will transfer from Dr. Rice at [facility]. PT does not have a monitor. Optivol stable today\par \par 2.CKD\par - recheck labs including creat, last on 5/27/22 70/2.54 with K 3.9 with peak during admission 2.9 \par \par 3. CAD S/P CABG\par - no active chest pain\par - continue statin, coreg, ASA and plavix \par \par Follow up in office in  4 weeks, will call with labs

## 2022-06-10 NOTE — ASSESSMENT
[FreeTextEntry1] : 6M with PMH of HTN, HLD, CKD (baseline SCr 2.3-2.9), CVA with L side weakness ( 1993), COPD (home O2 2L NC), former smoker, recurrent PNA, CAD s/p CABG ( 2015 [facility]) (on ASA/Plavix) and HFrEF (EF 20%;LVIDd5.5 LVE and CHRISTINA) s/p Medtronic ICD. ( 2015) Presented with c/o acute SOB/KINNEY, nonproductive cough, and BLE swelling with ADHF. 5/26/22 RHC: RA-9 PA 54/27 PCWP 24 CO/CI 3.97/2.22 SVR 1510. \par ACC/AHA Stage C, NYHA Class III symptoms\par Appears compensated and normotensive

## 2022-06-13 NOTE — DISCHARGE NOTE NURSING/CASE MANAGEMENT/SOCIAL WORK - BRAND OF COVID-19 VACCINATION
No acute distress, ambulating without difficulty,  normal communication ability Moderna dose 1 and 2

## 2022-08-30 NOTE — ED PROVIDER NOTE - CHIEF COMPLAINT
PATIENT IS ASKING IF PRESCRIPTION CAN BE TRANSFERRED TO THE [facility] HE HAS REFILLS AT [place] BUT PATIENT HAS MOVED ANT THAT IS TOO FAR AWAY FOR HIM NOW.  PLEASE ASSIST  PATIENT IS ON LAST DOSE   The patient is a 66y Male complaining of

## 2022-10-14 ENCOUNTER — INPATIENT (INPATIENT)
Facility: HOSPITAL | Age: 67
LOS: 3 days | Discharge: HOME HEALTH | End: 2022-10-18
Attending: HOSPITALIST | Admitting: HOSPITALIST

## 2022-10-14 VITALS
TEMPERATURE: 99 F | HEIGHT: 69 IN | OXYGEN SATURATION: 98 % | DIASTOLIC BLOOD PRESSURE: 89 MMHG | RESPIRATION RATE: 18 BRPM | HEART RATE: 86 BPM | SYSTOLIC BLOOD PRESSURE: 144 MMHG

## 2022-10-14 DIAGNOSIS — Z95.1 PRESENCE OF AORTOCORONARY BYPASS GRAFT: Chronic | ICD-10-CM

## 2022-10-14 DIAGNOSIS — Z90.49 ACQUIRED ABSENCE OF OTHER SPECIFIED PARTS OF DIGESTIVE TRACT: Chronic | ICD-10-CM

## 2022-10-14 PROCEDURE — 93010 ELECTROCARDIOGRAM REPORT: CPT

## 2022-10-14 PROCEDURE — 99285 EMERGENCY DEPT VISIT HI MDM: CPT

## 2022-10-14 NOTE — ED ADULT TRIAGE NOTE - CHIEF COMPLAINT QUOTE
Pt reporting to the ED for chest pain starting this morning. Reports cough for ~ 1week and chronic lower extremity edema. pmh of CHF, COPD on 2 L nc at home, asthma. Rspo2 90% on room air, spo2 98% on 2 L. awaiting ekg

## 2022-10-15 DIAGNOSIS — E11.9 TYPE 2 DIABETES MELLITUS WITHOUT COMPLICATIONS: ICD-10-CM

## 2022-10-15 DIAGNOSIS — J44.1 CHRONIC OBSTRUCTIVE PULMONARY DISEASE WITH (ACUTE) EXACERBATION: ICD-10-CM

## 2022-10-15 DIAGNOSIS — N18.4 CHRONIC KIDNEY DISEASE, STAGE 4 (SEVERE): ICD-10-CM

## 2022-10-15 DIAGNOSIS — J96.01 ACUTE RESPIRATORY FAILURE WITH HYPOXIA: ICD-10-CM

## 2022-10-15 DIAGNOSIS — I25.10 ATHEROSCLEROTIC HEART DISEASE OF NATIVE CORONARY ARTERY WITHOUT ANGINA PECTORIS: ICD-10-CM

## 2022-10-15 DIAGNOSIS — R07.9 CHEST PAIN, UNSPECIFIED: ICD-10-CM

## 2022-10-15 DIAGNOSIS — I63.9 CEREBRAL INFARCTION, UNSPECIFIED: ICD-10-CM

## 2022-10-15 DIAGNOSIS — I50.20 UNSPECIFIED SYSTOLIC (CONGESTIVE) HEART FAILURE: ICD-10-CM

## 2022-10-15 DIAGNOSIS — Z29.9 ENCOUNTER FOR PROPHYLACTIC MEASURES, UNSPECIFIED: ICD-10-CM

## 2022-10-15 LAB
ALBUMIN SERPL ELPH-MCNC: 3.8 G/DL — SIGNIFICANT CHANGE UP (ref 3.3–5)
ALBUMIN SERPL ELPH-MCNC: 3.9 G/DL — SIGNIFICANT CHANGE UP (ref 3.3–5)
ALP SERPL-CCNC: 105 U/L — SIGNIFICANT CHANGE UP (ref 40–120)
ALP SERPL-CCNC: 99 U/L — SIGNIFICANT CHANGE UP (ref 40–120)
ALT FLD-CCNC: 29 U/L — SIGNIFICANT CHANGE UP (ref 4–41)
ALT FLD-CCNC: 33 U/L — SIGNIFICANT CHANGE UP (ref 4–41)
ANION GAP SERPL CALC-SCNC: 13 MMOL/L — SIGNIFICANT CHANGE UP (ref 7–14)
ANION GAP SERPL CALC-SCNC: 14 MMOL/L — SIGNIFICANT CHANGE UP (ref 7–14)
APTT BLD: 30.4 SEC — SIGNIFICANT CHANGE UP (ref 27–36.3)
AST SERPL-CCNC: 26 U/L — SIGNIFICANT CHANGE UP (ref 4–40)
AST SERPL-CCNC: 35 U/L — SIGNIFICANT CHANGE UP (ref 4–40)
B PERT DNA SPEC QL NAA+PROBE: SIGNIFICANT CHANGE UP
B PERT+PARAPERT DNA PNL SPEC NAA+PROBE: SIGNIFICANT CHANGE UP
BASE EXCESS BLDV CALC-SCNC: 0.4 MMOL/L — SIGNIFICANT CHANGE UP (ref -2–3)
BASE EXCESS BLDV CALC-SCNC: 0.4 MMOL/L — SIGNIFICANT CHANGE UP (ref -2–3)
BASOPHILS # BLD AUTO: 0.04 K/UL — SIGNIFICANT CHANGE UP (ref 0–0.2)
BASOPHILS NFR BLD AUTO: 0.4 % — SIGNIFICANT CHANGE UP (ref 0–2)
BILIRUB SERPL-MCNC: 0.6 MG/DL — SIGNIFICANT CHANGE UP (ref 0.2–1.2)
BILIRUB SERPL-MCNC: 0.7 MG/DL — SIGNIFICANT CHANGE UP (ref 0.2–1.2)
BLOOD GAS VENOUS COMPREHENSIVE RESULT: SIGNIFICANT CHANGE UP
BLOOD GAS VENOUS COMPREHENSIVE RESULT: SIGNIFICANT CHANGE UP
BORDETELLA PARAPERTUSSIS (RAPRVP): SIGNIFICANT CHANGE UP
BUN SERPL-MCNC: 35 MG/DL — HIGH (ref 7–23)
BUN SERPL-MCNC: 37 MG/DL — HIGH (ref 7–23)
C PNEUM DNA SPEC QL NAA+PROBE: SIGNIFICANT CHANGE UP
CALCIUM SERPL-MCNC: 8.9 MG/DL — SIGNIFICANT CHANGE UP (ref 8.4–10.5)
CALCIUM SERPL-MCNC: 9 MG/DL — SIGNIFICANT CHANGE UP (ref 8.4–10.5)
CHLORIDE BLDV-SCNC: 97 MMOL/L — SIGNIFICANT CHANGE UP (ref 96–108)
CHLORIDE BLDV-SCNC: 99 MMOL/L — SIGNIFICANT CHANGE UP (ref 96–108)
CHLORIDE SERPL-SCNC: 97 MMOL/L — LOW (ref 98–107)
CHLORIDE SERPL-SCNC: 99 MMOL/L — SIGNIFICANT CHANGE UP (ref 98–107)
CO2 BLDV-SCNC: 27.4 MMOL/L — HIGH (ref 22–26)
CO2 BLDV-SCNC: 28 MMOL/L — HIGH (ref 22–26)
CO2 SERPL-SCNC: 23 MMOL/L — SIGNIFICANT CHANGE UP (ref 22–31)
CO2 SERPL-SCNC: 24 MMOL/L — SIGNIFICANT CHANGE UP (ref 22–31)
CREAT SERPL-MCNC: 2.24 MG/DL — HIGH (ref 0.5–1.3)
CREAT SERPL-MCNC: 2.47 MG/DL — HIGH (ref 0.5–1.3)
EGFR: 28 ML/MIN/1.73M2 — LOW
EGFR: 31 ML/MIN/1.73M2 — LOW
EOSINOPHIL # BLD AUTO: 0.23 K/UL — SIGNIFICANT CHANGE UP (ref 0–0.5)
EOSINOPHIL NFR BLD AUTO: 2.4 % — SIGNIFICANT CHANGE UP (ref 0–6)
FLUAV SUBTYP SPEC NAA+PROBE: SIGNIFICANT CHANGE UP
FLUBV RNA SPEC QL NAA+PROBE: SIGNIFICANT CHANGE UP
GAS PNL BLDV: 128 MMOL/L — LOW (ref 136–145)
GAS PNL BLDV: 131 MMOL/L — LOW (ref 136–145)
GLUCOSE BLDC GLUCOMTR-MCNC: 153 MG/DL — HIGH (ref 70–99)
GLUCOSE BLDC GLUCOMTR-MCNC: 174 MG/DL — HIGH (ref 70–99)
GLUCOSE BLDC GLUCOMTR-MCNC: 230 MG/DL — HIGH (ref 70–99)
GLUCOSE BLDV-MCNC: 121 MG/DL — HIGH (ref 70–99)
GLUCOSE BLDV-MCNC: 123 MG/DL — HIGH (ref 70–99)
GLUCOSE SERPL-MCNC: 128 MG/DL — HIGH (ref 70–99)
GLUCOSE SERPL-MCNC: 229 MG/DL — HIGH (ref 70–99)
HADV DNA SPEC QL NAA+PROBE: SIGNIFICANT CHANGE UP
HCO3 BLDV-SCNC: 26 MMOL/L — SIGNIFICANT CHANGE UP (ref 22–29)
HCO3 BLDV-SCNC: 26 MMOL/L — SIGNIFICANT CHANGE UP (ref 22–29)
HCOV 229E RNA SPEC QL NAA+PROBE: SIGNIFICANT CHANGE UP
HCOV HKU1 RNA SPEC QL NAA+PROBE: SIGNIFICANT CHANGE UP
HCOV NL63 RNA SPEC QL NAA+PROBE: SIGNIFICANT CHANGE UP
HCOV OC43 RNA SPEC QL NAA+PROBE: SIGNIFICANT CHANGE UP
HCT VFR BLD CALC: 38.8 % — LOW (ref 39–50)
HCT VFR BLD CALC: 40.1 % — SIGNIFICANT CHANGE UP (ref 39–50)
HCT VFR BLDA CALC: 36 % — LOW (ref 39–51)
HCT VFR BLDA CALC: 37 % — LOW (ref 39–51)
HGB BLD CALC-MCNC: 11.9 G/DL — LOW (ref 13–17)
HGB BLD CALC-MCNC: 12.2 G/DL — LOW (ref 13–17)
HGB BLD-MCNC: 12.3 G/DL — LOW (ref 13–17)
HGB BLD-MCNC: 12.6 G/DL — LOW (ref 13–17)
HMPV RNA SPEC QL NAA+PROBE: SIGNIFICANT CHANGE UP
HPIV1 RNA SPEC QL NAA+PROBE: SIGNIFICANT CHANGE UP
HPIV2 RNA SPEC QL NAA+PROBE: SIGNIFICANT CHANGE UP
HPIV3 RNA SPEC QL NAA+PROBE: SIGNIFICANT CHANGE UP
HPIV4 RNA SPEC QL NAA+PROBE: SIGNIFICANT CHANGE UP
IANC: 7.35 K/UL — SIGNIFICANT CHANGE UP (ref 1.8–7.4)
IMM GRANULOCYTES NFR BLD AUTO: 0.4 % — SIGNIFICANT CHANGE UP (ref 0–0.9)
INR BLD: 1.2 RATIO — HIGH (ref 0.88–1.16)
LACTATE BLDV-MCNC: 0.9 MMOL/L — SIGNIFICANT CHANGE UP (ref 0.5–2)
LACTATE BLDV-MCNC: 1.1 MMOL/L — SIGNIFICANT CHANGE UP (ref 0.5–2)
LYMPHOCYTES # BLD AUTO: 1.21 K/UL — SIGNIFICANT CHANGE UP (ref 1–3.3)
LYMPHOCYTES # BLD AUTO: 12.7 % — LOW (ref 13–44)
M PNEUMO DNA SPEC QL NAA+PROBE: SIGNIFICANT CHANGE UP
MAGNESIUM SERPL-MCNC: 1.7 MG/DL — SIGNIFICANT CHANGE UP (ref 1.6–2.6)
MCHC RBC-ENTMCNC: 30.8 PG — SIGNIFICANT CHANGE UP (ref 27–34)
MCHC RBC-ENTMCNC: 30.9 PG — SIGNIFICANT CHANGE UP (ref 27–34)
MCHC RBC-ENTMCNC: 31.4 GM/DL — LOW (ref 32–36)
MCHC RBC-ENTMCNC: 31.7 GM/DL — LOW (ref 32–36)
MCV RBC AUTO: 97.2 FL — SIGNIFICANT CHANGE UP (ref 80–100)
MCV RBC AUTO: 98.3 FL — SIGNIFICANT CHANGE UP (ref 80–100)
MONOCYTES # BLD AUTO: 0.68 K/UL — SIGNIFICANT CHANGE UP (ref 0–0.9)
MONOCYTES NFR BLD AUTO: 7.1 % — SIGNIFICANT CHANGE UP (ref 2–14)
NEUTROPHILS # BLD AUTO: 7.35 K/UL — SIGNIFICANT CHANGE UP (ref 1.8–7.4)
NEUTROPHILS NFR BLD AUTO: 77 % — SIGNIFICANT CHANGE UP (ref 43–77)
NRBC # BLD: 0 /100 WBCS — SIGNIFICANT CHANGE UP (ref 0–0)
NRBC # BLD: 0 /100 WBCS — SIGNIFICANT CHANGE UP (ref 0–0)
NRBC # FLD: 0 K/UL — SIGNIFICANT CHANGE UP (ref 0–0)
NRBC # FLD: 0 K/UL — SIGNIFICANT CHANGE UP (ref 0–0)
NT-PROBNP SERPL-SCNC: 7674 PG/ML — HIGH
PCO2 BLDV: 45 MMHG — SIGNIFICANT CHANGE UP (ref 42–55)
PCO2 BLDV: 48 MMHG — SIGNIFICANT CHANGE UP (ref 42–55)
PH BLDV: 7.35 — SIGNIFICANT CHANGE UP (ref 7.32–7.43)
PH BLDV: 7.37 — SIGNIFICANT CHANGE UP (ref 7.32–7.43)
PHOSPHATE SERPL-MCNC: 3.3 MG/DL — SIGNIFICANT CHANGE UP (ref 2.5–4.5)
PLATELET # BLD AUTO: 152 K/UL — SIGNIFICANT CHANGE UP (ref 150–400)
PLATELET # BLD AUTO: 176 K/UL — SIGNIFICANT CHANGE UP (ref 150–400)
PO2 BLDV: 42 MMHG — SIGNIFICANT CHANGE UP
PO2 BLDV: 68 MMHG — SIGNIFICANT CHANGE UP
POTASSIUM BLDV-SCNC: 3.8 MMOL/L — SIGNIFICANT CHANGE UP (ref 3.5–5.1)
POTASSIUM BLDV-SCNC: SIGNIFICANT CHANGE UP MMOL/L (ref 3.5–5.1)
POTASSIUM SERPL-MCNC: 4 MMOL/L — SIGNIFICANT CHANGE UP (ref 3.5–5.3)
POTASSIUM SERPL-MCNC: 4.3 MMOL/L — SIGNIFICANT CHANGE UP (ref 3.5–5.3)
POTASSIUM SERPL-SCNC: 4 MMOL/L — SIGNIFICANT CHANGE UP (ref 3.5–5.3)
POTASSIUM SERPL-SCNC: 4.3 MMOL/L — SIGNIFICANT CHANGE UP (ref 3.5–5.3)
PROT SERPL-MCNC: 6.4 G/DL — SIGNIFICANT CHANGE UP (ref 6–8.3)
PROT SERPL-MCNC: 6.6 G/DL — SIGNIFICANT CHANGE UP (ref 6–8.3)
PROTHROM AB SERPL-ACNC: 13.9 SEC — HIGH (ref 10.5–13.4)
RAPID RVP RESULT: SIGNIFICANT CHANGE UP
RBC # BLD: 3.99 M/UL — LOW (ref 4.2–5.8)
RBC # BLD: 4.08 M/UL — LOW (ref 4.2–5.8)
RBC # FLD: 15.9 % — HIGH (ref 10.3–14.5)
RBC # FLD: 16.1 % — HIGH (ref 10.3–14.5)
RSV RNA SPEC QL NAA+PROBE: SIGNIFICANT CHANGE UP
RV+EV RNA SPEC QL NAA+PROBE: SIGNIFICANT CHANGE UP
SAO2 % BLDV: 63.9 % — SIGNIFICANT CHANGE UP
SAO2 % BLDV: 92.3 % — SIGNIFICANT CHANGE UP
SARS-COV-2 RNA SPEC QL NAA+PROBE: SIGNIFICANT CHANGE UP
SODIUM SERPL-SCNC: 134 MMOL/L — LOW (ref 135–145)
SODIUM SERPL-SCNC: 136 MMOL/L — SIGNIFICANT CHANGE UP (ref 135–145)
TROPONIN T, HIGH SENSITIVITY RESULT: 56 NG/L — CRITICAL HIGH
TROPONIN T, HIGH SENSITIVITY RESULT: 77 NG/L — CRITICAL HIGH
WBC # BLD: 5.97 K/UL — SIGNIFICANT CHANGE UP (ref 3.8–10.5)
WBC # BLD: 9.55 K/UL — SIGNIFICANT CHANGE UP (ref 3.8–10.5)
WBC # FLD AUTO: 5.97 K/UL — SIGNIFICANT CHANGE UP (ref 3.8–10.5)
WBC # FLD AUTO: 9.55 K/UL — SIGNIFICANT CHANGE UP (ref 3.8–10.5)

## 2022-10-15 PROCEDURE — 99223 1ST HOSP IP/OBS HIGH 75: CPT | Mod: GC

## 2022-10-15 PROCEDURE — 71045 X-RAY EXAM CHEST 1 VIEW: CPT | Mod: 26

## 2022-10-15 RX ORDER — INSULIN LISPRO 100/ML
VIAL (ML) SUBCUTANEOUS AT BEDTIME
Refills: 0 | Status: DISCONTINUED | OUTPATIENT
Start: 2022-10-15 | End: 2022-10-18

## 2022-10-15 RX ORDER — FAMOTIDINE 10 MG/ML
20 INJECTION INTRAVENOUS DAILY
Refills: 0 | Status: DISCONTINUED | OUTPATIENT
Start: 2022-10-15 | End: 2022-10-18

## 2022-10-15 RX ORDER — DEXTROSE 50 % IN WATER 50 %
15 SYRINGE (ML) INTRAVENOUS ONCE
Refills: 0 | Status: DISCONTINUED | OUTPATIENT
Start: 2022-10-15 | End: 2022-10-18

## 2022-10-15 RX ORDER — CARVEDILOL PHOSPHATE 80 MG/1
3.12 CAPSULE, EXTENDED RELEASE ORAL EVERY 12 HOURS
Refills: 0 | Status: DISCONTINUED | OUTPATIENT
Start: 2022-10-15 | End: 2022-10-16

## 2022-10-15 RX ORDER — SODIUM CHLORIDE 9 MG/ML
1000 INJECTION, SOLUTION INTRAVENOUS
Refills: 0 | Status: DISCONTINUED | OUTPATIENT
Start: 2022-10-15 | End: 2022-10-15

## 2022-10-15 RX ORDER — DEXTROSE 50 % IN WATER 50 %
25 SYRINGE (ML) INTRAVENOUS ONCE
Refills: 0 | Status: DISCONTINUED | OUTPATIENT
Start: 2022-10-15 | End: 2022-10-18

## 2022-10-15 RX ORDER — AZITHROMYCIN 500 MG/1
500 TABLET, FILM COATED ORAL DAILY
Refills: 0 | Status: COMPLETED | OUTPATIENT
Start: 2022-10-16 | End: 2022-10-17

## 2022-10-15 RX ORDER — BUDESONIDE AND FORMOTEROL FUMARATE DIHYDRATE 160; 4.5 UG/1; UG/1
2 AEROSOL RESPIRATORY (INHALATION)
Refills: 0 | Status: DISCONTINUED | OUTPATIENT
Start: 2022-10-15 | End: 2022-10-18

## 2022-10-15 RX ORDER — HYDRALAZINE HCL 50 MG
50 TABLET ORAL THREE TIMES A DAY
Refills: 0 | Status: DISCONTINUED | OUTPATIENT
Start: 2022-10-15 | End: 2022-10-16

## 2022-10-15 RX ORDER — FERROUS SULFATE 325(65) MG
325 TABLET ORAL
Refills: 0 | Status: DISCONTINUED | OUTPATIENT
Start: 2022-10-15 | End: 2022-10-18

## 2022-10-15 RX ORDER — CEFTRIAXONE 500 MG/1
1000 INJECTION, POWDER, FOR SOLUTION INTRAMUSCULAR; INTRAVENOUS ONCE
Refills: 0 | Status: COMPLETED | OUTPATIENT
Start: 2022-10-15 | End: 2022-10-15

## 2022-10-15 RX ORDER — TAMSULOSIN HYDROCHLORIDE 0.4 MG/1
0.4 CAPSULE ORAL AT BEDTIME
Refills: 0 | Status: DISCONTINUED | OUTPATIENT
Start: 2022-10-15 | End: 2022-10-16

## 2022-10-15 RX ORDER — IPRATROPIUM/ALBUTEROL SULFATE 18-103MCG
3 AEROSOL WITH ADAPTER (GRAM) INHALATION EVERY 6 HOURS
Refills: 0 | Status: DISCONTINUED | OUTPATIENT
Start: 2022-10-15 | End: 2022-10-18

## 2022-10-15 RX ORDER — MAGNESIUM SULFATE 500 MG/ML
2 VIAL (ML) INJECTION
Refills: 0 | Status: COMPLETED | OUTPATIENT
Start: 2022-10-15 | End: 2022-10-15

## 2022-10-15 RX ORDER — LEVOTHYROXINE SODIUM 125 MCG
25 TABLET ORAL DAILY
Refills: 0 | Status: DISCONTINUED | OUTPATIENT
Start: 2022-10-15 | End: 2022-10-18

## 2022-10-15 RX ORDER — INSULIN LISPRO 100/ML
VIAL (ML) SUBCUTANEOUS
Refills: 0 | Status: DISCONTINUED | OUTPATIENT
Start: 2022-10-15 | End: 2022-10-18

## 2022-10-15 RX ORDER — ASPIRIN/CALCIUM CARB/MAGNESIUM 324 MG
81 TABLET ORAL DAILY
Refills: 0 | Status: DISCONTINUED | OUTPATIENT
Start: 2022-10-15 | End: 2022-10-18

## 2022-10-15 RX ORDER — HEPARIN SODIUM 5000 [USP'U]/ML
5000 INJECTION INTRAVENOUS; SUBCUTANEOUS EVERY 8 HOURS
Refills: 0 | Status: DISCONTINUED | OUTPATIENT
Start: 2022-10-15 | End: 2022-10-18

## 2022-10-15 RX ORDER — ACETAMINOPHEN 500 MG
650 TABLET ORAL EVERY 6 HOURS
Refills: 0 | Status: DISCONTINUED | OUTPATIENT
Start: 2022-10-15 | End: 2022-10-18

## 2022-10-15 RX ORDER — GLUCAGON INJECTION, SOLUTION 0.5 MG/.1ML
1 INJECTION, SOLUTION SUBCUTANEOUS ONCE
Refills: 0 | Status: DISCONTINUED | OUTPATIENT
Start: 2022-10-15 | End: 2022-10-15

## 2022-10-15 RX ORDER — BUMETANIDE 0.25 MG/ML
2 INJECTION INTRAMUSCULAR; INTRAVENOUS
Refills: 0 | Status: DISCONTINUED | OUTPATIENT
Start: 2022-10-15 | End: 2022-10-16

## 2022-10-15 RX ORDER — DEXTROSE 50 % IN WATER 50 %
12.5 SYRINGE (ML) INTRAVENOUS ONCE
Refills: 0 | Status: DISCONTINUED | OUTPATIENT
Start: 2022-10-15 | End: 2022-10-18

## 2022-10-15 RX ORDER — ATORVASTATIN CALCIUM 80 MG/1
80 TABLET, FILM COATED ORAL AT BEDTIME
Refills: 0 | Status: DISCONTINUED | OUTPATIENT
Start: 2022-10-15 | End: 2022-10-18

## 2022-10-15 RX ORDER — ALBUTEROL 90 UG/1
2 AEROSOL, METERED ORAL EVERY 6 HOURS
Refills: 0 | Status: DISCONTINUED | OUTPATIENT
Start: 2022-10-15 | End: 2022-10-18

## 2022-10-15 RX ORDER — SPIRONOLACTONE 25 MG/1
12.5 TABLET, FILM COATED ORAL DAILY
Refills: 0 | Status: DISCONTINUED | OUTPATIENT
Start: 2022-10-15 | End: 2022-10-16

## 2022-10-15 RX ORDER — AZITHROMYCIN 500 MG/1
500 TABLET, FILM COATED ORAL ONCE
Refills: 0 | Status: COMPLETED | OUTPATIENT
Start: 2022-10-15 | End: 2022-10-15

## 2022-10-15 RX ORDER — SPIRONOLACTONE 25 MG/1
25 TABLET, FILM COATED ORAL DAILY
Refills: 0 | Status: DISCONTINUED | OUTPATIENT
Start: 2022-10-15 | End: 2022-10-15

## 2022-10-15 RX ORDER — ISOSORBIDE DINITRATE 5 MG/1
20 TABLET ORAL THREE TIMES A DAY
Refills: 0 | Status: DISCONTINUED | OUTPATIENT
Start: 2022-10-15 | End: 2022-10-16

## 2022-10-15 RX ORDER — TIOTROPIUM BROMIDE 18 UG/1
1 CAPSULE ORAL; RESPIRATORY (INHALATION) DAILY
Refills: 0 | Status: DISCONTINUED | OUTPATIENT
Start: 2022-10-15 | End: 2022-10-15

## 2022-10-15 RX ORDER — BUMETANIDE 0.25 MG/ML
2 INJECTION INTRAMUSCULAR; INTRAVENOUS
Refills: 0 | Status: DISCONTINUED | OUTPATIENT
Start: 2022-10-15 | End: 2022-10-15

## 2022-10-15 RX ORDER — CLOPIDOGREL BISULFATE 75 MG/1
75 TABLET, FILM COATED ORAL DAILY
Refills: 0 | Status: DISCONTINUED | OUTPATIENT
Start: 2022-10-15 | End: 2022-10-18

## 2022-10-15 RX ORDER — GABAPENTIN 400 MG/1
200 CAPSULE ORAL THREE TIMES A DAY
Refills: 0 | Status: DISCONTINUED | OUTPATIENT
Start: 2022-10-15 | End: 2022-10-18

## 2022-10-15 RX ORDER — SENNA PLUS 8.6 MG/1
2 TABLET ORAL AT BEDTIME
Refills: 0 | Status: DISCONTINUED | OUTPATIENT
Start: 2022-10-15 | End: 2022-10-18

## 2022-10-15 RX ADMIN — HEPARIN SODIUM 5000 UNIT(S): 5000 INJECTION INTRAVENOUS; SUBCUTANEOUS at 14:06

## 2022-10-15 RX ADMIN — HEPARIN SODIUM 5000 UNIT(S): 5000 INJECTION INTRAVENOUS; SUBCUTANEOUS at 22:18

## 2022-10-15 RX ADMIN — BUDESONIDE AND FORMOTEROL FUMARATE DIHYDRATE 2 PUFF(S): 160; 4.5 AEROSOL RESPIRATORY (INHALATION) at 22:17

## 2022-10-15 RX ADMIN — SPIRONOLACTONE 25 MILLIGRAM(S): 25 TABLET, FILM COATED ORAL at 12:43

## 2022-10-15 RX ADMIN — SENNA PLUS 2 TABLET(S): 8.6 TABLET ORAL at 22:17

## 2022-10-15 RX ADMIN — Medication 3 MILLILITER(S): at 22:38

## 2022-10-15 RX ADMIN — Medication 81 MILLIGRAM(S): at 12:05

## 2022-10-15 RX ADMIN — Medication 25 MICROGRAM(S): at 12:06

## 2022-10-15 RX ADMIN — CEFTRIAXONE 100 MILLIGRAM(S): 500 INJECTION, POWDER, FOR SOLUTION INTRAMUSCULAR; INTRAVENOUS at 01:49

## 2022-10-15 RX ADMIN — CARVEDILOL PHOSPHATE 3.12 MILLIGRAM(S): 80 CAPSULE, EXTENDED RELEASE ORAL at 17:13

## 2022-10-15 RX ADMIN — Medication 25 GRAM(S): at 19:17

## 2022-10-15 RX ADMIN — GABAPENTIN 200 MILLIGRAM(S): 400 CAPSULE ORAL at 22:18

## 2022-10-15 RX ADMIN — ATORVASTATIN CALCIUM 80 MILLIGRAM(S): 80 TABLET, FILM COATED ORAL at 22:18

## 2022-10-15 RX ADMIN — Medication 25 GRAM(S): at 17:13

## 2022-10-15 RX ADMIN — ISOSORBIDE DINITRATE 20 MILLIGRAM(S): 5 TABLET ORAL at 13:43

## 2022-10-15 RX ADMIN — FAMOTIDINE 20 MILLIGRAM(S): 10 INJECTION INTRAVENOUS at 12:05

## 2022-10-15 RX ADMIN — Medication 2: at 18:23

## 2022-10-15 RX ADMIN — Medication 125 MILLIGRAM(S): at 01:49

## 2022-10-15 RX ADMIN — Medication 50 MILLIGRAM(S): at 13:43

## 2022-10-15 RX ADMIN — BUMETANIDE 2 MILLIGRAM(S): 0.25 INJECTION INTRAMUSCULAR; INTRAVENOUS at 12:05

## 2022-10-15 RX ADMIN — Medication 50 MILLIGRAM(S): at 22:17

## 2022-10-15 RX ADMIN — CLOPIDOGREL BISULFATE 75 MILLIGRAM(S): 75 TABLET, FILM COATED ORAL at 12:05

## 2022-10-15 RX ADMIN — GABAPENTIN 200 MILLIGRAM(S): 400 CAPSULE ORAL at 13:43

## 2022-10-15 RX ADMIN — AZITHROMYCIN 255 MILLIGRAM(S): 500 TABLET, FILM COATED ORAL at 03:59

## 2022-10-15 RX ADMIN — BUMETANIDE 2 MILLIGRAM(S): 0.25 INJECTION INTRAMUSCULAR; INTRAVENOUS at 22:18

## 2022-10-15 RX ADMIN — TAMSULOSIN HYDROCHLORIDE 0.4 MILLIGRAM(S): 0.4 CAPSULE ORAL at 22:17

## 2022-10-15 RX ADMIN — Medication 3 MILLILITER(S): at 13:15

## 2022-10-15 RX ADMIN — Medication 1: at 12:41

## 2022-10-15 NOTE — H&P ADULT - NSICDXFAMILYHX_GEN_ALL_CORE_FT
FAMILY HISTORY:  Father  Still living? Unknown  Family history of type 2 diabetes mellitus in father, Age at diagnosis: Age Unknown

## 2022-10-15 NOTE — PATIENT PROFILE ADULT - FUNCTIONAL SCREEN CURRENT LEVEL: COMMUNICATION, MLM
Preop Dx: Hydrocephalus    Postop Dx:Same    Procedure:Creation of right frontal  shunt with Strata NSC 2.0    Attending:Criss    Assistant:NEGRO Brito    Anesthesia:GETA    Findings:CSF clear and colorless and under moderate pressure    Complications:None    Implants:Strata NSC 2.0    EBL:75 cc    Disposition:Extubated->PACU   0 = understands/communicates without difficulty

## 2022-10-15 NOTE — H&P ADULT - PROBLEM SELECTOR PLAN 2
L sided chest pain.   - EKG with LBBB unchanged from 5/2022.  - Troponin 77 (baseline 77 earlier this year)  - Trend trops L sided chest pain.   - EKG with LBBB unchanged from 5/2022  - Troponin 77 (baseline 77 earlier this year)  - Trend trops L sided chest pain.   - EKG with LBBB unchanged from 5/2022  - Troponin 77 >   - Baseline troponin 77 earlier this year L sided chest pain. Tender to palpation. Likely MSK but patient with significant cardiac risk factors.   - EKG with LBBB unchanged from 5/2022  - Troponin 77 > [ ]   - Baseline troponin 77 earlier this year

## 2022-10-15 NOTE — ED PROVIDER NOTE - CLINICAL SUMMARY MEDICAL DECISION MAKING FREE TEXT BOX
66 yo M pmh CAD s/p CABG on ASA/Plavix, HFrEF (22% 10/2022 TTE) s/p AICD on 2LNC at home, CKD, CVA with mild left deficits, asthma/COPD (on 2L NC), recurrent PNA infections presents with acute worsening SOB and b/l LE swelling. Pt endorsed dry cough over the last week with progressive worsening dyspnea and associated orthopnea, worst today. Likely acute COPD exacerbation vs acute on chronic chf component vs acs less likely vs viral uri vs pna vs pleural effusion. Obtain cbc, cmp, vbg, tni, ekg, cxr, bipap, abx. Admit. 68 yo M pmh CAD s/p CABG on ASA/Plavix, HFrEF (22% 10/2022 TTE) s/p AICD on 2LNC at home, CKD, CVA with mild left deficits, asthma/COPD (on 2L NC), recurrent PNA infections presents with acute worsening SOB and b/l LE swelling. Pt endorsed dry cough over the last week with progressive worsening dyspnea and associated orthopnea, worst today. Likely acute COPD exacerbation vs acute on chronic chf component vs acs less likely vs viral uri vs pna vs pleural effusion. Obtain cbc, cmp, vbg, tni, ekg, cxr, bipap, steroids, abx. Admit.

## 2022-10-15 NOTE — ED ADULT NURSE NOTE - OBJECTIVE STATEMENT
66 y/o M presents to Ed room 2 A&Ox4 c/o chest pain. pt has had c/p starting this AM. as per pt, cough started 1 week ago s/p being in cold rain. endorses fevers, chills, SOB. respirations appear labored, belly breathing, wheezing noted. 2L NC applied, on 2L NC at home. 1+ pitting edema noted in lower extremities. c/p worse upon exertion and when laying down. PMHx of CHF, CAD. pt placed on continuos ,monitor and pulse ox. VS as noted in flowsheet. pt Setswana speaking. MD student at bedside with . awaiting MD orders. safety maintained. 68 y/o M presents to Ed room 2 A&Ox4 c/o chest pain. pt has had c/p starting this AM. as per pt, cough started 1 week ago s/p being in cold rain. endorses fevers, chills, SOB. respirations appear labored, belly breathing, wheezing noted. 2L NC applied, on 2L NC at home. 1+ pitting edema noted in lower extremities. c/p worse upon exertion and when laying down. PMHx of CHF, CAD, COPD. pt placed on continuos ,monitor and pulse ox. VS as noted in flowsheet. pt Bulgarian speaking. MD student at bedside with . awaiting MD orders. safety maintained.

## 2022-10-15 NOTE — ED PROVIDER NOTE - ATTENDING CONTRIBUTION TO CARE
I have personally performed a history and physical examination of the patient and discussed management with the resident as well as the patient.  I reviewed the resident's note and agree with the documented findings and plan of care.  I have authored and modified critical sections of the Provider Note, including but not limited to HPI, Physical Exam and MDM.    68 yo M pmh CAD s/p CABG on ASA/Plavix, HFrEF (22% 10/2022 TTE) s/p AICD on 2LNC at home, CKD, CVA with mild left deficits, asthma/COPD (on 2L NC), recurrent PNA infections presents with acute worsening SOB and b/l LE swelling. Pt endorsed dry cough over the last week with progressive worsening dyspnea and associated orthopnea, worst today. Bedside POCUS suggestive of b/l pleural effusions without b-lines, adequate cardiac contractility with dilated RV/LV consistent with history of reduced EF, no evidence of ptx. Likely acute COPD exacerbation vs acute on chronic chf component vs acs less likely vs viral uri vs pna vs pleural effusion. Obtain cbc, cmp, vbg, tni, ekg, cxr, bipap, abx. Admit. I have personally performed a history and physical examination of the patient and discussed management with the resident as well as the patient.  I reviewed the resident's note and agree with the documented findings and plan of care.  I have authored and modified critical sections of the Provider Note, including but not limited to HPI, Physical Exam and MDM.    66 yo M pmh CAD s/p CABG on ASA/Plavix, HFrEF (22% 10/2022 TTE) s/p AICD on 2LNC at home, CKD, CVA with mild left deficits, asthma/COPD (on 2L NC), recurrent PNA infections presents with acute worsening SOB and b/l LE swelling. Pt endorsed dry cough over the last week with progressive worsening dyspnea and associated orthopnea, worst today. Bedside POCUS suggestive of b/l pleural effusions without b-lines, adequate cardiac contractility with dilated RV/LV consistent with history of reduced EF, no evidence of ptx. Likely acute COPD exacerbation vs acute on chronic chf component vs acs less likely vs viral uri vs pna vs pleural effusion. Obtain cbc, cmp, vbg, tni, ekg, cxr, bipap, steroids, abx. Admit.

## 2022-10-15 NOTE — PATIENT PROFILE ADULT - DATE OF LAST VACCINATION
November 14, 2018      Shalini Diallo MD  [address]  [place] [address]           [facility],  [address], [address]  [place] [address]  Phone: [phone]  Fax: [phone]          Patient: Yamilet Silverio   MR Number: [id number]   YOB: 1968   Date of Visit: 11/14/2018       Dear Dr. Shalini Diallo:    Thank you for referring Yamilet Silverio to me for evaluation. Attached you will find relevant portions of my assessment and plan of care.    If you have questions, please do not hesitate to call me. I look forward to following Yamilet Silverio along with you.    Sincerely,    Javi Gamble MD    Enclosure  CC:  No Recipients    If you would like to receive this communication electronically, please contact [email or web address] or [phone] to request more information on [employer] access.    For providers and/or their staff who would like to refer a patient to [facility], please contact us through our one-stop-shop provider referral line, [facility], at [phone].    If you feel you have received this communication in error or would no longer like to receive these types of communications, please e-mail [email or web address]          13-Apr-2022

## 2022-10-15 NOTE — H&P ADULT - PROBLEM SELECTOR PLAN 6
- Hold home oral meds (glimepiiride)  -Basal-bolus this admission LDSSI - Hold home oral meds (glimepiride)  - LDSSI  - carb consistent diet

## 2022-10-15 NOTE — PATIENT PROFILE ADULT - FALL HARM RISK - RISK INTERVENTIONS
Assistance OOB with selected safe patient handling equipment/Assistance with ambulation/Communicate Fall Risk and Risk Factors to all staff, patient, and family/Discuss with provider need for PT consult/Monitor gait and stability/Provide patient with walking aids - walker, cane, crutches/Reinforce activity limits and safety measures with patient and family/Sit up slowly, dangle for a short time, stand at bedside before walking/Visual Cue: Yellow wristband/Bed in lowest position, wheels locked, appropriate side rails in place/Call bell, personal items and telephone in reach/Instruct patient to call for assistance before getting out of bed or chair/Non-slip footwear when patient is out of bed/[place] to call system/Physically safe environment - no spills, clutter or unnecessary equipment/Purposeful Proactive Rounding/Room/bathroom lighting operational, light cord in reach

## 2022-10-15 NOTE — ED PROVIDER NOTE - OBJECTIVE STATEMENT
66 yo M pmh CAD s/p CABG on ASA/Plavix, HFrEF (22% 10/2022 TTE) s/p AICD on 2LNC at home, CKD, CVA with mild left deficits, asthma/COPD (on 2L NC), recurrent PNA infections presents with acute worsening SOB and b/l LE swelling. Pt endorsed dry cough over the last week with progressive worsening dyspnea and associated orthopnea, worst today. Endorses compliance with home medications. States LE edema began yesterday, and today he began having midsternal non-radiating, dull chest pain present at rest unchanged with position or exertion. No palliative or provocative factors. No other current complaints at this time.

## 2022-10-15 NOTE — H&P ADULT - NSHPLABSRESULTS_GEN_ALL_CORE
.  LABS:                         12.3   9.55  )-----------( 176      ( 15 Oct 2022 01:55 )             38.8     10-15    134<L>  |  97<L>  |  37<H>  ----------------------------<  128<H>  4.0   |  24  |  2.47<H>    Ca    9.0      15 Oct 2022 01:55    TPro  6.6  /  Alb  3.9  /  TBili  0.7  /  DBili  x   /  AST  35  /  ALT  33  /  AlkPhos  105  10-15    PT/INR - ( 15 Oct 2022 01:55 )   PT: 13.9 sec;   INR: 1.20 ratio         PTT - ( 15 Oct 2022 01:55 )  PTT:30.4 sec    Serum Pro-Brain Natriuretic Peptide: 7674 pg/mL (10-15 @ 01:55)        RADIOLOGY, EKG & ADDITIONAL TESTS: Reviewed. LABS:                         12.3   9.55  )-----------( 176      ( 15 Oct 2022 01:55 )             38.8     10-15    134<L>  |  97<L>  |  37<H>  ----------------------------<  128<H>  4.0   |  24  |  2.47<H>    Ca    9.0      15 Oct 2022 01:55    TPro  6.6  /  Alb  3.9  /  TBili  0.7  /  DBili  x   /  AST  35  /  ALT  33  /  AlkPhos  105  10-15    PT/INR - ( 15 Oct 2022 01:55 )   PT: 13.9 sec;   INR: 1.20 ratio         PTT - ( 15 Oct 2022 01:55 )  PTT:30.4 sec    Serum Pro-Brain Natriuretic Peptide: 7674 pg/mL (10-15 @ 01:55)    RADIOLOGY, EKG & ADDITIONAL TESTS: Reviewed.

## 2022-10-15 NOTE — H&P ADULT - PROBLEM SELECTOR PLAN 4
HFrEF NYHA III, stage C. HFrEF (EF 15 - 20% 5/2022) s/p medtronic ICD (2015) NYHA III, stage C. Mild pulmonary edema on CXR, No B-lines on ultrasound, no peripheral edema, no orthopnea.  - home bumex 3mg PO, coreg 12.5 qD, hydral 50, isordil 30, aldactone 12.5  - reportedly follows Dr. Conway outpatient (no records)  - acute management as above  - c/w other home meds as above  - strict I&Os

## 2022-10-15 NOTE — H&P ADULT - ASSESSMENT
Joey acevedo 67M history of CAD s/p CABG on ASA/Plavix, HFrEF (25-20% on  05/2022) s/p AICD on 2L at home, asthma, COPD, CKD, CVA (left sided deficit, uses walker), recurrent pneumonia presenting with one week of dry cough, shortness of breath and one day of left sided chest pain. Diffuse wheeze on exam, pulmonary edema on CXR, EKG nl, trops at baseline. Likely AHRF 2/2 COPD exacerbation +/- CHF exacerbation.

## 2022-10-15 NOTE — ED PROVIDER NOTE - NS ED ROS FT
ROS   GENERAL: No fever, no chills, no malaise, no fatigue   ENT: No earache, no coryza, no sore throat   NECK: No stiffness, no swollen glands, no dysphagia   RESPIRATORY: +cough, + dyspnea, no pleuritic chest pain   HEART: + chest pain, no palpitations, no edema, no jvd   ABDOMEN: No abdominal pain, no nausea, no vomiting, no diarrhea   MUSCULAR-SKELETAL: No myalgia, no arthralgia   NEUROLOGY: No headache, no vertigo, no paresthesia, no focal deficits, no diplopia   SKIN: No rash, no evidence of trauma  All other ROS are negative

## 2022-10-15 NOTE — H&P ADULT - HISTORY OF PRESENT ILLNESS
68 y/o M with PMH of CAD s/p CABG on ASA/Plavix, HFrEF (22% 10/2022 TTE) s/p AICD on 2LNC at home, CKD, CVA with mild left deficits, asthma/COPD (on 2L NC), recurrent PNA infections presents with acute worsening SOB and b/l LE swelling.      ED Course:  Vitals: T 98.6 F, HR 86. /89, RR 18, SpO2 98% on 2L O2 NC  pBNP 7674, trop 77  received CTX, azithro, and solumedrol 125 mg x 1 68 y/o M with PMH of CAD s/p CABG on ASA/Plavix, HFrEF (15-20% 05/2022 TTE) s/p AICD on 2LNC at home, CKD, CVA with mild left deficits, asthma/COPD (on 2L NC), recurrent PNA infections presents with acute worsening SOB and b/l LE swelling.      ED Course:  Vitals: T 98.6 F, HR 86. /89, RR 18, SpO2 98% on 2L O2 NC  pBNP 7674, trop 77  received CTX, azithro, and solumedrol 125 mg x 1 66 y/o M with PMH of CAD s/p CABG (2014 on ASA/Plavix), HFrEF (15-20% 05/2022 TTE) s/p AICD, asthma/COPD (on 2L NC at home), CKD, CVA (1993 mild left-sided deficits uses walker), presents with dry cough and shortness of breath x 1 week. One day of left sided chest pain. Shortness of breath is with exertion and at rest, has difficulty speaking long sentences, says this is not his baseline. Has had some leg swelling but states it is only a little bit worse, denies orthopnea, was interviewed while laying flat on 3L NC without issue. Denies fever, chills, sputum production or sick contacts. He has been taking all of his medications.     Chest pain is left-sided associated with left arm pain. Worse with deep breaths, tender to palpation. Has never had pain like this before. Is sedentary at baseline, denies new calf pain. Feels it is improving. Notes some chronic left sided abdominal pain that is intermittent and associated with constipation, improves with bowel movement.     ED Course:  Vitals: T 98.6 F, HR 86. /89, RR 18, SpO2 98% on 2L O2 NC, started on BIPAP  pBNP 7674, trop 77  received nebs, CTX, azithro, and solumedrol 125 mg x 1

## 2022-10-15 NOTE — H&P ADULT - ATTENDING COMMENTS
67M with hx of CAD s/p CABG on DAPT, HFrEF (EF 15-20% May 2022) s/p AICD, COPD on 2L home oxygen, CKD Stage 4, CVA (with residual left sided deficits), recurrent PNA who presents with cough and shortness of breath x 1 week duration a/w acute on chronic hypoxic respiratory failure likely d/t COPD and CHF exacerbation. VSS. Initially on BIPAP now weaned to 3L NC. Wheezing on exam. Labs reviewed notable for elevated proBNP to 7K, creatinine at baseline CKD 4, Trops 77 close to baseline. RVP negative. CXR mild pulmonary edema. ED POCUS b/l pleural effusions without b-lines, focal consolidations.     #Acute on chronic hypoxic respiratory failure: COPD vs. CHF exacerbation. Prednisone 40mg daily x 5 days, add azithromycin 500mg daily x 3 days. Agree with Bumex 2mg IV BID for now. Wean back to home oxygen as tolerated.     Discussed with Team 5. 67M with hx of CAD s/p CABG on DAPT, HFrEF (EF 15-20% May 2022) s/p AICD, COPD on 2L home oxygen, CKD Stage 4, CVA (with residual left sided deficits), recurrent PNA who presents with cough and shortness of breath x 1 week duration a/w acute on chronic hypoxic respiratory failure likely d/t COPD and CHF exacerbation. VSS. Initially on BIPAP now weaned to 3L NC. Wheezing on exam. Labs reviewed notable for elevated proBNP to 7K, creatinine at baseline CKD 4, Trops 77 close to baseline. RVP negative. CXR mild pulmonary edema. ED POCUS b/l pleural effusions without b-lines, focal consolidations.     #Acute on chronic hypoxic respiratory failure: COPD vs. CHF exacerbation. s/p solumedrol 125mg IV in ED, continue with 40mg IV daily, transition to oral steroids when able. Add azithromycin 500mg daily x 3 days. Agree with Bumex 2mg IV BID for now. Wean back to home oxygen as tolerated.     Discussed with Team 5.

## 2022-10-15 NOTE — H&P ADULT - NSHPPHYSICALEXAM_GEN_ALL_CORE
PHYSICAL EXAM:  GENERAL: NAD, lying in bed comfortably  HEAD:  Atraumatic, Normocephalic  EYES: EOMI, PERRLA, conjunctiva and sclera clear  ENT: Moist mucous membranes  NECK: Supple, No JVD  CHEST/LUNG: Clear to auscultation bilaterally; No rales, rhonchi, wheezing, or rubs. Unlabored respirations  HEART: Regular rate and rhythm; No murmurs, rubs, or gallops  ABDOMEN: Bowel sounds present; Soft, Nontender, Nondistended. No hepatomegally  EXTREMITIES:  2+ Peripheral Pulses, brisk capillary refill. No clubbing, cyanosis, or edema  NERVOUS SYSTEM:  Alert & Oriented X3, speech clear. No deficits   MSK: FROM all 4 extremities, full and equal strength  SKIN: No rashes or lesions PHYSICAL EXAM:  GENERAL: SENTENCES SHORT, APPEARS DYSPNEIC AT REST  HEAD:  Atraumatic, Normocephalic  EYES: EOMI, PERRLA, conjunctiva and sclera clear  ENT: Moist mucous membranes  NECK: Supple, No JVD  CHEST/LUNG: DIFFUSE WHEEZING, MILD CRACKLES BILATERALLY  HEART: Regular rate and rhythm; No murmurs, rubs, or gallops  ABDOMEN: MILD TENDERNESS ON LEFT Bowel sounds present; Soft, Nondistended.   EXTREMITIES:  TRACE EDEMA RLE, 1+ EDEMA LLE   NERVOUS SYSTEM:  Alert & Oriented X3, speech clear. No deficits   MSK: LUE LLE 4/5 FROM all 4 extremities.  SKIN: No rashes or lesions

## 2022-10-15 NOTE — H&P ADULT - PROBLEM SELECTOR PLAN 1
Hx of COPD (on 2L), CHF (NYHA III, stage C) p/w 1 week dry cough, shortness of breath, no orthopnea, diffuse wheeze. Started on BIPAP. No B-lines on ultrasound, although possible mild pulmonary edema on CXR.   - s/p solumedrol. c/w prednisone qD x 14 days  - duonebs q6 standing  - c/w home symbicort  - c/w home bumex 3 qD  - consider adding spiriva  - Wean O2 as tolerated Hx of COPD (on 2L), CHF (NYHA III, stage C) p/w 1 week dry cough, shortness of breath, no orthopnea, diffuse wheeze. Started on BIPAP. No B-lines on ultrasound, although possible mild pulmonary edema on CXR.   - s/p solumedrol. c/w prednisone qD x 14 days  - duonebs q6 standing  - c/w home symbicort  - c/w home bumex 3 qD  - consider adding spiriva  - Wean O2 as tolerated  - consider LLE u/s Hx of COPD (on 2L), CHF (NYHA III, stage C) p/w 1 week dry cough, shortness of breath, no orthopnea, diffuse wheeze. Started on BIPAP, weaned to NC 3L. No B-lines on ultrasound, although possible mild pulmonary edema on CXR.   - s/p solumedrol. c/w prednisone qD x 14 days  - duonebs q6 standing  - c/w home symbicort  - increase home bumex 3PO -> 2 IV BID  - consider adding spiriva  - consider BLE u/s  - Wean O2 as tolerated Hx of COPD (on 2L), CHF (NYHA III, stage C) p/w 1 week dry cough, shortness of breath, no orthopnea, diffuse wheeze. Started on BIPAP, weaned to NC 3L. No B-lines on ultrasound, although possible mild pulmonary edema on CXR.   - s/p solumedrol. c/w prednisone qD x 5 days  - duonebs q6 standing  - c/w home symbicort  - increase home bumex 3PO -> 2 IV BID  - consider adding spiriva  - consider BLE u/s  - Wean O2 as tolerated Hx of COPD (on 2L), CHF (NYHA III, stage C) p/w 1 week dry cough, shortness of breath, no orthopnea, diffuse wheeze. Started on BIPAP, weaned to NC 3L. No B-lines on ultrasound, although possible mild pulmonary edema on CXR.   - s/p solumedrol 125mg on admission, c/w 40mg daily, transition to prednisone when improved   - duonebs q6 standing  - c/w home symbicort  - increase home bumex 3PO -> 2 IV BID  - consider adding spiriva  - consider BLE u/s  - Wean O2 as tolerated

## 2022-10-15 NOTE — H&P ADULT - NSHPREVIEWOFSYSTEMS_GEN_ALL_CORE
General: Denies dizziness, fatigue  Eyes: Denies blurry vision  ENMT: Denies rhinorrhea  Respiratory: Denies cough, SOB  Cardiovascular: Denies palpitations, CP  Gastrointestinal: Denies abd pain, N/V/D/C, hematochezia, melena  : Denies dysuria, increased freq  Musculoskeletal: Denies edema, joint pain  Endocrine: Denies increased thirst, increased frequency  Allergic/Immunologic: Denies rashes or hives  Neuro: Denies weakness, numbness  Psych: Denies anxiety, depression  All ROS negative unless indicated above General: Denies dizziness, fatigue  Eyes: Denies blurry vision  ENMT: Denies rhinorrhea  Respiratory: SOB COUGH   Cardiovascular: CHEST PAIN Denies palpitations  Gastrointestinal: Denies abd pain, N/V/D/C, hematochezia, melena  : Denies dysuria, increased freq  Musculoskeletal: MILD EDEMA Denies joint pain  Endocrine: Denies increased thirst, increased frequency  Allergic/Immunologic: Denies rashes or hives  Neuro: Denies weakness, numbness  Psych: Denies anxiety, depression  All ROS negative unless indicated above

## 2022-10-15 NOTE — ED PROVIDER NOTE - PHYSICAL EXAMINATION
GEN - A&O x3   HEAD - NC/AT   EYES- PERRL, EOMI  ENT: Airway patent, mmm, Oral cavity and pharynx normal. No inflammation, swelling, exudate, or lesions.  NECK: Neck supple, non-tender without lymphadenopathy, no masses. No jvd.  PULMONARY - increased WOB, poor air entry, diffuse end expiratory wheezes. Bedside POCUS suggestive of b/l pleural effusions without b-lines, adequate cardiac contractility with dilated RV/LV consistent with history of reduced EF, no evidence of ptx.  CARDIAC -s1s2, RRR, no M,G,R, No JVD  ABDOMEN - +BS, ND, NT, soft, no guarding, no rebound, no masses , no rigidity  BACK - Normal  spine   EXTREMITIES - FROM, symmetric pulses, capillary refill < 2 seconds, no edema, 5/5 strength in b/l UE and LE  SKIN - no rash or bruising   NEUROLOGIC - alert, speech clear, no focal deficits, CN II-XII grossly intact  PSYCH -nl mood/affect, nl insight.

## 2022-10-15 NOTE — H&P ADULT - PROBLEM SELECTOR PLAN 7
Baseline ~2.5.   - no nephrotoxins  - dose renally Baseline ~2.3 - 2.9  - no SHERRY  - no nephrotoxins  - dose renally

## 2022-10-15 NOTE — H&P ADULT - PROBLEM SELECTOR PLAN 3
- 2L at home, on symbicort, montelukast, albuterol prn  - management as above - 2L at home, on symbicort, montelukast, albuterol prn  - acute management as above

## 2022-10-16 LAB
A1C WITH ESTIMATED AVERAGE GLUCOSE RESULT: 5.7 % — HIGH (ref 4–5.6)
ANION GAP SERPL CALC-SCNC: 12 MMOL/L — SIGNIFICANT CHANGE UP (ref 7–14)
BUN SERPL-MCNC: 41 MG/DL — HIGH (ref 7–23)
CALCIUM SERPL-MCNC: 9.1 MG/DL — SIGNIFICANT CHANGE UP (ref 8.4–10.5)
CHLORIDE SERPL-SCNC: 99 MMOL/L — SIGNIFICANT CHANGE UP (ref 98–107)
CO2 SERPL-SCNC: 26 MMOL/L — SIGNIFICANT CHANGE UP (ref 22–31)
CREAT SERPL-MCNC: 2.36 MG/DL — HIGH (ref 0.5–1.3)
EGFR: 29 ML/MIN/1.73M2 — LOW
ESTIMATED AVERAGE GLUCOSE: 117 — SIGNIFICANT CHANGE UP
GLUCOSE BLDC GLUCOMTR-MCNC: 110 MG/DL — HIGH (ref 70–99)
GLUCOSE BLDC GLUCOMTR-MCNC: 161 MG/DL — HIGH (ref 70–99)
GLUCOSE BLDC GLUCOMTR-MCNC: 190 MG/DL — HIGH (ref 70–99)
GLUCOSE BLDC GLUCOMTR-MCNC: 199 MG/DL — HIGH (ref 70–99)
GLUCOSE SERPL-MCNC: 99 MG/DL — SIGNIFICANT CHANGE UP (ref 70–99)
HCT VFR BLD CALC: 40 % — SIGNIFICANT CHANGE UP (ref 39–50)
HGB BLD-MCNC: 13.1 G/DL — SIGNIFICANT CHANGE UP (ref 13–17)
MAGNESIUM SERPL-MCNC: 2.7 MG/DL — HIGH (ref 1.6–2.6)
MCHC RBC-ENTMCNC: 31.4 PG — SIGNIFICANT CHANGE UP (ref 27–34)
MCHC RBC-ENTMCNC: 32.8 GM/DL — SIGNIFICANT CHANGE UP (ref 32–36)
MCV RBC AUTO: 95.9 FL — SIGNIFICANT CHANGE UP (ref 80–100)
NRBC # BLD: 0 /100 WBCS — SIGNIFICANT CHANGE UP (ref 0–0)
NRBC # FLD: 0.02 K/UL — HIGH (ref 0–0)
PHOSPHATE SERPL-MCNC: 3.6 MG/DL — SIGNIFICANT CHANGE UP (ref 2.5–4.5)
PLATELET # BLD AUTO: 181 K/UL — SIGNIFICANT CHANGE UP (ref 150–400)
POTASSIUM SERPL-MCNC: 4.4 MMOL/L — SIGNIFICANT CHANGE UP (ref 3.5–5.3)
POTASSIUM SERPL-SCNC: 4.4 MMOL/L — SIGNIFICANT CHANGE UP (ref 3.5–5.3)
RBC # BLD: 4.17 M/UL — LOW (ref 4.2–5.8)
RBC # FLD: 16.1 % — HIGH (ref 10.3–14.5)
SODIUM SERPL-SCNC: 137 MMOL/L — SIGNIFICANT CHANGE UP (ref 135–145)
TSH SERPL-MCNC: 1.43 UIU/ML — SIGNIFICANT CHANGE UP (ref 0.27–4.2)
WBC # BLD: 14.14 K/UL — HIGH (ref 3.8–10.5)
WBC # FLD AUTO: 14.14 K/UL — HIGH (ref 3.8–10.5)

## 2022-10-16 PROCEDURE — 99233 SBSQ HOSP IP/OBS HIGH 50: CPT | Mod: GC

## 2022-10-16 RX ORDER — ISOSORBIDE DINITRATE 5 MG/1
20 TABLET ORAL THREE TIMES A DAY
Refills: 0 | Status: DISCONTINUED | OUTPATIENT
Start: 2022-10-16 | End: 2022-10-18

## 2022-10-16 RX ORDER — BUMETANIDE 0.25 MG/ML
3 INJECTION INTRAMUSCULAR; INTRAVENOUS DAILY
Refills: 0 | Status: DISCONTINUED | OUTPATIENT
Start: 2022-10-17 | End: 2022-10-18

## 2022-10-16 RX ORDER — SPIRONOLACTONE 25 MG/1
12.5 TABLET, FILM COATED ORAL DAILY
Refills: 0 | Status: DISCONTINUED | OUTPATIENT
Start: 2022-10-16 | End: 2022-10-18

## 2022-10-16 RX ORDER — HYDRALAZINE HCL 50 MG
50 TABLET ORAL THREE TIMES A DAY
Refills: 0 | Status: DISCONTINUED | OUTPATIENT
Start: 2022-10-16 | End: 2022-10-18

## 2022-10-16 RX ORDER — BUMETANIDE 0.25 MG/ML
2 INJECTION INTRAMUSCULAR; INTRAVENOUS
Refills: 0 | Status: DISCONTINUED | OUTPATIENT
Start: 2022-10-16 | End: 2022-10-16

## 2022-10-16 RX ORDER — TAMSULOSIN HYDROCHLORIDE 0.4 MG/1
0.4 CAPSULE ORAL AT BEDTIME
Refills: 0 | Status: DISCONTINUED | OUTPATIENT
Start: 2022-10-16 | End: 2022-10-18

## 2022-10-16 RX ORDER — CARVEDILOL PHOSPHATE 80 MG/1
3.12 CAPSULE, EXTENDED RELEASE ORAL EVERY 12 HOURS
Refills: 0 | Status: DISCONTINUED | OUTPATIENT
Start: 2022-10-16 | End: 2022-10-18

## 2022-10-16 RX ADMIN — Medication 1: at 18:20

## 2022-10-16 RX ADMIN — Medication 25 MICROGRAM(S): at 07:23

## 2022-10-16 RX ADMIN — BUMETANIDE 2 MILLIGRAM(S): 0.25 INJECTION INTRAMUSCULAR; INTRAVENOUS at 07:24

## 2022-10-16 RX ADMIN — Medication 3 MILLILITER(S): at 15:57

## 2022-10-16 RX ADMIN — Medication 3 MILLILITER(S): at 22:52

## 2022-10-16 RX ADMIN — Medication 325 MILLIGRAM(S): at 07:22

## 2022-10-16 RX ADMIN — HEPARIN SODIUM 5000 UNIT(S): 5000 INJECTION INTRAVENOUS; SUBCUTANEOUS at 13:17

## 2022-10-16 RX ADMIN — AZITHROMYCIN 500 MILLIGRAM(S): 500 TABLET, FILM COATED ORAL at 11:32

## 2022-10-16 RX ADMIN — Medication 40 MILLIGRAM(S): at 07:25

## 2022-10-16 RX ADMIN — SPIRONOLACTONE 12.5 MILLIGRAM(S): 25 TABLET, FILM COATED ORAL at 07:23

## 2022-10-16 RX ADMIN — GABAPENTIN 200 MILLIGRAM(S): 400 CAPSULE ORAL at 13:16

## 2022-10-16 RX ADMIN — CLOPIDOGREL BISULFATE 75 MILLIGRAM(S): 75 TABLET, FILM COATED ORAL at 11:31

## 2022-10-16 RX ADMIN — Medication 81 MILLIGRAM(S): at 11:31

## 2022-10-16 RX ADMIN — FAMOTIDINE 20 MILLIGRAM(S): 10 INJECTION INTRAVENOUS at 11:31

## 2022-10-16 RX ADMIN — ISOSORBIDE DINITRATE 20 MILLIGRAM(S): 5 TABLET ORAL at 07:23

## 2022-10-16 RX ADMIN — Medication 50 MILLIGRAM(S): at 13:17

## 2022-10-16 RX ADMIN — ISOSORBIDE DINITRATE 20 MILLIGRAM(S): 5 TABLET ORAL at 11:36

## 2022-10-16 RX ADMIN — CARVEDILOL PHOSPHATE 3.12 MILLIGRAM(S): 80 CAPSULE, EXTENDED RELEASE ORAL at 07:23

## 2022-10-16 RX ADMIN — HEPARIN SODIUM 5000 UNIT(S): 5000 INJECTION INTRAVENOUS; SUBCUTANEOUS at 22:10

## 2022-10-16 RX ADMIN — Medication 50 MILLIGRAM(S): at 22:10

## 2022-10-16 RX ADMIN — BUDESONIDE AND FORMOTEROL FUMARATE DIHYDRATE 2 PUFF(S): 160; 4.5 AEROSOL RESPIRATORY (INHALATION) at 09:13

## 2022-10-16 RX ADMIN — GABAPENTIN 200 MILLIGRAM(S): 400 CAPSULE ORAL at 22:11

## 2022-10-16 RX ADMIN — GABAPENTIN 200 MILLIGRAM(S): 400 CAPSULE ORAL at 07:22

## 2022-10-16 RX ADMIN — Medication 3 MILLILITER(S): at 04:07

## 2022-10-16 RX ADMIN — SENNA PLUS 2 TABLET(S): 8.6 TABLET ORAL at 22:10

## 2022-10-16 RX ADMIN — BUDESONIDE AND FORMOTEROL FUMARATE DIHYDRATE 2 PUFF(S): 160; 4.5 AEROSOL RESPIRATORY (INHALATION) at 22:07

## 2022-10-16 RX ADMIN — HEPARIN SODIUM 5000 UNIT(S): 5000 INJECTION INTRAVENOUS; SUBCUTANEOUS at 07:25

## 2022-10-16 RX ADMIN — Medication 50 MILLIGRAM(S): at 07:26

## 2022-10-16 RX ADMIN — ISOSORBIDE DINITRATE 20 MILLIGRAM(S): 5 TABLET ORAL at 00:49

## 2022-10-16 RX ADMIN — ATORVASTATIN CALCIUM 80 MILLIGRAM(S): 80 TABLET, FILM COATED ORAL at 22:11

## 2022-10-16 RX ADMIN — TAMSULOSIN HYDROCHLORIDE 0.4 MILLIGRAM(S): 0.4 CAPSULE ORAL at 22:10

## 2022-10-16 NOTE — PROGRESS NOTE ADULT - PROBLEM SELECTOR PLAN 1
Hx of COPD (on 2L), CHF (NYHA III, stage C) p/w 1 week dry cough, shortness of breath, no orthopnea, diffuse wheeze. Started on BIPAP, weaned to NC 3L. No B-lines on ultrasound, although possible mild pulmonary edema on CXR.   - s/p solumedrol 125mg on admission, c/w 40mg daily, transition to prednisone when improved   - duonebs q6 standing  - c/w home symbicort  - increase home bumex 3PO -> 2 IV BID  - consider adding spiriva  - consider BLE u/s  - Wean O2 as tolerated Hx of COPD (on 2L), CHF (NYHA III, stage C) p/w 1 week dry cough, shortness of breath, no orthopnea, diffuse wheeze. Started on BIPAP, weaned to NC 3L. No B-lines on ultrasound, although possible mild pulmonary edema on CXR.   - s/p solumedrol 125mg on admission > solumedrol 40 x1 > prednisone 40 (5 day total course)  - duonebs q6 standing  - c/w home symbicort  - increase home bumex 3PO -> 2 IV BID -> 3 PO  - consider adding spiriva  - consider BLE u/s  - Wean O2 as tolerated

## 2022-10-16 NOTE — PROGRESS NOTE ADULT - SUBJECTIVE AND OBJECTIVE BOX
Malvin Bearden PGY1  pager [phone] or Teams or check resident tab for coverage    Patient is a 67y old  Male who presents with a chief complaint of AHRF (16 Oct 2022 11:17)    SUBJECTIVE / OVERNIGHT EVENTS:    NAEO.  Patient this morning is,    Brief Daily Plan:    MEDICATIONS  MEDICATIONS  (STANDING):  albuterol/ipratropium for Nebulization 3 milliLiter(s) Nebulizer every 6 hours  aspirin enteric coated 81 milliGRAM(s) Oral daily  atorvastatin 80 milliGRAM(s) Oral at bedtime  azithromycin   Tablet 500 milliGRAM(s) Oral daily  budesonide 160 MICROgram(s)/formoterol 4.5 MICROgram(s) Inhaler 2 Puff(s) Inhalation two times a day  buMETAnide Injectable 2 milliGRAM(s) IV Push two times a day  carvedilol 3.125 milliGRAM(s) Oral every 12 hours  clopidogrel Tablet 75 milliGRAM(s) Oral daily  dextrose 50% Injectable 25 Gram(s) IV Push once  dextrose 50% Injectable 12.5 Gram(s) IV Push once  dextrose 50% Injectable 25 Gram(s) IV Push once  famotidine    Tablet 20 milliGRAM(s) Oral daily  ferrous    sulfate 325 milliGRAM(s) Oral <User Schedule>  gabapentin 200 milliGRAM(s) Oral three times a day  heparin   Injectable 5000 Unit(s) SubCutaneous every 8 hours  hydrALAZINE 50 milliGRAM(s) Oral three times a day  insulin lispro (ADMELOG) corrective regimen sliding scale   SubCutaneous three times a day before meals  insulin lispro (ADMELOG) corrective regimen sliding scale   SubCutaneous at bedtime  isosorbide   dinitrate Tablet (ISORDIL) 20 milliGRAM(s) Oral three times a day  levothyroxine 25 MICROGram(s) Oral daily  methylPREDNISolone sodium succinate Injectable 40 milliGRAM(s) IV Push daily  senna 2 Tablet(s) Oral at bedtime  spironolactone 12.5 milliGRAM(s) Oral daily  tamsulosin 0.4 milliGRAM(s) Oral at bedtime    MEDICATIONS  (PRN):  acetaminophen     Tablet .. 650 milliGRAM(s) Oral every 6 hours PRN Temp greater or equal to 38C (100.4F), Mild Pain (1 - 3)  ALBUTerol    90 MICROgram(s) HFA Inhaler 2 Puff(s) Inhalation every 6 hours PRN Shortness of Breath and/or Wheezing  dextrose Oral Gel 15 Gram(s) Oral once PRN Blood Glucose LESS THAN 70 milliGRAM(s)/deciliter      VITALS  Vital Signs Last 24 Hrs  T(C): 36.2 (16 Oct 2022 07:26), Max: 36.7 (15 Oct 2022 13:40)  T(F): 97.2 (16 Oct 2022 07:26), Max: 98 (15 Oct 2022 13:40)  HR: 88 (16 Oct 2022 07:26) (68 - 99)  BP: 103/61 (16 Oct 2022 07:26) (103/61 - 118/75)  BP(mean): --  RR: 17 (16 Oct 2022 07:26) (17 - 19)  SpO2: 98% (16 Oct 2022 07:26) (95% - 100%)    Parameters below as of 16 Oct 2022 10:19  Patient On (Oxygen Delivery Method): nasal cannula        PHYSICAL EXAM:  GENERAL: no distress  PSYCH: A&O x3  HEAD: Atraumatic, Normocephalic  NECK: Supple, No JVD  CHEST/LUNG: clear to auscultation bilaterally  HEART: regular rate and rhythm, no murmurs  ABDOMEN: nontender to palpation, no rebound tenderness/guarding  EXTREMITIES: no edema on bilateral LE  NEUROLOGY: no focal neurologic deficit  SKIN: No rashes or lesions    LABS:  All labs personally Reviewed.    RADIOLOGY & ADDITIONAL TESTS:  All imaging personally Reviewed.  Malvin Bearden PGY1  pager [phone] or Teams or check resident tab for coverage    Patient is a 67y old  Male who presents with a chief complaint of AHRF (16 Oct 2022 11:17)    SUBJECTIVE / OVERNIGHT EVENTS:    NAEO.  Patient this morning is doing well. States that his shortness of breath is now at baseline. Notes pain in his left chest is still present but improved, continues to be tender to palpation. Lower extremities are painful when swollen, states there is a little bit of swelling right now.     Brief Daily Plan:  ·	c/w COPD care  ·	transition solumedrol > prednisone  ·	    MEDICATIONS  MEDICATIONS  (STANDING):  albuterol/ipratropium for Nebulization 3 milliLiter(s) Nebulizer every 6 hours  aspirin enteric coated 81 milliGRAM(s) Oral daily  atorvastatin 80 milliGRAM(s) Oral at bedtime  azithromycin   Tablet 500 milliGRAM(s) Oral daily  budesonide 160 MICROgram(s)/formoterol 4.5 MICROgram(s) Inhaler 2 Puff(s) Inhalation two times a day  buMETAnide Injectable 2 milliGRAM(s) IV Push two times a day  carvedilol 3.125 milliGRAM(s) Oral every 12 hours  clopidogrel Tablet 75 milliGRAM(s) Oral daily  dextrose 50% Injectable 25 Gram(s) IV Push once  dextrose 50% Injectable 12.5 Gram(s) IV Push once  dextrose 50% Injectable 25 Gram(s) IV Push once  famotidine    Tablet 20 milliGRAM(s) Oral daily  ferrous    sulfate 325 milliGRAM(s) Oral <User Schedule>  gabapentin 200 milliGRAM(s) Oral three times a day  heparin   Injectable 5000 Unit(s) SubCutaneous every 8 hours  hydrALAZINE 50 milliGRAM(s) Oral three times a day  insulin lispro (ADMELOG) corrective regimen sliding scale   SubCutaneous three times a day before meals  insulin lispro (ADMELOG) corrective regimen sliding scale   SubCutaneous at bedtime  isosorbide   dinitrate Tablet (ISORDIL) 20 milliGRAM(s) Oral three times a day  levothyroxine 25 MICROGram(s) Oral daily  methylPREDNISolone sodium succinate Injectable 40 milliGRAM(s) IV Push daily  senna 2 Tablet(s) Oral at bedtime  spironolactone 12.5 milliGRAM(s) Oral daily  tamsulosin 0.4 milliGRAM(s) Oral at bedtime    MEDICATIONS  (PRN):  acetaminophen     Tablet .. 650 milliGRAM(s) Oral every 6 hours PRN Temp greater or equal to 38C (100.4F), Mild Pain (1 - 3)  ALBUTerol    90 MICROgram(s) HFA Inhaler 2 Puff(s) Inhalation every 6 hours PRN Shortness of Breath and/or Wheezing  dextrose Oral Gel 15 Gram(s) Oral once PRN Blood Glucose LESS THAN 70 milliGRAM(s)/deciliter      VITALS  Vital Signs Last 24 Hrs  T(C): 36.2 (16 Oct 2022 07:26), Max: 36.7 (15 Oct 2022 13:40)  T(F): 97.2 (16 Oct 2022 07:26), Max: 98 (15 Oct 2022 13:40)  HR: 88 (16 Oct 2022 07:26) (68 - 99)  BP: 103/61 (16 Oct 2022 07:26) (103/61 - 118/75)  BP(mean): --  RR: 17 (16 Oct 2022 07:26) (17 - 19)  SpO2: 98% (16 Oct 2022 07:26) (95% - 100%)    Parameters below as of 16 Oct 2022 10:19  Patient On (Oxygen Delivery Method): nasal cannula        PHYSICAL EXAM:  GENERAL: no distress  PSYCH: A&O x3  HEAD: Atraumatic, Normocephalic  NECK: Supple, No JVD  CHEST/LUNG: clear to auscultation bilaterally  HEART: regular rate and rhythm, no murmurs  ABDOMEN: nontender to palpation, no rebound tenderness/guarding  EXTREMITIES: no edema on bilateral LE  NEUROLOGY: no focal neurologic deficit  SKIN: No rashes or lesions    LABS:  All labs personally Reviewed.    RADIOLOGY & ADDITIONAL TESTS:  All imaging personally Reviewed.  Malvin Bearden PGY1  pager [phone] or Teams or check resident tab for coverage    Patient is a 67y old  Male who presents with a chief complaint of AHRF (16 Oct 2022 11:17)    SUBJECTIVE / OVERNIGHT EVENTS:    NAEO.  Patient this morning is doing well. States that his shortness of breath is now at baseline. Notes pain in his left chest is still present but improved, continues to be tender to palpation. Lower extremities are painful when swollen, states there is a little bit of swelling right now.     Brief Daily Plan:  ·	c/w COPD care  ·	transition solumedrol > prednisone  ·	transition bumex IV > home 3 PO  ·	possible discharge tomorrow    MEDICATIONS  MEDICATIONS  (STANDING):  albuterol/ipratropium for Nebulization 3 milliLiter(s) Nebulizer every 6 hours  aspirin enteric coated 81 milliGRAM(s) Oral daily  atorvastatin 80 milliGRAM(s) Oral at bedtime  azithromycin   Tablet 500 milliGRAM(s) Oral daily  budesonide 160 MICROgram(s)/formoterol 4.5 MICROgram(s) Inhaler 2 Puff(s) Inhalation two times a day  buMETAnide Injectable 2 milliGRAM(s) IV Push two times a day  carvedilol 3.125 milliGRAM(s) Oral every 12 hours  clopidogrel Tablet 75 milliGRAM(s) Oral daily  dextrose 50% Injectable 25 Gram(s) IV Push once  dextrose 50% Injectable 12.5 Gram(s) IV Push once  dextrose 50% Injectable 25 Gram(s) IV Push once  famotidine    Tablet 20 milliGRAM(s) Oral daily  ferrous    sulfate 325 milliGRAM(s) Oral <User Schedule>  gabapentin 200 milliGRAM(s) Oral three times a day  heparin   Injectable 5000 Unit(s) SubCutaneous every 8 hours  hydrALAZINE 50 milliGRAM(s) Oral three times a day  insulin lispro (ADMELOG) corrective regimen sliding scale   SubCutaneous three times a day before meals  insulin lispro (ADMELOG) corrective regimen sliding scale   SubCutaneous at bedtime  isosorbide   dinitrate Tablet (ISORDIL) 20 milliGRAM(s) Oral three times a day  levothyroxine 25 MICROGram(s) Oral daily  methylPREDNISolone sodium succinate Injectable 40 milliGRAM(s) IV Push daily  senna 2 Tablet(s) Oral at bedtime  spironolactone 12.5 milliGRAM(s) Oral daily  tamsulosin 0.4 milliGRAM(s) Oral at bedtime    MEDICATIONS  (PRN):  acetaminophen     Tablet .. 650 milliGRAM(s) Oral every 6 hours PRN Temp greater or equal to 38C (100.4F), Mild Pain (1 - 3)  ALBUTerol    90 MICROgram(s) HFA Inhaler 2 Puff(s) Inhalation every 6 hours PRN Shortness of Breath and/or Wheezing  dextrose Oral Gel 15 Gram(s) Oral once PRN Blood Glucose LESS THAN 70 milliGRAM(s)/deciliter    VITALS  Vital Signs Last 24 Hrs  T(C): 36.2 (16 Oct 2022 07:26), Max: 36.7 (15 Oct 2022 13:40)  T(F): 97.2 (16 Oct 2022 07:26), Max: 98 (15 Oct 2022 13:40)  HR: 88 (16 Oct 2022 07:26) (68 - 99)  BP: 103/61 (16 Oct 2022 07:26) (103/61 - 118/75)  BP(mean): --  RR: 17 (16 Oct 2022 07:26) (17 - 19)  SpO2: 98% (16 Oct 2022 07:26) (95% - 100%)    Parameters below as of 16 Oct 2022 10:19  Patient On (Oxygen Delivery Method): nasal cannula    PHYSICAL EXAM:  GENERAL: no distress  PSYCH: A&O x3  HEAD: Atraumatic, Normocephalic  NECK: Supple, No JVD  CHEST/LUNG: diffuse wheezing on exam, improved after inhalers per RT  HEART: regular rate and rhythm, no murmurs  ABDOMEN: nontender to palpation, no rebound tenderness/guarding  EXTREMITIES: trace edema on bilateral LE  NEUROLOGY: no focal neurologic deficit  SKIN: No rashes or lesions    LABS:  All labs personally Reviewed.    RADIOLOGY & ADDITIONAL TESTS:  All imaging personally Reviewed.  Malvin Bearden PGY1  pager [phone] or Teams or check resident tab for coverage    Patient is a 67y-old  Male who presents with a chief complaint of AHRF (16 Oct 2022 11:17)    SUBJECTIVE / OVERNIGHT EVENTS:    NAEO.  Patient this morning is doing well. States that his shortness of breath is now at baseline. Notes pain in his left chest is still present but improved, continues to be tender to palpation. Lower extremities are painful when swollen, states there is a little bit of swelling right now.     Brief Daily Plan:  ·	c/w COPD care  ·	transition solumedrol > prednisone  ·	transition bumex IV > home 3 PO  ·	possible discharge tomorrow    MEDICATIONS  MEDICATIONS  (STANDING):  albuterol/ipratropium for Nebulization 3 milliLiter(s) Nebulizer every 6 hours  aspirin enteric coated 81 milliGRAM(s) Oral daily  atorvastatin 80 milliGRAM(s) Oral at bedtime  azithromycin   Tablet 500 milliGRAM(s) Oral daily  budesonide 160 MICROgram(s)/formoterol 4.5 MICROgram(s) Inhaler 2 Puff(s) Inhalation two times a day  buMETAnide Injectable 2 milliGRAM(s) IV Push two times a day  carvedilol 3.125 milliGRAM(s) Oral every 12 hours  clopidogrel Tablet 75 milliGRAM(s) Oral daily  dextrose 50% Injectable 25 Gram(s) IV Push once  dextrose 50% Injectable 12.5 Gram(s) IV Push once  dextrose 50% Injectable 25 Gram(s) IV Push once  famotidine    Tablet 20 milliGRAM(s) Oral daily  ferrous    sulfate 325 milliGRAM(s) Oral <User Schedule>  gabapentin 200 milliGRAM(s) Oral three times a day  heparin   Injectable 5000 Unit(s) SubCutaneous every 8 hours  hydrALAZINE 50 milliGRAM(s) Oral three times a day  insulin lispro (ADMELOG) corrective regimen sliding scale   SubCutaneous three times a day before meals  insulin lispro (ADMELOG) corrective regimen sliding scale   SubCutaneous at bedtime  isosorbide   dinitrate Tablet (ISORDIL) 20 milliGRAM(s) Oral three times a day  levothyroxine 25 MICROGram(s) Oral daily  methylPREDNISolone sodium succinate Injectable 40 milliGRAM(s) IV Push daily  senna 2 Tablet(s) Oral at bedtime  spironolactone 12.5 milliGRAM(s) Oral daily  tamsulosin 0.4 milliGRAM(s) Oral at bedtime    MEDICATIONS  (PRN):  acetaminophen     Tablet .. 650 milliGRAM(s) Oral every 6 hours PRN Temp greater or equal to 38C (100.4F), Mild Pain (1 - 3)  ALBUTerol    90 MICROgram(s) HFA Inhaler 2 Puff(s) Inhalation every 6 hours PRN Shortness of Breath and/or Wheezing  dextrose Oral Gel 15 Gram(s) Oral once PRN Blood Glucose LESS THAN 70 milliGRAM(s)/deciliter    VITALS  Vital Signs Last 24 Hrs  T(C): 36.2 (16 Oct 2022 07:26), Max: 36.7 (15 Oct 2022 13:40)  T(F): 97.2 (16 Oct 2022 07:26), Max: 98 (15 Oct 2022 13:40)  HR: 88 (16 Oct 2022 07:26) (68 - 99)  BP: 103/61 (16 Oct 2022 07:26) (103/61 - 118/75)  BP(mean): --  RR: 17 (16 Oct 2022 07:26) (17 - 19)  SpO2: 98% (16 Oct 2022 07:26) (95% - 100%)    Parameters below as of 16 Oct 2022 10:19  Patient On (Oxygen Delivery Method): nasal cannula    PHYSICAL EXAM:  GENERAL: no distress  PSYCH: A&O x3  HEAD: Atraumatic, Normocephalic  NECK: Supple, No JVD  CHEST/LUNG: diffuse wheezing on exam, improved after inhalers per RT  HEART: regular rate and rhythm, no murmurs  ABDOMEN: nontender to palpation, no rebound tenderness/guarding  EXTREMITIES: trace edema on bilateral LE  NEUROLOGY: no focal neurologic deficit  SKIN: No rashes or lesions    LABS:  All labs personally Reviewed.    RADIOLOGY & ADDITIONAL TESTS:  All imaging personally Reviewed.

## 2022-10-16 NOTE — PROGRESS NOTE ADULT - ATTENDING COMMENTS
67M PMH CAD s/p CABG, HFrEF s/p AICD, COPD w/chronic hypoxic respiratory failure on LTOT, CKD Stage 4, CVA (with residual left sided deficits) p/w acute on chronic hypoxic respiratory failure likely d/t COPD and acute CHF exacerbation. Initially on BIPAP now weaned to O2 via NC. Wheezing is improving on today's exam. Labs notable for elevated proBNP to 7K, creatinine at baseline CKD 4. RVP negative. CXR mild pulmonary edema. ED POCUS b/l pleural effusions without b-lines, focal consolidations.     #Acute on chronic hypoxic respiratory failure: COPD exacerbation vs. acute CHF exacerbation. s/p solumedrol 125mg IV in ED, continuing with 40mg IV daily, azithromycin 500mg daily x 3 days. Continue w/Bumex 2mg IV BID for now, can likely transition to PO tomorrow if there is continued clinical improvement. Wean back to home oxygen as tolerated.

## 2022-10-16 NOTE — PROGRESS NOTE ADULT - PROBLEM SELECTOR PLAN 2
L sided chest pain. Tender to palpation. Likely MSK but patient with significant cardiac risk factors.   - EKG with LBBB unchanged from 5/2022  - Troponin 77 > [ ]   - Baseline troponin 77 earlier this year L sided chest pain. Tender to palpation. Likely MSK but patient with significant cardiac risk factors.   - EKG with LBBB unchanged from 5/2022  - Troponin downtrended from 77  - Baseline troponin 77 earlier this year

## 2022-10-16 NOTE — PROGRESS NOTE ADULT - PROBLEM SELECTOR PLAN 4
HFrEF (EF 15 - 20% 5/2022) s/p medtronic ICD (2015) NYHA III, stage C. Mild pulmonary edema on CXR, No B-lines on ultrasound, no peripheral edema, no orthopnea.  - home bumex 3mg PO, coreg 12.5 qD, hydral 50, isordil 30, aldactone 12.5  - reportedly follows Dr. Conway outpatient (no records)  - acute management as above  - c/w other home meds as above  - strict I&Os HFrEF (EF 15 - 20% 5/2022) s/p medtronic ICD (2015) NYHA III, stage C. Mild pulmonary edema on CXR, No B-lines on ultrasound, no peripheral edema, no orthopnea.  - home bumex 3mg PO, coreg 12.5 qD, hydral 50, isordil 30, aldactone 12.5  - acute management as above  - c/w other home meds as above  - strict I&Os

## 2022-10-17 ENCOUNTER — TRANSCRIPTION ENCOUNTER (OUTPATIENT)
Age: 67
End: 2022-10-17

## 2022-10-17 LAB
ANION GAP SERPL CALC-SCNC: 11 MMOL/L — SIGNIFICANT CHANGE UP (ref 7–14)
BUN SERPL-MCNC: 43 MG/DL — HIGH (ref 7–23)
CALCIUM SERPL-MCNC: 8.9 MG/DL — SIGNIFICANT CHANGE UP (ref 8.4–10.5)
CHLORIDE SERPL-SCNC: 95 MMOL/L — LOW (ref 98–107)
CO2 SERPL-SCNC: 27 MMOL/L — SIGNIFICANT CHANGE UP (ref 22–31)
CREAT SERPL-MCNC: 2.36 MG/DL — HIGH (ref 0.5–1.3)
EGFR: 29 ML/MIN/1.73M2 — LOW
GLUCOSE BLDC GLUCOMTR-MCNC: 155 MG/DL — HIGH (ref 70–99)
GLUCOSE BLDC GLUCOMTR-MCNC: 178 MG/DL — HIGH (ref 70–99)
GLUCOSE BLDC GLUCOMTR-MCNC: 220 MG/DL — HIGH (ref 70–99)
GLUCOSE BLDC GLUCOMTR-MCNC: 222 MG/DL — HIGH (ref 70–99)
GLUCOSE SERPL-MCNC: 106 MG/DL — HIGH (ref 70–99)
HCT VFR BLD CALC: 38.9 % — LOW (ref 39–50)
HGB BLD-MCNC: 12.7 G/DL — LOW (ref 13–17)
MAGNESIUM SERPL-MCNC: 2.3 MG/DL — SIGNIFICANT CHANGE UP (ref 1.6–2.6)
MCHC RBC-ENTMCNC: 31.8 PG — SIGNIFICANT CHANGE UP (ref 27–34)
MCHC RBC-ENTMCNC: 32.6 GM/DL — SIGNIFICANT CHANGE UP (ref 32–36)
MCV RBC AUTO: 97.3 FL — SIGNIFICANT CHANGE UP (ref 80–100)
NRBC # BLD: 0 /100 WBCS — SIGNIFICANT CHANGE UP (ref 0–0)
NRBC # FLD: 0 K/UL — SIGNIFICANT CHANGE UP (ref 0–0)
PHOSPHATE SERPL-MCNC: 4.1 MG/DL — SIGNIFICANT CHANGE UP (ref 2.5–4.5)
PLATELET # BLD AUTO: 174 K/UL — SIGNIFICANT CHANGE UP (ref 150–400)
POTASSIUM SERPL-MCNC: 4.3 MMOL/L — SIGNIFICANT CHANGE UP (ref 3.5–5.3)
POTASSIUM SERPL-SCNC: 4.3 MMOL/L — SIGNIFICANT CHANGE UP (ref 3.5–5.3)
RBC # BLD: 4 M/UL — LOW (ref 4.2–5.8)
RBC # FLD: 16.3 % — HIGH (ref 10.3–14.5)
SODIUM SERPL-SCNC: 133 MMOL/L — LOW (ref 135–145)
WBC # BLD: 10.93 K/UL — HIGH (ref 3.8–10.5)
WBC # FLD AUTO: 10.93 K/UL — HIGH (ref 3.8–10.5)

## 2022-10-17 PROCEDURE — 93306 TTE W/DOPPLER COMPLETE: CPT | Mod: 26

## 2022-10-17 PROCEDURE — 99497 ADVNCD CARE PLAN 30 MIN: CPT

## 2022-10-17 PROCEDURE — 99233 SBSQ HOSP IP/OBS HIGH 50: CPT | Mod: GC

## 2022-10-17 RX ADMIN — CLOPIDOGREL BISULFATE 75 MILLIGRAM(S): 75 TABLET, FILM COATED ORAL at 12:00

## 2022-10-17 RX ADMIN — Medication 3 MILLILITER(S): at 09:32

## 2022-10-17 RX ADMIN — Medication 3 MILLILITER(S): at 04:24

## 2022-10-17 RX ADMIN — Medication 1: at 09:07

## 2022-10-17 RX ADMIN — GABAPENTIN 200 MILLIGRAM(S): 400 CAPSULE ORAL at 13:17

## 2022-10-17 RX ADMIN — HEPARIN SODIUM 5000 UNIT(S): 5000 INJECTION INTRAVENOUS; SUBCUTANEOUS at 13:27

## 2022-10-17 RX ADMIN — Medication 81 MILLIGRAM(S): at 12:01

## 2022-10-17 RX ADMIN — TAMSULOSIN HYDROCHLORIDE 0.4 MILLIGRAM(S): 0.4 CAPSULE ORAL at 22:35

## 2022-10-17 RX ADMIN — BUMETANIDE 3 MILLIGRAM(S): 0.25 INJECTION INTRAMUSCULAR; INTRAVENOUS at 05:15

## 2022-10-17 RX ADMIN — Medication 2: at 18:10

## 2022-10-17 RX ADMIN — Medication 3 MILLILITER(S): at 15:32

## 2022-10-17 RX ADMIN — AZITHROMYCIN 500 MILLIGRAM(S): 500 TABLET, FILM COATED ORAL at 12:01

## 2022-10-17 RX ADMIN — GABAPENTIN 200 MILLIGRAM(S): 400 CAPSULE ORAL at 22:36

## 2022-10-17 RX ADMIN — SENNA PLUS 2 TABLET(S): 8.6 TABLET ORAL at 22:37

## 2022-10-17 RX ADMIN — Medication 40 MILLIGRAM(S): at 05:15

## 2022-10-17 RX ADMIN — HEPARIN SODIUM 5000 UNIT(S): 5000 INJECTION INTRAVENOUS; SUBCUTANEOUS at 05:17

## 2022-10-17 RX ADMIN — BUDESONIDE AND FORMOTEROL FUMARATE DIHYDRATE 2 PUFF(S): 160; 4.5 AEROSOL RESPIRATORY (INHALATION) at 21:35

## 2022-10-17 RX ADMIN — FAMOTIDINE 20 MILLIGRAM(S): 10 INJECTION INTRAVENOUS at 12:00

## 2022-10-17 RX ADMIN — Medication 50 MILLIGRAM(S): at 05:16

## 2022-10-17 RX ADMIN — Medication 50 MILLIGRAM(S): at 13:27

## 2022-10-17 RX ADMIN — Medication 2: at 13:14

## 2022-10-17 RX ADMIN — Medication 50 MILLIGRAM(S): at 22:36

## 2022-10-17 RX ADMIN — GABAPENTIN 200 MILLIGRAM(S): 400 CAPSULE ORAL at 05:16

## 2022-10-17 RX ADMIN — Medication 3 MILLILITER(S): at 21:29

## 2022-10-17 RX ADMIN — ATORVASTATIN CALCIUM 80 MILLIGRAM(S): 80 TABLET, FILM COATED ORAL at 22:37

## 2022-10-17 RX ADMIN — SPIRONOLACTONE 12.5 MILLIGRAM(S): 25 TABLET, FILM COATED ORAL at 05:16

## 2022-10-17 RX ADMIN — Medication 25 MICROGRAM(S): at 05:15

## 2022-10-17 RX ADMIN — CARVEDILOL PHOSPHATE 3.12 MILLIGRAM(S): 80 CAPSULE, EXTENDED RELEASE ORAL at 05:16

## 2022-10-17 RX ADMIN — HEPARIN SODIUM 5000 UNIT(S): 5000 INJECTION INTRAVENOUS; SUBCUTANEOUS at 22:37

## 2022-10-17 RX ADMIN — ISOSORBIDE DINITRATE 20 MILLIGRAM(S): 5 TABLET ORAL at 05:16

## 2022-10-17 RX ADMIN — BUDESONIDE AND FORMOTEROL FUMARATE DIHYDRATE 2 PUFF(S): 160; 4.5 AEROSOL RESPIRATORY (INHALATION) at 09:07

## 2022-10-17 NOTE — PROGRESS NOTE ADULT - CONVERSATION DETAILS
Updated pt about his condition-Awaiting ECHO prior to dc   Asked about CODE STATUS- As per pt, he was intubated in 2014 at [facility]  and was in the MICU for 22 days  Pt shares that he would want to be intubated and resuscitated if needed  he lives with his daughter Melchor who is very involved in his care

## 2022-10-17 NOTE — DISCHARGE NOTE PROVIDER - PROVIDER TOKENS
FREE:[LAST:[Roxana],FIRST:[Robin],PHONE:[phone]],FAX:[(   )    -],FOLLOWUP:[1 week],ESTABLISHEDPATIENT:[T]] FREE:[LAST:[Roxana],FIRST:[Lorarudi],PHONE:[phone]],FAX:[(   )    -],FOLLOWUP:[1 week],ESTABLISHEDPATIENT:[T]],PROVIDER:[TOKEN:[[id number]],SCHEDULEDAPPT:[10/24/2022],SCHEDULEDAPPTTIME:[09:30 AM],ESTABLISHEDPATIENT:[T]]

## 2022-10-17 NOTE — DISCHARGE NOTE PROVIDER - NSDCMRMEDTOKEN_GEN_ALL_CORE_FT
albuterol 90 mcg/inh inhalation aerosol: 2 puff(s) inhaled every 6 hours, As needed, Shortness of Breath and/or Wheezing  Aldactone 25 mg oral tablet: 0.5 tab(s) orally once a day   aspirin 81 mg oral delayed release tablet: 1 tab(s) orally once a day  budesonide-formoterol 160 mcg-4.5 mcg/inh inhalation aerosol: 1  inhaled 2 times a day  bumetanide 1 mg oral tablet: 3 tab(s) orally once a day   carvedilol 3.125 mg oral tablet: 1 tab(s) orally every 12 hours  ezetimibe 10 mg oral tablet: 1 tab(s) orally once a day  FAMOTIDINE 40 MG TABLET: 1 each orally once a day  ferrous sulfate 325 mg (65 mg elemental iron) oral tablet: 1 tab(s) orally every other day  gabapentin 100 mg oral capsule: 2 cap(s) orally 3 times a day  glimepiride 2 mg oral tablet: 1 tab(s) orally once a day  hydrALAZINE 50 mg oral tablet: 1 tab(s) orally 3 times a day  isosorbide dinitrate 20 mg oral tablet: 1 tab(s) orally 3 times a day  levothyroxine 25 mcg (0.025 mg) oral tablet: 1 tab(s) orally once a day  Plavix 75 mg oral tablet: 1 tab(s) orally once a day  ROSUVASTATIN CALCIUM 20 MG TAB: TAKE 1 TABLET ONCE A DAY (AT BEDTIME) ORALLY  senna oral tablet: 2 tab(s) orally once a day (at bedtime)  tamsulosin 0.4 mg oral capsule: 1 cap(s) orally once a day   albuterol 90 mcg/inh inhalation aerosol: 2 puff(s) inhaled every 6 hours, As needed, Shortness of Breath and/or Wheezing  aspirin 81 mg oral delayed release tablet: 1 tab(s) orally once a day  budesonide-formoterol 160 mcg-4.5 mcg/inh inhalation aerosol: 1  inhaled 2 times a day  bumetanide 1 mg oral tablet: 3 tab(s) orally once a day  carvedilol 3.125 mg oral tablet: 1 tab(s) orally every 12 hours  clopidogrel 75 mg oral tablet: 1 tab(s) orally once a day  ezetimibe 10 mg oral tablet: 1 tab(s) orally once a day  famotidine 20 mg oral tablet: 1 tab(s) orally once a day  ferrous sulfate 325 mg (65 mg elemental iron) oral tablet: 1 tab(s) orally once a day  gabapentin 100 mg oral capsule: 2 cap(s) orally 3 times a day  glimepiride 2 mg oral tablet: 1 tab(s) orally once a day  hydrALAZINE 50 mg oral tablet: 1 tab(s) orally 3 times a day  ipratropium-albuterol 0.5 mg-2.5 mg/3 mL inhalation solution: 3 milliliter(s) inhaled every 6 hours, As Needed -for shortness of breath and/or wheezing   isosorbide dinitrate 20 mg oral tablet: 1 tab(s) orally 3 times a day  levothyroxine 25 mcg (0.025 mg) oral tablet: 1 tab(s) orally once a day  predniSONE 20 mg oral tablet: 2 tab(s) orally once a day  ROSUVASTATIN CALCIUM 20 MG TAB: TAKE 1 TABLET ONCE A DAY (AT BEDTIME) ORALLY  senna leaf extract oral tablet: 2 tab(s) orally once a day (at bedtime)  spironolactone 25 mg oral tablet: 0.5 tab(s) orally once a day  tamsulosin 0.4 mg oral capsule: 1 cap(s) orally once a day (at bedtime)

## 2022-10-17 NOTE — DISCHARGE NOTE PROVIDER - NSDCFUSCHEDAPPT_GEN_ALL_CORE_FT
Garo Dobson  [facility]  CARDIOLOGY [id number]  Scheduled Appointment: 10/24/2022

## 2022-10-17 NOTE — PROGRESS NOTE ADULT - SUBJECTIVE AND OBJECTIVE BOX
Patient is a 67y old  Male who presents with a chief complaint of AHRF (16 Oct 2022 11:17)     INTERVAL HPI/OVERNIGHT EVENTS:  - No acute events overnight    SUBJECTIVE  - Patient seen and evaluated at bedside  - Patient reports presence of   - Patient reports absence of fevers, chills, HA, lightheadedness, dizziness, nausea, emesis, chest pain, dyspnea, palpitations, abd pain, diarrhea, urinary symptoms, skin color changes or rashes, or LE edema     MEDICATIONS  (STANDING):  albuterol/ipratropium for Nebulization 3 milliLiter(s) Nebulizer every 6 hours  aspirin enteric coated 81 milliGRAM(s) Oral daily  atorvastatin 80 milliGRAM(s) Oral at bedtime  azithromycin   Tablet 500 milliGRAM(s) Oral daily  budesonide 160 MICROgram(s)/formoterol 4.5 MICROgram(s) Inhaler 2 Puff(s) Inhalation two times a day  buMETAnide 3 milliGRAM(s) Oral daily  carvedilol 3.125 milliGRAM(s) Oral every 12 hours  clopidogrel Tablet 75 milliGRAM(s) Oral daily  dextrose 50% Injectable 25 Gram(s) IV Push once  dextrose 50% Injectable 12.5 Gram(s) IV Push once  dextrose 50% Injectable 25 Gram(s) IV Push once  famotidine    Tablet 20 milliGRAM(s) Oral daily  ferrous    sulfate 325 milliGRAM(s) Oral <User Schedule>  gabapentin 200 milliGRAM(s) Oral three times a day  heparin   Injectable 5000 Unit(s) SubCutaneous every 8 hours  hydrALAZINE 50 milliGRAM(s) Oral three times a day  insulin lispro (ADMELOG) corrective regimen sliding scale   SubCutaneous three times a day before meals  insulin lispro (ADMELOG) corrective regimen sliding scale   SubCutaneous at bedtime  isosorbide   dinitrate Tablet (ISORDIL) 20 milliGRAM(s) Oral three times a day  levothyroxine 25 MICROGram(s) Oral daily  predniSONE   Tablet 40 milliGRAM(s) Oral daily  senna 2 Tablet(s) Oral at bedtime  spironolactone 12.5 milliGRAM(s) Oral daily  tamsulosin 0.4 milliGRAM(s) Oral at bedtime    MEDICATIONS  (PRN):  acetaminophen     Tablet .. 650 milliGRAM(s) Oral every 6 hours PRN Temp greater or equal to 38C (100.4F), Mild Pain (1 - 3)  ALBUTerol    90 MICROgram(s) HFA Inhaler 2 Puff(s) Inhalation every 6 hours PRN Shortness of Breath and/or Wheezing  dextrose Oral Gel 15 Gram(s) Oral once PRN Blood Glucose LESS THAN 70 milliGRAM(s)/deciliter    Allergies:  No Known Allergies    Intolerances:      REVIEW OF SYSTEMS: As indicated above; otherwise, negative    VITAL SIGNS:  T(F): 97.5 (10-17-22 @ 05:15), Max: 97.9 (10-16-22 @ 21:45)  HR: 90 (10-17-22 @ 05:15) (72 - 100)  BP: 112/78 (10-17-22 @ 05:15) (105/70 - 131/86)  RR: 18 (10-17-22 @ 05:15) (17 - 18)  SpO2: 99% (10-17-22 @ 05:15) (95% - 100%)    PHYSICAL EXAM:  General: NAD, well-groomed, well-developed  Eyes: Conjunctiva and sclera clear  ENMT: Moist mucous membranes  Neck: No palpable pre-auricular, post-auricular, occipital, mandibular, submental, supra-clavicular, or infra-clavicular lymph nodes   Chest: Clear to auscultation bilaterally; no rales, rhonchi, or wheezing  Heart: Regular rate and rhythm; normal S1 and S2; no murmurs, rubs, or gallops  Abd: Soft, nontender, nondistended  Nervous System: AAOX3  Psych: Appropriate affect  Ext: no peripheral LE edema bilaterally    LABS:      Ca    9.1        16 Oct 2022 06:17      CAPILLARY BLOOD GLUCOSE      POCT Blood Glucose.: 190 mg/dL (16 Oct 2022 22:13)  POCT Blood Glucose.: 161 mg/dL (16 Oct 2022 17:58)  POCT Blood Glucose.: 199 mg/dL (16 Oct 2022 12:07)  POCT Blood Glucose.: 110 mg/dL (16 Oct 2022 08:50)    BLOOD CULTURE  10-15 @ 01:40   No growth to date.  --  --  10-15 @ 01:35   No growth to date.  --  --    RADIOLOGY & ADDITIONAL TESTS:    Imaging Personally Reviewed:  [X ] YES     Consultant(s) Notes Reviewed:  Yes    Care Discussed with Consultants/Other Providers: Yes Patient is a 67y old  Male who presents with a chief complaint of AHRF (16 Oct 2022 11:17)     INTERVAL HPI/OVERNIGHT EVENTS:  - No acute events overnight. Patient states that he feels well.     SUBJECTIVE  - Patient seen and evaluated at bedside. Patient reports absence of fevers, chills, headache, lightheadedness, dizziness, nausea, emesis, chest pain, dyspnea, palpitations, abdominal pain, diarrhea, urinary symptoms, skin color changes or rashes, or LE edema     MEDICATIONS  (STANDING):  albuterol/ipratropium for Nebulization 3 milliLiter(s) Nebulizer every 6 hours  aspirin enteric coated 81 milliGRAM(s) Oral daily  atorvastatin 80 milliGRAM(s) Oral at bedtime  azithromycin   Tablet 500 milliGRAM(s) Oral daily  budesonide 160 MICROgram(s)/formoterol 4.5 MICROgram(s) Inhaler 2 Puff(s) Inhalation two times a day  buMETAnide 3 milliGRAM(s) Oral daily  carvedilol 3.125 milliGRAM(s) Oral every 12 hours  clopidogrel Tablet 75 milliGRAM(s) Oral daily  dextrose 50% Injectable 25 Gram(s) IV Push once  dextrose 50% Injectable 12.5 Gram(s) IV Push once  dextrose 50% Injectable 25 Gram(s) IV Push once  famotidine    Tablet 20 milliGRAM(s) Oral daily  ferrous    sulfate 325 milliGRAM(s) Oral <User Schedule>  gabapentin 200 milliGRAM(s) Oral three times a day  heparin   Injectable 5000 Unit(s) SubCutaneous every 8 hours  hydrALAZINE 50 milliGRAM(s) Oral three times a day  insulin lispro (ADMELOG) corrective regimen sliding scale   SubCutaneous three times a day before meals  insulin lispro (ADMELOG) corrective regimen sliding scale   SubCutaneous at bedtime  isosorbide   dinitrate Tablet (ISORDIL) 20 milliGRAM(s) Oral three times a day  levothyroxine 25 MICROGram(s) Oral daily  predniSONE   Tablet 40 milliGRAM(s) Oral daily  senna 2 Tablet(s) Oral at bedtime  spironolactone 12.5 milliGRAM(s) Oral daily  tamsulosin 0.4 milliGRAM(s) Oral at bedtime    MEDICATIONS  (PRN):  acetaminophen     Tablet .. 650 milliGRAM(s) Oral every 6 hours PRN Temp greater or equal to 38C (100.4F), Mild Pain (1 - 3)  ALBUTerol    90 MICROgram(s) HFA Inhaler 2 Puff(s) Inhalation every 6 hours PRN Shortness of Breath and/or Wheezing  dextrose Oral Gel 15 Gram(s) Oral once PRN Blood Glucose LESS THAN 70 milliGRAM(s)/deciliter    Allergies:  No Known Allergies    Intolerances:      REVIEW OF SYSTEMS: As indicated above; otherwise, negative    VITAL SIGNS:  T(F): 97.5 (10-17-22 @ 05:15), Max: 97.9 (10-16-22 @ 21:45)  HR: 90 (10-17-22 @ 05:15) (72 - 100)  BP: 112/78 (10-17-22 @ 05:15) (105/70 - 131/86)  RR: 18 (10-17-22 @ 05:15) (17 - 18)  SpO2: 99% (10-17-22 @ 05:15) (95% - 100%)    PHYSICAL EXAM:  General: NAD, well-groomed, well-developed  Eyes: Pupils, equal, round, reactive to light, anicteric sclera   ENMT: Moist mucous membranes  Neck: No thyromegaly, no JVD, no tender lymphadenopathy   Chest: Clear to auscultation bilaterally; no rales, rhonchi, or wheezing  Heart: Regular rate and rhythm; normal S1 and S2; no murmurs, rubs, or gallops  Abdomen: Soft, nontender, nondistended, normoactive bowel sounds   Nervous System: AOx3  Ext: Warm, well perfused, trace bilateral lower extremity edema   Psych: Appropriate affect    LABS:      Ca    9.1        16 Oct 2022 06:17      CAPILLARY BLOOD GLUCOSE      POCT Blood Glucose.: 190 mg/dL (16 Oct 2022 22:13)  POCT Blood Glucose.: 161 mg/dL (16 Oct 2022 17:58)  POCT Blood Glucose.: 199 mg/dL (16 Oct 2022 12:07)  POCT Blood Glucose.: 110 mg/dL (16 Oct 2022 08:50)    BLOOD CULTURE  10-15 @ 01:40   No growth to date.  --  --  10-15 @ 01:35   No growth to date.  --  --    RADIOLOGY & ADDITIONAL TESTS:    Imaging Personally Reviewed:  [X ] YES     Consultant(s) Notes Reviewed:  Yes    Care Discussed with Consultants/Other Providers: Yes

## 2022-10-17 NOTE — DISCHARGE NOTE PROVIDER - CARE PROVIDER_API CALL
Robin Schmidt  Phone: [phone]  Fax: (   )    -  Established Patient  Follow Up Time: 1 week   Robin Schmidt  Phone: [phone]  Fax: (   )    -  Established Patient  Follow Up Time: 1 week    Garo Dobson; PhD)  [facility]; Cardiology  [address] [place] [address]  Phone: [phone]  Fax: [phone]  Established Patient  Scheduled Appointment: 10/24/2022 09:30 AM

## 2022-10-17 NOTE — PROVIDER CONTACT NOTE (OTHER) - SITUATION
Pt's /60
Pt's /61. parameter for Isosorbide hold for SBP <100.
Pt's /70 and pt is to get 2 BP med. Parameter is hold <100 SBP.
Pt /71. No parameter is placed In the order for when RN is supposed to hold BP medication.
Pt with BP meds order and no parameter is placed In the order for when RN is supposed to hold BP medication.

## 2022-10-17 NOTE — DISCHARGE NOTE PROVIDER - NSDCCPCAREPLAN_GEN_ALL_CORE_FT
PRINCIPAL DISCHARGE DIAGNOSIS  Diagnosis: COPD with acute exacerbation  Assessment and Plan of Treatment: You presented with cough and diffuse wheeze. This was likely an acute worseing of your COPD. You were treated with your inhalers and you are discharged with your breathing at your baseline.      SECONDARY DISCHARGE DIAGNOSES  Diagnosis: Chest pain  Assessment and Plan of Treatment: You presented with chest pain. There was no evidence of heart injury worse than your baseline this admission. You noted that it was tender to palpation and started after the cough. This pain will likely improve with time. However, if you have chest pain again please call your primary care physician for immediate guidance or come back to the hospital.    Diagnosis: HFrEF (heart failure with reduced ejection fraction)  Assessment and Plan of Treatment: You have severe heart failure. This may have contributed to the shortness of breath you were having this admission. Please continue to take your medication and follow up with your cardiologist in 1 - 2 weeks.     PRINCIPAL DISCHARGE DIAGNOSIS  Diagnosis: COPD with acute exacerbation  Assessment and Plan of Treatment: You presented with cough and diffuse wheeze. This was likely an acute worseing of your COPD. You were treated with your inhalers and you are discharged with your breathing at your baseline. Please follow up with your scheduled pulmonolgy appointment on      SECONDARY DISCHARGE DIAGNOSES  Diagnosis: Chest pain  Assessment and Plan of Treatment: You presented with chest pain. There was no evidence of heart injury worse than your baseline this admission. You noted that it was tender to palpation and started after the cough. This pain will likely improve with time. However, if you have chest pain again please call your primary care physician for immediate guidance or come back to the hospital.    Diagnosis: HFrEF (heart failure with reduced ejection fraction)  Assessment and Plan of Treatment: You have severe heart failure. This may have contributed to the shortness of breath you were having this admission. Please continue to take your medication and follow up with your cardiologist in 1 - 2 weeks.     PRINCIPAL DISCHARGE DIAGNOSIS  Diagnosis: COPD with acute exacerbation  Assessment and Plan of Treatment: You presented with cough and diffuse wheeze. This was likely an acute worseing of your COPD. You were treated with your inhalers and you are discharged with your breathing at your baseline. Please take your medication ans prescribed, you have 1 additional day of steroid for your COPD exacerbation. Please follow up with your scheduled pulmonolgy appointment on      SECONDARY DISCHARGE DIAGNOSES  Diagnosis: Chest pain  Assessment and Plan of Treatment: You presented with chest pain. There was no evidence of heart injury worse than your baseline this admission. You noted that it was tender to palpation and started after the cough. This pain will likely improve with time. However, if you have chest pain again please call your primary care physician for immediate guidance or come back to the hospital.    Diagnosis: HFrEF (heart failure with reduced ejection fraction)  Assessment and Plan of Treatment: You have severe heart failure. This may have contributed to the shortness of breath you were having this admission. Please continue to take your medication and follow up with your cardiologist in 1 - 2 weeks.

## 2022-10-17 NOTE — PROVIDER CONTACT NOTE (OTHER) - RECOMMENDATIONS
Notify MD.
provider notified. place parameter in the order for BP medication for when RN should hold medication
provider notified. place parameter in the order for BP medication for when RN should hold medication
notify MD.
Notify MD.

## 2022-10-17 NOTE — DISCHARGE NOTE PROVIDER - HOSPITAL COURSE
Discharge Summary     Admission diagnoses:   acute hypoxic respiratory failure    Discharge diagnoses:   AHRF 2/2 COPD +/- CHF    Hospital Course:   For full details, please see H&P, progress notes, consult notes and ancillary notes. Briefly, Joey Lewis 67 M history of CAD s/p CABG (on ASA and Plavix), HfREF (NYHA III, stage C, 15 - 20% on 05/2022) s/p AICD, COPD (on 2L at home), CKD, CVA (left-sided deficit, uses walker) presenting with cough x 1 week, shortness of breath, and left chest pain tender to palpation x 1 day. Found to be in AHRF with diffuse wheeze, normal VBG, initially started on BIPAP, responded well do duonebs and weaned appropriately to home O2 2L. No peripheral edema noted on exam although with mild pulmonary edema on imaging, received appropriate diuresis. Is discharged with shortness of breath at his baseline on his home O2 settings.     On day of discharge, patient is clinically stable with no new exam findings or acute symptoms compared to prior. The patient was seen by the attending physician on the date of discharge and deemed stable and acceptable for discharge. The patient's chronic medical conditions were treated accordingly per the patient's home medication regimen. The patient's medication reconciliation (with changes made to chronic medications), follow up appointments, discharge orders, instructions, and significant lab and diagnostic studies are as noted.     Discharge follow up action items:     1. Follow up with PCP, cardiology, and pulmonology in 1-2 weeks.   2. Medication changes ***    Patient's ordered code status: Full    Patient disposition: Home     Discharge Summary     Admission diagnoses:   acute hypoxic respiratory failure    Discharge diagnoses:   AHRF 2/2 COPD +/- CHF    Hospital Course:   For full details, please see H&P, progress notes, consult notes and ancillary notes. Briefly, Joey Lewis 67 M history of CAD s/p CABG (on ASA and Plavix), HfREF (NYHA III, stage C, 15 - 20% on 05/2022) s/p AICD, COPD (on 2L at home), CKD, CVA (left-sided deficit, uses walker) presenting with cough x 1 week, shortness of breath, and left chest pain tender to palpation x 1 day. Found to be in AHRF with diffuse wheeze, normal VBG, initially started on BIPAP, responded well do duonebs and weaned appropriately to home O2 2L. No peripheral edema noted on exam although with mild pulmonary edema on imaging, received appropriate diuresis. Is discharged with shortness of breath at his baseline on his home O2 settings. On 10/17 patient had an echo showing Ef of 14%, mild mitral regurgitation, mild left ventricular enlargement, severe global left ventricular systolic dysfunction, right ventricular enlargement with decreased right ventricular dysfunction.     On day of discharge, patient is clinically stable with no new exam findings or acute symptoms compared to prior. The patient was seen by the attending physician on the date of discharge and deemed stable and acceptable for discharge. The patient's chronic medical conditions were treated accordingly per the patient's home medication regimen. The patient's medication reconciliation (with changes made to chronic medications), follow up appointments, discharge orders, instructions, and significant lab and diagnostic studies are as noted.     Discharge follow up action items:     1. Follow up with PCP, cardiology, and pulmonology in 1-2 weeks.   2. Medication changes ***    Patient's ordered code status: Full    Patient disposition: Home     Discharge Summary     Admission diagnoses:   acute hypoxic respiratory failure    Discharge diagnoses:   AHRF 2/2 COPD +/- CHF    Hospital Course:   For full details, please see H&P, progress notes, consult notes and ancillary notes. Briefly, Joey Lewis 67 M history of CAD s/p CABG (on ASA and Plavix), HfREF (NYHA III, stage C, 15 - 20% on 05/2022) s/p AICD, COPD (on 2L at home), CKD, CVA (left-sided deficit, uses walker) presenting with cough x 1 week, shortness of breath, and left chest pain tender to palpation x 1 day. Found to be in AHRF with diffuse wheeze, normal VBG, initially started on BIPAP, responded well do duonebs and weaned appropriately to home O2 2L. No peripheral edema noted on exam although with mild pulmonary edema on imaging, received appropriate diuresis. Is discharged with shortness of breath at his baseline on his home O2 settings. On 10/17 patient had an echo showing Ef of 14%, mild mitral regurgitation, mild left ventricular enlargement, severe global left ventricular systolic dysfunction, right ventricular enlargement with decreased right ventricular dysfunction.     On day of discharge, patient is clinically stable with no new exam findings or acute symptoms compared to prior. The patient was seen by the attending physician on the date of discharge and deemed stable and acceptable for discharge. The patient's chronic medical conditions were treated accordingly per the patient's home medication regimen. The patient's medication reconciliation (with changes made to chronic medications), follow up appointments, discharge orders, instructions, and significant lab and diagnostic studies are as noted.     Discharge follow up action items:     1. Follow up with PCP, cardiology, and pulmonology in 1-2 weeks.     Patient's ordered code status: Full    Patient disposition: Home     Discharge Summary     Admission diagnoses:   acute hypoxic respiratory failure    Discharge diagnoses:   AHRF 2/2 COPD +/- CHF    Hospital Course:   For full details, please see H&P, progress notes, consult notes and ancillary notes. Briefly, Joey Lewis 67 M history of CAD s/p CABG (on ASA and Plavix), HfREF (NYHA III, stage C, 15 - 20% on 05/2022) s/p AICD, COPD (on 2L at home), CKD, CVA (left-sided deficit, uses walker) presenting with cough x 1 week, shortness of breath, and left chest pain tender to palpation x 1 day. Found to have Acute on chronic hypoxic respiratory failure likely d/t COPD exacerbation.  Also noted to be in AHRF with diffuse wheeze, normal VBG, initially started on BIPAP, responded well do duonebs and weaned appropriately to home O2 2L. No peripheral edema noted on exam although with mild pulmonary edema on imaging, received appropriate diuresis. Was on IV Steroids which  was transitioned to PO Prednisone. Pt was initially placed on IV Diuresis which was transitioned to home med Bumex   pt continued to improve, SOB at baseline. Ambulated with PT. Was at his baseline on his home O2 settings. Echo showing Ef of 14%, mild mitral regurgitation, mild left ventricular enlargement, severe global left ventricular systolic dysfunction, right ventricular enlargement with decreased right ventricular dysfunction which was similar to ECHO findings in May 2022. Pt was advised to continue GDMT and follow up with his PMD/Cardiologist for further  titration of meds. On day of discharge, patient is clinically stable with no new exam findings or acute symptoms compared to prior. The patient was seen by the attending physician on the date of discharge and deemed stable and acceptable for discharge. The patient's chronic medical conditions were treated accordingly per the patient's home medication regimen. The patient's medication reconciliation (with changes made to chronic medications), follow up appointments, discharge orders, instructions, and significant lab and diagnostic studies are as noted.     Discharge follow up action items:     1. Follow up with PCP, cardiology, and pulmonology in 1-2 weeks.  Pt to see his PMD Dr Schmidt on 10/23 at 12 noon    Patient's ordered code status: Full    Patient disposition: Home

## 2022-10-17 NOTE — DISCHARGE NOTE PROVIDER - NSFOLLOWUPCLINICS_GEN_ALL_ED_FT
[facility]  Cardiology  [address] [place] [id number]  Phone: [phone]  Fax:     [facility] [place]  Internal Medicine  [address] [place] [address]  Phone: [phone]  Fax: [phone]    [facility]  Pulmonology  [address] [place] [id number]  Phone: [phone]  Fax:

## 2022-10-17 NOTE — PROVIDER CONTACT NOTE (OTHER) - ACTION/TREATMENT ORDERED:
MD notified, Isosorbide to be held.
As per MD, she will place parameter if she gets to it or she will endorse to day team to adjust order
As per MD, she will place parameter in the order for BP medication for when RN should hold medication
MD notified. BP med will be held.
MD notified. Hold BP med and recheck BP in an hour.

## 2022-10-17 NOTE — PROGRESS NOTE ADULT - ATTENDING COMMENTS
67M PMH CAD s/p CABG, HFrEF s/p AICD, COPD w/chronic hypoxic respiratory failure on LTOT, CKD Stage 4, CVA (with residual left sided deficits) p/w     # Acute on chronic hypoxic respiratory failure likely d/t COPD exacerbation. Also Acute CHF exacerbation. Wheezing is improving on today's exam.  Pt reports his attack was triggered because of weather change   Initially on BIPAP now weaned to O2 via NC.    Labs notable for elevated proBNP to 7K. Trops 77-->56  RVP negative. CXR mild pulmonary edema..ED POCUS b/l pleural effusions without b-lines, focal consolidations.  s/p solumedrol 125mg IV in ED, continuing with 40mg IV daily  On azithromycin 500mg daily x 3 days. Was on Bumex 2mg IV BID for now, transitioned to home dose   Wean back to home oxygen as tolerated.    # CKD 4- Creatinine stable  # CAD s/p CABG (2014) on ASA/Plavix, Imdur, Hydralazine, Lipitor  # Dispo- FU ECHO, dc planning if ECHO stable  # CODE STATUS- FULL CODE   DC Planning 33mins  Pt reports he will see his PMD Dr Schmidt on 10/23 at 2pm

## 2022-10-17 NOTE — PROVIDER CONTACT NOTE (OTHER) - BACKGROUND
Pt admitted for COPD
patient pmh: BPH, CHF, CAD, HTN. s/p cath in R groin.
Pt admitted for COPD
patient pmh: BPH, CHF, CAD, HTN. s/p cath in R groin.
Pt admitted for COPD

## 2022-10-17 NOTE — PROGRESS NOTE ADULT - PROBLEM SELECTOR PLAN 4
HFrEF (EF 15 - 20% 5/2022) s/p medtronic ICD (2015) NYHA III, stage C. Mild pulmonary edema on CXR, No B-lines on ultrasound, no peripheral edema, no orthopnea.  - home bumex 3mg PO, coreg 12.5 qD, hydral 50, isordil 30, aldactone 12.5  - acute management as above  - c/w other home meds as above  - strict I&Os  - repeat echo performed today, 10/17, will f/u results

## 2022-10-17 NOTE — PROGRESS NOTE ADULT - PROBLEM SELECTOR PLAN 1
Hx of COPD (on 2L), CHF (NYHA III, stage C) p/w 1 week dry cough, shortness of breath, no orthopnea, diffuse wheeze. Started on BIPAP, weaned to NC 3L. No B-lines on ultrasound, although possible mild pulmonary edema on CXR.   - s/p solumedrol 125mg on admission > solumedrol 40 x1 > prednisone 40 (5 day total course)  - duonebs q6 standing  - c/w home symbicort  - increase home bumex 3PO -> 2 IV BID -> 3 PO  - consider adding spiriva  - consider BLE u/s  - Wean O2 as tolerated

## 2022-10-17 NOTE — PROVIDER CONTACT NOTE (OTHER) - REASON
Pt /71. No parameter is placed In the order for when RN is supposed to hold BP medication.
Pt's /61.
Pt's /70
Pt with BP meds order and no parameter is placed In the order for when RN is supposed to hold BP medication.
Pt's /60

## 2022-10-17 NOTE — DISCHARGE NOTE PROVIDER - NSDCFUADDAPPT_GEN_ALL_CORE_FT
Please follow up with the following providers, if you would like to see one of our providers please call the number(s) provided above.  1. primary care physician, 1 - 2 weeks  2. cardiologist, 1 - 2 weeks  3. pulmonologist, 1 - 2 weeks Please follow up with the following providers, if you would like to see one of our providers please call the number(s) provided above.  1. primary care physician, 1 - 2 weeks  2. cardiologist, appointment scheduled for Monday, October 24th at 9:30am  3. pulmonologist, 1 - 2 weeks, please call office to make appointment as soon as possible

## 2022-10-17 NOTE — DISCHARGE NOTE PROVIDER - CARE PROVIDERS DIRECT ADDRESSES
,DirectAddress_Unknown ,DirectAddress_Unknown,[email or web address]

## 2022-10-17 NOTE — PROGRESS NOTE ADULT - PROBLEM SELECTOR PLAN 2
L sided chest pain. Tender to palpation. Likely MSK but patient with significant cardiac risk factors.   - EKG with LBBB unchanged from 5/2022  - Troponin downtrended from 77  - Baseline troponin 77 earlier this year

## 2022-10-18 ENCOUNTER — TRANSCRIPTION ENCOUNTER (OUTPATIENT)
Age: 67
End: 2022-10-18

## 2022-10-18 VITALS
DIASTOLIC BLOOD PRESSURE: 68 MMHG | TEMPERATURE: 98 F | RESPIRATION RATE: 18 BRPM | SYSTOLIC BLOOD PRESSURE: 118 MMHG | HEART RATE: 70 BPM | OXYGEN SATURATION: 96 %

## 2022-10-18 LAB
ANION GAP SERPL CALC-SCNC: 12 MMOL/L — SIGNIFICANT CHANGE UP (ref 7–14)
BUN SERPL-MCNC: 48 MG/DL — HIGH (ref 7–23)
CALCIUM SERPL-MCNC: 8.9 MG/DL — SIGNIFICANT CHANGE UP (ref 8.4–10.5)
CHLORIDE SERPL-SCNC: 94 MMOL/L — LOW (ref 98–107)
CO2 SERPL-SCNC: 26 MMOL/L — SIGNIFICANT CHANGE UP (ref 22–31)
CREAT SERPL-MCNC: 2.38 MG/DL — HIGH (ref 0.5–1.3)
EGFR: 29 ML/MIN/1.73M2 — LOW
GLUCOSE BLDC GLUCOMTR-MCNC: 172 MG/DL — HIGH (ref 70–99)
GLUCOSE BLDC GLUCOMTR-MCNC: 241 MG/DL — HIGH (ref 70–99)
GLUCOSE SERPL-MCNC: 109 MG/DL — HIGH (ref 70–99)
HCT VFR BLD CALC: 37.6 % — LOW (ref 39–50)
HGB BLD-MCNC: 12.2 G/DL — LOW (ref 13–17)
MAGNESIUM SERPL-MCNC: 2.2 MG/DL — SIGNIFICANT CHANGE UP (ref 1.6–2.6)
MCHC RBC-ENTMCNC: 31.4 PG — SIGNIFICANT CHANGE UP (ref 27–34)
MCHC RBC-ENTMCNC: 32.4 GM/DL — SIGNIFICANT CHANGE UP (ref 32–36)
MCV RBC AUTO: 96.9 FL — SIGNIFICANT CHANGE UP (ref 80–100)
NRBC # BLD: 0 /100 WBCS — SIGNIFICANT CHANGE UP (ref 0–0)
NRBC # FLD: 0 K/UL — SIGNIFICANT CHANGE UP (ref 0–0)
PHOSPHATE SERPL-MCNC: 3.8 MG/DL — SIGNIFICANT CHANGE UP (ref 2.5–4.5)
PLATELET # BLD AUTO: 158 K/UL — SIGNIFICANT CHANGE UP (ref 150–400)
POTASSIUM SERPL-MCNC: 3.8 MMOL/L — SIGNIFICANT CHANGE UP (ref 3.5–5.3)
POTASSIUM SERPL-SCNC: 3.8 MMOL/L — SIGNIFICANT CHANGE UP (ref 3.5–5.3)
RBC # BLD: 3.88 M/UL — LOW (ref 4.2–5.8)
RBC # FLD: 16 % — HIGH (ref 10.3–14.5)
SODIUM SERPL-SCNC: 132 MMOL/L — LOW (ref 135–145)
WBC # BLD: 11.52 K/UL — HIGH (ref 3.8–10.5)
WBC # FLD AUTO: 11.52 K/UL — HIGH (ref 3.8–10.5)

## 2022-10-18 PROCEDURE — 99239 HOSP IP/OBS DSCHRG MGMT >30: CPT

## 2022-10-18 RX ORDER — IPRATROPIUM/ALBUTEROL SULFATE 18-103MCG
3 AEROSOL WITH ADAPTER (GRAM) INHALATION
Qty: 168 | Refills: 0
Start: 2022-10-18 | End: 2022-10-31

## 2022-10-18 RX ORDER — ALBUTEROL 90 UG/1
2 AEROSOL, METERED ORAL
Qty: 0 | Refills: 0 | DISCHARGE
Start: 2022-10-18

## 2022-10-18 RX ORDER — FAMOTIDINE 10 MG/ML
1 INJECTION INTRAVENOUS
Qty: 0 | Refills: 0 | DISCHARGE
Start: 2022-10-18

## 2022-10-18 RX ORDER — CLOPIDOGREL BISULFATE 75 MG/1
1 TABLET, FILM COATED ORAL
Qty: 0 | Refills: 0 | DISCHARGE
Start: 2022-10-18

## 2022-10-18 RX ORDER — TAMSULOSIN HYDROCHLORIDE 0.4 MG/1
1 CAPSULE ORAL
Qty: 0 | Refills: 0 | DISCHARGE

## 2022-10-18 RX ORDER — CLOPIDOGREL BISULFATE 75 MG/1
1 TABLET, FILM COATED ORAL
Qty: 0 | Refills: 0 | DISCHARGE

## 2022-10-18 RX ORDER — ASPIRIN/CALCIUM CARB/MAGNESIUM 324 MG
1 TABLET ORAL
Qty: 0 | Refills: 0 | DISCHARGE
Start: 2022-10-18

## 2022-10-18 RX ORDER — TAMSULOSIN HYDROCHLORIDE 0.4 MG/1
1 CAPSULE ORAL
Qty: 0 | Refills: 0 | DISCHARGE
Start: 2022-10-18

## 2022-10-18 RX ORDER — HYDRALAZINE HCL 50 MG
1 TABLET ORAL
Qty: 0 | Refills: 0 | DISCHARGE
Start: 2022-10-18

## 2022-10-18 RX ORDER — SPIRONOLACTONE 25 MG/1
0.5 TABLET, FILM COATED ORAL
Qty: 0 | Refills: 0 | DISCHARGE
Start: 2022-10-18

## 2022-10-18 RX ORDER — POLYETHYLENE GLYCOL 3350 17 G/17G
17 POWDER, FOR SOLUTION ORAL DAILY
Refills: 0 | Status: DISCONTINUED | OUTPATIENT
Start: 2022-10-18 | End: 2022-10-18

## 2022-10-18 RX ORDER — FERROUS SULFATE 325(65) MG
1 TABLET ORAL
Qty: 0 | Refills: 0 | DISCHARGE
Start: 2022-10-18

## 2022-10-18 RX ORDER — SENNA PLUS 8.6 MG/1
2 TABLET ORAL
Qty: 0 | Refills: 0 | DISCHARGE
Start: 2022-10-18

## 2022-10-18 RX ORDER — BUMETANIDE 0.25 MG/ML
3 INJECTION INTRAMUSCULAR; INTRAVENOUS
Qty: 0 | Refills: 0 | DISCHARGE
Start: 2022-10-18

## 2022-10-18 RX ORDER — FAMOTIDINE 10 MG/ML
1 INJECTION INTRAVENOUS
Qty: 0 | Refills: 0 | DISCHARGE

## 2022-10-18 RX ORDER — CARVEDILOL PHOSPHATE 80 MG/1
1 CAPSULE, EXTENDED RELEASE ORAL
Qty: 0 | Refills: 0 | DISCHARGE
Start: 2022-10-18

## 2022-10-18 RX ORDER — ISOSORBIDE DINITRATE 5 MG/1
1 TABLET ORAL
Qty: 0 | Refills: 0 | DISCHARGE
Start: 2022-10-18

## 2022-10-18 RX ORDER — GABAPENTIN 400 MG/1
2 CAPSULE ORAL
Qty: 0 | Refills: 0 | DISCHARGE
Start: 2022-10-18

## 2022-10-18 RX ORDER — LEVOTHYROXINE SODIUM 125 MCG
1 TABLET ORAL
Qty: 0 | Refills: 0 | DISCHARGE
Start: 2022-10-18

## 2022-10-18 RX ADMIN — ISOSORBIDE DINITRATE 20 MILLIGRAM(S): 5 TABLET ORAL at 11:34

## 2022-10-18 RX ADMIN — Medication 325 MILLIGRAM(S): at 07:00

## 2022-10-18 RX ADMIN — POLYETHYLENE GLYCOL 3350 17 GRAM(S): 17 POWDER, FOR SOLUTION ORAL at 13:03

## 2022-10-18 RX ADMIN — Medication 3 MILLILITER(S): at 03:08

## 2022-10-18 RX ADMIN — BUMETANIDE 3 MILLIGRAM(S): 0.25 INJECTION INTRAMUSCULAR; INTRAVENOUS at 06:58

## 2022-10-18 RX ADMIN — GABAPENTIN 200 MILLIGRAM(S): 400 CAPSULE ORAL at 06:59

## 2022-10-18 RX ADMIN — ISOSORBIDE DINITRATE 20 MILLIGRAM(S): 5 TABLET ORAL at 07:00

## 2022-10-18 RX ADMIN — Medication 2: at 13:03

## 2022-10-18 RX ADMIN — SPIRONOLACTONE 12.5 MILLIGRAM(S): 25 TABLET, FILM COATED ORAL at 06:59

## 2022-10-18 RX ADMIN — BUDESONIDE AND FORMOTEROL FUMARATE DIHYDRATE 2 PUFF(S): 160; 4.5 AEROSOL RESPIRATORY (INHALATION) at 09:15

## 2022-10-18 RX ADMIN — HEPARIN SODIUM 5000 UNIT(S): 5000 INJECTION INTRAVENOUS; SUBCUTANEOUS at 07:00

## 2022-10-18 RX ADMIN — GABAPENTIN 200 MILLIGRAM(S): 400 CAPSULE ORAL at 13:04

## 2022-10-18 RX ADMIN — HEPARIN SODIUM 5000 UNIT(S): 5000 INJECTION INTRAVENOUS; SUBCUTANEOUS at 13:04

## 2022-10-18 RX ADMIN — Medication 1: at 09:15

## 2022-10-18 RX ADMIN — Medication 3 MILLILITER(S): at 09:34

## 2022-10-18 RX ADMIN — CLOPIDOGREL BISULFATE 75 MILLIGRAM(S): 75 TABLET, FILM COATED ORAL at 11:35

## 2022-10-18 RX ADMIN — Medication 50 MILLIGRAM(S): at 13:03

## 2022-10-18 RX ADMIN — CARVEDILOL PHOSPHATE 3.12 MILLIGRAM(S): 80 CAPSULE, EXTENDED RELEASE ORAL at 06:59

## 2022-10-18 RX ADMIN — FAMOTIDINE 20 MILLIGRAM(S): 10 INJECTION INTRAVENOUS at 11:35

## 2022-10-18 RX ADMIN — Medication 25 MICROGRAM(S): at 06:59

## 2022-10-18 RX ADMIN — Medication 50 MILLIGRAM(S): at 07:00

## 2022-10-18 RX ADMIN — Medication 81 MILLIGRAM(S): at 11:36

## 2022-10-18 RX ADMIN — Medication 40 MILLIGRAM(S): at 07:00

## 2022-10-18 NOTE — PHYSICAL THERAPY INITIAL EVALUATION ADULT - GENERAL OBSERVATIONS, REHAB EVAL
Pt received semisupine in bed, +2L oxygen via nasal cannula, +pulse oximeter, in NAD. Pt agreeable to participate in PT evaluation. Pt left semisupine in bed, all lines intact and RN aware.

## 2022-10-18 NOTE — PROGRESS NOTE ADULT - SUBJECTIVE AND OBJECTIVE BOX
Patient is a 67y old  Male who presents with a chief complaint of AHRF (17 Oct 2022 13:54)     INTERVAL HPI/OVERNIGHT EVENTS:  - No acute events overnight    SUBJECTIVE  - Patient seen and evaluated at bedside  - Patient reports presence of   - Patient reports absence of fevers, chills, HA, lightheadedness, dizziness, nausea, emesis, chest pain, dyspnea, palpitations, abd pain, diarrhea, urinary symptoms, skin color changes or rashes, or LE edema     MEDICATIONS  (STANDING):  albuterol/ipratropium for Nebulization 3 milliLiter(s) Nebulizer every 6 hours  aspirin enteric coated 81 milliGRAM(s) Oral daily  atorvastatin 80 milliGRAM(s) Oral at bedtime  budesonide 160 MICROgram(s)/formoterol 4.5 MICROgram(s) Inhaler 2 Puff(s) Inhalation two times a day  buMETAnide 3 milliGRAM(s) Oral daily  carvedilol 3.125 milliGRAM(s) Oral every 12 hours  clopidogrel Tablet 75 milliGRAM(s) Oral daily  dextrose 50% Injectable 25 Gram(s) IV Push once  dextrose 50% Injectable 12.5 Gram(s) IV Push once  dextrose 50% Injectable 25 Gram(s) IV Push once  famotidine    Tablet 20 milliGRAM(s) Oral daily  ferrous    sulfate 325 milliGRAM(s) Oral <User Schedule>  gabapentin 200 milliGRAM(s) Oral three times a day  heparin   Injectable 5000 Unit(s) SubCutaneous every 8 hours  hydrALAZINE 50 milliGRAM(s) Oral three times a day  insulin lispro (ADMELOG) corrective regimen sliding scale   SubCutaneous three times a day before meals  insulin lispro (ADMELOG) corrective regimen sliding scale   SubCutaneous at bedtime  isosorbide   dinitrate Tablet (ISORDIL) 20 milliGRAM(s) Oral three times a day  levothyroxine 25 MICROGram(s) Oral daily  predniSONE   Tablet 40 milliGRAM(s) Oral daily  senna 2 Tablet(s) Oral at bedtime  spironolactone 12.5 milliGRAM(s) Oral daily  tamsulosin 0.4 milliGRAM(s) Oral at bedtime    MEDICATIONS  (PRN):  acetaminophen     Tablet .. 650 milliGRAM(s) Oral every 6 hours PRN Temp greater or equal to 38C (100.4F), Mild Pain (1 - 3)  ALBUTerol    90 MICROgram(s) HFA Inhaler 2 Puff(s) Inhalation every 6 hours PRN Shortness of Breath and/or Wheezing  dextrose Oral Gel 15 Gram(s) Oral once PRN Blood Glucose LESS THAN 70 milliGRAM(s)/deciliter    Allergies:  No Known Allergies    Intolerances:      REVIEW OF SYSTEMS: As indicated above; otherwise, negative    VITAL SIGNS:  T(F): 97.7 (10-17-22 @ 22:05), Max: 98.1 (10-17-22 @ 11:15)  HR: 60 (10-18-22 @ 03:21) (60 - 105)  BP: 115/71 (10-17-22 @ 22:05) (104/61 - 118/60)  RR: 18 (10-17-22 @ 22:05) (17 - 18)  SpO2: 95% (10-18-22 @ 03:21) (95% - 100%)    PHYSICAL EXAM:  General: NAD, well-groomed, well-developed  Eyes: Conjunctiva and sclera clear  ENMT: Moist mucous membranes  Neck: No palpable pre-auricular, post-auricular, occipital, mandibular, submental, supra-clavicular, or infra-clavicular lymph nodes   Chest: Clear to auscultation bilaterally; no rales, rhonchi, or wheezing  Heart: Regular rate and rhythm; normal S1 and S2; no murmurs, rubs, or gallops  Abd: Soft, nontender, nondistended  Nervous System: AAOX3  Psych: Appropriate affect  Ext: no peripheral LE edema bilaterally    LABS:      Ca    8.9        17 Oct 2022 05:35      CAPILLARY BLOOD GLUCOSE      POCT Blood Glucose.: 178 mg/dL (17 Oct 2022 22:14)  POCT Blood Glucose.: 222 mg/dL (17 Oct 2022 17:47)  POCT Blood Glucose.: 220 mg/dL (17 Oct 2022 12:37)  POCT Blood Glucose.: 155 mg/dL (17 Oct 2022 08:44)    BLOOD CULTURE  10-15 @ 01:40   No growth to date.  --  --  10-15 @ 01:35   No growth to date.  --  --    RADIOLOGY & ADDITIONAL TESTS:    Imaging Personally Reviewed:  [X ] YES     Consultant(s) Notes Reviewed:  Yes    Care Discussed with Consultants/Other Providers: Yes Patient is a 67y old  Male who presents with a chief complaint of AHRF (17 Oct 2022 13:54)     INTERVAL HPI/OVERNIGHT EVENTS:  - No acute events overnight    SUBJECTIVE  - Patient seen and evaluated at bedside. Patient reports presence of constipation as last bowel movement was 2 days ago. Patient states that he tried senna yesterday, but that did not help. Additionally, patient states that he has some shortness of breath when he walks, but he states that is his baseline.   - Patient reports absence of fevers, chills, headache, lightheadedness, dizziness, nausea, emesis, chest pain, palpitations, diarrhea, urinary symptoms, skin color changes or rashes, or LE edema     MEDICATIONS  (STANDING):  albuterol/ipratropium for Nebulization 3 milliLiter(s) Nebulizer every 6 hours  aspirin enteric coated 81 milliGRAM(s) Oral daily  atorvastatin 80 milliGRAM(s) Oral at bedtime  budesonide 160 MICROgram(s)/formoterol 4.5 MICROgram(s) Inhaler 2 Puff(s) Inhalation two times a day  buMETAnide 3 milliGRAM(s) Oral daily  carvedilol 3.125 milliGRAM(s) Oral every 12 hours  clopidogrel Tablet 75 milliGRAM(s) Oral daily  dextrose 50% Injectable 25 Gram(s) IV Push once  dextrose 50% Injectable 12.5 Gram(s) IV Push once  dextrose 50% Injectable 25 Gram(s) IV Push once  famotidine    Tablet 20 milliGRAM(s) Oral daily  ferrous    sulfate 325 milliGRAM(s) Oral <User Schedule>  gabapentin 200 milliGRAM(s) Oral three times a day  heparin   Injectable 5000 Unit(s) SubCutaneous every 8 hours  hydrALAZINE 50 milliGRAM(s) Oral three times a day  insulin lispro (ADMELOG) corrective regimen sliding scale   SubCutaneous three times a day before meals  insulin lispro (ADMELOG) corrective regimen sliding scale   SubCutaneous at bedtime  isosorbide   dinitrate Tablet (ISORDIL) 20 milliGRAM(s) Oral three times a day  levothyroxine 25 MICROGram(s) Oral daily  predniSONE   Tablet 40 milliGRAM(s) Oral daily  senna 2 Tablet(s) Oral at bedtime  spironolactone 12.5 milliGRAM(s) Oral daily  tamsulosin 0.4 milliGRAM(s) Oral at bedtime    MEDICATIONS  (PRN):  acetaminophen     Tablet .. 650 milliGRAM(s) Oral every 6 hours PRN Temp greater or equal to 38C (100.4F), Mild Pain (1 - 3)  ALBUTerol    90 MICROgram(s) HFA Inhaler 2 Puff(s) Inhalation every 6 hours PRN Shortness of Breath and/or Wheezing  dextrose Oral Gel 15 Gram(s) Oral once PRN Blood Glucose LESS THAN 70 milliGRAM(s)/deciliter    Allergies:  No Known Allergies    Intolerances:      REVIEW OF SYSTEMS: As indicated above; otherwise, negative    VITAL SIGNS:  T(F): 97.7 (10-17-22 @ 22:05), Max: 98.1 (10-17-22 @ 11:15)  HR: 60 (10-18-22 @ 03:21) (60 - 105)  BP: 115/71 (10-17-22 @ 22:05) (104/61 - 118/60)  RR: 18 (10-17-22 @ 22:05) (17 - 18)  SpO2: 95% (10-18-22 @ 03:21) (95% - 100%)    PHYSICAL EXAM:  General: NAD, well-groomed, well-developed  Eyes: Pupils, equal, round, reactive to light, extraocular muscles intact, anicteric sclera   ENMT: Moist mucous membranes  Neck: No thyromegaly, no JVD, no tender lymphadenopathy   Chest: Clear to auscultation bilaterally; no rales, rhonchi, or wheezing  Heart: Regular rate and rhythm; normal S1 and S2; no murmurs, rubs, or gallops  Abdomen: Soft, nondistended, mildly distended on LLQ, normoactive bowel sounds   Ext: Warm, well perfused, trace bilateral lower extremity edema   Nervous System: AOx3  Psych: Appropriate affect    LABS:      Ca    8.9        17 Oct 2022 05:35      CAPILLARY BLOOD GLUCOSE      POCT Blood Glucose.: 178 mg/dL (17 Oct 2022 22:14)  POCT Blood Glucose.: 222 mg/dL (17 Oct 2022 17:47)  POCT Blood Glucose.: 220 mg/dL (17 Oct 2022 12:37)  POCT Blood Glucose.: 155 mg/dL (17 Oct 2022 08:44)    BLOOD CULTURE  10-15 @ 01:40   No growth to date.  --  --  10-15 @ 01:35   No growth to date.  --  --    RADIOLOGY & ADDITIONAL TESTS:    Imaging Personally Reviewed:  [X ] YES     Consultant(s) Notes Reviewed:  Yes    Care Discussed with Consultants/Other Providers: Yes

## 2022-10-18 NOTE — PROGRESS NOTE ADULT - PROBLEM SELECTOR PLAN 3
- 2L at home, on symbicort, montelukast, albuterol prn  - acute management as above

## 2022-10-18 NOTE — PHYSICAL THERAPY INITIAL EVALUATION ADULT - ADDITIONAL COMMENTS
Pt lives in a private house with 3 exterior steps to enter. Pt endorses sleeping in living room. Prior to admission, pt ambulated with a rolling walker independently. Spouse assists with ADLs. Pt has 2L home oxygen.

## 2022-10-18 NOTE — PROGRESS NOTE ADULT - PROBLEM SELECTOR PLAN 8
1993, left-sided deficit, walker at baseline

## 2022-10-18 NOTE — PHYSICAL THERAPY INITIAL EVALUATION ADULT - PATIENT PROFILE REVIEW, REHAB EVAL
PT evaluate and treat orders received: no formal activity orders. Consult with THOMAS MATUTE, pt may participate in PT evaluation./yes

## 2022-10-18 NOTE — PROGRESS NOTE ADULT - PROBLEM SELECTOR PROBLEM 4
HFrEF (heart failure with reduced ejection fraction)

## 2022-10-18 NOTE — PROGRESS NOTE ADULT - PROBLEM SELECTOR PLAN 5
s/p CABG (2015 [place] on ASA, Plavix).  - hold home ezetimibe  - c/w home statin

## 2022-10-18 NOTE — PROGRESS NOTE ADULT - PROBLEM SELECTOR PLAN 9
DVT ppx: SQH  Diet: Renal, carb consistent   Dispo: patient is being discharged today, 10/18
DVT ppx: SQH  Diet: Renal, carb consistent   Dispo: Home
DVT ppx: SQH  Diet: Renal, carb consistent   Dispo: Home likely, PT eval placed prior to discharge

## 2022-10-18 NOTE — PROGRESS NOTE ADULT - PROBLEM SELECTOR PLAN 4
HFrEF (EF 15 - 20% 5/2022) s/p medtronic ICD (2015) NYHA III, stage C. Mild pulmonary edema on CXR, No B-lines on ultrasound, no peripheral edema, no orthopnea.  - home bumex 3mg PO, coreg 12.5 qD, hydral 50, isordil 30, aldactone 12.5  - acute management as above  - c/w other home meds as above  - strict I&Os  - repeat echo performed on 10/17, results showed EF 14%, mild mitral regurgitation, mild left ventricular enlargement, severe global left ventricular systolic dysfunction, right ventricular enlargement with right ventricular dysfunction

## 2022-10-18 NOTE — PHYSICAL THERAPY INITIAL EVALUATION ADULT - NSPTDISCHREC_GEN_A_CORE
Anticipate home w/ continued family assist and home PT services, to improve functional mobility and optimize safety.

## 2022-10-18 NOTE — PHYSICAL THERAPY INITIAL EVALUATION ADULT - PERTINENT HX OF CURRENT PROBLEM, REHAB EVAL
Pt is a 67 year old male presenting with Pt is a 67 year old male presenting with cough, shortness of breath and chest pain. Likely AHRF secondary to COPD exacerbation +/- CHF exacerbation.

## 2022-10-18 NOTE — DISCHARGE NOTE NURSING/CASE MANAGEMENT/SOCIAL WORK - PATIENT PORTAL LINK FT
You can access the FollowMyHealth Patient Portal offered by [facility] by registering at the following website: [email or web address]. By joining [employer]’s FollowMyHealth portal, you will also be able to view your health information using other applications (apps) compatible with our system.

## 2022-10-18 NOTE — PROGRESS NOTE ADULT - ATTENDING COMMENTS
67M PMH CAD s/p CABG, HFrEF s/p AICD, COPD w/chronic hypoxic respiratory failure on LTOT, CKD Stage 4, CVA (with residual left sided deficits) p/w   This AM, pt feels well. Breathing at baseline. Reports he walked with PT in the hallways    # Acute on chronic hypoxic respiratory failure likely d/t COPD exacerbation. Also Acute CHF exacerbation. Wheezing is improving on today's exam.  Pt reports his attack was triggered because of weather change   Initially on BIPAP now weaned to O2 via NC.    Labs notable for elevated proBNP to 7K. Trops 77-->56  RVP negative. CXR mild pulmonary edema..ED POCUS b/l pleural effusions without b-lines, focal consolidations.  s/p solumedrol 125mg IV in ED, Transitioned to PO   On azithromycin 500mg daily x 3 days. Was on Bumex 2mg IV BID for now, transitioned to home dose   Wean back to home oxygen as tolerated.    # CKD 4- Creatinine stable  # CAD s/p CABG (2014) on ASA/Plavix, Imdur, Hydralazine, Lipitor ECHO reviewed. Pt advsied to FU with Cardiology as outpt  # CODE STATUS- FULL CODE   DC Planning 33mins  Pt reports he will see his PMD Dr Schmidt on 10/23 at 2pm . 67M PMH CAD s/p CABG, HFrEF s/p AICD, COPD w/chronic hypoxic respiratory failure on LTOT, CKD Stage 4, CVA (with residual left sided deficits) p/w   This AM, pt feels well. Breathing at baseline. Reports he walked with PT in the hallways    # Acute on chronic hypoxic respiratory failure likely d/t COPD exacerbation. Also Acute CHF exacerbation. Wheezing is improving on today's exam.  Pt reports his attack was triggered because of weather change   Initially on BIPAP now weaned to O2 via NC.    Labs notable for elevated proBNP to 7K. Trops 77-->56  RVP negative. CXR mild pulmonary edema..ED POCUS b/l pleural effusions without b-lines, focal consolidations.  s/p solumedrol 125mg IV in ED, Transitioned to PO   On azithromycin 500mg daily x 3 days. Was on Bumex 2mg IV BID for now, transitioned to home dose   Wean back to home oxygen as tolerated.    # CKD 4- Creatinine stable  # CAD s/p CABG (2014) on ASA/Plavix, Imdur, Hydralazine, Lipitor ECHO reviewed. FIndings similar to ECHO from May 22.  Pt advised to FU with Cardiology as outpt  # CODE STATUS- FULL CODE   DC Planning 33mins  Pt reports he will see his PMD Dr Schmidt on 10/23 at 2pm .

## 2022-10-18 NOTE — PHYSICAL THERAPY INITIAL EVALUATION ADULT - IMPAIRMENTS CONTRIBUTING TO GAIT DEVIATIONS, PT EVAL
endurance decreased; SpO2 dropped to 86% on supplemental oxygen during ambulation. Improved to >90% with rest./impaired balance/decreased strength

## 2022-10-18 NOTE — PROGRESS NOTE ADULT - PROBLEM SELECTOR PLAN 6
- Hold home oral meds (glimepiride)  - LDSSI  - carb consistent diet

## 2022-10-18 NOTE — DISCHARGE NOTE NURSING/CASE MANAGEMENT/SOCIAL WORK - NSDCFUADDAPPT_GEN_ALL_CORE_FT
Please follow up with the following providers, if you would like to see one of our providers please call the number(s) provided above.  1. primary care physician, 1 - 2 weeks  2. cardiologist, appointment scheduled for Monday, October 24th at 9:30am  3. pulmonologist, 1 - 2 weeks, please call office to make appointment as soon as possible

## 2022-10-18 NOTE — PROGRESS NOTE ADULT - PROBLEM SELECTOR PLAN 7
Baseline ~2.3 - 2.9  - no SHERRY  - no nephrotoxins  - dose renally

## 2022-10-24 ENCOUNTER — APPOINTMENT (OUTPATIENT)
Dept: CARDIOLOGY | Facility: CLINIC | Age: 67
End: 2022-10-24

## 2022-12-01 ENCOUNTER — APPOINTMENT (OUTPATIENT)
Dept: CARDIOLOGY | Facility: CLINIC | Age: 67
End: 2022-12-01

## 2022-12-04 ENCOUNTER — INPATIENT (INPATIENT)
Facility: HOSPITAL | Age: 67
LOS: 6 days | Discharge: HOME HEALTH | End: 2022-12-11
Attending: STUDENT IN AN ORGANIZED HEALTH CARE EDUCATION/TRAINING PROGRAM | Admitting: STUDENT IN AN ORGANIZED HEALTH CARE EDUCATION/TRAINING PROGRAM
Payer: MEDICARE

## 2022-12-04 VITALS
DIASTOLIC BLOOD PRESSURE: 83 MMHG | RESPIRATION RATE: 22 BRPM | SYSTOLIC BLOOD PRESSURE: 126 MMHG | TEMPERATURE: 98 F | OXYGEN SATURATION: 98 % | HEIGHT: 69 IN | HEART RATE: 102 BPM

## 2022-12-04 DIAGNOSIS — Z95.1 PRESENCE OF AORTOCORONARY BYPASS GRAFT: Chronic | ICD-10-CM

## 2022-12-04 DIAGNOSIS — R34 ANURIA AND OLIGURIA: ICD-10-CM

## 2022-12-04 DIAGNOSIS — J96.02 ACUTE RESPIRATORY FAILURE WITH HYPERCAPNIA: ICD-10-CM

## 2022-12-04 DIAGNOSIS — I50.23 ACUTE ON CHRONIC SYSTOLIC (CONGESTIVE) HEART FAILURE: ICD-10-CM

## 2022-12-04 DIAGNOSIS — R06.02 SHORTNESS OF BREATH: ICD-10-CM

## 2022-12-04 DIAGNOSIS — I10 ESSENTIAL (PRIMARY) HYPERTENSION: ICD-10-CM

## 2022-12-04 DIAGNOSIS — R06.89 OTHER ABNORMALITIES OF BREATHING: ICD-10-CM

## 2022-12-04 DIAGNOSIS — Z29.9 ENCOUNTER FOR PROPHYLACTIC MEASURES, UNSPECIFIED: ICD-10-CM

## 2022-12-04 DIAGNOSIS — R79.89 OTHER SPECIFIED ABNORMAL FINDINGS OF BLOOD CHEMISTRY: ICD-10-CM

## 2022-12-04 DIAGNOSIS — Z90.49 ACQUIRED ABSENCE OF OTHER SPECIFIED PARTS OF DIGESTIVE TRACT: Chronic | ICD-10-CM

## 2022-12-04 LAB
ALBUMIN SERPL ELPH-MCNC: 3.9 G/DL — SIGNIFICANT CHANGE UP (ref 3.3–5)
ALP SERPL-CCNC: 111 U/L — SIGNIFICANT CHANGE UP (ref 40–120)
ALT FLD-CCNC: 18 U/L — SIGNIFICANT CHANGE UP (ref 4–41)
ANION GAP SERPL CALC-SCNC: 10 MMOL/L — SIGNIFICANT CHANGE UP (ref 7–14)
APTT BLD: 31.2 SEC — SIGNIFICANT CHANGE UP (ref 27–36.3)
AST SERPL-CCNC: 32 U/L — SIGNIFICANT CHANGE UP (ref 4–40)
BASE EXCESS BLDV CALC-SCNC: 2.6 MMOL/L — SIGNIFICANT CHANGE UP (ref -2–3)
BASOPHILS # BLD AUTO: 0.04 K/UL — SIGNIFICANT CHANGE UP (ref 0–0.2)
BASOPHILS NFR BLD AUTO: 0.4 % — SIGNIFICANT CHANGE UP (ref 0–2)
BILIRUB SERPL-MCNC: 0.7 MG/DL — SIGNIFICANT CHANGE UP (ref 0.2–1.2)
BLOOD GAS VENOUS COMPREHENSIVE RESULT: SIGNIFICANT CHANGE UP
BUN SERPL-MCNC: 36 MG/DL — HIGH (ref 7–23)
CALCIUM SERPL-MCNC: 9.2 MG/DL — SIGNIFICANT CHANGE UP (ref 8.4–10.5)
CHLORIDE BLDV-SCNC: 95 MMOL/L — LOW (ref 96–108)
CHLORIDE SERPL-SCNC: 95 MMOL/L — LOW (ref 98–107)
CO2 BLDV-SCNC: 31.7 MMOL/L — HIGH (ref 22–26)
CO2 SERPL-SCNC: 28 MMOL/L — SIGNIFICANT CHANGE UP (ref 22–31)
CREAT SERPL-MCNC: 2.24 MG/DL — HIGH (ref 0.5–1.3)
D DIMER BLD IA.RAPID-MCNC: 424 NG/ML DDU — HIGH
EGFR: 31 ML/MIN/1.73M2 — LOW
EOSINOPHIL # BLD AUTO: 0.16 K/UL — SIGNIFICANT CHANGE UP (ref 0–0.5)
EOSINOPHIL NFR BLD AUTO: 1.6 % — SIGNIFICANT CHANGE UP (ref 0–6)
FLUAV AG NPH QL: SIGNIFICANT CHANGE UP
FLUBV AG NPH QL: SIGNIFICANT CHANGE UP
GAS PNL BLDV: 131 MMOL/L — LOW (ref 136–145)
GLUCOSE BLDV-MCNC: 140 MG/DL — HIGH (ref 70–99)
GLUCOSE SERPL-MCNC: 133 MG/DL — HIGH (ref 70–99)
HCO3 BLDV-SCNC: 30 MMOL/L — HIGH (ref 22–29)
HCT VFR BLD CALC: 37.2 % — LOW (ref 39–50)
HCT VFR BLDA CALC: 37 % — LOW (ref 39–51)
HGB BLD CALC-MCNC: 12.2 G/DL — LOW (ref 13–17)
HGB BLD-MCNC: 12 G/DL — LOW (ref 13–17)
IANC: 8.25 K/UL — HIGH (ref 1.8–7.4)
IMM GRANULOCYTES NFR BLD AUTO: 0.4 % — SIGNIFICANT CHANGE UP (ref 0–0.9)
INR BLD: 1.13 RATIO — SIGNIFICANT CHANGE UP (ref 0.88–1.16)
LACTATE BLDV-MCNC: 1 MMOL/L — SIGNIFICANT CHANGE UP (ref 0.5–2)
LYMPHOCYTES # BLD AUTO: 0.96 K/UL — LOW (ref 1–3.3)
LYMPHOCYTES # BLD AUTO: 9.4 % — LOW (ref 13–44)
MAGNESIUM SERPL-MCNC: 2 MG/DL — SIGNIFICANT CHANGE UP (ref 1.6–2.6)
MCHC RBC-ENTMCNC: 31.7 PG — SIGNIFICANT CHANGE UP (ref 27–34)
MCHC RBC-ENTMCNC: 32.3 GM/DL — SIGNIFICANT CHANGE UP (ref 32–36)
MCV RBC AUTO: 98.2 FL — SIGNIFICANT CHANGE UP (ref 80–100)
MONOCYTES # BLD AUTO: 0.76 K/UL — SIGNIFICANT CHANGE UP (ref 0–0.9)
MONOCYTES NFR BLD AUTO: 7.4 % — SIGNIFICANT CHANGE UP (ref 2–14)
NEUTROPHILS # BLD AUTO: 8.25 K/UL — HIGH (ref 1.8–7.4)
NEUTROPHILS NFR BLD AUTO: 80.8 % — HIGH (ref 43–77)
NRBC # BLD: 0 /100 WBCS — SIGNIFICANT CHANGE UP (ref 0–0)
NRBC # FLD: 0 K/UL — SIGNIFICANT CHANGE UP (ref 0–0)
NT-PROBNP SERPL-SCNC: 7986 PG/ML — HIGH
PCO2 BLDV: 58 MMHG — HIGH (ref 42–55)
PH BLDV: 7.32 — SIGNIFICANT CHANGE UP (ref 7.32–7.43)
PLATELET # BLD AUTO: 168 K/UL — SIGNIFICANT CHANGE UP (ref 150–400)
PO2 BLDV: 34 MMHG — SIGNIFICANT CHANGE UP
POTASSIUM BLDV-SCNC: 4 MMOL/L — SIGNIFICANT CHANGE UP (ref 3.5–5.1)
POTASSIUM SERPL-MCNC: 4.4 MMOL/L — SIGNIFICANT CHANGE UP (ref 3.5–5.3)
POTASSIUM SERPL-SCNC: 4.4 MMOL/L — SIGNIFICANT CHANGE UP (ref 3.5–5.3)
PROT SERPL-MCNC: 6.7 G/DL — SIGNIFICANT CHANGE UP (ref 6–8.3)
PROTHROM AB SERPL-ACNC: 13.1 SEC — SIGNIFICANT CHANGE UP (ref 10.5–13.4)
RBC # BLD: 3.79 M/UL — LOW (ref 4.2–5.8)
RBC # FLD: 15.9 % — HIGH (ref 10.3–14.5)
RSV RNA NPH QL NAA+NON-PROBE: SIGNIFICANT CHANGE UP
SAO2 % BLDV: 54.6 % — SIGNIFICANT CHANGE UP
SARS-COV-2 RNA SPEC QL NAA+PROBE: SIGNIFICANT CHANGE UP
SODIUM SERPL-SCNC: 133 MMOL/L — LOW (ref 135–145)
TROPONIN T, HIGH SENSITIVITY RESULT: 77 NG/L — CRITICAL HIGH
TROPONIN T, HIGH SENSITIVITY RESULT: 77 NG/L — CRITICAL HIGH
TSH SERPL-MCNC: 5.71 UIU/ML — HIGH (ref 0.27–4.2)
WBC # BLD: 10.21 K/UL — SIGNIFICANT CHANGE UP (ref 3.8–10.5)
WBC # FLD AUTO: 10.21 K/UL — SIGNIFICANT CHANGE UP (ref 3.8–10.5)

## 2022-12-04 PROCEDURE — 71045 X-RAY EXAM CHEST 1 VIEW: CPT | Mod: 26

## 2022-12-04 PROCEDURE — 99285 EMERGENCY DEPT VISIT HI MDM: CPT | Mod: CS

## 2022-12-04 PROCEDURE — 99223 1ST HOSP IP/OBS HIGH 75: CPT

## 2022-12-04 PROCEDURE — 93010 ELECTROCARDIOGRAM REPORT: CPT

## 2022-12-04 RX ORDER — FUROSEMIDE 40 MG
80 TABLET ORAL ONCE
Refills: 0 | Status: COMPLETED | OUTPATIENT
Start: 2022-12-04 | End: 2022-12-04

## 2022-12-04 RX ORDER — BUMETANIDE 0.25 MG/ML
1 INJECTION INTRAMUSCULAR; INTRAVENOUS ONCE
Refills: 0 | Status: DISCONTINUED | OUTPATIENT
Start: 2022-12-05 | End: 2022-12-05

## 2022-12-04 RX ORDER — HEPARIN SODIUM 5000 [USP'U]/ML
5000 INJECTION INTRAVENOUS; SUBCUTANEOUS EVERY 8 HOURS
Refills: 0 | Status: DISCONTINUED | OUTPATIENT
Start: 2022-12-04 | End: 2022-12-09

## 2022-12-04 RX ADMIN — Medication 80 MILLIGRAM(S): at 17:45

## 2022-12-04 NOTE — H&P ADULT - PROBLEM SELECTOR PLAN 3
- for the past ~ 2 days  - in the setting of diuretic use  - f/u straight cath (ordered)  - f/u bladder scan (ordered)  - if retaining urine, Torres catheter insertion indicated

## 2022-12-04 NOTE — H&P ADULT - PROBLEM SELECTOR PLAN 2
- on BiPAP - 10/5, 50%.  pCO2 = 58 on vBG; in the setting of acute on chronic systolic heart failure  - not thought to be sequela of infectious etiology or COPD/Asthma exacerbation  - on supplemental oxygen at home  - intake/output Q4H, weight daily, HOB elevation 60 to 90 degrees, fluid restriction when PO intake resumes  - f/u vBG in the AM  - wean off BiPAP, as tolerated

## 2022-12-04 NOTE — ED ADULT TRIAGE NOTE - CHIEF COMPLAINT QUOTE
reported increased diff breathing with ch pains  since yesterday with minimal pedal edema. given baby aspirin  x4 by ems.  fs 193 by ems- pmh- copd ,cabg ,dm ,htn on nasal cann2 l 24 hrs. resp labored at present  exp wheezing

## 2022-12-04 NOTE — H&P ADULT - PROBLEM SELECTOR PLAN 5
- TSH = 5.71  - possibly affecting heart failure  - f/u FT3, T4 levels (added to previous lab-work); please f/u

## 2022-12-04 NOTE — H&P ADULT - NSHPPHYSICALEXAM_GEN_ALL_CORE
Vital Signs Last 24 Hrs  T(C): 36.2 (04 Dec 2022 17:53), Max: 36.9 (04 Dec 2022 14:26)  T(F): 97.1 (04 Dec 2022 17:53), Max: 98.4 (04 Dec 2022 14:26)  HR: 88 (04 Dec 2022 20:51) (85 - 102)  BP: 113/76 (04 Dec 2022 20:51) (113/76 - 126/83)  BP(mean): --  RR: 21 (04 Dec 2022 20:51) (20 - 22)  SpO2: 100% (04 Dec 2022 20:51) (98% - 100%)    Parameters below as of 04 Dec 2022 20:51  Patient On (Oxygen Delivery Method): BiPAP/CPAP    =============================================================== Vital Signs Last 24 Hrs  T(C): 36.2 (04 Dec 2022 17:53), Max: 36.9 (04 Dec 2022 14:26)  T(F): 97.1 (04 Dec 2022 17:53), Max: 98.4 (04 Dec 2022 14:26)  HR: 88 (04 Dec 2022 20:51) (85 - 102)  BP: 113/76 (04 Dec 2022 20:51) (113/76 - 126/83)  BP(mean): --  RR: 21 (04 Dec 2022 20:51) (20 - 22)  SpO2: 100% (04 Dec 2022 20:51) (98% - 100%)    Parameters below as of 04 Dec 2022 20:51  Patient On (Oxygen Delivery Method): BiPAP/CPAP    ===============================================================  PHYSICAL EXAMINATION:    APPEARANCE: Adequately groomed, adequately nourished male, lying propped up in stretcher.  Has increased respiratory effort, especially when trying to converse.  Speaks in short sentences.  HEENT: BiPAP mask in place.  Very dry lips.  Unable to adequately visualize oral mucosa.  Left pupil slightly distorted.  EOMI	  LYMPHATIC: No lymphadenopathy appreciated  CARDIOVASCULAR: (+) S1 S2.  No JVD.  No gross murmurs appreciated.  (+) mild edema of the upper extremities.  (+) pitting edema of the lower extremities - pronounced at the pedal areas (~ 3+)  RESPIRATORY: Use of accessory muscles.  Scattered rhonchi bilaterally.  Decreased breath sounds at the bases  GASTROINTESTINAL:  Use of abdominal muscles in the setting of increased respiratory effort.  Taut.  Non-tender, (+) BS  GENITOURINARY: No gross suprapubic tenderness  EXTREMITIES: (+) edema of the UE and LE bilaterally.  Edema most pronounced in the pedal areas (~ 3+).  Normal range of motion.  No clubbing or cyanosis  MUSCULOSKELETAL: No atrophy.  No asymmetry.  Good ROM  SKIN: No rashes. No ecchymoses.  No cyanosis  PSYCHIATRIC: A&O x 3.  Conversant (limited by respiratory state).  Appears sad  NEUROLOGICAL: Able to lift all extremities against gravity.  Responsive to auditory, tactile stimuli  VASCULAR: Peripheral pulses palpable

## 2022-12-04 NOTE — ED PROVIDER NOTE - OBJECTIVE STATEMENT
Dr San  66 y/o M with PMH of CAD s/p CABG (2014 on ASA/Plavix), HFrEF (15-20% 05/2022 TTE) s/p AICD, asthma/COPD (on 2L NC at home), CKD, CVA (1993 mild left-sided deficits uses walker), co Patient from home complaining of chest pain and shortness of breath x1 day.  Endorsing dyspnea on exertion, orthopnea and bilateral leg swelling over the last 2 days with increased shortness of breath today, midsternal chest pressure.  Chest pain is exertional, nonradiating, not pleuritic.  Denies any fever denies cough denies nausea vomiting.  Adherent to meds per patient, still urinates.  No recent travel and no sick contacts

## 2022-12-04 NOTE — H&P ADULT - NSHPLABSRESULTS_GEN_ALL_CORE
LAB-WORK/STUDIES:                          12.0   10.21 )-----------( 168      ( 04 Dec 2022 17:36 )             37.2     04 Dec 2022 17:36    133    |  95     |  36     ----------------------------<  133    4.4     |  28     |  2.24     Ca    9.2        04 Dec 2022 17:36  Mg     2.00      04 Dec 2022 17:36    TPro  6.7    /  Alb  3.9    /  TBili  0.7    /  DBili  x      /  AST  32     /  ALT  18     /  AlkPhos  111    04 Dec 2022 17:36    LIVER FUNCTIONS - ( 04 Dec 2022 17:36 )  Alb: 3.9 g/dL / Pro: 6.7 g/dL / ALK PHOS: 111 U/L / ALT: 18 U/L / AST: 32 U/L / GGT: x           PT/INR - ( 04 Dec 2022 17:36 )   PT: 13.1 sec;   INR: 1.13 ratio    PTT - ( 04 Dec 2022 17:36 )  PTT:31.2 sec    CAPILLARY BLOOD GLUCOSE    ======================================================= LAB-WORK/STUDIES:                          12.0   10.21 )-----------( 168      ( 04 Dec 2022 17:36 )             37.2     04 Dec 2022 17:36    133    |  95     |  36     ----------------------------<  133    4.4     |  28     |  2.24     Ca    9.2        04 Dec 2022 17:36  Mg     2.00      04 Dec 2022 17:36    TPro  6.7    /  Alb  3.9    /  TBili  0.7    /  DBili  x      /  AST  32     /  ALT  18     /  AlkPhos  111    04 Dec 2022 17:36    LIVER FUNCTIONS - ( 04 Dec 2022 17:36 )  Alb: 3.9 g/dL / Pro: 6.7 g/dL / ALK PHOS: 111 U/L / ALT: 18 U/L / AST: 32 U/L / GGT: x           PT/INR - ( 04 Dec 2022 17:36 )   PT: 13.1 sec;   INR: 1.13 ratio    PTT - ( 04 Dec 2022 17:36 )  PTT:31.2 sec    CAPILLARY BLOOD GLUCOSE    ============================================================    ECG = NSR at 100 bpm, QTc = 513.  LAD, LBBB.  (no significant changes from prior study    ============================================================

## 2022-12-04 NOTE — ED ADULT NURSE NOTE - NSIMPLEMENTINTERV_GEN_ALL_ED
Implemented All Fall Risk Interventions:  [place] to call system. Call bell, personal items and telephone within reach. Instruct patient to call for assistance. Room bathroom lighting operational. Non-slip footwear when patient is off stretcher. Physically safe environment: no spills, clutter or unnecessary equipment. Stretcher in lowest position, wheels locked, appropriate side rails in place. Provide visual cue, wrist band, yellow gown, etc. Monitor gait and stability. Monitor for mental status changes and reorient to person, place, and time. Review medications for side effects contributing to fall risk. Reinforce activity limits and safety measures with patient and family.

## 2022-12-04 NOTE — H&P ADULT - PROBLEM SELECTOR PLAN 7
- patient NPO (on BiPAP)  - please prescribe oral medications when patient able to be maintained off BiPAP

## 2022-12-04 NOTE — ED PROVIDER NOTE - PHYSICAL EXAMINATION
Dr San  Patient ANO x3 able to answer questions in 1-2 words, looks uncomfortable, + work of breathing + retractions + Rales bilaterally on nasal cannula.  soft nontender abdomen. no  rebound. no guarding. no sign of trauma. no CVAT   Bilateral leg swelling.  No calf tenderness

## 2022-12-04 NOTE — ED PROVIDER NOTE - PROGRESS NOTE DETAILS
Called resp for BIPAP Pt on BIPAP Patient more comfortable less work of breathing admitted for CHF exacerbation.  Tolerating BIPAP, admitted. pt has been on BIPAP in the past.

## 2022-12-04 NOTE — H&P ADULT - PROBLEM SELECTOR PLAN 1
- presented w/ pain involving the entire chest area, shortness of breath, KINNEY, insomnia due to orthopnea  - reports decreased urine output over the past 2 days at least (After ~ 5.5 hours of furosemide 80 mg IV, in the setting of CKD4 w/ urine output of 400 mL)  - last TTE of 10/17/2022 w/ EF of 14%  - pro-BNP = 7986, Trop = 77 --> 77, ECG w/o any acute changes from prior study of 10/14/2022  - TSH currently 5.71; f/u FT3, T4  - findings on CXR consistent w/ pulmonary edema  - on Bumex 3 mg PO daily  - received furosemide 80 mg IV x one since admission.  But urine output as above.  Will give Bumex 2 mg IV  - intake/output Q4H, weight daily, HOB elevation 60 to 90 degrees.  Fluid restriction order when PO intake initiated  - no signs of infection  - Changing furosemide to Bumex (metolazone not prescribed due to hypochloremia)\  - fluid overload appears to be sequela of decreased urine output, especially since patient is adherent to fluid restriction and prescription medications at home  - will not repeat TTE at present  - f/u bladder scan (ordered)  - Straight cath for urine ordered (please f/u - including quantity output)  - continues on BiPAP (not first time on the device)  - Cardiology/Adult Heart Failure team consult in the AM (Garo Dobson MD); please call - presented w/ pain involving the entire chest area, shortness of breath, KINNEY, insomnia due to orthopnea  - reports decreased urine output over the past 2 days at least (After ~ 5.5 hours of furosemide 80 mg IV, in the setting of CKD4 w/ urine output of 400 mL)  - last TTE of 10/17/2022 w/ EF of 14%  - pro-BNP = 7986, Trop = 77 --> 77, ECG w/o any acute changes from prior study of 10/14/2022  - TSH currently 5.71; f/u FT3, T4  - findings on CXR consistent w/ pulmonary edema  - on Bumex 3 mg PO daily  - received furosemide 80 mg IV x one since admission.  But urine output as above.  Will give Bumex 2 mg IV (Evaluate for further diuretic dosing - was on Bumex 3 mg QD PTA)  - intake/output Q4H, weight daily, HOB elevation 60 to 90 degrees.  Fluid restriction order when PO intake initiated  - no signs of infection  - fluid overload appears to be sequela of decreased urine output, especially since patient is adherent to fluid restriction and prescription medications at home  - will not repeat TTE at present ([facility] d/w Cardiologist)  - f/u bladder scan (ordered)  - Straight cath for urine ordered (please f/u - including quantity output)  - continues on BiPAP (not first time on the device)  - Cardiology/Adult Heart Failure team consult in the AM (Garo Dobson MD); please call

## 2022-12-04 NOTE — H&P ADULT - PROBLEM SELECTOR PLAN 4
38 yo bm with recurrent vomiting due to gastroparesis from uncontrolled diabetes. Possible Cyclic vomiting syndrome but in view of the poorly controlled bs most likely gastroparesis. No evidence of colitis. - hyponatremic (133), hypochloremic (95) - chronic issues  - in the setting of diuretic prescription, possible thyroid dysfunction, in the setting of CKD-IV  - although fluid overloaded will hold off on trial of metolazone (prior to furosemide)  - f/u electrolytes and replete, as necessary

## 2022-12-04 NOTE — ED ADULT NURSE NOTE - OBJECTIVE STATEMENT
Facilitator RN Note: Received pt into spot #3 via stretcher. Pt c/o SOB, chest pain and bilateral lower ext edema x 2 days. Pt's breathing appears labored even at rest but increase in SOB with any speaking and activity. Placed on tele monitor SR HR 90s. BLE +3 pitting edema noted. Nonpitting edema noted to Bilateral upper exts. Skin intact. #20 angio placed to LAC saline locked. Lasix 80mg IVP given as ordered. Pt placed on BIPAP by respiratory therapist. Call light in reach. Urinals x 2 given to pt as requested. Report given to primary RN Ericka.

## 2022-12-04 NOTE — H&P ADULT - ASSESSMENT
[ x ]  Lab studies personally reviewed  [ x ]  Radiology personally reviewed  [ x ]  Old records personally reviewed    67 year old male, with past history significant for CAD, CHF (EF = 15 to 20%), CABG, HTN, HLD, Type-2 DM, AICD, Asthma, COPD, Home oxygen (2L), CKD, CVA w/ Left sided deficits, and Gait difficulty (uses walker), presented to the ED secondary to chest pain and shortness of breath.  Diagnosed with Shortness of Breath in the ED.

## 2022-12-04 NOTE — H&P ADULT - HISTORY OF PRESENT ILLNESS
67 year old male, with past history significant for CAD, CHF (EF = 15 to 20%), CABG, HTN, HLD, Type-2 DM, AICD, Asthma, COPD, Home oxygen (2L), CKD, CVA w/ Left sided deficits, and Gait difficulty (uses walker), presented to the ED secondary to chest pain and shortness of breath.  Seen and evaluated at bedside;    Vital signs upon ED presentation as follows:  BP =126/83, HR = 102, RR = 22, T = 36.9 C (98.4 F), O2 Sat = 99% on RA to 100% on BiPAP.  Diagnosed with Shortness of Breath and prescribed furosemide 80 mg IV x one in the ED. 67 year old male, with past history significant for CAD, CHF (EF = 15 to 20%), CABG, HTN, HLD, Type-2 DM, AICD, Asthma, COPD, Home oxygen (2L), CKD, CVA w/ Left sided deficits, and Gait difficulty (uses walker), presented to the ED secondary to chest pain and shortness of breath.  Seen and evaluated at bedside; has some increased respiratory effort, especially when attempting to speak, but no overt distress.  Information is limited since patient is on BiPAP, has shortness of breath and speaks in short sentences (sometimes having to repeat information due to increased respiratory effort).  Confirms report of chest pain - described as heaviness - upper and lower extremities swelling.  Edema significant for the past ~ 2 days.  Has had sensation of palpitations (in the context of AICD being in place).  Uses 2 pillows, but has not been able to sleep for the past 3 nights, at least.  Ambulates with a walker at baseline, but has increased limitations now due to KINNEY.  Adheres to fluid restriction at home and is medication compliant.  Notes decreased urine output over the past ~ 2 days.  Indicates only ~ 450 mL of urine output after 5.5 hours of receiving furosemide 80 mg IV.  No report of coughing, headache, dizziness, abdominal pain, diarrhea.    Vital signs upon ED presentation as follows:  BP =126/83, HR = 102, RR = 22, T = 36.9 C (98.4 F), O2 Sat = 99% on RA to 100% on BiPAP.  Diagnosed with Shortness of Breath and prescribed furosemide 80 mg IV x one in the ED. 67 year old male, with past history significant for CAD, CHF (EF = 14% - 10/17/2022), CABG, HTN, HLD, Type-2 DM, AICD, Asthma, COPD, Home oxygen (2L), CKD, CVA w/ Left sided deficits, and Gait difficulty (uses walker), presented to the ED secondary to chest pain and shortness of breath.  Seen and evaluated at bedside; has some increased respiratory effort, especially when attempting to speak, but no overt distress.  Information is limited since patient is on BiPAP, has shortness of breath and speaks in short sentences (sometimes having to repeat information due to increased respiratory effort).  Confirms report of chest pain - described as heaviness - upper and lower extremities swelling.  Edema significant for the past ~ 2 days.  Has had sensation of palpitations (in the context of AICD being in place).  Uses 2 pillows, but has not been able to sleep for the past 3 nights, at least.  Ambulates with a walker at baseline, but has increased limitations now due to KINNEY.  Adheres to fluid restriction at home and is medication compliant.  Notes decreased urine output over the past ~ 2 days.  Indicates only ~ 450 mL of urine output after 5.5 hours of receiving furosemide 80 mg IV.  No report of coughing, headache, dizziness, abdominal pain, diarrhea.    Vital signs upon ED presentation as follows:  BP =126/83, HR = 102, RR = 22, T = 36.9 C (98.4 F), O2 Sat = 99% on RA to 100% on BiPAP.  Diagnosed with Shortness of Breath and prescribed furosemide 80 mg IV x one in the ED.

## 2022-12-04 NOTE — H&P ADULT - NSHPSOCIALHISTORY_GEN_ALL_CORE
SOCIAL HISTORY:    Marital Status:  (  )    (  ) Single        (  )        (  )        (  )   Lives with:        (  ) Alone       (  ) Spouse      (  ) Children        (  ) Parents           (  ) Other  Occupation:     No history of smoking  No history of alcohol abuse  No history of illegal drug use SOCIAL HISTORY:    Marital Status:  (  )    (  ) Single        (  )        (  )        (  )   Lives with:        (  ) Alone       (  ) Spouse      ( x ) Children/Daughter        (  ) Parents           (  ) Other  Occupation:     No history of smoking  No history of alcohol abuse  No history of illegal drug use

## 2022-12-04 NOTE — H&P ADULT - NSHPREVIEWOFSYSTEMS_GEN_ALL_CORE
REVIEW OF SYSTEMS: Somewhat limited since patient is on BiPAP and has increased shortness of breath when trying to speak    CONSTITUTIONAL: No weakness, fever, chills or sweating  EYES/ENT: No visual changes.  No dysphagia  NECK: No pain or stiffness  RESPIRATORY: No cough or hemoptysis.  (+) shortness of breath  CARDIOVASCULAR: Pressure type pain involving the entire chest area.  (+) sensation of palpitations.  B/L UE and LE edema  GASTROINTESTINAL: No epigastric or abdominal pain. No nausea, vomiting or hematemesis.  No diarrhea or constipation. No melena or hematochezia.  GENITOURINARY: No dysuria, frequency or hematuria  MUSCULOSKELETAL: Some weakness of the L-upper and L-lower extremities.  No joint pain, decreased ROM, erythema, warmth  NEUROLOGICAL: No numbness or weakness  PSYCHIATRY: No anxiety, or depression.  SKIN: No itching, burning, rashes, or lesions   All other review of systems is negative unless indicated above.

## 2022-12-05 DIAGNOSIS — Z79.899 OTHER LONG TERM (CURRENT) DRUG THERAPY: ICD-10-CM

## 2022-12-05 DIAGNOSIS — E87.8 OTHER DISORDERS OF ELECTROLYTE AND FLUID BALANCE, NOT ELSEWHERE CLASSIFIED: ICD-10-CM

## 2022-12-05 LAB
24R-OH-CALCIDIOL SERPL-MCNC: 69.5 NG/ML — SIGNIFICANT CHANGE UP (ref 30–80)
ANION GAP SERPL CALC-SCNC: 10 MMOL/L — SIGNIFICANT CHANGE UP (ref 7–14)
APPEARANCE UR: CLEAR — SIGNIFICANT CHANGE UP
BACTERIA # UR AUTO: NEGATIVE — SIGNIFICANT CHANGE UP
BASE EXCESS BLDV CALC-SCNC: 5.5 MMOL/L — HIGH (ref -2–3)
BASOPHILS # BLD AUTO: 0.06 K/UL — SIGNIFICANT CHANGE UP (ref 0–0.2)
BASOPHILS NFR BLD AUTO: 0.5 % — SIGNIFICANT CHANGE UP (ref 0–2)
BILIRUB UR-MCNC: NEGATIVE — SIGNIFICANT CHANGE UP
BLOOD GAS VENOUS COMPREHENSIVE RESULT: SIGNIFICANT CHANGE UP
BUN SERPL-MCNC: 36 MG/DL — HIGH (ref 7–23)
CALCIUM SERPL-MCNC: 9.2 MG/DL — SIGNIFICANT CHANGE UP (ref 8.4–10.5)
CHLORIDE BLDV-SCNC: 98 MMOL/L — SIGNIFICANT CHANGE UP (ref 96–108)
CHLORIDE SERPL-SCNC: 97 MMOL/L — LOW (ref 98–107)
CO2 BLDV-SCNC: 36.1 MMOL/L — HIGH (ref 22–26)
CO2 SERPL-SCNC: 30 MMOL/L — SIGNIFICANT CHANGE UP (ref 22–31)
COLOR SPEC: COLORLESS — SIGNIFICANT CHANGE UP
CREAT SERPL-MCNC: 2.33 MG/DL — HIGH (ref 0.5–1.3)
DIFF PNL FLD: NEGATIVE — SIGNIFICANT CHANGE UP
EGFR: 30 ML/MIN/1.73M2 — LOW
EOSINOPHIL # BLD AUTO: 0.28 K/UL — SIGNIFICANT CHANGE UP (ref 0–0.5)
EOSINOPHIL NFR BLD AUTO: 2.5 % — SIGNIFICANT CHANGE UP (ref 0–6)
EPI CELLS # UR: 0 /HPF — SIGNIFICANT CHANGE UP (ref 0–5)
GAS PNL BLDV: 134 MMOL/L — LOW (ref 136–145)
GAS PNL BLDV: SIGNIFICANT CHANGE UP
GLUCOSE BLDC GLUCOMTR-MCNC: 113 MG/DL — HIGH (ref 70–99)
GLUCOSE BLDC GLUCOMTR-MCNC: 150 MG/DL — HIGH (ref 70–99)
GLUCOSE BLDC GLUCOMTR-MCNC: 156 MG/DL — HIGH (ref 70–99)
GLUCOSE BLDC GLUCOMTR-MCNC: 182 MG/DL — HIGH (ref 70–99)
GLUCOSE BLDC GLUCOMTR-MCNC: 185 MG/DL — HIGH (ref 70–99)
GLUCOSE BLDC GLUCOMTR-MCNC: 41 MG/DL — CRITICAL LOW (ref 70–99)
GLUCOSE BLDC GLUCOMTR-MCNC: 41 MG/DL — CRITICAL LOW (ref 70–99)
GLUCOSE BLDC GLUCOMTR-MCNC: 87 MG/DL — SIGNIFICANT CHANGE UP (ref 70–99)
GLUCOSE BLDC GLUCOMTR-MCNC: 90 MG/DL — SIGNIFICANT CHANGE UP (ref 70–99)
GLUCOSE BLDC GLUCOMTR-MCNC: 91 MG/DL — SIGNIFICANT CHANGE UP (ref 70–99)
GLUCOSE BLDV-MCNC: 41 MG/DL — CRITICAL LOW (ref 70–99)
GLUCOSE SERPL-MCNC: 43 MG/DL — CRITICAL LOW (ref 70–99)
GLUCOSE UR QL: NEGATIVE — SIGNIFICANT CHANGE UP
HCO3 BLDV-SCNC: 34 MMOL/L — HIGH (ref 22–29)
HCT VFR BLD CALC: 37.5 % — LOW (ref 39–50)
HCT VFR BLDA CALC: 37 % — LOW (ref 39–51)
HGB BLD CALC-MCNC: 12.3 G/DL — LOW (ref 13–17)
HGB BLD-MCNC: 11.9 G/DL — LOW (ref 13–17)
HYALINE CASTS # UR AUTO: 1 /LPF — SIGNIFICANT CHANGE UP (ref 0–7)
IANC: 8.57 K/UL — HIGH (ref 1.8–7.4)
IMM GRANULOCYTES NFR BLD AUTO: 0.3 % — SIGNIFICANT CHANGE UP (ref 0–0.9)
KETONES UR-MCNC: NEGATIVE — SIGNIFICANT CHANGE UP
LACTATE BLDV-MCNC: 1 MMOL/L — SIGNIFICANT CHANGE UP (ref 0.5–2)
LEUKOCYTE ESTERASE UR-ACNC: NEGATIVE — SIGNIFICANT CHANGE UP
LYMPHOCYTES # BLD AUTO: 1.4 K/UL — SIGNIFICANT CHANGE UP (ref 1–3.3)
LYMPHOCYTES # BLD AUTO: 12.3 % — LOW (ref 13–44)
MAGNESIUM SERPL-MCNC: 2 MG/DL — SIGNIFICANT CHANGE UP (ref 1.6–2.6)
MCHC RBC-ENTMCNC: 31.3 PG — SIGNIFICANT CHANGE UP (ref 27–34)
MCHC RBC-ENTMCNC: 31.7 GM/DL — LOW (ref 32–36)
MCV RBC AUTO: 98.7 FL — SIGNIFICANT CHANGE UP (ref 80–100)
MONOCYTES # BLD AUTO: 1 K/UL — HIGH (ref 0–0.9)
MONOCYTES NFR BLD AUTO: 8.8 % — SIGNIFICANT CHANGE UP (ref 2–14)
NEUTROPHILS # BLD AUTO: 8.57 K/UL — HIGH (ref 1.8–7.4)
NEUTROPHILS NFR BLD AUTO: 75.6 % — SIGNIFICANT CHANGE UP (ref 43–77)
NITRITE UR-MCNC: NEGATIVE — SIGNIFICANT CHANGE UP
NRBC # BLD: 0 /100 WBCS — SIGNIFICANT CHANGE UP (ref 0–0)
NRBC # FLD: 0 K/UL — SIGNIFICANT CHANGE UP (ref 0–0)
PCO2 BLDV: 69 MMHG — HIGH (ref 42–55)
PH BLDV: 7.3 — LOW (ref 7.32–7.43)
PH UR: 6.5 — SIGNIFICANT CHANGE UP (ref 5–8)
PHOSPHATE SERPL-MCNC: 3.8 MG/DL — SIGNIFICANT CHANGE UP (ref 2.5–4.5)
PLATELET # BLD AUTO: 175 K/UL — SIGNIFICANT CHANGE UP (ref 150–400)
PO2 BLDV: 30 MMHG — SIGNIFICANT CHANGE UP
POTASSIUM BLDV-SCNC: 3.8 MMOL/L — SIGNIFICANT CHANGE UP (ref 3.5–5.1)
POTASSIUM SERPL-MCNC: 3.6 MMOL/L — SIGNIFICANT CHANGE UP (ref 3.5–5.3)
POTASSIUM SERPL-SCNC: 3.6 MMOL/L — SIGNIFICANT CHANGE UP (ref 3.5–5.3)
PROT UR-MCNC: NEGATIVE — SIGNIFICANT CHANGE UP
RBC # BLD: 3.8 M/UL — LOW (ref 4.2–5.8)
RBC # FLD: 15.9 % — HIGH (ref 10.3–14.5)
RBC CASTS # UR COMP ASSIST: 1 /HPF — SIGNIFICANT CHANGE UP (ref 0–4)
SAO2 % BLDV: 42.9 % — SIGNIFICANT CHANGE UP
SODIUM SERPL-SCNC: 137 MMOL/L — SIGNIFICANT CHANGE UP (ref 135–145)
SP GR SPEC: 1.01 — LOW (ref 1.01–1.05)
T3 SERPL-MCNC: 50 NG/DL — LOW (ref 80–200)
T4 AB SER-ACNC: 4.55 UG/DL — LOW (ref 5.1–13)
TROPONIN T, HIGH SENSITIVITY RESULT: 81 NG/L — CRITICAL HIGH
UROBILINOGEN FLD QL: SIGNIFICANT CHANGE UP
WBC # BLD: 11.34 K/UL — HIGH (ref 3.8–10.5)
WBC # FLD AUTO: 11.34 K/UL — HIGH (ref 3.8–10.5)
WBC UR QL: 0 /HPF — SIGNIFICANT CHANGE UP (ref 0–5)

## 2022-12-05 PROCEDURE — 99233 SBSQ HOSP IP/OBS HIGH 50: CPT

## 2022-12-05 PROCEDURE — 99223 1ST HOSP IP/OBS HIGH 75: CPT | Mod: FS

## 2022-12-05 RX ORDER — BUMETANIDE 0.25 MG/ML
2 INJECTION INTRAMUSCULAR; INTRAVENOUS ONCE
Refills: 0 | Status: COMPLETED | OUTPATIENT
Start: 2022-12-05 | End: 2022-12-05

## 2022-12-05 RX ORDER — DEXTROSE 50 % IN WATER 50 %
25 SYRINGE (ML) INTRAVENOUS ONCE
Refills: 0 | Status: DISCONTINUED | OUTPATIENT
Start: 2022-12-05 | End: 2022-12-11

## 2022-12-05 RX ORDER — DEXTROSE 50 % IN WATER 50 %
12.5 SYRINGE (ML) INTRAVENOUS ONCE
Refills: 0 | Status: COMPLETED | OUTPATIENT
Start: 2022-12-05 | End: 2022-12-05

## 2022-12-05 RX ORDER — DEXTROSE 50 % IN WATER 50 %
25 SYRINGE (ML) INTRAVENOUS ONCE
Refills: 0 | Status: COMPLETED | OUTPATIENT
Start: 2022-12-05 | End: 2022-12-05

## 2022-12-05 RX ORDER — PANTOPRAZOLE SODIUM 20 MG/1
40 TABLET, DELAYED RELEASE ORAL
Refills: 0 | Status: DISCONTINUED | OUTPATIENT
Start: 2022-12-05 | End: 2022-12-11

## 2022-12-05 RX ORDER — DEXTROSE 50 % IN WATER 50 %
12.5 SYRINGE (ML) INTRAVENOUS ONCE
Refills: 0 | Status: DISCONTINUED | OUTPATIENT
Start: 2022-12-05 | End: 2022-12-11

## 2022-12-05 RX ORDER — GLUCAGON INJECTION, SOLUTION 0.5 MG/.1ML
1 INJECTION, SOLUTION SUBCUTANEOUS ONCE
Refills: 0 | Status: DISCONTINUED | OUTPATIENT
Start: 2022-12-05 | End: 2022-12-11

## 2022-12-05 RX ORDER — BUMETANIDE 0.25 MG/ML
4 INJECTION INTRAMUSCULAR; INTRAVENOUS
Refills: 0 | Status: DISCONTINUED | OUTPATIENT
Start: 2022-12-05 | End: 2022-12-08

## 2022-12-05 RX ORDER — LEVOTHYROXINE SODIUM 125 MCG
50 TABLET ORAL DAILY
Refills: 0 | Status: DISCONTINUED | OUTPATIENT
Start: 2022-12-05 | End: 2022-12-11

## 2022-12-05 RX ORDER — SPIRONOLACTONE 25 MG/1
25 TABLET, FILM COATED ORAL DAILY
Refills: 0 | Status: DISCONTINUED | OUTPATIENT
Start: 2022-12-05 | End: 2022-12-11

## 2022-12-05 RX ORDER — FERROUS SULFATE 325(65) MG
325 TABLET ORAL DAILY
Refills: 0 | Status: DISCONTINUED | OUTPATIENT
Start: 2022-12-05 | End: 2022-12-11

## 2022-12-05 RX ORDER — ASPIRIN/CALCIUM CARB/MAGNESIUM 324 MG
81 TABLET ORAL DAILY
Refills: 0 | Status: DISCONTINUED | OUTPATIENT
Start: 2022-12-05 | End: 2022-12-11

## 2022-12-05 RX ORDER — HYDRALAZINE HCL 50 MG
50 TABLET ORAL THREE TIMES A DAY
Refills: 0 | Status: DISCONTINUED | OUTPATIENT
Start: 2022-12-05 | End: 2022-12-08

## 2022-12-05 RX ORDER — BUDESONIDE AND FORMOTEROL FUMARATE DIHYDRATE 160; 4.5 UG/1; UG/1
2 AEROSOL RESPIRATORY (INHALATION)
Refills: 0 | Status: DISCONTINUED | OUTPATIENT
Start: 2022-12-05 | End: 2022-12-11

## 2022-12-05 RX ORDER — CARVEDILOL PHOSPHATE 80 MG/1
3.12 CAPSULE, EXTENDED RELEASE ORAL EVERY 12 HOURS
Refills: 0 | Status: DISCONTINUED | OUTPATIENT
Start: 2022-12-05 | End: 2022-12-08

## 2022-12-05 RX ORDER — BUMETANIDE 0.25 MG/ML
2 INJECTION INTRAMUSCULAR; INTRAVENOUS ONCE
Refills: 0 | Status: DISCONTINUED | OUTPATIENT
Start: 2022-12-05 | End: 2022-12-05

## 2022-12-05 RX ORDER — ISOSORBIDE DINITRATE 5 MG/1
30 TABLET ORAL THREE TIMES A DAY
Refills: 0 | Status: DISCONTINUED | OUTPATIENT
Start: 2022-12-05 | End: 2022-12-11

## 2022-12-05 RX ORDER — ATORVASTATIN CALCIUM 80 MG/1
80 TABLET, FILM COATED ORAL AT BEDTIME
Refills: 0 | Status: DISCONTINUED | OUTPATIENT
Start: 2022-12-05 | End: 2022-12-11

## 2022-12-05 RX ORDER — DEXTROSE 50 % IN WATER 50 %
15 SYRINGE (ML) INTRAVENOUS ONCE
Refills: 0 | Status: DISCONTINUED | OUTPATIENT
Start: 2022-12-05 | End: 2022-12-11

## 2022-12-05 RX ORDER — TAMSULOSIN HYDROCHLORIDE 0.4 MG/1
0.4 CAPSULE ORAL AT BEDTIME
Refills: 0 | Status: DISCONTINUED | OUTPATIENT
Start: 2022-12-05 | End: 2022-12-11

## 2022-12-05 RX ORDER — CLOPIDOGREL BISULFATE 75 MG/1
75 TABLET, FILM COATED ORAL DAILY
Refills: 0 | Status: DISCONTINUED | OUTPATIENT
Start: 2022-12-05 | End: 2022-12-11

## 2022-12-05 RX ADMIN — Medication 50 MICROGRAM(S): at 16:13

## 2022-12-05 RX ADMIN — HEPARIN SODIUM 5000 UNIT(S): 5000 INJECTION INTRAVENOUS; SUBCUTANEOUS at 07:25

## 2022-12-05 RX ADMIN — TAMSULOSIN HYDROCHLORIDE 0.4 MILLIGRAM(S): 0.4 CAPSULE ORAL at 22:54

## 2022-12-05 RX ADMIN — Medication 25 MILLILITER(S): at 07:15

## 2022-12-05 RX ADMIN — BUDESONIDE AND FORMOTEROL FUMARATE DIHYDRATE 2 PUFF(S): 160; 4.5 AEROSOL RESPIRATORY (INHALATION) at 22:53

## 2022-12-05 RX ADMIN — ISOSORBIDE DINITRATE 30 MILLIGRAM(S): 5 TABLET ORAL at 16:12

## 2022-12-05 RX ADMIN — Medication 12.5 MILLILITER(S): at 08:36

## 2022-12-05 RX ADMIN — HEPARIN SODIUM 5000 UNIT(S): 5000 INJECTION INTRAVENOUS; SUBCUTANEOUS at 14:07

## 2022-12-05 RX ADMIN — BUMETANIDE 2 MILLIGRAM(S): 0.25 INJECTION INTRAMUSCULAR; INTRAVENOUS at 03:08

## 2022-12-05 RX ADMIN — Medication 50 MILLIGRAM(S): at 14:07

## 2022-12-05 RX ADMIN — ATORVASTATIN CALCIUM 80 MILLIGRAM(S): 80 TABLET, FILM COATED ORAL at 22:54

## 2022-12-05 RX ADMIN — CARVEDILOL PHOSPHATE 3.12 MILLIGRAM(S): 80 CAPSULE, EXTENDED RELEASE ORAL at 18:27

## 2022-12-05 RX ADMIN — HEPARIN SODIUM 5000 UNIT(S): 5000 INJECTION INTRAVENOUS; SUBCUTANEOUS at 22:53

## 2022-12-05 RX ADMIN — BUMETANIDE 132 MILLIGRAM(S): 0.25 INJECTION INTRAMUSCULAR; INTRAVENOUS at 15:13

## 2022-12-05 RX ADMIN — Medication 81 MILLIGRAM(S): at 16:11

## 2022-12-05 NOTE — CONSULT NOTE ADULT - PROBLEM SELECTOR RECOMMENDATION 9
- Monitor on tele   - Volume overloaded on exam, elevated JVP  - Bipap removed 12/4   - Start Bumex 4mg IV BID  - Continue Coreg 3.125mg BID   - Continue Hydralazine 50mg TID, Isordil 30mg TID, Aldactone 25mg daily   - Continue ASA, Plavix, statin   - Daily standing weight   - Monitor I&Os   - Keep K+ 4.0 and mag 2.0   - EP interrogate ICD - Monitor on tele   - Volume overloaded on exam, elevated JVP  - Bipap removed 12/4   - Start Bumex 4mg IV BID  - Continue Coreg 3.125mg BID   - Continue Hydralazine 50mg TID, Isordil 30mg TID, Aldactone 25mg daily   - Continue ASA, Plavix, statin   - Daily standing weight   - Monitor I&Os   - Keep K+ 4.0 and mag 2.0   - EP interrogate ICD. Possible for CRT upgrade.    - Repeat ECHO

## 2022-12-05 NOTE — CONSULT NOTE ADULT - NS ATTEND AMEND GEN_ALL_CORE FT
Bumex 4 mg iv bid.  Continue other meds.  EP evaluation for possible upgrade to CRT-D.  May need an ischemic evaluation. Bumex 4 mg iv bid.  Continue other meds.  EP evaluation for possible upgrade to CRT-D.  External angina. May need an ischemic evaluation. Bumex 4 mg iv bid.  Continue other meds.  EP evaluation for possible upgrade to CRT-D. Check Optivol.  External angina. May need an ischemic evaluation.

## 2022-12-05 NOTE — PROGRESS NOTE ADULT - ASSESSMENT
67 year old male, with past history significant for CAD, CHF (EF = 15 to 20%), CABG, HTN, HLD, Type-2 DM, AICD, Asthma, COPD, Home oxygen (2L), CKD, CVA w/ Left sided deficits, and Gait difficulty (uses walker), presented to the ED secondary to chest pain and shortness of breath.  Pt admitted with acute on chronic heart failure

## 2022-12-05 NOTE — CONSULT NOTE ADULT - SUBJECTIVE AND OBJECTIVE BOX
CHIEF COMPLAINT: SOB    HISTORY OF PRESENT ILLNESS:  67 year old male, with past history significant for CAD, CHF (EF = 14% - 10/17/2022), CABG, HTN, HLD, Type-2 DM, AICD, Asthma, COPD, Home oxygen (2L), CKD, CVA w/ Left sided deficits, and Gait difficulty (uses walker), presented to the ED secondary shortness of breath starting Friday.  Patient uses 2 pillows, but has not been able to sleep for the past 3 nights, at least.  Ambulates with a walker at baseline, but has increased limitations now due to KINNEY. Seen and evaluated at bedside; has son BiPAP, has shortness of breath and speaks in short sentences (sometimes having to repeat information due to increased respiratory effort).  Adheres to fluid restriction at home and is medication compliant.  Notes decreased urine output over the past ~ 2 days.  Patient was removed from Bipap by ED doctor and myself and on 4L NC.  Currently, denies chest pain, SOB, palpitations, N.V.D. fever or chills.        Vital signs upon ED presentation as follows:  BP =126/83, HR = 102, RR = 22, T = 36.9 C (98.4 F), O2 Sat = 99% on RA to 100% on BiPAP.  Diagnosed with Shortness of Breath and prescribed furosemide 80 mg IV x one in the ED and Bumex 2mg IV BID.        Allergies    No Known Allergies    Intolerances    	    MEDICATIONS:  heparin   Injectable 5000 Unit(s) SubCutaneous every 8 hours    PAST MEDICAL & SURGICAL HISTORY:  HTN (hypertension)      CAD (coronary artery disease)      Diabetes      Hyperlipidemia, unspecified hyperlipidemia type      CHF (congestive heart failure)      BPH (benign prostatic hyperplasia)      S/P CABG (coronary artery bypass graft)      S/P appendectomy          FAMILY HISTORY:  Family history of type 2 diabetes mellitus in father (Father)        SOCIAL HISTORY:    [x ] ex-smoker  [ ] Smoker  [ ] Alcohol  [ x] Denies Alcohol      REVIEW OF SYSTEMS:  CONSTITUTIONAL: no fevers or chills  EYES/ENT: No visual changes; No vertigo or throat pain  NECK: No mass  RESPIRATORY:  No cough, wheezing, chills or hemoptysis, + SOB  CARDIOVASCULAR: No chest pain, palpitations, passing out, dizziness, + leg swelling  GASTROINTESTINAL: No abdominal or epigastric pain. No nausea/vomiting/melena  Genitourinary: No dysuria, frequency or hematuria  NEUROLOGICAL: No numbness or weakness  SKIN: No itching, burning, rashes or lesions      PHYSICAL EXAM:  T(C): 36.5 (12-05-22 @ 01:57), Max: 36.9 (12-04-22 @ 14:26)  HR: 93 (12-05-22 @ 11:53) (71 - 102)  BP: 156/70 (12-05-22 @ 11:53) (109/78 - 156/70)  RR: 18 (12-05-22 @ 11:53) (18 - 22)  SpO2: 99% (12-05-22 @ 11:53) (98% - 100%)  Vital Signs Last 24 Hrs  T(C): 36.5 (05 Dec 2022 01:57), Max: 36.9 (04 Dec 2022 14:26)  T(F): 97.7 (05 Dec 2022 01:57), Max: 98.4 (04 Dec 2022 14:26)  HR: 93 (05 Dec 2022 11:53) (71 - 102)  BP: 156/70 (05 Dec 2022 11:53) (109/78 - 156/70)  RR: 18 (05 Dec 2022 11:53) (18 - 22)  SpO2: 99% (05 Dec 2022 11:53) (98% - 100%)    O2 Parameters below as of 05 Dec 2022 11:53  Patient On (Oxygen Delivery Method): nasal cannula  O2 Flow (L/min): 4        I&O's Summary    04 Dec 2022 07:01  -  05 Dec 2022 07:00  --------------------------------------------------------  IN: 0 mL / OUT: 1500 mL / NET: -1500 mL          Appearance: Normal	  HEENT:   Normal oral mucosa	  Cardiovascular: Normal S1 S2, elevated JVD, No murmurs, + edema  Respiratory: Lungs clear to auscultation	  Psychiatry: A & O x 3, Mood & affect appropriate  Gastrointestinal:  Soft, Non-tender, + BS	  Skin: No rashes, No ecchymoses, No cyanosis	  Neurologic: Non-focal  Extremities: Warm and well perfused. 2+ pulses bilaterally        LABS:	 	    CBC Full  -  ( 05 Dec 2022 06:15 )  WBC Count : 11.34 K/uL  Hemoglobin : 11.9 g/dL  Hematocrit : 37.5 %  Platelet Count - Automated : 175 K/uL  Mean Cell Volume : 98.7 fL  Mean Cell Hemoglobin : 31.3 pg  Mean Cell Hemoglobin Concentration : 31.7 gm/dL  Auto Neutrophil # : 8.57 K/uL  Auto Lymphocyte # : 1.40 K/uL  Auto Monocyte # : 1.00 K/uL  Auto Eosinophil # : 0.28 K/uL  Auto Basophil # : 0.06 K/uL  Auto Neutrophil % : 75.6 %  Auto Lymphocyte % : 12.3 %  Auto Monocyte % : 8.8 %  Auto Eosinophil % : 2.5 %  Auto Basophil % : 0.5 %    12-05    137  |  97<L>  |  36<H>  ----------------------------<  43<LL>  3.6   |  30  |  2.33<H>  12-04    133<L>  |  95<L>  |  36<H>  ----------------------------<  133<H>  4.4   |  28  |  2.24<H>    Ca    9.2      05 Dec 2022 06:15  Ca    9.2      04 Dec 2022 17:36  Phos  3.8     12-05  Mg     2.00     12-05  Mg     2.00     12-04    TPro  6.7  /  Alb  3.9  /  TBili  0.7  /  DBili  x   /  AST  32  /  ALT  18  /  AlkPhos  111  12-04      proBNP: 7986 pg/mL (12-04 @ 19:36)    Lipid Profile:   HgA1c:   TSH: 5.71 uIU/mL (12-04 @ 17:36)      CARDIAC MARKERS:  HsT: 77-->77-->81     TELEMETRY: 	    ECG:  NSR w/ LBBB. No acute VALENTINE	    PREVIOUS DIAGNOSTIC TESTING:      Transthoracic Echocardiogram (10.17.22 @ 14:12)  DIMENSIONS:  Dimensions:     Normal Values:  LA:     4.2 cm    2.0 - 4.0 cm  Ao:     3.5 cm    2.0 - 3.8 cm  SEPTUM: 0.8 cm    0.6 - 1.2 cm  PWT:    0.9 cm    0.6 - 1.1 cm  LVIDd:  5.9 cm    3.0 - 5.6 cm  LVIDs:    ---     1.8 - 4.0 cm  Derived Variables:  LVMI: 100 g/m2  RWT: 0.30  Ejection Fraction (Modified Velazquez Rule): 14 %  ------------------------------------------------------------------------  OBSERVATIONS:  Mitral Valve: Tethered mitral valve leaflets with normal  opening. Mild mitral regurgitation.  Aortic Root: Normal aortic root.  Aortic Valve: Normal trileaflet aortic valve. Minimal  aortic regurgitation.  LVOT velocity time integral equals  16 cm.  Left Atrium: Normal left atrium.  LA volume index = 31  cc/m2.  Left Ventricle: Severe global left ventricular systolic  dysfunction. Endocardial visualization enhanced with  intravenous injection of echo contrast (Definity). Mild  left ventricular enlargement. Normal  E/e'  is consistent  with probably normal left ventricular filling pressure.  Right Heart: A device wire is noted in the right heart.  Right ventricular enlargement with decreased right  ventricular systolic function. Normal tricuspid valve.  Mild-moderate tricuspid regurgitation. Normal pulmonic  valve.  Pericardium/PleuraNormal pericardium with no pericardial  effusion.  ------------------------------------------------------------------------  CONCLUSIONS:  1. Tethered mitral valve leaflets with normal opening. Mild  mitral regurgitation.  2. Normal trileaflet aortic valve. Minimal aortic  regurgitation.  3. Mild left ventricular enlargement.  4. Severe global left ventricular systolic dysfunction.  Endocardial visualization enhanced with intravenous  injection of echo contrast (Definity). LVOT velocity time  integral equals 16 cm.  5. A device wire is noted in the right heart.  6. Right ventricular enlargement with decreased right  ventricular systolic function.  --------------------------------------

## 2022-12-05 NOTE — PROGRESS NOTE ADULT - PROBLEM SELECTOR PLAN 1
- presented w/ pain involving the entire chest area, shortness of breath, KINNEY, insomnia due to orthopnea  - reports decreased urine output over the past 2 days at least (After ~ 5.5 hours of furosemide 80 mg IV, in the setting of CKD4 w/ urine output of 400 mL)  - last TTE of 10/17/2022 w/ EF of 14%  - pro-BNP = 7986, Trop = 77 --> 77, ECG w/o any acute changes from prior study of 10/14/2022  - TSH currently 5.71; f/u FT3, T4  - findings on CXR consistent w/ pulmonary edema  - on Bumex 3 mg PO daily  - received furosemide 80 mg IV x one and  Bumex 2 mg IV   - Heart failure team eval appreciated , recommend Start Bumex 4mg IV BID  - intake/output Q4H, weight daily, HOB elevation 60 to 90 degrees.  Fluid restriction order when PO intake initiated  - no signs of infection  - fluid overload appears to be sequela of decreased urine output, especially since patient is adherent to fluid restriction and prescription medications at home  - cards also recommend - EP interrogate ICD. Possible for CRT upgrade and repeat ECHO

## 2022-12-05 NOTE — PROGRESS NOTE ADULT - SUBJECTIVE AND OBJECTIVE BOX
Patient is a 67y old  Male who presents with a chief complaint of Shortness of Breath (05 Dec 2022 12:21)      SUBJECTIVE / OVERNIGHT EVENTS: patient seen and examined by bedside, Pt reports chest pain has improved , SOB also improved, pt c/o dysphagia and food stuck in his chest       MEDICATIONS  (STANDING):  aspirin enteric coated 81 milliGRAM(s) Oral daily  atorvastatin 80 milliGRAM(s) Oral at bedtime  budesonide 160 MICROgram(s)/formoterol 4.5 MICROgram(s) Inhaler 2 Puff(s) Inhalation two times a day  buMETAnide IVPB 4 milliGRAM(s) IV Intermittent two times a day  carvedilol 3.125 milliGRAM(s) Oral every 12 hours  clopidogrel Tablet 75 milliGRAM(s) Oral daily  dextrose 50% Injectable 25 Gram(s) IV Push once  dextrose 50% Injectable 12.5 Gram(s) IV Push once  dextrose 50% Injectable 25 Gram(s) IV Push once  dextrose Oral Gel 15 Gram(s) Oral once  ferrous    sulfate 325 milliGRAM(s) Oral daily  glucagon  Injectable 1 milliGRAM(s) IntraMuscular once  heparin   Injectable 5000 Unit(s) SubCutaneous every 8 hours  hydrALAZINE 50 milliGRAM(s) Oral three times a day  isosorbide   dinitrate Tablet (ISORDIL) 30 milliGRAM(s) Oral three times a day  levothyroxine 50 MICROGram(s) Oral daily  pantoprazole    Tablet 40 milliGRAM(s) Oral before breakfast  spironolactone 25 milliGRAM(s) Oral daily  tamsulosin 0.4 milliGRAM(s) Oral at bedtime    MEDICATIONS  (PRN):      Vital Signs Last 24 Hrs  T(C): 36.5 (05 Dec 2022 01:57), Max: 36.5 (05 Dec 2022 01:57)  T(F): 97.7 (05 Dec 2022 01:57), Max: 97.7 (05 Dec 2022 01:57)  HR: 89 (05 Dec 2022 14:49) (71 - 99)  BP: 118/65 (05 Dec 2022 15:14) (101/69 - 156/70)  BP(mean): --  RR: 18 (05 Dec 2022 15:14) (18 - 22)  SpO2: 100% (05 Dec 2022 15:14) (98% - 100%)    Parameters below as of 05 Dec 2022 15:14  Patient On (Oxygen Delivery Method): nasal cannula  O2 Flow (L/min): 4    CAPILLARY BLOOD GLUCOSE      POCT Blood Glucose.: 150 mg/dL (05 Dec 2022 14:18)  POCT Blood Glucose.: 90 mg/dL (05 Dec 2022 12:42)  POCT Blood Glucose.: 156 mg/dL (05 Dec 2022 09:41)  POCT Blood Glucose.: 182 mg/dL (05 Dec 2022 09:11)  POCT Blood Glucose.: 91 mg/dL (05 Dec 2022 07:46)  POCT Blood Glucose.: 87 mg/dL (05 Dec 2022 07:23)  POCT Blood Glucose.: 41 mg/dL (05 Dec 2022 06:59)  POCT Blood Glucose.: 41 mg/dL (05 Dec 2022 06:58)    I&O's Summary    04 Dec 2022 07:01  -  05 Dec 2022 07:00  --------------------------------------------------------  IN: 0 mL / OUT: 1500 mL / NET: -1500 mL        PHYSICAL EXAM:  GENERAL: NAD, well-developed  HEAD:  Atraumatic, Normocephalic  EYES: EOMI, PERRLA, conjunctiva and sclera clear  NECK: Supple,  JVD +  CHEST/LUNG: .  Scattered rhonchi bilaterally.  Decreased breath sounds at the bases  HEART: Regular rate and rhythm; No murmurs, rubs, or gallops  ABDOMEN: Soft, Nontender, Nondistended; Bowel sounds present  EXTREMITIES:  2+ Peripheral Pulses, No clubbing, cyanosis, B/L LE edema   PSYCH: AAOx3, appears anxious   NEUROLOGY: non-focal  SKIN: No rashes or lesions    LABS:                        11.9   11.34 )-----------( 175      ( 05 Dec 2022 06:15 )             37.5     12-05    137  |  97<L>  |  36<H>  ----------------------------<  43<LL>  3.6   |  30  |  2.33<H>    Ca    9.2      05 Dec 2022 06:15  Phos  3.8     12-05  Mg     2.00     12-05    TPro  6.7  /  Alb  3.9  /  TBili  0.7  /  DBili  x   /  AST  32  /  ALT  18  /  AlkPhos  111  12-04    PT/INR - ( 04 Dec 2022 17:36 )   PT: 13.1 sec;   INR: 1.13 ratio         PTT - ( 04 Dec 2022 17:36 )  PTT:31.2 sec      Urinalysis Basic - ( 04 Dec 2022 23:52 )    Color: Colorless / Appearance: Clear / S.007 / pH: x  Gluc: x / Ketone: Negative  / Bili: Negative / Urobili: <2 mg/dL   Blood: x / Protein: Negative / Nitrite: Negative   Leuk Esterase: Negative / RBC: 1 /HPF / WBC 0 /HPF   Sq Epi: x / Non Sq Epi: 0 /HPF / Bacteria: Negative        RADIOLOGY & ADDITIONAL TESTS:    Imaging Personally Reviewed:    Consultant(s) Notes Reviewed:  Heart failure team     Care Discussed with Consultants/Other Providers:

## 2022-12-05 NOTE — CONSULT NOTE ADULT - ASSESSMENT
67 year old male, with past history significant for CAD, CHF (EF = 14% - 10/17/2022), CABG, HTN, HLD, Type-2 DM, AICD, Asthma, COPD, Home oxygen (2L), CKD, CVA w/ Left sided deficits, and Gait difficulty (uses walker), presented to the ED secondary shortness of breath starting Friday.  Patient uses 2 pillows, but has not been able to sleep for the past 3 nights, at least.  Ambulates with a walker at baseline, but has increased limitations now due to KINNEY. Seen and evaluated at bedside; has son BiPAP, has shortness of breath and speaks in short sentences (sometimes having to repeat information due to increased respiratory effort).  Adheres to fluid restriction at home and is medication compliant.  Notes decreased urine output over the past ~ 2 days.  Patient was removed from Bipap by ED doctor and myself and on 4L NC.  Currently, denies chest pain, SOB, palpitations, N.V.D. fever or chills.        Vital signs upon ED presentation as follows:  BP =126/83, HR = 102, RR = 22, T = 36.9 C (98.4 F), O2 Sat = 99% on RA to 100% on BiPAP.  Diagnosed with Shortness of Breath and prescribed furosemide 80 mg IV x one in the ED and Bumex 2mg IV BID.  Voided 1.5L in ED      RHC: 5/26/2022  RA 9 mmHg. Elevated PCWP 24. PA sat 56%. C.O. 3.97. Cardiac index 2.2. SVR 1510

## 2022-12-05 NOTE — PHARMACOTHERAPY INTERVENTION NOTE - COMMENTS
Medication history currently saved as incomplete, unable to reach family to confirm home meds.   Medication list in OMR updated with fill information provided by [facility], however discrepancies noted.  Of Note;  - Levothyroxine 50mcg once daily last filled on 11/26/22   - Gabapentin 800mg TID last filled on 11/9/22   - Isosorbide Dinitrate 30mg TID last filled 10/28/22

## 2022-12-06 DIAGNOSIS — E11.9 TYPE 2 DIABETES MELLITUS WITHOUT COMPLICATIONS: ICD-10-CM

## 2022-12-06 LAB
ALBUMIN SERPL ELPH-MCNC: 3.8 G/DL — SIGNIFICANT CHANGE UP (ref 3.3–5)
ALP SERPL-CCNC: 102 U/L — SIGNIFICANT CHANGE UP (ref 40–120)
ALT FLD-CCNC: 17 U/L — SIGNIFICANT CHANGE UP (ref 4–41)
ANION GAP SERPL CALC-SCNC: 13 MMOL/L — SIGNIFICANT CHANGE UP (ref 7–14)
AST SERPL-CCNC: 29 U/L — SIGNIFICANT CHANGE UP (ref 4–40)
BASE EXCESS BLDV CALC-SCNC: 4.8 MMOL/L — HIGH (ref -2–3)
BILIRUB SERPL-MCNC: 1 MG/DL — SIGNIFICANT CHANGE UP (ref 0.2–1.2)
BLOOD GAS VENOUS COMPREHENSIVE RESULT: SIGNIFICANT CHANGE UP
BUN SERPL-MCNC: 35 MG/DL — HIGH (ref 7–23)
CALCIUM SERPL-MCNC: 9.1 MG/DL — SIGNIFICANT CHANGE UP (ref 8.4–10.5)
CHLORIDE BLDV-SCNC: 96 MMOL/L — SIGNIFICANT CHANGE UP (ref 96–108)
CHLORIDE SERPL-SCNC: 94 MMOL/L — LOW (ref 98–107)
CO2 BLDV-SCNC: 35 MMOL/L — HIGH (ref 22–26)
CO2 SERPL-SCNC: 26 MMOL/L — SIGNIFICANT CHANGE UP (ref 22–31)
CREAT SERPL-MCNC: 2.25 MG/DL — HIGH (ref 0.5–1.3)
EGFR: 31 ML/MIN/1.73M2 — LOW
GAS PNL BLDV: 133 MMOL/L — LOW (ref 136–145)
GLUCOSE BLDC GLUCOMTR-MCNC: 119 MG/DL — HIGH (ref 70–99)
GLUCOSE BLDC GLUCOMTR-MCNC: 147 MG/DL — HIGH (ref 70–99)
GLUCOSE BLDC GLUCOMTR-MCNC: 187 MG/DL — HIGH (ref 70–99)
GLUCOSE BLDC GLUCOMTR-MCNC: 90 MG/DL — SIGNIFICANT CHANGE UP (ref 70–99)
GLUCOSE BLDC GLUCOMTR-MCNC: 95 MG/DL — SIGNIFICANT CHANGE UP (ref 70–99)
GLUCOSE BLDC GLUCOMTR-MCNC: 98 MG/DL — SIGNIFICANT CHANGE UP (ref 70–99)
GLUCOSE BLDV-MCNC: 115 MG/DL — HIGH (ref 70–99)
GLUCOSE SERPL-MCNC: 113 MG/DL — HIGH (ref 70–99)
HCO3 BLDV-SCNC: 33 MMOL/L — HIGH (ref 22–29)
HCT VFR BLD CALC: 41.7 % — SIGNIFICANT CHANGE UP (ref 39–50)
HCT VFR BLDA CALC: 41 % — SIGNIFICANT CHANGE UP (ref 39–51)
HGB BLD CALC-MCNC: 13.8 G/DL — SIGNIFICANT CHANGE UP (ref 13–17)
HGB BLD-MCNC: 13 G/DL — SIGNIFICANT CHANGE UP (ref 13–17)
LACTATE BLDV-MCNC: 2.5 MMOL/L — HIGH (ref 0.5–2)
MAGNESIUM SERPL-MCNC: 1.8 MG/DL — SIGNIFICANT CHANGE UP (ref 1.6–2.6)
MCHC RBC-ENTMCNC: 31 PG — SIGNIFICANT CHANGE UP (ref 27–34)
MCHC RBC-ENTMCNC: 31.2 GM/DL — LOW (ref 32–36)
MCV RBC AUTO: 99.5 FL — SIGNIFICANT CHANGE UP (ref 80–100)
NRBC # BLD: 0 /100 WBCS — SIGNIFICANT CHANGE UP (ref 0–0)
NRBC # FLD: 0 K/UL — SIGNIFICANT CHANGE UP (ref 0–0)
PCO2 BLDV: 64 MMHG — HIGH (ref 42–55)
PH BLDV: 7.32 — SIGNIFICANT CHANGE UP (ref 7.32–7.43)
PHOSPHATE SERPL-MCNC: 3.3 MG/DL — SIGNIFICANT CHANGE UP (ref 2.5–4.5)
PLATELET # BLD AUTO: 165 K/UL — SIGNIFICANT CHANGE UP (ref 150–400)
PO2 BLDV: 34 MMHG — SIGNIFICANT CHANGE UP
POTASSIUM BLDV-SCNC: 3.5 MMOL/L — SIGNIFICANT CHANGE UP (ref 3.5–5.1)
POTASSIUM SERPL-MCNC: 3.7 MMOL/L — SIGNIFICANT CHANGE UP (ref 3.5–5.3)
POTASSIUM SERPL-SCNC: 3.7 MMOL/L — SIGNIFICANT CHANGE UP (ref 3.5–5.3)
PROT SERPL-MCNC: 6.6 G/DL — SIGNIFICANT CHANGE UP (ref 6–8.3)
RBC # BLD: 4.19 M/UL — LOW (ref 4.2–5.8)
RBC # FLD: 15.9 % — HIGH (ref 10.3–14.5)
SAO2 % BLDV: 50.8 % — SIGNIFICANT CHANGE UP
SODIUM SERPL-SCNC: 133 MMOL/L — LOW (ref 135–145)
WBC # BLD: 11.08 K/UL — HIGH (ref 3.8–10.5)
WBC # FLD AUTO: 11.08 K/UL — HIGH (ref 3.8–10.5)

## 2022-12-06 PROCEDURE — 93010 ELECTROCARDIOGRAM REPORT: CPT

## 2022-12-06 PROCEDURE — 99233 SBSQ HOSP IP/OBS HIGH 50: CPT | Mod: FS

## 2022-12-06 PROCEDURE — 99222 1ST HOSP IP/OBS MODERATE 55: CPT | Mod: FS

## 2022-12-06 PROCEDURE — 99233 SBSQ HOSP IP/OBS HIGH 50: CPT

## 2022-12-06 RX ORDER — ACETAMINOPHEN 500 MG
650 TABLET ORAL ONCE
Refills: 0 | Status: COMPLETED | OUTPATIENT
Start: 2022-12-06 | End: 2022-12-06

## 2022-12-06 RX ADMIN — BUDESONIDE AND FORMOTEROL FUMARATE DIHYDRATE 2 PUFF(S): 160; 4.5 AEROSOL RESPIRATORY (INHALATION) at 21:22

## 2022-12-06 RX ADMIN — HEPARIN SODIUM 5000 UNIT(S): 5000 INJECTION INTRAVENOUS; SUBCUTANEOUS at 14:47

## 2022-12-06 RX ADMIN — Medication 650 MILLIGRAM(S): at 04:25

## 2022-12-06 RX ADMIN — Medication 50 MILLIGRAM(S): at 17:44

## 2022-12-06 RX ADMIN — CLOPIDOGREL BISULFATE 75 MILLIGRAM(S): 75 TABLET, FILM COATED ORAL at 14:47

## 2022-12-06 RX ADMIN — Medication 50 MICROGRAM(S): at 06:17

## 2022-12-06 RX ADMIN — CARVEDILOL PHOSPHATE 3.12 MILLIGRAM(S): 80 CAPSULE, EXTENDED RELEASE ORAL at 20:31

## 2022-12-06 RX ADMIN — Medication 50 MILLIGRAM(S): at 08:13

## 2022-12-06 RX ADMIN — HEPARIN SODIUM 5000 UNIT(S): 5000 INJECTION INTRAVENOUS; SUBCUTANEOUS at 21:22

## 2022-12-06 RX ADMIN — Medication 650 MILLIGRAM(S): at 05:11

## 2022-12-06 RX ADMIN — BUMETANIDE 132 MILLIGRAM(S): 0.25 INJECTION INTRAMUSCULAR; INTRAVENOUS at 20:32

## 2022-12-06 RX ADMIN — ISOSORBIDE DINITRATE 30 MILLIGRAM(S): 5 TABLET ORAL at 08:13

## 2022-12-06 RX ADMIN — SPIRONOLACTONE 25 MILLIGRAM(S): 25 TABLET, FILM COATED ORAL at 14:46

## 2022-12-06 RX ADMIN — HEPARIN SODIUM 5000 UNIT(S): 5000 INJECTION INTRAVENOUS; SUBCUTANEOUS at 06:16

## 2022-12-06 RX ADMIN — ISOSORBIDE DINITRATE 30 MILLIGRAM(S): 5 TABLET ORAL at 17:44

## 2022-12-06 RX ADMIN — TAMSULOSIN HYDROCHLORIDE 0.4 MILLIGRAM(S): 0.4 CAPSULE ORAL at 20:31

## 2022-12-06 RX ADMIN — CARVEDILOL PHOSPHATE 3.12 MILLIGRAM(S): 80 CAPSULE, EXTENDED RELEASE ORAL at 08:13

## 2022-12-06 RX ADMIN — ATORVASTATIN CALCIUM 80 MILLIGRAM(S): 80 TABLET, FILM COATED ORAL at 20:32

## 2022-12-06 RX ADMIN — Medication 325 MILLIGRAM(S): at 14:47

## 2022-12-06 RX ADMIN — PANTOPRAZOLE SODIUM 40 MILLIGRAM(S): 20 TABLET, DELAYED RELEASE ORAL at 06:16

## 2022-12-06 RX ADMIN — BUDESONIDE AND FORMOTEROL FUMARATE DIHYDRATE 2 PUFF(S): 160; 4.5 AEROSOL RESPIRATORY (INHALATION) at 08:12

## 2022-12-06 RX ADMIN — BUMETANIDE 132 MILLIGRAM(S): 0.25 INJECTION INTRAMUSCULAR; INTRAVENOUS at 08:09

## 2022-12-06 RX ADMIN — Medication 81 MILLIGRAM(S): at 14:46

## 2022-12-06 NOTE — ED ADULT NURSE REASSESSMENT NOTE - NS ED NURSE REASSESS COMMENT FT1
Break coverage RN: Pt A&Ox4 respirations even and unlabored, sating 100% on 4L o2 via NC, normal sinus on monitor. Pt offers no complaints at this time, well appearing. No acute distress noted. report given to ESSU 2 RN.
Break coverage note: fis 90, pt given soft diet  assisted pt with feeding  which was tolerated well.  o2 sat wnl 0n nasal 02, 4liters. Pt awaiting floor bed.
blood sugar low. ACP called (multiple times, #[id number]) for Dextrose order. Meds given as ordered. pt denies any acute complaints. Vitals are stable.   repeat FS noted to be 87 & 91. ACP provider Adi made aware. awaiting orders at this time.
Heart failure provider at bedside. Pt taken off Bipap, placed on 4L o2 via NC. Respirations are even and unlabored, skin intact.  VSS, pt denies any acute complaints at this time. Pending further orders

## 2022-12-06 NOTE — CONSULT NOTE ADULT - SUBJECTIVE AND OBJECTIVE BOX
67 year old male with past history significant of HTN. HLD, DMT2, CAD, CABG, CHF (EF = 14% - 10/17/2022), Mdt single chamber ICD implanted  for primary prevention, asthma, COPD w/ home oxygen (2L), CKD, CVA w/ left sided weakness/deficets and unsteady gait (uses cane or walker), presented to the ED on 22 with chest discomfort, progressive shortness of breath  x 4-5 days, lower extremity edema and inability to sleep despite using 2 pillows. Unable to walk from room to room in his home without dyspnea. Patient states he is only feeling slightly better since admission and speaks in short sentences due to increased respiratory effort. He is compliant with home medications and dietary/fluid restrictions but none the less has had 7-8 hospitalizations 2/2 fluid overload since 2018.  Adheres to fluid restriction at home and is medication compliant. He has occasional palpitations but no syncope or near syncope and no ICD shocks.        Vital signs upon ED presentation as follows:  BP =126/83, HR = 102, RR = 22, T = 36.9 C (98.4 F), O2 Sat = 99% on RA to 100% on BiPAP.  Diagnosed with Shortness of Breath and prescribed furosemide 80 mg IV x one in the ED and Bumex 2mg IV BID.              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     S/P appendectomy          HPI:                    MEDICATIONS  (STANDING):  aspirin enteric coated 81 milliGRAM(s) Oral daily  atorvastatin 80 milliGRAM(s) Oral at bedtime  budesonide 160 MICROgram(s)/formoterol 4.5 MICROgram(s) Inhaler 2 Puff(s) Inhalation two times a day  buMETAnide IVPB 4 milliGRAM(s) IV Intermittent two times a day  carvedilol 3.125 milliGRAM(s) Oral every 12 hours  clopidogrel Tablet 75 milliGRAM(s) Oral daily  dextrose 50% Injectable 25 Gram(s) IV Push once  dextrose 50% Injectable 12.5 Gram(s) IV Push once  dextrose 50% Injectable 25 Gram(s) IV Push once  dextrose Oral Gel 15 Gram(s) Oral once  ferrous    sulfate 325 milliGRAM(s) Oral daily  glucagon  Injectable 1 milliGRAM(s) IntraMuscular once  heparin   Injectable 5000 Unit(s) SubCutaneous every 8 hours  hydrALAZINE 50 milliGRAM(s) Oral three times a day  isosorbide   dinitrate Tablet (ISORDIL) 30 milliGRAM(s) Oral three times a day  levothyroxine 50 MICROGram(s) Oral daily  pantoprazole    Tablet 40 milliGRAM(s) Oral before breakfast  spironolactone 25 milliGRAM(s) Oral daily  tamsulosin 0.4 milliGRAM(s) Oral at bedtime    MEDICATIONS  (PRN):      FAMILY HISTORY:  Family history of type 2 diabetes mellitus in father (Father)        SOCIAL HISTORY:    CIGARETTES:    ALCOHOL:    REVIEW OF SYSTEMS:    CONSTITUTIONAL: No fever, weight loss, chills, shakes, or fatigue  EYES: No eye pain, visual disturbances, or discharge  ENMT:  No difficulty hearing, tinnitus, vertigo; No sinus or throat pain  NECK: No pain or stiffness  BREASTS: No pain, masses, or nipple discharge  RESPIRATORY: No cough, wheezing, hemoptysis, or shortness of breath  CARDIOVASCULAR: No chest pain, dyspnea, palpitations, dizziness, syncope, paroxysmal nocturnal dyspnea, orthopnea, or arm or leg swelling  GASTROINTESTINAL: No abdominal  or epigastric pain, nausea, vomiting, hematemesis, diarrhea, constipation, melena or bright red blood.  GENITOURINARY: No dysuria, nocturia, hematuria, or urinary incontinence  NEUROLOGICAL: No headaches, memory loss, slurred speech, limb weakness, loss of strength, numbness, or tremors  SKIN: No itching, burning, rashes, or lesions   LYMPH NODES: No enlarged glands  ENDOCRINE: No heat or cold intolerance, or hair loss  MUSCULOSKELETAL: No joint pain or swelling, muscle, back, or extremity pain  PSYCHIATRIC: No depression, anxiety, or difficulty sleeping  HEME/LYMPH: No easy bruising or bleeding gums  ALLERY AND IMMUNOLOGIC: No hives or rash.      Vital Signs Last 24 Hrs  T(C): 36.9 (06 Dec 2022 11:03), Max: 36.9 (06 Dec 2022 11:03)  T(F): 98.4 (06 Dec 2022 11:03), Max: 98.4 (06 Dec 2022 11:03)  HR: 82 (06 Dec 2022 11:03) (82 - 93)  BP: 98/58 (06 Dec 2022 11:03) (97/69 - 130/70)  BP(mean): 78 (05 Dec 2022 22:53) (78 - 81)  RR: 18 (06 Dec 2022 11:03) (16 - 22)  SpO2: 98% (06 Dec 2022 11:03) (97% - 100%)    Parameters below as of 06 Dec 2022 11:03  Patient On (Oxygen Delivery Method): nasal cannula  O2 Flow (L/min): 2      PHYSICAL EXAM:    GENERAL: Patient   HEAD:  Atraumatic, Normocephalic  EYES: EOMI, PERRLA, conjunctiva and sclera clear  ENMT: No tonsillar erythema, exudates, or enlargement; Moist mucous membranes,   NECK: Supple and normal thyroid.  No JVD or carotid bruit.   HEART: Regular rate and rhythm; No murmurs, rubs, or gallops.  PULMONARY: Diffuse expiratory wheezing throughout anterior and posterior lobes  ABDOMEN: Soft, Nontender, distended; Bowel sounds present  EXTREMITIES:  trace pitting edema left lower leg. 1+ pedal pulses bilaterally  LYMPH: No lymphadenopathy noted  NEUROLOGICAL: Grossly nonfocal          INTERPRETATION OF TELEMETRY: Sinus rhythm     ECG: Normal sinus rhythm 100 bpm. LAD. LBBB w/QRSd 142ms        LABS:                        13.0   11.08 )-----------( 165      ( 06 Dec 2022 05:01 )             41.7     12-06    133<L>  |  94<L>  |  35<H>  ----------------------------<  113<H>  3.7   |  26  |  2.25<H>    Ca    9.1      06 Dec 2022 05:01  Phos  3.3     12-06  Mg     1.80     12-06    TPro  6.6  /  Alb  3.8  /  TBili  1.0  /  DBili  x   /  AST  29  /  ALT  17  /  AlkPhos  102  12-06    PT/INR - ( 04 Dec 2022 17:36 )   PT: 13.1 sec;   INR: 1.13 ratio         PTT - ( 04 Dec 2022 17:36 )  PTT:31.2 sec  Urinalysis Basic - ( 04 Dec 2022 23:52 )    Color: Colorless / Appearance: Clear / S.007 / pH: x  Gluc: x / Ketone: Negative  / Bili: Negative / Urobili: <2 mg/dL   Blood: x / Protein: Negative / Nitrite: Negative   Leuk Esterase: Negative / RBC: 1 /HPF / WBC 0 /HPF   Sq Epi: x / Non Sq Epi: 0 /HPF / Bacteria: Negative      BNP: 7986        Daily Height in cm: 177.8 (06 Dec 2022 03:52)    Daily Weight in k.9 (06 Dec 2022 03:52)    RADIOLOGY & ADDITIONAL STUDIES:  PREVIOUS DIAGNOSTIC TESTING:    < from: Xray Chest 1 View- PORTABLE-Urgent (22 @ 18:27) >  ACC: [id number] EXAM:  XR CHEST PORTABLE URGENT 1V                          PROCEDURE DATE:  2022      INTERPRETATION:  EXAMINATION: XR CHEST URGENT    CLINICAL INDICATION: Shortness of Breath    TECHNIQUE: Single frontal, portable view of the chest was obtained.    COMPARISON: Chest X-Ray 10/15/2022.    FINDINGS:    Left chest wall AICD. Median sternotomy wires. Surgical clips overlie   mediastinum.  The heart is not accurately assessed in this AP projection.  Increased bronchovascularmarkings, right more than left.  There is no pleural effusion.  There is no pneumothorax.  No acute bony abnormality.    IMPRESSION:  Increased bronchovascular markings, suggestive of pulmonary edema.   Underlying infiltrate is not excluded.    --- End of Report ---  GREY CANALES MD; Resident Radiologist  This document has been electronically signed.  ABRIL ISABEL MD; Attending Interventional Radiologist  This document has been electronically signed. Dec  5 2022  8:29AM          ECHO  FINDINGS:  < from: Transthoracic Echocardiogram (10.17.22 @ 14:12) >  Patient name: Joey Lewis  YOB: 1955   Age: 67 (M)   MR#: [id number]  Study Date: 10/17/2022  Location: [id number]: NONA Roth  Study quality: Technically good  Referring Physician: Dov Humphrey MD  Blood Pressure: 104/61 mmHg  Height: 175 cm  Weight: 79 kg  BSA: 2 m2  ------------------------------------------------------------------------  PROCEDURE: Transthoracic echocardiogram with 2-D, M-Mode  and complete spectral and color flow Doppler.  INDICATION: Heart failure, unspecified (I50.9)  ------------------------------------------------------------------------  DIMENSIONS:  Dimensions:     Normal Values:  LA:     4.2 cm    2.0 - 4.0 cm  Ao:     3.5 cm    2.0 - 3.8 cm  SEPTUM: 0.8 cm    0.6 - 1.2 cm  PWT:    0.9 cm    0.6 - 1.1 cm  LVIDd:  5.9 cm    3.0 - 5.6 cm  LVIDs:    ---     1.8 - 4.0 cm  Derived Variables:  LVMI: 100 g/m2  RWT: 0.30  Ejection Fraction (Modified Velazquez Rule): 14 %  ------------------------------------------------------------------------  OBSERVATIONS:  Mitral Valve: Tethered mitral valve leaflets with normal  opening. Mild mitral regurgitation.  Aortic Root: Normal aortic root.  Aortic Valve: Normal trileaflet aortic valve. Minimal  aortic regurgitation.  LVOT velocity time integral equals  16 cm.  Left Atrium: Normal left atrium.  LA volume index = 31  cc/m2.  Left Ventricle: Severe global left ventricular systolic  dysfunction. Endocardial visualization enhanced with  intravenous injection of echo contrast (Definity). Mild  left ventricular enlargement. Normal  E/e'  is consistent  with probably normal left ventricular filling pressure.  Right Heart: A device wire is noted in the right heart.  Right ventricular enlargement with decreased right  ventricular systolic function. Normal tricuspid valve.  Mild-moderate tricuspid regurgitation. Normal pulmonic  valve.  Pericardium/PleuraNormal pericardium with no pericardial  effusion.  ------------------------------------------------------------------------  CONCLUSIONS:  1. Tethered mitral valve leaflets with normal opening. Mild  mitral regurgitation.  2. Normal trileaflet aortic valve. Minimal aortic  regurgitation.  3. Mild left ventricular enlargement.  4. Severe global left ventricular systolic dysfunction.  Endocardial visualization enhanced with intravenous  injection of echo contrast (Definity). LVOT velocity time  integral equals 16 cm.  5. A device wire is noted in the right heart.  6. Right ventricular enlargement with decreased right  ventricular systolic function.  ------------------------------------------------------------------------  Confirmed on  10/17/2022 - 16:20:39 by JEREMY Torrez  ------------------------------------------------------------------------    CATHETERIZATION  FINDINGS:  < from: Cardiac Catheterization (22 @ 15:35) >  Study Date:     2022   Name:           JOEY LEWIS   :            1955   (66 years)   Gender:         male   MR#:            [id number]   MPI#:           [id number]   Patient Class:  Inpatient     Cath Lab Report    Diagnostic Cardiologist:       Dov Sandoval MD   Referring Physician:           Garo Dobson MD     Procedures Performed   Procedures:              1.    Ultrasound Guided Access     2.    Venous Access - Right Femoral   3.    RHC     Indications:               Congestive heart failure, acute on chronic  systolic    Presentation:   67 yo male with CAD (s/p CABG), HFrEF (EF 15-20%), DM, HTN, CKD, ICD,  asthma/COPD, recurrent  pneumonia, and prior CVA presents with worsening SOB and leg swelling.      Diagnostic Conclusions:   RA 9 mmHg. Elevated PCWP (24 mmHg). Cardiac index 2.2 L/min/m2. SVR  1510 dsc  .     Recommendations:   Additional diuresis as tolerated for management of acute on chronic  systolic heart failure.      Acute complication:    No complications                            67 year old male with past history significant of HTN. HLD, DMT2, CAD, CABG, CHF (EF = 14% - 10/17/2022), Mdt single chamber ICD implanted  for primary prevention, asthma, COPD w/ home oxygen (2L), CKD, CVA w/ left sided weakness/deficets and unsteady gait (uses cane or walker), presented to the ED on 22 with chest discomfort, progressive shortness of breath  x 4-5 days, lower extremity edema and inability to sleep despite using 2 pillows. Unable to walk from room to room in his home without dyspnea. States he is only feeling slightly better since admission and speaks in short sentences due to increased respiratory effort. He is compliant with home medications and dietary/fluid restrictions but none the less has had 7-8 hospitalizations 2/2 fluid overload since 2018.  Adheres to fluid restriction at home and is medication compliant. He has occasional palpitations but no syncope or near syncope and no ICD shocks.     Device interrogation showed a single chamber Medtronic ICD with appropriate pacing and sensing. Good capture threshold. RV pacing < .1%. 4 brief episodes of NSVT but no ICD therapies. Optivol fluid accumulation from 10/15/22 - 22. Fluid index is rising since Dec 1st but has not crossed the threshold.    EP consulted for possible CRT upgrade.         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     S/P appendectomy    MEDICATIONS  (STANDING):  aspirin enteric coated 81 milliGRAM(s) Oral daily  atorvastatin 80 milliGRAM(s) Oral at bedtime  budesonide 160 MICROgram(s)/formoterol 4.5 MICROgram(s) Inhaler 2 Puff(s) Inhalation two times a day  buMETAnide IVPB 4 milliGRAM(s) IV Intermittent two times a day  carvedilol 3.125 milliGRAM(s) Oral every 12 hours  clopidogrel Tablet 75 milliGRAM(s) Oral daily  dextrose 50% Injectable 25 Gram(s) IV Push once  dextrose 50% Injectable 12.5 Gram(s) IV Push once  dextrose 50% Injectable 25 Gram(s) IV Push once  dextrose Oral Gel 15 Gram(s) Oral once  ferrous    sulfate 325 milliGRAM(s) Oral daily  glucagon  Injectable 1 milliGRAM(s) IntraMuscular once  heparin   Injectable 5000 Unit(s) SubCutaneous every 8 hours  hydrALAZINE 50 milliGRAM(s) Oral three times a day  isosorbide   dinitrate Tablet (ISORDIL) 30 milliGRAM(s) Oral three times a day  levothyroxine 50 MICROGram(s) Oral daily  pantoprazole    Tablet 40 milliGRAM(s) Oral before breakfast  spironolactone 25 milliGRAM(s) Oral daily  tamsulosin 0.4 milliGRAM(s) Oral at bedtime    MEDICATIONS  (PRN):      FAMILY HISTORY:  Family history of type 2 diabetes mellitus in father (Father)        SOCIAL HISTORY: lives with his family    CIGARETTES: nonsmoker  ALCOHOL: quit many years ago    REVIEW OF SYSTEMS:    CONSTITUTIONAL: No fever, weight loss, chills, shakes, or fatigue +weight gain  EYES: No eye pain, visual disturbances, or discharge  ENMT:  No difficulty hearing, tinnitus, vertigo; No sinus or throat pain  NECK: No pain or stiffness  BREASTS: No pain, masses, or nipple discharge  RESPIRATORY: + wheezing + shortness of breath even with talking  CARDIOVASCULAR: + chest pain, + dyspnea, intermittent palpitations, lower extremity dizziness without dizziness, lower extremity swelling, syncope, paroxysmal nocturnal dyspnea, orthopnea, or arm or leg swelling  GASTROINTESTINAL: No abdominal  or epigastric pain, nausea, vomiting, hematemesis, diarrhea, constipation, melena or bright red blood.  GENITOURINARY: No dysuria, nocturia, hematuria, or urinary incontinence  NEUROLOGICAL: No headaches, memory loss, slurred speech,  loss of strength, numbness, or tremors  SKIN: No itching, burning, rashes, or lesions   LYMPH NODES: No enlarged glands  ENDOCRINE: No heat or cold intolerance, or hair loss  MUSCULOSKELETAL: No joint pain or swelling, muscle, back, or extremity pain  PSYCHIATRIC: No depression, anxiety, or difficulty sleeping  HEME/LYMPH: No easy bruising or bleeding gums  ALLERY AND IMMUNOLOGIC: No hives or rash.      Vital Signs Last 24 Hrs  T(C): 36.9 (06 Dec 2022 11:03), Max: 36.9 (06 Dec 2022 11:03)  T(F): 98.4 (06 Dec 2022 11:03), Max: 98.4 (06 Dec 2022 11:03)  HR: 82 (06 Dec 2022 11:03) (82 - 93)  BP: 98/58 (06 Dec 2022 11:03) (97/69 - 130/70)  BP(mean): 78 (05 Dec 2022 22:53) (78 - 81)  RR: 18 (06 Dec 2022 11:03) (16 - 22)  SpO2: 98% (06 Dec 2022 11:03) (97% - 100%)    Parameters below as of 06 Dec 2022 11:03  Patient On (Oxygen Delivery Method): nasal cannula  O2 Flow (L/min): 2      PHYSICAL EXAM:    GENERAL: Patient   HEAD:  Atraumatic, Normocephalic  EYES: EOMI, PERRLA, conjunctiva and sclera clear  ENMT: No tonsillar erythema, exudates, or enlargement; Moist mucous membranes,   NECK: Supple and normal thyroid.  No JVD or carotid bruit.   HEART: Regular rate and rhythm; No murmurs, rubs, or gallops.  PULMONARY: Diffuse expiratory wheezing throughout anterior and posterior lobes  ABDOMEN: Soft, Nontender, distended; Bowel sounds present  EXTREMITIES:  trace pitting edema left lower leg. 1+ pedal pulses bilaterally  LYMPH: No lymphadenopathy noted  NEUROLOGICAL: Grossly nonfocal          INTERPRETATION OF TELEMETRY: Sinus rhythm     ECG: Normal sinus rhythm 100 bpm. LAD. LBBB w/QRSd 142ms        LABS:                        13.0   11.08 )-----------( 165      ( 06 Dec 2022 05:01 )             41.7     12-06    133<L>  |  94<L>  |  35<H>  ----------------------------<  113<H>  3.7   |  26  |  2.25<H>    Ca    9.1      06 Dec 2022 05:01  Phos  3.3     12-06  Mg     1.80     12-06    TPro  6.6  /  Alb  3.8  /  TBili  1.0  /  DBili  x   /  AST  29  /  ALT  17  /  AlkPhos  102  12-06    PT/INR - ( 04 Dec 2022 17:36 )   PT: 13.1 sec;   INR: 1.13 ratio         PTT - ( 04 Dec 2022 17:36 )  PTT:31.2 sec  Urinalysis Basic - ( 04 Dec 2022 23:52 )    Color: Colorless / Appearance: Clear / S.007 / pH: x  Gluc: x / Ketone: Negative  / Bili: Negative / Urobili: <2 mg/dL   Blood: x / Protein: Negative / Nitrite: Negative   Leuk Esterase: Negative / RBC: 1 /HPF / WBC 0 /HPF   Sq Epi: x / Non Sq Epi: 0 /HPF / Bacteria: Negative      BNP: 7986        Daily Height in cm: 177.8 (06 Dec 2022 03:52)    Daily Weight in k.9 (06 Dec 2022 03:52)    RADIOLOGY & ADDITIONAL STUDIES:  PREVIOUS DIAGNOSTIC TESTING:    < from: Xray Chest 1 View- PORTABLE-Urgent (22 @ 18:27) >  ACC: [id number] EXAM:  XR CHEST PORTABLE URGENT 1V                          PROCEDURE DATE:  2022      INTERPRETATION:  EXAMINATION: XR CHEST URGENT    CLINICAL INDICATION: Shortness of Breath    TECHNIQUE: Single frontal, portable view of the chest was obtained.    COMPARISON: Chest X-Ray 10/15/2022.    FINDINGS:    Left chest wall AICD. Median sternotomy wires. Surgical clips overlie   mediastinum.  The heart is not accurately assessed in this AP projection.  Increased bronchovascularmarkings, right more than left.  There is no pleural effusion.  There is no pneumothorax.  No acute bony abnormality.    IMPRESSION:  Increased bronchovascular markings, suggestive of pulmonary edema.   Underlying infiltrate is not excluded.    --- End of Report ---  GREY CANALES MD; Resident Radiologist  This document has been electronically signed.  ABRIL ISABEL MD; Attending Interventional Radiologist  This document has been electronically signed. Dec  5 2022  8:29AM          ECHO  FINDINGS:  < from: Transthoracic Echocardiogram (10.17.22 @ 14:12) >  Patient name: Joey Lewis  YOB: 1955   Age: 67 (M)   MR#: [id number]  Study Date: 10/17/2022  Location: [id number]: NONA Roth  Study quality: Technically good  Referring Physician: Dov Humphrey MD  Blood Pressure: 104/61 mmHg  Height: 175 cm  Weight: 79 kg  BSA: 2 m2  ------------------------------------------------------------------------  PROCEDURE: Transthoracic echocardiogram with 2-D, M-Mode  and complete spectral and color flow Doppler.  INDICATION: Heart failure, unspecified (I50.9)  ------------------------------------------------------------------------  DIMENSIONS:  Dimensions:     Normal Values:  LA:     4.2 cm    2.0 - 4.0 cm  Ao:     3.5 cm    2.0 - 3.8 cm  SEPTUM: 0.8 cm    0.6 - 1.2 cm  PWT:    0.9 cm    0.6 - 1.1 cm  LVIDd:  5.9 cm    3.0 - 5.6 cm  LVIDs:    ---     1.8 - 4.0 cm  Derived Variables:  LVMI: 100 g/m2  RWT: 0.30  Ejection Fraction (Modified Velazquez Rule): 14 %  ------------------------------------------------------------------------  OBSERVATIONS:  Mitral Valve: Tethered mitral valve leaflets with normal  opening. Mild mitral regurgitation.  Aortic Root: Normal aortic root.  Aortic Valve: Normal trileaflet aortic valve. Minimal  aortic regurgitation.  LVOT velocity time integral equals  16 cm.  Left Atrium: Normal left atrium.  LA volume index = 31  cc/m2.  Left Ventricle: Severe global left ventricular systolic  dysfunction. Endocardial visualization enhanced with  intravenous injection of echo contrast (Definity). Mild  left ventricular enlargement. Normal  E/e'  is consistent  with probably normal left ventricular filling pressure.  Right Heart: A device wire is noted in the right heart.  Right ventricular enlargement with decreased right  ventricular systolic function. Normal tricuspid valve.  Mild-moderate tricuspid regurgitation. Normal pulmonic  valve.  Pericardium/PleuraNormal pericardium with no pericardial  effusion.  ------------------------------------------------------------------------  CONCLUSIONS:  1. Tethered mitral valve leaflets with normal opening. Mild  mitral regurgitation.  2. Normal trileaflet aortic valve. Minimal aortic  regurgitation.  3. Mild left ventricular enlargement.  4. Severe global left ventricular systolic dysfunction.  Endocardial visualization enhanced with intravenous  injection of echo contrast (Definity). LVOT velocity time  integral equals 16 cm.  5. A device wire is noted in the right heart.  6. Right ventricular enlargement with decreased right  ventricular systolic function.  ------------------------------------------------------------------------  Confirmed on  10/17/2022 - 16:20:39 by JEREMY Torrez  ------------------------------------------------------------------------    CATHETERIZATION  FINDINGS:  < from: Cardiac Catheterization (22 @ 15:35) >  Study Date:     2022   Name:           JOEY LEWIS   :            1955   (66 years)   Gender:         male   MR#:            [id number]   MPI#:           [id number]   Patient Class:  Inpatient     Cath Lab Report    Diagnostic Cardiologist:       Dov Sandoval MD   Referring Physician:           Garo Dobson MD     Procedures Performed   Procedures:              1.    Ultrasound Guided Access     2.    Venous Access - Right Femoral   3.    RHC     Indications:               Congestive heart failure, acute on chronic  systolic    Presentation:   67 yo male with CAD (s/p CABG), HFrEF (EF 15-20%), DM, HTN, CKD, ICD,  asthma/COPD, recurrent  pneumonia, and prior CVA presents with worsening SOB and leg swelling.      Diagnostic Conclusions:   RA 9 mmHg. Elevated PCWP (24 mmHg). Cardiac index 2.2 L/min/m2. SVR  1510 dsc  .     Recommendations:   Additional diuresis as tolerated for management of acute on chronic  systolic heart failure.      Acute complication:    No complications                            67 year old male with past history significant of HTN. HLD, DMT2, CAD, CABG, CHF (EF = 14% - 10/17/2022), Mdt single chamber ICD implanted  for primary prevention, asthma, COPD w/ home oxygen (2L), CKD, CVA w/ left sided weakness/deficets and unsteady gait (uses cane or walker), presented to the ED on 22 with chest discomfort, progressive shortness of breath  x 4-5 days, lower extremity edema and inability to sleep despite using 2 pillows. Unable to walk from room to room in his home without dyspnea. States he is only feeling slightly better since admission and speaks in short sentences due to increased respiratory effort. On optimal medical therapy for several years and is compliant with home medications and dietary/fluid restrictions but none the less has had 7-8 hospitalizations 2/2 fluid overload since 2018.  Adheres to fluid restriction at home and is medication compliant. He has occasional palpitations but no syncope or near syncope and no ICD shocks.     Device interrogation showed a single chamber Medtronic ICD with appropriate pacing and sensing. Good capture threshold. RV pacing < .1%. 4 brief episodes of NSVT but no ICD therapies. Optivol fluid accumulation from 10/15/22 - 22. Fluid index is rising since Dec 1st but has not crossed the threshold.    EP consulted for possible CRT upgrade.         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     S/P appendectomy    MEDICATIONS  (STANDING):  aspirin enteric coated 81 milliGRAM(s) Oral daily  atorvastatin 80 milliGRAM(s) Oral at bedtime  budesonide 160 MICROgram(s)/formoterol 4.5 MICROgram(s) Inhaler 2 Puff(s) Inhalation two times a day  buMETAnide IVPB 4 milliGRAM(s) IV Intermittent two times a day  carvedilol 3.125 milliGRAM(s) Oral every 12 hours  clopidogrel Tablet 75 milliGRAM(s) Oral daily  dextrose 50% Injectable 25 Gram(s) IV Push once  dextrose 50% Injectable 12.5 Gram(s) IV Push once  dextrose 50% Injectable 25 Gram(s) IV Push once  dextrose Oral Gel 15 Gram(s) Oral once  ferrous    sulfate 325 milliGRAM(s) Oral daily  glucagon  Injectable 1 milliGRAM(s) IntraMuscular once  heparin   Injectable 5000 Unit(s) SubCutaneous every 8 hours  hydrALAZINE 50 milliGRAM(s) Oral three times a day  isosorbide   dinitrate Tablet (ISORDIL) 30 milliGRAM(s) Oral three times a day  levothyroxine 50 MICROGram(s) Oral daily  pantoprazole    Tablet 40 milliGRAM(s) Oral before breakfast  spironolactone 25 milliGRAM(s) Oral daily  tamsulosin 0.4 milliGRAM(s) Oral at bedtime    MEDICATIONS  (PRN):      FAMILY HISTORY:  Family history of type 2 diabetes mellitus in father (Father)        SOCIAL HISTORY: lives with his family    CIGARETTES: nonsmoker  ALCOHOL: quit many years ago    REVIEW OF SYSTEMS:    CONSTITUTIONAL: No fever, weight loss, chills, shakes, or fatigue +weight gain  EYES: No eye pain, visual disturbances, or discharge  ENMT:  No difficulty hearing, tinnitus, vertigo; No sinus or throat pain  NECK: No pain or stiffness  BREASTS: No pain, masses, or nipple discharge  RESPIRATORY: + wheezing + shortness of breath even with talking  CARDIOVASCULAR: + chest pain, + dyspnea, intermittent palpitations, lower extremity dizziness without dizziness, lower extremity swelling, syncope, paroxysmal nocturnal dyspnea, orthopnea, or arm or leg swelling  GASTROINTESTINAL: No abdominal  or epigastric pain, nausea, vomiting, hematemesis, diarrhea, constipation, melena or bright red blood.  GENITOURINARY: No dysuria, nocturia, hematuria, or urinary incontinence  NEUROLOGICAL: No headaches, memory loss, slurred speech,  loss of strength, numbness, or tremors  SKIN: No itching, burning, rashes, or lesions   LYMPH NODES: No enlarged glands  ENDOCRINE: No heat or cold intolerance, or hair loss  MUSCULOSKELETAL: No joint pain or swelling, muscle, back, or extremity pain  PSYCHIATRIC: No depression, anxiety, or difficulty sleeping  HEME/LYMPH: No easy bruising or bleeding gums  ALLERGY AND IMMUNOLOGIC: No hives or rash.      Vital Signs Last 24 Hrs  T(C): 36.9 (06 Dec 2022 11:03), Max: 36.9 (06 Dec 2022 11:03)  T(F): 98.4 (06 Dec 2022 11:03), Max: 98.4 (06 Dec 2022 11:03)  HR: 82 (06 Dec 2022 11:03) (82 - 93)  BP: 98/58 (06 Dec 2022 11:03) (97/69 - 130/70)  BP(mean): 78 (05 Dec 2022 22:53) (78 - 81)  RR: 18 (06 Dec 2022 11:03) (16 - 22)  SpO2: 98% (06 Dec 2022 11:03) (97% - 100%)    Parameters below as of 06 Dec 2022 11:03  Patient On (Oxygen Delivery Method): nasal cannula  O2 Flow (L/min): 2      PHYSICAL EXAM:    GENERAL: Patient   HEAD:  Atraumatic, Normocephalic  EYES: EOMI, PERRLA, conjunctiva and sclera clear  ENMT: No tonsillar erythema, exudates, or enlargement; Moist mucous membranes,   NECK: Supple and normal thyroid.  No JVD or carotid bruit.   HEART: Regular rate and rhythm; No murmurs, rubs, or gallops.  PULMONARY: Diffuse expiratory wheezing throughout anterior and posterior lobes  ABDOMEN: Soft, Nontender, distended; Bowel sounds present  EXTREMITIES:  trace pitting edema left lower leg. 1+ pedal pulses bilaterally  LYMPH: No lymphadenopathy noted  NEUROLOGICAL: Grossly nonfocal          INTERPRETATION OF TELEMETRY: Sinus rhythm     ECG: Normal sinus rhythm 100 bpm. LAD. LBBB w/QRSd 142ms        LABS:                        13.0   11.08 )-----------( 165      ( 06 Dec 2022 05:01 )             41.7     12-06    133<L>  |  94<L>  |  35<H>  ----------------------------<  113<H>  3.7   |  26  |  2.25<H>    Ca    9.1      06 Dec 2022 05:01  Phos  3.3     12-06  Mg     1.80     12-06    TPro  6.6  /  Alb  3.8  /  TBili  1.0  /  DBili  x   /  AST  29  /  ALT  17  /  AlkPhos  102  12-06    PT/INR - ( 04 Dec 2022 17:36 )   PT: 13.1 sec;   INR: 1.13 ratio         PTT - ( 04 Dec 2022 17:36 )  PTT:31.2 sec  Urinalysis Basic - ( 04 Dec 2022 23:52 )    Color: Colorless / Appearance: Clear / S.007 / pH: x  Gluc: x / Ketone: Negative  / Bili: Negative / Urobili: <2 mg/dL   Blood: x / Protein: Negative / Nitrite: Negative   Leuk Esterase: Negative / RBC: 1 /HPF / WBC 0 /HPF   Sq Epi: x / Non Sq Epi: 0 /HPF / Bacteria: Negative      BNP: 7986        Daily Height in cm: 177.8 (06 Dec 2022 03:52)    Daily Weight in k.9 (06 Dec 2022 03:52)    RADIOLOGY & ADDITIONAL STUDIES:  PREVIOUS DIAGNOSTIC TESTING:    < from: Xray Chest 1 View- PORTABLE-Urgent (22 @ 18:27) >  ACC: [id number] EXAM:  XR CHEST PORTABLE URGENT 1V                          PROCEDURE DATE:  2022      INTERPRETATION:  EXAMINATION: XR CHEST URGENT    CLINICAL INDICATION: Shortness of Breath    TECHNIQUE: Single frontal, portable view of the chest was obtained.    COMPARISON: Chest X-Ray 10/15/2022.    FINDINGS:    Left chest wall AICD. Median sternotomy wires. Surgical clips overlie   mediastinum.  The heart is not accurately assessed in this AP projection.  Increased bronchovascularmarkings, right more than left.  There is no pleural effusion.  There is no pneumothorax.  No acute bony abnormality.    IMPRESSION:  Increased bronchovascular markings, suggestive of pulmonary edema.   Underlying infiltrate is not excluded.    --- End of Report ---  GREY CANALES MD; Resident Radiologist  This document has been electronically signed.  ABRIL ISABEL MD; Attending Interventional Radiologist  This document has been electronically signed. Dec  5 2022  8:29AM          ECHO  FINDINGS:  < from: Transthoracic Echocardiogram (10.17.22 @ 14:12) >  Patient name: Joey Lewis  YOB: 1955   Age: 67 (M)   MR#: [id number]  Study Date: 10/17/2022  Location: [id number]: NONA Roth  Study quality: Technically good  Referring Physician: Dov Humphrey MD  Blood Pressure: 104/61 mmHg  Height: 175 cm  Weight: 79 kg  BSA: 2 m2  ------------------------------------------------------------------------  PROCEDURE: Transthoracic echocardiogram with 2-D, M-Mode  and complete spectral and color flow Doppler.  INDICATION: Heart failure, unspecified (I50.9)  ------------------------------------------------------------------------  DIMENSIONS:  Dimensions:     Normal Values:  LA:     4.2 cm    2.0 - 4.0 cm  Ao:     3.5 cm    2.0 - 3.8 cm  SEPTUM: 0.8 cm    0.6 - 1.2 cm  PWT:    0.9 cm    0.6 - 1.1 cm  LVIDd:  5.9 cm    3.0 - 5.6 cm  LVIDs:    ---     1.8 - 4.0 cm  Derived Variables:  LVMI: 100 g/m2  RWT: 0.30  Ejection Fraction (Modified Velazquez Rule): 14 %  ------------------------------------------------------------------------  OBSERVATIONS:  Mitral Valve: Tethered mitral valve leaflets with normal  opening. Mild mitral regurgitation.  Aortic Root: Normal aortic root.  Aortic Valve: Normal trileaflet aortic valve. Minimal  aortic regurgitation.  LVOT velocity time integral equals  16 cm.  Left Atrium: Normal left atrium.  LA volume index = 31  cc/m2.  Left Ventricle: Severe global left ventricular systolic  dysfunction. Endocardial visualization enhanced with  intravenous injection of echo contrast (Definity). Mild  left ventricular enlargement. Normal  E/e'  is consistent  with probably normal left ventricular filling pressure.  Right Heart: A device wire is noted in the right heart.  Right ventricular enlargement with decreased right  ventricular systolic function. Normal tricuspid valve.  Mild-moderate tricuspid regurgitation. Normal pulmonic  valve.  Pericardium/PleuraNormal pericardium with no pericardial  effusion.  ------------------------------------------------------------------------  CONCLUSIONS:  1. Tethered mitral valve leaflets with normal opening. Mild  mitral regurgitation.  2. Normal trileaflet aortic valve. Minimal aortic  regurgitation.  3. Mild left ventricular enlargement.  4. Severe global left ventricular systolic dysfunction.  Endocardial visualization enhanced with intravenous  injection of echo contrast (Definity). LVOT velocity time  integral equals 16 cm.  5. A device wire is noted in the right heart.  6. Right ventricular enlargement with decreased right  ventricular systolic function.  ------------------------------------------------------------------------  Confirmed on  10/17/2022 - 16:20:39 by Shena Inman M.D. RPVI  ------------------------------------------------------------------------    CATHETERIZATION  FINDINGS:  < from: Cardiac Catheterization (22 @ 15:35) >  Study Date:     2022   Name:           JOEY LEWIS   :            1955   (66 years)   Gender:         male   MR#:            [id number]   [facility]#:           [id number]   Patient Class:  Inpatient     Cath Lab Report    Diagnostic Cardiologist:       Dov Sandoval MD   Referring Physician:           Garo Dobson MD     Procedures Performed   Procedures:              1.    Ultrasound Guided Access     2.    Venous Access - Right Femoral   3.    RHC     Indications:               Congestive heart failure, acute on chronic  systolic    Presentation:   65 yo male with CAD (s/p CABG), HFrEF (EF 15-20%), DM, HTN, CKD, ICD,  asthma/COPD, recurrent  pneumonia, and prior CVA presents with worsening SOB and leg swelling.      Diagnostic Conclusions:   RA 9 mmHg. Elevated PCWP (24 mmHg). Cardiac index 2.2 L/min/m2. SVR  1510 dsc  .     Recommendations:   Additional diuresis as tolerated for management of acute on chronic  systolic heart failure.      Acute complication:    No complications                            [facility]  [id number]  67 year old male with past history significant of HTN. HLD, DMT2, CAD, CABG, CHF (EF = 14% - 10/17/2022), Mdt single chamber ICD implanted  for primary prevention, asthma, COPD w/ home oxygen (2L), CKD, CVA w/ left sided weakness/deficets and unsteady gait (uses cane or walker), presented to the ED on 22 with chest discomfort, progressive shortness of breath  x 4-5 days, lower extremity edema and inability to sleep despite using 2 pillows. Unable to walk from room to room in his home without dyspnea. States he is only feeling slightly better since admission and speaks in short sentences due to increased respiratory effort. On optimal medical therapy for several years and is compliant with home medications and dietary/fluid restrictions but none the less has had 7-8 hospitalizations 2/2 fluid overload since 2018. He has occasional palpitations but no syncope or near syncope and no ICD shocks.     Device interrogation showed a single chamber Medtronic ICD with appropriate pacing and sensing. Good capture threshold. RV pacing < .1%. 4 brief episodes of NSVT but no ICD therapies. Optivol fluid accumulation from 10/15/22 - 22. Fluid index is rising since Dec 1st but has not crossed the threshold.    EP consulted for possible CRT upgrade.         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     S/P appendectomy    MEDICATIONS  (STANDING):  aspirin enteric coated 81 milliGRAM(s) Oral daily  atorvastatin 80 milliGRAM(s) Oral at bedtime  budesonide 160 MICROgram(s)/formoterol 4.5 MICROgram(s) Inhaler 2 Puff(s) Inhalation two times a day  buMETAnide IVPB 4 milliGRAM(s) IV Intermittent two times a day  carvedilol 3.125 milliGRAM(s) Oral every 12 hours  clopidogrel Tablet 75 milliGRAM(s) Oral daily  dextrose 50% Injectable 25 Gram(s) IV Push once  dextrose 50% Injectable 12.5 Gram(s) IV Push once  dextrose 50% Injectable 25 Gram(s) IV Push once  dextrose Oral Gel 15 Gram(s) Oral once  ferrous    sulfate 325 milliGRAM(s) Oral daily  glucagon  Injectable 1 milliGRAM(s) IntraMuscular once  heparin   Injectable 5000 Unit(s) SubCutaneous every 8 hours  hydrALAZINE 50 milliGRAM(s) Oral three times a day  isosorbide   dinitrate Tablet (ISORDIL) 30 milliGRAM(s) Oral three times a day  levothyroxine 50 MICROGram(s) Oral daily  pantoprazole    Tablet 40 milliGRAM(s) Oral before breakfast  spironolactone 25 milliGRAM(s) Oral daily  tamsulosin 0.4 milliGRAM(s) Oral at bedtime    MEDICATIONS  (PRN):      FAMILY HISTORY:  Family history of type 2 diabetes mellitus in father (Father)        SOCIAL HISTORY: lives with his family    CIGARETTES: nonsmoker  ALCOHOL: quit many years ago    REVIEW OF SYSTEMS:    CONSTITUTIONAL: No fever, weight loss, chills, shakes, or fatigue +weight gain  EYES: No eye pain, visual disturbances, or discharge  ENMT:  No difficulty hearing, tinnitus, vertigo; No sinus or throat pain  NECK: No pain or stiffness  BREASTS: No pain, masses, or nipple discharge  RESPIRATORY: + wheezing + shortness of breath even with talking  CARDIOVASCULAR: + chest pain, + dyspnea, intermittent palpitations, lower extremity dizziness without dizziness, lower extremity swelling, syncope, paroxysmal nocturnal dyspnea, orthopnea, or arm or leg swelling  GASTROINTESTINAL: No abdominal  or epigastric pain, nausea, vomiting, hematemesis, diarrhea, constipation, melena or bright red blood.  GENITOURINARY: No dysuria, nocturia, hematuria, or urinary incontinence  NEUROLOGICAL: No headaches, memory loss, slurred speech,  loss of strength, numbness, or tremors  SKIN: No itching, burning, rashes, or lesions   LYMPH NODES: No enlarged glands  ENDOCRINE: No heat or cold intolerance, or hair loss  MUSCULOSKELETAL: No joint pain or swelling, muscle, back, or extremity pain  PSYCHIATRIC: No depression, anxiety, or difficulty sleeping  HEME/LYMPH: No easy bruising or bleeding gums  ALLERGY AND IMMUNOLOGIC: No hives or rash.      Vital Signs Last 24 Hrs  T(C): 36.9 (06 Dec 2022 11:03), Max: 36.9 (06 Dec 2022 11:03)  T(F): 98.4 (06 Dec 2022 11:03), Max: 98.4 (06 Dec 2022 11:03)  HR: 82 (06 Dec 2022 11:03) (82 - 93)  BP: 98/58 (06 Dec 2022 11:03) (97/69 - 130/70)  BP(mean): 78 (05 Dec 2022 22:53) (78 - 81)  RR: 18 (06 Dec 2022 11:03) (16 - 22)  SpO2: 98% (06 Dec 2022 11:03) (97% - 100%)    Parameters below as of 06 Dec 2022 11:03  Patient On (Oxygen Delivery Method): nasal cannula  O2 Flow (L/min): 2      PHYSICAL EXAM:    GENERAL: Patient   HEAD:  Atraumatic, Normocephalic  EYES: EOMI, PERRLA, conjunctiva and sclera clear  ENMT: No tonsillar erythema, exudates, or enlargement; Moist mucous membranes,   NECK: Supple and normal thyroid.  No JVD or carotid bruit.   HEART: Regular rate and rhythm; No murmurs, rubs, or gallops.  PULMONARY: Diffuse expiratory wheezing throughout anterior and posterior lobes  ABDOMEN: Soft, Nontender, distended; Bowel sounds present  EXTREMITIES:  trace pitting edema left lower leg. 1+ pedal pulses bilaterally  LYMPH: No lymphadenopathy noted  NEUROLOGICAL: Grossly nonfocal          INTERPRETATION OF TELEMETRY: Sinus rhythm     ECG: Normal sinus rhythm 100 bpm. LAD. LBBB w/QRSd 142ms        LABS:                        13.0   11.08 )-----------( 165      ( 06 Dec 2022 05:01 )             41.7     12-06    133<L>  |  94<L>  |  35<H>  ----------------------------<  113<H>  3.7   |  26  |  2.25<H>    Ca    9.1      06 Dec 2022 05:01  Phos  3.3     12-06  Mg     1.80     12-06    TPro  6.6  /  Alb  3.8  /  TBili  1.0  /  DBili  x   /  AST  29  /  ALT  17  /  AlkPhos  102  12-06    PT/INR - ( 04 Dec 2022 17:36 )   PT: 13.1 sec;   INR: 1.13 ratio         PTT - ( 04 Dec 2022 17:36 )  PTT:31.2 sec  Urinalysis Basic - ( 04 Dec 2022 23:52 )    Color: Colorless / Appearance: Clear / S.007 / pH: x  Gluc: x / Ketone: Negative  / Bili: Negative / Urobili: <2 mg/dL   Blood: x / Protein: Negative / Nitrite: Negative   Leuk Esterase: Negative / RBC: 1 /HPF / WBC 0 /HPF   Sq Epi: x / Non Sq Epi: 0 /HPF / Bacteria: Negative      BNP: 7986        Daily Height in cm: 177.8 (06 Dec 2022 03:52)    Daily Weight in k.9 (06 Dec 2022 03:52)    RADIOLOGY & ADDITIONAL STUDIES:  PREVIOUS DIAGNOSTIC TESTING:    < from: Xray Chest 1 View- PORTABLE-Urgent (22 @ 18:27) >  ACC: [id number] EXAM:  XR CHEST PORTABLE URGENT 1V                          PROCEDURE DATE:  2022      INTERPRETATION:  EXAMINATION: XR CHEST URGENT    CLINICAL INDICATION: Shortness of Breath    TECHNIQUE: Single frontal, portable view of the chest was obtained.    COMPARISON: Chest X-Ray 10/15/2022.    FINDINGS:    Left chest wall AICD. Median sternotomy wires. Surgical clips overlie   mediastinum.  The heart is not accurately assessed in this AP projection.  Increased bronchovascularmarkings, right more than left.  There is no pleural effusion.  There is no pneumothorax.  No acute bony abnormality.    IMPRESSION:  Increased bronchovascular markings, suggestive of pulmonary edema.   Underlying infiltrate is not excluded.    --- End of Report ---  GREY CANALES MD; Resident Radiologist  This document has been electronically signed.  ABRIL ISABEL MD; Attending Interventional Radiologist  This document has been electronically signed. Dec  5 2022  8:29AM          ECHO  FINDINGS:  < from: Transthoracic Echocardiogram (10.17.22 @ 14:12) >  Patient name: Joey Lewis  YOB: 1955   Age: 67 (M)   MR#: [id number]  Study Date: 10/17/2022  Location: [id number]: NONA Roth  Study quality: Technically good  Referring Physician: Dov Humphrey MD  Blood Pressure: 104/61 mmHg  Height: 175 cm  Weight: 79 kg  BSA: 2 m2  ------------------------------------------------------------------------  PROCEDURE: Transthoracic echocardiogram with 2-D, M-Mode  and complete spectral and color flow Doppler.  INDICATION: Heart failure, unspecified (I50.9)  ------------------------------------------------------------------------  DIMENSIONS:  Dimensions:     Normal Values:  LA:     4.2 cm    2.0 - 4.0 cm  Ao:     3.5 cm    2.0 - 3.8 cm  SEPTUM: 0.8 cm    0.6 - 1.2 cm  PWT:    0.9 cm    0.6 - 1.1 cm  LVIDd:  5.9 cm    3.0 - 5.6 cm  LVIDs:    ---     1.8 - 4.0 cm  Derived Variables:  LVMI: 100 g/m2  RWT: 0.30  Ejection Fraction (Modified Velazquez Rule): 14 %  ------------------------------------------------------------------------  OBSERVATIONS:  Mitral Valve: Tethered mitral valve leaflets with normal  opening. Mild mitral regurgitation.  Aortic Root: Normal aortic root.  Aortic Valve: Normal trileaflet aortic valve. Minimal  aortic regurgitation.  LVOT velocity time integral equals  16 cm.  Left Atrium: Normal left atrium.  LA volume index = 31  cc/m2.  Left Ventricle: Severe global left ventricular systolic  dysfunction. Endocardial visualization enhanced with  intravenous injection of echo contrast (Definity). Mild  left ventricular enlargement. Normal  E/e'  is consistent  with probably normal left ventricular filling pressure.  Right Heart: A device wire is noted in the right heart.  Right ventricular enlargement with decreased right  ventricular systolic function. Normal tricuspid valve.  Mild-moderate tricuspid regurgitation. Normal pulmonic  valve.  Pericardium/PleuraNormal pericardium with no pericardial  effusion.  ------------------------------------------------------------------------  CONCLUSIONS:  1. Tethered mitral valve leaflets with normal opening. Mild  mitral regurgitation.  2. Normal trileaflet aortic valve. Minimal aortic  regurgitation.  3. Mild left ventricular enlargement.  4. Severe global left ventricular systolic dysfunction.  Endocardial visualization enhanced with intravenous  injection of echo contrast (Definity). LVOT velocity time  integral equals 16 cm.  5. A device wire is noted in the right heart.  6. Right ventricular enlargement with decreased right  ventricular systolic function.  ------------------------------------------------------------------------  Confirmed on  10/17/2022 - 16:20:39 by JEREMY Torrez  ------------------------------------------------------------------------    CATHETERIZATION  FINDINGS:  < from: Cardiac Catheterization (22 @ 15:35) >  Study Date:     2022   Name:           JOEY LEWIS   :            1955   (66 years)   Gender:         male   MR#:            [id number]   [facility]#:           [id number]   Patient Class:  Inpatient     Cath Lab Report    Diagnostic Cardiologist:       Dov Sandoval MD   Referring Physician:           Garo Dobson MD     Procedures Performed   Procedures:              1.    Ultrasound Guided Access     2.    Venous Access - Right Femoral   3.    RHC     Indications:               Congestive heart failure, acute on chronic  systolic    Presentation:   67 yo male with CAD (s/p CABG), HFrEF (EF 15-20%), DM, HTN, CKD, ICD,  asthma/COPD, recurrent  pneumonia, and prior CVA presents with worsening SOB and leg swelling.      Diagnostic Conclusions:   RA 9 mmHg. Elevated PCWP (24 mmHg). Cardiac index 2.2 L/min/m2. SVR  1510 dsc  .     Recommendations:   Additional diuresis as tolerated for management of acute on chronic  systolic heart failure.      Acute complication:    No complications

## 2022-12-06 NOTE — PATIENT PROFILE ADULT - FALL HARM RISK - HARM RISK INTERVENTIONS

## 2022-12-06 NOTE — PATIENT PROFILE ADULT - FUNCTIONAL ASSESSMENT - BASIC MOBILITY 1.
2 = A lot of assistance
DVT prophylaxis: VenodynES
DVT prophylaxis: Venodyne, + Thrombocytopenia
DVT prophylaxis: VenodynES
DVT prophylaxis: Venodyne, + Thrombocytopenia
DVT prophylaxis: Venodyne, + Thrombocytopenia
FS, Sliding scale, Hold Oral Hypoglycemic agents  HgbA1c, Fructosamine, TSH,

## 2022-12-06 NOTE — PROCEDURE NOTE - NSINTVT-VFEP_CARD_ALL_CORE
Restorative Specialist Mobility Note       Activity: Ambulate in rasmussen, Chair     Assistive Device: Front wheel walker  Distance Ambulated (ft): 400 ft Yes

## 2022-12-06 NOTE — CONSULT NOTE ADULT - NS ATTEND AMEND GEN_ALL_CORE FT
67 year old male with past history significant of HTN. HLD, DMT2, CAD, CABG, CHF (EF = 14% - 10/17/2022), Mdt single chamber ICD implanted 2015 for primary prevention, asthma, COPD w/ home oxygen (2L), CKD, CVA w/ left sided weakness/deficets and unsteady gait (uses cane or walker), presented to the ED on 12/4/22 with acute on chronic decompensated heart failure. Being diuresed but continues to be symptomatic. On optimal medical therapy for several years and is compliant with home medications and dietary/fluid restrictions; however, has had 7-8 hospitalizations 2/2 fluid overload.  He has occasional palpitations but no syncope or near syncope and no ICD shocks.  Device interrogation showed a single chamber Medtronic ICD with appropriate pacing and sensing. Good capture threshold. RV pacing < .1%. 4 brief episodes of NSVT but no ICD therapies. Optivol fluid accumulation from 10/15/22 - 11/22/22. Fluid index is rising since Dec 1st but has not crossed the threshold.  Echo 10/2022: LVEF 14%.  EKG: LBBB with QRSd 140-142ms. NYHA class III-IV. RV pacing < .1%. Patient is a CRT-D candidate and may benefit from upgrade of his device. We have had this discussion with the patient and he is in agreement. Will plan for the procedure this admission after he is medically optimized and able to lie flat.

## 2022-12-06 NOTE — PROGRESS NOTE ADULT - SUBJECTIVE AND OBJECTIVE BOX
Patient is a 67y old  Male who presents with a chief complaint of Shortness of Breath (06 Dec 2022 11:14)      SUBJECTIVE / OVERNIGHT EVENTS: patient seen and examined by bedside, pt feeling better , chest pain and SOB improved from yesterday , pt says that  he is not making enough urine, denies headache, dizziness, Palpitations , N/V/D, abdominal pain  Tele : Sinus Rhythm, 3 beats Vtach, 5 beats v tach overnight       MEDICATIONS  (STANDING):  aspirin enteric coated 81 milliGRAM(s) Oral daily  atorvastatin 80 milliGRAM(s) Oral at bedtime  budesonide 160 MICROgram(s)/formoterol 4.5 MICROgram(s) Inhaler 2 Puff(s) Inhalation two times a day  buMETAnide IVPB 4 milliGRAM(s) IV Intermittent two times a day  carvedilol 3.125 milliGRAM(s) Oral every 12 hours  clopidogrel Tablet 75 milliGRAM(s) Oral daily  dextrose 50% Injectable 25 Gram(s) IV Push once  dextrose 50% Injectable 12.5 Gram(s) IV Push once  dextrose 50% Injectable 25 Gram(s) IV Push once  dextrose Oral Gel 15 Gram(s) Oral once  ferrous    sulfate 325 milliGRAM(s) Oral daily  glucagon  Injectable 1 milliGRAM(s) IntraMuscular once  heparin   Injectable 5000 Unit(s) SubCutaneous every 8 hours  hydrALAZINE 50 milliGRAM(s) Oral three times a day  isosorbide   dinitrate Tablet (ISORDIL) 30 milliGRAM(s) Oral three times a day  levothyroxine 50 MICROGram(s) Oral daily  pantoprazole    Tablet 40 milliGRAM(s) Oral before breakfast  spironolactone 25 milliGRAM(s) Oral daily  tamsulosin 0.4 milliGRAM(s) Oral at bedtime    MEDICATIONS  (PRN):      Vital Signs Last 24 Hrs  T(C): 36.9 (06 Dec 2022 11:03), Max: 36.9 (06 Dec 2022 11:03)  T(F): 98.4 (06 Dec 2022 11:03), Max: 98.4 (06 Dec 2022 11:03)  HR: 82 (06 Dec 2022 11:03) (82 - 96)  BP: 98/58 (06 Dec 2022 11:03) (97/69 - 130/70)  BP(mean): 78 (05 Dec 2022 22:53) (78 - 81)  RR: 18 (06 Dec 2022 11:03) (16 - 22)  SpO2: 98% (06 Dec 2022 11:03) (97% - 100%)    Parameters below as of 06 Dec 2022 11:03  Patient On (Oxygen Delivery Method): nasal cannula  O2 Flow (L/min): 2    CAPILLARY BLOOD GLUCOSE      POCT Blood Glucose.: 119 mg/dL (06 Dec 2022 11:58)  POCT Blood Glucose.: 98 mg/dL (06 Dec 2022 08:06)  POCT Blood Glucose.: 90 mg/dL (06 Dec 2022 03:42)  POCT Blood Glucose.: 95 mg/dL (06 Dec 2022 03:00)  POCT Blood Glucose.: 185 mg/dL (05 Dec 2022 22:46)  POCT Blood Glucose.: 113 mg/dL (05 Dec 2022 18:21)  POCT Blood Glucose.: 150 mg/dL (05 Dec 2022 14:18)    I&O's Summary    05 Dec 2022 07:01  -  06 Dec 2022 07:00  --------------------------------------------------------  IN: 150 mL / OUT: 500 mL / NET: -350 mL    06 Dec 2022 07:01  -  06 Dec 2022 13:59  --------------------------------------------------------  IN: 0 mL / OUT: 400 mL / NET: -400 mL        PHYSICAL EXAM:  GENERAL: NAD, well-developed  HEAD:  Atraumatic, Normocephalic  EYES: EOMI, PERRLA, conjunctiva and sclera clear  NECK: Supple,  JVD +  CHEST/LUNG: .  Scattered rhonchi bilaterally.  Decreased breath sounds at the bases  HEART: Regular rate and rhythm; No murmurs, rubs, or gallops  ABDOMEN: Soft, Nontender, Nondistended; Bowel sounds present  EXTREMITIES:  2+ Peripheral Pulses, No clubbing, cyanosis, B/L LE edema   PSYCH: AAOx3, appears anxious   NEUROLOGY: non-focal  SKIN: No rashes or lesions        LABS:                        13.0   11.08 )-----------( 165      ( 06 Dec 2022 05:01 )             41.7     12-06    133<L>  |  94<L>  |  35<H>  ----------------------------<  113<H>  3.7   |  26  |  2.25<H>    Ca    9.1      06 Dec 2022 05:01  Phos  3.3     12-  Mg     1.80     12-    TPro  6.6  /  Alb  3.8  /  TBili  1.0  /  DBili  x   /  AST  29  /  ALT  17  /  AlkPhos  102  12-06    PT/INR - ( 04 Dec 2022 17:36 )   PT: 13.1 sec;   INR: 1.13 ratio         PTT - ( 04 Dec 2022 17:36 )  PTT:31.2 sec      Urinalysis Basic - ( 04 Dec 2022 23:52 )    Color: Colorless / Appearance: Clear / S.007 / pH: x  Gluc: x / Ketone: Negative  / Bili: Negative / Urobili: <2 mg/dL   Blood: x / Protein: Negative / Nitrite: Negative   Leuk Esterase: Negative / RBC: 1 /HPF / WBC 0 /HPF   Sq Epi: x / Non Sq Epi: 0 /HPF / Bacteria: Negative        RADIOLOGY & ADDITIONAL TESTS:    Imaging Personally Reviewed:    Consultant(s) Notes Reviewed: heart failure      Care Discussed with Consultants/Other Providers:

## 2022-12-06 NOTE — SWALLOW BEDSIDE ASSESSMENT ADULT - SWALLOW EVAL: DIAGNOSIS
1- Functional oral stage for puree, minced and moist solids, soft and bite sized solids, regular solids, and thin liquids marked by adequate oral containment, adequate manipulation and timely mastication, adequate anterior to posterior transfer/transit with oral clearance noted. 2- Functional pharyngeal stage for puree, minced and moist solids, soft and bite sized solids, regular solids, and thin liquids marked by 1- Functional oral stage for puree, minced and moist solids, soft and bite sized solids, regular solids, and thin liquids marked by adequate oral containment, adequate manipulation and timely mastication, adequate anterior to posterior transfer/transit with oral clearance noted. 2- Functional pharyngeal stage for puree, minced and moist solids, soft and bite sized solids, regular solids, and thin liquids marked by initiation of pharyngeal swallow trigger and hyolaryngeal excursion upon palpation. No overt signs of aspiration across PO consistencies however patient indicated "stuck" sensation in his chest/sternum area. 1- Functional oral stage for puree, minced and moist solids, soft and bite sized solids, regular solids, and thin liquids marked by adequate oral containment, adequate manipulation and timely mastication, adequate anterior to posterior transfer/transit with oral clearance noted. 2- Functional pharyngeal stage for puree, minced and moist solids, soft and bite sized solids, regular solids, and thin liquids marked by initiation of pharyngeal swallow trigger and hyolaryngeal excursion upon palpation. No overt signs of aspiration across PO consistencies however patient indicated "stuck" sensation in his chest/sternum area post oral intake of all solid PO trials (minced/moist, soft and bite sized, regular solids).

## 2022-12-06 NOTE — PROCEDURE NOTE - ADDITIONAL PROCEDURE DETAILS
Asked to interrogate device for RV pacing burden and Optivol.   Device sensing and pacing well.  Capture threshold evaluated via iterative testing.  Battery life estimated at 2.9 years.   4 brief episode of NSVT, last on 11/6/22 lasting 1 second.  bpm. No therapies delivered.   Possible Optivol fluid accumulation 10/15/22 through 11/22/22 and again beginning 12/1/22.  No reprogramming.

## 2022-12-06 NOTE — SWALLOW BEDSIDE ASSESSMENT ADULT - COMMENTS
Progress Note- Hospitalist 12/6: "67 year old male, with past history significant for CAD, CHF (EF = 15 to 20%), CABG, HTN, HLD, Type-2 DM, AICD, Asthma, COPD, Home oxygen (2L), CKD, CVA w/ Left sided deficits, and Gait difficulty (uses walker), presented to the ED secondary to chest pain and shortness of breath.  Pt admitted with acute on chronic heart failure."    CR Chest 12/4: "IMPRESSION: Increased bronchovascular markings, suggestive of pulmonary edema. Underlying infiltrate is not excluded."    Patient seen at bedside awake/alert and cooperative. Patient is able to follow simple commands and make basic needs known. Upon questioning, patient indicated that during PO intake food feels "stuck" and pointed to his chest. Patient is on nasal cannula. Progress Note- Hospitalist 12/6: "67 year old male, with past history significant for CAD, CHF (EF = 15 to 20%), CABG, HTN, HLD, Type-2 DM, AICD, Asthma, COPD, Home oxygen (2L), CKD, CVA w/ Left sided deficits, and Gait difficulty (uses walker), presented to the ED secondary to chest pain and shortness of breath.  Pt admitted with acute on chronic heart failure."    CR Chest 12/4: "IMPRESSION: Increased bronchovascular markings, suggestive of pulmonary edema. Underlying infiltrate is not excluded."    Patient seen at bedside awake/alert and cooperative. Patient is able to follow simple commands and make basic needs known. Upon questioning, patient indicated that during PO intake food feels "stuck" while pointing at his chest/sternum area. Patient is on nasal cannula.

## 2022-12-06 NOTE — PROGRESS NOTE ADULT - PROBLEM SELECTOR PLAN 1
- presented w/ pain involving the entire chest area, shortness of breath, KINNEY, insomnia due to orthopnea  - reports decreased urine output over the past 2 days at least (After ~ 5.5 hours of furosemide 80 mg IV, in the setting of CKD4 w/ urine output of 400 mL)  - last TTE of 10/17/2022 w/ EF of 14%  - pro-BNP = 7986, Trop = 77 --> 77, ECG w/o any acute changes from prior study of 10/14/2022  - TSH currently 5.71; f/u FT3, T4  - findings on CXR consistent w/ pulmonary edema  - on Bumex 3 mg PO daily  - received furosemide 80 mg IV x one and  Bumex 2 mg IV   - Heart failure team eval appreciated , recommend Start Bumex 4mg IV BID  - intake/output Q4H, weight daily, HOB elevation 60 to 90 degrees.  Fluid restriction order when PO intake initiated  - no signs of infection  - cards also recommend - EP interrogate ICD. Possible for CRT upgrade and repeat ECHO  - will check Bladder scan , R/O retention

## 2022-12-06 NOTE — CONSULT NOTE ADULT - ASSESSMENT
67 year old male with past history significant of HTN. HLD, DMT2, CAD, CABG, CHF (EF = 14% - 10/17/2022), Mdt single chamber ICD implanted 2015 for primary prevention, asthma, COPD w/ home oxygen (2L), CKD, CVA w/ left sided weakness/deficets and unsteady gait (uses cane or walker), presented to the ED on 12/4/22 with chest discomfort, progressive shortness of breath  x 4-5 days, lower extremity edema and inability to sleep despite using 2 pillows. Unable to walk from room to room in his home without dyspnea. States he is only feeling slightly better since admission and speaks in short sentences due to increased respiratory effort. On optimal medical therapy for several years and is compliant with home medications and dietary/fluid restrictions but none the less has had 7-8 hospitalizations 2/2 fluid overload since 2018.  Adheres to fluid restriction at home and is medication compliant. He has occasional palpitations but no syncope or near syncope and no ICD shocks.     Device interrogation showed a single chamber Medtronic ICD with appropriate pacing and sensing. Good capture threshold. RV pacing < .1%. 4 brief episodes of NSVT but no ICD therapies. Optivol fluid accumulation from 10/15/22 - 11/22/22. Fluid index is rising since Dec 1st but has not crossed the threshold.    EP consulted for possible CRT upgrade.      67 year old male with past history significant of HTN. HLD, DMT2, CAD, CABG, CHF (EF = 14% - 10/17/2022), Mdt single chamber ICD implanted 2015 for primary prevention, asthma, COPD w/ home oxygen (2L), CKD, CVA w/ left sided weakness/deficets and unsteady gait (uses cane or walker), presented to the ED on 12/4/22 with acute on chronic decompensated heart failure. Being diuresed but continues to be symptomatic. On optimal medical therapy for several years and is compliant with home medications and dietary/fluid restrictions however, has had 7-8 hospitalizations 2/2 fluid overload.  He has occasional palpitations but no syncope or near syncope and no ICD shocks.     Device interrogation showed a single chamber Medtronic ICD with appropriate pacing and sensing. Good capture threshold. RV pacing < .1%. 4 brief episodes of NSVT but no ICD therapies. Optivol fluid accumulation from 10/15/22 - 11/22/22. Fluid index is rising since Dec 1st but has not crossed the threshold.    Echo 10/2022: LVEF 14%.  EKG: LBBB with QRSd 140-142ms.   NYHA class III-IV.   RV pacing < .1%.   Patient is a CRT-D candidate and may benefit from upgrade of his device. We have had this discussion with the patient and he is in agreement. Will plan for the procedure this admission after he is medically optimized and able to lay flat.

## 2022-12-06 NOTE — SWALLOW BEDSIDE ASSESSMENT ADULT - ADDITIONAL RECOMMENDATIONS
This department to follow up as schedule permits to assess for diet tolerance. Medical team further advised to reconsult if there is a change in medical status and/or observed change in patient's tolerance of recommended PO diet.

## 2022-12-06 NOTE — PROGRESS NOTE ADULT - PROBLEM SELECTOR PLAN 1
IV Bumex 4mg BID  Coreg 3.125mg BID (hold SBP <90)  Hydralazine 50mg TID (hold SBP <90)  Isordil 30mg TID  Spironolactone 25mg daily   Daily standing weights   Strict I/O    Monitor lytes replete K>4.0 and Mg>2.0  TTE ordered/pending   Possible ischemic workup this admission  EP Consult (interrogate ICD/possible CRT upgrade)    Management per primary team  Pending final recommendations from HF attending IV Bumex 4mg BID  Coreg 3.125mg BID (hold SBP <90)  Hydralazine 50mg TID (hold SBP <90)  Isordil 30mg TID  Spironolactone 25mg daily   Daily standing weights   Strict I/O    Monitor lytes replete K>4.0 and Mg>2.0  TTE ordered/pending   Possible ischemic workup this admission  EP Consult (interrogate Medtronic ICD/possible CRT upgrade)    Management per primary team  Pending final recommendations from HF attending IV Bumex 4mg BID  Please order metolazone 2.5mg X1  now   Coreg 3.125mg BID (hold SBP <90)  Hydralazine 50mg TID (hold SBP <90)  Isordil 30mg TID  Spironolactone 25mg daily   Daily standing weights   Strict I/O    Monitor lytes replete K>4.0 and Mg>2.0  TTE ordered/pending   Possible ischemic workup this admission  EP Consult (interrogate Medtronic ICD/possible CRT upgrade)    Management per primary team  Pending final recommendations from HF attending

## 2022-12-06 NOTE — PROGRESS NOTE ADULT - SUBJECTIVE AND OBJECTIVE BOX
Interval History:  Patient resting comfortably in bed  He reports   Denies CP/SOB/palpitations/dizziness  No acute events overnight    Medications:  aspirin enteric coated 81 milliGRAM(s) Oral daily  atorvastatin 80 milliGRAM(s) Oral at bedtime  budesonide 160 MICROgram(s)/formoterol 4.5 MICROgram(s) Inhaler 2 Puff(s) Inhalation two times a day  buMETAnide IVPB 4 milliGRAM(s) IV Intermittent two times a day  carvedilol 3.125 milliGRAM(s) Oral every 12 hours  clopidogrel Tablet 75 milliGRAM(s) Oral daily  dextrose 50% Injectable 25 Gram(s) IV Push once  dextrose 50% Injectable 12.5 Gram(s) IV Push once  dextrose 50% Injectable 25 Gram(s) IV Push once  dextrose Oral Gel 15 Gram(s) Oral once  ferrous    sulfate 325 milliGRAM(s) Oral daily  glucagon  Injectable 1 milliGRAM(s) IntraMuscular once  heparin   Injectable 5000 Unit(s) SubCutaneous every 8 hours  hydrALAZINE 50 milliGRAM(s) Oral three times a day  isosorbide   dinitrate Tablet (ISORDIL) 30 milliGRAM(s) Oral three times a day  levothyroxine 50 MICROGram(s) Oral daily  pantoprazole    Tablet 40 milliGRAM(s) Oral before breakfast  spironolactone 25 milliGRAM(s) Oral daily  tamsulosin 0.4 milliGRAM(s) Oral at bedtime      Vitals:  T(C): 36.9 (22 @ 11:03), Max: 36.9 (22 @ 11:03)  HR: 82 (22 @ 11:03) (82 - 96)  BP: 98/58 (22 @ 11:03) (97/69 - 156/70)  BP(mean): 78 (22 @ 22:53) (78 - 81)  RR: 18 (22 @ 11:03) (16 - 22)  SpO2: 98% (22 @ 11:03) (97% - 100%)    Daily Height in cm: 177.8 (06 Dec 2022 03:52)    Daily Weight in k.9 (06 Dec 2022 03:52)    Weight (kg): 80.9 ( @ 03:52)    I&O's Summary    05 Dec 2022 07:01  -  06 Dec 2022 07:00  --------------------------------------------------------  IN: 150 mL / OUT: 500 mL / NET: -350 mL        Physical Exam:  Appearance: No Acute Distress  Neck:  JVD  Cardiovascular: Normal S1 S2  Respiratory: Clear to auscultation bilaterally  Gastrointestinal: Soft, Non-tender	  Skin: No cyanosis	  Neurologic: Non-focal  Extremities: BLE edema  Psychiatry: A & O x 3, Mood & affect appropriate    Labs:                        13.0   11.08 )-----------( 165      ( 06 Dec 2022 05:01 )             41.7     12-06    133<L>  |  94<L>  |  35<H>  ----------------------------<  113<H>  3.7   |  26  |  2.25<H>    Ca    9.1      06 Dec 2022 05:01  Phos  3.3     12-06  Mg     1.80     12-06    TPro  6.6  /  Alb  3.8  /  TBili  1.0  /  DBili  x   /  AST  29  /  ALT  17  /  AlkPhos  102  12-06    PT/INR - ( 04 Dec 2022 17:36 )   PT: 13.1 sec;   INR: 1.13 ratio         PTT - ( 04 Dec 2022 17:36 )  PTT:31.2 sec    12 Lead ECG (10.14.22 @ 23:23)     Ventricular Rate 99 BPM    Atrial Rate 99 BPM    P-R Interval 182 ms    QRS Duration 142 ms    Q-T Interval 412 ms    QTC Calculation(Bazett) 528 ms    P Axis 74 degrees    R Axis -47 degrees    T Axis 107 degrees    Diagnosis Line Sinus rhythm with occasional Premature ventricular complexes  Possible Left atrial enlargement  Left axis deviation  Left bundle branch block  Abnormal ECG          TELEMETRY: Sinus Rhythm    Echocardiogram:  Transthoracic Echocardiogram (10.17.22 @ 14:12)    DIMENSIONS:  Dimensions:     Normal Values:  LA:     4.2 cm    2.0 - 4.0 cm  Ao:     3.5 cm    2.0 - 3.8 cm  SEPTUM: 0.8 cm    0.6 - 1.2 cm  PWT:    0.9 cm    0.6 - 1.1 cm  LVIDd:  5.9 cm    3.0 - 5.6 cm  LVIDs:    ---     1.8 - 4.0 cm  Derived Variables:  LVMI: 100 g/m2  RWT: 0.30  Ejection Fraction (Modified Velazquez Rule): 14 %  ------------------------------------------------------------------------  OBSERVATIONS:  Mitral Valve: Tethered mitral valve leaflets with normal  opening. Mild mitral regurgitation.  Aortic Root: Normal aortic root.  Aortic Valve: Normal trileaflet aortic valve. Minimal  aortic regurgitation.  LVOT velocity time integral equals  16 cm.  Left Atrium: Normal left atrium.  LA volume index = 31  cc/m2.  Left Ventricle: Severe global left ventricular systolic  dysfunction. Endocardial visualization enhanced with  intravenous injection of echo contrast (Definity). Mild  left ventricular enlargement. Normal  E/e'  is consistent  with probably normal left ventricular filling pressure.  Right Heart: A device wire is noted in the right heart.  Right ventricular enlargement with decreased right  ventricular systolic function. Normal tricuspid valve.  Mild-moderate tricuspid regurgitation. Normal pulmonic  valve.  Pericardium/PleuraNormal pericardium with no pericardial  effusion.  ------------------------------------------------------------------------  CONCLUSIONS:  1. Tethered mitral valve leaflets with normal opening. Mild  mitral regurgitation.  2. Normal trileaflet aortic valve. Minimal aortic  regurgitation.  3. Mild left ventricular enlargement.  4. Severe global left ventricular systolic dysfunction.  Endocardial visualization enhanced with intravenous  injection of echo contrast (Definity). LVOT velocity time       Interval History:  Patient resting comfortably in bed  He reports no difficulty breathing while lying flat   Denies CP/SOB/palpitations/dizziness  No acute events overnight    Medications:  aspirin enteric coated 81 milliGRAM(s) Oral daily  atorvastatin 80 milliGRAM(s) Oral at bedtime  budesonide 160 MICROgram(s)/formoterol 4.5 MICROgram(s) Inhaler 2 Puff(s) Inhalation two times a day  buMETAnide IVPB 4 milliGRAM(s) IV Intermittent two times a day  carvedilol 3.125 milliGRAM(s) Oral every 12 hours  clopidogrel Tablet 75 milliGRAM(s) Oral daily  dextrose 50% Injectable 25 Gram(s) IV Push once  dextrose 50% Injectable 12.5 Gram(s) IV Push once  dextrose 50% Injectable 25 Gram(s) IV Push once  dextrose Oral Gel 15 Gram(s) Oral once  ferrous    sulfate 325 milliGRAM(s) Oral daily  glucagon  Injectable 1 milliGRAM(s) IntraMuscular once  heparin   Injectable 5000 Unit(s) SubCutaneous every 8 hours  hydrALAZINE 50 milliGRAM(s) Oral three times a day  isosorbide   dinitrate Tablet (ISORDIL) 30 milliGRAM(s) Oral three times a day  levothyroxine 50 MICROGram(s) Oral daily  pantoprazole    Tablet 40 milliGRAM(s) Oral before breakfast  spironolactone 25 milliGRAM(s) Oral daily  tamsulosin 0.4 milliGRAM(s) Oral at bedtime      Vitals:  T(C): 36.9 (22 @ 11:03), Max: 36.9 (22 @ 11:03)  HR: 82 (22 @ 11:03) (82 - 96)  BP: 98/58 (22 @ 11:03) (97/69 - 156/70)  BP(mean): 78 (22 @ 22:53) (78 - 81)  RR: 18 (22 @ 11:03) (16 - 22)  SpO2: 98% (22 @ 11:03) (97% - 100%)    Daily Height in cm: 177.8 (06 Dec 2022 03:52)    Daily Weight in k.9 (06 Dec 2022 03:52)    Weight (kg): 80.9 ( @ 03:52)    I&O's Summary    05 Dec 2022 07:01  -  06 Dec 2022 07:00  --------------------------------------------------------  IN: 150 mL / OUT: 500 mL / NET: -350 mL        Physical Exam:  Appearance: No Acute Distress  Neck:  JVP~12  Cardiovascular: Normal S1 S2  Respiratory: Clear to auscultation bilaterally  Gastrointestinal: Soft, Non-tender, slightly distended 	  Skin: No cyanosis	  Neurologic: Non-focal  Extremities: Trace BLE edema  Psychiatry: A & O x 3, Mood & affect appropriate    Labs:                        13.0   11.08 )-----------( 165      ( 06 Dec 2022 05:01 )             41.7     12-    133<L>  |  94<L>  |  35<H>  ----------------------------<  113<H>  3.7   |  26  |  2.25<H>    Ca    9.1      06 Dec 2022 05:01  Phos  3.3     12-  Mg     1.80     12-    TPro  6.6  /  Alb  3.8  /  TBili  1.0  /  DBili  x   /  AST  29  /  ALT  17  /  AlkPhos  102  12-06    PT/INR - ( 04 Dec 2022 17:36 )   PT: 13.1 sec;   INR: 1.13 ratio         PTT - ( 04 Dec 2022 17:36 )  PTT:31.2 sec    12 Lead ECG (10.14.22 @ 23:23)     Ventricular Rate 99 BPM    Atrial Rate 99 BPM    P-R Interval 182 ms    QRS Duration 142 ms    Q-T Interval 412 ms    QTC Calculation(Bazett) 528 ms    P Axis 74 degrees    R Axis -47 degrees    T Axis 107 degrees    Diagnosis Line Sinus rhythm with occasional Premature ventricular complexes  Possible Left atrial enlargement  Left axis deviation  Left bundle branch block  Abnormal ECG          TELEMETRY: Sinus Rhythm with brief runs of NSVT    Echocardiogram:  Transthoracic Echocardiogram (10.17.22 @ 14:12)    DIMENSIONS:  Dimensions:     Normal Values:  LA:     4.2 cm    2.0 - 4.0 cm  Ao:     3.5 cm    2.0 - 3.8 cm  SEPTUM: 0.8 cm    0.6 - 1.2 cm  PWT:    0.9 cm    0.6 - 1.1 cm  LVIDd:  5.9 cm    3.0 - 5.6 cm  LVIDs:    ---     1.8 - 4.0 cm  Derived Variables:  LVMI: 100 g/m2  RWT: 0.30  Ejection Fraction (Modified Velazquez Rule): 14 %  ------------------------------------------------------------------------  OBSERVATIONS:  Mitral Valve: Tethered mitral valve leaflets with normal  opening. Mild mitral regurgitation.  Aortic Root: Normal aortic root.  Aortic Valve: Normal trileaflet aortic valve. Minimal  aortic regurgitation.  LVOT velocity time integral equals  16 cm.  Left Atrium: Normal left atrium.  LA volume index = 31  cc/m2.  Left Ventricle: Severe global left ventricular systolic  dysfunction. Endocardial visualization enhanced with  intravenous injection of echo contrast (Definity). Mild  left ventricular enlargement. Normal  E/e'  is consistent  with probably normal left ventricular filling pressure.  Right Heart: A device wire is noted in the right heart.  Right ventricular enlargement with decreased right  ventricular systolic function. Normal tricuspid valve.  Mild-moderate tricuspid regurgitation. Normal pulmonic  valve.  Pericardium/PleuraNormal pericardium with no pericardial  effusion.  ------------------------------------------------------------------------  CONCLUSIONS:  1. Tethered mitral valve leaflets with normal opening. Mild  mitral regurgitation.  2. Normal trileaflet aortic valve. Minimal aortic  regurgitation.  3. Mild left ventricular enlargement.  4. Severe global left ventricular systolic dysfunction.  Endocardial visualization enhanced with intravenous  injection of echo contrast (Definity). LVOT velocity time       Interval History:  Patient resting comfortably in bed  He reports some difficulty breathing while lying flat and scattered expiratory wheeze  Denies CP/SOB/palpitations/dizziness  No acute events overnight    Medications:  aspirin enteric coated 81 milliGRAM(s) Oral daily  atorvastatin 80 milliGRAM(s) Oral at bedtime  budesonide 160 MICROgram(s)/formoterol 4.5 MICROgram(s) Inhaler 2 Puff(s) Inhalation two times a day  buMETAnide IVPB 4 milliGRAM(s) IV Intermittent two times a day  carvedilol 3.125 milliGRAM(s) Oral every 12 hours  clopidogrel Tablet 75 milliGRAM(s) Oral daily  dextrose 50% Injectable 25 Gram(s) IV Push once  dextrose 50% Injectable 12.5 Gram(s) IV Push once  dextrose 50% Injectable 25 Gram(s) IV Push once  dextrose Oral Gel 15 Gram(s) Oral once  ferrous    sulfate 325 milliGRAM(s) Oral daily  glucagon  Injectable 1 milliGRAM(s) IntraMuscular once  heparin   Injectable 5000 Unit(s) SubCutaneous every 8 hours  hydrALAZINE 50 milliGRAM(s) Oral three times a day  isosorbide   dinitrate Tablet (ISORDIL) 30 milliGRAM(s) Oral three times a day  levothyroxine 50 MICROGram(s) Oral daily  pantoprazole    Tablet 40 milliGRAM(s) Oral before breakfast  spironolactone 25 milliGRAM(s) Oral daily  tamsulosin 0.4 milliGRAM(s) Oral at bedtime      Vitals:  T(C): 36.9 (22 @ 11:03), Max: 36.9 (22 @ 11:03)  HR: 82 (22 @ 11:03) (82 - 96)  BP: 98/58 (22 @ 11:03) (97/69 - 156/70)  BP(mean): 78 (22 @ 22:53) (78 - 81)  RR: 18 (22 @ 11:03) (16 - 22)  SpO2: 98% (22 @ 11:03) (97% - 100%)    Daily Height in cm: 177.8 (06 Dec 2022 03:52)    Daily Weight in k.9 (06 Dec 2022 03:52)    Weight (kg): 80.9 (12-06 @ 03:52)    I&O's Summary    05 Dec 2022 07:01  -  06 Dec 2022 07:00  --------------------------------------------------------  IN: 150 mL / OUT: 500 mL / NET: -350 mL        Physical Exam:  Appearance: No Acute Distress  Neck:  JVP~12  Cardiovascular: Normal S1 S2  Respiratory: Clear to auscultation bilaterally with scattered expiratory wheeze   Gastrointestinal: Soft, Non-tender, slightly distended 	  Skin: No cyanosis	  Neurologic: Non-focal  Extremities: Trace BLE edema  Psychiatry: A & O x 3, Mood & affect appropriate    Labs:                        13.0   11.08 )-----------( 165      ( 06 Dec 2022 05:01 )             41.7     12-    133<L>  |  94<L>  |  35<H>  ----------------------------<  113<H>  3.7   |  26  |  2.25<H>    Ca    9.1      06 Dec 2022 05:01  Phos  3.3     12-  Mg     1.80     12-    TPro  6.6  /  Alb  3.8  /  TBili  1.0  /  DBili  x   /  AST  29  /  ALT  17  /  AlkPhos  102  12-    PT/INR - ( 04 Dec 2022 17:36 )   PT: 13.1 sec;   INR: 1.13 ratio         PTT - ( 04 Dec 2022 17:36 )  PTT:31.2 sec    12 Lead ECG (10.14.22 @ 23:23)     Ventricular Rate 99 BPM    Atrial Rate 99 BPM    P-R Interval 182 ms    QRS Duration 142 ms    Q-T Interval 412 ms    QTC Calculation(Bazett) 528 ms    P Axis 74 degrees    R Axis -47 degrees    T Axis 107 degrees    Diagnosis Line Sinus rhythm with occasional Premature ventricular complexes  Possible Left atrial enlargement  Left axis deviation  Left bundle branch block  Abnormal ECG          TELEMETRY: Sinus Rhythm with brief runs of NSVT    Echocardiogram:  Transthoracic Echocardiogram (10.17.22 @ 14:12)    DIMENSIONS:  Dimensions:     Normal Values:  LA:     4.2 cm    2.0 - 4.0 cm  Ao:     3.5 cm    2.0 - 3.8 cm  SEPTUM: 0.8 cm    0.6 - 1.2 cm  PWT:    0.9 cm    0.6 - 1.1 cm  LVIDd:  5.9 cm    3.0 - 5.6 cm  LVIDs:    ---     1.8 - 4.0 cm  Derived Variables:  LVMI: 100 g/m2  RWT: 0.30  Ejection Fraction (Modified Velazquez Rule): 14 %  ------------------------------------------------------------------------  OBSERVATIONS:  Mitral Valve: Tethered mitral valve leaflets with normal  opening. Mild mitral regurgitation.  Aortic Root: Normal aortic root.  Aortic Valve: Normal trileaflet aortic valve. Minimal  aortic regurgitation.  LVOT velocity time integral equals  16 cm.  Left Atrium: Normal left atrium.  LA volume index = 31  cc/m2.  Left Ventricle: Severe global left ventricular systolic  dysfunction. Endocardial visualization enhanced with  intravenous injection of echo contrast (Definity). Mild  left ventricular enlargement. Normal  E/e'  is consistent  with probably normal left ventricular filling pressure.  Right Heart: A device wire is noted in the right heart.  Right ventricular enlargement with decreased right  ventricular systolic function. Normal tricuspid valve.  Mild-moderate tricuspid regurgitation. Normal pulmonic  valve.  Pericardium/PleuraNormal pericardium with no pericardial  effusion.  ------------------------------------------------------------------------  CONCLUSIONS:  1. Tethered mitral valve leaflets with normal opening. Mild  mitral regurgitation.  2. Normal trileaflet aortic valve. Minimal aortic  regurgitation.  3. Mild left ventricular enlargement.  4. Severe global left ventricular systolic dysfunction.  Endocardial visualization enhanced with intravenous  injection of echo contrast (Definity). LVOT velocity time

## 2022-12-06 NOTE — PROGRESS NOTE ADULT - ASSESSMENT
67 year old male, with past history significant for CAD, CHF (EF = 14% - 10/17/2022), CABG, HTN, HLD, Type-2 DM, AICD, Asthma, COPD, Home oxygen (2L), CKD, CVA w/ Left sided deficits, and Gait difficulty (uses walker), presented to the ED secondary shortness of breath starting Friday.  Patient uses 2 pillows, but has not been able to sleep for the past 3 nights, at least.  Ambulates with a walker at baseline, but has increased limitations now due to KINNEY. Seen and evaluated at bedside; has son BiPAP, has shortness of breath and speaks in short sentences (sometimes having to repeat information due to increased respiratory effort).  Adheres to fluid restriction at home and is medication compliant.  Notes decreased urine output over the past ~ 2 days.  Patient was removed from Bipap by ED doctor and myself and on 4L NC. Vital signs upon ED presentation as follows:  BP =126/83, HR = 102, RR = 22, T = 36.9 C (98.4 F), O2 Sat = 99% on RA to 100% on BiPAP    RHC: 5/26/2022  RA 9 mmHg. Elevated PCWP 24. PA sat 56%. C.O. 3.97. Cardiac index 2.2. SVR 1510

## 2022-12-06 NOTE — SWALLOW BEDSIDE ASSESSMENT ADULT - ASR SWALLOW REFERRAL
Consider GI consult to assess and rule out an esophageal component given patient indicating "stuck" sensation in chest/sternum area post oral intake at MD's discretion.

## 2022-12-07 LAB
A1C WITH ESTIMATED AVERAGE GLUCOSE RESULT: 6.2 % — HIGH (ref 4–5.6)
ANION GAP SERPL CALC-SCNC: 24 MMOL/L — HIGH (ref 7–14)
B PERT DNA SPEC QL NAA+PROBE: SIGNIFICANT CHANGE UP
B PERT+PARAPERT DNA PNL SPEC NAA+PROBE: SIGNIFICANT CHANGE UP
BORDETELLA PARAPERTUSSIS (RAPRVP): SIGNIFICANT CHANGE UP
BUN SERPL-MCNC: 36 MG/DL — HIGH (ref 7–23)
C PNEUM DNA SPEC QL NAA+PROBE: SIGNIFICANT CHANGE UP
CALCIUM SERPL-MCNC: 9.1 MG/DL — SIGNIFICANT CHANGE UP (ref 8.4–10.5)
CHLORIDE SERPL-SCNC: 87 MMOL/L — LOW (ref 98–107)
CO2 SERPL-SCNC: 31 MMOL/L — SIGNIFICANT CHANGE UP (ref 22–31)
CREAT SERPL-MCNC: 2.33 MG/DL — HIGH (ref 0.5–1.3)
EGFR: 30 ML/MIN/1.73M2 — LOW
ESTIMATED AVERAGE GLUCOSE: 131 — SIGNIFICANT CHANGE UP
FLUAV SUBTYP SPEC NAA+PROBE: SIGNIFICANT CHANGE UP
FLUBV RNA SPEC QL NAA+PROBE: SIGNIFICANT CHANGE UP
GLUCOSE BLDC GLUCOMTR-MCNC: 131 MG/DL — HIGH (ref 70–99)
GLUCOSE BLDC GLUCOMTR-MCNC: 143 MG/DL — HIGH (ref 70–99)
GLUCOSE BLDC GLUCOMTR-MCNC: 170 MG/DL — HIGH (ref 70–99)
GLUCOSE BLDC GLUCOMTR-MCNC: 84 MG/DL — SIGNIFICANT CHANGE UP (ref 70–99)
GLUCOSE SERPL-MCNC: 84 MG/DL — SIGNIFICANT CHANGE UP (ref 70–99)
HADV DNA SPEC QL NAA+PROBE: SIGNIFICANT CHANGE UP
HCOV 229E RNA SPEC QL NAA+PROBE: SIGNIFICANT CHANGE UP
HCOV HKU1 RNA SPEC QL NAA+PROBE: SIGNIFICANT CHANGE UP
HCOV NL63 RNA SPEC QL NAA+PROBE: SIGNIFICANT CHANGE UP
HCOV OC43 RNA SPEC QL NAA+PROBE: SIGNIFICANT CHANGE UP
HCT VFR BLD CALC: 38.3 % — LOW (ref 39–50)
HGB BLD-MCNC: 12.1 G/DL — LOW (ref 13–17)
HMPV RNA SPEC QL NAA+PROBE: SIGNIFICANT CHANGE UP
HPIV1 RNA SPEC QL NAA+PROBE: SIGNIFICANT CHANGE UP
HPIV2 RNA SPEC QL NAA+PROBE: SIGNIFICANT CHANGE UP
HPIV3 RNA SPEC QL NAA+PROBE: SIGNIFICANT CHANGE UP
HPIV4 RNA SPEC QL NAA+PROBE: SIGNIFICANT CHANGE UP
M PNEUMO DNA SPEC QL NAA+PROBE: SIGNIFICANT CHANGE UP
MAGNESIUM SERPL-MCNC: 1.7 MG/DL — SIGNIFICANT CHANGE UP (ref 1.6–2.6)
MCHC RBC-ENTMCNC: 31.3 PG — SIGNIFICANT CHANGE UP (ref 27–34)
MCHC RBC-ENTMCNC: 31.6 GM/DL — LOW (ref 32–36)
MCV RBC AUTO: 99 FL — SIGNIFICANT CHANGE UP (ref 80–100)
NRBC # BLD: 0 /100 WBCS — SIGNIFICANT CHANGE UP (ref 0–0)
NRBC # FLD: 0 K/UL — SIGNIFICANT CHANGE UP (ref 0–0)
PHOSPHATE SERPL-MCNC: 3 MG/DL — SIGNIFICANT CHANGE UP (ref 2.5–4.5)
PLATELET # BLD AUTO: 171 K/UL — SIGNIFICANT CHANGE UP (ref 150–400)
POTASSIUM SERPL-MCNC: 3.9 MMOL/L — SIGNIFICANT CHANGE UP (ref 3.5–5.3)
POTASSIUM SERPL-SCNC: 3.9 MMOL/L — SIGNIFICANT CHANGE UP (ref 3.5–5.3)
RAPID RVP RESULT: SIGNIFICANT CHANGE UP
RBC # BLD: 3.87 M/UL — LOW (ref 4.2–5.8)
RBC # FLD: 15.8 % — HIGH (ref 10.3–14.5)
RSV RNA SPEC QL NAA+PROBE: SIGNIFICANT CHANGE UP
RV+EV RNA SPEC QL NAA+PROBE: SIGNIFICANT CHANGE UP
SARS-COV-2 RNA SPEC QL NAA+PROBE: SIGNIFICANT CHANGE UP
SODIUM SERPL-SCNC: 142 MMOL/L — SIGNIFICANT CHANGE UP (ref 135–145)
WBC # BLD: 11.49 K/UL — HIGH (ref 3.8–10.5)
WBC # FLD AUTO: 11.49 K/UL — HIGH (ref 3.8–10.5)

## 2022-12-07 PROCEDURE — 99231 SBSQ HOSP IP/OBS SF/LOW 25: CPT

## 2022-12-07 PROCEDURE — 99233 SBSQ HOSP IP/OBS HIGH 50: CPT

## 2022-12-07 PROCEDURE — 99223 1ST HOSP IP/OBS HIGH 75: CPT | Mod: GC

## 2022-12-07 PROCEDURE — 93306 TTE W/DOPPLER COMPLETE: CPT | Mod: 26

## 2022-12-07 PROCEDURE — 99232 SBSQ HOSP IP/OBS MODERATE 35: CPT

## 2022-12-07 RX ORDER — POLYETHYLENE GLYCOL 3350 17 G/17G
17 POWDER, FOR SOLUTION ORAL DAILY
Refills: 0 | Status: DISCONTINUED | OUTPATIENT
Start: 2022-12-07 | End: 2022-12-11

## 2022-12-07 RX ORDER — SENNA PLUS 8.6 MG/1
2 TABLET ORAL AT BEDTIME
Refills: 0 | Status: DISCONTINUED | OUTPATIENT
Start: 2022-12-07 | End: 2022-12-11

## 2022-12-07 RX ORDER — ALBUTEROL 90 UG/1
2.5 AEROSOL, METERED ORAL EVERY 6 HOURS
Refills: 0 | Status: DISCONTINUED | OUTPATIENT
Start: 2022-12-07 | End: 2022-12-11

## 2022-12-07 RX ORDER — ALBUTEROL 90 UG/1
1 AEROSOL, METERED ORAL EVERY 4 HOURS
Refills: 0 | Status: DISCONTINUED | OUTPATIENT
Start: 2022-12-07 | End: 2022-12-11

## 2022-12-07 RX ADMIN — CARVEDILOL PHOSPHATE 3.12 MILLIGRAM(S): 80 CAPSULE, EXTENDED RELEASE ORAL at 20:48

## 2022-12-07 RX ADMIN — BUMETANIDE 132 MILLIGRAM(S): 0.25 INJECTION INTRAMUSCULAR; INTRAVENOUS at 20:46

## 2022-12-07 RX ADMIN — Medication 50 MILLIGRAM(S): at 17:54

## 2022-12-07 RX ADMIN — BUMETANIDE 132 MILLIGRAM(S): 0.25 INJECTION INTRAMUSCULAR; INTRAVENOUS at 10:20

## 2022-12-07 RX ADMIN — Medication 81 MILLIGRAM(S): at 13:57

## 2022-12-07 RX ADMIN — SPIRONOLACTONE 25 MILLIGRAM(S): 25 TABLET, FILM COATED ORAL at 10:19

## 2022-12-07 RX ADMIN — ALBUTEROL 2.5 MILLIGRAM(S): 90 AEROSOL, METERED ORAL at 21:02

## 2022-12-07 RX ADMIN — HEPARIN SODIUM 5000 UNIT(S): 5000 INJECTION INTRAVENOUS; SUBCUTANEOUS at 21:01

## 2022-12-07 RX ADMIN — CARVEDILOL PHOSPHATE 3.12 MILLIGRAM(S): 80 CAPSULE, EXTENDED RELEASE ORAL at 10:19

## 2022-12-07 RX ADMIN — CLOPIDOGREL BISULFATE 75 MILLIGRAM(S): 75 TABLET, FILM COATED ORAL at 13:57

## 2022-12-07 RX ADMIN — HEPARIN SODIUM 5000 UNIT(S): 5000 INJECTION INTRAVENOUS; SUBCUTANEOUS at 05:43

## 2022-12-07 RX ADMIN — SENNA PLUS 2 TABLET(S): 8.6 TABLET ORAL at 21:39

## 2022-12-07 RX ADMIN — Medication 50 MILLIGRAM(S): at 21:02

## 2022-12-07 RX ADMIN — Medication 50 MICROGRAM(S): at 05:43

## 2022-12-07 RX ADMIN — ATORVASTATIN CALCIUM 80 MILLIGRAM(S): 80 TABLET, FILM COATED ORAL at 21:02

## 2022-12-07 RX ADMIN — ALBUTEROL 2.5 MILLIGRAM(S): 90 AEROSOL, METERED ORAL at 10:47

## 2022-12-07 RX ADMIN — ISOSORBIDE DINITRATE 30 MILLIGRAM(S): 5 TABLET ORAL at 17:54

## 2022-12-07 RX ADMIN — TAMSULOSIN HYDROCHLORIDE 0.4 MILLIGRAM(S): 0.4 CAPSULE ORAL at 21:02

## 2022-12-07 RX ADMIN — HEPARIN SODIUM 5000 UNIT(S): 5000 INJECTION INTRAVENOUS; SUBCUTANEOUS at 13:57

## 2022-12-07 RX ADMIN — BUDESONIDE AND FORMOTEROL FUMARATE DIHYDRATE 2 PUFF(S): 160; 4.5 AEROSOL RESPIRATORY (INHALATION) at 20:47

## 2022-12-07 RX ADMIN — Medication 325 MILLIGRAM(S): at 13:58

## 2022-12-07 RX ADMIN — Medication 50 MILLIGRAM(S): at 00:52

## 2022-12-07 RX ADMIN — ISOSORBIDE DINITRATE 30 MILLIGRAM(S): 5 TABLET ORAL at 00:52

## 2022-12-07 RX ADMIN — ISOSORBIDE DINITRATE 30 MILLIGRAM(S): 5 TABLET ORAL at 10:19

## 2022-12-07 RX ADMIN — PANTOPRAZOLE SODIUM 40 MILLIGRAM(S): 20 TABLET, DELAYED RELEASE ORAL at 05:43

## 2022-12-07 RX ADMIN — BUDESONIDE AND FORMOTEROL FUMARATE DIHYDRATE 2 PUFF(S): 160; 4.5 AEROSOL RESPIRATORY (INHALATION) at 10:20

## 2022-12-07 RX ADMIN — Medication 50 MILLIGRAM(S): at 10:19

## 2022-12-07 NOTE — PHYSICAL THERAPY INITIAL EVALUATION ADULT - GENERAL OBSERVATIONS, REHAB EVAL
Pt received semisupine in bed, +telemetry/, +nasal cannula, in NAD. Pt agreeable to participate in PT evaluation. Pt left seated in chair, all lines intact and RN aware.

## 2022-12-07 NOTE — CONSULT NOTE ADULT - SUBJECTIVE AND OBJECTIVE BOX
67 year old male, with past history significant for CAD, CHF (EF = 14% - 10/17/2022), CABG, HTN, HLD, Type-2 DM, AICD, Asthma, COPD, Home oxygen (2L), CKD, CVA w/ Left sided deficits, and Gait difficulty (uses walker), presented to the ED secondary to chest pain and shortness of breath.  Seen and evaluated at bedside; has some increased respiratory effort, especially when attempting to speak, but no overt distress.  Information is limited since patient is on BiPAP, has shortness of breath and speaks in short sentences (sometimes having to repeat information due to increased respiratory effort).  Confirms report of chest pain - described as heaviness - upper and lower extremities swelling.  Edema significant for the past ~ 2 days.  Has had sensation of palpitations (in the context of AICD being in place).  Uses 2 pillows, but has not been able to sleep for the past 3 nights, at least.  Ambulates with a walker at baseline, but has increased limitations now due to KINNEY.  Adheres to fluid restriction at home and is medication compliant.  Notes decreased urine output over the past ~ 2 days.  Indicates only ~ 450 mL of urine output after 5.5 hours of receiving furosemide 80 mg IV.  No report of coughing, headache, dizziness, abdominal pain, diarrhea.    Vital signs upon ED presentation as follows:  BP =126/83, HR = 102, RR = 22, T = 36.9 C (98.4 F), O2 Sat = 99% on RA to 100% on BiPAP.  Diagnosed with Shortness of Breath and prescribed furosemide 80 mg IV x one in the ED.    Patient admitted for acute decompensated heart failure, on IV diuresis with improvement. Plan for CRT upgrade on Friday and pulm consulted for pre-op optimization.    PAST MEDICAL & SURGICAL HISTORY:  HTN (hypertension)      CAD (coronary artery disease)      Diabetes      Hyperlipidemia, unspecified hyperlipidemia type      CHF (congestive heart failure)      BPH (benign prostatic hyperplasia)      S/P CABG (coronary artery bypass graft)      S/P appendectomy          FAMILY HISTORY:  Family history of type 2 diabetes mellitus in father (Father)        SOCIAL HISTORY:  Smoking: [ ] Never Smoked [X ] Former Smoker (__1ppd packs x __>30_ years) [ ] Current Smoker  (__ packs x ___ years)  Substance Use: [ ] Never Used [ ] Used ____  EtOH Use:  Marital Status: [ ] Single [ ]  [ ]  [ ]   Sexual History:   Occupation: worked in medicine factory in [facility]  Recent Travel:  Country of Birth:  Advance Directives:    Allergies    No Known Allergies    Intolerances        HOME MEDICATIONS:    REVIEW OF SYSTEMS:  Constitutional: [X ] negative [ ] fevers [ ] chills [ ] weight loss [ ] weight gain  HEENT: [ X] negative [ ] dry eyes [ ] eye irritation [ ] postnasal drip [ ] nasal congestion  CV: [ X] negative  [ ] chest pain [ ] orthopnea [ ] palpitations [ ] murmur  Resp: [ ] negative [ ] cough [ X] shortness of breath [ ] dyspnea [ ] wheezing [ ] sputum [ ] hemoptysis  GI: [ Xnegative [ ] nausea [ ] vomiting [ ] diarrhea [ ] constipation [ ] abd pain [ ] dysphagia   : [X ] negative [ ] dysuria [ ] nocturia [ ] hematuria [ ] increased urinary frequency  Musculoskeletal: [ ] negative [ ] back pain [ ] myalgias [ ] arthralgias [ ] fracture  Skin: [ ] negative [ ] rash [ ] itch  Neurological: [ ] negative [ ] headache [ ] dizziness [ ] syncope [ ] weakness [ ] numbness  Psychiatric: [ ] negative [ ] anxiety [ ] depression  Endocrine: [ ] negative [ ] diabetes [ ] thyroid problem  Hematologic/Lymphatic: [ ] negative [ ] anemia [ ] bleeding problem  Allergic/Immunologic: [ ] negative [ ] itchy eyes [ ] nasal discharge [ ] hives [ ] angioedema  [ ] All other systems negative  [ ] Unable to assess ROS because ________    OBJECTIVE:  ICU Vital Signs Last 24 Hrs  T(C): 36.8 (07 Dec 2022 10:00), Max: 36.8 (07 Dec 2022 10:00)  T(F): 98.3 (07 Dec 2022 10:00), Max: 98.3 (07 Dec 2022 10:00)  HR: 100 (07 Dec 2022 10:00) (90 - 100)  BP: 93/53 (07 Dec 2022 11:47) (93/53 - 117/64)  BP(mean): --  ABP: --  ABP(mean): --  RR: 16 (07 Dec 2022 10:00) (16 - 18)  SpO2: 98% (07 Dec 2022 10:00) (97% - 100%)    O2 Parameters below as of 07 Dec 2022 10:00  Patient On (Oxygen Delivery Method): nasal cannula  O2 Flow (L/min): 2            12-06 @ 07:01  -  12-07 @ 07:00  --------------------------------------------------------  IN: 1250 mL / OUT: 2240 mL / NET: -990 mL    12-07 @ 07:01  -  12-07 @ 16:39  --------------------------------------------------------  IN: 700 mL / OUT: 800 mL / NET: -100 mL      CAPILLARY BLOOD GLUCOSE      POCT Blood Glucose.: 170 mg/dL (07 Dec 2022 11:22)      PHYSICAL EXAM:  General: well appearing, sitting in chair, NAD  HEENT: no icterus  Neck: supple  Respiratory: bibasilar crackles up to mid lung fields  Cardiovascular: S1/S2, RRR  Abdomen: soft, nontender  Extremities: no LE edema  Skin: no rashes  Neurological: AxOx3  Psychiatry: normal mood and affect    HOSPITAL MEDICATIONS:  aspirin enteric coated 81 milliGRAM(s) Oral daily  clopidogrel Tablet 75 milliGRAM(s) Oral daily  heparin   Injectable 5000 Unit(s) SubCutaneous every 8 hours      buMETAnide IVPB 4 milliGRAM(s) IV Intermittent two times a day  carvedilol 3.125 milliGRAM(s) Oral every 12 hours  hydrALAZINE 50 milliGRAM(s) Oral three times a day  isosorbide   dinitrate Tablet (ISORDIL) 30 milliGRAM(s) Oral three times a day  metolazone 2.5 milliGRAM(s) Oral once  spironolactone 25 milliGRAM(s) Oral daily    atorvastatin 80 milliGRAM(s) Oral at bedtime  dextrose 50% Injectable 25 Gram(s) IV Push once  dextrose 50% Injectable 12.5 Gram(s) IV Push once  dextrose 50% Injectable 25 Gram(s) IV Push once  dextrose Oral Gel 15 Gram(s) Oral once  glucagon  Injectable 1 milliGRAM(s) IntraMuscular once  levothyroxine 50 MICROGram(s) Oral daily    albuterol    0.083% 2.5 milliGRAM(s) Nebulizer every 6 hours  albuterol    90 MICROgram(s) HFA Inhaler 1 Puff(s) Inhalation every 4 hours  budesonide 160 MICROgram(s)/formoterol 4.5 MICROgram(s) Inhaler 2 Puff(s) Inhalation two times a day      pantoprazole    Tablet 40 milliGRAM(s) Oral before breakfast  polyethylene glycol 3350 17 Gram(s) Oral daily  senna 2 Tablet(s) Oral at bedtime      tamsulosin 0.4 milliGRAM(s) Oral at bedtime    ferrous    sulfate 325 milliGRAM(s) Oral daily            LABS:                        12.1   11.49 )-----------( 171      ( 07 Dec 2022 06:23 )             38.3     Hgb Trend: 12.1<--, 13.0<--, 11.9<--, 12.0<--  12-07    142  |  87<L>  |  36<H>  ----------------------------<  84  3.9   |  31  |  2.33<H>    Ca    9.1      07 Dec 2022 06:23  Phos  3.0     12-07  Mg     1.70     12-07    TPro  6.6  /  Alb  3.8  /  TBili  1.0  /  DBili  x   /  AST  29  /  ALT  17  /  AlkPhos  102  12-06    Creatinine Trend: 2.33<--, 2.25<--, 2.33<--, 2.24<--        Venous Blood Gas:  12-06 @ 05:01  7.32/64/34/33/50.8  VBG Lactate: 2.5      MICROBIOLOGY:     RADIOLOGY:  [ ] Reviewed and interpreted by me    PULMONARY FUNCTION TESTS:    EKG:

## 2022-12-07 NOTE — CONSULT NOTE ADULT - ATTENDING COMMENTS
67M with PMHx of CAD, CHF (EF = 15 to 20%), CABG, HTN, HLD, Type-2 DM, AICD, Asthma, COPD, Home oxygen (2L), CKD, CVA w/ Left sided deficits, and Gait difficulty (uses walker), presented to the ED secondary to chest pain and shortness of breath, admitted for acute decompensated heart failure. Pulmonary consulted for pre-op eval prior to CRT upgrade.    Agree with above. Patient with COPD on supplemental home oxygen not currently wheezing. Dyspnea likely secondary to uncontrolled decompensated heart failure. He remains a moderate risk for pulmonary complications. However, given his moderate to severe BALTAZAR would he is optimized to the best of his ability. If he becomes bronchospastic would suggest steroids and albuterol.     Would also have anesthesia undergo BALTAZAR precautions and hypoventilation precautions with anesthesia.     Patient is optimized from a pulmonary stand point. This does not constitute medical clearance which should be done by the primary team.

## 2022-12-07 NOTE — PROGRESS NOTE ADULT - PROBLEM SELECTOR PLAN 1
Pt. presented with chest pain, SOB, KINNEY and orthopnea. No LE swelling. Decreased urine output. Has hx of CHF with EF 14% in Oct 2022. EKG without changes from prior in October 2022. CXR showing pulmonary edema. On bumex 3mg PO qd at home - reports compliance. S/p furosemide 80 mg IV x1 and bumex 2 mg IV.   - Repeat echo showing EF 24%.   - Heart failure team eval appreciated , recommend Start Bumex 4mg IV BID  - intake/output Q4H, weight daily, HOB elevation 60 to 90 degrees.  Fluid restriction order when PO intake initiated  - no signs of infection  - cards also recommend - EP interrogate ICD. Possible for CRT upgrade

## 2022-12-07 NOTE — PROGRESS NOTE ADULT - NS ATTEND AMEND GEN_ALL_CORE FT
67 year old male with past history significant of HTN. HLD, DMT2, CAD, CABG, CHF (EF = 14% - 10/17/2022), Mdt single chamber ICD implanted 2015 for primary prevention, asthma, COPD w/ home oxygen (2L), CKD, CVA w/ left sided weakness or deficits and unsteady gait (uses cane or walker), presented to the ED on 12/4/22 with acute on chronic decompensated heart failure. Being diuresed but continues to be symptomatic. On optimal medical therapy for several years and is compliant with home medications and dietary/fluid restrictions however, has had 7-8 hospitalizations 2/2 fluid overload.  He has occasional palpitations but no syncope or near syncope and no ICD shocks.   Device interrogation showed a single chamber Medtronic ICD with appropriate pacing and sensing. Good capture threshold. RV pacing < .1%. 4 brief episodes of NSVT but no ICD therapies. Optivol fluid accumulation from 10/15/22 - 11/22/22. Fluid index is rising since Dec 1st but has not crossed the threshold.  Echo 10/2022: LVEF 14%.  EKG: LBBB with QRSd 140-142ms. NYHA class III-IV. RV pacing < .1%. Patient is a CRT-D candidate and may benefit from upgrade of his device. We have had this discussion with the patient and he is in agreement. Will plan for the procedure this admission after he is medically optimized and able to lie flat possibly this Friday. Would start nebulizer treatments as patient with diffuse wheezing and has hx of asthma/COpd.

## 2022-12-07 NOTE — PHYSICAL THERAPY INITIAL EVALUATION ADULT - PATIENT PROFILE REVIEW, REHAB EVAL
PT evaluate and treat orders received: no formal activity orders. Consult with RN Lorna GAMEZ, pt may participate in PT evaluation./yes

## 2022-12-07 NOTE — PROGRESS NOTE ADULT - SUBJECTIVE AND OBJECTIVE BOX
Interval History:  Patient resting comfortably in bed   Denies CP/SOB/palpitations/dizziness  No acute events overnight    Medications:  albuterol    0.083% 2.5 milliGRAM(s) Nebulizer every 6 hours  albuterol    90 MICROgram(s) HFA Inhaler 1 Puff(s) Inhalation every 4 hours  aspirin enteric coated 81 milliGRAM(s) Oral daily  atorvastatin 80 milliGRAM(s) Oral at bedtime  budesonide 160 MICROgram(s)/formoterol 4.5 MICROgram(s) Inhaler 2 Puff(s) Inhalation two times a day  buMETAnide IVPB 4 milliGRAM(s) IV Intermittent two times a day  carvedilol 3.125 milliGRAM(s) Oral every 12 hours  clopidogrel Tablet 75 milliGRAM(s) Oral daily  dextrose 50% Injectable 25 Gram(s) IV Push once  dextrose 50% Injectable 12.5 Gram(s) IV Push once  dextrose 50% Injectable 25 Gram(s) IV Push once  dextrose Oral Gel 15 Gram(s) Oral once  ferrous    sulfate 325 milliGRAM(s) Oral daily  glucagon  Injectable 1 milliGRAM(s) IntraMuscular once  heparin   Injectable 5000 Unit(s) SubCutaneous every 8 hours  hydrALAZINE 50 milliGRAM(s) Oral three times a day  isosorbide   dinitrate Tablet (ISORDIL) 30 milliGRAM(s) Oral three times a day  levothyroxine 50 MICROGram(s) Oral daily  pantoprazole    Tablet 40 milliGRAM(s) Oral before breakfast  spironolactone 25 milliGRAM(s) Oral daily  tamsulosin 0.4 milliGRAM(s) Oral at bedtime      Vitals:  T(C): 36.6 (22 @ 05:30), Max: 36.9 (22 @ 11:03)  HR: 95 (22 @ 05:30) (82 - 97)  BP: 101/60 (22 @ 05:30) (98/58 - 115/71)  BP(mean): --  RR: 16 (22 @ 05:30) (16 - 18)  SpO2: 97% (22 @ 05:30) (97% - 100%)    Daily     Daily Weight in k.2 (07 Dec 2022 05:30)    Weight (kg): 80.9 (12-06 @ 03:52)    I&O's Summary    06 Dec 2022 07:01  -  07 Dec 2022 07:00  --------------------------------------------------------  IN: 1250 mL / OUT: 2240 mL / NET: -990 mL        Physical Exam:  Appearance: No Acute Distress  Neck: JVP  Cardiovascular: Normal S1 S2  Respiratory: Clear to auscultation bilaterally  Gastrointestinal: Soft, Non-tender, slightly distended	  Skin: No cyanosis	  Neurologic: Non-focal  Extremities: No LE edema  Psychiatry: A & O x 3, Mood & affect appropriate    Labs:                        12.1   1149 )-----------( 171      ( 07 Dec 2022 06:23 )             38.3         142  |  87<L>  |  36<H>  ----------------------------<  84  3.9   |  31  |  2.33<H>    Ca    9.1      07 Dec 2022 06:23  Phos  3.0       Mg     1.70         TPro  6.6  /  Alb  3.8  /  TBili  1.0  /  DBili  x   /  AST  29  /  ALT  17  /  AlkPhos  102          TELEMETRY: Sinus Rhythm     Echocardiogram:  TTE with Doppler (w/Cont) (22 @ 08:51)     DIMENSIONS:  Dimensions:     Normal Values:  LA:     4.8 cm    2.0 - 4.0 cm  Ao:     3.8 cm    2.0 - 3.8 cm  SEPTUM: 0.8 cm    0.6 - 1.2 cm  PWT:    0.8 cm    0.6 - 1.1 cm  LVIDd: 6.1 cm    3.0 - 5.6 cm  LVIDs:  5.4 cm    1.8 - 4.0 cm  Derived Variables:  LVMI: 96 g/m2  RWT: 0.26  Fractional short: 11 %  Ejection Fraction (Velazquez Rule): 24 %  ------------------------------------------------------------------------  OBSERVATIONS:  Mitral Valve: Mitral annular calcification, otherwise  normal mitral valve. Mild-moderate mitral regurgitation.  Aortic Root: Normal aortic root.  Aortic Valve: Calcified trileaflet aortic valve with normal  opening. Minimal aortic regurgitation.  Left Atrium: Mildly dilated left atrium.  LA volume index =  36 cc/m2.  Left Ventricle: Severe global left ventricular systolic  dysfunction.  Endocardial visualization enhanced with  intravenous injection of echo contrast (Definity).  No LV  thrombus seen. Mild left ventricular enlargement.  Right Heart: Normal right atrium. The right ventricle is  not well visualized; grossly normal right ventricular  systolic function.  A device wire is noted in the right  heart. Normal tricuspid valve.  Mild tricuspid  regurgitation. Normal pulmonic valve.  Minimal pulmonic  regurgitation.  Pericardium/PleuraNormal pericardium with no pericardial  effusion.  ------------------------------------------------------------------------  CONCLUSIONS:  1. Mitral annular calcification, otherwise normal mitral  valve. Mild-moderate mitral regurgitation.  2. Mildly dilated left atrium.  LA volume index = 36 cc/m2.  3. Mild left ventricular enlargement.  4. Severe global left ventricular systolic dysfunction.  Endocardial visualization enhanced with intravenous  injection of echo contrast (Definity).  No LV thrombus  seen.  5. The right ventricle is not well visualized; grossly  normal right ventricular systolic function.  A device wire  is noted in the right heart.  *** Compared with echocardiogram of 10/17/2022, no  significant changes noted.       Interval History:  Patient resting comfortably in bed   Denies CP/SOB/palpitations/dizziness  No acute events overnight    Medications:  albuterol    0.083% 2.5 milliGRAM(s) Nebulizer every 6 hours  albuterol    90 MICROgram(s) HFA Inhaler 1 Puff(s) Inhalation every 4 hours  aspirin enteric coated 81 milliGRAM(s) Oral daily  atorvastatin 80 milliGRAM(s) Oral at bedtime  budesonide 160 MICROgram(s)/formoterol 4.5 MICROgram(s) Inhaler 2 Puff(s) Inhalation two times a day  buMETAnide IVPB 4 milliGRAM(s) IV Intermittent two times a day  carvedilol 3.125 milliGRAM(s) Oral every 12 hours  clopidogrel Tablet 75 milliGRAM(s) Oral daily  dextrose 50% Injectable 25 Gram(s) IV Push once  dextrose 50% Injectable 12.5 Gram(s) IV Push once  dextrose 50% Injectable 25 Gram(s) IV Push once  dextrose Oral Gel 15 Gram(s) Oral once  ferrous    sulfate 325 milliGRAM(s) Oral daily  glucagon  Injectable 1 milliGRAM(s) IntraMuscular once  heparin   Injectable 5000 Unit(s) SubCutaneous every 8 hours  hydrALAZINE 50 milliGRAM(s) Oral three times a day  isosorbide   dinitrate Tablet (ISORDIL) 30 milliGRAM(s) Oral three times a day  levothyroxine 50 MICROGram(s) Oral daily  pantoprazole    Tablet 40 milliGRAM(s) Oral before breakfast  spironolactone 25 milliGRAM(s) Oral daily  tamsulosin 0.4 milliGRAM(s) Oral at bedtime      Vitals:  T(C): 36.6 (22 @ 05:30), Max: 36.9 (22 @ 11:03)  HR: 95 (22 @ 05:30) (82 - 97)  BP: 101/60 (22 @ 05:30) (98/58 - 115/71)  BP(mean): --  RR: 16 (22 @ 05:30) (16 - 18)  SpO2: 97% (22 @ 05:30) (97% - 100%)    Daily     Daily Weight in k.2 (07 Dec 2022 05:30)    Weight (kg): 80.9 (12-06 @ 03:52)    I&O's Summary    06 Dec 2022 07:01  -  07 Dec 2022 07:00  --------------------------------------------------------  IN: 1250 mL / OUT: 2240 mL / NET: -990 mL        Physical Exam:  Appearance: No Acute Distress  Neck: JVP  Cardiovascular: Normal S1 S2  Respiratory: Clear to auscultation bilaterally with scattered wheeze  Gastrointestinal: Soft, Non-tender, slightly distended	  Skin: No cyanosis	  Neurologic: Non-focal  Extremities: No LE edema  Psychiatry: A & O x 3, Mood & affect appropriate    Labs:                        12.1   1149 )-----------( 171      ( 07 Dec 2022 06:23 )             38.3         142  |  87<L>  |  36<H>  ----------------------------<  84  3.9   |  31  |  2.33<H>    Ca    9.1      07 Dec 2022 06:23  Phos  3.0       Mg     1.70         TPro  6.6  /  Alb  3.8  /  TBili  1.0  /  DBili  x   /  AST  29  /  ALT  17  /  AlkPhos  102          TELEMETRY: Sinus Rhythm     Echocardiogram:  TTE with Doppler (w/Cont) (22 @ 08:51)     DIMENSIONS:  Dimensions:     Normal Values:  LA:     4.8 cm    2.0 - 4.0 cm  Ao:     3.8 cm    2.0 - 3.8 cm  SEPTUM: 0.8 cm    0.6 - 1.2 cm  PWT:    0.8 cm    0.6 - 1.1 cm  LVIDd: 6.1 cm    3.0 - 5.6 cm  LVIDs:  5.4 cm    1.8 - 4.0 cm  Derived Variables:  LVMI: 96 g/m2  RWT: 0.26  Fractional short: 11 %  Ejection Fraction (Velazquez Rule): 24 %  ------------------------------------------------------------------------  OBSERVATIONS:  Mitral Valve: Mitral annular calcification, otherwise  normal mitral valve. Mild-moderate mitral regurgitation.  Aortic Root: Normal aortic root.  Aortic Valve: Calcified trileaflet aortic valve with normal  opening. Minimal aortic regurgitation.  Left Atrium: Mildly dilated left atrium.  LA volume index =  36 cc/m2.  Left Ventricle: Severe global left ventricular systolic  dysfunction.  Endocardial visualization enhanced with  intravenous injection of echo contrast (Definity).  No LV  thrombus seen. Mild left ventricular enlargement.  Right Heart: Normal right atrium. The right ventricle is  not well visualized; grossly normal right ventricular  systolic function.  A device wire is noted in the right  heart. Normal tricuspid valve.  Mild tricuspid  regurgitation. Normal pulmonic valve.  Minimal pulmonic  regurgitation.  Pericardium/PleuraNormal pericardium with no pericardial  effusion.  ------------------------------------------------------------------------  CONCLUSIONS:  1. Mitral annular calcification, otherwise normal mitral  valve. Mild-moderate mitral regurgitation.  2. Mildly dilated left atrium.  LA volume index = 36 cc/m2.  3. Mild left ventricular enlargement.  4. Severe global left ventricular systolic dysfunction.  Endocardial visualization enhanced with intravenous  injection of echo contrast (Definity).  No LV thrombus  seen.  5. The right ventricle is not well visualized; grossly  normal right ventricular systolic function.  A device wire  is noted in the right heart.  *** Compared with echocardiogram of 10/17/2022, no  significant changes noted.       Interval History:  Patient resting comfortably in bed   Denies CP/SOB/palpitations/dizziness  No acute events overnight    Medications:  albuterol    0.083% 2.5 milliGRAM(s) Nebulizer every 6 hours  albuterol    90 MICROgram(s) HFA Inhaler 1 Puff(s) Inhalation every 4 hours  aspirin enteric coated 81 milliGRAM(s) Oral daily  atorvastatin 80 milliGRAM(s) Oral at bedtime  budesonide 160 MICROgram(s)/formoterol 4.5 MICROgram(s) Inhaler 2 Puff(s) Inhalation two times a day  buMETAnide IVPB 4 milliGRAM(s) IV Intermittent two times a day  carvedilol 3.125 milliGRAM(s) Oral every 12 hours  clopidogrel Tablet 75 milliGRAM(s) Oral daily  dextrose 50% Injectable 25 Gram(s) IV Push once  dextrose 50% Injectable 12.5 Gram(s) IV Push once  dextrose 50% Injectable 25 Gram(s) IV Push once  dextrose Oral Gel 15 Gram(s) Oral once  ferrous    sulfate 325 milliGRAM(s) Oral daily  glucagon  Injectable 1 milliGRAM(s) IntraMuscular once  heparin   Injectable 5000 Unit(s) SubCutaneous every 8 hours  hydrALAZINE 50 milliGRAM(s) Oral three times a day  isosorbide   dinitrate Tablet (ISORDIL) 30 milliGRAM(s) Oral three times a day  levothyroxine 50 MICROGram(s) Oral daily  pantoprazole    Tablet 40 milliGRAM(s) Oral before breakfast  spironolactone 25 milliGRAM(s) Oral daily  tamsulosin 0.4 milliGRAM(s) Oral at bedtime      Vitals:  T(C): 36.6 (22 @ 05:30), Max: 36.9 (22 @ 11:03)  HR: 95 (22 @ 05:30) (82 - 97)  BP: 101/60 (22 @ 05:30) (98/58 - 115/71)  BP(mean): --  RR: 16 (22 @ 05:30) (16 - 18)  SpO2: 97% (22 @ 05:30) (97% - 100%)    Daily     Daily Weight in k.2 (07 Dec 2022 05:30)    Weight (kg): 80.9 (12-06 @ 03:52)    I&O's Summary    06 Dec 2022 07:01  -  07 Dec 2022 07:00  --------------------------------------------------------  IN: 1250 mL / OUT: 2240 mL / NET: -990 mL        Physical Exam:  Appearance: No Acute Distress  Neck: JVP 10-12  Cardiovascular: Normal S1 S2  Respiratory: Clear to auscultation bilaterally with faint scattered wheeze  Gastrointestinal: Soft, Non-tender, slightly distended	  Skin: No cyanosis	  Neurologic: Non-focal  Extremities: No BLE edema  Psychiatry: A & O x 3, Mood & affect appropriate    Labs:                        12.1   11.49 )-----------( 171      ( 07 Dec 2022 06:23 )             38.3         142  |  87<L>  |  36<H>  ----------------------------<  84  3.9   |  31  |  2.33<H>    Ca    9.1      07 Dec 2022 06:23  Phos  3.0       Mg     1.70         TPro  6.6  /  Alb  3.8  /  TBili  1.0  /  DBili  x   /  AST  29  /  ALT  17  /  AlkPhos  102          TELEMETRY: Sinus Rhythm     Echocardiogram:  TTE with Doppler (w/Cont) (22 @ 08:51)     DIMENSIONS:  Dimensions:     Normal Values:  LA:     4.8 cm    2.0 - 4.0 cm  Ao:     3.8 cm    2.0 - 3.8 cm  SEPTUM: 0.8 cm    0.6 - 1.2 cm  PWT:    0.8 cm    0.6 - 1.1 cm  LVIDd: 6.1 cm    3.0 - 5.6 cm  LVIDs:  5.4 cm    1.8 - 4.0 cm  Derived Variables:  LVMI: 96 g/m2  RWT: 0.26  Fractional short: 11 %  Ejection Fraction (Velazquez Rule): 24 %  ------------------------------------------------------------------------  OBSERVATIONS:  Mitral Valve: Mitral annular calcification, otherwise  normal mitral valve. Mild-moderate mitral regurgitation.  Aortic Root: Normal aortic root.  Aortic Valve: Calcified trileaflet aortic valve with normal  opening. Minimal aortic regurgitation.  Left Atrium: Mildly dilated left atrium.  LA volume index =  36 cc/m2.  Left Ventricle: Severe global left ventricular systolic  dysfunction.  Endocardial visualization enhanced with  intravenous injection of echo contrast (Definity).  No LV  thrombus seen. Mild left ventricular enlargement.  Right Heart: Normal right atrium. The right ventricle is  not well visualized; grossly normal right ventricular  systolic function.  A device wire is noted in the right  heart. Normal tricuspid valve.  Mild tricuspid  regurgitation. Normal pulmonic valve.  Minimal pulmonic  regurgitation.  Pericardium/PleuraNormal pericardium with no pericardial  effusion.  ------------------------------------------------------------------------  CONCLUSIONS:  1. Mitral annular calcification, otherwise normal mitral  valve. Mild-moderate mitral regurgitation.  2. Mildly dilated left atrium.  LA volume index = 36 cc/m2.  3. Mild left ventricular enlargement.  4. Severe global left ventricular systolic dysfunction.  Endocardial visualization enhanced with intravenous  injection of echo contrast (Definity).  No LV thrombus  seen.  5. The right ventricle is not well visualized; grossly  normal right ventricular systolic function.  A device wire  is noted in the right heart.  *** Compared with echocardiogram of 10/17/2022, no  significant changes noted.

## 2022-12-07 NOTE — CONSULT NOTE ADULT - ASSESSMENT
67M with PMHx of CAD, CHF (EF = 15 to 20%), CABG, HTN, HLD, Type-2 DM, AICD, Asthma, COPD, Home oxygen (2L), CKD, CVA w/ Left sided deficits, and Gait difficulty (uses walker), presented to the ED secondary to chest pain and shortness of breath, admitted for acute decompensated heart failure. Pulmonary consulted for pre-op eval prior to CRT upgrade.    #Pre-operative risk stratification:  - Patient with history of COPD (unclear when/who diagnosed as patient without pulmonologist) and chronic hypoxemic respiratory failure on home oxygen (likely multifactorial from COPD and known heart failure). Currently patient without wheezing on examination, and symptoms not suggestive of COPD exacerbation. Crackles heard in bilateral lung fields and CXR with pulmonary edema.    Recommendations:  - c/w Symbicort and albuterol nebs q6 hours (home regimen)  - continue to treat underlying heart failure exacerbation with IV diuresis per heart failure team recommendations  - if concern for worsening sob with wheezing, can change albuterol nebs to duonebs to add antimuscarinic; does not require at this time    Patient is an intermediate risk for his operative procedure. At this time, he is optimized from a pulmonary standpoint to proceed. If any clinical changes, please notify pulmonary team.

## 2022-12-07 NOTE — PROGRESS NOTE ADULT - ASSESSMENT
67 year old male with past history significant of HTN. HLD, DMT2, CAD, CABG, CHF (EF = 14% - 10/17/2022), Mdt single chamber ICD implanted 2015 for primary prevention, asthma, COPD w/ home oxygen (2L), CKD, CVA w/ left sided weakness/deficets and unsteady gait (uses cane or walker), presented to the ED on 12/4/22 with acute on chronic decompensated heart failure. Being diuresed but continues to be symptomatic. On optimal medical therapy for several years and is compliant with home medications and dietary/fluid restrictions however, has had 7-8 hospitalizations 2/2 fluid overload.  He has occasional palpitations but no syncope or near syncope and no ICD shocks.     Device interrogation showed a single chamber Medtronic ICD with appropriate pacing and sensing. Good capture threshold. RV pacing < .1%. 4 brief episodes of NSVT but no ICD therapies. Optivol fluid accumulation from 10/15/22 - 11/22/22. Fluid index is rising since Dec 1st but has not crossed the threshold.    Echo 10/2022: LVEF 14%.  EKG: LBBB with QRSd 140-142ms.   NYHA class III-IV.   RV pacing < .1%.   Patient is a CRT-D candidate and may benefit from upgrade of his device. We have had this discussion with the patient and he is in agreement. Will plan for the procedure this admission after he is medically optimized and able to lay flat possibly this Friday.   Would start nebulizer treatments as patient with diffuse wheezing and has hx of asthma/COpd.

## 2022-12-07 NOTE — PHYSICAL THERAPY INITIAL EVALUATION ADULT - PERTINENT HX OF CURRENT PROBLEM, REHAB EVAL
Pt is a 67 year old male presenting with chest pain and SOB. Admitted w/ CHF exacerbation. PMH significant for CVA with left sided weakness, further PMH below.

## 2022-12-07 NOTE — PHYSICAL THERAPY INITIAL EVALUATION ADULT - ADDITIONAL COMMENTS
Pt lives in a house with 3 exterior steps to enter, bedroom located on first floor. Prior to admission, pt reports ambulating with a rollator independently. Pt has a home health aide 7 days per week, hours unknown to assist with ADLs. Pt uses 2L home oxygen.

## 2022-12-07 NOTE — PROGRESS NOTE ADULT - PROBLEM SELECTOR PLAN 1
IV Bumex 4mg BID  Metolazone 2.5mg X1 (12/6 6pm)   Coreg 3.125mg BID (hold SBP <90)  Hydralazine 50mg TID (hold SBP <90)  Isordil 30mg TID  Spironolactone 25mg daily   Daily standing weights   Strict I/O    Monitor lytes replete K>4.0 and Mg>2.0  TTE completed (report above)   Possible ischemic workup this admission  EP Consult appreciated (interrogate Medtronic ICD/possible CRT upgrade)    Management per primary team  Pending final recommendations from HF attending IV Bumex 4mg BID  Metolazone 2.5mg X1 (12/6 6pm)   Coreg 3.125mg BID (hold SBP <90)  Hydralazine 50mg TID (hold SBP <90)  Isordil 30mg TID  Spironolactone 25mg daily   Daily standing weights   Strict I/O    Monitor lytes replete K>4.0 and Mg>2.0  TTE completed (report above)   Possible ischemic workup this admission  EP Consult appreciated (interrogate Medtronic ICD/CRT upgrade this admission)    Management per primary team  Pending final recommendations from HF attending IV Bumex 4mg BID  Metolazone 2.5mg X1 now   Coreg 3.125mg BID (hold SBP <90)  Hydralazine 50mg TID (hold SBP <90)  Isordil 30mg TID  Spironolactone 25mg daily   Daily standing weights   Strict I/O    Monitor lytes replete K>4.0 and Mg>2.0  TTE completed (report above)   Possible ischemic workup this admission  EP Consult appreciated (interrogate Medtronic ICD/CRT upgrade this admission)    Management per primary team  Pending final recommendations from HF attending IV Bumex 4mg BID  Metolazone 2.5mg X1 now   Coreg 3.125mg BID (hold SBP <90)  Hydralazine 50mg TID (hold SBP <90)  Isordil 30mg TID  Spironolactone 25mg daily   Daily standing weights   Strict I/O    Monitor lytes replete K>4.0 and Mg>2.0  TTE completed (report above)   Possible ischemic workup this admission  EP Consult appreciated (interrogate Medtronic ICD/CRT upgrade this admission)    Plan for CRT upgrade this Friday (12/9)   Please consider Pulmonology consult (COPDon home O2/asthma and scattered exp wheezing)   Management per primary team  Pending final recommendations from HF attending IV Bumex 4mg BID  Metolazone 2.5mg X1 now   Coreg 3.125mg BID (hold SBP <90)  Hydralazine 50mg TID (hold SBP <90)  Isordil 30mg TID  Spironolactone 25mg daily   Daily standing weights   Strict I/O    Monitor lytes replete K>4.0 and Mg>2.0  TTE completed (report above)   Possible ischemic workup this admission  EP Consult appreciated (interrogate Medtronic ICD/CRT upgrade this admission)    Plan for possible CRT upgrade this Friday (12/9)   Please request Pulmonology consult (COPD on home O2/asthma and scattered exp wheezing)   Management per primary team  Pending final recommendations from HF attending

## 2022-12-07 NOTE — PROGRESS NOTE ADULT - SUBJECTIVE AND OBJECTIVE BOX
Patient sitting up in chair. States he is feeling slightly better. Still with dyspnea at rest. No cough.   Also complains of continual midsternal chest discomfort since admission.  N0 fever.   O2 sats % on 2L NC O2.       Vital Signs Last 24 Hrs  T(C): 36.8 (07 Dec 2022 10:00), Max: 36.8 (07 Dec 2022 10:00)  T(F): 98.3 (07 Dec 2022 10:00), Max: 98.3 (07 Dec 2022 10:00)  HR: 100 (07 Dec 2022 10:00) (90 - 100)  BP: 93/53 (07 Dec 2022 11:47) (93/53 - 117/64)  BP(mean): --  RR: 16 (07 Dec 2022 10:00) (16 - 18)  SpO2: 98% (07 Dec 2022 10:00) (97% - 100%)    Parameters below as of 07 Dec 2022 10:00  Patient On (Oxygen Delivery Method): nasal cannula  O2 Flow (L/min): 2        Telemetry: Normal sinus rhythm with PVC's +NSVT x 3 beats.   MEDICATIONS  (STANDING):  albuterol    0.083% 2.5 milliGRAM(s) Nebulizer every 6 hours  albuterol    90 MICROgram(s) HFA Inhaler 1 Puff(s) Inhalation every 4 hours  aspirin enteric coated 81 milliGRAM(s) Oral daily  atorvastatin 80 milliGRAM(s) Oral at bedtime  budesonide 160 MICROgram(s)/formoterol 4.5 MICROgram(s) Inhaler 2 Puff(s) Inhalation two times a day  buMETAnide IVPB 4 milliGRAM(s) IV Intermittent two times a day  carvedilol 3.125 milliGRAM(s) Oral every 12 hours  clopidogrel Tablet 75 milliGRAM(s) Oral daily  dextrose 50% Injectable 25 Gram(s) IV Push once  dextrose 50% Injectable 12.5 Gram(s) IV Push once  dextrose 50% Injectable 25 Gram(s) IV Push once  dextrose Oral Gel 15 Gram(s) Oral once  ferrous    sulfate 325 milliGRAM(s) Oral daily  glucagon  Injectable 1 milliGRAM(s) IntraMuscular once  heparin   Injectable 5000 Unit(s) SubCutaneous every 8 hours  hydrALAZINE 50 milliGRAM(s) Oral three times a day  isosorbide   dinitrate Tablet (ISORDIL) 30 milliGRAM(s) Oral three times a day  levothyroxine 50 MICROGram(s) Oral daily  metolazone 2.5 milliGRAM(s) Oral once  pantoprazole    Tablet 40 milliGRAM(s) Oral before breakfast  polyethylene glycol 3350 17 Gram(s) Oral daily  senna 2 Tablet(s) Oral at bedtime  spironolactone 25 milliGRAM(s) Oral daily  tamsulosin 0.4 milliGRAM(s) Oral at bedtime    MEDICATIONS  (PRN):          Physical exam:   Gen- well developed well nourished. NAD  Resp- decreased breath sounds with wheezing throughout.   CV- S1 and S2 RRR. No murmurs, gallops or rubs. ICD site without erythema, swelling. No evidence of infection.  ABD- soft, distended + bowel sounds   EXT- trace lower extremity edema. Left knee warm to touch.  Neuro- grossly nonfocal                            12.1   11.49 )-----------( 171      ( 07 Dec 2022 06:23 )             38.3       12-07    142  |  87<L>  |  36<H>  ----------------------------<  84  3.9   |  31  |  2.33<H>    Ca    9.1      07 Dec 2022 06:23  Phos  3.0     12-07  Mg     1.70     12-07    TPro  6.6  /  Alb  3.8  /  TBili  1.0  /  DBili  x   /  AST  29  /  ALT  17  /  AlkPhos  102  12-06        < from: TTE with Doppler (w/Cont) (12.07.22 @ 08:51) >  Patient name: Joey Lewis  YOB: 1955   Age: 67 (M)   MR#: [id number]  Study Date: 12/7/2022  [facility]iana Corey RDCS  Study quality: Technically Fair  Referring Physician: Traci Wu MD  Blood Pressure: 101/60 mmHg  Height: 177 cm  Weight: 80 kg  BSA: 2 m2  ------------------------------------------------------------------------  PROCEDURE: Transthoracic echocardiogram with 2-D, M-Mode  and complete spectral and color flow Doppler.  Intravenous ultrasound enhancing agent was administered for  improved left ventricular endocardial border definition.  Following the intravenous injection of ultrasound enhancing  agent, harmonic imaging was performed.  INDICATION: Heart failure, unspecified (I50.9)  ------------------------------------------------------------------------  DIMENSIONS:  Dimensions:     Normal Values:  LA:     4.8 cm    2.0 - 4.0 cm  Ao:     3.8 cm    2.0 - 3.8 cm  SEPTUM: 0.8 cm    0.6 - 1.2 cm  PWT:    0.8 cm    0.6 - 1.1 cm  LVIDd: 6.1 cm    3.0 - 5.6 cm  LVIDs:  5.4 cm    1.8 - 4.0 cm  Derived Variables:  LVMI: 96 g/m2  RWT: 0.26  Fractional short: 11 %  Ejection Fraction (Velazquez Rule): 24 %  ------------------------------------------------------------------------  OBSERVATIONS:  Mitral Valve: Mitral annular calcification, otherwise  normal mitral valve. Mild-moderate mitral regurgitation.  Aortic Root: Normal aortic root.  Aortic Valve: Calcified trileaflet aortic valve with normal  opening. Minimal aortic regurgitation.  Left Atrium: Mildly dilated left atrium.  LA volume index =  36 cc/m2.  Left Ventricle: Severe global left ventricular systolic  dysfunction.  Endocardial visualization enhanced with  intravenous injection of echo contrast (Definity).  No LV  thrombus seen. Mild left ventricular enlargement.  Right Heart: Normal right atrium. The right ventricle is  not well visualized; grossly normal right ventricular  systolic function.  A device wire is noted in the right  heart. Normal tricuspid valve.  Mild tricuspid  regurgitation. Normal pulmonic valve.  Minimal pulmonic  regurgitation.  Pericardium/PleuraNormal pericardium with no pericardial  effusion.  ------------------------------------------------------------------------  CONCLUSIONS:  1. Mitral annular calcification, otherwise normal mitral  valve. Mild-moderate mitral regurgitation.  2. Mildly dilated left atrium.  LA volume index = 36 cc/m2.  3. Mild left ventricular enlargement.  4. Severe global left ventricular systolic dysfunction.  Endocardial visualization enhanced with intravenous  injection of echo contrast (Definity).  No LV thrombus  seen.  5. The right ventricle is not well visualized; grossly  normal right ventricular systolic function.  A device wire  is noted in the right heart.  *** Compared with echocardiogram of 10/17/2022, no  significant changes noted.  ------------------------------------------------------------------------  Confirmed on  12/7/2022 - 10:15:21 by Greg Medrano M.D.,  [facility], [facility]  ------------------------------------------------------------------------

## 2022-12-07 NOTE — PROGRESS NOTE ADULT - SUBJECTIVE AND OBJECTIVE BOX
Hospitalist Progress Note  Mara Trevino MD Pager # [phone]    OVERNIGHT EVENTS: HEBER    SUBJECTIVE / INTERVAL HPI: Patient seen and examined at bedside. Pt. reports feeling better. His breathing has improved. He is still feeling chest pain. He says his chest hurts when he presses on it as well. No LE swelling. He thinks the symptoms are related to the change in weather.     VITAL SIGNS:  Vital Signs Last 24 Hrs  T(C): 36.8 (07 Dec 2022 10:00), Max: 36.8 (07 Dec 2022 10:00)  T(F): 98.3 (07 Dec 2022 10:00), Max: 98.3 (07 Dec 2022 10:00)  HR: 100 (07 Dec 2022 10:00) (90 - 100)  BP: 93/53 (07 Dec 2022 11:47) (93/53 - 117/64)  BP(mean): --  RR: 16 (07 Dec 2022 10:00) (16 - 18)  SpO2: 98% (07 Dec 2022 10:00) (97% - 100%)    Parameters below as of 07 Dec 2022 10:00  Patient On (Oxygen Delivery Method): nasal cannula  O2 Flow (L/min): 2      PHYSICAL EXAM:    General: WDWN male resting in bed   HEENT: NC/AT; PERRL, anicteric sclera; MMM  Neck: supple  Cardiovascular: +S1/S2; RRR  Respiratory: crackles bilaterally to the mid lung, no respiratory distress or accessory muscle use.   Gastrointestinal: soft, NT/ND; +BSx4  Extremities: WWP; no edema, clubbing or cyanosis  Vascular: 2+ radial, DP/PT pulses B/L  Neurological: AAOx3; no focal deficits    MEDICATIONS:  MEDICATIONS  (STANDING):  albuterol    0.083% 2.5 milliGRAM(s) Nebulizer every 6 hours  albuterol    90 MICROgram(s) HFA Inhaler 1 Puff(s) Inhalation every 4 hours  aspirin enteric coated 81 milliGRAM(s) Oral daily  atorvastatin 80 milliGRAM(s) Oral at bedtime  budesonide 160 MICROgram(s)/formoterol 4.5 MICROgram(s) Inhaler 2 Puff(s) Inhalation two times a day  buMETAnide IVPB 4 milliGRAM(s) IV Intermittent two times a day  carvedilol 3.125 milliGRAM(s) Oral every 12 hours  clopidogrel Tablet 75 milliGRAM(s) Oral daily  dextrose 50% Injectable 25 Gram(s) IV Push once  dextrose 50% Injectable 12.5 Gram(s) IV Push once  dextrose 50% Injectable 25 Gram(s) IV Push once  dextrose Oral Gel 15 Gram(s) Oral once  ferrous    sulfate 325 milliGRAM(s) Oral daily  glucagon  Injectable 1 milliGRAM(s) IntraMuscular once  heparin   Injectable 5000 Unit(s) SubCutaneous every 8 hours  hydrALAZINE 50 milliGRAM(s) Oral three times a day  isosorbide   dinitrate Tablet (ISORDIL) 30 milliGRAM(s) Oral three times a day  levothyroxine 50 MICROGram(s) Oral daily  pantoprazole    Tablet 40 milliGRAM(s) Oral before breakfast  polyethylene glycol 3350 17 Gram(s) Oral daily  senna 2 Tablet(s) Oral at bedtime  spironolactone 25 milliGRAM(s) Oral daily  tamsulosin 0.4 milliGRAM(s) Oral at bedtime    MEDICATIONS  (PRN):      ALLERGIES:  Allergies    No Known Allergies    Intolerances        LABS:                        12.1   11.49 )-----------( 171      ( 07 Dec 2022 06:23 )             38.3     12-07    142  |  87<L>  |  36<H>  ----------------------------<  84  3.9   |  31  |  2.33<H>    Ca    9.1      07 Dec 2022 06:23  Phos  3.0     12-07  Mg     1.70     12-07    TPro  6.6  /  Alb  3.8  /  TBili  1.0  /  DBili  x   /  AST  29  /  ALT  17  /  AlkPhos  102  12-06        CAPILLARY BLOOD GLUCOSE      POCT Blood Glucose.: 170 mg/dL (07 Dec 2022 11:22)      RADIOLOGY & ADDITIONAL TESTS: Reviewed.    ASSESSMENT:    PLAN:

## 2022-12-08 LAB
ANION GAP SERPL CALC-SCNC: 14 MMOL/L — SIGNIFICANT CHANGE UP (ref 7–14)
BUN SERPL-MCNC: 43 MG/DL — HIGH (ref 7–23)
CALCIUM SERPL-MCNC: 9.1 MG/DL — SIGNIFICANT CHANGE UP (ref 8.4–10.5)
CHLORIDE SERPL-SCNC: 89 MMOL/L — LOW (ref 98–107)
CO2 SERPL-SCNC: 30 MMOL/L — SIGNIFICANT CHANGE UP (ref 22–31)
CREAT SERPL-MCNC: 2.5 MG/DL — HIGH (ref 0.5–1.3)
EGFR: 27 ML/MIN/1.73M2 — LOW
GLUCOSE BLDC GLUCOMTR-MCNC: 100 MG/DL — HIGH (ref 70–99)
GLUCOSE BLDC GLUCOMTR-MCNC: 121 MG/DL — HIGH (ref 70–99)
GLUCOSE BLDC GLUCOMTR-MCNC: 177 MG/DL — HIGH (ref 70–99)
GLUCOSE BLDC GLUCOMTR-MCNC: 97 MG/DL — SIGNIFICANT CHANGE UP (ref 70–99)
GLUCOSE SERPL-MCNC: 112 MG/DL — HIGH (ref 70–99)
HCT VFR BLD CALC: 36.6 % — LOW (ref 39–50)
HGB BLD-MCNC: 12 G/DL — LOW (ref 13–17)
MAGNESIUM SERPL-MCNC: 1.6 MG/DL — SIGNIFICANT CHANGE UP (ref 1.6–2.6)
MCHC RBC-ENTMCNC: 31.1 PG — SIGNIFICANT CHANGE UP (ref 27–34)
MCHC RBC-ENTMCNC: 32.8 GM/DL — SIGNIFICANT CHANGE UP (ref 32–36)
MCV RBC AUTO: 94.8 FL — SIGNIFICANT CHANGE UP (ref 80–100)
NRBC # BLD: 0 /100 WBCS — SIGNIFICANT CHANGE UP (ref 0–0)
NRBC # FLD: 0 K/UL — SIGNIFICANT CHANGE UP (ref 0–0)
PHOSPHATE SERPL-MCNC: 3.2 MG/DL — SIGNIFICANT CHANGE UP (ref 2.5–4.5)
PLATELET # BLD AUTO: 162 K/UL — SIGNIFICANT CHANGE UP (ref 150–400)
POTASSIUM SERPL-MCNC: 3.6 MMOL/L — SIGNIFICANT CHANGE UP (ref 3.5–5.3)
POTASSIUM SERPL-SCNC: 3.6 MMOL/L — SIGNIFICANT CHANGE UP (ref 3.5–5.3)
RBC # BLD: 3.86 M/UL — LOW (ref 4.2–5.8)
RBC # FLD: 15.6 % — HIGH (ref 10.3–14.5)
SODIUM SERPL-SCNC: 133 MMOL/L — LOW (ref 135–145)
WBC # BLD: 10.82 K/UL — HIGH (ref 3.8–10.5)
WBC # FLD AUTO: 10.82 K/UL — HIGH (ref 3.8–10.5)

## 2022-12-08 PROCEDURE — 99232 SBSQ HOSP IP/OBS MODERATE 35: CPT

## 2022-12-08 PROCEDURE — 99231 SBSQ HOSP IP/OBS SF/LOW 25: CPT

## 2022-12-08 PROCEDURE — 99233 SBSQ HOSP IP/OBS HIGH 50: CPT

## 2022-12-08 RX ORDER — HYDRALAZINE HCL 50 MG
50 TABLET ORAL THREE TIMES A DAY
Refills: 0 | Status: DISCONTINUED | OUTPATIENT
Start: 2022-12-08 | End: 2022-12-11

## 2022-12-08 RX ORDER — BUMETANIDE 0.25 MG/ML
4 INJECTION INTRAMUSCULAR; INTRAVENOUS DAILY
Refills: 0 | Status: DISCONTINUED | OUTPATIENT
Start: 2022-12-09 | End: 2022-12-11

## 2022-12-08 RX ORDER — ALLOPURINOL 300 MG
100 TABLET ORAL DAILY
Refills: 0 | Status: DISCONTINUED | OUTPATIENT
Start: 2022-12-08 | End: 2022-12-11

## 2022-12-08 RX ORDER — SODIUM CHLORIDE 9 MG/ML
1000 INJECTION, SOLUTION INTRAVENOUS
Refills: 0 | Status: DISCONTINUED | OUTPATIENT
Start: 2022-12-08 | End: 2022-12-11

## 2022-12-08 RX ORDER — INSULIN LISPRO 100/ML
VIAL (ML) SUBCUTANEOUS
Refills: 0 | Status: DISCONTINUED | OUTPATIENT
Start: 2022-12-08 | End: 2022-12-11

## 2022-12-08 RX ORDER — CARVEDILOL PHOSPHATE 80 MG/1
3.12 CAPSULE, EXTENDED RELEASE ORAL EVERY 12 HOURS
Refills: 0 | Status: DISCONTINUED | OUTPATIENT
Start: 2022-12-08 | End: 2022-12-11

## 2022-12-08 RX ORDER — ACETAMINOPHEN 500 MG
650 TABLET ORAL ONCE
Refills: 0 | Status: COMPLETED | OUTPATIENT
Start: 2022-12-08 | End: 2022-12-08

## 2022-12-08 RX ADMIN — CLOPIDOGREL BISULFATE 75 MILLIGRAM(S): 75 TABLET, FILM COATED ORAL at 10:46

## 2022-12-08 RX ADMIN — SPIRONOLACTONE 25 MILLIGRAM(S): 25 TABLET, FILM COATED ORAL at 05:08

## 2022-12-08 RX ADMIN — Medication 650 MILLIGRAM(S): at 11:02

## 2022-12-08 RX ADMIN — ALBUTEROL 2.5 MILLIGRAM(S): 90 AEROSOL, METERED ORAL at 04:29

## 2022-12-08 RX ADMIN — HEPARIN SODIUM 5000 UNIT(S): 5000 INJECTION INTRAVENOUS; SUBCUTANEOUS at 05:08

## 2022-12-08 RX ADMIN — BUDESONIDE AND FORMOTEROL FUMARATE DIHYDRATE 2 PUFF(S): 160; 4.5 AEROSOL RESPIRATORY (INHALATION) at 10:02

## 2022-12-08 RX ADMIN — ATORVASTATIN CALCIUM 80 MILLIGRAM(S): 80 TABLET, FILM COATED ORAL at 22:37

## 2022-12-08 RX ADMIN — Medication 325 MILLIGRAM(S): at 10:47

## 2022-12-08 RX ADMIN — BUMETANIDE 132 MILLIGRAM(S): 0.25 INJECTION INTRAMUSCULAR; INTRAVENOUS at 05:07

## 2022-12-08 RX ADMIN — SENNA PLUS 2 TABLET(S): 8.6 TABLET ORAL at 22:42

## 2022-12-08 RX ADMIN — Medication 100 MILLIGRAM(S): at 10:45

## 2022-12-08 RX ADMIN — Medication 50 MICROGRAM(S): at 05:09

## 2022-12-08 RX ADMIN — POLYETHYLENE GLYCOL 3350 17 GRAM(S): 17 POWDER, FOR SOLUTION ORAL at 10:46

## 2022-12-08 RX ADMIN — CARVEDILOL PHOSPHATE 3.12 MILLIGRAM(S): 80 CAPSULE, EXTENDED RELEASE ORAL at 17:14

## 2022-12-08 RX ADMIN — HEPARIN SODIUM 5000 UNIT(S): 5000 INJECTION INTRAVENOUS; SUBCUTANEOUS at 22:35

## 2022-12-08 RX ADMIN — CARVEDILOL PHOSPHATE 3.12 MILLIGRAM(S): 80 CAPSULE, EXTENDED RELEASE ORAL at 05:35

## 2022-12-08 RX ADMIN — Medication 650 MILLIGRAM(S): at 10:02

## 2022-12-08 RX ADMIN — Medication 50 MILLIGRAM(S): at 05:35

## 2022-12-08 RX ADMIN — Medication 50 MILLIGRAM(S): at 22:36

## 2022-12-08 RX ADMIN — ALBUTEROL 2.5 MILLIGRAM(S): 90 AEROSOL, METERED ORAL at 20:41

## 2022-12-08 RX ADMIN — BUDESONIDE AND FORMOTEROL FUMARATE DIHYDRATE 2 PUFF(S): 160; 4.5 AEROSOL RESPIRATORY (INHALATION) at 22:35

## 2022-12-08 RX ADMIN — HEPARIN SODIUM 5000 UNIT(S): 5000 INJECTION INTRAVENOUS; SUBCUTANEOUS at 15:28

## 2022-12-08 RX ADMIN — ALBUTEROL 2.5 MILLIGRAM(S): 90 AEROSOL, METERED ORAL at 16:18

## 2022-12-08 RX ADMIN — Medication 50 MILLIGRAM(S): at 15:38

## 2022-12-08 RX ADMIN — Medication 81 MILLIGRAM(S): at 10:47

## 2022-12-08 RX ADMIN — ALBUTEROL 2.5 MILLIGRAM(S): 90 AEROSOL, METERED ORAL at 09:40

## 2022-12-08 RX ADMIN — PANTOPRAZOLE SODIUM 40 MILLIGRAM(S): 20 TABLET, DELAYED RELEASE ORAL at 06:26

## 2022-12-08 NOTE — PROGRESS NOTE ADULT - SUBJECTIVE AND OBJECTIVE BOX
Interval History:  Patient resting comfortably in bed   Denies CP/SOB/palpitations/dizziness  No acute events overnight    Medications:  albuterol    0.083% 2.5 milliGRAM(s) Nebulizer every 6 hours  albuterol    90 MICROgram(s) HFA Inhaler 1 Puff(s) Inhalation every 4 hours  allopurinol 100 milliGRAM(s) Oral daily  aspirin enteric coated 81 milliGRAM(s) Oral daily  atorvastatin 80 milliGRAM(s) Oral at bedtime  budesonide 160 MICROgram(s)/formoterol 4.5 MICROgram(s) Inhaler 2 Puff(s) Inhalation two times a day  buMETAnide IVPB 4 milliGRAM(s) IV Intermittent two times a day  carvedilol 3.125 milliGRAM(s) Oral every 12 hours  clopidogrel Tablet 75 milliGRAM(s) Oral daily  dextrose 50% Injectable 25 Gram(s) IV Push once  dextrose 50% Injectable 12.5 Gram(s) IV Push once  dextrose 50% Injectable 25 Gram(s) IV Push once  dextrose Oral Gel 15 Gram(s) Oral once  ferrous    sulfate 325 milliGRAM(s) Oral daily  glucagon  Injectable 1 milliGRAM(s) IntraMuscular once  heparin   Injectable 5000 Unit(s) SubCutaneous every 8 hours  hydrALAZINE 50 milliGRAM(s) Oral three times a day  isosorbide   dinitrate Tablet (ISORDIL) 30 milliGRAM(s) Oral three times a day  levothyroxine 50 MICROGram(s) Oral daily  pantoprazole    Tablet 40 milliGRAM(s) Oral before breakfast  polyethylene glycol 3350 17 Gram(s) Oral daily  senna 2 Tablet(s) Oral at bedtime  spironolactone 25 milliGRAM(s) Oral daily  tamsulosin 0.4 milliGRAM(s) Oral at bedtime      Vitals:  T(C): 36.7 (12-08-22 @ 10:30), Max: 37 (12-07-22 @ 17:50)  HR: 94 (12-08-22 @ 10:30) (80 - 99)  BP: 95/61 (12-08-22 @ 10:30) (93/53 - 117/68)  BP(mean): --  RR: 18 (12-08-22 @ 10:30) (18 - 19)  SpO2: 96% (12-08-22 @ 10:30) (96% - 100%)    Daily     Daily         I&O's Summary    07 Dec 2022 07:01  -  08 Dec 2022 07:00  --------------------------------------------------------  IN: 1100 mL / OUT: 3025 mL / NET: -1925 mL        Physical Exam:  Appearance: No Acute Distress  Neck: JVP~10  Cardiovascular: Normal S1 S2  Respiratory: Clear to auscultation bilaterally with faint expiratory wheeze   Gastrointestinal: Soft, Non-tender	  Skin: No cyanosis	  Neurologic: Non-focal  Extremities: No BLE edema  Psychiatry: A & O x 3, Mood & affect appropriate    Labs:                        12.0   10.82 )-----------( 162      ( 08 Dec 2022 04:54 )             36.6     12-08    133<L>  |  89<L>  |  43<H>  ----------------------------<  112<H>  3.6   |  30  |  2.50<H>    Ca    9.1      08 Dec 2022 04:54  Phos  3.2     12-08  Mg     1.60     12-08      TELEMETRY: Sinus Rhythm     Echocardiogram:  TTE with Doppler (w/Cont) (12.07.22 @ 08:51)     DIMENSIONS:  Dimensions:     Normal Values:  LA:     4.8 cm    2.0 - 4.0 cm  Ao:     3.8 cm    2.0 - 3.8 cm  SEPTUM: 0.8 cm    0.6 - 1.2 cm  PWT:    0.8 cm    0.6 - 1.1 cm  LVIDd: 6.1 cm    3.0 - 5.6 cm  LVIDs:  5.4 cm    1.8 - 4.0 cm  Derived Variables:  LVMI: 96 g/m2  RWT: 0.26  Fractional short: 11 %  Ejection Fraction (Velazquez Rule): 24 %  ------------------------------------------------------------------------  OBSERVATIONS:  Mitral Valve: Mitral annular calcification, otherwise  normal mitral valve. Mild-moderate mitral regurgitation.  Aortic Root: Normal aortic root.  Aortic Valve: Calcified trileaflet aortic valve with normal  opening. Minimal aortic regurgitation.  Left Atrium: Mildly dilated left atrium.  LA volume index =  36 cc/m2.  Left Ventricle: Severe global left ventricular systolic  dysfunction.  Endocardial visualization enhanced with  intravenous injection of echo contrast (Definity).  No LV  thrombus seen. Mild left ventricular enlargement.  Right Heart: Normal right atrium. The right ventricle is  not well visualized; grossly normal right ventricular  systolic function.  A device wire is noted in the right  heart. Normal tricuspid valve.  Mild tricuspid  regurgitation. Normal pulmonic valve.  Minimal pulmonic  regurgitation.  Pericardium/PleuraNormal pericardium with no pericardial  effusion.  ------------------------------------------------------------------------  CONCLUSIONS:  1. Mitral annular calcification, otherwise normal mitral  valve. Mild-moderate mitral regurgitation.  2. Mildly dilated left atrium.  LA volume index = 36 cc/m2.  3. Mild left ventricular enlargement.  4. Severe global left ventricular systolic dysfunction.  Endocardial visualization enhanced with intravenous  injection of echo contrast (Definity).  No LV thrombus  seen.  5. The right ventricle is not well visualized; grossly  normal right ventricular systolic function.  A device wire  is noted in the right heart.  *** Compared with echocardiogram of 10/17/2022, no  significant changes noted.         Interval History:  Patient resting comfortably in bed   Denies CP/SOB/palpitations/dizziness  No acute events overnight    Medications:  albuterol    0.083% 2.5 milliGRAM(s) Nebulizer every 6 hours  albuterol    90 MICROgram(s) HFA Inhaler 1 Puff(s) Inhalation every 4 hours  allopurinol 100 milliGRAM(s) Oral daily  aspirin enteric coated 81 milliGRAM(s) Oral daily  atorvastatin 80 milliGRAM(s) Oral at bedtime  budesonide 160 MICROgram(s)/formoterol 4.5 MICROgram(s) Inhaler 2 Puff(s) Inhalation two times a day  buMETAnide IVPB 4 milliGRAM(s) IV Intermittent two times a day  carvedilol 3.125 milliGRAM(s) Oral every 12 hours  clopidogrel Tablet 75 milliGRAM(s) Oral daily  dextrose 50% Injectable 25 Gram(s) IV Push once  dextrose 50% Injectable 12.5 Gram(s) IV Push once  dextrose 50% Injectable 25 Gram(s) IV Push once  dextrose Oral Gel 15 Gram(s) Oral once  ferrous    sulfate 325 milliGRAM(s) Oral daily  glucagon  Injectable 1 milliGRAM(s) IntraMuscular once  heparin   Injectable 5000 Unit(s) SubCutaneous every 8 hours  hydrALAZINE 50 milliGRAM(s) Oral three times a day  isosorbide   dinitrate Tablet (ISORDIL) 30 milliGRAM(s) Oral three times a day  levothyroxine 50 MICROGram(s) Oral daily  pantoprazole    Tablet 40 milliGRAM(s) Oral before breakfast  polyethylene glycol 3350 17 Gram(s) Oral daily  senna 2 Tablet(s) Oral at bedtime  spironolactone 25 milliGRAM(s) Oral daily  tamsulosin 0.4 milliGRAM(s) Oral at bedtime      Vitals:  T(C): 36.7 (12-08-22 @ 10:30), Max: 37 (12-07-22 @ 17:50)  HR: 94 (12-08-22 @ 10:30) (80 - 99)  BP: 95/61 (12-08-22 @ 10:30) (93/53 - 117/68)  BP(mean): --  RR: 18 (12-08-22 @ 10:30) (18 - 19)  SpO2: 96% (12-08-22 @ 10:30) (96% - 100%)    Daily     Daily         I&O's Summary    07 Dec 2022 07:01  -  08 Dec 2022 07:00  --------------------------------------------------------  IN: 1100 mL / OUT: 3025 mL / NET: -1925 mL        Physical Exam:  Appearance: No Acute Distress  Neck: JVP~10  Cardiovascular: Normal S1 S2  Respiratory: Clear to auscultation bilaterally    Gastrointestinal: Soft, Non-tender	  Skin: No cyanosis	  Neurologic: Non-focal  Extremities: No BLE edema  Psychiatry: A & O x 3, Mood & affect appropriate    Labs:                        12.0   10.82 )-----------( 162      ( 08 Dec 2022 04:54 )             36.6     12-08    133<L>  |  89<L>  |  43<H>  ----------------------------<  112<H>  3.6   |  30  |  2.50<H>    Ca    9.1      08 Dec 2022 04:54  Phos  3.2     12-08  Mg     1.60     12-08      TELEMETRY: Sinus Rhythm     Echocardiogram:  TTE with Doppler (w/Cont) (12.07.22 @ 08:51)     DIMENSIONS:  Dimensions:     Normal Values:  LA:     4.8 cm    2.0 - 4.0 cm  Ao:     3.8 cm    2.0 - 3.8 cm  SEPTUM: 0.8 cm    0.6 - 1.2 cm  PWT:    0.8 cm    0.6 - 1.1 cm  LVIDd: 6.1 cm    3.0 - 5.6 cm  LVIDs:  5.4 cm    1.8 - 4.0 cm  Derived Variables:  LVMI: 96 g/m2  RWT: 0.26  Fractional short: 11 %  Ejection Fraction (Velazquez Rule): 24 %  ------------------------------------------------------------------------  OBSERVATIONS:  Mitral Valve: Mitral annular calcification, otherwise  normal mitral valve. Mild-moderate mitral regurgitation.  Aortic Root: Normal aortic root.  Aortic Valve: Calcified trileaflet aortic valve with normal  opening. Minimal aortic regurgitation.  Left Atrium: Mildly dilated left atrium.  LA volume index =  36 cc/m2.  Left Ventricle: Severe global left ventricular systolic  dysfunction.  Endocardial visualization enhanced with  intravenous injection of echo contrast (Definity).  No LV  thrombus seen. Mild left ventricular enlargement.  Right Heart: Normal right atrium. The right ventricle is  not well visualized; grossly normal right ventricular  systolic function.  A device wire is noted in the right  heart. Normal tricuspid valve.  Mild tricuspid  regurgitation. Normal pulmonic valve.  Minimal pulmonic  regurgitation.  Pericardium/PleuraNormal pericardium with no pericardial  effusion.  ------------------------------------------------------------------------  CONCLUSIONS:  1. Mitral annular calcification, otherwise normal mitral  valve. Mild-moderate mitral regurgitation.  2. Mildly dilated left atrium.  LA volume index = 36 cc/m2.  3. Mild left ventricular enlargement.  4. Severe global left ventricular systolic dysfunction.  Endocardial visualization enhanced with intravenous  injection of echo contrast (Definity).  No LV thrombus  seen.  5. The right ventricle is not well visualized; grossly  normal right ventricular systolic function.  A device wire  is noted in the right heart.  *** Compared with echocardiogram of 10/17/2022, no  significant changes noted.

## 2022-12-08 NOTE — PROGRESS NOTE ADULT - PROBLEM SELECTOR PLAN 1
Pt. presented with chest pain, SOB, KINNEY and orthopnea. No LE swelling. Decreased urine output. Has hx of CHF with EF 14% in Oct 2022. EKG without changes from prior in October 2022. CXR showing pulmonary edema. On bumex 3mg PO qd at home - reports compliance. S/p furosemide 80 mg IV x1 and bumex 2 mg IV.   - Repeat echo showing EF 24%.   - Heart failure team estefany appreciated , c/w bumex 4mg IV BID  - intake/output Q4H, weight daily, HOB elevation 60 to 90 degrees.  Fluid restriction order when PO intake initiated  - no signs of infection  - EP planning for ICD upgrade tomorrow

## 2022-12-08 NOTE — PROGRESS NOTE ADULT - NS ATTEND AMEND GEN_ALL_CORE FT
67 year old male with past history significant of HTN. HLD, DMT2, CAD, CABG, CHF (EF = 14% - 10/17/2022), Mdt single chamber ICD implanted 2015 for primary prevention, asthma, COPD w/ home oxygen (2L), CKD, CVA w/ left sided weakness deficits and unsteady gait (uses cane or walker), presented to the ED on 12/4/22 with acute on chronic decompensated heart failure. Being diuresed but continued to be symptomatic. On optimal medical therapy for several years and is compliant with home medications and dietary/fluid restrictions; however, has had 7-8 hospitalizations 2/2 fluid overload.  He has occasional palpitations but no syncope or near syncope and no ICD shocks.     Device interrogation showed a single chamber Med[employer]onic ICD with appropriate pacing and sensing. Patient is a CRT-D candidate and may benefit from upgrade of his device. We have had this discussion with the patient and he is in agreement. Discussed with patient regarding risks/benefits and alternatives of BIV ICD upgrade. All questions answered. He consented for the procedure after understanding risks/benefits and alternatives of the procedure. Patient with timothy exp. wheezes. Pulmonary consult appreciated. Would continue nebulizer treatments as patient with diffuse wheezing and has hx of asthma/COPD. Plan for possible BIV ICD upgrade tomorrow

## 2022-12-08 NOTE — PROGRESS NOTE ADULT - ASSESSMENT
67 year old male, with past history significant for CAD, CHF (EF = 14% - 10/17/2022), CABG, HTN, HLD, Type-2 DM, AICD, Asthma, COPD, Home oxygen (2L), CKD, CVA w/ Left sided deficits, and Gait difficulty (uses walker), presented to the ED secondary shortness of breath starting Friday.  Patient uses 2 pillows, but has not been able to sleep for the past 3 nights, at least.  Ambulates with a walker at baseline, but has increased limitations now due to KINNEY. Seen and evaluated at bedside; has son BiPAP, has shortness of breath and speaks in short sentences (sometimes having to repeat information due to increased respiratory effort).  Adheres to fluid restriction at home and is medication compliant.  Notes decreased urine output over the past ~ 2 days.  Patient was removed from Bipap by ED doctor and myself and on 4L NC. Vital signs upon ED presentation as follows:  BP =126/83, HR = 102, RR = 22, T = 36.9 C (98.4 F), O2 Sat = 99% on RA to 100% on BiPAP    RHC: 5/26/2022  RA 9 mmHg. Elevated PCWP 24. PA sat 56%. C.O. 3.97. Cardiac index 2.2. SVR 1510     negative...

## 2022-12-08 NOTE — PROGRESS NOTE ADULT - SUBJECTIVE AND OBJECTIVE BOX
Hospitalist Progress Note  Mara Trevino MD Pager # [phone]    OVERNIGHT EVENTS: HEBER     SUBJECTIVE / INTERVAL HPI: Patient seen and examined at bedside. Patient reports feeling better. SOB has improved. Chest pain has improved.     VITAL SIGNS:  Vital Signs Last 24 Hrs  T(C): 36.7 (08 Dec 2022 10:30), Max: 37 (07 Dec 2022 17:50)  T(F): 98 (08 Dec 2022 10:30), Max: 98.6 (07 Dec 2022 17:50)  HR: 94 (08 Dec 2022 10:30) (80 - 99)  BP: 95/61 (08 Dec 2022 10:30) (93/56 - 117/68)  BP(mean): --  RR: 18 (08 Dec 2022 10:30) (18 - 19)  SpO2: 96% (08 Dec 2022 10:30) (96% - 100%)    Parameters below as of 08 Dec 2022 10:30  Patient On (Oxygen Delivery Method): nasal cannula  O2 Flow (L/min): 2      PHYSICAL EXAM:    General: WDWN male resting in bed   HEENT: NC/AT; PERRL, anicteric sclera; MMM  Neck: supple  Cardiovascular: +S1/S2; RRR  Respiratory: Bilateral crackles - improved compared to yesterdays exam, no accessory muscle use/tachypnea, no wheezing/rhonchi  Gastrointestinal: soft, NT/ND; +BSx4  Extremities: WWP; no edema, clubbing or cyanosis  Vascular: 2+ radial, DP/PT pulses B/L  Neurological: AAOx3; no focal deficits    MEDICATIONS:  MEDICATIONS  (STANDING):  albuterol    0.083% 2.5 milliGRAM(s) Nebulizer every 6 hours  albuterol    90 MICROgram(s) HFA Inhaler 1 Puff(s) Inhalation every 4 hours  allopurinol 100 milliGRAM(s) Oral daily  aspirin enteric coated 81 milliGRAM(s) Oral daily  atorvastatin 80 milliGRAM(s) Oral at bedtime  budesonide 160 MICROgram(s)/formoterol 4.5 MICROgram(s) Inhaler 2 Puff(s) Inhalation two times a day  buMETAnide IVPB 4 milliGRAM(s) IV Intermittent two times a day  carvedilol 3.125 milliGRAM(s) Oral every 12 hours  clopidogrel Tablet 75 milliGRAM(s) Oral daily  dextrose 50% Injectable 25 Gram(s) IV Push once  dextrose 50% Injectable 12.5 Gram(s) IV Push once  dextrose 50% Injectable 25 Gram(s) IV Push once  dextrose Oral Gel 15 Gram(s) Oral once  ferrous    sulfate 325 milliGRAM(s) Oral daily  glucagon  Injectable 1 milliGRAM(s) IntraMuscular once  heparin   Injectable 5000 Unit(s) SubCutaneous every 8 hours  hydrALAZINE 50 milliGRAM(s) Oral three times a day  isosorbide   dinitrate Tablet (ISORDIL) 30 milliGRAM(s) Oral three times a day  levothyroxine 50 MICROGram(s) Oral daily  pantoprazole    Tablet 40 milliGRAM(s) Oral before breakfast  polyethylene glycol 3350 17 Gram(s) Oral daily  senna 2 Tablet(s) Oral at bedtime  spironolactone 25 milliGRAM(s) Oral daily  tamsulosin 0.4 milliGRAM(s) Oral at bedtime    MEDICATIONS  (PRN):      ALLERGIES:  Allergies    No Known Allergies    Intolerances        LABS:                        12.0   10.82 )-----------( 162      ( 08 Dec 2022 04:54 )             36.6     12-08    133<L>  |  89<L>  |  43<H>  ----------------------------<  112<H>  3.6   |  30  |  2.50<H>    Ca    9.1      08 Dec 2022 04:54  Phos  3.2     12-08  Mg     1.60     12-08          CAPILLARY BLOOD GLUCOSE      POCT Blood Glucose.: 177 mg/dL (08 Dec 2022 11:37)      RADIOLOGY & ADDITIONAL TESTS: Reviewed.    ASSESSMENT:    PLAN:

## 2022-12-08 NOTE — PROGRESS NOTE ADULT - SUBJECTIVE AND OBJECTIVE BOX
Patient is seen and examined. He denies any chest pain, SOB, palpitations or dizziness. He feels better    PAST MEDICAL & SURGICAL HISTORY:  HTN (hypertension)    CAD (coronary artery disease)    Diabetes    Hyperlipidemia, unspecified hyperlipidemia type    CHF (congestive heart failure)    BPH (benign prostatic hyperplasia)    S/P CABG (coronary artery bypass graft)    S/P appendectomy        MEDICATIONS  (STANDING):  albuterol    0.083% 2.5 milliGRAM(s) Nebulizer every 6 hours  albuterol    90 MICROgram(s) HFA Inhaler 1 Puff(s) Inhalation every 4 hours  aspirin enteric coated 81 milliGRAM(s) Oral daily  atorvastatin 80 milliGRAM(s) Oral at bedtime  budesonide 160 MICROgram(s)/formoterol 4.5 MICROgram(s) Inhaler 2 Puff(s) Inhalation two times a day  buMETAnide IVPB 4 milliGRAM(s) IV Intermittent two times a day  carvedilol 3.125 milliGRAM(s) Oral every 12 hours  clopidogrel Tablet 75 milliGRAM(s) Oral daily  dextrose 50% Injectable 25 Gram(s) IV Push once  dextrose 50% Injectable 12.5 Gram(s) IV Push once  dextrose 50% Injectable 25 Gram(s) IV Push once  dextrose Oral Gel 15 Gram(s) Oral once  ferrous    sulfate 325 milliGRAM(s) Oral daily  glucagon  Injectable 1 milliGRAM(s) IntraMuscular once  heparin   Injectable 5000 Unit(s) SubCutaneous every 8 hours  hydrALAZINE 50 milliGRAM(s) Oral three times a day  isosorbide   dinitrate Tablet (ISORDIL) 30 milliGRAM(s) Oral three times a day  levothyroxine 50 MICROGram(s) Oral daily  pantoprazole    Tablet 40 milliGRAM(s) Oral before breakfast  polyethylene glycol 3350 17 Gram(s) Oral daily  senna 2 Tablet(s) Oral at bedtime  spironolactone 25 milliGRAM(s) Oral daily  tamsulosin 0.4 milliGRAM(s) Oral at bedtime    MEDICATIONS  (PRN):      Vital Signs Last 24 Hrs  T(C): 36.8 (08 Dec 2022 05:11), Max: 37 (07 Dec 2022 17:50)  T(F): 98.2 (08 Dec 2022 05:11), Max: 98.6 (07 Dec 2022 17:50)  HR: 90 (08 Dec 2022 05:11) (80 - 100)  BP: 105/61 (08 Dec 2022 05:11) (93/53 - 117/68)  BP(mean): --  RR: 18 (08 Dec 2022 05:11) (16 - 19)  SpO2: 100% (08 Dec 2022 05:11) (97% - 100%)    Parameters below as of 08 Dec 2022 05:11  Patient On (Oxygen Delivery Method): nasal cannula  O2 Flow (L/min): 2    INTERPRETATION OF TELEMETRY: Sinus rhythm with PVCs; HR 80s-90s    LABS:                        12.0   10.82 )-----------( 162      ( 08 Dec 2022 04:54 )             36.6     12-08    133<L>  |  89<L>  |  43<H>  ----------------------------<  112<H>  3.6   |  30  |  2.50<H>    Ca    9.1      08 Dec 2022 04:54  Phos  3.2     12-08  Mg     1.60     12-08      I&O's Summary    07 Dec 2022 07:01  -  08 Dec 2022 07:00  --------------------------------------------------------  IN: 1100 mL / OUT: 3025 mL / NET: -1925 mL      PHYSICAL EXAM:    GENERAL: In no apparent distress, well nourished, and hydrated.  HEART: Regular rate and rhythm; No murmurs, rubs, or gallops.  PULMONARY: + exp. wheezes bilaterally  ABDOMEN: Soft, Nontender, Nondistended; Bowel sounds present  EXTREMITIES:  2+ Peripheral Pulses, No clubbing, cyanosis, or edema

## 2022-12-08 NOTE — PROGRESS NOTE ADULT - ASSESSMENT
67 year old male with past history significant of HTN. HLD, DMT2, CAD, CABG, CHF (EF = 14% - 10/17/2022), Mdt single chamber ICD implanted 2015 for primary prevention, asthma, COPD w/ home oxygen (2L), CKD, CVA w/ left sided weakness/deficets and unsteady gait (uses cane or walker), presented to the ED on 12/4/22 with acute on chronic decompensated heart failure. Being diuresed but continued to be symptomatic. On optimal medical therapy for several years and is compliant with home medications and dietary/fluid restrictions however, has had 7-8 hospitalizations 2/2 fluid overload.  He has occasional palpitations but no syncope or near syncope and no ICD shocks.     Device interrogation showed a single chamber Medtronic ICD with appropriate pacing and sensing. Patient is a CRT-D candidate and may benefit from upgrade of his device. We have had this discussion with the patient and he is in agreement. Discussed with patient regarding risks/benefits and alternatives of BIV ICD upgrade. All questions answered. He consented for the procedure after understanding risks/benefits and alternatives of the procedure.  Patient with timothy exp. wheezes. Pulmonary consult appreciated. Would continue nebulizer treatments as patient with diffuse wheezing and has hx of asthma/COPD  Continue management as per primary/HF team  Plan for possible BIV ICD upgrade tomorrow  67 year old male with past history significant of HTN. HLD, DMT2, CAD, CABG, CHF (EF = 14% - 10/17/2022), Mdt single chamber ICD implanted 2015 for primary prevention, asthma, COPD w/ home oxygen (2L), CKD, CVA w/ left sided weakness/deficets and unsteady gait (uses cane or walker), presented to the ED on 12/4/22 with acute on chronic decompensated heart failure. Being diuresed but continued to be symptomatic. On optimal medical therapy for several years and is compliant with home medications and dietary/fluid restrictions however, has had 7-8 hospitalizations 2/2 fluid overload.  He has occasional palpitations but no syncope or near syncope and no ICD shocks.     Device interrogation showed a single chamber Medtronic ICD with appropriate pacing and sensing. Patient is a CRT-D candidate and may benefit from upgrade of his device. We have had this discussion with the patient and he is in agreement. Discussed with patient regarding risks/benefits and alternatives of BIV ICD upgrade. All questions answered. He consented for the procedure after understanding risks/benefits and alternatives of the procedure.  Patient with timothy exp. wheezes. Pulmonary consult appreciated. Would continue nebulizer treatments as patient with diffuse wheezing and has hx of asthma/COPD  Continue management as per primary/HF team  Plan for possible BIV ICD upgrade tomorrow     Spoke to patient using [employer]  ID# [id number]

## 2022-12-08 NOTE — PROGRESS NOTE ADULT - PROBLEM SELECTOR PLAN 1
IV Bumex 4mg BID  Coreg 3.125mg BID (hold SBP <90)  Hydralazine 50mg TID (hold SBP <90)  Isordil 30mg TID  Spironolactone 25mg daily   Daily standing weights   Strict I/O    Monitor lytes replete K>4.0 and Mg>2.0  TTE completed (report above)   Possible ischemic workup this admission  EP Consult appreciated (interrogate Medtronic ICD/CRT upgrade this admission)    Plan for possible CRT upgrade this Friday (12/9)   Please request Pulmonology consult (COPD on home O2/asthma and scattered exp wheezing)   Management per primary team  Pending final recommendations from HF attending IV Bumex 4mg BID; Please switch to PO Bumex 4mg daily   Coreg 3.125mg BID (hold SBP <90)  Hydralazine 50mg TID (hold SBP <90)  Isordil 30mg TID  Spironolactone 25mg daily   Daily standing weights   Strict I/O    Monitor lytes replete K>4.0 and Mg>2.0  TTE completed (report above)   Possible ischemic workup this admission  Plan for possible CRT upgrade this Friday (12/9)   Appreciate Pulmonology consult   Management per primary team  Pending final recommendations from HF attending

## 2022-12-09 LAB
ANION GAP SERPL CALC-SCNC: 15 MMOL/L — HIGH (ref 7–14)
APTT BLD: 29.5 SEC — SIGNIFICANT CHANGE UP (ref 27–36.3)
BUN SERPL-MCNC: 46 MG/DL — HIGH (ref 7–23)
CALCIUM SERPL-MCNC: 9.2 MG/DL — SIGNIFICANT CHANGE UP (ref 8.4–10.5)
CHLORIDE SERPL-SCNC: 89 MMOL/L — LOW (ref 98–107)
CO2 SERPL-SCNC: 30 MMOL/L — SIGNIFICANT CHANGE UP (ref 22–31)
CREAT SERPL-MCNC: 2.63 MG/DL — HIGH (ref 0.5–1.3)
EGFR: 26 ML/MIN/1.73M2 — LOW
GLUCOSE BLDC GLUCOMTR-MCNC: 113 MG/DL — HIGH (ref 70–99)
GLUCOSE BLDC GLUCOMTR-MCNC: 114 MG/DL — HIGH (ref 70–99)
GLUCOSE BLDC GLUCOMTR-MCNC: 115 MG/DL — HIGH (ref 70–99)
GLUCOSE BLDC GLUCOMTR-MCNC: 118 MG/DL — HIGH (ref 70–99)
GLUCOSE BLDC GLUCOMTR-MCNC: 132 MG/DL — HIGH (ref 70–99)
GLUCOSE BLDC GLUCOMTR-MCNC: 146 MG/DL — HIGH (ref 70–99)
GLUCOSE SERPL-MCNC: 93 MG/DL — SIGNIFICANT CHANGE UP (ref 70–99)
HCT VFR BLD CALC: 38.6 % — LOW (ref 39–50)
HGB BLD-MCNC: 12.8 G/DL — LOW (ref 13–17)
INR BLD: 1.19 RATIO — HIGH (ref 0.88–1.16)
MAGNESIUM SERPL-MCNC: 1.6 MG/DL — SIGNIFICANT CHANGE UP (ref 1.6–2.6)
MCHC RBC-ENTMCNC: 31.5 PG — SIGNIFICANT CHANGE UP (ref 27–34)
MCHC RBC-ENTMCNC: 33.2 GM/DL — SIGNIFICANT CHANGE UP (ref 32–36)
MCV RBC AUTO: 95.1 FL — SIGNIFICANT CHANGE UP (ref 80–100)
NRBC # BLD: 0 /100 WBCS — SIGNIFICANT CHANGE UP (ref 0–0)
NRBC # FLD: 0 K/UL — SIGNIFICANT CHANGE UP (ref 0–0)
PHOSPHATE SERPL-MCNC: 3.4 MG/DL — SIGNIFICANT CHANGE UP (ref 2.5–4.5)
PLATELET # BLD AUTO: 166 K/UL — SIGNIFICANT CHANGE UP (ref 150–400)
POTASSIUM SERPL-MCNC: 3.6 MMOL/L — SIGNIFICANT CHANGE UP (ref 3.5–5.3)
POTASSIUM SERPL-SCNC: 3.6 MMOL/L — SIGNIFICANT CHANGE UP (ref 3.5–5.3)
PROTHROM AB SERPL-ACNC: 13.8 SEC — HIGH (ref 10.5–13.4)
RBC # BLD: 4.06 M/UL — LOW (ref 4.2–5.8)
RBC # FLD: 15.3 % — HIGH (ref 10.3–14.5)
SARS-COV-2 RNA SPEC QL NAA+PROBE: SIGNIFICANT CHANGE UP
SODIUM SERPL-SCNC: 134 MMOL/L — LOW (ref 135–145)
WBC # BLD: 9.94 K/UL — SIGNIFICANT CHANGE UP (ref 3.8–10.5)
WBC # FLD AUTO: 9.94 K/UL — SIGNIFICANT CHANGE UP (ref 3.8–10.5)

## 2022-12-09 PROCEDURE — 33249 INSJ/RPLCMT DEFIB W/LEAD(S): CPT

## 2022-12-09 PROCEDURE — 93010 ELECTROCARDIOGRAM REPORT: CPT

## 2022-12-09 PROCEDURE — 33225 L VENTRIC PACING LEAD ADD-ON: CPT

## 2022-12-09 PROCEDURE — 71045 X-RAY EXAM CHEST 1 VIEW: CPT | Mod: 26

## 2022-12-09 PROCEDURE — 99232 SBSQ HOSP IP/OBS MODERATE 35: CPT

## 2022-12-09 RX ORDER — POTASSIUM CHLORIDE 20 MEQ
10 PACKET (EA) ORAL
Refills: 0 | Status: COMPLETED | OUTPATIENT
Start: 2022-12-09 | End: 2022-12-09

## 2022-12-09 RX ORDER — CEFAZOLIN SODIUM 1 G
1000 VIAL (EA) INJECTION EVERY 12 HOURS
Refills: 0 | Status: COMPLETED | OUTPATIENT
Start: 2022-12-10 | End: 2022-12-10

## 2022-12-09 RX ORDER — MAGNESIUM SULFATE 500 MG/ML
1 VIAL (ML) INJECTION ONCE
Refills: 0 | Status: COMPLETED | OUTPATIENT
Start: 2022-12-09 | End: 2022-12-09

## 2022-12-09 RX ADMIN — SPIRONOLACTONE 25 MILLIGRAM(S): 25 TABLET, FILM COATED ORAL at 05:21

## 2022-12-09 RX ADMIN — Medication 100 MILLIEQUIVALENT(S): at 09:49

## 2022-12-09 RX ADMIN — Medication 100 GRAM(S): at 08:38

## 2022-12-09 RX ADMIN — Medication 50 MILLIGRAM(S): at 05:24

## 2022-12-09 RX ADMIN — HEPARIN SODIUM 5000 UNIT(S): 5000 INJECTION INTRAVENOUS; SUBCUTANEOUS at 05:20

## 2022-12-09 RX ADMIN — ISOSORBIDE DINITRATE 30 MILLIGRAM(S): 5 TABLET ORAL at 22:44

## 2022-12-09 RX ADMIN — CLOPIDOGREL BISULFATE 75 MILLIGRAM(S): 75 TABLET, FILM COATED ORAL at 17:59

## 2022-12-09 RX ADMIN — SENNA PLUS 2 TABLET(S): 8.6 TABLET ORAL at 22:44

## 2022-12-09 RX ADMIN — ATORVASTATIN CALCIUM 80 MILLIGRAM(S): 80 TABLET, FILM COATED ORAL at 22:43

## 2022-12-09 RX ADMIN — ISOSORBIDE DINITRATE 30 MILLIGRAM(S): 5 TABLET ORAL at 00:48

## 2022-12-09 RX ADMIN — TAMSULOSIN HYDROCHLORIDE 0.4 MILLIGRAM(S): 0.4 CAPSULE ORAL at 22:44

## 2022-12-09 RX ADMIN — ALBUTEROL 2.5 MILLIGRAM(S): 90 AEROSOL, METERED ORAL at 21:25

## 2022-12-09 RX ADMIN — CARVEDILOL PHOSPHATE 3.12 MILLIGRAM(S): 80 CAPSULE, EXTENDED RELEASE ORAL at 05:22

## 2022-12-09 RX ADMIN — Medication 100 MILLIEQUIVALENT(S): at 08:39

## 2022-12-09 RX ADMIN — ISOSORBIDE DINITRATE 30 MILLIGRAM(S): 5 TABLET ORAL at 08:39

## 2022-12-09 RX ADMIN — CARVEDILOL PHOSPHATE 3.12 MILLIGRAM(S): 80 CAPSULE, EXTENDED RELEASE ORAL at 17:59

## 2022-12-09 RX ADMIN — BUMETANIDE 4 MILLIGRAM(S): 0.25 INJECTION INTRAMUSCULAR; INTRAVENOUS at 05:22

## 2022-12-09 RX ADMIN — Medication 325 MILLIGRAM(S): at 18:00

## 2022-12-09 RX ADMIN — Medication 50 MICROGRAM(S): at 05:21

## 2022-12-09 RX ADMIN — PANTOPRAZOLE SODIUM 40 MILLIGRAM(S): 20 TABLET, DELAYED RELEASE ORAL at 05:21

## 2022-12-09 RX ADMIN — Medication 50 MILLIGRAM(S): at 22:45

## 2022-12-09 RX ADMIN — Medication 30 MILLILITER(S): at 03:46

## 2022-12-09 RX ADMIN — BUDESONIDE AND FORMOTEROL FUMARATE DIHYDRATE 2 PUFF(S): 160; 4.5 AEROSOL RESPIRATORY (INHALATION) at 22:43

## 2022-12-09 RX ADMIN — ALBUTEROL 2.5 MILLIGRAM(S): 90 AEROSOL, METERED ORAL at 03:10

## 2022-12-09 RX ADMIN — ALBUTEROL 2.5 MILLIGRAM(S): 90 AEROSOL, METERED ORAL at 10:01

## 2022-12-09 RX ADMIN — Medication 81 MILLIGRAM(S): at 18:00

## 2022-12-09 RX ADMIN — BUDESONIDE AND FORMOTEROL FUMARATE DIHYDRATE 2 PUFF(S): 160; 4.5 AEROSOL RESPIRATORY (INHALATION) at 08:50

## 2022-12-09 RX ADMIN — Medication 100 MILLIGRAM(S): at 18:00

## 2022-12-09 NOTE — PROGRESS NOTE ADULT - NS ATTEND AMEND GEN_ALL_CORE FT
Device interrogation showed a single chamber Medtronic ICD with appropriate pacing and sensing. Patient is a CRT-D candidate and may benefit from upgrade of his device. We have had this discussion with the patient and he is in agreement. Discussed with patient regarding risks/benefits and alternatives of BIV ICD upgrade. All questions answered. He consented for the procedure after understanding risks/benefits and alternatives of the procedure. Patient with timothy expiratory wheezes. Pulmonary consult appreciated. Would continue nebulizer treatments as patient with diffuse wheezing and has hx of asthma/COPD. Patient s/p upgrade to CRT-D. Tolerated the procedure well. No complications. Vital signs stable. Dressing dry and intact. No evidence of hematoma, bleeding or ecchymosis. Post procedure done with patient via [facility] .  Written instructions and contact information provided.

## 2022-12-09 NOTE — PROGRESS NOTE ADULT - PROBLEM SELECTOR PLAN 1
Bumex 4mg daily   Coreg 3.125mg BID (hold SBP <90)  Hydralazine 50mg TID (hold SBP <90)  Isordil 30mg TID  Spironolactone 25mg daily   Daily standing weights   Strict I/O    Monitor lytes replete K>4.0 and Mg>2.0  TTE completed (report above)   Possible ischemic workup this admission  CRT upgrade today (12/9)   Appreciate Pulmonology consult   Management per primary team  Pending final recommendations from HF attending Bumex 4mg daily   Coreg 3.125mg BID (hold SBP <90)  Hydralazine 50mg TID (hold SBP <90)  Isordil 30mg TID  Spironolactone 25mg daily   Daily standing weights   Strict I/O    Monitor lytes replete K>4.0 and Mg>2.0  TTE completed (report above)   Possible ischemic workup this admission  CRT upgrade today (12/9)   Appreciate Pulmonology consult   Management per primary team  Follow up appointment with HF Clinic on 1/4/2023  Pending final recommendations from HF attending Bumex 4mg daily   Coreg 3.125mg BID (hold SBP <90)  Hydralazine 50mg TID (hold SBP <90)  Isordil 30mg TID  Spironolactone 25mg daily   Daily standing weights   Strict I/O    Monitor lytes replete K>4.0 and Mg>2.0  TTE completed (report above)   Possible ischemic workup this admission  CRT upgrade today (12/9)   Appreciate Pulmonology consult   Management per primary team  Follow up appointment with HF Clinic on 1/4/2023 at 12:30pm  Pending final recommendations from HF attending Bumex 4mg daily; Place on hold and restart on 12/12   Coreg 3.125mg BID (hold SBP <90)  Hydralazine 50mg TID (hold SBP <90)  Isordil 30mg TID  Spironolactone 25mg daily   Daily standing weights   Strict I/O    Monitor lytes replete K>4.0 and Mg>2.0  TTE completed (report above)   Possible ischemic workup this admission  CRT upgrade today (12/9)   Appreciate Pulmonology consult   Management per primary team  Follow up appointment with HF Clinic on 1/4/2023 at 12:30pm  Pending final recommendations from HF attending

## 2022-12-09 NOTE — PROGRESS NOTE ADULT - NS ATTEND OPT1A GEN_ALL_CORE
History/Exam/Medical decision making
Medical decision making

## 2022-12-09 NOTE — CHART NOTE - NSCHARTNOTEFT_GEN_A_CORE
Attempted to see the patient twice, once in the AM and once in the PM but the patient was off the floor. Attempted to visit the patient in the cath lab recovery room in the afternoon but the patient was in the procedure for ICD device upgrade. Reviewed all notes from specialists. Pt. is now euvolemic and is on PO bumex. F/u CHF team recommendations. F/u EP recommendations.

## 2022-12-09 NOTE — PROGRESS NOTE ADULT - SUBJECTIVE AND OBJECTIVE BOX
[facility]  #[id number]  Patient states he is feeling better.   Able to lay flat in bed without shortness of breath. Using 2L NCO2.   Denies chest pain, shortness of breath at rest, palpitations or lightheadedness.   Still wheezing but improved on nebulizers.         Vital Signs Last 24 Hrs  T(C): 36.5 (09 Dec 2022 05:11), Max: 37.1 (08 Dec 2022 12:45)  T(F): 97.7 (09 Dec 2022 05:11), Max: 98.7 (08 Dec 2022 12:45)  HR: 70 (09 Dec 2022 10:01) (70 - 95)  BP: 109/64 (09 Dec 2022 08:20) (89/53 - 109/64)  BP(mean): --  RR: 18 (09 Dec 2022 08:20) (17 - 18)  SpO2: 97% (09 Dec 2022 08:20) (95% - 100%)    Parameters below as of 09 Dec 2022 08:20  Patient On (Oxygen Delivery Method): nasal cannula  O2 Flow (L/min): 2        Telemetry: NSR 80-90's with PVC's and 3 beats NSVT.   MEDICATIONS  (STANDING):  albuterol    0.083% 2.5 milliGRAM(s) Nebulizer every 6 hours  albuterol    90 MICROgram(s) HFA Inhaler 1 Puff(s) Inhalation every 4 hours  allopurinol 100 milliGRAM(s) Oral daily  aspirin enteric coated 81 milliGRAM(s) Oral daily  atorvastatin 80 milliGRAM(s) Oral at bedtime  budesonide 160 MICROgram(s)/formoterol 4.5 MICROgram(s) Inhaler 2 Puff(s) Inhalation two times a day  buMETAnide 4 milliGRAM(s) Oral daily  carvedilol 3.125 milliGRAM(s) Oral every 12 hours  clopidogrel Tablet 75 milliGRAM(s) Oral daily  dextrose 5%. 1000 milliLiter(s) (50 mL/Hr) IV Continuous <Continuous>  dextrose 5%. 1000 milliLiter(s) (100 mL/Hr) IV Continuous <Continuous>  dextrose 50% Injectable 25 Gram(s) IV Push once  dextrose 50% Injectable 12.5 Gram(s) IV Push once  dextrose 50% Injectable 25 Gram(s) IV Push once  dextrose Oral Gel 15 Gram(s) Oral once  ferrous    sulfate 325 milliGRAM(s) Oral daily  glucagon  Injectable 1 milliGRAM(s) IntraMuscular once  heparin   Injectable 5000 Unit(s) SubCutaneous every 8 hours  hydrALAZINE 50 milliGRAM(s) Oral three times a day  insulin lispro (ADMELOG) corrective regimen sliding scale   SubCutaneous three times a day before meals  isosorbide   dinitrate Tablet (ISORDIL) 30 milliGRAM(s) Oral three times a day  levothyroxine 50 MICROGram(s) Oral daily  pantoprazole    Tablet 40 milliGRAM(s) Oral before breakfast  polyethylene glycol 3350 17 Gram(s) Oral daily  potassium chloride  10 mEq/100 mL IVPB 10 milliEquivalent(s) IV Intermittent every 1 hour  senna 2 Tablet(s) Oral at bedtime  spironolactone 25 milliGRAM(s) Oral daily  tamsulosin 0.4 milliGRAM(s) Oral at bedtime    MEDICATIONS  (PRN):          Physical exam:   Gen- well developed well nourished in NAD. Able to lay flat in bed.   Resp- diffuse but mild expiratory wheezing. No rales or rhonchi. On 2L NCO2 O2 sat 97%. No cough or use of accessory muscles.   CV-  S1 and S2 RRR. No murmur, gallops or rubs  ABD- soft nontender + bowel sounds   EXT- no edema no calf tenderness  Neuro- grossly nonfocal                            12.8   9.94  )-----------( 166      ( 09 Dec 2022 06:00 )             38.6     PT/INR - ( 09 Dec 2022 06:00 )   PT: 13.8 sec;   INR: 1.19 ratio         PTT - ( 09 Dec 2022 06:00 )  PTT:29.5 sec  12-09    134<L>  |  89<L>  |  46<H>  ----------------------------<  93  3.6   |  30  |  2.63<H>    Ca    9.2      09 Dec 2022 06:00  Phos  3.4     12-09  Mg     1.60     12-09    < from: TTE with Doppler (w/Cont) (12.07.22 @ 08:51) >  Patient name: Joey Lewis  YOB: 1955   Age: 67 (M)   MR#: [id number]  Study Date: 12/7/2022  [facility] Cristiana Corey RDCS  Study quality: Technically Fair  Referring Physician: Traci Wu MD  Blood Pressure: 101/60 mmHg  Height: 177 cm  Weight: 80 kg  BSA: 2 m2  ------------------------------------------------------------------------  PROCEDURE: Transthoracic echocardiogram with 2-D, M-Mode  and complete spectral and color flow Doppler.  Intravenous ultrasound enhancing agent was administered for  improved left ventricular endocardial border definition.  Following the intravenous injection of ultrasound enhancing  agent, harmonic imaging was performed.  INDICATION: Heart failure, unspecified (I50.9)  ------------------------------------------------------------------------  DIMENSIONS:  Dimensions:     Normal Values:  LA:     4.8 cm    2.0 - 4.0 cm  Ao:     3.8 cm    2.0 - 3.8 cm  SEPTUM: 0.8 cm    0.6 - 1.2 cm  PWT:    0.8 cm    0.6 - 1.1 cm  LVIDd: 6.1 cm    3.0 - 5.6 cm  LVIDs:  5.4 cm    1.8 - 4.0 cm  Derived Variables:  LVMI: 96 g/m2  RWT: 0.26  Fractional short: 11 %  Ejection Fraction (Velazquez Rule): 24 %  ------------------------------------------------------------------------  OBSERVATIONS:  Mitral Valve: Mitral annular calcification, otherwise  normal mitral valve. Mild-moderate mitral regurgitation.  Aortic Root: Normal aortic root.  Aortic Valve: Calcified trileaflet aortic valve with normal  opening. Minimal aortic regurgitation.  Left Atrium: Mildly dilated left atrium.  LA volume index =  36 cc/m2.  Left Ventricle: Severe global left ventricular systolic  dysfunction.  Endocardial visualization enhanced with  < from: TTE with Doppler (w/Cont) (12.07.22 @ 08:51) >  intravenous injection of echo contrast (Definity).  No LV  thrombus seen. Mild left ventricular enlargement.  Right Heart: Normal right atrium. The right ventricle is  not well visualized; grossly normal right ventricular  systolic function.  A device wire is noted in the right  heart. Normal tricuspid valve.  Mild tricuspid  regurgitation. Normal pulmonic valve.  Minimal pulmonic  regurgitation.  Pericardium/PleuraNormal pericardium with no pericardial  effusion.  ------------------------------------------------------------------------  CONCLUSIONS:  1. Mitral annular calcification, otherwise normal mitral  valve. Mild-moderate mitral regurgitation.  2. Mildly dilated left atrium.  LA volume index = 36 cc/m2.  3. Mild left ventricular enlargement.  4. Severe global left ventricular systolic dysfunction.  Endocardial visualization enhanced with intravenous  injection of echo contrast (Definity).  No LV thrombus  seen.  5. The right ventricle is not well visualized; grossly  normal right ventricular systolic function.  A device wire  is noted in the right heart.  *** Compared with echocardiogram of 10/17/2022, no  significant changes noted.  ------------------------------------------------------------------------  Confirmed on  12/7/2022 - 10:15:21 by Greg Medrano M.D.,  [facility], [facility]  ------------------------------------------------------------------------

## 2022-12-09 NOTE — CHART NOTE - NSCHARTNOTEFT_GEN_A_CORE
[facility] MEDICINE NIGHT COVERAGE    Patient seen at bedside status post upgrade to CRT-D. .left anterior chest wall site with surgical dressing intact. Site clean, dry. No bleeding, underlying hematoma, or erythema. No bruit auscultated. Patient denies chest pain, SOB, numbness/weakness/tingling. Pulses present, capillary refill appropriate. Patient educated and understands if any of the above symptoms were to arise, to notify RN.     post procedure cxr with no obvious pneumothorax (pending official reading)  Will continue to monitor closely.    vIan Mast PA-C  Pager # [phone]

## 2022-12-09 NOTE — PROGRESS NOTE ADULT - SUBJECTIVE AND OBJECTIVE BOX
Interval History:  Patient resting comfortably on stretcher in cath lab/CRT upgrade    Denies CP/SOB/palpitations/dizziness  No acute events overnight    Medications:  albuterol    0.083% 2.5 milliGRAM(s) Nebulizer every 6 hours  albuterol    90 MICROgram(s) HFA Inhaler 1 Puff(s) Inhalation every 4 hours  allopurinol 100 milliGRAM(s) Oral daily  aspirin enteric coated 81 milliGRAM(s) Oral daily  atorvastatin 80 milliGRAM(s) Oral at bedtime  budesonide 160 MICROgram(s)/formoterol 4.5 MICROgram(s) Inhaler 2 Puff(s) Inhalation two times a day  buMETAnide 4 milliGRAM(s) Oral daily  carvedilol 3.125 milliGRAM(s) Oral every 12 hours  clopidogrel Tablet 75 milliGRAM(s) Oral daily  dextrose 5%. 1000 milliLiter(s) IV Continuous <Continuous>  dextrose 5%. 1000 milliLiter(s) IV Continuous <Continuous>  dextrose 50% Injectable 25 Gram(s) IV Push once  dextrose 50% Injectable 12.5 Gram(s) IV Push once  dextrose 50% Injectable 25 Gram(s) IV Push once  dextrose Oral Gel 15 Gram(s) Oral once  ferrous    sulfate 325 milliGRAM(s) Oral daily  glucagon  Injectable 1 milliGRAM(s) IntraMuscular once  heparin   Injectable 5000 Unit(s) SubCutaneous every 8 hours  hydrALAZINE 50 milliGRAM(s) Oral three times a day  insulin lispro (ADMELOG) corrective regimen sliding scale   SubCutaneous three times a day before meals  isosorbide   dinitrate Tablet (ISORDIL) 30 milliGRAM(s) Oral three times a day  levothyroxine 50 MICROGram(s) Oral daily  pantoprazole    Tablet 40 milliGRAM(s) Oral before breakfast  polyethylene glycol 3350 17 Gram(s) Oral daily  potassium chloride  10 mEq/100 mL IVPB 10 milliEquivalent(s) IV Intermittent every 1 hour  senna 2 Tablet(s) Oral at bedtime  spironolactone 25 milliGRAM(s) Oral daily  tamsulosin 0.4 milliGRAM(s) Oral at bedtime      Vitals:  T(C): 36.5 (22 @ 05:11), Max: 37.1 (22 @ 12:45)  HR: 70 (22 @ 10:01) (70 - 95)  BP: 109/64 (22 @ 08:20) (89/53 - 109/64)  BP(mean): --  RR: 18 (22 @ 08:20) (17 - 18)  SpO2: 97% (22 @ 08:20) (95% - 100%)    Daily     Daily Weight in k.7 (09 Dec 2022 05:11)      I&O's Summary    08 Dec 2022 07:01  -  09 Dec 2022 07:00  --------------------------------------------------------  IN: 610 mL / OUT: 2325 mL / NET: -1715 mL    09 Dec 2022 07:01  -  09 Dec 2022 11:50  --------------------------------------------------------  IN: 0 mL / OUT: 600 mL / NET: -600 mL        Physical Exam:  Appearance: No Acute Distress  Neck: No JVD  Cardiovascular: Normal S1 S2  Respiratory: Clear to auscultation bilaterally  Gastrointestinal: Soft, Non-tender	  Skin: No cyanosis	  Neurologic: Non-focal  Extremities: No LE edema  Psychiatry: A & O x 3, Mood & affect appropriate    Labs:                        12.8   9.94  )-----------( 166      ( 09 Dec 2022 06:00 )             38.6         134<L>  |  89<L>  |  46<H>  ----------------------------<  93  3.6   |  30  |  2.63<H>    Ca    9.2      09 Dec 2022 06:00  Phos  3.4       Mg     1.60     12      PT/INR - ( 09 Dec 2022 06:00 )   PT: 13.8 sec;   INR: 1.19 ratio         PTT - ( 09 Dec 2022 06:00 )  PTT:29.5 sec        TELEMETRY: Sinus Rhythm     Echocardiogram:  TTE with Doppler (w/Cont) (22 @ 08:51)     DIMENSIONS:  Dimensions:     Normal Values:  LA:     4.8 cm    2.0 - 4.0 cm  Ao:     3.8 cm    2.0 - 3.8 cm  SEPTUM: 0.8 cm    0.6 - 1.2 cm  PWT:    0.8 cm    0.6 - 1.1 cm  LVIDd: 6.1 cm    3.0 - 5.6 cm  LVIDs:  5.4 cm    1.8 - 4.0 cm  Derived Variables:  LVMI: 96 g/m2  RWT: 0.26  Fractional short: 11 %  Ejection Fraction (Velazquez Rule): 24 %  ------------------------------------------------------------------------  OBSERVATIONS:  Mitral Valve: Mitral annular calcification, otherwise  normal mitral valve. Mild-moderate mitral regurgitation.  Aortic Root: Normal aortic root.  Aortic Valve: Calcified trileaflet aortic valve with normal  opening. Minimal aortic regurgitation.  Left Atrium: Mildly dilated left atrium.  LA volume index =  36 cc/m2.  Left Ventricle: Severe global left ventricular systolic  dysfunction.  Endocardial visualization enhanced with  intravenous injection of echo contrast (Definity).  No LV  thrombus seen. Mild left ventricular enlargement.  Right Heart: Normal right atrium. The right ventricle is  not well visualized; grossly normal right ventricular  systolic function.  A device wire is noted in the right  heart. Normal tricuspid valve.  Mild tricuspid  regurgitation. Normal pulmonic valve.  Minimal pulmonic  regurgitation.  Pericardium/PleuraNormal pericardium with no pericardial  effusion.  ------------------------------------------------------------------------  CONCLUSIONS:  1. Mitral annular calcification, otherwise normal mitral  valve. Mild-moderate mitral regurgitation.  2. Mildly dilated left atrium.  LA volume index = 36 cc/m2.  3. Mild left ventricular enlargement.  4. Severe global left ventricular systolic dysfunction.  Endocardial visualization enhanced with intravenous  injection of echo contrast (Definity).  No LV thrombus  seen.  5. The right ventricle is not well visualized; grossly  normal right ventricular systolic function.  A device wire  is noted in the right heart.  *** Compared with echocardiogram of 10/17/2022, no  significant changes noted.

## 2022-12-09 NOTE — PROGRESS NOTE ADULT - NS ATTEND AMEND GEN_ALL_CORE FT
Hold Bumex for now and monitor renal function.  CRT upgrade today.  Possible d/c home over the weekend. Hold Bumex for now and monitor renal function.  CRT upgrade today.  Possible d/c home over the weekend.    Addendum:  Restart Bumex 4 mg po qd on Monday.

## 2022-12-09 NOTE — PROGRESS NOTE ADULT - ASSESSMENT
67 year old male with past history significant of HTN. HLD, DMT2, CAD, CABG, CHF (EF = 14% - 10/17/2022), Mdt single chamber ICD implanted 2015 for primary prevention, asthma, COPD w/ home oxygen (2L), CKD, CVA w/ left sided weakness/deficets and unsteady gait (uses cane or walker), presented to the ED on 12/4/22 with acute on chronic decompensated heart failure. Diuresed and compliant with diet/fluid intake and optimal medical therapy for several years,  however, has had 7-8 hospitalizations 2/2 fluid overload.    Device interrogation showed a single chamber Medtronic ICD with appropriate pacing and sensing. Patient is a CRT-D candidate and may benefit from upgrade of his device. We have had this discussion with the patient and he is in agreement. Discussed with patient regarding risks/benefits and alternatives of BIV ICD upgrade. All questions answered. He consented for the procedure after understanding risks/benefits and alternatives of the procedure.  Patient with timothy expiratory wheezes. Pulmonary consult appreciated. Would continue nebulizer treatments as patient with diffuse wheezing and has hx of asthma/COPD    Plan for possible BIV ICD upgrade today.        67 year old male with past history significant of HTN. HLD, DMT2, CAD, CABG, CHF (EF = 14% - 10/17/2022), Mdt single chamber ICD implanted 2015 for primary prevention, asthma, COPD w/ home oxygen (2L), CKD, CVA w/ left sided weakness/deficets and unsteady gait (uses cane or walker), presented to the ED on 12/4/22 with acute on chronic decompensated heart failure. Diuresed and compliant with diet/fluid intake and optimal medical therapy for several years,  however, has had 7-8 hospitalizations 2/2 fluid overload.    Device interrogation showed a single chamber Medtronic ICD with appropriate pacing and sensing. Patient is a CRT-D candidate and may benefit from upgrade of his device. We have had this discussion with the patient and he is in agreement. Discussed with patient regarding risks/benefits and alternatives of BIV ICD upgrade. All questions answered. He consented for the procedure after understanding risks/benefits and alternatives of the procedure.  Patient with timothy expiratory wheezes. Pulmonary consult appreciated. Would continue nebulizer treatments as patient with diffuse wheezing and has hx of asthma/COPD    Plan for possible BIV ICD upgrade today.       ADDENDUM:    Patient s/p upgrade to CRT-D. Tolerated the procedure well. No complications.   Vital signs stable.  Dressing dry and intact. No evidence of hematoma, bleeding or ecchymosis.   Post procedure done with patient via [facility] .   Written instructions and contact information provided.   All questions answered.  He has a follow-up appointment in the device clinic on 12/22/22 at 2:40pm  4th floor oncology Building [phone].

## 2022-12-10 ENCOUNTER — TRANSCRIPTION ENCOUNTER (OUTPATIENT)
Age: 67
End: 2022-12-10

## 2022-12-10 LAB
ANION GAP SERPL CALC-SCNC: 15 MMOL/L — HIGH (ref 7–14)
BUN SERPL-MCNC: 55 MG/DL — HIGH (ref 7–23)
CALCIUM SERPL-MCNC: 9 MG/DL — SIGNIFICANT CHANGE UP (ref 8.4–10.5)
CHLORIDE SERPL-SCNC: 88 MMOL/L — LOW (ref 98–107)
CO2 SERPL-SCNC: 30 MMOL/L — SIGNIFICANT CHANGE UP (ref 22–31)
CREAT SERPL-MCNC: 2.85 MG/DL — HIGH (ref 0.5–1.3)
EGFR: 23 ML/MIN/1.73M2 — LOW
GLUCOSE BLDC GLUCOMTR-MCNC: 116 MG/DL — HIGH (ref 70–99)
GLUCOSE BLDC GLUCOMTR-MCNC: 132 MG/DL — HIGH (ref 70–99)
GLUCOSE BLDC GLUCOMTR-MCNC: 142 MG/DL — HIGH (ref 70–99)
GLUCOSE BLDC GLUCOMTR-MCNC: 180 MG/DL — HIGH (ref 70–99)
GLUCOSE SERPL-MCNC: 118 MG/DL — HIGH (ref 70–99)
HCT VFR BLD CALC: 38.1 % — LOW (ref 39–50)
HGB BLD-MCNC: 12.2 G/DL — LOW (ref 13–17)
MAGNESIUM SERPL-MCNC: 1.9 MG/DL — SIGNIFICANT CHANGE UP (ref 1.6–2.6)
MCHC RBC-ENTMCNC: 30.8 PG — SIGNIFICANT CHANGE UP (ref 27–34)
MCHC RBC-ENTMCNC: 32 GM/DL — SIGNIFICANT CHANGE UP (ref 32–36)
MCV RBC AUTO: 96.2 FL — SIGNIFICANT CHANGE UP (ref 80–100)
NRBC # BLD: 0 /100 WBCS — SIGNIFICANT CHANGE UP (ref 0–0)
NRBC # FLD: 0 K/UL — SIGNIFICANT CHANGE UP (ref 0–0)
PHOSPHATE SERPL-MCNC: 3.7 MG/DL — SIGNIFICANT CHANGE UP (ref 2.5–4.5)
PLATELET # BLD AUTO: 157 K/UL — SIGNIFICANT CHANGE UP (ref 150–400)
POTASSIUM SERPL-MCNC: 3.7 MMOL/L — SIGNIFICANT CHANGE UP (ref 3.5–5.3)
POTASSIUM SERPL-SCNC: 3.7 MMOL/L — SIGNIFICANT CHANGE UP (ref 3.5–5.3)
RBC # BLD: 3.96 M/UL — LOW (ref 4.2–5.8)
RBC # FLD: 15.3 % — HIGH (ref 10.3–14.5)
SODIUM SERPL-SCNC: 133 MMOL/L — LOW (ref 135–145)
WBC # BLD: 10.93 K/UL — HIGH (ref 3.8–10.5)
WBC # FLD AUTO: 10.93 K/UL — HIGH (ref 3.8–10.5)

## 2022-12-10 PROCEDURE — 99233 SBSQ HOSP IP/OBS HIGH 50: CPT

## 2022-12-10 RX ORDER — DIPHENHYDRAMINE HCL 50 MG
25 CAPSULE ORAL ONCE
Refills: 0 | Status: COMPLETED | OUTPATIENT
Start: 2022-12-10 | End: 2022-12-10

## 2022-12-10 RX ADMIN — Medication 50 MICROGRAM(S): at 06:10

## 2022-12-10 RX ADMIN — Medication 100 MILLIGRAM(S): at 12:46

## 2022-12-10 RX ADMIN — BUMETANIDE 4 MILLIGRAM(S): 0.25 INJECTION INTRAMUSCULAR; INTRAVENOUS at 06:09

## 2022-12-10 RX ADMIN — ISOSORBIDE DINITRATE 30 MILLIGRAM(S): 5 TABLET ORAL at 06:09

## 2022-12-10 RX ADMIN — ALBUTEROL 2.5 MILLIGRAM(S): 90 AEROSOL, METERED ORAL at 03:36

## 2022-12-10 RX ADMIN — SPIRONOLACTONE 25 MILLIGRAM(S): 25 TABLET, FILM COATED ORAL at 06:09

## 2022-12-10 RX ADMIN — Medication 100 MILLIGRAM(S): at 00:13

## 2022-12-10 RX ADMIN — ISOSORBIDE DINITRATE 30 MILLIGRAM(S): 5 TABLET ORAL at 21:12

## 2022-12-10 RX ADMIN — CARVEDILOL PHOSPHATE 3.12 MILLIGRAM(S): 80 CAPSULE, EXTENDED RELEASE ORAL at 17:07

## 2022-12-10 RX ADMIN — PANTOPRAZOLE SODIUM 40 MILLIGRAM(S): 20 TABLET, DELAYED RELEASE ORAL at 06:09

## 2022-12-10 RX ADMIN — TAMSULOSIN HYDROCHLORIDE 0.4 MILLIGRAM(S): 0.4 CAPSULE ORAL at 21:13

## 2022-12-10 RX ADMIN — Medication 50 MILLIGRAM(S): at 12:45

## 2022-12-10 RX ADMIN — POLYETHYLENE GLYCOL 3350 17 GRAM(S): 17 POWDER, FOR SOLUTION ORAL at 12:45

## 2022-12-10 RX ADMIN — Medication 81 MILLIGRAM(S): at 12:46

## 2022-12-10 RX ADMIN — Medication 25 MILLIGRAM(S): at 00:49

## 2022-12-10 RX ADMIN — BUDESONIDE AND FORMOTEROL FUMARATE DIHYDRATE 2 PUFF(S): 160; 4.5 AEROSOL RESPIRATORY (INHALATION) at 21:11

## 2022-12-10 RX ADMIN — ALBUTEROL 2.5 MILLIGRAM(S): 90 AEROSOL, METERED ORAL at 10:07

## 2022-12-10 RX ADMIN — CARVEDILOL PHOSPHATE 3.12 MILLIGRAM(S): 80 CAPSULE, EXTENDED RELEASE ORAL at 06:09

## 2022-12-10 RX ADMIN — BUDESONIDE AND FORMOTEROL FUMARATE DIHYDRATE 2 PUFF(S): 160; 4.5 AEROSOL RESPIRATORY (INHALATION) at 08:26

## 2022-12-10 RX ADMIN — Medication 50 MILLIGRAM(S): at 06:09

## 2022-12-10 RX ADMIN — Medication 1: at 08:30

## 2022-12-10 RX ADMIN — ATORVASTATIN CALCIUM 80 MILLIGRAM(S): 80 TABLET, FILM COATED ORAL at 21:12

## 2022-12-10 RX ADMIN — ALBUTEROL 2.5 MILLIGRAM(S): 90 AEROSOL, METERED ORAL at 16:25

## 2022-12-10 RX ADMIN — Medication 100 MILLIGRAM(S): at 12:44

## 2022-12-10 RX ADMIN — Medication 325 MILLIGRAM(S): at 12:45

## 2022-12-10 RX ADMIN — CLOPIDOGREL BISULFATE 75 MILLIGRAM(S): 75 TABLET, FILM COATED ORAL at 12:45

## 2022-12-10 RX ADMIN — Medication 50 MILLIGRAM(S): at 21:12

## 2022-12-10 RX ADMIN — ALBUTEROL 2.5 MILLIGRAM(S): 90 AEROSOL, METERED ORAL at 22:19

## 2022-12-10 NOTE — DISCHARGE NOTE PROVIDER - NSDCMRMEDTOKEN_GEN_ALL_CORE_FT
albuterol 90 mcg/inh inhalation aerosol: 2 puff(s) inhaled every 6 hours, As needed, Shortness of Breath and/or Wheezing  aspirin 81 mg oral delayed release tablet: 1 tab(s) orally once a day  budesonide-formoterol 160 mcg-4.5 mcg/inh inhalation aerosol: 1  inhaled 2 times a day  bumetanide 1 mg oral tablet: 3 tab(s) orally once a day  carvedilol 3.125 mg oral tablet: 1 tab(s) orally every 12 hours  clopidogrel 75 mg oral tablet: 1 tab(s) orally once a day  ezetimibe 10 mg oral tablet: 1 tab(s) orally once a day  famotidine 40 mg oral tablet: 1 tab(s) orally once a day  ferrous sulfate 325 mg (65 mg elemental iron) oral tablet: 1 tab(s) orally once a day  gabapentin 100 mg oral capsule: 2 cap(s) orally 3 times a day  Per [facility]: 800mg TID (filled on 11/9/22).   glimepiride 2 mg oral tablet: 1 tab(s) orally once a day  hydrALAZINE 50 mg oral tablet: 1 tab(s) orally 3 times a day (dispensed on 10/24/22).   ipratropium-albuterol 0.5 mg-2.5 mg/3 mL inhalation solution: 3 milliliter(s) inhaled every 6 hours, As Needed -for shortness of breath and/or wheezing   isosorbide dinitrate 20 mg oral tablet: Per [facility]: 30mg tabs: 1 tablet orally 3 times daily.   levothyroxine 25 mcg (0.025 mg) oral tablet: 1 tab(s) orally once a day  Per [facility]: 50mcg tabs: 1 tab daily filled on 11/26/22 ?   ROSUVASTATIN CALCIUM 20 MG TAB: TAKE 1 TABLET ONCE A DAY (AT BEDTIME) ORALLY  senna leaf extract oral tablet: 2 tab(s) orally once a day (at bedtime)  spironolactone 25 mg oral tablet: 0.5 tab(s) orally once a day (Filled on 11/9/22)  tamsulosin 0.4 mg oral capsule: 1 cap(s) orally once a day (at bedtime)   albuterol 90 mcg/inh inhalation aerosol: 2 puff(s) inhaled every 6 hours, As needed, Shortness of Breath and/or Wheezing  allopurinol 100 mg oral tablet: 1 tab(s) orally once a day  aspirin 81 mg oral delayed release tablet: 1 tab(s) orally once a day  budesonide-formoterol 160 mcg-4.5 mcg/inh inhalation aerosol: 1  inhaled 2 times a day  bumetanide 2 mg oral tablet: 2 tab(s) orally once a day  carvedilol 3.125 mg oral tablet: 1 tab(s) orally every 12 hours  clopidogrel 75 mg oral tablet: 1 tab(s) orally once a day  ezetimibe 10 mg oral tablet: 1 tab(s) orally once a day  famotidine 40 mg oral tablet: 1 tab(s) orally once a day  ferrous sulfate 325 mg (65 mg elemental iron) oral tablet: 1 tab(s) orally once a day  glimepiride 2 mg oral tablet: 1 tab(s) orally once a day  hydrALAZINE 50 mg oral tablet: 1 tab(s) orally 3 times a day (dispensed on 10/24/22).   ipratropium-albuterol 0.5 mg-2.5 mg/3 mL inhalation solution: 3 milliliter(s) inhaled every 6 hours, As Needed -for shortness of breath and/or wheezing   isosorbide dinitrate 30 mg oral tablet: 1 tab(s) orally 3 times a day  levothyroxine 50 mcg (0.05 mg) oral tablet: 1 tab(s) orally once a day  ROSUVASTATIN CALCIUM 20 MG TAB: TAKE 1 TABLET ONCE A DAY (AT BEDTIME) ORALLY  senna leaf extract oral tablet: 2 tab(s) orally once a day (at bedtime)  spironolactone 25 mg oral tablet: 0.5 tab(s) orally once a day (Filled on 11/9/22)  tamsulosin 0.4 mg oral capsule: 1 cap(s) orally once a day (at bedtime)

## 2022-12-10 NOTE — PROGRESS NOTE ADULT - PROBLEM SELECTOR PLAN 1
Resume PO bumex 4 mg QD on discharge   Cont Coreg 3.125mg BID    Cont Hydralazine 50mg TID   Cont Isordil 30mg TID  Cont Spironolactone 25mg daily   Follow up appointment with HF Clinic on 1/4/2023 at 12:30pm

## 2022-12-10 NOTE — DISCHARGE NOTE PROVIDER - NSDCFUSCHEDAPPT_GEN_ALL_CORE_FT
[facility] [phone]  Scheduled Appointment: 12/22/2022    [facility]  HEARTFAIL [address]  Scheduled Appointment: 01/04/2023

## 2022-12-10 NOTE — PROGRESS NOTE ADULT - PROBLEM SELECTOR PLAN 1
Pt. presented with chest pain, SOB, KINNEY and orthopnea. No LE swelling. Decreased urine output. Has hx of CHF with EF 14% in Oct 2022. EKG without changes from prior in October 2022. CXR showing pulmonary edema. On bumex 3mg PO qd at home - reports compliance. S/p furosemide 80 mg IV x1 and bumex 2 mg IV.   - Repeat echo showing EF 24%.   - Heart failure team eval appreciated , c/w bumex 4mg IV BID  - intake/output Q4H, weight daily, HOB elevation 60 to 90 degrees.  Fluid restriction order when PO intake initiated  - no signs of infection  - EP ICD upgrade on 12/9 without complication  - CXR showed ICD in place. No PTX  - Pressure bandage removed. Cleared for DC pending home vs rehab (discuss with daughter)

## 2022-12-10 NOTE — DISCHARGE NOTE PROVIDER - NSFTFHOMEHTHYNRD_GEN_ALL_CORE
[Dear  ___] : Dear  [unfilled], [Consult Letter:] : I had the pleasure of evaluating your patient, [unfilled]. [Please see my note below.] : Please see my note below. [Consult Closing:] : Thank you very much for allowing me to participate in the care of this patient.  If you have any questions, please do not hesitate to contact me. [Sincerely,] : Sincerely, [FreeTextEntry2] : Dr. Eduardo Ingram\par [address] [place] [address], [address]\[place] [address] [FreeTextEntry3] : Dev Yes

## 2022-12-10 NOTE — PROGRESS NOTE ADULT - SUBJECTIVE AND OBJECTIVE BOX
[facility] Division of Hospital Medicine  Mart Parish MD  Pager (M-F, 8A-5P): [phone]      SUBJECTIVE / OVERNIGHT EVENTS: Patient with procedure 12/9. Upgrade without complication. CHXR showing no Pneumothorax. ICD in place. Pressure bandage removed. Cleared for DC pending home vs rehab, NAEON. Patient denies fevers/chills/HA/SOB/CP/N/V/D/C  ADDITIONAL REVIEW OF SYSTEMS:    MEDICATIONS  (STANDING):  albuterol    0.083% 2.5 milliGRAM(s) Nebulizer every 6 hours  albuterol    90 MICROgram(s) HFA Inhaler 1 Puff(s) Inhalation every 4 hours  allopurinol 100 milliGRAM(s) Oral daily  aspirin enteric coated 81 milliGRAM(s) Oral daily  atorvastatin 80 milliGRAM(s) Oral at bedtime  budesonide 160 MICROgram(s)/formoterol 4.5 MICROgram(s) Inhaler 2 Puff(s) Inhalation two times a day  buMETAnide 4 milliGRAM(s) Oral daily  carvedilol 3.125 milliGRAM(s) Oral every 12 hours  clopidogrel Tablet 75 milliGRAM(s) Oral daily  dextrose 5%. 1000 milliLiter(s) (50 mL/Hr) IV Continuous <Continuous>  dextrose 5%. 1000 milliLiter(s) (100 mL/Hr) IV Continuous <Continuous>  dextrose 50% Injectable 25 Gram(s) IV Push once  dextrose 50% Injectable 12.5 Gram(s) IV Push once  dextrose 50% Injectable 25 Gram(s) IV Push once  dextrose Oral Gel 15 Gram(s) Oral once  ferrous    sulfate 325 milliGRAM(s) Oral daily  glucagon  Injectable 1 milliGRAM(s) IntraMuscular once  hydrALAZINE 50 milliGRAM(s) Oral three times a day  insulin lispro (ADMELOG) corrective regimen sliding scale   SubCutaneous three times a day before meals  isosorbide   dinitrate Tablet (ISORDIL) 30 milliGRAM(s) Oral three times a day  levothyroxine 50 MICROGram(s) Oral daily  pantoprazole    Tablet 40 milliGRAM(s) Oral before breakfast  polyethylene glycol 3350 17 Gram(s) Oral daily  senna 2 Tablet(s) Oral at bedtime  spironolactone 25 milliGRAM(s) Oral daily  tamsulosin 0.4 milliGRAM(s) Oral at bedtime    MEDICATIONS  (PRN):      I&O's Summary    09 Dec 2022 07:01  -  10 Dec 2022 07:00  --------------------------------------------------------  IN: 150 mL / OUT: 1300 mL / NET: -1150 mL        PHYSICAL EXAM:  Vital Signs Last 24 Hrs  T(C): 36.8 (10 Dec 2022 12:08), Max: 36.8 (09 Dec 2022 17:48)  T(F): 98.2 (10 Dec 2022 12:08), Max: 98.2 (09 Dec 2022 17:48)  HR: 86 (10 Dec 2022 12:08) (81 - 89)  BP: 98/63 (10 Dec 2022 12:08) (98/63 - 114/60)  BP(mean): --  RR: 17 (10 Dec 2022 12:08) (16 - 17)  SpO2: 100% (10 Dec 2022 12:08) (99% - 100%)    Parameters below as of 10 Dec 2022 12:08  Patient On (Oxygen Delivery Method): nasal cannula  O2 Flow (L/min): 2    CONSTITUTIONAL: NAD, well-developed, well-groomed  EYES: PERRLA; conjunctiva and sclera clear  ENMT: Moist oral mucosa, no pharyngeal injection or exudates; normal dentition  NECK: Supple, no palpable masses; no thyromegaly  RESPIRATORY: Normal respiratory effort; lungs are clear to auscultation bilaterally  CARDIOVASCULAR: Regular rate and rhythm, normal S1 and S2, no murmur/rub/gallop; No lower extremity edema; Peripheral pulses are 2+ bilaterally. Dressing wihtout drainage. Mild subcutaneous air present  ABDOMEN: Nontender to palpation, normoactive bowel sounds, no rebound/guarding; No hepatosplenomegaly  MUSCULOSKELETAL:  Normal gait; no clubbing or cyanosis of digits; no joint swelling or tenderness to palpation  PSYCH: A+O to person, place, and time; affect appropriate  NEUROLOGY: CN 2-12 are intact and symmetric; no gross sensory deficits   SKIN: No rashes; no palpable lesions    LABS:                        12.2   10.93 )-----------( 157      ( 10 Dec 2022 06:50 )             38.1     12-10    133<L>  |  88<L>  |  55<H>  ----------------------------<  118<H>  3.7   |  30  |  2.85<H>    Ca    9.0      10 Dec 2022 06:50  Phos  3.7     12-10  Mg     1.90     12-10      PT/INR - ( 09 Dec 2022 06:00 )   PT: 13.8 sec;   INR: 1.19 ratio         PTT - ( 09 Dec 2022 06:00 )  PTT:29.5 sec            RADIOLOGY & ADDITIONAL TESTS:  Results Reviewed:   Imaging Personally Reviewed:  Electrocardiogram Personally Reviewed:    COORDINATION OF CARE:  Care Discussed with Consultants/Other Providers [Y/N]:  Prior or Outpatient Records Reviewed [Y/N]:

## 2022-12-10 NOTE — CHART NOTE - NSCHARTNOTEFT_GEN_A_CORE
Cardiology Fellow Wound Check     L upper sternal wound check, no hematoma or bleeding. Pressure dressing removed.     Thank you for allowing us to participate in the care of your patient. If you have any questions or concerns please do not hesitate to contact us 24/7.     All Cardiology service information can be found 24/7 on [email or web address], password: cr [email or web address] MD  PGY-6 Cardiology Fellow, [facility]/HAKAN

## 2022-12-10 NOTE — DISCHARGE NOTE PROVIDER - NSDCFUADDAPPT_GEN_ALL_CORE_FT
, Follow up appointment with Heart Failure  Clinic on 1/4/2023 at 12:30pm.    You have a follow-up appointment in the device clinic on 12/22/22 at 2:40pm  4th floor oncology Building [phone].

## 2022-12-10 NOTE — PROGRESS NOTE ADULT - ASSESSMENT
67M with PMH of CAD, HFrEF (EF = 14% - 10/17/2022), CABG, HTN, HLD, T2DM s/p ICD, Asthma, COPD, Home oxygen (2L), CKD, CVA w/ Left sided deficits, and Gait difficulty (uses walker), presented to the ED with ADHF

## 2022-12-10 NOTE — DISCHARGE NOTE PROVIDER - HOSPITAL COURSE
66 y/o M pmhx CAD, CHF (EF 15-20% w/ ICD), CABG, HTN, HLD, T2DM, Asthma, COPD (home O2 2L), CKD, CVA w/ L sided deficits and gait difficulty (uses walker) p/w CHF exacerbation     Acute on chronic systolic heart failure   - EKG w/o ischemic  changes from prior in Oct 2022   - CXR w/ pulmonary edema.   - At home on bumex 3mg po qd, reported compliance. S/P furosemide 80mg IV x1 and bumex 2mg IV  - Repeat TTE showed EF 24%.  - Followed by heart failure team s/p IV bumex transitioned to PO bumex 4mg   - s/p BIV-ICD upgrade on 12/9 with EP   - c/w coreg, bumex, hydralazine, isordil, spironolactone     Acute Respiratory Failure with Hypercapnia   - Hypercapnia on admission iso CHF exacerbation   - c/w home supplemental O2   - fluid restriction     Oliguria   - improved with diuretics; strict weights; strict I&Os     Electrolyte Imbalance  - Hyponatremia on admission to 133. Resolved.     Abnormal Thyroid Blood Test  - TSH 5.71. Free T3/T4 levels low.   - No changes to synthroid at this time ?euthyroid sick syndrome  - repeat outpatient TFTS     T2DM (A1C 6.2% this admission)   - ISS, FS monitored     Patient is medically optimized for discharge. Case discussed with 66 y/o M pmhx CAD, CHF (EF 15-20% w/ ICD), CABG, HTN, HLD, T2DM, Asthma, COPD (home O2 2L), CKD, CVA w/ L sided deficits and gait difficulty (uses walker) p/w CHF exacerbation     Acute on chronic systolic heart failure   - EKG w/o ischemic  changes from prior in Oct 2022   - CXR w/ pulmonary edema.   - At home on bumex 3mg po qd, reported compliance. S/P furosemide 80mg IV x1 and bumex 2mg IV  - Repeat TTE showed EF 24%.  - Followed by heart failure team s/p IV bumex transitioned to PO bumex 4mg   - s/p BIV-ICD upgrade on 12/9 with EP   - c/w coreg, bumex, hydralazine, isordil, spironolactone     Acute Respiratory Failure with Hypercapnia   - Hypercapnia on admission iso CHF exacerbation   - c/w home supplemental O2   - fluid restriction     Oliguria   - improved with diuretics; strict weights; strict I&Os     Electrolyte Imbalance  - Hyponatremia on admission to 133. Resolved.     Abnormal Thyroid Blood Test  - TSH 5.71. Free T3/T4 levels low.   - Synthroid increased from 25mcg to 50mcg ?euthyroid sick syndrome  - repeat outpatient TFTS     T2DM (A1C 6.2% this admission)   - ISS, FS monitored     Patient seen and evaluated. Afebrile, VSS. Patient case reviewed and discussed with Attending Physician Dr Parish, who agrees the patient is medically stable and cleared for discharge 12/11 with appropriate follow up. Reviewed discharge medications with daughter, patient and attending. All new medications requiring new prescriptions were sent to the pharmacy of patient's choice. Reviewed need for prescription for previous home medications and new prescriptions sent if requested.  in and following with ambulance transport arranged for later today. Patient and daughter understands and agrees with plan of care.     66 y/o M pmhx CAD, CHF (EF 15-20% w/ ICD), CABG, HTN, HLD, T2DM, Asthma, COPD (home O2 2L), CKD, CVA w/ L sided deficits and gait difficulty (uses walker) p/w CHF exacerbation     Acute on chronic systolic heart failure   - EKG w/o ischemic  changes from prior in Oct 2022   - CXR w/ pulmonary edema.   - At home on bumex 3mg po qd, reported compliance. S/P furosemide 80mg IV x1 and bumex 2mg IV  - Repeat TTE showed EF 24%.  - Followed by heart failure team s/p IV bumex transitioned to PO bumex 4mg   - s/p BIV-ICD upgrade on 12/9 with EP   - c/w coreg, bumex, hydralazine, isordil, spironolactone     Acute Respiratory Failure with Hypercapnia   - Hypercapnia on admission iso CHF exacerbation   - c/w home supplemental O2   - fluid restriction     Oliguria   - improved with diuretics; strict weights; strict I&Os     Electrolyte Imbalance  - Hyponatremia on admission to 133. Resolved.     Abnormal Thyroid Blood Test  - TSH 5.71. Free T3/T4 levels low.   - Synthroid increased to 50mcg ?euthyroid sick syndrome  - repeat outpatient TFTS     T2DM (A1C 6.2% this admission)   - ISS, FS monitored     Patient seen and evaluated. Afebrile, VSS. Patient case reviewed and discussed with Attending Physician Dr Parish, who agrees the patient is medically stable and cleared for discharge 12/11 with appropriate follow up. Reviewed discharge medications with daughter, patient and attending. All new medications requiring new prescriptions were sent to the pharmacy of patient's choice. Reviewed need for prescription for previous home medications and new prescriptions sent if requested.  in and following with ambulance transport arranged for later today. Patient and daughter understands and agrees with plan of care.

## 2022-12-11 ENCOUNTER — TRANSCRIPTION ENCOUNTER (OUTPATIENT)
Age: 67
End: 2022-12-11

## 2022-12-11 VITALS
TEMPERATURE: 98 F | OXYGEN SATURATION: 99 % | DIASTOLIC BLOOD PRESSURE: 65 MMHG | HEART RATE: 92 BPM | SYSTOLIC BLOOD PRESSURE: 115 MMHG | RESPIRATION RATE: 18 BRPM

## 2022-12-11 LAB
ANION GAP SERPL CALC-SCNC: 14 MMOL/L — SIGNIFICANT CHANGE UP (ref 7–14)
BUN SERPL-MCNC: 59 MG/DL — HIGH (ref 7–23)
CALCIUM SERPL-MCNC: 9 MG/DL — SIGNIFICANT CHANGE UP (ref 8.4–10.5)
CHLORIDE SERPL-SCNC: 86 MMOL/L — LOW (ref 98–107)
CO2 SERPL-SCNC: 30 MMOL/L — SIGNIFICANT CHANGE UP (ref 22–31)
CREAT SERPL-MCNC: 2.98 MG/DL — HIGH (ref 0.5–1.3)
EGFR: 22 ML/MIN/1.73M2 — LOW
GLUCOSE BLDC GLUCOMTR-MCNC: 113 MG/DL — HIGH (ref 70–99)
GLUCOSE BLDC GLUCOMTR-MCNC: 185 MG/DL — HIGH (ref 70–99)
GLUCOSE BLDC GLUCOMTR-MCNC: 188 MG/DL — HIGH (ref 70–99)
GLUCOSE BLDC GLUCOMTR-MCNC: 224 MG/DL — HIGH (ref 70–99)
GLUCOSE SERPL-MCNC: 90 MG/DL — SIGNIFICANT CHANGE UP (ref 70–99)
HCT VFR BLD CALC: 34.9 % — LOW (ref 39–50)
HGB BLD-MCNC: 11.5 G/DL — LOW (ref 13–17)
MAGNESIUM SERPL-MCNC: 2.2 MG/DL — SIGNIFICANT CHANGE UP (ref 1.6–2.6)
MCHC RBC-ENTMCNC: 31 PG — SIGNIFICANT CHANGE UP (ref 27–34)
MCHC RBC-ENTMCNC: 33 GM/DL — SIGNIFICANT CHANGE UP (ref 32–36)
MCV RBC AUTO: 94.1 FL — SIGNIFICANT CHANGE UP (ref 80–100)
NRBC # BLD: 0 /100 WBCS — SIGNIFICANT CHANGE UP (ref 0–0)
NRBC # FLD: 0 K/UL — SIGNIFICANT CHANGE UP (ref 0–0)
PHOSPHATE SERPL-MCNC: 3.6 MG/DL — SIGNIFICANT CHANGE UP (ref 2.5–4.5)
PLATELET # BLD AUTO: 163 K/UL — SIGNIFICANT CHANGE UP (ref 150–400)
POTASSIUM SERPL-MCNC: 3.9 MMOL/L — SIGNIFICANT CHANGE UP (ref 3.5–5.3)
POTASSIUM SERPL-SCNC: 3.9 MMOL/L — SIGNIFICANT CHANGE UP (ref 3.5–5.3)
RBC # BLD: 3.71 M/UL — LOW (ref 4.2–5.8)
RBC # FLD: 15.2 % — HIGH (ref 10.3–14.5)
SODIUM SERPL-SCNC: 130 MMOL/L — LOW (ref 135–145)
WBC # BLD: 10.64 K/UL — HIGH (ref 3.8–10.5)
WBC # FLD AUTO: 10.64 K/UL — HIGH (ref 3.8–10.5)

## 2022-12-11 PROCEDURE — 99239 HOSP IP/OBS DSCHRG MGMT >30: CPT

## 2022-12-11 RX ORDER — ALLOPURINOL 300 MG
1 TABLET ORAL
Qty: 30 | Refills: 0
Start: 2022-12-11 | End: 2023-01-09

## 2022-12-11 RX ORDER — BUMETANIDE 0.25 MG/ML
2 INJECTION INTRAMUSCULAR; INTRAVENOUS
Qty: 60 | Refills: 0
Start: 2022-12-11 | End: 2023-01-09

## 2022-12-11 RX ORDER — ISOSORBIDE DINITRATE 5 MG/1
1 TABLET ORAL
Qty: 90 | Refills: 0
Start: 2022-12-11 | End: 2023-01-09

## 2022-12-11 RX ORDER — LEVOTHYROXINE SODIUM 125 MCG
1 TABLET ORAL
Qty: 30 | Refills: 0
Start: 2022-12-11 | End: 2023-01-09

## 2022-12-11 RX ADMIN — ISOSORBIDE DINITRATE 30 MILLIGRAM(S): 5 TABLET ORAL at 06:50

## 2022-12-11 RX ADMIN — Medication 50 MILLIGRAM(S): at 05:16

## 2022-12-11 RX ADMIN — Medication 81 MILLIGRAM(S): at 13:59

## 2022-12-11 RX ADMIN — CLOPIDOGREL BISULFATE 75 MILLIGRAM(S): 75 TABLET, FILM COATED ORAL at 13:58

## 2022-12-11 RX ADMIN — PANTOPRAZOLE SODIUM 40 MILLIGRAM(S): 20 TABLET, DELAYED RELEASE ORAL at 05:16

## 2022-12-11 RX ADMIN — Medication 100 MILLIGRAM(S): at 14:00

## 2022-12-11 RX ADMIN — Medication 50 MICROGRAM(S): at 05:16

## 2022-12-11 RX ADMIN — CARVEDILOL PHOSPHATE 3.12 MILLIGRAM(S): 80 CAPSULE, EXTENDED RELEASE ORAL at 05:16

## 2022-12-11 RX ADMIN — SPIRONOLACTONE 25 MILLIGRAM(S): 25 TABLET, FILM COATED ORAL at 05:16

## 2022-12-11 RX ADMIN — ISOSORBIDE DINITRATE 30 MILLIGRAM(S): 5 TABLET ORAL at 14:00

## 2022-12-11 RX ADMIN — Medication 1: at 13:57

## 2022-12-11 RX ADMIN — ALBUTEROL 2.5 MILLIGRAM(S): 90 AEROSOL, METERED ORAL at 10:29

## 2022-12-11 RX ADMIN — Medication 325 MILLIGRAM(S): at 13:59

## 2022-12-11 RX ADMIN — ALBUTEROL 2.5 MILLIGRAM(S): 90 AEROSOL, METERED ORAL at 04:01

## 2022-12-11 RX ADMIN — Medication 50 MILLIGRAM(S): at 14:01

## 2022-12-11 RX ADMIN — BUMETANIDE 4 MILLIGRAM(S): 0.25 INJECTION INTRAMUSCULAR; INTRAVENOUS at 05:16

## 2022-12-11 RX ADMIN — ALBUTEROL 2.5 MILLIGRAM(S): 90 AEROSOL, METERED ORAL at 16:27

## 2022-12-11 NOTE — PROGRESS NOTE ADULT - PROBLEM SELECTOR PLAN 3
Improved with diuretics.   - Continue to monitor
- for the past ~ 2 days  - in the setting of diuretic use  - f/u straight cath (ordered)  - f/u bladder scan (ordered)  - if retaining urine, Torres catheter insertion indicated  - pt says he is voiding well now after IV bumex
Improved with diuretics.   - Continue to monitor
- for the past ~ 2 days  - in the setting of diuretic use  - f/u bladder scan   - if retaining urine, will place Torres catheter

## 2022-12-11 NOTE — PROGRESS NOTE ADULT - PROBLEM SELECTOR PLAN 8
- heparin SQ  diet: - on regular carb consistent diet   pt also c/o feeling of food stuck in his chest , will request S&S eval   PT eval  plan of care d/w pt and ACP
- heparin SQ  diet: - patient was  NPO (on BiPAP)  - pt weaned off BIpap , on regular carb consistent diet   pt was noted to be hypoglycemic , likely 2/2 to NPO status   will monitor closely  pt also c/o feeling of food stuck in his chest , will request S&S eval   PT eval
- heparin SQ  diet: - on regular carb consistent diet   pt also c/o feeling of food stuck in his chest , will request S&S eval   PT eval  plan of care d/w pt and ACP

## 2022-12-11 NOTE — PROGRESS NOTE ADULT - PROBLEM SELECTOR PLAN 7
- SBP  stable.   - Continue to monitor
-  no acute issues presently  - f/u BP, especially in the setting of being diuresed
- SBP  stable.   - Continue to monitor
- SBP  stable.   - Continue to monitor
- patient was  NPO (on BiPAP)  - pt weaned off BIpap , on regular carb consistent diet   pt was noted to be hypoglycemic , likely 2/2 to NPO status   will monitor closely
- SBP  stable.   - Continue to monitor

## 2022-12-11 NOTE — DISCHARGE NOTE NURSING/CASE MANAGEMENT/SOCIAL WORK - NSDCPEFALRISK_GEN_ALL_CORE
For information on Fall & Injury Prevention, visit: [email or web address] OR  [email or web address] OR  https://www.cdc.gov/steadi/patient.html

## 2022-12-11 NOTE — PROGRESS NOTE ADULT - ASSESSMENT
67M with PMH of CAD, HFrEF (EF = 14% - 10/17/2022), CABG, HTN, HLD, T2DM s/p ICD, Asthma, COPD, Home oxygen (2L), CKD, CVA w/ Left sided deficits, and Gait difficulty (uses walker), presented to the ED with ADHF     Cont PO bumex 4 mg QD   Cont Coreg 3.125mg BID    Cont Hydralazine 50mg TID   Cont Isordil 30mg TID  Cont Spironolactone 25mg daily   Cont ASA and Plavix     Follow up appointment with HF Clinic on 1/4/2023 at 12:30pm.  Thank you for allowing us to participate in the care of your patient. If you have any questions or concerns please do not hesitate to contact us 24/7.     All Cardiology service information can be found 24/7 on [email or web address], password: cr Espino MD  PGY-6 Cardiology Fellow, [facility]

## 2022-12-11 NOTE — DISCHARGE NOTE NURSING/CASE MANAGEMENT/SOCIAL WORK - PATIENT PORTAL LINK FT
You can access the FollowMyHealth Patient Portal offered by [facility] by registering at the following website: [email or web address]. By joining [facility]’s FollowMyHealth portal, you will also be able to view your health information using other applications (apps) compatible with our system.

## 2022-12-11 NOTE — PROGRESS NOTE ADULT - PROBLEM SELECTOR PLAN 5
- TSH = 5.71  - f/u FT3, T4 levels noted , low  - will not make changes to synthroid dose at this time,  ?Euthyroid sick syndrome   - will repeat TFTs when patient more stable
- TSH = 5.71  - possibly affecting heart failure  - f/u FT3, T4 levels noted , low  - will not make changes to synthroid dose at this time,  ?Euthyroid sick syndrome   - will repeat TFTs when patient more stable
- TSH = 5.71  - f/u FT3, T4 levels noted , low  - will not make changes to synthroid dose at this time,  ?Euthyroid sick syndrome   - will repeat TFTs when patient more stable
- TSH = 5.71  - f/u FT3, T4 levels noted , low  - will not make changes to synthroid dose at this time,  ?Euthyroid sick syndrome   - will repeat TFTs when patient more stable
- TSH = 5.71  - possibly affecting heart failure  - f/u FT3, T4 levels noted , low  - will not make changes to synthroid dose at this time,  ?Euthyroid sick syndrome   - will repeat TFTs when patient more stable
- TSH = 5.71  - f/u FT3, T4 levels noted , low  - will not make changes to synthroid dose at this time,  ?Euthyroid sick syndrome   - will repeat TFTs when patient more stable

## 2022-12-11 NOTE — PROGRESS NOTE ADULT - PROBLEM SELECTOR PLAN 4
Hyponatremic on admission to 133. Na 142 yesterday and 132 today. Fluctuating.   - Monitor closely.
- hyponatremic (133), hypochloremic (95) - chronic issues  - in the setting of diuretic prescription, possible thyroid dysfunction, in the setting of CKD-IV  -sodium stable   - f/u electrolytes and replete, as necessary
Hyponatremic on admission to 133. Na 142 today.   - Monitor closely.
Hyponatremic on admission to 133. Na 142 yesterday and 132 today. Fluctuating.   - Monitor closely.
- hyponatremic (133), hypochloremic (95) - chronic issues  - in the setting of diuretic prescription, possible thyroid dysfunction, in the setting of CKD-IV  - although fluid overloaded will hold off on trial of metolazone (prior to furosemide)  -sodium improved on repeat labs   - f/u electrolytes and replete, as necessary
Hyponatremic on admission to 133. Na 142 yesterday and 132 today. Fluctuating.   - Monitor closely.

## 2022-12-11 NOTE — PROGRESS NOTE ADULT - PROBLEM SELECTOR PROBLEM 1
Acute on chronic systolic heart failure

## 2022-12-11 NOTE — PROGRESS NOTE ADULT - SUBJECTIVE AND OBJECTIVE BOX
Patient seen and examined at bedside.    Overnight Events:     no events o/n   episode of PVCs on telemetry     REVIEW OF SYSTEMS:  Constitutional:     [x ] negative [ ] fevers [ ] chills [ ] weight loss [ ] weight gain  HEENT:                  [x ] negative [ ] dry eyes [ ] eye irritation [ ] postnasal drip [ ] nasal congestion  CV:                         [ x] negative  [ ] chest pain [ ] orthopnea [ ] palpitations [ ] murmur  Resp:                     [ x] negative [ ] cough [ ] shortness of breath [ ] dyspnea [ ] wheezing [ ] sputum [ ]hemoptysis  GI:                          [ x] negative [ ] nausea [ ] vomiting [ ] diarrhea [ ] constipation [ ] abd pain [ ] dysphagia   :                        [ x] negative [ ] dysuria [ ] nocturia [ ] hematuria [ ] increased urinary frequency  Musculoskeletal: [ x] negative [ ] back pain [ ] myalgias [ ] arthralgias [ ] fracture  Skin:                       [ x] negative [ ] rash [ ] itch  Neurological:        [x ] negative [ ] headache [ ] dizziness [ ] syncope [ ] weakness [ ] numbness  Psychiatric:           [ x] negative [ ] anxiety [ ] depression  Endocrine:            [ x] negative [ ] diabetes [ ] thyroid problem  Heme/Lymph:      [ x] negative [ ] anemia [ ] bleeding problem  Allergic/Immune: [ x] negative [ ] itchy eyes [ ] nasal discharge [ ] hives [ ] angioedema    [ x] All other systems negative  [ ] Unable to assess ROS due to    Current Meds:  albuterol    0.083% 2.5 milliGRAM(s) Nebulizer every 6 hours  albuterol    90 MICROgram(s) HFA Inhaler 1 Puff(s) Inhalation every 4 hours  allopurinol 100 milliGRAM(s) Oral daily  aspirin enteric coated 81 milliGRAM(s) Oral daily  atorvastatin 80 milliGRAM(s) Oral at bedtime  budesonide 160 MICROgram(s)/formoterol 4.5 MICROgram(s) Inhaler 2 Puff(s) Inhalation two times a day  buMETAnide 4 milliGRAM(s) Oral daily  carvedilol 3.125 milliGRAM(s) Oral every 12 hours  clopidogrel Tablet 75 milliGRAM(s) Oral daily  dextrose 5%. 1000 milliLiter(s) IV Continuous <Continuous>  dextrose 5%. 1000 milliLiter(s) IV Continuous <Continuous>  dextrose 50% Injectable 25 Gram(s) IV Push once  dextrose 50% Injectable 12.5 Gram(s) IV Push once  dextrose 50% Injectable 25 Gram(s) IV Push once  dextrose Oral Gel 15 Gram(s) Oral once  ferrous    sulfate 325 milliGRAM(s) Oral daily  glucagon  Injectable 1 milliGRAM(s) IntraMuscular once  hydrALAZINE 50 milliGRAM(s) Oral three times a day  insulin lispro (ADMELOG) corrective regimen sliding scale   SubCutaneous three times a day before meals  isosorbide   dinitrate Tablet (ISORDIL) 30 milliGRAM(s) Oral three times a day  levothyroxine 50 MICROGram(s) Oral daily  pantoprazole    Tablet 40 milliGRAM(s) Oral before breakfast  polyethylene glycol 3350 17 Gram(s) Oral daily  senna 2 Tablet(s) Oral at bedtime  spironolactone 25 milliGRAM(s) Oral daily  tamsulosin 0.4 milliGRAM(s) Oral at bedtime      PAST MEDICAL & SURGICAL HISTORY:  HTN (hypertension)      CAD (coronary artery disease)      Diabetes      Hyperlipidemia, unspecified hyperlipidemia type      CHF (congestive heart failure)      BPH (benign prostatic hyperplasia)      S/P CABG (coronary artery bypass graft)      S/P appendectomy          Vitals:  T(F): 97.8 (12-11), Max: 97.8 (12-11)  HR: 92 (12-11) (80 - 98)  BP: 106/50 (12-11) (101/56 - 111/62)  RR: 18 (12-11)  SpO2: 100% (12-11)  I&O's Summary    10 Dec 2022 07:01  -  11 Dec 2022 07:00  --------------------------------------------------------  IN: 720 mL / OUT: 550 mL / NET: 170 mL    11 Dec 2022 07:01  -  11 Dec 2022 13:03  --------------------------------------------------------  IN: 0 mL / OUT: 700 mL / NET: -700 mL        Physical Exam:  Appearance: No Acute Distress  Neck: No JVD  Cardiovascular: Normal S1 S2  Respiratory: Clear to auscultation bilaterally. L sternal wound stable   Gastrointestinal: Soft, Non-tender	  Skin: No cyanosis	  Neurologic: Non-focal  Extremities: No LE edema  Psychiatry: A & O x 3, Mood & affect appropriate                          11.5   10.64 )-----------( 163      ( 11 Dec 2022 05:49 )             34.9     12-11    130<L>  |  86<L>  |  59<H>  ----------------------------<  90  3.9   |  30  |  2.98<H>    Ca    9.0      11 Dec 2022 05:49  Phos  3.6     12-11  Mg     2.20     12-11            Serum Pro-Brain Natriuretic Peptide: 7986 pg/mL (12-04 @ 19:36)          Cardiovascular Testings:       Interpretation of Telemetry: paced rhythm with occasional PVCs

## 2022-12-11 NOTE — PROGRESS NOTE ADULT - PROBLEM SELECTOR PROBLEM 6
Type 2 diabetes mellitus
Type 2 diabetes mellitus
Essential hypertension
Type 2 diabetes mellitus

## 2022-12-11 NOTE — PROGRESS NOTE ADULT - PROBLEM SELECTOR PLAN 1
Pt. presented with chest pain, SOB, KINNEY and orthopnea. No LE swelling. Decreased urine output. Has hx of CHF with EF 14% in Oct 2022. EKG without changes from prior in October 2022. CXR showing pulmonary edema. On bumex 3mg PO qd at home - reports compliance. S/p furosemide 80 mg IV x1 and bumex 2 mg IV.   - Repeat echo showing EF 24%.   - Heart failure team eval appreciated , c/w bumex 4mg IV BID  - intake/output Q4H, weight daily, HOB elevation 60 to 90 degrees.  Fluid restriction order when PO intake initiated  - no signs of infection  - EP ICD upgrade on 12/9 without complication  - CXR showed ICD in place. No PTX  - Pressure bandage removed. Cleared for DC pending home vs rehab (discuss with daughter)    Patient cleared and medicall stable for discharge. discussed case with ACP. Over 30 minutes were spent on this encounter

## 2022-12-11 NOTE — PROGRESS NOTE ADULT - PROVIDER SPECIALTY LIST ADULT
Electrophysiology
Heart Failure
Electrophysiology
Electrophysiology
Hospitalist
Cardiology
Heart Failure
Hospitalist

## 2022-12-11 NOTE — PROGRESS NOTE ADULT - PROBLEM SELECTOR PLAN 2
Hypercapnia on admission. pCO2 = 58-->69-->64  on vBG; in the setting of acute on chronic systolic heart failure. Pt is alert and oriented.   - on supplemental oxygen at home  - intake/output Q4H, weight daily, HOB elevation 60 to 90 degrees, fluid restriction
- on BiPAP - 10/5, 50%.  pCO2 = 58 on vBG; in the setting of acute on chronic systolic heart failure  - not thought to be sequela of infectious etiology or COPD/Asthma exacerbation  - on supplemental oxygen at home  - intake/output Q4H, weight daily, HOB elevation 60 to 90 degrees, fluid restriction when PO intake resumes  - f/u vBG in the AM  - wean off BiPAP, as tolerated
Hypercapnia on admission. pCO2 = 58-->69-->64  on vBG; in the setting of acute on chronic systolic heart failure. Pt is alert and oriented.   - on supplemental oxygen at home  - intake/output Q4H, weight daily, HOB elevation 60 to 90 degrees, fluid restriction
-pt off  BiPAP now   pCO2 = 58-->69-->64  on vBG; in the setting of acute on chronic systolic heart failure  - not thought to be sequela of infectious etiology or COPD/Asthma exacerbation  - on supplemental oxygen at home  - intake/output Q4H, weight daily, HOB elevation 60 to 90 degrees, fluid restriction   -will CTM  vBG i

## 2022-12-11 NOTE — PROGRESS NOTE ADULT - PROBLEM SELECTOR PLAN 6
A1C 5.7 in October. Sugars are controlled currently. A1C 6.2 this admission.   - Pending repeat A1C   - ISS   - Monitor
-  no acute issues presently  - f/u BP, especially in the setting of being diuresed
A1C 5.7 in October. Sugars are controlled currently.   - Pending repeat A1C   - Hold of on ISS for now   - Monitor
A1C 5.7 in October. Sugars are controlled currently. A1C 6.2 this admission.   - Pending repeat A1C   - ISS   - Monitor
- patient was  NPO (on BiPAP)  - pt weaned off BIpap , on regular carb consistent diet   pt was noted to be hypoglycemic , likely 2/2 to NPO status   will monitor closely  Hb Aic 5.7 in oct 16 , will repeat   FSBS AC&HS   ISS   consistent carb diet
A1C 5.7 in October. Sugars are controlled currently. A1C 6.2 this admission.   - Pending repeat A1C   - ISS   - Monitor

## 2022-12-11 NOTE — PROGRESS NOTE ADULT - PROBLEM SELECTOR PROBLEM 7
Essential hypertension
Status post right thoracentesis (removed 650 cc clear yellow fluid) and right lower quadrant paracentesis (removed 600 cc clear yellow fluid).  Specimens from both sent for culture and cytology.
Medication management
Essential hypertension

## 2022-12-11 NOTE — PROGRESS NOTE ADULT - PROBLEM SELECTOR PROBLEM 5
Abnormal thyroid blood test

## 2022-12-11 NOTE — PROGRESS NOTE ADULT - PROBLEM SELECTOR PROBLEM 4
Electrolyte imbalance

## 2022-12-11 NOTE — DISCHARGE NOTE NURSING/CASE MANAGEMENT/SOCIAL WORK - NSDCFUADDAPPT_GEN_ALL_CORE_FT
Follow up appointment with Heart Failure  Clinic on 1/4/2023 at 12:30pm.    You have a follow-up appointment in the device clinic on 12/22/22 at 2:40pm  4th floor oncology Building [phone].

## 2022-12-11 NOTE — PROGRESS NOTE ADULT - SUBJECTIVE AND OBJECTIVE BOX
[facility] Division of Hospital Medicine  Mart Parish MD  Pager (M-F, 8A-5P): [phone]      SUBJECTIVE / OVERNIGHT EVENTS: NAEON. Patient denies fevers/chills/HA/SOB/CP/N/V/D/C  ADDITIONAL REVIEW OF SYSTEMS:    MEDICATIONS  (STANDING):  albuterol    0.083% 2.5 milliGRAM(s) Nebulizer every 6 hours  albuterol    90 MICROgram(s) HFA Inhaler 1 Puff(s) Inhalation every 4 hours  allopurinol 100 milliGRAM(s) Oral daily  aspirin enteric coated 81 milliGRAM(s) Oral daily  atorvastatin 80 milliGRAM(s) Oral at bedtime  budesonide 160 MICROgram(s)/formoterol 4.5 MICROgram(s) Inhaler 2 Puff(s) Inhalation two times a day  buMETAnide 4 milliGRAM(s) Oral daily  carvedilol 3.125 milliGRAM(s) Oral every 12 hours  clopidogrel Tablet 75 milliGRAM(s) Oral daily  dextrose 5%. 1000 milliLiter(s) (50 mL/Hr) IV Continuous <Continuous>  dextrose 5%. 1000 milliLiter(s) (100 mL/Hr) IV Continuous <Continuous>  dextrose 50% Injectable 25 Gram(s) IV Push once  dextrose 50% Injectable 12.5 Gram(s) IV Push once  dextrose 50% Injectable 25 Gram(s) IV Push once  dextrose Oral Gel 15 Gram(s) Oral once  ferrous    sulfate 325 milliGRAM(s) Oral daily  glucagon  Injectable 1 milliGRAM(s) IntraMuscular once  hydrALAZINE 50 milliGRAM(s) Oral three times a day  insulin lispro (ADMELOG) corrective regimen sliding scale   SubCutaneous three times a day before meals  isosorbide   dinitrate Tablet (ISORDIL) 30 milliGRAM(s) Oral three times a day  levothyroxine 50 MICROGram(s) Oral daily  pantoprazole    Tablet 40 milliGRAM(s) Oral before breakfast  polyethylene glycol 3350 17 Gram(s) Oral daily  senna 2 Tablet(s) Oral at bedtime  spironolactone 25 milliGRAM(s) Oral daily  tamsulosin 0.4 milliGRAM(s) Oral at bedtime    MEDICATIONS  (PRN):      I&O's Summary    10 Dec 2022 07:01  -  11 Dec 2022 07:00  --------------------------------------------------------  IN: 720 mL / OUT: 550 mL / NET: 170 mL    11 Dec 2022 07:01  -  11 Dec 2022 13:05  --------------------------------------------------------  IN: 0 mL / OUT: 700 mL / NET: -700 mL        PHYSICAL EXAM:  Vital Signs Last 24 Hrs  T(C): 36.6 (11 Dec 2022 12:19), Max: 36.6 (11 Dec 2022 05:00)  T(F): 97.8 (11 Dec 2022 12:19), Max: 97.8 (11 Dec 2022 05:00)  HR: 92 (11 Dec 2022 12:19) (80 - 98)  BP: 106/50 (11 Dec 2022 12:19) (101/56 - 111/62)  BP(mean): --  RR: 18 (11 Dec 2022 12:19) (18 - 18)  SpO2: 100% (11 Dec 2022 12:19) (99% - 100%)    Parameters below as of 11 Dec 2022 12:19  Patient On (Oxygen Delivery Method): nasal cannula      CONSTITUTIONAL: NAD, well-developed, well-groomed  EYES: PERRLA; conjunctiva and sclera clear  ENMT: Moist oral mucosa, no pharyngeal injection or exudates; normal dentition  NECK: Supple, no palpable masses; no thyromegaly  RESPIRATORY: Normal respiratory effort; lungs are clear to auscultation bilaterally  CARDIOVASCULAR: Regular rate and rhythm, normal S1 and S2, no murmur/rub/gallop; No lower extremity edema; Peripheral pulses are 2+ bilaterally. Dressing wihtout drainage. Mild subcutaneous air present  ABDOMEN: Nontender to palpation, normoactive bowel sounds, no rebound/guarding; No hepatosplenomegaly  MUSCULOSKELETAL:  Normal gait; no clubbing or cyanosis of digits; no joint swelling or tenderness to palpation  PSYCH: A+O to person, place, and time; affect appropriate  NEUROLOGY: CN 2-12 are intact and symmetric; no gross sensory deficits   SKIN: No rashes; no palpable lesions    LABS:                        11.5   10.64 )-----------( 163      ( 11 Dec 2022 05:49 )             34.9     12-11    130<L>  |  86<L>  |  59<H>  ----------------------------<  90  3.9   |  30  |  2.98<H>    Ca    9.0      11 Dec 2022 05:49  Phos  3.6     12-11  Mg     2.20     12-11                  RADIOLOGY & ADDITIONAL TESTS:  Results Reviewed:   Imaging Personally Reviewed:  Electrocardiogram Personally Reviewed:    COORDINATION OF CARE:  Care Discussed with Consultants/Other Providers [Y/N]:  Prior or Outpatient Records Reviewed [Y/N]:

## 2022-12-14 ENCOUNTER — APPOINTMENT (OUTPATIENT)
Age: 67
End: 2022-12-14

## 2022-12-14 ENCOUNTER — TRANSCRIPTION ENCOUNTER (OUTPATIENT)
Age: 67
End: 2022-12-14

## 2022-12-14 VITALS
TEMPERATURE: 97.7 F | HEART RATE: 90 BPM | SYSTOLIC BLOOD PRESSURE: 116 MMHG | DIASTOLIC BLOOD PRESSURE: 57 MMHG | RESPIRATION RATE: 16 BRPM | OXYGEN SATURATION: 96 %

## 2022-12-14 DIAGNOSIS — Z87.891 PERSONAL HISTORY OF NICOTINE DEPENDENCE: ICD-10-CM

## 2022-12-14 PROCEDURE — 99495 TRANSJ CARE MGMT MOD F2F 14D: CPT

## 2022-12-14 RX ORDER — LEVOTHYROXINE SODIUM 0.05 MG/1
50 TABLET ORAL
Refills: 0 | Status: ACTIVE | COMMUNITY
Start: 2021-10-27

## 2022-12-15 NOTE — HEALTH RISK ASSESSMENT
[With Patient/Caregiver] : , with patient/caregiver [No falls in past year] : Patient reported no falls in the past year [Assistive Device] : Patient uses an assistive device [Low Salt Diet] : low salt [General Adherence] : general adherence [With Family] : lives with family [] :  [Feels Safe at Home] : Feels safe at home [de-identified] : benjamin [AdvancecareDate] : 12/14/2022 [FreeTextEntry4] : MOLST form reviewed, pt to discuss further with family, blank copy provided.

## 2022-12-15 NOTE — PHYSICAL EXAM
[No Acute Distress] : no acute distress [Well Nourished] : well nourished [Well Developed] : well developed [Well-Appearing] : well-appearing [Normal Sclera/Conjunctiva] : normal sclera/conjunctiva [PERRL] : pupils equal round and reactive to light [EOMI] : extraocular movements intact [Normal Oropharynx] : the oropharynx was normal [No JVD] : no jugular venous distention [Supple] : supple [No Respiratory Distress] : no respiratory distress  [Clear to Auscultation] : lungs were clear to auscultation bilaterally [No Accessory Muscle Use] : no accessory muscle use [Normal Rate] : normal rate  [Regular Rhythm] : with a regular rhythm [No Carotid Bruits] : no carotid bruits [Pedal Pulses Present] : the pedal pulses are present [No Edema] : there was no peripheral edema [Soft] : abdomen soft [Non Tender] : non-tender [No HSM] : no HSM [Normal Bowel Sounds] : normal bowel sounds [Normal Affect] : the affect was normal [Alert and Oriented x3] : oriented to person, place, and time [Normal Insight/Judgement] : insight and judgment were intact [de-identified] : residual left sided weakness  [de-identified] : icd site c,d,i, pain free [de-identified] : ambulates with rollator

## 2022-12-15 NOTE — PLAN
[FreeTextEntry1] : The [employer] TCM STARS teaching: Enforced with patient need for daily weights. Advised to call for an increase of greater than 2 lbs. in a day or 5 pounds in a week. Adhere to low salt diet and educated patient on foods that should be avoided such as processed or fast food. Continue medications as ordered. Follow up with Cardiologist. Contact information given, patient advised to call with any questions/concerns.

## 2022-12-15 NOTE — HISTORY OF PRESENT ILLNESS
[Post-hospitalization from ___ Hospital] : Post-hospitalization from [unfilled] Hospital [Admitted on: ___] : The patient was admitted on [unfilled] [Discharged on ___] : discharged on [unfilled] [Discharge Summary] : discharge summary [Pertinent Labs] : pertinent labs [Discharge Med List] : discharge medication list [Patient Contacted By: ____] : and contacted by [unfilled] [FreeTextEntry2] : Mr. Lewis is a 66 yo male with pmhx of CAD, CHF (EF 15-20% w/ ICD), CABG, HTN, HLD, T2DM, Asthma, COPD (home O2 2L), CKD, CVA w/ L sided deficits and gait difficulty (uses walker) presents to ED with CHF exacerbation. CXR w/ pulmonary edema, s/p IV lasix, IV bumex and transitioned to PO bumex 4mg (previously on 3mg). Repeat TTE EF 24%. S/p BIV-ICD upgrade on 12/9 with EP. Oliguria improved with diuretics, Synthroid increased and symptoms improved. Rehab recommended, however pt and family opt for home with home care services. \par \par Upon arrival to home, found sitting at the dining table, appears comfortable, pleasant, A&O x3 and in nad. States since discharge to home, feeling okay and using supplemental O2 as needed, SpO2 93-96% on RA. Endorses generalized weakness, improving and denies any fever/chills, SOB, CP, PND or orthopnea. ICD site c,d,I and pain free. He is observed ambulating 5 laps (50ft) around the house with no noted distress, SpO2 91-92% with ambulation and improves to 95% at rest. Sleeps with 2 pillows at night and not able to climb stairs due to prior CVA. Reports general adherence with low sodium diets and daughter assists with FS/daily weights. \par Medications and discharge instructions reviewed, daughter Ketty ([facility]) prepares medications. \par Heart healthy guidelines and worsening s/s reviewed. \par \[facility] Has home care services with [facility] and SONG w/ CDPAP from [facility] (spouse).\par I spoke with PMD Dr. Schmidt office, HFU scheduled for 12/19 and reinforced f/u appts with EP 12/22 and Heart Failure clinic 1/4. \par Pt and family offers no questions at this time. 24/7 TCM contact provided.

## 2022-12-20 ENCOUNTER — TRANSCRIPTION ENCOUNTER (OUTPATIENT)
Age: 67
End: 2022-12-20

## 2022-12-22 ENCOUNTER — APPOINTMENT (OUTPATIENT)
Dept: ELECTROPHYSIOLOGY | Facility: CLINIC | Age: 67
End: 2022-12-22

## 2022-12-22 PROCEDURE — 99024 POSTOP FOLLOW-UP VISIT: CPT

## 2022-12-23 ENCOUNTER — TRANSCRIPTION ENCOUNTER (OUTPATIENT)
Age: 67
End: 2022-12-23

## 2022-12-28 ENCOUNTER — TRANSCRIPTION ENCOUNTER (OUTPATIENT)
Age: 67
End: 2022-12-28

## 2023-01-04 ENCOUNTER — APPOINTMENT (OUTPATIENT)
Dept: HEART FAILURE | Facility: CLINIC | Age: 68
End: 2023-01-04

## 2023-01-05 ENCOUNTER — TRANSCRIPTION ENCOUNTER (OUTPATIENT)
Age: 68
End: 2023-01-05

## 2023-01-09 ENCOUNTER — TRANSCRIPTION ENCOUNTER (OUTPATIENT)
Age: 68
End: 2023-01-09

## 2023-01-24 NOTE — PROGRESS NOTE ADULT - REASON FOR ADMISSION
Assessment/Plan:   Diagnoses and all orders for this visit:    Shortness of breath    ILD (interstitial lung disease) ([facility] )  -     Ambulatory Referral to Pulmonary Rehabilitation; Future  -     Portable Concentrators  -     fluticasone-umeclidinium-vilanterol (Trelegy Ellipta) 100-62 5-25 mcg/actuation inhaler; Inhale 1 puff daily Rinse mouth after use  Moderate persistent asthma without complication  -     albuterol (2 5 mg/3 mL) 0 083 % nebulizer solution; Take 3 mL (2 5 mg total) by nebulization every 6 (six) hours as needed for wheezing or shortness of breath  -     fluticasone-umeclidinium-vilanterol (Trelegy Ellipta) 100-62 5-25 mcg/actuation inhaler; Inhale 1 puff daily Rinse mouth after use  Interstitial lung disease (HCC)  -     albuterol (2 5 mg/3 mL) 0 083 % nebulizer solution; Take 3 mL (2 5 mg total) by nebulization every 6 (six) hours as needed for wheezing or shortness of breath    Rheumatoid arthritis involving multiple sites with positive rheumatoid factor (HCC)    Chronic respiratory failure with hypoxia (HCC)    Obesity (BMI 30-39  9)        PFTs in 2021 with restrictive airflow limitation lung volumes were moderately decreased and the DLCO was severely decreased,  And with her rheumatoid arthritis and interstitial lung disease follows up with rheumatology on infusions  Also has a history of moderate persistent asthma currently not in any exacerbation  Discussed with the patient to continue with albuterol nebulizer 3 times to 4 times a day as needed  Will add Trelegy 1 puff daily    Also on prednisone unclear at what dosing she is on, it shows as if she is on 2 5 mg on the list but she states she was on 20 mg I did ask her to call and let us know the dosing of the prednisone and she will bring in next visit with all her medications in    The prednisone has been prescribed by her rheumatologist for which she is continuing for her rheumatoid arthritis  Also she has been on 3 L of oxygen by nasal
cannula , she does not have a portable concentrator she only has a portable tank    Orders have been sent unclear if it is her insurance issues, will resend the portable concentrator's again  Again discussed the need for compliance with the oxygen especially on exertion and at nighttime understands and verbalizes  We will see her back in 2 to 3 months or as needed earlier as needed  Return in about 3 months (around 4/24/2023)  All questions are answered to the patient's satisfaction and understanding  She verbalizes understanding  She is encouraged to call with any further questions or concerns  Portions of the record may have been created with voice recognition software  Occasional wrong word or "sound a like" substitutions may have occurred due to the inherent limitations of voice recognition software  Read the chart carefully and recognize, using context, where substitutions have occurred  Electronically Signed by Doron Leal MD    ______________________________________________________________________    Chief Complaint:   Chief Complaint   Patient presents with   • Follow-up   • Shortness of Breath   • Wheezing       Patient ID: Mason Gurrola is a 67 y o  y o  female has a past medical history of AMS (altered mental status) (5/24/2022), Asthma, Chest pain on breathing, Chronic diastolic CHF (congestive heart failure) ([address] ), Chronic respiratory failure with hypoxia ([address] ), Diabetes mellitus ([address] ), Exertional chest pain (7/14/2021), HTN (hypertension), Hyperlipidemia, Insomnia, Obesity, Preop cardiovascular exam (2/2/2018), Pulmonary fibrosis ([address] ), Rheumatoid arthritis ([address] ), SIRS (systemic inflammatory response syndrome) ([address] ) (5/22/2022), and Volume overload (12/10/2021)  1/24/2023  Patient presents today for follow-up visit    Patient is a 44-year-old female former smoker with past medical history of ILD, RA, chronic respiratory failure on 3 L O2, asthma, emphysema, diabetes, GERD, hypertension,
CHF     She is here today for follow-up  Overall her breathing has been stable  She does have oxygen which she is supposed to use continuously, finds that the tanks are hard to take out of the house with her  She continues with her Breo daily, using her nebulizer usually at least once per day  Also she states she ran out of the [place] recently and using her nebulizer once a day  Follows up with rheumatology at [address] symptoms include wheezing  Wheezing  Associated symptoms include wheezing  Review of Systems   Constitutional: Negative  HENT: Negative  Eyes: Negative  Respiratory: Positive for shortness of breath and wheezing  Cardiovascular: Negative  Gastrointestinal: Negative  Endocrine: Negative  Genitourinary: Negative  Musculoskeletal: Negative  Skin: Negative  Allergic/Immunologic: Negative  Neurological: Negative  Psychiatric/Behavioral: Negative  Smoking history: She reports that she quit smoking about 26 years ago  Her smoking use included cigarettes  She started smoking about 60 years ago  She has a 27 00 pack-year smoking history   She has never used smokeless tobacco     The following portions of the patient's history were reviewed and updated as appropriate: allergies, current medications, past family history, past medical history, past social history, past surgical history and problem list     Immunization History   Administered Date(s) Administered   • COVID-19 MODERNA VACC 0 5 ML IM 06/20/2021, 07/18/2021   • INFLUENZA 09/01/2015, 12/20/2016, 11/10/2017, 02/20/2022   • Influenza Split High Dose Preservative Free IM 12/20/2016, 11/10/2017   • Influenza, high dose seasonal 0 7 mL 10/17/2018, 09/29/2019, 11/17/2020   • Influenza, seasonal, injectable 10/01/2014, 09/01/2015   • Pneumococcal Conjugate 13-Valent 07/03/2019   • Pneumococcal Polysaccharide PPV23 04/21/2008, 12/20/2016   • Tdap 1946
• Zoster Vaccine Recombinant 01/28/2020, 07/21/2020     Current Outpatient Medications   Medication Sig Dispense Refill   • albuterol (2 5 mg/3 mL) 0 083 % nebulizer solution Take 3 mL (2 5 mg total) by nebulization every 6 (six) hours as needed for wheezing or shortness of breath 375 mL 2   • albuterol (PROVENTIL HFA,VENTOLIN HFA) 90 mcg/act inhaler Inhale 1 puff every 6 (six) hours as needed for wheezing or shortness of breath 18 Inhaler 3   • B-D TB SYRINGE 1CC/27GX1/2" 27G X 1/2" 1 ML MISC by Other route 2 (two) times a day 100 each 3   • Blood Glucose Monitoring Suppl (OneTouch Verio) w/Device KIT by Does not apply route 3 (three) times a day 1 kit 0   • celecoxib (CeleBREX) 200 mg capsule      • Cimzia Starter Kit 6 X 200 MG/ML KIT      • Diclofenac Sodium (VOLTAREN) 1 % Apply 2 g topically in the morning and 2 g at noon and 2 g in the evening and 2 g before bedtime  50 g 0   • fluticasone-umeclidinium-vilanterol (Trelegy Ellipta) 100-62 5-25 mcg/actuation inhaler Inhale 1 puff daily Rinse mouth after use   60 blister 5   • HYDROmorphone (DILAUDID) 4 mg tablet TAKE 1 TABLET BY MOUTH EVERY 8 HOURS AS NEEDED FOR 30 DAYS     • insulin degludec Santiago Zhou FlexTouch) 100 units/mL injection pen Inject 50 Units under the skin daily 45 mL 3   • Insulin Pen Needle (B-D UF III MINI PEN NEEDLES) 31G X 5 MM MISC Inject under the skin 2 (two) times a day Use as directed 200 each 3   • metFORMIN (GLUCOPHAGE) 1000 MG tablet TAKE 1 TABLET BY MOUTH TWICE A  tablet 3   • methocarbamol (ROBAXIN) 500 mg tablet Take 1 tablet (500 mg total) by mouth 2 (two) times a day as needed for muscle spasms (side pain) 28 tablet 0   • mirtazapine (REMERON) 15 mg tablet Take 1 tablet (15 mg total) by mouth daily at bedtime 90 tablet 3   • nystatin (MYCOSTATIN) powder Apply topically 4 (four) times a day groin 15 g 0   • OneTouch Ultra test strip PATIENT TO TEST ONCE A  strip 3   • torsemide (DEMADEX) 20 mg tablet TAKE 2 TABLETS BY
MOUTH 2 (TWO) TIMES A  tablet 3   • predniSONE 2 5 mg tablet TAKE 3 TABLETS (7 5 MG TOTAL) BY MOUTH DAILY  USE FOR PREDNISONE TAPER (Patient not taking: Reported on 9/26/2022)       No current facility-administered medications for this visit  Allergies: Gabapentin and Vancomycin    Objective:  Vitals:    01/24/23 1037   BP: 126/80   Pulse: 86   Temp: 98 2 °F (36 8 °C)   SpO2: 92%   Weight: 87 1 kg (192 lb)   Height: 4' 10" (1 473 m)   Oxygen Therapy  SpO2: 92 %    Wt Readings from Last 3 Encounters:   01/24/23 87 1 kg (192 lb)   09/26/22 77 3 kg (170 lb 6 4 oz)   05/24/22 74 5 kg (164 lb 3 9 oz)     Body mass index is 40 13 kg/m²  Physical Exam  Vitals reviewed  Constitutional:       Appearance: Normal appearance  HENT:      Head: Normocephalic and atraumatic  Mouth/Throat:      Pharynx: Oropharynx is clear  Eyes:      Conjunctiva/sclera: Conjunctivae normal    Cardiovascular:      Rate and Rhythm: Normal rate and regular rhythm  Pulmonary:      Effort: Pulmonary effort is normal  No respiratory distress  Breath sounds: Normal breath sounds  No decreased breath sounds, wheezing, rhonchi or rales  Abdominal:      General: Abdomen is flat  There is no distension  Musculoskeletal:         General: Normal range of motion  Cervical back: Normal range of motion  Right lower leg: No edema  Left lower leg: No edema  Skin:     General: Skin is warm and dry  Neurological:      Mental Status: She is alert and oriented to person, place, and time     Psychiatric:         Mood and Affect: Mood normal          Behavior: Behavior normal            Diagnostics:  I have personally reviewed pertinent films in PACS
CHF exacerbation
show

## 2023-01-25 ENCOUNTER — APPOINTMENT (OUTPATIENT)
Dept: HEART FAILURE | Facility: CLINIC | Age: 68
End: 2023-01-25

## 2023-02-09 ENCOUNTER — APPOINTMENT (OUTPATIENT)
Dept: ELECTROPHYSIOLOGY | Facility: CLINIC | Age: 68
End: 2023-02-09
Payer: MEDICARE

## 2023-02-09 VITALS — SYSTOLIC BLOOD PRESSURE: 109 MMHG | DIASTOLIC BLOOD PRESSURE: 70 MMHG | HEART RATE: 91 BPM

## 2023-02-09 PROCEDURE — 93284 PRGRMG EVAL IMPLANTABLE DFB: CPT

## 2023-03-18 NOTE — ED ADULT NURSE NOTE - PRIMARY CARE PROVIDER
Goal Outcome Evaluation:      Plan of Care Reviewed With: parent    Overall Patient Progress: no changeOverall Patient Progress: no change         [id number] Alert and oriented x4. VSS on RA. Up SBA. Tolerating renal diet. Lung sounds clear. BS+. BM-. Patient on HD but still makes some urine. RUE acewrap in place, elevated on pillows. L arm incision, dressing CDI. Pain managed with Oxycodone. Denies nausea. CMS intact. Tele NSR. Hep gtt infusing at 1950, PTT recheck at 0115.    unkn

## 2023-03-22 ENCOUNTER — LABORATORY RESULT (OUTPATIENT)
Age: 68
End: 2023-03-22

## 2023-03-22 ENCOUNTER — APPOINTMENT (OUTPATIENT)
Dept: HEART FAILURE | Facility: CLINIC | Age: 68
End: 2023-03-22
Payer: MEDICARE

## 2023-03-22 ENCOUNTER — NON-APPOINTMENT (OUTPATIENT)
Age: 68
End: 2023-03-22

## 2023-03-22 VITALS
WEIGHT: 164 LBS | HEART RATE: 86 BPM | HEIGHT: 70 IN | SYSTOLIC BLOOD PRESSURE: 110 MMHG | TEMPERATURE: 97.7 F | OXYGEN SATURATION: 95 % | DIASTOLIC BLOOD PRESSURE: 74 MMHG | BODY MASS INDEX: 23.48 KG/M2

## 2023-03-22 DIAGNOSIS — I50.23 ACUTE ON CHRONIC SYSTOLIC (CONGESTIVE) HEART FAILURE: ICD-10-CM

## 2023-03-22 PROCEDURE — 93000 ELECTROCARDIOGRAM COMPLETE: CPT

## 2023-03-22 PROCEDURE — 99214 OFFICE O/P EST MOD 30 MIN: CPT

## 2023-03-22 PROCEDURE — 36415 COLL VENOUS BLD VENIPUNCTURE: CPT

## 2023-03-22 RX ORDER — ALLOPURINOL 100 MG/1
100 TABLET ORAL DAILY
Qty: 90 | Refills: 3 | Status: ACTIVE | COMMUNITY
Start: 2022-12-14 | End: 1900-01-01

## 2023-03-22 RX ORDER — SENNOSIDES 8.6 MG/1
8.6 CAPSULE, GELATIN COATED ORAL
Qty: 30 | Refills: 1 | Status: ACTIVE | COMMUNITY
Start: 2021-10-27 | End: 1900-01-01

## 2023-03-22 RX ORDER — TAMSULOSIN HYDROCHLORIDE 0.4 MG/1
0.4 CAPSULE ORAL
Qty: 30 | Refills: 0 | Status: ACTIVE | COMMUNITY
Start: 2021-10-27 | End: 1900-01-01

## 2023-03-23 NOTE — PHYSICAL EXAM
[Well Nourished] : well nourished [No Acute Distress] : no acute distress [Normal Conjunctiva] : normal conjunctiva [Normal S1, S2] : normal S1, S2 [Soft] : abdomen soft [Non Tender] : non-tender [No Rash] : no rash [No Edema] : no edema [Normal] : moves all extremities, no focal deficits, normal speech [de-identified] : JVP 6-8 cm  [de-identified] : Left chest CRT-D with healed incision [de-identified] : bilateral fine crackles- uses home O2 2 liters [de-identified] : slow gait, uses a walkser, mild LUE/LLE weakness [de-identified] : discomfort in calves with walking

## 2023-03-23 NOTE — DISCUSSION/SUMMARY
[Patient] : the patient [___ Week(s)] : in [unfilled] week(s) [EKG obtained to assist in diagnosis and management of assessed problem(s)] : EKG obtained to assist in diagnosis and management of assessed problem(s) [FreeTextEntry2] : wife and  [FreeTextEntry3] : Dr. Dobson [FreeTextEntry1] : 1. Chronic systolic HF\par - continue  Bumex 2 mg bid with additional 1 mg as needed for weight gain of 2-3 lbs in 2-3 days\par - continue isosorbide 30 mg q 8 \par - continue Coreg 3.125mg twice daily, may try to up titrate since pt has CRT-D\par - continue Hydralazine 50mg TID continue \par - Spironolactone 12.5mg daily\par - daily weight and blood pressure log, pt was given a digiti scale for weights Strict I/O\par - labs done today, will call with results\par - Optivol stable on remote transmission 2/2023, S/P CRT-D up grade 12/9/22\par - schedule TTE with next visit\par \par 2.CKD\par - recheck labs including creat, last on 12/10/22 with Na 133, BUN/creat 55/2.85 and K 3.7\par \par 3. CAD S/P CABG\par - no active chest pain\par - continue statin, coreg, ASA and plavix \par \par Follow up in office in  6 weeks, will call with labs

## 2023-03-23 NOTE — HISTORY OF PRESENT ILLNESS
[FreeTextEntry1] : Joey Lewis is 67 M with PMH of HTN, HLD, CKD (baseline SCr 2.3-2.9), CVA with L side weakness ( 1993), COPD (home O2 2L NC), former smoker, recurrent PNA, CAD s/p CABG ( 2015 [facility]) (on ASA/Plavix) and HFrEF (EF 20%;LVIDd5.5 LVE and CHRISTINA) s/p Medtronic ICD with decline to 14% S/P CRT-D upgrade 12/9/22. ( 2015) Presented with c/o acute SOB/KINNEY, nonproductive cough, and BLE swelling with ADHF. 5/26/22 RHC: RA-9 PA 54/27 PCWP 24 CO/CI 3.97/2.22 SVR 1510. Prior admissions 5/21-6/27/22 and 12/4/22-12/11/22 for ADHF and generator change.PCP Dr. Schmidt ( [phone]). Pt is here for a follow up with his wife.\par \par Course in hospital: S/P admission for Acute on chronic systolic heart failure with Acute Respiratory Failure with Hypercapnia- EKG w/o ischemic changes from prior in Oct 2022 \par - CXR w/ pulmonary edema. \par - At home on bumex 3mg po qd, reported compliance. S/P furosemide 80mg IV x1 \par and bumex 2mg IV \par - Repeat TTE showed EF 24%. \par - Followed by heart failure team s/p IV bumex transitioned to PO bumex 4mg \par - s/p BIV-ICD upgrade on 12/9/22 with EP \par - c/w coreg, bumex, hydralazine, isordil, spironolactone \par \par Pt is here with his wife for a post hospital follow up. States d/c weight was 155 lbs and he is 160 lbs at home and is 164 lbs in office today with clothes.\par Pt states he is feeling well since d/c. He was d/c off gabapentin and has been having bilateral legs pains especially with walking. He is tolerating bumex 2 mg bid, coreg 3.125 mg bid,  hydral 50 mg tid and iso 30 mg tid.\par \par States he can walk approx 2 blocks with his walker. Not able to climb stairs ( prior LUE/LLE weakness from CVA 1993). He sleeps with 2 pillows with no orthopnea and uses home oxygen nightly. Reports he is following a low salt diet and is drinking less than 2 liters of fluid per day.\par \par Denies chest pain, palpitations, dizziness/LH, syncope and no ICD shocks. Of note, ICD  previously [facility] by Dr. Rice but is now following with Dr. Thrasher and is S/P upgrade to BiV 12/9/22 with last check 2/9/23 with no events and stable Optivol. Labs 12/9/22 notable for  Na 133, K 3.7 and BUN/creat 55/2.85 and serum pro BNP 7986.\par \par \par Patient name: Joey Lewis\par YOB: 1955   Age: 66 (M)   MR#: [id number]\par Study Date: 5/22/2022\[facility]\par Study quality: Technically Difficult\par Referring Physician: Allen Nash MD\par Blood Pressure: 109/76 mmHg\par Height: 175 cm\par Weight: 78 kg\par BSA: 1.9 m2\par ------------------------------------------------------------------------\par PROCEDURE: Transthoracic echocardiogram with 2-D, M-Mode\par and complete spectral and color flow Doppler.\par Intravenous ultrasound enhancing agent was administered for\par improved left ventricular endocardial border definition. \par Following the intravenous injection of ultrasound enhancing\par agent, harmonic imaging was performed.\par INDICATION: Heart failure, unspecified (I50.9)\par ------------------------------------------------------------------------\par DIMENSIONS:\par Dimensions:     Normal Values:\par LA:     5.0 cm    2.0 - 4.0 cm\par Ao:     2.5 cm    2.0 - 3.8 cm\par SEPTUM: 1.0 cm    0.6 - 1.2 cm\par PWT:    1.0 cm    0.6 - 1.1 cm\par LVIDd:  5.5 cm    3.0 - 5.6 cm\par LVIDs:  5.1 cm    1.8 - 4.0 cm\par Derived Variables:\par LVMI: 110 g/m2\par RWT: 0.36\par Fractional short: 7 %\par Ejection Fraction (Visual Estimate): 15-20 %\par ------------------------------------------------------------------------\par OBSERVATIONS:\par Mitral Valve: Tethered mitral valve leaflets with normal\par opening. Mild mitral regurgitation. \par Aortic Root: Normal aortic root.\par Aortic Valve: Normal trileaflet aortic valve.\par Left Atrium: Moderately dilated left atrium.  LA volume\par index = 42 cc/m2.\par Left Ventricle: Severe global left ventricular systolic\par dysfunction. Left ventricular enlargement. (DT:130 ms).\par Right Heart: Moderate right atrial enlargement. The right\par ventricle is not well visualized appears enlarged. A device\par wire is noted in the right heart. Normal tricuspid valve.\par Mild tricuspid regurgitation. Normal pulmonic valve. Mild\par pulmonic regurgitation.\par Pericardium/PleuraNormal pericardium with no pericardial\par effusion. Right pleural effusion.\par Hemodynamic: Estimated right ventricular systolic pressure\par equals 36 mm Hg, assuming right atrial pressure equals 10\par mm Hg, consistent with borderline pulmonary hypertension.\par ------------------------------------------------------------------------\par CONCLUSIONS:\par 1. Moderately dilated left atrium.  LA volume index = 42\par cc/m2.\par 2. Left ventricular enlargement.\par 3. Severe global left ventricular systolic dysfunction.\par 4. Moderate right atrial enlargement.\par 5. The right ventricle is not well visualized appears\par enlarged. A device wire is noted in the right heart.\par ------------------------------------------------------------------------\par \par \par \par \par \par

## 2023-03-23 NOTE — ADDENDUM
[FreeTextEntry1] : Labs notable for decrease in serum pro BNP 3223 from 5625 with K 4.1 and BUN/creat 44/2.78. Will increase Coreg to 6.25 mg bid.

## 2023-03-23 NOTE — ASSESSMENT
[FreeTextEntry1] : 67 M with PMH of HTN, HLD, CKD (baseline SCr 2.3-2.9), CVA with L side weakness ( 1993), COPD (home O2 2L NC), former smoker, recurrent PNA, CAD s/p CABG ( 2015 [facility]) (on ASA/Plavix) and HFrEF (EF 20%;LVIDd5.5 LVE and CHRISTINA) s/p Medtronic ICD with decline to 14% S/P CRT-D upgrade 12/9/22. ( 2015) Presented with c/o acute SOB/KINNEY, nonproductive cough, and BLE swelling with ADHF. 5/26/22 RHC: RA-9 PA 54/27 PCWP 24 CO/CI 3.97/2.22 SVR 1510. Prior admissions 5/21-6/27/22 and 12/4/22-12/11/22 for ADHF and generator change.PCP Dr. Schmidt ( [phone]). Pt is here for a follow up with his wife.\par \par ACC/AHA Stage C, NYHA Class III symptoms\par Appears compensated and normotensive but with pain in calves

## 2023-03-23 NOTE — CARDIOLOGY SUMMARY
[de-identified] : 3/22/23 Atrial sensed with biventricular paced rhythm rate 90 bpm \par 6/9/22 NSR 87, IVCD, LAFB,, TWI 1, AVL, NSST with PVC [de-identified] : 10/17/22 TTE LVEF 14%\par 5/22/22 TTE: LVEF 15-20%, LVIDD 5.5 cm, mild MR, severe LV systolic dysfunction, mod LAE, mod CHRISTINA, mild TR [de-identified] : 5/26/22  RHC: RA-9 PA 54/27 PCWP 24 CO/CI 3.97/2.22 SVR 1510\par  [de-identified] : 5/21/22 CXR showed hazy right lower lung opacity which may reflect layering effusion,\par atelectasis, or pneumonia

## 2023-03-24 LAB
ALBUMIN SERPL ELPH-MCNC: 4.4 G/DL
ALP BLD-CCNC: 137 U/L
ALT SERPL-CCNC: 22 U/L
ANION GAP SERPL CALC-SCNC: 19 MMOL/L
AST SERPL-CCNC: 28 U/L
BASOPHILS # BLD AUTO: 0.06 K/UL
BASOPHILS NFR BLD AUTO: 0.5 %
BILIRUB SERPL-MCNC: 0.4 MG/DL
BUN SERPL-MCNC: 44 MG/DL
CALCIUM SERPL-MCNC: 9.3 MG/DL
CHLORIDE SERPL-SCNC: 95 MMOL/L
CO2 SERPL-SCNC: 22 MMOL/L
CREAT SERPL-MCNC: 2.78 MG/DL
EGFR: 24 ML/MIN/1.73M2
EOSINOPHIL # BLD AUTO: 0.4 K/UL
EOSINOPHIL NFR BLD AUTO: 3.5 %
ESTIMATED AVERAGE GLUCOSE: 117 MG/DL
HBA1C MFR BLD HPLC: 5.7 %
HCT VFR BLD CALC: 41.4 %
HGB BLD-MCNC: 13.7 G/DL
IMM GRANULOCYTES NFR BLD AUTO: 0.3 %
LYMPHOCYTES # BLD AUTO: 1.43 K/UL
LYMPHOCYTES NFR BLD AUTO: 12.4 %
MAN DIFF?: NORMAL
MCHC RBC-ENTMCNC: 32.7 PG
MCHC RBC-ENTMCNC: 33.1 GM/DL
MCV RBC AUTO: 98.8 FL
MONOCYTES # BLD AUTO: 0.81 K/UL
MONOCYTES NFR BLD AUTO: 7 %
NEUTROPHILS # BLD AUTO: 8.82 K/UL
NEUTROPHILS NFR BLD AUTO: 76.3 %
NT-PROBNP SERPL-MCNC: 3223 PG/ML
PLATELET # BLD AUTO: 163 K/UL
POTASSIUM SERPL-SCNC: 4.1 MMOL/L
PROT SERPL-MCNC: 6.5 G/DL
RBC # BLD: 4.19 M/UL
RBC # FLD: 14.6 %
SODIUM SERPL-SCNC: 135 MMOL/L
TSH SERPL-ACNC: 8.65 UIU/ML
URATE SERPL-MCNC: 8.4 MG/DL
WBC # FLD AUTO: 11.56 K/UL

## 2023-04-18 NOTE — PROGRESS NOTE ADULT - SUBJECTIVE AND OBJECTIVE BOX
Patient seen and examined at bedside.    Overnight Events:   no events   s/p crt upgrade   tolerated well    REVIEW OF SYSTEMS:  Constitutional:     [x ] negative [ ] fevers [ ] chills [ ] weight loss [ ] weight gain  HEENT:                  [x ] negative [ ] dry eyes [ ] eye irritation [ ] postnasal drip [ ] nasal congestion  CV:                         [ x] negative  [ ] chest pain [ ] orthopnea [ ] palpitations [ ] murmur  Resp:                     [ x] negative [ ] cough [ ] shortness of breath [ ] dyspnea [ ] wheezing [ ] sputum [ ]hemoptysis  GI:                          [ x] negative [ ] nausea [ ] vomiting [ ] diarrhea [ ] constipation [ ] abd pain [ ] dysphagia   :                        [ x] negative [ ] dysuria [ ] nocturia [ ] hematuria [ ] increased urinary frequency  Musculoskeletal: [ x] negative [ ] back pain [ ] myalgias [ ] arthralgias [ ] fracture  Skin:                       [ x] negative [ ] rash [ ] itch  Neurological:        [x ] negative [ ] headache [ ] dizziness [ ] syncope [ ] weakness [ ] numbness  Psychiatric:           [ x] negative [ ] anxiety [ ] depression  Endocrine:            [ x] negative [ ] diabetes [ ] thyroid problem  Heme/Lymph:      [ x] negative [ ] anemia [ ] bleeding problem  Allergic/Immune: [ x] negative [ ] itchy eyes [ ] nasal discharge [ ] hives [ ] angioedema    [ x] All other systems negative  [ ] Unable to assess ROS due to    Current Meds:  albuterol    0.083% 2.5 milliGRAM(s) Nebulizer every 6 hours  albuterol    90 MICROgram(s) HFA Inhaler 1 Puff(s) Inhalation every 4 hours  allopurinol 100 milliGRAM(s) Oral daily  aspirin enteric coated 81 milliGRAM(s) Oral daily  atorvastatin 80 milliGRAM(s) Oral at bedtime  budesonide 160 MICROgram(s)/formoterol 4.5 MICROgram(s) Inhaler 2 Puff(s) Inhalation two times a day  buMETAnide 4 milliGRAM(s) Oral daily  carvedilol 3.125 milliGRAM(s) Oral every 12 hours  clopidogrel Tablet 75 milliGRAM(s) Oral daily  dextrose 5%. 1000 milliLiter(s) IV Continuous <Continuous>  dextrose 5%. 1000 milliLiter(s) IV Continuous <Continuous>  dextrose 50% Injectable 25 Gram(s) IV Push once  dextrose 50% Injectable 12.5 Gram(s) IV Push once  dextrose 50% Injectable 25 Gram(s) IV Push once  dextrose Oral Gel 15 Gram(s) Oral once  ferrous    sulfate 325 milliGRAM(s) Oral daily  glucagon  Injectable 1 milliGRAM(s) IntraMuscular once  hydrALAZINE 50 milliGRAM(s) Oral three times a day  insulin lispro (ADMELOG) corrective regimen sliding scale   SubCutaneous three times a day before meals  isosorbide   dinitrate Tablet (ISORDIL) 30 milliGRAM(s) Oral three times a day  levothyroxine 50 MICROGram(s) Oral daily  pantoprazole    Tablet 40 milliGRAM(s) Oral before breakfast  polyethylene glycol 3350 17 Gram(s) Oral daily  senna 2 Tablet(s) Oral at bedtime  spironolactone 25 milliGRAM(s) Oral daily  tamsulosin 0.4 milliGRAM(s) Oral at bedtime      PAST MEDICAL & SURGICAL HISTORY:  HTN (hypertension)      CAD (coronary artery disease)      Diabetes      Hyperlipidemia, unspecified hyperlipidemia type      CHF (congestive heart failure)      BPH (benign prostatic hyperplasia)      S/P CABG (coronary artery bypass graft)      S/P appendectomy          Vitals:  T(F): 98.2 (12-10), Max: 98.2 (12-09)  HR: 86 (12-10) (81 - 89)  BP: 112/62 (12-10) (100/60 - 114/60)  RR: 17 (12-10)  SpO2: 100% (12-10)  I&O's Summary    09 Dec 2022 07:01  -  10 Dec 2022 07:00  --------------------------------------------------------  IN: 150 mL / OUT: 1300 mL / NET: -1150 mL        Physical Exam:  Appearance: No Acute Distress  Neck: No JVD  Cardiovascular: Normal S1 S2  Respiratory: Clear to auscultation bilaterally. L sternal wound stable   Gastrointestinal: Soft, Non-tender	  Skin: No cyanosis	  Neurologic: Non-focal  Extremities: No LE edema  Psychiatry: A & O x 3, Mood & affect appropriate                          12.2   10.93 )-----------( 157      ( 10 Dec 2022 06:50 )             38.1     12-10    133<L>  |  88<L>  |  55<H>  ----------------------------<  118<H>  3.7   |  30  |  2.85<H>    Ca    9.0      10 Dec 2022 06:50  Phos  3.7     12-10  Mg     1.90     12-10      PT/INR - ( 09 Dec 2022 06:00 )   PT: 13.8 sec;   INR: 1.19 ratio         PTT - ( 09 Dec 2022 06:00 )  PTT:29.5 sec      Serum Pro-Brain Natriuretic Peptide: 7986 pg/mL (12-04 @ 19:36)          Cardiovascular Testings:       Interpretation of Telemetry:   DISCHARGE

## 2023-04-27 NOTE — ED PROVIDER NOTE - NSICDXPASTSURGICALHX_GEN_ALL_CORE_FT
Follow up with phone visit in two weeks two review cultures.     Begin walking daily, progress to brisk walking.     Continue to increase your times. Increase endurance.     Continue incentive spirometry use.      PAST SURGICAL HISTORY:  S/P appendectomy     S/P CABG (coronary artery bypass graft)

## 2023-05-10 ENCOUNTER — NON-APPOINTMENT (OUTPATIENT)
Age: 68
End: 2023-05-10

## 2023-05-10 ENCOUNTER — APPOINTMENT (OUTPATIENT)
Dept: ELECTROPHYSIOLOGY | Facility: CLINIC | Age: 68
End: 2023-05-10
Payer: MEDICARE

## 2023-05-10 PROCEDURE — 93295 DEV INTERROG REMOTE 1/2/MLT: CPT

## 2023-05-10 PROCEDURE — 93296 REM INTERROG EVL PM/IDS: CPT

## 2023-05-25 ENCOUNTER — APPOINTMENT (OUTPATIENT)
Dept: CV DIAGNOSITCS | Facility: HOSPITAL | Age: 68
End: 2023-05-25

## 2023-05-25 ENCOUNTER — APPOINTMENT (OUTPATIENT)
Dept: CARDIOLOGY | Facility: CLINIC | Age: 68
End: 2023-05-25

## 2023-07-16 ENCOUNTER — INPATIENT (INPATIENT)
Facility: HOSPITAL | Age: 68
LOS: 2 days | Discharge: HOME HEALTH | End: 2023-07-19
Attending: HOSPITALIST | Admitting: HOSPITALIST
Payer: MEDICARE

## 2023-07-16 VITALS
HEART RATE: 70 BPM | SYSTOLIC BLOOD PRESSURE: 121 MMHG | OXYGEN SATURATION: 94 % | DIASTOLIC BLOOD PRESSURE: 71 MMHG | RESPIRATION RATE: 18 BRPM

## 2023-07-16 DIAGNOSIS — Z90.49 ACQUIRED ABSENCE OF OTHER SPECIFIED PARTS OF DIGESTIVE TRACT: Chronic | ICD-10-CM

## 2023-07-16 DIAGNOSIS — Z95.1 PRESENCE OF AORTOCORONARY BYPASS GRAFT: Chronic | ICD-10-CM

## 2023-07-16 PROCEDURE — 99285 EMERGENCY DEPT VISIT HI MDM: CPT

## 2023-07-16 RX ORDER — IPRATROPIUM/ALBUTEROL SULFATE 18-103MCG
3 AEROSOL WITH ADAPTER (GRAM) INHALATION
Refills: 0 | Status: COMPLETED | OUTPATIENT
Start: 2023-07-16 | End: 2023-07-17

## 2023-07-16 RX ORDER — ACETAMINOPHEN 500 MG
1000 TABLET ORAL ONCE
Refills: 0 | Status: COMPLETED | OUTPATIENT
Start: 2023-07-16 | End: 2023-07-17

## 2023-07-16 NOTE — ED ADULT NURSE NOTE - OBJECTIVE STATEMENT
Break coverage RN: Pt is a&ox3, resp even and unlabored, no acute distress in appearance. Pt presents with chest pain since this morning, believes his pacemaker has fired numerous times today prompting his visit to the ed. Pt well appearing, skin appropriate for race. Continuous cardiac monitoring shows paced rhythm with frequent PVCs. Vitals as noted. Pt arrives with 18G IV in L wrist placed by EMS, received 200mL of normal saline in transport - unknown if pt got any other medications via ems. Pt also endorsing frequent urination, denies burning/painful urination. Not offering any other complaints. No shortness of breath, diff breathing, n/v/d, fevers. Will maintain safety and continue to monitor. Will give report to primary RN Berta.

## 2023-07-16 NOTE — ED ADULT TRIAGE NOTE - CHIEF COMPLAINT QUOTE
Pt presents to ED via EMS from home with c/o chest pain since this morning, "defibrillator fire x several times" and dizziness. Pt arrives with #18g IV to L forearm by EMS.

## 2023-07-17 DIAGNOSIS — J44.9 CHRONIC OBSTRUCTIVE PULMONARY DISEASE, UNSPECIFIED: ICD-10-CM

## 2023-07-17 DIAGNOSIS — I50.9 HEART FAILURE, UNSPECIFIED: ICD-10-CM

## 2023-07-17 DIAGNOSIS — I50.23 ACUTE ON CHRONIC SYSTOLIC (CONGESTIVE) HEART FAILURE: ICD-10-CM
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
## 2023-07-17 DIAGNOSIS — Z29.9 ENCOUNTER FOR PROPHYLACTIC MEASURES, UNSPECIFIED: ICD-10-CM

## 2023-07-17 DIAGNOSIS — I10 ESSENTIAL (PRIMARY) HYPERTENSION: ICD-10-CM

## 2023-07-17 DIAGNOSIS — E11.9 TYPE 2 DIABETES MELLITUS WITHOUT COMPLICATIONS: ICD-10-CM

## 2023-07-17 DIAGNOSIS — N18.4 CHRONIC KIDNEY DISEASE, STAGE 4 (SEVERE): ICD-10-CM

## 2023-07-17 DIAGNOSIS — Z95.810 PRESENCE OF AUTOMATIC (IMPLANTABLE) CARDIAC DEFIBRILLATOR: ICD-10-CM

## 2023-07-17 LAB
ALBUMIN SERPL ELPH-MCNC: 3.9 G/DL — SIGNIFICANT CHANGE UP (ref 3.3–5)
ALP SERPL-CCNC: 106 U/L — SIGNIFICANT CHANGE UP (ref 40–120)
ALT FLD-CCNC: 20 U/L — SIGNIFICANT CHANGE UP (ref 4–41)
ANION GAP SERPL CALC-SCNC: 17 MMOL/L — HIGH (ref 7–14)
ANION GAP SERPL CALC-SCNC: 19 MMOL/L — HIGH (ref 7–14)
APPEARANCE UR: CLEAR — SIGNIFICANT CHANGE UP
AST SERPL-CCNC: 23 U/L — SIGNIFICANT CHANGE UP (ref 4–40)
BACTERIA # UR AUTO: NEGATIVE /HPF — SIGNIFICANT CHANGE UP
BASE EXCESS BLDV CALC-SCNC: -1.3 MMOL/L — SIGNIFICANT CHANGE UP (ref -2–3)
BASOPHILS # BLD AUTO: 0.04 K/UL — SIGNIFICANT CHANGE UP (ref 0–0.2)
BASOPHILS NFR BLD AUTO: 0.3 % — SIGNIFICANT CHANGE UP (ref 0–2)
BILIRUB SERPL-MCNC: 0.7 MG/DL — SIGNIFICANT CHANGE UP (ref 0.2–1.2)
BILIRUB UR-MCNC: NEGATIVE — SIGNIFICANT CHANGE UP
BLOOD GAS VENOUS COMPREHENSIVE RESULT: SIGNIFICANT CHANGE UP
BUN SERPL-MCNC: 43 MG/DL — HIGH (ref 7–23)
BUN SERPL-MCNC: 48 MG/DL — HIGH (ref 7–23)
CALCIUM SERPL-MCNC: 8.9 MG/DL — SIGNIFICANT CHANGE UP (ref 8.4–10.5)
CALCIUM SERPL-MCNC: 8.9 MG/DL — SIGNIFICANT CHANGE UP (ref 8.4–10.5)
CAST: 0 /LPF — SIGNIFICANT CHANGE UP (ref 0–4)
CHLORIDE BLDV-SCNC: 98 MMOL/L — SIGNIFICANT CHANGE UP (ref 96–108)
CHLORIDE SERPL-SCNC: 97 MMOL/L — LOW (ref 98–107)
CHLORIDE SERPL-SCNC: 98 MMOL/L — SIGNIFICANT CHANGE UP (ref 98–107)
CO2 BLDV-SCNC: 23.9 MMOL/L — SIGNIFICANT CHANGE UP (ref 22–26)
CO2 SERPL-SCNC: 18 MMOL/L — LOW (ref 22–31)
CO2 SERPL-SCNC: 21 MMOL/L — LOW (ref 22–31)
COLOR SPEC: YELLOW — SIGNIFICANT CHANGE UP
CREAT SERPL-MCNC: 2.99 MG/DL — HIGH (ref 0.5–1.3)
CREAT SERPL-MCNC: 3.01 MG/DL — HIGH (ref 0.5–1.3)
DIFF PNL FLD: NEGATIVE — SIGNIFICANT CHANGE UP
EGFR: 22 ML/MIN/1.73M2 — LOW
EGFR: 22 ML/MIN/1.73M2 — LOW
EOSINOPHIL # BLD AUTO: 0.04 K/UL — SIGNIFICANT CHANGE UP (ref 0–0.5)
EOSINOPHIL NFR BLD AUTO: 0.3 % — SIGNIFICANT CHANGE UP (ref 0–6)
GAS PNL BLDV: 129 MMOL/L — LOW (ref 136–145)
GAS PNL BLDV: SIGNIFICANT CHANGE UP
GLUCOSE BLDC GLUCOMTR-MCNC: 101 MG/DL — HIGH (ref 70–99)
GLUCOSE BLDC GLUCOMTR-MCNC: 135 MG/DL — HIGH (ref 70–99)
GLUCOSE BLDC GLUCOMTR-MCNC: 171 MG/DL — HIGH (ref 70–99)
GLUCOSE BLDC GLUCOMTR-MCNC: 97 MG/DL — SIGNIFICANT CHANGE UP (ref 70–99)
GLUCOSE BLDV-MCNC: 115 MG/DL — HIGH (ref 70–99)
GLUCOSE SERPL-MCNC: 113 MG/DL — HIGH (ref 70–99)
GLUCOSE SERPL-MCNC: 139 MG/DL — HIGH (ref 70–99)
GLUCOSE UR QL: NEGATIVE MG/DL — SIGNIFICANT CHANGE UP
HCO3 BLDV-SCNC: 23 MMOL/L — SIGNIFICANT CHANGE UP (ref 22–29)
HCT VFR BLD CALC: 42.9 % — SIGNIFICANT CHANGE UP (ref 39–50)
HCT VFR BLD CALC: 43.2 % — SIGNIFICANT CHANGE UP (ref 39–50)
HCT VFR BLDA CALC: 45 % — SIGNIFICANT CHANGE UP (ref 39–51)
HGB BLD CALC-MCNC: 15.1 G/DL — SIGNIFICANT CHANGE UP (ref 12.6–17.4)
HGB BLD-MCNC: 14.7 G/DL — SIGNIFICANT CHANGE UP (ref 13–17)
HGB BLD-MCNC: 14.8 G/DL — SIGNIFICANT CHANGE UP (ref 13–17)
IANC: 11.57 K/UL — HIGH (ref 1.8–7.4)
IMM GRANULOCYTES NFR BLD AUTO: 0.5 % — SIGNIFICANT CHANGE UP (ref 0–0.9)
KETONES UR-MCNC: NEGATIVE MG/DL — SIGNIFICANT CHANGE UP
LACTATE BLDV-MCNC: 1.6 MMOL/L — SIGNIFICANT CHANGE UP (ref 0.5–2)
LEUKOCYTE ESTERASE UR-ACNC: NEGATIVE — SIGNIFICANT CHANGE UP
LYMPHOCYTES # BLD AUTO: 0.6 K/UL — LOW (ref 1–3.3)
LYMPHOCYTES # BLD AUTO: 4.6 % — LOW (ref 13–44)
MAGNESIUM SERPL-MCNC: 2.1 MG/DL — SIGNIFICANT CHANGE UP (ref 1.6–2.6)
MCHC RBC-ENTMCNC: 31.7 PG — SIGNIFICANT CHANGE UP (ref 27–34)
MCHC RBC-ENTMCNC: 32.3 PG — SIGNIFICANT CHANGE UP (ref 27–34)
MCHC RBC-ENTMCNC: 34.3 GM/DL — SIGNIFICANT CHANGE UP (ref 32–36)
MCHC RBC-ENTMCNC: 34.3 GM/DL — SIGNIFICANT CHANGE UP (ref 32–36)
MCV RBC AUTO: 92.5 FL — SIGNIFICANT CHANGE UP (ref 80–100)
MCV RBC AUTO: 94.3 FL — SIGNIFICANT CHANGE UP (ref 80–100)
MONOCYTES # BLD AUTO: 0.68 K/UL — SIGNIFICANT CHANGE UP (ref 0–0.9)
MONOCYTES NFR BLD AUTO: 5.2 % — SIGNIFICANT CHANGE UP (ref 2–14)
NEUTROPHILS # BLD AUTO: 11.57 K/UL — HIGH (ref 1.8–7.4)
NEUTROPHILS NFR BLD AUTO: 89.1 % — HIGH (ref 43–77)
NITRITE UR-MCNC: NEGATIVE — SIGNIFICANT CHANGE UP
NRBC # BLD: 0 /100 WBCS — SIGNIFICANT CHANGE UP (ref 0–0)
NRBC # BLD: 0 /100 WBCS — SIGNIFICANT CHANGE UP (ref 0–0)
NRBC # FLD: 0 K/UL — SIGNIFICANT CHANGE UP (ref 0–0)
NRBC # FLD: 0 K/UL — SIGNIFICANT CHANGE UP (ref 0–0)
NT-PROBNP SERPL-SCNC: 5510 PG/ML — HIGH
PCO2 BLDV: 36 MMHG — LOW (ref 42–55)
PH BLDV: 7.41 — SIGNIFICANT CHANGE UP (ref 7.32–7.43)
PH UR: 6 — SIGNIFICANT CHANGE UP (ref 5–8)
PHOSPHATE SERPL-MCNC: 3.1 MG/DL — SIGNIFICANT CHANGE UP (ref 2.5–4.5)
PLATELET # BLD AUTO: 116 K/UL — LOW (ref 150–400)
PLATELET # BLD AUTO: 124 K/UL — LOW (ref 150–400)
PO2 BLDV: 51 MMHG — HIGH (ref 25–45)
POTASSIUM BLDV-SCNC: 4.1 MMOL/L — SIGNIFICANT CHANGE UP (ref 3.5–5.1)
POTASSIUM SERPL-MCNC: 3.6 MMOL/L — SIGNIFICANT CHANGE UP (ref 3.5–5.3)
POTASSIUM SERPL-MCNC: 3.8 MMOL/L — SIGNIFICANT CHANGE UP (ref 3.5–5.3)
POTASSIUM SERPL-SCNC: 3.6 MMOL/L — SIGNIFICANT CHANGE UP (ref 3.5–5.3)
POTASSIUM SERPL-SCNC: 3.8 MMOL/L — SIGNIFICANT CHANGE UP (ref 3.5–5.3)
PROT SERPL-MCNC: 6.4 G/DL — SIGNIFICANT CHANGE UP (ref 6–8.3)
PROT UR-MCNC: 30 MG/DL
RBC # BLD: 4.55 M/UL — SIGNIFICANT CHANGE UP (ref 4.2–5.8)
RBC # BLD: 4.67 M/UL — SIGNIFICANT CHANGE UP (ref 4.2–5.8)
RBC # FLD: 14.5 % — SIGNIFICANT CHANGE UP (ref 10.3–14.5)
RBC # FLD: 14.5 % — SIGNIFICANT CHANGE UP (ref 10.3–14.5)
RBC CASTS # UR COMP ASSIST: 1 /HPF — SIGNIFICANT CHANGE UP (ref 0–4)
SAO2 % BLDV: 81.2 % — SIGNIFICANT CHANGE UP (ref 67–88)
SODIUM SERPL-SCNC: 135 MMOL/L — SIGNIFICANT CHANGE UP (ref 135–145)
SODIUM SERPL-SCNC: 135 MMOL/L — SIGNIFICANT CHANGE UP (ref 135–145)
SP GR SPEC: 1.01 — SIGNIFICANT CHANGE UP (ref 1–1.03)
SQUAMOUS # UR AUTO: 0 /HPF — SIGNIFICANT CHANGE UP (ref 0–5)
TROPONIN T, HIGH SENSITIVITY RESULT: 72 NG/L — CRITICAL HIGH
TROPONIN T, HIGH SENSITIVITY RESULT: 73 NG/L — CRITICAL HIGH
UROBILINOGEN FLD QL: 0.2 MG/DL — SIGNIFICANT CHANGE UP (ref 0.2–1)
WBC # BLD: 12.99 K/UL — HIGH (ref 3.8–10.5)
WBC # BLD: 7.59 K/UL — SIGNIFICANT CHANGE UP (ref 3.8–10.5)
WBC # FLD AUTO: 12.99 K/UL — HIGH (ref 3.8–10.5)
WBC # FLD AUTO: 7.59 K/UL — SIGNIFICANT CHANGE UP (ref 3.8–10.5)
WBC UR QL: 0 /HPF — SIGNIFICANT CHANGE UP (ref 0–5)

## 2023-07-17 PROCEDURE — 99223 1ST HOSP IP/OBS HIGH 75: CPT

## 2023-07-17 PROCEDURE — 71045 X-RAY EXAM CHEST 1 VIEW: CPT | Mod: 26

## 2023-07-17 PROCEDURE — 73502 X-RAY EXAM HIP UNI 2-3 VIEWS: CPT | Mod: 26,LT

## 2023-07-17 PROCEDURE — 99223 1ST HOSP IP/OBS HIGH 75: CPT | Mod: FS

## 2023-07-17 PROCEDURE — 99231 SBSQ HOSP IP/OBS SF/LOW 25: CPT | Mod: FS

## 2023-07-17 PROCEDURE — 93284 PRGRMG EVAL IMPLANTABLE DFB: CPT | Mod: 26

## 2023-07-17 RX ORDER — ASPIRIN/CALCIUM CARB/MAGNESIUM 324 MG
81 TABLET ORAL DAILY
Refills: 0 | Status: DISCONTINUED | OUTPATIENT
Start: 2023-07-17 | End: 2023-07-19

## 2023-07-17 RX ORDER — FAMOTIDINE 10 MG/ML
20 INJECTION INTRAVENOUS DAILY
Refills: 0 | Status: DISCONTINUED | OUTPATIENT
Start: 2023-07-17 | End: 2023-07-19

## 2023-07-17 RX ORDER — DEXTROSE 50 % IN WATER 50 %
12.5 SYRINGE (ML) INTRAVENOUS ONCE
Refills: 0 | Status: DISCONTINUED | OUTPATIENT
Start: 2023-07-17 | End: 2023-07-19

## 2023-07-17 RX ORDER — ATORVASTATIN CALCIUM 80 MG/1
80 TABLET, FILM COATED ORAL AT BEDTIME
Refills: 0 | Status: DISCONTINUED | OUTPATIENT
Start: 2023-07-17 | End: 2023-07-19

## 2023-07-17 RX ORDER — BUMETANIDE 0.25 MG/ML
2 INJECTION INTRAMUSCULAR; INTRAVENOUS ONCE
Refills: 0 | Status: COMPLETED | OUTPATIENT
Start: 2023-07-17 | End: 2023-07-17

## 2023-07-17 RX ORDER — SODIUM CHLORIDE 9 MG/ML
1000 INJECTION, SOLUTION INTRAVENOUS
Refills: 0 | Status: DISCONTINUED | OUTPATIENT
Start: 2023-07-17 | End: 2023-07-19

## 2023-07-17 RX ORDER — GLUCAGON INJECTION, SOLUTION 0.5 MG/.1ML
1 INJECTION, SOLUTION SUBCUTANEOUS ONCE
Refills: 0 | Status: DISCONTINUED | OUTPATIENT
Start: 2023-07-17 | End: 2023-07-19

## 2023-07-17 RX ORDER — FERROUS SULFATE 325(65) MG
325 TABLET ORAL DAILY
Refills: 0 | Status: DISCONTINUED | OUTPATIENT
Start: 2023-07-17 | End: 2023-07-19

## 2023-07-17 RX ORDER — CLOPIDOGREL BISULFATE 75 MG/1
75 TABLET, FILM COATED ORAL DAILY
Refills: 0 | Status: DISCONTINUED | OUTPATIENT
Start: 2023-07-17 | End: 2023-07-19

## 2023-07-17 RX ORDER — ACETAMINOPHEN 500 MG
650 TABLET ORAL EVERY 6 HOURS
Refills: 0 | Status: DISCONTINUED | OUTPATIENT
Start: 2023-07-17 | End: 2023-07-19

## 2023-07-17 RX ORDER — DEXTROSE 50 % IN WATER 50 %
15 SYRINGE (ML) INTRAVENOUS ONCE
Refills: 0 | Status: DISCONTINUED | OUTPATIENT
Start: 2023-07-17 | End: 2023-07-19

## 2023-07-17 RX ORDER — ALBUTEROL 90 UG/1
2 AEROSOL, METERED ORAL EVERY 6 HOURS
Refills: 0 | Status: DISCONTINUED | OUTPATIENT
Start: 2023-07-17 | End: 2023-07-18

## 2023-07-17 RX ORDER — CARVEDILOL PHOSPHATE 80 MG/1
6.25 CAPSULE, EXTENDED RELEASE ORAL EVERY 12 HOURS
Refills: 0 | Status: DISCONTINUED | OUTPATIENT
Start: 2023-07-17 | End: 2023-07-19

## 2023-07-17 RX ORDER — ALLOPURINOL 300 MG
100 TABLET ORAL DAILY
Refills: 0 | Status: DISCONTINUED | OUTPATIENT
Start: 2023-07-17 | End: 2023-07-19

## 2023-07-17 RX ORDER — DEXTROSE 50 % IN WATER 50 %
25 SYRINGE (ML) INTRAVENOUS ONCE
Refills: 0 | Status: DISCONTINUED | OUTPATIENT
Start: 2023-07-17 | End: 2023-07-19

## 2023-07-17 RX ORDER — BUDESONIDE AND FORMOTEROL FUMARATE DIHYDRATE 160; 4.5 UG/1; UG/1
2 AEROSOL RESPIRATORY (INHALATION)
Refills: 0 | Status: DISCONTINUED | OUTPATIENT
Start: 2023-07-17 | End: 2023-07-19

## 2023-07-17 RX ORDER — LEVOTHYROXINE SODIUM 125 MCG
50 TABLET ORAL DAILY
Refills: 0 | Status: DISCONTINUED | OUTPATIENT
Start: 2023-07-17 | End: 2023-07-19

## 2023-07-17 RX ORDER — TAMSULOSIN HYDROCHLORIDE 0.4 MG/1
0.4 CAPSULE ORAL AT BEDTIME
Refills: 0 | Status: DISCONTINUED | OUTPATIENT
Start: 2023-07-17 | End: 2023-07-19

## 2023-07-17 RX ORDER — SENNA PLUS 8.6 MG/1
2 TABLET ORAL AT BEDTIME
Refills: 0 | Status: DISCONTINUED | OUTPATIENT
Start: 2023-07-17 | End: 2023-07-19

## 2023-07-17 RX ORDER — INSULIN LISPRO 100/ML
VIAL (ML) SUBCUTANEOUS
Refills: 0 | Status: DISCONTINUED | OUTPATIENT
Start: 2023-07-17 | End: 2023-07-19

## 2023-07-17 RX ORDER — HYDRALAZINE HCL 50 MG
50 TABLET ORAL THREE TIMES A DAY
Refills: 0 | Status: DISCONTINUED | OUTPATIENT
Start: 2023-07-17 | End: 2023-07-19

## 2023-07-17 RX ORDER — BUDESONIDE AND FORMOTEROL FUMARATE DIHYDRATE 160; 4.5 UG/1; UG/1
2 AEROSOL RESPIRATORY (INHALATION)
Refills: 0 | DISCHARGE

## 2023-07-17 RX ORDER — BUMETANIDE 0.25 MG/ML
2 INJECTION INTRAMUSCULAR; INTRAVENOUS EVERY 12 HOURS
Refills: 0 | Status: DISCONTINUED | OUTPATIENT
Start: 2023-07-17 | End: 2023-07-17

## 2023-07-17 RX ORDER — POTASSIUM CHLORIDE 20 MEQ
20 PACKET (EA) ORAL ONCE
Refills: 0 | Status: COMPLETED | OUTPATIENT
Start: 2023-07-17 | End: 2023-07-17

## 2023-07-17 RX ORDER — BUMETANIDE 0.25 MG/ML
4 INJECTION INTRAMUSCULAR; INTRAVENOUS DAILY
Refills: 0 | Status: DISCONTINUED | OUTPATIENT
Start: 2023-07-18 | End: 2023-07-19

## 2023-07-17 RX ORDER — HEPARIN SODIUM 5000 [USP'U]/ML
5000 INJECTION INTRAVENOUS; SUBCUTANEOUS EVERY 8 HOURS
Refills: 0 | Status: DISCONTINUED | OUTPATIENT
Start: 2023-07-17 | End: 2023-07-19

## 2023-07-17 RX ORDER — ISOSORBIDE DINITRATE 5 MG/1
30 TABLET ORAL THREE TIMES A DAY
Refills: 0 | Status: DISCONTINUED | OUTPATIENT
Start: 2023-07-17 | End: 2023-07-19

## 2023-07-17 RX ADMIN — HEPARIN SODIUM 5000 UNIT(S): 5000 INJECTION INTRAVENOUS; SUBCUTANEOUS at 21:49

## 2023-07-17 RX ADMIN — Medication 3 MILLILITER(S): at 01:27

## 2023-07-17 RX ADMIN — Medication 1: at 21:49

## 2023-07-17 RX ADMIN — CARVEDILOL PHOSPHATE 6.25 MILLIGRAM(S): 80 CAPSULE, EXTENDED RELEASE ORAL at 17:49

## 2023-07-17 RX ADMIN — BUMETANIDE 2 MILLIGRAM(S): 0.25 INJECTION INTRAMUSCULAR; INTRAVENOUS at 05:59

## 2023-07-17 RX ADMIN — Medication 20 MILLIEQUIVALENT(S): at 17:49

## 2023-07-17 RX ADMIN — Medication 1000 MILLIGRAM(S): at 12:23

## 2023-07-17 RX ADMIN — Medication 400 MILLIGRAM(S): at 01:30

## 2023-07-17 RX ADMIN — Medication 50 MILLIGRAM(S): at 21:48

## 2023-07-17 RX ADMIN — SENNA PLUS 2 TABLET(S): 8.6 TABLET ORAL at 21:49

## 2023-07-17 RX ADMIN — ATORVASTATIN CALCIUM 80 MILLIGRAM(S): 80 TABLET, FILM COATED ORAL at 21:49

## 2023-07-17 RX ADMIN — Medication 3 MILLILITER(S): at 02:11

## 2023-07-17 RX ADMIN — BUDESONIDE AND FORMOTEROL FUMARATE DIHYDRATE 2 PUFF(S): 160; 4.5 AEROSOL RESPIRATORY (INHALATION) at 21:51

## 2023-07-17 RX ADMIN — Medication 50 MICROGRAM(S): at 17:49

## 2023-07-17 RX ADMIN — BUMETANIDE 2 MILLIGRAM(S): 0.25 INJECTION INTRAMUSCULAR; INTRAVENOUS at 17:49

## 2023-07-17 RX ADMIN — Medication 3 MILLILITER(S): at 01:02

## 2023-07-17 RX ADMIN — TAMSULOSIN HYDROCHLORIDE 0.4 MILLIGRAM(S): 0.4 CAPSULE ORAL at 21:49

## 2023-07-17 NOTE — H&P ADULT - NSHPPHYSICALEXAM_GEN_ALL_CORE
LVM with patient advising call back for results disclosure.    .  VITAL SIGNS:  T(C): 36.9 (07-17-23 @ 12:33), Max: 36.9 (07-17-23 @ 12:33)  T(F): 98.5 (07-17-23 @ 12:33), Max: 98.5 (07-17-23 @ 12:33)  HR: 99 (07-17-23 @ 12:33) (70 - 100)  BP: 127/75 (07-17-23 @ 12:33) (102/62 - 134/70)  BP(mean): 73 (07-17-23 @ 02:46) (73 - 73)  RR: 18 (07-17-23 @ 08:23) (18 - 20)  SpO2: 95% (07-17-23 @ 08:23) (94% - 98%)  Wt(kg): --    PHYSICAL EXAM:    Constitutional: elderly, frail, sitting in stretcher   Head: NC/AT  Eyes: PERRL, EOMI, clear conjunctiva  ENT: no nasal discharge; uvula midline, no oropharyngeal erythema or exudates; MMM  Neck: supple; no JVD  Respiratory: b/l crackles 1/2 up lungs, no wheeze, normal resp effort   Cardiac: distant heart sounds, RRR, no overt murmurs   Gastrointestinal: soft, NT/ND; no rebound or guarding; +BSx4  Genitourinary: no ya   Back: spine midline, no bony tenderness or step-offs; no CVAT B/L  Extremities: no peripheral edema  Musculoskeletal: NROM x4; no joint swelling, tenderness or erythema  Dermatologic: skin warm, dry and intact; no rashes, wounds, or scars  Neurologic: AAOx3; CNII-XII grossly intact; no focal deficits  Psychiatric: affect and characteristics of appearance, verbalizations, behaviors are appropriate

## 2023-07-17 NOTE — H&P ADULT - ASSESSMENT
67M with PMH of CAD, HFrEF (EF = 14% - 10/17/2022), s/p Medtronic CRT-D, CABG, HTN, HLD, T2DM s/p ICD, Asthma, COPD, Home oxygen (2L), CKD, CVA w/ Left sided deficits, and Gait difficulty (uses walker), presented to the ED due to feeling his ICD discharge x 3, in addition to sob x a few days and palpitations a/f acute on chronic CHF.

## 2023-07-17 NOTE — ED PROVIDER NOTE - OBJECTIVE STATEMENT
68 male past medical history CHF, COPD, CAD status post CABG, diabetes, hypertension, hyperlipidemia presenting s/p defibrillator firing.  Patient says that his defibrillator fired 3 times this morning and after that he started having chest pain.  He is still endorsing chest pain.  Also endorsing shortness of breath which started this morning and dizziness.  Patient says that he tried using his nebulizer without relief.  Has been taking his Bumex twice daily.  Denies fever, abdominal pain, nausea vomiting, urinary symptoms, diarrhea constipation, lower extremity edema. 68 male past medical history CHF, COPD, CKD, CAD status post CABG, diabetes, hypertension, hyperlipidemia presenting s/p defibrillator firing.  Patient says that his defibrillator fired 3 times this morning and after that he started having chest pain.  He is still endorsing chest pain.  Also endorsing shortness of breath which started this morning and dizziness.  Patient says that he tried using his nebulizer without relief.  Has been taking his Bumex twice daily.  Denies fever, abdominal pain, nausea vomiting, urinary symptoms, diarrhea constipation, lower extremity edema.

## 2023-07-17 NOTE — CONSULT NOTE ADULT - PROBLEM SELECTOR RECOMMENDATION 9
Overall NYHA class III - mildly hypervolemic. Had CP prior to admission.   Discontinue IV diuretics.   Start Bumex 4 mg po daily.   Please get NST.   Continue imdur 30 mg po daily.   Continue hydralazine 50 mg po daily. Continue vero 12.5 mg po daily.   Continue Coreg 3.125 mg po daily.

## 2023-07-17 NOTE — ED PROVIDER NOTE - PHYSICAL EXAMINATION
PHYSICAL EXAM:  GENERAL: Sitting comfortable in bed, in no acute distress  HENMT: Atraumatic, moist mucous membranes EYES: Clear bilaterally, PERRL, EOMs intact b/l  HEART: Regular rate and regular rhythm, S1/S2, no murmur  RESPIRATORY: Wheezing auscultated bilaterally  ABDOMEN: Soft, nontender, nondistended  EXTREMITIES: No lower extremity edema  NEURO: A&Ox4, no focal motor deficits or sensory deficits   SKIN: Skin normal color for race, warm, dry and intact

## 2023-07-17 NOTE — H&P ADULT - PROBLEM SELECTOR PLAN 1
p/w sob, palpitations, chest tightness for past few days. Reports feeling his ICD fired 3x at home.  CXR w/congestion, BNP ~5K (previously 7K), trop elevated but stable x2  EKG nonischemic  Home meds: PO Bumex 1mg qD? (unclear dose, was discharged on 4mg BID Dec 2022, but recent rx shows 1mg), Coreg 6.25mg BID, Hydralazine 50mg TID, Isosorbide dinitrate 30mg TID, Spironolactone 25mg, Rosuvastatin    -s/p IV Bumex 2mg in ER, ordered q12 for now  -c/w Coreg, statin, DAPT   -HF consulted, f/u recs regarding diuresis   -Apprec EP eval and device interrogation

## 2023-07-17 NOTE — H&P ADULT - PROBLEM SELECTOR PLAN 2
device interrogated on 7/17  -Report: few episodes of transient NSVT recorded since 7/14; total 41 seconds in AT/AF from 2/9/2023 to 7/17/2023  No ICD shocks seen since Feb 9, 2023

## 2023-07-17 NOTE — PATIENT PROFILE ADULT - FALL HARM RISK - HARM RISK INTERVENTIONS

## 2023-07-17 NOTE — CONSULT NOTE ADULT - SUBJECTIVE AND OBJECTIVE BOX
HPI: 67M with PMH of CAD, HFrEF (EF = 14% - 10/17/2022), s/p Medtronic CRT-D, CABG, HTN, HLD, T2DM s/p ICD, Asthma, COPD, Home oxygen (2L), CKD, CVA w/ Left sided deficits, and Gait difficulty (uses walker), presented to the ED due to feeling his ICD discharge x 3 for which EP was consulted.     Pt has been noticing increased sob for past few days associated w palpitations and earlier today felt he got shocked at 8am, 6pm x2 and subsequently presented to ED.     currently Pt denies any cp, palpitation, sob, orthopnea, PND, dizziness, lightheadedness or syncope. Has been compliant w meds and is scheduled to see cards in august 1st week.     ROS as above    T(C): --  HR: 70 (23 @ 23:10) (70 - 70)  BP: 121/71 (23 @ 23:10) (121/71 - 121/71)  RR: 18 (23 @ 23:10) (18 - 18)  SpO2: 94% (23 @ 23:10) (94% - 94%)    MEDICATIONS  (STANDING):  albuterol/ipratropium for Nebulization 3 milliLiter(s) Nebulizer every 30 minutes    MEDICATIONS  (PRN):      I&O's Summary                        14.7                 135  | 18   | 48           12.99 >-----------< 124     ------------------------< 113                   42.9                 3.6  | 98   | 3.01                                         Ca 8.9   Mg x     Ph x      Urinalysis Basic - ( 2023 00:10 )    Color: Yellow / Appearance: Clear / S.009 / pH: x  Gluc: 113 mg/dL / Ketone: Negative mg/dL  / Bili: Negative / Urobili: 0.2 mg/dL   Blood: x / Protein: 30 mg/dL / Nitrite: Negative   Leuk Esterase: Negative / RBC: 1 /HPF / WBC 0 /HPF   Sq Epi: x / Non Sq Epi: 0 /HPF / Bacteria: Negative /HPF

## 2023-07-17 NOTE — CONSULT NOTE ADULT - ASSESSMENT
HPI: 67M with PMH of CAD, HFrEF (EF = 14% - 10/17/2022), s/p Medtronic CRT-D, CABG, HTN, HLD, T2DM s/p ICD, Asthma, COPD, Home oxygen (2L), CKD, CVA w/ Left sided deficits, and Gait difficulty (uses walker), presented to the ED due to feeling his ICD discharge x 3 for which EP was consulted.     Pt has been noticing increased sob for past few days associated w palpitations and earlier today felt he got shocked at 8am, 6pm x2 and subsequently presented to ED.     Assessment and Plan:  # Suspected ICD shocks x 3  Pt currently asymptomatic. VSS.   Trop 73  pBNP 5510  EKG w NSR and no significant ST-T changes.  Prelim interrogation of ICD reveals few short runs of VT and NO shocks were delivered.  - Tele monitoring  - Trend cardiac enzymes to peak  - resume BB and titrate up as tolerated   - If pt continues to have frequent episodes of VT will consider starting Amio load  - replete electrolytes to keep K>4, Mg>2  - Formal ICD interrogation in AM    Case discussed w On call fellow Dr. Nagel  EP will continue to follow in AM  
69 y/o [place] male with PMHX of HTN, HFrEF, CAD w/ CABG 2014 at [facility], CRT-D, COPD, CVA with left sided weakness comes in with complaints of chest pain and SOB and sensation of possible ICD shock. ICD interrogation did not reveal ICD fire. Labs show elevated BUN/Cr 48/3.01, trop 73, 72, proBNP 5510. He was found to be in acute on chronic systolic heart failure and started on IV diuretics.   Overall NYHA class III - mildly hypervolemic. Had CP prior to admission.

## 2023-07-17 NOTE — PROGRESS NOTE ADULT - SUBJECTIVE AND OBJECTIVE BOX
Patient is a 68y old  Male who presents with a chief complaint of   French  ID: [id number]    c/o diffuse chest pressure 8/10 across over L side and substernal area since last night with SOB, denies palpitation or lightheadedness or dizziness      PAST MEDICAL & SURGICAL HISTORY:  HTN (hypertension)    CAD (coronary artery disease)    Diabetes    Hyperlipidemia, unspecified hyperlipidemia type    CHF (congestive heart failure)    BPH (benign prostatic hyperplasia)    S/P CABG (coronary artery bypass graft)    S/P appendectomy        MEDICATIONS  (STANDING):    MEDICATIONS  (PRN):            Vital Signs Last 24 Hrs  T(C): 36.7 (2023 08:23), Max: 36.7 (2023 02:46)  T(F): 98.1 (2023 08:23), Max: 98.1 (2023 07:11)  HR: 100 (2023 08:23) (70 - 100)  BP: 134/70 (2023 08:23) (102/62 - 134/70)  BP(mean): 73 (2023 02:46) (73 - 73)  RR: 18 (2023 08:23) (18 - 20)  SpO2: 95% (2023 08:23) (94% - 98%)    Parameters below as of 2023 08:23  Patient On (Oxygen Delivery Method): room air                INTERPRETATION OF TELEMETRY:  SR with biv paced at 90's with transient atrial couplets/ NSVT seen     ECG:        LABS:                        14.7   12.99 )-----------( 124      ( 2023 00:10 )             42.9     07-17    135  |  98  |  48<H>  ----------------------------<  113<H>  3.6   |  18<L>  |  3.01<H>    Ca    8.9      2023 00:10    TPro  6.4  /  Alb  3.9  /  TBili  0.7  /  DBili  x   /  AST  23  /  ALT  20  /  AlkPhos  106  07-17    CARDIAC MARKERS ( 2023 00:10 )  x     / x     / 143 U/L / x     / 3.1 ng/mL        Urinalysis Basic - ( 2023 00:10 )    Color: Yellow / Appearance: Clear / S.009 / pH: x  Gluc: 113 mg/dL / Ketone: Negative mg/dL  / Bili: Negative / Urobili: 0.2 mg/dL   Blood: x / Protein: 30 mg/dL / Nitrite: Negative   Leuk Esterase: Negative / RBC: 1 /HPF / WBC 0 /HPF   Sq Epi: x / Non Sq Epi: 0 /HPF / Bacteria: Negative /HPF        BNP  RADIOLOGY & ADDITIONAL STUDIES:  DIMENSIONS:  Dimensions:     Normal Values:  LA:     4.8 cm    2.0 - 4.0 cm  Ao:     3.8 cm    2.0 - 3.8 cm  SEPTUM: 0.8 cm    0.6 - 1.2 cm  PWT:    0.8 cm    0.6 - 1.1 cm  LVIDd: 6.1 cm    3.0 - 5.6 cm  LVIDs:  5.4 cm    1.8 - 4.0 cm  Derived Variables:  LVMI: 96 g/m2  RWT: 0.26  Fractional short: 11 %  Ejection Fraction (Velazquez Rule): 24 %  ------------------------------------------------------------------------  OBSERVATIONS:  Mitral Valve: Mitral annular calcification, otherwise  normal mitral valve. Mild-moderate mitral regurgitation.  Aortic Root: Normal aortic root.  Aortic Valve: Calcified trileaflet aortic valve with normal  opening. Minimal aortic regurgitation.  Left Atrium: Mildly dilated left atrium.  LA volume index =  36 cc/m2.  Left Ventricle: Severe global left ventricular systolic  dysfunction.  Endocardial visualization enhanced with  intravenous injection of echo contrast (Definity).  No LV  thrombus seen. Mild left ventricular enlargement.  Right Heart: Normal right atrium. The right ventricle is  not well visualized; grossly normal right ventricular  systolic function.  A device wire is noted in the right  heart. Normal tricuspid valve.  Mild tricuspid  regurgitation. Normal pulmonic valve.  Minimal pulmonic  regurgitation.  Pericardium/PleuraNormal pericardium with no pericardial  effusion.  ------------------------------------------------------------------------  CONCLUSIONS:  1. Mitral annular calcification, otherwise normal mitral  valve. Mild-moderate mitral regurgitation.  2. Mildly dilated left atrium.  LA volume index = 36 cc/m2.  3. Mild left ventricular enlargement.  4. Severe global left ventricular systolic dysfunction.  Endocardial visualization enhanced with intravenous  injection of echo contrast (Definity).  No LV thrombus  seen.  5. The right ventricle is not well visualized; grossly  normal right ventricular systolic function.  A device wire  is noted in the right heart.  *** Compared with echocardiogram of 10/17/2022, no  significant changes noted.  ------------------------------------------------------------------------  Confirmed on  2022 - 10:15:21 by Greg Medrano M.D.,  [facility], [facility]  ------------------------------------------------------------------------        PHYSICAL EXAM:    GENERAL: In no apparent distress,  NECK: Supple and normal thyroid.  No JVD or carotid bruit.  Carotid pulse is 2+ bilaterally.  HEART: Regular rate and rhythm; No murmurs, rubs, or gallops.  L ICD; + tender on palpation over substernal area and L lower chest   PULMONARY: +basilar rales, no wheezing, or rhonchi bilaterally.  ABDOMEN: Soft, Nontender, Nondistended; Bowel sounds present  EXTREMITIES:  2+ Peripheral Pulses, No clubbing, cyanosis, or edema  NEUROLOGICAL: alert oriented x4 no speech clear no focal deficit

## 2023-07-17 NOTE — ED PROVIDER NOTE - CLINICAL SUMMARY MEDICAL DECISION MAKING FREE TEXT BOX
68 male past medical history CHF, COPD, CAD status post CABG, diabetes, hypertension, hyperlipidemia presenting s/p defibrillator firing. DDx for SOB includes but not limited to: CHF exacerbation vs COPD exacerbation. Will c/s EP to interrogate defibrillator. Plan: blood work, ECG, CXR, duonebs. Will re-assess. Plan to admit. 68 male past medical history CHF, COPD, CKD, CAD status post CABG, diabetes, hypertension, hyperlipidemia presenting s/p defibrillator firing. DDx for SOB includes but not limited to: CHF exacerbation vs COPD exacerbation. Will c/s EP to interrogate defibrillator. Plan: blood work, ECG, CXR, duonebs. Will re-assess. Plan to admit.

## 2023-07-17 NOTE — PROGRESS NOTE ADULT - NS ATTEND AMEND GEN_ALL_CORE FT
This is a 81 year old man with PMH chronic persistent afib s/p multiple BRANDIE/DCCVs and ablations (on Xarelto), HFrEF (TTE 11/2022: severe global LV systolic dysfunction, also mod-severe L mitral regurg), HTN, HLD, CKD, hypothyroidism, GERD, BALTAZAR (noncompliant with CPAP), spinal stenosis s/p hemilaminectomy (8/2018), and SBO s/p ex-lap with lysis of adhesions (11/2022), recent admission 6/10-6/27/23 s/p AF ablation (6/30) c/b hypotension requiring pressors presenting with weakness x 3 days and found to be in A Flutter with rate as low as 30s-40s. Patient is hemodynamically stable. TSH 11.5. Echocardiogram on 7/6/23 shows normal LV function. Ep team discussed with patient regarding risks/benefits and alternatives of DCCV. All questions answered. Patient consented for the procedure. Patient was compliant with AC. Missed one dose of AC on 6/30/23. No BRANDIE required. Monitor electrolytes and replete K to 4 and Mg to 2. Continue Xarelto for thromboembolic ppx  given that patient has a [id number] score of 4 (Age, HTN, CHF). Continue to hold beta blockers. Currently NPO for DCCV today. 67M with PMH of CAD, HFrEF (EF = 14% - 10/17/2022), s/p Medtronic CRT-D, CABG, HTN, HLD, T2DM s/p ICD, Asthma, COPD, Home oxygen (2L), CKD, CVA w/ Left sided deficits, and Gait difficulty (uses walker), presented to the ED due to feeling his ICD discharge x 3 for which EP was consulted.     Pt has been noticing increased sob for past few days associated w palpitations and earlier today felt he got shocked at 8am, 6pm x2 and subsequently presented to ED.  Patient endorses persistent chest pressure started yesterday.     Assessment and Plan:  Full ICD interrogation revealed normal device functions with 95 biv pacing noted few transient NSVT lasted 1 second duration since 7/14-- no ICD shocks seen Pt currently with reproducible chest pain   -continue diuresis per primary   - resume BB and titrate up as tolerated, no indication for antiarrhythmic drug at this time  - replete electrolytes to keep K>4, Mg>2  -persistent chest pressure possibly non-cardiac, musculoskeletal etiology, follow up cardiology

## 2023-07-17 NOTE — PROCEDURE NOTE - ADDITIONAL PROCEDURE DETAILS
p/w chest pressure sob   few episodes of transient NSVT recorded since 7/14; total 41 seconds in AT/AF from 2/9/2023 to 7/17/2023  No ICD shocks seen since Feb 9, 2023

## 2023-07-17 NOTE — ED PROVIDER NOTE - PROGRESS NOTE DETAILS
Ramona Parra M.D. (Resident Physician): Bedside POCUS showed b/l b-lines. Ramona Parra M.D. (Resident Physician): EP interrogated the defibrillator and did not any shocks, only some runs of vtach. Will give pt his home beta blocker. Ramona Parra M.D. (Resident Physician): EP interrogated the defibrillator and did not any shocks, only some runs of vtach.

## 2023-07-17 NOTE — H&P ADULT - NSHPLABSRESULTS_GEN_ALL_CORE
.  LABS:                         14.7   12.99 )-----------( 124      ( 2023 00:10 )             42.9         135  |  98  |  48<H>  ----------------------------<  113<H>  3.6   |  18<L>  |  3.01<H>    Ca    8.9      2023 00:10    TPro  6.4  /  Alb  3.9  /  TBili  0.7  /  DBili  x   /  AST  23  /  ALT  20  /  AlkPhos  106        Urinalysis Basic - ( 2023 00:10 )    Color: Yellow / Appearance: Clear / S.009 / pH: x  Gluc: 113 mg/dL / Ketone: Negative mg/dL  / Bili: Negative / Urobili: 0.2 mg/dL   Blood: x / Protein: 30 mg/dL / Nitrite: Negative   Leuk Esterase: Negative / RBC: 1 /HPF / WBC 0 /HPF   Sq Epi: x / Non Sq Epi: 0 /HPF / Bacteria: Negative /HPF      CARDIAC MARKERS ( 2023 00:10 )  x     / x     / 143 U/L / x     / 3.1 ng/mL            RADIOLOGY, EKG & ADDITIONAL TESTS: Reviewed.   CXR: < from: Xray Chest 1 View- PORTABLE-Urgent (23 @ 02:37) >    IMPRESSION:  Low lung volumes with no focal consolidation, pneumothorax, nor pleural   effusion.    Mildly prominent vascular markings.    --- End of Report---          ROSALINDA HOU MD; Resident Radiologist  This document has been electronically signed.  ABRIL ISABEL MD; Attending Interventional Radiologist  This document has been electronically signed. 2023 10:02AM    < end of copied text >      EKG: sinus at 92 w/STT changes, unchanged from prior

## 2023-07-17 NOTE — ED ADULT NURSE REASSESSMENT NOTE - NS ED NURSE REASSESS COMMENT FT1
BREAK RN: pt. remains A&Ox4, awake and resting. pt. offers no new complaints at this time. no acute distress noted. respirations even and unlabored. paced rhythm on cardiac monitor. VS as noted.

## 2023-07-17 NOTE — H&P ADULT - PROBLEM SELECTOR PLAN 4
Cr at baseline  -Monitor BMP  -Avoid nephrotoxic agents  -Renally dose meds -- Has been filling gabapentin 800mg qD at pharmacy, will decrease dose to 200mg.

## 2023-07-17 NOTE — ED PROVIDER NOTE - ATTENDING CONTRIBUTION TO CARE
68 male past medical history CHF, COPD, CKD, CAD status post CABG, diabetes, hypertension, hyperlipidemia presenting s/p defibrillator firing.  Patient says that his defibrillator fired 3 times this morning and after that he started having chest pain.  He is still endorsing chest pain.  Also endorsing shortness of breath which started this morning and dizziness.  Patient says that he tried using his nebulizer without relief.  Has been taking his Bumex twice daily.  Denies fever, abdominal pain, nausea vomiting, urinary symptoms, diarrhea constipation, lower extremity edema.

## 2023-07-17 NOTE — H&P ADULT - PROBLEM SELECTOR PLAN 3
Fall 2 weeks ago at night, pt reports chronic dizziness, now using walker.   -L hip pain, check L hip xray  -Avoid use of high doses of Gabapentin at home   -PT melindaal

## 2023-07-17 NOTE — ED ADULT NURSE REASSESSMENT NOTE - NS ED NURSE REASSESS COMMENT FT1
Report given to floor , pt awaiting transport. pt tolerated dinner  no co nausea.  pt does not look tachypneic at this time, co intermittent chest discomfort., paced on cardiac monitor.  pt using urinal ,yellow colored out put noted.

## 2023-07-17 NOTE — ED ADULT NURSE REASSESSMENT NOTE - NS ED NURSE REASSESS COMMENT FT1
pt tolerated po intake with no co nausea or vomiting. pt does not look tachypneic denies sob. Pt paced on cardiaco monitor , does co generalized body aches. pt with no fever or chills.,

## 2023-07-17 NOTE — H&P ADULT - HISTORY OF PRESENT ILLNESS
67M with PMH of CAD, HFrEF (EF = 14% - 10/17/2022), s/p Medtronic CRT-D, CABG, HTN, HLD, T2DM s/p ICD, Asthma, COPD, Home oxygen (2L), CKD, CVA w/ Left sided deficits, and Gait difficulty (uses walker), presented to the ED due to feeling his ICD discharge x 3, in addition to sob x a few days and palpitations.  In ER, pt evaluated by EP, no shocks give. Pt given IV Bumex with mild improvement of symptoms.  Pt reports worsening sob, however only noticed it yesterday prompting to ER visit. He reports compliance to medications, NO dietary indiscretion or travel.   Reports dizziness, ongoing since stroke, and fall in the middle of the night 2 weeks ago that lead to L hip pain. Now he uses walker to ambulate.   Otherwise pt denies fevers, chills, nvd, dysuria, weakness.

## 2023-07-17 NOTE — CONSULT NOTE ADULT - NS ATTEND AMEND GEN_ALL_CORE FT
Briefly, 67 y/o M w/ h/o CAD s/p CABG 2014 ([facility]), COPD (on nocturnal O2), HFrEF/ICM (EF 10-15%, LVEDD 5.9 cm) s/p MDT CRT-D, CVA w/ residual L sided weakness, T2DM (A1c 6.2 12/22), CKD (b/l Cr 2.8-3) who presented on 7/17 with complaints of CP for the past 2 days that is unrelated to exertion as well as belief of ICD shocks today. Followed by Dr. Dobson as outpatient. ICD interrogated without any shocks. On exam, NAD, JVP approx 8 cm w/ mild HJR, RRR, mild crackles, nontender abdomen, no pedal edema, warm extremities. Labs reviewed - K 3.6, BuN/Cr 48/3.01 (stable from outpatient), BNP 5500, trop 70s. ECG - sinus, lateral infarct, PRWP (unchanged from prior). Overall stage C HF, NYHA class III with non-specific chest discomfort but given history of CAD, warrants an ischemic evaluation.  - plan for pharmacologic stress test  - repeat TTE  - c/w home medications  - agree with IV diuretic; switch to bumex 4 mg PO daily tomorrow  - hold vero for now  - no ACS protocol for now

## 2023-07-17 NOTE — CONSULT NOTE ADULT - SUBJECTIVE AND OBJECTIVE BOX
Date of Admission:    CHIEF COMPLAINT:    HISTORY OF PRESENT ILLNESS:      Allergies    No Known Allergies    Intolerances    	    MEDICATIONS:  aspirin enteric coated 81 milliGRAM(s) Oral daily  buMETAnide Injectable 2 milliGRAM(s) IV Push every 12 hours  carvedilol 6.25 milliGRAM(s) Oral every 12 hours  clopidogrel Tablet 75 milliGRAM(s) Oral daily  heparin   Injectable 5000 Unit(s) SubCutaneous every 8 hours  hydrALAZINE 50 milliGRAM(s) Oral three times a day  isosorbide   dinitrate Tablet (ISORDIL) 30 milliGRAM(s) Oral three times a day      albuterol    90 MICROgram(s) HFA Inhaler 2 Puff(s) Inhalation every 6 hours PRN  budesonide 160 MICROgram(s)/formoterol 4.5 MICROgram(s) Inhaler 2 Puff(s) Inhalation two times a day    acetaminophen     Tablet .. 650 milliGRAM(s) Oral every 6 hours PRN    famotidine    Tablet 20 milliGRAM(s) Oral daily  senna 2 Tablet(s) Oral at bedtime    allopurinol 100 milliGRAM(s) Oral daily  atorvastatin 80 milliGRAM(s) Oral at bedtime  dextrose 50% Injectable 25 Gram(s) IV Push once  dextrose 50% Injectable 12.5 Gram(s) IV Push once  dextrose 50% Injectable 25 Gram(s) IV Push once  dextrose Oral Gel 15 Gram(s) Oral once PRN  glucagon  Injectable 1 milliGRAM(s) IntraMuscular once  insulin lispro (ADMELOG) corrective regimen sliding scale   SubCutaneous Before meals and at bedtime  levothyroxine 50 MICROGram(s) Oral daily    dextrose 5%. 1000 milliLiter(s) IV Continuous <Continuous>  dextrose 5%. 1000 milliLiter(s) IV Continuous <Continuous>  ferrous    sulfate 325 milliGRAM(s) Oral daily  potassium chloride    Tablet ER 20 milliEquivalent(s) Oral once  tamsulosin 0.4 milliGRAM(s) Oral at bedtime      PAST MEDICAL & SURGICAL HISTORY:  HTN (hypertension)      CAD (coronary artery disease)      Diabetes      Hyperlipidemia, unspecified hyperlipidemia type      CHF (congestive heart failure)      BPH (benign prostatic hyperplasia)      S/P CABG (coronary artery bypass graft)      S/P appendectomy          FAMILY HISTORY:  Family history of type 2 diabetes mellitus in father (Father)        SOCIAL HISTORY:    [ ] Non-smoker  [ ] Smoker  [ ] Alcohol      REVIEW OF SYSTEMS:  CONSTITUTIONAL: No fever, weight loss, or fatigue  EYES: No eye pain, visual disturbances, or discharge  ENMT:  No difficulty hearing, tinnitus, vertigo; No sinus or throat pain  NECK: No pain or stiffness  RESPIRATORY: No cough, wheezing, chills or hemoptysis; No Shortness of Breath  CARDIOVASCULAR: No chest pain, palpitations, passing out, dizziness, or leg swelling  GASTROINTESTINAL: No abdominal or epigastric pain. No nausea, vomiting, or hematemesis; No diarrhea or constipation. No melena or hematochezia.  GENITOURINARY: No dysuria, frequency, hematuria, or incontinence  NEUROLOGICAL: No headaches, memory loss, loss of strength, numbness, or tremors  SKIN: No itching, burning, rashes, or lesions   LYMPH Nodes: No enlarged glands  ENDOCRINE: No heat or cold intolerance; No hair loss  MUSCULOSKELETAL: No joint pain or swelling; No muscle, back, or extremity pain  PSYCHIATRIC: No depression, anxiety, mood swings, or difficulty sleeping  HEME/LYMPH: No easy bruising, or bleeding gums  ALLERY AND IMMUNOLOGIC: No hives or eczema	    [ ] All others negative	  [ ] Unable to obtain    PHYSICAL EXAM:  T(C): 37.1 (07-17-23 @ 15:14), Max: 37.1 (07-17-23 @ 15:14)  HR: 82 (07-17-23 @ 15:14) (70 - 100)  BP: 112/66 (07-17-23 @ 15:14) (102/62 - 134/70)  RR: 16 (07-17-23 @ 15:14) (16 - 20)  SpO2: 98% (07-17-23 @ 15:14) (94% - 98%)  Wt(kg): --  I&O's Summary    17 Jul 2023 07:01  -  17 Jul 2023 17:09  --------------------------------------------------------  IN: 250 mL / OUT: 1700 mL / NET: -1450 mL        Appearance: Normal	  HEENT:   Normal oral mucosa, PERRL, EOMI	  Lymphatic: No lymphadenopathy  Cardiovascular: Normal S1 S2, No JVD, No murmurs, No edema  Respiratory: Lungs clear to auscultation	  Psychiatry: A & O x 3, Mood & affect appropriate  Gastrointestinal:  Soft, Non-tender, + BS	  Skin: No rashes, No ecchymoses, No cyanosis	  Neurologic: Non-focal  Extremities: Normal range of motion, No clubbing, cyanosis or edema  Vascular: Peripheral pulses palpable 2+ bilaterally        LABS:	 	    CBC Full  -  ( 17 Jul 2023 15:40 )  WBC Count : 7.59 K/uL  Hemoglobin : 14.8 g/dL  Hematocrit : 43.2 %  Platelet Count - Automated : 116 K/uL  Mean Cell Volume : 92.5 fL  Mean Cell Hemoglobin : 31.7 pg  Mean Cell Hemoglobin Concentration : 34.3 gm/dL  Auto Neutrophil # : x  Auto Lymphocyte # : x  Auto Monocyte # : x  Auto Eosinophil # : x  Auto Basophil # : x  Auto Neutrophil % : x  Auto Lymphocyte % : x  Auto Monocyte % : x  Auto Eosinophil % : x  Auto Basophil % : x    07-17    135  |  97<L>  |  43<H>  ----------------------------<  139<H>  3.8   |  21<L>  |  2.99<H>  07-17    135  |  98  |  48<H>  ----------------------------<  113<H>  3.6   |  18<L>  |  3.01<H>    Ca    8.9      17 Jul 2023 15:40  Ca    8.9      17 Jul 2023 00:10  Phos  3.1     07-17  Mg     2.10     07-17    TPro  6.4  /  Alb  3.9  /  TBili  0.7  /  DBili  x   /  AST  23  /  ALT  20  /  AlkPhos  106  07-17      proBNP:   Lipid Profile:   HgA1c:   TSH:       CARDIAC MARKERS:            TELEMETRY: 	    ECG:  	  RADIOLOGY:  OTHER: 	    PREVIOUS DIAGNOSTIC TESTING:    [ ] Echocardiogram:  [ ]  Catheterization:  [ ] Stress Test:  	  	  ASSESSMENT/PLAN: 	     Date of Admission: 7/17/23     CHIEF COMPLAINT: SOB and chest pain     HISTORY OF PRESENT ILLNESS:    67 y/o Syriac male with PMHX of HTN, HFrEF, CAD w/ CABG 2014 at [facility], CRT-D, COPD, CVA with left sided weakness comes in with complaints of chest pain and SOB and sensation of possible ICD shock. ICD interrogation did not reveal ICD fire. Labs show elevated BUN/Cr 48/3.01, trop 73, 72, proBNP 5510. He was found to be in acute on chronic systolic heart failure and started on IV diuretics. HF service consulted to help manage care in this patient who was previously seen by HF service in the clinic.           Allergies    No Known Allergies    Intolerances    	    MEDICATIONS:  aspirin enteric coated 81 milliGRAM(s) Oral daily  buMETAnide Injectable 2 milliGRAM(s) IV Push every 12 hours  carvedilol 6.25 milliGRAM(s) Oral every 12 hours  clopidogrel Tablet 75 milliGRAM(s) Oral daily  heparin   Injectable 5000 Unit(s) SubCutaneous every 8 hours  hydrALAZINE 50 milliGRAM(s) Oral three times a day  isosorbide   dinitrate Tablet (ISORDIL) 30 milliGRAM(s) Oral three times a day      albuterol    90 MICROgram(s) HFA Inhaler 2 Puff(s) Inhalation every 6 hours PRN  budesonide 160 MICROgram(s)/formoterol 4.5 MICROgram(s) Inhaler 2 Puff(s) Inhalation two times a day    acetaminophen     Tablet .. 650 milliGRAM(s) Oral every 6 hours PRN    famotidine    Tablet 20 milliGRAM(s) Oral daily  senna 2 Tablet(s) Oral at bedtime    allopurinol 100 milliGRAM(s) Oral daily  atorvastatin 80 milliGRAM(s) Oral at bedtime  dextrose 50% Injectable 25 Gram(s) IV Push once  dextrose 50% Injectable 12.5 Gram(s) IV Push once  dextrose 50% Injectable 25 Gram(s) IV Push once  dextrose Oral Gel 15 Gram(s) Oral once PRN  glucagon  Injectable 1 milliGRAM(s) IntraMuscular once  insulin lispro (ADMELOG) corrective regimen sliding scale   SubCutaneous Before meals and at bedtime  levothyroxine 50 MICROGram(s) Oral daily    dextrose 5%. 1000 milliLiter(s) IV Continuous <Continuous>  dextrose 5%. 1000 milliLiter(s) IV Continuous <Continuous>  ferrous    sulfate 325 milliGRAM(s) Oral daily  potassium chloride    Tablet ER 20 milliEquivalent(s) Oral once  tamsulosin 0.4 milliGRAM(s) Oral at bedtime      PAST MEDICAL & SURGICAL HISTORY:  HTN (hypertension)      CAD (coronary artery disease)      Diabetes      Hyperlipidemia, unspecified hyperlipidemia type      CHF (congestive heart failure)      BPH (benign prostatic hyperplasia)      S/P CABG (coronary artery bypass graft)      S/P appendectomy          FAMILY HISTORY:  Family history of type 2 diabetes mellitus in father (Father)        SOCIAL HISTORY:    [ x] Non-smoker  [ ] Smoker  [ ] Alcohol      REVIEW OF SYSTEMS:  CONSTITUTIONAL: No fever, weight loss, or fatigue  EYES: No eye pain, visual disturbances, or discharge  ENMT:  No difficulty hearing, tinnitus, vertigo; No sinus or throat pain  NECK: No pain or stiffness  RESPIRATORY: No cough, wheezing, chills or hemoptysis; admits to Shortness of Breath  CARDIOVASCULAR: had CP, no palpitations, passing out, dizziness, or leg swelling. Felt ICD fire.   GASTROINTESTINAL: No abdominal or epigastric pain. No nausea, vomiting, or hematemesis; No diarrhea or constipation. No melena or hematochezia.  GENITOURINARY: No dysuria, frequency, hematuria, or incontinence  NEUROLOGICAL: No headaches, memory loss, loss of strength, numbness, or tremors  SKIN: No itching, burning, rashes, or lesions   LYMPH Nodes: No enlarged glands  ENDOCRINE: No heat or cold intolerance; No hair loss  MUSCULOSKELETAL: No joint pain or swelling; No muscle, back, or extremity pain  PSYCHIATRIC: No depression, anxiety, mood swings, or difficulty sleeping  HEME/LYMPH: No easy bruising, or bleeding gums  ALLERY AND IMMUNOLOGIC: No hives or eczema	    [ ] All others negative	  [ ] Unable to obtain    PHYSICAL EXAM:  T(C): 37.1 (07-17-23 @ 15:14), Max: 37.1 (07-17-23 @ 15:14)  HR: 82 (07-17-23 @ 15:14) (70 - 100)  BP: 112/66 (07-17-23 @ 15:14) (102/62 - 134/70)  RR: 16 (07-17-23 @ 15:14) (16 - 20)  SpO2: 98% (07-17-23 @ 15:14) (94% - 98%)  Wt(kg): --  I&O's Summary    17 Jul 2023 07:01  -  17 Jul 2023 17:09  --------------------------------------------------------  IN: 250 mL / OUT: 1700 mL / NET: -1450 mL        Appearance: Normal	  HEENT:   Normal oral mucosa, PERRL, EOMI	  Lymphatic: No lymphadenopathy  Cardiovascular: Normal S1 S2, JVP mildly elevated, No murmurs, No edema and is warm b/l.   Respiratory: Lungs clear to auscultation	  Psychiatry: A & O x 3, Mood & affect appropriate  Gastrointestinal:  Soft, Non-tender, + BS	  Skin: No rashes, No ecchymoses, No cyanosis	  Neurologic: Non-focal  Extremities: Normal range of motion, No clubbing, cyanosis or edema  Vascular: Peripheral pulses palpable 2+ bilaterally        LABS:	 	    CBC Full  -  ( 17 Jul 2023 15:40 )  WBC Count : 7.59 K/uL  Hemoglobin : 14.8 g/dL  Hematocrit : 43.2 %  Platelet Count - Automated : 116 K/uL  Mean Cell Volume : 92.5 fL  Mean Cell Hemoglobin : 31.7 pg  Mean Cell Hemoglobin Concentration : 34.3 gm/dL  Auto Neutrophil # : x  Auto Lymphocyte # : x  Auto Monocyte # : x  Auto Eosinophil # : x  Auto Basophil # : x  Auto Neutrophil % : x  Auto Lymphocyte % : x  Auto Monocyte % : x  Auto Eosinophil % : x  Auto Basophil % : x    07-17    135  |  97<L>  |  43<H>  ----------------------------<  139<H>  3.8   |  21<L>  |  2.99<H>  07-17    135  |  98  |  48<H>  ----------------------------<  113<H>  3.6   |  18<L>  |  3.01<H>    Ca    8.9      17 Jul 2023 15:40  Ca    8.9      17 Jul 2023 00:10  Phos  3.1     07-17  Mg     2.10     07-17    TPro  6.4  /  Alb  3.9  /  TBili  0.7  /  DBili  x   /  AST  23  /  ALT  20  /  AlkPhos  106  07-17      proBNP:   Lipid Profile:   HgA1c:   TSH:       CARDIAC MARKERS:            TELEMETRY: 	    ECG:  	  RADIOLOGY:  OTHER: 	    PREVIOUS DIAGNOSTIC TESTING:    [ ] Echocardiogram:  [ ]  Catheterization:  [ ] Stress Test:  	  	  ASSESSMENT/PLAN:

## 2023-07-18 DIAGNOSIS — I25.10 ATHEROSCLEROTIC HEART DISEASE OF NATIVE CORONARY ARTERY WITHOUT ANGINA PECTORIS: ICD-10-CM

## 2023-07-18 LAB
A1C WITH ESTIMATED AVERAGE GLUCOSE RESULT: 6 % — HIGH (ref 4–5.6)
ANION GAP SERPL CALC-SCNC: 14 MMOL/L — SIGNIFICANT CHANGE UP (ref 7–14)
BUN SERPL-MCNC: 54 MG/DL — HIGH (ref 7–23)
CALCIUM SERPL-MCNC: 9.2 MG/DL — SIGNIFICANT CHANGE UP (ref 8.4–10.5)
CHLORIDE SERPL-SCNC: 97 MMOL/L — LOW (ref 98–107)
CO2 SERPL-SCNC: 22 MMOL/L — SIGNIFICANT CHANGE UP (ref 22–31)
CREAT SERPL-MCNC: 3.17 MG/DL — HIGH (ref 0.5–1.3)
CULTURE RESULTS: NO GROWTH — SIGNIFICANT CHANGE UP
EGFR: 20 ML/MIN/1.73M2 — LOW
ESTIMATED AVERAGE GLUCOSE: 126 — SIGNIFICANT CHANGE UP
GLUCOSE BLDC GLUCOMTR-MCNC: 131 MG/DL — HIGH (ref 70–99)
GLUCOSE BLDC GLUCOMTR-MCNC: 150 MG/DL — HIGH (ref 70–99)
GLUCOSE BLDC GLUCOMTR-MCNC: 228 MG/DL — HIGH (ref 70–99)
GLUCOSE SERPL-MCNC: 124 MG/DL — HIGH (ref 70–99)
HCT VFR BLD CALC: 45.3 % — SIGNIFICANT CHANGE UP (ref 39–50)
HGB BLD-MCNC: 15.4 G/DL — SIGNIFICANT CHANGE UP (ref 13–17)
MAGNESIUM SERPL-MCNC: 2 MG/DL — SIGNIFICANT CHANGE UP (ref 1.6–2.6)
MCHC RBC-ENTMCNC: 32.5 PG — SIGNIFICANT CHANGE UP (ref 27–34)
MCHC RBC-ENTMCNC: 34 GM/DL — SIGNIFICANT CHANGE UP (ref 32–36)
MCV RBC AUTO: 95.6 FL — SIGNIFICANT CHANGE UP (ref 80–100)
NRBC # BLD: 0 /100 WBCS — SIGNIFICANT CHANGE UP (ref 0–0)
NRBC # FLD: 0 K/UL — SIGNIFICANT CHANGE UP (ref 0–0)
PHOSPHATE SERPL-MCNC: 3.3 MG/DL — SIGNIFICANT CHANGE UP (ref 2.5–4.5)
PLATELET # BLD AUTO: 114 K/UL — LOW (ref 150–400)
POTASSIUM SERPL-MCNC: 3.5 MMOL/L — SIGNIFICANT CHANGE UP (ref 3.5–5.3)
POTASSIUM SERPL-SCNC: 3.5 MMOL/L — SIGNIFICANT CHANGE UP (ref 3.5–5.3)
RBC # BLD: 4.74 M/UL — SIGNIFICANT CHANGE UP (ref 4.2–5.8)
RBC # FLD: 14.5 % — SIGNIFICANT CHANGE UP (ref 10.3–14.5)
SODIUM SERPL-SCNC: 133 MMOL/L — LOW (ref 135–145)
SPECIMEN SOURCE: SIGNIFICANT CHANGE UP
WBC # BLD: 8.32 K/UL — SIGNIFICANT CHANGE UP (ref 3.8–10.5)
WBC # FLD AUTO: 8.32 K/UL — SIGNIFICANT CHANGE UP (ref 3.8–10.5)

## 2023-07-18 PROCEDURE — 93018 CV STRESS TEST I&R ONLY: CPT | Mod: GC

## 2023-07-18 PROCEDURE — 93016 CV STRESS TEST SUPVJ ONLY: CPT | Mod: GC

## 2023-07-18 PROCEDURE — 93306 TTE W/DOPPLER COMPLETE: CPT | Mod: 26

## 2023-07-18 PROCEDURE — 78452 HT MUSCLE IMAGE SPECT MULT: CPT | Mod: 26

## 2023-07-18 PROCEDURE — 99233 SBSQ HOSP IP/OBS HIGH 50: CPT

## 2023-07-18 RX ORDER — POTASSIUM CHLORIDE 20 MEQ
40 PACKET (EA) ORAL ONCE
Refills: 0 | Status: COMPLETED | OUTPATIENT
Start: 2023-07-18 | End: 2023-07-18

## 2023-07-18 RX ORDER — IPRATROPIUM/ALBUTEROL SULFATE 18-103MCG
3 AEROSOL WITH ADAPTER (GRAM) INHALATION EVERY 6 HOURS
Refills: 0 | Status: DISCONTINUED | OUTPATIENT
Start: 2023-07-18 | End: 2023-07-19

## 2023-07-18 RX ADMIN — CLOPIDOGREL BISULFATE 75 MILLIGRAM(S): 75 TABLET, FILM COATED ORAL at 14:33

## 2023-07-18 RX ADMIN — Medication 50 MILLIGRAM(S): at 05:15

## 2023-07-18 RX ADMIN — HEPARIN SODIUM 5000 UNIT(S): 5000 INJECTION INTRAVENOUS; SUBCUTANEOUS at 05:15

## 2023-07-18 RX ADMIN — HEPARIN SODIUM 5000 UNIT(S): 5000 INJECTION INTRAVENOUS; SUBCUTANEOUS at 21:58

## 2023-07-18 RX ADMIN — Medication 50 MICROGRAM(S): at 05:15

## 2023-07-18 RX ADMIN — Medication 3 MILLILITER(S): at 21:28

## 2023-07-18 RX ADMIN — Medication 50 MILLIGRAM(S): at 21:57

## 2023-07-18 RX ADMIN — CARVEDILOL PHOSPHATE 6.25 MILLIGRAM(S): 80 CAPSULE, EXTENDED RELEASE ORAL at 18:00

## 2023-07-18 RX ADMIN — ISOSORBIDE DINITRATE 30 MILLIGRAM(S): 5 TABLET ORAL at 05:16

## 2023-07-18 RX ADMIN — Medication 40 MILLIEQUIVALENT(S): at 16:23

## 2023-07-18 RX ADMIN — BUDESONIDE AND FORMOTEROL FUMARATE DIHYDRATE 2 PUFF(S): 160; 4.5 AEROSOL RESPIRATORY (INHALATION) at 21:57

## 2023-07-18 RX ADMIN — TAMSULOSIN HYDROCHLORIDE 0.4 MILLIGRAM(S): 0.4 CAPSULE ORAL at 21:57

## 2023-07-18 RX ADMIN — SENNA PLUS 2 TABLET(S): 8.6 TABLET ORAL at 21:58

## 2023-07-18 RX ADMIN — ISOSORBIDE DINITRATE 30 MILLIGRAM(S): 5 TABLET ORAL at 14:33

## 2023-07-18 RX ADMIN — BUMETANIDE 4 MILLIGRAM(S): 0.25 INJECTION INTRAMUSCULAR; INTRAVENOUS at 05:16

## 2023-07-18 RX ADMIN — FAMOTIDINE 20 MILLIGRAM(S): 10 INJECTION INTRAVENOUS at 14:33

## 2023-07-18 RX ADMIN — Medication 325 MILLIGRAM(S): at 14:33

## 2023-07-18 RX ADMIN — HEPARIN SODIUM 5000 UNIT(S): 5000 INJECTION INTRAVENOUS; SUBCUTANEOUS at 14:33

## 2023-07-18 RX ADMIN — Medication 50 MILLIGRAM(S): at 14:33

## 2023-07-18 RX ADMIN — ISOSORBIDE DINITRATE 30 MILLIGRAM(S): 5 TABLET ORAL at 21:57

## 2023-07-18 RX ADMIN — Medication 3 MILLILITER(S): at 15:56

## 2023-07-18 RX ADMIN — Medication 81 MILLIGRAM(S): at 14:33

## 2023-07-18 RX ADMIN — Medication 2: at 13:30

## 2023-07-18 RX ADMIN — ATORVASTATIN CALCIUM 80 MILLIGRAM(S): 80 TABLET, FILM COATED ORAL at 21:58

## 2023-07-18 NOTE — PROGRESS NOTE ADULT - PROBLEM SELECTOR PLAN 8
Chronic hypoxic respiratory failure d/t COPD, stable on 2L home O2   -c/w Symbicort, Alb PRN  -c/w supplemental O2 Chronic hypoxic respiratory failure d/t COPD, stable on 2L home O2   -c/w Symbicort, nebs PRN  -c/w supplemental O2

## 2023-07-18 NOTE — PROGRESS NOTE ADULT - SUBJECTIVE AND OBJECTIVE BOX
PROGRESS NOTE:     Patient is a 68y old  Male who presents with a chief complaint of sob (17 Jul 2023 17:08)      SUBJECTIVE / OVERNIGHT EVENTS: Shortness of breath improved.     ADDITIONAL REVIEW OF SYSTEMS:    MEDICATIONS  (STANDING):  allopurinol 100 milliGRAM(s) Oral daily  aspirin enteric coated 81 milliGRAM(s) Oral daily  atorvastatin 80 milliGRAM(s) Oral at bedtime  budesonide 160 MICROgram(s)/formoterol 4.5 MICROgram(s) Inhaler 2 Puff(s) Inhalation two times a day  buMETAnide 4 milliGRAM(s) Oral daily  carvedilol 6.25 milliGRAM(s) Oral every 12 hours  clopidogrel Tablet 75 milliGRAM(s) Oral daily  dextrose 5%. 1000 milliLiter(s) (100 mL/Hr) IV Continuous <Continuous>  dextrose 5%. 1000 milliLiter(s) (50 mL/Hr) IV Continuous <Continuous>  dextrose 50% Injectable 25 Gram(s) IV Push once  dextrose 50% Injectable 25 Gram(s) IV Push once  dextrose 50% Injectable 12.5 Gram(s) IV Push once  famotidine    Tablet 20 milliGRAM(s) Oral daily  ferrous    sulfate 325 milliGRAM(s) Oral daily  glucagon  Injectable 1 milliGRAM(s) IntraMuscular once  heparin   Injectable 5000 Unit(s) SubCutaneous every 8 hours  hydrALAZINE 50 milliGRAM(s) Oral three times a day  insulin lispro (ADMELOG) corrective regimen sliding scale   SubCutaneous Before meals and at bedtime  isosorbide   dinitrate Tablet (ISORDIL) 30 milliGRAM(s) Oral three times a day  levothyroxine 50 MICROGram(s) Oral daily  senna 2 Tablet(s) Oral at bedtime  tamsulosin 0.4 milliGRAM(s) Oral at bedtime    MEDICATIONS  (PRN):  acetaminophen     Tablet .. 650 milliGRAM(s) Oral every 6 hours PRN Mild Pain (1 - 3)  albuterol    90 MICROgram(s) HFA Inhaler 2 Puff(s) Inhalation every 6 hours PRN Shortness of Breath and/or Wheezing  dextrose Oral Gel 15 Gram(s) Oral once PRN Blood Glucose LESS THAN 70 milliGRAM(s)/deciliter      CAPILLARY BLOOD GLUCOSE      POCT Blood Glucose.: 171 mg/dL (17 Jul 2023 21:45)  POCT Blood Glucose.: 101 mg/dL (17 Jul 2023 17:01)  POCT Blood Glucose.: 135 mg/dL (17 Jul 2023 13:04)    I&O's Summary    17 Jul 2023 07:01  -  18 Jul 2023 07:00  --------------------------------------------------------  IN: 480 mL / OUT: 2200 mL / NET: -1720 mL        PHYSICAL EXAM:  Vital Signs Last 24 Hrs  T(C): 36.9 (18 Jul 2023 10:55), Max: 37.1 (17 Jul 2023 15:14)  T(F): 98.4 (18 Jul 2023 10:55), Max: 98.7 (17 Jul 2023 15:14)  HR: 93 (18 Jul 2023 10:55) (82 - 99)  BP: 106/62 (18 Jul 2023 10:55) (106/52 - 151/81)  BP(mean): --  RR: 16 (18 Jul 2023 10:55) (16 - 18)  SpO2: 100% (18 Jul 2023 10:55) (98% - 100%)    Parameters below as of 18 Jul 2023 10:55  Patient On (Oxygen Delivery Method): nasal cannula  O2 Flow (L/min): 2      CONSTITUTIONAL: NAD, well-developed  RESPIRATORY: Normal respiratory effort; lungs are clear to auscultation bilaterally  CARDIOVASCULAR: Regular rate and rhythm, normal S1 and S2, no murmur/rub/gallop; No lower extremity edema; Peripheral pulses are 2+ bilaterally  ABDOMEN: Nontender to palpation, normoactive bowel sounds, no rebound/guarding; No hepatosplenomegaly  MUSCLOSKELETAL: no clubbing or cyanosis of digits; no joint swelling or tenderness to palpation  PSYCH: A+O to person, place, and time; affect appropriate      LABS:                        15.4   8.32  )-----------( 114      ( 18 Jul 2023 05:51 )             45.3     07-18    133<L>  |  97<L>  |  54<H>  ----------------------------<  124<H>  3.5   |  22  |  3.17<H>    Ca    9.2      18 Jul 2023 05:51  Phos  3.3     07-18  Mg     2.00     07-18    TPro  6.4  /  Alb  3.9  /  TBili  0.7  /  DBili  x   /  AST  23  /  ALT  20  /  AlkPhos  106  07-17      CARDIAC MARKERS ( 17 Jul 2023 00:10 )  x     / x     / 143 U/L / x     / 3.1 ng/mL      Urinalysis Basic - ( 18 Jul 2023 05:51 )    Color: x / Appearance: x / SG: x / pH: x  Gluc: 124 mg/dL / Ketone: x  / Bili: x / Urobili: x   Blood: x / Protein: x / Nitrite: x   Leuk Esterase: x / RBC: x / WBC x   Sq Epi: x / Non Sq Epi: x / Bacteria: x        Culture - Urine (collected 17 Jul 2023 07:40)  Source: Clean Catch Clean Catch (Midstream)  Final Report (18 Jul 2023 07:13):    No growth        RADIOLOGY & ADDITIONAL TESTS:  Results Reviewed:   Imaging Personally Reviewed:  Electrocardiogram Personally Reviewed:    COORDINATION OF CARE:  Care Discussed with Consultants/Other Providers [Y/N]:  Prior or Outpatient Records Reviewed [Y/N]:   PROGRESS NOTE:     Patient is a 68y old  Male who presents with a chief complaint of sob (17 Jul 2023 17:08)      SUBJECTIVE / OVERNIGHT EVENTS: Shortness of breath improved.     ADDITIONAL REVIEW OF SYSTEMS:    MEDICATIONS  (STANDING):  allopurinol 100 milliGRAM(s) Oral daily  aspirin enteric coated 81 milliGRAM(s) Oral daily  atorvastatin 80 milliGRAM(s) Oral at bedtime  budesonide 160 MICROgram(s)/formoterol 4.5 MICROgram(s) Inhaler 2 Puff(s) Inhalation two times a day  buMETAnide 4 milliGRAM(s) Oral daily  carvedilol 6.25 milliGRAM(s) Oral every 12 hours  clopidogrel Tablet 75 milliGRAM(s) Oral daily  dextrose 5%. 1000 milliLiter(s) (100 mL/Hr) IV Continuous <Continuous>  dextrose 5%. 1000 milliLiter(s) (50 mL/Hr) IV Continuous <Continuous>  dextrose 50% Injectable 25 Gram(s) IV Push once  dextrose 50% Injectable 25 Gram(s) IV Push once  dextrose 50% Injectable 12.5 Gram(s) IV Push once  famotidine    Tablet 20 milliGRAM(s) Oral daily  ferrous    sulfate 325 milliGRAM(s) Oral daily  glucagon  Injectable 1 milliGRAM(s) IntraMuscular once  heparin   Injectable 5000 Unit(s) SubCutaneous every 8 hours  hydrALAZINE 50 milliGRAM(s) Oral three times a day  insulin lispro (ADMELOG) corrective regimen sliding scale   SubCutaneous Before meals and at bedtime  isosorbide   dinitrate Tablet (ISORDIL) 30 milliGRAM(s) Oral three times a day  levothyroxine 50 MICROGram(s) Oral daily  senna 2 Tablet(s) Oral at bedtime  tamsulosin 0.4 milliGRAM(s) Oral at bedtime    MEDICATIONS  (PRN):  acetaminophen     Tablet .. 650 milliGRAM(s) Oral every 6 hours PRN Mild Pain (1 - 3)  albuterol    90 MICROgram(s) HFA Inhaler 2 Puff(s) Inhalation every 6 hours PRN Shortness of Breath and/or Wheezing  dextrose Oral Gel 15 Gram(s) Oral once PRN Blood Glucose LESS THAN 70 milliGRAM(s)/deciliter      CAPILLARY BLOOD GLUCOSE      POCT Blood Glucose.: 171 mg/dL (17 Jul 2023 21:45)  POCT Blood Glucose.: 101 mg/dL (17 Jul 2023 17:01)  POCT Blood Glucose.: 135 mg/dL (17 Jul 2023 13:04)    I&O's Summary    17 Jul 2023 07:01  -  18 Jul 2023 07:00  --------------------------------------------------------  IN: 480 mL / OUT: 2200 mL / NET: -1720 mL        PHYSICAL EXAM:  Vital Signs Last 24 Hrs  T(C): 36.9 (18 Jul 2023 10:55), Max: 37.1 (17 Jul 2023 15:14)  T(F): 98.4 (18 Jul 2023 10:55), Max: 98.7 (17 Jul 2023 15:14)  HR: 93 (18 Jul 2023 10:55) (82 - 99)  BP: 106/62 (18 Jul 2023 10:55) (106/52 - 151/81)  BP(mean): --  RR: 16 (18 Jul 2023 10:55) (16 - 18)  SpO2: 100% (18 Jul 2023 10:55) (98% - 100%)    Parameters below as of 18 Jul 2023 10:55  Patient On (Oxygen Delivery Method): nasal cannula  O2 Flow (L/min): 2      CONSTITUTIONAL: NAD, well-developed  RESPIRATORY: Normal respiratory effort; lungs are clear to auscultation bilaterally  CARDIOVASCULAR: Regular rate and rhythm, normal S1 and S2, no murmur/rub/gallop; No lower extremity edema; Peripheral pulses are 2+ bilaterally  ABDOMEN: Nontender to palpation, normoactive bowel sounds, no rebound/guarding; No hepatosplenomegaly  MUSCLOSKELETAL: no clubbing or cyanosis of digits; no joint swelling or tenderness to palpation  PSYCH: A+O to person, place, and time; affect appropriate      LABS:                        15.4   8.32  )-----------( 114      ( 18 Jul 2023 05:51 )             45.3     07-18    133<L>  |  97<L>  |  54<H>  ----------------------------<  124<H>  3.5   |  22  |  3.17<H>    Ca    9.2      18 Jul 2023 05:51  Phos  3.3     07-18  Mg     2.00     07-18    TPro  6.4  /  Alb  3.9  /  TBili  0.7  /  DBili  x   /  AST  23  /  ALT  20  /  AlkPhos  106  07-17      CARDIAC MARKERS ( 17 Jul 2023 00:10 )  x     / x     / 143 U/L / x     / 3.1 ng/mL      Urinalysis Basic - ( 18 Jul 2023 05:51 )    Color: x / Appearance: x / SG: x / pH: x  Gluc: 124 mg/dL / Ketone: x  / Bili: x / Urobili: x   Blood: x / Protein: x / Nitrite: x   Leuk Esterase: x / RBC: x / WBC x   Sq Epi: x / Non Sq Epi: x / Bacteria: x        Culture - Urine (collected 17 Jul 2023 07:40)  Source: Clean Catch Clean Catch (Midstream)  Final Report (18 Jul 2023 07:13):    No growth        RADIOLOGY & ADDITIONAL TESTS:  Results Reviewed:   Imaging Personally Reviewed:  Electrocardiogram Personally Reviewed:  TTE:  1. Mitral annular calcification, otherwise normal mitral  valve. Mild mitral regurgitation.  2. Normal left atrium.  LA volume index = 30 cc/m2.  3. Normal left ventricular internal dimensions and  increased wall thicknesses.  4. Severe global left ventricular systolic dysfunction.  LVEF calculated using biplane Velazquez's method is 25%.  Endocardial visualization enhanced with intravenous  injection of echo contrast (Definity). No left ventricular  thrombus.  5. Normal left ventricular diastolic function.  6. Normal right atrium.  7. A device wire is noted in the right heart.  8. Normal tricuspid valve. No tricuspid regurgitation.  9. No pericardial effusion seen.      COORDINATION OF CARE:  Care Discussed with Consultants/Other Providers [Y/N]:  Prior or Outpatient Records Reviewed [Y/N]:

## 2023-07-18 NOTE — PROGRESS NOTE ADULT - PROBLEM SELECTOR PLAN 1
p/w sob, palpitations, chest tightness for past few days. Reports feeling his ICD fired 3x at home.  CXR w/ congestion, BNP ~5K (previously 7K), trop elevated but stable x2  EKG nonischemic    -repeat TTE  -s/p IV Bumex 2mg in ER, now transitioned to Bumex 4mg PO daily   -c/w Coreg 6.25mg BID, Hydralazine 50mg TID, Isosorbide dinitrate 30mg TID  -Hold Spironolactone for now   -HF input appreciated p/w sob, palpitations, chest tightness for past few days. Reports feeling his ICD fired 3x at home.  CXR w/ congestion, BNP ~5K (previously 7K), trop elevated but stable x2  EKG nonischemic    -TTE w/ severe global left ventricular systolic dysfunction (LVEF 25%)  -s/p IV Bumex 2mg in ER, now transitioned to Bumex 4mg PO daily   -c/w Coreg 6.25mg BID, Hydralazine 50mg TID, Isosorbide dinitrate 30mg TID  -Hold Spironolactone for now   -HF input appreciated

## 2023-07-18 NOTE — PROGRESS NOTE ADULT - PROBLEM SELECTOR PLAN 4
Fall 2 weeks ago at night, pt reports chronic dizziness, now using walker.   -L hip pain, L hip xray negative for fracture or dislocation   -Avoid use of high doses of Gabapentin at home   -PT eval

## 2023-07-19 ENCOUNTER — TRANSCRIPTION ENCOUNTER (OUTPATIENT)
Age: 68
End: 2023-07-19

## 2023-07-19 VITALS — WEIGHT: 164.24 LBS

## 2023-07-19 LAB
ANION GAP SERPL CALC-SCNC: 9 MMOL/L — SIGNIFICANT CHANGE UP (ref 7–14)
BUN SERPL-MCNC: 58 MG/DL — HIGH (ref 7–23)
CALCIUM SERPL-MCNC: 9 MG/DL — SIGNIFICANT CHANGE UP (ref 8.4–10.5)
CHLORIDE SERPL-SCNC: 103 MMOL/L — SIGNIFICANT CHANGE UP (ref 98–107)
CO2 SERPL-SCNC: 18 MMOL/L — LOW (ref 22–31)
CREAT SERPL-MCNC: 2.92 MG/DL — HIGH (ref 0.5–1.3)
EGFR: 23 ML/MIN/1.73M2 — LOW
GLUCOSE BLDC GLUCOMTR-MCNC: 141 MG/DL — HIGH (ref 70–99)
GLUCOSE BLDC GLUCOMTR-MCNC: 153 MG/DL — HIGH (ref 70–99)
GLUCOSE SERPL-MCNC: 131 MG/DL — HIGH (ref 70–99)
HCT VFR BLD CALC: 42.2 % — SIGNIFICANT CHANGE UP (ref 39–50)
HGB BLD-MCNC: 14.3 G/DL — SIGNIFICANT CHANGE UP (ref 13–17)
MAGNESIUM SERPL-MCNC: 2.2 MG/DL — SIGNIFICANT CHANGE UP (ref 1.6–2.6)
MCHC RBC-ENTMCNC: 31.9 PG — SIGNIFICANT CHANGE UP (ref 27–34)
MCHC RBC-ENTMCNC: 33.9 GM/DL — SIGNIFICANT CHANGE UP (ref 32–36)
MCV RBC AUTO: 94.2 FL — SIGNIFICANT CHANGE UP (ref 80–100)
NRBC # BLD: 0 /100 WBCS — SIGNIFICANT CHANGE UP (ref 0–0)
NRBC # FLD: 0 K/UL — SIGNIFICANT CHANGE UP (ref 0–0)
PHOSPHATE SERPL-MCNC: 3.1 MG/DL — SIGNIFICANT CHANGE UP (ref 2.5–4.5)
PLATELET # BLD AUTO: 112 K/UL — LOW (ref 150–400)
POTASSIUM SERPL-MCNC: 3.8 MMOL/L — SIGNIFICANT CHANGE UP (ref 3.5–5.3)
POTASSIUM SERPL-SCNC: 3.8 MMOL/L — SIGNIFICANT CHANGE UP (ref 3.5–5.3)
RBC # BLD: 4.48 M/UL — SIGNIFICANT CHANGE UP (ref 4.2–5.8)
RBC # FLD: 14.6 % — HIGH (ref 10.3–14.5)
SODIUM SERPL-SCNC: 130 MMOL/L — LOW (ref 135–145)
WBC # BLD: 7.67 K/UL — SIGNIFICANT CHANGE UP (ref 3.8–10.5)
WBC # FLD AUTO: 7.67 K/UL — SIGNIFICANT CHANGE UP (ref 3.8–10.5)

## 2023-07-19 PROCEDURE — 99233 SBSQ HOSP IP/OBS HIGH 50: CPT

## 2023-07-19 PROCEDURE — 99239 HOSP IP/OBS DSCHRG MGMT >30: CPT

## 2023-07-19 RX ORDER — GABAPENTIN 400 MG/1
1 CAPSULE ORAL
Qty: 30 | Refills: 0
Start: 2023-07-19 | End: 2023-08-17

## 2023-07-19 RX ORDER — CARVEDILOL PHOSPHATE 80 MG/1
1 CAPSULE, EXTENDED RELEASE ORAL
Qty: 60 | Refills: 0
Start: 2023-07-19 | End: 2023-08-17

## 2023-07-19 RX ORDER — GABAPENTIN 400 MG/1
1 CAPSULE ORAL
Refills: 0 | DISCHARGE

## 2023-07-19 RX ORDER — CARVEDILOL PHOSPHATE 80 MG/1
12.5 CAPSULE, EXTENDED RELEASE ORAL EVERY 12 HOURS
Refills: 0 | Status: DISCONTINUED | OUTPATIENT
Start: 2023-07-19 | End: 2023-07-19

## 2023-07-19 RX ORDER — BUMETANIDE 0.25 MG/ML
2 INJECTION INTRAMUSCULAR; INTRAVENOUS
Qty: 60 | Refills: 0
Start: 2023-07-19 | End: 2023-08-17

## 2023-07-19 RX ORDER — HYDRALAZINE HCL 50 MG
1 TABLET ORAL
Qty: 90 | Refills: 0
Start: 2023-07-19 | End: 2023-08-17

## 2023-07-19 RX ORDER — BUMETANIDE 0.25 MG/ML
1 INJECTION INTRAMUSCULAR; INTRAVENOUS
Refills: 0 | DISCHARGE

## 2023-07-19 RX ORDER — CARVEDILOL PHOSPHATE 80 MG/1
1 CAPSULE, EXTENDED RELEASE ORAL
Refills: 0 | DISCHARGE

## 2023-07-19 RX ADMIN — Medication 50 MILLIGRAM(S): at 12:54

## 2023-07-19 RX ADMIN — BUDESONIDE AND FORMOTEROL FUMARATE DIHYDRATE 2 PUFF(S): 160; 4.5 AEROSOL RESPIRATORY (INHALATION) at 08:56

## 2023-07-19 RX ADMIN — Medication 1: at 08:56

## 2023-07-19 RX ADMIN — Medication 100 MILLIGRAM(S): at 12:56

## 2023-07-19 RX ADMIN — HEPARIN SODIUM 5000 UNIT(S): 5000 INJECTION INTRAVENOUS; SUBCUTANEOUS at 12:55

## 2023-07-19 RX ADMIN — CLOPIDOGREL BISULFATE 75 MILLIGRAM(S): 75 TABLET, FILM COATED ORAL at 12:55

## 2023-07-19 RX ADMIN — Medication 3 MILLILITER(S): at 08:43

## 2023-07-19 RX ADMIN — ISOSORBIDE DINITRATE 30 MILLIGRAM(S): 5 TABLET ORAL at 05:28

## 2023-07-19 RX ADMIN — Medication 325 MILLIGRAM(S): at 12:55

## 2023-07-19 RX ADMIN — HEPARIN SODIUM 5000 UNIT(S): 5000 INJECTION INTRAVENOUS; SUBCUTANEOUS at 05:26

## 2023-07-19 RX ADMIN — FAMOTIDINE 20 MILLIGRAM(S): 10 INJECTION INTRAVENOUS at 12:55

## 2023-07-19 RX ADMIN — BUMETANIDE 4 MILLIGRAM(S): 0.25 INJECTION INTRAMUSCULAR; INTRAVENOUS at 05:27

## 2023-07-19 RX ADMIN — Medication 3 MILLILITER(S): at 03:23

## 2023-07-19 RX ADMIN — CARVEDILOL PHOSPHATE 6.25 MILLIGRAM(S): 80 CAPSULE, EXTENDED RELEASE ORAL at 05:26

## 2023-07-19 RX ADMIN — Medication 3 MILLILITER(S): at 15:50

## 2023-07-19 RX ADMIN — Medication 81 MILLIGRAM(S): at 12:55

## 2023-07-19 RX ADMIN — ISOSORBIDE DINITRATE 30 MILLIGRAM(S): 5 TABLET ORAL at 12:58

## 2023-07-19 RX ADMIN — Medication 50 MICROGRAM(S): at 05:27

## 2023-07-19 RX ADMIN — Medication 50 MILLIGRAM(S): at 05:26

## 2023-07-19 NOTE — DIETITIAN INITIAL EVALUATION ADULT - ADD RECOMMEND
1. Monitor weights, labs, PO intake, and skin integrity  2. Document PO intake in nursing flowsheets

## 2023-07-19 NOTE — CHART NOTE - NSCHARTNOTEFT_GEN_A_CORE
GOC discussion     Spoke w/ patient regarding his heart failure diagnosis and prognosis. Patient expressed understanding. He wishes to continue aggressive medical management at this time. Also discussed code status with patient and he wishes to remain full code.

## 2023-07-19 NOTE — DISCHARGE NOTE PROVIDER - NSDCMRMEDTOKEN_GEN_ALL_CORE_FT
albuterol 90 mcg/inh inhalation aerosol: 2 puff(s) inhaled every 6 hours, As needed, Shortness of Breath and/or Wheezing  allopurinol 100 mg oral tablet: 1 tab(s) orally once a day  aspirin 81 mg oral delayed release tablet: 1 tab(s) orally once a day  budesonide-formoterol 160 mcg-4.5 mcg/inh inhalation aerosol: 1  inhaled 2 times a day  Bumex 1 mg oral tablet: 1 orally once a day  clopidogrel 75 mg oral tablet: 1 tab(s) orally once a day  Coreg 6.25 mg oral tablet: 1 orally 2 times a day  ezetimibe 10 mg oral tablet: 1 tab(s) orally once a day  famotidine 40 mg oral tablet: 1 tab(s) orally once a day  ferrous sulfate 325 mg (65 mg elemental iron) oral tablet: 1 tab(s) orally once a day  gabapentin 800 mg oral tablet: 1 orally once a day  glimepiride 2 mg oral tablet: 1 tab(s) orally once a day  hydrALAZINE 50 mg oral tablet: 1 tab(s) orally 3 times a day (dispensed on 10/24/22).   isosorbide dinitrate 30 mg oral tablet: 1 tab(s) orally 3 times a day  levothyroxine 50 mcg (0.05 mg) oral tablet: 1 tab(s) orally once a day  ROSUVASTATIN CALCIUM 20 MG TAB: TAKE 1 TABLET ONCE A DAY (AT BEDTIME) ORALLY  senna leaf extract oral tablet: 2 tab(s) orally once a day (at bedtime)  spironolactone 25 mg oral tablet: 0.5 tab(s) orally once a day (Filled on 11/9/22)  tamsulosin 0.4 mg oral capsule: 1 cap(s) orally once a day (at bedtime)   albuterol 90 mcg/inh inhalation aerosol: 2 puff(s) inhaled every 6 hours, As needed, Shortness of Breath and/or Wheezing  allopurinol 100 mg oral tablet: 1 tab(s) orally once a day  aspirin 81 mg oral delayed release tablet: 1 tab(s) orally once a day  budesonide-formoterol 160 mcg-4.5 mcg/inh inhalation aerosol: 1  inhaled 2 times a day  bumetanide 2 mg oral tablet: 2 tab(s) orally once a day  carvedilol 12.5 mg oral tablet: 1 tab(s) orally every 12 hours  clopidogrel 75 mg oral tablet: 1 tab(s) orally once a day  ezetimibe 10 mg oral tablet: 1 tab(s) orally once a day  famotidine 40 mg oral tablet: 1 tab(s) orally once a day  ferrous sulfate 325 mg (65 mg elemental iron) oral tablet: 1 tab(s) orally once a day  gabapentin 600 mg oral tablet: 1 tab(s) orally  glimepiride 2 mg oral tablet: 1 tab(s) orally once a day  hydrALAZINE 50 mg oral tablet: 1 tab(s) orally 3 times a day  isosorbide dinitrate 30 mg oral tablet: 1 tab(s) orally 3 times a day  levothyroxine 50 mcg (0.05 mg) oral tablet: 1 tab(s) orally once a day  ROSUVASTATIN CALCIUM 20 MG TAB: TAKE 1 TABLET ONCE A DAY (AT BEDTIME) ORALLY  senna leaf extract oral tablet: 2 tab(s) orally once a day (at bedtime)  tamsulosin 0.4 mg oral capsule: 1 cap(s) orally once a day (at bedtime)

## 2023-07-19 NOTE — DISCHARGE NOTE PROVIDER - NSDCFUADDAPPT_GEN_ALL_CORE_FT
Follow up with the heart failure team (Dr. Dobson) on 8/3/23 at 1:20PM   Follow up with electrophysiology 8/2/23 at 12:00PM

## 2023-07-19 NOTE — PHARMACOTHERAPY INTERVENTION NOTE - COMMENTS
Discharge medications reviewed with the patient. Current medication schedule was discussed in detail including: medication name, indication, dose, administration times, treatment duration, side effects, and special instructions. Patient reports adherence to home medications and denies side effects. Also discussed CHF management. Patient counseled on heart failure medication indications, administration, and side effects. Also discussed fluid and salt restrictions, and maintaining a log of daily weights. Discussed warning signs of fluid overload (SOB, orthopnea, KINNEY, edema, etc.) and when to seek medical attention. Patient verbalized understanding. Questions and concerns were answered and addressed. Patient provided with CHF booklet.    Jing Pichardo, GuillerminaD, BCPS  Clinical Pharmacy Specialist  [id number]

## 2023-07-19 NOTE — DIETITIAN INITIAL EVALUATION ADULT - PERTINENT MEDS FT
MEDICATIONS  (STANDING):  albuterol/ipratropium for Nebulization 3 milliLiter(s) Nebulizer every 6 hours  allopurinol 100 milliGRAM(s) Oral daily  aspirin enteric coated 81 milliGRAM(s) Oral daily  atorvastatin 80 milliGRAM(s) Oral at bedtime  budesonide 160 MICROgram(s)/formoterol 4.5 MICROgram(s) Inhaler 2 Puff(s) Inhalation two times a day  buMETAnide 4 milliGRAM(s) Oral daily  carvedilol 12.5 milliGRAM(s) Oral every 12 hours  clopidogrel Tablet 75 milliGRAM(s) Oral daily  dextrose 5%. 1000 milliLiter(s) (100 mL/Hr) IV Continuous <Continuous>  dextrose 5%. 1000 milliLiter(s) (50 mL/Hr) IV Continuous <Continuous>  dextrose 50% Injectable 25 Gram(s) IV Push once  dextrose 50% Injectable 25 Gram(s) IV Push once  dextrose 50% Injectable 12.5 Gram(s) IV Push once  famotidine    Tablet 20 milliGRAM(s) Oral daily  ferrous    sulfate 325 milliGRAM(s) Oral daily  glucagon  Injectable 1 milliGRAM(s) IntraMuscular once  heparin   Injectable 5000 Unit(s) SubCutaneous every 8 hours  hydrALAZINE 50 milliGRAM(s) Oral three times a day  insulin lispro (ADMELOG) corrective regimen sliding scale   SubCutaneous Before meals and at bedtime  isosorbide   dinitrate Tablet (ISORDIL) 30 milliGRAM(s) Oral three times a day  levothyroxine 50 MICROGram(s) Oral daily  senna 2 Tablet(s) Oral at bedtime  tamsulosin 0.4 milliGRAM(s) Oral at bedtime    MEDICATIONS  (PRN):  acetaminophen     Tablet .. 650 milliGRAM(s) Oral every 6 hours PRN Mild Pain (1 - 3)  dextrose Oral Gel 15 Gram(s) Oral once PRN Blood Glucose LESS THAN 70 milliGRAM(s)/deciliter

## 2023-07-19 NOTE — PHYSICAL THERAPY INITIAL EVALUATION ADULT - PERTINENT HX OF CURRENT PROBLEM, REHAB EVAL
67 year old male with PMH stated below, presented to the ED due to feeling his ICD discharge x 3, in addition to SOB x a few days and palpitations a/f acute on chronic CHF

## 2023-07-19 NOTE — DISCHARGE NOTE PROVIDER - HOSPITAL COURSE
67M with PMH of CAD, HFrEF (EF = 14% - 10/17/2022), s/p Medtronic CRT-D, CABG, HTN, HLD, T2DM s/p ICD, Asthma, COPD, Home oxygen (2L), CKD, CVA w/ Left sided deficits, and Gait difficulty (uses walker), presented to the ED due to feeling his ICD discharge x 3, in addition to sob x a few days and palpitations, a/f acute on chronic CHF.     ·  Problem: Acute on chronic systolic heart failure.   ·  Plan: p/w sob, palpitations, chest tightness for past few days. Reports feeling his ICD fired 3x at home.  -CXR w/ congestion, BNP ~5K (previously 7K)  -TTE w/ severe global left ventricular systolic dysfunction (LVEF 25%)  -s/p IV Bumex 2mg in ER, then transitioned to Bumex 4mg PO daily   -increase Coreg to 12.5mg BID  -Continue Hydralazine 50mg TID, Isosorbide dinitrate 30mg TID  -Hold Spironolactone   -HF input appreciated.    ·  Problem: CAD (coronary artery disease).   ·  Plan: Trop elevated but stable x 2  EKG nonischemic  - c/w ASA/Plavix   - c/w statin   - nuclear stress test w/ fixed defects, no significant ischemia     ·  Problem: Cardiac resynchronization therapy defibrillator (CRT-D) in place.   ·  Plan: device interrogated on 7/17  -Report: few episodes of transient NSVT recorded since 7/14; total 41 seconds in AT/AF from 2/9/2023 to 7/17/2023  No ICD shocks seen since Feb 9, 2023.    ·  Problem: Fall.   ·  Plan: Fall 2 weeks ago at night, pt reports chronic dizziness, now using walker.   -L hip pain, L hip xray negative for fracture or dislocation   -Avoid use of high doses of Gabapentin at home   -PT: home PT     ·  Problem: Stage 4 chronic kidney disease.   ·  Plan: Cr at baseline  -Monitor BMP  -Avoid nephrotoxic agents  -Renally dose meds -- Has been filling gabapentin 800mg qD at pharmacy, dose renally adjusted     ·  Problem: HTN (hypertension).   ·  Plan: BP stable  -c/w meds as above  -monitor BP.    ·  Problem: Type 2 diabetes mellitus.   ·  Plan: Hold home meds  -Hgb A1c 6.0   -c/w low ISS   -monitor fingersticks   -Consistent carb, renal diet.    ·  Problem: Chronic obstructive pulmonary disease (COPD).   ·  Plan: Chronic hypoxic respiratory failure d/t COPD, stable on 2L home O2   -c/w Symbicort, nebs PRN  -c/w supplemental O2.

## 2023-07-19 NOTE — PROGRESS NOTE ADULT - PROBLEM SELECTOR PLAN 1
c/w bumex 4 mg PO daily  increase coreg to 12.5 mg twice/day  c/w hydral/isordil  continue holding spironolactone given advanced CKD

## 2023-07-19 NOTE — PHYSICAL THERAPY INITIAL EVALUATION ADULT - ADDITIONAL COMMENTS
Pt lives in a private house with wife and daughter, +3 steps to enter, resides on main level, uses a rolling walker at baseline for all functional mobility; uses oxygen at home (2L/min), and requires assistance from wife and family for ADL performance.     Pt left semi-supine in bed, all lines intact, all needs in reach, bed alarm set, in NAD. THOMAS Turner aware. Heart rate 112 beats per minute.

## 2023-07-19 NOTE — DISCHARGE NOTE PROVIDER - CARE PROVIDER_API CALL
Garo Dobson  [facility]  [address] [address] [place] [address], [address] [place] [id number]  Phone: [phone]  Fax: [phone]  Follow Up Time:

## 2023-07-19 NOTE — DIETITIAN INITIAL EVALUATION ADULT - NS FNS DIET ORDER
Diet, DASH/TLC:   Sodium & Cholesterol Restricted  1500mL Fluid Restriction ([id number])  Caffeine Free (NOCAFF)  For patients receiving Renal Replacement - No Protein Restr, No Conc K, No Conc Phos, Low Sodium (RENAL)  [place]al (07-17-23 @ 20:32)

## 2023-07-19 NOTE — DISCHARGE NOTE NURSING/CASE MANAGEMENT/SOCIAL WORK - NSDCPEFALRISK_GEN_ALL_CORE
For information on Fall & Injury Prevention, visit: [email or web address] OR  [email or web address] OR  https://www.cdc.gov/steadi/patient.html

## 2023-07-19 NOTE — PHYSICAL THERAPY INITIAL EVALUATION ADULT - GENERAL OBSERVATIONS, REHAB EVAL
Pt received semi-supine in bed, +telemetry, +nasal cannula doffed, all lines intact, in NAD. Pt agreeable to participate in PT evaluation.

## 2023-07-19 NOTE — PHYSICAL THERAPY INITIAL EVALUATION ADULT - GAIT DEVIATIONS NOTED, PT EVAL
decreased susana/increased time in double stance/decreased step length/decreased stride length/decreased weight-shifting ability

## 2023-07-19 NOTE — DISCHARGE NOTE PROVIDER - POSTFACE STATEMENT FOR MINUTES SPENT
Problem: At Risk for Falls  Goal: # Patient does not fall  Outcome: Outcome Met, Continue evaluating goal progress toward completion  Pt remains free from falls. Oriented to call light for all needs. Will continue to monitor closely.       minutes on the discharge service.

## 2023-07-19 NOTE — DIETITIAN INITIAL EVALUATION ADULT - PERTINENT LABORATORY DATA
07-19    130<L>  |  103  |  58<H>  ----------------------------<  131<H>  3.8   |  18<L>  |  2.92<H>    Ca    9.0      19 Jul 2023 06:49  Phos  3.1     07-19  Mg     2.20     07-19    POCT Blood Glucose.: 141 mg/dL (07-19-23 @ 12:35)  A1C with Estimated Average Glucose Result: 6.0 % (07-18-23 @ 05:51)  A1C with Estimated Average Glucose Result: 6.2 % (12-07-22 @ 06:23)  A1C with Estimated Average Glucose Result: 5.7 % (10-16-22 @ 06:17)

## 2023-07-19 NOTE — DIETITIAN INITIAL EVALUATION ADULT - PERSON TAUGHT/METHOD
Education given on Sodium and Fluid restrictions./verbal instruction/written material/patient instructed

## 2023-07-19 NOTE — DISCHARGE NOTE PROVIDER - NSDCFUSCHEDAPPT_GEN_ALL_CORE_FT
[facility] [phone]  Scheduled Appointment: 08/02/2023    Garo Dobson  [facility]  HEARTFAIL [address]  Scheduled Appointment: 08/03/2023    [facility] [phone]  Scheduled Appointment: 08/09/2023

## 2023-07-19 NOTE — DISCHARGE NOTE PROVIDER - ATTENDING DISCHARGE PHYSICAL EXAMINATION:
CONSTITUTIONAL: NAD, well-developed  RESPIRATORY: Normal respiratory effort; lungs are clear to auscultation bilaterally  CARDIOVASCULAR: Regular rate and rhythm, normal S1 and S2, no murmur/rub/gallop; No lower extremity edema; Peripheral pulses are 2+ bilaterally  ABDOMEN: Nontender to palpation, normoactive bowel sounds, no rebound/guarding; No hepatosplenomegaly  MUSCLOSKELETAL: no clubbing or cyanosis of digits; no joint swelling or tenderness to palpation  PSYCH: A+O to person, place, and time; affect appropriate

## 2023-07-19 NOTE — DISCHARGE NOTE NURSING/CASE MANAGEMENT/SOCIAL WORK - PATIENT PORTAL LINK FT
You can access the FollowMyHealth Patient Portal offered by [facility] by registering at the following website: [email or web address]. By joining [facility]’s FollowMyHealth portal, you will also be able to view your health information using other applications (apps) compatible with our system.

## 2023-07-19 NOTE — DISCHARGE NOTE PROVIDER - NSDCCPCAREPLAN_GEN_ALL_CORE_FT
PRINCIPAL DISCHARGE DIAGNOSIS  Diagnosis: Acute exacerbation of CHF (congestive heart failure)  Assessment and Plan of Treatment: Continue Bumex 4mg oral daily. Increased Coreg to 12.5mg twice a day. Continue Hydralazine and Isosorbide dinitrate as prescribed. Hold Spironolactone. Follow up with cardiology.      SECONDARY DISCHARGE DIAGNOSES  Diagnosis: Stage 4 chronic kidney disease  Assessment and Plan of Treatment: Hold Spironolactone. Monitor renal function periodically. Follow up with PCP.    Diagnosis: CAD (coronary artery disease)  Assessment and Plan of Treatment: Continue Aspirin, Plavix, and Rosuvastatin. Nuclear stress test here showed fixed defects and no significant ischemia.    Diagnosis: Chronic obstructive pulmonary disease (COPD)  Assessment and Plan of Treatment: Continue Symbicort twice a day and albuterol as needed,    Diagnosis: Type 2 diabetes mellitus  Assessment and Plan of Treatment: Continue Glimepiride and monitor sugars at home. Follow up with PCP.

## 2023-07-19 NOTE — PHYSICAL THERAPY INITIAL EVALUATION ADULT - NSPTDMEREC_GEN_A_CORE
No DME needs at this time; pt owns rolling walker Suturegard Intro: Intraoperative tissue expansion was performed, utilizing the SUTUREGARD device, in order to reduce wound tension.

## 2023-07-19 NOTE — PROVIDER CONTACT NOTE (OTHER) - ASSESSMENT
no acute changes noted patient currently resting in bed comfortably
6 beat vtach on tele   vital signs as documented   pt asymptomatic resting in bed

## 2023-07-19 NOTE — PROGRESS NOTE ADULT - PROBLEM SELECTOR PLAN 2
stress test c/w infarct   c/w ASA/plavix/statin
Trop elevated but stable x 2  EKG nonischemic  - c/w ASA/Plavix   - c/w statin   - plan for nuclear stress test as per cardiology

## 2023-07-19 NOTE — PROGRESS NOTE ADULT - ASSESSMENT
67M with PMH of CAD, HFrEF (EF = 14% - 10/17/2022), s/p Medtronic CRT-D, CABG, HTN, HLD, T2DM s/p ICD, Asthma, COPD, Home oxygen (2L), CKD, CVA w/ Left sided deficits, and Gait difficulty (uses walker), presented to the ED due to feeling his ICD discharge x 3, in addition to sob x a few days and palpitations a/f acute on chronic CHF. 
HPI: 67M with PMH of CAD, HFrEF (EF = 14% - 10/17/2022), s/p Medtronic CRT-D, CABG, HTN, HLD, T2DM s/p ICD, Asthma, COPD, Home oxygen (2L), CKD, CVA w/ Left sided deficits, and Gait difficulty (uses walker), presented to the ED due to feeling his ICD discharge x 3 for which EP was consulted.     Pt has been noticing increased sob for past few days associated w palpitations and earlier today felt he got shocked at 8am, 6pm x2 and subsequently presented to ED.  Patient endorses persistent chest pressure started yesterday.     Assessment and Plan:  Full ICD interrogation revealed normal device functions with 95 biv pacing noted few transient NSVT lasted 1 second duration since 7/14-- no ICD shocks seen Pt currently with reproducible chest pain   -continue diuresis per primary   - resume BB and titrate up as tolerated, no indication for antiarrhythmic drug at this time  - replete electrolytes to keep K>4, Mg>2  -persistent chest pressure possibly non-cardiac, musculoskeletal etiology, follow up cardiology   -Will discuss with EP attending      
69 y/o [place] male with PMHX of HTN, HFrEF, CAD w/ CABG 2014 at [facility], CRT-D, COPD, CVA with left sided weakness comes in with complaints of chest pain and SOB and sensation of possible ICD shock. ICD interrogation did not reveal ICD fire. Labs show elevated BUN/Cr 48/3.01, trop 73, 72, proBNP 5510. He was found to be in acute on chronic systolic heart failure and started on IV diuretics with improvement. Underwent pharmacologic stress for chest pain which was consistent with infarct inferolateral and apical walls w/ mild kadie-infarct ischemia and infarct in anterior and anterolateral walls. Overall NYHA class III - now appears euvolemic; plan to medically manage CAD as no benefit of revascularization. Stable for d/c with close f/u.

## 2023-07-19 NOTE — DIETITIAN INITIAL EVALUATION ADULT - OTHER INFO
Pt seen at bedside. A&Ox4. NKFA. Pt stated consumes 75% or less of meals. No chewing or swallowing issues. Denies nausea or vomiting. No constipation or diarrhea. Last BM was today. UBW is 167 Lbs. CBW is 164 Lbs.

## 2023-07-19 NOTE — PROGRESS NOTE ADULT - SUBJECTIVE AND OBJECTIVE BOX
Interval History:  reports feeling better  denies complaints  stress test with infarct w/ mild kadie-infarct ischemia     Medications:  acetaminophen     Tablet .. 650 milliGRAM(s) Oral every 6 hours PRN  albuterol/ipratropium for Nebulization 3 milliLiter(s) Nebulizer every 6 hours  allopurinol 100 milliGRAM(s) Oral daily  aspirin enteric coated 81 milliGRAM(s) Oral daily  atorvastatin 80 milliGRAM(s) Oral at bedtime  budesonide 160 MICROgram(s)/formoterol 4.5 MICROgram(s) Inhaler 2 Puff(s) Inhalation two times a day  buMETAnide 4 milliGRAM(s) Oral daily  carvedilol 12.5 milliGRAM(s) Oral every 12 hours  clopidogrel Tablet 75 milliGRAM(s) Oral daily  dextrose 5%. 1000 milliLiter(s) IV Continuous <Continuous>  dextrose 5%. 1000 milliLiter(s) IV Continuous <Continuous>  dextrose 50% Injectable 25 Gram(s) IV Push once  dextrose 50% Injectable 12.5 Gram(s) IV Push once  dextrose 50% Injectable 25 Gram(s) IV Push once  dextrose Oral Gel 15 Gram(s) Oral once PRN  famotidine    Tablet 20 milliGRAM(s) Oral daily  ferrous    sulfate 325 milliGRAM(s) Oral daily  glucagon  Injectable 1 milliGRAM(s) IntraMuscular once  heparin   Injectable 5000 Unit(s) SubCutaneous every 8 hours  hydrALAZINE 50 milliGRAM(s) Oral three times a day  insulin lispro (ADMELOG) corrective regimen sliding scale   SubCutaneous Before meals and at bedtime  isosorbide   dinitrate Tablet (ISORDIL) 30 milliGRAM(s) Oral three times a day  levothyroxine 50 MICROGram(s) Oral daily  senna 2 Tablet(s) Oral at bedtime  tamsulosin 0.4 milliGRAM(s) Oral at bedtime      Vitals:  T(C): 36.8 (23 @ 12:23), Max: 36.8 (23 @ 22:00)  HR: 87 (23 @ 12:50) (80 - 88)  BP: 108/72 (23 @ 12:50) (101/63 - 116/67)  BP(mean): --  RR: 19 (23 @ 12:23) (16 - 19)  SpO2: 99% (23 @ 12:23) (96% - 100%)    Daily     Daily Weight in k.6 (2023 06:50)    Weight (kg): 74.5 ( @ 17:55)    I&O's Summary    2023 07:01  -  2023 07:00  --------------------------------------------------------  IN: 120 mL / OUT: 950 mL / NET: -830 mL    2023 07:01  -  2023 19:59  --------------------------------------------------------  IN: 400 mL / OUT: 900 mL / NET: -500 mL        Physical Exam:  Appearance: No Acute Distress; on oxygen  HEENT: PERRL  Neck: JVD approx 6-8   Cardiovascular: Normal S1 S2, No murmurs/rubs/gallops  Respiratory: Clear to auscultation bilaterally  Gastrointestinal: Soft, Non-tender	  Skin: No cyanosis	  Neurologic: Non-focal  Extremities: No LE edema  Psychiatry: A & O x 3, Mood & affect appropriate    Labs:                        14.3   7.67  )-----------( 112      ( 2023 06:49 )             42.2         130<L>  |  103  |  58<H>  ----------------------------<  131<H>  3.8   |  18<L>  |  2.92<H>    Ca    9.0      2023 06:49  Phos  3.1       Mg     2.20

## 2023-07-21 ENCOUNTER — TRANSCRIPTION ENCOUNTER (OUTPATIENT)
Age: 68
End: 2023-07-21

## 2023-07-25 ENCOUNTER — APPOINTMENT (OUTPATIENT)
Dept: CARE COORDINATION | Facility: HOME HEALTH | Age: 68
End: 2023-07-25
Payer: MEDICARE

## 2023-07-25 VITALS
TEMPERATURE: 97.5 F | DIASTOLIC BLOOD PRESSURE: 60 MMHG | RESPIRATION RATE: 18 BRPM | HEART RATE: 69 BPM | OXYGEN SATURATION: 99 % | SYSTOLIC BLOOD PRESSURE: 120 MMHG

## 2023-07-25 DIAGNOSIS — E11.9 TYPE 2 DIABETES MELLITUS W/OUT COMPLICATIONS: ICD-10-CM

## 2023-07-25 DIAGNOSIS — Z95.810 PRESENCE OF AUTOMATIC (IMPLANTABLE) CARDIAC DEFIBRILLATOR: ICD-10-CM

## 2023-07-25 DIAGNOSIS — Z99.81 DEPENDENCE ON SUPPLEMENTAL OXYGEN: ICD-10-CM

## 2023-07-25 PROCEDURE — 99349 HOME/RES VST EST MOD MDM 40: CPT

## 2023-07-25 NOTE — PHYSICAL EXAM
[No Acute Distress] : no acute distress [Normal Sclera/Conjunctiva] : normal sclera/conjunctiva [Normal Outer Ear/Nose] : the outer ears and nose were normal in appearance [No JVD] : no jugular venous distention [No Respiratory Distress] : no respiratory distress  [Clear to Auscultation] : lungs were clear to auscultation bilaterally [No Accessory Muscle Use] : no accessory muscle use [No Edema] : there was no peripheral edema [Soft] : abdomen soft [Non Tender] : non-tender [No CVA Tenderness] : no CVA  tenderness [No Joint Swelling] : no joint swelling [No Rash] : no rash [No Skin Lesions] : no skin lesions [Coordination Grossly Intact] : coordination grossly intact [Speech Grossly Normal] : speech grossly normal [Memory Grossly Normal] : memory grossly normal [Normal Affect] : the affect was normal [Normal Mood] : the mood was normal [Normal Insight/Judgement] : insight and judgment were intact

## 2023-07-25 NOTE — COUNSELING
[Fall prevention counseling provided] : Fall prevention counseling provided [Use recommended devices] : Use recommended devices [None] : None

## 2023-07-25 NOTE — HEALTH RISK ASSESSMENT
[Diabetic Diet] : diabetic [Low Fat Diet] : low fat [Low Salt Diet] : low salt [General Adherence] : general adherence

## 2023-07-26 PROBLEM — Z99.81 ON HOME OXYGEN THERAPY: Status: ACTIVE | Noted: 2023-07-25

## 2023-07-26 NOTE — HISTORY OF PRESENT ILLNESS
[Post-hospitalization from ___ Hospital] : Post-hospitalization from [unfilled] Hospital [Admitted on: ___] : The patient was admitted on [unfilled] [Discharged on ___] : discharged on [unfilled] [Discharge Summary] : discharge summary [Discharge Med List] : discharge medication list [Other: ____] : [unfilled] [Med Reconciliation] : medication reconciliation has been completed [Patient Contacted By: ____] : and contacted by [unfilled] [FreeTextEntry2] : 67M with PMH of CAD, HFrEF (EF = 14% - 10/17/2022), s/p Medtronic CRT-D, CABG, HTN, HLD, T2DM s/p ICD, Asthma, COPD, Home oxygen (2L), CKD, CVA w/ Left sided deficits, and Gait difficulty (uses walker), presented to the ED due to feeling his ICD discharge x 3, in addition to sob x a few days and palpitations, a/f acute on chronic CHF.\par \par Home visit made with pt, he is alert and oriented x 4 able to answer all questions with wife providing minor additional information. Pt denies CP, SOB, nausea, vomiting. No firing of ICD since new one was placed. No edema. Pt is very knowledgeable about his medications and treatment plan and reports compliance.  Reports appetite is intact, adhering to fluid restrictions.\par Pt reports diabetic neuropathy with both feet, takes Gabapentin, uses walker when he ambulates. NW   PT Ines present for session with pt when NP arrived. \par Pt has 2 Oxygen tanks in his bedroom in addition to the O2 concentrator. He does not think that he needs them. Explained to him that one is for him to use when going to medical appointments and the other is for emergency use in case power goes out in his neighborhood. Verbalized understanding after wife explained to him. \par Reminded pt to f/u with all medical appointments, 8/2 - Dr. Dobson - Heart failure; 8/3 - EP, 8/9 - EP \par \par \par

## 2023-07-26 NOTE — REVIEW OF SYSTEMS
[Unsteady Walk] : ataxia [Negative] : Heme/Lymph [Fever] : no fever [Chills] : no chills [Fatigue] : no fatigue [Vision Problems] : no vision problems [Hearing Loss] : no hearing loss [Chest Pain] : no chest pain [Palpitations] : no palpitations [Lower Ext Edema] : no lower extremity edema [Shortness Of Breath] : no shortness of breath [Wheezing] : no wheezing [Cough] : no cough [Abdominal Pain] : no abdominal pain [Constipation] : no constipation [Incontinence] : no incontinence [Muscle Pain] : no muscle pain [Muscle Weakness] : no muscle weakness [Back Pain] : no back pain [Headache] : no headache [Memory Loss] : no memory loss [Insomnia] : no insomnia [Anxiety] : no anxiety [Depression] : no depression [FreeTextEntry9] : feet -  [de-identified] : use walker

## 2023-07-26 NOTE — CURRENT MEDS
[Takes medication as prescribed] : takes [None] : Patient does not have any barriers to medication adherence [Yes] : Reviewed medication list for presence of high-risk medications. [Blood Thinners] : blood thinners [FreeTextEntry1] : Aspirin and Clopidogrel

## 2023-07-26 NOTE — PLAN
[FreeTextEntry1] : 1.Cont BG monitoring\par 2. Cont all medications as prescribed\par 3. f/u with PCP for lab monitoring\par 4. maintain low fat, low salt, consistent carb diet\par 5. maintain fluid restrictions\par 6. keep all upcoming medical appointments  - 8/2, 8/3 and 8/9.

## 2023-08-01 ENCOUNTER — NON-APPOINTMENT (OUTPATIENT)
Age: 68
End: 2023-08-01

## 2023-08-01 ENCOUNTER — APPOINTMENT (OUTPATIENT)
Dept: ELECTROPHYSIOLOGY | Facility: CLINIC | Age: 68
End: 2023-08-01
Payer: MEDICARE

## 2023-08-01 PROCEDURE — 93284 PRGRMG EVAL IMPLANTABLE DFB: CPT

## 2023-08-03 ENCOUNTER — APPOINTMENT (OUTPATIENT)
Dept: HEART FAILURE | Facility: CLINIC | Age: 68
End: 2023-08-03
Payer: MEDICARE

## 2023-08-03 VITALS
HEART RATE: 90 BPM | DIASTOLIC BLOOD PRESSURE: 72 MMHG | OXYGEN SATURATION: 91 % | SYSTOLIC BLOOD PRESSURE: 122 MMHG | HEIGHT: 70 IN

## 2023-08-03 VITALS — WEIGHT: 172 LBS | BODY MASS INDEX: 24.68 KG/M2

## 2023-08-03 PROCEDURE — 93000 ELECTROCARDIOGRAM COMPLETE: CPT

## 2023-08-03 PROCEDURE — 36415 COLL VENOUS BLD VENIPUNCTURE: CPT

## 2023-08-03 PROCEDURE — 99214 OFFICE O/P EST MOD 30 MIN: CPT

## 2023-08-03 RX ORDER — FAMOTIDINE 40 MG/1
40 TABLET, FILM COATED ORAL
Qty: 90 | Refills: 3 | Status: ACTIVE | COMMUNITY
Start: 2023-08-03 | End: 1900-01-01

## 2023-08-03 RX ORDER — SPIRONOLACTONE 25 MG/1
25 TABLET ORAL
Qty: 90 | Refills: 3 | Status: DISCONTINUED | COMMUNITY
Start: 2021-10-27 | End: 2023-08-03

## 2023-08-04 LAB
ALBUMIN SERPL ELPH-MCNC: 4.4 G/DL
ALP BLD-CCNC: 128 U/L
ALT SERPL-CCNC: 17 U/L
ANION GAP SERPL CALC-SCNC: 17 MMOL/L
AST SERPL-CCNC: 22 U/L
BILIRUB SERPL-MCNC: 0.7 MG/DL
BUN SERPL-MCNC: 38 MG/DL
CALCIUM SERPL-MCNC: 9.2 MG/DL
CHLORIDE SERPL-SCNC: 97 MMOL/L
CO2 SERPL-SCNC: 22 MMOL/L
CREAT SERPL-MCNC: 2.76 MG/DL
EGFR: 24 ML/MIN/1.73M2
ESTIMATED AVERAGE GLUCOSE: 120 MG/DL
HBA1C MFR BLD HPLC: 5.8 %
NT-PROBNP SERPL-MCNC: 5338 PG/ML
POTASSIUM SERPL-SCNC: 4.1 MMOL/L
PROT SERPL-MCNC: 6.7 G/DL
SODIUM SERPL-SCNC: 136 MMOL/L

## 2023-08-04 NOTE — PHYSICAL EXAM
[Well Nourished] : well nourished [No Acute Distress] : no acute distress [Normal Conjunctiva] : normal conjunctiva [Normal S1, S2] : normal S1, S2 [Soft] : abdomen soft [Non Tender] : non-tender [No Edema] : no edema [No Rash] : no rash [Normal] : alert and oriented, normal memory [de-identified] : JVP 8 cm  [de-identified] : Left chest CRT-D  [de-identified] : bilateral fine crackles- uses home O2 2 liters [de-identified] : slow gait, uses a walkser, mild LUE/LLE weakness

## 2023-08-04 NOTE — ASSESSMENT
[FreeTextEntry1] : 67 year old [place] male with PMHX of HTN, HFrEF (EF = 14% - 10/17/2022->25%),0% ,CAD w/ CABG 2014 at [facility], CRT-D, COPD, CVA with left sided weakness ,( 1993), HLD, CKD (baseline SCr 2.3-2.9),  COPD (home O2 2L NC), former smoker, recurrent PNA ,s/p Medtronic ICD with decline to 14% S/P CRT-D upgrade 12/9/22. ( 2015) PCP Dr. Schmidt ( [phone]). Pt is here for a post follow up after admission 7/17-7/19/23.. 5/26/22 RHC: RA-9 PA 54/27 PCWP 24 CO/CI 3.97/2.22 SVR 1510  Overall NYHA class III - now appears euvolemic; plan to medically manage CAD as no benefit of revascularization.  ACC/AHA Stage C, NYHA Class III symptoms Appears compensated and normotensive

## 2023-08-04 NOTE — HISTORY OF PRESENT ILLNESS
[FreeTextEntry1] : Joey Lewis is a 67 year old [place] male with PMHX of HTN, HFrEF (EF = 14% - 10/17/2022->25%),0% ,CAD w/ CABG 2014 at [facility], CRT-D, COPD, CVA with left sided weakness ,( 1993), HLD, CKD (baseline SCr 2.3-2.9),  COPD (home O2 2L NC), former smoker, recurrent PNA ,s/p Medtronic ICD with decline to 14% S/P CRT-D upgrade 12/9/22. ( 2015) PCP Dr. Schmidt ( [phone]). Pt is here for a post follow up after admission 7/17-7/19/23..  Course in hospital ; pt came to the ED with complaints of chest pain and SOB and sensation of possible ICD shock. ICD interrogation did not reveal ICD fire. Labs show elevated BUN/Cr 48/3.01, trop 73, 72, proBNP 5510. He was found to be in acute on chronic systolic heart failure and started on IV diuretics with improvement. Underwent pharmacologic stress for chest pain which was consistent with infarct inferolateral and apical walls w/ mild kadie-infarct ischemia and infarct in anterior and anterolateral walls.  Pt is here with his wife for a post hospital follow up. States d/c weight was 168 lbs and he is 172 lbs in office and 168 lbs at home.  He was d/c off vero 12.5 mg due to CKD. His bumex was increased to 4 mg daily from prior 1 mg and coreg to 12.5 mg bid. He is on  gabapentin 100 mg with some improvement in his bilateral legs pains.   States he can walk approx 2 blocks with his walker. Not able to climb stairs ( prior LUE/LLE weakness from CVA 1993). He sleeps with 2 pillows with no orthopnea and uses home oxygen nightly. Reports he is following a low salt diet and is drinking less than 2 liters of fluid per day.  Denies chest pain, palpitations, dizziness/LH, syncope and no ICD shocks. Of note, ICD  previously followed at [facility] by Dr. Rice but is now following with Dr. Thrasher and is S/P upgrade to BiV 12/9/22    Patient name: Joey Lewis YOB: 1955   Age: 66 (M)   MR#: [id number] Study Date: 5/22/2022 [facility] Study quality: Technically Difficult Referring Physician: Allen Nash MD Blood Pressure: 109/76 mmHg Height: 175 cm Weight: 78 kg BSA: 1.9 m2 ------------------------------------------------------------------------ PROCEDURE: Transthoracic echocardiogram with 2-D, M-Mode and complete spectral and color flow Doppler. Intravenous ultrasound enhancing agent was administered for improved left ventricular endocardial border definition.  Following the intravenous injection of ultrasound enhancing agent, harmonic imaging was performed. INDICATION: Heart failure, unspecified (I50.9) ------------------------------------------------------------------------ DIMENSIONS: Dimensions:     Normal Values: LA:     5.0 cm    2.0 - 4.0 cm Ao:     2.5 cm    2.0 - 3.8 cm SEPTUM: 1.0 cm    0.6 - 1.2 cm PWT:    1.0 cm    0.6 - 1.1 cm LVIDd:  5.5 cm    3.0 - 5.6 cm LVIDs:  5.1 cm    1.8 - 4.0 cm Derived Variables: LVMI: 110 g/m2 RWT: 0.36 Fractional short: 7 % Ejection Fraction (Visual Estimate): 15-20 % ------------------------------------------------------------------------ OBSERVATIONS: Mitral Valve: Tethered mitral valve leaflets with normal opening. Mild mitral regurgitation.  Aortic Root: Normal aortic root. Aortic Valve: Normal trileaflet aortic valve. Left Atrium: Moderately dilated left atrium.  LA volume index = 42 cc/m2. Left Ventricle: Severe global left ventricular systolic dysfunction. Left ventricular enlargement. (DT:130 ms). Right Heart: Moderate right atrial enlargement. The right ventricle is not well visualized appears enlarged. A device wire is noted in the right heart. Normal tricuspid valve. Mild tricuspid regurgitation. Normal pulmonic valve. Mild pulmonic regurgitation. Pericardium/PleuraNormal pericardium with no pericardial effusion. Right pleural effusion. Hemodynamic: Estimated right ventricular systolic pressure equals 36 mm Hg, assuming right atrial pressure equals 10 mm Hg, consistent with borderline pulmonary hypertension. ------------------------------------------------------------------------ CONCLUSIONS: 1. Moderately dilated left atrium.  LA volume index = 42 cc/m2. 2. Left ventricular enlargement. 3. Severe global left ventricular systolic dysfunction. 4. Moderate right atrial enlargement. 5. The right ventricle is not well visualized appears enlarged. A device wire is noted in the right heart. ------------------------------------------------------------------------

## 2023-08-04 NOTE — CARDIOLOGY SUMMARY
[de-identified] : 8/3/23 Atrial sensed with biventricular paced rhythm 3/22/23 Atrial sensed with biventricular paced rhythm rate 90 bpm  6/9/22 NSR 87, IVCD, LAFB,, TWI 1, AVL, NSST with PVC [de-identified] :  7/2023 pharmacologic stress for chest pain which was consistent with infarct inferolateral and apical walls w/ mild kadie-infarct ischemia and infarct in anterior and anterolateral walls. [de-identified] : 7/2023 TTE w/ severe global left ventricular systolic dysfunction (LVEF 25%)  10/17/22 TTE LVEF 14% 5/22/22 TTE: LVEF 15-20%, LVIDD 5.5 cm, mild MR, severe LV systolic dysfunction, mod LAE, mod CHRISTINA, mild TR [de-identified] : 5/26/22  RHC: RA-9 PA 54/27 PCWP 24 CO/CI 3.97/2.22 SVR 1510\par   [de-identified] : 5/21/22 CXR showed hazy right lower lung opacity which may reflect layering effusion,\par  atelectasis, or pneumonia

## 2023-08-04 NOTE — ADDENDUM
[FreeTextEntry1] : Labs reviewed with no change in BUN/creat 38/2.76 K 4.1 but with increase in serum pro BNP 5338 from 3223. Will take additional bumex 2 mg x 3 days and keep a log of weight and B/P . Pt will follow up in the office.

## 2023-08-04 NOTE — DISCUSSION/SUMMARY
[Patient] : the patient [___ Week(s)] : in [unfilled] week(s) [EKG obtained to assist in diagnosis and management of assessed problem(s)] : EKG obtained to assist in diagnosis and management of assessed problem(s) [FreeTextEntry2] : daughter by phone [FreeTextEntry3] : Dr. Dobson and 4 weeks with EP [FreeTextEntry1] :  1: S/P  Acute on chronic systolic heart failure. -  Plan: c/w bumex 4 mg PO daily - continue coreg to 12.5 mg twice/day - continue hydral 50 mg q 8  - continue isordil 30 mg q 8 - continue holding spironolactone given advanced CKD. - daily weight and blood pressure log - recheck labs today   2. CAD (coronary artery disease). -  Plan: stress test c/w infarct - c/w ASA/plavix/statin.  2.CKD - recheck labs, last 7/19/23 with K 3.8/creat 2.92  3. CAD S/P CABG - no active chest pain - continue statin, coreg, ASA and plavix   Follow up in office in  8 weeks, will call with labs

## 2023-09-07 ENCOUNTER — NON-APPOINTMENT (OUTPATIENT)
Age: 68
End: 2023-09-07

## 2023-09-07 ENCOUNTER — APPOINTMENT (OUTPATIENT)
Dept: HEART FAILURE | Facility: CLINIC | Age: 68
End: 2023-09-07
Payer: MEDICARE

## 2023-09-07 VITALS
OXYGEN SATURATION: 91 % | HEART RATE: 83 BPM | DIASTOLIC BLOOD PRESSURE: 70 MMHG | WEIGHT: 171 LBS | SYSTOLIC BLOOD PRESSURE: 119 MMHG | HEIGHT: 61 IN | BODY MASS INDEX: 32.28 KG/M2

## 2023-09-07 PROCEDURE — 99214 OFFICE O/P EST MOD 30 MIN: CPT

## 2023-09-07 PROCEDURE — 93000 ELECTROCARDIOGRAM COMPLETE: CPT

## 2023-09-07 NOTE — ASSESSMENT
[FreeTextEntry1] : 68 year old [place] male with PMHX of HTN, HFrEF (EF = 14% - 10/17/2022->25%),0% ,CAD w/ CABG 2014 at [facility], CRT-D, COPD, CVA with left sided weakness ,( 1993), HLD, CKD (baseline SCr 2.3-2.9),  COPD (home O2 2L NC), former smoker, recurrent PNA ,s/p Medtronic ICD with decline to 14% S/P CRT-D upgrade 12/9/22. ( 2015) PCP Dr. Schmidt ( [phone]). Pt is here for a post follow up after admission 7/17-7/19/23.. 5/26/22 RHC: RA-9 PA 54/27 PCWP 24 CO/CI 3.97/2.22 SVR 1510  Overall NYHA class III - now appears euvolemic; plan to medically manage CAD as no benefit of revascularization.  ACC/AHA Stage C, NYHA Class III symptoms Appears compensated and normotensive.

## 2023-09-07 NOTE — HISTORY OF PRESENT ILLNESS
[FreeTextEntry1] : Joey Lewis is a 68 year old [place] male with PMHX of HTN, HFrEF (EF = 14% - 10/17/2022->25%),0% ,CAD w/ CABG 2014 at [facility], CRT-D, COPD, CVA with left sided weakness ,( 1993), HLD, CKD (baseline SCr 2.3-2.9),  COPD (home O2 2L NC), former smoker, recurrent PNA ,s/p Medtronic ICD with decline to 14% S/P CRT-D upgrade 12/9/22. ( 2015) PCP Dr. Schmidt ( [phone]). Pt is here for a post follow up after admission 7/17-7/19/23..  Course in hospital ; pt came to the ED with complaints of chest pain and SOB and sensation of possible ICD shock. ICD interrogation did not reveal ICD fire. Labs show elevated BUN/Cr 48/3.01, trop 73, 72, proBNP 5510. He was found to be in acute on chronic systolic heart failure and started on IV diuretics with improvement. Underwent pharmacologic stress for chest pain which was consistent with infarct inferolateral and apical walls w/ mild kadie-infarct ischemia and infarct in anterior and anterolateral walls.  Pt is here with his wife for a follow up. States d/c weight was 168 lbs and he is 171 lbs in office and 168 lbs at home.  He was d/c off vero 12.5 mg due to CKD. His bumex was increased to 4 mg daily from prior 1 mg and coreg to 12.5 mg bid. He is on  gabapentin 100 mg with some improvement in his bilateral legs pains. Last visit, he was mildly volume overloaded and was treated with additional bumex. He takes extra 1-2 times per week.  States he can walk approx 3 blocks from priro 2 blocks with his walker.  Not able to climb stairs ( prior LUE/LLE weakness from CVA 1993). He sleeps with 2 pillows with no orthopnea and uses home oxygen nightly. Reports he is following a low salt diet and is drinking less than 2 liters of fluid per day.  Denies chest pain, palpitations, dizziness/LH, syncope and no ICD shocks. Of note, ICD  previously followed at [facility] by Dr. Rice but is now following with Dr. Thrasher and is S/P upgrade to BiV 12/9/22   Patient name: Joey Lewis YOB: 1955   Age: 68 (M)   MR#: [id number] Study Date: 7/18/2023 [facility] Sandy Koo MOHINDER Study quality: Technically Difficult Referring Physician: Bernardo Buckner MD Blood Pressure: 106/52 mmHg Height: 178 cm Weight: 77 kg BSA: 2 m2 ------------------------------------------------------------------------ PROCEDURE: Transthoracic echocardiogram with 2-D, M-Mode and complete spectral and color flow Doppler. Intravenous ultrasound enhancing agent was administered for improved left ventricular endocardial border definition.  Following the intravenous injection of ultrasound enhancing agent, harmonic imaging was performed. INDICATION: Heart failure, unspecified (I50.9) ------------------------------------------------------------------------ DIMENSIONS: Dimensions:     Normal Values: LA:     4.9 cm    2.0 - 4.0 cm Ao:     3.6 cm    2.0 - 3.8 cm SEPTUM: 1.1 cm    0.6 - 1.2 cm PWT:    1.1 cm    0.6 - 1.1 cm LVIDd:  5.6 cm    3.0 - 5.6 cm LVIDs:  4.9 cm    1.8 - 4.0 cm Derived Variables: LVMI: 128 g/m2 RWT: 0.39 Fractional short: 13 % Ejection Fraction (Modified Velazquez Rule): 25 % ------------------------------------------------------------------------ OBSERVATIONS: Mitral Valve: Mitral annular calcification, otherwise normal mitral valve. Mild mitral regurgitation.  Aortic Root: Normal aortic root. Aortic Valve: Calcified trileaflet aortic valve with normal opening. No aortic valve regurgitation seen. Left Atrium: The left atrium appears enlarged however, the LA volume is underestimated. Left Ventricle: Severe global left ventricular systolic dysfunction. LVEF calculated using biplane Velazquez's method is 25%. Endocardial visualization enhanced with intravenous injection of echo contrast (Definity). No left ventricular thrombus. Normal left ventricular internal dimensions and increased wall thicknesses. Normal left ventricular diastolic function. Right Heart: Normal right atrium. A device wire is noted in the right heart. Normal tricuspid valve. No tricuspid regurgitation. Normal pulmonic valve. Pericardium/PleuraNo pericardial effusion seen. Left pleural effusion. ------------------------------------------------------------------------ CONCLUSIONS: 1. Mitral annular calcification, otherwise normal mitral valve. Mild mitral regurgitation.  2. Normal left atrium.  LA volume index = 30 cc/m2. 3. Normal left ventricular internal dimensions and increased wall thicknesses. 4. Severe global left ventricular systolic dysfunction. LVEF calculated using biplane Velazquez's method is 25%. Endocardial visualization enhanced with intravenous injection of echo contrast (Definity). No left ventricular thrombus. 5. Normal left ventricular diastolic function. 6. Normal right atrium. 7. A device wire is noted in the right heart. 8. Normal tricuspid valve. No tricuspid regurgitation. 9. No pericardial effusion seen. *** Compared with echocardiogram of 12/7/2022, no significant changes noted. ------------------------------------------------------------------------ Confirmed on  7/18/2023 - 10:38:35 by Deysi Anderson M.D. ------------------------------------------- Patient name: Joey Lewis YOB: 1955   Age: 66 (M)   MR#: [id number] Study Date: 5/22/2022 [facility] Sandy Koo MOHINDER Study quality: Technically Difficult Referring Physician: Allen Nash MD Blood Pressure: 109/76 mmHg Height: 175 cm Weight: 78 kg BSA: 1.9 m2 ------------------------------------------------------------------------ PROCEDURE: Transthoracic echocardiogram with 2-D, M-Mode and complete spectral and color flow Doppler. Intravenous ultrasound enhancing agent was administered for improved left ventricular endocardial border definition.  Following the intravenous injection of ultrasound enhancing agent, harmonic imaging was performed. INDICATION: Heart failure, unspecified (I50.9) ------------------------------------------------------------------------ DIMENSIONS: Dimensions:     Normal Values: LA:     5.0 cm    2.0 - 4.0 cm Ao:     2.5 cm    2.0 - 3.8 cm SEPTUM: 1.0 cm    0.6 - 1.2 cm PWT:    1.0 cm    0.6 - 1.1 cm LVIDd:  5.5 cm    3.0 - 5.6 cm LVIDs:  5.1 cm    1.8 - 4.0 cm Derived Variables: LVMI: 110 g/m2 RWT: 0.36 Fractional short: 7 % Ejection Fraction (Visual Estimate): 15-20 % ------------------------------------------------------------------------ OBSERVATIONS: Mitral Valve: Tethered mitral valve leaflets with normal opening. Mild mitral regurgitation.  Aortic Root: Normal aortic root. Aortic Valve: Normal trileaflet aortic valve. Left Atrium: Moderately dilated left atrium.  LA volume index = 42 cc/m2. Left Ventricle: Severe global left ventricular systolic dysfunction. Left ventricular enlargement. (DT:130 ms). Right Heart: Moderate right atrial enlargement. The right ventricle is not well visualized appears enlarged. A device wire is noted in the right heart. Normal tricuspid valve. Mild tricuspid regurgitation. Normal pulmonic valve. Mild pulmonic regurgitation. Pericardium/PleuraNormal pericardium with no pericardial effusion. Right pleural effusion. Hemodynamic: Estimated right ventricular systolic pressure equals 36 mm Hg, assuming right atrial pressure equals 10 mm Hg, consistent with borderline pulmonary hypertension. ------------------------------------------------------------------------ CONCLUSIONS: 1. Moderately dilated left atrium.  LA volume index = 42 cc/m2. 2. Left ventricular enlargement. 3. Severe global left ventricular systolic dysfunction. 4. Moderate right atrial enlargement. 5. The right ventricle is not well visualized appears enlarged. A device wire is noted in the right heart. ------------------------------------------------------------------------

## 2023-09-07 NOTE — DISCUSSION/SUMMARY
[Patient] : the patient [___ Week(s)] : in [unfilled] week(s) [FreeTextEntry2] : daughter by phone [FreeTextEntry3] : Dr. Dobson and 4 weeks with EP [FreeTextEntry1] :  1: Chronic systolic heart failure. -  Plan: c/w bumex 4 mg PO daily - continue coreg to 12.5 mg twice/day - increase hydral  to 75 mg q 8 from 50 mg q 8  - continue isordil 30 mg q 8 - continue holding spironolactone given advanced CKD. - daily weight and blood pressure log   2. CAD (coronary artery disease). -  Plan: stress test c/w infarct - c/w ASA/plavix/statin.  2.CKD - recheck labs, last 7/19/23 with K 3.8/creat 2.92 with 8/3/23 K 4.1, BUN/creat 38/2.76 and serum pro BNP 5338 from 3223.  3. CAD S/P CABG - no active chest pain - continue statin, coreg, ASA and plavix   Follow up in office in 3 months [EKG obtained to assist in diagnosis and management of assessed problem(s)] : EKG obtained to assist in diagnosis and management of assessed problem(s)

## 2023-09-07 NOTE — PHYSICAL EXAM
[Well Nourished] : well nourished [No Acute Distress] : no acute distress [Normal Conjunctiva] : normal conjunctiva [Normal S1, S2] : normal S1, S2 [Soft] : abdomen soft [Non Tender] : non-tender [No Edema] : no edema [No Rash] : no rash [Normal] : alert and oriented, normal memory [de-identified] : JVP 8 cm  [de-identified] : Left chest CRT-D  [de-identified] : bilateral fine crackles- uses home O2 2 liters [de-identified] : slow gait, uses a walkser, mild LUE/LLE weakness

## 2023-09-07 NOTE — CARDIOLOGY SUMMARY
[de-identified] : 9/7/23  Atrial sensed with biventricular paced rhythm, rate 84 bpm 8/3/23 Atrial sensed with biventricular paced rhythm 3/22/23 Atrial sensed with biventricular paced rhythm rate 90 bpm  6/9/22 NSR 87, IVCD, LAFB,, TWI 1, AVL, NSST with PVC [de-identified] :  7/2023 pharmacologic stress for chest pain which was consistent with infarct inferolateral and apical walls w/ mild kadie-infarct ischemia and infarct in anterior and anterolateral walls. [de-identified] : 7/2023 TTE w/ severe global left ventricular systolic dysfunction (LVEF 25%)  10/17/22 TTE LVEF 14% 5/22/22 TTE: LVEF 15-20%, LVIDD 5.5 cm, mild MR, severe LV systolic dysfunction, mod LAE, mod CHRISTINA, mild TR [de-identified] : 5/26/22  RHC: RA-9 PA 54/27 PCWP 24 CO/CI 3.97/2.22 SVR 1510\par   [de-identified] : 5/21/22 CXR showed hazy right lower lung opacity which may reflect layering effusion,\par  atelectasis, or pneumonia

## 2023-09-22 NOTE — PHYSICAL THERAPY INITIAL EVALUATION ADULT - ASSISTIVE DEVICE FOR TRANSFER: GAIT, REHAB EVAL
rolling walker Sarecycline Pregnancy And Lactation Text: This medication is Pregnancy Category D and not consider safe during pregnancy. It is also excreted in breast milk.

## 2023-11-07 ENCOUNTER — NON-APPOINTMENT (OUTPATIENT)
Age: 68
End: 2023-11-07

## 2023-11-07 ENCOUNTER — APPOINTMENT (OUTPATIENT)
Dept: ELECTROPHYSIOLOGY | Facility: CLINIC | Age: 68
End: 2023-11-07
Payer: MEDICARE

## 2023-11-07 PROCEDURE — 93295 DEV INTERROG REMOTE 1/2/MLT: CPT

## 2023-11-07 PROCEDURE — 93296 REM INTERROG EVL PM/IDS: CPT

## 2023-11-09 ENCOUNTER — LABORATORY RESULT (OUTPATIENT)
Age: 68
End: 2023-11-09

## 2023-11-09 ENCOUNTER — NON-APPOINTMENT (OUTPATIENT)
Age: 68
End: 2023-11-09

## 2023-11-09 ENCOUNTER — APPOINTMENT (OUTPATIENT)
Dept: HEART FAILURE | Facility: CLINIC | Age: 68
End: 2023-11-09
Payer: MEDICARE

## 2023-11-09 VITALS
OXYGEN SATURATION: 91 % | HEIGHT: 61 IN | SYSTOLIC BLOOD PRESSURE: 139 MMHG | HEART RATE: 110 BPM | BODY MASS INDEX: 32.47 KG/M2 | DIASTOLIC BLOOD PRESSURE: 80 MMHG | WEIGHT: 172 LBS

## 2023-11-09 PROCEDURE — 36415 COLL VENOUS BLD VENIPUNCTURE: CPT

## 2023-11-09 PROCEDURE — 99215 OFFICE O/P EST HI 40 MIN: CPT

## 2023-11-09 PROCEDURE — 93000 ELECTROCARDIOGRAM COMPLETE: CPT

## 2023-11-09 RX ORDER — CLOPIDOGREL 75 MG/1
75 TABLET, FILM COATED ORAL DAILY
Refills: 0 | Status: DISCONTINUED | COMMUNITY
Start: 2021-10-27 | End: 2023-11-09

## 2023-11-09 RX ORDER — GABAPENTIN 100 MG/1
100 CAPSULE ORAL
Qty: 90 | Refills: 2 | Status: ACTIVE | COMMUNITY
Start: 2022-06-09 | End: 1900-01-01

## 2023-11-16 ENCOUNTER — APPOINTMENT (OUTPATIENT)
Dept: ELECTROPHYSIOLOGY | Facility: CLINIC | Age: 68
End: 2023-11-16

## 2023-12-14 ENCOUNTER — APPOINTMENT (OUTPATIENT)
Dept: CARDIOLOGY | Facility: CLINIC | Age: 68
End: 2023-12-14

## 2024-01-11 NOTE — PHYSICAL THERAPY INITIAL EVALUATION ADULT - MANUAL MUSCLE TESTING RESULTS, REHAB EVAL
Home Oxygen Qualifying Test     Patient name: Mirna Medina        : 1952   Date of Test:  2024  Diagnosis:    Home Oxygen Test:    **Medicare Guidelines require item(s) 1-5 on all ambulatory patients or 1 and 2 on non-ambulatory patients.    1. Baseline SPO2 on Room Air at rest 97 %   If <= 88% on Room Air add O2 via NC to obtain SpO2 >=88%. If LPM needed, document LPM N/A needed to reach =>88%    SPO2 during exertion on Room Air 95  %  During exertion monitor SPO2. If SPO2 increases >=89%, do not add supplemental oxygen    SPO2 on Oxygen at Rest N/A% at N/A LPM    SPO2 during exertion on Oxygen N/A % at N/A LPM    Test performed during exertion activity.      []  Supplemental Home Oxygen is indicated.    [x]  Client does not qualify for home oxygen.    Respiratory Additional Notes- Pt SpO2 never below 94% during evaluation. No home Oxygen required.       Mandy Castaneda, RT      grossly 3+/5

## 2024-02-06 ENCOUNTER — NON-APPOINTMENT (OUTPATIENT)
Age: 69
End: 2024-02-06

## 2024-02-06 ENCOUNTER — APPOINTMENT (OUTPATIENT)
Dept: ELECTROPHYSIOLOGY | Facility: CLINIC | Age: 69
End: 2024-02-06

## 2024-02-19 NOTE — ED ADULT TRIAGE NOTE - HEART RATE (BEATS/MIN)
Becky presents for postop visit from Total Laparoscopic Hysterectomy about 8 weeks ago.  Doing well; no concerns. Reports she has not had intercourse. Denies any bleeding.  Fever: NO Voiding well: YES.  Bowel movements OK: YES.      Exam: A&OX3, NAD.  Abdomen:  Non tender Incisions clean, dry, intact.  Spec exam cuff intact, bimanual confirms.    A/P.  Stable Post op condition.  Gradually increase activity. Resumption of sexual activity is allowed at this time.  Follow up prn or for scheduled health screenings.    Becky was seen today for post-op check.    Diagnoses and all orders for this visit:    Postop check         No follow-ups on file.       
102

## 2024-02-22 ENCOUNTER — NON-APPOINTMENT (OUTPATIENT)
Age: 69
End: 2024-02-22

## 2024-02-22 ENCOUNTER — APPOINTMENT (OUTPATIENT)
Dept: HEART FAILURE | Facility: CLINIC | Age: 69
End: 2024-02-22
Payer: MEDICARE

## 2024-02-22 VITALS
SYSTOLIC BLOOD PRESSURE: 112 MMHG | DIASTOLIC BLOOD PRESSURE: 62 MMHG | HEIGHT: 61 IN | TEMPERATURE: 97 F | HEART RATE: 80 BPM | BODY MASS INDEX: 31.72 KG/M2 | WEIGHT: 168 LBS | OXYGEN SATURATION: 91 %

## 2024-02-22 DIAGNOSIS — I25.10 ATHEROSCLEROTIC HEART DISEASE OF NATIVE CORONARY ARTERY W/OUT ANGINA PECTORIS: ICD-10-CM

## 2024-02-22 DIAGNOSIS — I50.20 UNSPECIFIED SYSTOLIC (CONGESTIVE) HEART FAILURE: ICD-10-CM

## 2024-02-22 DIAGNOSIS — J44.9 CHRONIC OBSTRUCTIVE PULMONARY DISEASE, UNSPECIFIED: ICD-10-CM

## 2024-02-22 DIAGNOSIS — N18.9 CHRONIC KIDNEY DISEASE, UNSPECIFIED: ICD-10-CM

## 2024-02-22 DIAGNOSIS — I10 ESSENTIAL (PRIMARY) HYPERTENSION: ICD-10-CM

## 2024-02-22 PROCEDURE — 93000 ELECTROCARDIOGRAM COMPLETE: CPT

## 2024-02-22 PROCEDURE — 99214 OFFICE O/P EST MOD 30 MIN: CPT

## 2024-02-22 RX ORDER — ROSUVASTATIN CALCIUM 5 MG/1
5 TABLET, FILM COATED ORAL DAILY
Qty: 30 | Refills: 1 | Status: DISCONTINUED | COMMUNITY
End: 2024-02-22

## 2024-02-22 RX ORDER — FAMOTIDINE 40 MG/1
40 TABLET, FILM COATED ORAL
Qty: 90 | Refills: 3 | Status: DISCONTINUED | COMMUNITY
Start: 2022-06-09 | End: 2024-02-22

## 2024-02-22 NOTE — DISCUSSION/SUMMARY
[Patient] : the patient [___ Month(s)] : in [unfilled] month(s) [FreeTextEntry1] : 1: Chronic systolic heart failure. -  c/w bumex 4 mg PO in am and 2 mg in pm but will obtain recent labs from PCP, last creat in office 11/9/23 47/3.04 with K 3.7 -coreg had been decreased back to 6.25 mg bid from 12.5 mg twice/day - continue hydral 75 mg q 8  - continue isordil 30 mg q 8 - continue holding spironolactone given advanced CKD. - daily weight and blood pressure log  2. CAD (coronary artery disease). - Plan: stress test c/w infarct - c/w ASA/plavix/statin.  2.CKD - prior labs 7/19/23 with K 3.8/creat 2.92 with 8/3/23 K 4.1, BUN/creat 38/2.76 and serum pro BNP 5338 from 3223 with 11/9/23 labs with creat 3.02 and K 3.7  3. CAD S/P CABG - no active chest pain - continue statin, coreg, ASA and plavix  Follow up in office in 3 months.     Encourage BP checks at home. Won't increase hydralazine because of chronic dizziness. Continue current dose of Bumex. Blood tests. Refer to Dr. Hicks for PVD check (pt c/o L calf pain). Will order DANTE/PVR. [EKG obtained to assist in diagnosis and management of assessed problem(s)] : EKG obtained to assist in diagnosis and management of assessed problem(s)

## 2024-02-22 NOTE — ASSESSMENT
[FreeTextEntry1] : 68 year old [place] male with PMHX of HTN, HFrEF (EF = 14% - 10/17/2022->25%),0% ,CAD w/ CABG 2014 at [facility], CRT-D, COPD, CVA with left sided weakness ,( 1993), HLD, CKD (baseline SCr 2.3-2.9), COPD (home O2 2L NC), former smoker, recurrent PNA ,s/p Medtronic ICD with decline to 14% S/P CRT-D upgrade 12/9/22. ( 2015) PCP Dr. Schmidt ( [phone]). Pt is here for a post follow up after admission 7/17-7/19/23.. 5/26/22 RHC: RA-9 PA 54/27 PCWP 24 CO/CI 3.97/2.22 SVR 1510  Overall NYHA class III ; plan to medically manage CAD as no benefit of revascularization. ACC/AHA Stage C, NYHA Class III symptoms Appears compensated and normotensive.

## 2024-02-22 NOTE — CARDIOLOGY SUMMARY
[de-identified] :  7/2023 pharmacologic stress for chest pain which was consistent with infarct inferolateral and apical walls w/ mild kadie-infarct ischemia and infarct in anterior and anterolateral walls. [de-identified] : 2/22/24 Atrial sensed with biventricular paced rhythm, rate 95 bpm with PVC 9/7/23  Atrial sensed with biventricular paced rhythm, rate 84 bpm 8/3/23 Atrial sensed with biventricular paced rhythm 3/22/23 Atrial sensed with biventricular paced rhythm rate 90 bpm  6/9/22 NSR 87, IVCD, LAFB,, TWI 1, AVL, NSST with PVC [de-identified] : 7/2023 TTE w/ severe global left ventricular systolic dysfunction (LVEF 25%)  10/17/22 TTE LVEF 14% 5/22/22 TTE: LVEF 15-20%, LVIDD 5.5 cm, mild MR, severe LV systolic dysfunction, mod LAE, mod CHRISTINA, mild TR [de-identified] : 5/26/22  RHC: RA-9 PA 54/27 PCWP 24 CO/CI 3.97/2.22 SVR 1510\par   [de-identified] : 5/21/22 CXR showed hazy right lower lung opacity which may reflect layering effusion,\par  atelectasis, or pneumonia

## 2024-02-22 NOTE — HISTORY OF PRESENT ILLNESS
[FreeTextEntry1] : Joey Lewis is a 68-year-old Yi male with PMHX of HTN, HFrEF (EF = 14% - 10/17/2022->25%), CAD w/ [facility]G [facility], CRT-D, CVA with left sided weakness (1993), HLD, CKD (baseline SCr 2.3-2.9), COPD (home O2 2L NC), former smoker, recurrent PNA, s/p [employer] ICD with decline to 14%, now S/P CRT-D upgrade 12/9/22. PCP Dr. Schmidt ([phone]).  Received a message from remote team on 2/6/24; routine remote showed BiVP at 80% (84.1% effective) OptiVol fluid index with elevation in [place] and increased bumex to 4 mg in am and 2 mg in pm from 4 mg daily. States he had labs with his PCP this month and will request. Last labs 11/9/23 with K 3.7, BUN/creat 47/3.04.   Pt missed appt with Dr. Hicks on 12/14/23 for calf pain and did not schedule DANTE. C/o calf pain at rest and with exertion.  Currently, pt states he is taking COreg 6/25 mg bid from 12.5 mg bid due to prior dizziness. He states he can walk with his rolling walker 1 block. He sleeps with 2 pillows with no orthopnea/PND.  Adhering to low salt diet and is drinking less than 2 liters of fluid per day.    No CP or SOB at rest, palpitations, syncope and no CRT-D shocks.   Patient name: Joey Lewis YOB: 1955   Age: 68 (M)   MR#: [id number] Study Date: 7/18/2023 [facility] Sandy Koo RDCS Study quality: Technically Difficult Referring Physician: Bernardo Buckner MD Blood Pressure: 106/52 mmHg Height: 178 cm Weight: 77 kg BSA: 2 m2 ------------------------------------------------------------------------ PROCEDURE: Transthoracic echocardiogram with 2-D, M-Mode and complete spectral and color flow Doppler. Intravenous ultrasound enhancing agent was administered for improved left ventricular endocardial border definition.  Following the intravenous injection of ultrasound enhancing agent, harmonic imaging was performed. INDICATION: Heart failure, unspecified (I50.9) ------------------------------------------------------------------------ DIMENSIONS: Dimensions:     Normal Values: LA:     4.9 cm    2.0 - 4.0 cm Ao:     3.6 cm    2.0 - 3.8 cm SEPTUM: 1.1 cm    0.6 - 1.2 cm PWT:    1.1 cm    0.6 - 1.1 cm LVIDd:  5.6 cm    3.0 - 5.6 cm LVIDs:  4.9 cm    1.8 - 4.0 cm Derived Variables: LVMI: 128 g/m2 RWT: 0.39 Fractional short: 13 % Ejection Fraction (Modified Velazquez Rule): 25 % ------------------------------------------------------------------------ OBSERVATIONS: Mitral Valve: Mitral annular calcification, otherwise normal mitral valve. Mild mitral regurgitation.  Aortic Root: Normal aortic root. Aortic Valve: Calcified trileaflet aortic valve with normal opening. No aortic valve regurgitation seen. Left Atrium: The left atrium appears enlarged however, the LA volume is underestimated. Left Ventricle: Severe global left ventricular systolic dysfunction. LVEF calculated using biplane Velazquez's method is 25%. Endocardial visualization enhanced with intravenous injection of echo contrast (Definity). No left ventricular thrombus. Normal left ventricular internal dimensions and increased wall thicknesses. Normal left ventricular diastolic function. Right Heart: Normal right atrium. A device wire is noted in the right heart. Normal tricuspid valve. No tricuspid regurgitation. Normal pulmonic valve. Pericardium/PleuraNo pericardial effusion seen. Left pleural effusion. ------------------------------------------------------------------------ CONCLUSIONS: 1. Mitral annular calcification, otherwise normal mitral valve. Mild mitral regurgitation.  2. Normal left atrium.  LA volume index = 30 cc/m2. 3. Normal left ventricular internal dimensions and increased wall thicknesses. 4. Severe global left ventricular systolic dysfunction. LVEF calculated using biplane Velazquez's method is 25%. Endocardial visualization enhanced with intravenous injection of echo contrast (Definity). No left ventricular thrombus. 5. Normal left ventricular diastolic function. 6. Normal right atrium. 7. A device wire is noted in the right heart. 8. Normal tricuspid valve. No tricuspid regurgitation. 9. No pericardial effusion seen. *** Compared with echocardiogram of 12/7/2022, no significant changes noted. ------------------------------------------------------------------------ Confirmed on  7/18/2023 - 10:38:35 by Deysi Anderson M.D. ------------------------------------------- Patient name: Joey Lewis YOB: 1955   Age: 66 (M)   MR#: [id number] Study Date: 5/22/2022 [facility]oo RD[facility] Study quality: Technically Difficult Referring Physician: Allen Nash MD Blood Pressure: 109/76 mmHg Height: 175 cm Weight: 78 kg BSA: 1.9 m2 ------------------------------------------------------------------------ PROCEDURE: Transthoracic echocardiogram with 2-D, M-Mode and complete spectral and color flow Doppler. Intravenous ultrasound enhancing agent was administered for improved left ventricular endocardial border definition.  Following the intravenous injection of ultrasound enhancing agent, harmonic imaging was performed. INDICATION: Heart failure, unspecified (I50.9) ------------------------------------------------------------------------ DIMENSIONS: Dimensions:     Normal Values: LA:     5.0 cm    2.0 - 4.0 cm Ao:     2.5 cm    2.0 - 3.8 cm SEPTUM: 1.0 cm    0.6 - 1.2 cm PWT:    1.0 cm    0.6 - 1.1 cm LVIDd:  5.5 cm    3.0 - 5.6 cm LVIDs:  5.1 cm    1.8 - 4.0 cm Derived Variables: LVMI: 110 g/m2 RWT: 0.36 Fractional short: 7 % Ejection Fraction (Visual Estimate): 15-20 % ------------------------------------------------------------------------ OBSERVATIONS: Mitral Valve: Tethered mitral valve leaflets with normal opening. Mild mitral regurgitation.  Aortic Root: Normal aortic root. Aortic Valve: Normal trileaflet aortic valve. Left Atrium: Moderately dilated left atrium.  LA volume index = 42 cc/m2. Left Ventricle: Severe global left ventricular systolic dysfunction. Left ventricular enlargement. (DT:130 ms). Right Heart: Moderate right atrial enlargement. The right ventricle is not well visualized appears enlarged. A device wire is noted in the right heart. Normal tricuspid valve. Mild tricuspid regurgitation. Normal pulmonic valve. Mild pulmonic regurgitation. Pericardium/PleuraNormal pericardium with no pericardial effusion. Right pleural effusion. Hemodynamic: Estimated right ventricular systolic pressure equals 36 mm Hg, assuming right atrial pressure equals 10 mm Hg, consistent with borderline pulmonary hypertension. ------------------------------------------------------------------------ CONCLUSIONS: 1. Moderately dilated left atrium.  LA volume index = 42 cc/m2. 2. Left ventricular enlargement. 3. Severe global left ventricular systolic dysfunction. 4. Moderate right atrial enlargement. 5. The right ventricle is not well visualized appears enlarged. A device wire is noted in the right heart. ------------------------------------------------------------------------

## 2024-02-22 NOTE — PHYSICAL EXAM
[Lethargic] : lethargic [No Carotid Bruit] : no carotid bruit [Normal S1, S2] : normal S1, S2 [No Respiratory Distress] : no respiratory distress  [Soft] : abdomen soft [Non Tender] : non-tender [No Rash] : no rash [Normal] : alert and oriented, normal memory [de-identified] : appears in some distress [de-identified] : bilateral fine crackles [de-identified] : Left chest CRT-D; frequent ectopy [de-identified] : JVP 8 cm  [de-identified] : uses a walker [de-identified] : trace edema bilaterally

## 2024-03-07 ENCOUNTER — APPOINTMENT (OUTPATIENT)
Dept: CARDIOLOGY | Facility: CLINIC | Age: 69
End: 2024-03-07

## 2024-03-07 ENCOUNTER — APPOINTMENT (OUTPATIENT)
Dept: CARDIOLOGY | Facility: CLINIC | Age: 69
End: 2024-03-07
Payer: MEDICARE

## 2024-03-07 ENCOUNTER — NON-APPOINTMENT (OUTPATIENT)
Age: 69
End: 2024-03-07

## 2024-03-07 VITALS — HEART RATE: 87 BPM | OXYGEN SATURATION: 93 % | SYSTOLIC BLOOD PRESSURE: 121 MMHG | DIASTOLIC BLOOD PRESSURE: 69 MMHG

## 2024-03-07 VITALS — BODY MASS INDEX: 32.1 KG/M2 | WEIGHT: 170 LBS | HEIGHT: 61 IN

## 2024-03-07 DIAGNOSIS — Z13.6 ENCOUNTER FOR SCREENING FOR CARDIOVASCULAR DISORDERS: ICD-10-CM

## 2024-03-07 DIAGNOSIS — I73.9 PERIPHERAL VASCULAR DISEASE, UNSPECIFIED: ICD-10-CM

## 2024-03-07 PROCEDURE — 99204 OFFICE O/P NEW MOD 45 MIN: CPT

## 2024-03-07 PROCEDURE — G2211 COMPLEX E/M VISIT ADD ON: CPT

## 2024-03-07 PROCEDURE — 93000 ELECTROCARDIOGRAM COMPLETE: CPT

## 2024-03-08 RX ORDER — ASPIRIN ENTERIC COATED TABLETS 81 MG 81 MG/1
81 TABLET, DELAYED RELEASE ORAL DAILY
Qty: 90 | Refills: 3 | Status: DISCONTINUED | COMMUNITY
Start: 2021-10-27 | End: 2024-03-08

## 2024-03-08 RX ORDER — APIXABAN 2.5 MG/1
0 TABLET, FILM COATED ORAL
Refills: 5 | DISCHARGE
Start: 2024-03-08

## 2024-03-12 PROBLEM — Z13.6 ENCOUNTER FOR SCREENING FOR VASCULAR DISEASE: Status: ACTIVE | Noted: 2024-03-12

## 2024-03-16 ENCOUNTER — INPATIENT (INPATIENT)
Facility: HOSPITAL | Age: 69
LOS: 13 days | Discharge: HOME HEALTH | End: 2024-03-30
Attending: INTERNAL MEDICINE | Admitting: INTERNAL MEDICINE
Payer: MEDICARE

## 2024-03-16 VITALS
DIASTOLIC BLOOD PRESSURE: 71 MMHG | OXYGEN SATURATION: 95 % | SYSTOLIC BLOOD PRESSURE: 125 MMHG | RESPIRATION RATE: 28 BRPM | HEART RATE: 92 BPM | TEMPERATURE: 98 F

## 2024-03-16 DIAGNOSIS — I50.9 HEART FAILURE, UNSPECIFIED: ICD-10-CM

## 2024-03-16 DIAGNOSIS — Z90.49 ACQUIRED ABSENCE OF OTHER SPECIFIED PARTS OF DIGESTIVE TRACT: Chronic | ICD-10-CM

## 2024-03-16 DIAGNOSIS — Z95.1 PRESENCE OF AORTOCORONARY BYPASS GRAFT: Chronic | ICD-10-CM

## 2024-03-16 LAB
ALBUMIN SERPL ELPH-MCNC: 3.6 G/DL — SIGNIFICANT CHANGE UP (ref 3.3–5)
ALP SERPL-CCNC: 200 U/L — HIGH (ref 40–120)
ALT FLD-CCNC: 42 U/L — HIGH (ref 4–41)
ANION GAP SERPL CALC-SCNC: 18 MMOL/L — HIGH (ref 7–14)
APTT BLD: 39.4 SEC — HIGH (ref 24.5–35.6)
AST SERPL-CCNC: 45 U/L — HIGH (ref 4–40)
BASE EXCESS BLDV CALC-SCNC: -4.7 MMOL/L — LOW (ref -2–3)
BASOPHILS # BLD AUTO: 0.03 K/UL — SIGNIFICANT CHANGE UP (ref 0–0.2)
BASOPHILS NFR BLD AUTO: 0.3 % — SIGNIFICANT CHANGE UP (ref 0–2)
BILIRUB SERPL-MCNC: 0.7 MG/DL — SIGNIFICANT CHANGE UP (ref 0.2–1.2)
BLOOD GAS VENOUS COMPREHENSIVE RESULT: SIGNIFICANT CHANGE UP
BLOOD GAS VENOUS COMPREHENSIVE RESULT: SIGNIFICANT CHANGE UP
BUN SERPL-MCNC: 63 MG/DL — HIGH (ref 7–23)
CALCIUM SERPL-MCNC: 8.7 MG/DL — SIGNIFICANT CHANGE UP (ref 8.4–10.5)
CHLORIDE BLDV-SCNC: 96 MMOL/L — SIGNIFICANT CHANGE UP (ref 96–108)
CHLORIDE SERPL-SCNC: 98 MMOL/L — SIGNIFICANT CHANGE UP (ref 98–107)
CK SERPL-CCNC: 132 U/L — SIGNIFICANT CHANGE UP (ref 30–200)
CO2 BLDV-SCNC: 24 MMOL/L — SIGNIFICANT CHANGE UP (ref 22–26)
CO2 SERPL-SCNC: 18 MMOL/L — LOW (ref 22–31)
CREAT SERPL-MCNC: 2.94 MG/DL — HIGH (ref 0.5–1.3)
EGFR: 22 ML/MIN/1.73M2 — LOW
EOSINOPHIL # BLD AUTO: 0.16 K/UL — SIGNIFICANT CHANGE UP (ref 0–0.5)
EOSINOPHIL NFR BLD AUTO: 1.5 % — SIGNIFICANT CHANGE UP (ref 0–6)
FLUAV AG NPH QL: SIGNIFICANT CHANGE UP
FLUBV AG NPH QL: SIGNIFICANT CHANGE UP
GAS PNL BLDV: 128 MMOL/L — LOW (ref 136–145)
GAS PNL BLDV: SIGNIFICANT CHANGE UP
GLUCOSE BLDV-MCNC: 121 MG/DL — HIGH (ref 70–99)
GLUCOSE SERPL-MCNC: 120 MG/DL — HIGH (ref 70–99)
HCO3 BLDV-SCNC: 22 MMOL/L — SIGNIFICANT CHANGE UP (ref 22–29)
HCT VFR BLD CALC: 38.8 % — LOW (ref 39–50)
HCT VFR BLDA CALC: 38 % — LOW (ref 39–51)
HGB BLD CALC-MCNC: 12.7 G/DL — SIGNIFICANT CHANGE UP (ref 12.6–17.4)
HGB BLD-MCNC: 12.6 G/DL — LOW (ref 13–17)
IANC: 8.73 K/UL — HIGH (ref 1.8–7.4)
IMM GRANULOCYTES NFR BLD AUTO: 0.4 % — SIGNIFICANT CHANGE UP (ref 0–0.9)
INR BLD: 1.54 RATIO — HIGH (ref 0.85–1.18)
LACTATE BLDV-MCNC: 1.9 MMOL/L — SIGNIFICANT CHANGE UP (ref 0.5–2)
LYMPHOCYTES # BLD AUTO: 0.85 K/UL — LOW (ref 1–3.3)
LYMPHOCYTES # BLD AUTO: 8.2 % — LOW (ref 13–44)
MCHC RBC-ENTMCNC: 31 PG — SIGNIFICANT CHANGE UP (ref 27–34)
MCHC RBC-ENTMCNC: 32.5 GM/DL — SIGNIFICANT CHANGE UP (ref 32–36)
MCV RBC AUTO: 95.6 FL — SIGNIFICANT CHANGE UP (ref 80–100)
MONOCYTES # BLD AUTO: 0.58 K/UL — SIGNIFICANT CHANGE UP (ref 0–0.9)
MONOCYTES NFR BLD AUTO: 5.6 % — SIGNIFICANT CHANGE UP (ref 2–14)
NEUTROPHILS # BLD AUTO: 8.73 K/UL — HIGH (ref 1.8–7.4)
NEUTROPHILS NFR BLD AUTO: 84 % — HIGH (ref 43–77)
NRBC # BLD: 0 /100 WBCS — SIGNIFICANT CHANGE UP (ref 0–0)
NRBC # FLD: 0 K/UL — SIGNIFICANT CHANGE UP (ref 0–0)
NT-PROBNP SERPL-SCNC: HIGH PG/ML
PCO2 BLDV: 49 MMHG — SIGNIFICANT CHANGE UP (ref 42–55)
PH BLDV: 7.27 — LOW (ref 7.32–7.43)
PLATELET # BLD AUTO: 149 K/UL — LOW (ref 150–400)
PO2 BLDV: 27 MMHG — SIGNIFICANT CHANGE UP (ref 25–45)
POTASSIUM BLDV-SCNC: 4.2 MMOL/L — SIGNIFICANT CHANGE UP (ref 3.5–5.1)
POTASSIUM SERPL-MCNC: 4.3 MMOL/L — SIGNIFICANT CHANGE UP (ref 3.5–5.3)
POTASSIUM SERPL-SCNC: 4.3 MMOL/L — SIGNIFICANT CHANGE UP (ref 3.5–5.3)
PROT SERPL-MCNC: 6.9 G/DL — SIGNIFICANT CHANGE UP (ref 6–8.3)
PROTHROM AB SERPL-ACNC: 17 SEC — HIGH (ref 9.5–13)
RBC # BLD: 4.06 M/UL — LOW (ref 4.2–5.8)
RBC # FLD: 16.2 % — HIGH (ref 10.3–14.5)
RSV RNA NPH QL NAA+NON-PROBE: SIGNIFICANT CHANGE UP
SAO2 % BLDV: 30.8 % — LOW (ref 67–88)
SARS-COV-2 RNA SPEC QL NAA+PROBE: SIGNIFICANT CHANGE UP
SODIUM SERPL-SCNC: 134 MMOL/L — LOW (ref 135–145)
TROPONIN T, HIGH SENSITIVITY RESULT: 84 NG/L — CRITICAL HIGH
TROPONIN T, HIGH SENSITIVITY RESULT: 93 NG/L — CRITICAL HIGH
WBC # BLD: 10.39 K/UL — SIGNIFICANT CHANGE UP (ref 3.8–10.5)
WBC # FLD AUTO: 10.39 K/UL — SIGNIFICANT CHANGE UP (ref 3.8–10.5)

## 2024-03-16 PROCEDURE — 99223 1ST HOSP IP/OBS HIGH 75: CPT

## 2024-03-16 PROCEDURE — 71045 X-RAY EXAM CHEST 1 VIEW: CPT | Mod: 26

## 2024-03-16 PROCEDURE — 99285 EMERGENCY DEPT VISIT HI MDM: CPT

## 2024-03-16 RX ORDER — FUROSEMIDE 40 MG
40 TABLET ORAL ONCE
Refills: 0 | Status: DISCONTINUED | OUTPATIENT
Start: 2024-03-16 | End: 2024-03-16

## 2024-03-16 RX ORDER — FUROSEMIDE 40 MG
40 TABLET ORAL ONCE
Refills: 0 | Status: COMPLETED | OUTPATIENT
Start: 2024-03-16 | End: 2024-03-16

## 2024-03-16 RX ORDER — BUMETANIDE 0.25 MG/ML
2 INJECTION INTRAMUSCULAR; INTRAVENOUS ONCE
Refills: 0 | Status: DISCONTINUED | OUTPATIENT
Start: 2024-03-16 | End: 2024-03-16

## 2024-03-16 RX ORDER — BUMETANIDE 0.25 MG/ML
4 INJECTION INTRAMUSCULAR; INTRAVENOUS ONCE
Refills: 0 | Status: COMPLETED | OUTPATIENT
Start: 2024-03-16 | End: 2024-03-16

## 2024-03-16 RX ADMIN — Medication 40 MILLIGRAM(S): at 19:09

## 2024-03-16 NOTE — ED ADULT NURSE NOTE - OBJECTIVE STATEMENT
pt received to room 14. A&Ox4, amb at baseline with  rollator. pmh of HTN, HLD, CHF, COPD on 2L NC baseline, DM2, Afib pt received to room 14. A&Ox4, amb at baseline with  rollator. pmh of HTN, HLD, CHF, defbrillator to ATILIO chest wall area, COPD on 2L NC baseline, DM2, Afib on AC. pt c/o x5 days CP and SOB worsening today. states having increased swelling to bilateral lower extremities causing difficulty with ambulation. Arrives on 3L NC, usually uses 2L NC at baseline, pt appears tachypneic with RR> 25. MD brought to bedside. pt satting 95% on 4L NC. BIPAP called and pt placed on BIPAP. 2x 20G IVs placed to R arm, labs collected and sent, afib on monitor V paced. given IV lasix. safety maintained. awaiting orders. resp labored, abd soft, pedal pulses 2+ bilaterally, minimal swelling noted to lower extremities

## 2024-03-16 NOTE — ED ADULT NURSE REASSESSMENT NOTE - NS ED NURSE REASSESS COMMENT FT1
report given to THOMAS Atkins patient laying in semi fowlers position on the stretcher. patient denies shortness of breath, chest pain, nausea, vomiting, chill, fever. Patient normal sinus on the monitor. Respirations equal and adequate. Patients two IVs patent, no signs of infiltration. Safety measures in place, call bell within reach. Patient stable upon leaving the room. patient tolerating bipap well

## 2024-03-16 NOTE — H&P ADULT - NSHPPHYSICALEXAM_GEN_ALL_CORE
PHYSICAL EXAM:      Constitutional: NAD, well-groomed, well-developed  HEENT: EOMI, Normal Hearing  Neck: No LAD, No JVD  Back: Normal spine flexure, No CVA tenderness  Respiratory: difficult to auscultate while on bipap, but no over wheezing or crackles appreciated   Cardiovascular: S1 and S2, RRR, n  Gastrointestinal: BS+, soft, NT/ND  Extremities: mild peripheral edema  Vascular: 2+ peripheral pulses  Neurological: A/O x 3, left sided weakness   Psychiatric: Normal mood, normal affect

## 2024-03-16 NOTE — H&P ADULT - PROBLEM SELECTOR PLAN 5
as pt did not mention starting eliquis last week, will need to email pharmacy to ensure current and accurate med regimen reflected   confirm coreg strength; 12.5 in July vs 6,25 on recent outpt list as pt did not mention starting eliquis last week, will need to email pharmacy to ensure current and accurate med regimen reflected   confirm coreg strength; 12.5 in July vs 6.25 on recent outpt list

## 2024-03-16 NOTE — H&P ADULT - NSHPLABSRESULTS_GEN_ALL_CORE
12.6   10.39 )-----------( 149      ( 16 Mar 2024 19:12 )             38.8     03-16    134<L>  |  98  |  63<H>  ----------------------------<  120<H>  4.3   |  18<L>  |  2.94<H>    Ca    8.7      16 Mar 2024 19:12    TPro  6.9  /  Alb  3.6  /  TBili  0.7  /  DBili  x   /  AST  45<H>  /  ALT  42<H>  /  AlkPhos  200<H>  03-16    CAPILLARY BLOOD GLUCOSE      POCT Blood Glucose.: 128 mg/dL (16 Mar 2024 17:34)    PT/INR - ( 16 Mar 2024 19:12 )   PT: 17.0 sec;   INR: 1.54 ratio         PTT - ( 16 Mar 2024 19:12 )  PTT:39.4 sec  Urinalysis Basic - ( 16 Mar 2024 19:12 )    Color: x / Appearance: x / SG: x / pH: x  Gluc: 120 mg/dL / Ketone: x  / Bili: x / Urobili: x   Blood: x / Protein: x / Nitrite: x   Leuk Esterase: x / RBC: x / WBC x   Sq Epi: x / Non Sq Epi: x / Bacteria: x      Vital Signs Last 24 Hrs  T(C): 36.8 (16 Mar 2024 22:36), Max: 36.9 (16 Mar 2024 17:35)  T(F): 98.3 (16 Mar 2024 22:36), Max: 98.5 (16 Mar 2024 17:35)  HR: 78 (16 Mar 2024 22:36) (78 - 92)  BP: 124/80 (16 Mar 2024 22:36) (119/82 - 125/71)  BP(mean): --  RR: 18 (16 Mar 2024 22:36) (18 - 28)  SpO2: 98% (16 Mar 2024 22:36) (95% - 98%)    Parameters below as of 16 Mar 2024 22:36  Patient On (Oxygen Delivery Method): BiPAP/CPAP

## 2024-03-16 NOTE — H&P ADULT - PROBLEM SELECTOR PLAN 1
-possible stemming from new-onset afib /flutter first noted March 8th as well as recent URI that maybe contributory  -c/w bipap, ween off as tolerated  -tele, tte  -IV bumex 4mg bid, Is/Os,  -would obtain formal EP eval -possible stemming from new-onset afib /flutter first noted March 8th as well as recent URI that maybe contributory  -c/w bipap, ween off as tolerated  -tele, tte  -IV bumex 4mg bid, Is/Os,  -would obtain formal EP eval  -given prelim retrocardiac opacity, follow procal and official cxr; hold abx for now

## 2024-03-16 NOTE — ED PROVIDER NOTE - CLINICAL SUMMARY MEDICAL DECISION MAKING FREE TEXT BOX
MDM/Summary/DDx (includes but is not limited to):  this is a gentleman with A-fib on aspirin, unknown AC usage,  Orthopneic chest pain with shortness of breath found with cardiac wheeze.  Increased oxygen requirement.  Wheezing likely cardiac in nature COPD versus CHF exacerbation.  On a good amount of Bumex at home.  Will monitor urinary output on while giving initial amount of Lasix.  Exam and history is not consistent with ACS.  Exam and history is possibly consistent with arrhythmia, EKG largely unchanged since last.  Exam and history is not consistent with pulmonary embolism.  Exam and history is not consistent with AAA.  Could be viral in nature could be a pneumonia.   Labs:  troponin proBNP flu COVID CBC CMP CK  Imaging: XR chest   Tx: Supportive, pain/nausea medications as pt requires/requests.  Consults/Resources: none at this time    Dispo: admit likely     Triage note reviewed. VS reviewed. EKG reviewed and documented in "RESULTS" section, if possible at given time.     DDx in MDM includes the most likely ddx, but is not limited to solely what is listed. Clinical course may alter/deviate from the above plan. When possible, progress notes written, as needed, and are included in "PROGRESS NOTE" section below.       Medical, family, and social determinants of health reviewed and discussed w/ pt/family/caretaker, when allowable, and is incorporated into note above, whenever possible.

## 2024-03-16 NOTE — ED ADULT TRIAGE NOTE - CHIEF COMPLAINT QUOTE
Pt c/o CP, SOB x 5 days worsening today. Also reports increased swelling to legs, abdomen. Pt tachypneic in triage. Pt on 2L NC at baseline, Spo2 95% on 3L NC. Received 1 duoneb, 324mg ASA, 12mg IM dexamethasone by EMS. PHx A. Fib, pacemaker, DM2, HTN, CHF, COPD

## 2024-03-16 NOTE — CONSULT NOTE ADULT - ASSESSMENT
Patient is a 68 year old male with PMHx HFrEF (EF 25% 7/2023) s/p CRT-D, CKD, COPD on home oxygen, hypothyroid, afib, CAD s/p CABG on ASA/Plavix, DM2, HTN, hypothyroidism on levothyroxine, p/w  substernal burning chest pain with shortness of breath for 5 days and noting more edema to legs B/L. Cardiology consulted for c/f CHF exacerbation requiring BIPAP. Patient HD stable, EKG without acute changes, and patient noting improvement with BIPAP able to speak full sentences. Given endorsement of minimal urination, elevated proBNP, and appearing VOL on physical exam, presentation likely attributable to CHF exacerbation.       PLAN:   #HFrEF  - Recommend admission to telemetry for continuous cardiac monitoring   - s/p Lasix 40 mg IVP x1 with minimal urine output, pt endorses minimal urine output at home  - Recommend administration of Bumex 4 mg IVP x1 and continuing IV Bumex 4 mg BID  - Obtain TTE in AM for further assessment of LV function and rule out wall motion abnormalities  - Continue remainder of home medications to ensure afterload reduction  - Strict I/Os  - Replete lytes to maintain K>4 and Mg>2  - Wean BIPAP as tolerated  - Continue symptomatic management per primary medicine team    #H/O CAD  - Continue home ASA/Plavix/statin  - Obtain A1C, TSH, lipid profile       Case discussed with cardiology fellow Shivani Henley MD  Should patient HD status change, please call cardiology at #[phone] once again.  Note Incomplete, Attending attestation to follow for final recommendations Patient is a 68 year old male with PMHx HFrEF (EF 25% 7/2023) s/p CRT-D, CKD, COPD on home oxygen, hypothyroid, CAD s/p CABG, DM2, HTN, hypothyroidism on levothyroxine, p/w  substernal burning chest pain with shortness of breath for 5 days and noting more edema to legs B/L. Cardiology consulted for c/f CHF exacerbation requiring BIPAP. Patient HD stable, EKG without acute changes, and patient noting improvement with BIPAP able to speak full sentences. Given endorsement of minimal urination, elevated proBNP, and appearing VOL on physical exam, presentation likely attributable to CHF exacerbation.       PLAN:   #HFrEF  - Recommend admission to telemetry for continuous cardiac monitoring   - s/p Lasix 40 mg IVP x1 with minimal urine output, pt endorses minimal urine output at home  - Recommend administration of Bumex 4 mg IVP x1 and continuing IV Bumex 4 mg BID  - Obtain TTE in AM for further assessment of LV function and rule out wall motion abnormalities  - Continue remainder of home medications to ensure afterload reduction  - Strict I/Os  - Replete lytes to maintain K>4 and Mg>2  - Wean BIPAP as tolerated  - Continue symptomatic management per primary medicine team    #H/O CAD  - Continue home ASA/Plavix/statin  - Obtain A1C, TSH, lipid profile       Case discussed with cardiology fellow Shivani Henley MD  Should patient HD status change, please call cardiology at #[phone] once again.  Note Incomplete, Attending attestation to follow for final recommendations Patient is a 68 year old male with PMHx HFrEF (EF 25% 7/2023) s/p CRT-D, CKD, COPD on home oxygen, hypothyroid, CAD s/p CABG, DM2, HTN, hypothyroidism on levothyroxine, p/w  substernal burning chest pain with shortness of breath for 5 days and noting more edema to legs B/L. Cardiology consulted for c/f CHF exacerbation requiring BIPAP. Patient HD stable, EKG without acute changes, and patient noting improvement with BIPAP able to speak full sentences. Given endorsement of minimal urination, elevated proBNP, and appearing VOL on physical exam, presentation likely attributable to CHF exacerbation.       PLAN:   #HFrEF  - Recommend admission to telemetry for continuous cardiac monitoring   - s/p Lasix 40 mg IVP x1 with minimal urine output, pt endorses minimal urine output at home  - Recommend administration of Bumex 4 mg IVP x1 and continuing IV Bumex 4 mg BID  - Obtain TTE in AM for further assessment of LV function and rule out wall motion abnormalities  - Continue remainder of home medications to ensure afterload reduction  - Strict I/Os  - Replete lytes to maintain K>4 and Mg>2  - Wean BIPAP as tolerated  - Continue symptomatic management per primary medicine team    #H/O CAD  - Continue home ASA/Plavix/statin- @0205 ASA d/cd per pt, continue Plavix and statin  - Obtain A1C, TSH, lipid profile       Case discussed with cardiology fellow Shivani Henley MD  Should patient HD status change, please call cardiology at #[phone] once again.  Note Incomplete, Attending attestation to follow for final recommendations

## 2024-03-16 NOTE — ED PROVIDER NOTE - PHYSICAL EXAMINATION
Exam as stated below:   CONSTITUTIONAL:  patient tachypneic but speaking in full sentences, not diaphoretic. 3 lpm 95% on 3 lpm.   SKIN: Warm dry. No rashes noted.   EYES: No scleral icterus. Conjunctiva pink.  ENT: Posterior pharynx with no erythema.  CARD: ireg ireg Rhythm.   RESP:  3 L/min 95% room air.  As stated above tachypneic but speaking in full sentences.  Patient with bilateral symmetric chest rise and slight end expiratory wheezing without crackles  ABD: Soft. Non-tender. slightly distended.   MSK: 2+ bl pedal edema. No calf tenderness.  NEURO: UE/LE grossly intact. Motor UE/LE sensation grossly intact. CN II-XII grossly intact.   PSYCH: Cooperative, appropriate.

## 2024-03-16 NOTE — H&P ADULT - NSHPREVIEWOFSYSTEMS_GEN_ALL_CORE
Review of Systems:   CONSTITUTIONAL: No fever  EYES: No eye pain, visual disturbances, or discharge  ENMT:  No difficulty hearing, tinnitus, vertigo; No sinus or throat pain  NECK: No pain or stiffness  RESPIRATORY: improved shortness of breath  CARDIOVASCULAR: improving chest pain; + leg swelling  GASTROINTESTINAL: No abdominal or epigastric pain. No nausea, vomiting, or hematemesis; No diarrhea or constipation. No melena or hematochezia.  GENITOURINARY: No dysuria, frequency, hematuria, or incontinence  NEUROLOGICAL: No headaches, memory loss, loss of strength, numbness, or tremors  SKIN: No itching, burning, rashes, or lesions   LYMPH NODES: No enlarged glands  ENDOCRINE: No heat or cold intolerance; No hair loss  MUSCULOSKELETAL: No joint pain or swelling; No muscle, back, or extremity pain  PSYCHIATRIC: No depression, anxiety, mood swings, or difficulty sleeping  HEME/LYMPH: No easy bruising, or bleeding gums  ALLERY AND IMMUNOLOGIC: No hives or eczema Review of Systems:   CONSTITUTIONAL: No fever  EYES: No eye pain, visual disturbances, or discharge  ENMT:  No difficulty hearing, tinnitus, vertigo; No sinus or throat pain  NECK: No pain or stiffness  RESPIRATORY: improved shortness of breath  CARDIOVASCULAR: improving chest pain; + leg swelling  GASTROINTESTINAL: No abdominal or epigastric pain. No nausea, vomiting, or hematemesis; No diarrhea or constipation. No melena or hematochezia.  GENITOURINARY: decreased UO  NEUROLOGICAL: No headaches, chronic left sided weakness   MUSCULOSKELETAL: No joint pain or swelling; No muscle, back, or extremity pain

## 2024-03-16 NOTE — H&P ADULT - HISTORY OF PRESENT ILLNESS
68M with PMH of CAD, HFrEF (EF = 25% - 2023), s/p AICD, CABG, HTN, HLD, T2DM  Asthma, COPD, Home oxygen (2L), CKD, CVA w/ Left sided deficits, and Gait difficulty (uses walker),  68M with PMH of CAD, HFrEF (EF = 25% - 2023), s/p AICD, CABG, HTN, HLD, T2DM  Asthma, COPD, Home oxygen (2L), CKD, CVA w/ Left sided deficits, and Gait difficulty (uses walker), Reports sob/leg swelling with minimal urine output  over last 3 days. Reports compliance with medications including 4-6 mg bumex daily. Denies change in salt or water intake. Does note uri symptom that began 2 weeks ago and ended prior to symptom onset. Sars/rsv/flu negative. Prelim cxr with left retrocardiac opacity; normal wbc, afebrile thus far. Reports significant symptomatic improvement since being placed on Bipap including improved cp. Multiple ekgs non ischemic. Trop 92. SCr 2.94 which is baseline.  Reports aicd fired 3 days ago prior to symptom onset (states cardiologist aware) 68M with PMH of CAD, HFrEF (EF = 25% - 2023), s/p AICD, CABG, HTN, HLD, T2DM  Asthma, COPD, Home oxygen (2L), CKD, CVA w/ Left sided deficits, and Gait difficulty (uses walker), Reports sob/leg swelling with minimal urine output  over last 3 days. Reports compliance with medications including 4-6 mg bumex daily. Denies change in salt or water intake. Does note uri symptom that began 2 weeks ago and ended prior to symptom onset. Sars/rsv/flu negative. Prelim cxr with left retrocardiac opacity; normal wbc, afebrile thus far. Reports significant symptomatic improvement since being placed on Bipap including improved cp. Multiple ekgs non ischemic. Trop 93, 84. SCr 2.94 which is baseline.  Reports aicd fired 3 days ago prior to symptom onset (states cardiologist aware)    ADDEND  Of note, ekg does appear to contain aflutter/ Pt recently started on Eliquis reflected on recent note from march 8 68M with PMH of CAD, HFrEF (EF = 25% - 2023), s/p AICD, CABG, HTN, HLD, T2DM  Asthma, COPD, Home oxygen (2L), CKD, CVA w/ Left sided deficits, and Gait difficulty (uses walker), Reports sob/leg swelling with minimal urine output  over last 3 days. Reports compliance with medications including 6 mg bumex daily. Denies change in salt or water intake. Does note uri symptom that began 2 weeks ago and ended prior to symptom onset. Sars/rsv/flu negative. Prelim cxr with left retrocardiac opacity; normal wbc, afebrile thus far. Reports significant symptomatic improvement since being placed on Bipap including improved cp. Multiple ekgs non ischemic. Trop 93, 84. SCr 2.94 which is baseline.  Reports aicd fired last week prior to symptom onset (states cardiologist aware). Per [facility] note from March 8, Afib activity noted for 43 minutes, pt started on eliquis. Aspirin discontinued recently per pt, remains on plavix

## 2024-03-16 NOTE — ED PROVIDER NOTE - OBJECTIVE STATEMENT
HPI & ROS: 68 yom hx CAD CHF DM HTN CABG on bumex 2 mg, HPI & ROS: 68 yom hx COPD, hypothyroid, afib, CAD CHF DM HTN CABG on bumex 2 mg, Isosorbide, levothyroxine, aspirin, p/w  substernal burning chest pain with shortness of breath for 5 days.  Increase swelling to legs.  Positive orthopnea.  Swollen abdomen as well.  Tachycardic and normally on 2 L nasal cannula baseline, on 3 L nasal cannula at 95%.  Received a DuoNeb, aspirin, 12 mg of IM dexamethasone.

## 2024-03-16 NOTE — CONSULT NOTE ADULT - SUBJECTIVE AND OBJECTIVE BOX
CHIEF COMPLAINT:    HISTORY OF PRESENT ILLNESS:      Allergies    No Known Allergies    Intolerances    	    MEDICATIONS:                  PAST MEDICAL & SURGICAL HISTORY:  HTN (hypertension)      CAD (coronary artery disease)      Diabetes      Hyperlipidemia, unspecified hyperlipidemia type      CHF (congestive heart failure)      BPH (benign prostatic hyperplasia)      S/P CABG (coronary artery bypass graft)      S/P appendectomy          FAMILY HISTORY:  Family history of type 2 diabetes mellitus in father (Father)        SOCIAL HISTORY:    [ ] Non-smoker  [ ] Smoker  [ ] Alcohol  [ ] Denies Alcohol      REVIEW OF SYSTEMS:  CONSTITUTIONAL: no fevers or chills  EYES/ENT: No visual changes; No vertigo or throat pain  NECK: No JVD  RESPIRATORY:  No cough, wheezing, chills or hemoptysis, SOB  CARDIOVASCULAR: No chest pain, palpitations, passing out, dizziness, or leg swelling  GASTROINTESTINAL: No abdominal or epigastric pain. No nausea/vomiting/melena  Genitourinary: No dysuria, frequency or hematuria  NEUROLOGICAL: No numbness or weakness  SKIN: No itching, burning, rashes or lesions      PHYSICAL EXAM:  T(C): 36.9 (03-16-24 @ 20:07), Max: 36.9 (03-16-24 @ 17:35)  HR: 79 (03-16-24 @ 20:07) (79 - 92)  BP: 119/82 (03-16-24 @ 20:07) (119/82 - 125/71)  RR: 19 (03-16-24 @ 20:07) (19 - 28)  SpO2: 98% (03-16-24 @ 20:07) (95% - 98%)  Wt(kg): --  ICU Vital Signs Last 24 Hrs  T(C): 36.9 (16 Mar 2024 20:07), Max: 36.9 (16 Mar 2024 17:35)  T(F): 98.4 (16 Mar 2024 20:07), Max: 98.5 (16 Mar 2024 17:35)  HR: 79 (16 Mar 2024 20:07) (79 - 92)  BP: 119/82 (16 Mar 2024 20:07) (119/82 - 125/71)  BP(mean): --  ABP: --  ABP(mean): --  RR: 19 (16 Mar 2024 20:07) (19 - 28)  SpO2: 98% (16 Mar 2024 20:07) (95% - 98%)    O2 Parameters below as of 16 Mar 2024 20:07  Patient On (Oxygen Delivery Method): BiPAP/CPAP          I&O's Summary        Appearance: Normal	  HEENT:   Normal oral mucosa	  Cardiovascular: Normal S1 S2, No JVD, No murmurs, No edema  Respiratory: Lungs clear to auscultation	  Psychiatry: A & O x 3, Mood & affect appropriate  Gastrointestinal:  Soft, Non-tender, + BS	  Skin: No rashes, No ecchymoses, No cyanosis	  Neurologic: Non-focal  Extremities: Warm and well perfused. 2+ pulses bilaterally        LABS:	 	    CBC Full  -  ( 16 Mar 2024 19:12 )  WBC Count : 10.39 K/uL  Hemoglobin : 12.6 g/dL  Hematocrit : 38.8 %  Platelet Count - Automated : 149 K/uL  Mean Cell Volume : 95.6 fL  Mean Cell Hemoglobin : 31.0 pg  Mean Cell Hemoglobin Concentration : 32.5 gm/dL  Auto Neutrophil # : 8.73 K/uL  Auto Lymphocyte # : 0.85 K/uL  Auto Monocyte # : 0.58 K/uL  Auto Eosinophil # : 0.16 K/uL  Auto Basophil # : 0.03 K/uL  Auto Neutrophil % : 84.0 %  Auto Lymphocyte % : 8.2 %  Auto Monocyte % : 5.6 %  Auto Eosinophil % : 1.5 %  Auto Basophil % : 0.3 %    03-16    134<L>  |  98  |  63<H>  ----------------------------<  120<H>  4.3   |  18<L>  |  2.94<H>    Ca    8.7      16 Mar 2024 19:12    TPro  6.9  /  Alb  3.6  /  TBili  0.7  /  DBili  x   /  AST  45<H>  /  ALT  42<H>  /  AlkPhos  200<H>  03-16      proBNP: [id number]  Lipid Profile:   HgA1c:   TSH:     CARDIAC MARKERS:  Troponin 93  Creatine Kinase, Serum: 132 U/L (03-16-24 @ 19:12)      TELEMETRY: 	    ECG:  	    PREVIOUS DIAGNOSTIC TESTING:    [ ] Echocardiogram:  TTE with Doppler (w/Cont) (07.18.23 @ 08:31)  Ejection Fraction (Modified Velazquez Rule): 25 %  ------------------------------------------------------------------------  OBSERVATIONS:  Mitral Valve: Mitral annular calcification, otherwise  normal mitral valve. Mild mitral regurgitation.  Aortic Root: Normal aortic root.  Aortic Valve: Calcified trileaflet aortic valve with normal  opening. No aortic valve regurgitation seen.  Left Atrium: The left atrium appears enlarged however, the  LA volume is underestimated.  Left Ventricle: Severe global left ventricular systolic  dysfunction. LVEF calculated using biplane Velazquez's method  is 25%. Endocardial visualization enhanced with intravenous  injection of echo contrast (Definity). No left ventricular  thrombus. Normal left ventricular internal dimensions and  increased wall thicknesses. Normal left ventricular  diastolic function.  Right Heart: Normal right atrium. A device wire is noted in  the right heart. Normal tricuspid valve. No tricuspid  regurgitation. Normal pulmonic valve.  Pericardium/PleuraNo pericardial effusion seen. Left  pleural effusion.    [ ]  Catheterization:    [ ] Stress Test:  	     CHIEF COMPLAINT:    HISTORY OF PRESENT ILLNESS:  Patient is a 68 year old male with PMHx HFrEF (EF 25% 7/2023), COPD, hypothyroid, afib, CAD s/p CABG on ASA/Plavix, DM2, HTN, hypothyroidism on levothyroxine, p/w  substernal burning chest pain with shortness of breath for 5 days and noting more edema to legs B/L. Cardiology consulted for c/f CHF exacerbation requiring BIPAP. On interview, patient speaking full sentences and noting improvement in breathing on BIPAP 10/5/40%. States he went to his HF doctor Dr. Dobson's office 2 weeks ago for routine follow up and reports no changes in home medications. Reports inability to urinate much at home even with PO Bumex.  Endorses compliance with home medications.   ED course- Lasix 40 mg IVP x1 with minimal urine output. proBNP 22675, troponin 93,    EKG/TELEMETRY: A sense V pace HR 70s-90s, without ischemic changes, unchanged compared to prior EKG  Allergies    No Known Allergies    Intolerances    	    MEDICATIONS:                  PAST MEDICAL & SURGICAL HISTORY:  HTN (hypertension)      CAD (coronary artery disease)      Diabetes      Hyperlipidemia, unspecified hyperlipidemia type      CHF (congestive heart failure)      BPH (benign prostatic hyperplasia)      S/P CABG (coronary artery bypass graft)      S/P appendectomy          FAMILY HISTORY:  Family history of type 2 diabetes mellitus in father (Father)        SOCIAL HISTORY:    [ ] Non-smoker  [ ] Smoker  [ ] Alcohol  [ ] Denies Alcohol      REVIEW OF SYSTEMS:  CONSTITUTIONAL: no fevers or chills  EYES/ENT: No visual changes; No vertigo or throat pain  NECK: No JVD  RESPIRATORY:  No cough, wheezing, chills or hemoptysis, SOB  CARDIOVASCULAR: No chest pain, palpitations, passing out, dizziness, or leg swelling  GASTROINTESTINAL: No abdominal or epigastric pain. No nausea/vomiting/melena  Genitourinary: No dysuria, frequency or hematuria  NEUROLOGICAL: No numbness or weakness  SKIN: No itching, burning, rashes or lesions      PHYSICAL EXAM:  T(C): 36.9 (03-16-24 @ 20:07), Max: 36.9 (03-16-24 @ 17:35)  HR: 79 (03-16-24 @ 20:07) (79 - 92)  BP: 119/82 (03-16-24 @ 20:07) (119/82 - 125/71)  RR: 19 (03-16-24 @ 20:07) (19 - 28)  SpO2: 98% (03-16-24 @ 20:07) (95% - 98%)  Wt(kg): --  ICU Vital Signs Last 24 Hrs  T(C): 36.9 (16 Mar 2024 20:07), Max: 36.9 (16 Mar 2024 17:35)  T(F): 98.4 (16 Mar 2024 20:07), Max: 98.5 (16 Mar 2024 17:35)  HR: 79 (16 Mar 2024 20:07) (79 - 92)  BP: 119/82 (16 Mar 2024 20:07) (119/82 - 125/71)  BP(mean): --  ABP: --  ABP(mean): --  RR: 19 (16 Mar 2024 20:07) (19 - 28)  SpO2: 98% (16 Mar 2024 20:07) (95% - 98%)    O2 Parameters below as of 16 Mar 2024 20:07  Patient On (Oxygen Delivery Method): BiPAP/CPAP          I&O's Summary      PHYSICAL EXAM:  Appearance: NAD, tolerating BIPAP and speaking full sentences	  HEENT:   Normal oral mucosa	  Cardiovascular: 1-2+ pitting edema B/L. +JVD. Normal S1 S2. No murmurs.  Respiratory: Fine crackles B/L throughout exam. 	  Psychiatry: A & O x 3, Mood & affect appropriate  Gastrointestinal:  Soft, Non-tender, + BS	  Skin: No rashes, No ecchymoses, No cyanosis	  Neurologic: Non-focal  Extremities: Warm and well perfused.         LABS:	 	    CBC Full  -  ( 16 Mar 2024 19:12 )  WBC Count : 10.39 K/uL  Hemoglobin : 12.6 g/dL  Hematocrit : 38.8 %  Platelet Count - Automated : 149 K/uL  Mean Cell Volume : 95.6 fL  Mean Cell Hemoglobin : 31.0 pg  Mean Cell Hemoglobin Concentration : 32.5 gm/dL  Auto Neutrophil # : 8.73 K/uL  Auto Lymphocyte # : 0.85 K/uL  Auto Monocyte # : 0.58 K/uL  Auto Eosinophil # : 0.16 K/uL  Auto Basophil # : 0.03 K/uL  Auto Neutrophil % : 84.0 %  Auto Lymphocyte % : 8.2 %  Auto Monocyte % : 5.6 %  Auto Eosinophil % : 1.5 %  Auto Basophil % : 0.3 %    03-16    134<L>  |  98  |  63<H>  ----------------------------<  120<H>  4.3   |  18<L>  |  2.94<H>    Ca    8.7      16 Mar 2024 19:12    TPro  6.9  /  Alb  3.6  /  TBili  0.7  /  DBili  x   /  AST  45<H>  /  ALT  42<H>  /  AlkPhos  200<H>  03-16      proBNP: 77857  Lipid Profile:   HgA1c:   TSH:     CARDIAC MARKERS:  Troponin 93  Creatine Kinase, Serum: 132 U/L (03-16-24 @ 19:12)      	      PREVIOUS DIAGNOSTIC TESTING:    [ ] Echocardiogram:  TTE with Doppler (w/Cont) (07.18.23 @ 08:31)  Ejection Fraction (Modified Velazquez Rule): 25 %  ------------------------------------------------------------------------  OBSERVATIONS:  Mitral Valve: Mitral annular calcification, otherwise  normal mitral valve. Mild mitral regurgitation.  Aortic Root: Normal aortic root.  Aortic Valve: Calcified trileaflet aortic valve with normal  opening. No aortic valve regurgitation seen.  Left Atrium: The left atrium appears enlarged however, the  LA volume is underestimated.  Left Ventricle: Severe global left ventricular systolic  dysfunction. LVEF calculated using biplane Velazquez's method  is 25%. Endocardial visualization enhanced with intravenous  injection of echo contrast (Definity). No left ventricular  thrombus. Normal left ventricular internal dimensions and  increased wall thicknesses. Normal left ventricular  diastolic function.  Right Heart: Normal right atrium. A device wire is noted in  the right heart. Normal tricuspid valve. No tricuspid  regurgitation. Normal pulmonic valve.  Pericardium/PleuraNo pericardial effusion seen. Left  pleural effusion.    [ ]  Catheterization:    [ ] Stress Test:  	     CHIEF COMPLAINT:    HISTORY OF PRESENT ILLNESS:  Patient is a 68 year old male with PMHx HFrEF (EF 25% 7/2023) s/p CRT-D, CKD, COPD on home oxygen, hypothyroid, afib, CAD s/p CABG on ASA/Plavix, DM2, HTN, hypothyroidism on levothyroxine, p/w  substernal burning chest pain with shortness of breath for 5 days and noting more edema to legs B/L. Cardiology consulted for c/f CHF exacerbation requiring BIPAP. On interview, patient speaking full sentences and noting improvement in breathing on BIPAP 10/5/40%. States he went to his HF doctor Dr. Dobson's office 2 weeks ago for routine follow up and reports no changes in home medications. Reports inability to urinate much at home even with PO Bumex, and reports increased leg swelling.  Endorses compliance with home medications. Denies palpitations, N/V, abdominal pain. Reports former smoking use, denies ETOH/illicit substance use. Denies recent infection exposure, recent travel.     ED course- Lasix 40 mg IVP x1 with minimal urine output. proBNP 16506, troponin 93, , SCr 2.94 which is roughly baseline SCr, K 4.3, Lact 1.5, RVP negative, CXR demonstrating left retrocardiac opacity, may represent consolidation.  EKG/TELEMETRY: A sense V pace HR 70s-90s, without ischemic changes, unchanged compared to prior EKG    Allergies  No Known Allergies    	    MEDICATIONS:  Per AllScripts Review  Carvedilol 6.25 mg BID  Hydralazine 75 mg TID  Isordil 30 mg TID  PO Bumex 4 mg daily and additional 2 mg PRN for weight gain  ASA 81 mg daily  Plavix 75 mg daily  Rosuvastatin 20 mg                  PAST MEDICAL & SURGICAL HISTORY:  HTN (hypertension)      CAD (coronary artery disease)      Diabetes      Hyperlipidemia, unspecified hyperlipidemia type      CHF (congestive heart failure)      BPH (benign prostatic hyperplasia)      S/P CABG (coronary artery bypass graft)      S/P appendectomy          FAMILY HISTORY:  Family history of type 2 diabetes mellitus in father (Father)        SOCIAL HISTORY:    [ x ] Former smoker  [ ] Smoker  [ ] Alcohol  [ x ] Denies Alcohol      REVIEW OF SYSTEMS:  CONSTITUTIONAL: no fevers or chills  EYES/ENT: No visual changes; No vertigo or throat pain  NECK: No JVD  RESPIRATORY: +SOB  No cough, wheezing, chills or hemoptysis  CARDIOVASCULAR: +Leg swelling No chest pain, palpitations, passing out, dizziness  GASTROINTESTINAL: No abdominal or epigastric pain. No nausea/vomiting/melena  Genitourinary: No dysuria, frequency or hematuria  NEUROLOGICAL: No numbness or weakness  SKIN: No itching, burning, rashes or lesions      PHYSICAL EXAM:  T(C): 36.9 (03-16-24 @ 20:07), Max: 36.9 (03-16-24 @ 17:35)  HR: 79 (03-16-24 @ 20:07) (79 - 92)  BP: 119/82 (03-16-24 @ 20:07) (119/82 - 125/71)  RR: 19 (03-16-24 @ 20:07) (19 - 28)  SpO2: 98% (03-16-24 @ 20:07) (95% - 98%)  Wt(kg): --  ICU Vital Signs Last 24 Hrs  T(C): 36.9 (16 Mar 2024 20:07), Max: 36.9 (16 Mar 2024 17:35)  T(F): 98.4 (16 Mar 2024 20:07), Max: 98.5 (16 Mar 2024 17:35)  HR: 79 (16 Mar 2024 20:07) (79 - 92)  BP: 119/82 (16 Mar 2024 20:07) (119/82 - 125/71)  RR: 19 (16 Mar 2024 20:07) (19 - 28)  SpO2: 98% (16 Mar 2024 20:07) (95% - 98%)    O2 Parameters below as of 16 Mar 2024 20:07  Patient On (Oxygen Delivery Method): BiPAP/CPAP          I&O's Summary      PHYSICAL EXAM:  Appearance: NAD, tolerating BIPAP and speaking full sentences	  HEENT:   Normal oral mucosa	  Cardiovascular: 1-2+ pitting edema B/L. +JVD. Normal S1 S2. No murmurs.  Respiratory: Fine crackles B/L throughout exam. 	  Psychiatry: A & O x 3, Mood & affect appropriate  Gastrointestinal:  Soft, Non-tender, + BS	  Skin: No rashes, No ecchymoses, No cyanosis	  Neurologic: Non-focal  Extremities: Warm and well perfused.         LABS:	 	  CBC Full  -  ( 16 Mar 2024 19:12 )  WBC Count : 10.39 K/uL  Hemoglobin : 12.6 g/dL  Hematocrit : 38.8 %  Platelet Count - Automated : 149 K/uL  Mean Cell Volume : 95.6 fL  Mean Cell Hemoglobin : 31.0 pg  Mean Cell Hemoglobin Concentration : 32.5 gm/dL  Auto Neutrophil # : 8.73 K/uL  Auto Lymphocyte # : 0.85 K/uL  Auto Monocyte # : 0.58 K/uL  Auto Eosinophil # : 0.16 K/uL  Auto Basophil # : 0.03 K/uL  Auto Neutrophil % : 84.0 %  Auto Lymphocyte % : 8.2 %  Auto Monocyte % : 5.6 %  Auto Eosinophil % : 1.5 %  Auto Basophil % : 0.3 %    03-16    134<L>  |  98  |  63<H>  ----------------------------<  120<H>  4.3   |  18<L>  |  2.94<H>    Ca    8.7      16 Mar 2024 19:12    TPro  6.9  /  Alb  3.6  /  TBili  0.7  /  DBili  x   /  AST  45<H>  /  ALT  42<H>  /  AlkPhos  200<H>  03-16      proBNP: 37879  Lipid Profile:   HgA1c:   TSH:     CARDIAC MARKERS:  Troponin 93  Creatine Kinase, Serum: 132 U/L (03-16-24 @ 19:12)      	      PREVIOUS DIAGNOSTIC TESTING:    [ x ] Echocardiogram:  TTE with Doppler (w/Cont) (07.18.23 @ 08:31)  Ejection Fraction (Modified Velazquez Rule): 25 %  ------------------------------------------------------------------------  OBSERVATIONS:  Mitral Valve: Mitral annular calcification, otherwise  normal mitral valve. Mild mitral regurgitation.  Aortic Root: Normal aortic root.  Aortic Valve: Calcified trileaflet aortic valve with normal  opening. No aortic valve regurgitation seen.  Left Atrium: The left atrium appears enlarged however, the  LA volume is underestimated.  Left Ventricle: Severe global left ventricular systolic  dysfunction. LVEF calculated using biplane Velazquez's method  is 25%. Endocardial visualization enhanced with intravenous  injection of echo contrast (Definity). No left ventricular  thrombus. Normal left ventricular internal dimensions and  increased wall thicknesses. Normal left ventricular  diastolic function.  Right Heart: Normal right atrium. A device wire is noted in  the right heart. Normal tricuspid valve. No tricuspid  regurgitation. Normal pulmonic valve.  Pericardium/PleuraNo pericardial effusion seen. Left  pleural effusion.    [ ]  Catheterization:  [ ] Stress Test:  	     CHIEF COMPLAINT:    HISTORY OF PRESENT ILLNESS:  Patient is a 68 year old male with PMHx HFrEF (EF 25% 7/2023) s/p CRT-D, CKD, COPD on home oxygen, hypothyroid, afib, CAD s/p CABG on ASA/Plavix, DM2, HTN, hypothyroidism on levothyroxine, p/w  substernal burning chest pain with shortness of breath for 5 days and noting more edema to legs B/L. Cardiology consulted for c/f CHF exacerbation requiring BIPAP. On interview, patient speaking full sentences and noting improvement in breathing on BIPAP 10/5/40%. States he went to his HF doctor Dr. Dobson's office 2 weeks ago for routine follow up and reports no changes in home medications. Reports inability to urinate much at home even with PO Bumex, and reports increased leg swelling.  Endorses compliance with home medications. Denies palpitations, N/V, abdominal pain. Reports former smoking use, denies ETOH/illicit substance use. Denies recent infection exposure, recent travel.     ED course- Lasix 40 mg IVP x1 with minimal urine output. proBNP 98938, troponin 93, , SCr 2.94 which is roughly baseline SCr, K 4.3, Lact 1.5, RVP negative, CXR demonstrating left retrocardiac opacity, may represent consolidation.  EKG/TELEMETRY: A flutter with PVCs on EKG HR 80s, A sense V pace HR 70s-90s on tele, without ischemic changes    Allergies  No Known Allergies    	    MEDICATIONS:  Per AllScripts Review  Carvedilol 6.25 mg BID  Hydralazine 75 mg TID  Isordil 30 mg TID  PO Bumex 4 mg daily and additional 2 mg PRN for weight gain  ASA 81 mg daily  Plavix 75 mg daily  Rosuvastatin 20 mg                  PAST MEDICAL & SURGICAL HISTORY:  HTN (hypertension)      CAD (coronary artery disease)      Diabetes      Hyperlipidemia, unspecified hyperlipidemia type      CHF (congestive heart failure)      BPH (benign prostatic hyperplasia)      S/P CABG (coronary artery bypass graft)      S/P appendectomy          FAMILY HISTORY:  Family history of type 2 diabetes mellitus in father (Father)        SOCIAL HISTORY:    [ x ] Former smoker  [ ] Smoker  [ ] Alcohol  [ x ] Denies Alcohol      REVIEW OF SYSTEMS:  CONSTITUTIONAL: no fevers or chills  EYES/ENT: No visual changes; No vertigo or throat pain  NECK: No JVD  RESPIRATORY: +SOB  No cough, wheezing, chills or hemoptysis  CARDIOVASCULAR: +Leg swelling No chest pain, palpitations, passing out, dizziness  GASTROINTESTINAL: No abdominal or epigastric pain. No nausea/vomiting/melena  Genitourinary: No dysuria, frequency or hematuria  NEUROLOGICAL: No numbness or weakness  SKIN: No itching, burning, rashes or lesions      PHYSICAL EXAM:  T(C): 36.9 (03-16-24 @ 20:07), Max: 36.9 (03-16-24 @ 17:35)  HR: 79 (03-16-24 @ 20:07) (79 - 92)  BP: 119/82 (03-16-24 @ 20:07) (119/82 - 125/71)  RR: 19 (03-16-24 @ 20:07) (19 - 28)  SpO2: 98% (03-16-24 @ 20:07) (95% - 98%)  Wt(kg): --  ICU Vital Signs Last 24 Hrs  T(C): 36.9 (16 Mar 2024 20:07), Max: 36.9 (16 Mar 2024 17:35)  T(F): 98.4 (16 Mar 2024 20:07), Max: 98.5 (16 Mar 2024 17:35)  HR: 79 (16 Mar 2024 20:07) (79 - 92)  BP: 119/82 (16 Mar 2024 20:07) (119/82 - 125/71)  RR: 19 (16 Mar 2024 20:07) (19 - 28)  SpO2: 98% (16 Mar 2024 20:07) (95% - 98%)    O2 Parameters below as of 16 Mar 2024 20:07  Patient On (Oxygen Delivery Method): BiPAP/CPAP          I&O's Summary      PHYSICAL EXAM:  Appearance: NAD, tolerating BIPAP and speaking full sentences	  HEENT:   Normal oral mucosa	  Cardiovascular: 1-2+ pitting edema B/L. +JVD. Normal S1 S2. No murmurs.  Respiratory: Fine crackles B/L throughout exam. 	  Psychiatry: A & O x 3, Mood & affect appropriate  Gastrointestinal:  Soft, Non-tender, + BS	  Skin: No rashes, No ecchymoses, No cyanosis	  Neurologic: Non-focal  Extremities: Warm and well perfused.         LABS:	 	  CBC Full  -  ( 16 Mar 2024 19:12 )  WBC Count : 10.39 K/uL  Hemoglobin : 12.6 g/dL  Hematocrit : 38.8 %  Platelet Count - Automated : 149 K/uL  Mean Cell Volume : 95.6 fL  Mean Cell Hemoglobin : 31.0 pg  Mean Cell Hemoglobin Concentration : 32.5 gm/dL  Auto Neutrophil # : 8.73 K/uL  Auto Lymphocyte # : 0.85 K/uL  Auto Monocyte # : 0.58 K/uL  Auto Eosinophil # : 0.16 K/uL  Auto Basophil # : 0.03 K/uL  Auto Neutrophil % : 84.0 %  Auto Lymphocyte % : 8.2 %  Auto Monocyte % : 5.6 %  Auto Eosinophil % : 1.5 %  Auto Basophil % : 0.3 %    03-16    134<L>  |  98  |  63<H>  ----------------------------<  120<H>  4.3   |  18<L>  |  2.94<H>    Ca    8.7      16 Mar 2024 19:12    TPro  6.9  /  Alb  3.6  /  TBili  0.7  /  DBili  x   /  AST  45<H>  /  ALT  42<H>  /  AlkPhos  200<H>  03-16      proBNP: 46692  Lipid Profile:   HgA1c:   TSH:     CARDIAC MARKERS:  Troponin 93  Creatine Kinase, Serum: 132 U/L (03-16-24 @ 19:12)      	      PREVIOUS DIAGNOSTIC TESTING:    [ x ] Echocardiogram:  TTE with Doppler (w/Cont) (07.18.23 @ 08:31)  Ejection Fraction (Modified Velazquez Rule): 25 %  ------------------------------------------------------------------------  OBSERVATIONS:  Mitral Valve: Mitral annular calcification, otherwise  normal mitral valve. Mild mitral regurgitation.  Aortic Root: Normal aortic root.  Aortic Valve: Calcified trileaflet aortic valve with normal  opening. No aortic valve regurgitation seen.  Left Atrium: The left atrium appears enlarged however, the  LA volume is underestimated.  Left Ventricle: Severe global left ventricular systolic  dysfunction. LVEF calculated using biplane Velazquez's method  is 25%. Endocardial visualization enhanced with intravenous  injection of echo contrast (Definity). No left ventricular  thrombus. Normal left ventricular internal dimensions and  increased wall thicknesses. Normal left ventricular  diastolic function.  Right Heart: Normal right atrium. A device wire is noted in  the right heart. Normal tricuspid valve. No tricuspid  regurgitation. Normal pulmonic valve.  Pericardium/PleuraNo pericardial effusion seen. Left  pleural effusion.    [ ]  Catheterization:  [ ] Stress Test:  	     CHIEF COMPLAINT:    HISTORY OF PRESENT ILLNESS:  Patient is a 68 year old male with PMHx HFrEF (EF 25% 7/2023) s/p CRT-D, CKD, COPD on home oxygen, hypothyroid, CAD s/p CABG, DM2, HTN, hypothyroidism on levothyroxine, p/w  substernal burning chest pain with shortness of breath for 5 days and noting more edema to legs B/L. Cardiology consulted for c/f CHF exacerbation requiring BIPAP. On interview, patient speaking full sentences and noting improvement in breathing on BIPAP 10/5/40%. States he went to his HF doctor Dr. Dobson's office 2 weeks ago for routine follow up and reports recent discontinuation of aspirin otherwise no other changes in medications. Reports inability to urinate much at home even with PO Bumex, and reports increased leg swelling.  Endorses compliance with home medications. Denies palpitations, N/V, abdominal pain. Reports former smoking use, denies ETOH/illicit substance use. Denies recent infection exposure, recent travel.     ED course- Lasix 40 mg IVP x1 with minimal urine output. proBNP 47298, troponin 93, , SCr 2.94 which is roughly baseline SCr, K 4.3, Lact 1.5, RVP negative, CXR demonstrating left retrocardiac opacity, may represent consolidation.  EKG/TELEMETRY: A flutter with PVCs on EKG HR 80s, A sense V pace HR 70s-90s on tele, without ischemic changes    Allergies  No Known Allergies    	    MEDICATIONS:  Per AllScripts Review  Carvedilol 6.25 mg BID  Hydralazine 75 mg TID  Isordil 30 mg TID  PO Bumex 4 mg daily and additional 2 mg PRN for weight gain  ASA 81 mg daily  Plavix 75 mg daily  Rosuvastatin 20 mg                  PAST MEDICAL & SURGICAL HISTORY:  HTN (hypertension)      CAD (coronary artery disease)      Diabetes      Hyperlipidemia, unspecified hyperlipidemia type      CHF (congestive heart failure)      BPH (benign prostatic hyperplasia)      S/P CABG (coronary artery bypass graft)      S/P appendectomy          FAMILY HISTORY:  Family history of type 2 diabetes mellitus in father (Father)        SOCIAL HISTORY:    [ x ] Former smoker  [ ] Smoker  [ ] Alcohol  [ x ] Denies Alcohol      REVIEW OF SYSTEMS:  CONSTITUTIONAL: no fevers or chills  EYES/ENT: No visual changes; No vertigo or throat pain  NECK: No JVD  RESPIRATORY: +SOB  No cough, wheezing, chills or hemoptysis  CARDIOVASCULAR: +Leg swelling No chest pain, palpitations, passing out, dizziness  GASTROINTESTINAL: No abdominal or epigastric pain. No nausea/vomiting/melena  Genitourinary: No dysuria, frequency or hematuria  NEUROLOGICAL: No numbness or weakness  SKIN: No itching, burning, rashes or lesions      PHYSICAL EXAM:  T(C): 36.9 (03-16-24 @ 20:07), Max: 36.9 (03-16-24 @ 17:35)  HR: 79 (03-16-24 @ 20:07) (79 - 92)  BP: 119/82 (03-16-24 @ 20:07) (119/82 - 125/71)  RR: 19 (03-16-24 @ 20:07) (19 - 28)  SpO2: 98% (03-16-24 @ 20:07) (95% - 98%)  Wt(kg): --  ICU Vital Signs Last 24 Hrs  T(C): 36.9 (16 Mar 2024 20:07), Max: 36.9 (16 Mar 2024 17:35)  T(F): 98.4 (16 Mar 2024 20:07), Max: 98.5 (16 Mar 2024 17:35)  HR: 79 (16 Mar 2024 20:07) (79 - 92)  BP: 119/82 (16 Mar 2024 20:07) (119/82 - 125/71)  RR: 19 (16 Mar 2024 20:07) (19 - 28)  SpO2: 98% (16 Mar 2024 20:07) (95% - 98%)    O2 Parameters below as of 16 Mar 2024 20:07  Patient On (Oxygen Delivery Method): BiPAP/CPAP          I&O's Summary      PHYSICAL EXAM:  Appearance: NAD, tolerating BIPAP and speaking full sentences	  HEENT:   Normal oral mucosa	  Cardiovascular: 1-2+ pitting edema B/L. +JVD. Normal S1 S2. No murmurs.  Respiratory: Fine crackles B/L throughout exam. 	  Psychiatry: A & O x 3, Mood & affect appropriate  Gastrointestinal:  Soft, Non-tender, + BS	  Skin: No rashes, No ecchymoses, No cyanosis	  Neurologic: Non-focal  Extremities: Warm and well perfused.         LABS:	 	  CBC Full  -  ( 16 Mar 2024 19:12 )  WBC Count : 10.39 K/uL  Hemoglobin : 12.6 g/dL  Hematocrit : 38.8 %  Platelet Count - Automated : 149 K/uL  Mean Cell Volume : 95.6 fL  Mean Cell Hemoglobin : 31.0 pg  Mean Cell Hemoglobin Concentration : 32.5 gm/dL  Auto Neutrophil # : 8.73 K/uL  Auto Lymphocyte # : 0.85 K/uL  Auto Monocyte # : 0.58 K/uL  Auto Eosinophil # : 0.16 K/uL  Auto Basophil # : 0.03 K/uL  Auto Neutrophil % : 84.0 %  Auto Lymphocyte % : 8.2 %  Auto Monocyte % : 5.6 %  Auto Eosinophil % : 1.5 %  Auto Basophil % : 0.3 %    03-16    134<L>  |  98  |  63<H>  ----------------------------<  120<H>  4.3   |  18<L>  |  2.94<H>    Ca    8.7      16 Mar 2024 19:12    TPro  6.9  /  Alb  3.6  /  TBili  0.7  /  DBili  x   /  AST  45<H>  /  ALT  42<H>  /  AlkPhos  200<H>  03-16      proBNP: 06051  Lipid Profile:   HgA1c:   TSH:     CARDIAC MARKERS:  Troponin 93  Creatine Kinase, Serum: 132 U/L (03-16-24 @ 19:12)      	      PREVIOUS DIAGNOSTIC TESTING:    [ x ] Echocardiogram:  TTE with Doppler (w/Cont) (07.18.23 @ 08:31)  Ejection Fraction (Modified Velazquez Rule): 25 %  ------------------------------------------------------------------------  OBSERVATIONS:  Mitral Valve: Mitral annular calcification, otherwise  normal mitral valve. Mild mitral regurgitation.  Aortic Root: Normal aortic root.  Aortic Valve: Calcified trileaflet aortic valve with normal  opening. No aortic valve regurgitation seen.  Left Atrium: The left atrium appears enlarged however, the  LA volume is underestimated.  Left Ventricle: Severe global left ventricular systolic  dysfunction. LVEF calculated using biplane Velazquez's method  is 25%. Endocardial visualization enhanced with intravenous  injection of echo contrast (Definity). No left ventricular  thrombus. Normal left ventricular internal dimensions and  increased wall thicknesses. Normal left ventricular  diastolic function.  Right Heart: Normal right atrium. A device wire is noted in  the right heart. Normal tricuspid valve. No tricuspid  regurgitation. Normal pulmonic valve.  Pericardium/PleuraNo pericardial effusion seen. Left  pleural effusion.    [ ]  Catheterization:  [ ] Stress Test:

## 2024-03-16 NOTE — ED PROVIDER NOTE - ATTENDING CONTRIBUTION TO CARE
DR. BLOCH, ATTENDING MD-  I performed a face to face bedside interview with patient regarding history of present illness, review of symptoms and past medical history. I completed an independent physical exam.  I have discussed patient's plan of care with the resident.  68-year-old male in moderate distress tachypneic, pale, complaining of shortness of breath sitting upright.  HEENT normal pupils equal round and reactive to light, throat mucous membranes moist, positive JVD, heart sounds normal S1-S2 with no murmur heard lungs patient with and expiratory wheeze.  Abdomen soft, nontender, minimal edema pulses intact.  Patient moving all extremities

## 2024-03-17 DIAGNOSIS — I10 ESSENTIAL (PRIMARY) HYPERTENSION: ICD-10-CM

## 2024-03-17 DIAGNOSIS — Z29.9 ENCOUNTER FOR PROPHYLACTIC MEASURES, UNSPECIFIED: ICD-10-CM

## 2024-03-17 DIAGNOSIS — Z79.899 OTHER LONG TERM (CURRENT) DRUG THERAPY: ICD-10-CM

## 2024-03-17 DIAGNOSIS — E11.9 TYPE 2 DIABETES MELLITUS WITHOUT COMPLICATIONS: ICD-10-CM

## 2024-03-17 DIAGNOSIS — J44.1 CHRONIC OBSTRUCTIVE PULMONARY DISEASE WITH (ACUTE) EXACERBATION: ICD-10-CM

## 2024-03-17 DIAGNOSIS — I50.9 HEART FAILURE, UNSPECIFIED: ICD-10-CM

## 2024-03-17 DIAGNOSIS — I82.431 ACUTE EMBOLISM AND THROMBOSIS OF RIGHT POPLITEAL VEIN: ICD-10-CM

## 2024-03-17 DIAGNOSIS — I48.92 UNSPECIFIED ATRIAL FLUTTER: ICD-10-CM

## 2024-03-17 LAB
ALBUMIN SERPL ELPH-MCNC: 3.6 G/DL — SIGNIFICANT CHANGE UP (ref 3.3–5)
ALP SERPL-CCNC: 176 U/L — HIGH (ref 40–120)
ALT FLD-CCNC: 44 U/L — HIGH (ref 4–41)
ANION GAP SERPL CALC-SCNC: 19 MMOL/L — HIGH (ref 7–14)
AST SERPL-CCNC: 40 U/L — SIGNIFICANT CHANGE UP (ref 4–40)
BASE EXCESS BLDV CALC-SCNC: -5.7 MMOL/L — LOW (ref -2–3)
BASOPHILS # BLD AUTO: 0.01 K/UL — SIGNIFICANT CHANGE UP (ref 0–0.2)
BASOPHILS NFR BLD AUTO: 0.1 % — SIGNIFICANT CHANGE UP (ref 0–2)
BILIRUB SERPL-MCNC: 0.7 MG/DL — SIGNIFICANT CHANGE UP (ref 0.2–1.2)
BUN SERPL-MCNC: 68 MG/DL — HIGH (ref 7–23)
CA-I SERPL-SCNC: 1.16 MMOL/L — SIGNIFICANT CHANGE UP (ref 1.15–1.33)
CALCIUM SERPL-MCNC: 8.8 MG/DL — SIGNIFICANT CHANGE UP (ref 8.4–10.5)
CHLORIDE BLDV-SCNC: 98 MMOL/L — SIGNIFICANT CHANGE UP (ref 96–108)
CHLORIDE SERPL-SCNC: 97 MMOL/L — LOW (ref 98–107)
CO2 BLDV-SCNC: 22 MMOL/L — SIGNIFICANT CHANGE UP (ref 22–26)
CO2 SERPL-SCNC: 19 MMOL/L — LOW (ref 22–31)
CREAT SERPL-MCNC: 2.94 MG/DL — HIGH (ref 0.5–1.3)
EGFR: 22 ML/MIN/1.73M2 — LOW
EOSINOPHIL # BLD AUTO: 0 K/UL — SIGNIFICANT CHANGE UP (ref 0–0.5)
EOSINOPHIL NFR BLD AUTO: 0 % — SIGNIFICANT CHANGE UP (ref 0–6)
GAS PNL BLDV: 126 MMOL/L — LOW (ref 136–145)
GAS PNL BLDV: SIGNIFICANT CHANGE UP
GAS PNL BLDV: SIGNIFICANT CHANGE UP
GLUCOSE BLDC GLUCOMTR-MCNC: 102 MG/DL — HIGH (ref 70–99)
GLUCOSE BLDC GLUCOMTR-MCNC: 125 MG/DL — HIGH (ref 70–99)
GLUCOSE BLDC GLUCOMTR-MCNC: 149 MG/DL — HIGH (ref 70–99)
GLUCOSE BLDC GLUCOMTR-MCNC: 160 MG/DL — HIGH (ref 70–99)
GLUCOSE BLDV-MCNC: 133 MG/DL — HIGH (ref 70–99)
GLUCOSE SERPL-MCNC: 146 MG/DL — HIGH (ref 70–99)
HCO3 BLDV-SCNC: 21 MMOL/L — LOW (ref 22–29)
HCT VFR BLD CALC: 36.8 % — LOW (ref 39–50)
HCT VFR BLDA CALC: 35 % — LOW (ref 39–51)
HGB BLD CALC-MCNC: 11.8 G/DL — LOW (ref 12.6–17.4)
HGB BLD-MCNC: 12.2 G/DL — LOW (ref 13–17)
IANC: 6.78 K/UL — SIGNIFICANT CHANGE UP (ref 1.8–7.4)
IMM GRANULOCYTES NFR BLD AUTO: 0.3 % — SIGNIFICANT CHANGE UP (ref 0–0.9)
LACTATE BLDV-MCNC: 2.8 MMOL/L — HIGH (ref 0.5–2)
LYMPHOCYTES # BLD AUTO: 0.58 K/UL — LOW (ref 1–3.3)
LYMPHOCYTES # BLD AUTO: 7.7 % — LOW (ref 13–44)
MAGNESIUM SERPL-MCNC: 2 MG/DL — SIGNIFICANT CHANGE UP (ref 1.6–2.6)
MCHC RBC-ENTMCNC: 31.4 PG — SIGNIFICANT CHANGE UP (ref 27–34)
MCHC RBC-ENTMCNC: 33.2 GM/DL — SIGNIFICANT CHANGE UP (ref 32–36)
MCV RBC AUTO: 94.8 FL — SIGNIFICANT CHANGE UP (ref 80–100)
MONOCYTES # BLD AUTO: 0.17 K/UL — SIGNIFICANT CHANGE UP (ref 0–0.9)
MONOCYTES NFR BLD AUTO: 2.2 % — SIGNIFICANT CHANGE UP (ref 2–14)
NEUTROPHILS # BLD AUTO: 6.78 K/UL — SIGNIFICANT CHANGE UP (ref 1.8–7.4)
NEUTROPHILS NFR BLD AUTO: 89.7 % — HIGH (ref 43–77)
NRBC # BLD: 0 /100 WBCS — SIGNIFICANT CHANGE UP (ref 0–0)
NRBC # FLD: 0 K/UL — SIGNIFICANT CHANGE UP (ref 0–0)
PCO2 BLDV: 43 MMHG — SIGNIFICANT CHANGE UP (ref 42–55)
PH BLDV: 7.29 — LOW (ref 7.32–7.43)
PLATELET # BLD AUTO: 133 K/UL — LOW (ref 150–400)
PO2 BLDV: 62 MMHG — HIGH (ref 25–45)
POTASSIUM BLDV-SCNC: 3.8 MMOL/L — SIGNIFICANT CHANGE UP (ref 3.5–5.1)
POTASSIUM SERPL-MCNC: 4.1 MMOL/L — SIGNIFICANT CHANGE UP (ref 3.5–5.3)
POTASSIUM SERPL-SCNC: 4.1 MMOL/L — SIGNIFICANT CHANGE UP (ref 3.5–5.3)
PROCALCITONIN SERPL-MCNC: 0.14 NG/ML — HIGH (ref 0.02–0.1)
PROT SERPL-MCNC: 6.6 G/DL — SIGNIFICANT CHANGE UP (ref 6–8.3)
RBC # BLD: 3.88 M/UL — LOW (ref 4.2–5.8)
RBC # FLD: 15.9 % — HIGH (ref 10.3–14.5)
SAO2 % BLDV: 87.9 % — SIGNIFICANT CHANGE UP (ref 67–88)
SODIUM SERPL-SCNC: 135 MMOL/L — SIGNIFICANT CHANGE UP (ref 135–145)
TSH SERPL-MCNC: 4.43 UIU/ML — HIGH (ref 0.27–4.2)
WBC # BLD: 7.56 K/UL — SIGNIFICANT CHANGE UP (ref 3.8–10.5)
WBC # FLD AUTO: 7.56 K/UL — SIGNIFICANT CHANGE UP (ref 3.8–10.5)

## 2024-03-17 PROCEDURE — 99232 SBSQ HOSP IP/OBS MODERATE 35: CPT

## 2024-03-17 PROCEDURE — 99222 1ST HOSP IP/OBS MODERATE 55: CPT | Mod: FS

## 2024-03-17 PROCEDURE — 99223 1ST HOSP IP/OBS HIGH 75: CPT | Mod: GC

## 2024-03-17 RX ORDER — GLUCAGON INJECTION, SOLUTION 0.5 MG/.1ML
1 INJECTION, SOLUTION SUBCUTANEOUS ONCE
Refills: 0 | Status: DISCONTINUED | OUTPATIENT
Start: 2024-03-17 | End: 2024-03-30

## 2024-03-17 RX ORDER — DEXTROSE 50 % IN WATER 50 %
15 SYRINGE (ML) INTRAVENOUS ONCE
Refills: 0 | Status: DISCONTINUED | OUTPATIENT
Start: 2024-03-17 | End: 2024-03-30

## 2024-03-17 RX ORDER — HYDRALAZINE HCL 50 MG
75 TABLET ORAL THREE TIMES A DAY
Refills: 0 | Status: DISCONTINUED | OUTPATIENT
Start: 2024-03-17 | End: 2024-03-18

## 2024-03-17 RX ORDER — AZITHROMYCIN 500 MG/1
250 TABLET, FILM COATED ORAL DAILY
Refills: 0 | Status: COMPLETED | OUTPATIENT
Start: 2024-03-18 | End: 2024-03-21

## 2024-03-17 RX ORDER — DEXTROSE 50 % IN WATER 50 %
25 SYRINGE (ML) INTRAVENOUS ONCE
Refills: 0 | Status: DISCONTINUED | OUTPATIENT
Start: 2024-03-17 | End: 2024-03-30

## 2024-03-17 RX ORDER — CEFTRIAXONE 500 MG/1
1000 INJECTION, POWDER, FOR SOLUTION INTRAMUSCULAR; INTRAVENOUS EVERY 24 HOURS
Refills: 0 | Status: COMPLETED | OUTPATIENT
Start: 2024-03-17 | End: 2024-03-22

## 2024-03-17 RX ORDER — APIXABAN 2.5 MG/1
5 TABLET, FILM COATED ORAL EVERY 12 HOURS
Refills: 0 | Status: DISCONTINUED | OUTPATIENT
Start: 2024-03-17 | End: 2024-03-25

## 2024-03-17 RX ORDER — INSULIN LISPRO 100/ML
VIAL (ML) SUBCUTANEOUS AT BEDTIME
Refills: 0 | Status: DISCONTINUED | OUTPATIENT
Start: 2024-03-17 | End: 2024-03-30

## 2024-03-17 RX ORDER — LEVOTHYROXINE SODIUM 125 MCG
50 TABLET ORAL DAILY
Refills: 0 | Status: DISCONTINUED | OUTPATIENT
Start: 2024-03-17 | End: 2024-03-30

## 2024-03-17 RX ORDER — TAMSULOSIN HYDROCHLORIDE 0.4 MG/1
0.4 CAPSULE ORAL AT BEDTIME
Refills: 0 | Status: DISCONTINUED | OUTPATIENT
Start: 2024-03-17 | End: 2024-03-30

## 2024-03-17 RX ORDER — BUMETANIDE 0.25 MG/ML
4 INJECTION INTRAMUSCULAR; INTRAVENOUS EVERY 12 HOURS
Refills: 0 | Status: DISCONTINUED | OUTPATIENT
Start: 2024-03-17 | End: 2024-03-17

## 2024-03-17 RX ORDER — FAMOTIDINE 10 MG/ML
20 INJECTION INTRAVENOUS DAILY
Refills: 0 | Status: DISCONTINUED | OUTPATIENT
Start: 2024-03-17 | End: 2024-03-30

## 2024-03-17 RX ORDER — BUMETANIDE 0.25 MG/ML
4 INJECTION INTRAMUSCULAR; INTRAVENOUS ONCE
Refills: 0 | Status: COMPLETED | OUTPATIENT
Start: 2024-03-17 | End: 2024-03-17

## 2024-03-17 RX ORDER — ISOSORBIDE DINITRATE 5 MG/1
30 TABLET ORAL THREE TIMES A DAY
Refills: 0 | Status: DISCONTINUED | OUTPATIENT
Start: 2024-03-17 | End: 2024-03-18

## 2024-03-17 RX ORDER — INSULIN LISPRO 100/ML
VIAL (ML) SUBCUTANEOUS
Refills: 0 | Status: DISCONTINUED | OUTPATIENT
Start: 2024-03-17 | End: 2024-03-30

## 2024-03-17 RX ORDER — FERROUS SULFATE 325(65) MG
325 TABLET ORAL DAILY
Refills: 0 | Status: DISCONTINUED | OUTPATIENT
Start: 2024-03-17 | End: 2024-03-30

## 2024-03-17 RX ORDER — AZITHROMYCIN 500 MG/1
TABLET, FILM COATED ORAL
Refills: 0 | Status: COMPLETED | OUTPATIENT
Start: 2024-03-17 | End: 2024-03-22

## 2024-03-17 RX ORDER — FAMOTIDINE 10 MG/ML
20 INJECTION INTRAVENOUS
Refills: 0 | Status: DISCONTINUED | OUTPATIENT
Start: 2024-03-17 | End: 2024-03-17

## 2024-03-17 RX ORDER — BUMETANIDE 0.25 MG/ML
4 INJECTION INTRAMUSCULAR; INTRAVENOUS
Refills: 0 | Status: DISCONTINUED | OUTPATIENT
Start: 2024-03-18 | End: 2024-03-18

## 2024-03-17 RX ORDER — SODIUM CHLORIDE 9 MG/ML
1000 INJECTION, SOLUTION INTRAVENOUS
Refills: 0 | Status: DISCONTINUED | OUTPATIENT
Start: 2024-03-17 | End: 2024-03-30

## 2024-03-17 RX ORDER — CARVEDILOL PHOSPHATE 80 MG/1
6.25 CAPSULE, EXTENDED RELEASE ORAL EVERY 12 HOURS
Refills: 0 | Status: DISCONTINUED | OUTPATIENT
Start: 2024-03-17 | End: 2024-03-18

## 2024-03-17 RX ORDER — AZITHROMYCIN 500 MG/1
500 TABLET, FILM COATED ORAL ONCE
Refills: 0 | Status: COMPLETED | OUTPATIENT
Start: 2024-03-17 | End: 2024-03-17

## 2024-03-17 RX ORDER — CLOPIDOGREL BISULFATE 75 MG/1
75 TABLET, FILM COATED ORAL DAILY
Refills: 0 | Status: DISCONTINUED | OUTPATIENT
Start: 2024-03-17 | End: 2024-03-30

## 2024-03-17 RX ORDER — IPRATROPIUM/ALBUTEROL SULFATE 18-103MCG
3 AEROSOL WITH ADAPTER (GRAM) INHALATION EVERY 6 HOURS
Refills: 0 | Status: DISCONTINUED | OUTPATIENT
Start: 2024-03-17 | End: 2024-03-30

## 2024-03-17 RX ORDER — ATORVASTATIN CALCIUM 80 MG/1
80 TABLET, FILM COATED ORAL AT BEDTIME
Refills: 0 | Status: DISCONTINUED | OUTPATIENT
Start: 2024-03-17 | End: 2024-03-30

## 2024-03-17 RX ORDER — ROSUVASTATIN CALCIUM 5 MG/1
0 TABLET ORAL
Qty: 0 | Refills: 2 | DISCHARGE

## 2024-03-17 RX ORDER — DEXTROSE 50 % IN WATER 50 %
12.5 SYRINGE (ML) INTRAVENOUS ONCE
Refills: 0 | Status: DISCONTINUED | OUTPATIENT
Start: 2024-03-17 | End: 2024-03-30

## 2024-03-17 RX ORDER — ALLOPURINOL 300 MG
100 TABLET ORAL DAILY
Refills: 0 | Status: DISCONTINUED | OUTPATIENT
Start: 2024-03-17 | End: 2024-03-30

## 2024-03-17 RX ORDER — GABAPENTIN 400 MG/1
100 CAPSULE ORAL DAILY
Refills: 0 | Status: DISCONTINUED | OUTPATIENT
Start: 2024-03-17 | End: 2024-03-30

## 2024-03-17 RX ORDER — BUDESONIDE AND FORMOTEROL FUMARATE DIHYDRATE 160; 4.5 UG/1; UG/1
2 AEROSOL RESPIRATORY (INHALATION)
Refills: 0 | Status: DISCONTINUED | OUTPATIENT
Start: 2024-03-17 | End: 2024-03-30

## 2024-03-17 RX ADMIN — Medication 75 MILLIGRAM(S): at 21:14

## 2024-03-17 RX ADMIN — BUDESONIDE AND FORMOTEROL FUMARATE DIHYDRATE 2 PUFF(S): 160; 4.5 AEROSOL RESPIRATORY (INHALATION) at 21:13

## 2024-03-17 RX ADMIN — TAMSULOSIN HYDROCHLORIDE 0.4 MILLIGRAM(S): 0.4 CAPSULE ORAL at 21:13

## 2024-03-17 RX ADMIN — FAMOTIDINE 20 MILLIGRAM(S): 10 INJECTION INTRAVENOUS at 13:47

## 2024-03-17 RX ADMIN — Medication 75 MILLIGRAM(S): at 16:14

## 2024-03-17 RX ADMIN — Medication 1: at 18:00

## 2024-03-17 RX ADMIN — ATORVASTATIN CALCIUM 80 MILLIGRAM(S): 80 TABLET, FILM COATED ORAL at 21:13

## 2024-03-17 RX ADMIN — CARVEDILOL PHOSPHATE 6.25 MILLIGRAM(S): 80 CAPSULE, EXTENDED RELEASE ORAL at 17:56

## 2024-03-17 RX ADMIN — Medication 3 MILLILITER(S): at 20:22

## 2024-03-17 RX ADMIN — CLOPIDOGREL BISULFATE 75 MILLIGRAM(S): 75 TABLET, FILM COATED ORAL at 13:46

## 2024-03-17 RX ADMIN — AZITHROMYCIN 500 MILLIGRAM(S): 500 TABLET, FILM COATED ORAL at 21:24

## 2024-03-17 RX ADMIN — ISOSORBIDE DINITRATE 30 MILLIGRAM(S): 5 TABLET ORAL at 15:45

## 2024-03-17 RX ADMIN — Medication 325 MILLIGRAM(S): at 13:47

## 2024-03-17 RX ADMIN — ISOSORBIDE DINITRATE 30 MILLIGRAM(S): 5 TABLET ORAL at 17:56

## 2024-03-17 RX ADMIN — BUMETANIDE 132 MILLIGRAM(S): 0.25 INJECTION INTRAMUSCULAR; INTRAVENOUS at 00:09

## 2024-03-17 RX ADMIN — BUDESONIDE AND FORMOTEROL FUMARATE DIHYDRATE 2 PUFF(S): 160; 4.5 AEROSOL RESPIRATORY (INHALATION) at 11:30

## 2024-03-17 RX ADMIN — BUMETANIDE 132 MILLIGRAM(S): 0.25 INJECTION INTRAMUSCULAR; INTRAVENOUS at 15:56

## 2024-03-17 RX ADMIN — Medication 100 MILLIGRAM(S): at 13:58

## 2024-03-17 RX ADMIN — Medication 3 MILLILITER(S): at 16:14

## 2024-03-17 RX ADMIN — APIXABAN 5 MILLIGRAM(S): 2.5 TABLET, FILM COATED ORAL at 17:56

## 2024-03-17 RX ADMIN — GABAPENTIN 100 MILLIGRAM(S): 400 CAPSULE ORAL at 13:46

## 2024-03-17 NOTE — ED ADULT NURSE REASSESSMENT NOTE - NS ED NURSE REASSESS COMMENT FT1
ER AOX 3 denies CP, cough, on NC 3 L maintaining 98-99% oxygen Sat.    Report to 712 primary Rn done by covering RN Janeen.      Elif Major RN

## 2024-03-17 NOTE — CONSULT NOTE ADULT - ASSESSMENT
68M with PMH of CAD, HFrEF (EF = 25% - 2023), s/p AICD, CABG, HTN, HLD, T2DM  Asthma, COPD, Home oxygen (2L), CKD, CVA w/ Left sided deficits, and Gait difficulty (uses walker), presenting with pleuritic chest pain, shortness of breath, leg swelling, and reduced urine output for 3 days. Required BIPAP in ED for increased work of breathing. RVP negative. CXR with retrocardiac opacity. Admitted for ADHF. On physical examination, patient appears comfortable, distant air sounds, diffuse faint expiratory wheezing, speaking in complete sentences, SpO2 100% on 2 L NC (home O2)      Recommendations:  -solumedrol 60 mg IVP x 5 days for possible COPD exaceration  -duonebs q6h  -send full RVP  -would give bumex 4 mg IVP now. Has not received diuretics today. Agree with bumex 4 mg IVP BID  -daily weights and strict I/Os  -low threshold for starting abx if febrile given retrocardiac opacity on CXR concerning for possible PNA. Trend fever curve  -nocturnal BIPAP  -check VBG  -EP consult for PPM interrogation    Recommendations not final until note attested by attending    Pina Ordaz MD  Internal Medicine PGY3 68M with PMH of CAD, HFrEF (EF = 25% - 2023), s/p AICD, CABG, HTN, HLD, T2DM  Asthma, COPD, Home oxygen (2L), CKD, CVA w/ Left sided deficits, and Gait difficulty (uses walker), presenting with pleuritic chest pain, shortness of breath, leg swelling, and reduced urine output for 3 days. Required BIPAP in ED for increased work of breathing. RVP negative. CXR with retrocardiac opacity. Admitted for ADHF. On physical examination, patient appears comfortable, distant air sounds, diffuse faint expiratory wheezing, speaking in complete sentences, SpO2 100% on 2 L NC (home O2)      Recommendations:  -solumedrol 60 mg IVP x 5 days for possible COPD exaceration  -duonebs q6h  -send full RVP  -would give bumex 4 mg IVP now. Has not received diuretics today. Agree with bumex 4 mg IVP BID  -daily weights and strict I/Os  -low threshold for starting abx given retrocardiac opacity on CXR concerning for possible PNA. Trend fever curve  -nocturnal BIPAP  -check VBG  -EP consult for PPM interrogation    Recommendations not final until note attested by attending. Patient is not MICU candidate at this time. Please reconsult as necessary    Pina Ordaz MD  Internal Medicine PGY3 68M with PMH of CAD, HFrEF (EF = 25% - 2023), s/p AICD, CABG, HTN, HLD, T2DM  Asthma, COPD, Home oxygen (2L), CKD, CVA w/ Left sided deficits, and Gait difficulty (uses walker), presenting with pleuritic chest pain, shortness of breath, leg swelling, and reduced urine output for 3 days. Required BIPAP in ED for increased work of breathing. RVP negative. CXR with retrocardiac opacity. Admitted for ADHF. On physical examination, patient appears comfortable, distant air sounds, diffuse faint expiratory wheezing, speaking in complete sentences, SpO2 100% on 2 L NC (home O2)      Recommendations:  -duonebs q6h  -send full RVP  -would give bumex 4 mg IVP now. Has not received diuretics today. Agree with bumex 4 mg IVP BID  -daily weights and strict I/Os  -nocturnal BIPAP  -check VBG  -EP consult for PPM interrogation    Plan discussed with attending. Patient is not MICU candidate at this time. Please reconsult as necessary    Pina Ordaz MD  Internal Medicine PGY3 68M with PMH of CAD, HFrEF (EF = 25% - 2023), s/p AICD, CABG, HTN, HLD, T2DM  Asthma, COPD, Home oxygen (2L), CKD, CVA w/ Left sided deficits, and Gait difficulty (uses walker), presenting with pleuritic chest pain, shortness of breath, leg swelling, and reduced urine output for 3 days. Required BIPAP in ED for increased work of breathing. Admitted for ADHF. On physical examination, patient appears comfortable, distant air sounds, diffuse faint expiratory wheezing, speaking in complete sentences, SpO2 100% on 2 L NC (home O2)      Recommendations:  -duonebs q6h  -send full RVP  -would give bumex 4 mg IVP now. Has not received diuretics today. Agree with bumex 4 mg IVP BID  -daily weights and strict I/Os  -check VBG  -EP consult for PPM interrogation    Plan discussed with attending. Patient is not MICU candidate at this time. Please reconsult as necessary    Pina Ordaz MD  Internal Medicine PGY3

## 2024-03-17 NOTE — CONSULT NOTE ADULT - SUBJECTIVE AND OBJECTIVE BOX
CHIEF COMPLAINT:    HPI:    PAST MEDICAL & SURGICAL HISTORY:  HTN (hypertension)      CAD (coronary artery disease)      Diabetes      Hyperlipidemia, unspecified hyperlipidemia type      CHF (congestive heart failure)      BPH (benign prostatic hyperplasia)      S/P CABG (coronary artery bypass graft)      S/P appendectomy          FAMILY HISTORY:  Family history of type 2 diabetes mellitus in father (Father)        SOCIAL HISTORY:  Smoking: __ packs x ___ years  EtOH Use:  Marital Status:  Occupation:  Recent Travel:  Country of Birth:  Advance Directives:    Allergies    No Known Allergies    Intolerances        HOME MEDICATIONS:    REVIEW OF SYSTEMS:  Constitutional:   Eyes:  ENT:  CV:  Resp:  GI:  :  MSK:  Integumentary:  Neurological:  Psychiatric:  Endocrine:  Hematologic/Lymphatic:  Allergic/Immunologic:  [ ] All other systems negative  [ ] Unable to assess ROS because ________    OBJECTIVE:  ICU Vital Signs Last 24 Hrs  T(C): 36.3 (17 Mar 2024 09:06), Max: 37 (17 Mar 2024 02:41)  T(F): 97.3 (17 Mar 2024 09:06), Max: 98.6 (17 Mar 2024 02:41)  HR: 82 (17 Mar 2024 09:06) (78 - 92)  BP: 108/80 (17 Mar 2024 09:06) (106/78 - 130/82)  BP(mean): --  ABP: --  ABP(mean): --  RR: 20 (17 Mar 2024 09:06) (18 - 28)  SpO2: 100% (17 Mar 2024 09:06) (95% - 100%)    O2 Parameters below as of 17 Mar 2024 09:06  Patient On (Oxygen Delivery Method): BiPAP/CPAP              CAPILLARY BLOOD GLUCOSE      POCT Blood Glucose.: 125 mg/dL (17 Mar 2024 09:31)      PHYSICAL EXAM:  General:   HEENT:   Lymph Nodes:  Neck:   Respiratory:   Cardiovascular:   Abdomen:   Extremities:   Skin:   Neurological:  Psychiatry:    HOSPITAL MEDICATIONS:  MEDICATIONS  (STANDING):  albuterol/ipratropium for Nebulization 3 milliLiter(s) Nebulizer every 6 hours  allopurinol 100 milliGRAM(s) Oral daily  apixaban 5 milliGRAM(s) Oral every 12 hours  atorvastatin 80 milliGRAM(s) Oral at bedtime  budesonide 160 MICROgram(s)/formoterol 4.5 MICROgram(s) Inhaler 2 Puff(s) Inhalation two times a day  buMETAnide IVPB 4 milliGRAM(s) IV Intermittent every 12 hours  carvedilol 6.25 milliGRAM(s) Oral every 12 hours  clopidogrel Tablet 75 milliGRAM(s) Oral daily  dextrose 5%. 1000 milliLiter(s) (50 mL/Hr) IV Continuous <Continuous>  dextrose 5%. 1000 milliLiter(s) (100 mL/Hr) IV Continuous <Continuous>  dextrose 50% Injectable 25 Gram(s) IV Push once  dextrose 50% Injectable 12.5 Gram(s) IV Push once  dextrose 50% Injectable 25 Gram(s) IV Push once  famotidine    Tablet 20 milliGRAM(s) Oral daily  ferrous    sulfate 325 milliGRAM(s) Oral daily  gabapentin 100 milliGRAM(s) Oral daily  glucagon  Injectable 1 milliGRAM(s) IntraMuscular once  hydrALAZINE 75 milliGRAM(s) Oral three times a day  insulin lispro (ADMELOG) corrective regimen sliding scale   SubCutaneous at bedtime  insulin lispro (ADMELOG) corrective regimen sliding scale   SubCutaneous three times a day before meals  isosorbide   dinitrate Tablet (ISORDIL) 30 milliGRAM(s) Oral three times a day  levothyroxine 50 MICROGram(s) Oral daily  tamsulosin 0.4 milliGRAM(s) Oral at bedtime    MEDICATIONS  (PRN):  dextrose Oral Gel 15 Gram(s) Oral once PRN Blood Glucose LESS THAN 70 milliGRAM(s)/deciliter      LABS:                        12.2   7.56  )-----------( 133      ( 17 Mar 2024 05:19 )             36.8     03-17    135  |  97<L>  |  68<H>  ----------------------------<  146<H>  4.1   |  19<L>  |  2.94<H>    Ca    8.8      17 Mar 2024 06:24  Mg     2.00     03-17    TPro  6.6  /  Alb  3.6  /  TBili  0.7  /  DBili  x   /  AST  40  /  ALT  44<H>  /  AlkPhos  176<H>  03-17    PT/INR - ( 16 Mar 2024 19:12 )   PT: 17.0 sec;   INR: 1.54 ratio         PTT - ( 16 Mar 2024 19:12 )  PTT:39.4 sec  Urinalysis Basic - ( 17 Mar 2024 06:24 )    Color: x / Appearance: x / SG: x / pH: x  Gluc: 146 mg/dL / Ketone: x  / Bili: x / Urobili: x   Blood: x / Protein: x / Nitrite: x   Leuk Esterase: x / RBC: x / WBC x   Sq Epi: x / Non Sq Epi: x / Bacteria: x        Venous Blood Gas:  03-16 @ 20:33  7.28/41/31/19/44.2  VBG Lactate: 1.5  Venous Blood Gas:  03-16 @ 19:12  7.27/49/27/22/30.8  VBG Lactate: 1.9      MICROBIOLOGY:     RADIOLOGY:  [ ] Reviewed and interpreted by me    EKG: CHIEF COMPLAINT: shortness of breath    HPI:  68M with PMH of CAD, HFrEF (EF = 25% - 2023), s/p AICD, CABG, HTN, HLD, T2DM  Asthma, COPD, Home oxygen (2L), CKD, CVA w/ Left sided deficits, and Gait difficulty (uses walker), Reports shortness of breath and leg swelling associated with dry cough and minimal urine output over the last 3 days. SpO2 dropped to 80-84% at home. Reports compliance with medications including 6 mg bumex daily. Denies change in salt or water intake. Does note uri symptom that began 2 weeks ago and ended prior to symptom onset. Sars/rsv/flu negative. States he was wheezing at home and had worsening dyspnea on exertion. Sleeps with 2 pillows at home. Prelim cxr with left retrocardiac opacity; normal wbc, afebrile thus far. Reports significant symptomatic improvement since being placed on Bipap including improved cp. Multiple ekgs non ischemic. Trop 93, 84. SCr 2.94 which is baseline.  Reports aicd fired last week prior to symptom onset (states cardiologist aware). Per [facility]IE note from March 8, Afib activity noted for 43 minutes, pt started on eliquis. Aspirin discontinued recently per pt, remains on plavix    Patient admitted for ADHF. Received lasix 40 mg IVP on 3/16. Labs significant for WBC 7.56, procalcitonin 0.14, pH 7.28, pCOW 41, trops 93->84, BUN 68, creatiine 2.94. RVP negative. CXR with retrocardiac opacity.    Patient on BIPAP overnight, currently on 2 L NC (home O2). Endorses improvement in SOB since arrival.     PAST MEDICAL & SURGICAL HISTORY:  HTN (hypertension)      CAD (coronary artery disease)      Diabetes      Hyperlipidemia, unspecified hyperlipidemia type      CHF (congestive heart failure)      BPH (benign prostatic hyperplasia)      S/P CABG (coronary artery bypass graft)      S/P appendectomy          FAMILY HISTORY:  Family history of type 2 diabetes mellitus in father (Father)        SOCIAL HISTORY:  Smoking: __ packs x ___ years  EtOH Use:  Marital Status:  Occupation:  Recent Travel:  Country of Birth:  Advance Directives:    Allergies    No Known Allergies    Intolerances        HOME MEDICATIONS:    REVIEW OF SYSTEMS:  As stated in HPI  +abdominal gas    OBJECTIVE:  ICU Vital Signs Last 24 Hrs  T(C): 36.3 (17 Mar 2024 09:06), Max: 37 (17 Mar 2024 02:41)  T(F): 97.3 (17 Mar 2024 09:06), Max: 98.6 (17 Mar 2024 02:41)  HR: 82 (17 Mar 2024 09:06) (78 - 92)  BP: 108/80 (17 Mar 2024 09:06) (106/78 - 130/82)  BP(mean): --  ABP: --  ABP(mean): --  RR: 20 (17 Mar 2024 09:06) (18 - 28)  SpO2: 100% (17 Mar 2024 09:06) (95% - 100%)    O2 Parameters below as of 17 Mar 2024 09:06  Patient On (Oxygen Delivery Method): BiPAP/CPAP      CAPILLARY BLOOD GLUCOSE      POCT Blood Glucose.: 125 mg/dL (17 Mar 2024 09:31)      PHYSICAL EXAM:  Constitutional: appears comfortable  HEENT: NCAT, no conjunctival injection, PERRL, EOMI, moist oral mucosa, no oropharyngeal exudates  Neck: no JVD, supple, no LAD, no thyromegaly  Chest: distant air sounds, diffuse faint expiratory wheezing, speaking in complete sentences  Heart: RRR, physiologic S1 and S2, no murmurs, no rubs, no gallops, no LE edema  Abd: distended, soft, nontender, +BS  Extremities: no clubbing, warm hands and feet, no edema  Neuro: alert, A&Ox3, no focal deficits  MSK: spontaneous movement of all 4 extremities, full and equal strength, no joint swelling  Skin: Warm. no rashes    HOSPITAL MEDICATIONS:  MEDICATIONS  (STANDING):  albuterol/ipratropium for Nebulization 3 milliLiter(s) Nebulizer every 6 hours  allopurinol 100 milliGRAM(s) Oral daily  apixaban 5 milliGRAM(s) Oral every 12 hours  atorvastatin 80 milliGRAM(s) Oral at bedtime  budesonide 160 MICROgram(s)/formoterol 4.5 MICROgram(s) Inhaler 2 Puff(s) Inhalation two times a day  buMETAnide IVPB 4 milliGRAM(s) IV Intermittent every 12 hours  carvedilol 6.25 milliGRAM(s) Oral every 12 hours  clopidogrel Tablet 75 milliGRAM(s) Oral daily  dextrose 5%. 1000 milliLiter(s) (50 mL/Hr) IV Continuous <Continuous>  dextrose 5%. 1000 milliLiter(s) (100 mL/Hr) IV Continuous <Continuous>  dextrose 50% Injectable 25 Gram(s) IV Push once  dextrose 50% Injectable 12.5 Gram(s) IV Push once  dextrose 50% Injectable 25 Gram(s) IV Push once  famotidine    Tablet 20 milliGRAM(s) Oral daily  ferrous    sulfate 325 milliGRAM(s) Oral daily  gabapentin 100 milliGRAM(s) Oral daily  glucagon  Injectable 1 milliGRAM(s) IntraMuscular once  hydrALAZINE 75 milliGRAM(s) Oral three times a day  insulin lispro (ADMELOG) corrective regimen sliding scale   SubCutaneous at bedtime  insulin lispro (ADMELOG) corrective regimen sliding scale   SubCutaneous three times a day before meals  isosorbide   dinitrate Tablet (ISORDIL) 30 milliGRAM(s) Oral three times a day  levothyroxine 50 MICROGram(s) Oral daily  tamsulosin 0.4 milliGRAM(s) Oral at bedtime    MEDICATIONS  (PRN):  dextrose Oral Gel 15 Gram(s) Oral once PRN Blood Glucose LESS THAN 70 milliGRAM(s)/deciliter      LABS:                        12.2   7.56  )-----------( 133      ( 17 Mar 2024 05:19 )             36.8     03-17    135  |  97<L>  |  68<H>  ----------------------------<  146<H>  4.1   |  19<L>  |  2.94<H>    Ca    8.8      17 Mar 2024 06:24  Mg     2.00     03-17    TPro  6.6  /  Alb  3.6  /  TBili  0.7  /  DBili  x   /  AST  40  /  ALT  44<H>  /  AlkPhos  176<H>  03-17    PT/INR - ( 16 Mar 2024 19:12 )   PT: 17.0 sec;   INR: 1.54 ratio         PTT - ( 16 Mar 2024 19:12 )  PTT:39.4 sec  Urinalysis Basic - ( 17 Mar 2024 06:24 )    Color: x / Appearance: x / SG: x / pH: x  Gluc: 146 mg/dL / Ketone: x  / Bili: x / Urobili: x   Blood: x / Protein: x / Nitrite: x   Leuk Esterase: x / RBC: x / WBC x   Sq Epi: x / Non Sq Epi: x / Bacteria: x        Venous Blood Gas:  03-16 @ 20:33  7.28/41/31/19/44.2  VBG Lactate: 1.5  Venous Blood Gas:  03-16 @ 19:12  7.27/49/27/22/30.8  VBG Lactate: 1.9      MICROBIOLOGY:     RADIOLOGY:    EXAM:  XR CHEST PORTABLE URGENT 1V   ORDERED BY: HELEN BLOCH     PROCEDURE DATE:  03/16/2024          INTERPRETATION:  CLINICAL INDICATION: Congestive heart failure with chest   pain    EXAM: Frontal radiograph of the chest.    COMPARISON: Chest radiograph from 7/17/2023    FINDINGS:  Redemonstrated left chest wall ICD with intact leads, sternotomy wires   and mediastinal clips.  Left retrocardiac opacity.  There is no pneumothorax.  The heart is enlarged, unchanged.  The visualized osseous structures demonstrate no acute pathology.    IMPRESSION:  Left retrocardiac opacity, may represent consolidation.      EKG:

## 2024-03-17 NOTE — ED ADULT NURSE REASSESSMENT NOTE - NS ED NURSE REASSESS COMMENT FT1
patient laying in semi fowlers position on the stretcher. patient alert and oriented times four. patient denies shortness of breath, chest pain, nausea, vomiting, chill, fever. Patient normal sinus on the monitor. Respirations equal and adequate. Safety measures in place, call bell within reach. Patient stable upon assessment. patient does not tolerate coming off of bipap, morning p.o meds cannot be given ACP nina made aware.

## 2024-03-17 NOTE — PROGRESS NOTE ADULT - PROBLEM SELECTOR PLAN 1
-possible stemming from new-onset afib /flutter first noted March 8th as well as recent URI that maybe contributory  -needed bipap in the ED, now on 2L NC  -tele, tte  -IV bumex 4mg bid, Is/Os,  -cardiology consulted, recs appreciated   -would obtain formal EP eval   -CXR with retrocardiac opacity, procal mildly elevated, start cap coverage

## 2024-03-17 NOTE — PROGRESS NOTE ADULT - SUBJECTIVE AND OBJECTIVE BOX
Sandor Esquivel MD  [facility] Division of Hospital Medicine  Pager: [phone]  Available via MS Teams    SUBJECTIVE / OVERNIGHT EVENTS:    Short of breath on examination   Diffusely wheezy   Cold hands and feet noted     MEDICATIONS  (STANDING):  albuterol/ipratropium for Nebulization 3 milliLiter(s) Nebulizer every 6 hours  allopurinol 100 milliGRAM(s) Oral daily  apixaban 5 milliGRAM(s) Oral every 12 hours  atorvastatin 80 milliGRAM(s) Oral at bedtime  budesonide 160 MICROgram(s)/formoterol 4.5 MICROgram(s) Inhaler 2 Puff(s) Inhalation two times a day  buMETAnide IVPB 4 milliGRAM(s) IV Intermittent two times a day  carvedilol 6.25 milliGRAM(s) Oral every 12 hours  clopidogrel Tablet 75 milliGRAM(s) Oral daily  dextrose 5%. 1000 milliLiter(s) (50 mL/Hr) IV Continuous <Continuous>  dextrose 5%. 1000 milliLiter(s) (100 mL/Hr) IV Continuous <Continuous>  dextrose 50% Injectable 25 Gram(s) IV Push once  dextrose 50% Injectable 25 Gram(s) IV Push once  dextrose 50% Injectable 12.5 Gram(s) IV Push once  famotidine    Tablet 20 milliGRAM(s) Oral daily  ferrous    sulfate 325 milliGRAM(s) Oral daily  gabapentin 100 milliGRAM(s) Oral daily  glucagon  Injectable 1 milliGRAM(s) IntraMuscular once  hydrALAZINE 75 milliGRAM(s) Oral three times a day  insulin lispro (ADMELOG) corrective regimen sliding scale   SubCutaneous at bedtime  insulin lispro (ADMELOG) corrective regimen sliding scale   SubCutaneous three times a day before meals  isosorbide   dinitrate Tablet (ISORDIL) 30 milliGRAM(s) Oral three times a day  levothyroxine 50 MICROGram(s) Oral daily  tamsulosin 0.4 milliGRAM(s) Oral at bedtime    MEDICATIONS  (PRN):  dextrose Oral Gel 15 Gram(s) Oral once PRN Blood Glucose LESS THAN 70 milliGRAM(s)/deciliter      I&O's Summary      PHYSICAL EXAM:  Vital Signs Last 24 Hrs  T(C): 36.7 (17 Mar 2024 16:23), Max: 37 (17 Mar 2024 02:41)  T(F): 98.1 (17 Mar 2024 16:23), Max: 98.6 (17 Mar 2024 02:41)  HR: 70 (17 Mar 2024 17:55) (70 - 87)  BP: 118/71 (17 Mar 2024 17:55) (106/78 - 130/82)  BP(mean): --  RR: 21 (17 Mar 2024 17:55) (18 - 22)  SpO2: 100% (17 Mar 2024 17:55) (96% - 100%)    Parameters below as of 17 Mar 2024 17:55  Patient On (Oxygen Delivery Method): nasal cannula  O2 Flow (L/min): 2    CONSTITUTIONAL: mild resp distress    EYES: conjunctiva and sclera clear  ENMT: Moist oral mucosa   NECK: Supple   RESPIRATORY: diffusely wheezing throughout lung fields   CARDIOVASCULAR: Regular rate and rhythm, normal S1 and S2, no murmur/rub/gallop; Peripheral pulses are 2+ bilaterally  ABDOMEN: Nontender to palpation, normoactive bowel sounds, no rebound/guarding   MUSCULOSKELETAL: mild LE edema   PSYCH: A+O to person, place, and time; affect appropriate  NEUROLOGY: moves all extremities   SKIN: No rashes    LABS:                        12.2   7.56  )-----------( 133      ( 17 Mar 2024 05:19 )             36.8     03-17    135  |  97<L>  |  68<H>  ----------------------------<  146<H>  4.1   |  19<L>  |  2.94<H>    Ca    8.8      17 Mar 2024 06:24  Mg     2.00     03-17    TPro  6.6  /  Alb  3.6  /  TBili  0.7  /  DBili  x   /  AST  40  /  ALT  44<H>  /  AlkPhos  176<H>  03-17    PT/INR - ( 16 Mar 2024 19:12 )   PT: 17.0 sec;   INR: 1.54 ratio         PTT - ( 16 Mar 2024 19:12 )  PTT:39.4 sec  CARDIAC MARKERS ( 16 Mar 2024 19:12 )  x     / x     / 132 U/L / x     / x          Urinalysis Basic - ( 17 Mar 2024 06:24 )    Color: x / Appearance: x / SG: x / pH: x  Gluc: 146 mg/dL / Ketone: x  / Bili: x / Urobili: x   Blood: x / Protein: x / Nitrite: x   Leuk Esterase: x / RBC: x / WBC x   Sq Epi: x / Non Sq Epi: x / Bacteria: x        RADIOLOGY & ADDITIONAL TESTS:  Other Results Reviewed Today: BMP with elevated cr, CBC with stable Hg     COORDINATION OF CARE:  Communication: discussed plan of care with ACP

## 2024-03-17 NOTE — PHARMACOTHERAPY INTERVENTION NOTE - COMMENTS
Medication history is incomplete. Medication list updated in Outpatient Medication Record (OMR) per [facility]. Patient unable to recall entire medication list. Please call spectra [phone] if you have any questions. Medication history is incomplete. Per documentation in provider handoff: medication list updated in Outpatient Medication Record (OMR) per [facility]. Patient unable to recall entire medication list. Please call spectra [phone] if you have any questions.

## 2024-03-17 NOTE — PHYSICAL THERAPY INITIAL EVALUATION ADULT - DID THE PATIENT HAVE SURGERY?
Problem: BIRTH - VAGINAL/ SECTION  Goal: Fetal and maternal status remain reassuring during the birth process  INTERVENTIONS:  - Monitor vital signs  - Monitor fetal heart rate  - Monitor uterine activity  - Monitor labor progression (vaginal deliv n/a

## 2024-03-17 NOTE — PHYSICAL THERAPY INITIAL EVALUATION ADULT - ACTIVE RANGE OF MOTION EXAMINATION, REHAB EVAL
timothy. upper extremity Active ROM was WNL (within normal limits)/bilateral lower extremity Active ROM was WNL (within normal limits)

## 2024-03-17 NOTE — ED ADULT NURSE REASSESSMENT NOTE - NS ED NURSE REASSESS COMMENT FT1
BREAK RN: Pt received from THOMAS Shah. Pt with no acute changes at this time. Pt respirations even and unlabored, chest rise and fall equal b/l, O2 % on BIPAP. Pt denies chest pain, HA, SOB, dizziness, N/V/D, fever/chills. Stretcher in lowest position, call bell within reach, pt safety maintained.

## 2024-03-17 NOTE — PHYSICAL THERAPY INITIAL EVALUATION ADULT - ADDITIONAL COMMENTS
Pt left semisupine in bed in NAD, all lines intact, call bell in reach, SPO2 99%, +NC at 2L, and bed alarm on.

## 2024-03-17 NOTE — PROGRESS NOTE ADULT - ASSESSMENT
69 y/o Uz[place] male with PMHX of HTN, HFrEF, CAD w/ CABG 2014 at [facility], CRT-D, COPD (100 pack year history), CVA presents with dyspnea and difficulty breathing, found to have AECOPD and ADHF.

## 2024-03-18 ENCOUNTER — RESULT REVIEW (OUTPATIENT)
Age: 69
End: 2024-03-18

## 2024-03-18 DIAGNOSIS — M79.606 PAIN IN LEG, UNSPECIFIED: ICD-10-CM

## 2024-03-18 DIAGNOSIS — N18.9 CHRONIC KIDNEY DISEASE, UNSPECIFIED: ICD-10-CM

## 2024-03-18 DIAGNOSIS — I50.23 ACUTE ON CHRONIC SYSTOLIC (CONGESTIVE) HEART FAILURE: ICD-10-CM

## 2024-03-18 LAB
ANION GAP SERPL CALC-SCNC: 15 MMOL/L — HIGH (ref 7–14)
BUN SERPL-MCNC: 79 MG/DL — HIGH (ref 7–23)
CALCIUM SERPL-MCNC: 8.4 MG/DL — SIGNIFICANT CHANGE UP (ref 8.4–10.5)
CHLORIDE SERPL-SCNC: 99 MMOL/L — SIGNIFICANT CHANGE UP (ref 98–107)
CO2 SERPL-SCNC: 20 MMOL/L — LOW (ref 22–31)
CREAT SERPL-MCNC: 3.01 MG/DL — HIGH (ref 0.5–1.3)
EGFR: 22 ML/MIN/1.73M2 — LOW
GLUCOSE BLDC GLUCOMTR-MCNC: 151 MG/DL — HIGH (ref 70–99)
GLUCOSE BLDC GLUCOMTR-MCNC: 166 MG/DL — HIGH (ref 70–99)
GLUCOSE BLDC GLUCOMTR-MCNC: 168 MG/DL — HIGH (ref 70–99)
GLUCOSE BLDC GLUCOMTR-MCNC: 88 MG/DL — SIGNIFICANT CHANGE UP (ref 70–99)
GLUCOSE SERPL-MCNC: 101 MG/DL — HIGH (ref 70–99)
HCT VFR BLD CALC: 35.6 % — LOW (ref 39–50)
HGB BLD-MCNC: 11.7 G/DL — LOW (ref 13–17)
LACTATE SERPL-SCNC: 1.5 MMOL/L — SIGNIFICANT CHANGE UP (ref 0.5–2)
MAGNESIUM SERPL-MCNC: 2.1 MG/DL — SIGNIFICANT CHANGE UP (ref 1.6–2.6)
MCHC RBC-ENTMCNC: 31 PG — SIGNIFICANT CHANGE UP (ref 27–34)
MCHC RBC-ENTMCNC: 32.9 GM/DL — SIGNIFICANT CHANGE UP (ref 32–36)
MCV RBC AUTO: 94.4 FL — SIGNIFICANT CHANGE UP (ref 80–100)
NRBC # BLD: 0 /100 WBCS — SIGNIFICANT CHANGE UP (ref 0–0)
NRBC # FLD: 0 K/UL — SIGNIFICANT CHANGE UP (ref 0–0)
PHOSPHATE SERPL-MCNC: 4.4 MG/DL — SIGNIFICANT CHANGE UP (ref 2.5–4.5)
PLATELET # BLD AUTO: 139 K/UL — LOW (ref 150–400)
POTASSIUM SERPL-MCNC: 4 MMOL/L — SIGNIFICANT CHANGE UP (ref 3.5–5.3)
POTASSIUM SERPL-SCNC: 4 MMOL/L — SIGNIFICANT CHANGE UP (ref 3.5–5.3)
RBC # BLD: 3.77 M/UL — LOW (ref 4.2–5.8)
RBC # FLD: 16 % — HIGH (ref 10.3–14.5)
SODIUM SERPL-SCNC: 134 MMOL/L — LOW (ref 135–145)
TROPONIN T, HIGH SENSITIVITY RESULT: 93 NG/L — CRITICAL HIGH
WBC # BLD: 10.55 K/UL — HIGH (ref 3.8–10.5)
WBC # FLD AUTO: 10.55 K/UL — HIGH (ref 3.8–10.5)

## 2024-03-18 PROCEDURE — 93306 TTE W/DOPPLER COMPLETE: CPT | Mod: 26

## 2024-03-18 PROCEDURE — 99232 SBSQ HOSP IP/OBS MODERATE 35: CPT

## 2024-03-18 PROCEDURE — 99223 1ST HOSP IP/OBS HIGH 75: CPT | Mod: FS

## 2024-03-18 RX ORDER — ISOSORBIDE DINITRATE 5 MG/1
30 TABLET ORAL THREE TIMES A DAY
Refills: 0 | Status: DISCONTINUED | OUTPATIENT
Start: 2024-03-18 | End: 2024-03-30

## 2024-03-18 RX ORDER — CARVEDILOL PHOSPHATE 80 MG/1
6.25 CAPSULE, EXTENDED RELEASE ORAL EVERY 12 HOURS
Refills: 0 | Status: DISCONTINUED | OUTPATIENT
Start: 2024-03-19 | End: 2024-03-30

## 2024-03-18 RX ORDER — HYDRALAZINE HCL 50 MG
75 TABLET ORAL THREE TIMES A DAY
Refills: 0 | Status: DISCONTINUED | OUTPATIENT
Start: 2024-03-18 | End: 2024-03-19

## 2024-03-18 RX ORDER — BUMETANIDE 0.25 MG/ML
2 INJECTION INTRAMUSCULAR; INTRAVENOUS
Qty: 20 | Refills: 0 | Status: DISCONTINUED | OUTPATIENT
Start: 2024-03-18 | End: 2024-03-25

## 2024-03-18 RX ADMIN — BUDESONIDE AND FORMOTEROL FUMARATE DIHYDRATE 2 PUFF(S): 160; 4.5 AEROSOL RESPIRATORY (INHALATION) at 08:17

## 2024-03-18 RX ADMIN — Medication 75 MILLIGRAM(S): at 18:04

## 2024-03-18 RX ADMIN — Medication 50 MICROGRAM(S): at 04:52

## 2024-03-18 RX ADMIN — Medication 3 MILLILITER(S): at 10:49

## 2024-03-18 RX ADMIN — CEFTRIAXONE 100 MILLIGRAM(S): 500 INJECTION, POWDER, FOR SOLUTION INTRAMUSCULAR; INTRAVENOUS at 00:46

## 2024-03-18 RX ADMIN — CARVEDILOL PHOSPHATE 6.25 MILLIGRAM(S): 80 CAPSULE, EXTENDED RELEASE ORAL at 18:04

## 2024-03-18 RX ADMIN — AZITHROMYCIN 250 MILLIGRAM(S): 500 TABLET, FILM COATED ORAL at 13:03

## 2024-03-18 RX ADMIN — FAMOTIDINE 20 MILLIGRAM(S): 10 INJECTION INTRAVENOUS at 13:03

## 2024-03-18 RX ADMIN — BUDESONIDE AND FORMOTEROL FUMARATE DIHYDRATE 2 PUFF(S): 160; 4.5 AEROSOL RESPIRATORY (INHALATION) at 21:14

## 2024-03-18 RX ADMIN — Medication 100 MILLIGRAM(S): at 13:03

## 2024-03-18 RX ADMIN — ISOSORBIDE DINITRATE 30 MILLIGRAM(S): 5 TABLET ORAL at 13:02

## 2024-03-18 RX ADMIN — Medication 3 MILLILITER(S): at 16:27

## 2024-03-18 RX ADMIN — APIXABAN 5 MILLIGRAM(S): 2.5 TABLET, FILM COATED ORAL at 18:04

## 2024-03-18 RX ADMIN — ISOSORBIDE DINITRATE 30 MILLIGRAM(S): 5 TABLET ORAL at 08:17

## 2024-03-18 RX ADMIN — Medication 1: at 18:04

## 2024-03-18 RX ADMIN — ATORVASTATIN CALCIUM 80 MILLIGRAM(S): 80 TABLET, FILM COATED ORAL at 21:12

## 2024-03-18 RX ADMIN — BUMETANIDE 132 MILLIGRAM(S): 0.25 INJECTION INTRAMUSCULAR; INTRAVENOUS at 04:51

## 2024-03-18 RX ADMIN — CLOPIDOGREL BISULFATE 75 MILLIGRAM(S): 75 TABLET, FILM COATED ORAL at 13:03

## 2024-03-18 RX ADMIN — APIXABAN 5 MILLIGRAM(S): 2.5 TABLET, FILM COATED ORAL at 05:47

## 2024-03-18 RX ADMIN — TAMSULOSIN HYDROCHLORIDE 0.4 MILLIGRAM(S): 0.4 CAPSULE ORAL at 21:13

## 2024-03-18 RX ADMIN — Medication 325 MILLIGRAM(S): at 13:02

## 2024-03-18 RX ADMIN — Medication 75 MILLIGRAM(S): at 08:19

## 2024-03-18 RX ADMIN — Medication 3 MILLILITER(S): at 04:32

## 2024-03-18 RX ADMIN — Medication 3 MILLILITER(S): at 21:05

## 2024-03-18 RX ADMIN — GABAPENTIN 100 MILLIGRAM(S): 400 CAPSULE ORAL at 12:46

## 2024-03-18 RX ADMIN — CARVEDILOL PHOSPHATE 6.25 MILLIGRAM(S): 80 CAPSULE, EXTENDED RELEASE ORAL at 08:17

## 2024-03-18 RX ADMIN — BUMETANIDE 132 MILLIGRAM(S): 0.25 INJECTION INTRAMUSCULAR; INTRAVENOUS at 18:05

## 2024-03-18 RX ADMIN — BUMETANIDE 10 MG/HR: 0.25 INJECTION INTRAMUSCULAR; INTRAVENOUS at 21:59

## 2024-03-18 NOTE — CONSULT NOTE ADULT - PROBLEM SELECTOR RECOMMENDATION 4
B/L leg pain.   Patient is on Eliquis, however consider b/l LE US as he is having b/l calf tenderness.

## 2024-03-18 NOTE — CONSULT NOTE ADULT - ASSESSMENT
67 y/o Filipino male with PMHX of HTN, HFrEF (TTE 7/18/23 LVEF 25%, LVIDD 5.6cm, LA 4.9cm, mild MR), CAD w/ CABG 2014 at [facility], CRT-D, asthma/COPD on 2 L O2 at home, CVA with left sided weakness comes in with complaints of chest pressure and SOB. He admits to 2 pillow orthopnea, KINNEY that was worsening where he would get SOB after walking (with rollator walker) for less than 2 blocks which was going on for 4 days. Also admits to not urinating well to home dose of diuretics. Per record patient admits to URI 2 weeks ago, and was also found to be in a.fib and was started on Eliquis. He was found to be in acute on chronic systolic heart failure and started on IV Bumex 4 BID. HF service consulted to help manage care in this patient who is followed by Dr. Garo Dobson as an outpatient. Labs show proBNP 14K, trop 93, lactate 2.8 which has since improved to 1.5, BUN/Cr 63/2.94 (similar to last labs in July 2023 of 53/2.92). Overall stage C HF, NYHA class III- severely hypervolemic and normotensive.

## 2024-03-18 NOTE — PROGRESS NOTE ADULT - ASSESSMENT
69 y/o Faro[place] male with PMHX of HTN, HFrEF, CAD w/ CABG 2014 at [facility], CRT-D, COPD (100 pack year history), CVA presents with dyspnea and difficulty breathing, found to have AECOPD and ADHF.

## 2024-03-18 NOTE — PROGRESS NOTE ADULT - SUBJECTIVE AND OBJECTIVE BOX
Patient is a 68y old  Male who presents with a chief complaint of ADHF (18 Mar 2024 13:31)    SUBJECTIVE / OVERNIGHT EVENTS:    No events overnight. This AM, RUBEN CARLOS feels well. Has no n/v/d/cp/sob. Off BiPAP at present.    MEDICATIONS  (STANDING):  albuterol/ipratropium for Nebulization 3 milliLiter(s) Nebulizer every 6 hours  allopurinol 100 milliGRAM(s) Oral daily  apixaban 5 milliGRAM(s) Oral every 12 hours  atorvastatin 80 milliGRAM(s) Oral at bedtime  azithromycin   Tablet 250 milliGRAM(s) Oral daily  azithromycin   Tablet      budesonide 160 MICROgram(s)/formoterol 4.5 MICROgram(s) Inhaler 2 Puff(s) Inhalation two times a day  buMETAnide IVPB 4 milliGRAM(s) IV Intermittent two times a day  carvedilol 6.25 milliGRAM(s) Oral every 12 hours  cefTRIAXone   IVPB 1000 milliGRAM(s) IV Intermittent every 24 hours  clopidogrel Tablet 75 milliGRAM(s) Oral daily  dextrose 5%. 1000 milliLiter(s) (50 mL/Hr) IV Continuous <Continuous>  dextrose 5%. 1000 milliLiter(s) (100 mL/Hr) IV Continuous <Continuous>  dextrose 50% Injectable 25 Gram(s) IV Push once  dextrose 50% Injectable 12.5 Gram(s) IV Push once  dextrose 50% Injectable 25 Gram(s) IV Push once  famotidine    Tablet 20 milliGRAM(s) Oral daily  ferrous    sulfate 325 milliGRAM(s) Oral daily  gabapentin 100 milliGRAM(s) Oral daily  glucagon  Injectable 1 milliGRAM(s) IntraMuscular once  hydrALAZINE 75 milliGRAM(s) Oral three times a day  insulin lispro (ADMELOG) corrective regimen sliding scale   SubCutaneous three times a day before meals  insulin lispro (ADMELOG) corrective regimen sliding scale   SubCutaneous at bedtime  isosorbide   dinitrate Tablet (ISORDIL) 30 milliGRAM(s) Oral three times a day  levothyroxine 50 MICROGram(s) Oral daily  tamsulosin 0.4 milliGRAM(s) Oral at bedtime    MEDICATIONS  (PRN):  dextrose Oral Gel 15 Gram(s) Oral once PRN Blood Glucose LESS THAN 70 milliGRAM(s)/deciliter      PHYSICAL EXAM:  T(C): 36.5 (03-18-24 @ 09:00), Max: 36.7 (03-17-24 @ 16:23)  HR: 88 (03-18-24 @ 10:49) (70 - 90)  BP: 122/74 (03-18-24 @ 09:00) (95/57 - 122/74)  RR: 28 (03-18-24 @ 09:00) (18 - 28)  SpO2: 98% (03-18-24 @ 10:49) (98% - 100%)  I&O's Summary    17 Mar 2024 07:01  -  18 Mar 2024 07:00  --------------------------------------------------------  IN: 100 mL / OUT: 325 mL / NET: -225 mL      GENERAL: NAD, lying in bed  HEAD:  Atraumatic, Normocephalic, MMM  CHEST/LUNG: No use of accessory muscles, CTAB, breathing non-labored  COR: RR, no mrcg  ABD: Soft, ND/NT, +BS  PSYCH: AAOx3  NEUROLOGY: CN II-XII grossly intact, moving all extremities  SKIN: No rashes or lesions  EXT: wwp, no cce    LABS:  CAPILLARY BLOOD GLUCOSE      POCT Blood Glucose.: 166 mg/dL (18 Mar 2024 12:26)  POCT Blood Glucose.: 88 mg/dL (18 Mar 2024 08:12)  POCT Blood Glucose.: 102 mg/dL (17 Mar 2024 21:16)  POCT Blood Glucose.: 160 mg/dL (17 Mar 2024 17:55)                          11.7   10.55 )-----------( 139      ( 18 Mar 2024 05:48 )             35.6     03-18    134<L>  |  99  |  79<H>  ----------------------------<  101<H>  4.0   |  20<L>  |  3.01<H>    Ca    8.4      18 Mar 2024 05:48  Phos  4.4     03-18  Mg     2.10     03-18    TPro  6.6  /  Alb  3.6  /  TBili  0.7  /  DBili  x   /  AST  40  /  ALT  44<H>  /  AlkPhos  176<H>  03-17    PT/INR - ( 16 Mar 2024 19:12 )   PT: 17.0 sec;   INR: 1.54 ratio         PTT - ( 16 Mar 2024 19:12 )  PTT:39.4 sec  CARDIAC MARKERS ( 16 Mar 2024 19:12 )  x     / x     / 132 U/L / x     / x          Urinalysis Basic - ( 18 Mar 2024 05:48 )    Color: x / Appearance: x / SG: x / pH: x  Gluc: 101 mg/dL / Ketone: x  / Bili: x / Urobili: x   Blood: x / Protein: x / Nitrite: x   Leuk Esterase: x / RBC: x / WBC x   Sq Epi: x / Non Sq Epi: x / Bacteria: x          RADIOLOGY & ADDITIONAL TESTS:    Telemetry Personally Reviewed -     Imaging Personally Reviewed -     Imaging Reviewed -     Consultant(s) Notes Reviewed -       Care Discussed with Consultants/Other Providers -

## 2024-03-18 NOTE — PROGRESS NOTE ADULT - PROBLEM SELECTOR PLAN 1
-possible stemming from new-onset afib/flutter first noted March 8th as well as recent URI that maybe contributory  -needed bipap in the ED, now on 2L NC  -tele, tte  -c/w IV bumex 4mg bid, Is/Os,  -cardiology consulted, recs appreciated; f/u further recs  -CXR with retrocardiac opacity, procal mildly elevated, c/w cap coverage for possible bacterial pneumonia

## 2024-03-18 NOTE — CONSULT NOTE ADULT - SUBJECTIVE AND OBJECTIVE BOX
Date of Admission:    CHIEF COMPLAINT:    HISTORY OF PRESENT ILLNESS:      Allergies    No Known Allergies    Intolerances    	    MEDICATIONS:  apixaban 5 milliGRAM(s) Oral every 12 hours  buMETAnide IVPB 4 milliGRAM(s) IV Intermittent two times a day  carvedilol 6.25 milliGRAM(s) Oral every 12 hours  clopidogrel Tablet 75 milliGRAM(s) Oral daily  hydrALAZINE 75 milliGRAM(s) Oral three times a day  isosorbide   dinitrate Tablet (ISORDIL) 30 milliGRAM(s) Oral three times a day    azithromycin   Tablet 250 milliGRAM(s) Oral daily  azithromycin   Tablet      cefTRIAXone   IVPB 1000 milliGRAM(s) IV Intermittent every 24 hours    albuterol/ipratropium for Nebulization 3 milliLiter(s) Nebulizer every 6 hours  budesonide 160 MICROgram(s)/formoterol 4.5 MICROgram(s) Inhaler 2 Puff(s) Inhalation two times a day    gabapentin 100 milliGRAM(s) Oral daily    famotidine    Tablet 20 milliGRAM(s) Oral daily    allopurinol 100 milliGRAM(s) Oral daily  atorvastatin 80 milliGRAM(s) Oral at bedtime  dextrose 50% Injectable 25 Gram(s) IV Push once  dextrose 50% Injectable 12.5 Gram(s) IV Push once  dextrose 50% Injectable 25 Gram(s) IV Push once  dextrose Oral Gel 15 Gram(s) Oral once PRN  glucagon  Injectable 1 milliGRAM(s) IntraMuscular once  insulin lispro (ADMELOG) corrective regimen sliding scale   SubCutaneous at bedtime  insulin lispro (ADMELOG) corrective regimen sliding scale   SubCutaneous three times a day before meals  levothyroxine 50 MICROGram(s) Oral daily    dextrose 5%. 1000 milliLiter(s) IV Continuous <Continuous>  dextrose 5%. 1000 milliLiter(s) IV Continuous <Continuous>  ferrous    sulfate 325 milliGRAM(s) Oral daily  tamsulosin 0.4 milliGRAM(s) Oral at bedtime      PAST MEDICAL & SURGICAL HISTORY:  HTN (hypertension)      CAD (coronary artery disease)      Diabetes      Hyperlipidemia, unspecified hyperlipidemia type      CHF (congestive heart failure)      BPH (benign prostatic hyperplasia)      S/P CABG (coronary artery bypass graft)      S/P appendectomy          FAMILY HISTORY:  Family history of type 2 diabetes mellitus in father (Father)        SOCIAL HISTORY:    [ ] Non-smoker  [ ] Smoker  [ ] Alcohol      REVIEW OF SYSTEMS:  CONSTITUTIONAL: No fever, weight loss, or fatigue  EYES: No eye pain, visual disturbances, or discharge  ENMT:  No difficulty hearing, tinnitus, vertigo; No sinus or throat pain  NECK: No pain or stiffness  RESPIRATORY: No cough, wheezing, chills or hemoptysis; No Shortness of Breath  CARDIOVASCULAR: No chest pain, palpitations, passing out, dizziness, or leg swelling  GASTROINTESTINAL: No abdominal or epigastric pain. No nausea, vomiting, or hematemesis; No diarrhea or constipation. No melena or hematochezia.  GENITOURINARY: No dysuria, frequency, hematuria, or incontinence  NEUROLOGICAL: No headaches, memory loss, loss of strength, numbness, or tremors  SKIN: No itching, burning, rashes, or lesions   LYMPH Nodes: No enlarged glands  ENDOCRINE: No heat or cold intolerance; No hair loss  MUSCULOSKELETAL: No joint pain or swelling; No muscle, back, or extremity pain  PSYCHIATRIC: No depression, anxiety, mood swings, or difficulty sleeping  HEME/LYMPH: No easy bruising, or bleeding gums  ALLERY AND IMMUNOLOGIC: No hives or eczema	    [ ] All others negative	  [ ] Unable to obtain    PHYSICAL EXAM:  T(C): 36.5 (03-18-24 @ 09:00), Max: 36.7 (03-17-24 @ 16:23)  HR: 88 (03-18-24 @ 10:49) (70 - 90)  BP: 122/74 (03-18-24 @ 09:00) (95/57 - 122/74)  RR: 28 (03-18-24 @ 09:00) (18 - 28)  SpO2: 98% (03-18-24 @ 10:49) (98% - 100%)  Wt(kg): --  I&O's Summary    17 Mar 2024 07:01  -  18 Mar 2024 07:00  --------------------------------------------------------  IN: 100 mL / OUT: 325 mL / NET: -225 mL        Appearance: Normal	  HEENT:   Normal oral mucosa, PERRL, EOMI	  Lymphatic: No lymphadenopathy  Cardiovascular: Normal S1 S2, No JVD, No murmurs, No edema  Respiratory: Lungs clear to auscultation	  Psychiatry: A & O x 3, Mood & affect appropriate  Gastrointestinal:  Soft, Non-tender, + BS	  Skin: No rashes, No ecchymoses, No cyanosis	  Neurologic: Non-focal  Extremities: Normal range of motion, No clubbing, cyanosis or edema  Vascular: Peripheral pulses palpable 2+ bilaterally        LABS:	 	    CBC Full  -  ( 18 Mar 2024 05:48 )  WBC Count : 10.55 K/uL  Hemoglobin : 11.7 g/dL  Hematocrit : 35.6 %  Platelet Count - Automated : 139 K/uL  Mean Cell Volume : 94.4 fL  Mean Cell Hemoglobin : 31.0 pg  Mean Cell Hemoglobin Concentration : 32.9 gm/dL  Auto Neutrophil # : x  Auto Lymphocyte # : x  Auto Monocyte # : x  Auto Eosinophil # : x  Auto Basophil # : x  Auto Neutrophil % : x  Auto Lymphocyte % : x  Auto Monocyte % : x  Auto Eosinophil % : x  Auto Basophil % : x    03-18    134<L>  |  99  |  79<H>  ----------------------------<  101<H>  4.0   |  20<L>  |  3.01<H>  03-17    135  |  97<L>  |  68<H>  ----------------------------<  146<H>  4.1   |  19<L>  |  2.94<H>    Ca    8.4      18 Mar 2024 05:48  Ca    8.8      17 Mar 2024 06:24  Phos  4.4     03-18  Mg     2.10     03-18  Mg     2.00     03-17    TPro  6.6  /  Alb  3.6  /  TBili  0.7  /  DBili  x   /  AST  40  /  ALT  44<H>  /  AlkPhos  176<H>  03-17  TPro  6.9  /  Alb  3.6  /  TBili  0.7  /  DBili  x   /  AST  45<H>  /  ALT  42<H>  /  AlkPhos  200<H>  03-16      < from: TTE with Doppler (w/Cont) (07.18.23 @ 08:31) >  DIMENSIONS:  Dimensions:     Normal Values:  LA:     4.9 cm    2.0 - 4.0 cm  Ao:     3.6 cm    2.0 - 3.8 cm  SEPTUM: 1.1 cm    0.6 - 1.2 cm  PWT:    1.1 cm    0.6 - 1.1 cm  LVIDd:  5.6 cm    3.0 - 5.6 cm  LVIDs:  4.9 cm    1.8 - 4.0 cm  Derived Variables:  LVMI: 128 g/m2  RWT: 0.39  Fractional short: 13 %  Ejection Fraction (Modified Velazquez Rule): 25 %  ------------------------------------------------------------------------  OBSERVATIONS:  Mitral Valve: Mitral annular calcification, otherwise  normal mitral valve. Mild mitral regurgitation.  Aortic Root: Normal aortic root.  Aortic Valve: Calcified trileaflet aortic valve with normal  opening. No aortic valve regurgitation seen.  Left Atrium: The left atrium appears enlarged however, the  LA volume is underestimated.  Left Ventricle: Severe global left ventricular systolic  dysfunction. LVEF calculated using biplane Velazquez's method  is 25%. Endocardial visualization enhanced with intravenous  injection of echo contrast (Definity). No left ventricular  thrombus. Normal left ventricular internal dimensions and  increased wall thicknesses. Normal left ventricular  diastolic function.  Right Heart: Normal right atrium. A device wire is noted in  the right heart. Normal tricuspid valve. No tricuspid  regurgitation. Normal pulmonic valve.  Pericardium/PleuraNo pericardial effusion seen. Left  pleural effusion.  ------------------------------------------------------------------------  CONCLUSIONS:  1. Mitral annular calcification, otherwise normal mitral  valve. Mild mitral regurgitation.  2. Normal left atrium.  LA volume index = 30 cc/m2.  3. Normal left ventricular internal dimensions and  increasedwall thicknesses.  4. Severe global left ventricular systolic dysfunction.  LVEF calculated using biplane Velazquez's method is 25%.  Endocardial visualization enhanced with intravenous  injection of echo contrast (Definity). No left ventricular  thrombus.  5. Normal left ventricular diastolic function.  6. Normal right atrium.  7. A device wire is noted in the right heart.  8. Normal tricuspid valve. No tricuspid regurgitation.  9. No pericardial effusion seen.  *** Compared with echocardiogram of12/7/2022, no  significant changes noted.    < end of copied text >    < from: Cardiac Catheterization (05.26.22 @ 15:35) >  Diagnostic Conclusions:   RA 9 mmHg. Elevated PCWP (24 mmHg). Cardiac index 2.2 L/min/m2. SVR  1510 dsc  .     Recommendations:   Additional diuresis as tolerated for management of acute on chronic  systolic heart failure.      Acute complication:    No complications     Presentation:   67 yo male with CAD (s/p CABG), HFrEF (EF 15-20%), DM, HTN, CKD, ICD,  asthma/COPD, recurrent  pneumonia, and prior CVA presents with worsening SOB and leg swelling.      Procedure Narrative:   The risks and alternatives of the procedures and conscious sedation  were explained to the patient and informed consent was  obtained. The patient was brought to the cath lab and placed on the  exam table.  Access   Right femoral vein:   The puncture site was infiltrated with 2% Lidocaine. Vascular access  was obtained using modified seldinger technique and a 7fr  [place] was advanced into the vessel.      Right Heart Cath   A 7 Fr. [place] Patsy catheter was advanced to the pulmonary artery wedge  position. Measurements of pressures and cardiac  output by assumed le were obtained.      Additional Procedures   A Ultrasound Guided Access was performed. Right common femoral vein  was identified and access was obtained using  ultrasound guidance.     Patient: INGE CARLOS              MRN: [id number]  Study Date: 05/26/2022   03:35 PM      Page 1 of 4          Diagnostic Findings:     History and Risk Factors:   Hypertension:                              Yes   Dyslipidemia:                              Yes   COPD:                                      Yes   Prior CABG:                                Yes   Prior Heart Failure:                       Yes   Diabetes:                                  Yes     X-Ray:   Diagnostic Flouro time:      1 min.                     Exam record  DAP:  Total Flouro Time:           1.0 min.                   Exam total  DAP:    Exam Start Time:   03:35 PM   Exam End Time:     03:42 PM   Exam Duration:     7 min     Contrast:   Description                    Dose         Unit       Serial No.     Medication:   Description                  Dose, Unit, Route, Time   Oxygen (Nasal Cannula)       2.0, l per min, Nasal cannula, 15:24:27     Normal Saline 0.9%           10.0, ml per hr, IV, 15:30:00   lidocaine 2% Injectable      10.0, ml, Subcut, 15:35:02   (Lidocaine)     Inventory:   Description                  Quantity     [employer]#        Reference  No.    Serial No.      Lot No.       CDM  Angio Pack          1.000        [id number]   [id number]  [id number]*    720   Sodium Heparin               1.000        [id number]  [id number]                                     [id number]*    490   7fr [place]          1.000        [id number]   [id number]  [id number]*    643   7 Fr. [place] Patsy              1.000        [id number]   [id number]  [id number]*    733   Hemodynamic Pressures:   Phase          Location           [mmHg]                [mmHg]  [mmHg]            HR  Baseline       Ao             s      111                d  66                m       84         87  Baseline       RV                   s       54  ed           13         86  Baseline       PA                   s       54                d  27                m       38         87  Baseline       PCW                  a       30                 v  28                m       24         88  Baseline       RA                   a       12                 v  13                m       9          90    Patient: INGE CARLOS              MRN: [id number]  Study Date: 05/26/2022   03:35 PM      Page 2 of 4          Oxygen Saturations   Phase          Location        Hb             Sat            pO2  Content        Group  Baseline        AO                        12     90  14.9   SA  Baseline        Pa                        12              56  9.3   PA    Oxygen Saturation Average, Phase: Baseline   PV Hb                PV Sat                 PV pO2  PV Content  SA Hb      12.20 g/dL SA Sat     90.00 %    SA pO2  SA Content     14.93 %  PA Hb      12.20 g/dL PA Sat     56.10 %    PA pO2  PA Content     9.31 %  MV Hb                MV Sat                 MV pO2  MV Content  Strokework:   Phase:                    Baseline   LVSW:                     37.26 g*m                  LVSW (index):  20.84 g*m/m2  RVSW:                     18.01 g*m                  RVSW (index):  10.07 g*m/m2  Flow Calculations, Phase: Baseline   CO                    3.97 l/min    HR  O2Insp  CI                 2.22 l/min/m2 PO2  O2Exp  SV                                  VO2                 223.42 ml/min  O2(ExAir)  SVI                                 A-VO2  Hb                 12.20 gm/d  Shunts:   Qs                    3.97 l/min    Qs Index               2.22  l/min/m2  Qp                    3.97 l/min    Qp Index               2.22  l/min/m2    Qp/Qs  EPBF                                EPBF Index   L-R                                 L-R Index   R-L                                 R-L Index   Systemic Resistances, Phase: Baseline   SVR              1510.18 dyn*s/cm5                  TVR  1691.40 dyn*s/cm5  SVRI             2699.18 dyn*s*m2/cm5               TVRI  3023.08 dyn*s*m2/cm5  SVR              18.88 VEE                           TVR  21.15 VEE  SVRI          33.75 VEE*m2                        TVRI  37.80 VEE*m2  Pulmonary Resistances:   PVR              281.90 dyn*s/cm5                   TPR  765.16 dyn*s/cm5  PVRI             503.85 dyn*s*m2/cm5                TPRI  1367.58 dyn*s*m2/cm5  PVR          3.52 VEE                            TPR  9.57 VEE  PVRI             6.30 VEE*m2                         TPRI  17.10 VEE*m2  Ratios:   PVR-SVR          0.19                               TPR-TVR  0.45  Shunts:   venO2                  CO   R-L Shunt                                           R-L Shunt   Jas. Method   L-R Shunt                                           L-R Shunt   R-L Shunt                                           R-L Shunt   Jas. Method     Conscious sedation was administered and monitored by Cardiology  nursing staff,    Patient: INGE CARLOS              MRN: [id number]  Study Date: 05/26/2022   03:35 PM      Page 3 of 4          < end of copied text >      < from: Xray Chest 1 View- PORTABLE-Urgent (Xray Chest 1 View- PORTABLE-Urgent .) (03.16.24 @ 20:24) >  FINDINGS:  Redemonstrated left chest wall ICD with intact leads, sternotomy wires   and mediastinal clips.  Left retrocardiac opacity.  There is no pneumothorax.  The heart is enlarged, unchanged.  The visualized osseous structures demonstrate no acute pathology.    IMPRESSION:  Left retrocardiac opacity, may represent consolidation.    < end of copied text >       Date of Admission: 3/16/24    CHIEF COMPLAINT: SOB and decreased urine output     HISTORY OF PRESENT ILLNESS:    67 y/o [place] male with PMHX of HTN, HFrEF (TTE 7/18/23 LVEF 25%, LVIDD 5.6cm, LA 4.9cm, mild MR), CAD w/ CABG 2014 at [facility], CRT-D, asthma/COPD on 2 L O2 at home, CVA with left sided weakness comes in with complaints of chest pressure and SOB. He admits to 2 pillow orthopnea, KINNEY that was worsening where he would get SOB after walking (with rollator walker) for less than 2 blocks which was going on for 4 days. Also admits to not urinating well to home dose of diuretics. Per record patient admits to URI 2 weeks ago, and was also found to be in a.fib and was started on Eliquis. He was found to be in acute on chronic systolic heart failure and started on IV Bumex 4 BID. HF service consulted to help manage care in this patient who is followed by Dr. Garo Dobson as an outpatient. Labs show proBNP 14K, trop 93, lactate 2.8 which has since improved to 1.5, BUN/Cr 63/2.94 (similar to last labs in July 2023 of 53/2.92). Heart failure serivce consulted to help mange care in this patient who is followed in [facility] HF clinic.         Allergies    No Known Allergies    Intolerances    	    MEDICATIONS:  apixaban 5 milliGRAM(s) Oral every 12 hours  buMETAnide IVPB 4 milliGRAM(s) IV Intermittent two times a day  carvedilol 6.25 milliGRAM(s) Oral every 12 hours  clopidogrel Tablet 75 milliGRAM(s) Oral daily  hydrALAZINE 75 milliGRAM(s) Oral three times a day  isosorbide   dinitrate Tablet (ISORDIL) 30 milliGRAM(s) Oral three times a day    azithromycin   Tablet 250 milliGRAM(s) Oral daily  azithromycin   Tablet      cefTRIAXone   IVPB 1000 milliGRAM(s) IV Intermittent every 24 hours    albuterol/ipratropium for Nebulization 3 milliLiter(s) Nebulizer every 6 hours  budesonide 160 MICROgram(s)/formoterol 4.5 MICROgram(s) Inhaler 2 Puff(s) Inhalation two times a day    gabapentin 100 milliGRAM(s) Oral daily    famotidine    Tablet 20 milliGRAM(s) Oral daily    allopurinol 100 milliGRAM(s) Oral daily  atorvastatin 80 milliGRAM(s) Oral at bedtime  dextrose 50% Injectable 25 Gram(s) IV Push once  dextrose 50% Injectable 12.5 Gram(s) IV Push once  dextrose 50% Injectable 25 Gram(s) IV Push once  dextrose Oral Gel 15 Gram(s) Oral once PRN  glucagon  Injectable 1 milliGRAM(s) IntraMuscular once  insulin lispro (ADMELOG) corrective regimen sliding scale   SubCutaneous at bedtime  insulin lispro (ADMELOG) corrective regimen sliding scale   SubCutaneous three times a day before meals  levothyroxine 50 MICROGram(s) Oral daily    dextrose 5%. 1000 milliLiter(s) IV Continuous <Continuous>  dextrose 5%. 1000 milliLiter(s) IV Continuous <Continuous>  ferrous    sulfate 325 milliGRAM(s) Oral daily  tamsulosin 0.4 milliGRAM(s) Oral at bedtime      PAST MEDICAL & SURGICAL HISTORY:  HTN (hypertension)      CAD (coronary artery disease)      Diabetes      Hyperlipidemia, unspecified hyperlipidemia type      CHF (congestive heart failure)      BPH (benign prostatic hyperplasia)      S/P CABG (coronary artery bypass graft)      S/P appendectomy          FAMILY HISTORY:  Family history of type 2 diabetes mellitus in father (Father)        SOCIAL HISTORY:    [x ] Non-smoker  [ ] Smoker  [ ] Alcohol      REVIEW OF SYSTEMS:  CONSTITUTIONAL: No fever, weight loss, or fatigue  EYES: No eye pain, visual disturbances, or discharge  ENMT:  No difficulty hearing, tinnitus, vertigo; No sinus or throat pain  NECK: No pain or stiffness  RESPIRATORY: No cough, wheezing, chills or hemoptysis; admits to Shortness of Breath  CARDIOVASCULAR: No chest pain, palpitations, passing out, dizziness, or leg swelling  GASTROINTESTINAL: No abdominal or epigastric pain. No nausea, vomiting, or hematemesis; No diarrhea or constipation. No melena or hematochezia.  GENITOURINARY: No dysuria, frequency, hematuria, or incontinence  NEUROLOGICAL: No headaches, memory loss, loss of strength, numbness, or tremors  SKIN: No itching, burning, rashes, or lesions   LYMPH Nodes: No enlarged glands  ENDOCRINE: No heat or cold intolerance; No hair loss  MUSCULOSKELETAL: No joint pain or swelling; No muscle, back, or extremity pain  PSYCHIATRIC: No depression, anxiety, mood swings, or difficulty sleeping  HEME/LYMPH: No easy bruising, or bleeding gums  ALLERY AND IMMUNOLOGIC: No hives or eczema	    [ ] All others negative	  [ ] Unable to obtain    PHYSICAL EXAM:  T(C): 36.5 (03-18-24 @ 09:00), Max: 36.7 (03-17-24 @ 16:23)  HR: 88 (03-18-24 @ 10:49) (70 - 90)  BP: 122/74 (03-18-24 @ 09:00) (95/57 - 122/74)  RR: 28 (03-18-24 @ 09:00) (18 - 28)  SpO2: 98% (03-18-24 @ 10:49) (98% - 100%)  Wt(kg): --  I&O's Summary    17 Mar 2024 07:01  -  18 Mar 2024 07:00  --------------------------------------------------------  IN: 100 mL / OUT: 325 mL / NET: -225 mL        Appearance: Normal	  HEENT:   Normal oral mucosa, PERRL, EOMI	  Lymphatic: No lymphadenopathy  Cardiovascular: Normal S1 S2, JVP 20cm, No murmurs, 1+ b/l LE edema, warm b/l.   Respiratory: Lungs clear to auscultation	  Psychiatry: A & O x 3, Mood & affect appropriate  Gastrointestinal:  Soft, Non-tender, + BS	  Skin: No rashes, No ecchymoses, No cyanosis	  Neurologic: Non-focal  Extremities: Normal range of motion, No clubbing, cyanosis. Tender to palpation b/l legs.  Vascular: Peripheral pulses palpable 2+ bilaterally        LABS:	 	    CBC Full  -  ( 18 Mar 2024 05:48 )  WBC Count : 10.55 K/uL  Hemoglobin : 11.7 g/dL  Hematocrit : 35.6 %  Platelet Count - Automated : 139 K/uL  Mean Cell Volume : 94.4 fL  Mean Cell Hemoglobin : 31.0 pg  Mean Cell Hemoglobin Concentration : 32.9 gm/dL  Auto Neutrophil # : x  Auto Lymphocyte # : x  Auto Monocyte # : x  Auto Eosinophil # : x  Auto Basophil # : x  Auto Neutrophil % : x  Auto Lymphocyte % : x  Auto Monocyte % : x  Auto Eosinophil % : x  Auto Basophil % : x    03-18    134<L>  |  99  |  79<H>  ----------------------------<  101<H>  4.0   |  20<L>  |  3.01<H>  03-17    135  |  97<L>  |  68<H>  ----------------------------<  146<H>  4.1   |  19<L>  |  2.94<H>    Ca    8.4      18 Mar 2024 05:48  Ca    8.8      17 Mar 2024 06:24  Phos  4.4     03-18  Mg     2.10     03-18  Mg     2.00     03-17    TPro  6.6  /  Alb  3.6  /  TBili  0.7  /  DBili  x   /  AST  40  /  ALT  44<H>  /  AlkPhos  176<H>  03-17  TPro  6.9  /  Alb  3.6  /  TBili  0.7  /  DBili  x   /  AST  45<H>  /  ALT  42<H>  /  AlkPhos  200<H>  03-16      < from: TTE with Doppler (w/Cont) (07.18.23 @ 08:31) >  DIMENSIONS:  Dimensions:     Normal Values:  LA:     4.9 cm    2.0 - 4.0 cm  Ao:     3.6 cm    2.0 - 3.8 cm  SEPTUM: 1.1 cm    0.6 - 1.2 cm  PWT:    1.1 cm    0.6 - 1.1 cm  LVIDd:  5.6 cm    3.0 - 5.6 cm  LVIDs:  4.9 cm    1.8 - 4.0 cm  Derived Variables:  LVMI: 128 g/m2  RWT: 0.39  Fractional short: 13 %  Ejection Fraction (Modified Velazquez Rule): 25 %  ------------------------------------------------------------------------  OBSERVATIONS:  Mitral Valve: Mitral annular calcification, otherwise  normal mitral valve. Mild mitral regurgitation.  Aortic Root: Normal aortic root.  Aortic Valve: Calcified trileaflet aortic valve with normal  opening. No aortic valve regurgitation seen.  Left Atrium: The left atrium appears enlarged however, the  LA volume is underestimated.  Left Ventricle: Severe global left ventricular systolic  dysfunction. LVEF calculated using biplane Velazquez's method  is 25%. Endocardial visualization enhanced with intravenous  injection of echo contrast (Definity). No left ventricular  thrombus. Normal left ventricular internal dimensions and  increased wall thicknesses. Normal left ventricular  diastolic function.  Right Heart: Normal right atrium. A device wire is noted in  the right heart. Normal tricuspid valve. No tricuspid  regurgitation. Normal pulmonic valve.  Pericardium/PleuraNo pericardial effusion seen. Left  pleural effusion.  ------------------------------------------------------------------------  CONCLUSIONS:  1. Mitral annular calcification, otherwise normal mitral  valve. Mild mitral regurgitation.  2. Normal left atrium.  LA volume index = 30 cc/m2.  3. Normal left ventricular internal dimensions and  increasedwall thicknesses.  4. Severe global left ventricular systolic dysfunction.  LVEF calculated using biplane Velazquez's method is 25%.  Endocardial visualization enhanced with intravenous  injection of echo contrast (Definity). No left ventricular  thrombus.  5. Normal left ventricular diastolic function.  6. Normal right atrium.  7. A device wire is noted in the right heart.  8. Normal tricuspid valve. No tricuspid regurgitation.  9. No pericardial effusion seen.  *** Compared with echocardiogram of12/7/2022, no  significant changes noted.    < end of copied text >    < from: Cardiac Catheterization (05.26.22 @ 15:35) >  Diagnostic Conclusions:   RA 9 mmHg. Elevated PCWP (24 mmHg). Cardiac index 2.2 L/min/m2. SVR  1510 dsc  .     Recommendations:   Additional diuresis as tolerated for management of acute on chronic  systolic heart failure.      Acute complication:    No complications     Presentation:   65 yo male with CAD (s/p CABG), HFrEF (EF 15-20%), DM, HTN, CKD, ICD,  asthma/COPD, recurrent  pneumonia, and prior CVA presents with worsening SOB and leg swelling.      Procedure Narrative:   The risks and alternatives of the procedures and conscious sedation  were explained to the patient and informed consent was  obtained. The patient was brought to the cath lab and placed on the  exam table.  Access   Right femoral vein:   The puncture site was infiltrated with 2% Lidocaine. Vascular access  was obtained using modified seldinger technique and a 7fr  [place] was advanced into the vessel.      Right Heart Cath   A 7 Fr. [place] Patsy catheter was advanced to the pulmonary artery wedge  position. Measurements of pressures and cardiac  output by assumed el were obtained.      Additional Procedures   A Ultrasound Guided Access was performed. Right common femoral vein  was identified and access was obtained using  ultrasound guidance.     Patient: INGE CARLOS              MRN: [id number]  Study Date: 05/26/2022   03:35 PM      Page 1 of 4          Diagnostic Findings:     History and Risk Factors:   Hypertension:                              Yes   Dyslipidemia:                              Yes   COPD:                                      Yes   Prior CABG:                                Yes   Prior Heart Failure:                       Yes   Diabetes:                                  Yes     X-Ray:   Diagnostic Flouro time:      1 min.                     Exam record  DAP:  Total Flouro Time:           1.0 min.                   Exam total  DAP:    Exam Start Time:   03:35 PM   Exam End Time:     03:42 PM   Exam Duration:     7 min     Contrast:   Description                    Dose         Unit       Serial No.     Medication:   Description                  Dose, Unit, Route, Time   Oxygen (Nasal Cannula)       2.0, l per min, Nasal cannula, 15:24:27     Normal Saline 0.9%           10.0, ml per hr, IV, 15:30:00   lidocaine 2% Injectable      10.0, ml, Subcut, 15:35:02   (Lidocaine)     Inventory:   Description                  Quantity     Peoplesoft#        Reference  No.    Serial No.      Lot No.       CDM  Angio Pack          1.000        [id number]   [id number]  [id number]*    720   Sodium Heparin               1.000        [id number]  [id number]                                     [id number]*    490   7fr Sheath [place]          1.000        [id number]   [id number]  [id number]*    643   7 Fr. [place] Patsy              1.000        [id number]   [id number]  [id number]*    733   Hemodynamic Pressures:   Phase          Location           [mmHg]                [mmHg]  [mmHg]            HR  Baseline       Ao             s      111                d  66                m       84         87  Baseline       RV                   s       54  ed           13         86  Baseline       PA                   s       54                d  27                m       38         87  Baseline       PCW                  a       30                 v  28                m       24         88  Baseline       RA                   a       12                 v  13                m       9          90    Patient: INGE CARLOS              MRN: [id number]  Study Date: 05/26/2022   03:35 PM      Page 2 of 4          Oxygen Saturations   Phase          Location        Hb             Sat            pO2  Content        Group  Baseline        AO                        12     90  14.9   SA  Baseline        Pa                        12              56  9.3   PA    Oxygen Saturation Average, Phase: Baseline   PV Hb                PV Sat                 PV pO2  PV Content  SA Hb      12.20 g/dL SA Sat     90.00 %    SA pO2  SA Content     14.93 %  PA Hb      12.20 g/dL PA Sat     56.10 %    PA pO2  PA Content     9.31 %  MV Hb                MV Sat                 MV pO2  MV Content  Strokework:   Phase:                    Baseline   LVSW:                     37.26 g*m                  LVSW (index):  20.84 g*m/m2  RVSW:                     18.01 g*m                  RVSW (index):  10.07 g*m/m2  Flow Calculations, Phase: Baseline   CO                    3.97 l/min    HR  O2Insp  CI                 2.22 l/min/m2 PO2  O2Exp  SV                                  VO2                 223.42 ml/min  O2(ExAir)  SVI                                 A-VO2  Hb                 12.20 gm/d  Shunts:   Qs                    3.97 l/min    Qs Index               2.22  l/min/m2  Qp                    3.97 l/min    Qp Index               2.22  l/min/m2    Qp/Qs  EPBF                                EPBF Index   L-R                                 L-R Index   R-L                                 R-L Index   Systemic Resistances, Phase: Baseline   SVR              1510.18 dyn*s/cm5                  TVR  1691.40 dyn*s/cm5  SVRI             2699.18 dyn*s*m2/cm5               TVRI  3023.08 dyn*s*m2/cm5  SVR              18.88 VEE                           TVR  21.15 VEE  SVRI          33.75 VEE*m2                        TVRI  37.80 VEE*m2  Pulmonary Resistances:   PVR              281.90 dyn*s/cm5                   TPR  765.16 dyn*s/cm5  PVRI             503.85 dyn*s*m2/cm5                TPRI  1367.58 dyn*s*m2/cm5  PVR          3.52 VEE                            TPR  9.57 VEE  PVRI             6.30 VEE*m2                         TPRI  17.10 VEE*m2  Ratios:   PVR-SVR          0.19                               TPR-TVR  0.45  Shunts:   venO2                  CO   R-L Shunt                                           R-L Shunt   Jas. Method   L-R Shunt                                           L-R Shunt   R-L Shunt                                           R-L Shunt   Jas. Method     Conscious sedation was administered and monitored by Cardiology  nursing staff,    Patient: INGE CARLOS              MRN: [id number]  Study Date: 05/26/2022   03:35 PM      Page 3 of 4          < end of copied text >      < from: Xray Chest 1 View- PORTABLE-Urgent (Xray Chest 1 View- PORTABLE-Urgent .) (03.16.24 @ 20:24) >  FINDINGS:  Redemonstrated left chest wall ICD with intact leads, sternotomy wires   and mediastinal clips.  Left retrocardiac opacity.  There is no pneumothorax.  The heart is enlarged, unchanged.  The visualized osseous structures demonstrate no acute pathology.    IMPRESSION:  Left retrocardiac opacity, may represent consolidation.    < end of copied text >       Date of Admission: 3/16/24    CHIEF COMPLAINT: SOB and decreased urine output     HISTORY OF PRESENT ILLNESS:    69 y/o [place] male with PMHX of HTN, HFrEF (TTE 7/18/23 LVEF 25%, LVIDD 5.6cm, LA 4.9cm, mild MR), CAD w/ CABG 2014 at [facility], CRT-D, asthma/COPD on 2 L O2 at home, CVA with left sided weakness comes in with complaints of chest pressure and SOB. He admits to 2 pillow orthopnea, KINNEY that was worsening where he would get SOB after walking (with rollator walker) for less than 2 blocks which was going on for 4 days. Also admits to not urinating well to home dose of diuretics. Per record patient admits to URI 2 weeks ago, and was also found to be in a.fib and was started on Eliquis. He was found to be in acute on chronic systolic heart failure and started on IV Bumex 4 BID. HF service consulted to help manage care in this patient who is followed by Dr. Garo Dobson as an outpatient. Labs show proBNP 14K, trop 93, lactate 2.8 which has since improved to 1.5, BUN/Cr 63/2.94 (similar to last labs in July 2023 of 53/2.92). Heart failure serivce consulted to help mange care in this patient who is followed in [facility] HF clinic.         Allergies    No Known Allergies    Intolerances    	    MEDICATIONS:  apixaban 5 milliGRAM(s) Oral every 12 hours  buMETAnide IVPB 4 milliGRAM(s) IV Intermittent two times a day  carvedilol 6.25 milliGRAM(s) Oral every 12 hours  clopidogrel Tablet 75 milliGRAM(s) Oral daily  hydrALAZINE 75 milliGRAM(s) Oral three times a day  isosorbide   dinitrate Tablet (ISORDIL) 30 milliGRAM(s) Oral three times a day    azithromycin   Tablet 250 milliGRAM(s) Oral daily  azithromycin   Tablet      cefTRIAXone   IVPB 1000 milliGRAM(s) IV Intermittent every 24 hours    albuterol/ipratropium for Nebulization 3 milliLiter(s) Nebulizer every 6 hours  budesonide 160 MICROgram(s)/formoterol 4.5 MICROgram(s) Inhaler 2 Puff(s) Inhalation two times a day    gabapentin 100 milliGRAM(s) Oral daily    famotidine    Tablet 20 milliGRAM(s) Oral daily    allopurinol 100 milliGRAM(s) Oral daily  atorvastatin 80 milliGRAM(s) Oral at bedtime  dextrose 50% Injectable 25 Gram(s) IV Push once  dextrose 50% Injectable 12.5 Gram(s) IV Push once  dextrose 50% Injectable 25 Gram(s) IV Push once  dextrose Oral Gel 15 Gram(s) Oral once PRN  glucagon  Injectable 1 milliGRAM(s) IntraMuscular once  insulin lispro (ADMELOG) corrective regimen sliding scale   SubCutaneous at bedtime  insulin lispro (ADMELOG) corrective regimen sliding scale   SubCutaneous three times a day before meals  levothyroxine 50 MICROGram(s) Oral daily    dextrose 5%. 1000 milliLiter(s) IV Continuous <Continuous>  dextrose 5%. 1000 milliLiter(s) IV Continuous <Continuous>  ferrous    sulfate 325 milliGRAM(s) Oral daily  tamsulosin 0.4 milliGRAM(s) Oral at bedtime      PAST MEDICAL & SURGICAL HISTORY:  HTN (hypertension)      CAD (coronary artery disease)      Diabetes      Hyperlipidemia, unspecified hyperlipidemia type      CHF (congestive heart failure)      BPH (benign prostatic hyperplasia)      S/P CABG (coronary artery bypass graft)      S/P appendectomy          FAMILY HISTORY:  Family history of type 2 diabetes mellitus in father (Father)        SOCIAL HISTORY:    [x ] Non-smoker  [ ] Smoker  [ ] Alcohol      REVIEW OF SYSTEMS:  CONSTITUTIONAL: No fever, weight loss, or fatigue  EYES: No eye pain, visual disturbances, or discharge  ENMT:  No difficulty hearing, tinnitus, vertigo; No sinus or throat pain  NECK: No pain or stiffness  RESPIRATORY: No cough, wheezing, chills or hemoptysis; admits to Shortness of Breath  CARDIOVASCULAR: No chest pain, palpitations, passing out, dizziness, or leg swelling  GASTROINTESTINAL: No abdominal or epigastric pain. No nausea, vomiting, or hematemesis; No diarrhea or constipation. No melena or hematochezia.  GENITOURINARY: No dysuria, frequency, hematuria, or incontinence  NEUROLOGICAL: No headaches, memory loss, loss of strength, numbness, or tremors  SKIN: No itching, burning, rashes, or lesions   LYMPH Nodes: No enlarged glands  ENDOCRINE: No heat or cold intolerance; No hair loss  MUSCULOSKELETAL: No joint pain or swelling; No muscle, back, or extremity pain  PSYCHIATRIC: No depression, anxiety, mood swings, or difficulty sleeping  HEME/LYMPH: No easy bruising, or bleeding gums  ALLERY AND IMMUNOLOGIC: No hives or eczema	    [ ] All others negative	  [ ] Unable to obtain    PHYSICAL EXAM:  T(C): 36.5 (03-18-24 @ 09:00), Max: 36.7 (03-17-24 @ 16:23)  HR: 88 (03-18-24 @ 10:49) (70 - 90)  BP: 122/74 (03-18-24 @ 09:00) (95/57 - 122/74)  RR: 28 (03-18-24 @ 09:00) (18 - 28)  SpO2: 98% (03-18-24 @ 10:49) (98% - 100%)  Wt(kg): --  I&O's Summary    17 Mar 2024 07:01  -  18 Mar 2024 07:00  --------------------------------------------------------  IN: 100 mL / OUT: 325 mL / NET: -225 mL        Appearance: Normal	  HEENT:   Normal oral mucosa, PERRL, EOMI	  Lymphatic: No lymphadenopathy  Cardiovascular: Normal S1 S2, JVP 20cm, No murmurs, 1+ b/l LE edema, warm b/l.   Respiratory: Lungs clear to auscultation	  Psychiatry: A & O x 3, Mood & affect appropriate  Gastrointestinal:  Soft, Non-tender, + BS	  Skin: No rashes, No ecchymoses, No cyanosis	  Neurologic: Non-focal  Extremities: Normal range of motion, No clubbing, cyanosis. Tender to palpation b/l legs.  Vascular: Peripheral pulses palpable 2+ bilaterally        LABS:	 	    CBC Full  -  ( 18 Mar 2024 05:48 )  WBC Count : 10.55 K/uL  Hemoglobin : 11.7 g/dL  Hematocrit : 35.6 %  Platelet Count - Automated : 139 K/uL  Mean Cell Volume : 94.4 fL  Mean Cell Hemoglobin : 31.0 pg  Mean Cell Hemoglobin Concentration : 32.9 gm/dL  Auto Neutrophil # : x  Auto Lymphocyte # : x  Auto Monocyte # : x  Auto Eosinophil # : x  Auto Basophil # : x  Auto Neutrophil % : x  Auto Lymphocyte % : x  Auto Monocyte % : x  Auto Eosinophil % : x  Auto Basophil % : x    03-18    134<L>  |  99  |  79<H>  ----------------------------<  101<H>  4.0   |  20<L>  |  3.01<H>  03-17    135  |  97<L>  |  68<H>  ----------------------------<  146<H>  4.1   |  19<L>  |  2.94<H>    Ca    8.4      18 Mar 2024 05:48  Ca    8.8      17 Mar 2024 06:24  Phos  4.4     03-18  Mg     2.10     03-18  Mg     2.00     03-17    TPro  6.6  /  Alb  3.6  /  TBili  0.7  /  DBili  x   /  AST  40  /  ALT  44<H>  /  AlkPhos  176<H>  03-17  TPro  6.9  /  Alb  3.6  /  TBili  0.7  /  DBili  x   /  AST  45<H>  /  ALT  42<H>  /  AlkPhos  200<H>  03-16      < from: TTE with Doppler (w/Cont) (07.18.23 @ 08:31) >  DIMENSIONS:  Dimensions:     Normal Values:  LA:     4.9 cm    2.0 - 4.0 cm  Ao:     3.6 cm    2.0 - 3.8 cm  SEPTUM: 1.1 cm    0.6 - 1.2 cm  PWT:    1.1 cm    0.6 - 1.1 cm  LVIDd:  5.6 cm    3.0 - 5.6 cm  LVIDs:  4.9 cm    1.8 - 4.0 cm  Derived Variables:  LVMI: 128 g/m2  RWT: 0.39  Fractional short: 13 %  Ejection Fraction (Modified Velazquez Rule): 25 %  ------------------------------------------------------------------------  OBSERVATIONS:  Mitral Valve: Mitral annular calcification, otherwise  normal mitral valve. Mild mitral regurgitation.  Aortic Root: Normal aortic root.  Aortic Valve: Calcified trileaflet aortic valve with normal  opening. No aortic valve regurgitation seen.  Left Atrium: The left atrium appears enlarged however, the  LA volume is underestimated.  Left Ventricle: Severe global left ventricular systolic  dysfunction. LVEF calculated using biplane Velazquez's method  is 25%. Endocardial visualization enhanced with intravenous  injection of echo contrast (Definity). No left ventricular  thrombus. Normal left ventricular internal dimensions and  increased wall thicknesses. Normal left ventricular  diastolic function.  Right Heart: Normal right atrium. A device wire is noted in  the right heart. Normal tricuspid valve. No tricuspid  regurgitation. Normal pulmonic valve.  Pericardium/PleuraNo pericardial effusion seen. Left  pleural effusion.  ------------------------------------------------------------------------  CONCLUSIONS:  1. Mitral annular calcification, otherwise normal mitral  valve. Mild mitral regurgitation.  2. Normal left atrium.  LA volume index = 30 cc/m2.  3. Normal left ventricular internal dimensions and  increasedwall thicknesses.  4. Severe global left ventricular systolic dysfunction.  LVEF calculated using biplane Velazquez's method is 25%.  Endocardial visualization enhanced with intravenous  injection of echo contrast (Definity). No left ventricular  thrombus.  5. Normal left ventricular diastolic function.  6. Normal right atrium.  7. A device wire is noted in the right heart.  8. Normal tricuspid valve. No tricuspid regurgitation.  9. No pericardial effusion seen.  *** Compared with echocardiogram of12/7/2022, no  significant changes noted.    < end of copied text >    < from: Cardiac Catheterization (05.26.22 @ 15:35) >  Diagnostic Conclusions:   RA 9 mmHg. Elevated PCWP (24 mmHg). Cardiac index 2.2 L/min/m2. SVR  1510 dsc  .     Recommendations:   Additional diuresis as tolerated for management of acute on chronic  systolic heart failure.      Acute complication:    No complications     Presentation:   65 yo male with CAD (s/p CABG), HFrEF (EF 15-20%), DM, HTN, CKD, ICD,  asthma/COPD, recurrent  pneumonia, and prior CVA presents with worsening SOB and leg swelling.      Procedure Narrative:   The risks and alternatives of the procedures and conscious sedation  were explained to the patient and informed consent was  obtained. The patient was brought to the cath lab and placed on the  exam table.  Access   Right femoral vein:   The puncture site was infiltrated with 2% Lidocaine. Vascular access  was obtained using modified seldinger technique and a 7fr  Sheath Rodney was advanced into the vessel.      Right Heart Cath   A 7 Fr. [place] Patsy catheter was advanced to the pulmonary artery wedge  position. Measurements of pressures and cardiac  output by assumed le were obtained.      Additional Procedures   A Ultrasound Guided Access was performed. Right common femoral vein  was identified and access was obtained using  ultrasound guidance.     Patient: INGE CARLOS              MRN: [id number]  Study Date: 05/26/2022   03:35 PM      Page 1 of 4          Diagnostic Findings:     History and Risk Factors:   Hypertension:                              Yes   Dyslipidemia:                              Yes   COPD:                                      Yes   Prior CABG:                                Yes   Prior Heart Failure:                       Yes   Diabetes:                                  Yes     X-Ray:   Diagnostic Flouro time:      1 min.                     Exam record  DAP:  Total Flouro Time:           1.0 min.                   Exam total  DAP:    Exam Start Time:   03:35 PM   Exam End Time:     03:42 PM   Exam Duration:     7 min     Contrast:   Description                    Dose         Unit       Serial No.     Medication:   Description                  Dose, Unit, Route, Time   Oxygen (Nasal Cannula)       2.0, l per min, Nasal cannula, 15:24:27     Normal Saline 0.9%           10.0, ml per hr, IV, 15:30:00   lidocaine 2% Injectable      10.0, ml, Subcut, 15:35:02   (Lidocaine)     Inventory:   Description                  Quantity     Peoplesoft#        Reference  No.    Serial No.      Lot No.       CDM  Angio Pack          1.000        [id number]   [id number]  [id number]*    720   Sodium Heparin               1.000        [id number]  [id number]                                     [id number]*    490   7fr Sheath [place]          1.000        [id number]   [id number]  [id number]*    643   7 Fr. [place] Patsy              1.000        [id number]   [id number]  [id number]*    733   Hemodynamic Pressures:   Phase          Location           [mmHg]                [mmHg]  [mmHg]            HR  Baseline       Ao             s      111                d  66                m       84         87  Baseline       RV                   s       54  ed           13         86  Baseline       PA                   s       54                d  27                m       38         87  Baseline       PCW                  a       30                 v  28                m       24         88  Baseline       RA                   a       12                 v  13                m       9          90    Patient: INGE CARLOS              MRN: [id number]  Study Date: 05/26/2022   03:35 PM      Page 2 of 4          Oxygen Saturations   Phase          Location        Hb             Sat            pO2  Content        Group  Baseline        AO                        12     90  14.9   SA  Baseline        Pa                        12              56  9.3   PA    Oxygen Saturation Average, Phase: Baseline   PV Hb                PV Sat                 PV pO2  PV Content  SA Hb      12.20 g/dL SA Sat     90.00 %    SA pO2  SA Content     14.93 %  PA Hb      12.20 g/dL PA Sat     56.10 %    PA pO2  PA Content     9.31 %  MV Hb                MV Sat                 MV pO2  MV Content  Strokework:   Phase:                    Baseline   LVSW:                     37.26 g*m                  LVSW (index):  20.84 g*m/m2  RVSW:                     18.01 g*m                  RVSW (index):  10.07 g*m/m2  Flow Calculations, Phase: Baseline   CO                    3.97 l/min    HR  O2Insp  CI                 2.22 l/min/m2 PO2  O2Exp  SV                                  VO2                 223.42 ml/min  O2(ExAir)  SVI                                 A-VO2  Hb                 12.20 gm/d  Shunts:   Qs                    3.97 l/min    Qs Index               2.22  l/min/m2  Qp                    3.97 l/min    Qp Index               2.22  l/min/m2    Qp/Qs  EPBF                                EPBF Index   L-R                                 L-R Index   R-L                                 R-L Index   Systemic Resistances, Phase: Baseline   SVR              1510.18 dyn*s/cm5                  TVR  1691.40 dyn*s/cm5  SVRI             2699.18 dyn*s*m2/cm5               TVRI  3023.08 dyn*s*m2/cm5  SVR              18.88 VEE                           TVR  21.15 VEE  SVRI          33.75 VEE*m2                        TVRI  37.80 VEE*m2  Pulmonary Resistances:   PVR              281.90 dyn*s/cm5                   TPR  765.16 dyn*s/cm5  PVRI             503.85 dyn*s*m2/cm5                TPRI  1367.58 dyn*s*m2/cm5  PVR          3.52 VEE                            TPR  9.57 VEE  PVRI             6.30 VEE*m2                         TPRI  17.10 VEE*m2  Ratios:   PVR-SVR          0.19                               TPR-TVR  0.45  Shunts:   venO2                  CO   R-L Shunt                                           R-L Shunt   Jas. Method   L-R Shunt                                           L-R Shunt   R-L Shunt                                           R-L Shunt   Jas. Method     Conscious sedation was administered and monitored by Cardiology  nursing staff,    Patient: INGE CARLOS              MRN: [id number]  Study Date: 05/26/2022   03:35 PM      Page 3 of 4          < end of copied text >      < from: Xray Chest 1 View- PORTABLE-Urgent (Xray Chest 1 View- PORTABLE-Urgent .) (03.16.24 @ 20:24) >  FINDINGS:  Redemonstrated left chest wall ICD with intact leads, sternotomy wires   and mediastinal clips.  Left retrocardiac opacity.  There is no pneumothorax.  The heart is enlarged, unchanged.  The visualized osseous structures demonstrate no acute pathology.    IMPRESSION:  Left retrocardiac opacity, may represent consolidation.    < end of copied text >

## 2024-03-19 DIAGNOSIS — N18.9 CHRONIC KIDNEY DISEASE, UNSPECIFIED: ICD-10-CM

## 2024-03-19 LAB
ANION GAP SERPL CALC-SCNC: 16 MMOL/L — HIGH (ref 7–14)
APPEARANCE UR: CLEAR — SIGNIFICANT CHANGE UP
BILIRUB UR-MCNC: NEGATIVE — SIGNIFICANT CHANGE UP
BUN SERPL-MCNC: 82 MG/DL — HIGH (ref 7–23)
CALCIUM SERPL-MCNC: 8.5 MG/DL — SIGNIFICANT CHANGE UP (ref 8.4–10.5)
CHLORIDE SERPL-SCNC: 100 MMOL/L — SIGNIFICANT CHANGE UP (ref 98–107)
CO2 SERPL-SCNC: 18 MMOL/L — LOW (ref 22–31)
COLOR SPEC: YELLOW — SIGNIFICANT CHANGE UP
CREAT ?TM UR-MCNC: 44 MG/DL — SIGNIFICANT CHANGE UP
CREAT SERPL-MCNC: 2.92 MG/DL — HIGH (ref 0.5–1.3)
DIFF PNL FLD: NEGATIVE — SIGNIFICANT CHANGE UP
EGFR: 23 ML/MIN/1.73M2 — LOW
GLUCOSE BLDC GLUCOMTR-MCNC: 103 MG/DL — HIGH (ref 70–99)
GLUCOSE BLDC GLUCOMTR-MCNC: 109 MG/DL — HIGH (ref 70–99)
GLUCOSE BLDC GLUCOMTR-MCNC: 151 MG/DL — HIGH (ref 70–99)
GLUCOSE BLDC GLUCOMTR-MCNC: 221 MG/DL — HIGH (ref 70–99)
GLUCOSE BLDC GLUCOMTR-MCNC: 98 MG/DL — SIGNIFICANT CHANGE UP (ref 70–99)
GLUCOSE SERPL-MCNC: 111 MG/DL — HIGH (ref 70–99)
GLUCOSE UR QL: NEGATIVE MG/DL — SIGNIFICANT CHANGE UP
HCT VFR BLD CALC: 37.9 % — LOW (ref 39–50)
HGB BLD-MCNC: 12 G/DL — LOW (ref 13–17)
KETONES UR-MCNC: NEGATIVE MG/DL — SIGNIFICANT CHANGE UP
LEGIONELLA AG UR QL: NEGATIVE — SIGNIFICANT CHANGE UP
LEUKOCYTE ESTERASE UR-ACNC: NEGATIVE — SIGNIFICANT CHANGE UP
MAGNESIUM SERPL-MCNC: 2.1 MG/DL — SIGNIFICANT CHANGE UP (ref 1.6–2.6)
MCHC RBC-ENTMCNC: 30.3 PG — SIGNIFICANT CHANGE UP (ref 27–34)
MCHC RBC-ENTMCNC: 31.7 GM/DL — LOW (ref 32–36)
MCV RBC AUTO: 95.7 FL — SIGNIFICANT CHANGE UP (ref 80–100)
NITRITE UR-MCNC: NEGATIVE — SIGNIFICANT CHANGE UP
NRBC # BLD: 0 /100 WBCS — SIGNIFICANT CHANGE UP (ref 0–0)
NRBC # FLD: 0 K/UL — SIGNIFICANT CHANGE UP (ref 0–0)
PH UR: 6 — SIGNIFICANT CHANGE UP (ref 5–8)
PHOSPHATE SERPL-MCNC: 4.7 MG/DL — HIGH (ref 2.5–4.5)
PLATELET # BLD AUTO: 135 K/UL — LOW (ref 150–400)
POTASSIUM SERPL-MCNC: 4.7 MMOL/L — SIGNIFICANT CHANGE UP (ref 3.5–5.3)
POTASSIUM SERPL-SCNC: 4.7 MMOL/L — SIGNIFICANT CHANGE UP (ref 3.5–5.3)
PROT ?TM UR-MCNC: 6 MG/DL — SIGNIFICANT CHANGE UP
PROT UR-MCNC: NEGATIVE MG/DL — SIGNIFICANT CHANGE UP
PROT/CREAT UR-RTO: 0.1 RATIO — SIGNIFICANT CHANGE UP (ref 0–0.2)
RBC # BLD: 3.96 M/UL — LOW (ref 4.2–5.8)
RBC # FLD: 16.5 % — HIGH (ref 10.3–14.5)
SODIUM SERPL-SCNC: 134 MMOL/L — LOW (ref 135–145)
SP GR SPEC: 1.01 — SIGNIFICANT CHANGE UP (ref 1–1.03)
UROBILINOGEN FLD QL: 0.2 MG/DL — SIGNIFICANT CHANGE UP (ref 0.2–1)
UUN UR-MCNC: 392.6 MG/DL — SIGNIFICANT CHANGE UP
WBC # BLD: 8.24 K/UL — SIGNIFICANT CHANGE UP (ref 3.8–10.5)
WBC # FLD AUTO: 8.24 K/UL — SIGNIFICANT CHANGE UP (ref 3.8–10.5)

## 2024-03-19 PROCEDURE — 99233 SBSQ HOSP IP/OBS HIGH 50: CPT | Mod: FS

## 2024-03-19 PROCEDURE — 99223 1ST HOSP IP/OBS HIGH 75: CPT | Mod: GC

## 2024-03-19 PROCEDURE — 93284 PRGRMG EVAL IMPLANTABLE DFB: CPT | Mod: 26

## 2024-03-19 PROCEDURE — 76770 US EXAM ABDO BACK WALL COMP: CPT | Mod: 26

## 2024-03-19 PROCEDURE — 99232 SBSQ HOSP IP/OBS MODERATE 35: CPT

## 2024-03-19 PROCEDURE — 93970 EXTREMITY STUDY: CPT | Mod: 26

## 2024-03-19 RX ORDER — ACETAMINOPHEN 500 MG
650 TABLET ORAL EVERY 6 HOURS
Refills: 0 | Status: DISCONTINUED | OUTPATIENT
Start: 2024-03-19 | End: 2024-03-30

## 2024-03-19 RX ORDER — HYDRALAZINE HCL 50 MG
100 TABLET ORAL THREE TIMES A DAY
Refills: 0 | Status: DISCONTINUED | OUTPATIENT
Start: 2024-03-19 | End: 2024-03-30

## 2024-03-19 RX ORDER — CHLOROTHIAZIDE 500 MG
500 TABLET ORAL ONCE
Refills: 0 | Status: COMPLETED | OUTPATIENT
Start: 2024-03-19 | End: 2024-03-19

## 2024-03-19 RX ORDER — TRAMADOL HYDROCHLORIDE 50 MG/1
25 TABLET ORAL ONCE
Refills: 0 | Status: DISCONTINUED | OUTPATIENT
Start: 2024-03-19 | End: 2024-03-19

## 2024-03-19 RX ORDER — ACETAMINOPHEN 500 MG
1000 TABLET ORAL ONCE
Refills: 0 | Status: COMPLETED | OUTPATIENT
Start: 2024-03-19 | End: 2024-03-19

## 2024-03-19 RX ADMIN — Medication 200 MILLIGRAM(S): at 17:55

## 2024-03-19 RX ADMIN — Medication 3 MILLILITER(S): at 22:23

## 2024-03-19 RX ADMIN — FAMOTIDINE 20 MILLIGRAM(S): 10 INJECTION INTRAVENOUS at 12:42

## 2024-03-19 RX ADMIN — Medication 2: at 12:45

## 2024-03-19 RX ADMIN — CLOPIDOGREL BISULFATE 75 MILLIGRAM(S): 75 TABLET, FILM COATED ORAL at 12:41

## 2024-03-19 RX ADMIN — Medication 325 MILLIGRAM(S): at 12:41

## 2024-03-19 RX ADMIN — CARVEDILOL PHOSPHATE 6.25 MILLIGRAM(S): 80 CAPSULE, EXTENDED RELEASE ORAL at 06:00

## 2024-03-19 RX ADMIN — TRAMADOL HYDROCHLORIDE 25 MILLIGRAM(S): 50 TABLET ORAL at 17:52

## 2024-03-19 RX ADMIN — GABAPENTIN 100 MILLIGRAM(S): 400 CAPSULE ORAL at 12:44

## 2024-03-19 RX ADMIN — BUDESONIDE AND FORMOTEROL FUMARATE DIHYDRATE 2 PUFF(S): 160; 4.5 AEROSOL RESPIRATORY (INHALATION) at 08:46

## 2024-03-19 RX ADMIN — APIXABAN 5 MILLIGRAM(S): 2.5 TABLET, FILM COATED ORAL at 05:53

## 2024-03-19 RX ADMIN — CEFTRIAXONE 100 MILLIGRAM(S): 500 INJECTION, POWDER, FOR SOLUTION INTRAMUSCULAR; INTRAVENOUS at 00:39

## 2024-03-19 RX ADMIN — Medication 400 MILLIGRAM(S): at 19:17

## 2024-03-19 RX ADMIN — AZITHROMYCIN 250 MILLIGRAM(S): 500 TABLET, FILM COATED ORAL at 12:42

## 2024-03-19 RX ADMIN — ISOSORBIDE DINITRATE 30 MILLIGRAM(S): 5 TABLET ORAL at 16:34

## 2024-03-19 RX ADMIN — ISOSORBIDE DINITRATE 30 MILLIGRAM(S): 5 TABLET ORAL at 05:52

## 2024-03-19 RX ADMIN — Medication 100 MILLIGRAM(S): at 13:54

## 2024-03-19 RX ADMIN — BUMETANIDE 10 MG/HR: 0.25 INJECTION INTRAMUSCULAR; INTRAVENOUS at 08:45

## 2024-03-19 RX ADMIN — ATORVASTATIN CALCIUM 80 MILLIGRAM(S): 80 TABLET, FILM COATED ORAL at 22:42

## 2024-03-19 RX ADMIN — Medication 3 MILLILITER(S): at 16:23

## 2024-03-19 RX ADMIN — Medication 50 MICROGRAM(S): at 04:35

## 2024-03-19 RX ADMIN — Medication 100 MILLIGRAM(S): at 12:42

## 2024-03-19 RX ADMIN — Medication 75 MILLIGRAM(S): at 05:52

## 2024-03-19 RX ADMIN — CARVEDILOL PHOSPHATE 6.25 MILLIGRAM(S): 80 CAPSULE, EXTENDED RELEASE ORAL at 17:36

## 2024-03-19 RX ADMIN — ISOSORBIDE DINITRATE 30 MILLIGRAM(S): 5 TABLET ORAL at 12:40

## 2024-03-19 RX ADMIN — Medication 3 MILLILITER(S): at 10:15

## 2024-03-19 RX ADMIN — Medication 3 MILLILITER(S): at 03:46

## 2024-03-19 RX ADMIN — TAMSULOSIN HYDROCHLORIDE 0.4 MILLIGRAM(S): 0.4 CAPSULE ORAL at 22:42

## 2024-03-19 RX ADMIN — APIXABAN 5 MILLIGRAM(S): 2.5 TABLET, FILM COATED ORAL at 17:36

## 2024-03-19 RX ADMIN — Medication 100 MILLIGRAM(S): at 22:43

## 2024-03-19 NOTE — PROGRESS NOTE ADULT - ASSESSMENT
67 y/o Hebrew male with PMHX of HTN, HFrEF, CAD w/ CABG 2014 at [facility], CRT-D, COPD (100 pack year history), CVA presents with dyspnea and difficulty breathing, found to have AECOPD and ADHF.

## 2024-03-19 NOTE — PROGRESS NOTE ADULT - PROBLEM SELECTOR PLAN 1
-possible stemming from new-onset afib/flutter first noted March 8th as well as recent URI that maybe contributory  -needed bipap in the ED, now on 2L NC  -tele, tte  -c/w IV bumex 4mg bid, Is/Os,  -cardiology consulted, recs appreciated; f/u further recs  -CXR with retrocardiac opacity, procal mildly elevated, c/w cap coverage for possible bacterial pneumonia  -will also consult renal given renal insufficiency, hypervolemia

## 2024-03-19 NOTE — PROGRESS NOTE ADULT - SUBJECTIVE AND OBJECTIVE BOX
Patient is a 68y old  Male who presents with a chief complaint of ADHF (19 Mar 2024 10:11)      SUBJECTIVE / OVERNIGHT EVENTS:    No events overnight. This AM, RUBEN CARLOS feels well. Has no n/v/d/cp/sob. Still has leg pain.    MEDICATIONS  (STANDING):  albuterol/ipratropium for Nebulization 3 milliLiter(s) Nebulizer every 6 hours  allopurinol 100 milliGRAM(s) Oral daily  apixaban 5 milliGRAM(s) Oral every 12 hours  atorvastatin 80 milliGRAM(s) Oral at bedtime  azithromycin   Tablet 250 milliGRAM(s) Oral daily  azithromycin   Tablet      budesonide 160 MICROgram(s)/formoterol 4.5 MICROgram(s) Inhaler 2 Puff(s) Inhalation two times a day  buMETAnide Infusion 2 mG/Hr (10 mL/Hr) IV Continuous <Continuous>  carvedilol 6.25 milliGRAM(s) Oral every 12 hours  cefTRIAXone   IVPB 1000 milliGRAM(s) IV Intermittent every 24 hours  chlorothiazide IVPB 500 milliGRAM(s) IV Intermittent once  clopidogrel Tablet 75 milliGRAM(s) Oral daily  dextrose 5%. 1000 milliLiter(s) (100 mL/Hr) IV Continuous <Continuous>  dextrose 5%. 1000 milliLiter(s) (50 mL/Hr) IV Continuous <Continuous>  dextrose 50% Injectable 25 Gram(s) IV Push once  dextrose 50% Injectable 12.5 Gram(s) IV Push once  dextrose 50% Injectable 25 Gram(s) IV Push once  famotidine    Tablet 20 milliGRAM(s) Oral daily  ferrous    sulfate 325 milliGRAM(s) Oral daily  gabapentin 100 milliGRAM(s) Oral daily  glucagon  Injectable 1 milliGRAM(s) IntraMuscular once  hydrALAZINE 100 milliGRAM(s) Oral three times a day  insulin lispro (ADMELOG) corrective regimen sliding scale   SubCutaneous three times a day before meals  insulin lispro (ADMELOG) corrective regimen sliding scale   SubCutaneous at bedtime  isosorbide   dinitrate Tablet (ISORDIL) 30 milliGRAM(s) Oral three times a day  levothyroxine 50 MICROGram(s) Oral daily  tamsulosin 0.4 milliGRAM(s) Oral at bedtime    MEDICATIONS  (PRN):  dextrose Oral Gel 15 Gram(s) Oral once PRN Blood Glucose LESS THAN 70 milliGRAM(s)/deciliter      PHYSICAL EXAM:  T(C): 36.4 (03-19-24 @ 05:50), Max: 36.4 (03-19-24 @ 05:50)  HR: 84 (03-19-24 @ 10:15) (79 - 107)  BP: 123/86 (03-19-24 @ 05:50) (101/65 - 123/86)  RR: 19 (03-19-24 @ 05:50) (18 - 19)  SpO2: 95% (03-19-24 @ 10:15) (95% - 100%)  I&O's Summary    18 Mar 2024 07:01  -  19 Mar 2024 07:00  --------------------------------------------------------  IN: 100 mL / OUT: 1100 mL / NET: -1000 mL      GENERAL: NAD, lying in bed  HEAD:  Atraumatic, Normocephalic, MMM  CHEST/LUNG: No use of accessory muscles, CTAB, breathing non-labored  COR: RR, no mrcg  ABD: Soft, ND/NT, +BS  PSYCH: AAOx3  NEUROLOGY: CN II-XII grossly intact, moving all extremities  SKIN: No rashes or lesions  EXT: wwp, no cce    LABS:  CAPILLARY BLOOD GLUCOSE      POCT Blood Glucose.: 98 mg/dL (19 Mar 2024 07:16)  POCT Blood Glucose.: 168 mg/dL (18 Mar 2024 21:12)  POCT Blood Glucose.: 151 mg/dL (18 Mar 2024 17:22)  POCT Blood Glucose.: 166 mg/dL (18 Mar 2024 12:26)                          12.0   8.24  )-----------( 135      ( 19 Mar 2024 03:30 )             37.9     03-19    134<L>  |  100  |  82<H>  ----------------------------<  111<H>  4.7   |  18<L>  |  2.92<H>    Ca    8.5      19 Mar 2024 03:30  Phos  4.7     03-19  Mg     2.10     03-19            Urinalysis Basic - ( 19 Mar 2024 03:30 )    Color: x / Appearance: x / SG: x / pH: x  Gluc: 111 mg/dL / Ketone: x  / Bili: x / Urobili: x   Blood: x / Protein: x / Nitrite: x   Leuk Esterase: x / RBC: x / WBC x   Sq Epi: x / Non Sq Epi: x / Bacteria: x          RADIOLOGY & ADDITIONAL TESTS:    Telemetry Personally Reviewed -     Imaging Personally Reviewed -     Imaging Reviewed -     Consultant(s) Notes Reviewed -       Care Discussed with Consultants/Other Providers -

## 2024-03-19 NOTE — CONSULT NOTE ADULT - ASSESSMENT
68 year old male with PMHX of HTN, chronic HFrEF, CAD w/ CABG 2014 at [facility], s/p [employer] [place] CRT-D, PAF-on Eliquis, asthma/COPD on 2 L O2 at home, CKD stage III, CVA with left sided weakness, HTN, HLD, DMT2 and hypothyroidism who presents with SOB found to be in acute decompensated heart failure and in AFl and UTI.  Interrogation revealed 4.9% AF/AFl burden since March 8 with biv pacing at 80.7%. No ICD shocks noted.  Patient was started on IV Bumex gtt for diuresis.  He denies palpitations/syncope.  Patient claims his daughter administer home meds to him.    A repeat TTE showed LVEF 20-25% normal LA.     Discussed about potential benefit of rhythm control with BRANDIE/DCCV to restore sinus rhythm considering his exacerbation of HF and suboptimal biv pacing associated with persistent AF/FL.  Outpatient AF/FL ablation can be considered as a long term goal for rhythm control.   -Plan for BRANDIE/DCCV when patient is euvolemic and after resolution of UTI  -Continue management per HF/primary cardiology team  -Continue AC Eliquis 5mg bid for high CHADS2-VASC2 score of 7 for thromboembolic prophylaxis.   -Will discuss with EP attending regarding potential indication of antiarrhythmic drug   -Will follow up     68 year old male with PMHX of HTN, chronic HFrEF, CAD w/ CABG 2014 at [facility], s/p [employer] [place] CRT-D, recently diagnosed PAF-started on Eliquis since March 8, asthma/COPD on 2 L O2 at home, CKD stage III, CVA with left sided weakness, HTN, HLD, DMT2 and hypothyroidism who presents with SOB found to be in acute decompensated heart failure and in AFl and UTI.  Interrogation revealed 4.9% AF/AFl burden since March 8 with biv pacing at 80.7%. No ICD shocks noted.  Patient was started on IV Bumex gtt for diuresis.  He denies palpitations/syncope.  Patient claims his daughter administer home meds to him.    A repeat TTE showed LVEF 20-25% normal LA.     Discussed with patient and his daughter about potential benefit of rhythm control with BRANDIE/DCCV to restore sinus rhythm considering his exacerbation of HF and suboptimal biv pacing associated with persistent AF/FL.  Outpatient AF/FL ablation can be considered as a long term goal for rhythm control.  They are amenable to the recommendations of BRANDIE/DCCV and follow up for possible ablation as an outpatient.      acute decompensated heart failure associated with newly diagnosed persistent AF/FL.  -Plan for BRANDIE guided DCCV when patient is euvolemic and after resolution of UTI  -Continue management per HF/primary cardiology team  -Continue AC Eliquis 5mg bid for high CHADS2-VASC2 score of 7 for thromboembolic prophylaxis.   -Will discuss with EP attending regarding potential indication of antiarrhythmic drug   -Will follow up

## 2024-03-19 NOTE — PROCEDURE NOTE - ADDITIONAL PROCEDURE DETAILS
-AF burden 4.9% since Aug 1,2023, long-standing persistent AF AF/AFL with V rate controlled since March 8,2024  -biv pacing <90%  -elevated OptiVol fluid accumulation from Dec/09/2023 -ongoing  -multiple episodes of NSVT lasted <1 second, most recent NSVT (6 beats) recorded on Jan 27, 2024

## 2024-03-19 NOTE — PROGRESS NOTE ADULT - ASSESSMENT
69 y/o Fijian male with PMHX of HTN, HFrEF (TTE 7/18/23 LVEF 25%, LVIDD 5.6cm, LA 4.9cm, mild MR) no ICD, CAD w/ CABG 2014 at [facility], CRT-D, asthma/COPD on 2 L O2 at home, CVA with left sided weakness comes in with complaints of chest pressure and SOB. He admits to 2 pillow orthopnea, KINNEY that was worsening where he would get SOB after walking (with rollator walker) for less than 2 blocks which was going on for 4 days. Also admits to not urinating well to home dose of diuretics. Per record patient admits to URI 2 weeks ago, and was also found to be in a.fib and was started on Eliquis. He was found to be in acute on chronic systolic heart failure and started on IV Bumex 4 BID. HF service consulted to help manage care in this patient who is followed by Dr. Garo Dobson as an outpatient. Labs show proBNP 14K, trop 93, lactate 2.8 which has since improved to 1.5, BUN/Cr 63/2.94 (similar to last labs in July 2023 of 53/2.92). Overall stage C HF, NYHA class III- severely hypervolemic and normotensive and not responding well to current diuretic regimen.      67 y/o [place] male with PMHX of HTN, HFrEF (TTE 7/18/23 LVEF 25%, LVIDD 5.6cm, LA 4.9cm, mild MR), CAD w/ CABG 2014 at [facility], CRT-D, asthma/COPD on 2 L O2 at home, CVA with left sided weakness comes in with complaints of chest pressure and SOB. He admits to 2 pillow orthopnea, KINNEY that was worsening where he would get SOB after walking (with rollator walker) for less than 2 blocks which was going on for 4 days. Also admits to not urinating well to home dose of diuretics. Per record patient admits to URI 2 weeks ago, and was also found to be in a.fib and was started on Eliquis. He was found to be in acute on chronic systolic heart failure and started on IV Bumex 4 BID. HF service consulted to help manage care in this patient who is followed by Dr. Garo Dobson as an outpatient. Labs show proBNP 14K, trop 93, lactate 2.8 which has since improved to 1.5, BUN/Cr 63/2.94 (similar to last labs in July 2023 of 53/2.92). Overall stage C HF, NYHA class III- severely hypervolemic and normotensive and not responding well to current diuretic regimen.

## 2024-03-19 NOTE — PROGRESS NOTE ADULT - PROBLEM SELECTOR PLAN 1
Remains severely  hypervolemic, and not responding well to current diuretics.   Continue Bumex gtt 2 mg/hr. Add diuril 500 mg IV x 1. Will monitor urine output.  Continue Coreg 6.25 mg po BID, hold for SBP <90, HR ,60.   Increase hydralazine to 100 mg po TID. hold for SBP <90.   Continue Isordil 30 mg po TID. hold for SBP <90.  Strict I/O and daily standing weights.   Keep K 4.0-5.0 and mag >2.0.

## 2024-03-19 NOTE — CONSULT NOTE ADULT - ATTENDING COMMENTS
68M HFrEF s/p AICD, COPD, chronic respiratory failure on 2LPM at home presents with shortness of breath.   MICU consulted for COPD as a cause of shortness of breath.  He is currently on 2LPM, talking in full sentences. Hemodynamically stable.  On initial exam, faint end-expiratory wheezing heard that cleared with coughing.  Suggest duonebs, can hold off on steroids.  There is not a need for MICU level of care at this time.  Please re-consult as needed.
stage 3-4 CKD   CHF on diuretics   can add HTS to augment diuresis   r/o retention

## 2024-03-19 NOTE — CONSULT NOTE ADULT - NS ATTEND AMEND GEN_ALL_CORE FT
Patient in acute decompensate HF and in atrial flutter. Would benefit from rhythm control. At this time very SOB at rest. Would not be a candidate for BRANDIE at this time. Once more optimized, will proceed with BRANDIE/DCCV vs ablation for his flutter. Continue AC. Agree with diuresis.
Acutely ill, unclear if this is COPD or HF. Will need intensive care.   We have discussed with the pulmonary team to evaluate; if this is felt to be less likely from COPD, we will admit to CCU.  Needs aggressive diuresis.
Briefly, 69 y/o Kos[place] male with h/o HTN, HFrEF/ICM (EF 25%, LVIDD 5.6cm), CAD w/ CABG 2014 at [facility], CRT-D, asthma/COPD (on 2 L O2 at home), CVA with left sided weakness, CKD (b/l Cr 2.9) comes in on 3/16 with complaints of chest pressure and SOB that started 4-5 days prior. Had recent URI. Adherent to medications. Walks with rollator walker for less than 2 block. Also admits to not urinating well to home dose of diuretics. Found to be in afib (new). Started on bumex with poor output. ON exam, JVP elevated, RRR, tachypneic, wheezing b/l, nontender abdomen, trace edema b/l. Labs show proBNP 14K, trop 93, lactate 2.8 which has since improved to 1.5, BUN/Cr 63/2.94 (near baseline).   - bladder scan patient  - please c/s renal  - please c/s EP to interrogate device; may benefit from BRANDIE/DCCV  - bumex gtt as above; standing weights daily  - aggressive PT/OT  - c/w current meds

## 2024-03-19 NOTE — PROGRESS NOTE ADULT - SUBJECTIVE AND OBJECTIVE BOX
Patient seen and examined. He states he is still having b/l leg pain (going on for past 3 years). States he is not urinating much.   No SOB at rest.     Medications:  albuterol/ipratropium for Nebulization 3 milliLiter(s) Nebulizer every 6 hours  allopurinol 100 milliGRAM(s) Oral daily  apixaban 5 milliGRAM(s) Oral every 12 hours  atorvastatin 80 milliGRAM(s) Oral at bedtime  azithromycin   Tablet 250 milliGRAM(s) Oral daily  azithromycin   Tablet      budesonide 160 MICROgram(s)/formoterol 4.5 MICROgram(s) Inhaler 2 Puff(s) Inhalation two times a day  buMETAnide Infusion 2 mG/Hr IV Continuous <Continuous>  carvedilol 6.25 milliGRAM(s) Oral every 12 hours  cefTRIAXone   IVPB 1000 milliGRAM(s) IV Intermittent every 24 hours  clopidogrel Tablet 75 milliGRAM(s) Oral daily  dextrose 5%. 1000 milliLiter(s) IV Continuous <Continuous>  dextrose 5%. 1000 milliLiter(s) IV Continuous <Continuous>  dextrose 50% Injectable 25 Gram(s) IV Push once  dextrose 50% Injectable 12.5 Gram(s) IV Push once  dextrose 50% Injectable 25 Gram(s) IV Push once  dextrose Oral Gel 15 Gram(s) Oral once PRN  famotidine    Tablet 20 milliGRAM(s) Oral daily  ferrous    sulfate 325 milliGRAM(s) Oral daily  gabapentin 100 milliGRAM(s) Oral daily  glucagon  Injectable 1 milliGRAM(s) IntraMuscular once  hydrALAZINE 75 milliGRAM(s) Oral three times a day  insulin lispro (ADMELOG) corrective regimen sliding scale   SubCutaneous three times a day before meals  insulin lispro (ADMELOG) corrective regimen sliding scale   SubCutaneous at bedtime  isosorbide   dinitrate Tablet (ISORDIL) 30 milliGRAM(s) Oral three times a day  levothyroxine 50 MICROGram(s) Oral daily  tamsulosin 0.4 milliGRAM(s) Oral at bedtime      Vitals:  Vital Signs Last 24 Hrs  T(C): 36.4 (19 Mar 2024 05:50), Max: 36.4 (19 Mar 2024 05:50)  T(F): 97.6 (19 Mar 2024 05:50), Max: 97.6 (19 Mar 2024 05:50)  HR: 84 (19 Mar 2024 07:38) (79 - 107)  BP: 123/86 (19 Mar 2024 05:50) (101/65 - 123/86)  BP(mean): --  RR: 19 (19 Mar 2024 05:50) (18 - 19)  SpO2: 98% (19 Mar 2024 07:38) (98% - 100%)    Parameters below as of 19 Mar 2024 05:50  Patient On (Oxygen Delivery Method): BiPAP/CPAP  O2 Flow (L/min): 3      Daily Height in cm: 177.8 (19 Mar 2024 07:47)    Daily Weight in k.1 (19 Mar 2024 05:50)    I&O's Detail    18 Mar 2024 07:01  -  19 Mar 2024 07:00  --------------------------------------------------------  IN:    Oral Fluid: 100 mL  Total IN: 100 mL    OUT:    Voided (mL): 1100 mL  Total OUT: 1100 mL    Total NET: -1000 mL          Physical Exam:     General: No distress. Comfortable.  HEENT: EOM intact.  Neck: Neck supple. JVP severely elevated 20cm. No masses  Chest: Clear to auscultation bilaterally. Tender to palpation over left chest.   CV: Normal S1 and S2. No murmurs, rub, or gallops. Radial pulses normal. No LE edema and is warm b/l.   Abdomen: Soft, non-distended, non-tender  Skin: No rashes or skin breakdown  Neurology: Alert and oriented times three. Sensation intact  Psych: Affect normal    Labs:                        12.0   8.24  )-----------( 135      ( 19 Mar 2024 03:30 )             37.9     03-19    134<L>  |  100  |  82<H>  ----------------------------<  111<H>  4.7   |  18<L>  |  2.92<H>    Ca    8.5      19 Mar 2024 03:30  Phos  4.7     03-19  Mg     2.10     03-19

## 2024-03-19 NOTE — CONSULT NOTE ADULT - SUBJECTIVE AND OBJECTIVE BOX
Patient is a 68y old  Male who presents with a chief complaint of ADHF (19 Mar 2024 11:01)          HPI:      68 year old male with PMHX of HTN, chronic HFrEF, CAD w/ CABG 2014 at [facility], s/p [employer] [place] CRT-D, PAF-on Eliquis, asthma/COPD on 2 L O2 at home, CKD stage III, CVA with left sided weakness, HTN, HLD, DMT2 and hypothyroidism who presents with SOB found to be in acute decompensated heart failure and in AFl and UTI.  Interrogation revealed 4.9% AF/AFl burden since March 8 with biv pacing at 80.7%. No ICD shocks noted.  Patient was started on IV Bumex gtt for diuresis.  He denies palpitations/syncope.  Patient claims his daughter administer home meds to him.    A repeat TTE showed LVEF 20-25% normal LA.     EP consulted for BRANDIE/DCCV for persistent AF /AFL.       PAST MEDICAL & SURGICAL HISTORY:  HTN (hypertension)      CAD (coronary artery disease)      Diabetes      Hyperlipidemia, unspecified hyperlipidemia type      CHF (congestive heart failure)      BPH (benign prostatic hyperplasia)      S/P CABG (coronary artery bypass graft)      S/P appendectomy  s/p BiV ICD          MEDICATIONS  (STANDING):  albuterol/ipratropium for Nebulization 3 milliLiter(s) Nebulizer every 6 hours  allopurinol 100 milliGRAM(s) Oral daily  apixaban 5 milliGRAM(s) Oral every 12 hours  atorvastatin 80 milliGRAM(s) Oral at bedtime  azithromycin   Tablet 250 milliGRAM(s) Oral daily  azithromycin   Tablet      budesonide 160 MICROgram(s)/formoterol 4.5 MICROgram(s) Inhaler 2 Puff(s) Inhalation two times a day  buMETAnide Infusion 2 mG/Hr (10 mL/Hr) IV Continuous <Continuous>  carvedilol 6.25 milliGRAM(s) Oral every 12 hours  cefTRIAXone   IVPB 1000 milliGRAM(s) IV Intermittent every 24 hours  chlorothiazide IVPB 500 milliGRAM(s) IV Intermittent once  clopidogrel Tablet 75 milliGRAM(s) Oral daily  dextrose 5%. 1000 milliLiter(s) (100 mL/Hr) IV Continuous <Continuous>  dextrose 5%. 1000 milliLiter(s) (50 mL/Hr) IV Continuous <Continuous>  dextrose 50% Injectable 25 Gram(s) IV Push once  dextrose 50% Injectable 12.5 Gram(s) IV Push once  dextrose 50% Injectable 25 Gram(s) IV Push once  famotidine    Tablet 20 milliGRAM(s) Oral daily  ferrous    sulfate 325 milliGRAM(s) Oral daily  gabapentin 100 milliGRAM(s) Oral daily  glucagon  Injectable 1 milliGRAM(s) IntraMuscular once  hydrALAZINE 100 milliGRAM(s) Oral three times a day  insulin lispro (ADMELOG) corrective regimen sliding scale   SubCutaneous three times a day before meals  insulin lispro (ADMELOG) corrective regimen sliding scale   SubCutaneous at bedtime  isosorbide   dinitrate Tablet (ISORDIL) 30 milliGRAM(s) Oral three times a day  levothyroxine 50 MICROGram(s) Oral daily  tamsulosin 0.4 milliGRAM(s) Oral at bedtime    MEDICATIONS  (PRN):  dextrose Oral Gel 15 Gram(s) Oral once PRN Blood Glucose LESS THAN 70 milliGRAM(s)/deciliter    Allergies    No Known Allergies    Intolerances      FAMILY HISTORY:  Family history of type 2 diabetes mellitus in father (Father)        SOCIAL HISTORY:  Denies smoking; no   Alcohol  or  Drug abuse         REVIEW OF SYSTEMS:    CONSTITUTIONAL: No fever, weight loss, chills, shakes, or fatigue  EYES: No eye pain, visual disturbances, or discharge  ENMT:  No difficulty hearing, tinnitus, vertigo; No sinus or throat pain  NECK: No pain or stiffness  RESPIRATORY: No cough, wheezing, hemoptysis, or shortness of breath  CARDIOVASCULAR: No chest pain, palpitations, dizziness, syncope, paroxysmal nocturnal dyspnea, orthopnea, or arm or leg swelling + dyspnea,  GASTROINTESTINAL: No abdominal  or epigastric pain, nausea, vomiting, hematemesis, diarrhea, constipation, melena or bright red blood.  GENITOURINARY: No dysuria, nocturia, hematuria, or urinary incontinence  NEUROLOGICAL: No headaches, memory loss, slurred speech, limb weakness, loss of strength, numbness, or tremors  SKIN: No itching, burning, rashes, or lesions   LYMPH NODES: No enlarged glands  ENDOCRINE: No heat or cold intolerance, or hair loss  MUSCULOSKELETAL: No joint pain or swelling, muscle, back, or extremity pain  PSYCHIATRIC: No depression, anxiety, or difficulty sleeping  HEME/LYMPH: No easy bruising or bleeding gums  ALLERY AND IMMUNOLOGIC: No hives or rash.      Vital Signs Last 24 Hrs  T(C): 36.4 (19 Mar 2024 05:50), Max: 36.4 (19 Mar 2024 05:50)  T(F): 97.6 (19 Mar 2024 05:50), Max: 97.6 (19 Mar 2024 05:50)  HR: 84 (19 Mar 2024 10:15) (79 - 107)  BP: 123/86 (19 Mar 2024 05:50) (101/65 - 123/86)  BP(mean): --  RR: 19 (19 Mar 2024 05:50) (18 - 19)  SpO2: 95% (19 Mar 2024 10:15) (95% - 100%)    Parameters below as of 19 Mar 2024 10:15  Patient On (Oxygen Delivery Method): nasal cannula        PHYSICAL EXAM:    GENERAL: In no apparent distress  HEAD:  Atraumatic, Normocephalic  NECK: Supple.  elevated JVD, no carotid bruit or thyroidmegaly.  Carotid pulse is 2+ bilaterally.  PULMONARY: mildly tachypneic with timothy basilar rales, no wheezing, or rhonchi bilaterally.  HEART: Regular rate and rhythm; No murmurs, rubs, or gallops.  ABDOMEN: Soft, Nontender, Nondistended; Bowel sounds present  EXTREMITIES: No clubbing, cyanosis, or edema  NEUROLOGICAL: Alert oriented to person, place and time.  Speech clear.  Skin: Dry intact, no rashes or lesions.          INTERPRETATION OF TELEMETRY:  AFL with V paced at 90's     ECG: AFL with V paced at 90's       LABS:                        12.0   8.24  )-----------( 135      ( 19 Mar 2024 03:30 )             37.9     03-19    134<L>  |  100  |  82<H>  ----------------------------<  111<H>  4.7   |  18<L>  |  2.92<H>    Ca    8.5      19 Mar 2024 03:30  Phos  4.7     03-19  Mg     2.10     03-19            Urinalysis Basic - ( 19 Mar 2024 03:30 )    Color: x / Appearance: x / SG: x / pH: x  Gluc: 111 mg/dL / Ketone: x  / Bili: x / Urobili: x   Blood: x / Protein: x / Nitrite: x   Leuk Esterase: x / RBC: x / WBC x   Sq Epi: x / Non Sq Epi: x / Bacteria: x        BNP  RADIOLOGY & ADDITIONAL STUDIES:  PREVIOUS DIAGNOSTIC TESTING:      ECHO FINDINGS:  CONCLUSIONS:      1. Left ventricular systolic function is severely decreased with an ejection fraction visually estimated at 20 to 25 %. Global left ventricular hypokinesis.   2. Normal right ventricular cavity size, with normal wall thickness, and normal systolic function.   3. Device lead is visualized.   4. The left atrium is normal.   5. The right atrium is normal in size.   6. No significant valvular disease.   7. No pericardial effusion seen.   8. Estimated pulmonary artery systolic pressure is 49 mmHg.   9. The inferior vena cava is dilated measuring 2.35 cm in diameter, (dilated >2.1cm) with abnormal inspiratory collapse (abnormal <50%) consistent with elevated right atrialpressure (~15, range 10-20mmHg).      STRESS FINDINGS:    CATHETERIZATION FINDINGS:       Patient is a 68y old  Male who presents with a chief complaint of ADHF (19 Mar 2024 11:01)          HPI:      68 year old Korean speaking male with PMHX of chronic HFrEF, CAD w/ CABG 2014 at [facility], s/p [employer] [place] CRT-D, recently diagnosed with PAF and started on Eliquis since March 8, 2024 (per device clinic), asthma/COPD on 2 L O2 at home, CKD stage III, CVA with left sided weakness many years ago, HTN, HLD, DMT2 and hypothyroidism who presents with SOB found to be in acute decompensated heart failure and in AFl and UTI.  Interrogation revealed 4.9% AF/AFl burden since March 8 with biv pacing at 80.7%. No ICD shocks noted.  Patient was started on IV Bumex gtt for diuresis.  He denies palpitations/syncope.  Per pt's daughter who administer home meds to him, patient was on ASA/Plavix prior to March 8.     A repeat TTE showed LVEF 20-25% normal LA.     EP consulted for BRANDIE/DCCV for persistent AF /AFL.       PAST MEDICAL & SURGICAL HISTORY:  HTN (hypertension)      CAD (coronary artery disease)      Diabetes      Hyperlipidemia, unspecified hyperlipidemia type      CHF (congestive heart failure)      BPH (benign prostatic hyperplasia)      S/P CABG (coronary artery bypass graft)      S/P appendectomy  s/p BiV ICD          MEDICATIONS  (STANDING):  albuterol/ipratropium for Nebulization 3 milliLiter(s) Nebulizer every 6 hours  allopurinol 100 milliGRAM(s) Oral daily  apixaban 5 milliGRAM(s) Oral every 12 hours  atorvastatin 80 milliGRAM(s) Oral at bedtime  azithromycin   Tablet 250 milliGRAM(s) Oral daily  azithromycin   Tablet      budesonide 160 MICROgram(s)/formoterol 4.5 MICROgram(s) Inhaler 2 Puff(s) Inhalation two times a day  buMETAnide Infusion 2 mG/Hr (10 mL/Hr) IV Continuous <Continuous>  carvedilol 6.25 milliGRAM(s) Oral every 12 hours  cefTRIAXone   IVPB 1000 milliGRAM(s) IV Intermittent every 24 hours  chlorothiazide IVPB 500 milliGRAM(s) IV Intermittent once  clopidogrel Tablet 75 milliGRAM(s) Oral daily  dextrose 5%. 1000 milliLiter(s) (100 mL/Hr) IV Continuous <Continuous>  dextrose 5%. 1000 milliLiter(s) (50 mL/Hr) IV Continuous <Continuous>  dextrose 50% Injectable 25 Gram(s) IV Push once  dextrose 50% Injectable 12.5 Gram(s) IV Push once  dextrose 50% Injectable 25 Gram(s) IV Push once  famotidine    Tablet 20 milliGRAM(s) Oral daily  ferrous    sulfate 325 milliGRAM(s) Oral daily  gabapentin 100 milliGRAM(s) Oral daily  glucagon  Injectable 1 milliGRAM(s) IntraMuscular once  hydrALAZINE 100 milliGRAM(s) Oral three times a day  insulin lispro (ADMELOG) corrective regimen sliding scale   SubCutaneous three times a day before meals  insulin lispro (ADMELOG) corrective regimen sliding scale   SubCutaneous at bedtime  isosorbide   dinitrate Tablet (ISORDIL) 30 milliGRAM(s) Oral three times a day  levothyroxine 50 MICROGram(s) Oral daily  tamsulosin 0.4 milliGRAM(s) Oral at bedtime    MEDICATIONS  (PRN):  dextrose Oral Gel 15 Gram(s) Oral once PRN Blood Glucose LESS THAN 70 milliGRAM(s)/deciliter    Allergies    No Known Allergies    Intolerances      FAMILY HISTORY:  Family history of type 2 diabetes mellitus in father (Father)        SOCIAL HISTORY:  Denies smoking; no   Alcohol  or  Drug abuse         REVIEW OF SYSTEMS:    CONSTITUTIONAL: No fever, weight loss, chills, shakes, or fatigue  EYES: No eye pain, visual disturbances, or discharge  ENMT:  No difficulty hearing, tinnitus, vertigo; No sinus or throat pain  NECK: No pain or stiffness  RESPIRATORY: No cough, wheezing, hemoptysis, or shortness of breath  CARDIOVASCULAR: No chest pain, palpitations, dizziness, syncope, paroxysmal nocturnal dyspnea, orthopnea, or arm or leg swelling + dyspnea,  GASTROINTESTINAL: No abdominal  or epigastric pain, nausea, vomiting, hematemesis, diarrhea, constipation, melena or bright red blood.  GENITOURINARY: No dysuria, nocturia, hematuria, or urinary incontinence  NEUROLOGICAL: No headaches, memory loss, slurred speech, limb weakness, loss of strength, numbness, or tremors  SKIN: No itching, burning, rashes, or lesions   LYMPH NODES: No enlarged glands  ENDOCRINE: No heat or cold intolerance, or hair loss  MUSCULOSKELETAL: No joint pain or swelling, muscle, back, or extremity pain  PSYCHIATRIC: No depression, anxiety, or difficulty sleeping  HEME/LYMPH: No easy bruising or bleeding gums  ALLERY AND IMMUNOLOGIC: No hives or rash.      Vital Signs Last 24 Hrs  T(C): 36.4 (19 Mar 2024 05:50), Max: 36.4 (19 Mar 2024 05:50)  T(F): 97.6 (19 Mar 2024 05:50), Max: 97.6 (19 Mar 2024 05:50)  HR: 84 (19 Mar 2024 10:15) (79 - 107)  BP: 123/86 (19 Mar 2024 05:50) (101/65 - 123/86)  BP(mean): --  RR: 19 (19 Mar 2024 05:50) (18 - 19)  SpO2: 95% (19 Mar 2024 10:15) (95% - 100%)    Parameters below as of 19 Mar 2024 10:15  Patient On (Oxygen Delivery Method): nasal cannula        PHYSICAL EXAM:    GENERAL: In no apparent distress  HEAD:  Atraumatic, Normocephalic  NECK: Supple.  elevated JVD, no carotid bruit or thyroidmegaly.  Carotid pulse is 2+ bilaterally.  PULMONARY: mildly tachypneic with timothy basilar rales, no wheezing, or rhonchi bilaterally.  HEART: Regular rate and rhythm; No murmurs, rubs, or gallops.  ABDOMEN: Soft, Nontender, Nondistended; Bowel sounds present  EXTREMITIES: No clubbing, cyanosis, or edema  NEUROLOGICAL: Alert oriented to person, place and time.  Speech clear.  Skin: Dry intact, no rashes or lesions.          INTERPRETATION OF TELEMETRY:  AFL with V paced at 90's     ECG: AFL with V paced at 90's       LABS:                        12.0   8.24  )-----------( 135      ( 19 Mar 2024 03:30 )             37.9     03-19    134<L>  |  100  |  82<H>  ----------------------------<  111<H>  4.7   |  18<L>  |  2.92<H>    Ca    8.5      19 Mar 2024 03:30  Phos  4.7     03-19  Mg     2.10     03-19            Urinalysis Basic - ( 19 Mar 2024 03:30 )    Color: x / Appearance: x / SG: x / pH: x  Gluc: 111 mg/dL / Ketone: x  / Bili: x / Urobili: x   Blood: x / Protein: x / Nitrite: x   Leuk Esterase: x / RBC: x / WBC x   Sq Epi: x / Non Sq Epi: x / Bacteria: x        BNP  RADIOLOGY & ADDITIONAL STUDIES:  PREVIOUS DIAGNOSTIC TESTING:      ECHO FINDINGS:  CONCLUSIONS:      1. Left ventricular systolic function is severely decreased with an ejection fraction visually estimated at 20 to 25 %. Global left ventricular hypokinesis.   2. Normal right ventricular cavity size, with normal wall thickness, and normal systolic function.   3. Device lead is visualized.   4. The left atrium is normal.   5. The right atrium is normal in size.   6. No significant valvular disease.   7. No pericardial effusion seen.   8. Estimated pulmonary artery systolic pressure is 49 mmHg.   9. The inferior vena cava is dilated measuring 2.35 cm in diameter, (dilated >2.1cm) with abnormal inspiratory collapse (abnormal <50%) consistent with elevated right atrialpressure (~15, range 10-20mmHg).      STRESS FINDINGS:    CATHETERIZATION FINDINGS:

## 2024-03-19 NOTE — CONSULT NOTE ADULT - SUBJECTIVE AND OBJECTIVE BOX
[facility] DIVISION OF KIDNEY DISEASES AND HYPERTENSION -- [phone]  -- INITIAL CONSULT NOTE  --------------------------------------------------------------------------------  HPI:  68 y.o [place]ian Male with PMHx of CKD, HTN, HFrEF, CAD, COPD here with dyspnea and SOB found to have ADHF. Patient was being diuresed with bumex 4IV BID but not diuresing adequately so today switched over to bumex infusion with diuril. Nephrology consulted for management of diuresis with CKD history.     Patient seen at bedside, appears comfortable but not able to talk long due to shortness of breath. Reports some improvement in his shortness of breath since admission. Patient denies having an outpatient nephrologist. Denies any headaches, fevers/chills, chest pain, palpitations, or leg swelling.       PAST HISTORY  --------------------------------------------------------------------------------  PAST MEDICAL & SURGICAL HISTORY:  HTN (hypertension)      CAD (coronary artery disease)      Diabetes      Hyperlipidemia, unspecified hyperlipidemia type      CHF (congestive heart failure)      BPH (benign prostatic hyperplasia)      S/P CABG (coronary artery bypass graft)      S/P appendectomy        FAMILY HISTORY:  Family history of type 2 diabetes mellitus in father (Father)      PAST SOCIAL HISTORY:    ALLERGIES & MEDICATIONS  --------------------------------------------------------------------------------  Allergies    No Known Allergies    Intolerances      Standing Inpatient Medications  albuterol/ipratropium for Nebulization 3 milliLiter(s) Nebulizer every 6 hours  allopurinol 100 milliGRAM(s) Oral daily  apixaban 5 milliGRAM(s) Oral every 12 hours  atorvastatin 80 milliGRAM(s) Oral at bedtime  azithromycin   Tablet 250 milliGRAM(s) Oral daily  azithromycin   Tablet      budesonide 160 MICROgram(s)/formoterol 4.5 MICROgram(s) Inhaler 2 Puff(s) Inhalation two times a day  buMETAnide Infusion 2 mG/Hr IV Continuous <Continuous>  carvedilol 6.25 milliGRAM(s) Oral every 12 hours  cefTRIAXone   IVPB 1000 milliGRAM(s) IV Intermittent every 24 hours  chlorothiazide IVPB 500 milliGRAM(s) IV Intermittent once  clopidogrel Tablet 75 milliGRAM(s) Oral daily  dextrose 5%. 1000 milliLiter(s) IV Continuous <Continuous>  dextrose 5%. 1000 milliLiter(s) IV Continuous <Continuous>  dextrose 50% Injectable 25 Gram(s) IV Push once  dextrose 50% Injectable 12.5 Gram(s) IV Push once  dextrose 50% Injectable 25 Gram(s) IV Push once  famotidine    Tablet 20 milliGRAM(s) Oral daily  ferrous    sulfate 325 milliGRAM(s) Oral daily  gabapentin 100 milliGRAM(s) Oral daily  glucagon  Injectable 1 milliGRAM(s) IntraMuscular once  hydrALAZINE 100 milliGRAM(s) Oral three times a day  insulin lispro (ADMELOG) corrective regimen sliding scale   SubCutaneous three times a day before meals  insulin lispro (ADMELOG) corrective regimen sliding scale   SubCutaneous at bedtime  isosorbide   dinitrate Tablet (ISORDIL) 30 milliGRAM(s) Oral three times a day  levothyroxine 50 MICROGram(s) Oral daily  tamsulosin 0.4 milliGRAM(s) Oral at bedtime    PRN Inpatient Medications  dextrose Oral Gel 15 Gram(s) Oral once PRN      REVIEW OF SYSTEMS  --------------------------------------------------------------------------------  per above    VITALS/PHYSICAL EXAM  --------------------------------------------------------------------------------  T(C): 36.8 (03-19-24 @ 13:45), Max: 36.8 (03-19-24 @ 12:30)  HR: 95 (03-19-24 @ 13:45) (71 - 107)  BP: 103/63 (03-19-24 @ 13:45) (101/65 - 123/86)  RR: 18 (03-19-24 @ 13:45) (18 - 19)  SpO2: 99% (03-19-24 @ 13:45) (95% - 100%)  Wt(kg): --  Height (cm): 177.8 (03-19-24 @ 07:47)  Weight (kg): 78.1 (03-19-24 @ 07:47)  BMI (kg/m2): 24.7 (03-19-24 @ 07:47)  BSA (m2): 1.96 (03-19-24 @ 07:47)      03-18-24 @ 07:01  -  03-19-24 @ 07:00  --------------------------------------------------------  IN: 100 mL / OUT: 1100 mL / NET: -1000 mL        Physical Exam:  	Gen: NAD  	HEENT: Anicteric  	Pulm: CTA B/L  	CV: S1S2+  	Abd: Soft, +BS    	Ext: No LE edema B/L  	Neuro: Awake          : Torres+ with clear urine in the bag  	Skin: Warm and dry  	Dialysis access: julienne    LABS/STUDIES  --------------------------------------------------------------------------------              12.0   8.24  >-----------<  135      [03-19-24 @ 03:30]              37.9     134  |  100  |  82  ----------------------------<  111      [03-19-24 @ 03:30]  4.7   |  18  |  2.92        Ca     8.5     [03-19-24 @ 03:30]      Mg     2.10     [03-19-24 @ 03:30]      Phos  4.7     [03-19-24 @ 03:30]            Creatinine Trend:  SCr 2.92 [03-19 @ 03:30]  SCr 3.01 [03-18 @ 05:48]  SCr 2.94 [03-17 @ 06:24]  SCr 2.94 [03-16 @ 19:12]    Urinalysis - [03-19-24 @ 13:15]      Color Yellow / Appearance Clear / SG 1.011 / pH 6.0      Gluc Negative / Ketone Negative  / Bili Negative / Urobili 0.2       Blood Negative / Protein Negative / Leuk Est Negative / Nitrite Negative      RBC  / WBC  / Hyaline  / Gran  / Sq Epi  / Non Sq Epi  / Bacteria     Urine Creatinine 44      [03-19-24 @ 13:15]  Urine Protein 6      [03-19-24 @ 13:15]  Urine Urea Nitrogen 392.6      [03-19-24 @ 13:15]    HBsAg Nonreact      [05-22-22 @ 07:20]  HCV 0.34, Nonreact      [05-22-22 @ 07:20]  HIV Nonreact      [05-22-22 @ 07:20]      Tacrolimus  Cyclosporine  Sirolimus  Mycophenolate  BK PCR  CMV PCR  Parvo PCR  EBV PCR

## 2024-03-19 NOTE — PROGRESS NOTE ADULT - NS ATTEND AMEND GEN_ALL_CORE FT
Briefly, 67 y/o Ivorian male with h/o HTN, HFrEF/ICM (EF 25%, LVIDD 5.6cm), CAD w/ CABG 2014 at [facility], CRT-D, asthma/COPD (on 2 L O2 at home), CVA with left sided weakness, CKD (b/l Cr 2.9) comes in on 3/16 with complaints of chest pressure and SOB that started 4-5 days prior. Had recent URI. Adherent to medications. Walks with rollator walker for less than 2 block. Also admits to not urinating well to home dose of diuretics. Found to be in afib (new). Started on bumex with poor output with subsequent bumex gtt with suboptimal response. On exam, JVP elevated, irreg irreg, tachypneic, wheezing b/l, nontender abdomen, trace edema b/l. Labs show proBNP 14K, trop 93, lactate 2.8 which has since improved to 1.5, BUN/Cr 82/2.92 (near baseline).   - bladder scan patient; if has evidence of retention, place ya  - please c/s renal  - please c/s EP to interrogate device; may benefit from BRANDIE/DCCV once optimized  - bumex gtt as above; standing weights daily; give diuril 500 mg IV x1; will defer HT saline given pulmonary edema and wheezing  - aggressive PT/OT  - increase hydral to 100 mg q8h

## 2024-03-19 NOTE — CONSULT NOTE ADULT - PROBLEM SELECTOR RECOMMENDATION 2
Pt with CKD history likely from long standing HF. Never followed with an outpatient nephrology doctor. Per review of [facility], scr of 3 in November 2023 now here with Scr at baseline at 2.92. UA completely bland on 3/19. Renal US notable for b/l kidney echogenicity seen. Bladder scan notable for prevoid of >400. Would recommend avoiding nephrotoxic agents and CT IV contrast studies. Diuresis per above. Monitor I/O and BMP. Serum bicarb is low, but will monitor for now as sodium bicarb can increase fluid retention    If you have any questions, please feel free to contact me  Logan Leon  Nephrology Fellow  [phone]; Prefer [employer] TEAMS  (After 5pm or on weekends please page the on-call fellow).
Please get ICD interrogation- patient states he was shocked a few days ago.   Get EP consult- consider DCCV.

## 2024-03-19 NOTE — CONSULT NOTE ADULT - PROBLEM SELECTOR RECOMMENDATION 9
Patient here with ADHF. Recent TTE done showing EF of 20-25% with dilated IVC. Patient was being diuresed with bumex 4mg IV BID but had poor response, now switched to bumex infusion at 2mg/hour with diuril dose. Would recommend 3% HTS 150cc bolus daily while patient receiving IV bumex for diuresis augmentation. Would also advise PVR bladder scan and place ya as patient has shown to be retaining>250 on multiple scans. Strict I/O and place on 1L fluid restriction. Repeat CXR tomorrow am.
Severely  hypervolemic. Normotensive.   Discontinue IV Bumex. Start Bumex gtt 2 mg/hr.   Continue Coreg 6.25 mg po BID, hold for SBP <90.   Continue hydralazine 75 mg po TID. hold for SBP <90.   Continue Isordil 30 mg po TID. hold for SBP <90.  Please check A1C.

## 2024-03-20 LAB
ANION GAP SERPL CALC-SCNC: 17 MMOL/L — HIGH (ref 7–14)
BUN SERPL-MCNC: 83 MG/DL — HIGH (ref 7–23)
CALCIUM SERPL-MCNC: 8.7 MG/DL — SIGNIFICANT CHANGE UP (ref 8.4–10.5)
CHLORIDE SERPL-SCNC: 100 MMOL/L — SIGNIFICANT CHANGE UP (ref 98–107)
CO2 SERPL-SCNC: 24 MMOL/L — SIGNIFICANT CHANGE UP (ref 22–31)
CREAT SERPL-MCNC: 2.9 MG/DL — HIGH (ref 0.5–1.3)
EGFR: 23 ML/MIN/1.73M2 — LOW
GLUCOSE BLDC GLUCOMTR-MCNC: 165 MG/DL — HIGH (ref 70–99)
GLUCOSE BLDC GLUCOMTR-MCNC: 189 MG/DL — HIGH (ref 70–99)
GLUCOSE BLDC GLUCOMTR-MCNC: 190 MG/DL — HIGH (ref 70–99)
GLUCOSE BLDC GLUCOMTR-MCNC: 192 MG/DL — HIGH (ref 70–99)
GLUCOSE SERPL-MCNC: 121 MG/DL — HIGH (ref 70–99)
HCT VFR BLD CALC: 36.1 % — LOW (ref 39–50)
HGB BLD-MCNC: 11.8 G/DL — LOW (ref 13–17)
MAGNESIUM SERPL-MCNC: 2.1 MG/DL — SIGNIFICANT CHANGE UP (ref 1.6–2.6)
MCHC RBC-ENTMCNC: 31.2 PG — SIGNIFICANT CHANGE UP (ref 27–34)
MCHC RBC-ENTMCNC: 32.7 GM/DL — SIGNIFICANT CHANGE UP (ref 32–36)
MCV RBC AUTO: 95.5 FL — SIGNIFICANT CHANGE UP (ref 80–100)
NRBC # BLD: 0 /100 WBCS — SIGNIFICANT CHANGE UP (ref 0–0)
NRBC # FLD: 0 K/UL — SIGNIFICANT CHANGE UP (ref 0–0)
PHOSPHATE SERPL-MCNC: 4.6 MG/DL — HIGH (ref 2.5–4.5)
PLATELET # BLD AUTO: 148 K/UL — LOW (ref 150–400)
POTASSIUM SERPL-MCNC: 3.5 MMOL/L — SIGNIFICANT CHANGE UP (ref 3.5–5.3)
POTASSIUM SERPL-SCNC: 3.5 MMOL/L — SIGNIFICANT CHANGE UP (ref 3.5–5.3)
RBC # BLD: 3.78 M/UL — LOW (ref 4.2–5.8)
RBC # FLD: 16.6 % — HIGH (ref 10.3–14.5)
SODIUM SERPL-SCNC: 141 MMOL/L — SIGNIFICANT CHANGE UP (ref 135–145)
WBC # BLD: 9.73 K/UL — SIGNIFICANT CHANGE UP (ref 3.8–10.5)
WBC # FLD AUTO: 9.73 K/UL — SIGNIFICANT CHANGE UP (ref 3.8–10.5)

## 2024-03-20 PROCEDURE — 99232 SBSQ HOSP IP/OBS MODERATE 35: CPT | Mod: GC

## 2024-03-20 PROCEDURE — 99233 SBSQ HOSP IP/OBS HIGH 50: CPT | Mod: FS

## 2024-03-20 PROCEDURE — 99497 ADVNCD CARE PLAN 30 MIN: CPT

## 2024-03-20 PROCEDURE — 99232 SBSQ HOSP IP/OBS MODERATE 35: CPT

## 2024-03-20 PROCEDURE — 99232 SBSQ HOSP IP/OBS MODERATE 35: CPT | Mod: FS

## 2024-03-20 RX ORDER — SODIUM CHLORIDE 5 G/100ML
150 INJECTION, SOLUTION INTRAVENOUS
Refills: 0 | Status: COMPLETED | OUTPATIENT
Start: 2024-03-20 | End: 2024-03-20

## 2024-03-20 RX ADMIN — ISOSORBIDE DINITRATE 30 MILLIGRAM(S): 5 TABLET ORAL at 05:18

## 2024-03-20 RX ADMIN — APIXABAN 5 MILLIGRAM(S): 2.5 TABLET, FILM COATED ORAL at 17:13

## 2024-03-20 RX ADMIN — TAMSULOSIN HYDROCHLORIDE 0.4 MILLIGRAM(S): 0.4 CAPSULE ORAL at 22:18

## 2024-03-20 RX ADMIN — ATORVASTATIN CALCIUM 80 MILLIGRAM(S): 80 TABLET, FILM COATED ORAL at 22:16

## 2024-03-20 RX ADMIN — Medication 325 MILLIGRAM(S): at 12:21

## 2024-03-20 RX ADMIN — Medication 3 MILLILITER(S): at 07:56

## 2024-03-20 RX ADMIN — FAMOTIDINE 20 MILLIGRAM(S): 10 INJECTION INTRAVENOUS at 12:20

## 2024-03-20 RX ADMIN — CEFTRIAXONE 100 MILLIGRAM(S): 500 INJECTION, POWDER, FOR SOLUTION INTRAMUSCULAR; INTRAVENOUS at 00:49

## 2024-03-20 RX ADMIN — BUMETANIDE 10 MG/HR: 0.25 INJECTION INTRAMUSCULAR; INTRAVENOUS at 17:15

## 2024-03-20 RX ADMIN — Medication 100 MILLIGRAM(S): at 14:39

## 2024-03-20 RX ADMIN — BUDESONIDE AND FORMOTEROL FUMARATE DIHYDRATE 2 PUFF(S): 160; 4.5 AEROSOL RESPIRATORY (INHALATION) at 22:16

## 2024-03-20 RX ADMIN — Medication 50 MICROGRAM(S): at 05:17

## 2024-03-20 RX ADMIN — Medication 100 MILLIGRAM(S): at 12:21

## 2024-03-20 RX ADMIN — APIXABAN 5 MILLIGRAM(S): 2.5 TABLET, FILM COATED ORAL at 05:17

## 2024-03-20 RX ADMIN — Medication 3 MILLILITER(S): at 22:36

## 2024-03-20 RX ADMIN — AZITHROMYCIN 250 MILLIGRAM(S): 500 TABLET, FILM COATED ORAL at 12:21

## 2024-03-20 RX ADMIN — GABAPENTIN 100 MILLIGRAM(S): 400 CAPSULE ORAL at 12:21

## 2024-03-20 RX ADMIN — ISOSORBIDE DINITRATE 30 MILLIGRAM(S): 5 TABLET ORAL at 22:17

## 2024-03-20 RX ADMIN — CARVEDILOL PHOSPHATE 6.25 MILLIGRAM(S): 80 CAPSULE, EXTENDED RELEASE ORAL at 17:13

## 2024-03-20 RX ADMIN — SODIUM CHLORIDE 150 MILLILITER(S): 5 INJECTION, SOLUTION INTRAVENOUS at 17:43

## 2024-03-20 RX ADMIN — Medication 1: at 12:20

## 2024-03-20 RX ADMIN — Medication 1: at 17:12

## 2024-03-20 RX ADMIN — ISOSORBIDE DINITRATE 30 MILLIGRAM(S): 5 TABLET ORAL at 12:27

## 2024-03-20 RX ADMIN — CARVEDILOL PHOSPHATE 6.25 MILLIGRAM(S): 80 CAPSULE, EXTENDED RELEASE ORAL at 05:18

## 2024-03-20 RX ADMIN — Medication 100 MILLIGRAM(S): at 05:18

## 2024-03-20 RX ADMIN — Medication 100 MILLIGRAM(S): at 22:17

## 2024-03-20 RX ADMIN — Medication 3 MILLILITER(S): at 16:06

## 2024-03-20 RX ADMIN — Medication 1: at 08:18

## 2024-03-20 RX ADMIN — BUDESONIDE AND FORMOTEROL FUMARATE DIHYDRATE 2 PUFF(S): 160; 4.5 AEROSOL RESPIRATORY (INHALATION) at 08:17

## 2024-03-20 RX ADMIN — Medication 3 MILLILITER(S): at 03:53

## 2024-03-20 RX ADMIN — CLOPIDOGREL BISULFATE 75 MILLIGRAM(S): 75 TABLET, FILM COATED ORAL at 12:21

## 2024-03-20 NOTE — PROGRESS NOTE ADULT - CONVERSATION DETAILS
Goals of care discussed with Mr. Lewis. I explained that his underlying CHF places him at increased risk of cardiac events. I asked what his wishes would be in the event of cardiac or respiratory arrest. Mr. Lewis would want full resuscitative measures, including but not limited to ACLS and mechanical ventilation. He elects full code status.

## 2024-03-20 NOTE — PROGRESS NOTE ADULT - PROBLEM SELECTOR PLAN 1
-possible stemming from new-onset afib/flutter first noted March 8th as well as recent URI that maybe contributory  -needed bipap in the ED, now on 2L NC  -tele, tte  -c/w IV bumex 4mg bid, Is/Os,  -cardiology consulted, recs appreciated; f/u further recs  -CXR with retrocardiac opacity, procal mildly elevated, c/w cap coverage for possible bacterial pneumonia  -renal team consulted, Torres placed for management of urinary retention, will continue to monitor sCr

## 2024-03-20 NOTE — PROGRESS NOTE ADULT - SUBJECTIVE AND OBJECTIVE BOX
Patient is a 68y old  Male who presents with a chief complaint of ADHF (20 Mar 2024 07:55)      SUBJECTIVE / OVERNIGHT EVENTS:    No events overnight. This AM, RUBEN CARLOS feels well. Has no n/v/d/cp/sob.      MEDICATIONS  (STANDING):  albuterol/ipratropium for Nebulization 3 milliLiter(s) Nebulizer every 6 hours  allopurinol 100 milliGRAM(s) Oral daily  apixaban 5 milliGRAM(s) Oral every 12 hours  atorvastatin 80 milliGRAM(s) Oral at bedtime  azithromycin   Tablet 250 milliGRAM(s) Oral daily  azithromycin   Tablet      budesonide 160 MICROgram(s)/formoterol 4.5 MICROgram(s) Inhaler 2 Puff(s) Inhalation two times a day  buMETAnide Infusion 2 mG/Hr (10 mL/Hr) IV Continuous <Continuous>  carvedilol 6.25 milliGRAM(s) Oral every 12 hours  cefTRIAXone   IVPB 1000 milliGRAM(s) IV Intermittent every 24 hours  clopidogrel Tablet 75 milliGRAM(s) Oral daily  dextrose 5%. 1000 milliLiter(s) (100 mL/Hr) IV Continuous <Continuous>  dextrose 5%. 1000 milliLiter(s) (50 mL/Hr) IV Continuous <Continuous>  dextrose 50% Injectable 25 Gram(s) IV Push once  dextrose 50% Injectable 12.5 Gram(s) IV Push once  dextrose 50% Injectable 25 Gram(s) IV Push once  famotidine    Tablet 20 milliGRAM(s) Oral daily  ferrous    sulfate 325 milliGRAM(s) Oral daily  gabapentin 100 milliGRAM(s) Oral daily  glucagon  Injectable 1 milliGRAM(s) IntraMuscular once  hydrALAZINE 100 milliGRAM(s) Oral three times a day  insulin lispro (ADMELOG) corrective regimen sliding scale   SubCutaneous three times a day before meals  insulin lispro (ADMELOG) corrective regimen sliding scale   SubCutaneous at bedtime  isosorbide   dinitrate Tablet (ISORDIL) 30 milliGRAM(s) Oral three times a day  levothyroxine 50 MICROGram(s) Oral daily  tamsulosin 0.4 milliGRAM(s) Oral at bedtime    MEDICATIONS  (PRN):  acetaminophen     Tablet .. 650 milliGRAM(s) Oral every 6 hours PRN Temp greater or equal to 38C (100.4F), Mild Pain (1 - 3)  dextrose Oral Gel 15 Gram(s) Oral once PRN Blood Glucose LESS THAN 70 milliGRAM(s)/deciliter      PHYSICAL EXAM:  T(C): 36.6 (03-20-24 @ 05:12), Max: 36.9 (03-19-24 @ 16:30)  HR: 85 (03-20-24 @ 07:55) (71 - 99)  BP: 113/64 (03-20-24 @ 05:12) (101/60 - 121/75)  RR: 20 (03-20-24 @ 05:12) (18 - 20)  SpO2: 100% (03-20-24 @ 07:55) (95% - 100%)  I&O's Summary    19 Mar 2024 07:01  -  20 Mar 2024 07:00  --------------------------------------------------------  IN: 925 mL / OUT: 3700 mL / NET: -2775 mL      GENERAL: NAD, lying in bed  HEAD:  Atraumatic, Normocephalic, MMM  CHEST/LUNG: No use of accessory muscles, CTAB, breathing non-labored  COR: RR, no mrcg  ABD: Soft, ND/NT, +BS  PSYCH: AAOx3  NEUROLOGY: CN II-XII grossly intact, moving all extremities  SKIN: No rashes or lesions  EXT: wwp, no cce    LABS:  CAPILLARY BLOOD GLUCOSE      POCT Blood Glucose.: 165 mg/dL (20 Mar 2024 08:13)  POCT Blood Glucose.: 151 mg/dL (19 Mar 2024 21:45)  POCT Blood Glucose.: 103 mg/dL (19 Mar 2024 17:26)  POCT Blood Glucose.: 109 mg/dL (19 Mar 2024 16:32)  POCT Blood Glucose.: 221 mg/dL (19 Mar 2024 12:41)                          11.8   9.73  )-----------( 148      ( 20 Mar 2024 07:03 )             36.1     03-20    141  |  100  |  83<H>  ----------------------------<  121<H>  3.5   |  24  |  2.90<H>    Ca    8.7      20 Mar 2024 07:03  Phos  4.6     03-20  Mg     2.10     03-20            Urinalysis Basic - ( 20 Mar 2024 07:03 )    Color: x / Appearance: x / SG: x / pH: x  Gluc: 121 mg/dL / Ketone: x  / Bili: x / Urobili: x   Blood: x / Protein: x / Nitrite: x   Leuk Esterase: x / RBC: x / WBC x   Sq Epi: x / Non Sq Epi: x / Bacteria: x          RADIOLOGY & ADDITIONAL TESTS:    Telemetry Personally Reviewed -     Imaging Personally Reviewed -     Imaging Reviewed -     Consultant(s) Notes Reviewed -       Care Discussed with Consultants/Other Providers -

## 2024-03-20 NOTE — PROGRESS NOTE ADULT - NS ATTEND AMEND GEN_ALL_CORE FT
Briefly, 67 y/o [place] male with h/o HTN, HFrEF/ICM (EF 25%, LVIDD 5.6cm), CAD w/ CABG 2014 at [facility], CRT-D, asthma/COPD (on 2 L O2 at home), CVA with left sided weakness, CKD (b/l Cr 2.9) comes in on 3/16 with complaints of chest pressure and SOB that started 4-5 days prior. Had recent URI. Adherent to medications. Walks with rollator walker for less than 2 block. Also admits to not urinating well to home dose of diuretics. Found to be in aflutter (new). Started on bumex with poor output with subsequent bumex gtt with suboptimal response; improved with diuril. On exam, JVP elevated, irreg irreg, CTAB, nontender abdomen, trace edema b/l. Labs show proBNP 14K, trop 93, lactate 2.8 which has since improved to 1.5, BUN/Cr 82/2.92 (near baseline).   - BRANDIE/DCCV or ablation once compensated  - c/w bumex gtt as above; standing weights daily; given improvement in resp status can give 3% saline x1  - c/w current meds  - aggressive PT/OT

## 2024-03-20 NOTE — PROGRESS NOTE ADULT - SUBJECTIVE AND OBJECTIVE BOX
Medications:  acetaminophen     Tablet .. 650 milliGRAM(s) Oral every 6 hours PRN  albuterol/ipratropium for Nebulization 3 milliLiter(s) Nebulizer every 6 hours  allopurinol 100 milliGRAM(s) Oral daily  apixaban 5 milliGRAM(s) Oral every 12 hours  atorvastatin 80 milliGRAM(s) Oral at bedtime  azithromycin   Tablet 250 milliGRAM(s) Oral daily  azithromycin   Tablet      budesonide 160 MICROgram(s)/formoterol 4.5 MICROgram(s) Inhaler 2 Puff(s) Inhalation two times a day  buMETAnide Infusion 2 mG/Hr IV Continuous <Continuous>  carvedilol 6.25 milliGRAM(s) Oral every 12 hours  cefTRIAXone   IVPB 1000 milliGRAM(s) IV Intermittent every 24 hours  clopidogrel Tablet 75 milliGRAM(s) Oral daily  dextrose 5%. 1000 milliLiter(s) IV Continuous <Continuous>  dextrose 5%. 1000 milliLiter(s) IV Continuous <Continuous>  dextrose 50% Injectable 25 Gram(s) IV Push once  dextrose 50% Injectable 12.5 Gram(s) IV Push once  dextrose 50% Injectable 25 Gram(s) IV Push once  dextrose Oral Gel 15 Gram(s) Oral once PRN  famotidine    Tablet 20 milliGRAM(s) Oral daily  ferrous    sulfate 325 milliGRAM(s) Oral daily  gabapentin 100 milliGRAM(s) Oral daily  glucagon  Injectable 1 milliGRAM(s) IntraMuscular once  hydrALAZINE 100 milliGRAM(s) Oral three times a day  insulin lispro (ADMELOG) corrective regimen sliding scale   SubCutaneous three times a day before meals  insulin lispro (ADMELOG) corrective regimen sliding scale   SubCutaneous at bedtime  isosorbide   dinitrate Tablet (ISORDIL) 30 milliGRAM(s) Oral three times a day  levothyroxine 50 MICROGram(s) Oral daily  tamsulosin 0.4 milliGRAM(s) Oral at bedtime      Vitals:  Vital Signs Last 24 Hrs  T(C): 36.8 (20 Mar 2024 14:30), Max: 36.9 (19 Mar 2024 16:30)  T(F): 98.2 (20 Mar 2024 14:30), Max: 98.4 (19 Mar 2024 16:30)  HR: 80 (20 Mar 2024 14:30) (80 - 99)  BP: 101/64 (20 Mar 2024 14:30) (101/60 - 121/75)  BP(mean): --  RR: 18 (20 Mar 2024 14:30) (18 - 20)  SpO2: 98% (20 Mar 2024 14:30) (98% - 100%)    Parameters below as of 20 Mar 2024 14:30  Patient On (Oxygen Delivery Method): nasal cannula  O2 Flow (L/min): 3      Daily     Daily Weight in k.7 (20 Mar 2024 07:00)    I&O's Detail    19 Mar 2024 07:01  -  20 Mar 2024 07:00  --------------------------------------------------------  IN:    Bumetanide: 200 mL    Oral Fluid: 725 mL  Total IN: 925 mL    OUT:    Indwelling Catheter - Urethral (mL): 875 mL    Voided (mL): 2825 mL  Total OUT: 3700 mL    Total NET: -2775 mL          Physical Exam:     General: No distress. Comfortable.  HEENT: EOM intact.  Neck: Neck supple. JVP not elevated. No masses  Chest: Clear to auscultation bilaterally  CV: Normal S1 and S2. No murmurs, rub, or gallops. Radial pulses normal.  Abdomen: Soft, non-distended, non-tender  Skin: No rashes or skin breakdown  Neurology: Alert and oriented times three. Sensation intact  Psych: Affect normal    Labs:                        11.8   9.73  )-----------( 148      ( 20 Mar 2024 07:03 )             36.1     03-20    141  |  100  |  83<H>  ----------------------------<  121<H>  3.5   |  24  |  2.90<H>    Ca    8.7      20 Mar 2024 07:03  Phos  4.6     03-20  Mg     2.10     03-20             Patient seen and examined. His main complaint is b/l leg pain, tender to palpation.   No SOB at rest, CP, palpitations, dizziness.       Medications:  acetaminophen     Tablet .. 650 milliGRAM(s) Oral every 6 hours PRN  albuterol/ipratropium for Nebulization 3 milliLiter(s) Nebulizer every 6 hours  allopurinol 100 milliGRAM(s) Oral daily  apixaban 5 milliGRAM(s) Oral every 12 hours  atorvastatin 80 milliGRAM(s) Oral at bedtime  azithromycin   Tablet 250 milliGRAM(s) Oral daily  azithromycin   Tablet      budesonide 160 MICROgram(s)/formoterol 4.5 MICROgram(s) Inhaler 2 Puff(s) Inhalation two times a day  buMETAnide Infusion 2 mG/Hr IV Continuous <Continuous>  carvedilol 6.25 milliGRAM(s) Oral every 12 hours  cefTRIAXone   IVPB 1000 milliGRAM(s) IV Intermittent every 24 hours  clopidogrel Tablet 75 milliGRAM(s) Oral daily  dextrose 5%. 1000 milliLiter(s) IV Continuous <Continuous>  dextrose 5%. 1000 milliLiter(s) IV Continuous <Continuous>  dextrose 50% Injectable 25 Gram(s) IV Push once  dextrose 50% Injectable 12.5 Gram(s) IV Push once  dextrose 50% Injectable 25 Gram(s) IV Push once  dextrose Oral Gel 15 Gram(s) Oral once PRN  famotidine    Tablet 20 milliGRAM(s) Oral daily  ferrous    sulfate 325 milliGRAM(s) Oral daily  gabapentin 100 milliGRAM(s) Oral daily  glucagon  Injectable 1 milliGRAM(s) IntraMuscular once  hydrALAZINE 100 milliGRAM(s) Oral three times a day  insulin lispro (ADMELOG) corrective regimen sliding scale   SubCutaneous three times a day before meals  insulin lispro (ADMELOG) corrective regimen sliding scale   SubCutaneous at bedtime  isosorbide   dinitrate Tablet (ISORDIL) 30 milliGRAM(s) Oral three times a day  levothyroxine 50 MICROGram(s) Oral daily  tamsulosin 0.4 milliGRAM(s) Oral at bedtime      Vitals:  Vital Signs Last 24 Hrs  T(C): 36.8 (20 Mar 2024 14:30), Max: 36.9 (19 Mar 2024 16:30)  T(F): 98.2 (20 Mar 2024 14:30), Max: 98.4 (19 Mar 2024 16:30)  HR: 80 (20 Mar 2024 14:30) (80 - 99)  BP: 101/64 (20 Mar 2024 14:30) (101/60 - 121/75)  BP(mean): --  RR: 18 (20 Mar 2024 14:30) (18 - 20)  SpO2: 98% (20 Mar 2024 14:30) (98% - 100%)    Parameters below as of 20 Mar 2024 14:30  Patient On (Oxygen Delivery Method): nasal cannula  O2 Flow (L/min): 3      Daily     Daily Weight in k.7 (20 Mar 2024 07:00)    I&O's Detail    19 Mar 2024 07:01  -  20 Mar 2024 07:00  --------------------------------------------------------  IN:    Bumetanide: 200 mL    Oral Fluid: 725 mL  Total IN: 925 mL    OUT:    Indwelling Catheter - Urethral (mL): 875 mL    Voided (mL): 2825 mL  Total OUT: 3700 mL    Total NET: -2775 mL          Physical Exam:     General: No distress. Comfortable.  HEENT: EOM intact.  Neck: Neck supple. JVP elevated 16-18cm. No masses  Chest: Clear to auscultation bilaterally. No wheezing today.   CV: Normal S1 and S2. No murmurs, rub, or gallops. Radial pulses normal. No LE edema and is warm b/l.   Abdomen: Soft, non-distended, non-tender  Skin: No rashes or skin breakdown  Neurology: Alert and oriented times three. Sensation intact  Psych: Affect normal    Labs:                        11.8   9.73  )-----------( 148      ( 20 Mar 2024 07:03 )             36.1     03-20    141  |  100  |  83<H>  ----------------------------<  121<H>  3.5   |  24  |  2.90<H>    Ca    8.7      20 Mar 2024 07:03  Phos  4.6     03-20  Mg     2.10     03-20

## 2024-03-20 NOTE — PROGRESS NOTE ADULT - PROBLEM SELECTOR PLAN 1
Patient here with ADHF. Recent TTE done showing EF of 20-25% with dilated IVC. Patient was being diuresed with bumex 4mg IV BID but had poor response, now switched to bumex infusion at 2mg/hour with diuril dose bon 3/19. UOP of ~3.6L in past 24 hours. Consider 3% HTS 150cc bolus daily while patient receiving IV bumex for diuresis augmentation if not meeting goal. Continue with indwelling ya. Strict I/O and place on 1L fluid restriction.

## 2024-03-20 NOTE — PROGRESS NOTE ADULT - ASSESSMENT
69 y/o [place] male with PMHX of HTN, HFrEF, CAD w/ CABG 2014 at [facility], CRT-D, COPD (100 pack year history), CVA presents with dyspnea and difficulty breathing, found to have AECOPD and ADHF.

## 2024-03-20 NOTE — PROGRESS NOTE ADULT - ASSESSMENT
67 y/o [place] male with PMHX of HTN, HFrEF (TTE 7/18/23 LVEF 25%, LVIDD 5.6cm, LA 4.9cm, mild MR), CAD w/ CABG 2014 at [facility], CRT-D, asthma/COPD on 2 L O2 at home, CVA with left sided weakness comes in with complaints of chest pressure and SOB. He admits to 2 pillow orthopnea, KINNEY that was worsening where he would get SOB after walking (with rollator walker) for less than 2 blocks which was going on for 4 days. Also admits to not urinating well to home dose of diuretics. Per record patient admits to URI 2 weeks ago, and was also found to be in a.fib and was started on Eliquis. He was found to be in acute on chronic systolic heart failure and started on IV Bumex 4 BID. HF service consulted to help manage care in this patient who is followed by Dr. Garo Dobson as an outpatient. Labs show proBNP 14K, trop 93, lactate 2.8 which has since improved to 1.5, BUN/Cr 63/2.94 (similar to last labs in July 2023 of 53/2.92). Overall stage C HF, NYHA class III- severely hypervolemic and normotensive.

## 2024-03-20 NOTE — PROGRESS NOTE ADULT - SUBJECTIVE AND OBJECTIVE BOX
Interval history:  no acute overnight event  Tele with V paced at 90s  pt. denies palpitation, CP, dizziness, reports his SOB is a little better      PAST MEDICAL & SURGICAL HISTORY:  HTN (hypertension)    CAD (coronary artery disease)    Diabetes    Hyperlipidemia, unspecified hyperlipidemia type    CHF (congestive heart failure)    BPH (benign prostatic hyperplasia)    S/P CABG (coronary artery bypass graft)    S/P appendectomy        MEDICATIONS  (STANDING):  albuterol/ipratropium for Nebulization 3 milliLiter(s) Nebulizer every 6 hours  allopurinol 100 milliGRAM(s) Oral daily  apixaban 5 milliGRAM(s) Oral every 12 hours  atorvastatin 80 milliGRAM(s) Oral at bedtime  azithromycin   Tablet 250 milliGRAM(s) Oral daily  azithromycin   Tablet      budesonide 160 MICROgram(s)/formoterol 4.5 MICROgram(s) Inhaler 2 Puff(s) Inhalation two times a day  buMETAnide Infusion 2 mG/Hr (10 mL/Hr) IV Continuous <Continuous>  carvedilol 6.25 milliGRAM(s) Oral every 12 hours  cefTRIAXone   IVPB 1000 milliGRAM(s) IV Intermittent every 24 hours  clopidogrel Tablet 75 milliGRAM(s) Oral daily  dextrose 5%. 1000 milliLiter(s) (100 mL/Hr) IV Continuous <Continuous>  dextrose 5%. 1000 milliLiter(s) (50 mL/Hr) IV Continuous <Continuous>  dextrose 50% Injectable 25 Gram(s) IV Push once  dextrose 50% Injectable 12.5 Gram(s) IV Push once  dextrose 50% Injectable 25 Gram(s) IV Push once  famotidine    Tablet 20 milliGRAM(s) Oral daily  ferrous    sulfate 325 milliGRAM(s) Oral daily  gabapentin 100 milliGRAM(s) Oral daily  glucagon  Injectable 1 milliGRAM(s) IntraMuscular once  hydrALAZINE 100 milliGRAM(s) Oral three times a day  insulin lispro (ADMELOG) corrective regimen sliding scale   SubCutaneous three times a day before meals  insulin lispro (ADMELOG) corrective regimen sliding scale   SubCutaneous at bedtime  isosorbide   dinitrate Tablet (ISORDIL) 30 milliGRAM(s) Oral three times a day  levothyroxine 50 MICROGram(s) Oral daily  tamsulosin 0.4 milliGRAM(s) Oral at bedtime    MEDICATIONS  (PRN):  acetaminophen     Tablet .. 650 milliGRAM(s) Oral every 6 hours PRN Temp greater or equal to 38C (100.4F), Mild Pain (1 - 3)  dextrose Oral Gel 15 Gram(s) Oral once PRN Blood Glucose LESS THAN 70 milliGRAM(s)/deciliter            Vital Signs Last 24 Hrs  T(C): 36.6 (20 Mar 2024 05:12), Max: 36.9 (19 Mar 2024 16:30)  T(F): 97.8 (20 Mar 2024 05:12), Max: 98.4 (19 Mar 2024 16:30)  HR: 86 (20 Mar 2024 05:12) (71 - 99)  BP: 113/64 (20 Mar 2024 05:12) (101/60 - 121/75)  BP(mean): --  RR: 20 (20 Mar 2024 05:12) (18 - 20)  SpO2: 100% (20 Mar 2024 05:12) (95% - 100%)    Parameters below as of 20 Mar 2024 05:12  Patient On (Oxygen Delivery Method): nasal cannula                INTERPRETATION OF TELEMETRY: V paced at 90s    ECG:        LABS:                        12.0   8.24  )-----------( 135      ( 19 Mar 2024 03:30 )             37.9     03-19    134<L>  |  100  |  82<H>  ----------------------------<  111<H>  4.7   |  18<L>  |  2.92<H>    Ca    8.5      19 Mar 2024 03:30  Phos  4.7     03-19  Mg     2.10     03-19            Urinalysis Basic - ( 19 Mar 2024 13:15 )    Color: Yellow / Appearance: Clear / S.011 / pH: x  Gluc: x / Ketone: Negative mg/dL  / Bili: Negative / Urobili: 0.2 mg/dL   Blood: x / Protein: Negative mg/dL / Nitrite: Negative   Leuk Esterase: Negative / RBC: x / WBC x   Sq Epi: x / Non Sq Epi: x / Bacteria: x      I&O's Summary    19 Mar 2024 07:01  -  20 Mar 2024 07:00  --------------------------------------------------------  IN: 925 mL / OUT: 3700 mL / NET: -2775 mL      BNP  RADIOLOGY & ADDITIONAL STUDIES:      PHYSICAL EXAM:    GENERAL: In no apparent distress, well nourished, and hydrated.  HEART: Irregular rate and rhythm; No murmurs, rubs, or gallops.  PULMONARY: Clear to auscultation and percussion.  No rales, wheezing, or rhonchi bilaterally.  ABDOMEN: Soft, Nontender, Nondistended; Bowel sounds present  EXTREMITIES:  2+ Peripheral Pulses, No clubbing, cyanosis, or edema  NEUROLOGICAL: Grossly nonfocal

## 2024-03-20 NOTE — PROGRESS NOTE ADULT - NS ATTEND AMEND GEN_ALL_CORE FT
Continue to be overloaded. Plan for BRANDIE/DCCV vs ablation once euvolemic. Suspect next week. Continue AC. D/w Dr Ching from heart failure.

## 2024-03-20 NOTE — PROGRESS NOTE ADULT - SUBJECTIVE AND OBJECTIVE BOX
[facility] Division of Kidney Diseases & Hypertension  FOLLOW UP NOTE  [phone]--------------------------------------------------------------------------------    Chief Complaint: CKD; volume overload     24 hour events/subjective:  Patient seen at bedside, laying flat off his oxygen. Denies any headaches, fevers/chills, chest pain, palpitations, SOB, and leg swelling.    PAST HISTORY  --------------------------------------------------------------------------------  No significant changes to PMH, PSH, FHx, SHx, unless otherwise noted    ALLERGIES & MEDICATIONS  --------------------------------------------------------------------------------  Allergies    No Known Allergies    Intolerances      Standing Inpatient Medications  albuterol/ipratropium for Nebulization 3 milliLiter(s) Nebulizer every 6 hours  allopurinol 100 milliGRAM(s) Oral daily  apixaban 5 milliGRAM(s) Oral every 12 hours  atorvastatin 80 milliGRAM(s) Oral at bedtime  azithromycin   Tablet 250 milliGRAM(s) Oral daily  azithromycin   Tablet      budesonide 160 MICROgram(s)/formoterol 4.5 MICROgram(s) Inhaler 2 Puff(s) Inhalation two times a day  buMETAnide Infusion 2 mG/Hr IV Continuous <Continuous>  carvedilol 6.25 milliGRAM(s) Oral every 12 hours  cefTRIAXone   IVPB 1000 milliGRAM(s) IV Intermittent every 24 hours  clopidogrel Tablet 75 milliGRAM(s) Oral daily  dextrose 5%. 1000 milliLiter(s) IV Continuous <Continuous>  dextrose 5%. 1000 milliLiter(s) IV Continuous <Continuous>  dextrose 50% Injectable 25 Gram(s) IV Push once  dextrose 50% Injectable 12.5 Gram(s) IV Push once  dextrose 50% Injectable 25 Gram(s) IV Push once  famotidine    Tablet 20 milliGRAM(s) Oral daily  ferrous    sulfate 325 milliGRAM(s) Oral daily  gabapentin 100 milliGRAM(s) Oral daily  glucagon  Injectable 1 milliGRAM(s) IntraMuscular once  hydrALAZINE 100 milliGRAM(s) Oral three times a day  insulin lispro (ADMELOG) corrective regimen sliding scale   SubCutaneous three times a day before meals  insulin lispro (ADMELOG) corrective regimen sliding scale   SubCutaneous at bedtime  isosorbide   dinitrate Tablet (ISORDIL) 30 milliGRAM(s) Oral three times a day  levothyroxine 50 MICROGram(s) Oral daily  tamsulosin 0.4 milliGRAM(s) Oral at bedtime    PRN Inpatient Medications  acetaminophen     Tablet .. 650 milliGRAM(s) Oral every 6 hours PRN  dextrose Oral Gel 15 Gram(s) Oral once PRN      REVIEW OF SYSTEMS  --------------------------------------------------------------------------------  per above    VITALS/PHYSICAL EXAM  --------------------------------------------------------------------------------  T(C): 36.6 (03-20-24 @ 05:12), Max: 36.9 (03-19-24 @ 16:30)  HR: 85 (03-20-24 @ 07:55) (71 - 99)  BP: 113/64 (03-20-24 @ 05:12) (101/60 - 121/75)  RR: 20 (03-20-24 @ 05:12) (18 - 20)  SpO2: 100% (03-20-24 @ 07:55) (98% - 100%)  Wt(kg): --  Height (cm): 177.8 (03-19-24 @ 07:47)  Weight (kg): 78.1 (03-19-24 @ 07:47)  BMI (kg/m2): 24.7 (03-19-24 @ 07:47)  BSA (m2): 1.96 (03-19-24 @ 07:47)      03-19-24 @ 07:01  -  03-20-24 @ 07:00  --------------------------------------------------------  IN: 925 mL / OUT: 3700 mL / NET: -2775 mL      Physical Exam:  	Gen: NAD  	HEENT: Anicteric  	Pulm: CTA B/L  	CV: S1S2+  	Abd: Soft, +BS    	Ext: No LE edema B/L  	Neuro: Awake          : Torres+ with clear urine in the bag  	Skin: Warm and dry  	Dialysis access: none    LABS/STUDIES  --------------------------------------------------------------------------------              11.8   9.73  >-----------<  148      [03-20-24 @ 07:03]              36.1     141  |  100  |  83  ----------------------------<  121      [03-20-24 @ 07:03]  3.5   |  24  |  2.90        Ca     8.7     [03-20-24 @ 07:03]      Mg     2.10     [03-20-24 @ 07:03]      Phos  4.6     [03-20-24 @ 07:03]            Creatinine Trend:  SCr 2.90 [03-20 @ 07:03]  SCr 2.92 [03-19 @ 03:30]  SCr 3.01 [03-18 @ 05:48]  SCr 2.94 [03-17 @ 06:24]  SCr 2.94 [03-16 @ 19:12]    Urinalysis - [03-20-24 @ 07:03]      Color  / Appearance  / SG  / pH       Gluc 121 / Ketone   / Bili  / Urobili        Blood  / Protein  / Leuk Est  / Nitrite       RBC  / WBC  / Hyaline  / Gran  / Sq Epi  / Non Sq Epi  / Bacteria     Urine Creatinine 44      [03-19-24 @ 13:15]  Urine Protein 6      [03-19-24 @ 13:15]  Urine Urea Nitrogen 392.6      [03-19-24 @ 13:15]    TSH 4.43      [03-17-24 @ 06:24]

## 2024-03-20 NOTE — PROGRESS NOTE ADULT - ASSESSMENT
68 year old male with PMHX of HTN, chronic HFrEF, CAD w/ CABG 2014 at [facility], s/p [employer] [place] CRT-D, recently diagnosed PAF-started on Eliquis since March 8, asthma/COPD on 2 L O2 at home, CKD stage III, CVA with left sided weakness, HTN, HLD, DMT2 and hypothyroidism who presents with SOB found to be in acute decompensated heart failure and in AFl and UTI.  Interrogation revealed 4.9% AF/AFl burden since March 8 with biv pacing at 80.7%. No ICD shocks noted.  Patient was started on IV Bumex gtt for diuresis.  He denies palpitations/syncope.  Patient claims his daughter administer home meds to him.    A repeat TTE showed LVEF 20-25% normal LA.     Discussed with patient and his daughter about potential benefit of rhythm control with BRANDIE/DCCV to restore sinus rhythm considering his exacerbation of HF and suboptimal biv pacing associated with persistent AF/FL.  Outpatient AF/FL ablation can be considered as a long term goal for rhythm control.  They are amenable to the recommendations of BRANDIE/DCCV and follow up for possible ablation as an outpatient.      ## acute decompensated heart failure   ## newly diagnosed persistent AF/FL  ## HFrEF w/ CRT-D  ## CAD CABG   ## UTI      -Plan for BRANDIE guided DCCV when patient is euvolemic and after resolution of UTI  -Continue GMDT and management per HF/primary cardiology team  -Continue AC Eliquis 5mg bid for high CHADS2-VASC2 score of 7 for thromboembolic prophylaxis.

## 2024-03-20 NOTE — PROGRESS NOTE ADULT - PROBLEM SELECTOR PLAN 1
Remains severely  hypervolemic, JVP 18cm.   -2.7L in 24 hrs. Torres in place.   Continue Bumex gtt 2 mg/hr.  Add hypertonic saline 3%, 150 ml x 1 over 30 minutes.  Continue Coreg 6.25 mg po BID, hold for SBP <90, HR ,60.   Continue hydralazine to 100 mg po TID. hold for SBP <90.   Continue Isordil 30 mg po TID. hold for SBP <90.  Strict I/O and daily standing weights.   Keep K 4.0-5.0 and mag >2.0.

## 2024-03-21 DIAGNOSIS — E87.6 HYPOKALEMIA: ICD-10-CM

## 2024-03-21 LAB
ANION GAP SERPL CALC-SCNC: 18 MMOL/L — HIGH (ref 7–14)
BUN SERPL-MCNC: 78 MG/DL — HIGH (ref 7–23)
CALCIUM SERPL-MCNC: 8.6 MG/DL — SIGNIFICANT CHANGE UP (ref 8.4–10.5)
CHLORIDE SERPL-SCNC: 98 MMOL/L — SIGNIFICANT CHANGE UP (ref 98–107)
CO2 SERPL-SCNC: 23 MMOL/L — SIGNIFICANT CHANGE UP (ref 22–31)
CREAT SERPL-MCNC: 2.86 MG/DL — HIGH (ref 0.5–1.3)
EGFR: 23 ML/MIN/1.73M2 — LOW
GLUCOSE BLDC GLUCOMTR-MCNC: 151 MG/DL — HIGH (ref 70–99)
GLUCOSE BLDC GLUCOMTR-MCNC: 165 MG/DL — HIGH (ref 70–99)
GLUCOSE BLDC GLUCOMTR-MCNC: 166 MG/DL — HIGH (ref 70–99)
GLUCOSE BLDC GLUCOMTR-MCNC: 234 MG/DL — HIGH (ref 70–99)
GLUCOSE SERPL-MCNC: 120 MG/DL — HIGH (ref 70–99)
HCT VFR BLD CALC: 37.6 % — LOW (ref 39–50)
HGB BLD-MCNC: 12.2 G/DL — LOW (ref 13–17)
MAGNESIUM SERPL-MCNC: 2 MG/DL — SIGNIFICANT CHANGE UP (ref 1.6–2.6)
MCHC RBC-ENTMCNC: 30.9 PG — SIGNIFICANT CHANGE UP (ref 27–34)
MCHC RBC-ENTMCNC: 32.4 GM/DL — SIGNIFICANT CHANGE UP (ref 32–36)
MCV RBC AUTO: 95.2 FL — SIGNIFICANT CHANGE UP (ref 80–100)
NRBC # BLD: 0 /100 WBCS — SIGNIFICANT CHANGE UP (ref 0–0)
NRBC # FLD: 0 K/UL — SIGNIFICANT CHANGE UP (ref 0–0)
PHOSPHATE SERPL-MCNC: 4.6 MG/DL — HIGH (ref 2.5–4.5)
PLATELET # BLD AUTO: 149 K/UL — LOW (ref 150–400)
POTASSIUM SERPL-MCNC: 3.3 MMOL/L — LOW (ref 3.5–5.3)
POTASSIUM SERPL-SCNC: 3.3 MMOL/L — LOW (ref 3.5–5.3)
RBC # BLD: 3.95 M/UL — LOW (ref 4.2–5.8)
RBC # FLD: 16.4 % — HIGH (ref 10.3–14.5)
SODIUM SERPL-SCNC: 139 MMOL/L — SIGNIFICANT CHANGE UP (ref 135–145)
URATE SERPL-MCNC: 12.3 MG/DL — HIGH (ref 3.4–8.8)
WBC # BLD: 9.23 K/UL — SIGNIFICANT CHANGE UP (ref 3.8–10.5)
WBC # FLD AUTO: 9.23 K/UL — SIGNIFICANT CHANGE UP (ref 3.8–10.5)

## 2024-03-21 PROCEDURE — 73502 X-RAY EXAM HIP UNI 2-3 VIEWS: CPT | Mod: 26,LT

## 2024-03-21 PROCEDURE — 99232 SBSQ HOSP IP/OBS MODERATE 35: CPT | Mod: GC

## 2024-03-21 PROCEDURE — 99233 SBSQ HOSP IP/OBS HIGH 50: CPT | Mod: FS

## 2024-03-21 PROCEDURE — 99232 SBSQ HOSP IP/OBS MODERATE 35: CPT | Mod: FS

## 2024-03-21 PROCEDURE — 99232 SBSQ HOSP IP/OBS MODERATE 35: CPT

## 2024-03-21 RX ORDER — SODIUM CHLORIDE 5 G/100ML
150 INJECTION, SOLUTION INTRAVENOUS
Refills: 0 | Status: COMPLETED | OUTPATIENT
Start: 2024-03-21 | End: 2024-03-21

## 2024-03-21 RX ORDER — POTASSIUM CHLORIDE 20 MEQ
40 PACKET (EA) ORAL EVERY 4 HOURS
Refills: 0 | Status: COMPLETED | OUTPATIENT
Start: 2024-03-21 | End: 2024-03-21

## 2024-03-21 RX ORDER — POLYETHYLENE GLYCOL 3350 17 G/17G
17 POWDER, FOR SOLUTION ORAL DAILY
Refills: 0 | Status: DISCONTINUED | OUTPATIENT
Start: 2024-03-21 | End: 2024-03-30

## 2024-03-21 RX ORDER — SENNA PLUS 8.6 MG/1
2 TABLET ORAL AT BEDTIME
Refills: 0 | Status: DISCONTINUED | OUTPATIENT
Start: 2024-03-21 | End: 2024-03-30

## 2024-03-21 RX ADMIN — FAMOTIDINE 20 MILLIGRAM(S): 10 INJECTION INTRAVENOUS at 12:04

## 2024-03-21 RX ADMIN — BUMETANIDE 10 MG/HR: 0.25 INJECTION INTRAMUSCULAR; INTRAVENOUS at 04:47

## 2024-03-21 RX ADMIN — Medication 1: at 17:09

## 2024-03-21 RX ADMIN — Medication 1: at 08:07

## 2024-03-21 RX ADMIN — Medication 100 MILLIGRAM(S): at 12:04

## 2024-03-21 RX ADMIN — Medication 2: at 12:06

## 2024-03-21 RX ADMIN — Medication 3 MILLILITER(S): at 10:55

## 2024-03-21 RX ADMIN — ISOSORBIDE DINITRATE 30 MILLIGRAM(S): 5 TABLET ORAL at 13:30

## 2024-03-21 RX ADMIN — POLYETHYLENE GLYCOL 3350 17 GRAM(S): 17 POWDER, FOR SOLUTION ORAL at 17:03

## 2024-03-21 RX ADMIN — Medication 40 MILLIEQUIVALENT(S): at 13:32

## 2024-03-21 RX ADMIN — Medication 3 MILLILITER(S): at 16:24

## 2024-03-21 RX ADMIN — BUDESONIDE AND FORMOTEROL FUMARATE DIHYDRATE 2 PUFF(S): 160; 4.5 AEROSOL RESPIRATORY (INHALATION) at 22:01

## 2024-03-21 RX ADMIN — Medication 100 MILLIGRAM(S): at 13:30

## 2024-03-21 RX ADMIN — Medication 100 MILLIGRAM(S): at 05:28

## 2024-03-21 RX ADMIN — CARVEDILOL PHOSPHATE 6.25 MILLIGRAM(S): 80 CAPSULE, EXTENDED RELEASE ORAL at 05:28

## 2024-03-21 RX ADMIN — APIXABAN 5 MILLIGRAM(S): 2.5 TABLET, FILM COATED ORAL at 05:28

## 2024-03-21 RX ADMIN — Medication 50 MICROGRAM(S): at 05:28

## 2024-03-21 RX ADMIN — AZITHROMYCIN 250 MILLIGRAM(S): 500 TABLET, FILM COATED ORAL at 12:03

## 2024-03-21 RX ADMIN — CLOPIDOGREL BISULFATE 75 MILLIGRAM(S): 75 TABLET, FILM COATED ORAL at 12:05

## 2024-03-21 RX ADMIN — ISOSORBIDE DINITRATE 30 MILLIGRAM(S): 5 TABLET ORAL at 22:02

## 2024-03-21 RX ADMIN — SODIUM CHLORIDE 150 MILLILITER(S): 5 INJECTION, SOLUTION INTRAVENOUS at 17:03

## 2024-03-21 RX ADMIN — GABAPENTIN 100 MILLIGRAM(S): 400 CAPSULE ORAL at 12:03

## 2024-03-21 RX ADMIN — TAMSULOSIN HYDROCHLORIDE 0.4 MILLIGRAM(S): 0.4 CAPSULE ORAL at 22:02

## 2024-03-21 RX ADMIN — Medication 325 MILLIGRAM(S): at 12:05

## 2024-03-21 RX ADMIN — ATORVASTATIN CALCIUM 80 MILLIGRAM(S): 80 TABLET, FILM COATED ORAL at 22:02

## 2024-03-21 RX ADMIN — Medication 40 MILLIEQUIVALENT(S): at 17:02

## 2024-03-21 RX ADMIN — BUDESONIDE AND FORMOTEROL FUMARATE DIHYDRATE 2 PUFF(S): 160; 4.5 AEROSOL RESPIRATORY (INHALATION) at 08:07

## 2024-03-21 RX ADMIN — ISOSORBIDE DINITRATE 30 MILLIGRAM(S): 5 TABLET ORAL at 05:28

## 2024-03-21 RX ADMIN — APIXABAN 5 MILLIGRAM(S): 2.5 TABLET, FILM COATED ORAL at 17:03

## 2024-03-21 RX ADMIN — Medication 3 MILLILITER(S): at 03:33

## 2024-03-21 RX ADMIN — CEFTRIAXONE 100 MILLIGRAM(S): 500 INJECTION, POWDER, FOR SOLUTION INTRAMUSCULAR; INTRAVENOUS at 00:20

## 2024-03-21 RX ADMIN — Medication 100 MILLIGRAM(S): at 22:02

## 2024-03-21 RX ADMIN — CARVEDILOL PHOSPHATE 6.25 MILLIGRAM(S): 80 CAPSULE, EXTENDED RELEASE ORAL at 17:02

## 2024-03-21 NOTE — PROGRESS NOTE ADULT - NS ATTEND AMEND GEN_ALL_CORE FT
Continue to be overloaded. Plan for BRANDIE/DCCV vs ablation once euvolemic. Suspect next week. Continue AC.

## 2024-03-21 NOTE — PROGRESS NOTE ADULT - ASSESSMENT
67 y/o [place]ian male with PMHX of HTN, HFrEF, CAD w/ CABG 2014 at [facility], CRT-D, COPD (100 pack year history), CVA presents with dyspnea and difficulty breathing, found to have AECOPD and ADHF.

## 2024-03-21 NOTE — PROGRESS NOTE ADULT - NS ATTEND AMEND GEN_ALL_CORE FT
Briefly, 67 y/o [place]ames[place] male with h/o HTN, HFrEF/ICM (EF 25%, LVIDD 5.6cm), CAD w/ CABG 2014 at [facility], CRT-D, asthma/COPD (on 2 L O2 at home), CVA with left sided weakness, CKD (b/l Cr 2.9) comes in on 3/16 with complaints of chest pressure and SOB that started 4-5 days prior. Had recent URI. Adherent to medications. Walks with rollator walker for less than 2 block. Also admits to not urinating well to home dose of diuretics. Found to be in aflutter (new). Started on bumex with poor output with subsequent bumex gtt and 3% saline with improvement. On exam, JVP elevated, irreg irreg, CTAB, nontender abdomen, trace edema b/l. Labs show proBNP 14K, trop 93, lactate 1.5 (peaked 2.8), BUN/Cr 78/2.78 (improving; near baseline).   - BRANDIE/DCCV or ablation once compensated  - c/w bumex gtt as above; standing weights daily; c/w 3% saline x1  - c/w current meds  - aggressive PT/OT

## 2024-03-21 NOTE — PROGRESS NOTE ADULT - SUBJECTIVE AND OBJECTIVE BOX
Interval history:  no acute overnight event  pt. denies palpitation, CP, dizziness      PAST MEDICAL & SURGICAL HISTORY:  HTN (hypertension)    CAD (coronary artery disease)    Diabetes    Hyperlipidemia, unspecified hyperlipidemia type    CHF (congestive heart failure)    BPH (benign prostatic hyperplasia)    S/P CABG (coronary artery bypass graft)    S/P appendectomy        MEDICATIONS  (STANDING):  albuterol/ipratropium for Nebulization 3 milliLiter(s) Nebulizer every 6 hours  allopurinol 100 milliGRAM(s) Oral daily  apixaban 5 milliGRAM(s) Oral every 12 hours  atorvastatin 80 milliGRAM(s) Oral at bedtime  azithromycin   Tablet 250 milliGRAM(s) Oral daily  azithromycin   Tablet      budesonide 160 MICROgram(s)/formoterol 4.5 MICROgram(s) Inhaler 2 Puff(s) Inhalation two times a day  buMETAnide Infusion 2 mG/Hr (10 mL/Hr) IV Continuous <Continuous>  carvedilol 6.25 milliGRAM(s) Oral every 12 hours  cefTRIAXone   IVPB 1000 milliGRAM(s) IV Intermittent every 24 hours  clopidogrel Tablet 75 milliGRAM(s) Oral daily  dextrose 5%. 1000 milliLiter(s) (100 mL/Hr) IV Continuous <Continuous>  dextrose 5%. 1000 milliLiter(s) (50 mL/Hr) IV Continuous <Continuous>  dextrose 50% Injectable 25 Gram(s) IV Push once  dextrose 50% Injectable 12.5 Gram(s) IV Push once  dextrose 50% Injectable 25 Gram(s) IV Push once  famotidine    Tablet 20 milliGRAM(s) Oral daily  ferrous    sulfate 325 milliGRAM(s) Oral daily  gabapentin 100 milliGRAM(s) Oral daily  glucagon  Injectable 1 milliGRAM(s) IntraMuscular once  hydrALAZINE 100 milliGRAM(s) Oral three times a day  insulin lispro (ADMELOG) corrective regimen sliding scale   SubCutaneous three times a day before meals  insulin lispro (ADMELOG) corrective regimen sliding scale   SubCutaneous at bedtime  isosorbide   dinitrate Tablet (ISORDIL) 30 milliGRAM(s) Oral three times a day  levothyroxine 50 MICROGram(s) Oral daily  tamsulosin 0.4 milliGRAM(s) Oral at bedtime    MEDICATIONS  (PRN):  acetaminophen     Tablet .. 650 milliGRAM(s) Oral every 6 hours PRN Temp greater or equal to 38C (100.4F), Mild Pain (1 - 3)  dextrose Oral Gel 15 Gram(s) Oral once PRN Blood Glucose LESS THAN 70 milliGRAM(s)/deciliter            Vital Signs Last 24 Hrs  T(C): 36.7 (21 Mar 2024 05:00), Max: 36.9 (20 Mar 2024 22:12)  T(F): 98 (21 Mar 2024 05:00), Max: 98.5 (20 Mar 2024 22:12)  HR: 84 (21 Mar 2024 05:00) (77 - 91)  BP: 116/65 (21 Mar 2024 05:00) (101/64 - 116/65)  BP(mean): --  RR: 18 (21 Mar 2024 05:00) (18 - 19)  SpO2: 99% (21 Mar 2024 05:00) (98% - 100%)    Parameters below as of 21 Mar 2024 05:00    O2 Flow (L/min): 3              INTERPRETATION OF TELEMETRY: AF V paced at 80s-90s    ECG:        LABS:                        12.2   9.23  )-----------( 149      ( 21 Mar 2024 05:00 )             37.6     03-21    139  |  98  |  78<H>  ----------------------------<  120<H>  3.3<L>   |  23  |  2.86<H>    Ca    8.6      21 Mar 2024 05:00  Phos  4.6     03-21  Mg     2.00     03-21            Urinalysis Basic - ( 21 Mar 2024 05:00 )    Color: x / Appearance: x / SG: x / pH: x  Gluc: 120 mg/dL / Ketone: x  / Bili: x / Urobili: x   Blood: x / Protein: x / Nitrite: x   Leuk Esterase: x / RBC: x / WBC x   Sq Epi: x / Non Sq Epi: x / Bacteria: x      I&O's Summary    20 Mar 2024 07:01  -  21 Mar 2024 07:00  --------------------------------------------------------  IN: 580 mL / OUT: 2500 mL / NET: -1920 mL      BNP  RADIOLOGY & ADDITIONAL STUDIES:      PHYSICAL EXAM:    GENERAL: In no apparent distress, well nourished, and hydrated.  HEART: Irregular rate and rhythm; No murmurs, rubs, or gallops.  PULMONARY: Clear to auscultation and percussion.  No rales, wheezing, or rhonchi bilaterally.  ABDOMEN: Soft, Nontender, Nondistended; Bowel sounds present  EXTREMITIES:  2+ Peripheral Pulses, No clubbing, cyanosis, or edema  NEUROLOGICAL: Grossly nonfocal         WDL

## 2024-03-21 NOTE — PROGRESS NOTE ADULT - PROBLEM SELECTOR PLAN 1
-possible stemming from new-onset afib/flutter first noted March 8th as well as recent URI that maybe contributory  -needed bipap in the ED, no on NC  -tele, tte  -c/w IV bumex  -cardiology consulted, recs appreciated; f/u further recs  -CXR with retrocardiac opacity, procal mildly elevated, c/w cap coverage for possible bacterial pneumonia  -renal team consulted, Torres placed for management of urinary retention, will continue to monitor sCr

## 2024-03-21 NOTE — PROGRESS NOTE ADULT - ASSESSMENT
68 year old male with PMHX of HTN, chronic HFrEF, CAD w/ CABG 2014 at [facility], s/p [employer] CRT-D, recently diagnosed PAF-started on Eliquis since March 8, asthma/COPD on 2 L O2 at home, CKD stage III, CVA with left sided weakness, HTN, HLD, DMT2 and hypothyroidism who presents with SOB found to be in acute decompensated heart failure and in AFl and UTI.  Interrogation revealed 4.9% AF/AFl burden since March 8 with biv pacing at 80.7%. No ICD shocks noted.  Patient was started on IV Bumex gtt for diuresis.  He denies palpitations/syncope.  Patient claims his daughter administer home meds to him.    A repeat TTE showed LVEF 20-25% normal LA.       ## acute decompensated heart failure   ## newly diagnosed persistent AF/FL  ## HFrEF w/ CRT-D  ## CAD CABG   ## UTI      -Plan for BRANDIE / DCCV vs ablation when patient is euvolemic and after resolution of UTI likely next week  -Continue GMDT and management per HF/primary cardiology team  -Continue AC Eliquis 5mg bid for high CHADS2-VASC2 score of 7 for thromboembolic prophylaxis.

## 2024-03-21 NOTE — PROGRESS NOTE ADULT - ATTENDING COMMENTS
advanced CKD and CHF   diuresing well in the past 24 hrs
advanced CKD 4 AND CHF   continue with diuresis and HTS daily   replete K   would start Allopurinol 100mg daily for hyperuricemia  Assessment and plan as outlined above. Monitor labs and urine output. Avoid any potential nephrotoxins. Dose medications as per eGFR.  avoid contrast studies, ACE-I, ARB, or NSAIDs

## 2024-03-21 NOTE — PROGRESS NOTE ADULT - SUBJECTIVE AND OBJECTIVE BOX
Interval History:  Patient resting comfortably in bed   Denies CP/SOB/palpitations/dizziness  No acute events overnight      Medications:  acetaminophen     Tablet .. 650 milliGRAM(s) Oral every 6 hours PRN  albuterol/ipratropium for Nebulization 3 milliLiter(s) Nebulizer every 6 hours  allopurinol 100 milliGRAM(s) Oral daily  apixaban 5 milliGRAM(s) Oral every 12 hours  atorvastatin 80 milliGRAM(s) Oral at bedtime  azithromycin   Tablet 250 milliGRAM(s) Oral daily  azithromycin   Tablet      budesonide 160 MICROgram(s)/formoterol 4.5 MICROgram(s) Inhaler 2 Puff(s) Inhalation two times a day  buMETAnide Infusion 2 mG/Hr IV Continuous <Continuous>  carvedilol 6.25 milliGRAM(s) Oral every 12 hours  cefTRIAXone   IVPB 1000 milliGRAM(s) IV Intermittent every 24 hours  clopidogrel Tablet 75 milliGRAM(s) Oral daily  dextrose 5%. 1000 milliLiter(s) IV Continuous <Continuous>  dextrose 5%. 1000 milliLiter(s) IV Continuous <Continuous>  dextrose 50% Injectable 25 Gram(s) IV Push once  dextrose 50% Injectable 12.5 Gram(s) IV Push once  dextrose 50% Injectable 25 Gram(s) IV Push once  dextrose Oral Gel 15 Gram(s) Oral once PRN  famotidine    Tablet 20 milliGRAM(s) Oral daily  ferrous    sulfate 325 milliGRAM(s) Oral daily  gabapentin 100 milliGRAM(s) Oral daily  glucagon  Injectable 1 milliGRAM(s) IntraMuscular once  hydrALAZINE 100 milliGRAM(s) Oral three times a day  insulin lispro (ADMELOG) corrective regimen sliding scale   SubCutaneous three times a day before meals  insulin lispro (ADMELOG) corrective regimen sliding scale   SubCutaneous at bedtime  isosorbide   dinitrate Tablet (ISORDIL) 30 milliGRAM(s) Oral three times a day  levothyroxine 50 MICROGram(s) Oral daily  potassium chloride    Tablet ER 40 milliEquivalent(s) Oral every 4 hours  tamsulosin 0.4 milliGRAM(s) Oral at bedtime      Vitals:  T(C): 36.7 (24 @ 05:00), Max: 36.9 (24 @ 22:12)  HR: 84 (24 @ 05:00) (77 - 91)  BP: 116/65 (24 @ 05:00) (101/64 - 116/65)  BP(mean): --  RR: 18 (24 @ 05:00) (18 - 19)  SpO2: 99% (24 @ 05:00) (98% - 100%)    Daily     Daily Weight in k.7 (21 Mar 2024 05:00)        I&O's Summary    20 Mar 2024 07:01  -  21 Mar 2024 07:00  --------------------------------------------------------  IN: 580 mL / OUT: 2500 mL / NET: -1920 mL    21 Mar 2024 07:01  -  21 Mar 2024 10:48  --------------------------------------------------------  IN: 150 mL / OUT: 0 mL / NET: 150 mL        Physical Exam:  Appearance: No Acute Distress  Neck: JVP  Cardiovascular: Normal S1 S2  Respiratory: Clear to auscultation bilaterally  Gastrointestinal: Soft, Non-tender	  Skin: No cyanosis	  Neurologic: Non-focal  Extremities: No LE edema  Psychiatry: A & O x 3    Labs:                        12.2   9.23  )-----------( 149      ( 21 Mar 2024 05:00 )             37.6     -    139  |  98  |  78<H>  ----------------------------<  120<H>  3.3<L>   |  23  |  2.86<H>    Ca    8.6      21 Mar 2024 05:00  Phos  4.6     -  Mg     2.00     -        TELEMETRY:    Echocardiogram:  TTE W or WO Ultrasound Enhancing Agent (24 @ 12:26)   _______________________________________________________________________________________     CONCLUSIONS:      1. Left ventricular systolic function is severely decreased with an ejection fraction visually estimated at 20 to 25 %. Global left ventricular hypokinesis.   2. Normal right ventricular cavity size, with normal wall thickness, and normal systolic function.   3. Device lead is visualized.   4. The left atrium is normal.   5. The right atrium is normal in size.   6. No significant valvular disease.   7. No pericardial effusion seen.   8. Estimated pulmonary artery systolic pressure is 49 mmHg.   9. The inferior vena cava is dilated measuring 2.35 cm in diameter, (dilated >2.1cm) with abnormal inspiratory collapse (abnormal <50%) consistent with elevated right atrialpressure (~15, range 10-20mmHg).    ________________________________________________________________________________________  FINDINGS:     Left Ventricle:  The left ventricular cavity is mildly dilated. Left ventricular systolic function is severelydecreased with an ejection fraction visually estimated at 20 to 25%. There is global left ventricular hypokinesis.     Right Ventricle:  The right ventricular cavity is normal in size, with normal wall thickness and normal systolic function. A devicelead is visualized.     Left Atrium:  The left atrium is normal with an indexed volume of 24.01 ml/m².     Right Atrium:  The right atrium is normal in size with an indexed area of 7.50 cm²/m².     Aortic Valve:  The aortic valve is tricuspid with normal leaflet excursion. There is fibrocalcific aortic valve sclerosis without stenosis. There is no evidence of aortic regurgitation.     Mitral Valve:  Structurally normal mitral valve with normal leaflet excursion. There is symmetric leaflet tethering. There is no mitral valve stenosis. There is mild mitral regurgitation.     Tricuspid Valve:  Structurally normal tricuspid valve with normal leaflet excursion. There is mild tricuspid regurgitation. Estimated pulmonary artery systolic pressure is 49 mmHg.     Pulmonic Valve:  Structurally normal pulmonic valve with normal leaflet excursion. There is trace pulmonic regurgitation.     Aorta:  The aortic root at the sinuses of Valsalva is normal in size, measuring 3.10 cm (indexed 1.60 cm/m²). The ascending aorta diameter is normal in size, measuring 2.80 cm (indexed 1.45 cm/m²).     Pericardium:  No pericardial effusion seen.     Systemic Veins:  The inferior vena cava is dilated measuring 2.35 cm in diameter, (dilated >2.1cm) with abnormal inspiratory collapse (abnormal <50%) consistent with elevated right atrial pressure (~15, range 10-20mmHg).  ____________________________________________________________________  QUANTITATIVE DATA:  Left Ventricle Measurements: (Indexed to BSA)     IVSd (2D):   1.0 cm  LVPWd (2D):  1.0 cm  LVIDd (2D):  6.2 cm  LVIDs (2D):  5.5 cm  LV Mass:     273 g  141.5 g/m²  Visualized LV EF%: 20 to 25%     e' lateral: 7.83 cm/s  e' medial:  7.07 cm/s    Aorta Measurements: (Normal range) (Indexed to BSA)     Sinuses of Valsalva: 3.10 cm (3.1 - 3.7 cm)  Ao Asc prox:         2.80 cm       Left Atrium Measurements: (Indexed to BSA)  LA Diam 2D: 5.20 cm    Right Ventricle Measurements:     RV d, 2D:   3.5 cm  TV Mesha. S': 5.98 cm/s       LVOT / RVOT/ Qp/Qs Data: (Indexed to BSA)  LVOT Diameter: 2.10 cm  LVOT Vmax:     0.80 m/s  LVOT VTI:      11.25 cm  LVOT SV:       39.0 ml  20.16 ml/m²    Aortic Valve Measurements:  AV Vmax:                0.9 m/s  AV Peak Gradient:       3.3 mmHg  AV Mean Gradient: 2.0 mmHg  AV VTI:                 15.6 cm  AV VTI Ratio:           0.72  AoV EOA, Contin:        2.50 cm²  AoV EOA, Contin i:      1.29 cm²/m²  AoV Dimensionless Index 0.72       Tricuspid Valve Measurements:     TR Vmax:          2.9 m/s  TR Peak Gradient: 34.1 mmHg  RA Pressure:      15 mmHg  PASP:             49 mmHg       Interval History:  Patient resting comfortably in bed   Denies CP/SOB/palpitations/dizziness  No acute events overnight      Medications:  acetaminophen     Tablet .. 650 milliGRAM(s) Oral every 6 hours PRN  albuterol/ipratropium for Nebulization 3 milliLiter(s) Nebulizer every 6 hours  allopurinol 100 milliGRAM(s) Oral daily  apixaban 5 milliGRAM(s) Oral every 12 hours  atorvastatin 80 milliGRAM(s) Oral at bedtime  azithromycin   Tablet 250 milliGRAM(s) Oral daily  azithromycin   Tablet      budesonide 160 MICROgram(s)/formoterol 4.5 MICROgram(s) Inhaler 2 Puff(s) Inhalation two times a day  buMETAnide Infusion 2 mG/Hr IV Continuous <Continuous>  carvedilol 6.25 milliGRAM(s) Oral every 12 hours  cefTRIAXone   IVPB 1000 milliGRAM(s) IV Intermittent every 24 hours  clopidogrel Tablet 75 milliGRAM(s) Oral daily  dextrose 5%. 1000 milliLiter(s) IV Continuous <Continuous>  dextrose 5%. 1000 milliLiter(s) IV Continuous <Continuous>  dextrose 50% Injectable 25 Gram(s) IV Push once  dextrose 50% Injectable 12.5 Gram(s) IV Push once  dextrose 50% Injectable 25 Gram(s) IV Push once  dextrose Oral Gel 15 Gram(s) Oral once PRN  famotidine    Tablet 20 milliGRAM(s) Oral daily  ferrous    sulfate 325 milliGRAM(s) Oral daily  gabapentin 100 milliGRAM(s) Oral daily  glucagon  Injectable 1 milliGRAM(s) IntraMuscular once  hydrALAZINE 100 milliGRAM(s) Oral three times a day  insulin lispro (ADMELOG) corrective regimen sliding scale   SubCutaneous three times a day before meals  insulin lispro (ADMELOG) corrective regimen sliding scale   SubCutaneous at bedtime  isosorbide   dinitrate Tablet (ISORDIL) 30 milliGRAM(s) Oral three times a day  levothyroxine 50 MICROGram(s) Oral daily  potassium chloride    Tablet ER 40 milliEquivalent(s) Oral every 4 hours  tamsulosin 0.4 milliGRAM(s) Oral at bedtime      Vitals:  T(C): 36.7 (24 @ 05:00), Max: 36.9 (24 @ 22:12)  HR: 84 (24 @ 05:00) (77 - 91)  BP: 116/65 (24 @ 05:00) (101/64 - 116/65)  BP(mean): --  RR: 18 (24 @ 05:00) (18 - 19)  SpO2: 99% (24 @ 05:00) (98% - 100%)    Daily     Daily Weight in k.7 (21 Mar 2024 05:00)        I&O's Summary    20 Mar 2024 07:01  -  21 Mar 2024 07:00  --------------------------------------------------------  IN: 580 mL / OUT: 2500 mL / NET: -1920 mL    21 Mar 2024 07:01  -  21 Mar 2024 10:48  --------------------------------------------------------  IN: 150 mL / OUT: 0 mL / NET: 150 mL        Physical Exam:  Appearance: No Acute Distress  Neck: JVP~12  Cardiovascular: Normal S1 S2  Respiratory: Clear to auscultation with fine bibasilar rales   Gastrointestinal: Soft, Non-tender	  Skin: No cyanosis	  Neurologic: Non-focal  Extremities: No LE edema  Psychiatry: A & O x 3    Labs:                        12.2   9.23  )-----------( 149      ( 21 Mar 2024 05:00 )             37.6     -    139  |  98  |  78<H>  ----------------------------<  120<H>  3.3<L>   |  23  |  2.86<H>    Ca    8.6      21 Mar 2024 05:00  Phos  4.6     -  Mg     2.00     -        TELEMETRY: AFib/VPaced     Echocardiogram:  TTE W or WO Ultrasound Enhancing Agent (24 @ 12:26)   _______________________________________________________________________________________     CONCLUSIONS:      1. Left ventricular systolic function is severely decreased with an ejection fraction visually estimated at 20 to 25 %. Global left ventricular hypokinesis.   2. Normal right ventricular cavity size, with normal wall thickness, and normal systolic function.   3. Device lead is visualized.   4. The left atrium is normal.   5. The right atrium is normal in size.   6. No significant valvular disease.   7. No pericardial effusion seen.   8. Estimated pulmonary artery systolic pressure is 49 mmHg.   9. The inferior vena cava is dilated measuring 2.35 cm in diameter, (dilated >2.1cm) with abnormal inspiratory collapse (abnormal <50%) consistent with elevated right atrialpressure (~15, range 10-20mmHg).    ________________________________________________________________________________________  FINDINGS:     Left Ventricle:  The left ventricular cavity is mildly dilated. Left ventricular systolic function is severelydecreased with an ejection fraction visually estimated at 20 to 25%. There is global left ventricular hypokinesis.     Right Ventricle:  The right ventricular cavity is normal in size, with normal wall thickness and normal systolic function. A devicelead is visualized.     Left Atrium:  The left atrium is normal with an indexed volume of 24.01 ml/m².     Right Atrium:  The right atrium is normal in size with an indexed area of 7.50 cm²/m².     Aortic Valve:  The aortic valve is tricuspid with normal leaflet excursion. There is fibrocalcific aortic valve sclerosis without stenosis. There is no evidence of aortic regurgitation.     Mitral Valve:  Structurally normal mitral valve with normal leaflet excursion. There is symmetric leaflet tethering. There is no mitral valve stenosis. There is mild mitral regurgitation.     Tricuspid Valve:  Structurally normal tricuspid valve with normal leaflet excursion. There is mild tricuspid regurgitation. Estimated pulmonary artery systolic pressure is 49 mmHg.     Pulmonic Valve:  Structurally normal pulmonic valve with normal leaflet excursion. There is trace pulmonic regurgitation.     Aorta:  The aortic root at the sinuses of Valsalva is normal in size, measuring 3.10 cm (indexed 1.60 cm/m²). The ascending aorta diameter is normal in size, measuring 2.80 cm (indexed 1.45 cm/m²).     Pericardium:  No pericardial effusion seen.     Systemic Veins:  The inferior vena cava is dilated measuring 2.35 cm in diameter, (dilated >2.1cm) with abnormal inspiratory collapse (abnormal <50%) consistent with elevated right atrial pressure (~15, range 10-20mmHg).  ____________________________________________________________________  QUANTITATIVE DATA:  Left Ventricle Measurements: (Indexed to BSA)     IVSd (2D):   1.0 cm  LVPWd (2D):  1.0 cm  LVIDd (2D):  6.2 cm  LVIDs (2D):  5.5 cm  LV Mass:     273 g  141.5 g/m²  Visualized LV EF%: 20 to 25%     e' lateral: 7.83 cm/s  e' medial:  7.07 cm/s    Aorta Measurements: (Normal range) (Indexed to BSA)     Sinuses of Valsalva: 3.10 cm (3.1 - 3.7 cm)  Ao Asc prox:         2.80 cm       Left Atrium Measurements: (Indexed to BSA)  LA Diam 2D: 5.20 cm    Right Ventricle Measurements:     RV d, 2D:   3.5 cm  TV Mesha. S': 5.98 cm/s       LVOT / RVOT/ Qp/Qs Data: (Indexed to BSA)  LVOT Diameter: 2.10 cm  LVOT Vmax:     0.80 m/s  LVOT VTI:      11.25 cm  LVOT SV:       39.0 ml  20.16 ml/m²    Aortic Valve Measurements:  AV Vmax:                0.9 m/s  AV Peak Gradient:       3.3 mmHg  AV Mean Gradient: 2.0 mmHg  AV VTI:                 15.6 cm  AV VTI Ratio:           0.72  AoV EOA, Contin:        2.50 cm²  AoV EOA, Contin i:      1.29 cm²/m²  AoV Dimensionless Index 0.72       Tricuspid Valve Measurements:     TR Vmax:          2.9 m/s  TR Peak Gradient: 34.1 mmHg  RA Pressure:      15 mmHg  PASP:             49 mmHg

## 2024-03-21 NOTE — PROGRESS NOTE ADULT - PROBLEM SELECTOR PLAN 1
Patient here with ADHF. Recent TTE done showing EF of 20-25% with dilated IVC. Patient was being diuresed with bumex 4mg IV BID but had poor response, now switched to bumex infusion at 2mg/hour with diuril dose on 3/19 and had UOP of ~3.6L. In last 24 hours, withou diuril on bumex infusion, UOP of 2.5L. Consider 3% HTS 150cc bolus daily while patient receiving IV bumex for diuresis augmentation if not meeting diuresis goal. Continue with indwelling ya. Strict I/O and place on 1L fluid restriction. Patient here with ADHF. Recent TTE done showing EF of 20-25% with dilated IVC. Patient was being diuresed with bumex 4mg IV BID but had poor response, now switched to bumex infusion at 2mg/hour with diuril dose on 3/19 and had UOP of ~3.6L. In last 24 hours, without diuril on bumex infusion with 3% HTS 150cc bolus, UOP of 2.5L. Consider 3% HTS 150cc bolus daily while patient receiving IV bumex for diuresis augmentation if not meeting diuresis goal. Continue with indwelling ya. Strict I/O and place on 1L fluid restriction.

## 2024-03-21 NOTE — PROGRESS NOTE ADULT - ASSESSMENT
67 y/o [place] male with PMHX of HTN, HFrEF (TTE 7/18/23 LVEF 25%, LVIDD 5.6cm, LA 4.9cm, mild MR), CAD w/ CABG 2014 at [facility], CRT-D, asthma/COPD on 2 L O2 at home, CVA with left sided weakness comes in with complaints of chest pressure and SOB. He admits to 2 pillow orthopnea, KINNEY that was worsening where he would get SOB after walking (with rollator walker) for less than 2 blocks which was going on for 4 days. Also admits to not urinating well to home dose of diuretics. Per record patient admits to URI 2 weeks ago, and was also found to be in a.fib and was started on Eliquis. He was found to be in acute on chronic systolic heart failure and started on IV Bumex 4 BID. HF service consulted to help manage care in this patient who is followed by Dr. Garo Dobson as an outpatient. Labs show proBNP 14K, trop 93, lactate 2.8 which has since improved to 1.5, BUN/Cr 63/2.94 (similar to last labs in July 2023 of 53/2.92). Overall stage C HF, NYHA class III- severely hypervolemic and normotensive.

## 2024-03-21 NOTE — PROGRESS NOTE ADULT - PROBLEM SELECTOR PLAN 1
Bumex 2 mg/hr  Hypertonic saline 3%, 150 ml x 1 over 30 minutes (given yesterday)  Coreg 6.25 mg BID (hold  SBP <90)   Hydralazine 100 mg TID (hold  SBP <90)   Isordil 30 mg TID (hold  SBP <90)  Strict I/O   Daily standing weights  Monitor lytes replete K>4.0 and Mg>2.0  Trend SCr  Management per primary team  Appreciate EP recommendations (BRANDIE/DCCV vs. ablation when euvolemic)   Pending final recommendations from HF attending Bumex 2 mg/hr  Please order Hypertonic saline 3%, 150 ml x 1 over 30 minutes   Coreg 6.25 mg BID (hold  SBP <90)   Hydralazine 100 mg TID (hold  SBP <90)   Isordil 30 mg TID (hold  SBP <90)  Strict I/O   Daily standing weights  Monitor lytes replete K>4.0 and Mg>2.0  Trend SCr  Management per primary team  Appreciate Nephrology recommendations (HTS daily)  Appreciate EP recommendations (BRANDIE/DCCV vs. ablation when euvolemic)   Pending final recommendations from HF attending

## 2024-03-21 NOTE — PROGRESS NOTE ADULT - SUBJECTIVE AND OBJECTIVE BOX
Patient is a 68y old  Male who presents with a chief complaint of ADHF (21 Mar 2024 12:08)      SUBJECTIVE / OVERNIGHT EVENTS:    No events overnight. This AM, RUBEN CARLOS feels well. Has no n/v/d/cp/sob.      MEDICATIONS  (STANDING):  albuterol/ipratropium for Nebulization 3 milliLiter(s) Nebulizer every 6 hours  allopurinol 100 milliGRAM(s) Oral daily  apixaban 5 milliGRAM(s) Oral every 12 hours  atorvastatin 80 milliGRAM(s) Oral at bedtime  budesonide 160 MICROgram(s)/formoterol 4.5 MICROgram(s) Inhaler 2 Puff(s) Inhalation two times a day  buMETAnide Infusion 2 mG/Hr (10 mL/Hr) IV Continuous <Continuous>  carvedilol 6.25 milliGRAM(s) Oral every 12 hours  cefTRIAXone   IVPB 1000 milliGRAM(s) IV Intermittent every 24 hours  clopidogrel Tablet 75 milliGRAM(s) Oral daily  dextrose 5%. 1000 milliLiter(s) (100 mL/Hr) IV Continuous <Continuous>  dextrose 5%. 1000 milliLiter(s) (50 mL/Hr) IV Continuous <Continuous>  dextrose 50% Injectable 25 Gram(s) IV Push once  dextrose 50% Injectable 12.5 Gram(s) IV Push once  dextrose 50% Injectable 25 Gram(s) IV Push once  famotidine    Tablet 20 milliGRAM(s) Oral daily  ferrous    sulfate 325 milliGRAM(s) Oral daily  gabapentin 100 milliGRAM(s) Oral daily  glucagon  Injectable 1 milliGRAM(s) IntraMuscular once  hydrALAZINE 100 milliGRAM(s) Oral three times a day  insulin lispro (ADMELOG) corrective regimen sliding scale   SubCutaneous three times a day before meals  insulin lispro (ADMELOG) corrective regimen sliding scale   SubCutaneous at bedtime  isosorbide   dinitrate Tablet (ISORDIL) 30 milliGRAM(s) Oral three times a day  levothyroxine 50 MICROGram(s) Oral daily  potassium chloride    Tablet ER 40 milliEquivalent(s) Oral every 4 hours  tamsulosin 0.4 milliGRAM(s) Oral at bedtime    MEDICATIONS  (PRN):  acetaminophen     Tablet .. 650 milliGRAM(s) Oral every 6 hours PRN Temp greater or equal to 38C (100.4F), Mild Pain (1 - 3)  dextrose Oral Gel 15 Gram(s) Oral once PRN Blood Glucose LESS THAN 70 milliGRAM(s)/deciliter      PHYSICAL EXAM:  T(C): 36.9 (03-21-24 @ 13:15), Max: 36.9 (03-20-24 @ 22:12)  HR: 84 (03-21-24 @ 13:15) (77 - 91)  BP: 107/84 (03-21-24 @ 13:15) (101/64 - 116/65)  RR: 18 (03-21-24 @ 13:15) (18 - 18)  SpO2: 100% (03-21-24 @ 13:15) (98% - 100%)  I&O's Summary    20 Mar 2024 07:01  -  21 Mar 2024 07:00  --------------------------------------------------------  IN: 580 mL / OUT: 2500 mL / NET: -1920 mL    21 Mar 2024 07:01  -  21 Mar 2024 14:10  --------------------------------------------------------  IN: 150 mL / OUT: 0 mL / NET: 150 mL      GENERAL: NAD, lying in bed  HEAD:  Atraumatic, Normocephalic, MMM  CHEST/LUNG: No use of accessory muscles, CTAB, breathing non-labored  COR: RR, no mrcg  ABD: Soft, ND/NT, +BS  PSYCH: AAOx3  NEUROLOGY: CN II-XII grossly intact, moving all extremities  SKIN: No rashes or lesions  EXT: wwp, no cce    LABS:  CAPILLARY BLOOD GLUCOSE      POCT Blood Glucose.: 234 mg/dL (21 Mar 2024 11:55)  POCT Blood Glucose.: 166 mg/dL (21 Mar 2024 07:19)  POCT Blood Glucose.: 189 mg/dL (20 Mar 2024 22:12)  POCT Blood Glucose.: 190 mg/dL (20 Mar 2024 17:07)                          12.2   9.23  )-----------( 149      ( 21 Mar 2024 05:00 )             37.6     03-21    139  |  98  |  78<H>  ----------------------------<  120<H>  3.3<L>   |  23  |  2.86<H>    Ca    8.6      21 Mar 2024 05:00  Phos  4.6     03-21  Mg     2.00     03-21            Urinalysis Basic - ( 21 Mar 2024 05:00 )    Color: x / Appearance: x / SG: x / pH: x  Gluc: 120 mg/dL / Ketone: x  / Bili: x / Urobili: x   Blood: x / Protein: x / Nitrite: x   Leuk Esterase: x / RBC: x / WBC x   Sq Epi: x / Non Sq Epi: x / Bacteria: x          RADIOLOGY & ADDITIONAL TESTS:    Telemetry Personally Reviewed -     Imaging Personally Reviewed -     Imaging Reviewed -     Consultant(s) Notes Reviewed -       Care Discussed with Consultants/Other Providers -

## 2024-03-21 NOTE — PROGRESS NOTE ADULT - SUBJECTIVE AND OBJECTIVE BOX
[facility] Division of Kidney Diseases & Hypertension  FOLLOW UP NOTE  [phone]--------------------------------------------------------------------------------    Chief Complaint: CKD; volume overload     24 hour events/subjective:  Patient seen at bedside, sitting up in NAD. Denies any headaches, fevers/chills, chest pain, palpitations, SOB, and leg swelling.    PAST HISTORY  --------------------------------------------------------------------------------  No significant changes to PMH, PSH, FHx, SHx, unless otherwise noted    ALLERGIES & MEDICATIONS  --------------------------------------------------------------------------------  Allergies    No Known Allergies    Intolerances      Standing Inpatient Medications  albuterol/ipratropium for Nebulization 3 milliLiter(s) Nebulizer every 6 hours  allopurinol 100 milliGRAM(s) Oral daily  apixaban 5 milliGRAM(s) Oral every 12 hours  atorvastatin 80 milliGRAM(s) Oral at bedtime  azithromycin   Tablet 250 milliGRAM(s) Oral daily  azithromycin   Tablet      budesonide 160 MICROgram(s)/formoterol 4.5 MICROgram(s) Inhaler 2 Puff(s) Inhalation two times a day  buMETAnide Infusion 2 mG/Hr IV Continuous <Continuous>  carvedilol 6.25 milliGRAM(s) Oral every 12 hours  cefTRIAXone   IVPB 1000 milliGRAM(s) IV Intermittent every 24 hours  clopidogrel Tablet 75 milliGRAM(s) Oral daily  dextrose 5%. 1000 milliLiter(s) IV Continuous <Continuous>  dextrose 5%. 1000 milliLiter(s) IV Continuous <Continuous>  dextrose 50% Injectable 25 Gram(s) IV Push once  dextrose 50% Injectable 12.5 Gram(s) IV Push once  dextrose 50% Injectable 25 Gram(s) IV Push once  famotidine    Tablet 20 milliGRAM(s) Oral daily  ferrous    sulfate 325 milliGRAM(s) Oral daily  gabapentin 100 milliGRAM(s) Oral daily  glucagon  Injectable 1 milliGRAM(s) IntraMuscular once  hydrALAZINE 100 milliGRAM(s) Oral three times a day  insulin lispro (ADMELOG) corrective regimen sliding scale   SubCutaneous three times a day before meals  insulin lispro (ADMELOG) corrective regimen sliding scale   SubCutaneous at bedtime  isosorbide   dinitrate Tablet (ISORDIL) 30 milliGRAM(s) Oral three times a day  levothyroxine 50 MICROGram(s) Oral daily  potassium chloride    Tablet ER 40 milliEquivalent(s) Oral every 4 hours  tamsulosin 0.4 milliGRAM(s) Oral at bedtime    PRN Inpatient Medications  acetaminophen     Tablet .. 650 milliGRAM(s) Oral every 6 hours PRN  dextrose Oral Gel 15 Gram(s) Oral once PRN      REVIEW OF SYSTEMS  --------------------------------------------------------------------------------  per above    VITALS/PHYSICAL EXAM  --------------------------------------------------------------------------------  T(C): 36.7 (03-21-24 @ 05:00), Max: 36.9 (03-20-24 @ 22:12)  HR: 85 (03-21-24 @ 10:55) (77 - 91)  BP: 116/65 (03-21-24 @ 05:00) (101/64 - 116/65)  RR: 18 (03-21-24 @ 05:00) (18 - 19)  SpO2: 98% (03-21-24 @ 10:55) (98% - 100%)  Wt(kg): --        03-20-24 @ 07:01  -  03-21-24 @ 07:00  --------------------------------------------------------  IN: 580 mL / OUT: 2500 mL / NET: -1920 mL    03-21-24 @ 07:01  -  03-21-24 @ 12:08  --------------------------------------------------------  IN: 150 mL / OUT: 0 mL / NET: 150 mL      Physical Exam:  	Gen: NAD  	HEENT: Anicteric  	Pulm: CTA B/L  	CV: S1S2+  	Abd: Soft, +BS    	Ext: No LE edema B/L  	Neuro: Awake          : Torres+ with clear urine in the bag  	Skin: Warm and dry  	Dialysis access: none    LABS/STUDIES  --------------------------------------------------------------------------------              12.2   9.23  >-----------<  149      [03-21-24 @ 05:00]              37.6     139  |  98  |  78  ----------------------------<  120      [03-21-24 @ 05:00]  3.3   |  23  |  2.86        Ca     8.6     [03-21-24 @ 05:00]      Mg     2.00     [03-21-24 @ 05:00]      Phos  4.6     [03-21-24 @ 05:00]          Uric acid 12.3      [03-21-24 @ 05:00]    Creatinine Trend:  SCr 2.86 [03-21 @ 05:00]  SCr 2.90 [03-20 @ 07:03]  SCr 2.92 [03-19 @ 03:30]  SCr 3.01 [03-18 @ 05:48]  SCr 2.94 [03-17 @ 06:24]    Urinalysis - [03-21-24 @ 05:00]      Color  / Appearance  / SG  / pH       Gluc 120 / Ketone   / Bili  / Urobili        Blood  / Protein  / Leuk Est  / Nitrite       RBC  / WBC  / Hyaline  / Gran  / Sq Epi  / Non Sq Epi  / Bacteria     Urine Creatinine 44      [03-19-24 @ 13:15]  Urine Protein 6      [03-19-24 @ 13:15]  Urine Urea Nitrogen 392.6      [03-19-24 @ 13:15]    TSH 4.43      [03-17-24 @ 06:24]

## 2024-03-22 LAB
ANION GAP SERPL CALC-SCNC: 15 MMOL/L — HIGH (ref 7–14)
BASOPHILS # BLD AUTO: 0.04 K/UL — SIGNIFICANT CHANGE UP (ref 0–0.2)
BASOPHILS NFR BLD AUTO: 0.4 % — SIGNIFICANT CHANGE UP (ref 0–2)
BUN SERPL-MCNC: 74 MG/DL — HIGH (ref 7–23)
CALCIUM SERPL-MCNC: 8.7 MG/DL — SIGNIFICANT CHANGE UP (ref 8.4–10.5)
CHLORIDE SERPL-SCNC: 103 MMOL/L — SIGNIFICANT CHANGE UP (ref 98–107)
CO2 SERPL-SCNC: 23 MMOL/L — SIGNIFICANT CHANGE UP (ref 22–31)
CREAT SERPL-MCNC: 2.67 MG/DL — HIGH (ref 0.5–1.3)
EGFR: 25 ML/MIN/1.73M2 — LOW
EOSINOPHIL # BLD AUTO: 0.29 K/UL — SIGNIFICANT CHANGE UP (ref 0–0.5)
EOSINOPHIL NFR BLD AUTO: 2.8 % — SIGNIFICANT CHANGE UP (ref 0–6)
GLUCOSE BLDC GLUCOMTR-MCNC: 131 MG/DL — HIGH (ref 70–99)
GLUCOSE BLDC GLUCOMTR-MCNC: 151 MG/DL — HIGH (ref 70–99)
GLUCOSE BLDC GLUCOMTR-MCNC: 180 MG/DL — HIGH (ref 70–99)
GLUCOSE BLDC GLUCOMTR-MCNC: 192 MG/DL — HIGH (ref 70–99)
GLUCOSE SERPL-MCNC: 141 MG/DL — HIGH (ref 70–99)
HCT VFR BLD CALC: 37.3 % — LOW (ref 39–50)
HGB BLD-MCNC: 12 G/DL — LOW (ref 13–17)
IANC: 7.73 K/UL — HIGH (ref 1.8–7.4)
IMM GRANULOCYTES NFR BLD AUTO: 0.8 % — SIGNIFICANT CHANGE UP (ref 0–0.9)
LYMPHOCYTES # BLD AUTO: 1.17 K/UL — SIGNIFICANT CHANGE UP (ref 1–3.3)
LYMPHOCYTES # BLD AUTO: 11.4 % — LOW (ref 13–44)
MAGNESIUM SERPL-MCNC: 1.9 MG/DL — SIGNIFICANT CHANGE UP (ref 1.6–2.6)
MCHC RBC-ENTMCNC: 31.1 PG — SIGNIFICANT CHANGE UP (ref 27–34)
MCHC RBC-ENTMCNC: 32.2 GM/DL — SIGNIFICANT CHANGE UP (ref 32–36)
MCV RBC AUTO: 96.6 FL — SIGNIFICANT CHANGE UP (ref 80–100)
MONOCYTES # BLD AUTO: 0.98 K/UL — HIGH (ref 0–0.9)
MONOCYTES NFR BLD AUTO: 9.5 % — SIGNIFICANT CHANGE UP (ref 2–14)
NEUTROPHILS # BLD AUTO: 7.73 K/UL — HIGH (ref 1.8–7.4)
NEUTROPHILS NFR BLD AUTO: 75.1 % — SIGNIFICANT CHANGE UP (ref 43–77)
NRBC # BLD: 0 /100 WBCS — SIGNIFICANT CHANGE UP (ref 0–0)
NRBC # FLD: 0 K/UL — SIGNIFICANT CHANGE UP (ref 0–0)
PHOSPHATE SERPL-MCNC: 3.6 MG/DL — SIGNIFICANT CHANGE UP (ref 2.5–4.5)
PLATELET # BLD AUTO: 143 K/UL — LOW (ref 150–400)
POTASSIUM SERPL-MCNC: 4.2 MMOL/L — SIGNIFICANT CHANGE UP (ref 3.5–5.3)
POTASSIUM SERPL-SCNC: 4.2 MMOL/L — SIGNIFICANT CHANGE UP (ref 3.5–5.3)
RBC # BLD: 3.86 M/UL — LOW (ref 4.2–5.8)
RBC # FLD: 16.7 % — HIGH (ref 10.3–14.5)
SODIUM SERPL-SCNC: 141 MMOL/L — SIGNIFICANT CHANGE UP (ref 135–145)
WBC # BLD: 10.29 K/UL — SIGNIFICANT CHANGE UP (ref 3.8–10.5)
WBC # FLD AUTO: 10.29 K/UL — SIGNIFICANT CHANGE UP (ref 3.8–10.5)

## 2024-03-22 PROCEDURE — 99232 SBSQ HOSP IP/OBS MODERATE 35: CPT

## 2024-03-22 PROCEDURE — 99233 SBSQ HOSP IP/OBS HIGH 50: CPT | Mod: FS

## 2024-03-22 PROCEDURE — 99232 SBSQ HOSP IP/OBS MODERATE 35: CPT | Mod: FS

## 2024-03-22 RX ORDER — SODIUM CHLORIDE 5 G/100ML
150 INJECTION, SOLUTION INTRAVENOUS
Refills: 0 | Status: COMPLETED | OUTPATIENT
Start: 2024-03-22 | End: 2024-03-22

## 2024-03-22 RX ADMIN — FAMOTIDINE 20 MILLIGRAM(S): 10 INJECTION INTRAVENOUS at 11:37

## 2024-03-22 RX ADMIN — Medication 3 MILLILITER(S): at 00:14

## 2024-03-22 RX ADMIN — CARVEDILOL PHOSPHATE 6.25 MILLIGRAM(S): 80 CAPSULE, EXTENDED RELEASE ORAL at 17:16

## 2024-03-22 RX ADMIN — APIXABAN 5 MILLIGRAM(S): 2.5 TABLET, FILM COATED ORAL at 06:38

## 2024-03-22 RX ADMIN — BUDESONIDE AND FORMOTEROL FUMARATE DIHYDRATE 2 PUFF(S): 160; 4.5 AEROSOL RESPIRATORY (INHALATION) at 22:29

## 2024-03-22 RX ADMIN — Medication 1: at 08:36

## 2024-03-22 RX ADMIN — SENNA PLUS 2 TABLET(S): 8.6 TABLET ORAL at 22:30

## 2024-03-22 RX ADMIN — BUMETANIDE 10 MG/HR: 0.25 INJECTION INTRAMUSCULAR; INTRAVENOUS at 19:32

## 2024-03-22 RX ADMIN — CLOPIDOGREL BISULFATE 75 MILLIGRAM(S): 75 TABLET, FILM COATED ORAL at 11:37

## 2024-03-22 RX ADMIN — ISOSORBIDE DINITRATE 30 MILLIGRAM(S): 5 TABLET ORAL at 13:22

## 2024-03-22 RX ADMIN — Medication 100 MILLIGRAM(S): at 13:22

## 2024-03-22 RX ADMIN — APIXABAN 5 MILLIGRAM(S): 2.5 TABLET, FILM COATED ORAL at 17:16

## 2024-03-22 RX ADMIN — BUDESONIDE AND FORMOTEROL FUMARATE DIHYDRATE 2 PUFF(S): 160; 4.5 AEROSOL RESPIRATORY (INHALATION) at 08:37

## 2024-03-22 RX ADMIN — ISOSORBIDE DINITRATE 30 MILLIGRAM(S): 5 TABLET ORAL at 06:39

## 2024-03-22 RX ADMIN — Medication 3 MILLILITER(S): at 10:09

## 2024-03-22 RX ADMIN — POLYETHYLENE GLYCOL 3350 17 GRAM(S): 17 POWDER, FOR SOLUTION ORAL at 06:40

## 2024-03-22 RX ADMIN — ATORVASTATIN CALCIUM 80 MILLIGRAM(S): 80 TABLET, FILM COATED ORAL at 22:30

## 2024-03-22 RX ADMIN — Medication 3 MILLILITER(S): at 22:44

## 2024-03-22 RX ADMIN — BUMETANIDE 10 MG/HR: 0.25 INJECTION INTRAMUSCULAR; INTRAVENOUS at 07:03

## 2024-03-22 RX ADMIN — ISOSORBIDE DINITRATE 30 MILLIGRAM(S): 5 TABLET ORAL at 22:30

## 2024-03-22 RX ADMIN — Medication 100 MILLIGRAM(S): at 06:39

## 2024-03-22 RX ADMIN — Medication 3 MILLILITER(S): at 14:50

## 2024-03-22 RX ADMIN — TAMSULOSIN HYDROCHLORIDE 0.4 MILLIGRAM(S): 0.4 CAPSULE ORAL at 22:30

## 2024-03-22 RX ADMIN — CEFTRIAXONE 100 MILLIGRAM(S): 500 INJECTION, POWDER, FOR SOLUTION INTRAMUSCULAR; INTRAVENOUS at 00:04

## 2024-03-22 RX ADMIN — Medication 3 MILLILITER(S): at 04:22

## 2024-03-22 RX ADMIN — Medication 100 MILLIGRAM(S): at 22:31

## 2024-03-22 RX ADMIN — Medication 325 MILLIGRAM(S): at 11:37

## 2024-03-22 RX ADMIN — Medication 1: at 17:14

## 2024-03-22 RX ADMIN — Medication 1: at 11:35

## 2024-03-22 RX ADMIN — SODIUM CHLORIDE 150 MILLILITER(S): 5 INJECTION, SOLUTION INTRAVENOUS at 19:34

## 2024-03-22 RX ADMIN — CARVEDILOL PHOSPHATE 6.25 MILLIGRAM(S): 80 CAPSULE, EXTENDED RELEASE ORAL at 06:38

## 2024-03-22 RX ADMIN — Medication 50 MICROGRAM(S): at 06:38

## 2024-03-22 RX ADMIN — Medication 100 MILLIGRAM(S): at 11:37

## 2024-03-22 RX ADMIN — GABAPENTIN 100 MILLIGRAM(S): 400 CAPSULE ORAL at 11:39

## 2024-03-22 NOTE — PROGRESS NOTE ADULT - PROBLEM SELECTOR PLAN 1
Bumex 2 mg/hr  Please order Hypertonic saline 3%, 150 ml x 1 over 30 minutes   Coreg 6.25 mg BID (hold  SBP <90)   Hydralazine 100 mg TID (hold  SBP <90)   Isordil 30 mg TID (hold  SBP <90)  Strict I/O   Daily standing weights  Monitor lytes replete K>4.0 and Mg>2.0  Trend SCr  Management per primary team  Appreciate Nephrology recommendations (HTS daily)  Appreciate EP recommendations (BRANDIE/DCCV vs. ablation when euvolemic)   Pending final recommendations from HF attending

## 2024-03-22 NOTE — PROGRESS NOTE ADULT - ASSESSMENT
67 y/o Greek male with PMHX of HTN, HFrEF, CAD w/ CABG 2014 at [facility], CRT-D, COPD (100 pack year history), CVA presents with dyspnea and difficulty breathing, found to have AECOPD and ADHF.

## 2024-03-22 NOTE — PROGRESS NOTE ADULT - NS ATTEND AMEND GEN_ALL_CORE FT
Briefly, 69 y/o [place] male with h/o HTN, HFrEF/ICM (EF 25%, LVIDD 5.6cm), CAD w/ CABG 2014 at [facility], CRT-D, asthma/COPD (on 2 L O2 at home), CVA with left sided weakness, CKD (b/l Cr 2.9) comes in on 3/16 with complaints of chest pressure and SOB that started 4-5 days prior. Had recent URI. Adherent to medications. Walks with rollator walker for less than 2 block. Also admits to not urinating well to home dose of diuretics. Found to be in aflutter (new). Started on bumex with poor output with subsequent bumex gtt and 3% saline with improvement. Feels improved. On exam, JVP elevated, irreg irreg, CTAB, nontender abdomen, trace edema b/l. Labs show proBNP 14K, trop 93, lactate 1.5 (peaked 2.8), BUN/Cr 74/2.67 (improving; near baseline).   - BRANDIE/DCCV or ablation once compensated  - c/w bumex gtt as above; standing weights daily; c/w 3% saline x1  - c/w current meds  - aggressive PT/OT

## 2024-03-22 NOTE — PROGRESS NOTE ADULT - SUBJECTIVE AND OBJECTIVE BOX
Interval History:  Patient resting comfortably in bed  Denies CP/SOB/palpitations/dizziness  No acute events overnight      Medications:  acetaminophen     Tablet .. 650 milliGRAM(s) Oral every 6 hours PRN  albuterol/ipratropium for Nebulization 3 milliLiter(s) Nebulizer every 6 hours  allopurinol 100 milliGRAM(s) Oral daily  apixaban 5 milliGRAM(s) Oral every 12 hours  atorvastatin 80 milliGRAM(s) Oral at bedtime  budesonide 160 MICROgram(s)/formoterol 4.5 MICROgram(s) Inhaler 2 Puff(s) Inhalation two times a day  buMETAnide Infusion 2 mG/Hr IV Continuous <Continuous>  carvedilol 6.25 milliGRAM(s) Oral every 12 hours  clopidogrel Tablet 75 milliGRAM(s) Oral daily  dextrose 5%. 1000 milliLiter(s) IV Continuous <Continuous>  dextrose 5%. 1000 milliLiter(s) IV Continuous <Continuous>  dextrose 50% Injectable 25 Gram(s) IV Push once  dextrose 50% Injectable 12.5 Gram(s) IV Push once  dextrose 50% Injectable 25 Gram(s) IV Push once  dextrose Oral Gel 15 Gram(s) Oral once PRN  famotidine    Tablet 20 milliGRAM(s) Oral daily  ferrous    sulfate 325 milliGRAM(s) Oral daily  gabapentin 100 milliGRAM(s) Oral daily  glucagon  Injectable 1 milliGRAM(s) IntraMuscular once  hydrALAZINE 100 milliGRAM(s) Oral three times a day  insulin lispro (ADMELOG) corrective regimen sliding scale   SubCutaneous three times a day before meals  insulin lispro (ADMELOG) corrective regimen sliding scale   SubCutaneous at bedtime  isosorbide   dinitrate Tablet (ISORDIL) 30 milliGRAM(s) Oral three times a day  levothyroxine 50 MICROGram(s) Oral daily  polyethylene glycol 3350 17 Gram(s) Oral daily PRN  senna 2 Tablet(s) Oral at bedtime  tamsulosin 0.4 milliGRAM(s) Oral at bedtime      Vitals:  T(C): 36.5 (24 @ 05:08), Max: 37.1 (24 @ 20:47)  HR: 92 (24 @ 07:22) (78 - 97)  BP: 122/73 (24 @ 05:08) (100/65 - 122/73)  BP(mean): --  RR: 18 (24 @ 05:08) (18 - 18)  SpO2: 94% (24 @ 07:22) (94% - 100%)    Daily     Daily Weight in k.3 (22 Mar 2024 05:08)        I&O's Summary    21 Mar 2024 07:01  -  22 Mar 2024 07:00  --------------------------------------------------------  IN: 440 mL / OUT: 1000 mL / NET: -560 mL        Physical Exam:  Appearance: No Acute Distress  Neck: JVP  Cardiovascular: Normal S1 S2  Respiratory: Clear to auscultation bilaterally  Gastrointestinal: Soft, Non-tender	  Skin: No cyanosis	  Neurologic: Non-focal  Extremities: BLE edema  Psychiatry: A & O x 3    Labs:                        12.0   10.29 )-----------( 143      ( 22 Mar 2024 06:20 )             37.3     22    141  |  103  |  74<H>  ----------------------------<  141<H>  4.2   |  23  |  2.67<H>    Ca    8.7      22 Mar 2024 06:20  Phos  3.6       Mg     1.90             TELEMETRY:    Echocardiogram:  TTE W or WO Ultrasound Enhancing Agent (24 @ 12:26)      CONCLUSIONS:      1. Left ventricular systolic function is severely decreased with an ejection fraction visually estimated at 20 to 25 %. Global left ventricular hypokinesis.   2. Normal right ventricular cavity size, with normal wall thickness, and normal systolic function.   3. Device lead is visualized.   4. The left atrium is normal.   5. The right atrium is normal in size.   6. No significant valvular disease.   7. No pericardial effusion seen.   8. Estimated pulmonary artery systolic pressure is 49 mmHg.   9. The inferior vena cava is dilated measuring 2.35 cm in diameter, (dilated >2.1cm) with abnormal inspiratory collapse (abnormal <50%) consistent with elevated right atrialpressure (~15, range 10-20mmHg).    ________________________________________________________________________________________  FINDINGS:     Left Ventricle:  The left ventricular cavity is mildly dilated. Left ventricular systolic function is severelydecreased with an ejection fraction visually estimated at 20 to 25%. There is global left ventricular hypokinesis.     Right Ventricle:  The right ventricular cavity is normal in size, with normal wall thickness and normal systolic function. A devicelead is visualized.     Left Atrium:  The left atrium is normal with an indexed volume of 24.01 ml/m².     Right Atrium:  The right atrium is normal in size with an indexed area of 7.50 cm²/m².     Aortic Valve:  The aortic valve is tricuspid with normal leaflet excursion. There is fibrocalcific aortic valve sclerosis without stenosis. There is no evidence of aortic regurgitation.     Mitral Valve:  Structurally normal mitral valve with normal leaflet excursion. There is symmetric leaflet tethering. There is no mitral valve stenosis. There is mild mitral regurgitation.     Tricuspid Valve:  Structurally normal tricuspid valve with normal leaflet excursion. There is mild tricuspid regurgitation. Estimated pulmonary artery systolic pressure is 49 mmHg.     Pulmonic Valve:  Structurally normal pulmonic valve with normal leaflet excursion. There is trace pulmonic regurgitation.     Aorta:  The aortic root at the sinuses of Valsalva is normal in size, measuring 3.10 cm (indexed 1.60 cm/m²). The ascending aorta diameter is normal in size, measuring 2.80 cm (indexed 1.45 cm/m²).     Pericardium:  No pericardial effusion seen.     Systemic Veins:  The inferior vena cava is dilated measuring 2.35 cm in diameter, (dilated >2.1cm) with abnormal inspiratory collapse (abnormal <50%) consistent with elevated right atrial pressure (~15, range 10-20mmHg).  ____________________________________________________________________  QUANTITATIVE DATA:  Left Ventricle Measurements: (Indexed to BSA)     IVSd (2D):   1.0 cm  LVPWd (2D):  1.0 cm  LVIDd (2D):  6.2 cm  LVIDs (2D):  5.5 cm  LV Mass:     273 g  141.5 g/m²  Visualized LV EF%: 20 to 25%     e' lateral: 7.83 cm/s  e' medial:  7.07 cm/s    Aorta Measurements: (Normal range) (Indexed to BSA)     Sinuses of Valsalva: 3.10 cm (3.1 - 3.7 cm)  Ao Asc prox:         2.80 cm       Left Atrium Measurements: (Indexed to BSA)  LA Diam 2D: 5.20 cm    Right Ventricle Measurements:     RV d, 2D:   3.5 cm  TV Mesha. S': 5.98 cm/s       LVOT / RVOT/ Qp/Qs Data: (Indexed to BSA)  LVOT Diameter: 2.10 cm  LVOT Vmax:     0.80 m/s  LVOT VTI:      11.25 cm  LVOT SV:       39.0 ml  20.16 ml/m²    Aortic Valve Measurements:  AV Vmax:                0.9 m/s  AV Peak Gradient:       3.3 mmHg  AV Mean Gradient: 2.0 mmHg  AV VTI:                 15.6 cm  AV VTI Ratio:           0.72  AoV EOA, Contin:        2.50 cm²  AoV EOA, Contin i:      1.29 cm²/m²  AoV Dimensionless Index 0.72       Tricuspid Valve Measurements:     TR Vmax:          2.9 m/s  TR Peak Gradient: 34.1 mmHg  RA Pressure:      15 mmHg  PASP:             49 mmHg         Interval History:  Patient resting comfortably in bed  Denies CP/SOB/palpitations/dizziness  No acute events overnight      Medications:  acetaminophen     Tablet .. 650 milliGRAM(s) Oral every 6 hours PRN  albuterol/ipratropium for Nebulization 3 milliLiter(s) Nebulizer every 6 hours  allopurinol 100 milliGRAM(s) Oral daily  apixaban 5 milliGRAM(s) Oral every 12 hours  atorvastatin 80 milliGRAM(s) Oral at bedtime  budesonide 160 MICROgram(s)/formoterol 4.5 MICROgram(s) Inhaler 2 Puff(s) Inhalation two times a day  buMETAnide Infusion 2 mG/Hr IV Continuous <Continuous>  carvedilol 6.25 milliGRAM(s) Oral every 12 hours  clopidogrel Tablet 75 milliGRAM(s) Oral daily  dextrose 5%. 1000 milliLiter(s) IV Continuous <Continuous>  dextrose 5%. 1000 milliLiter(s) IV Continuous <Continuous>  dextrose 50% Injectable 25 Gram(s) IV Push once  dextrose 50% Injectable 12.5 Gram(s) IV Push once  dextrose 50% Injectable 25 Gram(s) IV Push once  dextrose Oral Gel 15 Gram(s) Oral once PRN  famotidine    Tablet 20 milliGRAM(s) Oral daily  ferrous    sulfate 325 milliGRAM(s) Oral daily  gabapentin 100 milliGRAM(s) Oral daily  glucagon  Injectable 1 milliGRAM(s) IntraMuscular once  hydrALAZINE 100 milliGRAM(s) Oral three times a day  insulin lispro (ADMELOG) corrective regimen sliding scale   SubCutaneous three times a day before meals  insulin lispro (ADMELOG) corrective regimen sliding scale   SubCutaneous at bedtime  isosorbide   dinitrate Tablet (ISORDIL) 30 milliGRAM(s) Oral three times a day  levothyroxine 50 MICROGram(s) Oral daily  polyethylene glycol 3350 17 Gram(s) Oral daily PRN  senna 2 Tablet(s) Oral at bedtime  tamsulosin 0.4 milliGRAM(s) Oral at bedtime      Vitals:  T(C): 36.5 (24 @ 05:08), Max: 37.1 (24 @ 20:47)  HR: 92 (24 @ 07:22) (78 - 97)  BP: 122/73 (24 @ 05:08) (100/65 - 122/73)  BP(mean): --  RR: 18 (24 @ 05:08) (18 - 18)  SpO2: 94% (24 @ 07:22) (94% - 100%)    Daily     Daily Weight in k.3 (22 Mar 2024 05:08)        I&O's Summary    21 Mar 2024 07:01  -  22 Mar 2024 07:00  --------------------------------------------------------  IN: 440 mL / OUT: 1000 mL / NET: -560 mL        Physical Exam:  Appearance: No Acute Distress  Neck: JVP 12-14  Cardiovascular: Normal S1 S2  Respiratory: Clear to auscultation bilaterally  Gastrointestinal: Soft, Non-tender	  Skin: No cyanosis	  Neurologic: Non-focal  Extremities: No BLE edema  Psychiatry: A & O x 3    Labs:                        12.0   10.29 )-----------( 143      ( 22 Mar 2024 06:20 )             37.3         141  |  103  |  74<H>  ----------------------------<  141<H>  4.2   |  23  |  2.67<H>    Ca    8.7      22 Mar 2024 06:20  Phos  3.6       Mg     1.90             TELEMETRY: AFlutter/VPaced    Echocardiogram:  TTE W or WO Ultrasound Enhancing Agent (24 @ 12:26)      CONCLUSIONS:      1. Left ventricular systolic function is severely decreased with an ejection fraction visually estimated at 20 to 25 %. Global left ventricular hypokinesis.   2. Normal right ventricular cavity size, with normal wall thickness, and normal systolic function.   3. Device lead is visualized.   4. The left atrium is normal.   5. The right atrium is normal in size.   6. No significant valvular disease.   7. No pericardial effusion seen.   8. Estimated pulmonary artery systolic pressure is 49 mmHg.   9. The inferior vena cava is dilated measuring 2.35 cm in diameter, (dilated >2.1cm) with abnormal inspiratory collapse (abnormal <50%) consistent with elevated right atrialpressure (~15, range 10-20mmHg).    ________________________________________________________________________________________  FINDINGS:     Left Ventricle:  The left ventricular cavity is mildly dilated. Left ventricular systolic function is severelydecreased with an ejection fraction visually estimated at 20 to 25%. There is global left ventricular hypokinesis.     Right Ventricle:  The right ventricular cavity is normal in size, with normal wall thickness and normal systolic function. A devicelead is visualized.     Left Atrium:  The left atrium is normal with an indexed volume of 24.01 ml/m².     Right Atrium:  The right atrium is normal in size with an indexed area of 7.50 cm²/m².     Aortic Valve:  The aortic valve is tricuspid with normal leaflet excursion. There is fibrocalcific aortic valve sclerosis without stenosis. There is no evidence of aortic regurgitation.     Mitral Valve:  Structurally normal mitral valve with normal leaflet excursion. There is symmetric leaflet tethering. There is no mitral valve stenosis. There is mild mitral regurgitation.     Tricuspid Valve:  Structurally normal tricuspid valve with normal leaflet excursion. There is mild tricuspid regurgitation. Estimated pulmonary artery systolic pressure is 49 mmHg.     Pulmonic Valve:  Structurally normal pulmonic valve with normal leaflet excursion. There is trace pulmonic regurgitation.     Aorta:  The aortic root at the sinuses of Valsalva is normal in size, measuring 3.10 cm (indexed 1.60 cm/m²). The ascending aorta diameter is normal in size, measuring 2.80 cm (indexed 1.45 cm/m²).     Pericardium:  No pericardial effusion seen.     Systemic Veins:  The inferior vena cava is dilated measuring 2.35 cm in diameter, (dilated >2.1cm) with abnormal inspiratory collapse (abnormal <50%) consistent with elevated right atrial pressure (~15, range 10-20mmHg).  ____________________________________________________________________  QUANTITATIVE DATA:  Left Ventricle Measurements: (Indexed to BSA)     IVSd (2D):   1.0 cm  LVPWd (2D):  1.0 cm  LVIDd (2D):  6.2 cm  LVIDs (2D):  5.5 cm  LV Mass:     273 g  141.5 g/m²  Visualized LV EF%: 20 to 25%     e' lateral: 7.83 cm/s  e' medial:  7.07 cm/s    Aorta Measurements: (Normal range) (Indexed to BSA)     Sinuses of Valsalva: 3.10 cm (3.1 - 3.7 cm)  Ao Asc prox:         2.80 cm       Left Atrium Measurements: (Indexed to BSA)  LA Diam 2D: 5.20 cm    Right Ventricle Measurements:     RV d, 2D:   3.5 cm  TV Mesha. S': 5.98 cm/s       LVOT / RVOT/ Qp/Qs Data: (Indexed to BSA)  LVOT Diameter: 2.10 cm  LVOT Vmax:     0.80 m/s  LVOT VTI:      11.25 cm  LVOT SV:       39.0 ml  20.16 ml/m²    Aortic Valve Measurements:  AV Vmax:                0.9 m/s  AV Peak Gradient:       3.3 mmHg  AV Mean Gradient: 2.0 mmHg  AV VTI:                 15.6 cm  AV VTI Ratio:           0.72  AoV EOA, Contin:        2.50 cm²  AoV EOA, Contin i:      1.29 cm²/m²  AoV Dimensionless Index 0.72       Tricuspid Valve Measurements:     TR Vmax:          2.9 m/s  TR Peak Gradient: 34.1 mmHg  RA Pressure:      15 mmHg  PASP:             49 mmHg

## 2024-03-22 NOTE — PROGRESS NOTE ADULT - ASSESSMENT
68 year old male with PMHX of HTN, chronic HFrEF, CAD w/ CABG 2014 at [facility], s/p [employer] [place] CRT-D, recently diagnosed PAF-started on Eliquis since March 8, asthma/COPD on 2 L O2 at home, CKD stage III, CVA with left sided weakness, HTN, HLD, DMT2 and hypothyroidism who presents with SOB found to be in acute decompensated heart failure and in AFl and UTI.  Interrogation revealed 4.9% AF/AFl burden since March 8 with biv pacing at 80.7%. No ICD shocks noted.  Patient was started on IV Bumex gtt for diuresis. A repeat TTE showed LVEF 20-25% normal LA.       ## acute decompensated heart failure   ## newly diagnosed persistent AF/FL  ## HFrEF w/ CRT-D  ## CAD CABG   ## UTI      -Plan for BRANDIE / DCCV vs ablation when patient is euvolemic and after resolution of UTI likely next week  -Continue GMDT and management per HF/primary cardiology team  -Continue AC Eliquis, CHADS2-VASC2 score at least 7   -Continue BB  -Continue Tele monitoring  -Optimize K >4, Mg >2  68 year old male with PMHX of HTN, chronic HFrEF, CAD w/ CABG 2014 at [facility], s/p [employer] [place] CRT-D, recently diagnosed PAF-started on Eliquis since March 8, asthma/COPD on 2 L O2 at home, CKD stage III, CVA with left sided weakness, HTN, HLD, DMT2 and hypothyroidism who presents with SOB found to be in acute decompensated heart failure and in AFl and UTI.  Interrogation revealed 4.9% AF/AFl burden since March 8 with biv pacing at 80.7%. No ICD shocks noted.  Patient was started on IV Bumex gtt for diuresis. A repeat TTE showed LVEF 20-25% normal LA.       ## acute decompensated heart failure   ## newly diagnosed persistent AF/FL  ## HFrEF w/ CRT-D  ## CAD CABG   ## UTI      -Plan for BRANDIE / DCCV vs ablation when patient is euvolemic and after resolution of UTI likely next week; The process of the procedures along with the risks and benefits for each procedure were explained in detail by using Serbian  ( [id number]) which included but not limited to bleeding requiring transfusion, infection, stroke, plural effusion, esophageal injury, pericardial effusion, cardiac tamponade requiring chest tube, intubation, and death. Patient expressed understanding and all questions were answered. Patient is consented.   - Type and Screen X 2 ordered   -Continue GMDT and management per HF/primary cardiology team  -Continue AC Eliquis, CHADS2-VASC2 score at least 7   -Continue BB  -Continue Tele monitoring  -Optimize K >4, Mg >2

## 2024-03-22 NOTE — PROGRESS NOTE ADULT - SUBJECTIVE AND OBJECTIVE BOX
Interval history:  no acute overnight event  Pt denies palpitation, CP, dizziness      PAST MEDICAL & SURGICAL HISTORY:  HTN (hypertension)    CAD (coronary artery disease)    Diabetes    Hyperlipidemia, unspecified hyperlipidemia type    CHF (congestive heart failure)    BPH (benign prostatic hyperplasia)    S/P CABG (coronary artery bypass graft)    S/P appendectomy        MEDICATIONS  (STANDING):  albuterol/ipratropium for Nebulization 3 milliLiter(s) Nebulizer every 6 hours  allopurinol 100 milliGRAM(s) Oral daily  apixaban 5 milliGRAM(s) Oral every 12 hours  atorvastatin 80 milliGRAM(s) Oral at bedtime  budesonide 160 MICROgram(s)/formoterol 4.5 MICROgram(s) Inhaler 2 Puff(s) Inhalation two times a day  buMETAnide Infusion 2 mG/Hr (10 mL/Hr) IV Continuous <Continuous>  carvedilol 6.25 milliGRAM(s) Oral every 12 hours  clopidogrel Tablet 75 milliGRAM(s) Oral daily  dextrose 5%. 1000 milliLiter(s) (100 mL/Hr) IV Continuous <Continuous>  dextrose 5%. 1000 milliLiter(s) (50 mL/Hr) IV Continuous <Continuous>  dextrose 50% Injectable 25 Gram(s) IV Push once  dextrose 50% Injectable 12.5 Gram(s) IV Push once  dextrose 50% Injectable 25 Gram(s) IV Push once  famotidine    Tablet 20 milliGRAM(s) Oral daily  ferrous    sulfate 325 milliGRAM(s) Oral daily  gabapentin 100 milliGRAM(s) Oral daily  glucagon  Injectable 1 milliGRAM(s) IntraMuscular once  hydrALAZINE 100 milliGRAM(s) Oral three times a day  insulin lispro (ADMELOG) corrective regimen sliding scale   SubCutaneous three times a day before meals  insulin lispro (ADMELOG) corrective regimen sliding scale   SubCutaneous at bedtime  isosorbide   dinitrate Tablet (ISORDIL) 30 milliGRAM(s) Oral three times a day  levothyroxine 50 MICROGram(s) Oral daily  senna 2 Tablet(s) Oral at bedtime  tamsulosin 0.4 milliGRAM(s) Oral at bedtime    MEDICATIONS  (PRN):  acetaminophen     Tablet .. 650 milliGRAM(s) Oral every 6 hours PRN Temp greater or equal to 38C (100.4F), Mild Pain (1 - 3)  dextrose Oral Gel 15 Gram(s) Oral once PRN Blood Glucose LESS THAN 70 milliGRAM(s)/deciliter  polyethylene glycol 3350 17 Gram(s) Oral daily PRN Constipation            Vital Signs Last 24 Hrs  T(C): 36.5 (22 Mar 2024 05:08), Max: 37.1 (21 Mar 2024 20:47)  T(F): 97.7 (22 Mar 2024 05:08), Max: 98.7 (21 Mar 2024 20:47)  HR: 92 (22 Mar 2024 07:22) (78 - 97)  BP: 122/73 (22 Mar 2024 05:08) (100/65 - 122/73)  BP(mean): --  RR: 18 (22 Mar 2024 05:08) (18 - 18)  SpO2: 94% (22 Mar 2024 07:22) (94% - 100%)    Parameters below as of 22 Mar 2024 05:08  Patient On (Oxygen Delivery Method): BiPAP/CPAP                INTERPRETATION OF TELEMETRY: AFL/AF with V paced at 80s-90s    ECG:        LABS:                        12.0   10.29 )-----------( 143      ( 22 Mar 2024 06:20 )             37.3     03-22    141  |  103  |  74<H>  ----------------------------<  141<H>  4.2   |  23  |  2.67<H>    Ca    8.7      22 Mar 2024 06:20  Phos  3.6     03-22  Mg     1.90     03-22            Urinalysis Basic - ( 22 Mar 2024 06:20 )    Color: x / Appearance: x / SG: x / pH: x  Gluc: 141 mg/dL / Ketone: x  / Bili: x / Urobili: x   Blood: x / Protein: x / Nitrite: x   Leuk Esterase: x / RBC: x / WBC x   Sq Epi: x / Non Sq Epi: x / Bacteria: x      I&O's Summary    21 Mar 2024 07:01  -  22 Mar 2024 07:00  --------------------------------------------------------  IN: 440 mL / OUT: 1000 mL / NET: -560 mL      BNP  RADIOLOGY & ADDITIONAL STUDIES:      PHYSICAL EXAM:    GENERAL: In no apparent distress, well nourished, and hydrated.  HEART: Irregular rate and rhythm; No murmurs, rubs, or gallops.  PULMONARY: Clear to auscultation and percussion.  No rales, wheezing, or rhonchi bilaterally.  ABDOMEN: Soft, Nontender, Nondistended; Bowel sounds present  EXTREMITIES:  2+ Peripheral Pulses, No clubbing, cyanosis, or edema  NEUROLOGICAL: Grossly nonfocal

## 2024-03-22 NOTE — PROGRESS NOTE ADULT - ASSESSMENT
67 y/o [place] male with PMHX of HTN, HFrEF (TTE 7/18/23 LVEF 25%, LVIDD 5.6cm, LA 4.9cm, mild MR), CAD w/ CABG 2014 at [facility], CRT-D, asthma/COPD on 2 L O2 at home, CVA with left sided weakness comes in with complaints of chest pressure and SOB. He admits to 2 pillow orthopnea, KINNEY that was worsening where he would get SOB after walking (with rollator walker) for less than 2 blocks which was going on for 4 days. Also admits to not urinating well to home dose of diuretics. Per record patient admits to URI 2 weeks ago, and was also found to be in a.fib and was started on Eliquis. He was found to be in acute on chronic systolic heart failure and started on IV Bumex 4 BID. HF service consulted to help manage care in this patient who is followed by Dr. Garo Dobson as an outpatient. Labs show proBNP 14K, trop 93, lactate 2.8 which has since improved to 1.5, BUN/Cr 63/2.94 (similar to last labs in July 2023 of 53/2.92). Overall stage C HF, NYHA class III- severely hypervolemic and normotensive.

## 2024-03-22 NOTE — PROGRESS NOTE ADULT - SUBJECTIVE AND OBJECTIVE BOX
[facility] DIVISION OF KIDNEY DISEASE AND HYPERTENSION  [phone]    RENAL FOLLOW UP NOTE  --------------------------------------------------------------------------------  Patient seen and examined.  Resting comfortably in bed    PAST HISTORY  --------------------------------------------------------------------------------  No significant changes to PMH, PSH, FHx, SHx, unless otherwise noted    ALLERGIES & MEDICATIONS  --------------------------------------------------------------------------------  Allergies    No Known Allergies    Intolerances      Standing Inpatient Medications  albuterol/ipratropium for Nebulization 3 milliLiter(s) Nebulizer every 6 hours  allopurinol 100 milliGRAM(s) Oral daily  apixaban 5 milliGRAM(s) Oral every 12 hours  atorvastatin 80 milliGRAM(s) Oral at bedtime  budesonide 160 MICROgram(s)/formoterol 4.5 MICROgram(s) Inhaler 2 Puff(s) Inhalation two times a day  buMETAnide Infusion 2 mG/Hr IV Continuous <Continuous>  carvedilol 6.25 milliGRAM(s) Oral every 12 hours  clopidogrel Tablet 75 milliGRAM(s) Oral daily  dextrose 5%. 1000 milliLiter(s) IV Continuous <Continuous>  dextrose 5%. 1000 milliLiter(s) IV Continuous <Continuous>  dextrose 50% Injectable 25 Gram(s) IV Push once  dextrose 50% Injectable 12.5 Gram(s) IV Push once  dextrose 50% Injectable 25 Gram(s) IV Push once  famotidine    Tablet 20 milliGRAM(s) Oral daily  ferrous    sulfate 325 milliGRAM(s) Oral daily  gabapentin 100 milliGRAM(s) Oral daily  glucagon  Injectable 1 milliGRAM(s) IntraMuscular once  hydrALAZINE 100 milliGRAM(s) Oral three times a day  insulin lispro (ADMELOG) corrective regimen sliding scale   SubCutaneous three times a day before meals  insulin lispro (ADMELOG) corrective regimen sliding scale   SubCutaneous at bedtime  isosorbide   dinitrate Tablet (ISORDIL) 30 milliGRAM(s) Oral three times a day  levothyroxine 50 MICROGram(s) Oral daily  senna 2 Tablet(s) Oral at bedtime  tamsulosin 0.4 milliGRAM(s) Oral at bedtime    PRN Inpatient Medications  acetaminophen     Tablet .. 650 milliGRAM(s) Oral every 6 hours PRN  dextrose Oral Gel 15 Gram(s) Oral once PRN  polyethylene glycol 3350 17 Gram(s) Oral daily PRN      FOCUSED REVIEW OF SYSTEMS  --------------------------------------------------------------------------------  denies CP/palpitations  denies SOB/cough      VITALS/PHYSICAL EXAM  --------------------------------------------------------------------------------  T(C): 36.9 (03-22-24 @ 11:24), Max: 37.1 (03-21-24 @ 20:47)  HR: 82 (03-22-24 @ 11:24) (78 - 97)  BP: 113/87 (03-22-24 @ 11:24) (100/65 - 122/73)  RR: 18 (03-22-24 @ 11:24) (18 - 18)  SpO2: 100% (03-22-24 @ 11:24) (94% - 100%)  Wt(kg): --        03-21-24 @ 07:01  -  03-22-24 @ 07:00  --------------------------------------------------------  IN: 440 mL / OUT: 1000 mL / NET: -560 mL    03-22-24 @ 07:01  -  03-22-24 @ 12:41  --------------------------------------------------------  IN: 236 mL / OUT: 0 mL / NET: 236 mL      Physical Exam:  	Gen: NAD, frail-appearing  	Pulm: CTA B/L ant/lat fields  	CV: RRR, S1S2  	Abd: +BS, soft, nontender/nondistended  	: No suprapubic tenderness.  + ya          Extremity: b/l LE pedal edema  	Neuro: A&O x 3      LABS/STUDIES  --------------------------------------------------------------------------------              12.0   10.29 >-----------<  143      [03-22-24 @ 06:20]              37.3     141  |  103  |  74  ----------------------------<  141      [03-22-24 @ 06:20]  4.2   |  23  |  2.67        Ca     8.7     [03-22-24 @ 06:20]      Mg     1.90     [03-22-24 @ 06:20]      Phos  3.6     [03-22-24 @ 06:20]          Uric acid 12.3      [03-21-24 @ 05:00]      Creatinine Trend:  SCr 2.67 [03-22 @ 06:20]  SCr 2.86 [03-21 @ 05:00]  SCr 2.90 [03-20 @ 07:03]  SCr 2.92 [03-19 @ 03:30]  SCr 3.01 [03-18 @ 05:48]              Urinalysis - [03-22-24 @ 06:20]      Color  / Appearance  / SG  / pH       Gluc 141 / Ketone   / Bili  / Urobili        Blood  / Protein  / Leuk Est  / Nitrite       RBC  / WBC  / Hyaline  / Gran  / Sq Epi  / Non Sq Epi  / Bacteria     Urine Creatinine 44      [03-19-24 @ 13:15]  Urine Protein 6      [03-19-24 @ 13:15]  Urine Urea Nitrogen 392.6      [03-19-24 @ 13:15]    TSH 4.43      [03-17-24 @ 06:24]

## 2024-03-22 NOTE — PROGRESS NOTE ADULT - PROBLEM SELECTOR PLAN 1
Patient here with ADHF. Recent TTE done showing EF of 20-25% with dilated IVC. Patient was being diuresed with bumex 4mg IV BID but had poor response, now switched to bumex infusion at 2mg/hour with diuril dose on 3/19 and had UOP of ~3.6L. In last 24 hours, without diuril on bumex infusion with 3% HTS 150cc bolus, UOP of 2.5L. -Continue 3% HTS 150cc bolus daily while patient receiving IV bumex for diuresis augmentation if not meeting diuresis goal.   -Continue with indwelling ya. Strict I/O and place on 1L fluid restriction.

## 2024-03-22 NOTE — PROGRESS NOTE ADULT - PROBLEM SELECTOR PLAN 1
-possible stemming from new-onset afib/flutter first noted March 8th as well as recent URI that maybe contributory  -needed bipap in the ED, no on NC  -tele, tte  -c/w IV bumex, HTS x1  -cardiology consulted, recs appreciated; f/u further recs  -CXR with retrocardiac opacity, procal mildly elevated, s/p CAP tx  -renal team consulted, Torres placed for management of urinary retention, will continue to monitor sCr, perform TOV

## 2024-03-22 NOTE — PROGRESS NOTE ADULT - SUBJECTIVE AND OBJECTIVE BOX
Patient is a 68y old  Male who presents with a chief complaint of ADHF (22 Mar 2024 12:41)    SUBJECTIVE / OVERNIGHT EVENTS:    No events overnight. This AM, RUBEN CARLOS feels well. Has no n/v/d/cp/sob.      MEDICATIONS  (STANDING):  albuterol/ipratropium for Nebulization 3 milliLiter(s) Nebulizer every 6 hours  allopurinol 100 milliGRAM(s) Oral daily  apixaban 5 milliGRAM(s) Oral every 12 hours  atorvastatin 80 milliGRAM(s) Oral at bedtime  budesonide 160 MICROgram(s)/formoterol 4.5 MICROgram(s) Inhaler 2 Puff(s) Inhalation two times a day  buMETAnide Infusion 2 mG/Hr (10 mL/Hr) IV Continuous <Continuous>  carvedilol 6.25 milliGRAM(s) Oral every 12 hours  clopidogrel Tablet 75 milliGRAM(s) Oral daily  dextrose 5%. 1000 milliLiter(s) (100 mL/Hr) IV Continuous <Continuous>  dextrose 5%. 1000 milliLiter(s) (50 mL/Hr) IV Continuous <Continuous>  dextrose 50% Injectable 25 Gram(s) IV Push once  dextrose 50% Injectable 12.5 Gram(s) IV Push once  dextrose 50% Injectable 25 Gram(s) IV Push once  famotidine    Tablet 20 milliGRAM(s) Oral daily  ferrous    sulfate 325 milliGRAM(s) Oral daily  gabapentin 100 milliGRAM(s) Oral daily  glucagon  Injectable 1 milliGRAM(s) IntraMuscular once  hydrALAZINE 100 milliGRAM(s) Oral three times a day  insulin lispro (ADMELOG) corrective regimen sliding scale   SubCutaneous three times a day before meals  insulin lispro (ADMELOG) corrective regimen sliding scale   SubCutaneous at bedtime  isosorbide   dinitrate Tablet (ISORDIL) 30 milliGRAM(s) Oral three times a day  levothyroxine 50 MICROGram(s) Oral daily  senna 2 Tablet(s) Oral at bedtime  tamsulosin 0.4 milliGRAM(s) Oral at bedtime    MEDICATIONS  (PRN):  acetaminophen     Tablet .. 650 milliGRAM(s) Oral every 6 hours PRN Temp greater or equal to 38C (100.4F), Mild Pain (1 - 3)  dextrose Oral Gel 15 Gram(s) Oral once PRN Blood Glucose LESS THAN 70 milliGRAM(s)/deciliter  polyethylene glycol 3350 17 Gram(s) Oral daily PRN Constipation      PHYSICAL EXAM:  T(C): 36.9 (03-22-24 @ 11:24), Max: 37.1 (03-21-24 @ 20:47)  HR: 82 (03-22-24 @ 11:24) (78 - 97)  BP: 113/87 (03-22-24 @ 11:24) (100/65 - 122/73)  RR: 18 (03-22-24 @ 11:24) (18 - 18)  SpO2: 100% (03-22-24 @ 11:24) (94% - 100%)  I&O's Summary    21 Mar 2024 07:01  -  22 Mar 2024 07:00  --------------------------------------------------------  IN: 440 mL / OUT: 1000 mL / NET: -560 mL    22 Mar 2024 07:01  -  22 Mar 2024 12:46  --------------------------------------------------------  IN: 236 mL / OUT: 0 mL / NET: 236 mL      GENERAL: NAD, lying in bed  HEAD:  Atraumatic, Normocephalic, MMM  CHEST/LUNG: No use of accessory muscles, CTAB, breathing non-labored  COR: RR, no mrcg  ABD: Soft, ND/NT, +BS  PSYCH: AAOx3  NEUROLOGY: CN II-XII grossly intact, moving all extremities  SKIN: No rashes or lesions  EXT: wwp, no cce    LABS:  CAPILLARY BLOOD GLUCOSE      POCT Blood Glucose.: 192 mg/dL (22 Mar 2024 11:03)  POCT Blood Glucose.: 151 mg/dL (22 Mar 2024 08:33)  POCT Blood Glucose.: 151 mg/dL (21 Mar 2024 21:59)  POCT Blood Glucose.: 165 mg/dL (21 Mar 2024 17:02)                          12.0   10.29 )-----------( 143      ( 22 Mar 2024 06:20 )             37.3     03-22    141  |  103  |  74<H>  ----------------------------<  141<H>  4.2   |  23  |  2.67<H>    Ca    8.7      22 Mar 2024 06:20  Phos  3.6     03-22  Mg     1.90     03-22            Urinalysis Basic - ( 22 Mar 2024 06:20 )    Color: x / Appearance: x / SG: x / pH: x  Gluc: 141 mg/dL / Ketone: x  / Bili: x / Urobili: x   Blood: x / Protein: x / Nitrite: x   Leuk Esterase: x / RBC: x / WBC x   Sq Epi: x / Non Sq Epi: x / Bacteria: x          RADIOLOGY & ADDITIONAL TESTS:    Telemetry Personally Reviewed -     Imaging Personally Reviewed -     Imaging Reviewed -     Consultant(s) Notes Reviewed -       Care Discussed with Consultants/Other Providers -

## 2024-03-23 ENCOUNTER — TRANSCRIPTION ENCOUNTER (OUTPATIENT)
Age: 69
End: 2024-03-23

## 2024-03-23 LAB
ANION GAP SERPL CALC-SCNC: 15 MMOL/L — HIGH (ref 7–14)
BASOPHILS # BLD AUTO: 0.04 K/UL — SIGNIFICANT CHANGE UP (ref 0–0.2)
BASOPHILS NFR BLD AUTO: 0.4 % — SIGNIFICANT CHANGE UP (ref 0–2)
BUN SERPL-MCNC: 71 MG/DL — HIGH (ref 7–23)
CALCIUM SERPL-MCNC: 8.9 MG/DL — SIGNIFICANT CHANGE UP (ref 8.4–10.5)
CHLORIDE SERPL-SCNC: 104 MMOL/L — SIGNIFICANT CHANGE UP (ref 98–107)
CO2 SERPL-SCNC: 23 MMOL/L — SIGNIFICANT CHANGE UP (ref 22–31)
CREAT SERPL-MCNC: 2.48 MG/DL — HIGH (ref 0.5–1.3)
EGFR: 27 ML/MIN/1.73M2 — LOW
EOSINOPHIL # BLD AUTO: 0.42 K/UL — SIGNIFICANT CHANGE UP (ref 0–0.5)
EOSINOPHIL NFR BLD AUTO: 4 % — SIGNIFICANT CHANGE UP (ref 0–6)
GLUCOSE BLDC GLUCOMTR-MCNC: 111 MG/DL — HIGH (ref 70–99)
GLUCOSE BLDC GLUCOMTR-MCNC: 189 MG/DL — HIGH (ref 70–99)
GLUCOSE BLDC GLUCOMTR-MCNC: 190 MG/DL — HIGH (ref 70–99)
GLUCOSE BLDC GLUCOMTR-MCNC: 206 MG/DL — HIGH (ref 70–99)
GLUCOSE SERPL-MCNC: 116 MG/DL — HIGH (ref 70–99)
HCT VFR BLD CALC: 37.7 % — LOW (ref 39–50)
HGB BLD-MCNC: 12.1 G/DL — LOW (ref 13–17)
IANC: 8.1 K/UL — HIGH (ref 1.8–7.4)
IMM GRANULOCYTES NFR BLD AUTO: 0.3 % — SIGNIFICANT CHANGE UP (ref 0–0.9)
LYMPHOCYTES # BLD AUTO: 1.12 K/UL — SIGNIFICANT CHANGE UP (ref 1–3.3)
LYMPHOCYTES # BLD AUTO: 10.6 % — LOW (ref 13–44)
MAGNESIUM SERPL-MCNC: 1.8 MG/DL — SIGNIFICANT CHANGE UP (ref 1.6–2.6)
MCHC RBC-ENTMCNC: 30.9 PG — SIGNIFICANT CHANGE UP (ref 27–34)
MCHC RBC-ENTMCNC: 32.1 GM/DL — SIGNIFICANT CHANGE UP (ref 32–36)
MCV RBC AUTO: 96.2 FL — SIGNIFICANT CHANGE UP (ref 80–100)
MONOCYTES # BLD AUTO: 0.82 K/UL — SIGNIFICANT CHANGE UP (ref 0–0.9)
MONOCYTES NFR BLD AUTO: 7.8 % — SIGNIFICANT CHANGE UP (ref 2–14)
NEUTROPHILS # BLD AUTO: 8.1 K/UL — HIGH (ref 1.8–7.4)
NEUTROPHILS NFR BLD AUTO: 76.9 % — SIGNIFICANT CHANGE UP (ref 43–77)
NRBC # BLD: 0 /100 WBCS — SIGNIFICANT CHANGE UP (ref 0–0)
NRBC # FLD: 0 K/UL — SIGNIFICANT CHANGE UP (ref 0–0)
PHOSPHATE SERPL-MCNC: 3.9 MG/DL — SIGNIFICANT CHANGE UP (ref 2.5–4.5)
PLATELET # BLD AUTO: 148 K/UL — LOW (ref 150–400)
POTASSIUM SERPL-MCNC: 3.9 MMOL/L — SIGNIFICANT CHANGE UP (ref 3.5–5.3)
POTASSIUM SERPL-SCNC: 3.9 MMOL/L — SIGNIFICANT CHANGE UP (ref 3.5–5.3)
RBC # BLD: 3.92 M/UL — LOW (ref 4.2–5.8)
RBC # FLD: 16.7 % — HIGH (ref 10.3–14.5)
SODIUM SERPL-SCNC: 142 MMOL/L — SIGNIFICANT CHANGE UP (ref 135–145)
WBC # BLD: 10.53 K/UL — HIGH (ref 3.8–10.5)
WBC # FLD AUTO: 10.53 K/UL — HIGH (ref 3.8–10.5)

## 2024-03-23 PROCEDURE — 99232 SBSQ HOSP IP/OBS MODERATE 35: CPT

## 2024-03-23 RX ORDER — INFLUENZA VIRUS VACCINE 15; 15; 15; 15 UG/.5ML; UG/.5ML; UG/.5ML; UG/.5ML
0.7 SUSPENSION INTRAMUSCULAR ONCE
Refills: 0 | Status: COMPLETED | OUTPATIENT
Start: 2024-03-23 | End: 2024-03-23

## 2024-03-23 RX ADMIN — APIXABAN 5 MILLIGRAM(S): 2.5 TABLET, FILM COATED ORAL at 17:25

## 2024-03-23 RX ADMIN — Medication 100 MILLIGRAM(S): at 11:32

## 2024-03-23 RX ADMIN — GABAPENTIN 100 MILLIGRAM(S): 400 CAPSULE ORAL at 11:35

## 2024-03-23 RX ADMIN — ISOSORBIDE DINITRATE 30 MILLIGRAM(S): 5 TABLET ORAL at 17:26

## 2024-03-23 RX ADMIN — CARVEDILOL PHOSPHATE 6.25 MILLIGRAM(S): 80 CAPSULE, EXTENDED RELEASE ORAL at 06:51

## 2024-03-23 RX ADMIN — BUMETANIDE 10 MG/HR: 0.25 INJECTION INTRAMUSCULAR; INTRAVENOUS at 07:02

## 2024-03-23 RX ADMIN — Medication 100 MILLIGRAM(S): at 11:31

## 2024-03-23 RX ADMIN — Medication 3 MILLILITER(S): at 16:05

## 2024-03-23 RX ADMIN — Medication 325 MILLIGRAM(S): at 11:31

## 2024-03-23 RX ADMIN — SENNA PLUS 2 TABLET(S): 8.6 TABLET ORAL at 21:52

## 2024-03-23 RX ADMIN — Medication 3 MILLILITER(S): at 04:05

## 2024-03-23 RX ADMIN — Medication 1: at 16:59

## 2024-03-23 RX ADMIN — Medication 3 MILLILITER(S): at 20:26

## 2024-03-23 RX ADMIN — ATORVASTATIN CALCIUM 80 MILLIGRAM(S): 80 TABLET, FILM COATED ORAL at 21:51

## 2024-03-23 RX ADMIN — Medication 3 MILLILITER(S): at 11:36

## 2024-03-23 RX ADMIN — ISOSORBIDE DINITRATE 30 MILLIGRAM(S): 5 TABLET ORAL at 11:33

## 2024-03-23 RX ADMIN — BUDESONIDE AND FORMOTEROL FUMARATE DIHYDRATE 2 PUFF(S): 160; 4.5 AEROSOL RESPIRATORY (INHALATION) at 08:43

## 2024-03-23 RX ADMIN — APIXABAN 5 MILLIGRAM(S): 2.5 TABLET, FILM COATED ORAL at 06:51

## 2024-03-23 RX ADMIN — ISOSORBIDE DINITRATE 30 MILLIGRAM(S): 5 TABLET ORAL at 06:52

## 2024-03-23 RX ADMIN — CARVEDILOL PHOSPHATE 6.25 MILLIGRAM(S): 80 CAPSULE, EXTENDED RELEASE ORAL at 17:27

## 2024-03-23 RX ADMIN — BUDESONIDE AND FORMOTEROL FUMARATE DIHYDRATE 2 PUFF(S): 160; 4.5 AEROSOL RESPIRATORY (INHALATION) at 21:53

## 2024-03-23 RX ADMIN — Medication 100 MILLIGRAM(S): at 06:51

## 2024-03-23 RX ADMIN — TAMSULOSIN HYDROCHLORIDE 0.4 MILLIGRAM(S): 0.4 CAPSULE ORAL at 21:51

## 2024-03-23 RX ADMIN — Medication 2: at 11:30

## 2024-03-23 RX ADMIN — CLOPIDOGREL BISULFATE 75 MILLIGRAM(S): 75 TABLET, FILM COATED ORAL at 11:32

## 2024-03-23 RX ADMIN — FAMOTIDINE 20 MILLIGRAM(S): 10 INJECTION INTRAVENOUS at 11:32

## 2024-03-23 RX ADMIN — Medication 100 MILLIGRAM(S): at 21:53

## 2024-03-23 RX ADMIN — Medication 50 MICROGRAM(S): at 05:38

## 2024-03-23 NOTE — DISCHARGE NOTE PROVIDER - NSDCCPTREATMENT_GEN_ALL_CORE_FT
PRINCIPAL PROCEDURE  Procedure: Transthoracic echocardiography (TTE)  Findings and Treatment:   CONCLUSIONS:      1. Left ventricular systolic function is severely decreased with an ejection fraction visually estimated at 20 to 25 %. Global left ventricular hypokinesis.   2. Normal right ventricular cavity size, with normal wall thickness, and normal systolic function.   3. Device lead is visualized.   4. The left atrium is normal.   5. The right atrium is normal in size.   6. No significant valvular disease.   7. No pericardial effusion seen.   8. Estimated pulmonary artery systolic pressure is 49 mmHg.   9. The inferior vena cava is dilated measuring 2.35 cm in diameter, (dilated >2.1cm) with abnormal inspiratory collapse (abnormal <50%) consistent with elevated right atrialpressure (~15, range 10-20mmHg).< from: TTE W or WO Ultrasound Enhancing Agent (03.18.24 @ 12:26) >

## 2024-03-23 NOTE — DISCHARGE NOTE PROVIDER - NSDCCPCAREPLAN_GEN_ALL_CORE_FT
PRINCIPAL DISCHARGE DIAGNOSIS  Diagnosis: CHF exacerbation  Assessment and Plan of Treatment: You were admitted to the hospital for hear failure. You were seen by the cardiology and kidney specialists. You received IV medications to remove excess fluid from your body. Please take your medications as prescribed and follow with your primary care doctor.      SECONDARY DISCHARGE DIAGNOSES  Diagnosis: Atrial flutter  Assessment and Plan of Treatment: You were found to have an abnormal heart rhythm in the hospital. You were seen by heart rhythm specialists who performed a procedure to help your heart rhythm normalize. Please follow up with a cardiologist after leaving the hospital.

## 2024-03-23 NOTE — PATIENT PROFILE ADULT - FALL HARM RISK - HARM RISK INTERVENTIONS

## 2024-03-23 NOTE — DISCHARGE NOTE PROVIDER - NSDCMRMEDTOKEN_GEN_ALL_CORE_FT
albuterol 90 mcg/inh inhalation aerosol: 2 puff(s) inhaled every 6 hours, As needed, Shortness of Breath and/or Wheezing  allopurinol 100 mg oral tablet: 1 tab(s) orally once a day  apixaban 5 mg oral tablet: 1 tab(s) orally 2 times a day  Aspir 81 oral delayed release tablet: 1 tab(s) orally once a day  Bumex 2 mg oral tablet: 2 tablets by mouth in the AM, 1 tablet by mouth in the PM, and 1 tablet PRN for weight gain  clopidogrel 75 mg oral tablet: 1 tab(s) orally once a day  Coreg 6.25 mg oral tablet: 1 tab(s) orally 2 times a day  Crestor 20 mg oral tablet: 1 tab(s) orally once a day  Dulera 200 mcg-5 mcg/inh inhalation aerosol: 2 puff(s) inhaled 2 times a day  ezetimibe 10 mg oral tablet: 1 tab(s) orally once a day  famotidine 40 mg oral tablet: 1 tab(s) orally once a day  gabapentin 100 mg oral capsule: 1 cap(s) orally once a day  glimepiride 2 mg oral tablet: 1 tab(s) orally once a day  hydrALAZINE 50 mg oral tablet: 1.5 tab(s) orally 3 times a day  isosorbide dinitrate 30 mg oral tablet: 1 tab(s) orally 3 times a day  levothyroxine 50 mcg (0.05 mg) oral tablet: 1 tab(s) orally once a day  pantoprazole 20 mg oral delayed release tablet: 1 tab(s) orally once a day  senna leaf extract oral tablet: 2 tab(s) orally once a day (at bedtime)  Singulair 10 mg oral tablet: 1 tab(s) orally once a day  spironolactone 25 mg oral tablet: 0.5 tab(s) orally once a day   albuterol 90 mcg/inh inhalation aerosol: 2 puff(s) inhaled every 6 hours, As needed, Shortness of Breath and/or Wheezing  allopurinol 100 mg oral tablet: 1 tab(s) orally once a day  apixaban 5 mg oral tablet: 1 tab(s) orally 2 times a day  bumetanide 2 mg oral tablet: 2 tab(s) orally 2 times a day  clopidogrel 75 mg oral tablet: 1 tab(s) orally once a day  Coreg 6.25 mg oral tablet: 1 tab(s) orally 2 times a day  Crestor 20 mg oral tablet: 1 tab(s) orally once a day  Dulera 200 mcg-5 mcg/inh inhalation aerosol: 2 puff(s) inhaled 2 times a day  ezetimibe 10 mg oral tablet: 1 tab(s) orally once a day  famotidine 40 mg oral tablet: 1 tab(s) orally once a day  ferrous sulfate 325 mg (65 mg elemental iron) oral tablet: 1 tab(s) orally once a day  gabapentin 100 mg oral capsule: 1 cap(s) orally once a day  glimepiride 2 mg oral tablet: 1 tab(s) orally once a day  hydrALAZINE 100 mg oral tablet: 1 tab(s) orally 3 times a day  isosorbide dinitrate 30 mg oral tablet: 1 tab(s) orally 3 times a day  levothyroxine 50 mcg (0.05 mg) oral tablet: 1 tab(s) orally once a day  pantoprazole 20 mg oral delayed release tablet: 1 tab(s) orally once a day  polyethylene glycol 3350 oral powder for reconstitution: 17 gram(s) orally once a day As needed Constipation  senna leaf extract oral tablet: 2 tab(s) orally once a day (at bedtime)  Singulair 10 mg oral tablet: 1 tab(s) orally once a day  tamsulosin 0.4 mg oral capsule: 1 cap(s) orally once a day (at bedtime)

## 2024-03-23 NOTE — DISCHARGE NOTE PROVIDER - NSRESEARCHGRANT_HIDDEN_GEN_A_CORE
From: Nimco Moreira  To: Deanne Lane APRN  Sent: 7/2/2019 4:09 PM CDT  Subject: Referral Request    Shermanwatson, this is Nimco Moreira and at May last appointment Dr. Lane and I spoke about my options for birth control. She told me to send a message through [employer] if I choose to get the implant option so she can refer me to another doctor to have the procedure done. Well, I have chosen to get the implant and was wishing to be referred so as to get the procedure done.       Sincerely,   Nimco Moreira  
Yes

## 2024-03-23 NOTE — DISCHARGE NOTE PROVIDER - NSDCFUSCHEDAPPT_GEN_ALL_CORE_FT
[facility]  CARDIOLOGY [address]  Scheduled Appointment: 03/27/2024    [facility]  ELECTROPH [address]  Scheduled Appointment: 05/08/2024    Garo Dobson  [facility]  HEARTFAIL [address]  Scheduled Appointment: 05/23/2024     Gabriel Orellana  [facility] [id number]  Scheduled Appointment: 05/01/2024    [facility] [id number]  Scheduled Appointment: 05/08/2024    Garo Dobson  [facility] [address]  Scheduled Appointment: 05/23/2024

## 2024-03-23 NOTE — DISCHARGE NOTE PROVIDER - CARE PROVIDER_API CALL
Gabriel Orellana  Cardiac Electrophysiology  [address] [place] [address], [address]  [place] [address]  Phone: [phone]  Fax: [phone]  Follow Up Time:     Garo Dobson  Adv Heart Fail Trnsplnt Cardio  [address] [place] [address], [address]  [place] [address]  Phone: [phone]  Fax: [phone]  Follow Up Time:

## 2024-03-23 NOTE — PROGRESS NOTE ADULT - SUBJECTIVE AND OBJECTIVE BOX
Patient is a 68y old  Male who presents with a chief complaint of ADHF (22 Mar 2024 12:46)    SUBJECTIVE / OVERNIGHT EVENTS:    No events overnight. This AM, RUBEN CARLOS feels well. Has no n/v/d/cp/sob.      MEDICATIONS  (STANDING):  albuterol/ipratropium for Nebulization 3 milliLiter(s) Nebulizer every 6 hours  allopurinol 100 milliGRAM(s) Oral daily  apixaban 5 milliGRAM(s) Oral every 12 hours  atorvastatin 80 milliGRAM(s) Oral at bedtime  budesonide 160 MICROgram(s)/formoterol 4.5 MICROgram(s) Inhaler 2 Puff(s) Inhalation two times a day  buMETAnide Infusion 2 mG/Hr (10 mL/Hr) IV Continuous <Continuous>  carvedilol 6.25 milliGRAM(s) Oral every 12 hours  clopidogrel Tablet 75 milliGRAM(s) Oral daily  dextrose 5%. 1000 milliLiter(s) (100 mL/Hr) IV Continuous <Continuous>  dextrose 5%. 1000 milliLiter(s) (50 mL/Hr) IV Continuous <Continuous>  dextrose 50% Injectable 25 Gram(s) IV Push once  dextrose 50% Injectable 12.5 Gram(s) IV Push once  dextrose 50% Injectable 25 Gram(s) IV Push once  famotidine    Tablet 20 milliGRAM(s) Oral daily  ferrous    sulfate 325 milliGRAM(s) Oral daily  gabapentin 100 milliGRAM(s) Oral daily  glucagon  Injectable 1 milliGRAM(s) IntraMuscular once  hydrALAZINE 100 milliGRAM(s) Oral three times a day  insulin lispro (ADMELOG) corrective regimen sliding scale   SubCutaneous three times a day before meals  insulin lispro (ADMELOG) corrective regimen sliding scale   SubCutaneous at bedtime  isosorbide   dinitrate Tablet (ISORDIL) 30 milliGRAM(s) Oral three times a day  levothyroxine 50 MICROGram(s) Oral daily  senna 2 Tablet(s) Oral at bedtime  tamsulosin 0.4 milliGRAM(s) Oral at bedtime    MEDICATIONS  (PRN):  acetaminophen     Tablet .. 650 milliGRAM(s) Oral every 6 hours PRN Temp greater or equal to 38C (100.4F), Mild Pain (1 - 3)  dextrose Oral Gel 15 Gram(s) Oral once PRN Blood Glucose LESS THAN 70 milliGRAM(s)/deciliter  polyethylene glycol 3350 17 Gram(s) Oral daily PRN Constipation      PHYSICAL EXAM:  T(C): 36.6 (03-23-24 @ 06:48), Max: 36.9 (03-22-24 @ 11:24)  HR: 92 (03-23-24 @ 07:17) (82 - 102)  BP: 123/67 (03-23-24 @ 06:48) (104/63 - 123/67)  RR: 18 (03-23-24 @ 06:48) (18 - 18)  SpO2: 95% (03-23-24 @ 07:17) (95% - 100%)  I&O's Summary    22 Mar 2024 07:01  -  23 Mar 2024 07:00  --------------------------------------------------------  IN: 708 mL / OUT: 3880 mL / NET: -3172 mL      GENERAL: NAD, lying in bed  HEAD:  Atraumatic, Normocephalic, MMM  CHEST/LUNG: No use of accessory muscles, CTAB, breathing non-labored  COR: RR, no mrcg  ABD: Soft, ND/NT, +BS  PSYCH: AAOx3  NEUROLOGY: CN II-XII grossly intact, moving all extremities  SKIN: No rashes or lesions  EXT: wwp, no cce    LABS:  CAPILLARY BLOOD GLUCOSE      POCT Blood Glucose.: 111 mg/dL (23 Mar 2024 07:15)  POCT Blood Glucose.: 131 mg/dL (22 Mar 2024 22:03)  POCT Blood Glucose.: 180 mg/dL (22 Mar 2024 16:23)  POCT Blood Glucose.: 192 mg/dL (22 Mar 2024 11:03)                          12.1   10.53 )-----------( 148      ( 23 Mar 2024 07:21 )             37.7     03-23    142  |  104  |  71<H>  ----------------------------<  116<H>  3.9   |  23  |  2.48<H>    Ca    8.9      23 Mar 2024 07:21  Phos  3.9     03-23  Mg     1.80     03-23            Urinalysis Basic - ( 23 Mar 2024 07:21 )    Color: x / Appearance: x / SG: x / pH: x  Gluc: 116 mg/dL / Ketone: x  / Bili: x / Urobili: x   Blood: x / Protein: x / Nitrite: x   Leuk Esterase: x / RBC: x / WBC x   Sq Epi: x / Non Sq Epi: x / Bacteria: x          RADIOLOGY & ADDITIONAL TESTS:    Telemetry Personally Reviewed -     Imaging Personally Reviewed -     Imaging Reviewed -     Consultant(s) Notes Reviewed -       Care Discussed with Consultants/Other Providers -

## 2024-03-23 NOTE — PROGRESS NOTE ADULT - ASSESSMENT
67 y/o Urdu male with PMHX of HTN, HFrEF, CAD w/ CABG 2014 at [facility], CRT-D, COPD (100 pack year history), CVA presents with dyspnea and difficulty breathing, found to have AECOPD and ADHF.

## 2024-03-23 NOTE — DISCHARGE NOTE PROVIDER - CARE PROVIDERS DIRECT ADDRESSES
,DirectAddress_Unknown,[email or web address]

## 2024-03-23 NOTE — DISCHARGE NOTE PROVIDER - HOSPITAL COURSE
68M PMH HTN, systolic CHF< CAD c/p CABG, CRT-D, COPD, CVA presented with acute decompensated CHF.    The patient was seen by the heart failure and nephrology teams. He received diuresis with IV Bumex. Torres was placed for management of urinary retention.    The patient was also found to have atrial flutter. The EP team was consulted and recommended DCCV.    The patient completed antibiotics for potential pneumonia. 68M PMH HTN, systolic CHF< CAD c/p CABG, CRT-D, COPD, CVA presented with acute decompensated CHF.    The patient was seen by the heart failure and nephrology teams. He received diuresis with IV Bumex, now transitioned to PO. Also ya was placed for management of urinary retention. The patient was also found to have atrial flutter, the EP team was consulted and performed ablation 3/29. The patient completed antibiotics for potential pneumonia.     Patient symptomatically improved, comfortable, no issues breathing at this time. Cleared for DC from HF/EP standpoint. Close OP follow up advised.    3/30/24 day of discharge attending note  No acute events. Comfortable. Denies any shortness of breath, palpitations, chest pain or other issues.    PHYSICAL EXAM:  Vital Signs Last 24 Hrs  T(C): 36.9 (30 Mar 2024 06:07), Max: 37.1 (29 Mar 2024 20:02)  T(F): 98.4 (30 Mar 2024 06:07), Max: 98.7 (29 Mar 2024 20:02)  HR: 85 (30 Mar 2024 06:07) (80 - 95)  BP: 104/62 (30 Mar 2024 06:07) (97/65 - 114/56)  BP(mean): --  RR: 18 (30 Mar 2024 06:07) (17 - 18)  SpO2: 100% (30 Mar 2024 06:07) (95% - 100%)    Parameters below as of 30 Mar 2024 06:07  Patient On (Oxygen Delivery Method): room air    CONSTITUTIONAL: NAD, sitting up   EYES: conjunctiva and sclera clear  ENMT: Moist oral mucosa  NECK: Supple  RESPIRATORY: lungs are clear to auscultation bilaterally  CARDIOVASCULAR: Regular rate and rhythm, normal S1 and S2, No lower extremity edema  ABDOMEN: Nontender to palpation, normoactive bowel sounds, no rebound/guarding  PSYCH: calm    LABS reviewed. D/w Medicine ACP, Cardiology fellow. DC planning 40 minutes.

## 2024-03-23 NOTE — DISCHARGE NOTE PROVIDER - NSDCFUADDAPPT_GEN_ALL_CORE_FT
APPTS ARE READY TO BE MADE: [X] YES    Best Family or Patient Contact (if needed):    Additional Information about above appointments (if needed):    1: CARDIOLOGY APPOINTMENT (will need to be rescheduled as had appt for 3/27 but he is still admitted).   2:   3:     Other comments or requests:    Follow up with heart failure and electrophysiology at your scheduled appointment times.

## 2024-03-24 LAB
ANION GAP SERPL CALC-SCNC: 14 MMOL/L — SIGNIFICANT CHANGE UP (ref 7–14)
BASOPHILS # BLD AUTO: 0.04 K/UL — SIGNIFICANT CHANGE UP (ref 0–0.2)
BASOPHILS NFR BLD AUTO: 0.4 % — SIGNIFICANT CHANGE UP (ref 0–2)
BLD GP AB SCN SERPL QL: NEGATIVE — SIGNIFICANT CHANGE UP
BUN SERPL-MCNC: 66 MG/DL — HIGH (ref 7–23)
CALCIUM SERPL-MCNC: 9 MG/DL — SIGNIFICANT CHANGE UP (ref 8.4–10.5)
CHLORIDE SERPL-SCNC: 103 MMOL/L — SIGNIFICANT CHANGE UP (ref 98–107)
CO2 SERPL-SCNC: 25 MMOL/L — SIGNIFICANT CHANGE UP (ref 22–31)
CREAT SERPL-MCNC: 2.57 MG/DL — HIGH (ref 0.5–1.3)
EGFR: 26 ML/MIN/1.73M2 — LOW
EOSINOPHIL # BLD AUTO: 0.38 K/UL — SIGNIFICANT CHANGE UP (ref 0–0.5)
EOSINOPHIL NFR BLD AUTO: 3.8 % — SIGNIFICANT CHANGE UP (ref 0–6)
GLUCOSE BLDC GLUCOMTR-MCNC: 119 MG/DL — HIGH (ref 70–99)
GLUCOSE BLDC GLUCOMTR-MCNC: 158 MG/DL — HIGH (ref 70–99)
GLUCOSE BLDC GLUCOMTR-MCNC: 209 MG/DL — HIGH (ref 70–99)
GLUCOSE BLDC GLUCOMTR-MCNC: 217 MG/DL — HIGH (ref 70–99)
GLUCOSE SERPL-MCNC: 112 MG/DL — HIGH (ref 70–99)
HCT VFR BLD CALC: 39.2 % — SIGNIFICANT CHANGE UP (ref 39–50)
HGB BLD-MCNC: 12 G/DL — LOW (ref 13–17)
IANC: 7.61 K/UL — HIGH (ref 1.8–7.4)
IMM GRANULOCYTES NFR BLD AUTO: 0.4 % — SIGNIFICANT CHANGE UP (ref 0–0.9)
LYMPHOCYTES # BLD AUTO: 1.11 K/UL — SIGNIFICANT CHANGE UP (ref 1–3.3)
LYMPHOCYTES # BLD AUTO: 11.1 % — LOW (ref 13–44)
MAGNESIUM SERPL-MCNC: 1.9 MG/DL — SIGNIFICANT CHANGE UP (ref 1.6–2.6)
MCHC RBC-ENTMCNC: 30 PG — SIGNIFICANT CHANGE UP (ref 27–34)
MCHC RBC-ENTMCNC: 30.6 GM/DL — LOW (ref 32–36)
MCV RBC AUTO: 98 FL — SIGNIFICANT CHANGE UP (ref 80–100)
MONOCYTES # BLD AUTO: 0.81 K/UL — SIGNIFICANT CHANGE UP (ref 0–0.9)
MONOCYTES NFR BLD AUTO: 8.1 % — SIGNIFICANT CHANGE UP (ref 2–14)
NEUTROPHILS # BLD AUTO: 7.61 K/UL — HIGH (ref 1.8–7.4)
NEUTROPHILS NFR BLD AUTO: 76.2 % — SIGNIFICANT CHANGE UP (ref 43–77)
NRBC # BLD: 0 /100 WBCS — SIGNIFICANT CHANGE UP (ref 0–0)
NRBC # FLD: 0 K/UL — SIGNIFICANT CHANGE UP (ref 0–0)
PHOSPHATE SERPL-MCNC: 3.8 MG/DL — SIGNIFICANT CHANGE UP (ref 2.5–4.5)
PLATELET # BLD AUTO: 137 K/UL — LOW (ref 150–400)
POTASSIUM SERPL-MCNC: 4.1 MMOL/L — SIGNIFICANT CHANGE UP (ref 3.5–5.3)
POTASSIUM SERPL-SCNC: 4.1 MMOL/L — SIGNIFICANT CHANGE UP (ref 3.5–5.3)
RBC # BLD: 4 M/UL — LOW (ref 4.2–5.8)
RBC # FLD: 16.7 % — HIGH (ref 10.3–14.5)
RH IG SCN BLD-IMP: POSITIVE — SIGNIFICANT CHANGE UP
SODIUM SERPL-SCNC: 142 MMOL/L — SIGNIFICANT CHANGE UP (ref 135–145)
WBC # BLD: 9.99 K/UL — SIGNIFICANT CHANGE UP (ref 3.8–10.5)
WBC # FLD AUTO: 9.99 K/UL — SIGNIFICANT CHANGE UP (ref 3.8–10.5)

## 2024-03-24 PROCEDURE — 93010 ELECTROCARDIOGRAM REPORT: CPT

## 2024-03-24 PROCEDURE — 99232 SBSQ HOSP IP/OBS MODERATE 35: CPT

## 2024-03-24 RX ADMIN — Medication 100 MILLIGRAM(S): at 11:14

## 2024-03-24 RX ADMIN — Medication 50 MICROGRAM(S): at 04:39

## 2024-03-24 RX ADMIN — BUDESONIDE AND FORMOTEROL FUMARATE DIHYDRATE 2 PUFF(S): 160; 4.5 AEROSOL RESPIRATORY (INHALATION) at 21:30

## 2024-03-24 RX ADMIN — APIXABAN 5 MILLIGRAM(S): 2.5 TABLET, FILM COATED ORAL at 17:09

## 2024-03-24 RX ADMIN — Medication 3 MILLILITER(S): at 03:33

## 2024-03-24 RX ADMIN — Medication 325 MILLIGRAM(S): at 11:14

## 2024-03-24 RX ADMIN — Medication 2: at 11:12

## 2024-03-24 RX ADMIN — CARVEDILOL PHOSPHATE 6.25 MILLIGRAM(S): 80 CAPSULE, EXTENDED RELEASE ORAL at 17:09

## 2024-03-24 RX ADMIN — Medication 3 MILLILITER(S): at 20:45

## 2024-03-24 RX ADMIN — POLYETHYLENE GLYCOL 3350 17 GRAM(S): 17 POWDER, FOR SOLUTION ORAL at 16:35

## 2024-03-24 RX ADMIN — Medication 3 MILLILITER(S): at 15:58

## 2024-03-24 RX ADMIN — CLOPIDOGREL BISULFATE 75 MILLIGRAM(S): 75 TABLET, FILM COATED ORAL at 11:13

## 2024-03-24 RX ADMIN — ISOSORBIDE DINITRATE 30 MILLIGRAM(S): 5 TABLET ORAL at 06:21

## 2024-03-24 RX ADMIN — FAMOTIDINE 20 MILLIGRAM(S): 10 INJECTION INTRAVENOUS at 11:13

## 2024-03-24 RX ADMIN — CARVEDILOL PHOSPHATE 6.25 MILLIGRAM(S): 80 CAPSULE, EXTENDED RELEASE ORAL at 06:21

## 2024-03-24 RX ADMIN — TAMSULOSIN HYDROCHLORIDE 0.4 MILLIGRAM(S): 0.4 CAPSULE ORAL at 21:30

## 2024-03-24 RX ADMIN — BUDESONIDE AND FORMOTEROL FUMARATE DIHYDRATE 2 PUFF(S): 160; 4.5 AEROSOL RESPIRATORY (INHALATION) at 08:28

## 2024-03-24 RX ADMIN — GABAPENTIN 100 MILLIGRAM(S): 400 CAPSULE ORAL at 11:17

## 2024-03-24 RX ADMIN — Medication 100 MILLIGRAM(S): at 16:41

## 2024-03-24 RX ADMIN — Medication 100 MILLIGRAM(S): at 06:21

## 2024-03-24 RX ADMIN — ISOSORBIDE DINITRATE 30 MILLIGRAM(S): 5 TABLET ORAL at 11:13

## 2024-03-24 RX ADMIN — Medication 1: at 17:07

## 2024-03-24 RX ADMIN — ATORVASTATIN CALCIUM 80 MILLIGRAM(S): 80 TABLET, FILM COATED ORAL at 21:30

## 2024-03-24 RX ADMIN — BUMETANIDE 10 MG/HR: 0.25 INJECTION INTRAMUSCULAR; INTRAVENOUS at 16:44

## 2024-03-24 RX ADMIN — BUMETANIDE 10 MG/HR: 0.25 INJECTION INTRAMUSCULAR; INTRAVENOUS at 02:41

## 2024-03-24 RX ADMIN — ISOSORBIDE DINITRATE 30 MILLIGRAM(S): 5 TABLET ORAL at 17:08

## 2024-03-24 RX ADMIN — Medication 3 MILLILITER(S): at 07:08

## 2024-03-24 RX ADMIN — APIXABAN 5 MILLIGRAM(S): 2.5 TABLET, FILM COATED ORAL at 06:21

## 2024-03-24 RX ADMIN — Medication 100 MILLIGRAM(S): at 21:41

## 2024-03-24 NOTE — PROGRESS NOTE ADULT - SUBJECTIVE AND OBJECTIVE BOX
Heart Failure Progress Note    Interval History:  NAEO  Charted as net positive 387 cc  Standing weight charted as 74.3 kg  Tele: V-pacing 80-90s, unclear underlying atrial rhythm  ECG reviewed: Atrial flutter with variable block and demand pacing  Cr stable elevated this morning    Medications:  acetaminophen     Tablet .. 650 milliGRAM(s) Oral every 6 hours PRN  albuterol/ipratropium for Nebulization 3 milliLiter(s) Nebulizer every 6 hours  allopurinol 100 milliGRAM(s) Oral daily  apixaban 5 milliGRAM(s) Oral every 12 hours  atorvastatin 80 milliGRAM(s) Oral at bedtime  budesonide 160 MICROgram(s)/formoterol 4.5 MICROgram(s) Inhaler 2 Puff(s) Inhalation two times a day  buMETAnide Infusion 2 mG/Hr IV Continuous <Continuous>  carvedilol 6.25 milliGRAM(s) Oral every 12 hours  clopidogrel Tablet 75 milliGRAM(s) Oral daily  dextrose 5%. 1000 milliLiter(s) IV Continuous <Continuous>  dextrose 5%. 1000 milliLiter(s) IV Continuous <Continuous>  dextrose 50% Injectable 25 Gram(s) IV Push once  dextrose 50% Injectable 12.5 Gram(s) IV Push once  dextrose 50% Injectable 25 Gram(s) IV Push once  dextrose Oral Gel 15 Gram(s) Oral once PRN  famotidine    Tablet 20 milliGRAM(s) Oral daily  ferrous    sulfate 325 milliGRAM(s) Oral daily  gabapentin 100 milliGRAM(s) Oral daily  glucagon  Injectable 1 milliGRAM(s) IntraMuscular once  hydrALAZINE 100 milliGRAM(s) Oral three times a day  influenza  Vaccine (HIGH DOSE) 0.7 milliLiter(s) IntraMuscular once  insulin lispro (ADMELOG) corrective regimen sliding scale   SubCutaneous three times a day before meals  insulin lispro (ADMELOG) corrective regimen sliding scale   SubCutaneous at bedtime  isosorbide   dinitrate Tablet (ISORDIL) 30 milliGRAM(s) Oral three times a day  levothyroxine 50 MICROGram(s) Oral daily  polyethylene glycol 3350 17 Gram(s) Oral daily PRN  senna 2 Tablet(s) Oral at bedtime  tamsulosin 0.4 milliGRAM(s) Oral at bedtime    Vitals:  T(C): 36.3 (24 @ 06:15), Max: 36.9 (24 @ 21:45)  HR: 87 (24 @ 07:15) (76 - 94)  BP: 102/64 (24 @ 06:15) (102/64 - 111/64)  BP(mean): --  RR: 18 (24 @ 06:15) (17 - 18)  SpO2: 98% (24 @ 07:15) (98% - 100%)    Daily     Daily Weight in k.3 (24 Mar 2024 06:15)      I&O's Summary    23 Mar 2024 07:  -  24 Mar 2024 07:00  --------------------------------------------------------  IN: 1552 mL / OUT: 1165 mL / NET: 387 mL    24 Mar 2024 07:  -  24 Mar 2024 11:47  --------------------------------------------------------  IN: 236 mL / OUT: 0 mL / NET: 236 mL    Physical Exam:  Appearance: No Acute Distress  Neck: +JVD  Cardiovascular: Normal S1 S2  Respiratory: Crackles at bilateral lung bases and wheezing, on 2L NC  Gastrointestinal: Soft, Non-tender	  Skin: No cyanosis	  Neurologic: Non-focal  Extremities: No BLE edema  Psychiatry: A & O x 3    Labs:                        12.0   9.99  )-----------( 137      ( 24 Mar 2024 05:47 )             39.2         142  |  103  |  66<H>  ----------------------------<  112<H>  4.1   |  25  |  2.57<H>    Ca    9.0      24 Mar 2024 05:47  Phos  3.8       Mg     1.90

## 2024-03-24 NOTE — PROGRESS NOTE ADULT - SUBJECTIVE AND OBJECTIVE BOX
Patient is a 68y old  Male who presents with a chief complaint of ADHF (23 Mar 2024 11:07)      SUBJECTIVE / OVERNIGHT EVENTS:    No events overnight. This AM, RUBEN CARLOS feels well. Has no n/v/d/cp/sob.      MEDICATIONS  (STANDING):  albuterol/ipratropium for Nebulization 3 milliLiter(s) Nebulizer every 6 hours  allopurinol 100 milliGRAM(s) Oral daily  apixaban 5 milliGRAM(s) Oral every 12 hours  atorvastatin 80 milliGRAM(s) Oral at bedtime  budesonide 160 MICROgram(s)/formoterol 4.5 MICROgram(s) Inhaler 2 Puff(s) Inhalation two times a day  buMETAnide Infusion 2 mG/Hr (10 mL/Hr) IV Continuous <Continuous>  carvedilol 6.25 milliGRAM(s) Oral every 12 hours  clopidogrel Tablet 75 milliGRAM(s) Oral daily  dextrose 5%. 1000 milliLiter(s) (100 mL/Hr) IV Continuous <Continuous>  dextrose 5%. 1000 milliLiter(s) (50 mL/Hr) IV Continuous <Continuous>  dextrose 50% Injectable 25 Gram(s) IV Push once  dextrose 50% Injectable 12.5 Gram(s) IV Push once  dextrose 50% Injectable 25 Gram(s) IV Push once  famotidine    Tablet 20 milliGRAM(s) Oral daily  ferrous    sulfate 325 milliGRAM(s) Oral daily  gabapentin 100 milliGRAM(s) Oral daily  glucagon  Injectable 1 milliGRAM(s) IntraMuscular once  hydrALAZINE 100 milliGRAM(s) Oral three times a day  influenza  Vaccine (HIGH DOSE) 0.7 milliLiter(s) IntraMuscular once  insulin lispro (ADMELOG) corrective regimen sliding scale   SubCutaneous three times a day before meals  insulin lispro (ADMELOG) corrective regimen sliding scale   SubCutaneous at bedtime  isosorbide   dinitrate Tablet (ISORDIL) 30 milliGRAM(s) Oral three times a day  levothyroxine 50 MICROGram(s) Oral daily  senna 2 Tablet(s) Oral at bedtime  tamsulosin 0.4 milliGRAM(s) Oral at bedtime    MEDICATIONS  (PRN):  acetaminophen     Tablet .. 650 milliGRAM(s) Oral every 6 hours PRN Temp greater or equal to 38C (100.4F), Mild Pain (1 - 3)  dextrose Oral Gel 15 Gram(s) Oral once PRN Blood Glucose LESS THAN 70 milliGRAM(s)/deciliter  polyethylene glycol 3350 17 Gram(s) Oral daily PRN Constipation      PHYSICAL EXAM:  T(C): 36.3 (03-24-24 @ 06:15), Max: 36.9 (03-23-24 @ 11:19)  HR: 87 (03-24-24 @ 07:15) (76 - 94)  BP: 102/64 (03-24-24 @ 06:15) (102/64 - 111/64)  RR: 18 (03-24-24 @ 06:15) (17 - 18)  SpO2: 98% (03-24-24 @ 07:15) (96% - 100%)  I&O's Summary    23 Mar 2024 07:01  -  24 Mar 2024 07:00  --------------------------------------------------------  IN: 1552 mL / OUT: 1165 mL / NET: 387 mL      GENERAL: NAD, lying in bed  HEAD:  Atraumatic, Normocephalic, MMM  CHEST/LUNG: No use of accessory muscles, CTAB, breathing non-labored  COR: RR, no mrcg  ABD: Soft, ND/NT, +BS  PSYCH: AAOx3  NEUROLOGY: CN II-XII grossly intact, moving all extremities  SKIN: No rashes or lesions  EXT: wwp, no cce    LABS:  CAPILLARY BLOOD GLUCOSE      POCT Blood Glucose.: 119 mg/dL (24 Mar 2024 07:28)  POCT Blood Glucose.: 189 mg/dL (23 Mar 2024 21:49)  POCT Blood Glucose.: 190 mg/dL (23 Mar 2024 16:56)  POCT Blood Glucose.: 206 mg/dL (23 Mar 2024 11:25)                          12.0   9.99  )-----------( 137      ( 24 Mar 2024 05:47 )             39.2     03-24    142  |  103  |  66<H>  ----------------------------<  112<H>  4.1   |  25  |  2.57<H>    Ca    9.0      24 Mar 2024 05:47  Phos  3.8     03-24  Mg     1.90     03-24            Urinalysis Basic - ( 24 Mar 2024 05:47 )    Color: x / Appearance: x / SG: x / pH: x  Gluc: 112 mg/dL / Ketone: x  / Bili: x / Urobili: x   Blood: x / Protein: x / Nitrite: x   Leuk Esterase: x / RBC: x / WBC x   Sq Epi: x / Non Sq Epi: x / Bacteria: x          RADIOLOGY & ADDITIONAL TESTS:    Telemetry Personally Reviewed -     Imaging Personally Reviewed -     Imaging Reviewed -     Consultant(s) Notes Reviewed -       Care Discussed with Consultants/Other Providers -

## 2024-03-24 NOTE — PROGRESS NOTE ADULT - ASSESSMENT
69 y/o Turkish male with PMHX of HTN, HFrEF, CAD w/ CABG 2014 at [facility], CRT-D, COPD (100 pack year history), CVA presents with dyspnea and difficulty breathing, found to have AECOPD and ADHF.

## 2024-03-24 NOTE — PROGRESS NOTE ADULT - PROBLEM SELECTOR PLAN 1
Strict I/O   Daily standing weights  Monitor lytes replete K>4.0 and Mg>2.0  Trend SCr  C/w Bumex gtt at 2 mg/h  C/w Coreg 6.25 mg BID (hold  SBP <90)   C/w hydralazine 100 mg TID (hold  SBP <90)   C/w Isordil 30 mg TID (hold  SBP <90)  Appreciate Nephrology recommendations: 3% hypertonic saline 150 mL over 30 minutes once daily if not meeting diuresis goal of at least 1L negative

## 2024-03-24 NOTE — PROGRESS NOTE ADULT - PROBLEM SELECTOR PLAN 1
-possible stemming from new-onset afib/flutter first noted March 8th as well as recent URI that maybe contributory  -needed bipap in the ED, no on NC  -tele, tte  -c/w IV bumex, HTS x1  -cardiology consulted, recs appreciated; f/u further recs  -CXR with retrocardiac opacity, procal mildly elevated, s/p CAP tx  -renal team consulted, Torres placed for management of urinary retention, undergoing TOV, continuing to trend sCr

## 2024-03-24 NOTE — PROGRESS NOTE ADULT - ASSESSMENT
67 y/o [place] male with h/o HTN, HFrEF/ICM (EF 25%, LVIDD 5.6cm), CAD w/ CABG 2014 at [facility], CRT-D, asthma/COPD (on 2 L O2 at home), CVA with left sided weakness, CKD (b/l Cr 2.9) comes in on 3/16 with complaints of chest pressure and SOB that started 4-5 days prior. Had recent URI. Adherent to medications. Walks with rollator walker for less than 2 block. Also admits to not urinating well to home dose of diuretics. Found to be in aflutter (new). Started on bumex with poor output with subsequent bumex gtt and 3% saline with improvement. Feels improved. On exam, JVP elevated, irreg irreg, CTAB, nontender abdomen, trace edema b/l. Labs show proBNP 14K, trop 93, lactate 1.5 (peaked 2.8), BUN/Cr 74/2.67 (improving; near baseline).

## 2024-03-25 LAB
ANION GAP SERPL CALC-SCNC: 15 MMOL/L — HIGH (ref 7–14)
BASOPHILS # BLD AUTO: 0.04 K/UL — SIGNIFICANT CHANGE UP (ref 0–0.2)
BASOPHILS NFR BLD AUTO: 0.4 % — SIGNIFICANT CHANGE UP (ref 0–2)
BUN SERPL-MCNC: 67 MG/DL — HIGH (ref 7–23)
CALCIUM SERPL-MCNC: 9 MG/DL — SIGNIFICANT CHANGE UP (ref 8.4–10.5)
CHLORIDE SERPL-SCNC: 100 MMOL/L — SIGNIFICANT CHANGE UP (ref 98–107)
CO2 SERPL-SCNC: 25 MMOL/L — SIGNIFICANT CHANGE UP (ref 22–31)
CREAT SERPL-MCNC: 2.41 MG/DL — HIGH (ref 0.5–1.3)
EGFR: 28 ML/MIN/1.73M2 — LOW
EOSINOPHIL # BLD AUTO: 0.29 K/UL — SIGNIFICANT CHANGE UP (ref 0–0.5)
EOSINOPHIL NFR BLD AUTO: 2.8 % — SIGNIFICANT CHANGE UP (ref 0–6)
GLUCOSE BLDC GLUCOMTR-MCNC: 121 MG/DL — HIGH (ref 70–99)
GLUCOSE BLDC GLUCOMTR-MCNC: 128 MG/DL — HIGH (ref 70–99)
GLUCOSE BLDC GLUCOMTR-MCNC: 161 MG/DL — HIGH (ref 70–99)
GLUCOSE BLDC GLUCOMTR-MCNC: 172 MG/DL — HIGH (ref 70–99)
GLUCOSE SERPL-MCNC: 113 MG/DL — HIGH (ref 70–99)
HCT VFR BLD CALC: 38.3 % — LOW (ref 39–50)
HGB BLD-MCNC: 12 G/DL — LOW (ref 13–17)
IANC: 8.01 K/UL — HIGH (ref 1.8–7.4)
IMM GRANULOCYTES NFR BLD AUTO: 0.4 % — SIGNIFICANT CHANGE UP (ref 0–0.9)
LYMPHOCYTES # BLD AUTO: 1.2 K/UL — SIGNIFICANT CHANGE UP (ref 1–3.3)
LYMPHOCYTES # BLD AUTO: 11.5 % — LOW (ref 13–44)
MAGNESIUM SERPL-MCNC: 1.8 MG/DL — SIGNIFICANT CHANGE UP (ref 1.6–2.6)
MCHC RBC-ENTMCNC: 30.4 PG — SIGNIFICANT CHANGE UP (ref 27–34)
MCHC RBC-ENTMCNC: 31.3 GM/DL — LOW (ref 32–36)
MCV RBC AUTO: 97 FL — SIGNIFICANT CHANGE UP (ref 80–100)
MONOCYTES # BLD AUTO: 0.88 K/UL — SIGNIFICANT CHANGE UP (ref 0–0.9)
MONOCYTES NFR BLD AUTO: 8.4 % — SIGNIFICANT CHANGE UP (ref 2–14)
NEUTROPHILS # BLD AUTO: 8.01 K/UL — HIGH (ref 1.8–7.4)
NEUTROPHILS NFR BLD AUTO: 76.5 % — SIGNIFICANT CHANGE UP (ref 43–77)
NRBC # BLD: 0 /100 WBCS — SIGNIFICANT CHANGE UP (ref 0–0)
NRBC # FLD: 0 K/UL — SIGNIFICANT CHANGE UP (ref 0–0)
PHOSPHATE SERPL-MCNC: 2.9 MG/DL — SIGNIFICANT CHANGE UP (ref 2.5–4.5)
PLATELET # BLD AUTO: 138 K/UL — LOW (ref 150–400)
POTASSIUM SERPL-MCNC: 3.8 MMOL/L — SIGNIFICANT CHANGE UP (ref 3.5–5.3)
POTASSIUM SERPL-SCNC: 3.8 MMOL/L — SIGNIFICANT CHANGE UP (ref 3.5–5.3)
RBC # BLD: 3.95 M/UL — LOW (ref 4.2–5.8)
RBC # FLD: 16.8 % — HIGH (ref 10.3–14.5)
SODIUM SERPL-SCNC: 140 MMOL/L — SIGNIFICANT CHANGE UP (ref 135–145)
WBC # BLD: 10.46 K/UL — SIGNIFICANT CHANGE UP (ref 3.8–10.5)
WBC # FLD AUTO: 10.46 K/UL — SIGNIFICANT CHANGE UP (ref 3.8–10.5)

## 2024-03-25 PROCEDURE — 99232 SBSQ HOSP IP/OBS MODERATE 35: CPT

## 2024-03-25 PROCEDURE — 99231 SBSQ HOSP IP/OBS SF/LOW 25: CPT | Mod: FS

## 2024-03-25 RX ORDER — BUMETANIDE 0.25 MG/ML
4 INJECTION INTRAMUSCULAR; INTRAVENOUS
Refills: 0 | Status: DISCONTINUED | OUTPATIENT
Start: 2024-03-25 | End: 2024-03-25

## 2024-03-25 RX ORDER — BUMETANIDE 0.25 MG/ML
4 INJECTION INTRAMUSCULAR; INTRAVENOUS
Refills: 0 | Status: DISCONTINUED | OUTPATIENT
Start: 2024-03-25 | End: 2024-03-30

## 2024-03-25 RX ADMIN — BUDESONIDE AND FORMOTEROL FUMARATE DIHYDRATE 2 PUFF(S): 160; 4.5 AEROSOL RESPIRATORY (INHALATION) at 08:58

## 2024-03-25 RX ADMIN — Medication 325 MILLIGRAM(S): at 11:29

## 2024-03-25 RX ADMIN — GABAPENTIN 100 MILLIGRAM(S): 400 CAPSULE ORAL at 11:28

## 2024-03-25 RX ADMIN — BUMETANIDE 10 MG/HR: 0.25 INJECTION INTRAMUSCULAR; INTRAVENOUS at 04:12

## 2024-03-25 RX ADMIN — FAMOTIDINE 20 MILLIGRAM(S): 10 INJECTION INTRAVENOUS at 11:28

## 2024-03-25 RX ADMIN — Medication 3 MILLILITER(S): at 14:02

## 2024-03-25 RX ADMIN — ATORVASTATIN CALCIUM 80 MILLIGRAM(S): 80 TABLET, FILM COATED ORAL at 21:13

## 2024-03-25 RX ADMIN — Medication 50 MICROGRAM(S): at 05:11

## 2024-03-25 RX ADMIN — Medication 3 MILLILITER(S): at 21:13

## 2024-03-25 RX ADMIN — Medication 100 MILLIGRAM(S): at 21:14

## 2024-03-25 RX ADMIN — ISOSORBIDE DINITRATE 30 MILLIGRAM(S): 5 TABLET ORAL at 06:16

## 2024-03-25 RX ADMIN — TAMSULOSIN HYDROCHLORIDE 0.4 MILLIGRAM(S): 0.4 CAPSULE ORAL at 21:14

## 2024-03-25 RX ADMIN — ISOSORBIDE DINITRATE 30 MILLIGRAM(S): 5 TABLET ORAL at 11:29

## 2024-03-25 RX ADMIN — Medication 100 MILLIGRAM(S): at 13:21

## 2024-03-25 RX ADMIN — Medication 3 MILLILITER(S): at 03:35

## 2024-03-25 RX ADMIN — SENNA PLUS 2 TABLET(S): 8.6 TABLET ORAL at 21:13

## 2024-03-25 RX ADMIN — CARVEDILOL PHOSPHATE 6.25 MILLIGRAM(S): 80 CAPSULE, EXTENDED RELEASE ORAL at 18:22

## 2024-03-25 RX ADMIN — Medication 1: at 17:27

## 2024-03-25 RX ADMIN — Medication 100 MILLIGRAM(S): at 11:28

## 2024-03-25 RX ADMIN — Medication 3 MILLILITER(S): at 07:57

## 2024-03-25 RX ADMIN — APIXABAN 5 MILLIGRAM(S): 2.5 TABLET, FILM COATED ORAL at 06:12

## 2024-03-25 RX ADMIN — BUDESONIDE AND FORMOTEROL FUMARATE DIHYDRATE 2 PUFF(S): 160; 4.5 AEROSOL RESPIRATORY (INHALATION) at 21:15

## 2024-03-25 RX ADMIN — CARVEDILOL PHOSPHATE 6.25 MILLIGRAM(S): 80 CAPSULE, EXTENDED RELEASE ORAL at 04:13

## 2024-03-25 RX ADMIN — ISOSORBIDE DINITRATE 30 MILLIGRAM(S): 5 TABLET ORAL at 17:28

## 2024-03-25 RX ADMIN — Medication 100 MILLIGRAM(S): at 06:13

## 2024-03-25 RX ADMIN — CLOPIDOGREL BISULFATE 75 MILLIGRAM(S): 75 TABLET, FILM COATED ORAL at 11:28

## 2024-03-25 RX ADMIN — BUMETANIDE 10 MG/HR: 0.25 INJECTION INTRAMUSCULAR; INTRAVENOUS at 13:15

## 2024-03-25 NOTE — PROGRESS NOTE ADULT - NS ATTEND AMEND GEN_ALL_CORE FT
68yrs, HTN, CAD, s/p CABG 2014  LVEF 25, LVEDD 5.6, s/p CRTD.   COPD on home O2 2l.   s/p CVA left sided weakness.   CKD baseline Cr 3.0  Admitted 3.16: SOB. Found to be in new AFib and ADHF.   bumex infusion, hypertonic NaCL, diuril.   No standing weights.   awaiting DCCV  home meds: eloquis 5 bid, asa, bumex 4 bid, plavix, coreg 6.25 bid, statin, HDNZ 75 tid. synthrioid 50, ald 12.5   current meds: bumex 2/hr, no hypertonic since 3.22, elqouis, plavix, HDZN 100 tid, coreg 6.25 bid, ISDN 30 tid, synthroid.   HR 80-90afib, 91/-124/ 97% 2L  TTE 3.18: LVEF 20-25, LVEDD 6.2, PASP 49, RV normal. mild MR.   03-25    140  |  100  |  67<H>  ----------------------------<  113<H>  3.8   |  25  |  2.41<H>    Ca    9.0      25 Mar 2024 04:40  Phos  2.9     03-25  Mg     1.80     03-25                        12.0   10.46 )-----------( 138      ( 25 Mar 2024 04:40 )             38.3 68yrs, HTN, CAD, s/p CABG 2014  LVEF 25, LVEDD 5.6, s/p CRTD.   COPD on home O2 2l.   s/p CVA left sided weakness.   CKD baseline Cr 3.0  Admitted 3.16: SOB. Found to be in new AFib and ADHF.   bumex infusion, hypertonic NaCL, diuril.   No standing weights.   awaiting DCCV  home meds: eloquis 5 bid, asa, bumex 4 bid, plavix, coreg 6.25 bid, statin, HDNZ 75 tid. synthrioid 50, ald 12.5   current meds: bumex 2/hr, no hypertonic since 3.22, elqouis, plavix, HDZN 100 tid, coreg 6.25 bid, ISDN 30 tid, synthroid.   HR 80-90afib, 91/-124/ 97% 2L  TTE 3.18: LVEF 20-25, LVEDD 6.2, PASP 49, RV normal. mild MR.   03-25    140  |  100  |  67<H>  ----------------------------<  113<H>  3.8   |  25  |  2.41<H>    Ca    9.0      25 Mar 2024 04:40  Phos  2.9     03-25  Mg     1.80     03-25                        12.0   10.46 )-----------( 138      ( 25 Mar 2024 04:40 )             38.3  Patient appears euvolemic.   Based upon the degree of volume overload and doses of diuretics required- patient would benefit from MEMS if he and family are agreeable.   This should be peformed prior to DCCV.   Suggest:  d/c IV diuretics   start bumex 4 bid  PLEASE STAND PATIENT FOR DAILY WEIGHTS   Fox Smallwood

## 2024-03-25 NOTE — DIETITIAN INITIAL EVALUATION ADULT - NS FNS DIET ORDER
Diet, DASH/TLC:   Sodium & Cholesterol Restricted  Consistent Carbohydrate {Evening Snack} (CSTCHOSN)  1200mL Fluid Restriction ([id number])  Halal  Lacto-Ovo Veg (Accepts Milk Prod., Eggs) (03-23-24 @ 12:31) [Active]

## 2024-03-25 NOTE — PROGRESS NOTE ADULT - PROBLEM SELECTOR PLAN 1
He is euvolemic and normotensive.   I discussed the CardioMEMS device implant with patient and his daughers (HCP) Melchor, who are in agreement to have it done tomorrow 3/26 with Dr. Bakari Sandoval.    Discontinue Bumex gtt. Start Bumex 4 mg po BID.   Continue Coreg 6.25 mg BID (hold  SBP <90)   Continue hydralazine 100 mg TID (hold  SBP <90)   Continue Isordil 30 mg TID (hold  SBP <90)  Strict I/O   Daily standing weights  Monitor lytes replete K 4.0-5.0 and Mg>2.0  Appreciate EP recommendations (BRANDIE/DCCV vs. ablation)- Informed EP to wait till AFTER CardioMEMS is implanted to proceed.   Hold Eliquis tonight and tomorrow morning for RHC w/ CardioMEMS implant with Dr. Sandoval.

## 2024-03-25 NOTE — PROGRESS NOTE ADULT - PROBLEM SELECTOR PLAN 1
Appreciate HF consult and recommendations, now appearing euvolemic  - HF recommending PO bumix 4 BID (transitioned from IV)  - c/w standing daily weights  -CXR with retrocardiac opacity, procal mildly elevated, s/p CAP tx  -renal team consulted, Torres placed for management of urinary retention, undergoing TOV, continuing to trend sCr

## 2024-03-25 NOTE — DIETITIAN INITIAL EVALUATION ADULT - PROBLEM/PLAN-5
[FreeTextEntry1] : Physical exam shows an obese white female in no acute distress blood pressure 136/84 height 5 foot 5 inches weight 284 pounds BMI 47.26 temperature of 98.1 °F heart rate of 88 respirations at 16 HEENT was unremarkable chest was clear cardiovascular exam was regular with no extra heart sounds or murmurs abdomen was soft and nontender extremities showed no clubbing or cyanosis there was trace bilateral edema neurologic exam was nonfocal patient was given a slip for hemoglobin A1c CBC comprehensive metabolic profile glucose and free T4 she is up-to-date with her ophthalmologist she will consider a dermatology appointment for cancer screening her last mammogram was in November 2019 accompanied by her breast ultrasound realizes that she needs to schedule those as well as see her gynecologist Long discussion with the patient concerning her weight and obesity and the medical ramifications she was advised to consider bariatric surgery alternates being weight watchers or dietitian she needs to reduce carbohydrates reduce portions try to walk as much as she can and make proper selections of foods blood pressure was well controlled at the present visit patient does use her CPAP machine at home due to her obesity and previous pulmonary emboli she has been left on anticoagulation and gets an INR monthly DISPLAY PLAN FREE TEXT

## 2024-03-25 NOTE — DIETITIAN INITIAL EVALUATION ADULT - PROBLEM SELECTOR PLAN 1
-possible stemming from new-onset afib /flutter first noted March 8th as well as recent URI that maybe contributory  -c/w bipap, ween off as tolerated  -tele, tte  -IV bumex 4mg bid, Is/Os,  -would obtain formal EP eval  -given prelim retrocardiac opacity, follow procal and official cxr; hold abx for now

## 2024-03-25 NOTE — DIETITIAN INITIAL EVALUATION ADULT - PROBLEM SELECTOR PLAN 5
as pt did not mention starting eliquis last week, will need to email pharmacy to ensure current and accurate med regimen reflected   confirm coreg strength; 12.5 in July vs 6.25 on recent outpt list

## 2024-03-25 NOTE — PROGRESS NOTE ADULT - SUBJECTIVE AND OBJECTIVE BOX
Patient is a 68y old  Male who presents with a chief complaint of Heart failure (25 Mar 2024 13:49)    SUBJECTIVE / OVERNIGHT EVENTS: No acute events. Denies chest pain, shortness of breath or other discomfort.     MEDICATIONS  (STANDING):  albuterol/ipratropium for Nebulization 3 milliLiter(s) Nebulizer every 6 hours  allopurinol 100 milliGRAM(s) Oral daily  apixaban 5 milliGRAM(s) Oral every 12 hours  atorvastatin 80 milliGRAM(s) Oral at bedtime  budesonide 160 MICROgram(s)/formoterol 4.5 MICROgram(s) Inhaler 2 Puff(s) Inhalation two times a day  buMETAnide Infusion 2 mG/Hr (10 mL/Hr) IV Continuous <Continuous>  carvedilol 6.25 milliGRAM(s) Oral every 12 hours  clopidogrel Tablet 75 milliGRAM(s) Oral daily  dextrose 5%. 1000 milliLiter(s) (100 mL/Hr) IV Continuous <Continuous>  dextrose 5%. 1000 milliLiter(s) (50 mL/Hr) IV Continuous <Continuous>  dextrose 50% Injectable 25 Gram(s) IV Push once  dextrose 50% Injectable 12.5 Gram(s) IV Push once  dextrose 50% Injectable 25 Gram(s) IV Push once  famotidine    Tablet 20 milliGRAM(s) Oral daily  ferrous    sulfate 325 milliGRAM(s) Oral daily  gabapentin 100 milliGRAM(s) Oral daily  glucagon  Injectable 1 milliGRAM(s) IntraMuscular once  hydrALAZINE 100 milliGRAM(s) Oral three times a day  insulin lispro (ADMELOG) corrective regimen sliding scale   SubCutaneous three times a day before meals  insulin lispro (ADMELOG) corrective regimen sliding scale   SubCutaneous at bedtime  isosorbide   dinitrate Tablet (ISORDIL) 30 milliGRAM(s) Oral three times a day  levothyroxine 50 MICROGram(s) Oral daily  senna 2 Tablet(s) Oral at bedtime  tamsulosin 0.4 milliGRAM(s) Oral at bedtime    MEDICATIONS  (PRN):  acetaminophen     Tablet .. 650 milliGRAM(s) Oral every 6 hours PRN Temp greater or equal to 38C (100.4F), Mild Pain (1 - 3)  dextrose Oral Gel 15 Gram(s) Oral once PRN Blood Glucose LESS THAN 70 milliGRAM(s)/deciliter  polyethylene glycol 3350 17 Gram(s) Oral daily PRN Constipation      CAPILLARY BLOOD GLUCOSE      POCT Blood Glucose.: 121 mg/dL (25 Mar 2024 08:08)  POCT Blood Glucose.: 128 mg/dL (25 Mar 2024 04:05)  POCT Blood Glucose.: 209 mg/dL (24 Mar 2024 22:23)  POCT Blood Glucose.: 158 mg/dL (24 Mar 2024 17:04)    I&O's Summary    24 Mar 2024 07:01  -  25 Mar 2024 07:00  --------------------------------------------------------  IN: 892 mL / OUT: 1240 mL / NET: -348 mL    PHYSICAL EXAM:  Vital Signs Last 24 Hrs  T(C): 36.8 (25 Mar 2024 13:15), Max: 37.1 (25 Mar 2024 11:10)  T(F): 98.2 (25 Mar 2024 13:15), Max: 98.7 (25 Mar 2024 11:10)  HR: 81 (25 Mar 2024 14:05) (79 - 92)  BP: 100/64 (25 Mar 2024 13:15) (91/53 - 124/70)  BP(mean): --  RR: 18 (25 Mar 2024 13:15) (18 - 18)  SpO2: 98% (25 Mar 2024 14:05) (97% - 100%)    Parameters below as of 25 Mar 2024 14:05  Patient On (Oxygen Delivery Method): nasal cannula    CONSTITUTIONAL: NAD, laying in bed  ENMT: Moist oral mucosa  RESPIRATORY: Normal respiratory effort; lungs are clear to auscultation bilaterally  CARDIOVASCULAR: Regular rate and rhythm, normal S1 and S2, No lower extremity edema  ABDOMEN: Nontender to palpation, normoactive bowel sounds, no rebound/guarding  PSYCH: calm    LABS:                        12.0   10.46 )-----------( 138      ( 25 Mar 2024 04:40 )             38.3     03-25    140  |  100  |  67<H>  ----------------------------<  113<H>  3.8   |  25  |  2.41<H>    Ca    9.0      25 Mar 2024 04:40  Phos  2.9     03-25  Mg     1.80     03-25    Urinalysis Basic - ( 25 Mar 2024 04:40 )    Color: x / Appearance: x / SG: x / pH: x  Gluc: 113 mg/dL / Ketone: x  / Bili: x / Urobili: x   Blood: x / Protein: x / Nitrite: x   Leuk Esterase: x / RBC: x / WBC x   Sq Epi: x / Non Sq Epi: x / Bacteria: x    RADIOLOGY & ADDITIONAL TESTS: Reviewed    COORDINATION OF CARE:  Care Discussed with Consultants/Other Providers [Y- Medicine ACP]

## 2024-03-25 NOTE — PROGRESS NOTE ADULT - ASSESSMENT
67 y/o German male with PMHX of HTN, HFrEF, CAD w/ CABG 2014 at [facility], CRT-D, COPD (100 pack year history), CVA presents with dyspnea and difficulty breathing, found to have AECOPD and ADHF.

## 2024-03-25 NOTE — DIETITIAN INITIAL EVALUATION ADULT - PERTINENT MEDS FT
MEDICATIONS  (STANDING):  albuterol/ipratropium for Nebulization 3 milliLiter(s) Nebulizer every 6 hours  allopurinol 100 milliGRAM(s) Oral daily  apixaban 5 milliGRAM(s) Oral every 12 hours  atorvastatin 80 milliGRAM(s) Oral at bedtime  budesonide 160 MICROgram(s)/formoterol 4.5 MICROgram(s) Inhaler 2 Puff(s) Inhalation two times a day  buMETAnide Infusion 2 mG/Hr (10 mL/Hr) IV Continuous <Continuous>  carvedilol 6.25 milliGRAM(s) Oral every 12 hours  clopidogrel Tablet 75 milliGRAM(s) Oral daily  dextrose 5%. 1000 milliLiter(s) (100 mL/Hr) IV Continuous <Continuous>  dextrose 5%. 1000 milliLiter(s) (50 mL/Hr) IV Continuous <Continuous>  dextrose 50% Injectable 25 Gram(s) IV Push once  dextrose 50% Injectable 12.5 Gram(s) IV Push once  dextrose 50% Injectable 25 Gram(s) IV Push once  famotidine    Tablet 20 milliGRAM(s) Oral daily  ferrous    sulfate 325 milliGRAM(s) Oral daily  gabapentin 100 milliGRAM(s) Oral daily  glucagon  Injectable 1 milliGRAM(s) IntraMuscular once  hydrALAZINE 100 milliGRAM(s) Oral three times a day  insulin lispro (ADMELOG) corrective regimen sliding scale   SubCutaneous three times a day before meals  insulin lispro (ADMELOG) corrective regimen sliding scale   SubCutaneous at bedtime  isosorbide   dinitrate Tablet (ISORDIL) 30 milliGRAM(s) Oral three times a day  levothyroxine 50 MICROGram(s) Oral daily  senna 2 Tablet(s) Oral at bedtime  tamsulosin 0.4 milliGRAM(s) Oral at bedtime    MEDICATIONS  (PRN):  acetaminophen     Tablet .. 650 milliGRAM(s) Oral every 6 hours PRN Temp greater or equal to 38C (100.4F), Mild Pain (1 - 3)  dextrose Oral Gel 15 Gram(s) Oral once PRN Blood Glucose LESS THAN 70 milliGRAM(s)/deciliter  polyethylene glycol 3350 17 Gram(s) Oral daily PRN Constipation

## 2024-03-25 NOTE — PROGRESS NOTE ADULT - SUBJECTIVE AND OBJECTIVE BOX
Medications:  acetaminophen     Tablet .. 650 milliGRAM(s) Oral every 6 hours PRN  albuterol/ipratropium for Nebulization 3 milliLiter(s) Nebulizer every 6 hours  allopurinol 100 milliGRAM(s) Oral daily  apixaban 5 milliGRAM(s) Oral every 12 hours  atorvastatin 80 milliGRAM(s) Oral at bedtime  budesonide 160 MICROgram(s)/formoterol 4.5 MICROgram(s) Inhaler 2 Puff(s) Inhalation two times a day  buMETAnide Infusion 2 mG/Hr IV Continuous <Continuous>  carvedilol 6.25 milliGRAM(s) Oral every 12 hours  clopidogrel Tablet 75 milliGRAM(s) Oral daily  dextrose 5%. 1000 milliLiter(s) IV Continuous <Continuous>  dextrose 5%. 1000 milliLiter(s) IV Continuous <Continuous>  dextrose 50% Injectable 25 Gram(s) IV Push once  dextrose 50% Injectable 12.5 Gram(s) IV Push once  dextrose 50% Injectable 25 Gram(s) IV Push once  dextrose Oral Gel 15 Gram(s) Oral once PRN  famotidine    Tablet 20 milliGRAM(s) Oral daily  ferrous    sulfate 325 milliGRAM(s) Oral daily  gabapentin 100 milliGRAM(s) Oral daily  glucagon  Injectable 1 milliGRAM(s) IntraMuscular once  hydrALAZINE 100 milliGRAM(s) Oral three times a day  insulin lispro (ADMELOG) corrective regimen sliding scale   SubCutaneous three times a day before meals  insulin lispro (ADMELOG) corrective regimen sliding scale   SubCutaneous at bedtime  isosorbide   dinitrate Tablet (ISORDIL) 30 milliGRAM(s) Oral three times a day  levothyroxine 50 MICROGram(s) Oral daily  polyethylene glycol 3350 17 Gram(s) Oral daily PRN  senna 2 Tablet(s) Oral at bedtime  tamsulosin 0.4 milliGRAM(s) Oral at bedtime      Vitals:  Vital Signs Last 24 Hrs  T(C): 36.5 (24 Mar 2024 21:28), Max: 36.7 (24 Mar 2024 17:00)  T(F): 97.7 (24 Mar 2024 21:28), Max: 98 (24 Mar 2024 17:00)  HR: 81 (25 Mar 2024 07:57) (79 - 92)  BP: 97/60 (25 Mar 2024 05:32) (91/53 - 124/70)  BP(mean): --  RR: 18 (24 Mar 2024 21:28) (18 - 18)  SpO2: 98% (25 Mar 2024 07:57) (97% - 100%)    Parameters below as of 25 Mar 2024 07:57  Patient On (Oxygen Delivery Method): nasal cannula        Daily     Daily Weight in k.5 (25 Mar 2024 05:32)    I&O's Detail    24 Mar 2024 07:01  -  25 Mar 2024 07:00  --------------------------------------------------------  IN:    Bumetanide: 120 mL    Oral Fluid: 772 mL  Total IN: 892 mL    OUT:    Intermittent Catheterization - Urethral (mL): 300 mL    IV PiggyBack: 0 mL    Voided (mL): 940 mL  Total OUT: 1240 mL    Total NET: -348 mL          Physical Exam:     General: No distress. Comfortable.  HEENT: EOM intact.  Neck: Neck supple. JVP not elevated. No masses  Chest: Clear to auscultation bilaterally  CV: Normal S1 and S2. No murmurs, rub, or gallops. Radial pulses normal.  Abdomen: Soft, non-distended, non-tender  Skin: No rashes or skin breakdown  Neurology: Alert and oriented times three. Sensation intact  Psych: Affect normal    Labs:                        12.0   10.46 )-----------( 138      ( 25 Mar 2024 04:40 )             38.3     03    140  |  100  |  67<H>  ----------------------------<  113<H>  3.8   |  25  |  2.41<H>    Ca    9.0      25 Mar 2024 04:40  Phos  2.9       Mg     1.80                  Patient seen and examined. He states he feels a lot better today. No SOB at rest, KINNEY, CP, palpitations, dizziness.       Medications:  acetaminophen     Tablet .. 650 milliGRAM(s) Oral every 6 hours PRN  albuterol/ipratropium for Nebulization 3 milliLiter(s) Nebulizer every 6 hours  allopurinol 100 milliGRAM(s) Oral daily  apixaban 5 milliGRAM(s) Oral every 12 hours  atorvastatin 80 milliGRAM(s) Oral at bedtime  budesonide 160 MICROgram(s)/formoterol 4.5 MICROgram(s) Inhaler 2 Puff(s) Inhalation two times a day  buMETAnide Infusion 2 mG/Hr IV Continuous <Continuous>  carvedilol 6.25 milliGRAM(s) Oral every 12 hours  clopidogrel Tablet 75 milliGRAM(s) Oral daily  dextrose 5%. 1000 milliLiter(s) IV Continuous <Continuous>  dextrose 5%. 1000 milliLiter(s) IV Continuous <Continuous>  dextrose 50% Injectable 25 Gram(s) IV Push once  dextrose 50% Injectable 12.5 Gram(s) IV Push once  dextrose 50% Injectable 25 Gram(s) IV Push once  dextrose Oral Gel 15 Gram(s) Oral once PRN  famotidine    Tablet 20 milliGRAM(s) Oral daily  ferrous    sulfate 325 milliGRAM(s) Oral daily  gabapentin 100 milliGRAM(s) Oral daily  glucagon  Injectable 1 milliGRAM(s) IntraMuscular once  hydrALAZINE 100 milliGRAM(s) Oral three times a day  insulin lispro (ADMELOG) corrective regimen sliding scale   SubCutaneous three times a day before meals  insulin lispro (ADMELOG) corrective regimen sliding scale   SubCutaneous at bedtime  isosorbide   dinitrate Tablet (ISORDIL) 30 milliGRAM(s) Oral three times a day  levothyroxine 50 MICROGram(s) Oral daily  polyethylene glycol 3350 17 Gram(s) Oral daily PRN  senna 2 Tablet(s) Oral at bedtime  tamsulosin 0.4 milliGRAM(s) Oral at bedtime      Vitals:  Vital Signs Last 24 Hrs  T(C): 36.5 (24 Mar 2024 21:28), Max: 36.7 (24 Mar 2024 17:00)  T(F): 97.7 (24 Mar 2024 21:28), Max: 98 (24 Mar 2024 17:00)  HR: 81 (25 Mar 2024 07:57) (79 - 92)  BP: 97/60 (25 Mar 2024 05:32) (91/53 - 124/70)  BP(mean): --  RR: 18 (24 Mar 2024 21:28) (18 - 18)  SpO2: 98% (25 Mar 2024 07:57) (97% - 100%)    Parameters below as of 25 Mar 2024 07:57  Patient On (Oxygen Delivery Method): nasal cannula        Daily     Daily Weight in k.5 (25 Mar 2024 05:32)    I&O's Detail    24 Mar 2024 07:01  -  25 Mar 2024 07:00  --------------------------------------------------------  IN:    Bumetanide: 120 mL    Oral Fluid: 772 mL  Total IN: 892 mL    OUT:    Intermittent Catheterization - Urethral (mL): 300 mL    IV PiggyBack: 0 mL    Voided (mL): 940 mL  Total OUT: 1240 mL    Total NET: -348 mL          Physical Exam:     General: No distress. Comfortable.  HEENT: EOM intact.  Neck: Neck supple. JVP normal. No masses  Chest: Clear to auscultation bilaterally  CV: irreg irreg No murmurs, rub, or gallops. Radial pulses normal.  No LE edema and is warm b/l.   Abdomen: Soft, non-distended, non-tender  Skin: No rashes or skin breakdown  Neurology: Alert and oriented times three. Sensation intact  Psych: Affect normal    Labs:                        12.0   10.46 )-----------( 138      ( 25 Mar 2024 04:40 )             38.3     03-25    140  |  100  |  67<H>  ----------------------------<  113<H>  3.8   |  25  |  2.41<H>    Ca    9.0      25 Mar 2024 04:40  Phos  2.9     03-25  Mg     1.80     03-25

## 2024-03-25 NOTE — PROGRESS NOTE ADULT - ASSESSMENT
67 y/o [place] male with PMHX of HTN, HFrEF (TTE 7/18/23 LVEF 25%, LVIDD 5.6cm, LA 4.9cm, mild MR), CAD w/ CABG 2014 at [facility], CRT-D, asthma/COPD on 2 L O2 at home, CVA with left sided weakness comes in with complaints of chest pressure and SOB. He admits to 2 pillow orthopnea, KINNEY that was worsening where he would get SOB after walking (with rollator walker) for less than 2 blocks which was going on for 4 days. Also admits to not urinating well to home dose of diuretics. Per record patient admits to URI 2 weeks ago, and was also found to be in a.fib and was started on Eliquis. He was found to be in acute on chronic systolic heart failure and started on IV Bumex 4 BID. HF service consulted to help manage care in this patient who is followed by Dr. Garo Dobson as an outpatient. On admission labs show proBNP 14K, trop 93, lactate 2.8 which has since improved to 1.5, BUN/Cr 63/2.94 (similar to last labs in July 2023 of 53/2.92). Overall stage C HF, NYHA class III- admitted with severe hypervolemia,  requiring Bumex gtt/hypertonic saline and diuril, now is euvolemic.

## 2024-03-25 NOTE — PROGRESS NOTE ADULT - SUBJECTIVE AND OBJECTIVE BOX
Patient is a 68y old  Male who presents with a chief complaint of ADHF (24 Mar 2024 11:43)  laying close to flat in bed  denies SOB.  Denies CP/palpitations.  Remains on IV Bumex gtt at 2mg/hr.      PAST MEDICAL & SURGICAL HISTORY:  HTN (hypertension)    CAD (coronary artery disease)    Diabetes    Hyperlipidemia, unspecified hyperlipidemia type    CHF (congestive heart failure)    BPH (benign prostatic hyperplasia)    S/P CABG (coronary artery bypass graft)    S/P appendectomy        MEDICATIONS  (STANDING):  albuterol/ipratropium for Nebulization 3 milliLiter(s) Nebulizer every 6 hours  allopurinol 100 milliGRAM(s) Oral daily  apixaban 5 milliGRAM(s) Oral every 12 hours  atorvastatin 80 milliGRAM(s) Oral at bedtime  budesonide 160 MICROgram(s)/formoterol 4.5 MICROgram(s) Inhaler 2 Puff(s) Inhalation two times a day  buMETAnide Infusion 2 mG/Hr (10 mL/Hr) IV Continuous <Continuous>  carvedilol 6.25 milliGRAM(s) Oral every 12 hours  clopidogrel Tablet 75 milliGRAM(s) Oral daily  dextrose 5%. 1000 milliLiter(s) (100 mL/Hr) IV Continuous <Continuous>  dextrose 5%. 1000 milliLiter(s) (50 mL/Hr) IV Continuous <Continuous>  dextrose 50% Injectable 25 Gram(s) IV Push once  dextrose 50% Injectable 12.5 Gram(s) IV Push once  dextrose 50% Injectable 25 Gram(s) IV Push once  famotidine    Tablet 20 milliGRAM(s) Oral daily  ferrous    sulfate 325 milliGRAM(s) Oral daily  gabapentin 100 milliGRAM(s) Oral daily  glucagon  Injectable 1 milliGRAM(s) IntraMuscular once  hydrALAZINE 100 milliGRAM(s) Oral three times a day  insulin lispro (ADMELOG) corrective regimen sliding scale   SubCutaneous three times a day before meals  insulin lispro (ADMELOG) corrective regimen sliding scale   SubCutaneous at bedtime  isosorbide   dinitrate Tablet (ISORDIL) 30 milliGRAM(s) Oral three times a day  levothyroxine 50 MICROGram(s) Oral daily  senna 2 Tablet(s) Oral at bedtime  tamsulosin 0.4 milliGRAM(s) Oral at bedtime    MEDICATIONS  (PRN):  acetaminophen     Tablet .. 650 milliGRAM(s) Oral every 6 hours PRN Temp greater or equal to 38C (100.4F), Mild Pain (1 - 3)  dextrose Oral Gel 15 Gram(s) Oral once PRN Blood Glucose LESS THAN 70 milliGRAM(s)/deciliter  polyethylene glycol 3350 17 Gram(s) Oral daily PRN Constipation            Vital Signs Last 24 Hrs  T(C): 36.5 (24 Mar 2024 21:28), Max: 36.7 (24 Mar 2024 17:00)  T(F): 97.7 (24 Mar 2024 21:28), Max: 98 (24 Mar 2024 17:00)  HR: 81 (25 Mar 2024 07:57) (79 - 92)  BP: 97/60 (25 Mar 2024 05:32) (91/53 - 124/70)  BP(mean): --  RR: 18 (24 Mar 2024 21:28) (18 - 18)  SpO2: 98% (25 Mar 2024 07:57) (97% - 100%)    Parameters below as of 25 Mar 2024 07:57  Patient On (Oxygen Delivery Method): nasal cannula                INTERPRETATION OF TELEMETRY: AFl with variable AVB and V pacing on demand, 8 beats NSVT overnight    ECG: AFL        LABS:                        12.0   10.46 )-----------( 138      ( 25 Mar 2024 04:40 )             38.3     03-25    140  |  100  |  67<H>  ----------------------------<  113<H>  3.8   |  25  |  2.41<H>    Ca    9.0      25 Mar 2024 04:40  Phos  2.9     03-25  Mg     1.80     03-25            Urinalysis Basic - ( 25 Mar 2024 04:40 )    Color: x / Appearance: x / SG: x / pH: x  Gluc: 113 mg/dL / Ketone: x  / Bili: x / Urobili: x   Blood: x / Protein: x / Nitrite: x   Leuk Esterase: x / RBC: x / WBC x   Sq Epi: x / Non Sq Epi: x / Bacteria: x        BNP  RADIOLOGY & ADDITIONAL STUDIES:  CONCLUSIONS:      1. Left ventricular systolic function is severely decreased with an ejection fraction visually estimated at 20 to 25 %. Global left ventricular hypokinesis.   2. Normal right ventricular cavity size, with normal wall thickness, and normal systolic function.   3. Device lead is visualized.   4. The left atrium is normal.   5. The right atrium is normal in size.   6. No significant valvular disease.   7. No pericardial effusion seen.   8. Estimated pulmonary artery systolic pressure is 49 mmHg.   9. The inferior vena cava is dilated measuring 2.35 cm in diameter, (dilated >2.1cm) with abnormal inspiratory collapse (abnormal <50%) consistent with elevated right atrialpressure (~15, range 10-20mmHg).        PHYSICAL EXAM:    GENERAL: In no apparent distress,   NECK: Supple and normal thyroid.  mild JVD, no carotid bruit.  Carotid pulse is 2+ bilaterally.  HEART: S1S2 irregularly irregular; No murmurs, rubs, or gallops.  L biv ICD  PULMONARY: timothy basilar rales, no wheezing, or rhonchi bilaterally.  ABDOMEN: Soft, Nontender, Nondistended; Bowel sounds present  EXTREMITIES:  2+ Peripheral Pulses, No clubbing, cyanosis, or edema  NEUROLOGICAL: alert oriented x4  speech clear no focal deficit

## 2024-03-25 NOTE — DIETITIAN INITIAL EVALUATION ADULT - OTHER INFO
67 y/o [place] male with PMH of HTN, HFrEF, CAD w/ CABG 2014 at [facility], CRT-D, COPD (100 pack year history), CVA presents with dyspnea and difficulty breathing, found to have AECOPD and ADHF.     Pt seen and reports 75% intake of meals  with No GI distress (nausea/vomiting/diarrhea/constipation.) at present. Pt noted with skin ecchymosis, +1 generalized edema per nursing flow sheet. A1c not available. Request updated A1c.  Previous A1c- 6.0% (7/18/24). Noted stabel [id number] per HIE.

## 2024-03-25 NOTE — PROGRESS NOTE ADULT - SUBJECTIVE AND OBJECTIVE BOX
[facility] DIVISION OF KIDNEY DISEASE AND HYPERTENSION  [phone]    RENAL FOLLOW UP NOTE  --------------------------------------------------------------------------------  Patient seen and examined with HF team at bedside as well.  bumex gtt not running    PAST HISTORY  --------------------------------------------------------------------------------  No significant changes to PMH, PSH, FHx, SHx, unless otherwise noted    ALLERGIES & MEDICATIONS  --------------------------------------------------------------------------------  Allergies    No Known Allergies    Intolerances      Standing Inpatient Medications  albuterol/ipratropium for Nebulization 3 milliLiter(s) Nebulizer every 6 hours  allopurinol 100 milliGRAM(s) Oral daily  apixaban 5 milliGRAM(s) Oral every 12 hours  atorvastatin 80 milliGRAM(s) Oral at bedtime  budesonide 160 MICROgram(s)/formoterol 4.5 MICROgram(s) Inhaler 2 Puff(s) Inhalation two times a day  buMETAnide Infusion 2 mG/Hr IV Continuous <Continuous>  carvedilol 6.25 milliGRAM(s) Oral every 12 hours  clopidogrel Tablet 75 milliGRAM(s) Oral daily  dextrose 5%. 1000 milliLiter(s) IV Continuous <Continuous>  dextrose 5%. 1000 milliLiter(s) IV Continuous <Continuous>  dextrose 50% Injectable 25 Gram(s) IV Push once  dextrose 50% Injectable 12.5 Gram(s) IV Push once  dextrose 50% Injectable 25 Gram(s) IV Push once  famotidine    Tablet 20 milliGRAM(s) Oral daily  ferrous    sulfate 325 milliGRAM(s) Oral daily  gabapentin 100 milliGRAM(s) Oral daily  glucagon  Injectable 1 milliGRAM(s) IntraMuscular once  hydrALAZINE 100 milliGRAM(s) Oral three times a day  insulin lispro (ADMELOG) corrective regimen sliding scale   SubCutaneous three times a day before meals  insulin lispro (ADMELOG) corrective regimen sliding scale   SubCutaneous at bedtime  isosorbide   dinitrate Tablet (ISORDIL) 30 milliGRAM(s) Oral three times a day  levothyroxine 50 MICROGram(s) Oral daily  senna 2 Tablet(s) Oral at bedtime  tamsulosin 0.4 milliGRAM(s) Oral at bedtime    PRN Inpatient Medications  acetaminophen     Tablet .. 650 milliGRAM(s) Oral every 6 hours PRN  dextrose Oral Gel 15 Gram(s) Oral once PRN  polyethylene glycol 3350 17 Gram(s) Oral daily PRN      FOCUSED REVIEW OF SYSTEMS  --------------------------------------------------------------------------------  denies CP/palpitations  denies SOB/cough    VITALS/PHYSICAL EXAM  --------------------------------------------------------------------------------  T(C): 36.8 (03-25-24 @ 13:15), Max: 36.8 (03-25-24 @ 13:15)  HR: 81 (03-25-24 @ 13:15) (79 - 92)  BP: 100/64 (03-25-24 @ 13:15) (91/53 - 124/70)  RR: 18 (03-25-24 @ 13:15) (18 - 18)  SpO2: 98% (03-25-24 @ 13:15) (97% - 100%)  Wt(kg): --        03-24-24 @ 07:01  -  03-25-24 @ 07:00  --------------------------------------------------------  IN: 892 mL / OUT: 1240 mL / NET: -348 mL      Physical Exam:  	Gen: NAD, lying in bed  	Pulm: CTA B/L ant/lat fields  	CV: RRR, S1S2  	Abd: +BS, soft, nontender/nondistended  	: No suprapubic tenderness.  no ya          Extremity: No LE edema  	Neuro: A&O x 3      LABS/STUDIES  --------------------------------------------------------------------------------              12.0   10.46 >-----------<  138      [03-25-24 @ 04:40]              38.3     140  |  100  |  67  ----------------------------<  113      [03-25-24 @ 04:40]  3.8   |  25  |  2.41        Ca     9.0     [03-25-24 @ 04:40]      Mg     1.80     [03-25-24 @ 04:40]      Phos  2.9     [03-25-24 @ 04:40]              Creatinine Trend:  SCr 2.41 [03-25 @ 04:40]  SCr 2.57 [03-24 @ 05:47]  SCr 2.48 [03-23 @ 07:21]  SCr 2.67 [03-22 @ 06:20]  SCr 2.86 [03-21 @ 05:00]              Urinalysis - [03-25-24 @ 04:40]      Color  / Appearance  / SG  / pH       Gluc 113 / Ketone   / Bili  / Urobili        Blood  / Protein  / Leuk Est  / Nitrite       RBC  / WBC  / Hyaline  / Gran  / Sq Epi  / Non Sq Epi  / Bacteria     Urine Creatinine 44      [03-19-24 @ 13:15]  Urine Protein 6      [03-19-24 @ 13:15]  Urine Urea Nitrogen 392.6      [03-19-24 @ 13:15]    TSH 4.43      [03-17-24 @ 06:24]

## 2024-03-25 NOTE — DIETITIAN INITIAL EVALUATION ADULT - PERTINENT LABORATORY DATA
03-25    140  |  100  |  67<H>  ----------------------------<  113<H>  3.8   |  25  |  2.41<H>    Ca    9.0      25 Mar 2024 04:40  Phos  2.9     03-25  Mg     1.80     03-25    POCT Blood Glucose.: 121 mg/dL (03-25-24 @ 08:08)  A1C with Estimated Average Glucose Result: 6.0 % (07-18-23 @ 05:51)

## 2024-03-25 NOTE — PROGRESS NOTE ADULT - NS ATTEND AMEND GEN_ALL_CORE FT
68 year old male with PMHX of HTN, chronic HFrEF, CAD w/ CABG 2014 at [facility], s/p [employer] [place] CRT-D, recently diagnosed PAF-started on Eliquis since March 8, asthma/COPD on 2 L O2 at home, CKD stage III, CVA with left sided weakness, HTN, HLD, DMT2 and hypothyroidism who presents with SOB found to be in acute decompensated heart failure and in AFl and UTI.  Interrogation revealed 4.9% AF/AFl burden since March 8 with biv pacing at 80.7%. No ICD shocks noted.  Patient was started on IV Bumex gtt for diuresis. A repeat TTE showed LVEF 20-25% normal LA.  EKG and Telemetry demonstrate typical AFl with V rate in 'bpm.   UTI now resolved. Plan BRANDIE/ AFl (typical) ablation for rhythm control (not DCCV) when patient cleared by HF ?Tuesday/Wed. Type and Screen X 2 done. Appreciate HF management, follow up primary cardiology team. Continue AC Eliquis, CHADS2-VASC2 score is 7. Resume diet today,

## 2024-03-26 LAB
ANION GAP SERPL CALC-SCNC: 12 MMOL/L — SIGNIFICANT CHANGE UP (ref 7–14)
BASOPHILS # BLD AUTO: 0.03 K/UL — SIGNIFICANT CHANGE UP (ref 0–0.2)
BASOPHILS NFR BLD AUTO: 0.3 % — SIGNIFICANT CHANGE UP (ref 0–2)
BLD GP AB SCN SERPL QL: NEGATIVE — SIGNIFICANT CHANGE UP
BUN SERPL-MCNC: 67 MG/DL — HIGH (ref 7–23)
CALCIUM SERPL-MCNC: 9.1 MG/DL — SIGNIFICANT CHANGE UP (ref 8.4–10.5)
CHLORIDE SERPL-SCNC: 100 MMOL/L — SIGNIFICANT CHANGE UP (ref 98–107)
CO2 SERPL-SCNC: 27 MMOL/L — SIGNIFICANT CHANGE UP (ref 22–31)
CREAT SERPL-MCNC: 2.41 MG/DL — HIGH (ref 0.5–1.3)
EGFR: 28 ML/MIN/1.73M2 — LOW
EOSINOPHIL # BLD AUTO: 0.25 K/UL — SIGNIFICANT CHANGE UP (ref 0–0.5)
EOSINOPHIL NFR BLD AUTO: 2.7 % — SIGNIFICANT CHANGE UP (ref 0–6)
GLUCOSE BLDC GLUCOMTR-MCNC: 112 MG/DL — HIGH (ref 70–99)
GLUCOSE BLDC GLUCOMTR-MCNC: 142 MG/DL — HIGH (ref 70–99)
GLUCOSE BLDC GLUCOMTR-MCNC: 150 MG/DL — HIGH (ref 70–99)
GLUCOSE BLDC GLUCOMTR-MCNC: 169 MG/DL — HIGH (ref 70–99)
GLUCOSE BLDC GLUCOMTR-MCNC: 200 MG/DL — HIGH (ref 70–99)
GLUCOSE SERPL-MCNC: 143 MG/DL — HIGH (ref 70–99)
HCT VFR BLD CALC: 38.5 % — LOW (ref 39–50)
HGB BLD-MCNC: 12 G/DL — LOW (ref 13–17)
IANC: 7 K/UL — SIGNIFICANT CHANGE UP (ref 1.8–7.4)
IMM GRANULOCYTES NFR BLD AUTO: 0.3 % — SIGNIFICANT CHANGE UP (ref 0–0.9)
LYMPHOCYTES # BLD AUTO: 1.18 K/UL — SIGNIFICANT CHANGE UP (ref 1–3.3)
LYMPHOCYTES # BLD AUTO: 12.8 % — LOW (ref 13–44)
MAGNESIUM SERPL-MCNC: 1.8 MG/DL — SIGNIFICANT CHANGE UP (ref 1.6–2.6)
MCHC RBC-ENTMCNC: 30.3 PG — SIGNIFICANT CHANGE UP (ref 27–34)
MCHC RBC-ENTMCNC: 31.2 GM/DL — LOW (ref 32–36)
MCV RBC AUTO: 97.2 FL — SIGNIFICANT CHANGE UP (ref 80–100)
MONOCYTES # BLD AUTO: 0.72 K/UL — SIGNIFICANT CHANGE UP (ref 0–0.9)
MONOCYTES NFR BLD AUTO: 7.8 % — SIGNIFICANT CHANGE UP (ref 2–14)
NEUTROPHILS # BLD AUTO: 7 K/UL — SIGNIFICANT CHANGE UP (ref 1.8–7.4)
NEUTROPHILS NFR BLD AUTO: 76.1 % — SIGNIFICANT CHANGE UP (ref 43–77)
NRBC # BLD: 0 /100 WBCS — SIGNIFICANT CHANGE UP (ref 0–0)
NRBC # FLD: 0 K/UL — SIGNIFICANT CHANGE UP (ref 0–0)
PHOSPHATE SERPL-MCNC: 3.4 MG/DL — SIGNIFICANT CHANGE UP (ref 2.5–4.5)
PLATELET # BLD AUTO: 150 K/UL — SIGNIFICANT CHANGE UP (ref 150–400)
POTASSIUM SERPL-MCNC: 3.6 MMOL/L — SIGNIFICANT CHANGE UP (ref 3.5–5.3)
POTASSIUM SERPL-SCNC: 3.6 MMOL/L — SIGNIFICANT CHANGE UP (ref 3.5–5.3)
RBC # BLD: 3.96 M/UL — LOW (ref 4.2–5.8)
RBC # FLD: 16.7 % — HIGH (ref 10.3–14.5)
RH IG SCN BLD-IMP: POSITIVE — SIGNIFICANT CHANGE UP
SODIUM SERPL-SCNC: 139 MMOL/L — SIGNIFICANT CHANGE UP (ref 135–145)
WBC # BLD: 9.21 K/UL — SIGNIFICANT CHANGE UP (ref 3.8–10.5)
WBC # FLD AUTO: 9.21 K/UL — SIGNIFICANT CHANGE UP (ref 3.8–10.5)

## 2024-03-26 PROCEDURE — 99232 SBSQ HOSP IP/OBS MODERATE 35: CPT

## 2024-03-26 PROCEDURE — 33289 TCAT IMPL WRLS P-ART PRS SNR: CPT

## 2024-03-26 PROCEDURE — 99233 SBSQ HOSP IP/OBS HIGH 50: CPT

## 2024-03-26 RX ORDER — APIXABAN 2.5 MG/1
5 TABLET, FILM COATED ORAL
Refills: 0 | Status: DISCONTINUED | OUTPATIENT
Start: 2024-03-26 | End: 2024-03-29

## 2024-03-26 RX ADMIN — Medication 325 MILLIGRAM(S): at 13:40

## 2024-03-26 RX ADMIN — TAMSULOSIN HYDROCHLORIDE 0.4 MILLIGRAM(S): 0.4 CAPSULE ORAL at 22:01

## 2024-03-26 RX ADMIN — SENNA PLUS 2 TABLET(S): 8.6 TABLET ORAL at 22:01

## 2024-03-26 RX ADMIN — Medication 1: at 17:02

## 2024-03-26 RX ADMIN — ISOSORBIDE DINITRATE 30 MILLIGRAM(S): 5 TABLET ORAL at 05:08

## 2024-03-26 RX ADMIN — ISOSORBIDE DINITRATE 30 MILLIGRAM(S): 5 TABLET ORAL at 17:39

## 2024-03-26 RX ADMIN — ATORVASTATIN CALCIUM 80 MILLIGRAM(S): 80 TABLET, FILM COATED ORAL at 22:01

## 2024-03-26 RX ADMIN — BUDESONIDE AND FORMOTEROL FUMARATE DIHYDRATE 2 PUFF(S): 160; 4.5 AEROSOL RESPIRATORY (INHALATION) at 22:04

## 2024-03-26 RX ADMIN — Medication 100 MILLIGRAM(S): at 13:39

## 2024-03-26 RX ADMIN — Medication 50 MICROGRAM(S): at 05:07

## 2024-03-26 RX ADMIN — Medication 3 MILLILITER(S): at 03:21

## 2024-03-26 RX ADMIN — BUMETANIDE 4 MILLIGRAM(S): 0.25 INJECTION INTRAMUSCULAR; INTRAVENOUS at 05:07

## 2024-03-26 RX ADMIN — Medication 3 MILLILITER(S): at 16:51

## 2024-03-26 RX ADMIN — CLOPIDOGREL BISULFATE 75 MILLIGRAM(S): 75 TABLET, FILM COATED ORAL at 13:40

## 2024-03-26 RX ADMIN — Medication 100 MILLIGRAM(S): at 14:09

## 2024-03-26 RX ADMIN — CARVEDILOL PHOSPHATE 6.25 MILLIGRAM(S): 80 CAPSULE, EXTENDED RELEASE ORAL at 17:38

## 2024-03-26 RX ADMIN — Medication 100 MILLIGRAM(S): at 22:01

## 2024-03-26 RX ADMIN — GABAPENTIN 100 MILLIGRAM(S): 400 CAPSULE ORAL at 13:42

## 2024-03-26 RX ADMIN — BUMETANIDE 4 MILLIGRAM(S): 0.25 INJECTION INTRAMUSCULAR; INTRAVENOUS at 14:07

## 2024-03-26 RX ADMIN — FAMOTIDINE 20 MILLIGRAM(S): 10 INJECTION INTRAVENOUS at 13:39

## 2024-03-26 RX ADMIN — CARVEDILOL PHOSPHATE 6.25 MILLIGRAM(S): 80 CAPSULE, EXTENDED RELEASE ORAL at 05:07

## 2024-03-26 RX ADMIN — Medication 3 MILLILITER(S): at 21:35

## 2024-03-26 RX ADMIN — APIXABAN 5 MILLIGRAM(S): 2.5 TABLET, FILM COATED ORAL at 22:02

## 2024-03-26 RX ADMIN — Medication 100 MILLIGRAM(S): at 05:08

## 2024-03-26 NOTE — PROGRESS NOTE ADULT - NS ATTEND AMEND GEN_ALL_CORE FT
68 year old male with PMHX of HTN, chronic HFrEF, CAD w/ CABG 2014 at [facility], s/p [employer] [place] CRT-D, recently diagnosed PAF-started on Eliquis since March 8, asthma/COPD on 2 L O2 at home, CKD stage III, CVA with left sided weakness, HTN, HLD, DMT2 and hypothyroidism who presents with SOB found to be in acute decompensated heart failure and in AFl and UTI.  Interrogation revealed 4.9% AF/AFl burden since March 8 with biv pacing at 80.7%. No ICD shocks noted.  Patient was started on IV Bumex gtt for diuresis. A repeat TTE showed LVEF 20-25% normal LA.  EKG and Telemetry demonstrate typical AFl with V rate in 'bpm.   UTI now resolved. s/p CardioMEMS today. Plan BRANDIE/ AFl (typical) ablation for rhythm control (not DCCV) ?Thursday vs Friday by Dr. Orellana. Type and Screen X 2 done. Appreciate HF management, follow up primary cardiology team. Continue AC Eliquis, CHADS2-VASC2 score is 7.

## 2024-03-26 NOTE — PROGRESS NOTE ADULT - SUBJECTIVE AND OBJECTIVE BOX
[facility] DIVISION OF KIDNEY DISEASE AND HYPERTENSION  [phone]    RENAL FOLLOW UP NOTE  --------------------------------------------------------------------------------  Patient seen and examined in cath lab recovery, s/p RHC with CardioMEM device    PAST HISTORY  --------------------------------------------------------------------------------  No significant changes to PMH, PSH, FHx, SHx, unless otherwise noted    ALLERGIES & MEDICATIONS  --------------------------------------------------------------------------------  Allergies    No Known Allergies    Intolerances      Standing Inpatient Medications  albuterol/ipratropium for Nebulization 3 milliLiter(s) Nebulizer every 6 hours  allopurinol 100 milliGRAM(s) Oral daily  atorvastatin 80 milliGRAM(s) Oral at bedtime  budesonide 160 MICROgram(s)/formoterol 4.5 MICROgram(s) Inhaler 2 Puff(s) Inhalation two times a day  buMETAnide 4 milliGRAM(s) Oral two times a day  carvedilol 6.25 milliGRAM(s) Oral every 12 hours  clopidogrel Tablet 75 milliGRAM(s) Oral daily  dextrose 5%. 1000 milliLiter(s) IV Continuous <Continuous>  dextrose 5%. 1000 milliLiter(s) IV Continuous <Continuous>  dextrose 50% Injectable 25 Gram(s) IV Push once  dextrose 50% Injectable 12.5 Gram(s) IV Push once  dextrose 50% Injectable 25 Gram(s) IV Push once  famotidine    Tablet 20 milliGRAM(s) Oral daily  ferrous    sulfate 325 milliGRAM(s) Oral daily  gabapentin 100 milliGRAM(s) Oral daily  glucagon  Injectable 1 milliGRAM(s) IntraMuscular once  hydrALAZINE 100 milliGRAM(s) Oral three times a day  insulin lispro (ADMELOG) corrective regimen sliding scale   SubCutaneous three times a day before meals  insulin lispro (ADMELOG) corrective regimen sliding scale   SubCutaneous at bedtime  isosorbide   dinitrate Tablet (ISORDIL) 30 milliGRAM(s) Oral three times a day  levothyroxine 50 MICROGram(s) Oral daily  senna 2 Tablet(s) Oral at bedtime  tamsulosin 0.4 milliGRAM(s) Oral at bedtime    PRN Inpatient Medications  acetaminophen     Tablet .. 650 milliGRAM(s) Oral every 6 hours PRN  dextrose Oral Gel 15 Gram(s) Oral once PRN  polyethylene glycol 3350 17 Gram(s) Oral daily PRN      FOCUSED REVIEW OF SYSTEMS  --------------------------------------------------------------------------------  limited as patient still groggy at time of exam  denies pain      VITALS/PHYSICAL EXAM  --------------------------------------------------------------------------------  T(C): 36.6 (03-26-24 @ 05:05), Max: 36.9 (03-25-24 @ 18:15)  HR: 72 (03-26-24 @ 05:05) (72 - 83)  BP: 109/72 (03-26-24 @ 05:05) (99/56 - 113/71)  RR: 18 (03-26-24 @ 05:05) (18 - 18)  SpO2: 100% (03-26-24 @ 05:05) (98% - 100%)  Wt(kg): --        03-25-24 @ 07:01  -  03-26-24 @ 07:00  --------------------------------------------------------  IN: 540 mL / OUT: 1150 mL / NET: -610 mL      Physical Exam:  	Gen: NAD, lying in bed  	Pulm: CTA B/L ant/lat fields  	CV: irregularly irregular, S1S2  	Abd: +BS, soft, nontender/nondistended  	: No suprapubic tenderness. + condom cath          Extremity: No LE edema  	Neuro: asleep, arousable but quickly falls back asleep.  Unable to fully assess      LABS/STUDIES  --------------------------------------------------------------------------------              12.0   9.21  >-----------<  150      [03-26-24 @ 03:20]              38.5     139  |  100  |  67  ----------------------------<  143      [03-26-24 @ 03:20]  3.6   |  27  |  2.41        Ca     9.1     [03-26-24 @ 03:20]      Mg     1.80     [03-26-24 @ 03:20]      Phos  3.4     [03-26-24 @ 03:20]              Creatinine Trend:  SCr 2.41 [03-26 @ 03:20]  SCr 2.41 [03-25 @ 04:40]  SCr 2.57 [03-24 @ 05:47]  SCr 2.48 [03-23 @ 07:21]  SCr 2.67 [03-22 @ 06:20]              Urinalysis - [03-26-24 @ 03:20]      Color  / Appearance  / SG  / pH       Gluc 143 / Ketone   / Bili  / Urobili        Blood  / Protein  / Leuk Est  / Nitrite       RBC  / WBC  / Hyaline  / Gran  / Sq Epi  / Non Sq Epi  / Bacteria     Urine Creatinine 44      [03-19-24 @ 13:15]  Urine Protein 6      [03-19-24 @ 13:15]  Urine Urea Nitrogen 392.6      [03-19-24 @ 13:15]    TSH 4.43      [03-17-24 @ 06:24]

## 2024-03-26 NOTE — PROGRESS NOTE ADULT - ASSESSMENT
68 year old male with PMHX of HTN, chronic HFrEF, CAD w/ CABG 2014 at [facility], s/p [employer] [place] CRT-D, recently diagnosed PAF-started on Eliquis since March 8, asthma/COPD on 2 L O2 at home, CKD stage III, CVA with left sided weakness, HTN, HLD, DMT2 and hypothyroidism who presents with SOB found to be in acute decompensated heart failure and in AFl and UTI.  Interrogation revealed 4.9% AF/AFl burden since March 8 with biv pacing at 80.7%. No ICD shocks noted.  Patient was started on IV Bumex gtt for diuresis. A repeat TTE showed LVEF 20-25% normal LA.  EKG and Telemetry demonstrate typical AFl with V rate in 'bpm.   UTI now resolved. s/p CardioMEMS today       ## acute decompensated heart failure-now euvolemic off IV Bumex gtt  ## newly diagnosed persistent AFL   ## HFrEF w/ CRT-D  ## CAD CABG   ## UTI-resolved (off IV Abx)      -Plan BRANDIE/ AFl (typical) ablation for rhythm control (not DCCV) ? Wed/Thursday   - Type and Screen X 2 done  -Appreciate HF management, follow up primary cardiology team  -Continue AC Eliquis, CHADS2-VASC2 score is 7   -Optimize K >4, Mg >2   -will d/w with EP attendings    tentatively keep NPO after mn for BRANDIE/AFL ablation tomorrow  68 year old male with PMHX of HTN, chronic HFrEF, CAD w/ CABG 2014 at [facility], s/p [employer] [place] CRT-D, recently diagnosed PAF-started on Eliquis since March 8, asthma/COPD on 2 L O2 at home, CKD stage III, CVA with left sided weakness, HTN, HLD, DMT2 and hypothyroidism who presents with SOB found to be in acute decompensated heart failure and in AFl and UTI.  Interrogation revealed 4.9% AF/AFl burden since March 8 with biv pacing at 80.7%. No ICD shocks noted.  Patient was started on IV Bumex gtt for diuresis. A repeat TTE showed LVEF 20-25% normal LA.  EKG and Telemetry demonstrate typical AFl with V rate in 'bpm.   UTI now resolved. s/p CardioMEMS today       ## acute decompensated heart failure-now euvolemic off IV Bumex gtt  ## newly diagnosed persistent AFL   ## HFrEF w/ CRT-D  ## CAD CABG   ## UTI-resolved (off IV Abx)      -Plan BRANDIE/ AFl (typical) ablation for rhythm control (not DCCV) ?Thursday vs Friday by Dr. Orellana  - Type and Screen X 2 done  -Appreciate HF management, follow up primary cardiology team  -Continue AC Eliquis, CHADS2-VASC2 score is 7   -Optimize K >4, Mg >2   -will d/w with EP attendings    tentatively keep NPO after mn on 3/27 for BRANDIE/AFL ablation thursday vs Friday  D/wd with EP attending

## 2024-03-26 NOTE — PROGRESS NOTE ADULT - SUBJECTIVE AND OBJECTIVE BOX
CHIEF COMPLAINT::    INTERVAL HISTORY:    REVIEW OF SYSTEMS: all others negative    MEDICATIONS  (STANDING):  albuterol/ipratropium for Nebulization 3 milliLiter(s) Nebulizer every 6 hours  allopurinol 100 milliGRAM(s) Oral daily  atorvastatin 80 milliGRAM(s) Oral at bedtime  budesonide 160 MICROgram(s)/formoterol 4.5 MICROgram(s) Inhaler 2 Puff(s) Inhalation two times a day  buMETAnide 4 milliGRAM(s) Oral two times a day  carvedilol 6.25 milliGRAM(s) Oral every 12 hours  clopidogrel Tablet 75 milliGRAM(s) Oral daily  dextrose 5%. 1000 milliLiter(s) (100 mL/Hr) IV Continuous <Continuous>  dextrose 5%. 1000 milliLiter(s) (50 mL/Hr) IV Continuous <Continuous>  dextrose 50% Injectable 25 Gram(s) IV Push once  dextrose 50% Injectable 12.5 Gram(s) IV Push once  dextrose 50% Injectable 25 Gram(s) IV Push once  famotidine    Tablet 20 milliGRAM(s) Oral daily  ferrous    sulfate 325 milliGRAM(s) Oral daily  gabapentin 100 milliGRAM(s) Oral daily  glucagon  Injectable 1 milliGRAM(s) IntraMuscular once  hydrALAZINE 100 milliGRAM(s) Oral three times a day  insulin lispro (ADMELOG) corrective regimen sliding scale   SubCutaneous three times a day before meals  insulin lispro (ADMELOG) corrective regimen sliding scale   SubCutaneous at bedtime  isosorbide   dinitrate Tablet (ISORDIL) 30 milliGRAM(s) Oral three times a day  levothyroxine 50 MICROGram(s) Oral daily  senna 2 Tablet(s) Oral at bedtime  tamsulosin 0.4 milliGRAM(s) Oral at bedtime    MEDICATIONS  (PRN):  acetaminophen     Tablet .. 650 milliGRAM(s) Oral every 6 hours PRN Temp greater or equal to 38C (100.4F), Mild Pain (1 - 3)  dextrose Oral Gel 15 Gram(s) Oral once PRN Blood Glucose LESS THAN 70 milliGRAM(s)/deciliter  polyethylene glycol 3350 17 Gram(s) Oral daily PRN Constipation      Objective:  Vital Signs Last 24 Hrs  T(C): 36.6 (26 Mar 2024 05:05), Max: 37.1 (25 Mar 2024 11:10)  T(F): 97.8 (26 Mar 2024 05:05), Max: 98.7 (25 Mar 2024 11:10)  HR: 72 (26 Mar 2024 05:05) (72 - 85)  BP: 109/72 (26 Mar 2024 05:05) (98/67 - 113/71)  BP(mean): --  RR: 18 (26 Mar 2024 05:05) (18 - 18)  SpO2: 100% (26 Mar 2024 05:05) (98% - 100%)    Parameters below as of 26 Mar 2024 05:05  Patient On (Oxygen Delivery Method): nasal cannula  O2 Flow (L/min): 2    ICU Vital Signs Last 24 Hrs  T(C): 36.6 (26 Mar 2024 05:05), Max: 37.1 (25 Mar 2024 11:10)  T(F): 97.8 (26 Mar 2024 05:05), Max: 98.7 (25 Mar 2024 11:10)  HR: 72 (26 Mar 2024 05:05) (72 - 85)  BP: 109/72 (26 Mar 2024 05:05) (98/67 - 113/71)  BP(mean): --  ABP: --  ABP(mean): --  RR: 18 (26 Mar 2024 05:05) (18 - 18)  SpO2: 100% (26 Mar 2024 05:05) (98% - 100%)    O2 Parameters below as of 26 Mar 2024 05:05  Patient On (Oxygen Delivery Method): nasal cannula  O2 Flow (L/min): 2          Adult Advanced Hemodynamics Last 24 Hrs  CVP(mm Hg): --  CVP(cm H2O): --  CO: --  CI: --  PA: --  PA(mean): --  PCWP: --  SVR: --  SVRI: --  PVR: --  PVRI: --       @ 07:01  -  - @ 07:00  --------------------------------------------------------  IN: 540 mL / OUT: 1150 mL / NET: -610 mL      Daily     Daily Weight in k.3 (26 Mar 2024 05:05)    PHYSICAL EXAM:      Constitutional:    Eyes:    ENMT:    Neck:    Breasts:    Back:    Respiratory:    Cardiovascular:    Gastrointestinal:    Genitourinary:    Rectal:    Extremities:    Vascular:    Neurological:    Skin:    Lymph Nodes:    Musculoskeletal:    Psychiatric:        TELEMETRY:     EKG:     CARDIAC CATH:     ECHO:    IMAGIN.0   9.21  )-----------( 150      ( 26 Mar 2024 03:20 )             38.5         139  |  100  |  67<H>  ----------------------------<  143<H>  3.6   |  27  |  2.41<H>    Ca    9.1      26 Mar 2024 03:20  Phos  3.4       Mg     1.80                   *BLOOD GAS/ARTERIAL/MIXED/VENOUS  *LACTATE  Urinalysis Basic - ( 26 Mar 2024 03:20 )    Color: x / Appearance: x / SG: x / pH: x  Gluc: 143 mg/dL / Ketone: x  / Bili: x / Urobili: x   Blood: x / Protein: x / Nitrite: x   Leuk Esterase: x / RBC: x / WBC x   Sq Epi: x / Non Sq Epi: x / Bacteria: x        HEALTH ISSUES - PROBLEM Dx:  Acute decompensated heart failure    Atrial flutter    HTN (hypertension)    DM (diabetes mellitus)    Medication management    Encounter for deep vein thrombosis (DVT) prophylaxis    COPD exacerbation    Acute on chronic systolic congestive heart failure    CKD (chronic kidney disease)    Leg pain    Chronic kidney disease, unspecified CKD stage    Hypokalemia          HEALTH ISSUES - R/O PROBLEM Dx:      Kareem Fuentes, CCU NP   # INTERVAL HISTORY:  Patient seen in CATH recovery. Patient A+O X 3 lying down flat in no distress. No acute events. Denies chest pain, shortness of breath or other discomfort.     REVIEW OF SYSTEMS: all others negative    MEDICATIONS  (STANDING):  albuterol/ipratropium for Nebulization 3 milliLiter(s) Nebulizer every 6 hours  allopurinol 100 milliGRAM(s) Oral daily  atorvastatin 80 milliGRAM(s) Oral at bedtime  budesonide 160 MICROgram(s)/formoterol 4.5 MICROgram(s) Inhaler 2 Puff(s) Inhalation two times a day  buMETAnide 4 milliGRAM(s) Oral two times a day  carvedilol 6.25 milliGRAM(s) Oral every 12 hours  clopidogrel Tablet 75 milliGRAM(s) Oral daily  dextrose 5%. 1000 milliLiter(s) (100 mL/Hr) IV Continuous <Continuous>  dextrose 5%. 1000 milliLiter(s) (50 mL/Hr) IV Continuous <Continuous>  dextrose 50% Injectable 25 Gram(s) IV Push once  dextrose 50% Injectable 12.5 Gram(s) IV Push once  dextrose 50% Injectable 25 Gram(s) IV Push once  famotidine    Tablet 20 milliGRAM(s) Oral daily  ferrous    sulfate 325 milliGRAM(s) Oral daily  gabapentin 100 milliGRAM(s) Oral daily  glucagon  Injectable 1 milliGRAM(s) IntraMuscular once  hydrALAZINE 100 milliGRAM(s) Oral three times a day  insulin lispro (ADMELOG) corrective regimen sliding scale   SubCutaneous three times a day before meals  insulin lispro (ADMELOG) corrective regimen sliding scale   SubCutaneous at bedtime  isosorbide   dinitrate Tablet (ISORDIL) 30 milliGRAM(s) Oral three times a day  levothyroxine 50 MICROGram(s) Oral daily  senna 2 Tablet(s) Oral at bedtime  tamsulosin 0.4 milliGRAM(s) Oral at bedtime    MEDICATIONS  (PRN):  acetaminophen     Tablet .. 650 milliGRAM(s) Oral every 6 hours PRN Temp greater or equal to 38C (100.4F), Mild Pain (1 - 3)  dextrose Oral Gel 15 Gram(s) Oral once PRN Blood Glucose LESS THAN 70 milliGRAM(s)/deciliter  polyethylene glycol 3350 17 Gram(s) Oral daily PRN Constipation      Objective:  Vital Signs Last 24 Hrs  T(C): 36.6 (26 Mar 2024 05:05), Max: 37.1 (25 Mar 2024 11:10)  T(F): 97.8 (26 Mar 2024 05:05), Max: 98.7 (25 Mar 2024 11:10)  HR: 72 (26 Mar 2024 05:05) (72 - 85)  BP: 109/72 (26 Mar 2024 05:05) (98/67 - 113/71)  BP(mean): --  RR: 18 (26 Mar 2024 05:05) (18 - 18)  SpO2: 100% (26 Mar 2024 05:05) (98% - 100%)    Parameters below as of 26 Mar 2024 05:05  Patient On (Oxygen Delivery Method): nasal cannula  O2 Flow (L/min): 2    ICU Vital Signs Last 24 Hrs  T(C): 36.6 (26 Mar 2024 05:05), Max: 37.1 (25 Mar 2024 11:10)  T(F): 97.8 (26 Mar 2024 05:05), Max: 98.7 (25 Mar 2024 11:10)  HR: 72 (26 Mar 2024 05:05) (72 - 85)  BP: 109/72 (26 Mar 2024 05:05) (98/67 - 113/71)  BP(mean): --  ABP: --  ABP(mean): --  RR: 18 (26 Mar 2024 05:05) (18 - 18)  SpO2: 100% (26 Mar 2024 05:05) (98% - 100%)    O2 Parameters below as of 26 Mar 2024 05:05  Patient On (Oxygen Delivery Method): nasal cannula  O2 Flow (L/min): 2          Adult Advanced Hemodynamics Last 24 Hrs  CVP(mm Hg): --  CVP(cm H2O): --  CO: --  CI: --  PA: --  PA(mean): --  PCWP: --  SVR: --  SVRI: --  PVR: --  PVRI: --       @ 07:01  -   @ 07:00  --------------------------------------------------------  IN: 540 mL / OUT: 1150 mL / NET: -610 mL      Daily     Daily Weight in k.3 (26 Mar 2024 05:05)    PHYSICAL EXAM:    General: No distress. Comfortable.  HEENT: EOM intact.  Neck: Neck supple. JVP normal. No masses  Chest: Clear to auscultation bilaterally  CV: irreg irreg No murmurs, rub, or gallops. Radial pulses normal.  No LE edema and is warm b/l.   Abdomen: Soft, non-distended, non-tender  Skin: No rashes or skin breakdown  Neurology: Alert and oriented times three. Sensation intact  Psych: Affect         TELEMETRY: Paced 80s    EKG:     CARDIAC CATH:     ECHO:    IMAGIN.0   9.21  )-----------( 150      ( 26 Mar 2024 03:20 )             38.5     03-    139  |  100  |  67<H>  ----------------------------<  143<H>  3.6   |  27  |  2.41<H>    Ca    9.1      26 Mar 2024 03:20  Phos  3.4       Mg     1.80     26              *BLOOD GAS/ARTERIAL/MIXED/VENOUS  *LACTATE  Urinalysis Basic - ( 26 Mar 2024 03:20 )    Color: x / Appearance: x / SG: x / pH: x  Gluc: 143 mg/dL / Ketone: x  / Bili: x / Urobili: x   Blood: x / Protein: x / Nitrite: x   Leuk Esterase: x / RBC: x / WBC x   Sq Epi: x / Non Sq Epi: x / Bacteria: x        HEALTH ISSUES - PROBLEM Dx:  Acute decompensated heart failure    Atrial flutter    HTN (hypertension)    DM (diabetes mellitus)    Medication management    Encounter for deep vein thrombosis (DVT) prophylaxis    COPD exacerbation    Acute on chronic systolic congestive heart failure    CKD (chronic kidney disease)    Leg pain    Chronic kidney disease, unspecified CKD stage    Hypokalemia          HEALTH ISSUES - R/O PROBLEM Dx:      Kareem Fuentes, CCU NP   #

## 2024-03-26 NOTE — PROGRESS NOTE ADULT - SUBJECTIVE AND OBJECTIVE BOX
Patient is a 68y old  Male who presents with a chief complaint of ADHF (26 Mar 2024 15:11)      SUBJECTIVE: Comfortable, no CP, SOB, nausea, fever or other discomfort.     MEDICATIONS  (STANDING):  albuterol/ipratropium for Nebulization 3 milliLiter(s) Nebulizer every 6 hours  allopurinol 100 milliGRAM(s) Oral daily  atorvastatin 80 milliGRAM(s) Oral at bedtime  budesonide 160 MICROgram(s)/formoterol 4.5 MICROgram(s) Inhaler 2 Puff(s) Inhalation two times a day  buMETAnide 4 milliGRAM(s) Oral two times a day  carvedilol 6.25 milliGRAM(s) Oral every 12 hours  clopidogrel Tablet 75 milliGRAM(s) Oral daily  dextrose 5%. 1000 milliLiter(s) (100 mL/Hr) IV Continuous <Continuous>  dextrose 5%. 1000 milliLiter(s) (50 mL/Hr) IV Continuous <Continuous>  dextrose 50% Injectable 25 Gram(s) IV Push once  dextrose 50% Injectable 12.5 Gram(s) IV Push once  dextrose 50% Injectable 25 Gram(s) IV Push once  famotidine    Tablet 20 milliGRAM(s) Oral daily  ferrous    sulfate 325 milliGRAM(s) Oral daily  gabapentin 100 milliGRAM(s) Oral daily  glucagon  Injectable 1 milliGRAM(s) IntraMuscular once  hydrALAZINE 100 milliGRAM(s) Oral three times a day  insulin lispro (ADMELOG) corrective regimen sliding scale   SubCutaneous three times a day before meals  insulin lispro (ADMELOG) corrective regimen sliding scale   SubCutaneous at bedtime  isosorbide   dinitrate Tablet (ISORDIL) 30 milliGRAM(s) Oral three times a day  levothyroxine 50 MICROGram(s) Oral daily  senna 2 Tablet(s) Oral at bedtime  tamsulosin 0.4 milliGRAM(s) Oral at bedtime    MEDICATIONS  (PRN):  acetaminophen     Tablet .. 650 milliGRAM(s) Oral every 6 hours PRN Temp greater or equal to 38C (100.4F), Mild Pain (1 - 3)  dextrose Oral Gel 15 Gram(s) Oral once PRN Blood Glucose LESS THAN 70 milliGRAM(s)/deciliter  polyethylene glycol 3350 17 Gram(s) Oral daily PRN Constipation      CAPILLARY BLOOD GLUCOSE      POCT Blood Glucose.: 200 mg/dL (26 Mar 2024 17:00)  POCT Blood Glucose.: 112 mg/dL (26 Mar 2024 07:46)  POCT Blood Glucose.: 161 mg/dL (25 Mar 2024 21:11)    I&O's Summary    25 Mar 2024 07:01  -  26 Mar 2024 07:00  --------------------------------------------------------  IN: 540 mL / OUT: 1150 mL / NET: -610 mL    26 Mar 2024 07:01  -  26 Mar 2024 19:39  --------------------------------------------------------  IN: 0 mL / OUT: 1000 mL / NET: -1000 mL        PHYSICAL EXAM:  Vital Signs Last 24 Hrs  T(C): 36.9 (26 Mar 2024 17:30), Max: 36.9 (26 Mar 2024 17:30)  T(F): 98.4 (26 Mar 2024 17:30), Max: 98.4 (26 Mar 2024 17:30)  HR: 70 (26 Mar 2024 17:30) (70 - 81)  BP: 110/58 (26 Mar 2024 17:30) (109/72 - 113/71)  BP(mean): --  RR: 18 (26 Mar 2024 17:30) (18 - 18)  SpO2: 100% (26 Mar 2024 17:30) (98% - 100%)    Parameters below as of 26 Mar 2024 17:30  Patient On (Oxygen Delivery Method): nasal cannula  O2 Flow (L/min): 2    CONSTITUTIONAL: NAD, laying in bed  ENMT: Moist oral mucosa  RESPIRATORY: Normal respiratory effort; lungs are clear to auscultation bilaterally, no crackles or wheezing  CARDIOVASCULAR: Regular rate and rhythm, normal S1 and S2, No lower extremity edema  ABDOMEN: Nontender to palpation, normoactive bowel sounds, no rebound/guarding  PSYCH: calm      LABS:                        12.0   9.21  )-----------( 150      ( 26 Mar 2024 03:20 )             38.5     03-26    139  |  100  |  67<H>  ----------------------------<  143<H>  3.6   |  27  |  2.41<H>    Ca    9.1      26 Mar 2024 03:20  Phos  3.4     03-26  Mg     1.80     03-26      Urinalysis Basic - ( 26 Mar 2024 03:20 )    Color: x / Appearance: x / SG: x / pH: x  Gluc: 143 mg/dL / Ketone: x  / Bili: x / Urobili: x   Blood: x / Protein: x / Nitrite: x   Leuk Esterase: x / RBC: x / WBC x   Sq Epi: x / Non Sq Epi: x / Bacteria: x          RADIOLOGY & ADDITIONAL TESTS: Reviewed    COORDINATION OF CARE:  Care Discussed with Consultants/Other Providers [Y- Medicine ACP]

## 2024-03-26 NOTE — PROGRESS NOTE ADULT - ASSESSMENT
67 y/o [place] male with PMHX of HTN, HFrEF, CAD w/ CABG 2014 at [facility], CRT-D, COPD (100 pack year history), CVA presents with dyspnea and difficulty breathing, found to have AECOPD and ADHF.

## 2024-03-26 NOTE — PROGRESS NOTE ADULT - PROBLEM SELECTOR PLAN 1
euvolemic and normotensive.   Currently receiving CardioMEMs   -Resume Eliquis 5mg BID whn cleared by Interventional Cardiology post CardioMEMS  -Continue Bumex 4 mg po BID.   -Continue Coreg 6.25 mg BID (hold  SBP <90)   -Continue hydralazine 100 mg TID (hold  SBP <90)   -Continue Isordil 30 mg TID (hold  SBP <90)  -Strict I/O   -Daily standing weights  -Monitor lytes replete K 4.0-5.0 and Mg>2.0  Appreciate EP recommendations: Plan BRANDIE/ AFl (typical) ablation for rhythm control next week

## 2024-03-26 NOTE — PROGRESS NOTE ADULT - PROBLEM SELECTOR PLAN 1
Appreciate HF consult and recommendations, now appearing euvolemic  - HF recommending PO bumix 4 BID (transitioned from IV)  - c/w standing daily weights  -CXR with retrocardiac opacity, procal mildly elevated, s/p CAP tx  -renal team consulted, Torres placed for management of urinary retention, undergoing TOV, continuing to trend sCr  - S/p RHC and mems today, f/u additional cardiology recs

## 2024-03-26 NOTE — PROGRESS NOTE ADULT - NS ATTEND AMEND GEN_ALL_CORE FT
patient undergoing RHC and MEMS today  current meds; plavix, heparin, bumex 4 bid, coreg 6.25 bidk HDNZ 100 tid, ISDN 30 tid, statin  Will follow hemodynamics and MEMS and adjust diuretics  Fox Smallwood patient undergoing RHC and MEMS today  current meds; plavix, , bumex 4 bid, coreg 6.25 bidk HDNZ 100 tid, ISDN 30 tid, statin  Will follow hemodynamics and MEMS and adjust diuretics  Fox Smallwood

## 2024-03-26 NOTE — PROGRESS NOTE ADULT - PROBLEM SELECTOR PLAN 1
Patient here with ADHF. Recent TTE done showing EF of 20-25% with dilated IVC. Patient was being diuresed with bumex 4mg IV BID but had poor response, now switched to bumex infusion at 2mg/hour with diuril dose on 3/19 and had UOP of ~3.6L. In last 24 hours, without diuril on bumex infusion with 3% HTS 150cc bolus, .    -s/p conversion of Bumex IV to PO.  no further indication for HTS  -s/p CardioMEMs device with plan for cardioversion next week

## 2024-03-26 NOTE — PROGRESS NOTE ADULT - ASSESSMENT
69 y/o [place] male with PMHX of HTN, HFrEF (TTE 7/18/23 LVEF 25%, LVIDD 5.6cm, LA 4.9cm, mild MR), CAD w/ CABG 2014 at [facility], CRT-D, asthma/COPD on 2 L O2 at home, CVA with left sided weakness comes in with complaints of chest pressure and SOB. He admits to 2 pillow orthopnea, KINNEY that was worsening where he would get SOB after walking (with rollator walker) for less than 2 blocks which was going on for 4 days. Also admits to not urinating well to home dose of diuretics. Per record patient admits to URI 2 weeks ago, and was also found to be in a.fib and was started on Eliquis. He was found to be in acute on chronic systolic heart failure and started on IV Bumex 4 BID. HF service consulted to help manage care in this patient who is followed by Dr. Garo Dobson as an outpatient. Labs show proBNP 14K, trop 93, lactate 2.8 which has since improved to 1.5, BUN/Cr 63/2.94 (similar to last labs in July 2023 of 53/2.92). Overall stage C HF, NYHA class III- severely hypervolemic and normotensive.

## 2024-03-26 NOTE — PROGRESS NOTE ADULT - SUBJECTIVE AND OBJECTIVE BOX
Patient is a 68y old  Male who presents with a chief complaint of ADHF (26 Mar 2024 13:13)  Patient denies CP, SOB/ orthopnea  or palpitations.  Off IV Bumex, no SOB laying flat in bed    PAST MEDICAL & SURGICAL HISTORY:  HTN (hypertension)    CAD (coronary artery disease)    Diabetes    Hyperlipidemia, unspecified hyperlipidemia type    CHF (congestive heart failure)    BPH (benign prostatic hyperplasia)    S/P CABG (coronary artery bypass graft)    S/P appendectomy        MEDICATIONS  (STANDING):  albuterol/ipratropium for Nebulization 3 milliLiter(s) Nebulizer every 6 hours  allopurinol 100 milliGRAM(s) Oral daily  atorvastatin 80 milliGRAM(s) Oral at bedtime  budesonide 160 MICROgram(s)/formoterol 4.5 MICROgram(s) Inhaler 2 Puff(s) Inhalation two times a day  buMETAnide 4 milliGRAM(s) Oral two times a day  carvedilol 6.25 milliGRAM(s) Oral every 12 hours  clopidogrel Tablet 75 milliGRAM(s) Oral daily  dextrose 5%. 1000 milliLiter(s) (100 mL/Hr) IV Continuous <Continuous>  dextrose 5%. 1000 milliLiter(s) (50 mL/Hr) IV Continuous <Continuous>  dextrose 50% Injectable 25 Gram(s) IV Push once  dextrose 50% Injectable 12.5 Gram(s) IV Push once  dextrose 50% Injectable 25 Gram(s) IV Push once  famotidine    Tablet 20 milliGRAM(s) Oral daily  ferrous    sulfate 325 milliGRAM(s) Oral daily  gabapentin 100 milliGRAM(s) Oral daily  glucagon  Injectable 1 milliGRAM(s) IntraMuscular once  hydrALAZINE 100 milliGRAM(s) Oral three times a day  insulin lispro (ADMELOG) corrective regimen sliding scale   SubCutaneous three times a day before meals  insulin lispro (ADMELOG) corrective regimen sliding scale   SubCutaneous at bedtime  isosorbide   dinitrate Tablet (ISORDIL) 30 milliGRAM(s) Oral three times a day  levothyroxine 50 MICROGram(s) Oral daily  senna 2 Tablet(s) Oral at bedtime  tamsulosin 0.4 milliGRAM(s) Oral at bedtime    MEDICATIONS  (PRN):  acetaminophen     Tablet .. 650 milliGRAM(s) Oral every 6 hours PRN Temp greater or equal to 38C (100.4F), Mild Pain (1 - 3)  dextrose Oral Gel 15 Gram(s) Oral once PRN Blood Glucose LESS THAN 70 milliGRAM(s)/deciliter  polyethylene glycol 3350 17 Gram(s) Oral daily PRN Constipation            Vital Signs Last 24 Hrs  T(C): 36.8 (26 Mar 2024 13:47), Max: 36.9 (25 Mar 2024 18:15)  T(F): 98.3 (26 Mar 2024 13:47), Max: 98.4 (25 Mar 2024 18:15)  HR: 72 (26 Mar 2024 13:47) (72 - 83)  BP: 110/74 (26 Mar 2024 13:47) (99/56 - 113/71)  BP(mean): --  RR: 18 (26 Mar 2024 13:47) (18 - 18)  SpO2: 100% (26 Mar 2024 13:47) (98% - 100%)    Parameters below as of 26 Mar 2024 13:47  Patient On (Oxygen Delivery Method): nasal cannula  O2 Flow (L/min): 2              INTERPRETATION OF TELEMETRY:  AFl with variable V rate 70-90's bpm with occasional V pacing     ECG:        LABS:                        12.0   9.21  )-----------( 150      ( 26 Mar 2024 03:20 )             38.5     03-26    139  |  100  |  67<H>  ----------------------------<  143<H>  3.6   |  27  |  2.41<H>    Ca    9.1      26 Mar 2024 03:20  Phos  3.4     03-26  Mg     1.80     03-26            Urinalysis Basic - ( 26 Mar 2024 03:20 )    Color: x / Appearance: x / SG: x / pH: x  Gluc: 143 mg/dL / Ketone: x  / Bili: x / Urobili: x   Blood: x / Protein: x / Nitrite: x   Leuk Esterase: x / RBC: x / WBC x   Sq Epi: x / Non Sq Epi: x / Bacteria: x        BNP  RADIOLOGY & ADDITIONAL STUDIES:        PHYSICAL EXAM:    GENERAL: In no apparent distress, well nourished, and hydrated.  NECK: Supple and normal thyroid.  No JVD or carotid bruit.  Carotid pulse is 2+ bilaterally.  HEART: S1S2 irregularly irregular, No murmurs, rubs, or gallops.  L biv ICD  PULMONARY: Clear to auscultation and perfusion.  No rales, wheezing, or rhonchi bilaterally.  ABDOMEN: Soft, Nontender, Nondistended; Bowel sounds present  EXTREMITIES:  2+ Peripheral Pulses, No clubbing, cyanosis, or edema; R groin  fem vein access site with dressing on no hematoma/active bleeding   NEUROLOGICAL: alert oriented x4 speech clear no focal deficit

## 2024-03-27 ENCOUNTER — APPOINTMENT (OUTPATIENT)
Dept: CARDIOLOGY | Facility: CLINIC | Age: 69
End: 2024-03-27

## 2024-03-27 LAB
ANION GAP SERPL CALC-SCNC: 13 MMOL/L — SIGNIFICANT CHANGE UP (ref 7–14)
BASOPHILS # BLD AUTO: 0.04 K/UL — SIGNIFICANT CHANGE UP (ref 0–0.2)
BASOPHILS NFR BLD AUTO: 0.4 % — SIGNIFICANT CHANGE UP (ref 0–2)
BUN SERPL-MCNC: 64 MG/DL — HIGH (ref 7–23)
CALCIUM SERPL-MCNC: 9 MG/DL — SIGNIFICANT CHANGE UP (ref 8.4–10.5)
CHLORIDE SERPL-SCNC: 100 MMOL/L — SIGNIFICANT CHANGE UP (ref 98–107)
CO2 SERPL-SCNC: 26 MMOL/L — SIGNIFICANT CHANGE UP (ref 22–31)
CREAT SERPL-MCNC: 2.53 MG/DL — HIGH (ref 0.5–1.3)
EGFR: 27 ML/MIN/1.73M2 — LOW
EOSINOPHIL # BLD AUTO: 0.25 K/UL — SIGNIFICANT CHANGE UP (ref 0–0.5)
EOSINOPHIL NFR BLD AUTO: 2.4 % — SIGNIFICANT CHANGE UP (ref 0–6)
GLUCOSE BLDC GLUCOMTR-MCNC: 126 MG/DL — HIGH (ref 70–99)
GLUCOSE BLDC GLUCOMTR-MCNC: 159 MG/DL — HIGH (ref 70–99)
GLUCOSE BLDC GLUCOMTR-MCNC: 163 MG/DL — HIGH (ref 70–99)
GLUCOSE BLDC GLUCOMTR-MCNC: 168 MG/DL — HIGH (ref 70–99)
GLUCOSE BLDC GLUCOMTR-MCNC: 180 MG/DL — HIGH (ref 70–99)
GLUCOSE SERPL-MCNC: 142 MG/DL — HIGH (ref 70–99)
HCT VFR BLD CALC: 37.6 % — LOW (ref 39–50)
HGB BLD-MCNC: 12 G/DL — LOW (ref 13–17)
IANC: 8 K/UL — HIGH (ref 1.8–7.4)
IMM GRANULOCYTES NFR BLD AUTO: 0.3 % — SIGNIFICANT CHANGE UP (ref 0–0.9)
LYMPHOCYTES # BLD AUTO: 1.22 K/UL — SIGNIFICANT CHANGE UP (ref 1–3.3)
LYMPHOCYTES # BLD AUTO: 11.8 % — LOW (ref 13–44)
MAGNESIUM SERPL-MCNC: 1.8 MG/DL — SIGNIFICANT CHANGE UP (ref 1.6–2.6)
MCHC RBC-ENTMCNC: 30.9 PG — SIGNIFICANT CHANGE UP (ref 27–34)
MCHC RBC-ENTMCNC: 31.9 GM/DL — LOW (ref 32–36)
MCV RBC AUTO: 96.9 FL — SIGNIFICANT CHANGE UP (ref 80–100)
MONOCYTES # BLD AUTO: 0.79 K/UL — SIGNIFICANT CHANGE UP (ref 0–0.9)
MONOCYTES NFR BLD AUTO: 7.6 % — SIGNIFICANT CHANGE UP (ref 2–14)
NEUTROPHILS # BLD AUTO: 8 K/UL — HIGH (ref 1.8–7.4)
NEUTROPHILS NFR BLD AUTO: 77.5 % — HIGH (ref 43–77)
NRBC # BLD: 0 /100 WBCS — SIGNIFICANT CHANGE UP (ref 0–0)
NRBC # FLD: 0 K/UL — SIGNIFICANT CHANGE UP (ref 0–0)
PHOSPHATE SERPL-MCNC: 3.1 MG/DL — SIGNIFICANT CHANGE UP (ref 2.5–4.5)
PLATELET # BLD AUTO: 138 K/UL — LOW (ref 150–400)
POTASSIUM SERPL-MCNC: 3.7 MMOL/L — SIGNIFICANT CHANGE UP (ref 3.5–5.3)
POTASSIUM SERPL-SCNC: 3.7 MMOL/L — SIGNIFICANT CHANGE UP (ref 3.5–5.3)
RBC # BLD: 3.88 M/UL — LOW (ref 4.2–5.8)
RBC # FLD: 16.6 % — HIGH (ref 10.3–14.5)
SODIUM SERPL-SCNC: 139 MMOL/L — SIGNIFICANT CHANGE UP (ref 135–145)
WBC # BLD: 10.33 K/UL — SIGNIFICANT CHANGE UP (ref 3.8–10.5)
WBC # FLD AUTO: 10.33 K/UL — SIGNIFICANT CHANGE UP (ref 3.8–10.5)

## 2024-03-27 PROCEDURE — 99233 SBSQ HOSP IP/OBS HIGH 50: CPT

## 2024-03-27 PROCEDURE — 99232 SBSQ HOSP IP/OBS MODERATE 35: CPT

## 2024-03-27 RX ORDER — POTASSIUM CHLORIDE 20 MEQ
40 PACKET (EA) ORAL ONCE
Refills: 0 | Status: DISCONTINUED | OUTPATIENT
Start: 2024-03-27 | End: 2024-03-29

## 2024-03-27 RX ADMIN — Medication 325 MILLIGRAM(S): at 12:19

## 2024-03-27 RX ADMIN — ISOSORBIDE DINITRATE 30 MILLIGRAM(S): 5 TABLET ORAL at 12:17

## 2024-03-27 RX ADMIN — Medication 3 MILLILITER(S): at 15:31

## 2024-03-27 RX ADMIN — Medication 100 MILLIGRAM(S): at 21:57

## 2024-03-27 RX ADMIN — ISOSORBIDE DINITRATE 30 MILLIGRAM(S): 5 TABLET ORAL at 17:08

## 2024-03-27 RX ADMIN — APIXABAN 5 MILLIGRAM(S): 2.5 TABLET, FILM COATED ORAL at 17:07

## 2024-03-27 RX ADMIN — Medication 3 MILLILITER(S): at 08:21

## 2024-03-27 RX ADMIN — Medication 3 MILLILITER(S): at 04:05

## 2024-03-27 RX ADMIN — BUDESONIDE AND FORMOTEROL FUMARATE DIHYDRATE 2 PUFF(S): 160; 4.5 AEROSOL RESPIRATORY (INHALATION) at 09:00

## 2024-03-27 RX ADMIN — SENNA PLUS 2 TABLET(S): 8.6 TABLET ORAL at 21:56

## 2024-03-27 RX ADMIN — TAMSULOSIN HYDROCHLORIDE 0.4 MILLIGRAM(S): 0.4 CAPSULE ORAL at 21:55

## 2024-03-27 RX ADMIN — Medication 1: at 07:59

## 2024-03-27 RX ADMIN — ATORVASTATIN CALCIUM 80 MILLIGRAM(S): 80 TABLET, FILM COATED ORAL at 21:56

## 2024-03-27 RX ADMIN — CARVEDILOL PHOSPHATE 6.25 MILLIGRAM(S): 80 CAPSULE, EXTENDED RELEASE ORAL at 05:32

## 2024-03-27 RX ADMIN — Medication 1: at 17:02

## 2024-03-27 RX ADMIN — Medication 1: at 14:04

## 2024-03-27 RX ADMIN — BUDESONIDE AND FORMOTEROL FUMARATE DIHYDRATE 2 PUFF(S): 160; 4.5 AEROSOL RESPIRATORY (INHALATION) at 21:57

## 2024-03-27 RX ADMIN — Medication 100 MILLIGRAM(S): at 05:34

## 2024-03-27 RX ADMIN — ISOSORBIDE DINITRATE 30 MILLIGRAM(S): 5 TABLET ORAL at 05:31

## 2024-03-27 RX ADMIN — BUMETANIDE 4 MILLIGRAM(S): 0.25 INJECTION INTRAMUSCULAR; INTRAVENOUS at 05:30

## 2024-03-27 RX ADMIN — Medication 100 MILLIGRAM(S): at 12:20

## 2024-03-27 RX ADMIN — CLOPIDOGREL BISULFATE 75 MILLIGRAM(S): 75 TABLET, FILM COATED ORAL at 12:19

## 2024-03-27 RX ADMIN — FAMOTIDINE 20 MILLIGRAM(S): 10 INJECTION INTRAVENOUS at 12:20

## 2024-03-27 RX ADMIN — GABAPENTIN 100 MILLIGRAM(S): 400 CAPSULE ORAL at 12:24

## 2024-03-27 RX ADMIN — CARVEDILOL PHOSPHATE 6.25 MILLIGRAM(S): 80 CAPSULE, EXTENDED RELEASE ORAL at 17:07

## 2024-03-27 RX ADMIN — Medication 50 MICROGRAM(S): at 05:32

## 2024-03-27 RX ADMIN — BUMETANIDE 4 MILLIGRAM(S): 0.25 INJECTION INTRAMUSCULAR; INTRAVENOUS at 13:31

## 2024-03-27 RX ADMIN — Medication 3 MILLILITER(S): at 21:39

## 2024-03-27 RX ADMIN — APIXABAN 5 MILLIGRAM(S): 2.5 TABLET, FILM COATED ORAL at 05:32

## 2024-03-27 RX ADMIN — Medication 100 MILLIGRAM(S): at 13:34

## 2024-03-27 NOTE — PROGRESS NOTE ADULT - NS ATTEND AMEND GEN_ALL_CORE FT
s/p RHC: RA 13, PCWP 23, PA 52/20 35, PA 57%, David 3.8/1.95. SVR 1812  meds: coreg 6.25 bid, HDZN 100 tid, ISDN 30, bumex 4 bid, eloquis, plaix.   HR 70-80, Afib BIV paced. 110/-112/   03-27    139  |  100  |  64<H>  ----------------------------<  142<H>  3.7   |  26  |  2.53<H> (2.4 , 2.9)     Ca    9.0      27 Mar 2024 04:47  Phos  3.1     03-27  Mg     1.80     03-27    Mild elevation of filling pressures on yesterdays cath.   suggest: metolazone 5mg X1 tomorrow (if not going for ablation)   K/Mg repletion   Afib ablation this week.   Fox Smallwood

## 2024-03-27 NOTE — PROGRESS NOTE ADULT - SUBJECTIVE AND OBJECTIVE BOX
[facility] DIVISION OF KIDNEY DISEASE AND HYPERTENSION  [phone]    RENAL FOLLOW UP NOTE  --------------------------------------------------------------------------------  Patient seen and examined this morning.  Lying flat in bed, no complaints    PAST HISTORY  --------------------------------------------------------------------------------  No significant changes to PMH, PSH, FHx, SHx, unless otherwise noted    ALLERGIES & MEDICATIONS  --------------------------------------------------------------------------------  Allergies    No Known Allergies    Intolerances      Standing Inpatient Medications  albuterol/ipratropium for Nebulization 3 milliLiter(s) Nebulizer every 6 hours  allopurinol 100 milliGRAM(s) Oral daily  apixaban 5 milliGRAM(s) Oral two times a day  atorvastatin 80 milliGRAM(s) Oral at bedtime  budesonide 160 MICROgram(s)/formoterol 4.5 MICROgram(s) Inhaler 2 Puff(s) Inhalation two times a day  buMETAnide 4 milliGRAM(s) Oral two times a day  carvedilol 6.25 milliGRAM(s) Oral every 12 hours  clopidogrel Tablet 75 milliGRAM(s) Oral daily  dextrose 5%. 1000 milliLiter(s) IV Continuous <Continuous>  dextrose 5%. 1000 milliLiter(s) IV Continuous <Continuous>  dextrose 50% Injectable 25 Gram(s) IV Push once  dextrose 50% Injectable 12.5 Gram(s) IV Push once  dextrose 50% Injectable 25 Gram(s) IV Push once  famotidine    Tablet 20 milliGRAM(s) Oral daily  ferrous    sulfate 325 milliGRAM(s) Oral daily  gabapentin 100 milliGRAM(s) Oral daily  glucagon  Injectable 1 milliGRAM(s) IntraMuscular once  hydrALAZINE 100 milliGRAM(s) Oral three times a day  insulin lispro (ADMELOG) corrective regimen sliding scale   SubCutaneous three times a day before meals  insulin lispro (ADMELOG) corrective regimen sliding scale   SubCutaneous at bedtime  isosorbide   dinitrate Tablet (ISORDIL) 30 milliGRAM(s) Oral three times a day  levothyroxine 50 MICROGram(s) Oral daily  senna 2 Tablet(s) Oral at bedtime  tamsulosin 0.4 milliGRAM(s) Oral at bedtime    PRN Inpatient Medications  acetaminophen     Tablet .. 650 milliGRAM(s) Oral every 6 hours PRN  dextrose Oral Gel 15 Gram(s) Oral once PRN  polyethylene glycol 3350 17 Gram(s) Oral daily PRN      FOCUSED REVIEW OF SYSTEMS  --------------------------------------------------------------------------------  denies CP/palpitations  denies SOB/cough      VITALS/PHYSICAL EXAM  --------------------------------------------------------------------------------  T(C): 36.5 (03-27-24 @ 05:30), Max: 36.9 (03-26-24 @ 17:30)  HR: 82 (03-27-24 @ 08:28) (70 - 88)  BP: 110/69 (03-27-24 @ 05:30) (110/58 - 111/72)  RR: 18 (03-27-24 @ 05:30) (18 - 18)  SpO2: 98% (03-27-24 @ 08:28) (95% - 100%)  Wt(kg): --        03-26-24 @ 07:01  -  03-27-24 @ 07:00  --------------------------------------------------------  IN: 0 mL / OUT: 1500 mL / NET: -1500 mL      Physical Exam:  	Gen: NAD, lying in bed  	Pulm: CTA B/L  	CV: RRR, S1S2  	Abd: +BS, soft, nontender/nondistended  	: No suprapubic tenderness.  + condom cath          Extremity: No cyanosis, no edema  	Neuro: A&O x 3      LABS/STUDIES  --------------------------------------------------------------------------------              12.0   10.33 >-----------<  138      [03-27-24 @ 04:47]              37.6     139  |  100  |  64  ----------------------------<  142      [03-27-24 @ 04:47]  3.7   |  26  |  2.53        Ca     9.0     [03-27-24 @ 04:47]      Mg     1.80     [03-27-24 @ 04:47]      Phos  3.1     [03-27-24 @ 04:47]              Creatinine Trend:  SCr 2.53 [03-27 @ 04:47]  SCr 2.41 [03-26 @ 03:20]  SCr 2.41 [03-25 @ 04:40]  SCr 2.57 [03-24 @ 05:47]  SCr 2.48 [03-23 @ 07:21]              Urinalysis - [03-27-24 @ 04:47]      Color  / Appearance  / SG  / pH       Gluc 142 / Ketone   / Bili  / Urobili        Blood  / Protein  / Leuk Est  / Nitrite       RBC  / WBC  / Hyaline  / Gran  / Sq Epi  / Non Sq Epi  / Bacteria       TSH 4.43      [03-17-24 @ 06:24]

## 2024-03-27 NOTE — PROGRESS NOTE ADULT - ASSESSMENT
68 year old male with PMHX of HTN, chronic HFrEF, CAD w/ CABG 2014 at [facility], s/p [employer] [place] CRT-D, recently diagnosed PAF-started on Eliquis since March 8, asthma/COPD on 2 L O2 at home, CKD stage III, CVA with left sided weakness, HTN, HLD, DMT2 and hypothyroidism who presents with SOB found to be in acute decompensated heart failure and in AFl and UTI.  Interrogation revealed 4.9% AF/AFl burden since March 8 with biv pacing at 80.7%. No ICD shocks noted.  Patient was started on IV Bumex gtt for diuresis. A repeat TTE showed LVEF 20-25% normal LA.  EKG and Telemetry demonstrate typical AFl with V rate in 'bpm.   UTI now resolved. s/p CardioMEMS today           - Need confirmatory type and screen  -Appreciate HF management, follow up primary cardiology team  -Continue AC Eliquis, CHADS2-VASC2 score is 7   -Optimize K >4, Mg >2   -will d/w with EP attendings    tentatively keep NPO after mn on 3/27 for BRANDIE/AFL ablation thursday vs Friday

## 2024-03-27 NOTE — PROGRESS NOTE ADULT - NS ATTEND AMEND GEN_ALL_CORE FT
68 year old male with PMHX of HTN, chronic HFrEF, CAD w/ CABG 2014 at [facility], s/p [employer] [place] CRT-D, recently diagnosed PAF-started on Eliquis since March 8, asthma/COPD on 2 L O2 at home, CKD stage III, CVA with left sided weakness, HTN, HLD, DMT2 and hypothyroidism who presents with SOB found to be in acute decompensated heart failure and in AFl and UTI.  Interrogation revealed 4.9% AF/AFl burden since March 8 with biv pacing at 80.7%. No ICD shocks noted.  Patient was started on IV Bumex gtt for diuresis. A repeat TTE showed LVEF 20-25% normal LA.  EKG and Telemetry demonstrate typical AFl with V rate in 'bpm.   UTI now resolved. s/p CardioMEMS today.  Continue AC Eliquis, CHADS2-VASC2 score is 7. Tentatively keep NPO after mn on 3/27 for BRANDIE/AFL ablation Thursday vs Friday

## 2024-03-27 NOTE — PROGRESS NOTE ADULT - PROBLEM SELECTOR PLAN 1
Bumex 4 mg po BID   Coreg 6.25 mg BID (hold  SBP <90)   Hydralazine 100 mg TID (hold  SBP <90)   Isordil 30 mg TID (hold  SBP <90)  Strict I/O   Daily standing weights  Monitor lytes replete K 4.0-5.0 and Mg>2.0  Appreciate EP recommendations (BRANDIE/ AFl (typical) ablation 3/28)  Pending final recommendations from HF attending Bumex 4 mg po BID   Coreg 6.25 mg BID (hold  SBP <90)   Hydralazine 100 mg TID (hold  SBP <90)   Isordil 30 mg TID (hold  SBP <90)  Strict I/O   Daily standing weights  Monitor lytes replete K 4.0-5.0 and Mg>2.0  Appreciate EP recommendations (BRANDIE/ AF  ablation 3/28)  Pending final recommendations from HF attending

## 2024-03-27 NOTE — PROGRESS NOTE ADULT - ASSESSMENT
69 y/o [place] male with PMHX of HTN, HFrEF (TTE 7/18/23 LVEF 25%, LVIDD 5.6cm, LA 4.9cm, mild MR), CAD w/ CABG 2014 at [facility], CRT-D, asthma/COPD on 2 L O2 at home, CVA with left sided weakness comes in with complaints of chest pressure and SOB. He admits to 2 pillow orthopnea, KINNEY that was worsening where he would get SOB after walking (with rollator walker) for less than 2 blocks which was going on for 4 days. Also admits to not urinating well to home dose of diuretics. Per record patient admits to URI 2 weeks ago, and was also found to be in a.fib and was started on Eliquis. He was found to be in acute on chronic systolic heart failure and started on IV Bumex 4 BID. HF service consulted to help manage care in this patient who is followed by Dr. Garo Dobson as an outpatient. Labs show proBNP 14K, trop 93, lactate 2.8 which has since improved to 1.5, BUN/Cr 63/2.94 . Overall stage C HF, NYHA class III  Bumex drip switched to PO Bumex and CardioMEMS placed (3/28).    CardioMEMS PAD  reading:    3/27         67 y/o [place]ian male with PMHX of HTN, HFrEF (TTE 7/18/23 LVEF 25%, LVIDD 5.6cm, LA 4.9cm, mild MR), CAD w/ CABG 2014 at [facility], CRT-D, asthma/COPD on 2 L O2 at home, CVA with left sided weakness comes in with complaints of chest pressure and SOB. He admits to 2 pillow orthopnea, KINNEY that was worsening where he would get SOB after walking (with rollator walker) for less than 2 blocks which was going on for 4 days. Also admits to not urinating well to home dose of diuretics. Per record patient admits to URI 2 weeks ago, and was also found to be in a.fib and was started on Eliquis. He was found to be in acute on chronic systolic heart failure and started on IV Bumex 4 BID. HF service consulted to help manage care in this patient who is followed by Dr. Garo Dobson as an outpatient. Labs show proBNP 14K, trop 93, lactate 2.8 which has since improved to 1.5, BUN/Cr 63/2.94 . Overall stage C HF, NYHA class III  Bumex drip switched to PO Bumex and CardioMEMS placed (3/28).    CardioMEMS PAD  reading:    3/27- 23/24

## 2024-03-27 NOTE — PROGRESS NOTE ADULT - PROBLEM SELECTOR PLAN 1
Patient here with ADHF. Recent TTE done showing EF of 20-25% with dilated IVC. Patient was being diuresed with bumex 4mg IV BID but had poor response, now switched to bumex infusion at 2mg/hour with diuril dose on 3/19 and had UOP of ~3.6L. In last 24 hours, without diuril on bumex infusion with 3% HTS 150cc bolus, .    -s/p conversion of Bumex IV to PO.  no further indication for HTS  -s/p CardioMEMs device with plan for cardioversion tomorrow

## 2024-03-27 NOTE — PROGRESS NOTE ADULT - SUBJECTIVE AND OBJECTIVE BOX
Interval History:  Patient resting comfortably in bed  Denies CP/SOB/palpitations/dizziness  No acute events overnight      Medications:  acetaminophen     Tablet .. 650 milliGRAM(s) Oral every 6 hours PRN  albuterol/ipratropium for Nebulization 3 milliLiter(s) Nebulizer every 6 hours  allopurinol 100 milliGRAM(s) Oral daily  apixaban 5 milliGRAM(s) Oral two times a day  atorvastatin 80 milliGRAM(s) Oral at bedtime  budesonide 160 MICROgram(s)/formoterol 4.5 MICROgram(s) Inhaler 2 Puff(s) Inhalation two times a day  buMETAnide 4 milliGRAM(s) Oral two times a day  carvedilol 6.25 milliGRAM(s) Oral every 12 hours  clopidogrel Tablet 75 milliGRAM(s) Oral daily  dextrose 5%. 1000 milliLiter(s) IV Continuous <Continuous>  dextrose 5%. 1000 milliLiter(s) IV Continuous <Continuous>  dextrose 50% Injectable 25 Gram(s) IV Push once  dextrose 50% Injectable 25 Gram(s) IV Push once  dextrose 50% Injectable 12.5 Gram(s) IV Push once  dextrose Oral Gel 15 Gram(s) Oral once PRN  famotidine    Tablet 20 milliGRAM(s) Oral daily  ferrous    sulfate 325 milliGRAM(s) Oral daily  gabapentin 100 milliGRAM(s) Oral daily  glucagon  Injectable 1 milliGRAM(s) IntraMuscular once  hydrALAZINE 100 milliGRAM(s) Oral three times a day  insulin lispro (ADMELOG) corrective regimen sliding scale   SubCutaneous three times a day before meals  insulin lispro (ADMELOG) corrective regimen sliding scale   SubCutaneous at bedtime  isosorbide   dinitrate Tablet (ISORDIL) 30 milliGRAM(s) Oral three times a day  levothyroxine 50 MICROGram(s) Oral daily  polyethylene glycol 3350 17 Gram(s) Oral daily PRN  senna 2 Tablet(s) Oral at bedtime  tamsulosin 0.4 milliGRAM(s) Oral at bedtime      Vitals:  T(C): 36.5 (24 @ 05:30), Max: 36.9 (24 @ 17:30)  HR: 82 (24 @ 08:28) (70 - 88)  BP: 110/69 (03-27-24 @ 05:30) (110/58 - 111/72)  BP(mean): --  RR: 18 (24 @ 05:30) (18 - 18)  SpO2: 98% (24 @ 08:28) (95% - 100%)    Daily     Daily Weight in k.4 (27 Mar 2024 05:30)        I&O's Summary    26 Mar 2024 07:01  -  27 Mar 2024 07:00  --------------------------------------------------------  IN: 0 mL / OUT: 1500 mL / NET: -1500 mL        Physical Exam:  Appearance: No Acute Distress  Neck: JVP  Cardiovascular: Normal S1 S2  Respiratory: Clear to auscultation bilaterally  Gastrointestinal: Soft, Non-tender	  Skin: No cyanosis	  Neurologic: Non-focal  Extremities: No LE edema  Psychiatry: A & O x 3    Labs:                        12.0   10.33 )-----------( 138      ( 27 Mar 2024 04:47 )             37.6         139  |  100  |  64<H>  ----------------------------<  142<H>  3.7   |  26  |  2.53<H>    Ca    9.0      27 Mar 2024 04:47  Phos  3.1       Mg     1.80         TELEMETRY:    Echocardiogram:  TTE W or WO Ultrasound Enhancing Agent (24 @ 12:26)       CONCLUSIONS:      1. Left ventricular systolic function is severely decreased with an ejection fraction visually estimated at 20 to 25 %. Global left ventricular hypokinesis.   2. Normal right ventricular cavity size, with normal wall thickness, and normal systolic function.   3. Device lead is visualized.   4. The left atrium is normal.   5. The right atrium is normal in size.   6. No significant valvular disease.   7. No pericardial effusion seen.   8. Estimated pulmonary artery systolic pressure is 49 mmHg.   9. The inferior vena cava is dilated measuring 2.35 cm in diameter, (dilated >2.1cm) with abnormal inspiratory collapse (abnormal <50%) consistent with elevated right atrialpressure (~15, range 10-20mmHg).    ________________________________________________________________________________________  FINDINGS:     Left Ventricle:  The left ventricular cavity is mildly dilated. Left ventricular systolic function is severelydecreased with an ejection fraction visually estimated at 20 to 25%. There is global left ventricular hypokinesis.     Right Ventricle:  The right ventricular cavity is normal in size, with normal wall thickness and normal systolic function. A devicelead is visualized.     Left Atrium:  The left atrium is normal with an indexed volume of 24.01 ml/m².     Right Atrium:  The right atrium is normal in size with an indexed area of 7.50 cm²/m².     Aortic Valve:  The aortic valve is tricuspid with normal leaflet excursion. There is fibrocalcific aortic valve sclerosis without stenosis. There is no evidence of aortic regurgitation.     Mitral Valve:  Structurally normal mitral valve with normal leaflet excursion. There is symmetric leaflet tethering. There is no mitral valve stenosis. There is mild mitral regurgitation.     Tricuspid Valve:  Structurally normal tricuspid valve with normal leaflet excursion. There is mild tricuspid regurgitation. Estimated pulmonary artery systolic pressure is 49 mmHg.     Pulmonic Valve:  Structurally normal pulmonic valve with normal leaflet excursion. There is trace pulmonic regurgitation.     Aorta:  The aortic root at the sinuses of Valsalva is normal in size, measuring 3.10 cm (indexed 1.60 cm/m²). The ascending aorta diameter is normal in size, measuring 2.80 cm (indexed 1.45 cm/m²).     Pericardium:  No pericardial effusion seen.     Systemic Veins:  The inferior vena cava is dilated measuring 2.35 cm in diameter, (dilated >2.1cm) with abnormal inspiratory collapse (abnormal <50%) consistent with elevated right atrial pressure (~15, range 10-20mmHg).  ____________________________________________________________________  QUANTITATIVE DATA:  Left Ventricle Measurements: (Indexed to BSA)     IVSd (2D):   1.0 cm  LVPWd (2D):  1.0 cm  LVIDd (2D):  6.2 cm  LVIDs (2D):  5.5 cm  LV Mass:     273 g  141.5 g/m²  Visualized LV EF%: 20 to 25%     e' lateral: 7.83 cm/s  e' medial:  7.07 cm/s    Aorta Measurements: (Normal range) (Indexed to BSA)     Sinuses of Valsalva: 3.10 cm (3.1 - 3.7 cm)  Ao Asc prox:         2.80 cm       Left Atrium Measurements: (Indexed to BSA)  LA Diam 2D: 5.20 cm    Right Ventricle Measurements:     RV d, 2D:   3.5 cm  TV Mesha. S': 5.98 cm/s       LVOT / RVOT/ Qp/Qs Data: (Indexed to BSA)  LVOT Diameter: 2.10 cm  LVOT Vmax:     0.80 m/s  LVOT VTI:      11.25 cm  LVOT SV:       39.0 ml  20.16 ml/m²    Aortic Valve Measurements:  AV Vmax:                0.9 m/s  AV Peak Gradient:       3.3 mmHg  AV Mean Gradient: 2.0 mmHg  AV VTI:                 15.6 cm  AV VTI Ratio:           0.72  AoV EOA, Contin:        2.50 cm²  AoV EOA, Contin i:      1.29 cm²/m²  AoV Dimensionless Index 0.72       Tricuspid Valve Measurements:     TR Vmax:          2.9 m/s  TR Peak Gradient: 34.1 mmHg  RA Pressure:      15 mmHg  PASP:             49 mmHg     Interval History:  Patient resting comfortably in bed  Denies CP/SOB/palpitations/dizziness  No acute events overnight      Medications:  acetaminophen     Tablet .. 650 milliGRAM(s) Oral every 6 hours PRN  albuterol/ipratropium for Nebulization 3 milliLiter(s) Nebulizer every 6 hours  allopurinol 100 milliGRAM(s) Oral daily  apixaban 5 milliGRAM(s) Oral two times a day  atorvastatin 80 milliGRAM(s) Oral at bedtime  budesonide 160 MICROgram(s)/formoterol 4.5 MICROgram(s) Inhaler 2 Puff(s) Inhalation two times a day  buMETAnide 4 milliGRAM(s) Oral two times a day  carvedilol 6.25 milliGRAM(s) Oral every 12 hours  clopidogrel Tablet 75 milliGRAM(s) Oral daily  dextrose 5%. 1000 milliLiter(s) IV Continuous <Continuous>  dextrose 5%. 1000 milliLiter(s) IV Continuous <Continuous>  dextrose 50% Injectable 25 Gram(s) IV Push once  dextrose 50% Injectable 25 Gram(s) IV Push once  dextrose 50% Injectable 12.5 Gram(s) IV Push once  dextrose Oral Gel 15 Gram(s) Oral once PRN  famotidine    Tablet 20 milliGRAM(s) Oral daily  ferrous    sulfate 325 milliGRAM(s) Oral daily  gabapentin 100 milliGRAM(s) Oral daily  glucagon  Injectable 1 milliGRAM(s) IntraMuscular once  hydrALAZINE 100 milliGRAM(s) Oral three times a day  insulin lispro (ADMELOG) corrective regimen sliding scale   SubCutaneous three times a day before meals  insulin lispro (ADMELOG) corrective regimen sliding scale   SubCutaneous at bedtime  isosorbide   dinitrate Tablet (ISORDIL) 30 milliGRAM(s) Oral three times a day  levothyroxine 50 MICROGram(s) Oral daily  polyethylene glycol 3350 17 Gram(s) Oral daily PRN  senna 2 Tablet(s) Oral at bedtime  tamsulosin 0.4 milliGRAM(s) Oral at bedtime      Vitals:  T(C): 36.5 (24 @ 05:30), Max: 36.9 (24 @ 17:30)  HR: 82 (24 @ 08:28) (70 - 88)  BP: 110/69 (24 @ 05:30) (110/58 - 111/72)  BP(mean): --  RR: 18 (24 @ 05:30) (18 - 18)  SpO2: 98% (24 @ 08:28) (95% - 100%)    Daily     Daily Weight in k.4 (27 Mar 2024 05:30)        I&O's Summary    26 Mar 2024 07:01  -  27 Mar 2024 07:00  --------------------------------------------------------  IN: 0 mL / OUT: 1500 mL / NET: -1500 mL        Physical Exam:  Appearance: No Acute Distress  Neck: JVP 10-12  Cardiovascular: Normal S1 S2  Respiratory: Clear to auscultation bilaterally  Gastrointestinal: Soft, Non-tender	  Skin: No cyanosis	  Neurologic: Non-focal  Extremities: No LE edema  Psychiatry: A & O x 3    Labs:                        12.0   10.33 )-----------( 138      ( 27 Mar 2024 04:47 )             37.6         139  |  100  |  64<H>  ----------------------------<  142<H>  3.7   |  26  |  2.53<H>    Ca    9.0      27 Mar 2024 04:47  Phos  3.1       Mg     1.80         TELEMETRY: AFib/VPaced     Echocardiogram:  TTE W or WO Ultrasound Enhancing Agent (24 @ 12:26)       CONCLUSIONS:      1. Left ventricular systolic function is severely decreased with an ejection fraction visually estimated at 20 to 25 %. Global left ventricular hypokinesis.   2. Normal right ventricular cavity size, with normal wall thickness, and normal systolic function.   3. Device lead is visualized.   4. The left atrium is normal.   5. The right atrium is normal in size.   6. No significant valvular disease.   7. No pericardial effusion seen.   8. Estimated pulmonary artery systolic pressure is 49 mmHg.   9. The inferior vena cava is dilated measuring 2.35 cm in diameter, (dilated >2.1cm) with abnormal inspiratory collapse (abnormal <50%) consistent with elevated right atrialpressure (~15, range 10-20mmHg).    ________________________________________________________________________________________  FINDINGS:     Left Ventricle:  The left ventricular cavity is mildly dilated. Left ventricular systolic function is severelydecreased with an ejection fraction visually estimated at 20 to 25%. There is global left ventricular hypokinesis.     Right Ventricle:  The right ventricular cavity is normal in size, with normal wall thickness and normal systolic function. A devicelead is visualized.     Left Atrium:  The left atrium is normal with an indexed volume of 24.01 ml/m².     Right Atrium:  The right atrium is normal in size with an indexed area of 7.50 cm²/m².     Aortic Valve:  The aortic valve is tricuspid with normal leaflet excursion. There is fibrocalcific aortic valve sclerosis without stenosis. There is no evidence of aortic regurgitation.     Mitral Valve:  Structurally normal mitral valve with normal leaflet excursion. There is symmetric leaflet tethering. There is no mitral valve stenosis. There is mild mitral regurgitation.     Tricuspid Valve:  Structurally normal tricuspid valve with normal leaflet excursion. There is mild tricuspid regurgitation. Estimated pulmonary artery systolic pressure is 49 mmHg.     Pulmonic Valve:  Structurally normal pulmonic valve with normal leaflet excursion. There is trace pulmonic regurgitation.     Aorta:  The aortic root at the sinuses of Valsalva is normal in size, measuring 3.10 cm (indexed 1.60 cm/m²). The ascending aorta diameter is normal in size, measuring 2.80 cm (indexed 1.45 cm/m²).     Pericardium:  No pericardial effusion seen.     Systemic Veins:  The inferior vena cava is dilated measuring 2.35 cm in diameter, (dilated >2.1cm) with abnormal inspiratory collapse (abnormal <50%) consistent with elevated right atrial pressure (~15, range 10-20mmHg).  ____________________________________________________________________  QUANTITATIVE DATA:  Left Ventricle Measurements: (Indexed to BSA)     IVSd (2D):   1.0 cm  LVPWd (2D):  1.0 cm  LVIDd (2D):  6.2 cm  LVIDs (2D):  5.5 cm  LV Mass:     273 g  141.5 g/m²  Visualized LV EF%: 20 to 25%     e' lateral: 7.83 cm/s  e' medial:  7.07 cm/s    Aorta Measurements: (Normal range) (Indexed to BSA)     Sinuses of Valsalva: 3.10 cm (3.1 - 3.7 cm)  Ao Asc prox:         2.80 cm       Left Atrium Measurements: (Indexed to BSA)  LA Diam 2D: 5.20 cm    Right Ventricle Measurements:     RV d, 2D:   3.5 cm  TV Mesha. S': 5.98 cm/s       LVOT / RVOT/ Qp/Qs Data: (Indexed to BSA)  LVOT Diameter: 2.10 cm  LVOT Vmax:     0.80 m/s  LVOT VTI:      11.25 cm  LVOT SV:       39.0 ml  20.16 ml/m²    Aortic Valve Measurements:  AV Vmax:                0.9 m/s  AV Peak Gradient:       3.3 mmHg  AV Mean Gradient: 2.0 mmHg  AV VTI:                 15.6 cm  AV VTI Ratio:           0.72  AoV EOA, Contin:        2.50 cm²  AoV EOA, Contin i:      1.29 cm²/m²  AoV Dimensionless Index 0.72       Tricuspid Valve Measurements:     TR Vmax:          2.9 m/s  TR Peak Gradient: 34.1 mmHg  RA Pressure:      15 mmHg  PASP:             49 mmHg

## 2024-03-27 NOTE — PROGRESS NOTE ADULT - SUBJECTIVE AND OBJECTIVE BOX
Patient is a 68y old  Male who presents with a chief complaint of ADHF (27 Mar 2024 11:38)    SUBJECTIVE / OVERNIGHT EVENTS: No acute events. Comfortable, no chest pain, shortness of breath, dizziness or other discomfort.     MEDICATIONS  (STANDING):  albuterol/ipratropium for Nebulization 3 milliLiter(s) Nebulizer every 6 hours  allopurinol 100 milliGRAM(s) Oral daily  apixaban 5 milliGRAM(s) Oral two times a day  atorvastatin 80 milliGRAM(s) Oral at bedtime  budesonide 160 MICROgram(s)/formoterol 4.5 MICROgram(s) Inhaler 2 Puff(s) Inhalation two times a day  buMETAnide 4 milliGRAM(s) Oral two times a day  carvedilol 6.25 milliGRAM(s) Oral every 12 hours  clopidogrel Tablet 75 milliGRAM(s) Oral daily  dextrose 5%. 1000 milliLiter(s) (50 mL/Hr) IV Continuous <Continuous>  dextrose 5%. 1000 milliLiter(s) (100 mL/Hr) IV Continuous <Continuous>  dextrose 50% Injectable 25 Gram(s) IV Push once  dextrose 50% Injectable 25 Gram(s) IV Push once  dextrose 50% Injectable 12.5 Gram(s) IV Push once  famotidine    Tablet 20 milliGRAM(s) Oral daily  ferrous    sulfate 325 milliGRAM(s) Oral daily  gabapentin 100 milliGRAM(s) Oral daily  glucagon  Injectable 1 milliGRAM(s) IntraMuscular once  hydrALAZINE 100 milliGRAM(s) Oral three times a day  insulin lispro (ADMELOG) corrective regimen sliding scale   SubCutaneous three times a day before meals  insulin lispro (ADMELOG) corrective regimen sliding scale   SubCutaneous at bedtime  isosorbide   dinitrate Tablet (ISORDIL) 30 milliGRAM(s) Oral three times a day  levothyroxine 50 MICROGram(s) Oral daily  senna 2 Tablet(s) Oral at bedtime  tamsulosin 0.4 milliGRAM(s) Oral at bedtime    MEDICATIONS  (PRN):  acetaminophen     Tablet .. 650 milliGRAM(s) Oral every 6 hours PRN Temp greater or equal to 38C (100.4F), Mild Pain (1 - 3)  dextrose Oral Gel 15 Gram(s) Oral once PRN Blood Glucose LESS THAN 70 milliGRAM(s)/deciliter  polyethylene glycol 3350 17 Gram(s) Oral daily PRN Constipation    CAPILLARY BLOOD GLUCOSE    POCT Blood Glucose.: 163 mg/dL (27 Mar 2024 14:00)  POCT Blood Glucose.: 180 mg/dL (27 Mar 2024 11:45)  POCT Blood Glucose.: 159 mg/dL (27 Mar 2024 07:03)  POCT Blood Glucose.: 142 mg/dL (26 Mar 2024 21:59)  POCT Blood Glucose.: 200 mg/dL (26 Mar 2024 17:00)    I&O's Summary    26 Mar 2024 07:01  -  27 Mar 2024 07:00  --------------------------------------------------------  IN: 0 mL / OUT: 1500 mL / NET: -1500 mL    PHYSICAL EXAM:  Vital Signs Last 24 Hrs  T(C): 36.7 (27 Mar 2024 13:40), Max: 36.9 (26 Mar 2024 17:30)  T(F): 98.1 (27 Mar 2024 13:40), Max: 98.4 (26 Mar 2024 17:30)  HR: 82 (27 Mar 2024 13:40) (70 - 88)  BP: 103/60 (27 Mar 2024 13:40) (103/60 - 111/72)  BP(mean): --  RR: 18 (27 Mar 2024 13:40) (18 - 18)  SpO2: 96% (27 Mar 2024 13:40) (95% - 100%)    Parameters below as of 27 Mar 2024 13:40  Patient On (Oxygen Delivery Method): nasal cannula  O2 Flow (L/min): 2    CONSTITUTIONAL: NAD, sitting up in chair  EYES: conjunctiva and sclera clear  ENMT: Moist oral mucosa  RESPIRATORY: Normal respiratory effort; lungs are clear to auscultation bilaterally  CARDIOVASCULAR: Regular rate and rhythm, normal S1 and S2, No lower extremity edema  ABDOMEN: Nontender to palpation, normoactive bowel sounds, no rebound/guarding  PSYCH: calm    LABS:                        12.0   10.33 )-----------( 138      ( 27 Mar 2024 04:47 )             37.6     03-27    139  |  100  |  64<H>  ----------------------------<  142<H>  3.7   |  26  |  2.53<H>    Ca    9.0      27 Mar 2024 04:47  Phos  3.1     03-27  Mg     1.80     03-27    Urinalysis Basic - ( 27 Mar 2024 04:47 )    Color: x / Appearance: x / SG: x / pH: x  Gluc: 142 mg/dL / Ketone: x  / Bili: x / Urobili: x   Blood: x / Protein: x / Nitrite: x   Leuk Esterase: x / RBC: x / WBC x   Sq Epi: x / Non Sq Epi: x / Bacteria: x    RADIOLOGY & ADDITIONAL TESTS: Reviewed    COORDINATION OF CARE:  Care Discussed with Consultants/Other Providers [Y- Medicine ACP]

## 2024-03-27 NOTE — PROGRESS NOTE ADULT - ASSESSMENT
69 y/o [place] male with PMHX of HTN, HFrEF, CAD w/ CABG 2014 at [facility], CRT-D, COPD (100 pack year history), CVA presents with dyspnea and difficulty breathing, found to have AECOPD and ADHF.

## 2024-03-27 NOTE — PROGRESS NOTE ADULT - SUBJECTIVE AND OBJECTIVE BOX
Interval History:  No acute events overnight  Telemetry: V paced, afib underlying    MEDICATIONS  (STANDING):  albuterol/ipratropium for Nebulization 3 milliLiter(s) Nebulizer every 6 hours  allopurinol 100 milliGRAM(s) Oral daily  apixaban 5 milliGRAM(s) Oral two times a day  atorvastatin 80 milliGRAM(s) Oral at bedtime  budesonide 160 MICROgram(s)/formoterol 4.5 MICROgram(s) Inhaler 2 Puff(s) Inhalation two times a day  buMETAnide 4 milliGRAM(s) Oral two times a day  carvedilol 6.25 milliGRAM(s) Oral every 12 hours  clopidogrel Tablet 75 milliGRAM(s) Oral daily  dextrose 5%. 1000 milliLiter(s) (50 mL/Hr) IV Continuous <Continuous>  dextrose 5%. 1000 milliLiter(s) (100 mL/Hr) IV Continuous <Continuous>  dextrose 50% Injectable 25 Gram(s) IV Push once  dextrose 50% Injectable 12.5 Gram(s) IV Push once  dextrose 50% Injectable 25 Gram(s) IV Push once  famotidine    Tablet 20 milliGRAM(s) Oral daily  ferrous    sulfate 325 milliGRAM(s) Oral daily  gabapentin 100 milliGRAM(s) Oral daily  glucagon  Injectable 1 milliGRAM(s) IntraMuscular once  hydrALAZINE 100 milliGRAM(s) Oral three times a day  insulin lispro (ADMELOG) corrective regimen sliding scale   SubCutaneous at bedtime  insulin lispro (ADMELOG) corrective regimen sliding scale   SubCutaneous three times a day before meals  isosorbide   dinitrate Tablet (ISORDIL) 30 milliGRAM(s) Oral three times a day  levothyroxine 50 MICROGram(s) Oral daily  senna 2 Tablet(s) Oral at bedtime  tamsulosin 0.4 milliGRAM(s) Oral at bedtime    MEDICATIONS  (PRN):  acetaminophen     Tablet .. 650 milliGRAM(s) Oral every 6 hours PRN Temp greater or equal to 38C (100.4F), Mild Pain (1 - 3)  dextrose Oral Gel 15 Gram(s) Oral once PRN Blood Glucose LESS THAN 70 milliGRAM(s)/deciliter  polyethylene glycol 3350 17 Gram(s) Oral daily PRN Constipation    Vital Signs Last 24 Hrs  T(C): 36.5 (03-27-24 @ 05:30), Max: 36.9 (03-26-24 @ 17:30)  T(F): 97.7 (03-27-24 @ 05:30), Max: 98.4 (03-26-24 @ 17:30)  HR: 82 (03-27-24 @ 08:28) (70 - 88)  BP: 110/69 (03-27-24 @ 05:30) (110/58 - 111/72)  BP(mean): --  RR: 18 (03-27-24 @ 05:30) (18 - 18)  SpO2: 98% (03-27-24 @ 08:28) (95% - 100%)    Appearance: Normal	  HEENT:   Normal oral mucosa, PERRL, EOMI	  Lymphatic: No lymphadenopathy  Cardiovascular: No JVD, No murmurs, No edema  Respiratory: Lungs clear to auscultation	  Psychiatry: A & O x 3, Mood & affect appropriate  Gastrointestinal:  Soft, Non-tender, + BS	  Skin: No rashes, No ecchymoses, No cyanosis	  Neurologic: Non-focal  Extremities: Normal range of motion, No clubbing, cyanosis or edema  Vascular: Peripheral pulses palpable 2+ bilaterally    LABS:	 	    CBC Full  -  ( 27 Mar 2024 04:47 )  WBC Count : 10.33 K/uL  Hemoglobin : 12.0 g/dL  Hematocrit : 37.6 %  Platelet Count - Automated : 138 K/uL  Mean Cell Volume : 96.9 fL  Mean Cell Hemoglobin : 30.9 pg  Mean Cell Hemoglobin Concentration : 31.9 gm/dL  Auto Neutrophil # : 8.00 K/uL  Auto Lymphocyte # : 1.22 K/uL  Auto Monocyte # : 0.79 K/uL  Auto Eosinophil # : 0.25 K/uL  Auto Basophil # : 0.04 K/uL  Auto Neutrophil % : 77.5 %  Auto Lymphocyte % : 11.8 %  Auto Monocyte % : 7.6 %  Auto Eosinophil % : 2.4 %  Auto Basophil % : 0.4 %    03-27    139  |  100  |  64<H>  ----------------------------<  142<H>  3.7   |  26  |  2.53<H>  03-26    139  |  100  |  67<H>  ----------------------------<  143<H>  3.6   |  27  |  2.41<H>    Ca    9.0      27 Mar 2024 04:47  Ca    9.1      26 Mar 2024 03:20  Phos  3.1     03-27  Phos  3.4     03-26  Mg     1.80     03-27  Mg     1.80     03-26

## 2024-03-27 NOTE — PROGRESS NOTE ADULT - PROBLEM SELECTOR PLAN 1
Appreciate HF consult and recommendations, now appearing euvolemic  - HF recommending PO bumix 4 BID (transitioned from IV)  - c/w standing daily weights  -CXR with retrocardiac opacity, procal mildly elevated, s/p CAP tx  -renal team consulted, Torres placed for management of urinary retention, undergoing TOV, continuing to trend sCr  - S/p RHC and mems 3/26, f/u additional cardiology recs

## 2024-03-28 LAB
ANION GAP SERPL CALC-SCNC: 13 MMOL/L — SIGNIFICANT CHANGE UP (ref 7–14)
BASOPHILS # BLD AUTO: 0.03 K/UL — SIGNIFICANT CHANGE UP (ref 0–0.2)
BASOPHILS NFR BLD AUTO: 0.3 % — SIGNIFICANT CHANGE UP (ref 0–2)
BUN SERPL-MCNC: 65 MG/DL — HIGH (ref 7–23)
CALCIUM SERPL-MCNC: 8.8 MG/DL — SIGNIFICANT CHANGE UP (ref 8.4–10.5)
CHLORIDE SERPL-SCNC: 99 MMOL/L — SIGNIFICANT CHANGE UP (ref 98–107)
CO2 SERPL-SCNC: 26 MMOL/L — SIGNIFICANT CHANGE UP (ref 22–31)
CREAT SERPL-MCNC: 2.5 MG/DL — HIGH (ref 0.5–1.3)
EGFR: 27 ML/MIN/1.73M2 — LOW
EOSINOPHIL # BLD AUTO: 0.17 K/UL — SIGNIFICANT CHANGE UP (ref 0–0.5)
EOSINOPHIL NFR BLD AUTO: 1.7 % — SIGNIFICANT CHANGE UP (ref 0–6)
GLUCOSE BLDC GLUCOMTR-MCNC: 117 MG/DL — HIGH (ref 70–99)
GLUCOSE BLDC GLUCOMTR-MCNC: 119 MG/DL — HIGH (ref 70–99)
GLUCOSE BLDC GLUCOMTR-MCNC: 186 MG/DL — HIGH (ref 70–99)
GLUCOSE BLDC GLUCOMTR-MCNC: 214 MG/DL — HIGH (ref 70–99)
GLUCOSE SERPL-MCNC: 136 MG/DL — HIGH (ref 70–99)
HCT VFR BLD CALC: 36.2 % — LOW (ref 39–50)
HGB BLD-MCNC: 11.4 G/DL — LOW (ref 13–17)
IANC: 7.64 K/UL — HIGH (ref 1.8–7.4)
IMM GRANULOCYTES NFR BLD AUTO: 0.3 % — SIGNIFICANT CHANGE UP (ref 0–0.9)
LYMPHOCYTES # BLD AUTO: 1.15 K/UL — SIGNIFICANT CHANGE UP (ref 1–3.3)
LYMPHOCYTES # BLD AUTO: 11.8 % — LOW (ref 13–44)
MAGNESIUM SERPL-MCNC: 1.7 MG/DL — SIGNIFICANT CHANGE UP (ref 1.6–2.6)
MCHC RBC-ENTMCNC: 30.4 PG — SIGNIFICANT CHANGE UP (ref 27–34)
MCHC RBC-ENTMCNC: 31.5 GM/DL — LOW (ref 32–36)
MCV RBC AUTO: 96.5 FL — SIGNIFICANT CHANGE UP (ref 80–100)
MONOCYTES # BLD AUTO: 0.71 K/UL — SIGNIFICANT CHANGE UP (ref 0–0.9)
MONOCYTES NFR BLD AUTO: 7.3 % — SIGNIFICANT CHANGE UP (ref 2–14)
NEUTROPHILS # BLD AUTO: 7.64 K/UL — HIGH (ref 1.8–7.4)
NEUTROPHILS NFR BLD AUTO: 78.6 % — HIGH (ref 43–77)
NRBC # BLD: 0 /100 WBCS — SIGNIFICANT CHANGE UP (ref 0–0)
NRBC # FLD: 0 K/UL — SIGNIFICANT CHANGE UP (ref 0–0)
PHOSPHATE SERPL-MCNC: 3.4 MG/DL — SIGNIFICANT CHANGE UP (ref 2.5–4.5)
PLATELET # BLD AUTO: 134 K/UL — LOW (ref 150–400)
POTASSIUM SERPL-MCNC: 3.8 MMOL/L — SIGNIFICANT CHANGE UP (ref 3.5–5.3)
POTASSIUM SERPL-SCNC: 3.8 MMOL/L — SIGNIFICANT CHANGE UP (ref 3.5–5.3)
RBC # BLD: 3.75 M/UL — LOW (ref 4.2–5.8)
RBC # FLD: 16.5 % — HIGH (ref 10.3–14.5)
SODIUM SERPL-SCNC: 138 MMOL/L — SIGNIFICANT CHANGE UP (ref 135–145)
WBC # BLD: 9.73 K/UL — SIGNIFICANT CHANGE UP (ref 3.8–10.5)
WBC # FLD AUTO: 9.73 K/UL — SIGNIFICANT CHANGE UP (ref 3.8–10.5)

## 2024-03-28 PROCEDURE — 99233 SBSQ HOSP IP/OBS HIGH 50: CPT

## 2024-03-28 PROCEDURE — 99231 SBSQ HOSP IP/OBS SF/LOW 25: CPT | Mod: FS

## 2024-03-28 PROCEDURE — 99232 SBSQ HOSP IP/OBS MODERATE 35: CPT

## 2024-03-28 RX ORDER — MAGNESIUM SULFATE 500 MG/ML
2 VIAL (ML) INJECTION ONCE
Refills: 0 | Status: COMPLETED | OUTPATIENT
Start: 2024-03-28 | End: 2024-03-28

## 2024-03-28 RX ORDER — POTASSIUM CHLORIDE 20 MEQ
40 PACKET (EA) ORAL EVERY 4 HOURS
Refills: 0 | Status: DISCONTINUED | OUTPATIENT
Start: 2024-03-28 | End: 2024-03-28

## 2024-03-28 RX ADMIN — ISOSORBIDE DINITRATE 30 MILLIGRAM(S): 5 TABLET ORAL at 13:23

## 2024-03-28 RX ADMIN — Medication 50 MICROGRAM(S): at 05:31

## 2024-03-28 RX ADMIN — BUMETANIDE 4 MILLIGRAM(S): 0.25 INJECTION INTRAMUSCULAR; INTRAVENOUS at 17:00

## 2024-03-28 RX ADMIN — Medication 100 MILLIGRAM(S): at 05:31

## 2024-03-28 RX ADMIN — GABAPENTIN 100 MILLIGRAM(S): 400 CAPSULE ORAL at 11:16

## 2024-03-28 RX ADMIN — TAMSULOSIN HYDROCHLORIDE 0.4 MILLIGRAM(S): 0.4 CAPSULE ORAL at 22:08

## 2024-03-28 RX ADMIN — Medication 325 MILLIGRAM(S): at 11:15

## 2024-03-28 RX ADMIN — Medication 1: at 11:15

## 2024-03-28 RX ADMIN — SENNA PLUS 2 TABLET(S): 8.6 TABLET ORAL at 22:08

## 2024-03-28 RX ADMIN — Medication 100 MILLIGRAM(S): at 11:15

## 2024-03-28 RX ADMIN — BUDESONIDE AND FORMOTEROL FUMARATE DIHYDRATE 2 PUFF(S): 160; 4.5 AEROSOL RESPIRATORY (INHALATION) at 08:38

## 2024-03-28 RX ADMIN — Medication 3 MILLILITER(S): at 10:01

## 2024-03-28 RX ADMIN — APIXABAN 5 MILLIGRAM(S): 2.5 TABLET, FILM COATED ORAL at 17:01

## 2024-03-28 RX ADMIN — APIXABAN 5 MILLIGRAM(S): 2.5 TABLET, FILM COATED ORAL at 05:30

## 2024-03-28 RX ADMIN — BUMETANIDE 4 MILLIGRAM(S): 0.25 INJECTION INTRAMUSCULAR; INTRAVENOUS at 05:32

## 2024-03-28 RX ADMIN — ISOSORBIDE DINITRATE 30 MILLIGRAM(S): 5 TABLET ORAL at 05:33

## 2024-03-28 RX ADMIN — Medication 3 MILLILITER(S): at 23:11

## 2024-03-28 RX ADMIN — CLOPIDOGREL BISULFATE 75 MILLIGRAM(S): 75 TABLET, FILM COATED ORAL at 11:14

## 2024-03-28 RX ADMIN — CARVEDILOL PHOSPHATE 6.25 MILLIGRAM(S): 80 CAPSULE, EXTENDED RELEASE ORAL at 17:01

## 2024-03-28 RX ADMIN — BUDESONIDE AND FORMOTEROL FUMARATE DIHYDRATE 2 PUFF(S): 160; 4.5 AEROSOL RESPIRATORY (INHALATION) at 22:09

## 2024-03-28 RX ADMIN — Medication 100 MILLIGRAM(S): at 13:23

## 2024-03-28 RX ADMIN — ATORVASTATIN CALCIUM 80 MILLIGRAM(S): 80 TABLET, FILM COATED ORAL at 22:09

## 2024-03-28 RX ADMIN — Medication 3 MILLILITER(S): at 04:03

## 2024-03-28 RX ADMIN — FAMOTIDINE 20 MILLIGRAM(S): 10 INJECTION INTRAVENOUS at 11:14

## 2024-03-28 RX ADMIN — Medication 25 GRAM(S): at 16:46

## 2024-03-28 RX ADMIN — ISOSORBIDE DINITRATE 30 MILLIGRAM(S): 5 TABLET ORAL at 22:24

## 2024-03-28 RX ADMIN — CARVEDILOL PHOSPHATE 6.25 MILLIGRAM(S): 80 CAPSULE, EXTENDED RELEASE ORAL at 05:30

## 2024-03-28 RX ADMIN — Medication 3 MILLILITER(S): at 15:36

## 2024-03-28 RX ADMIN — Medication 100 MILLIGRAM(S): at 22:09

## 2024-03-28 NOTE — PROGRESS NOTE ADULT - NS ATTEND AMEND GEN_ALL_CORE FT
68 year old male with PMHX of HTN, chronic HFrEF, CAD w/ CABG 2014 at [facility], s/p [employer] [place] CRT-D, recently diagnosed PAF-started on Eliquis since March 8, asthma/COPD on 2 L O2 at home, CKD stage III, CVA with left sided weakness, HTN, HLD, DMT2 and hypothyroidism who presents with SOB found to be in acute decompensated heart failure and in AFl and UTI.  Interrogation revealed 4.9% AF/AFl burden since March 8 with biv pacing at 80.7%. No ICD shocks noted.  Patient was started on IV Bumex gtt for diuresis. A repeat TTE showed LVEF 20-25% normal LA.  EKG and Telemetry demonstrate typical AFl with V rate in 'bpm.   UTI now resolved. Need confirmatory type and screen. Appreciate HF management, follow up primary cardiology team. Continue AC Eliquis, CHADS2-VASC2 score is 7. NPO after midnight for ablation tomorrow

## 2024-03-28 NOTE — PROGRESS NOTE ADULT - PROBLEM SELECTOR PLAN 1
Bumex 4 mg po BID   Please order metolazone 5mg X1 (after K and Mg repletion completed)  Coreg 6.25 mg BID (hold  SBP <90)   Hydralazine 100 mg TID (hold  SBP <90)   Isordil 30 mg TID (hold  SBP <90)  Strict I/O   Daily standing weights  Monitor lytes replete K>4.0 and Mg>2.0  Appreciate EP recommendations (BRANDIE/ AF  ablation 3/28)  Pending final recommendations from HF attending Bumex 4 mg po BID   Please order metolazone 5mg X1 (after K and Mg repletion completed)  Coreg 6.25 mg BID (hold  SBP <90)   Hydralazine 100 mg TID (hold  SBP <90)   Isordil 30 mg TID (hold  SBP <90)  Strict I/O   Daily standing weights  Monitor lytes replete K>4.0 and Mg>2.0  Appreciate EP recommendations (BRANDIE/ AF  ablation 3/29)  Pending final recommendations from HF attending Bumex 4 mg po BID   Coreg 6.25 mg BID (hold  SBP <90)   Hydralazine 100 mg TID (hold  SBP <90)   Isordil 30 mg TID (hold  SBP <90)  Strict I/O   Daily standing weights  Monitor lytes replete K>4.0 and Mg>2.0  Appreciate EP recommendations (BRANDIE/ AF  ablation 3/29)  Pending final recommendations from HF attending

## 2024-03-28 NOTE — PROGRESS NOTE ADULT - SUBJECTIVE AND OBJECTIVE BOX
Patient is a 68y old  Male who presents with a chief complaint of ADHF (28 Mar 2024 12:23)    SUBJECTIVE / OVERNIGHT EVENTS: No acute events. Sitting up in chair, comfortable, no shortness of breath, chest pain or other discomfort reported.     MEDICATIONS  (STANDING):  albuterol/ipratropium for Nebulization 3 milliLiter(s) Nebulizer every 6 hours  allopurinol 100 milliGRAM(s) Oral daily  apixaban 5 milliGRAM(s) Oral two times a day  atorvastatin 80 milliGRAM(s) Oral at bedtime  budesonide 160 MICROgram(s)/formoterol 4.5 MICROgram(s) Inhaler 2 Puff(s) Inhalation two times a day  buMETAnide 4 milliGRAM(s) Oral two times a day  carvedilol 6.25 milliGRAM(s) Oral every 12 hours  clopidogrel Tablet 75 milliGRAM(s) Oral daily  dextrose 5%. 1000 milliLiter(s) (50 mL/Hr) IV Continuous <Continuous>  dextrose 5%. 1000 milliLiter(s) (100 mL/Hr) IV Continuous <Continuous>  dextrose 50% Injectable 25 Gram(s) IV Push once  dextrose 50% Injectable 25 Gram(s) IV Push once  dextrose 50% Injectable 12.5 Gram(s) IV Push once  famotidine    Tablet 20 milliGRAM(s) Oral daily  ferrous    sulfate 325 milliGRAM(s) Oral daily  gabapentin 100 milliGRAM(s) Oral daily  glucagon  Injectable 1 milliGRAM(s) IntraMuscular once  hydrALAZINE 100 milliGRAM(s) Oral three times a day  insulin lispro (ADMELOG) corrective regimen sliding scale   SubCutaneous three times a day before meals  insulin lispro (ADMELOG) corrective regimen sliding scale   SubCutaneous at bedtime  isosorbide   dinitrate Tablet (ISORDIL) 30 milliGRAM(s) Oral three times a day  levothyroxine 50 MICROGram(s) Oral daily  potassium chloride    Tablet ER 40 milliEquivalent(s) Oral once  senna 2 Tablet(s) Oral at bedtime  tamsulosin 0.4 milliGRAM(s) Oral at bedtime    MEDICATIONS  (PRN):  acetaminophen     Tablet .. 650 milliGRAM(s) Oral every 6 hours PRN Temp greater or equal to 38C (100.4F), Mild Pain (1 - 3)  dextrose Oral Gel 15 Gram(s) Oral once PRN Blood Glucose LESS THAN 70 milliGRAM(s)/deciliter  polyethylene glycol 3350 17 Gram(s) Oral daily PRN Constipation      CAPILLARY BLOOD GLUCOSE      POCT Blood Glucose.: 186 mg/dL (28 Mar 2024 11:10)  POCT Blood Glucose.: 117 mg/dL (28 Mar 2024 06:50)  POCT Blood Glucose.: 126 mg/dL (27 Mar 2024 21:55)  POCT Blood Glucose.: 168 mg/dL (27 Mar 2024 17:01)    I&O's Summary    27 Mar 2024 07:01  -  28 Mar 2024 07:00  --------------------------------------------------------  IN: 0 mL / OUT: 600 mL / NET: -600 mL    28 Mar 2024 07:01  -  28 Mar 2024 15:06  --------------------------------------------------------  IN: 120 mL / OUT: 200 mL / NET: -80 mL        PHYSICAL EXAM:  Vital Signs Last 24 Hrs  T(C): 36.7 (28 Mar 2024 13:20), Max: 36.8 (28 Mar 2024 11:45)  T(F): 98.1 (28 Mar 2024 13:20), Max: 98.2 (28 Mar 2024 11:45)  HR: 77 (28 Mar 2024 13:20) (67 - 88)  BP: 107/72 (28 Mar 2024 13:20) (105/63 - 128/66)  BP(mean): --  RR: 17 (28 Mar 2024 13:20) (17 - 18)  SpO2: 100% (28 Mar 2024 13:20) (98% - 100%)    Parameters below as of 28 Mar 2024 13:20  Patient On (Oxygen Delivery Method): nasal cannula  O2 Flow (L/min): 2    CONSTITUTIONAL: NAD, sitting up in chair  EYES: conjunctiva and sclera clear  ENMT: Moist oral mucosa  RESPIRATORY: Normal respiratory effort; lungs are clear to auscultation bilaterally  CARDIOVASCULAR: Regular rate and rhythm, normal S1 and S2, No lower extremity edema  ABDOMEN: Nontender to palpation, normoactive bowel sounds, no rebound/guarding  PSYCH: calm    LABS:                        11.4   9.73  )-----------( 134      ( 28 Mar 2024 03:19 )             36.2     03-28    138  |  99  |  65<H>  ----------------------------<  136<H>  3.8   |  26  |  2.50<H>    Ca    8.8      28 Mar 2024 03:19  Phos  3.4     03-28  Mg     1.70     03-28            Urinalysis Basic - ( 28 Mar 2024 03:19 )    Color: x / Appearance: x / SG: x / pH: x  Gluc: 136 mg/dL / Ketone: x  / Bili: x / Urobili: x   Blood: x / Protein: x / Nitrite: x   Leuk Esterase: x / RBC: x / WBC x   Sq Epi: x / Non Sq Epi: x / Bacteria: x      RADIOLOGY & ADDITIONAL TESTS: reviewed    COORDINATION OF CARE:  Care Discussed with Consultants/Other Providers [Y- Medicine ACP]

## 2024-03-28 NOTE — PROGRESS NOTE ADULT - ASSESSMENT
69 y/o [place] male with PMHX of HTN, HFrEF, CAD w/ CABG 2014 at [facility], CRT-D, COPD (100 pack year history), CVA presents with dyspnea and difficulty breathing, found to have AECOPD and ADHF.

## 2024-03-28 NOTE — PROGRESS NOTE ADULT - SUBJECTIVE AND OBJECTIVE BOX
Interval History:  No acute events overnight  Telemetry: Aflutter Vpaced    MEDICATIONS  (STANDING):  albuterol/ipratropium for Nebulization 3 milliLiter(s) Nebulizer every 6 hours  allopurinol 100 milliGRAM(s) Oral daily  apixaban 5 milliGRAM(s) Oral two times a day  atorvastatin 80 milliGRAM(s) Oral at bedtime  budesonide 160 MICROgram(s)/formoterol 4.5 MICROgram(s) Inhaler 2 Puff(s) Inhalation two times a day  buMETAnide 4 milliGRAM(s) Oral two times a day  carvedilol 6.25 milliGRAM(s) Oral every 12 hours  clopidogrel Tablet 75 milliGRAM(s) Oral daily  dextrose 5%. 1000 milliLiter(s) (50 mL/Hr) IV Continuous <Continuous>  dextrose 5%. 1000 milliLiter(s) (100 mL/Hr) IV Continuous <Continuous>  dextrose 50% Injectable 25 Gram(s) IV Push once  dextrose 50% Injectable 12.5 Gram(s) IV Push once  dextrose 50% Injectable 25 Gram(s) IV Push once  famotidine    Tablet 20 milliGRAM(s) Oral daily  ferrous    sulfate 325 milliGRAM(s) Oral daily  gabapentin 100 milliGRAM(s) Oral daily  glucagon  Injectable 1 milliGRAM(s) IntraMuscular once  hydrALAZINE 100 milliGRAM(s) Oral three times a day  insulin lispro (ADMELOG) corrective regimen sliding scale   SubCutaneous three times a day before meals  insulin lispro (ADMELOG) corrective regimen sliding scale   SubCutaneous at bedtime  isosorbide   dinitrate Tablet (ISORDIL) 30 milliGRAM(s) Oral three times a day  levothyroxine 50 MICROGram(s) Oral daily  potassium chloride    Tablet ER 40 milliEquivalent(s) Oral once  senna 2 Tablet(s) Oral at bedtime  tamsulosin 0.4 milliGRAM(s) Oral at bedtime    MEDICATIONS  (PRN):  acetaminophen     Tablet .. 650 milliGRAM(s) Oral every 6 hours PRN Temp greater or equal to 38C (100.4F), Mild Pain (1 - 3)  dextrose Oral Gel 15 Gram(s) Oral once PRN Blood Glucose LESS THAN 70 milliGRAM(s)/deciliter  polyethylene glycol 3350 17 Gram(s) Oral daily PRN Constipation    Vital Signs Last 24 Hrs  T(C): 36.6 (03-28-24 @ 05:25), Max: 36.7 (03-27-24 @ 13:00)  T(F): 97.9 (03-28-24 @ 05:25), Max: 98.1 (03-27-24 @ 13:00)  HR: 80 (03-28-24 @ 05:25) (80 - 88)  BP: 128/66 (03-28-24 @ 05:25) (103/60 - 128/66)  BP(mean): --  RR: 18 (03-28-24 @ 05:25) (18 - 18)  SpO2: 98% (03-28-24 @ 05:25) (96% - 100%)    Appearance: Normal	  HEENT:   Normal oral mucosa, PERRL, EOMI	  Lymphatic: No lymphadenopathy  Cardiovascular: No JVD, No murmurs, No edema  Respiratory: Lungs clear to auscultation	  Psychiatry: A & O x 3, Mood & affect appropriate  Gastrointestinal:  Soft, Non-tender, + BS	  Skin: No rashes, No ecchymoses, No cyanosis	  Neurologic: Non-focal  Extremities: Normal range of motion, No clubbing, cyanosis or edema  Vascular: Peripheral pulses palpable 2+ bilaterally    LABS:	 	    CBC Full  -  ( 28 Mar 2024 03:19 )  WBC Count : 9.73 K/uL  Hemoglobin : 11.4 g/dL  Hematocrit : 36.2 %  Platelet Count - Automated : 134 K/uL  Mean Cell Volume : 96.5 fL  Mean Cell Hemoglobin : 30.4 pg  Mean Cell Hemoglobin Concentration : 31.5 gm/dL  Auto Neutrophil # : 7.64 K/uL  Auto Lymphocyte # : 1.15 K/uL  Auto Monocyte # : 0.71 K/uL  Auto Eosinophil # : 0.17 K/uL  Auto Basophil # : 0.03 K/uL  Auto Neutrophil % : 78.6 %  Auto Lymphocyte % : 11.8 %  Auto Monocyte % : 7.3 %  Auto Eosinophil % : 1.7 %  Auto Basophil % : 0.3 %    03-28    138  |  99  |  65<H>  ----------------------------<  136<H>  3.8   |  26  |  2.50<H>  03-27    139  |  100  |  64<H>  ----------------------------<  142<H>  3.7   |  26  |  2.53<H>    Ca    8.8      28 Mar 2024 03:19  Ca    9.0      27 Mar 2024 04:47  Phos  3.4     03-28  Phos  3.1     03-27  Mg     1.70     03-28  Mg     1.80     03-27

## 2024-03-28 NOTE — PROGRESS NOTE ADULT - ASSESSMENT
68 year old male with PMHX of HTN, chronic HFrEF, CAD w/ CABG 2014 at [facility], s/p [employer] [place] CRT-D, recently diagnosed PAF-started on Eliquis since March 8, asthma/COPD on 2 L O2 at home, CKD stage III, CVA with left sided weakness, HTN, HLD, DMT2 and hypothyroidism who presents with SOB found to be in acute decompensated heart failure and in AFl and UTI.  Interrogation revealed 4.9% AF/AFl burden since March 8 with biv pacing at 80.7%. No ICD shocks noted.  Patient was started on IV Bumex gtt for diuresis. A repeat TTE showed LVEF 20-25% normal LA.  EKG and Telemetry demonstrate typical AFl with V rate in 'bpm.   UTI now resolved.          - Need confirmatory type and screen  -Appreciate HF management, follow up primary cardiology team  -Continue AC Eliquis, CHADS2-VASC2 score is 7   -Optimize K >4, Mg >2   -will d/w with EP attendings   - NPO after midnight for ablation tomorrow

## 2024-03-28 NOTE — PROGRESS NOTE ADULT - NS ATTEND AMEND GEN_ALL_CORE FT
K and Mg remain low did not get metolazone.   Would place on standing K/mg. would not give metolazone.   DAILY STANDING WEIGHTS.   mems today 23.   for afib ablation tomorrow  Fox Smallwood

## 2024-03-28 NOTE — PROGRESS NOTE ADULT - SUBJECTIVE AND OBJECTIVE BOX
[facility] DIVISION OF KIDNEY DISEASE AND HYPERTENSION  [phone]    RENAL FOLLOW UP NOTE  --------------------------------------------------------------------------------  Patient seen and examined this morning.  Resting comfortably in bed.    PAST HISTORY  --------------------------------------------------------------------------------  No significant changes to PMH, PSH, FHx, SHx, unless otherwise noted    ALLERGIES & MEDICATIONS  --------------------------------------------------------------------------------  Allergies    No Known Allergies    Intolerances      Standing Inpatient Medications  albuterol/ipratropium for Nebulization 3 milliLiter(s) Nebulizer every 6 hours  allopurinol 100 milliGRAM(s) Oral daily  apixaban 5 milliGRAM(s) Oral two times a day  atorvastatin 80 milliGRAM(s) Oral at bedtime  budesonide 160 MICROgram(s)/formoterol 4.5 MICROgram(s) Inhaler 2 Puff(s) Inhalation two times a day  buMETAnide 4 milliGRAM(s) Oral two times a day  carvedilol 6.25 milliGRAM(s) Oral every 12 hours  clopidogrel Tablet 75 milliGRAM(s) Oral daily  dextrose 5%. 1000 milliLiter(s) IV Continuous <Continuous>  dextrose 5%. 1000 milliLiter(s) IV Continuous <Continuous>  dextrose 50% Injectable 25 Gram(s) IV Push once  dextrose 50% Injectable 25 Gram(s) IV Push once  dextrose 50% Injectable 12.5 Gram(s) IV Push once  famotidine    Tablet 20 milliGRAM(s) Oral daily  ferrous    sulfate 325 milliGRAM(s) Oral daily  gabapentin 100 milliGRAM(s) Oral daily  glucagon  Injectable 1 milliGRAM(s) IntraMuscular once  hydrALAZINE 100 milliGRAM(s) Oral three times a day  insulin lispro (ADMELOG) corrective regimen sliding scale   SubCutaneous three times a day before meals  insulin lispro (ADMELOG) corrective regimen sliding scale   SubCutaneous at bedtime  isosorbide   dinitrate Tablet (ISORDIL) 30 milliGRAM(s) Oral three times a day  levothyroxine 50 MICROGram(s) Oral daily  metolazone 5 milliGRAM(s) Oral once  potassium chloride    Tablet ER 40 milliEquivalent(s) Oral once  senna 2 Tablet(s) Oral at bedtime  tamsulosin 0.4 milliGRAM(s) Oral at bedtime    PRN Inpatient Medications  acetaminophen     Tablet .. 650 milliGRAM(s) Oral every 6 hours PRN  dextrose Oral Gel 15 Gram(s) Oral once PRN  polyethylene glycol 3350 17 Gram(s) Oral daily PRN      FOCUSED REVIEW OF SYSTEMS  --------------------------------------------------------------------------------  denies CP/palpitations  denies SOB/cough  denies oliguria/dysuria      VITALS/PHYSICAL EXAM  --------------------------------------------------------------------------------  T(C): 36.8 (03-28-24 @ 11:45), Max: 36.8 (03-28-24 @ 11:45)  HR: 67 (03-28-24 @ 11:45) (67 - 88)  BP: 106/59 (03-28-24 @ 11:45) (103/60 - 128/66)  RR: 18 (03-28-24 @ 11:45) (18 - 18)  SpO2: 98% (03-28-24 @ 11:45) (96% - 100%)  Wt(kg): --        03-27-24 @ 07:01  -  03-28-24 @ 07:00  --------------------------------------------------------  IN: 0 mL / OUT: 600 mL / NET: -600 mL    03-28-24 @ 07:01  -  03-28-24 @ 12:23  --------------------------------------------------------  IN: 120 mL / OUT: 200 mL / NET: -80 mL      Physical Exam:  	Gen: NAD, lying in bed  	Pulm: CTA B/L  	CV: RRR, S1S2  	Abd: +BS, soft, nontender/nondistended  	: No suprapubic tenderness.  + condom cath          Extremity: No LE edema  	Neuro: A&O x 3      LABS/STUDIES  --------------------------------------------------------------------------------              11.4   9.73  >-----------<  134      [03-28-24 @ 03:19]              36.2     138  |  99  |  65  ----------------------------<  136      [03-28-24 @ 03:19]  3.8   |  26  |  2.50        Ca     8.8     [03-28-24 @ 03:19]      Mg     1.70     [03-28-24 @ 03:19]      Phos  3.4     [03-28-24 @ 03:19]              Creatinine Trend:  SCr 2.50 [03-28 @ 03:19]  SCr 2.53 [03-27 @ 04:47]  SCr 2.41 [03-26 @ 03:20]  SCr 2.41 [03-25 @ 04:40]  SCr 2.57 [03-24 @ 05:47]              Urinalysis - [03-28-24 @ 03:19]      Color  / Appearance  / SG  / pH       Gluc 136 / Ketone   / Bili  / Urobili        Blood  / Protein  / Leuk Est  / Nitrite       RBC  / WBC  / Hyaline  / Gran  / Sq Epi  / Non Sq Epi  / Bacteria       TSH 4.43      [03-17-24 @ 06:24]

## 2024-03-28 NOTE — PROGRESS NOTE ADULT - PROBLEM SELECTOR PLAN 1
Appreciate HF consult and recommendations, now appearing euvolemic  - HF recommending PO bumix 4 BID (transitioned from IV)  - c/w standing daily weights  - CXR with retrocardiac opacity, procal mildly elevated, s/p CAP tx  - renal team consulted, Torres placed for management of urinary retention, undergoing TOV, continuing to trend sCr  - S/p RHC and mems 3/26, f/u additional cardiology recs

## 2024-03-28 NOTE — PROGRESS NOTE ADULT - PROBLEM SELECTOR PLAN 1
Patient here with ADHF. Recent TTE done showing EF of 20-25% with dilated IVC. Patient was being diuresed with bumex 4mg IV BID but had poor response, now switched to bumex infusion at 2mg/hour with diuril dose on 3/19 and had UOP of ~3.6L. In last 24 hours, without diuril on bumex infusion with 3% HTS 150cc bolus, .    -s/p conversion of Bumex IV to PO.  no further indication for HTS  -s/p CardioMEMs device with plan for cardioversion on 03/29

## 2024-03-28 NOTE — PROGRESS NOTE ADULT - SUBJECTIVE AND OBJECTIVE BOX
Interval History:  Patient resting comfortably in bed   Denies CP/SOB/palpitations/dizziness  No acute events overnight      Medications:  acetaminophen     Tablet .. 650 milliGRAM(s) Oral every 6 hours PRN  albuterol/ipratropium for Nebulization 3 milliLiter(s) Nebulizer every 6 hours  allopurinol 100 milliGRAM(s) Oral daily  apixaban 5 milliGRAM(s) Oral two times a day  atorvastatin 80 milliGRAM(s) Oral at bedtime  budesonide 160 MICROgram(s)/formoterol 4.5 MICROgram(s) Inhaler 2 Puff(s) Inhalation two times a day  buMETAnide 4 milliGRAM(s) Oral two times a day  carvedilol 6.25 milliGRAM(s) Oral every 12 hours  clopidogrel Tablet 75 milliGRAM(s) Oral daily  dextrose 5%. 1000 milliLiter(s) IV Continuous <Continuous>  dextrose 5%. 1000 milliLiter(s) IV Continuous <Continuous>  dextrose 50% Injectable 25 Gram(s) IV Push once  dextrose 50% Injectable 25 Gram(s) IV Push once  dextrose 50% Injectable 12.5 Gram(s) IV Push once  dextrose Oral Gel 15 Gram(s) Oral once PRN  famotidine    Tablet 20 milliGRAM(s) Oral daily  ferrous    sulfate 325 milliGRAM(s) Oral daily  gabapentin 100 milliGRAM(s) Oral daily  glucagon  Injectable 1 milliGRAM(s) IntraMuscular once  hydrALAZINE 100 milliGRAM(s) Oral three times a day  insulin lispro (ADMELOG) corrective regimen sliding scale   SubCutaneous three times a day before meals  insulin lispro (ADMELOG) corrective regimen sliding scale   SubCutaneous at bedtime  isosorbide   dinitrate Tablet (ISORDIL) 30 milliGRAM(s) Oral three times a day  levothyroxine 50 MICROGram(s) Oral daily  polyethylene glycol 3350 17 Gram(s) Oral daily PRN  potassium chloride    Tablet ER 40 milliEquivalent(s) Oral once  senna 2 Tablet(s) Oral at bedtime  tamsulosin 0.4 milliGRAM(s) Oral at bedtime      Vitals:  T(C): 36.6 (24 @ 05:25), Max: 36.7 (24 @ 13:00)  HR: 80 (24 @ 05:25) (80 - 88)  BP: 128/66 (24 @ 05:25) (103/60 - 128/66)  BP(mean): --  RR: 18 (24 @ 05:25) (18 - 18)  SpO2: 98% (24 @ 05:25) (96% - 100%)    Daily     Daily Weight in k.8 (28 Mar 2024 05:25)        I&O's Summary    27 Mar 2024 07:01  -  28 Mar 2024 07:00  --------------------------------------------------------  IN: 0 mL / OUT: 600 mL / NET: -600 mL        Physical Exam:  Appearance: No Acute Distress  Neck: JVP  Cardiovascular: Normal S1 S2  Respiratory: Clear to auscultation bilaterally  Gastrointestinal: Soft, Non-tender	  Skin: No cyanosis	  Neurologic: Non-focal  Extremities: No LE edema  Psychiatry: A & O x 3    Labs:                        11.4   9.73  )-----------( 134      ( 28 Mar 2024 03:19 )             36.2     28    138  |  99  |  65<H>  ----------------------------<  136<H>  3.8   |  26  |  2.50<H>    Ca    8.8      28 Mar 2024 03:19  Phos  3.4       Mg     1.70             TELEMETRY:    Echocardiogram:    TTE W or WO Ultrasound Enhancing Agent (24 @ 12:26)     CONCLUSIONS:      1. Left ventricular systolic function is severely decreased with an ejection fraction visually estimated at 20 to 25 %. Global left ventricular hypokinesis.   2. Normal right ventricular cavity size, with normal wall thickness, and normal systolic function.   3. Device lead is visualized.   4. The left atrium is normal.   5. The right atrium is normal in size.   6. No significant valvular disease.   7. No pericardial effusion seen.   8. Estimated pulmonary artery systolic pressure is 49 mmHg.   9. The inferior vena cava is dilated measuring 2.35 cm in diameter, (dilated >2.1cm) with abnormal inspiratory collapse (abnormal <50%) consistent with elevated right atrialpressure (~15, range 10-20mmHg).    ________________________________________________________________________________________  FINDINGS:     Left Ventricle:  The left ventricular cavity is mildly dilated. Left ventricular systolic function is severelydecreased with an ejection fraction visually estimated at 20 to 25%. There is global left ventricular hypokinesis.     Right Ventricle:  The right ventricular cavity is normal in size, with normal wall thickness and normal systolic function. A devicelead is visualized.     Left Atrium:  The left atrium is normal with an indexed volume of 24.01 ml/m².     Right Atrium:  The right atrium is normal in size with an indexed area of 7.50 cm²/m².     Aortic Valve:  The aortic valve is tricuspid with normal leaflet excursion. There is fibrocalcific aortic valve sclerosis without stenosis. There is no evidence of aortic regurgitation.     Mitral Valve:  Structurally normal mitral valve with normal leaflet excursion. There is symmetric leaflet tethering. There is no mitral valve stenosis. There is mild mitral regurgitation.     Tricuspid Valve:  Structurally normal tricuspid valve with normal leaflet excursion. There is mild tricuspid regurgitation. Estimated pulmonary artery systolic pressure is 49 mmHg.     Pulmonic Valve:  Structurally normal pulmonic valve with normal leaflet excursion. There is trace pulmonic regurgitation.     Aorta:  The aortic root at the sinuses of Valsalva is normal in size, measuring 3.10 cm (indexed 1.60 cm/m²). The ascending aorta diameter is normal in size, measuring 2.80 cm (indexed 1.45 cm/m²).     Pericardium:  No pericardial effusion seen.     Systemic Veins:  The inferior vena cava is dilated measuring 2.35 cm in diameter, (dilated >2.1cm) with abnormal inspiratory collapse (abnormal <50%) consistent with elevated right atrial pressure (~15, range 10-20mmHg).  ____________________________________________________________________  QUANTITATIVE DATA:  Left Ventricle Measurements: (Indexed to BSA)     IVSd (2D):   1.0 cm  LVPWd (2D):  1.0 cm  LVIDd (2D):  6.2 cm  LVIDs (2D):  5.5 cm  LV Mass:     273 g  141.5 g/m²  Visualized LV EF%: 20 to 25%     e' lateral: 7.83 cm/s  e' medial:  7.07 cm/s    Aorta Measurements: (Normal range) (Indexed to BSA)     Sinuses of Valsalva: 3.10 cm (3.1 - 3.7 cm)  Ao Asc prox:         2.80 cm       Left Atrium Measurements: (Indexed to BSA)  LA Diam 2D: 5.20 cm    Right Ventricle Measurements:     RV d, 2D:   3.5 cm  TV Mesha. S': 5.98 cm/s       LVOT / RVOT/ Qp/Qs Data: (Indexed to BSA)  LVOT Diameter: 2.10 cm  LVOT Vmax:     0.80 m/s  LVOT VTI:      11.25 cm  LVOT SV:       39.0 ml  20.16 ml/m²    Aortic Valve Measurements:  AV Vmax:                0.9 m/s  AV Peak Gradient:       3.3 mmHg  AV Mean Gradient: 2.0 mmHg  AV VTI:                 15.6 cm  AV VTI Ratio:           0.72  AoV EOA, Contin:        2.50 cm²  AoV EOA, Contin i:      1.29 cm²/m²  AoV Dimensionless Index 0.72       Tricuspid Valve Measurements:     TR Vmax:          2.9 m/s  TR Peak Gradient: 34.1 mmHg  RA Pressure:      15 mmHg  PASP:             49 mmHg     Interval History:  Patient resting comfortably in bed   Denies CP/SOB/palpitations/dizziness  No acute events overnight      Medications:  acetaminophen     Tablet .. 650 milliGRAM(s) Oral every 6 hours PRN  albuterol/ipratropium for Nebulization 3 milliLiter(s) Nebulizer every 6 hours  allopurinol 100 milliGRAM(s) Oral daily  apixaban 5 milliGRAM(s) Oral two times a day  atorvastatin 80 milliGRAM(s) Oral at bedtime  budesonide 160 MICROgram(s)/formoterol 4.5 MICROgram(s) Inhaler 2 Puff(s) Inhalation two times a day  buMETAnide 4 milliGRAM(s) Oral two times a day  carvedilol 6.25 milliGRAM(s) Oral every 12 hours  clopidogrel Tablet 75 milliGRAM(s) Oral daily  dextrose 5%. 1000 milliLiter(s) IV Continuous <Continuous>  dextrose 5%. 1000 milliLiter(s) IV Continuous <Continuous>  dextrose 50% Injectable 25 Gram(s) IV Push once  dextrose 50% Injectable 25 Gram(s) IV Push once  dextrose 50% Injectable 12.5 Gram(s) IV Push once  dextrose Oral Gel 15 Gram(s) Oral once PRN  famotidine    Tablet 20 milliGRAM(s) Oral daily  ferrous    sulfate 325 milliGRAM(s) Oral daily  gabapentin 100 milliGRAM(s) Oral daily  glucagon  Injectable 1 milliGRAM(s) IntraMuscular once  hydrALAZINE 100 milliGRAM(s) Oral three times a day  insulin lispro (ADMELOG) corrective regimen sliding scale   SubCutaneous three times a day before meals  insulin lispro (ADMELOG) corrective regimen sliding scale   SubCutaneous at bedtime  isosorbide   dinitrate Tablet (ISORDIL) 30 milliGRAM(s) Oral three times a day  levothyroxine 50 MICROGram(s) Oral daily  polyethylene glycol 3350 17 Gram(s) Oral daily PRN  potassium chloride    Tablet ER 40 milliEquivalent(s) Oral once  senna 2 Tablet(s) Oral at bedtime  tamsulosin 0.4 milliGRAM(s) Oral at bedtime      Vitals:  T(C): 36.6 (24 @ 05:25), Max: 36.7 (24 @ 13:00)  HR: 80 (24 @ 05:25) (80 - 88)  BP: 128/66 (24 @ 05:25) (103/60 - 128/66)  BP(mean): --  RR: 18 (24 @ 05:25) (18 - 18)  SpO2: 98% (24 @ 05:25) (96% - 100%)    Daily     Daily Weight in k.8 (28 Mar 2024 05:25)        I&O's Summary    27 Mar 2024 07:01  -  28 Mar 2024 07:00  --------------------------------------------------------  IN: 0 mL / OUT: 600 mL / NET: -600 mL        Physical Exam:  Appearance: No Acute Distress  Neck: JVP~10  Cardiovascular: Normal S1 S2  Respiratory: Clear to auscultation bilaterally  Gastrointestinal: Soft, Non-tender	  Skin: No cyanosis	  Neurologic: Non-focal  Extremities: No LE edema  Psychiatry: A & O x 3    Labs:                        11.4   9.73  )-----------( 134      ( 28 Mar 2024 03:19 )             36.2         138  |  99  |  65<H>  ----------------------------<  136<H>  3.8   |  26  |  2.50<H>    Ca    8.8      28 Mar 2024 03:19  Phos  3.4       Mg     1.70             TELEMETRY: AFib/VPaced    Echocardiogram:    TTE W or WO Ultrasound Enhancing Agent (24 @ 12:26)     CONCLUSIONS:      1. Left ventricular systolic function is severely decreased with an ejection fraction visually estimated at 20 to 25 %. Global left ventricular hypokinesis.   2. Normal right ventricular cavity size, with normal wall thickness, and normal systolic function.   3. Device lead is visualized.   4. The left atrium is normal.   5. The right atrium is normal in size.   6. No significant valvular disease.   7. No pericardial effusion seen.   8. Estimated pulmonary artery systolic pressure is 49 mmHg.   9. The inferior vena cava is dilated measuring 2.35 cm in diameter, (dilated >2.1cm) with abnormal inspiratory collapse (abnormal <50%) consistent with elevated right atrialpressure (~15, range 10-20mmHg).    ________________________________________________________________________________________  FINDINGS:     Left Ventricle:  The left ventricular cavity is mildly dilated. Left ventricular systolic function is severelydecreased with an ejection fraction visually estimated at 20 to 25%. There is global left ventricular hypokinesis.     Right Ventricle:  The right ventricular cavity is normal in size, with normal wall thickness and normal systolic function. A devicelead is visualized.     Left Atrium:  The left atrium is normal with an indexed volume of 24.01 ml/m².     Right Atrium:  The right atrium is normal in size with an indexed area of 7.50 cm²/m².     Aortic Valve:  The aortic valve is tricuspid with normal leaflet excursion. There is fibrocalcific aortic valve sclerosis without stenosis. There is no evidence of aortic regurgitation.     Mitral Valve:  Structurally normal mitral valve with normal leaflet excursion. There is symmetric leaflet tethering. There is no mitral valve stenosis. There is mild mitral regurgitation.     Tricuspid Valve:  Structurally normal tricuspid valve with normal leaflet excursion. There is mild tricuspid regurgitation. Estimated pulmonary artery systolic pressure is 49 mmHg.     Pulmonic Valve:  Structurally normal pulmonic valve with normal leaflet excursion. There is trace pulmonic regurgitation.     Aorta:  The aortic root at the sinuses of Valsalva is normal in size, measuring 3.10 cm (indexed 1.60 cm/m²). The ascending aorta diameter is normal in size, measuring 2.80 cm (indexed 1.45 cm/m²).     Pericardium:  No pericardial effusion seen.     Systemic Veins:  The inferior vena cava is dilated measuring 2.35 cm in diameter, (dilated >2.1cm) with abnormal inspiratory collapse (abnormal <50%) consistent with elevated right atrial pressure (~15, range 10-20mmHg).  ____________________________________________________________________  QUANTITATIVE DATA:  Left Ventricle Measurements: (Indexed to BSA)     IVSd (2D):   1.0 cm  LVPWd (2D):  1.0 cm  LVIDd (2D):  6.2 cm  LVIDs (2D):  5.5 cm  LV Mass:     273 g  141.5 g/m²  Visualized LV EF%: 20 to 25%     e' lateral: 7.83 cm/s  e' medial:  7.07 cm/s    Aorta Measurements: (Normal range) (Indexed to BSA)     Sinuses of Valsalva: 3.10 cm (3.1 - 3.7 cm)  Ao Asc prox:         2.80 cm       Left Atrium Measurements: (Indexed to BSA)  LA Diam 2D: 5.20 cm    Right Ventricle Measurements:     RV d, 2D:   3.5 cm  TV Mesha. S': 5.98 cm/s       LVOT / RVOT/ Qp/Qs Data: (Indexed to BSA)  LVOT Diameter: 2.10 cm  LVOT Vmax:     0.80 m/s  LVOT VTI:      11.25 cm  LVOT SV:       39.0 ml  20.16 ml/m²    Aortic Valve Measurements:  AV Vmax:                0.9 m/s  AV Peak Gradient:       3.3 mmHg  AV Mean Gradient: 2.0 mmHg  AV VTI:                 15.6 cm  AV VTI Ratio:           0.72  AoV EOA, Contin:        2.50 cm²  AoV EOA, Contin i:      1.29 cm²/m²  AoV Dimensionless Index 0.72       Tricuspid Valve Measurements:     TR Vmax:          2.9 m/s  TR Peak Gradient: 34.1 mmHg  RA Pressure:      15 mmHg  PASP:             49 mmHg

## 2024-03-28 NOTE — PROGRESS NOTE ADULT - ASSESSMENT
69 y/o [place] male with PMHX of HTN, HFrEF (TTE 7/18/23 LVEF 25%, LVIDD 5.6cm, LA 4.9cm, mild MR), CAD w/ CABG 2014 at [facility], CRT-D, asthma/COPD on 2 L O2 at home, CVA with left sided weakness comes in with complaints of chest pressure and SOB. He admits to 2 pillow orthopnea, KINNEY that was worsening where he would get SOB after walking (with rollator walker) for less than 2 blocks which was going on for 4 days. Also admits to not urinating well to home dose of diuretics. Per record patient admits to URI 2 weeks ago, and was also found to be in a.fib and was started on Eliquis. He was found to be in acute on chronic systolic heart failure and started on IV Bumex 4 BID. HF service consulted to help manage care in this patient who is followed by Dr. Garo Dobson as an outpatient. Labs show proBNP 14K, trop 93, lactate 2.8 which has since improved to 1.5, BUN/Cr 63/2.94 . Overall stage C HF, NYHA class III  Bumex drip switched to PO Bumex and CardioMEMS placed (3/28).    CardioMEMS PAD  reading: (Goal   )    3/27- 23/24         69 y/o [place] male with PMHX of HTN, HFrEF (TTE 7/18/23 LVEF 25%, LVIDD 5.6cm, LA 4.9cm, mild MR), CAD w/ CABG 2014 at [facility], CRT-D, asthma/COPD on 2 L O2 at home, CVA with left sided weakness comes in with complaints of chest pressure and SOB. He admits to 2 pillow orthopnea, KINNEY that was worsening where he would get SOB after walking (with rollator walker) for less than 2 blocks which was going on for 4 days. Also admits to not urinating well to home dose of diuretics. Per record patient admits to URI 2 weeks ago, and was also found to be in a.fib and was started on Eliquis. He was found to be in acute on chronic systolic heart failure and started on IV Bumex 4 BID. HF service consulted to help manage care in this patient who is followed by Dr. Garo Dobson as an outpatient. Labs show proBNP 14K, trop 93, lactate 2.8 which has since improved to 1.5, BUN/Cr 63/2.94 . Overall stage C HF, NYHA class III  Bumex drip switched to PO Bumex and CardioMEMS placed (3/28).    CardioMEMS PAD  reading: (Goal 20)    3/27- 23/24

## 2024-03-29 ENCOUNTER — TRANSCRIPTION ENCOUNTER (OUTPATIENT)
Age: 69
End: 2024-03-29

## 2024-03-29 DIAGNOSIS — E87.1 HYPO-OSMOLALITY AND HYPONATREMIA: ICD-10-CM

## 2024-03-29 LAB
ANION GAP SERPL CALC-SCNC: 13 MMOL/L — SIGNIFICANT CHANGE UP (ref 7–14)
ANION GAP SERPL CALC-SCNC: 14 MMOL/L — SIGNIFICANT CHANGE UP (ref 7–14)
APTT BLD: 34.4 SEC — SIGNIFICANT CHANGE UP (ref 24.5–35.6)
BUN SERPL-MCNC: 68 MG/DL — HIGH (ref 7–23)
BUN SERPL-MCNC: 71 MG/DL — HIGH (ref 7–23)
CALCIUM SERPL-MCNC: 8.6 MG/DL — SIGNIFICANT CHANGE UP (ref 8.4–10.5)
CALCIUM SERPL-MCNC: 8.8 MG/DL — SIGNIFICANT CHANGE UP (ref 8.4–10.5)
CHLORIDE SERPL-SCNC: 95 MMOL/L — LOW (ref 98–107)
CHLORIDE SERPL-SCNC: 96 MMOL/L — LOW (ref 98–107)
CO2 SERPL-SCNC: 25 MMOL/L — SIGNIFICANT CHANGE UP (ref 22–31)
CO2 SERPL-SCNC: 27 MMOL/L — SIGNIFICANT CHANGE UP (ref 22–31)
CREAT SERPL-MCNC: 2.54 MG/DL — HIGH (ref 0.5–1.3)
CREAT SERPL-MCNC: 2.66 MG/DL — HIGH (ref 0.5–1.3)
EGFR: 25 ML/MIN/1.73M2 — LOW
EGFR: 27 ML/MIN/1.73M2 — LOW
GLUCOSE BLDC GLUCOMTR-MCNC: 129 MG/DL — HIGH (ref 70–99)
GLUCOSE BLDC GLUCOMTR-MCNC: 157 MG/DL — HIGH (ref 70–99)
GLUCOSE BLDC GLUCOMTR-MCNC: 187 MG/DL — HIGH (ref 70–99)
GLUCOSE BLDC GLUCOMTR-MCNC: 195 MG/DL — HIGH (ref 70–99)
GLUCOSE SERPL-MCNC: 158 MG/DL — HIGH (ref 70–99)
GLUCOSE SERPL-MCNC: 228 MG/DL — HIGH (ref 70–99)
HCT VFR BLD CALC: 34.8 % — LOW (ref 39–50)
HGB BLD-MCNC: 11.5 G/DL — LOW (ref 13–17)
INR BLD: 1.99 RATIO — HIGH (ref 0.85–1.18)
MAGNESIUM SERPL-MCNC: 2 MG/DL — SIGNIFICANT CHANGE UP (ref 1.6–2.6)
MAGNESIUM SERPL-MCNC: 2.1 MG/DL — SIGNIFICANT CHANGE UP (ref 1.6–2.6)
MCHC RBC-ENTMCNC: 31.4 PG — SIGNIFICANT CHANGE UP (ref 27–34)
MCHC RBC-ENTMCNC: 33 GM/DL — SIGNIFICANT CHANGE UP (ref 32–36)
MCV RBC AUTO: 95.1 FL — SIGNIFICANT CHANGE UP (ref 80–100)
NRBC # BLD: 0 /100 WBCS — SIGNIFICANT CHANGE UP (ref 0–0)
NRBC # FLD: 0 K/UL — SIGNIFICANT CHANGE UP (ref 0–0)
PHOSPHATE SERPL-MCNC: 3.4 MG/DL — SIGNIFICANT CHANGE UP (ref 2.5–4.5)
PHOSPHATE SERPL-MCNC: 4.6 MG/DL — HIGH (ref 2.5–4.5)
PLATELET # BLD AUTO: 175 K/UL — SIGNIFICANT CHANGE UP (ref 150–400)
POTASSIUM SERPL-MCNC: 3.7 MMOL/L — SIGNIFICANT CHANGE UP (ref 3.5–5.3)
POTASSIUM SERPL-MCNC: 4.4 MMOL/L — SIGNIFICANT CHANGE UP (ref 3.5–5.3)
POTASSIUM SERPL-SCNC: 3.7 MMOL/L — SIGNIFICANT CHANGE UP (ref 3.5–5.3)
POTASSIUM SERPL-SCNC: 4.4 MMOL/L — SIGNIFICANT CHANGE UP (ref 3.5–5.3)
PROTHROM AB SERPL-ACNC: 22 SEC — HIGH (ref 9.5–13)
RBC # BLD: 3.66 M/UL — LOW (ref 4.2–5.8)
RBC # FLD: 16.3 % — HIGH (ref 10.3–14.5)
SODIUM SERPL-SCNC: 134 MMOL/L — LOW (ref 135–145)
SODIUM SERPL-SCNC: 136 MMOL/L — SIGNIFICANT CHANGE UP (ref 135–145)
WBC # BLD: 9.59 K/UL — SIGNIFICANT CHANGE UP (ref 3.8–10.5)
WBC # FLD AUTO: 9.59 K/UL — SIGNIFICANT CHANGE UP (ref 3.8–10.5)

## 2024-03-29 PROCEDURE — 93010 ELECTROCARDIOGRAM REPORT: CPT

## 2024-03-29 PROCEDURE — 99232 SBSQ HOSP IP/OBS MODERATE 35: CPT

## 2024-03-29 PROCEDURE — 99233 SBSQ HOSP IP/OBS HIGH 50: CPT

## 2024-03-29 PROCEDURE — 93653 COMPRE EP EVAL TX SVT: CPT

## 2024-03-29 RX ORDER — APIXABAN 2.5 MG/1
5 TABLET, FILM COATED ORAL
Refills: 0 | Status: DISCONTINUED | OUTPATIENT
Start: 2024-03-29 | End: 2024-03-30

## 2024-03-29 RX ORDER — PHENYLEPHRINE HYDROCHLORIDE 10 MG/ML
0.4 INJECTION INTRAVENOUS
Qty: 40 | Refills: 0 | Status: DISCONTINUED | OUTPATIENT
Start: 2024-03-29 | End: 2024-03-29

## 2024-03-29 RX ORDER — POTASSIUM CHLORIDE 20 MEQ
40 PACKET (EA) ORAL ONCE
Refills: 0 | Status: COMPLETED | OUTPATIENT
Start: 2024-03-29 | End: 2024-03-29

## 2024-03-29 RX ADMIN — Medication 40 MILLIEQUIVALENT(S): at 04:29

## 2024-03-29 RX ADMIN — ATORVASTATIN CALCIUM 80 MILLIGRAM(S): 80 TABLET, FILM COATED ORAL at 23:07

## 2024-03-29 RX ADMIN — Medication 100 MILLIGRAM(S): at 16:30

## 2024-03-29 RX ADMIN — FAMOTIDINE 20 MILLIGRAM(S): 10 INJECTION INTRAVENOUS at 16:22

## 2024-03-29 RX ADMIN — BUMETANIDE 4 MILLIGRAM(S): 0.25 INJECTION INTRAMUSCULAR; INTRAVENOUS at 17:31

## 2024-03-29 RX ADMIN — Medication 325 MILLIGRAM(S): at 16:22

## 2024-03-29 RX ADMIN — Medication 100 MILLIGRAM(S): at 06:05

## 2024-03-29 RX ADMIN — ISOSORBIDE DINITRATE 30 MILLIGRAM(S): 5 TABLET ORAL at 06:08

## 2024-03-29 RX ADMIN — Medication 3 MILLILITER(S): at 21:07

## 2024-03-29 RX ADMIN — CARVEDILOL PHOSPHATE 6.25 MILLIGRAM(S): 80 CAPSULE, EXTENDED RELEASE ORAL at 17:32

## 2024-03-29 RX ADMIN — PHENYLEPHRINE HYDROCHLORIDE 11.7 MICROGRAM(S)/KG/MIN: 10 INJECTION INTRAVENOUS at 12:41

## 2024-03-29 RX ADMIN — Medication 100 MILLIGRAM(S): at 23:06

## 2024-03-29 RX ADMIN — BUMETANIDE 4 MILLIGRAM(S): 0.25 INJECTION INTRAMUSCULAR; INTRAVENOUS at 06:06

## 2024-03-29 RX ADMIN — CARVEDILOL PHOSPHATE 6.25 MILLIGRAM(S): 80 CAPSULE, EXTENDED RELEASE ORAL at 06:06

## 2024-03-29 RX ADMIN — APIXABAN 5 MILLIGRAM(S): 2.5 TABLET, FILM COATED ORAL at 17:33

## 2024-03-29 RX ADMIN — Medication 50 MICROGRAM(S): at 06:07

## 2024-03-29 RX ADMIN — Medication 3 MILLILITER(S): at 04:43

## 2024-03-29 RX ADMIN — GABAPENTIN 100 MILLIGRAM(S): 400 CAPSULE ORAL at 16:27

## 2024-03-29 RX ADMIN — APIXABAN 5 MILLIGRAM(S): 2.5 TABLET, FILM COATED ORAL at 06:07

## 2024-03-29 RX ADMIN — ISOSORBIDE DINITRATE 30 MILLIGRAM(S): 5 TABLET ORAL at 23:06

## 2024-03-29 RX ADMIN — Medication 1: at 16:27

## 2024-03-29 RX ADMIN — BUDESONIDE AND FORMOTEROL FUMARATE DIHYDRATE 2 PUFF(S): 160; 4.5 AEROSOL RESPIRATORY (INHALATION) at 23:05

## 2024-03-29 RX ADMIN — SENNA PLUS 2 TABLET(S): 8.6 TABLET ORAL at 23:07

## 2024-03-29 RX ADMIN — TAMSULOSIN HYDROCHLORIDE 0.4 MILLIGRAM(S): 0.4 CAPSULE ORAL at 23:07

## 2024-03-29 NOTE — PROGRESS NOTE ADULT - SUBJECTIVE AND OBJECTIVE BOX
Interval History:  No acute events overnight  Telemetry: Aflutter Vpaced    MEDICATIONS  (STANDING):  albuterol/ipratropium for Nebulization 3 milliLiter(s) Nebulizer every 6 hours  allopurinol 100 milliGRAM(s) Oral daily  apixaban 5 milliGRAM(s) Oral two times a day  atorvastatin 80 milliGRAM(s) Oral at bedtime  budesonide 160 MICROgram(s)/formoterol 4.5 MICROgram(s) Inhaler 2 Puff(s) Inhalation two times a day  buMETAnide 4 milliGRAM(s) Oral two times a day  carvedilol 6.25 milliGRAM(s) Oral every 12 hours  clopidogrel Tablet 75 milliGRAM(s) Oral daily  dextrose 5%. 1000 milliLiter(s) (50 mL/Hr) IV Continuous <Continuous>  dextrose 5%. 1000 milliLiter(s) (100 mL/Hr) IV Continuous <Continuous>  dextrose 50% Injectable 25 Gram(s) IV Push once  dextrose 50% Injectable 25 Gram(s) IV Push once  dextrose 50% Injectable 12.5 Gram(s) IV Push once  famotidine    Tablet 20 milliGRAM(s) Oral daily  ferrous    sulfate 325 milliGRAM(s) Oral daily  gabapentin 100 milliGRAM(s) Oral daily  glucagon  Injectable 1 milliGRAM(s) IntraMuscular once  hydrALAZINE 100 milliGRAM(s) Oral three times a day  insulin lispro (ADMELOG) corrective regimen sliding scale   SubCutaneous at bedtime  insulin lispro (ADMELOG) corrective regimen sliding scale   SubCutaneous three times a day before meals  isosorbide   dinitrate Tablet (ISORDIL) 30 milliGRAM(s) Oral three times a day  levothyroxine 50 MICROGram(s) Oral daily  senna 2 Tablet(s) Oral at bedtime  tamsulosin 0.4 milliGRAM(s) Oral at bedtime    MEDICATIONS  (PRN):  acetaminophen     Tablet .. 650 milliGRAM(s) Oral every 6 hours PRN Temp greater or equal to 38C (100.4F), Mild Pain (1 - 3)  dextrose Oral Gel 15 Gram(s) Oral once PRN Blood Glucose LESS THAN 70 milliGRAM(s)/deciliter  polyethylene glycol 3350 17 Gram(s) Oral daily PRN Constipation    ICU Vital Signs Last 24 Hrs  T(C): 36.9 (29 Mar 2024 06:05), Max: 37 (28 Mar 2024 22:08)  T(F): 98.5 (29 Mar 2024 06:05), Max: 98.6 (28 Mar 2024 22:08)  HR: 86 (29 Mar 2024 06:05) (67 - 89)  BP: 105/73 (29 Mar 2024 06:05) (105/63 - 112/77)  BP(mean): --  ABP: --  ABP(mean): --  RR: 18 (29 Mar 2024 06:05) (17 - 18)  SpO2: 96% (29 Mar 2024 06:05) (96% - 100%)    O2 Parameters below as of 29 Mar 2024 06:05  Patient On (Oxygen Delivery Method): nasal cannula  O2 Flow (L/min): 2      Appearance: Normal	  HEENT:   Normal oral mucosa, PERRL, EOMI	  Lymphatic: No lymphadenopathy  Cardiovascular: No JVD, No murmurs, No edema  Respiratory: Lungs clear to auscultation	  Psychiatry: A & O x 3, Mood & affect appropriate  Gastrointestinal:  Soft, Non-tender, + BS	  Skin: No rashes, No ecchymoses, No cyanosis	  Neurologic: Non-focal  Extremities: Normal range of motion, No clubbing, cyanosis or edema  Vascular: Peripheral pulses palpable 2+ bilaterally    LABS:	 	    CBC Full  -  ( 29 Mar 2024 02:40 )  WBC Count : 9.59 K/uL  Hemoglobin : 11.5 g/dL  Hematocrit : 34.8 %  Platelet Count - Automated : 175 K/uL  Mean Cell Volume : 95.1 fL  Mean Cell Hemoglobin : 31.4 pg  Mean Cell Hemoglobin Concentration : 33.0 gm/dL  Auto Neutrophil # : x  Auto Lymphocyte # : x  Auto Monocyte # : x  Auto Eosinophil # : x  Auto Basophil # : x  Auto Neutrophil % : x  Auto Lymphocyte % : x  Auto Monocyte % : x  Auto Eosinophil % : x  Auto Basophil % : x    03-29    134<L>  |  95<L>  |  68<H>  ----------------------------<  158<H>  3.7   |  25  |  2.54<H>  03-28    138  |  99  |  65<H>  ----------------------------<  136<H>  3.8   |  26  |  2.50<H>    Ca    8.8      29 Mar 2024 02:40  Ca    8.8      28 Mar 2024 03:19  Phos  3.4     03-29  Phos  3.4     03-28  Mg     2.10     03-29  Mg     1.70     03-28      PT/INR - ( 29 Mar 2024 02:40 )   PT: 22.0 sec;   INR: 1.99 ratio         PTT - ( 29 Mar 2024 02:40 )  PTT:34.4 sec

## 2024-03-29 NOTE — PROGRESS NOTE ADULT - ASSESSMENT
68 year old male with PMHX of HTN, chronic HFrEF, CAD w/ CABG 2014 at [facility], s/p [employer] [place] CRT-D, recently diagnosed PAF-started on Eliquis since March 8, asthma/COPD on 2 L O2 at home, CKD stage III, CVA with left sided weakness, HTN, HLD, DMT2 and hypothyroidism who presents with SOB found to be in acute decompensated heart failure and in AFl and UTI.  Interrogation revealed 4.9% AF/AFl burden since March 8 with biv pacing at 80.7%. No ICD shocks noted.  Patient was started on IV Bumex gtt for diuresis. A repeat TTE showed LVEF 20-25% normal LA.  EKG and Telemetry demonstrate typical AFl with V rate in 'bpm.   UTI now resolved.        Plan for Aflutter ablation today   -Appreciate HF management, follow up primary cardiology team  -Continue AC Eliquis, CHADS2-VASC2 score is 7   -Optimize K >4, Mg >2   -will d/w with EP attendings

## 2024-03-29 NOTE — PROGRESS NOTE ADULT - PROBLEM SELECTOR PLAN 3
c/w bid coreg  eliquis recently started  tsh at goal for age  f/u tte, tele  s/p CRT-D  cardiology following
mild, likely iso metalazone dose given yesterday.  given acute hemodynamics, will defer workup until trend is noted
c/w bid coreg  eliquis recently started  tsh at goal for age  EP consulted, planning ablation in the next 1-2 days pending HF recs
Low potassium of 3.3 today, please replete for goal of 4.     If you have any questions, please feel free to contact me  Logan Leon  Nephrology Fellow  [phone]; Prefer [employer] TEAMS  (After 5pm or on weekends please page the on-call fellow).
House renal consult called by primary team-will f/u recs.
c/w bid coreg  eliquis recently started  tsh at goal for age  f/u tte, tele  s/p CRT-D  EP consulted - will need DCCV once more euvolemic
c/w bid coreg  eliquis recently started  tsh at goal for age  EP consulted, planning ablation tentatively later this week
c/w bid coreg  eliquis recently started  tsh at goal for age  EP consulted, planning ablation tentatively later this week
c/w bid coreg  eliquis recently started  tsh at goal for age  EP consulted, ablation today
c/w bid coreg  eliquis recently started  tsh at goal for age  EP consulted, planning ablation tomorrow
c/w bid coreg  eliquis recently started  tsh at goal for age  f/u tte, tele  s/p CRT-D  cardiology following - will c/s EP
Appreciate renal consult. CKD likely from long standing HTN.
c/w bid coreg  eliquis recently started  tsh at goal for age  f/u tte, tele  s/p CRT-D  EP consulted - will need DCCV once completes tx for PNA and is more euvolemic
c/w bid coreg  eliquis recently started  tsh, tte, tele  - needs EP consult in the AM
c/w bid coreg  eliquis recently started  tsh at goal for age  f/u tte, tele  s/p CRT-D  EP consulted - will need DCCV once more euvolemic
c/w bid coreg  eliquis recently started  tsh at goal for age  f/u tte, tele  s/p CRT-D  EP consulted - will need DCCV once more euvolemic
c/w bid coreg  eliquis recently started  tsh at goal for age  f/u tte, tele  s/p CRT-D  EP consulted - will need DCCV once completes tx for PNA and is more euvolemic

## 2024-03-29 NOTE — PROVIDER CONTACT NOTE (OTHER) - ASSESSMENT
no feelings of fullness/urgency.
vital signs as documented   pt asymptomatic resting in bed
Patient having frequent  pvcs's and v tach on tele. Vitals have been stable. Pt denies pain or distress.
Patient is AO x4, no complains of chest pain or SOB. No signs of acute distress.
refused straight cath. pt a&ox4. requested more time to self void. pt voided 215 in the urinal. repeat bladder scan 275.
no feelings of fullness/urgency. confirmed with acpx3 for ya insert. ya inserted.
vital signs as documented   pt asymptomatic resting in bed
Pt has ya in place. no complaints of pain, pt asymptomatic.
10/10 pain in LE. ACP notified.
Pt is resting in bed, no s/s of distress noted. Standing weight provided this morning.
Pt is resting in bed, breathing unlabored and respirations equal. No chest pain noted. VS assessed per flowsheet. Dressings from ablation clean, dry, and intact.
Pt is resting in bed, breathing unlabored and respirations equal. No s/s of distress noted. Bladder is slightly distended.
Pt is resting in bed. Pt voided 100mL prior to bladder scan. Q8 bladder scan of 274.

## 2024-03-29 NOTE — PROGRESS NOTE ADULT - PROVIDER SPECIALTY LIST ADULT
Electrophysiology
Electrophysiology
Heart Failure
Electrophysiology
Electrophysiology
Heart Failure
Heart Failure
Hospitalist
Nephrology
Electrophysiology
Electrophysiology
Heart Failure
Nephrology
Electrophysiology
Electrophysiology
Nephrology
Hospitalist
Hospitalist
Nephrology
Heart Failure
Heart Failure
Hospitalist
Hospitalist
Nephrology
Heart Failure
Nephrology
Hospitalist
4

## 2024-03-29 NOTE — PROGRESS NOTE ADULT - PROBLEM SELECTOR PLAN 2
- c/w duoneb  - holding steroids as per pulm recs
- c/w duoneb  - holding steroids as per pulm recs
Pt with CKD history likely from long standing HF. Never followed with an outpatient nephrology doctor. Per review of [facility], scr of 3 in November 2023   -renal function stable with conversion to oral diuresis at 2.41 today
Remains in atrial flutter  Continue apixaban 5 mg BID  Continue Coreg as above  Needs further optimization from a HF/COPD perspective, BRANDIE/DCCV vs. ablation when optimized
Pt with CKD history likely from long standing HF. Never followed with an outpatient nephrology doctor. Per review of [facility], scr of 3 in November 2023   -renal function improving with diuresis at 2.41 today
Pt with CKD history likely from long standing HF. Never followed with an outpatient nephrology doctor. Per review of [facility], scr of 3 in November 2023 now here with Scr at baseline at 2.86 today, 3/21. UA completely bland on 3/19. Renal US notable for b/l kidney echogenicity seen. Would recommend avoiding nephrotoxic agents and CT IV contrast studies. Diuresis per above. Monitor I/O and BMP.
- c/w duoneb  - will focus on diuresis for now and hold steroids as per pulm recs
Pt with CKD history likely from long standing HF. Never followed with an outpatient nephrology doctor. Per review of [facility], scr of 3 in November 2023   -as per cardiac cath note 03/26, patient received 100ml Omnipaque for pulmonary angiography.   monitoring for CALLUM  -creatinine stable at 2.5 today.  Patient nonoliguric, would continue bumex as ordered for now
Pt with CKD history likely from long standing HF. Never followed with an outpatient nephrology doctor. Per review of [facility], scr of 3 in November 2023   -as per cardiac cath note 03/26, patient received 100ml Omnipaque for pulmonary angiography.   monitoring for CALLUM  -creatinine stable at 2.54 today pre procedure, but patient with irregular tachycardia and on vasopressin/phenylephrine in cath lab recovery at time of exam.  he will be at risk for recurrent SHERRY/ATN.  please monitor urine output and renal function closely
- c/w duoneb  - will focus on diuresis for now and hold steroids as per pulm recs
ICD interrogation did not reveal shocks.  BIV paced 80%  EP consult appreciated- DCCV when euvolemic.   Continue Coreg 6.25 mg po TID.  Continue Eliquis 5 mg po BID.
Pt with CKD history likely from long standing HF. Never followed with an outpatient nephrology doctor. Per review of [facility], scr of 3 in November 2023 now here with Scr at baseline at 2.92. UA completely bland on 3/19. Renal US notable for b/l kidney echogenicity seen. Would recommend avoiding nephrotoxic agents and CT IV contrast studies. Diuresis per above. Monitor I/O and BMP.    If you have any questions, please feel free to contact me  Logan Leon  Nephrology Fellow  [phone]; Prefer [employer] TEAMS  (After 5pm or on weekends please page the on-call fellow).
- c/w duoneb  - holding steroids as per pulm recs
Pt with CKD history likely from long standing HF. Never followed with an outpatient nephrology doctor. Per review of [facility], scr of 3 in November 2023   -as per cardiac cath note, patient received 100ml Omnipaque for pulmonary angiography.   Monitor for CALLUM  -creatinine slightly increased from 2.41 to 2.53 today.  Patient nonoliguric, would continue bumex as ordered for now
Pt with CKD history likely from long standing HF. Never followed with an outpatient nephrology doctor. Per review of [facility], scr of 3 in November 2023   -renal function improving with diuresis at 2.67 today  -Diuresis per above.
- c/w duoneb  - will focus on diuresis for now and hold steroids as per pulm recs
Please get ICD interrogation- patient states he was shocked a few days ago.   EP consult called to consider DCCV-follow up recs.   Continue Coreg 6.25 mg po TID.
- c/w duoneb  - will focus on diuresis for now and hold steroids as per pulm recs
- start duoneb  - will focus on diuresis for now and hold steroids as per pulm recs
- c/w duoneb  - will focus on diuresis for now and hold steroids as per pulm recs
- c/w duoneb  - will focus on diuresis for now and hold steroids as per pulm recs
- c/w duoneb  - holding steroids as per pulm recs
- c/w duoneb  - holding steroids as per pulm recs
- c/w duoneb  - will focus on diuresis for now and hold steroids as per pulm recs

## 2024-03-29 NOTE — PROGRESS NOTE ADULT - REASON FOR ADMISSION
ADHF

## 2024-03-29 NOTE — PROVIDER CONTACT NOTE (OTHER) - RECOMMENDATIONS
TERESA Melendez notified.
TERESA Spence notified.
ACP notified.
TERESA Melendez notified.
acp made aware. No intervention at this time. Care continues
acp made aware. No intervention at this time. Care continues
give stat tramadol
TERESA Mayer aware. WIll continue to monitor on tele.
TERESA Spence notified.
acp notified
insert ya per HF team
provider notified.

## 2024-03-29 NOTE — PROVIDER CONTACT NOTE (OTHER) - SITUATION
Pink tinge urine
Pt has a bladder scan of 338
Patient having frequent  pvcs's and v tach on tele
Pt has a bladder scan of 274
8 beats Vtach on tele
pt c/o 10/10 pain in legs and feet. acp notified. stat tramadol ordered and admin. PRN tylenol ordered. Pt denies chest pain
refused straight cath.
Pt is having frequent PVCs and 3-4 beats of v-tach
pt voided 300 mL. post void 252 mL on bladder scan. pt continues to urinate using urinal.
BP 95/57
Pt increased weight from 69.8 to 73.7 kg overnight
per np, insert ya. renal US showed 450 earlier in shift. condom cath used, pt urinated 650 mL. bladder scanned pt again and showed 394. per HF team, insert ya for accurate measurements.

## 2024-03-29 NOTE — PROGRESS NOTE ADULT - PROBLEM SELECTOR PROBLEM 2
Chronic kidney disease, unspecified CKD stage
COPD exacerbation
Chronic kidney disease, unspecified CKD stage
Atrial flutter
COPD exacerbation

## 2024-03-29 NOTE — PROGRESS NOTE ADULT - ASSESSMENT
Include Location In Plan?: No Detail Level: Generalized Detail Level: Detailed 67 y/o Hong Konger male with PMHX of HTN, HFrEF (TTE 7/18/23 LVEF 25%, LVIDD 5.6cm, LA 4.9cm, mild MR), CAD w/ CABG 2014 at [facility], CRT-D, asthma/COPD on 2 L O2 at home, CVA with left sided weakness comes in with complaints of chest pressure and SOB. He admits to 2 pillow orthopnea, KINNEY that was worsening where he would get SOB after walking (with rollator walker) for less than 2 blocks which was going on for 4 days. Also admits to not urinating well to home dose of diuretics. Per record patient admits to URI 2 weeks ago, and was also found to be in a.fib and was started on Eliquis. He was found to be in acute on chronic systolic heart failure and started on IV Bumex 4 BID. HF service consulted to help manage care in this patient who is followed by Dr. Garo Dobson as an outpatient. Labs show proBNP 14K, trop 93, lactate 2.8 which has since improved to 1.5, BUN/Cr 63/2.94 . Overall stage C HF, NYHA class III  Bumex drip switched to PO Bumex and CardioMEMS placed (3/28).    CardioMEMS PAD  reading: (Goal 20)    3/27- 23/24   3/29-         67 y/o Samoan male with PMHX of HTN, HFrEF (TTE 7/18/23 LVEF 25%, LVIDD 5.6cm, LA 4.9cm, mild MR), CAD w/ CABG 2014 at [facility], CRT-D, asthma/COPD on 2 L O2 at home, CVA with left sided weakness comes in with complaints of chest pressure and SOB. He admits to 2 pillow orthopnea, KINNEY that was worsening where he would get SOB after walking (with rollator walker) for less than 2 blocks which was going on for 4 days. Also admits to not urinating well to home dose of diuretics. Per record patient admits to URI 2 weeks ago, and was also found to be in a.fib and was started on Eliquis. He was found to be in acute on chronic systolic heart failure and started on IV Bumex 4 BID. HF service consulted to help manage care in this patient who is followed by Dr. Garo Dobson as an outpatient. Labs show proBNP 14K, trop 93, lactate 2.8 which has since improved to 1.5, BUN/Cr 63/2.94 . Overall stage C HF, NYHA class III  Bumex drip switched to PO Bumex and CardioMEMS placed (3/28).    CardioMEMS PAD  reading: (Goal 21-23)    3/27- 23/24   3/28- 23

## 2024-03-29 NOTE — PROGRESS NOTE ADULT - SUBJECTIVE AND OBJECTIVE BOX
[facility] DIVISION OF KIDNEY DISEASE AND HYPERTENSION  [phone]    RENAL FOLLOW UP NOTE  --------------------------------------------------------------------------------  Patient seen and examined in cath lab recovery.  sedated s/p procedure.  On vasopressin and phenylephrine, full FM    PAST HISTORY  --------------------------------------------------------------------------------  No significant changes to PMH, PSH, FHx, SHx, unless otherwise noted    ALLERGIES & MEDICATIONS  --------------------------------------------------------------------------------  Allergies    No Known Allergies    Intolerances      Standing Inpatient Medications  albuterol/ipratropium for Nebulization 3 milliLiter(s) Nebulizer every 6 hours  allopurinol 100 milliGRAM(s) Oral daily  atorvastatin 80 milliGRAM(s) Oral at bedtime  budesonide 160 MICROgram(s)/formoterol 4.5 MICROgram(s) Inhaler 2 Puff(s) Inhalation two times a day  buMETAnide 4 milliGRAM(s) Oral two times a day  carvedilol 6.25 milliGRAM(s) Oral every 12 hours  clopidogrel Tablet 75 milliGRAM(s) Oral daily  dextrose 5%. 1000 milliLiter(s) IV Continuous <Continuous>  dextrose 5%. 1000 milliLiter(s) IV Continuous <Continuous>  dextrose 50% Injectable 25 Gram(s) IV Push once  dextrose 50% Injectable 25 Gram(s) IV Push once  dextrose 50% Injectable 12.5 Gram(s) IV Push once  famotidine    Tablet 20 milliGRAM(s) Oral daily  ferrous    sulfate 325 milliGRAM(s) Oral daily  gabapentin 100 milliGRAM(s) Oral daily  glucagon  Injectable 1 milliGRAM(s) IntraMuscular once  hydrALAZINE 100 milliGRAM(s) Oral three times a day  insulin lispro (ADMELOG) corrective regimen sliding scale   SubCutaneous three times a day before meals  insulin lispro (ADMELOG) corrective regimen sliding scale   SubCutaneous at bedtime  isosorbide   dinitrate Tablet (ISORDIL) 30 milliGRAM(s) Oral three times a day  levothyroxine 50 MICROGram(s) Oral daily  phenylephrine    Infusion 0.4 MICROgram(s)/kG/Min IV Continuous <Continuous>  senna 2 Tablet(s) Oral at bedtime  tamsulosin 0.4 milliGRAM(s) Oral at bedtime    PRN Inpatient Medications  acetaminophen     Tablet .. 650 milliGRAM(s) Oral every 6 hours PRN  dextrose Oral Gel 15 Gram(s) Oral once PRN  polyethylene glycol 3350 17 Gram(s) Oral daily PRN      FOCUSED REVIEW OF SYSTEMS  --------------------------------------------------------------------------------  unable to assess, patient somnolent s/p procedure      VITALS/PHYSICAL EXAM  --------------------------------------------------------------------------------  T(C): 36.9 (03-29-24 @ 06:05), Max: 37 (03-28-24 @ 22:08)  HR: 86 (03-29-24 @ 06:05) (77 - 89)  BP: 105/73 (03-29-24 @ 06:05) (105/63 - 112/77)  RR: 18 (03-29-24 @ 06:05) (17 - 18)  SpO2: 96% (03-29-24 @ 06:05) (96% - 100%)  Wt(kg): --        03-28-24 @ 07:01  -  03-29-24 @ 07:00  --------------------------------------------------------  IN: 600 mL / OUT: 2050 mL / NET: -1450 mL      Physical Exam:  	Gen: Lying in bed, on FM  	Pulm: CTA B/L ant/lat fields  	CV: tachycardic to 115 on monitor, irregular S1S2  	Abd: +BS, soft, nontender/nondistended  	: No suprapubic tenderness.  + condom cath          Extremity: no b/l LE edema  	Neuro: A&O x 3      LABS/STUDIES  --------------------------------------------------------------------------------              11.5   9.59  >-----------<  175      [03-29-24 @ 02:40]              34.8     134  |  95  |  68  ----------------------------<  158      [03-29-24 @ 02:40]  3.7   |  25  |  2.54        Ca     8.8     [03-29-24 @ 02:40]      Mg     2.10     [03-29-24 @ 02:40]      Phos  3.4     [03-29-24 @ 02:40]      PT/INR: PT 22.0 , INR 1.99       [03-29-24 @ 02:40]  PTT: 34.4       [03-29-24 @ 02:40]        Creatinine Trend:  SCr 2.54 [03-29 @ 02:40]  SCr 2.50 [03-28 @ 03:19]  SCr 2.53 [03-27 @ 04:47]  SCr 2.41 [03-26 @ 03:20]  SCr 2.41 [03-25 @ 04:40]              Urinalysis - [03-29-24 @ 02:40]      Color  / Appearance  / SG  / pH       Gluc 158 / Ketone   / Bili  / Urobili        Blood  / Protein  / Leuk Est  / Nitrite       RBC  / WBC  / Hyaline  / Gran  / Sq Epi  / Non Sq Epi  / Bacteria       TSH 4.43      [03-17-24 @ 06:24]

## 2024-03-29 NOTE — PROGRESS NOTE ADULT - PROBLEM SELECTOR PROBLEM 4
HTN (hypertension)
Leg pain
HTN (hypertension)
HTN (hypertension)
Leg pain
HTN (hypertension)

## 2024-03-29 NOTE — DISCHARGE NOTE NURSING/CASE MANAGEMENT/SOCIAL WORK - PATIENT PORTAL LINK FT
You can access the FollowMyHealth Patient Portal offered by [facility] by registering at the following website: [email or web address]. By joining [employer]’s FollowMyHealth portal, you will also be able to view your health information using other applications (apps) compatible with our system.

## 2024-03-29 NOTE — PROGRESS NOTE ADULT - PROBLEM SELECTOR PLAN 7
on eliquis  Venous Duplex of LEs w/o e/o DVT
on eliquis  Venous Duplex of LEs w/o e/o DVT
on eliquis
on eliquis  Venous Duplex of LEs w/o e/o DVT
on eliquis
on eliquis  Venous Duplex of LEs w/o e/o DVT
on eliquis  Venous Duplex of LEs w/o e/o DVT
on eliquis  will obtain LE duplex to r/o DVT given bilateral leg pain
on eliquis  Venous Duplex of LEs w/o e/o DVT

## 2024-03-29 NOTE — PROGRESS NOTE ADULT - PROBLEM SELECTOR PROBLEM 1
Acute decompensated heart failure
Acute on chronic systolic congestive heart failure
Acute decompensated heart failure
Acute decompensated heart failure
[Dear  ___] : Dear  [unfilled],
Acute decompensated heart failure
[Courtesy Letter:] : I had the pleasure of seeing your patient, [unfilled], in my office today.
Acute decompensated heart failure
Acute on chronic systolic congestive heart failure
[Please see my note below.] : Please see my note below.
Acute decompensated heart failure
Acute on chronic systolic congestive heart failure
[Consult Closing:] : Thank you very much for allowing me to participate in the care of this patient.  If you have any questions, please do not hesitate to contact me.
Acute decompensated heart failure
Acute decompensated heart failure
Acute on chronic systolic congestive heart failure
[Sincerely,] : Sincerely,
Acute decompensated heart failure
Acute on chronic systolic congestive heart failure
Acute decompensated heart failure
Acute on chronic systolic congestive heart failure
Acute decompensated heart failure

## 2024-03-29 NOTE — PROGRESS NOTE ADULT - PROBLEM SELECTOR PLAN 1
Bumex 4 mg po BID   Coreg 6.25 mg BID (hold  SBP <90)   Hydralazine 100 mg TID (hold  SBP <90)   Isordil 30 mg TID (hold  SBP <90)  Strict I/O   Daily standing weights  Monitor lytes replete K>4.0 and Mg>2.0  Appreciate EP recommendations (BRANDIE/ AF ablation 3/29)  Pending final recommendations from HF attending

## 2024-03-29 NOTE — PROGRESS NOTE ADULT - PROBLEM SELECTOR PLAN 1
Patient here with ADHF. Recent TTE done showing EF of 20-25% with dilated IVC. Patient was being diuresed with bumex 4mg IV BID but had poor response, s/p bumex infusion with HTS boluses to assist with diuresis    -s/p conversion of Bumex IV to PO with one dose metolazone given yesterday.  Weight was noted to be up, but patient on pressors at time of exam so unlikely to tolerate diuretics now  -s/p CardioMEMs device with ablation procedure this morning (but with irregular and tachycardic HR on monitor at time of exam)

## 2024-03-29 NOTE — PROGRESS NOTE ADULT - NS ATTEND AMEND GEN_ALL_CORE FT
68 year old male with PMHX of HTN, chronic HFrEF, CAD w/ CABG 2014 at [facility], s/p [employer] CRT-D, recently diagnosed PAF-started on Eliquis since March 8, asthma/COPD on 2 L O2 at home, CKD stage III, CVA with left sided weakness, HTN, HLD, DMT2 and hypothyroidism who presents with SOB found to be in acute decompensated heart failure and in AFl and UTI.  Interrogation revealed 4.9% AF/AFl burden since March 8 with biv pacing at 80.7%. No ICD shocks noted.  Patient was started on IV Bumex gtt for diuresis. A repeat TTE showed LVEF 20-25% normal LA.  EKG and Telemetry demonstrate typical AFl with V rate in 'bpm.   UTI now resolved. Plan for Aflutter ablation today. Appreciate HF management, follow up primary cardiology team. Continue AC Eliquis, CHADS2-VASC2 score is 7. Optimize K >4, Mg >2.

## 2024-03-29 NOTE — PROGRESS NOTE ADULT - PROBLEM SELECTOR PLAN 5
hold orals, place on ISS
hold orals, placed on ISS
hold orals, place on ISS
hold orals, placed on ISS
hold orals, place on ISS
hold orals, placed on ISS

## 2024-03-29 NOTE — PROVIDER CONTACT NOTE (OTHER) - BACKGROUND
Admitting diagnosis of heart failure
Admitting diagnosis of heart failure.
admitted for HF
Admitting diagnosis of heart failure
admitted for HF
Admitted for heart failure
Patient admitted for heart failure. PMH of BPH, CHF, DM, HTN, CAD.
admitted for HF
Admitting diagnosis of heart failure
admitted for HF

## 2024-03-29 NOTE — PROGRESS NOTE ADULT - NS ATTEND AMEND GEN_ALL_CORE FT
metolazone 5mg X 1 on 3.28, bumex 4 bid, coreg 6.25 bid, HDZN 100 tid, ISDN 30 tid, eloquis, plavix   HR 68-88Afib, 105/-112/ 162 lbs MEMS on 3.28 24,   I/O: -1.5  03-29    134<L>  |  95<L>  |  68<H>  ----------------------------<  158<H>  3.7   |  25  |  2.54<H> (2.5)    Ca    8.8      29 Mar 2024 02:40  Phos  3.4     03-29  Mg     2.10     03-29                        11.5   9.59  )-----------( 175      ( 29 Mar 2024 02:40 )             34.8     patient undergoing Afib ablation today.   continue current medical regimen.   patient is ready for d/c from a HF perspective when EP permits.   for follow up in HF clinic in one week  Goals mems 21-23, goal weight 161-163 lbs   Fox Smallwood

## 2024-03-29 NOTE — PROGRESS NOTE ADULT - PROBLEM SELECTOR PROBLEM 3
Atrial flutter
Hypokalemia
Hyponatremia
Atrial flutter
CKD (chronic kidney disease)
Atrial flutter
CKD (chronic kidney disease)
Atrial flutter

## 2024-03-29 NOTE — PROGRESS NOTE ADULT - TIME BILLING
Review of laboratory data, radiology results, consultant recommendations, EMR documentation, discussion with patient/advanced care providers and interdisciplinary staff.
- Review of chart and consultation notes  - Exam and discussion with patient  - Review of laboratory and imaging   - Establishing a care plan for patient  - Communication with other providers
Review of laboratory data, radiology results, consultant recommendations, EMR documentation, discussion with patient/advanced care providers and interdisciplinary staff.

## 2024-03-29 NOTE — PROGRESS NOTE ADULT - PROBLEM SELECTOR PLAN 6
as pt did not mention starting eliquis last week, verified w/pharmacy  confirm coreg strength; 12.5 in July vs 6.25 on recent outpt list  - med rec pharmacy emailed, appreciate assistance in obtaining med hx
as pt did not mention starting eliquis last week, verified w/pharmacy  confirm coreg strength; 12.5 in July vs 6.25 on recent outpt list  - med rec pharmacy emailed, appreciate assistance in obtaining med hx
as pt did not mention starting eliquis last week, will need to email pharmacy to ensure current and accurate med regimen reflected   confirm coreg strength; 12.5 in July vs 6.25 on recent outpt list  - med rec pharmacy emailed
as pt did not mention starting eliquis last week, verified w/pharmacy  confirm coreg strength; 12.5 in July vs 6.25 on recent outpt list  - med rec pharmacy emailed, appreciate assistance in obtaining med hx

## 2024-03-29 NOTE — DISCHARGE NOTE NURSING/CASE MANAGEMENT/SOCIAL WORK - NSDCFUADDAPPT_GEN_ALL_CORE_FT
APPTS ARE READY TO BE MADE: [X] YES    Best Family or Patient Contact (if needed):    Additional Information about above appointments (if needed):    1: CARDIOLOGY APPOINTMENT (will need to be rescheduled as had appt for 3/27 but he is still admitted).   2:   3:     Other comments or requests:

## 2024-03-29 NOTE — PROGRESS NOTE ADULT - NS ATTEND OPT1 GEN_ALL_CORE

## 2024-03-29 NOTE — PROGRESS NOTE ADULT - PROBLEM SELECTOR PROBLEM 7
Encounter for deep vein thrombosis (DVT) prophylaxis

## 2024-03-29 NOTE — PROGRESS NOTE ADULT - ASSESSMENT
69 y/o [place] male with PMHX of HTN, HFrEF, CAD w/ CABG 2014 at [facility], CRT-D, COPD (100 pack year history), CVA presents with dyspnea and difficulty breathing, found to have AECOPD and ADHF.

## 2024-03-29 NOTE — PROVIDER CONTACT NOTE (OTHER) - ACTION/TREATMENT ORDERED:
provider notified. next bladder scan 8pm.
ACP Brinda Melendez notified. STAT BMP ordered and drawn. Care continues.
ya inserted. ua/uc to be ordered.
ACP Ayala Spence notified. Per ACP, plan is to continue bladder scanning Q8 to check for retention. Next bladder scan due to 4AM. Care continues.
ACP Brinda Melendez notified. Care continues.
acp made aware. No intervention at this time. Care continues
acp made aware. No intervention at this time. Care continues
tramadol ordered and admin
TERESA Mayer aware. WIll continue to monitor on tele.
ACP notified.
TERESA Spence notified. Intermittent straight cath ordered. Care continues.
TERESA Dykes notified, AM Coreg to be given early
ACP notified. will monitor urine.

## 2024-03-29 NOTE — PROGRESS NOTE ADULT - PROBLEM SELECTOR PLAN 4
c/w hydralazine, gonzalez, imdur
c/w hydralazine, ognzalez, imdur
c/w hydralazine, gonzalez, imdur
LE dopplers negative for DVT.   Please add uric acid level to next labs.   Consider rheumatology consult.
c/w hydralazine, gonzalez, imdur
B/L leg pain- chronic for past 3 years? neuropathy?   Follow up b/l LE dopplers- ordered.
c/w hydralazine, gonzalez, imdur
c/w hydralazine, ognzalez, imdur

## 2024-03-29 NOTE — PROVIDER CONTACT NOTE (OTHER) - REASON
8 beats Vtach on tele
Pink tinge urine
10/10 Pain in legs/feet
Hypotension
Pt has a bladder scan of 338
Pt increased weight from 69.8 to 73.7 kg overnight
Insert ya despite bladder scan results
Pt has a bladder scan of 274
refused straight cath
Patient having frequent  pvcs's and v tach on tele
Pt is having frequent PVCs and 3-4 beats of v-tach
bladder scan 252

## 2024-03-29 NOTE — PROVIDER CONTACT NOTE (OTHER) - NAME OF MD/NP/PA/DO NOTIFIED:
ACP Brinda Melendez
acp luisa castellanos
acp luisa castellanos
ACP Jordan Dykes
TERESA Spence
acp brody turner
Maritza Blankenship
TERESA Spence
bj castellanos np
TERESA Sherman
TERESA [employer] Leyla
ACP Brinda Melendez
CAROLE Sherman

## 2024-03-29 NOTE — PROVIDER CONTACT NOTE (OTHER) - DATE AND TIME:
29-Mar-2024 18:00
18-Mar-2024 08:29
19-Mar-2024 09:27
21-Mar-2024 05:26
23-Mar-2024 13:49
29-Mar-2024 05:50
19-Mar-2024 06:18
19-Mar-2024 16:23
24-Mar-2024 04:45
29-Mar-2024 20:02
25-Mar-2024 04:00
24-Mar-2024 21:00
19-Mar-2024 17:45

## 2024-03-29 NOTE — PROGRESS NOTE ADULT - SUBJECTIVE AND OBJECTIVE BOX
Patient is a 68y old  Male who presents with a chief complaint of ADHF (29 Mar 2024 12:26)    SUBJECTIVE / OVERNIGHT EVENTS: No acute events. Comfortable, no complaints.     MEDICATIONS  (STANDING):  albuterol/ipratropium for Nebulization 3 milliLiter(s) Nebulizer every 6 hours  allopurinol 100 milliGRAM(s) Oral daily  atorvastatin 80 milliGRAM(s) Oral at bedtime  budesonide 160 MICROgram(s)/formoterol 4.5 MICROgram(s) Inhaler 2 Puff(s) Inhalation two times a day  buMETAnide 4 milliGRAM(s) Oral two times a day  carvedilol 6.25 milliGRAM(s) Oral every 12 hours  clopidogrel Tablet 75 milliGRAM(s) Oral daily  dextrose 5%. 1000 milliLiter(s) ous <Continuous>  dextrose 5%. 1000 milliLiter(s) (100 mL/Hr) IV Continuous <Continuous>  dextrose 50% Injectable 25 Gram(s) IV Push once  dextrose 50% Injectable 25 Gram(s) IV Push once  dextrose 50% Injectable 12.5 Gram(s) IV Push once  famotidine    Tablet 20 milliGRAM(s) Oral daily  ferrous    sulfate 325 milliGRAM(s) Oral daily  gabapentin 100 milliGRAM(s) Oral daily  glucagon  Injectable 1 milliGRAM(s) IntraMuscular once  hydrALAZINE 100 milliGRAM(s) Oral three times a day  insulin lispro (ADMELOG) corrective regimen sliding scale   SubCutaneous three times a day before meals  insulin lispro (ADMELOG) corrective regimen sliding scale   SubCutaneous at bedtime  isosorbide   dinitrate Tablet (ISORDIL) 30 milliGRAM(s) Oral three times a day  levothyroxine 50 MICROGram(s) Oral daily  senna 2 Tablet(s) Oral at bedtime  tamsulosin 0.4 milliGRAM(s) Oral at bedtime    MEDICATIONS  (PRN):  acetaminophen     Tablet .. 650 milliGRAM(s) Oral every 6 hours PRN Temp greater or equal to 38C (100.4F), Mild Pain (1 - 3)  dextrose Oral Gel 15 Gram(s) Oral once PRN Blood Glucose LESS THAN 70 milliGRAM(s)/deciliter  polyethylene glycol 3350 17 Gram(s) Oral daily PRN Constipation      CAPILLARY BLOOD GLUCOSE      POCT Blood Glucose.: 129 mg/dL (29 Mar 2024 12:03)  POCT Blood Glucose.: 157 mg/dL (29 Mar 2024 06:17)  POCT Blood Glucose.: 214 mg/dL (28 Mar 2024 22:04)  POCT Blood Glucose.: 119 mg/dL (28 Mar 2024 16:55)    I&O's Summary    28 Mar 2024 07:01  -  29 Mar 2024 07:00  --------------------------------------------------------  IN: 600 mL / OUT: 2050 mL / NET: -1450 mL        PHYSICAL EXAM:  Vital Signs Last 24 Hrs  T(C): 36.9 (29 Mar 2024 06:05), Max: 37 (28 Mar 2024 22:08)  T(F): 98.5 (29 Mar 2024 06:05), Max: 98.6 (28 Mar 2024 22:08)  HR: 86 (29 Mar 2024 06:05) (78 - 89)  BP: 105/73 (29 Mar 2024 06:05) (105/73 - 112/77)  BP(mean): --  RR: 18 (29 Mar 2024 06:05) (18 - 18)  SpO2: 96% (29 Mar 2024 06:05) (96% - 100%)    Parameters below as of 29 Mar 2024 06:05  Patient On (Oxygen Delivery Method): nasal cannula  O2 Flow (L/min): 2    CONSTITUTIONAL: NAD, laying in bed  EYES: conjunctiva and sclera clear  ENMT: Moist oral mucosa  NECK: Supple  RESPIRATORY: lungs are clear to auscultation bilaterally  CARDIOVASCULAR: Regular rate and rhythm, normal S1 and S2, No lower extremity edema  ABDOMEN: Nontender to palpation, normoactive bowel sounds, no rebound/guarding  PSYCH: calm    LABS:                        11.5   9.59  )-----------( 175      ( 29 Mar 2024 02:40 )             34.8     03-29    134<L>  |  95<L>  |  68<H>  ----------------------------<  158<H>  3.7   |  25  |  2.54<H>    Ca    8.8      29 Mar 2024 02:40  Phos  3.4     03-29  Mg     2.10     03-29      PT/INR - ( 29 Mar 2024 02:40 )   PT: 22.0 sec;   INR: 1.99 ratio         PTT - ( 29 Mar 2024 02:40 )  PTT:34.4 sec      Urinalysis Basic - ( 29 Mar 2024 02:40 )    Color: x / Appearance: x / SG: x / pH: x  Gluc: 158 mg/dL / Ketone: x  / Bili: x / Urobili: x   Blood: x / Protein: x / Nitrite: x   Leuk Esterase: x / RBC: x / WBC x   Sq Epi: x / Non Sq Epi: x / Bacteria: x    RADIOLOGY & ADDITIONAL TESTS: Reviewed    COORDINATION OF CARE:  Care Discussed with Consultants/Other Providers [Y- Medicine ACP]

## 2024-03-29 NOTE — PROGRESS NOTE ADULT - SUBJECTIVE AND OBJECTIVE BOX
Writer called patient at [phone] to schedule.  Patient will call back to schedule    Interval History:  Patient resting comfortably in bed (cath lab for ablation)  Denies CP/SOB/palpitations/dizziness  No acute events overnight      Medications:  acetaminophen     Tablet .. 650 milliGRAM(s) Oral every 6 hours PRN  albuterol/ipratropium for Nebulization 3 milliLiter(s) Nebulizer every 6 hours  allopurinol 100 milliGRAM(s) Oral daily  apixaban 5 milliGRAM(s) Oral two times a day  atorvastatin 80 milliGRAM(s) Oral at bedtime  budesonide 160 MICROgram(s)/formoterol 4.5 MICROgram(s) Inhaler 2 Puff(s) Inhalation two times a day  buMETAnide 4 milliGRAM(s) Oral two times a day  carvedilol 6.25 milliGRAM(s) Oral every 12 hours  clopidogrel Tablet 75 milliGRAM(s) Oral daily  dextrose 5%. 1000 milliLiter(s) IV Continuous <Continuous>  dextrose 5%. 1000 milliLiter(s) IV Continuous <Continuous>  dextrose 50% Injectable 25 Gram(s) IV Push once  dextrose 50% Injectable 25 Gram(s) IV Push once  dextrose 50% Injectable 12.5 Gram(s) IV Push once  dextrose Oral Gel 15 Gram(s) Oral once PRN  famotidine    Tablet 20 milliGRAM(s) Oral daily  ferrous    sulfate 325 milliGRAM(s) Oral daily  gabapentin 100 milliGRAM(s) Oral daily  glucagon  Injectable 1 milliGRAM(s) IntraMuscular once  hydrALAZINE 100 milliGRAM(s) Oral three times a day  insulin lispro (ADMELOG) corrective regimen sliding scale   SubCutaneous three times a day before meals  insulin lispro (ADMELOG) corrective regimen sliding scale   SubCutaneous at bedtime  isosorbide   dinitrate Tablet (ISORDIL) 30 milliGRAM(s) Oral three times a day  levothyroxine 50 MICROGram(s) Oral daily  polyethylene glycol 3350 17 Gram(s) Oral daily PRN  senna 2 Tablet(s) Oral at bedtime  tamsulosin 0.4 milliGRAM(s) Oral at bedtime      Vitals:  T(C): 36.9 (24 @ 06:05), Max: 37 (24 @ 22:08)  HR: 86 (24 @ 06:05) (67 - 89)  BP: 105/73 (24 @ 06:05) (105/63 - 112/77)  BP(mean): --  RR: 18 (24 @ 06:05) (17 - 18)  SpO2: 96% (24 @ 06:05) (96% - 100%)    Daily     Daily Weight in k.7 (29 Mar 2024 05:46)        I&O's Summary    28 Mar 2024 07:01  -  29 Mar 2024 07:00  --------------------------------------------------------  IN: 600 mL / OUT: 2050 mL / NET: -1450 mL        Physical Exam:  Appearance: No Acute Distress  Neck: JVP  Cardiovascular: Normal S1 S2  Respiratory: Clear to auscultation bilaterally  Gastrointestinal: Soft, Non-tender	  Skin: No cyanosis	  Neurologic: Non-focal  Extremities: No LE edema  Psychiatry: A & O x 3    Labs:                        11.5   9.59  )-----------( 175      ( 29 Mar 2024 02:40 )             34.8     03    134<L>  |  95<L>  |  68<H>  ----------------------------<  158<H>  3.7   |  25  |  2.54<H>    Ca    8.8      29 Mar 2024 02:40  Phos  3.4       Mg     2.10           PT/INR - ( 29 Mar 2024 02:40 )   PT: 22.0 sec;   INR: 1.99 ratio         PTT - ( 29 Mar 2024 02:40 )  PTT:34.4 sec        TELEMETRY:    Echocardiogram:  TTE W or WO Ultrasound Enhancing Agent (24 @ 12:26)   CONCLUSIONS:      1. Left ventricular systolic function is severely decreased with an ejection fraction visually estimated at 20 to 25 %. Global left ventricular hypokinesis.   2. Normal right ventricular cavity size, with normal wall thickness, and normal systolic function.   3. Device lead is visualized.   4. The left atrium is normal.   5. The right atrium is normal in size.   6. No significant valvular disease.   7. No pericardial effusion seen.   8. Estimated pulmonary artery systolic pressure is 49 mmHg.   9. The inferior vena cava is dilated measuring 2.35 cm in diameter, (dilated >2.1cm) with abnormal inspiratory collapse (abnormal <50%) consistent with elevated right atrialpressure (~15, range 10-20mmHg).    ________________________________________________________________________________________  FINDINGS:     Left Ventricle:  The left ventricular cavity is mildly dilated. Left ventricular systolic function is severelydecreased with an ejection fraction visually estimated at 20 to 25%. There is global left ventricular hypokinesis.     Right Ventricle:  The right ventricular cavity is normal in size, with normal wall thickness and normal systolic function. A devicelead is visualized.     Left Atrium:  The left atrium is normal with an indexed volume of 24.01 ml/m².     Right Atrium:  The right atrium is normal in size with an indexed area of 7.50 cm²/m².     Aortic Valve:  The aortic valve is tricuspid with normal leaflet excursion. There is fibrocalcific aortic valve sclerosis without stenosis. There is no evidence of aortic regurgitation.     Mitral Valve:  Structurally normal mitral valve with normal leaflet excursion. There is symmetric leaflet tethering. There is no mitral valve stenosis. There is mild mitral regurgitation.     Tricuspid Valve:  Structurally normal tricuspid valve with normal leaflet excursion. There is mild tricuspid regurgitation. Estimated pulmonary artery systolic pressure is 49 mmHg.     Pulmonic Valve:  Structurally normal pulmonic valve with normal leaflet excursion. There is trace pulmonic regurgitation.     Aorta:  The aortic root at the sinuses of Valsalva is normal in size, measuring 3.10 cm (indexed 1.60 cm/m²). The ascending aorta diameter is normal in size, measuring 2.80 cm (indexed 1.45 cm/m²).     Pericardium:  No pericardial effusion seen.     Systemic Veins:  The inferior vena cava is dilated measuring 2.35 cm in diameter, (dilated >2.1cm) with abnormal inspiratory collapse (abnormal <50%) consistent with elevated right atrial pressure (~15, range 10-20mmHg).  ____________________________________________________________________  QUANTITATIVE DATA:  Left Ventricle Measurements: (Indexed to BSA)     IVSd (2D):   1.0 cm  LVPWd (2D):  1.0 cm  LVIDd (2D):  6.2 cm  LVIDs (2D):  5.5 cm  LV Mass:     273 g  141.5 g/m²  Visualized LV EF%: 20 to 25%     e' lateral: 7.83 cm/s  e' medial:  7.07 cm/s    Aorta Measurements: (Normal range) (Indexed to BSA)     Sinuses of Valsalva: 3.10 cm (3.1 - 3.7 cm)  Ao Asc prox:         2.80 cm       Left Atrium Measurements: (Indexed to BSA)  LA Diam 2D: 5.20 cm    Right Ventricle Measurements:     RV d, 2D:   3.5 cm  TV Mesha. S': 5.98 cm/s       LVOT / RVOT/ Qp/Qs Data: (Indexed to BSA)  LVOT Diameter: 2.10 cm  LVOT Vmax:     0.80 m/s  LVOT VTI:      11.25 cm  LVOT SV:       39.0 ml  20.16 ml/m²    Aortic Valve Measurements:  AV Vmax:                0.9 m/s  AV Peak Gradient:       3.3 mmHg  AV Mean Gradient: 2.0 mmHg  AV VTI:                 15.6 cm  AV VTI Ratio:           0.72  AoV EOA, Contin:        2.50 cm²  AoV EOA, Contin i:      1.29 cm²/m²  AoV Dimensionless Index 0.72       Tricuspid Valve Measurements:     TR Vmax:          2.9 m/s  TR Peak Gradient: 34.1 mmHg  RA Pressure:      15 mmHg  PASP:             49 mmHg

## 2024-03-30 VITALS — HEART RATE: 82 BPM | SYSTOLIC BLOOD PRESSURE: 107 MMHG | DIASTOLIC BLOOD PRESSURE: 63 MMHG

## 2024-03-30 LAB
ANION GAP SERPL CALC-SCNC: 15 MMOL/L — HIGH (ref 7–14)
BUN SERPL-MCNC: 76 MG/DL — HIGH (ref 7–23)
CALCIUM SERPL-MCNC: 8.2 MG/DL — LOW (ref 8.4–10.5)
CHLORIDE SERPL-SCNC: 95 MMOL/L — LOW (ref 98–107)
CO2 SERPL-SCNC: 24 MMOL/L — SIGNIFICANT CHANGE UP (ref 22–31)
CREAT SERPL-MCNC: 2.86 MG/DL — HIGH (ref 0.5–1.3)
EGFR: 23 ML/MIN/1.73M2 — LOW
GLUCOSE BLDC GLUCOMTR-MCNC: 201 MG/DL — HIGH (ref 70–99)
GLUCOSE BLDC GLUCOMTR-MCNC: 211 MG/DL — HIGH (ref 70–99)
GLUCOSE BLDC GLUCOMTR-MCNC: 217 MG/DL — HIGH (ref 70–99)
GLUCOSE SERPL-MCNC: 195 MG/DL — HIGH (ref 70–99)
HCT VFR BLD CALC: 34.3 % — LOW (ref 39–50)
HGB BLD-MCNC: 11.1 G/DL — LOW (ref 13–17)
MAGNESIUM SERPL-MCNC: 2 MG/DL — SIGNIFICANT CHANGE UP (ref 1.6–2.6)
MCHC RBC-ENTMCNC: 31.2 PG — SIGNIFICANT CHANGE UP (ref 27–34)
MCHC RBC-ENTMCNC: 32.4 GM/DL — SIGNIFICANT CHANGE UP (ref 32–36)
MCV RBC AUTO: 96.3 FL — SIGNIFICANT CHANGE UP (ref 80–100)
NRBC # BLD: 0 /100 WBCS — SIGNIFICANT CHANGE UP (ref 0–0)
NRBC # FLD: 0 K/UL — SIGNIFICANT CHANGE UP (ref 0–0)
PHOSPHATE SERPL-MCNC: 4.1 MG/DL — SIGNIFICANT CHANGE UP (ref 2.5–4.5)
PLATELET # BLD AUTO: 154 K/UL — SIGNIFICANT CHANGE UP (ref 150–400)
POTASSIUM SERPL-MCNC: 4 MMOL/L — SIGNIFICANT CHANGE UP (ref 3.5–5.3)
POTASSIUM SERPL-SCNC: 4 MMOL/L — SIGNIFICANT CHANGE UP (ref 3.5–5.3)
RBC # BLD: 3.56 M/UL — LOW (ref 4.2–5.8)
RBC # FLD: 16.2 % — HIGH (ref 10.3–14.5)
SODIUM SERPL-SCNC: 134 MMOL/L — LOW (ref 135–145)
WBC # BLD: 10.56 K/UL — HIGH (ref 3.8–10.5)
WBC # FLD AUTO: 10.56 K/UL — HIGH (ref 3.8–10.5)

## 2024-03-30 PROCEDURE — 99239 HOSP IP/OBS DSCHRG MGMT >30: CPT

## 2024-03-30 RX ORDER — TAMSULOSIN HYDROCHLORIDE 0.4 MG/1
1 CAPSULE ORAL
Qty: 30 | Refills: 0
Start: 2024-03-30 | End: 2024-04-28

## 2024-03-30 RX ORDER — HYDRALAZINE HCL 50 MG
1.5 TABLET ORAL
Refills: 0 | DISCHARGE

## 2024-03-30 RX ORDER — POLYETHYLENE GLYCOL 3350 17 G/17G
17 POWDER, FOR SOLUTION ORAL
Qty: 0 | Refills: 0 | DISCHARGE
Start: 2024-03-30

## 2024-03-30 RX ORDER — ASPIRIN/CALCIUM CARB/MAGNESIUM 324 MG
1 TABLET ORAL
Refills: 0 | DISCHARGE

## 2024-03-30 RX ORDER — BUMETANIDE 0.25 MG/ML
2 INJECTION INTRAMUSCULAR; INTRAVENOUS
Qty: 120 | Refills: 0
Start: 2024-03-30 | End: 2024-04-28

## 2024-03-30 RX ORDER — SPIRONOLACTONE 25 MG/1
0.5 TABLET, FILM COATED ORAL
Refills: 0 | DISCHARGE

## 2024-03-30 RX ORDER — FERROUS SULFATE 325(65) MG
1 TABLET ORAL
Qty: 30 | Refills: 0
Start: 2024-03-30 | End: 2024-04-28

## 2024-03-30 RX ORDER — BUMETANIDE 0.25 MG/ML
0 INJECTION INTRAMUSCULAR; INTRAVENOUS
Refills: 0 | DISCHARGE

## 2024-03-30 RX ORDER — HYDRALAZINE HCL 50 MG
1 TABLET ORAL
Qty: 90 | Refills: 0
Start: 2024-03-30 | End: 2024-04-28

## 2024-03-30 RX ADMIN — ISOSORBIDE DINITRATE 30 MILLIGRAM(S): 5 TABLET ORAL at 06:13

## 2024-03-30 RX ADMIN — ISOSORBIDE DINITRATE 30 MILLIGRAM(S): 5 TABLET ORAL at 11:17

## 2024-03-30 RX ADMIN — Medication 50 MICROGRAM(S): at 05:10

## 2024-03-30 RX ADMIN — Medication 2: at 08:15

## 2024-03-30 RX ADMIN — FAMOTIDINE 20 MILLIGRAM(S): 10 INJECTION INTRAVENOUS at 11:17

## 2024-03-30 RX ADMIN — Medication 100 MILLIGRAM(S): at 13:27

## 2024-03-30 RX ADMIN — BUDESONIDE AND FORMOTEROL FUMARATE DIHYDRATE 2 PUFF(S): 160; 4.5 AEROSOL RESPIRATORY (INHALATION) at 08:15

## 2024-03-30 RX ADMIN — GABAPENTIN 100 MILLIGRAM(S): 400 CAPSULE ORAL at 11:22

## 2024-03-30 RX ADMIN — BUMETANIDE 4 MILLIGRAM(S): 0.25 INJECTION INTRAMUSCULAR; INTRAVENOUS at 06:14

## 2024-03-30 RX ADMIN — CLOPIDOGREL BISULFATE 75 MILLIGRAM(S): 75 TABLET, FILM COATED ORAL at 11:17

## 2024-03-30 RX ADMIN — APIXABAN 5 MILLIGRAM(S): 2.5 TABLET, FILM COATED ORAL at 06:14

## 2024-03-30 RX ADMIN — Medication 3 MILLILITER(S): at 03:33

## 2024-03-30 RX ADMIN — Medication 2: at 11:15

## 2024-03-30 RX ADMIN — Medication 100 MILLIGRAM(S): at 11:18

## 2024-03-30 RX ADMIN — Medication 100 MILLIGRAM(S): at 06:14

## 2024-03-30 RX ADMIN — Medication 3 MILLILITER(S): at 08:01

## 2024-03-30 RX ADMIN — CARVEDILOL PHOSPHATE 6.25 MILLIGRAM(S): 80 CAPSULE, EXTENDED RELEASE ORAL at 06:13

## 2024-03-30 RX ADMIN — Medication 325 MILLIGRAM(S): at 11:17

## 2024-03-30 RX ADMIN — BUMETANIDE 4 MILLIGRAM(S): 0.25 INJECTION INTRAMUSCULAR; INTRAVENOUS at 13:27

## 2024-03-30 NOTE — CHART NOTE - NSCHARTNOTEFT_GEN_A_CORE
S/p Atrial flutter ablation, groin c/d/i with no bleeding/bruise/hematoma. Ok to d/c today from EP standpoint.     Rl Ernst MD  Cardiology Fellow - PGY 6  For all New Consults and Questions:  [email or web address]   Login: AppSense
The patient is s/p RHC, CardioMEM implant via RFV.   Suture removed. No bleeding noted. Sterile dressing applied.   As per Dr. Sandoval, Eliquis to be restarted tonight if R groin area is stable.
INGE CARLOS  MRN-[id number] 68y    Patient status post RHC via right femoral access. Dressing site clean, dry, and intact. No hematoma or active bleeding. Old blood noted on gauze no evidence of active bleeding noted. Extremities warm to touch. Pulses present b/l, capillary refill appropriate. Denies any pain, numbness or tingling. Will continue to monitor.    T(C): 36.4 (03-26-24 @ 22:00), Max: 36.9 (03-26-24 @ 17:30)  HR: 75 (03-26-24 @ 22:00) (70 - 86)  BP: 111/72 (03-26-24 @ 22:00) (109/72 - 111/72)  RR: 18 (03-26-24 @ 22:00) (18 - 18)  SpO2: 96% (03-26-24 @ 22:00) (96% - 100%)      Ayala Spence PA-C  Department of Medicine  [facility]   In House Page [phone]
Notified by primary RN that patient had 8  beats of NSVT, and patient is  now back to baseline rhythm. Assessed the patient at bedside. Patient was  asymptomatic, denies chest pain, palpitation, dizziness, pt   was sleeping during the time of episode. VSS .  Advised to send Am Mag, Phos, and BMP stat and recommended to give am dose of coreg early now. RN to notify with any acute changes.    T(C): 36.5 (03-24-24 @ 21:28), Max: 36.7 (03-24-24 @ 17:00)  HR: 92 (03-25-24 @ 05:32) (79 - 92)  BP: 97/60 (03-25-24 @ 05:32) (91/53 - 124/70)  RR: 18 (03-24-24 @ 21:28) (18 - 18)  SpO2: 98% (03-25-24 @ 03:35) (97% - 100%)
Pateint was reevaluation and also discuss with MICU fellow.  Patient reports improve in shortness of breath.julienne off Bipap. Denies chest pain at this time. pt reports chronic abdomen distension  Patient was examined.   b/l  diffuse exp  wheezing noted, sat 100%, on 2l NC , RR 16-21, No pedal edema note. Patient warm and dry to touch. Still using abdomen muscle to breath but comfortable . Had lunch without any difficulty or sob. urinate after bumex 4mg IVP. No records of I/I   Case discuss with Dr Silva. Will monitor pt on Tele/medicine and continue  to diuresis with Bumex 4mg IVP bid
Patient is s/p atrial flutter ablation today. Denies current complaints. Vitally stable.   R femoral access site dressing c/d/i. R femoral and R pedal pulse palpable. No hematoma or bleeding.   L radial access site dressing c/d/i. Capillary refill <2 sec. Radial pulse palpable. No hematoma or bleeding.

## 2024-03-30 NOTE — CHART NOTE - NSCHARTNOTESELECT_GEN_ALL_CORE
Event Note
Event Note
cardiology reevaluation/Event Note
site check/Event Note
Electrophysiology
Event Note

## 2024-04-01 ENCOUNTER — NON-APPOINTMENT (OUTPATIENT)
Age: 69
End: 2024-04-01

## 2024-04-04 LAB
HCT VFR BLDA CALC: 36 % — SIGNIFICANT CHANGE UP
HGB BLDA-MCNC: 12 G/DL — LOW (ref 12.6–17.4)
SAO2 % BLDA: 57.8 % — LOW (ref 94–98)

## 2024-04-05 ENCOUNTER — TRANSCRIPTION ENCOUNTER (OUTPATIENT)
Age: 69
End: 2024-04-05

## 2024-04-09 NOTE — DISCHARGE NOTE PROVIDER - DID THE PATIENT PRESENT WITH OR WAS TREATED FOR MALNUTRITION DURING THIS ADMISSION
Emergency Department Attending Supervision Note  4/9/2024  6:58 PM      I evaluated this patient in conjunction with Shaji MANUEL      Briefly, the patient presented with  falls at home with weakness.  Maybe hit wall on right shoulder and pain.  Unwitnessed.  Bruising upper thorax and pain with movement of right arm.  Family reports slightly off.  Wound care clinic today for chronic wounds visit and fmaily and clinic noticed shortness of breath.  Was 90% at clinic.  Ambulatory without difficulty but winded with walking.         On my exam,   General: Patient is well appearing. No distress.  Head to toe trauma normal except as below.    Head: Atraumatic.  Eyes: Conjunctivae and EOM are normal. No scleral icterus.  Neck: Normal range of motion. Neck supple.  No midline tenderness.   Cardiovascular: Normal rate, regular rhythm, normal heart sounds and intact distal pulses.   Pulmonary/Chest: Breath sounds normal. No respiratory distress.  Sliding sign present on bedside US.  Diffuse aged bruising in inverted heart shape on upper mid chest.    Abdominal: Soft. Bowel sounds are normal. No distension. No tenderness. No rebound or guarding.  No ecchymosis  Musculoskeletal: Pelvis intact.  Normal range of motion of right shoulder.  No dislocation.  No bony deformity or swelling of the upper arm.   Right elbow bruising also painless free ROM.  Left arm free and painless.  Bilateral LE dressed.  NVI distal.    Skin: Warm and dry. No rash noted. Not diaphoretic.  Otherwise as above.     Results:  No signs of infection.  No pneumonia  on CXR and swabs neg.  No elev WBC.  Stable anemia.  No definite ptx or hemothorax or acute rib fractures.  Noted mediastinum opacification CT chest pending for admission and relayed to hospitalist.  Ct head reviewed and no bleed or fracture. Old infracts.  Cspine Ct no acute fracture.  Humerus XR no large fracture or dislocation.      ED course:  Plan is for admission with normal EKG but slightly  elevated trop.  With oxygen for comfort with dyspnea on exertion beyond safety for home.  No large traumatic injury.      My impression is dyspnea with exertion  Bruising  Right shoulder pain.         Diagnosis    ICD-10-CM    1. Multiple falls  R29.6       2. Weakness  R53.1       3. Shortness of breath  R06.02       4. Elevated troponin  R79.89       5. Acute renal failure superimposed on chronic kidney disease, unspecified acute renal failure type, unspecified CKD stage  (H24)  N17.9     N18.9             Trevor Adams MD Stevens, Andrew C, MD  04/09/24 2023       Trevor Adams MD  04/09/24 2054     No

## 2024-04-16 ENCOUNTER — NON-APPOINTMENT (OUTPATIENT)
Age: 69
End: 2024-04-16

## 2024-04-17 ENCOUNTER — APPOINTMENT (OUTPATIENT)
Dept: HEART FAILURE | Facility: CLINIC | Age: 69
End: 2024-04-17

## 2024-04-23 ENCOUNTER — NON-APPOINTMENT (OUTPATIENT)
Age: 69
End: 2024-04-23

## 2024-04-23 RX ORDER — ROSUVASTATIN CALCIUM 20 MG/1
20 TABLET, FILM COATED ORAL
Qty: 1 | Refills: 3 | Status: ACTIVE | COMMUNITY
Start: 2021-10-27 | End: 1900-01-01

## 2024-04-23 RX ORDER — APIXABAN 5 MG/1
5 TABLET, FILM COATED ORAL
Qty: 60 | Refills: 5 | Status: ACTIVE | COMMUNITY
Start: 2024-03-08 | End: 1900-01-01

## 2024-04-23 RX ORDER — ISOSORBIDE DINITRATE 30 MG/1
30 TABLET ORAL EVERY 8 HOURS
Qty: 90 | Refills: 3 | Status: ACTIVE | COMMUNITY
Start: 2021-10-27 | End: 1900-01-01

## 2024-04-23 RX ORDER — HYDRALAZINE HYDROCHLORIDE 100 MG/1
100 TABLET ORAL 3 TIMES DAILY
Qty: 270 | Refills: 1 | Status: ACTIVE | COMMUNITY
Start: 2021-10-27 | End: 1900-01-01

## 2024-04-23 RX ORDER — CLOPIDOGREL BISULFATE 75 MG/1
75 TABLET, FILM COATED ORAL
Qty: 30 | Refills: 6 | Status: ACTIVE | COMMUNITY
Start: 2023-08-03 | End: 1900-01-01

## 2024-05-01 ENCOUNTER — NON-APPOINTMENT (OUTPATIENT)
Age: 69
End: 2024-05-01

## 2024-05-01 ENCOUNTER — INPATIENT (INPATIENT)
Facility: HOSPITAL | Age: 69
LOS: 13 days | Discharge: HOME | End: 2024-05-15
Attending: INTERNAL MEDICINE | Admitting: INTERNAL MEDICINE
Payer: MEDICARE

## 2024-05-01 ENCOUNTER — APPOINTMENT (OUTPATIENT)
Dept: ELECTROPHYSIOLOGY | Facility: CLINIC | Age: 69
End: 2024-05-01
Payer: MEDICARE

## 2024-05-01 ENCOUNTER — LABORATORY RESULT (OUTPATIENT)
Age: 69
End: 2024-05-01

## 2024-05-01 VITALS
RESPIRATION RATE: 16 BRPM | HEART RATE: 89 BPM | HEIGHT: 70 IN | DIASTOLIC BLOOD PRESSURE: 85 MMHG | OXYGEN SATURATION: 99 % | SYSTOLIC BLOOD PRESSURE: 135 MMHG | TEMPERATURE: 98 F

## 2024-05-01 VITALS
SYSTOLIC BLOOD PRESSURE: 113 MMHG | WEIGHT: 170 LBS | HEIGHT: 60 IN | HEART RATE: 106 BPM | DIASTOLIC BLOOD PRESSURE: 69 MMHG | OXYGEN SATURATION: 84 % | BODY MASS INDEX: 33.38 KG/M2

## 2024-05-01 DIAGNOSIS — E03.9 HYPOTHYROIDISM, UNSPECIFIED: ICD-10-CM

## 2024-05-01 DIAGNOSIS — I50.23 ACUTE ON CHRONIC SYSTOLIC (CONGESTIVE) HEART FAILURE: ICD-10-CM

## 2024-05-01 DIAGNOSIS — J44.9 CHRONIC OBSTRUCTIVE PULMONARY DISEASE, UNSPECIFIED: ICD-10-CM

## 2024-05-01 DIAGNOSIS — I44.7 LEFT BUNDLE-BRANCH BLOCK, UNSPECIFIED: ICD-10-CM

## 2024-05-01 DIAGNOSIS — I10 ESSENTIAL (PRIMARY) HYPERTENSION: ICD-10-CM

## 2024-05-01 DIAGNOSIS — E78.5 HYPERLIPIDEMIA, UNSPECIFIED: ICD-10-CM

## 2024-05-01 DIAGNOSIS — Z29.9 ENCOUNTER FOR PROPHYLACTIC MEASURES, UNSPECIFIED: ICD-10-CM

## 2024-05-01 DIAGNOSIS — Z95.1 PRESENCE OF AORTOCORONARY BYPASS GRAFT: Chronic | ICD-10-CM

## 2024-05-01 DIAGNOSIS — I48.0 PAROXYSMAL ATRIAL FIBRILLATION: ICD-10-CM

## 2024-05-01 DIAGNOSIS — I25.10 ATHEROSCLEROTIC HEART DISEASE OF NATIVE CORONARY ARTERY WITHOUT ANGINA PECTORIS: ICD-10-CM

## 2024-05-01 DIAGNOSIS — N40.0 BENIGN PROSTATIC HYPERPLASIA WITHOUT LOWER URINARY TRACT SYMPTOMS: ICD-10-CM

## 2024-05-01 DIAGNOSIS — Z90.49 ACQUIRED ABSENCE OF OTHER SPECIFIED PARTS OF DIGESTIVE TRACT: Chronic | ICD-10-CM

## 2024-05-01 DIAGNOSIS — N18.30 CHRONIC KIDNEY DISEASE, STAGE 3 UNSPECIFIED: ICD-10-CM

## 2024-05-01 DIAGNOSIS — I48.3 TYPICAL ATRIAL FLUTTER: ICD-10-CM

## 2024-05-01 DIAGNOSIS — E11.9 TYPE 2 DIABETES MELLITUS WITHOUT COMPLICATIONS: ICD-10-CM

## 2024-05-01 LAB
ALBUMIN SERPL ELPH-MCNC: 3.9 G/DL — SIGNIFICANT CHANGE UP (ref 3.3–5)
ALP SERPL-CCNC: 153 U/L — HIGH (ref 40–120)
ALT FLD-CCNC: 17 U/L — SIGNIFICANT CHANGE UP (ref 4–41)
ANION GAP SERPL CALC-SCNC: 15 MMOL/L — HIGH (ref 7–14)
APPEARANCE UR: CLEAR — SIGNIFICANT CHANGE UP
AST SERPL-CCNC: 29 U/L — SIGNIFICANT CHANGE UP (ref 4–40)
BACTERIA # UR AUTO: ABNORMAL /HPF
BASE EXCESS BLDV CALC-SCNC: -4.2 MMOL/L — LOW (ref -2–3)
BASOPHILS # BLD AUTO: 0.08 K/UL — SIGNIFICANT CHANGE UP (ref 0–0.2)
BASOPHILS NFR BLD AUTO: 0.6 % — SIGNIFICANT CHANGE UP (ref 0–2)
BILIRUB SERPL-MCNC: 0.7 MG/DL — SIGNIFICANT CHANGE UP (ref 0.2–1.2)
BILIRUB UR-MCNC: NEGATIVE — SIGNIFICANT CHANGE UP
BLOOD GAS VENOUS COMPREHENSIVE RESULT: SIGNIFICANT CHANGE UP
BUN SERPL-MCNC: 37 MG/DL — HIGH (ref 7–23)
CALCIUM SERPL-MCNC: 8.9 MG/DL — SIGNIFICANT CHANGE UP (ref 8.4–10.5)
CHLORIDE BLDV-SCNC: 104 MMOL/L — SIGNIFICANT CHANGE UP (ref 96–108)
CHLORIDE SERPL-SCNC: 101 MMOL/L — SIGNIFICANT CHANGE UP (ref 98–107)
CO2 BLDV-SCNC: 22.2 MMOL/L — SIGNIFICANT CHANGE UP (ref 22–26)
CO2 SERPL-SCNC: 19 MMOL/L — LOW (ref 22–31)
COLOR SPEC: YELLOW — SIGNIFICANT CHANGE UP
CREAT SERPL-MCNC: 2.02 MG/DL — HIGH (ref 0.5–1.3)
DIFF PNL FLD: NEGATIVE — SIGNIFICANT CHANGE UP
EGFR: 35 ML/MIN/1.73M2 — LOW
EOSINOPHIL # BLD AUTO: 0.15 K/UL — SIGNIFICANT CHANGE UP (ref 0–0.5)
EOSINOPHIL NFR BLD AUTO: 1.2 % — SIGNIFICANT CHANGE UP (ref 0–6)
EPI CELLS # UR: PRESENT
FLUAV AG NPH QL: SIGNIFICANT CHANGE UP
FLUBV AG NPH QL: SIGNIFICANT CHANGE UP
GAS PNL BLDV: 132 MMOL/L — LOW (ref 136–145)
GAS PNL BLDV: SIGNIFICANT CHANGE UP
GLUCOSE BLDC GLUCOMTR-MCNC: 247 MG/DL — HIGH (ref 70–99)
GLUCOSE BLDC GLUCOMTR-MCNC: 293 MG/DL — HIGH (ref 70–99)
GLUCOSE BLDV-MCNC: 112 MG/DL — HIGH (ref 70–99)
GLUCOSE SERPL-MCNC: 129 MG/DL — HIGH (ref 70–99)
GLUCOSE UR QL: NEGATIVE MG/DL — SIGNIFICANT CHANGE UP
HCO3 BLDV-SCNC: 21 MMOL/L — LOW (ref 22–29)
HCT VFR BLD CALC: 38.1 % — LOW (ref 39–50)
HCT VFR BLDA CALC: 35 % — LOW (ref 39–51)
HGB BLD CALC-MCNC: 11.7 G/DL — LOW (ref 12.6–17.4)
HGB BLD-MCNC: 12 G/DL — LOW (ref 13–17)
IANC: 10.43 K/UL — HIGH (ref 1.8–7.4)
IMM GRANULOCYTES NFR BLD AUTO: 0.7 % — SIGNIFICANT CHANGE UP (ref 0–0.9)
KETONES UR-MCNC: NEGATIVE MG/DL — SIGNIFICANT CHANGE UP
LACTATE BLDV-MCNC: 1.7 MMOL/L — SIGNIFICANT CHANGE UP (ref 0.5–2)
LEUKOCYTE ESTERASE UR-ACNC: NEGATIVE — SIGNIFICANT CHANGE UP
LYMPHOCYTES # BLD AUTO: 1.41 K/UL — SIGNIFICANT CHANGE UP (ref 1–3.3)
LYMPHOCYTES # BLD AUTO: 10.8 % — LOW (ref 13–44)
MAGNESIUM SERPL-MCNC: 1.9 MG/DL — SIGNIFICANT CHANGE UP (ref 1.6–2.6)
MCHC RBC-ENTMCNC: 31.5 GM/DL — LOW (ref 32–36)
MCHC RBC-ENTMCNC: 31.8 PG — SIGNIFICANT CHANGE UP (ref 27–34)
MCV RBC AUTO: 101.1 FL — HIGH (ref 80–100)
MONOCYTES # BLD AUTO: 0.85 K/UL — SIGNIFICANT CHANGE UP (ref 0–0.9)
MONOCYTES NFR BLD AUTO: 6.5 % — SIGNIFICANT CHANGE UP (ref 2–14)
NEUTROPHILS # BLD AUTO: 10.43 K/UL — HIGH (ref 1.8–7.4)
NEUTROPHILS NFR BLD AUTO: 80.2 % — HIGH (ref 43–77)
NITRITE UR-MCNC: NEGATIVE — SIGNIFICANT CHANGE UP
NRBC # BLD: 0 /100 WBCS — SIGNIFICANT CHANGE UP (ref 0–0)
NRBC # FLD: 0 K/UL — SIGNIFICANT CHANGE UP (ref 0–0)
NT-PROBNP SERPL-SCNC: 9273 PG/ML — HIGH
PCO2 BLDV: 38 MMHG — LOW (ref 42–55)
PH BLDV: 7.35 — SIGNIFICANT CHANGE UP (ref 7.32–7.43)
PH UR: 6.5 — SIGNIFICANT CHANGE UP (ref 5–8)
PHOSPHATE SERPL-MCNC: 3 MG/DL — SIGNIFICANT CHANGE UP (ref 2.5–4.5)
PLATELET # BLD AUTO: 140 K/UL — LOW (ref 150–400)
PO2 BLDV: 153 MMHG — HIGH (ref 25–45)
POTASSIUM BLDV-SCNC: 3.7 MMOL/L — SIGNIFICANT CHANGE UP (ref 3.5–5.1)
POTASSIUM SERPL-MCNC: 4.6 MMOL/L — SIGNIFICANT CHANGE UP (ref 3.5–5.3)
POTASSIUM SERPL-SCNC: 4.6 MMOL/L — SIGNIFICANT CHANGE UP (ref 3.5–5.3)
PROT SERPL-MCNC: 6.3 G/DL — SIGNIFICANT CHANGE UP (ref 6–8.3)
PROT UR-MCNC: 100 MG/DL
RBC # BLD: 3.77 M/UL — LOW (ref 4.2–5.8)
RBC # FLD: 17.7 % — HIGH (ref 10.3–14.5)
RBC CASTS # UR COMP ASSIST: SIGNIFICANT CHANGE UP /HPF (ref 0–4)
RSV RNA NPH QL NAA+NON-PROBE: SIGNIFICANT CHANGE UP
SAO2 % BLDV: 98.7 % — HIGH (ref 67–88)
SARS-COV-2 RNA SPEC QL NAA+PROBE: SIGNIFICANT CHANGE UP
SODIUM SERPL-SCNC: 135 MMOL/L — SIGNIFICANT CHANGE UP (ref 135–145)
SP GR SPEC: 1.02 — SIGNIFICANT CHANGE UP (ref 1–1.03)
TROPONIN T, HIGH SENSITIVITY RESULT: 81 NG/L — CRITICAL HIGH
TROPONIN T, HIGH SENSITIVITY RESULT: 84 NG/L — CRITICAL HIGH
UROBILINOGEN FLD QL: 0.2 MG/DL — SIGNIFICANT CHANGE UP (ref 0.2–1)
WBC # BLD: 13.01 K/UL — HIGH (ref 3.8–10.5)
WBC # FLD AUTO: 13.01 K/UL — HIGH (ref 3.8–10.5)
WBC UR QL: SIGNIFICANT CHANGE UP /HPF (ref 0–5)

## 2024-05-01 PROCEDURE — 71046 X-RAY EXAM CHEST 2 VIEWS: CPT | Mod: 26

## 2024-05-01 PROCEDURE — 93000 ELECTROCARDIOGRAM COMPLETE: CPT | Mod: PD

## 2024-05-01 PROCEDURE — 99214 OFFICE O/P EST MOD 30 MIN: CPT

## 2024-05-01 PROCEDURE — 99285 EMERGENCY DEPT VISIT HI MDM: CPT

## 2024-05-01 PROCEDURE — 93010 ELECTROCARDIOGRAM REPORT: CPT | Mod: 77

## 2024-05-01 PROCEDURE — 99204 OFFICE O/P NEW MOD 45 MIN: CPT

## 2024-05-01 PROCEDURE — 99223 1ST HOSP IP/OBS HIGH 75: CPT | Mod: GC

## 2024-05-01 RX ORDER — ALLOPURINOL 300 MG
100 TABLET ORAL DAILY
Refills: 0 | Status: DISCONTINUED | OUTPATIENT
Start: 2024-05-01 | End: 2024-05-15

## 2024-05-01 RX ORDER — SODIUM CHLORIDE 9 MG/ML
1000 INJECTION, SOLUTION INTRAVENOUS
Refills: 0 | Status: DISCONTINUED | OUTPATIENT
Start: 2024-05-01 | End: 2024-05-15

## 2024-05-01 RX ORDER — INFLUENZA VIRUS VACCINE 15; 15; 15; 15 UG/.5ML; UG/.5ML; UG/.5ML; UG/.5ML
0.7 SUSPENSION INTRAMUSCULAR ONCE
Refills: 0 | Status: DISCONTINUED | OUTPATIENT
Start: 2024-05-01 | End: 2024-05-15

## 2024-05-01 RX ORDER — CARVEDILOL PHOSPHATE 80 MG/1
6.25 CAPSULE, EXTENDED RELEASE ORAL EVERY 12 HOURS
Refills: 0 | Status: DISCONTINUED | OUTPATIENT
Start: 2024-05-01 | End: 2024-05-05

## 2024-05-01 RX ORDER — TAMSULOSIN HYDROCHLORIDE 0.4 MG/1
0.4 CAPSULE ORAL AT BEDTIME
Refills: 0 | Status: DISCONTINUED | OUTPATIENT
Start: 2024-05-01 | End: 2024-05-15

## 2024-05-01 RX ORDER — APIXABAN 2.5 MG/1
1 TABLET, FILM COATED ORAL
Refills: 0 | DISCHARGE

## 2024-05-01 RX ORDER — GLUCAGON INJECTION, SOLUTION 0.5 MG/.1ML
1 INJECTION, SOLUTION SUBCUTANEOUS ONCE
Refills: 0 | Status: DISCONTINUED | OUTPATIENT
Start: 2024-05-01 | End: 2024-05-15

## 2024-05-01 RX ORDER — CEFTRIAXONE 500 MG/1
1000 INJECTION, POWDER, FOR SOLUTION INTRAMUSCULAR; INTRAVENOUS ONCE
Refills: 0 | Status: COMPLETED | OUTPATIENT
Start: 2024-05-01 | End: 2024-05-01

## 2024-05-01 RX ORDER — DEXTROSE 50 % IN WATER 50 %
25 SYRINGE (ML) INTRAVENOUS ONCE
Refills: 0 | Status: DISCONTINUED | OUTPATIENT
Start: 2024-05-01 | End: 2024-05-15

## 2024-05-01 RX ORDER — MOMETASONE FUROATE AND FORMOTEROL FUMARATE DIHYDRATE 200; 5 UG/1; UG/1
2 AEROSOL RESPIRATORY (INHALATION)
Refills: 0 | DISCHARGE

## 2024-05-01 RX ORDER — IPRATROPIUM/ALBUTEROL SULFATE 18-103MCG
3 AEROSOL WITH ADAPTER (GRAM) INHALATION EVERY 4 HOURS
Refills: 0 | Status: DISCONTINUED | OUTPATIENT
Start: 2024-05-01 | End: 2024-05-02

## 2024-05-01 RX ORDER — INSULIN LISPRO 100/ML
VIAL (ML) SUBCUTANEOUS
Refills: 0 | Status: DISCONTINUED | OUTPATIENT
Start: 2024-05-01 | End: 2024-05-13

## 2024-05-01 RX ORDER — GABAPENTIN 400 MG/1
100 CAPSULE ORAL AT BEDTIME
Refills: 0 | Status: DISCONTINUED | OUTPATIENT
Start: 2024-05-01 | End: 2024-05-15

## 2024-05-01 RX ORDER — PANTOPRAZOLE SODIUM 20 MG/1
1 TABLET, DELAYED RELEASE ORAL
Refills: 0 | DISCHARGE

## 2024-05-01 RX ORDER — CLOPIDOGREL BISULFATE 75 MG/1
75 TABLET, FILM COATED ORAL DAILY
Refills: 0 | Status: DISCONTINUED | OUTPATIENT
Start: 2024-05-01 | End: 2024-05-15

## 2024-05-01 RX ORDER — MONTELUKAST 4 MG/1
10 TABLET, CHEWABLE ORAL DAILY
Refills: 0 | Status: DISCONTINUED | OUTPATIENT
Start: 2024-05-01 | End: 2024-05-15

## 2024-05-01 RX ORDER — EZETIMIBE 10 MG/1
1 TABLET ORAL
Qty: 0 | Refills: 0 | DISCHARGE

## 2024-05-01 RX ORDER — BUDESONIDE AND FORMOTEROL FUMARATE DIHYDRATE 160; 4.5 UG/1; UG/1
2 AEROSOL RESPIRATORY (INHALATION)
Refills: 0 | Status: DISCONTINUED | OUTPATIENT
Start: 2024-05-01 | End: 2024-05-15

## 2024-05-01 RX ORDER — POLYETHYLENE GLYCOL 3350 17 G/17G
17 POWDER, FOR SOLUTION ORAL DAILY
Refills: 0 | Status: DISCONTINUED | OUTPATIENT
Start: 2024-05-01 | End: 2024-05-15

## 2024-05-01 RX ORDER — BUMETANIDE 0.25 MG/ML
4 INJECTION INTRAMUSCULAR; INTRAVENOUS ONCE
Refills: 0 | Status: COMPLETED | OUTPATIENT
Start: 2024-05-01 | End: 2024-05-01

## 2024-05-01 RX ORDER — IPRATROPIUM/ALBUTEROL SULFATE 18-103MCG
3 AEROSOL WITH ADAPTER (GRAM) INHALATION
Refills: 0 | Status: COMPLETED | OUTPATIENT
Start: 2024-05-01 | End: 2024-05-01

## 2024-05-01 RX ORDER — MONTELUKAST 4 MG/1
1 TABLET, CHEWABLE ORAL
Refills: 0 | DISCHARGE

## 2024-05-01 RX ORDER — ATORVASTATIN CALCIUM 80 MG/1
80 TABLET, FILM COATED ORAL AT BEDTIME
Refills: 0 | Status: DISCONTINUED | OUTPATIENT
Start: 2024-05-01 | End: 2024-05-15

## 2024-05-01 RX ORDER — LEVOTHYROXINE SODIUM 125 MCG
50 TABLET ORAL DAILY
Refills: 0 | Status: DISCONTINUED | OUTPATIENT
Start: 2024-05-01 | End: 2024-05-15

## 2024-05-01 RX ORDER — MAGNESIUM SULFATE 500 MG/ML
2 VIAL (ML) INJECTION ONCE
Refills: 0 | Status: COMPLETED | OUTPATIENT
Start: 2024-05-01 | End: 2024-05-01

## 2024-05-01 RX ORDER — SENNA PLUS 8.6 MG/1
2 TABLET ORAL AT BEDTIME
Refills: 0 | Status: DISCONTINUED | OUTPATIENT
Start: 2024-05-01 | End: 2024-05-15

## 2024-05-01 RX ORDER — ACETAMINOPHEN 500 MG
650 TABLET ORAL EVERY 6 HOURS
Refills: 0 | Status: DISCONTINUED | OUTPATIENT
Start: 2024-05-01 | End: 2024-05-15

## 2024-05-01 RX ORDER — AZITHROMYCIN 500 MG/1
500 TABLET, FILM COATED ORAL ONCE
Refills: 0 | Status: COMPLETED | OUTPATIENT
Start: 2024-05-01 | End: 2024-05-01

## 2024-05-01 RX ORDER — DEXTROSE 50 % IN WATER 50 %
15 SYRINGE (ML) INTRAVENOUS ONCE
Refills: 0 | Status: DISCONTINUED | OUTPATIENT
Start: 2024-05-01 | End: 2024-05-15

## 2024-05-01 RX ORDER — BUMETANIDE 0.25 MG/ML
2 INJECTION INTRAMUSCULAR; INTRAVENOUS
Qty: 20 | Refills: 0 | Status: DISCONTINUED | OUTPATIENT
Start: 2024-05-01 | End: 2024-05-06

## 2024-05-01 RX ORDER — DEXTROSE 10 % IN WATER 10 %
125 INTRAVENOUS SOLUTION INTRAVENOUS ONCE
Refills: 0 | Status: DISCONTINUED | OUTPATIENT
Start: 2024-05-01 | End: 2024-05-15

## 2024-05-01 RX ORDER — HYDRALAZINE HCL 50 MG
100 TABLET ORAL THREE TIMES A DAY
Refills: 0 | Status: DISCONTINUED | OUTPATIENT
Start: 2024-05-01 | End: 2024-05-05

## 2024-05-01 RX ORDER — BUMETANIDE 0.25 MG/ML
4 INJECTION INTRAMUSCULAR; INTRAVENOUS ONCE
Refills: 0 | Status: DISCONTINUED | OUTPATIENT
Start: 2024-05-01 | End: 2024-05-01

## 2024-05-01 RX ORDER — FERROUS SULFATE 325(65) MG
325 TABLET ORAL DAILY
Refills: 0 | Status: DISCONTINUED | OUTPATIENT
Start: 2024-05-01 | End: 2024-05-15

## 2024-05-01 RX ORDER — INSULIN LISPRO 100/ML
VIAL (ML) SUBCUTANEOUS AT BEDTIME
Refills: 0 | Status: DISCONTINUED | OUTPATIENT
Start: 2024-05-01 | End: 2024-05-13

## 2024-05-01 RX ORDER — GLIMEPIRIDE 1 MG
1 TABLET ORAL
Qty: 0 | Refills: 0 | DISCHARGE

## 2024-05-01 RX ORDER — ROSUVASTATIN CALCIUM 5 MG/1
1 TABLET ORAL
Refills: 0 | DISCHARGE

## 2024-05-01 RX ORDER — DEXTROSE 50 % IN WATER 50 %
12.5 SYRINGE (ML) INTRAVENOUS ONCE
Refills: 0 | Status: DISCONTINUED | OUTPATIENT
Start: 2024-05-01 | End: 2024-05-15

## 2024-05-01 RX ORDER — ISOSORBIDE DINITRATE 5 MG/1
30 TABLET ORAL THREE TIMES A DAY
Refills: 0 | Status: DISCONTINUED | OUTPATIENT
Start: 2024-05-01 | End: 2024-05-05

## 2024-05-01 RX ORDER — APIXABAN 2.5 MG/1
5 TABLET, FILM COATED ORAL
Refills: 0 | Status: DISCONTINUED | OUTPATIENT
Start: 2024-05-01 | End: 2024-05-15

## 2024-05-01 RX ADMIN — Medication 3 MILLILITER(S): at 15:02

## 2024-05-01 RX ADMIN — Medication 2 GRAM(S): at 17:13

## 2024-05-01 RX ADMIN — Medication 3 MILLILITER(S): at 22:13

## 2024-05-01 RX ADMIN — BUMETANIDE 10 MG/HR: 0.25 INJECTION INTRAMUSCULAR; INTRAVENOUS at 22:21

## 2024-05-01 RX ADMIN — BUMETANIDE 10 MG/HR: 0.25 INJECTION INTRAMUSCULAR; INTRAVENOUS at 19:38

## 2024-05-01 RX ADMIN — Medication 3 MILLILITER(S): at 15:23

## 2024-05-01 RX ADMIN — CEFTRIAXONE 100 MILLIGRAM(S): 500 INJECTION, POWDER, FOR SOLUTION INTRAMUSCULAR; INTRAVENOUS at 17:26

## 2024-05-01 RX ADMIN — AZITHROMYCIN 255 MILLIGRAM(S): 500 TABLET, FILM COATED ORAL at 17:57

## 2024-05-01 RX ADMIN — Medication 40 MILLIGRAM(S): at 15:06

## 2024-05-01 RX ADMIN — GABAPENTIN 100 MILLIGRAM(S): 400 CAPSULE ORAL at 22:16

## 2024-05-01 RX ADMIN — Medication 150 GRAM(S): at 16:53

## 2024-05-01 RX ADMIN — BUMETANIDE 132 MILLIGRAM(S): 0.25 INJECTION INTRAMUSCULAR; INTRAVENOUS at 18:40

## 2024-05-01 RX ADMIN — AZITHROMYCIN 500 MILLIGRAM(S): 500 TABLET, FILM COATED ORAL at 18:57

## 2024-05-01 RX ADMIN — ATORVASTATIN CALCIUM 80 MILLIGRAM(S): 80 TABLET, FILM COATED ORAL at 22:16

## 2024-05-01 RX ADMIN — Medication 3 MILLILITER(S): at 15:44

## 2024-05-01 RX ADMIN — TAMSULOSIN HYDROCHLORIDE 0.4 MILLIGRAM(S): 0.4 CAPSULE ORAL at 22:16

## 2024-05-01 RX ADMIN — CEFTRIAXONE 1000 MILLIGRAM(S): 500 INJECTION, POWDER, FOR SOLUTION INTRAMUSCULAR; INTRAVENOUS at 17:56

## 2024-05-01 RX ADMIN — CARVEDILOL PHOSPHATE 6.25 MILLIGRAM(S): 80 CAPSULE, EXTENDED RELEASE ORAL at 23:49

## 2024-05-01 RX ADMIN — BUMETANIDE 4 MILLIGRAM(S): 0.25 INJECTION INTRAMUSCULAR; INTRAVENOUS at 19:10

## 2024-05-01 NOTE — ED ADULT NURSE NOTE - NSFALLHARMRISKINTERV_ED_ALL_ED

## 2024-05-01 NOTE — H&P ADULT - NSHPPHYSICALEXAM_GEN_ALL_CORE
PHYSICAL EXAM:   GENERAL: Alert. Not confused. No acute distress. Not thin. Not cachectic. Not obese.  HEAD:  Atraumatic. Normocephalic.  EYES: EOMI. PERRLA. Normal conjunctiva/sclera.  ENT: Neck supple. No JVD. Moist oral mucosa. Not edentulous. No thrush.  LYMPH: Normal supraclavicular/cervical lymph nodes.   CARDIAC: Not tachy, Not wily. Regular rhythm. Not irregularly irregular. S1. S2. No murmur. No rub. No distant heart sounds.  LUNG/CHEST: CTAB. BS equal bilaterally. No wheezes. No rales. No rhonchi.  ABDOMEN: Soft. No tenderness. No distension. No fluid wave. Normal bowel sounds.  BACK: No midline/vertebral tenderness. No flank tenderness.  VASCULAR: +2 b/l radial or ulnar pulses. Palpable DP pulses.  EXTREMITIES:  No clubbing. No cyanosis. No edema. Moving all 4.  NEUROLOGY: A&Ox3. Non-focal exam. Cranial nerves intact. Normal speech. Sensation intact.  PSYCH: Normal behavior. Normal affect.  SKIN: No jaundice. No erythema. No rash/lesion.  ICU Vital Signs Last 24 Hrs  T(C): 36.4 (01 May 2024 21:50), Max: 36.8 (01 May 2024 13:43)  T(F): 97.6 (01 May 2024 21:50), Max: 98.2 (01 May 2024 13:43)  HR: 116 (01 May 2024 21:50) (89 - 116)  BP: 141/94 (01 May 2024 21:50) (108/64 - 141/94)  BP(mean): --  ABP: --  ABP(mean): --  RR: 18 (01 May 2024 21:50) (16 - 18)  SpO2: 100% (01 May 2024 21:50) (99% - 100%)    O2 Parameters below as of 01 May 2024 21:50    O2 Flow (L/min): 3        I&O's Summary PHYSICAL EXAM:   GENERAL: Alert. Not confused. No acute distress. Not thin. Not cachectic. Not obese.  HEAD:  Atraumatic. Normocephalic.  EYES: EOMI. PERRLA. Normal conjunctiva/sclera.  ENT: Neck supple. (+) JVD. Moist oral mucosa. Not edentulous. No thrush.  LYMPH: Normal supraclavicular/cervical lymph nodes.   CARDIAC: Tachycardic. Regular rhythm. Not irregularly irregular. S1. S2. No murmur. No rub. No distant heart sounds.  LUNG/CHEST: (+) Rales auscultated to mid-lung fields bilaterally. Diffuse mild expiratory wheezes.   ABDOMEN: Soft. No tenderness. No distension. No fluid wave. Normal bowel sounds.  BACK: No midline/vertebral tenderness. No flank tenderness.  VASCULAR: +2 b/l radial or ulnar pulses. Palpable DP pulses.  EXTREMITIES:  No clubbing. No cyanosis. Minimal edema of LE bilaterally. Moving all 4. (+) Bony protrusion of LLE affecting posterior component of ankle/heel.   NEUROLOGY: A&Ox3. (+) RUE and RLE 5/5 in strength. LUE and LLE 3-/5 in strength. Cranial nerves intact. Normal speech. Sensation intact.  PSYCH: Normal behavior. Normal affect.  SKIN: No jaundice. No erythema. No rash/lesion.  ICU Vital Signs Last 24 Hrs  T(C): 36.4 (01 May 2024 21:50), Max: 36.8 (01 May 2024 13:43)  T(F): 97.6 (01 May 2024 21:50), Max: 98.2 (01 May 2024 13:43)  HR: 116 (01 May 2024 21:50) (89 - 116)  BP: 141/94 (01 May 2024 21:50) (108/64 - 141/94)  BP(mean): --  ABP: --  ABP(mean): --  RR: 18 (01 May 2024 21:50) (16 - 18)  SpO2: 100% (01 May 2024 21:50) (99% - 100%)    O2 Parameters below as of 01 May 2024 21:50    O2 Flow (L/min): 3        I&O's Summary

## 2024-05-01 NOTE — H&P ADULT - PROBLEM SELECTOR PLAN 6
SCr 2.02 on admission. Baseline SCr 2.4-2.6.  -hold nephrotoxins   -diuresis as above   -monitor UOP, electrolytes   -bladder scan given only put out ~400ccs over past 6hrs SCr 2.02 on admission. Baseline SCr 2.4-2.6.  -hold nephrotoxins   -diuresis as above   -monitor UOP, electrolytes   -bladder scan q8h given only put out ~400ccs over past 6hrs and initially w/ PVR of 250

## 2024-05-01 NOTE — DISCUSSION/SUMMARY
[FreeTextEntry1] : In summary, this is a 68 year old man with PAF, typical atrial flutter hypertension, diabetes, hyperlipidemia Stage C/Class II-III heart failure with reduced ejection fraction, CAD status post CABG in 2014, status post Medtronic cobalt CRT-D placement.  His past medical history significant for COPD on home O2, hypothyroidism, and CKD stage III.  He is status post CTI ablation.  He is maintaining sinus rhythm.  He has no evidence of recurrent arrhythmias on his device today.  He does a history of PAF and should continue on anticoagulation indefinitely.  He does appear to be volume overloaded today and I will have the heart failure team to see him.  Mr. Lewis appeared to understand the whole discussion and verbalized that all of his questions were answered to his satisfaction.  Thank you for allowing me to be involved in the care of this pleasant man. Please feel free to contact me with any questions. [EKG obtained to assist in diagnosis and management of assessed problem(s)] : EKG obtained to assist in diagnosis and management of assessed problem(s)

## 2024-05-01 NOTE — H&P ADULT - ASSESSMENT
Pt is a  68y M w/ hx of HFrEF (EF 20-25% as of 3/2024) s/p cardioMEMS in 3/2024 and Med-tronic CRT-D in 2022, paroxysmal afib/flutter s/p ablation on eliquis, CKD3, T2DM, COPD on 2LNC home O2, CAD s/p CABG in 2014, CVA w/ residual L sided weakness, prior R ankle fracture presenting from cardiology office for 1 day history of dyspnea with objective findings of elevated BNP, increased O2 requirement, increased PA pressures on cardioMEMS - consistent w/ acute on chronic systolic congestive heart failure.

## 2024-05-01 NOTE — ED PROVIDER NOTE - CLINICAL SUMMARY MEDICAL DECISION MAKING FREE TEXT BOX
68-year-old male past medical history of HFrEF (EF 20 to 25%), AICD, CAD status post CABG, Medtronic cobalt CRT-D,  COPD, CVA, diabetes, hypertension presenting with shortness of breath.  Patient recently admitted and discharged for CHF exacerbation.  Patient was following up with the EP clinic today but was found to be short of breath.  Sent from clinic to [facility] for admission.  Heart failure team at [facility] notified and will follow.  Patient has been placed on 3 L nasal cannula.  He denies recent fever, chills, chest pain,  sore throat, abdominal pain, nausea, vomiting, diarrhea, hematuria, melena, lower extremity pain.    Patient presents afebrile and hemodynamically stable.  Arrives on 3 L nasal cannula saturating 100%,  titrated down to 1 L nasal cannula saturating 98%.  On exam patient appears uncomfortable, bilateral wheezes, normal heart sounds, soft nontender abdomen, +1 pitting edema bilateral lower extremities.  Presentation concerning for COPD exacerbation versus CHF exacerbation.  Obtain cardiac labs, chest x-ray, give DuoNebs and steroids.  Will obtain flu COVID swab.  Expect admission.  Heart failure team to follow.

## 2024-05-01 NOTE — PATIENT PROFILE ADULT - FUNCTIONAL ASSESSMENT - BASIC MOBILITY 6.
2-calculated by average/Not able to assess (calculate score using [facility] averaging method)

## 2024-05-01 NOTE — H&P ADULT - PROBLEM SELECTOR PLAN 11
DVT PPx: full AC w/ eliquis  Diet: DASH/CC  Code status: Full code  Communication: Ketty (daughter) contacted 5/1 11PM with regard to plan to which understanding was verbalized by party  Dispo: likely home   PCP: Dr. Robin Schmidt  Pharmacy: [facility] [id number] [facility] DVT PPx: full AC w/ eliquis  Diet: DASH/CC fluid restriction  Code status: Full code  Communication: Ketty (daughter) contacted 5/1 11PM with regard to plan to which understanding was verbalized by party  Dispo: likely home   PCP: Dr. Robin Schmidt  Pharmacy: [facility] [id number] [facility], [place], operated by [facility]

## 2024-05-01 NOTE — H&P ADULT - PROBLEM SELECTOR PLAN 9
-c/w home   -bladder scan to r/o retention given output of ~450ccs per pt report despite bumex and previous need for ya -c/w home flomax  -bladder scan to r/o retention given output of ~450ccs per pt report despite bumex and previous need for ya

## 2024-05-01 NOTE — H&P ADULT - PROBLEM SELECTOR PLAN 1
ACC Stage C/NYHA Class II. TTE 3/2024 w/ EF 20-25%. Possible etiologies for current decompensation include: recurrence of afib despite ablation, COPD exacerbation (given leukocytosis and increased O2 req. however no fever or other infectious symptoms on part of patient), need for further medication optimization, and ischemia (though trops only mildly elevated in setting of known chronic renal sufficiency). Home GDMT: coreg 6.25mg BID, hydralazine 100mg TID, isordil 30mg TID  -S/p Mag, solu-medrol 40mg x1 , CTX/azithro x1 in ED  -Opting to hold abx for now given no clear consolidation on CXR and negative RVP. Can potentially initiate if pt w/ worsening hypoxia, leukocytosis, and/or fever as reason to continue  -bumex gtt 2mg/hr per cards  -EP in AM for device interrogation to assess for afib/aflutter burden  -c/w GDMT  -F/u cards/HF recs  -strict Is/Os, monitor UOP  -daily weights  -wean to home O2 setting of 2LNC ACC Stage C/NYHA Class II. TTE 3/2024 w/ EF 20-25%. Possible etiologies for current decompensation include: recurrence of afib despite ablation, COPD exacerbation (given leukocytosis and increased O2 req. however no fever or other infectious symptoms on part of patient), need for further medication optimization, and ischemia (though trops only mildly elevated in setting of known chronic renal sufficiency). Home GDMT: coreg 6.25mg BID, hydralazine 100mg TID, isordil 30mg TID  -S/p Mag, solu-medrol 40mg x1 , CTX/azithro x1 in ED  -Opting to hold CTX for now given no clear consolidation on CXR and negative RVP. Can potentially initiate if pt w/ worsening hypoxia, leukocytosis, and/or fever as reason to continue.  -Azithro for mgmt of COPD  -bumex gtt 2mg/hr per cards  -EP in AM for device interrogation to assess for afib/aflutter burden  -c/w GDMT  -F/u cards/HF recs  -strict Is/Os, monitor UOP  -daily weights  -wean to home O2 setting of 2LNC  -Maintain K>4, Mg>2. ACC Stage C/NYHA Class II. TTE 3/2024 w/ EF 20-25%. Possible etiologies for current decompensation include: recurrence of afib despite ablation, COPD exacerbation. Home GDMT: coreg 6.25mg BID, hydralazine 100mg TID, isordil 30mg TID  -defer to cardiology if necessary to repeat echo  -trops flat and no CP. EKG without STEMI equivalent. Not ACs  -S/p Mag, solu-medrol 40mg x1 , CTX/azithro x1 in ED  -Opting to hold CTX for now given no clear consolidation on CXR and negative RVP. Can potentially initiate if pt w/ worsening hypoxia, leukocytosis, and/or fever as reason to continue.  -Azithro for mgmt of COPD  -bumex gtt 2mg/hr per cards  -EP in AM for device interrogation to assess for afib/aflutter burden  -c/w GDMT  -F/u cards/HF recs  -strict Is/Os, monitor UOP  -daily weights  -wean to home O2 setting of 2LNC  -Maintain K>4, Mg>2.

## 2024-05-01 NOTE — H&P ADULT - PROBLEM SELECTOR PLAN 3
CHADSVASc score of 7. S/p ablation 3/29/24. EKG and telemetry w/ ventricular pacing 90s-110s thus far.   -c/w GDMT as above  -c/w eliquis  -device interrogation as above

## 2024-05-01 NOTE — H&P ADULT - HISTORY OF PRESENT ILLNESS
Pt is a 68y M w/ hx of HFrEF (EF 20-25% as of 3/2024) s/p cardioMEMS in 3/2024 and Med-tronic CRT-D in 2022, paroxysmal afib/flutter s/p ablation on eliquis, CKD3, T2DM, COPD on 2LNC home O2, CAD s/p CABG in 2014, CVA w/ residual L sided weakness presenting from cardiology office for 1 day history of dyspnea. Pt was reporting to EP office after recent hospitalization in 3/2024 for decompensated heart failure where he was noted to be dyspneic at rest w/ O2 reading at the time 92% and subsequently sent to ED for more aggressive diuresis. His diuretic regiment is 4mg bumex BID typically but was increased to 6mg AM/4mg PM for elevated MEMS readings >30. Despite such, pt noted to have increase in PA pressure from He endorses 1 episode of palpitations earlier this morning as well as chest pressure w/ pleuritic component, non-radiating and not associated w/ rest or activity. Also endorses intermittent cough, not increased over past 5 days. He denies fever, chills, abdominal pain, nausea, vomiting, diarrhea, worsening LE edema, worsening orthopnea (remains on 2 pillows), urinary frequency or dysuria.     Of note, pt w/ recent admission for ADHF w/ c/f aflutter as etiology – now s/p BRANDIE (no atrial thrombus) and ablation by EP as of 3/29/2024. Pt was also treated for COPD exacerbation during this admission w/ CTX/azithro and duonebs. Pt was following up w/ EP prior to current presentation. Also underwent RHC 3/26/24 for cardioMEMS placement.      In ED, VS: T 98.2, HR 89, /85, RR 16, O2 99% on 3LNC. Administered CTX/azithro, magnesium, duonebs, solumedrol 40mg x1.  Pt is a 68y M w/ hx of HFrEF (EF 20-25% as of 3/2024) s/p cardioMEMS in 3/2024 and Med-tronic CRT-D in 2022, paroxysmal afib/flutter s/p ablation on eliquis, CKD3, T2DM, COPD on 2LNC home O2, CAD s/p CABG in 2014, CVA w/ residual L sided weakness, prior R ankle fracture presenting from cardiology office for 1 day history of dyspnea. Pt was reporting to EP office after recent hospitalization in 3/2024 for decompensated heart failure where he was noted to be dyspneic at rest w/ O2 reading at the time 92% and subsequently sent to ED for more aggressive diuresis. His diuretic regiment is 4mg bumex BID typically but was increased to 6mg AM/4mg PM for elevated MEMS readings >30. Despite such, pt noted to have increase in PA pressure from 31 to 35 in <24h. He endorses 1 episode of palpitations earlier this morning as well as chest pressure w/ pleuritic component, non-radiating and not associated w/ rest or activity. Also endorses intermittent cough, not increased over past 5 days and without significant mucus production. He denies fever, chills, abdominal pain, nausea, vomiting, diarrhea, worsening LE edema, worsening orthopnea (remains on 2 pillows), urinary frequency or dysuria.     Of note, pt w/ recent admission for ADHF w/ c/f aflutter as etiology – now s/p BRANDIE (no atrial thrombus) and ablation by EP as of 3/29/2024. Pt was also treated for COPD exacerbation during this admission w/ CTX/azithro and duonebs. Pt was following up w/ EP prior to current presentation. Also underwent RHC 3/26/24 for cardioMEMS placement.      In ED, VS: T 98.2, HR 89, /85, RR 16, O2 99% on 3LNC. Administered CTX/azithro, magnesium, duonebs, solumedrol 40mg x1.  Pt is a 68y M w/ hx of HFrEF (EF 20-25% as of 3/2024) s/p cardioMEMS in 3/2024 and Med-tronic CRT-D in 2022, paroxysmal afib/flutter s/p ablation on eliquis, CKD3, T2DM, COPD on 2LNC home O2, CAD s/p CABG in 2014, CVA w/ residual L sided weakness, prior R ankle fracture presenting from cardiology office for 1 day history of dyspnea. Pt was reporting to EP office after recent hospitalization in 3/2024 for decompensated heart failure where he was noted to be dyspneic at rest w/ O2 reading at the time 92% and subsequently sent to ED for more aggressive diuresis. His diuretic regiment is 4mg bumex BID typically but was increased to 6mg AM/4mg PM for elevated MEMS readings >30. Despite such, pt noted to have increase in PA pressure from 31 to 35 in <24h. He endorses 1 episode of palpitations earlier this morning as well as chest pressure w/ pleuritic component, non-radiating and not associated w/ rest or activity. His CP is resolved. Also endorses intermittent cough, not increased over past 5 days and without significant mucus production. He denies fever, chills, abdominal pain, nausea, vomiting, diarrhea, worsening LE edema, worsening orthopnea (remains on 2 pillows), urinary frequency or dysuria.     Of note, pt w/ recent admission for ADHF w/ c/f aflutter as etiology – now s/p BRANDIE (no atrial thrombus) and ablation by EP as of 3/29/2024. Pt was also treated for COPD exacerbation during this admission w/ CTX/azithro and duonebs. Pt was following up w/ EP prior to current presentation. Also underwent RHC 3/26/24 for cardioMEMS placement.      In ED, VS: T 98.2, HR 89, /85, RR 16, O2 99% on 3LNC. Administered CTX/azithro, magnesium, duonebs, solumedrol 40mg x1.

## 2024-05-01 NOTE — H&P ADULT - PROBLEM SELECTOR PLAN 2
On home meds of albuterol inhaler PRN q6h, Dulera, cingulair  -c/w Dulera therapeutic interchange, cingulair  -duonebs q6h for 24 hrs -> albuterol inhaler PRN On home meds of albuterol inhaler PRN q6h, Dulera, cingulair  -c/w Dulera therapeutic interchange, cingulair  -xopenex/ipratropium q6h for 24 hrs -> albuterol inhaler PRN q6h On home meds of albuterol inhaler PRN q6h, Dulera, singulair  -c/w Dulera therapeutic interchange, singulair  -xopenex/ipratropium q6h for 24 hrs -> albuterol inhaler PRN q6h  -azithro  -pred 40mg qd x4 doses. S/p methylpred 40mg IV in ED

## 2024-05-01 NOTE — H&P ADULT - NSHPLABSRESULTS_GEN_ALL_CORE
12.0   13.01 )-----------( 140      ( 01 May 2024 15:18 )             38.1     05-01    135  |  101  |  37<H>  ----------------------------<  129<H>  4.6   |  19<L>  |  2.02<H>    Ca    8.9      01 May 2024 15:18  Phos  3.0     05-01  Mg     1.90     05-01    TPro  6.3  /  Alb  3.9  /  TBili  0.7  /  DBili  x   /  AST  29  /  ALT  17  /  AlkPhos  153<H>  05-01 12.0   13.01 )-----------( 140      ( 01 May 2024 15:18 )             38.1     05-01    135  |  101  |  37<H>  ----------------------------<  129<H>  4.6   |  19<L>  |  2.02<H>    Ca    8.9      01 May 2024 15:18  Phos  3.0     05-01  Mg     1.90     05-01    TPro  6.3  /  Alb  3.9  /  TBili  0.7  /  DBili  x   /  AST  29  /  ALT  17  /  AlkPhos  153<H>  05-01    EKG personally interpreted: V-paced in 110s.    CXR personally interpreted: Prominent pulmonary vascular markings w/ CRT-D in place. Costophrenic angles well-visualized - no effusion noted. Cardiomegaly. Labs reviewed by me:    12.0   13.01 )-----------( 140      ( 01 May 2024 15:18 )             38.1     05-01    135  |  101  |  37<H>  ----------------------------<  129<H>  4.6   |  19<L>  |  2.02<H>    Ca    8.9      01 May 2024 15:18  Phos  3.0     05-01  Mg     1.90     05-01    TPro  6.3  /  Alb  3.9  /  TBili  0.7  /  DBili  x   /  AST  29  /  ALT  17  /  AlkPhos  153<H>  05-01    EKG personally interpreted: V-paced in 110s.    CXR personally interpreted: Prominent pulmonary vascular markings w/ CRT-D in place. Costophrenic angles well-visualized - no effusion noted. Cardiomegaly.

## 2024-05-01 NOTE — PHYSICAL EXAM
[Well Developed] : well developed [Well Nourished] : well nourished [No Acute Distress] : no acute distress [Normal Conjunctiva] : normal conjunctiva [Normal Venous Pressure] : normal venous pressure [No Carotid Bruit] : no carotid bruit [Normal S1, S2] : normal S1, S2 [No Murmur] : no murmur [No Rub] : no rub [No Gallop] : no gallop [Good Air Entry] : good air entry [No Respiratory Distress] : no respiratory distress  [Soft] : abdomen soft [Non Tender] : non-tender [No Masses/organomegaly] : no masses/organomegaly [Normal Bowel Sounds] : normal bowel sounds [Normal Gait] : normal gait [No Edema] : no edema [No Cyanosis] : no cyanosis [No Clubbing] : no clubbing [No Varicosities] : no varicosities [No Rash] : no rash [No Skin Lesions] : no skin lesions [Moves all extremities] : moves all extremities [No Focal Deficits] : no focal deficits [Normal Speech] : normal speech [Alert and Oriented] : alert and oriented [Normal memory] : normal memory [de-identified] : Tachycardic [de-identified] : Bibasilar crackles [de-identified] : 1+ pitting edema

## 2024-05-01 NOTE — HISTORY OF PRESENT ILLNESS
[FreeTextEntry1] : Referring Physician: Abdulaziz Ching MD   Dear Dr. Ching:   Mr. Lewis was seen in the [facility] today. For our records, please allow me to summarize the history and my findings.   This pleasant 68-year-old man has a cardiovascular history significant for PAF, typical atrial flutter hypertension, diabetes, hyperlipidemia Stage C/Class II-III heart failure with reduced ejection fraction, CAD status post CABG in 2014, status post Medtronic cobalt CRT-D placement.  His past medical history significant for COPD on home O2, hypothyroidism, and CKD stage III.  He presented in March 2024 to [facility] in acute decompensated heart failure.  He was noted to be in atrial flutter at that time.  He was diuresed by the heart failure service.  He subsequently underwent a BRANDIE which revealed no evidence of left atrial thrombus.  He underwent CTI ablation.  Today he is maintaining sinus rhythm.  He does complain of dyspnea on exertion and increased volume.   Mr. Lewis denies any recent history of chest pain, palpitations, dizziness, or syncope.

## 2024-05-01 NOTE — ED PROVIDER NOTE - PHYSICAL EXAMINATION
GENERAL: well appearing in no acute distress, non-toxic appearing  HEAD: normocephalic, atraumatic  HEENT: normal conjunctiva, oral mucosa moist  CARDIAC: regular rate and rhythm, normal S1S2, no appreciable murmurs, 2+ pulses in UE/LE b/l  PULM: +BL wheezes  GI: abdomen nondistended, soft, nontender, no guarding, rebound tenderness  : no CVA tenderness b/l, no suprapubic tenderness  NEURO: no focal motor or sensory deficits, normal speech, normal gait, AAOx3  MSK: 1+ BL pitting edema, no calf tenderness b/l  SKIN: well-perfused, extremities warm, no visible rashes  PSYCH: appropriate mood and affect

## 2024-05-01 NOTE — ED ADULT TRIAGE NOTE - CHIEF COMPLAINT QUOTE
Pt presents from cardiologist clinic, had appointment for EP of AICD, endorses shortness of breath x2 days, brought to ED for further evaluation, denies chest pain, no headache/dizziness, afebrile. Pt presents from cardiologist clinic, had appointment for EP of AICD, endorses shortness of breath x2 days, breathing even and mildly labored, placed on 3L NC, brought to ED for further evaluation, denies chest pain, no headache/dizziness, afebrile.

## 2024-05-01 NOTE — ED PROVIDER NOTE - OBJECTIVE STATEMENT
This is a 68-year-old male past medical history of HFrEF (EF 20 to 25%), AICD, CAD status post CABG, Medtronic cobalt CRT-D,  COPD, CVA, diabetes, hypertension presenting with shortness of breath.  Patient recently admitted and discharged for CHF exacerbation.  Patient was following up with the EP clinic today but was found to be short of breath.  Sent from clinic to [facility] for admission.  Heart failure team at [facility] notified and will follow.  Patient has been placed on 3 L nasal cannula.  He denies recent fever, chills, chest pain,  sore throat, abdominal pain, nausea, vomiting, diarrhea, hematuria, melena, lower extremity pain.

## 2024-05-01 NOTE — H&P ADULT - NSHPSOCIALHISTORY_GEN_ALL_CORE
Home: Domiciled, lives w/ wife and daughter  ADL: Full ADL   Alcohol: Denies  Smoking: Denies   Drugs: Denies

## 2024-05-01 NOTE — H&P ADULT - TIME BILLING
I had a face to face encounter with this patient. I spent 78 total minutes on the bedside interview and examination, coordination of care, counseling, chart review, order placement and documentation for this patient.    This time spent by me is independent of the time spent by the resident managing the patient

## 2024-05-01 NOTE — ED PROVIDER NOTE - PROGRESS NOTE DETAILS
Jorje Tinajero MD  Patient reassessed.  Demonstrates continued but improved bilateral wheezes.   Saturating 98% on 3 L nasal cannula. Will order DuoNebs every 4.  Will order IV mag.  Shows leukocytosis, will order antibiotics.

## 2024-05-01 NOTE — H&P ADULT - NSHPREVIEWOFSYSTEMS_GEN_ALL_CORE
CONSTITUTIONAL:  No weight loss, fever, chills, weakness or fatigue.  HEENT:  Eyes:  No visual loss, blurred vision, double vision or yellow sclerae. Ears, Nose, Throat:  No hearing loss, sneezing, congestion, runny nose or sore throat.  SKIN:  No rash or itching.  CARDIOVASCULAR:  No chest pain, chest pressure or chest discomfort. No palpitations.  RESPIRATORY:  No shortness of breath, cough or sputum.  GASTROINTESTINAL:  No anorexia, nausea, vomiting or diarrhea. No abdominal pain or blood.  GENITOURINARY:  Denies hematuria, dysuria.   NEUROLOGICAL:  No headache, dizziness, syncope, paralysis, ataxia, numbness or tingling in the extremities. No change in bowel or bladder control.  MUSCULOSKELETAL:  No muscle, back pain, joint pain or stiffness.  HEMATOLOGIC:  No anemia, bleeding or bruising.  LYMPHATICS:  No enlarged nodes.   PSYCHIATRIC:  No history of depression or anxiety.  ENDOCRINOLOGIC:  No reports of sweating, cold or heat intolerance. No polyuria or polydipsia.  ALLERGIES:  No history of asthma, hives, eczema or rhinitis. CONSTITUTIONAL:  No weight loss, fever, chills, weakness or fatigue.  HEENT:  Eyes:  No visual loss, blurred vision, double vision or yellow sclerae. Ears, Nose, Throat:  No hearing loss, sneezing, congestion, runny nose or sore throat.  SKIN:  No rash or itching.  CARDIOVASCULAR: (+) Chest pressure, palpitations. No chest pain, or chest discomfort.  RESPIRATORY:  (+) Shortness of breath, cough. No sputum.  GASTROINTESTINAL:  No anorexia, nausea, vomiting or diarrhea. No abdominal pain or blood.  GENITOURINARY:  Denies hematuria, dysuria.   NEUROLOGICAL:  No headache, dizziness, syncope, paralysis, ataxia, numbness or tingling in the extremities. No change in bowel or bladder control.  MUSCULOSKELETAL: (+) bilateral calf pain. No muscle, back pain, joint pain or stiffness.  HEMATOLOGIC:  No anemia, bleeding or bruising.  LYMPHATICS:  No enlarged nodes.   PSYCHIATRIC:  No history of depression or anxiety.  ENDOCRINOLOGIC:  No reports of sweating, cold or heat intolerance. No polyuria or polydipsia.  ALLERGIES:  No history of asthma, hives, eczema or rhinitis.

## 2024-05-01 NOTE — CARDIOLOGY SUMMARY
[de-identified] : 5/1/2024: Sinus tachycardia at 102 bpm, BiV pacing [de-identified] : 3/18/2024: 1. Left ventricular systolic function is severely decreased with an ejection fraction visually estimated at 20 to 25 %. Global left ventricular hypokinesis.  2. Normal right ventricular cavity size, with normal wall thickness, and normal systolic function.  3. Device lead is visualized.  4. The left atrium is normal.  5. The right atrium is normal in size.  6. No significant valvular disease.  7. No pericardial effusion seen.  8. Estimated pulmonary artery systolic pressure is 49 mmHg.  9. The inferior vena cava is dilated measuring 2.35 cm in diameter, (dilated >2.1cm) with abnormal inspiratory collapse (abnormal <50%) consistent with elevated right atrial pressure (\R\15, range 10-20mmHg).

## 2024-05-01 NOTE — ED PROVIDER NOTE - ATTENDING CONTRIBUTION TO CARE
68-year-old male past medical history of CHF, AICD, CAD status post CABG, COPD, CVA, diabetes, hypertension for evaluation of shortness of breath, status post recent admission for CHF exacerbation.  Was sent to ED from EP clinic today after being found short of breath for admission PE: Well appearing, RRR,  abd soft NTND. A/P Labs, imaging, medicate, reassess

## 2024-05-01 NOTE — PATIENT PROFILE ADULT - FUNCTIONAL ASSESSMENT - BASIC MOBILITY 2.
Detail Level: Generalized Discussed using aquaphor, can also use OTC Lamisil. 2 = A lot of assistance

## 2024-05-01 NOTE — ED ADULT NURSE NOTE - OBJECTIVE STATEMENT
Received patient in room 15 c/o SOB, patient denies chest pain, fever. Patient is on cardiac monitor sinus tachycardia w/O2 sat 99% on a 3L/NC. Patient is A&OX4, airway patent, breathing unlabored and even, radial pulses palpable, abdomen soft, nontender. Labs obtained, 20G IV placed on left hand, medications given as ordered, awaiting X-ray. Side rails up and safety maintained. Fall precaution in place. Call bells within reach. Received patient in room 15 c/o SOB, sent from cardiology office. PMHX HFrEF (EF 20-25%), AICD, CAD s/p CABG, Medtronic [place] CRT-D, COPD, CVA, DM, HTN. Patient is on cardiac monitor sinus tachycardia w/O2 sat 99% on a 3L/NC. Patient is A&OX4, airway patent, breathing unlabored and even, radial pulses palpable, abdomen soft, nontender. Labs obtained, 20G IV placed on left hand, medications given as ordered, awaiting X-ray. Side rails up and safety maintained. Fall precaution in place. Call bells within reach. Received patient in room 15 c/o SOB, sent from cardiology office. PMHX HFrEF (EF 20-25%), AICD, CAD s/p CABG, Medtronic [place] CRT-D, COPD, CVA, DM, HTN. Patient is on cardiac monitor sinus tachycardia w/O2 sat 99% on a 3L/NC. Patient is A&OX4, airway patent, breathing unlabored and even, radial pulses palpable, abdomen soft, nontender. Labs obtained, 22G IV placed on right hand, medications given as ordered, awaiting X-ray. Side rails up and safety maintained. Fall precaution in place. Call bells within reach.

## 2024-05-01 NOTE — ED PROVIDER NOTE - WR ORDER NAME 1
well developed, well nourished , in no acute distress , ambulating without difficulty , normal communication ability Xray Chest 2 Views PA/Lat

## 2024-05-01 NOTE — ED ADULT NURSE NOTE - CHIEF COMPLAINT QUOTE
Pt presents from cardiologist clinic, had appointment for EP of AICD, endorses shortness of breath x2 days, breathing even and mildly labored, placed on 3L NC, brought to ED for further evaluation, denies chest pain, no headache/dizziness, afebrile.

## 2024-05-01 NOTE — H&P ADULT - PROBLEM SELECTOR PLAN 4
On glimepiride 2mg daily. A1c 6.0 in 07/2023.   -ISS while inpatient On glimepiride 2mg daily. A1c 6.0 in 07/2023.   -A1c in AM  -ISS while inpatient

## 2024-05-01 NOTE — PATIENT PROFILE ADULT - FALL HARM RISK - HARM RISK INTERVENTIONS

## 2024-05-01 NOTE — ED PROVIDER NOTE - NS ED ROS FT
General: denies fever, chills  HENT: denies nasal congestion, rhinorrhea  Eyes: denies visual changes, blurred vision  CV: denies chest pain, palpitations  Resp: +difficulty breathing, +cough  Abdominal: denies nausea, vomiting, diarrhea, abdominal pain  : denies urinary pain or discharge  MSK: denies muscle aches, leg swelling  Neuro: denies headaches, numbness, tingling  Skin: denies rashes, bruises

## 2024-05-01 NOTE — H&P ADULT - ATTENDING COMMENTS
I have personally seen, examined, and participated in the care of this patient.  I have reviewed all pertinent clinical information, including history, physical exam, plan, and the resident's note and agree except as noted.    68y M w/ hx of HFrEF (EF 20-25% as of 3/2024) s/p cardioMEMS in 3/2024 and Med-tronic CRT-D in 2022, paroxysmal afib/flutter s/p ablation on eliquis, CKD3, T2DM, COPD on 2LNC home O2, CAD s/p CABG in 2014, CVA w/ residual L sided weakness, prior R ankle fracture presenting from cardiology office for 1 day history of dyspnea with objective findings of elevated BNP, increased O2 requirement, increased PA pressures on cardioMEMS - consistent w/ acute on chronic systolic congestive heart failure.     #Acute CHF exacerbation  -c/w coreg, hydral  -bumex gtt 2mg/hr  -HF consulted - f/u recs in AM  -EP c/s in AM to interrogate device  -defer to cardiology if to repeat echo  -trops flat and no CP. EKG without STEMI equivalent. Not ACS    #COPD  Mild wheezing and cough. Possible exacerbation  -levalbuterol, ipratropium  -s/p methylpred in ED, c/w pred 40mg qd x4 doses  -azithromycin    #pAfib  -c/w coreg, eliquis    #DM2  -A1C, ISS    #HTN  -resume coreg, hydral    #CAD  -resume plavix, statin, coreg    #Hypothyroidism  Resume levothyroxine    #BPH  Resume flomax  Bladder scan protocol    #HLD  Resume statin

## 2024-05-01 NOTE — ED ADULT NURSE REASSESSMENT NOTE - NS ED NURSE REASSESS COMMENT FT1
pt received A&Ox4 offering no complaints at this time. remains on 3L NC satting at 99% at this time. respirations even and unlabored. no acute distress noted. awaiting transport to bed assignment.

## 2024-05-02 LAB
A1C WITH ESTIMATED AVERAGE GLUCOSE RESULT: 6.6 % — HIGH (ref 4–5.6)
ALBUMIN SERPL ELPH-MCNC: 3.8 G/DL — SIGNIFICANT CHANGE UP (ref 3.3–5)
ALBUMIN SERPL ELPH-MCNC: 4.3 G/DL
ALP BLD-CCNC: 169 U/L
ALP SERPL-CCNC: 157 U/L — HIGH (ref 40–120)
ALT FLD-CCNC: 16 U/L — SIGNIFICANT CHANGE UP (ref 4–41)
ALT SERPL-CCNC: 20 U/L
ANION GAP SERPL CALC-SCNC: 14 MMOL/L — SIGNIFICANT CHANGE UP (ref 7–14)
ANION GAP SERPL CALC-SCNC: 16 MMOL/L — HIGH (ref 7–14)
ANION GAP SERPL CALC-SCNC: 19 MMOL/L
AST SERPL-CCNC: 17 U/L — SIGNIFICANT CHANGE UP (ref 4–40)
AST SERPL-CCNC: 22 U/L
BASOPHILS # BLD AUTO: 0.01 K/UL — SIGNIFICANT CHANGE UP (ref 0–0.2)
BASOPHILS NFR BLD AUTO: 0.1 % — SIGNIFICANT CHANGE UP (ref 0–2)
BILIRUB SERPL-MCNC: 0.6 MG/DL — SIGNIFICANT CHANGE UP (ref 0.2–1.2)
BILIRUB SERPL-MCNC: 0.8 MG/DL
BUN SERPL-MCNC: 40 MG/DL
BUN SERPL-MCNC: 42 MG/DL — HIGH (ref 7–23)
BUN SERPL-MCNC: 43 MG/DL — HIGH (ref 7–23)
CALCIUM SERPL-MCNC: 8.6 MG/DL — SIGNIFICANT CHANGE UP (ref 8.4–10.5)
CALCIUM SERPL-MCNC: 8.8 MG/DL — SIGNIFICANT CHANGE UP (ref 8.4–10.5)
CALCIUM SERPL-MCNC: 9.2 MG/DL
CHLORIDE SERPL-SCNC: 101 MMOL/L — SIGNIFICANT CHANGE UP (ref 98–107)
CHLORIDE SERPL-SCNC: 102 MMOL/L — SIGNIFICANT CHANGE UP (ref 98–107)
CHLORIDE SERPL-SCNC: 96 MMOL/L
CO2 SERPL-SCNC: 22 MMOL/L
CO2 SERPL-SCNC: 22 MMOL/L — SIGNIFICANT CHANGE UP (ref 22–31)
CO2 SERPL-SCNC: 24 MMOL/L — SIGNIFICANT CHANGE UP (ref 22–31)
CREAT SERPL-MCNC: 2.11 MG/DL — HIGH (ref 0.5–1.3)
CREAT SERPL-MCNC: 2.25 MG/DL
CREAT SERPL-MCNC: 2.37 MG/DL — HIGH (ref 0.5–1.3)
EGFR: 29 ML/MIN/1.73M2 — LOW
EGFR: 31 ML/MIN/1.73M2
EGFR: 33 ML/MIN/1.73M2 — LOW
EOSINOPHIL # BLD AUTO: 0 K/UL — SIGNIFICANT CHANGE UP (ref 0–0.5)
EOSINOPHIL NFR BLD AUTO: 0 % — SIGNIFICANT CHANGE UP (ref 0–6)
ESTIMATED AVERAGE GLUCOSE: 143 MG/DL
ESTIMATED AVERAGE GLUCOSE: 143 — SIGNIFICANT CHANGE UP
GLUCOSE BLDC GLUCOMTR-MCNC: 111 MG/DL — HIGH (ref 70–99)
GLUCOSE BLDC GLUCOMTR-MCNC: 147 MG/DL — HIGH (ref 70–99)
GLUCOSE BLDC GLUCOMTR-MCNC: 155 MG/DL — HIGH (ref 70–99)
GLUCOSE BLDC GLUCOMTR-MCNC: 263 MG/DL — HIGH (ref 70–99)
GLUCOSE SERPL-MCNC: 121 MG/DL — HIGH (ref 70–99)
GLUCOSE SERPL-MCNC: 135 MG/DL — HIGH (ref 70–99)
HBA1C MFR BLD HPLC: 6.6 %
HCT VFR BLD CALC: 36.3 % — LOW (ref 39–50)
HCT VFR BLD CALC: 38.2 %
HGB BLD-MCNC: 11.8 G/DL
HGB BLD-MCNC: 11.8 G/DL — LOW (ref 13–17)
IANC: 8.17 K/UL — HIGH (ref 1.8–7.4)
IMM GRANULOCYTES NFR BLD AUTO: 0.4 % — SIGNIFICANT CHANGE UP (ref 0–0.9)
LYMPHOCYTES # BLD AUTO: 0.88 K/UL — LOW (ref 1–3.3)
LYMPHOCYTES # BLD AUTO: 9.2 % — LOW (ref 13–44)
MAGNESIUM SERPL-MCNC: 2.1 MG/DL — SIGNIFICANT CHANGE UP (ref 1.6–2.6)
MAGNESIUM SERPL-MCNC: 2.3 MG/DL — SIGNIFICANT CHANGE UP (ref 1.6–2.6)
MCHC RBC-ENTMCNC: 30.9 GM/DL
MCHC RBC-ENTMCNC: 31.6 PG
MCHC RBC-ENTMCNC: 32 PG — SIGNIFICANT CHANGE UP (ref 27–34)
MCHC RBC-ENTMCNC: 32.5 GM/DL — SIGNIFICANT CHANGE UP (ref 32–36)
MCV RBC AUTO: 102.1 FL
MCV RBC AUTO: 98.4 FL — SIGNIFICANT CHANGE UP (ref 80–100)
MONOCYTES # BLD AUTO: 0.43 K/UL — SIGNIFICANT CHANGE UP (ref 0–0.9)
MONOCYTES NFR BLD AUTO: 4.5 % — SIGNIFICANT CHANGE UP (ref 2–14)
MRSA PCR RESULT.: SIGNIFICANT CHANGE UP
NEUTROPHILS # BLD AUTO: 8.17 K/UL — HIGH (ref 1.8–7.4)
NEUTROPHILS NFR BLD AUTO: 85.8 % — HIGH (ref 43–77)
NRBC # BLD: 0 /100 WBCS — SIGNIFICANT CHANGE UP (ref 0–0)
NRBC # FLD: 0 K/UL — SIGNIFICANT CHANGE UP (ref 0–0)
NT-PROBNP SERPL-MCNC: 8915 PG/ML
PHOSPHATE SERPL-MCNC: 3.3 MG/DL — SIGNIFICANT CHANGE UP (ref 2.5–4.5)
PHOSPHATE SERPL-MCNC: 3.8 MG/DL — SIGNIFICANT CHANGE UP (ref 2.5–4.5)
PLATELET # BLD AUTO: 158 K/UL — SIGNIFICANT CHANGE UP (ref 150–400)
PLATELET # BLD AUTO: 164 K/UL
POTASSIUM SERPL-MCNC: 4.3 MMOL/L — SIGNIFICANT CHANGE UP (ref 3.5–5.3)
POTASSIUM SERPL-MCNC: 4.3 MMOL/L — SIGNIFICANT CHANGE UP (ref 3.5–5.3)
POTASSIUM SERPL-SCNC: 3.7 MMOL/L
POTASSIUM SERPL-SCNC: 4.3 MMOL/L — SIGNIFICANT CHANGE UP (ref 3.5–5.3)
POTASSIUM SERPL-SCNC: 4.3 MMOL/L — SIGNIFICANT CHANGE UP (ref 3.5–5.3)
PROT SERPL-MCNC: 6.6 G/DL
PROT SERPL-MCNC: 6.6 G/DL — SIGNIFICANT CHANGE UP (ref 6–8.3)
RBC # BLD: 3.69 M/UL — LOW (ref 4.2–5.8)
RBC # BLD: 3.74 M/UL
RBC # FLD: 17.3 % — HIGH (ref 10.3–14.5)
RBC # FLD: 18.4 %
S AUREUS DNA NOSE QL NAA+PROBE: SIGNIFICANT CHANGE UP
SODIUM SERPL-SCNC: 137 MMOL/L
SODIUM SERPL-SCNC: 139 MMOL/L — SIGNIFICANT CHANGE UP (ref 135–145)
SODIUM SERPL-SCNC: 140 MMOL/L — SIGNIFICANT CHANGE UP (ref 135–145)
TSH SERPL-ACNC: 17.3 UIU/ML
WBC # BLD: 9.53 K/UL — SIGNIFICANT CHANGE UP (ref 3.8–10.5)
WBC # FLD AUTO: 12.61 K/UL
WBC # FLD AUTO: 9.53 K/UL — SIGNIFICANT CHANGE UP (ref 3.8–10.5)

## 2024-05-02 PROCEDURE — 93281 PM DEVICE PROGR EVAL MULTI: CPT | Mod: 26

## 2024-05-02 PROCEDURE — 99233 SBSQ HOSP IP/OBS HIGH 50: CPT | Mod: GC

## 2024-05-02 RX ORDER — CHLORHEXIDINE GLUCONATE 213 G/1000ML
1 SOLUTION TOPICAL DAILY
Refills: 0 | Status: DISCONTINUED | OUTPATIENT
Start: 2024-05-02 | End: 2024-05-15

## 2024-05-02 RX ORDER — IPRATROPIUM BROMIDE 0.2 MG/ML
500 SOLUTION, NON-ORAL INHALATION EVERY 6 HOURS
Refills: 0 | Status: COMPLETED | OUTPATIENT
Start: 2024-05-02 | End: 2024-05-02

## 2024-05-02 RX ORDER — AZITHROMYCIN 500 MG/1
250 TABLET, FILM COATED ORAL DAILY
Refills: 0 | Status: DISCONTINUED | OUTPATIENT
Start: 2024-05-02 | End: 2024-05-06

## 2024-05-02 RX ORDER — LEVALBUTEROL 1.25 MG/.5ML
0.63 SOLUTION, CONCENTRATE RESPIRATORY (INHALATION) EVERY 6 HOURS
Refills: 0 | Status: COMPLETED | OUTPATIENT
Start: 2024-05-02 | End: 2024-05-02

## 2024-05-02 RX ADMIN — CLOPIDOGREL BISULFATE 75 MILLIGRAM(S): 75 TABLET, FILM COATED ORAL at 11:31

## 2024-05-02 RX ADMIN — SENNA PLUS 2 TABLET(S): 8.6 TABLET ORAL at 21:33

## 2024-05-02 RX ADMIN — APIXABAN 5 MILLIGRAM(S): 2.5 TABLET, FILM COATED ORAL at 17:13

## 2024-05-02 RX ADMIN — AZITHROMYCIN 250 MILLIGRAM(S): 500 TABLET, FILM COATED ORAL at 11:32

## 2024-05-02 RX ADMIN — LEVALBUTEROL 0.63 MILLIGRAM(S): 1.25 SOLUTION, CONCENTRATE RESPIRATORY (INHALATION) at 08:19

## 2024-05-02 RX ADMIN — Medication 100 MILLIGRAM(S): at 21:34

## 2024-05-02 RX ADMIN — GABAPENTIN 100 MILLIGRAM(S): 400 CAPSULE ORAL at 21:34

## 2024-05-02 RX ADMIN — Medication 500 MICROGRAM(S): at 15:41

## 2024-05-02 RX ADMIN — CARVEDILOL PHOSPHATE 6.25 MILLIGRAM(S): 80 CAPSULE, EXTENDED RELEASE ORAL at 11:50

## 2024-05-02 RX ADMIN — Medication 100 MILLIGRAM(S): at 11:31

## 2024-05-02 RX ADMIN — Medication 50 MICROGRAM(S): at 05:23

## 2024-05-02 RX ADMIN — BUMETANIDE 10 MG/HR: 0.25 INJECTION INTRAMUSCULAR; INTRAVENOUS at 11:49

## 2024-05-02 RX ADMIN — LEVALBUTEROL 0.63 MILLIGRAM(S): 1.25 SOLUTION, CONCENTRATE RESPIRATORY (INHALATION) at 03:44

## 2024-05-02 RX ADMIN — CHLORHEXIDINE GLUCONATE 1 APPLICATION(S): 213 SOLUTION TOPICAL at 11:32

## 2024-05-02 RX ADMIN — CARVEDILOL PHOSPHATE 6.25 MILLIGRAM(S): 80 CAPSULE, EXTENDED RELEASE ORAL at 21:34

## 2024-05-02 RX ADMIN — Medication 100 MILLIGRAM(S): at 05:23

## 2024-05-02 RX ADMIN — Medication 500 MICROGRAM(S): at 08:20

## 2024-05-02 RX ADMIN — Medication 500 MICROGRAM(S): at 03:47

## 2024-05-02 RX ADMIN — Medication 500 MICROGRAM(S): at 21:42

## 2024-05-02 RX ADMIN — Medication 325 MILLIGRAM(S): at 11:31

## 2024-05-02 RX ADMIN — BUMETANIDE 10 MG/HR: 0.25 INJECTION INTRAMUSCULAR; INTRAVENOUS at 18:45

## 2024-05-02 RX ADMIN — MONTELUKAST 10 MILLIGRAM(S): 4 TABLET, CHEWABLE ORAL at 11:32

## 2024-05-02 RX ADMIN — TAMSULOSIN HYDROCHLORIDE 0.4 MILLIGRAM(S): 0.4 CAPSULE ORAL at 21:33

## 2024-05-02 RX ADMIN — Medication 1: at 21:52

## 2024-05-02 RX ADMIN — LEVALBUTEROL 0.63 MILLIGRAM(S): 1.25 SOLUTION, CONCENTRATE RESPIRATORY (INHALATION) at 15:41

## 2024-05-02 RX ADMIN — ISOSORBIDE DINITRATE 30 MILLIGRAM(S): 5 TABLET ORAL at 11:50

## 2024-05-02 RX ADMIN — BUDESONIDE AND FORMOTEROL FUMARATE DIHYDRATE 2 PUFF(S): 160; 4.5 AEROSOL RESPIRATORY (INHALATION) at 11:50

## 2024-05-02 RX ADMIN — BUDESONIDE AND FORMOTEROL FUMARATE DIHYDRATE 2 PUFF(S): 160; 4.5 AEROSOL RESPIRATORY (INHALATION) at 21:35

## 2024-05-02 RX ADMIN — BUMETANIDE 10 MG/HR: 0.25 INJECTION INTRAMUSCULAR; INTRAVENOUS at 08:37

## 2024-05-02 RX ADMIN — ISOSORBIDE DINITRATE 30 MILLIGRAM(S): 5 TABLET ORAL at 05:23

## 2024-05-02 RX ADMIN — ATORVASTATIN CALCIUM 80 MILLIGRAM(S): 80 TABLET, FILM COATED ORAL at 21:33

## 2024-05-02 RX ADMIN — APIXABAN 5 MILLIGRAM(S): 2.5 TABLET, FILM COATED ORAL at 05:23

## 2024-05-02 RX ADMIN — LEVALBUTEROL 0.63 MILLIGRAM(S): 1.25 SOLUTION, CONCENTRATE RESPIRATORY (INHALATION) at 21:42

## 2024-05-02 RX ADMIN — Medication 1: at 11:54

## 2024-05-02 RX ADMIN — Medication 40 MILLIGRAM(S): at 15:15

## 2024-05-02 NOTE — CONSULT NOTE ADULT - PROBLEM SELECTOR RECOMMENDATION 9
Bumex 2mg/hr  Coreg 6.25mg twice daily (hold SBP<90)  Hydralazine 100mg TID (hold SBP<90)  Isosorbide 30mg TID (hold SBP<90)  Strict I/O  Daily standing weights  Monitor lytes replete K>4.0 and Mg>2.0  Trend SCr  ICD interrogation completed (BiVPaced 92% and one 86 second episode of AFlutter on 3/24)  Management per primary team  Pending final recommendations from HF attending

## 2024-05-02 NOTE — PROGRESS NOTE ADULT - PROBLEM SELECTOR PLAN 1
ACC Stage C/NYHA Class II. TTE 3/2024 w/ EF 20-25%. Possible etiologies for current decompensation include: recurrence of afib despite ablation, COPD exacerbation. Home GDMT: coreg 6.25mg BID, hydralazine 100mg TID, isordil 30mg TID  -defer to cardiology if necessary to repeat echo  -trops flat and no CP. EKG without STEMI equivalent - not ACS  -COPD management as below  -bumex gtt 2mg/hr per cards  -EP for device interrogation to assess for afib/aflutter burden  -c/w GDMT  -F/u cards/HF recs  -strict Is/Os, monitor UOP, daily weights  -Maintain K>4, Mg>2.

## 2024-05-02 NOTE — PROGRESS NOTE ADULT - ATTENDING COMMENTS
Patient seen and examined at bedside. In brief, 68y M w/ hx of HFrEF (EF 20-25% as of 3/2024) s/p cardioMEMS in 3/2024 and Med-tronic CRT-D in 2022, paroxysmal afib/flutter s/p ablation on eliquis, CKD3, T2DM, COPD on 2LNC home O2, CAD s/p CABG in 2014, CVA w/ residual L sided weakness, prior R ankle fracture presenting from cardiology office for 1 day history of dyspnea with objective findings of elevated BNP, increased O2 requirement, increased PA pressures on cardioMEMS - consistent w/ acute on chronic systolic congestive heart failure.     #Acute CHF exacerbation: -trops flat and no CP. EKG without STEMI equivalent. Low concern for  ACS  -c/w coreg, hydral  -bumex gtt 2mg/hr  -HF consulted - awaiting recs   -EP c/s in AM to interrogate device  -defer to cardiology if a  repeat echo is needed       #COPD  Mild wheezing and cough. Possible exacerbation  -levalbuterol, ipratropium  -s/p methylpred in ED, c/w pred 40mg qd x4 doses  -azithromycin    #pAfib  -c/w coreg, eliquis    #DM2  -A1C - 6.6 , ISS    #HTN  -resume coreg, hydral    #CAD  -resume plavix, statin, coreg    #Hypothyroidism: Resume levothyroxine    #BPH: Resume flomax  Bladder scan protocol    #HLD  Resume statin.

## 2024-05-02 NOTE — PROGRESS NOTE ADULT - SUBJECTIVE AND OBJECTIVE BOX
Patient is a 68y old  Male who presents with a chief complaint of acute on chronic heart failure exacerbation (01 May 2024 22:07)      SUBJECTIVE / OVERNIGHT EVENTS:  No acute events overnight. Admitted overnight. Endorsing b/l calf pain and chest discomfort. Patient seen and examined at bedside.    MEDICATIONS  (STANDING):  allopurinol 100 milliGRAM(s) Oral daily  apixaban 5 milliGRAM(s) Oral two times a day  atorvastatin 80 milliGRAM(s) Oral at bedtime  azithromycin   Tablet 250 milliGRAM(s) Oral daily  budesonide 160 MICROgram(s)/formoterol 4.5 MICROgram(s) Inhaler 2 Puff(s) Inhalation two times a day  buMETAnide Infusion 2 mG/Hr (10 mL/Hr) IV Continuous <Continuous>  carvedilol 6.25 milliGRAM(s) Oral every 12 hours  chlorhexidine 2% Cloths 1 Application(s) Topical daily  clopidogrel Tablet 75 milliGRAM(s) Oral daily  dextrose 10% Bolus 125 milliLiter(s) IV Bolus once  dextrose 5%. 1000 milliLiter(s) (100 mL/Hr) IV Continuous <Continuous>  dextrose 5%. 1000 milliLiter(s) (50 mL/Hr) IV Continuous <Continuous>  dextrose 50% Injectable 12.5 Gram(s) IV Push once  dextrose 50% Injectable 25 Gram(s) IV Push once  ferrous    sulfate 325 milliGRAM(s) Oral daily  gabapentin 100 milliGRAM(s) Oral at bedtime  glucagon  Injectable 1 milliGRAM(s) IntraMuscular once  hydrALAZINE 100 milliGRAM(s) Oral three times a day  influenza  Vaccine (HIGH DOSE) 0.7 milliLiter(s) IntraMuscular once  insulin lispro (ADMELOG) corrective regimen sliding scale   SubCutaneous three times a day before meals  insulin lispro (ADMELOG) corrective regimen sliding scale   SubCutaneous at bedtime  ipratropium    for Nebulization 500 MICROGram(s) Nebulizer every 6 hours  isosorbide   dinitrate Tablet (ISORDIL) 30 milliGRAM(s) Oral three times a day  levalbuterol Inhalation 0.63 milliGRAM(s) Inhalation every 6 hours  levothyroxine 50 MICROGram(s) Oral daily  montelukast 10 milliGRAM(s) Oral daily  predniSONE   Tablet 40 milliGRAM(s) Oral daily  senna 2 Tablet(s) Oral at bedtime  tamsulosin 0.4 milliGRAM(s) Oral at bedtime    MEDICATIONS  (PRN):  acetaminophen     Tablet .. 650 milliGRAM(s) Oral every 6 hours PRN Temp greater or equal to 38C (100.4F), Mild Pain (1 - 3)  dextrose Oral Gel 15 Gram(s) Oral once PRN Blood Glucose LESS THAN 70 milliGRAM(s)/deciliter  polyethylene glycol 3350 17 Gram(s) Oral daily PRN Constipation      CAPILLARY BLOOD GLUCOSE      POCT Blood Glucose.: 247 mg/dL (01 May 2024 22:14)  POCT Blood Glucose.: 293 mg/dL (01 May 2024 20:43)    I&O's Summary    01 May 2024 07:01  -  02 May 2024 07:00  --------------------------------------------------------  IN: 300 mL / OUT: 1100 mL / NET: -800 mL        PHYSICAL EXAM:  Vital Signs Last 24 Hrs  T(C): 36.5 (02 May 2024 05:23), Max: 36.8 (01 May 2024 13:43)  T(F): 97.7 (02 May 2024 05:23), Max: 98.2 (01 May 2024 13:43)  HR: 103 (02 May 2024 05:23) (89 - 116)  BP: 116/69 (02 May 2024 05:23) (108/64 - 141/94)  BP(mean): --  RR: 17 (02 May 2024 05:23) (16 - 18)  SpO2: 100% (02 May 2024 05:23) (99% - 100%)    Parameters below as of 02 May 2024 05:23  Patient On (Oxygen Delivery Method): nasal cannula    O2 Concentration (%): 3    GENERAL: No acute distress, well-developed  HEAD:  Atraumatic, Normocephalic  EYES: EOMI, PERRLA, conjunctiva and sclera clear  NECK: Supple, no lymphadenopathy, + JVD  CHEST/LUNG: CTAB; No wheezes. +  rales  HEART: Regular rate and rhythm; No murmurs, rubs, or gallops  ABDOMEN: Soft, non-tender, non-distended; normal bowel sounds, no organomegaly  EXTREMITIES:  2+ peripheral pulses b/l, No clubbing, cyanosis, or edema  NEUROLOGY: A&O x 3, no focal deficits. LUE 3/5 strength.   SKIN: No rashes or lesions    LABS:                        11.8   9.53  )-----------( 158      ( 02 May 2024 04:19 )             36.3     05-02    139  |  101  |  42<H>  ----------------------------<  121<H>  4.3   |  22  |  2.11<H>    Ca    8.8      02 May 2024 04:19  Phos  3.3     05-02  Mg     2.30     05-02    TPro  6.6  /  Alb  3.8  /  TBili  0.6  /  DBili  x   /  AST  17  /  ALT  16  /  AlkPhos  157<H>  05-02          Urinalysis Basic - ( 02 May 2024 04:19 )    Color: x / Appearance: x / SG: x / pH: x  Gluc: 121 mg/dL / Ketone: x  / Bili: x / Urobili: x   Blood: x / Protein: x / Nitrite: x   Leuk Esterase: x / RBC: x / WBC x   Sq Epi: x / Non Sq Epi: x / Bacteria: x          RADIOLOGY & ADDITIONAL TESTS:  Results Reviewed:   Imaging Personally Reviewed:  Electrocardiogram Personally Reviewed:    COORDINATION OF CARE:  Care Discussed with Consultants/Other Providers [Y/N]:  Prior or Outpatient Records Reviewed [Y/N]:

## 2024-05-02 NOTE — PROGRESS NOTE ADULT - PROBLEM SELECTOR PLAN 6
SCr 2.02 on admission. Baseline SCr 2.4-2.6.  -hold nephrotoxins   -diuresis as above   -monitor UOP, electrolytes   -bladder scan q8h given only put out ~400ccs over past 6hrs and initially w/ PVR of 250

## 2024-05-02 NOTE — CONSULT NOTE ADULT - SUBJECTIVE AND OBJECTIVE BOX
Patient is a 68y old  Male who presents with a chief complaint of acute on chronic heart failure exacerbation (02 May 2024 07:32)      HPI:    PAST MEDICAL & SURGICAL HISTORY:  HTN (hypertension)      CAD (coronary artery disease)      Diabetes      Hyperlipidemia, unspecified hyperlipidemia type      CHF (congestive heart failure)      BPH (benign prostatic hyperplasia)      S/P CABG (coronary artery bypass graft)      S/P appendectomy          FAMILY HISTORY:  Family history of type 2 diabetes mellitus in father (Father)        Home Medications:  albuterol 90 mcg/inh inhalation aerosol: 2 puff(s) inhaled every 6 hours, As needed, Shortness of Breath and/or Wheezing (01 May 2024 21:34)  apixaban 5 mg oral tablet: 1 tab(s) orally 2 times a day (01 May 2024 21:34)  clopidogrel 75 mg oral tablet: 1 tab(s) orally once a day (01 May 2024 21:34)  Coreg 6.25 mg oral tablet: 1 tab(s) orally 2 times a day (01 May 2024 21:34)  Crestor 20 mg oral tablet: 1 tab(s) orally once a day (01 May 2024 21:34)  Dulera 200 mcg-5 mcg/inh inhalation aerosol: 2 puff(s) inhaled 2 times a day (01 May 2024 21:34)  ezetimibe 10 mg oral tablet: 1 tab(s) orally once a day (01 May 2024 21:34)  famotidine 40 mg oral tablet: 1 tab(s) orally once a day (01 May 2024 21:34)  glimepiride 2 mg oral tablet: 1 tab(s) orally once a day (01 May 2024 21:34)  pantoprazole 20 mg oral delayed release tablet: 1 tab(s) orally once a day (01 May 2024 21:34)  polyethylene glycol 3350 oral powder for reconstitution: 17 gram(s) orally once a day As needed Constipation (01 May 2024 21:34)  senna leaf extract oral tablet: 2 tab(s) orally once a day (at bedtime) (01 May 2024 21:34)  Singulair 10 mg oral tablet: 1 tab(s) orally once a day (01 May 2024 21:34)      Medications:  acetaminophen     Tablet .. 650 milliGRAM(s) Oral every 6 hours PRN  allopurinol 100 milliGRAM(s) Oral daily  apixaban 5 milliGRAM(s) Oral two times a day  atorvastatin 80 milliGRAM(s) Oral at bedtime  azithromycin   Tablet 250 milliGRAM(s) Oral daily  budesonide 160 MICROgram(s)/formoterol 4.5 MICROgram(s) Inhaler 2 Puff(s) Inhalation two times a day  buMETAnide Infusion 2 mG/Hr IV Continuous <Continuous>  carvedilol 6.25 milliGRAM(s) Oral every 12 hours  chlorhexidine 2% Cloths 1 Application(s) Topical daily  clopidogrel Tablet 75 milliGRAM(s) Oral daily  dextrose 10% Bolus 125 milliLiter(s) IV Bolus once  dextrose 5%. 1000 milliLiter(s) IV Continuous <Continuous>  dextrose 5%. 1000 milliLiter(s) IV Continuous <Continuous>  dextrose 50% Injectable 12.5 Gram(s) IV Push once  dextrose 50% Injectable 25 Gram(s) IV Push once  dextrose Oral Gel 15 Gram(s) Oral once PRN  ferrous    sulfate 325 milliGRAM(s) Oral daily  gabapentin 100 milliGRAM(s) Oral at bedtime  glucagon  Injectable 1 milliGRAM(s) IntraMuscular once  hydrALAZINE 100 milliGRAM(s) Oral three times a day  influenza  Vaccine (HIGH DOSE) 0.7 milliLiter(s) IntraMuscular once  insulin lispro (ADMELOG) corrective regimen sliding scale   SubCutaneous three times a day before meals  insulin lispro (ADMELOG) corrective regimen sliding scale   SubCutaneous at bedtime  ipratropium    for Nebulization 500 MICROGram(s) Nebulizer every 6 hours  isosorbide   dinitrate Tablet (ISORDIL) 30 milliGRAM(s) Oral three times a day  levalbuterol Inhalation 0.63 milliGRAM(s) Inhalation every 6 hours  levothyroxine 50 MICROGram(s) Oral daily  montelukast 10 milliGRAM(s) Oral daily  polyethylene glycol 3350 17 Gram(s) Oral daily PRN  predniSONE   Tablet 40 milliGRAM(s) Oral daily  senna 2 Tablet(s) Oral at bedtime  tamsulosin 0.4 milliGRAM(s) Oral at bedtime      Review of systems:  10 point review of systems completed and are negative unless noted in HPI    Vitals:  T(C): 36.5 (24 @ 05:23), Max: 36.8 (24 @ 13:43)  HR: 95 (24 @ 11:20) (89 - 116)  BP: 106/70 (24 @ 11:20) (106/70 - 141/94)  BP(mean): --  RR: 17 (24 @ 05:23) (16 - 18)  SpO2: 100% (24 @ 05:23) (99% - 100%)    Daily Height in cm: 177.8 (01 May 2024 21:50)    Daily Weight in k.3 (02 May 2024 05:23)    Weight (kg): 76.5 ( @ 21:50)    I&O's Summary    01 May 2024 07:01  -  02 May 2024 07:00  --------------------------------------------------------  IN: 300 mL / OUT: 1100 mL / NET: -800 mL        Physical Exam:  Appearance: No Acute Distress  Neck: JVP  Cardiovascular: Normal S1 S2  Respiratory: Clear to auscultation bilaterally  Gastrointestinal: Soft, Non-tender	  Skin: No cyanosis	  Neurologic: Non-focal  Extremities: No LE edema  Psychiatry: A & O x 3    Labs:                        11.8   9.53  )-----------( 158      ( 02 May 2024 04:19 )             36.3     05-    139  |  101  |  42<H>  ----------------------------<  121<H>  4.3   |  22  |  2.11<H>    Ca    8.8      02 May 2024 04:19  Phos  3.3     -  Mg     2.30         TPro  6.6  /  Alb  3.8  /  TBili  0.6  /  DBili  x   /  AST  17  /  ALT  16  /  AlkPhos  157<H>  02      TELEMETRY:    Echocardiogram:  TTE W or WO Ultrasound Enhancing Agent (24 @ 12:26)     CONCLUSIONS:      1. Left ventricular systolic function is severely decreased with an ejection fraction visually estimated at 20 to 25 %. Global left ventricular hypokinesis.   2. Normal right ventricular cavity size, with normal wall thickness, and normal systolic function.   3. Device lead is visualized.   4. The left atrium is normal.   5. The right atrium is normal in size.   6. No significant valvular disease.   7. No pericardial effusion seen.   8. Estimated pulmonary artery systolic pressure is 49 mmHg.   9. The inferior vena cava is dilated measuring 2.35 cm in diameter, (dilated >2.1cm) with abnormal inspiratory collapse (abnormal <50%) consistent with elevated right atrialpressure (~15, range 10-20mmHg).    ________________________________________________________________________________________  FINDINGS:     Left Ventricle:  The left ventricular cavity is mildly dilated. Left ventricular systolic function is severelydecreased with an ejection fraction visually estimated at 20 to 25%. There is global left ventricular hypokinesis.     Right Ventricle:  The right ventricular cavity is normal in size, with normal wall thickness and normal systolic function. A devicelead is visualized.     Left Atrium:  The left atrium is normal with an indexed volume of 24.01 ml/m².     Right Atrium:  The right atrium is normal in size with an indexed area of 7.50 cm²/m².     Aortic Valve:  The aortic valve is tricuspid with normal leaflet excursion. There is fibrocalcific aortic valve sclerosis without stenosis. There is no evidence of aortic regurgitation.     Mitral Valve:  Structurally normal mitral valve with normal leaflet excursion. There is symmetric leaflet tethering. There is no mitral valve stenosis. There is mild mitral regurgitation.     Tricuspid Valve:  Structurally normal tricuspid valve with normal leaflet excursion. There is mild tricuspid regurgitation. Estimated pulmonary artery systolic pressure is 49 mmHg.     Pulmonic Valve:  Structurally normal pulmonic valve with normal leaflet excursion. There is trace pulmonic regurgitation.     Aorta:  The aortic root at the sinuses of Valsalva is normal in size, measuring 3.10 cm (indexed 1.60 cm/m²). The ascending aorta diameter is normal in size, measuring 2.80 cm (indexed 1.45 cm/m²).     Pericardium:  No pericardial effusion seen.     Systemic Veins:  The inferior vena cava is dilated measuring 2.35 cm in diameter, (dilated >2.1cm) with abnormal inspiratory collapse (abnormal <50%) consistent with elevated right atrial pressure (~15, range 10-20mmHg).  ____________________________________________________________________  QUANTITATIVE DATA:  Left Ventricle Measurements: (Indexed to BSA)     IVSd (2D):   1.0 cm  LVPWd (2D):  1.0 cm  LVIDd (2D):  6.2 cm  LVIDs (2D):  5.5 cm  LV Mass:     273 g  141.5 g/m²  Visualized LV EF%: 20 to 25%     e' lateral: 7.83 cm/s  e' medial:  7.07 cm/s    Aorta Measurements: (Normal range) (Indexed to BSA)     Sinuses of Valsalva: 3.10 cm (3.1 - 3.7 cm)  Ao Asc prox:         2.80 cm       Left Atrium Measurements: (Indexed to BSA)  LA Diam 2D: 5.20 cm    Right Ventricle Measurements:     RV d, 2D:   3.5 cm  TV Mesha. S': 5.98 cm/s       LVOT / RVOT/ Qp/Qs Data: (Indexed to BSA)  LVOT Diameter: 2.10 cm  LVOT Vmax:     0.80 m/s  LVOT VTI:      11.25 cm  LVOT SV:       39.0 ml  20.16 ml/m²    Aortic Valve Measurements:  AV Vmax:                0.9 m/s  AV Peak Gradient:       3.3 mmHg  AV Mean Gradient: 2.0 mmHg  AV VTI:                 15.6 cm  AV VTI Ratio:           0.72  AoV EOA, Contin:        2.50 cm²  AoV EOA, Contin i:      1.29 cm²/m²  AoV Dimensionless Index 0.72       Tricuspid Valve Measurements:     TR Vmax:          2.9 m/s  TR Peak Gradient: 34.1 mmHg  RA Pressure:      15 mmHg  PASP:             49 mmHg    ___________________________      EK Lead ECG (24 @ 12:03)     Ventricular Rate 92 BPM    Atrial Rate 92 BPM    P-R Interval 152 ms    QRS Duration 140 ms    Q-T Interval 488 ms    QTC Calculation(Bazett) 603 ms    P Axis 61 degrees    R Axis 115 degrees    T Axis 51 degrees    Diagnosis Line Normal sinus rhythm  Premature ventricular and fusion complexes  Non-specific intra-ventricular conduction block  Lateral infarct , age undetermined  Abnormal ECG    Xray Chest 2 Views PA/Lat (24 @ 21:29)   FINDINGS:    Status post median sternotomy.  Left chest wall AICD and CardioMems device noted.  Moderate pulmonary vascular congestion.  No large pleural effusion or discernible pneumothorax.  Cardiomegaly.  No acute abnormality within visible osseous structures.      IMPRESSION:    Cardiomegaly and moderate pulmonary vascular congestion. Findings likely   represent moderate pulmonary edema.      Cardiac Catheterization (24 @ 09:30)   Conclusions:   Patient with class III CHF admitted with acute on chronic systolic  heart failure.  RA 13 mmHg. PA 52/20/35 mmHg. PCW 23-25 mmHg. Cardiac index 1.96  L/min/m2. SVR 1712 dsc.  Successful implant of CardioMEMS for PA pressure-guided management of  heart failure.       Patient is a 68y old  Male who presents with a chief complaint of acute on chronic heart failure exacerbation (02 May 2024 07:32)      HPI:    PAST MEDICAL & SURGICAL HISTORY:  HTN (hypertension)      CAD (coronary artery disease)      Diabetes      Hyperlipidemia, unspecified hyperlipidemia type      CHF (congestive heart failure)      BPH (benign prostatic hyperplasia)      S/P CABG (coronary artery bypass graft)      S/P appendectomy          FAMILY HISTORY:  Family history of type 2 diabetes mellitus in father (Father)        Home Medications:  albuterol 90 mcg/inh inhalation aerosol: 2 puff(s) inhaled every 6 hours, As needed, Shortness of Breath and/or Wheezing (01 May 2024 21:34)  apixaban 5 mg oral tablet: 1 tab(s) orally 2 times a day (01 May 2024 21:34)  clopidogrel 75 mg oral tablet: 1 tab(s) orally once a day (01 May 2024 21:34)  Coreg 6.25 mg oral tablet: 1 tab(s) orally 2 times a day (01 May 2024 21:34)  Crestor 20 mg oral tablet: 1 tab(s) orally once a day (01 May 2024 21:34)  Dulera 200 mcg-5 mcg/inh inhalation aerosol: 2 puff(s) inhaled 2 times a day (01 May 2024 21:34)  ezetimibe 10 mg oral tablet: 1 tab(s) orally once a day (01 May 2024 21:34)  famotidine 40 mg oral tablet: 1 tab(s) orally once a day (01 May 2024 21:34)  glimepiride 2 mg oral tablet: 1 tab(s) orally once a day (01 May 2024 21:34)  pantoprazole 20 mg oral delayed release tablet: 1 tab(s) orally once a day (01 May 2024 21:34)  polyethylene glycol 3350 oral powder for reconstitution: 17 gram(s) orally once a day As needed Constipation (01 May 2024 21:34)  senna leaf extract oral tablet: 2 tab(s) orally once a day (at bedtime) (01 May 2024 21:34)  Singulair 10 mg oral tablet: 1 tab(s) orally once a day (01 May 2024 21:34)      Medications:  acetaminophen     Tablet .. 650 milliGRAM(s) Oral every 6 hours PRN  allopurinol 100 milliGRAM(s) Oral daily  apixaban 5 milliGRAM(s) Oral two times a day  atorvastatin 80 milliGRAM(s) Oral at bedtime  azithromycin   Tablet 250 milliGRAM(s) Oral daily  budesonide 160 MICROgram(s)/formoterol 4.5 MICROgram(s) Inhaler 2 Puff(s) Inhalation two times a day  buMETAnide Infusion 2 mG/Hr IV Continuous <Continuous>  carvedilol 6.25 milliGRAM(s) Oral every 12 hours  chlorhexidine 2% Cloths 1 Application(s) Topical daily  clopidogrel Tablet 75 milliGRAM(s) Oral daily  dextrose 10% Bolus 125 milliLiter(s) IV Bolus once  dextrose 5%. 1000 milliLiter(s) IV Continuous <Continuous>  dextrose 5%. 1000 milliLiter(s) IV Continuous <Continuous>  dextrose 50% Injectable 12.5 Gram(s) IV Push once  dextrose 50% Injectable 25 Gram(s) IV Push once  dextrose Oral Gel 15 Gram(s) Oral once PRN  ferrous    sulfate 325 milliGRAM(s) Oral daily  gabapentin 100 milliGRAM(s) Oral at bedtime  glucagon  Injectable 1 milliGRAM(s) IntraMuscular once  hydrALAZINE 100 milliGRAM(s) Oral three times a day  influenza  Vaccine (HIGH DOSE) 0.7 milliLiter(s) IntraMuscular once  insulin lispro (ADMELOG) corrective regimen sliding scale   SubCutaneous three times a day before meals  insulin lispro (ADMELOG) corrective regimen sliding scale   SubCutaneous at bedtime  ipratropium    for Nebulization 500 MICROGram(s) Nebulizer every 6 hours  isosorbide   dinitrate Tablet (ISORDIL) 30 milliGRAM(s) Oral three times a day  levalbuterol Inhalation 0.63 milliGRAM(s) Inhalation every 6 hours  levothyroxine 50 MICROGram(s) Oral daily  montelukast 10 milliGRAM(s) Oral daily  polyethylene glycol 3350 17 Gram(s) Oral daily PRN  predniSONE   Tablet 40 milliGRAM(s) Oral daily  senna 2 Tablet(s) Oral at bedtime  tamsulosin 0.4 milliGRAM(s) Oral at bedtime      Review of systems:  10 point review of systems completed and are negative unless noted in HPI    Vitals:  T(C): 36.5 (24 @ 05:23), Max: 36.8 (24 @ 13:43)  HR: 95 (24 @ 11:20) (89 - 116)  BP: 106/70 (24 @ 11:20) (106/70 - 141/94)  BP(mean): --  RR: 17 (24 @ 05:23) (16 - 18)  SpO2: 100% (24 @ 05:23) (99% - 100%)    Daily Height in cm: 177.8 (01 May 2024 21:50)    Daily Weight in k.3 (02 May 2024 05:23)    Weight (kg): 76.5 ( @ 21:50)    I&O's Summary    01 May 2024 07:01  -  02 May 2024 07:00  --------------------------------------------------------  IN: 300 mL / OUT: 1100 mL / NET: -800 mL        Physical Exam:  Appearance: No Acute Distress  Neck: JVP to earlobe  Cardiovascular: Normal S1 S2  Respiratory: Clear to auscultation with bilateral rales  R>L and faint expiratory wheeze bilaterally  Gastrointestinal: Soft, Non-tender	  Skin: No cyanosis	  Neurologic: Non-focal  Extremities: No LE edema  Psychiatry: A & O x 3    Labs:                        11.8   9.53  )-----------( 158      ( 02 May 2024 04:19 )             36.3     05-02    139  |  101  |  42<H>  ----------------------------<  121<H>  4.3   |  22  |  2.11<H>    Ca    8.8      02 May 2024 04:19  Phos  3.3     05-  Mg     2.30         TPro  6.6  /  Alb  3.8  /  TBili  0.6  /  DBili  x   /  AST  17  /  ALT  16  /  AlkPhos  157<H>  0502      TELEMETRY: BiVPaced@80bpm    Echocardiogram:  TTE W or WO Ultrasound Enhancing Agent (24 @ 12:26)     CONCLUSIONS:      1. Left ventricular systolic function is severely decreased with an ejection fraction visually estimated at 20 to 25 %. Global left ventricular hypokinesis.   2. Normal right ventricular cavity size, with normal wall thickness, and normal systolic function.   3. Device lead is visualized.   4. The left atrium is normal.   5. The right atrium is normal in size.   6. No significant valvular disease.   7. No pericardial effusion seen.   8. Estimated pulmonary artery systolic pressure is 49 mmHg.   9. The inferior vena cava is dilated measuring 2.35 cm in diameter, (dilated >2.1cm) with abnormal inspiratory collapse (abnormal <50%) consistent with elevated right atrialpressure (~15, range 10-20mmHg).    ________________________________________________________________________________________  FINDINGS:     Left Ventricle:  The left ventricular cavity is mildly dilated. Left ventricular systolic function is severelydecreased with an ejection fraction visually estimated at 20 to 25%. There is global left ventricular hypokinesis.     Right Ventricle:  The right ventricular cavity is normal in size, with normal wall thickness and normal systolic function. A devicelead is visualized.     Left Atrium:  The left atrium is normal with an indexed volume of 24.01 ml/m².     Right Atrium:  The right atrium is normal in size with an indexed area of 7.50 cm²/m².     Aortic Valve:  The aortic valve is tricuspid with normal leaflet excursion. There is fibrocalcific aortic valve sclerosis without stenosis. There is no evidence of aortic regurgitation.     Mitral Valve:  Structurally normal mitral valve with normal leaflet excursion. There is symmetric leaflet tethering. There is no mitral valve stenosis. There is mild mitral regurgitation.     Tricuspid Valve:  Structurally normal tricuspid valve with normal leaflet excursion. There is mild tricuspid regurgitation. Estimated pulmonary artery systolic pressure is 49 mmHg.     Pulmonic Valve:  Structurally normal pulmonic valve with normal leaflet excursion. There is trace pulmonic regurgitation.     Aorta:  The aortic root at the sinuses of Valsalva is normal in size, measuring 3.10 cm (indexed 1.60 cm/m²). The ascending aorta diameter is normal in size, measuring 2.80 cm (indexed 1.45 cm/m²).     Pericardium:  No pericardial effusion seen.     Systemic Veins:  The inferior vena cava is dilated measuring 2.35 cm in diameter, (dilated >2.1cm) with abnormal inspiratory collapse (abnormal <50%) consistent with elevated right atrial pressure (~15, range 10-20mmHg).  ____________________________________________________________________  QUANTITATIVE DATA:  Left Ventricle Measurements: (Indexed to BSA)     IVSd (2D):   1.0 cm  LVPWd (2D):  1.0 cm  LVIDd (2D):  6.2 cm  LVIDs (2D):  5.5 cm  LV Mass:     273 g  141.5 g/m²  Visualized LV EF%: 20 to 25%     e' lateral: 7.83 cm/s  e' medial:  7.07 cm/s    Aorta Measurements: (Normal range) (Indexed to BSA)     Sinuses of Valsalva: 3.10 cm (3.1 - 3.7 cm)  Ao Asc prox:         2.80 cm       Left Atrium Measurements: (Indexed to BSA)  LA Diam 2D: 5.20 cm    Right Ventricle Measurements:     RV d, 2D:   3.5 cm  TV Mesha. S': 5.98 cm/s       LVOT / RVOT/ Qp/Qs Data: (Indexed to BSA)  LVOT Diameter: 2.10 cm  LVOT Vmax:     0.80 m/s  LVOT VTI:      11.25 cm  LVOT SV:       39.0 ml  20.16 ml/m²    Aortic Valve Measurements:  AV Vmax:                0.9 m/s  AV Peak Gradient:       3.3 mmHg  AV Mean Gradient: 2.0 mmHg  AV VTI:                 15.6 cm  AV VTI Ratio:           0.72  AoV EOA, Contin:        2.50 cm²  AoV EOA, Contin i:      1.29 cm²/m²  AoV Dimensionless Index 0.72       Tricuspid Valve Measurements:     TR Vmax:          2.9 m/s  TR Peak Gradient: 34.1 mmHg  RA Pressure:      15 mmHg  PASP:             49 mmHg    ___________________________      EK Lead ECG (24 @ 12:03)     Ventricular Rate 92 BPM    Atrial Rate 92 BPM    P-R Interval 152 ms    QRS Duration 140 ms    Q-T Interval 488 ms    QTC Calculation(Bazett) 603 ms    P Axis 61 degrees    R Axis 115 degrees    T Axis 51 degrees    Diagnosis Line Normal sinus rhythm  Premature ventricular and fusion complexes  Non-specific intra-ventricular conduction block  Lateral infarct , age undetermined  Abnormal ECG    Xray Chest 2 Views PA/Lat (24 @ 21:29)   FINDINGS:    Status post median sternotomy.  Left chest wall AICD and CardioMems device noted.  Moderate pulmonary vascular congestion.  No large pleural effusion or discernible pneumothorax.  Cardiomegaly.  No acute abnormality within visible osseous structures.      IMPRESSION:    Cardiomegaly and moderate pulmonary vascular congestion. Findings likely   represent moderate pulmonary edema.      Cardiac Catheterization (24 @ 09:30)   Conclusions:   Patient with class III CHF admitted with acute on chronic systolic  heart failure.  RA 13 mmHg. PA 52/20/35 mmHg. PCW 23-25 mmHg. Cardiac index 1.96  L/min/m2. SVR 1712 dsc.  Successful implant of CardioMEMS for PA pressure-guided management of  heart failure.       Patient is a 68y old  Male who presents with a chief complaint of acute on chronic heart failure exacerbation (02 May 2024 07:32)      HPI:  68 year old Male with PMH of HTN, asthma/COPD (2 L O2 at home), CVA with left sided weakness, CAD s/p CABG ( at [facility]), CRT-D, AFib/Flutter s/p ablation (on Eliquis), and  HFrEF (EF 25%, LVIDd 5.6cm) s/p CardioMEMS (3/28). CardioMEMS PAD  reading: (Goal 21-23). Patient presents with c/o SOB/KINNEY, 2 pillow orthopnea X 2 weeks. Overall stage C HF, NYHA class III.      PAST MEDICAL & SURGICAL HISTORY:  HTN (hypertension)      CAD (coronary artery disease)      Diabetes      Hyperlipidemia, unspecified hyperlipidemia type      CHF (congestive heart failure)      BPH (benign prostatic hyperplasia)      S/P CABG (coronary artery bypass graft)      S/P appendectomy          FAMILY HISTORY:  Family history of type 2 diabetes mellitus in father (Father)        Home Medications:  albuterol 90 mcg/inh inhalation aerosol: 2 puff(s) inhaled every 6 hours, As needed, Shortness of Breath and/or Wheezing (01 May 2024 21:34)  apixaban 5 mg oral tablet: 1 tab(s) orally 2 times a day (01 May 2024 21:34)  clopidogrel 75 mg oral tablet: 1 tab(s) orally once a day (01 May 2024 21:34)  Coreg 6.25 mg oral tablet: 1 tab(s) orally 2 times a day (01 May 2024 21:34)  Crestor 20 mg oral tablet: 1 tab(s) orally once a day (01 May 2024 21:34)  Dulera 200 mcg-5 mcg/inh inhalation aerosol: 2 puff(s) inhaled 2 times a day (01 May 2024 21:34)  ezetimibe 10 mg oral tablet: 1 tab(s) orally once a day (01 May 2024 21:34)  famotidine 40 mg oral tablet: 1 tab(s) orally once a day (01 May 2024 21:34)  glimepiride 2 mg oral tablet: 1 tab(s) orally once a day (01 May 2024 21:34)  pantoprazole 20 mg oral delayed release tablet: 1 tab(s) orally once a day (01 May 2024 21:34)  polyethylene glycol 3350 oral powder for reconstitution: 17 gram(s) orally once a day As needed Constipation (01 May 2024 21:34)  senna leaf extract oral tablet: 2 tab(s) orally once a day (at bedtime) (01 May 2024 21:34)  Singulair 10 mg oral tablet: 1 tab(s) orally once a day (01 May 2024 21:34)      Medications:  acetaminophen     Tablet .. 650 milliGRAM(s) Oral every 6 hours PRN  allopurinol 100 milliGRAM(s) Oral daily  apixaban 5 milliGRAM(s) Oral two times a day  atorvastatin 80 milliGRAM(s) Oral at bedtime  azithromycin   Tablet 250 milliGRAM(s) Oral daily  budesonide 160 MICROgram(s)/formoterol 4.5 MICROgram(s) Inhaler 2 Puff(s) Inhalation two times a day  buMETAnide Infusion 2 mG/Hr IV Continuous <Continuous>  carvedilol 6.25 milliGRAM(s) Oral every 12 hours  chlorhexidine 2% Cloths 1 Application(s) Topical daily  clopidogrel Tablet 75 milliGRAM(s) Oral daily  dextrose 10% Bolus 125 milliLiter(s) IV Bolus once  dextrose 5%. 1000 milliLiter(s) IV Continuous <Continuous>  dextrose 5%. 1000 milliLiter(s) IV Continuous <Continuous>  dextrose 50% Injectable 12.5 Gram(s) IV Push once  dextrose 50% Injectable 25 Gram(s) IV Push once  dextrose Oral Gel 15 Gram(s) Oral once PRN  ferrous    sulfate 325 milliGRAM(s) Oral daily  gabapentin 100 milliGRAM(s) Oral at bedtime  glucagon  Injectable 1 milliGRAM(s) IntraMuscular once  hydrALAZINE 100 milliGRAM(s) Oral three times a day  influenza  Vaccine (HIGH DOSE) 0.7 milliLiter(s) IntraMuscular once  insulin lispro (ADMELOG) corrective regimen sliding scale   SubCutaneous three times a day before meals  insulin lispro (ADMELOG) corrective regimen sliding scale   SubCutaneous at bedtime  ipratropium    for Nebulization 500 MICROGram(s) Nebulizer every 6 hours  isosorbide   dinitrate Tablet (ISORDIL) 30 milliGRAM(s) Oral three times a day  levalbuterol Inhalation 0.63 milliGRAM(s) Inhalation every 6 hours  levothyroxine 50 MICROGram(s) Oral daily  montelukast 10 milliGRAM(s) Oral daily  polyethylene glycol 3350 17 Gram(s) Oral daily PRN  predniSONE   Tablet 40 milliGRAM(s) Oral daily  senna 2 Tablet(s) Oral at bedtime  tamsulosin 0.4 milliGRAM(s) Oral at bedtime      Review of systems:  10 point review of systems completed and are negative unless noted in HPI    Vitals:  T(C): 36.5 (24 @ 05:23), Max: 36.8 (24 @ 13:43)  HR: 95 (24 @ 11:20) (89 - 116)  BP: 106/70 (24 @ 11:20) (106/70 - 141/94)  BP(mean): --  RR: 17 (24 @ 05:23) (16 - 18)  SpO2: 100% (24 @ 05:23) (99% - 100%)    Daily Height in cm: 177.8 (01 May 2024 21:50)    Daily Weight in k.3 (02 May 2024 05:23)    Weight (kg): 76.5 ( @ 21:50)    I&O's Summary    01 May 2024 07:01  -  02 May 2024 07:00  --------------------------------------------------------  IN: 300 mL / OUT: 1100 mL / NET: -800 mL        Physical Exam:  Appearance: No Acute Distress  Neck: JVP to earlobe  Cardiovascular: Normal S1 S2  Respiratory: Clear to auscultation with bilateral rales  R>L and faint expiratory wheeze bilaterally  Gastrointestinal: Soft, Non-tender	  Skin: No cyanosis	  Neurologic: Non-focal  Extremities: No LE edema  Psychiatry: A & O x 3    Labs:                        11.8   9.53  )-----------( 158      ( 02 May 2024 04:19 )             36.3         139  |  101  |  42<H>  ----------------------------<  121<H>  4.3   |  22  |  2.11<H>    Ca    8.8      02 May 2024 04:19  Phos  3.3     05-  Mg     2.30         TPro  6.6  /  Alb  3.8  /  TBili  0.6  /  DBili  x   /  AST  17  /  ALT  16  /  AlkPhos  157<H>        TELEMETRY: BiVPaced@80bpm    Echocardiogram:  TTE W or WO Ultrasound Enhancing Agent (24 @ 12:26)     CONCLUSIONS:      1. Left ventricular systolic function is severely decreased with an ejection fraction visually estimated at 20 to 25 %. Global left ventricular hypokinesis.   2. Normal right ventricular cavity size, with normal wall thickness, and normal systolic function.   3. Device lead is visualized.   4. The left atrium is normal.   5. The right atrium is normal in size.   6. No significant valvular disease.   7. No pericardial effusion seen.   8. Estimated pulmonary artery systolic pressure is 49 mmHg.   9. The inferior vena cava is dilated measuring 2.35 cm in diameter, (dilated >2.1cm) with abnormal inspiratory collapse (abnormal <50%) consistent with elevated right atrialpressure (~15, range 10-20mmHg).    ________________________________________________________________________________________  FINDINGS:     Left Ventricle:  The left ventricular cavity is mildly dilated. Left ventricular systolic function is severelydecreased with an ejection fraction visually estimated at 20 to 25%. There is global left ventricular hypokinesis.     Right Ventricle:  The right ventricular cavity is normal in size, with normal wall thickness and normal systolic function. A devicelead is visualized.     Left Atrium:  The left atrium is normal with an indexed volume of 24.01 ml/m².     Right Atrium:  The right atrium is normal in size with an indexed area of 7.50 cm²/m².     Aortic Valve:  The aortic valve is tricuspid with normal leaflet excursion. There is fibrocalcific aortic valve sclerosis without stenosis. There is no evidence of aortic regurgitation.     Mitral Valve:  Structurally normal mitral valve with normal leaflet excursion. There is symmetric leaflet tethering. There is no mitral valve stenosis. There is mild mitral regurgitation.     Tricuspid Valve:  Structurally normal tricuspid valve with normal leaflet excursion. There is mild tricuspid regurgitation. Estimated pulmonary artery systolic pressure is 49 mmHg.     Pulmonic Valve:  Structurally normal pulmonic valve with normal leaflet excursion. There is trace pulmonic regurgitation.     Aorta:  The aortic root at the sinuses of Valsalva is normal in size, measuring 3.10 cm (indexed 1.60 cm/m²). The ascending aorta diameter is normal in size, measuring 2.80 cm (indexed 1.45 cm/m²).     Pericardium:  No pericardial effusion seen.     Systemic Veins:  The inferior vena cava is dilated measuring 2.35 cm in diameter, (dilated >2.1cm) with abnormal inspiratory collapse (abnormal <50%) consistent with elevated right atrial pressure (~15, range 10-20mmHg).  ____________________________________________________________________  QUANTITATIVE DATA:  Left Ventricle Measurements: (Indexed to BSA)     IVSd (2D):   1.0 cm  LVPWd (2D):  1.0 cm  LVIDd (2D):  6.2 cm  LVIDs (2D):  5.5 cm  LV Mass:     273 g  141.5 g/m²  Visualized LV EF%: 20 to 25%     e' lateral: 7.83 cm/s  e' medial:  7.07 cm/s    Aorta Measurements: (Normal range) (Indexed to BSA)     Sinuses of Valsalva: 3.10 cm (3.1 - 3.7 cm)  Ao Asc prox:         2.80 cm       Left Atrium Measurements: (Indexed to BSA)  LA Diam 2D: 5.20 cm    Right Ventricle Measurements:     RV d, 2D:   3.5 cm  TV Mesha. S': 5.98 cm/s       LVOT / RVOT/ Qp/Qs Data: (Indexed to BSA)  LVOT Diameter: 2.10 cm  LVOT Vmax:     0.80 m/s  LVOT VTI:      11.25 cm  LVOT SV:       39.0 ml  20.16 ml/m²    Aortic Valve Measurements:  AV Vmax:                0.9 m/s  AV Peak Gradient:       3.3 mmHg  AV Mean Gradient: 2.0 mmHg  AV VTI:                 15.6 cm  AV VTI Ratio:           0.72  AoV EOA, Contin:        2.50 cm²  AoV EOA, Contin i:      1.29 cm²/m²  AoV Dimensionless Index 0.72       Tricuspid Valve Measurements:     TR Vmax:          2.9 m/s  TR Peak Gradient: 34.1 mmHg  RA Pressure:      15 mmHg  PASP:             49 mmHg    ___________________________      EK Lead ECG (24 @ 12:03)     Ventricular Rate 92 BPM    Atrial Rate 92 BPM    P-R Interval 152 ms    QRS Duration 140 ms    Q-T Interval 488 ms    QTC Calculation(Bazett) 603 ms    P Axis 61 degrees    R Axis 115 degrees    T Axis 51 degrees    Diagnosis Line Normal sinus rhythm  Premature ventricular and fusion complexes  Non-specific intra-ventricular conduction block  Lateral infarct , age undetermined  Abnormal ECG    Xray Chest 2 Views PA/Lat (24 @ 21:29)   FINDINGS:    Status post median sternotomy.  Left chest wall AICD and CardioMems device noted.  Moderate pulmonary vascular congestion.  No large pleural effusion or discernible pneumothorax.  Cardiomegaly.  No acute abnormality within visible osseous structures.      IMPRESSION:    Cardiomegaly and moderate pulmonary vascular congestion. Findings likely   represent moderate pulmonary edema.      Cardiac Catheterization (24 @ 09:30)   Conclusions:   Patient with class III CHF admitted with acute on chronic systolic  heart failure.  RA 13 mmHg. PA 52/20/35 mmHg. PCW 23-25 mmHg. Cardiac index 1.96  L/min/m2. SVR 1712 dsc.  Successful implant of CardioMEMS for PA pressure-guided management of  heart failure.

## 2024-05-02 NOTE — PROGRESS NOTE ADULT - PROBLEM SELECTOR PLAN 11
DVT PPx: full AC w/ eliquis  Diet: DASH/CC fluid restriction  Code status: Full code  Communication: Ketty (daughter) contacted 5/1 11PM with regard to plan to which understanding was verbalized by party  Dispo: likely home   PCP: Dr. Robin Schmidt  Pharmacy: [facility] [phone] [facility]

## 2024-05-02 NOTE — PROGRESS NOTE ADULT - PROBLEM SELECTOR PLAN 2
On home meds of albuterol inhaler PRN q6h, Dulera, singulair  -c/w Dulera therapeutic interchange, singulair  -wean to home O2 setting of 2LNC  -xopenex/ipratropium q6h for 24 hrs -> albuterol inhaler PRN q6h  -S/p Mag, solu-medrol 40mg x1 , CTX/azithro x1 in ED  -Opting to hold CTX for now given no clear consolidation on CXR and negative RVP. Can potentially initiate if pt w/ worsening hypoxia, leukocytosis, and/or fever as reason to continue.  -azithro  -pred 40mg qd x4 doses

## 2024-05-02 NOTE — CONSULT NOTE ADULT - NS ATTEND AMEND GEN_ALL_CORE FT
Briefly, 67 y/o [place] male with h/o HTN, HFrEF/ICM (EF 25%, LVIDD 5.6cm) s/p CRT-D and CardioMEMS, CAD w/ CABG 2014 at [facility], asthma/COPD, CVA with left sided weakness, CKD (b/l Cr 2.0), aflutter s/p ablation (3/29/24) comes in from HF office with worsening dyspnea and fluid overload. Started on bumex gtt 2 mg/hr with improvement. Remains overloaded on exam with elevated JVP and dec air movement b/l. Adherent to medications but hasn't been responding well to bumex 4 mg PO twice/day with rising PAD to 35. Labs show BUN/Cr 42/2.11 (improved from prior), proBNP 9K (improved from 14k)  - c/w bumex gtt as above; standing weights daily (prior goal weight 161 pounds)  - c/w hydral 100 mg q8h/isordil 30 mg q8h  - c/w coreg 6.25 mg q12  - c/w Eliquis  - aggressive PT/OT.

## 2024-05-02 NOTE — PROCEDURE NOTE - ADDITIONAL PROCEDURE DETAILS
Time in AT/AF since March 29, 2024 :  86 seconds  Elevated OptiVol fluid since Feb 2024-ongoing  p/w HF

## 2024-05-02 NOTE — CONSULT NOTE ADULT - ASSESSMENT
68 year old Male with PMH of HTN, asthma/COPD (2 L O2 at home), CVA with left sided weakness, CAD s/p CABG (2014 at [facility]), CRT-D, AFib/Flutter s/p ablation (on Eliquis), and  HFrEF (EF 25%, LVIDd 5.6cm) s/p CardioMEMS (3/28). CardioMEMS PAD  reading: (Goal 21-23). Patient presents with c/o SOB/KINNEY, 2 pillow orthopnea X 2 weeks.  Overall stage C HF, NYHA class III.    Pertinent Labs:    Pro-Brain Natriuretic Peptide: 9273  Lactate: 1.7 mmol/L   Troponin  81/84  BUN/Cr 42.2.11    CXR: Moderate pulmonary vascular congestion     EKG: Sinus Rhythm non specific ST-T wave changes      RHC (3/24): RA 13 mmHg. PA 52/20/35 mmHg. PCW 23-25 mmHg. Cardiac index 1.96 L/min/m2. SVR 1712 dsc.

## 2024-05-02 NOTE — PROGRESS NOTE ADULT - PROBLEM SELECTOR PLAN 9
-c/w home flomax  -bladder scan to r/o retention given output of ~450ccs per pt report despite bumex and previous need for ya

## 2024-05-03 ENCOUNTER — TRANSCRIPTION ENCOUNTER (OUTPATIENT)
Age: 69
End: 2024-05-03

## 2024-05-03 LAB
ALBUMIN SERPL ELPH-MCNC: 3.7 G/DL — SIGNIFICANT CHANGE UP (ref 3.3–5)
ALP SERPL-CCNC: 154 U/L — HIGH (ref 40–120)
ALT FLD-CCNC: 20 U/L — SIGNIFICANT CHANGE UP (ref 4–41)
ANION GAP SERPL CALC-SCNC: 13 MMOL/L — SIGNIFICANT CHANGE UP (ref 7–14)
ANION GAP SERPL CALC-SCNC: 14 MMOL/L — SIGNIFICANT CHANGE UP (ref 7–14)
AST SERPL-CCNC: 20 U/L — SIGNIFICANT CHANGE UP (ref 4–40)
BASOPHILS # BLD AUTO: 0.01 K/UL — SIGNIFICANT CHANGE UP (ref 0–0.2)
BASOPHILS NFR BLD AUTO: 0.1 % — SIGNIFICANT CHANGE UP (ref 0–2)
BILIRUB SERPL-MCNC: 0.5 MG/DL — SIGNIFICANT CHANGE UP (ref 0.2–1.2)
BUN SERPL-MCNC: 49 MG/DL — HIGH (ref 7–23)
BUN SERPL-MCNC: 50 MG/DL — HIGH (ref 7–23)
CALCIUM SERPL-MCNC: 8.3 MG/DL — LOW (ref 8.4–10.5)
CALCIUM SERPL-MCNC: 8.6 MG/DL — SIGNIFICANT CHANGE UP (ref 8.4–10.5)
CHLORIDE SERPL-SCNC: 100 MMOL/L — SIGNIFICANT CHANGE UP (ref 98–107)
CHLORIDE SERPL-SCNC: 99 MMOL/L — SIGNIFICANT CHANGE UP (ref 98–107)
CO2 SERPL-SCNC: 24 MMOL/L — SIGNIFICANT CHANGE UP (ref 22–31)
CO2 SERPL-SCNC: 25 MMOL/L — SIGNIFICANT CHANGE UP (ref 22–31)
CREAT SERPL-MCNC: 2.34 MG/DL — HIGH (ref 0.5–1.3)
CREAT SERPL-MCNC: 2.36 MG/DL — HIGH (ref 0.5–1.3)
EGFR: 29 ML/MIN/1.73M2 — LOW
EGFR: 29 ML/MIN/1.73M2 — LOW
EOSINOPHIL # BLD AUTO: 0 K/UL — SIGNIFICANT CHANGE UP (ref 0–0.5)
EOSINOPHIL NFR BLD AUTO: 0 % — SIGNIFICANT CHANGE UP (ref 0–6)
GLUCOSE BLDC GLUCOMTR-MCNC: 131 MG/DL — HIGH (ref 70–99)
GLUCOSE BLDC GLUCOMTR-MCNC: 165 MG/DL — HIGH (ref 70–99)
GLUCOSE BLDC GLUCOMTR-MCNC: 172 MG/DL — HIGH (ref 70–99)
GLUCOSE BLDC GLUCOMTR-MCNC: 225 MG/DL — HIGH (ref 70–99)
GLUCOSE SERPL-MCNC: 166 MG/DL — HIGH (ref 70–99)
GLUCOSE SERPL-MCNC: 184 MG/DL — HIGH (ref 70–99)
HCT VFR BLD CALC: 35.3 % — LOW (ref 39–50)
HGB BLD-MCNC: 11.2 G/DL — LOW (ref 13–17)
IANC: 8.39 K/UL — HIGH (ref 1.8–7.4)
IMM GRANULOCYTES NFR BLD AUTO: 0.3 % — SIGNIFICANT CHANGE UP (ref 0–0.9)
LYMPHOCYTES # BLD AUTO: 0.73 K/UL — LOW (ref 1–3.3)
LYMPHOCYTES # BLD AUTO: 7.8 % — LOW (ref 13–44)
MAGNESIUM SERPL-MCNC: 1.7 MG/DL — SIGNIFICANT CHANGE UP (ref 1.6–2.6)
MAGNESIUM SERPL-MCNC: 2 MG/DL — SIGNIFICANT CHANGE UP (ref 1.6–2.6)
MCHC RBC-ENTMCNC: 31.4 PG — SIGNIFICANT CHANGE UP (ref 27–34)
MCHC RBC-ENTMCNC: 31.7 GM/DL — LOW (ref 32–36)
MCV RBC AUTO: 98.9 FL — SIGNIFICANT CHANGE UP (ref 80–100)
MONOCYTES # BLD AUTO: 0.14 K/UL — SIGNIFICANT CHANGE UP (ref 0–0.9)
MONOCYTES NFR BLD AUTO: 1.5 % — LOW (ref 2–14)
NEUTROPHILS # BLD AUTO: 8.39 K/UL — HIGH (ref 1.8–7.4)
NEUTROPHILS NFR BLD AUTO: 90.3 % — HIGH (ref 43–77)
NRBC # BLD: 0 /100 WBCS — SIGNIFICANT CHANGE UP (ref 0–0)
NRBC # FLD: 0 K/UL — SIGNIFICANT CHANGE UP (ref 0–0)
PHOSPHATE SERPL-MCNC: 3.8 MG/DL — SIGNIFICANT CHANGE UP (ref 2.5–4.5)
PHOSPHATE SERPL-MCNC: 3.8 MG/DL — SIGNIFICANT CHANGE UP (ref 2.5–4.5)
PLATELET # BLD AUTO: 164 K/UL — SIGNIFICANT CHANGE UP (ref 150–400)
POTASSIUM SERPL-MCNC: 3.9 MMOL/L — SIGNIFICANT CHANGE UP (ref 3.5–5.3)
POTASSIUM SERPL-MCNC: 4.1 MMOL/L — SIGNIFICANT CHANGE UP (ref 3.5–5.3)
POTASSIUM SERPL-SCNC: 3.9 MMOL/L — SIGNIFICANT CHANGE UP (ref 3.5–5.3)
POTASSIUM SERPL-SCNC: 4.1 MMOL/L — SIGNIFICANT CHANGE UP (ref 3.5–5.3)
PROT SERPL-MCNC: 6.4 G/DL — SIGNIFICANT CHANGE UP (ref 6–8.3)
RBC # BLD: 3.57 M/UL — LOW (ref 4.2–5.8)
RBC # FLD: 17.4 % — HIGH (ref 10.3–14.5)
SODIUM SERPL-SCNC: 137 MMOL/L — SIGNIFICANT CHANGE UP (ref 135–145)
SODIUM SERPL-SCNC: 138 MMOL/L — SIGNIFICANT CHANGE UP (ref 135–145)
WBC # BLD: 9.3 K/UL — SIGNIFICANT CHANGE UP (ref 3.8–10.5)
WBC # FLD AUTO: 9.3 K/UL — SIGNIFICANT CHANGE UP (ref 3.8–10.5)

## 2024-05-03 PROCEDURE — 99233 SBSQ HOSP IP/OBS HIGH 50: CPT | Mod: FS

## 2024-05-03 PROCEDURE — 99233 SBSQ HOSP IP/OBS HIGH 50: CPT | Mod: GC

## 2024-05-03 RX ORDER — SACUBITRIL AND VALSARTAN 24; 26 MG/1; MG/1
1 TABLET, FILM COATED ORAL
Refills: 0 | Status: DISCONTINUED | OUTPATIENT
Start: 2024-05-03 | End: 2024-05-06

## 2024-05-03 RX ADMIN — Medication 325 MILLIGRAM(S): at 11:44

## 2024-05-03 RX ADMIN — GABAPENTIN 100 MILLIGRAM(S): 400 CAPSULE ORAL at 22:09

## 2024-05-03 RX ADMIN — Medication 1: at 08:26

## 2024-05-03 RX ADMIN — Medication 1: at 17:55

## 2024-05-03 RX ADMIN — TAMSULOSIN HYDROCHLORIDE 0.4 MILLIGRAM(S): 0.4 CAPSULE ORAL at 22:03

## 2024-05-03 RX ADMIN — Medication 650 MILLIGRAM(S): at 02:40

## 2024-05-03 RX ADMIN — BUMETANIDE 10 MG/HR: 0.25 INJECTION INTRAMUSCULAR; INTRAVENOUS at 15:37

## 2024-05-03 RX ADMIN — Medication 2: at 12:39

## 2024-05-03 RX ADMIN — ATORVASTATIN CALCIUM 80 MILLIGRAM(S): 80 TABLET, FILM COATED ORAL at 22:02

## 2024-05-03 RX ADMIN — CARVEDILOL PHOSPHATE 6.25 MILLIGRAM(S): 80 CAPSULE, EXTENDED RELEASE ORAL at 22:37

## 2024-05-03 RX ADMIN — ISOSORBIDE DINITRATE 30 MILLIGRAM(S): 5 TABLET ORAL at 05:08

## 2024-05-03 RX ADMIN — ISOSORBIDE DINITRATE 30 MILLIGRAM(S): 5 TABLET ORAL at 11:44

## 2024-05-03 RX ADMIN — MONTELUKAST 10 MILLIGRAM(S): 4 TABLET, CHEWABLE ORAL at 11:44

## 2024-05-03 RX ADMIN — Medication 50 MICROGRAM(S): at 05:09

## 2024-05-03 RX ADMIN — BUMETANIDE 10 MG/HR: 0.25 INJECTION INTRAMUSCULAR; INTRAVENOUS at 22:36

## 2024-05-03 RX ADMIN — AZITHROMYCIN 250 MILLIGRAM(S): 500 TABLET, FILM COATED ORAL at 11:43

## 2024-05-03 RX ADMIN — Medication 100 MILLIGRAM(S): at 22:02

## 2024-05-03 RX ADMIN — POLYETHYLENE GLYCOL 3350 17 GRAM(S): 17 POWDER, FOR SOLUTION ORAL at 22:09

## 2024-05-03 RX ADMIN — SENNA PLUS 2 TABLET(S): 8.6 TABLET ORAL at 22:03

## 2024-05-03 RX ADMIN — Medication 100 MILLIGRAM(S): at 11:43

## 2024-05-03 RX ADMIN — Medication 100 MILLIGRAM(S): at 05:08

## 2024-05-03 RX ADMIN — CARVEDILOL PHOSPHATE 6.25 MILLIGRAM(S): 80 CAPSULE, EXTENDED RELEASE ORAL at 11:44

## 2024-05-03 RX ADMIN — Medication 100 MILLIGRAM(S): at 14:36

## 2024-05-03 RX ADMIN — APIXABAN 5 MILLIGRAM(S): 2.5 TABLET, FILM COATED ORAL at 05:08

## 2024-05-03 RX ADMIN — ISOSORBIDE DINITRATE 30 MILLIGRAM(S): 5 TABLET ORAL at 15:39

## 2024-05-03 RX ADMIN — CHLORHEXIDINE GLUCONATE 1 APPLICATION(S): 213 SOLUTION TOPICAL at 11:39

## 2024-05-03 RX ADMIN — BUDESONIDE AND FORMOTEROL FUMARATE DIHYDRATE 2 PUFF(S): 160; 4.5 AEROSOL RESPIRATORY (INHALATION) at 08:21

## 2024-05-03 RX ADMIN — Medication 650 MILLIGRAM(S): at 01:49

## 2024-05-03 RX ADMIN — CLOPIDOGREL BISULFATE 75 MILLIGRAM(S): 75 TABLET, FILM COATED ORAL at 11:44

## 2024-05-03 RX ADMIN — APIXABAN 5 MILLIGRAM(S): 2.5 TABLET, FILM COATED ORAL at 17:09

## 2024-05-03 RX ADMIN — BUDESONIDE AND FORMOTEROL FUMARATE DIHYDRATE 2 PUFF(S): 160; 4.5 AEROSOL RESPIRATORY (INHALATION) at 22:02

## 2024-05-03 RX ADMIN — Medication 40 MILLIGRAM(S): at 05:08

## 2024-05-03 RX ADMIN — BUMETANIDE 10 MG/HR: 0.25 INJECTION INTRAMUSCULAR; INTRAVENOUS at 05:09

## 2024-05-03 NOTE — DIETITIAN INITIAL EVALUATION ADULT - NS FNS DIET ORDER
Diet, Regular:   Consistent Carbohydrate {Evening Snack} (CSTCHOSN)  DASH/TLC {Sodium & Cholesterol Restricted} (DASH)  1200mL Fluid Restriction ([id number])  Talia (05-02-24 @ 18:24)

## 2024-05-03 NOTE — DIETITIAN INITIAL EVALUATION ADULT - LAB (SPECIFY)
Bun/ Cr  GFR  Serum Sodium Monitor:   POCT fingersticks  Renal Labs: Bun/ Cr GFR  Serum Sodium (correction per medical team as needed)

## 2024-05-03 NOTE — DISCHARGE NOTE PROVIDER - NSDCMRMEDTOKEN_GEN_ALL_CORE_FT
albuterol 90 mcg/inh inhalation aerosol: 2 puff(s) inhaled every 6 hours, As needed, Shortness of Breath and/or Wheezing  allopurinol 100 mg oral tablet: 1 tab(s) orally once a day  apixaban 5 mg oral tablet: 1 tab(s) orally 2 times a day  bumetanide 2 mg oral tablet: 2 tab(s) orally 2 times a day  clopidogrel 75 mg oral tablet: 1 tab(s) orally once a day  Coreg 6.25 mg oral tablet: 1 tab(s) orally 2 times a day  Crestor 20 mg oral tablet: 1 tab(s) orally once a day  Dulera 200 mcg-5 mcg/inh inhalation aerosol: 2 puff(s) inhaled 2 times a day  ezetimibe 10 mg oral tablet: 1 tab(s) orally once a day  famotidine 40 mg oral tablet: 1 tab(s) orally once a day  ferrous sulfate 325 mg (65 mg elemental iron) oral tablet: 1 tab(s) orally once a day  gabapentin 100 mg oral capsule: 1 cap(s) orally once a day  glimepiride 2 mg oral tablet: 1 tab(s) orally once a day  hydrALAZINE 100 mg oral tablet: 1 tab(s) orally 3 times a day  isosorbide dinitrate 30 mg oral tablet: 1 tab(s) orally 3 times a day  levothyroxine 50 mcg (0.05 mg) oral tablet: 1 tab(s) orally once a day  pantoprazole 20 mg oral delayed release tablet: 1 tab(s) orally once a day  polyethylene glycol 3350 oral powder for reconstitution: 17 gram(s) orally once a day As needed Constipation  senna leaf extract oral tablet: 2 tab(s) orally once a day (at bedtime)  Singulair 10 mg oral tablet: 1 tab(s) orally once a day  tamsulosin 0.4 mg oral capsule: 1 cap(s) orally once a day (at bedtime)   albuterol 90 mcg/inh inhalation aerosol: 2 puff(s) inhaled every 6 hours, As needed, Shortness of Breath and/or Wheezing  allopurinol 100 mg oral tablet: 1 tab(s) orally once a day  apixaban 5 mg oral tablet: 1 tab(s) orally 2 times a day  bumetanide 2 mg oral tablet: 2 tab(s) orally 2 times a day  clopidogrel 75 mg oral tablet: 1 tab(s) orally once a day  Crestor 20 mg oral tablet: 1 tab(s) orally once a day  Dulera 200 mcg-5 mcg/inh inhalation aerosol: 2 puff(s) inhaled 2 times a day  ezetimibe 10 mg oral tablet: 1 tab(s) orally once a day  famotidine 40 mg oral tablet: 1 tab(s) orally once a day  ferrous sulfate 325 mg (65 mg elemental iron) oral tablet: 1 tab(s) orally once a day  gabapentin 100 mg oral capsule: 1 cap(s) orally once a day  glimepiride 2 mg oral tablet: 1 tab(s) orally once a day  hydrALAZINE 100 mg oral tablet: 1 tab(s) orally 3 times a day  isosorbide dinitrate 30 mg oral tablet: 1 tab(s) orally 3 times a day  levothyroxine 50 mcg (0.05 mg) oral tablet: 1 tab(s) orally once a day  Metoprolol Succinate ER 50 mg oral tablet, extended release: 1 tab(s) orally every 12 hours  pantoprazole 20 mg oral delayed release tablet: 1 tab(s) orally once a day  polyethylene glycol 3350 oral powder for reconstitution: 17 gram(s) orally once a day As needed Constipation  senna leaf extract oral tablet: 2 tab(s) orally once a day (at bedtime)  Singulair 10 mg oral tablet: 1 tab(s) orally once a day  tamsulosin 0.4 mg oral capsule: 1 cap(s) orally once a day (at bedtime)   albuterol 90 mcg/inh inhalation aerosol: 2 puff(s) inhaled every 6 hours, As needed, Shortness of Breath and/or Wheezing  allopurinol 100 mg oral tablet: 1 tab(s) orally once a day  apixaban 5 mg oral tablet: 1 tab(s) orally 2 times a day  clopidogrel 75 mg oral tablet: 1 tab(s) orally once a day  Crestor 20 mg oral tablet: 1 tab(s) orally once a day  Dulera 200 mcg-5 mcg/inh inhalation aerosol: 2 puff(s) inhaled 2 times a day  ezetimibe 10 mg oral tablet: 1 tab(s) orally once a day  famotidine 40 mg oral tablet: 1 tab(s) orally once a day  ferrous sulfate 325 mg (65 mg elemental iron) oral tablet: 1 tab(s) orally once a day  gabapentin 100 mg oral capsule: 1 cap(s) orally once a day  glimepiride 2 mg oral tablet: 1 tab(s) orally once a day  hydrALAZINE 25 mg oral tablet: 3 tab(s) orally 3 times a day  isosorbide dinitrate 30 mg oral tablet: 1 tab(s) orally 3 times a day  levothyroxine 50 mcg (0.05 mg) oral tablet: 1 tab(s) orally once a day  metoprolol succinate 25 mg oral tablet, extended release: 3 tab(s) orally once a day  pantoprazole 20 mg oral delayed release tablet: 1 tab(s) orally once a day  polyethylene glycol 3350 oral powder for reconstitution: 17 gram(s) orally once a day As needed Constipation  senna leaf extract oral tablet: 2 tab(s) orally once a day (at bedtime)  Singulair 10 mg oral tablet: 1 tab(s) orally once a day  tamsulosin 0.4 mg oral capsule: 1 cap(s) orally once a day (at bedtime)  torsemide 100 mg oral tablet: 1 tab(s) orally every 12 hours

## 2024-05-03 NOTE — PROGRESS NOTE ADULT - ASSESSMENT
2nd Attempt made to reach patient for [facility] call. Patient requested a call back.     Briefly, 67 y/o [place] male with h/o HTN, HFrEF/ICM (EF 25%, LVIDD 5.6cm) s/p CRT-D and CardioMEMS, CAD w/ CABG 2014 at [facility], asthma/COPD, CVA with left sided weakness, CKD (b/l Cr 2.0), aflutter s/p ablation (3/29/24) comes in from HF office with worsening dyspnea and fluid overload. Started on bumex gtt 2 mg/hr with improvement. Remains overloaded on exam with elevated JVP and dec air movement b/l.     Pertinent Labs:    Pro-Brain Natriuretic Peptide: 9273  Lactate: 1.7 mmol/L   Troponin  81/84  BUN/Cr 42.2.11    CXR: Moderate pulmonary vascular congestion     EKG: Sinus Rhythm non specific ST-T wave changes      RHC (3/24): RA 13 mmHg. PA 52/20/35 mmHg. PCW 23-25 mmHg. Cardiac index 1.96 L/min/m2. SVR 1712 dsc.

## 2024-05-03 NOTE — DISCHARGE NOTE PROVIDER - NSDCCPCAREPLAN_GEN_ALL_CORE_FT
PRINCIPAL DISCHARGE DIAGNOSIS  Diagnosis: Acute on chronic systolic congestive heart failure  Assessment and Plan of Treatment: Heart failure (HF) is a condition that does not allow your heart to fill or pump properly. Not enough oxygen in your blood gets to your organs and tissues. HF can occur in the right side, the left side, or both lower chambers of your heart. HF is often caused by damage or injury to your heart. The damage may be caused by heart attack, other heart conditions, or high blood pressure. HF is a long-term condition that tends to get worse over time. It is important to manage your health to improve your quality of life. HF can be worsened by heavy alcohol use, smoking, diabetes that is not controlled, or obesity.  Call 911 for:  Squeezing, pressure, or pain in your chest that lasts longer than 5 minutes or returns  Discomfort or pain in your back, neck, jaw, stomach, or arm  Trouble breathing  Nausea or vomiting  Lightheadedness or a sudden cold sweat, especially with chest pain or trouble breathing.  Seek care immediately if:  You gain 3 or more pounds (1.4 kg) in a day  Your heartbeat is fast, slow, or uneven all the time.  Do not smoke. Nicotine and other chemicals in cigarettes and cigars can cause lung damage and make HF difficult to manage.   Do not drink alcohol or take illegal drugs. . Weigh yourself every morning. Use the same scale, in the same spot. Do this after you use the bathroom, but before you eat or drink anything. Record your weight each day so you will notice any sudden weight gain. Swelling and weight gain are signs of fluid retention.Manage any chronic health conditions you have. These include high blood pressure, diabetes, obesity, high cholesterol, metabolic syndrome, and COPD. Stay active. If you are not active, your symptoms are likely to worsen quickly. Get a flu shot every year. You may also need the pneumonia vaccine. The flu and pneumonia can be severe for a person who has HF.     PRINCIPAL DISCHARGE DIAGNOSIS  Diagnosis: Acute on chronic systolic congestive heart failure  Assessment and Plan of Treatment: Heart failure (HF) is a condition that does not allow your heart to fill or pump properly. Not enough oxygen in your blood gets to your organs and tissues. HF can occur in the right side, the left side, or both lower chambers of your heart. HF is often caused by damage or injury to your heart. The damage may be caused by heart attack, other heart conditions, or high blood pressure. HF is a long-term condition that tends to get worse over time. It is important to manage your health to improve your quality of life. HF can be worsened by heavy alcohol use, smoking, diabetes that is not controlled, or obesity.  Please take your heart medications exactly as prescribed. Please stop taking Coreg at home, and continue the new medication Toprol 50mg twice per day. Follow up with your cardiologist outpatient for further recommendations regarding these medications.  Call 911 for:  Squeezing, pressure, or pain in your chest that lasts longer than 5 minutes or returns  Discomfort or pain in your back, neck, jaw, stomach, or arm  Trouble breathing  Nausea or vomiting  Lightheadedness or a sudden cold sweat, especially with chest pain or trouble breathing.  Seek care immediately if:  You gain 3 or more pounds (1.4 kg) in a day  Your heartbeat is fast, slow, or uneven all the time.     PRINCIPAL DISCHARGE DIAGNOSIS  Diagnosis: Acute on chronic systolic congestive heart failure  Assessment and Plan of Treatment: You presented to the hospital for a heart failure excaerbation. Heart failure (HF) is a condition that does not allow your heart to fill or pump properly. Not enough oxygen in your blood gets to your organs and tissues. HF can occur in the right side, the left side, or both lower chambers of your heart. HF is often caused by damage or injury to your heart. The damage may be caused by heart attack, other heart conditions, or high blood pressure. HF is a long-term condition that tends to get worse over time. It is important to manage your health to improve your quality of life. HF can be worsened by heavy alcohol use, smoking, diabetes that is not controlled, or obesity.  Please take your heart medications exactly as prescribed. Please take the meds as described below:   - stop home coreg  - start Toprol XL 75mg daily  - decrease hydralazine to 75mg three times a day  - continue isordil 30mg three times a day   - start torsemide 100mg twice a day   Please follow up with your cardiology and heart failure doctors in 1-2 weeks for further management. Please check your blood pressure and weight daily. If you develop any dizziness, lightheadedness, chest pain, palpitations or shortness of breath, please seek medical attention.     PRINCIPAL DISCHARGE DIAGNOSIS  Diagnosis: Acute on chronic systolic congestive heart failure  Assessment and Plan of Treatment: You presented to the hospital for a heart failure excaerbation. Heart failure (HF) is a condition that does not allow your heart to fill or pump properly. Not enough oxygen in your blood gets to your organs and tissues. HF can occur in the right side, the left side, or both lower chambers of your heart. HF is often caused by damage or injury to your heart. The damage may be caused by heart attack, other heart conditions, or high blood pressure. HF is a long-term condition that tends to get worse over time. It is important to manage your health to improve your quality of life. HF can be worsened by heavy alcohol use, smoking, diabetes that is not controlled, or obesity.  Please take your heart medications exactly as prescribed. Please take the meds as described below:   - stop home coreg  - start Toprol XL 75mg daily  - decrease hydralazine to 75mg three times a day  - continue isordil 30mg three times a day   - start torsemide 100mg twice a day   YOU HAVE AN APPOINTMENT WITH DR. WALKER SCHEDULED ON 5/21/24 AT 2:40PM.   Please follow up with your cardiology and heart failure doctors in 1-2 weeks for further management. Please check your blood pressure and weight daily. If you develop any dizziness, lightheadedness, chest pain, palpitations or shortness of breath, please seek medical attention.     PRINCIPAL DISCHARGE DIAGNOSIS  Diagnosis: Acute on chronic systolic congestive heart failure  Assessment and Plan of Treatment: You presented to the hospital for a heart failure excaerbation. Heart failure (HF) is a condition that does not allow your heart to fill or pump properly. Not enough oxygen in your blood gets to your organs and tissues. HF can occur in the right side, the left side, or both lower chambers of your heart. HF is often caused by damage or injury to your heart. The damage may be caused by heart attack, other heart conditions, or high blood pressure. HF is a long-term condition that tends to get worse over time. It is important to manage your health to improve your quality of life. HF can be worsened by heavy alcohol use, smoking, diabetes that is not controlled, or obesity.  Please take your heart medications exactly as prescribed. Please take the meds as described below:   - stop home coreg  - start Toprol XL 75mg daily  - decrease hydralazine to 75mg three times a day  - continue isordil 30mg three times a day   - start torsemide 100mg twice a day   YOU HAVE A CARDIOLOGY APPOINTMENT WITH DR. WALKER SCHEDULED ON 5/21/24 AT 2:40PM.   YOU HAVE A PULMONOLOGY APPOINTMENT ON 5/17 AT 3:30pm WITH DR. WOODS.   Please follow up with your cardiology and heart failure doctors in 1-2 weeks for further management. Please check your blood pressure and weight daily. If you develop any dizziness, lightheadedness, chest pain, palpitations or shortness of breath, please seek medical attention.

## 2024-05-03 NOTE — DIETITIAN INITIAL EVALUATION ADULT - NSFNSPHYEXAMSKINFT_GEN_A_CORE
Pressure Injury Stage II documents upon admission per RN flowsheet. Per discussion with covering RN at time of visit, reports wound does not appear to be a pressure injury.

## 2024-05-03 NOTE — DISCHARGE NOTE PROVIDER - REASON FOR ADMISSION
----- Message from Aries Gotti sent at 10/25/2017  1:03 PM CDT -----  Contact: Pharmacy   Sally auth needed for medication Revlimid    Contact::[phone]  Fax::[phone]  Spoke with pharmacist at [place] [facility], [employer] auth#[id number] obtained on 10/25/17, was given to process Revlimid prescription  Nazanin   acute on chronic heart failure exacerbation

## 2024-05-03 NOTE — DISCHARGE NOTE PROVIDER - NSFOLLOWUPCLINICS_GEN_ALL_ED_FT
[facility]  Nephrology  [address] [place] [address], [address] [place] [address]  Phone: [phone]  Fax:   Follow Up Time: 1 week

## 2024-05-03 NOTE — DIETITIAN INITIAL EVALUATION ADULT - OTHER CALCULATIONS
+ 10*6= 166 lbs    + 10*6= 166 lbs  % weight change:  lbs (pt reported; unspecified time frame, stated this is recent) current weight 154.7 lbs= -4.5% over unspecified time frame (pt denied overt changes in weight)

## 2024-05-03 NOTE — PROGRESS NOTE ADULT - PROBLEM SELECTOR PLAN 1
ACC Stage C/NYHA Class II. TTE 3/2024 w/ EF 20-25%. Possible etiologies for current decompensation include: recurrence of afib despite ablation, COPD exacerbation. Home GDMT: coreg 6.25mg BID, hydralazine 100mg TID, isordil 30mg TID  -defer to cardiology if necessary to repeat echo  -trops flat and no CP. EKG without STEMI equivalent - not ACS  -COPD management as below  -bumex gtt 2mg/hr per HF  -EP for device interrogation to assess for afib/aflutter burden - 86 second episode AF, BiVPaced 92%  -c/w GDMT:  - hydral 100 mg q8h  - isordil 30 mg q8h  - coreg 6.25 mg q12  -strict Is/Os, monitor UOP, daily weights  -Maintain K>4, Mg>2.

## 2024-05-03 NOTE — DIETITIAN INITIAL EVALUATION ADULT - ORAL INTAKE PTA/DIET HISTORY
Hx obtained from patient at bedside. Reports variable appetite and intake, usually in fair range (50-75%) at meals. Denied chewing or swallowing difficulties. No dentures. Stated usually consumes 3 meals. Did not report following any specific diet at home, upon further inquiry reports he avoids sugar. Pt endorsed hx of DM stated being managed with meds (hgb 5/2/2024 hgb A1c 6.6%) Denied concerns for N/V/D or constipation however reports concerns for intermittent abdominal pain related to gas.      Hx obtained from patient at bedside. Reports variable appetite and intake, usually in fair range (50-75%) at meals. Denied chewing or swallowing difficulties. No dentures. Stated usually consumes 3 meals. Did not report following any specific diet at home, upon further inquiry reports he avoids sugar. Pt endorsed hx of DM stated being managed with meds (hgb 5/2/2024 hgb A1c 6.6%) Denied concerns for N/V/D or constipation however reports concerns for intermittent abdominal pain related to gas. Ambulation via walker.

## 2024-05-03 NOTE — DISCHARGE NOTE PROVIDER - NSDCFUADDINST_GEN_ALL_CORE_FT
YOU HAVE AN APPOINTMENT WITH DR. WALKER SCHEDULED ON 5/21/24 AT 2:40PM. NOT 5/23.  YOU HAVE AN APPOINTMENT WITH DR. WALKER SCHEDULED ON 5/21/24 AT 2:40PM. NOT 5/23.     YOU HAVE AN APPOINTMENT WITH DR. WOODS ON 5/17 @330pm VIRTUAL

## 2024-05-03 NOTE — PROGRESS NOTE ADULT - PROBLEM SELECTOR PLAN 3
CHADSVASc score of 7. S/p ablation 3/29/24. EKG and telemetry w/ ventricular pacing 90s-110s thus far.   -c/w GDMT as above  -c/w eliquis  -device interrogation as above - 86s

## 2024-05-03 NOTE — DISCHARGE NOTE PROVIDER - NSDCCPTREATMENT_GEN_ALL_CORE_FT
PRINCIPAL PROCEDURE  Procedure: XR chest, 1 view  Findings and Treatment: FINDINGS:  Status post median sternotomy.  Left chest wall AICD and CardioMems device noted.  Moderate pulmonary vascular congestion.  No large pleural effusion or discernible pneumothorax.  Cardiomegaly.  No acute abnormality within visible osseous structures.  IMPRESSION:  Cardiomegaly and moderate pulmonary vascular congestion. Findings likely   represent moderate pulmonary edema.       PRINCIPAL PROCEDURE  Procedure: XR chest, 1 view  Findings and Treatment: FINDINGS:  Status post median sternotomy.  Left chest wall AICD and CardioMems device noted.  Moderate pulmonary vascular congestion.  No large pleural effusion or discernible pneumothorax.  Cardiomegaly.  No acute abnormality within visible osseous structures.  IMPRESSION:  Cardiomegaly and moderate pulmonary vascular congestion. Findings likely   represent moderate pulmonary edema.        SECONDARY PROCEDURE  Procedure: Transthoracic echocardiography (TTE)  Findings and Treatment: CONCLUSIONS:      1. The left ventricular cavity is moderately dilated. Left ventricular wall thickness is normal. Left ventricular systolic function is severely decreased with an ejection fraction visually estimated at 15-20%. There is global left ventricular hypokinesis. There is severe (grade 3) left ventricular diastolic dysfunction, with elevated filling pressure.   2. The right ventricular cavity is normal in size and reduced systolic function. Tricuspid annular plane systolic excursion (TAPSE) is 1.5 cm (normal >=1.7 cm). A device lead is visualized in the right heart.   3. Structurally normal mitral valve with normal leaflet excursion. There is calcification of the mitral valve annulus. There is mild mitral regurgitation.   4. The left atrium is mildly dilated with an indexed volume of 39.98 ml/m².   5. Compared to the transthoracic echocardiogram performed on 3/18/2024, there have been no significant interval changes.

## 2024-05-03 NOTE — DISCHARGE NOTE PROVIDER - NSDCFUADDAPPT_GEN_ALL_CORE_FT
APPTS ARE READY TO BE MADE: [X] YES    Best Family or Patient Contact (if needed):    Additional Information about above appointments (if needed):    1:   2:   3:     Other comments or requests:    APPTS ARE READY TO BE MADE: [X] YES    Best Family or Patient Contact (if needed):    Additional Information about above appointments (if needed):    1:   2:   3:     Other comments or requests:     Patient has not secured an appointment yet with PCP but will do so, however during the initial conversation the patient declined scheduling assistance.    Provider's office was contacted to secure an appointment for Nephrology, however the office will follow up with the patient/caregiver directly.

## 2024-05-03 NOTE — DIETITIAN INITIAL EVALUATION ADULT - NSFNSGIIOFT_GEN_A_CORE
Pt reported no Bms x3 days. None noted per flowsheets. Currently ordered for miralax and senna per active meds Pt reported no Bms x3 days. None noted per RN flowsheets. Currently ordered for miralax and senna per active meds

## 2024-05-03 NOTE — PROGRESS NOTE ADULT - SUBJECTIVE AND OBJECTIVE BOX
Interval History:  feels thirsty  urinating well     Medications:  acetaminophen     Tablet .. 650 milliGRAM(s) Oral every 6 hours PRN  allopurinol 100 milliGRAM(s) Oral daily  apixaban 5 milliGRAM(s) Oral two times a day  atorvastatin 80 milliGRAM(s) Oral at bedtime  azithromycin   Tablet 250 milliGRAM(s) Oral daily  budesonide 160 MICROgram(s)/formoterol 4.5 MICROgram(s) Inhaler 2 Puff(s) Inhalation two times a day  buMETAnide Infusion 2 mG/Hr IV Continuous <Continuous>  carvedilol 6.25 milliGRAM(s) Oral every 12 hours  chlorhexidine 2% Cloths 1 Application(s) Topical daily  clopidogrel Tablet 75 milliGRAM(s) Oral daily  dextrose 10% Bolus 125 milliLiter(s) IV Bolus once  dextrose 5%. 1000 milliLiter(s) IV Continuous <Continuous>  dextrose 5%. 1000 milliLiter(s) IV Continuous <Continuous>  dextrose 50% Injectable 25 Gram(s) IV Push once  dextrose 50% Injectable 12.5 Gram(s) IV Push once  dextrose Oral Gel 15 Gram(s) Oral once PRN  ferrous    sulfate 325 milliGRAM(s) Oral daily  gabapentin 100 milliGRAM(s) Oral at bedtime  glucagon  Injectable 1 milliGRAM(s) IntraMuscular once  hydrALAZINE 100 milliGRAM(s) Oral three times a day  influenza  Vaccine (HIGH DOSE) 0.7 milliLiter(s) IntraMuscular once  insulin lispro (ADMELOG) corrective regimen sliding scale   SubCutaneous three times a day before meals  insulin lispro (ADMELOG) corrective regimen sliding scale   SubCutaneous at bedtime  isosorbide   dinitrate Tablet (ISORDIL) 30 milliGRAM(s) Oral three times a day  levothyroxine 50 MICROGram(s) Oral daily  montelukast 10 milliGRAM(s) Oral daily  polyethylene glycol 3350 17 Gram(s) Oral daily PRN  predniSONE   Tablet 40 milliGRAM(s) Oral daily  senna 2 Tablet(s) Oral at bedtime  tamsulosin 0.4 milliGRAM(s) Oral at bedtime      Vitals:  T(C): 36.6 (24 @ 13:00), Max: 37 (24 @ 05:05)  HR: 89 (24 @ 13:00) (89 - 99)  BP: 126/58 (24 @ 13:00) (115/56 - 126/58)  BP(mean): --  RR: 18 (24 @ 13:00) (17 - 18)  SpO2: 100% (24 @ 13:00) (97% - 100%)    Daily     Daily Weight in k.2 (03 May 2024 05:05)    Weight (kg): 76.5 ( @ 21:50)    I&O's Summary    02 May 2024 07:01  -  03 May 2024 07:00  --------------------------------------------------------  IN: 600 mL / OUT: 1875 mL / NET: -1275 mL    03 May 2024 07:01  -  03 May 2024 16:16  --------------------------------------------------------  IN: 280 mL / OUT: 1000 mL / NET: -720 mL    Physical Exam:  Appearance: No Acute Distress  HEENT: PERRL  Neck: JVD approx 10-12   Cardiovascular: Normal S1 S2, No murmurs/rubs/gallops  Respiratory: Clear to auscultation bilaterally  Gastrointestinal: Soft, Non-tender	  Skin: No cyanosis	  Neurologic: Non-focal  Extremities: b/l trace LE edema  Psychiatry: A & O x 3, Mood & affect appropriate    Labs:                        11.2   9.30  )-----------( 164      ( 03 May 2024 03:56 )             35.3     05-03    137  |  99  |  49<H>  ----------------------------<  166<H>  4.1   |  24  |  2.36<H>    Ca    8.6      03 May 2024 03:56  Phos  3.8     05-  Mg     2.00     -    TPro  6.4  /  Alb  3.7  /  TBili  0.5  /  DBili  x   /  AST  20  /  ALT  20  /  AlkPhos  154<H>  05-03

## 2024-05-03 NOTE — DIETITIAN INITIAL EVALUATION ADULT - REASON FOR ADMISSION
67 yo M w. hx of DM, CKD stage 3, HTN, HLD, COPD w. current home O2 supplementation and CHF. Currently being managed for Acute on chronic systolic congestive heart failure

## 2024-05-03 NOTE — PROGRESS NOTE ADULT - SUBJECTIVE AND OBJECTIVE BOX
Patient is a 68y old  Male who presents with a chief complaint of acute on chronic heart failure exacerbation (02 May 2024 11:25)      SUBJECTIVE / OVERNIGHT EVENTS:  No acute events overnight. Patient at this time denies fevers, chills, CP, SOB, nausea, vomiting, diarrhea, constipation, dysuria. Patient seen and examined at bedside.    MEDICATIONS  (STANDING):  allopurinol 100 milliGRAM(s) Oral daily  apixaban 5 milliGRAM(s) Oral two times a day  atorvastatin 80 milliGRAM(s) Oral at bedtime  azithromycin   Tablet 250 milliGRAM(s) Oral daily  budesonide 160 MICROgram(s)/formoterol 4.5 MICROgram(s) Inhaler 2 Puff(s) Inhalation two times a day  buMETAnide Infusion 2 mG/Hr (10 mL/Hr) IV Continuous <Continuous>  carvedilol 6.25 milliGRAM(s) Oral every 12 hours  chlorhexidine 2% Cloths 1 Application(s) Topical daily  clopidogrel Tablet 75 milliGRAM(s) Oral daily  dextrose 10% Bolus 125 milliLiter(s) IV Bolus once  dextrose 5%. 1000 milliLiter(s) (100 mL/Hr) IV Continuous <Continuous>  dextrose 5%. 1000 milliLiter(s) (50 mL/Hr) IV Continuous <Continuous>  dextrose 50% Injectable 12.5 Gram(s) IV Push once  dextrose 50% Injectable 25 Gram(s) IV Push once  ferrous    sulfate 325 milliGRAM(s) Oral daily  gabapentin 100 milliGRAM(s) Oral at bedtime  glucagon  Injectable 1 milliGRAM(s) IntraMuscular once  hydrALAZINE 100 milliGRAM(s) Oral three times a day  influenza  Vaccine (HIGH DOSE) 0.7 milliLiter(s) IntraMuscular once  insulin lispro (ADMELOG) corrective regimen sliding scale   SubCutaneous three times a day before meals  insulin lispro (ADMELOG) corrective regimen sliding scale   SubCutaneous at bedtime  isosorbide   dinitrate Tablet (ISORDIL) 30 milliGRAM(s) Oral three times a day  levothyroxine 50 MICROGram(s) Oral daily  montelukast 10 milliGRAM(s) Oral daily  predniSONE   Tablet 40 milliGRAM(s) Oral daily  senna 2 Tablet(s) Oral at bedtime  tamsulosin 0.4 milliGRAM(s) Oral at bedtime    MEDICATIONS  (PRN):  acetaminophen     Tablet .. 650 milliGRAM(s) Oral every 6 hours PRN Temp greater or equal to 38C (100.4F), Mild Pain (1 - 3)  dextrose Oral Gel 15 Gram(s) Oral once PRN Blood Glucose LESS THAN 70 milliGRAM(s)/deciliter  polyethylene glycol 3350 17 Gram(s) Oral daily PRN Constipation      CAPILLARY BLOOD GLUCOSE      POCT Blood Glucose.: 263 mg/dL (02 May 2024 21:32)  POCT Blood Glucose.: 147 mg/dL (02 May 2024 18:07)  POCT Blood Glucose.: 155 mg/dL (02 May 2024 11:47)  POCT Blood Glucose.: 111 mg/dL (02 May 2024 08:10)    I&O's Summary    02 May 2024 07:01  -  03 May 2024 07:00  --------------------------------------------------------  IN: 600 mL / OUT: 1875 mL / NET: -1275 mL        PHYSICAL EXAM:  Vital Signs Last 24 Hrs  T(C): 37 (03 May 2024 05:05), Max: 37 (03 May 2024 05:05)  T(F): 98.6 (03 May 2024 05:05), Max: 98.6 (03 May 2024 05:05)  HR: 92 (03 May 2024 05:05) (88 - 99)  BP: 115/56 (03 May 2024 05:05) (97/50 - 123/69)  BP(mean): --  RR: 17 (03 May 2024 05:05) (17 - 18)  SpO2: 100% (03 May 2024 05:05) (97% - 100%)    Parameters below as of 03 May 2024 05:05  Patient On (Oxygen Delivery Method): nasal cannula  O2 Flow (L/min): 2      GENERAL: No acute distress, well-developed  HEAD:  Atraumatic, Normocephalic  EYES: EOMI, PERRLA, conjunctiva and sclera clear  NECK: Supple, no lymphadenopathy, no JVD  CHEST/LUNG: rhonchi b/l  HEART: Regular rate and rhythm; No murmurs, rubs, or gallops  ABDOMEN: Soft, non-tender, non-distended; normal bowel sounds, no organomegaly  EXTREMITIES:  2+ peripheral pulses b/l, No clubbing, cyanosis, or edema  NEUROLOGY: A&O x 3, no focal deficits. Baseline L sided weakness.  SKIN: No rashes or lesions    LABS:                        11.2   9.30  )-----------( 164      ( 03 May 2024 03:56 )             35.3     05-03    137  |  99  |  49<H>  ----------------------------<  166<H>  4.1   |  24  |  2.36<H>    Ca    8.6      03 May 2024 03:56  Phos  3.8     05-03  Mg     2.00     05-03    TPro  6.4  /  Alb  3.7  /  TBili  0.5  /  DBili  x   /  AST  20  /  ALT  20  /  AlkPhos  154<H>  05-03          Urinalysis Basic - ( 03 May 2024 03:56 )    Color: x / Appearance: x / SG: x / pH: x  Gluc: 166 mg/dL / Ketone: x  / Bili: x / Urobili: x   Blood: x / Protein: x / Nitrite: x   Leuk Esterase: x / RBC: x / WBC x   Sq Epi: x / Non Sq Epi: x / Bacteria: x        Culture - Blood (collected 01 May 2024 16:52)  Source: .Blood Blood  Preliminary Report (02 May 2024 19:03):    No growth at 24 hours    Culture - Blood (collected 01 May 2024 16:37)  Source: .Blood Blood  Preliminary Report (02 May 2024 19:03):    No growth at 24 hours        RADIOLOGY & ADDITIONAL TESTS:  Results Reviewed:   Imaging Personally Reviewed:  Electrocardiogram Personally Reviewed:    COORDINATION OF CARE:  Care Discussed with Consultants/Other Providers [Y/N]:  Prior or Outpatient Records Reviewed [Y/N]:

## 2024-05-03 NOTE — PROGRESS NOTE ADULT - PROBLEM SELECTOR PLAN 11
DVT PPx: full AC w/ eliquis  Diet: DASH/CC fluid restriction  Code status: Full code  Communication: Ketty (daughter) contacted 5/1 11PM with regard to plan to which understanding was verbalized by party  Dispo: likely home   PCP: Dr. Robin Schmidt  Pharmacy: [facility] [phone] [facility]

## 2024-05-03 NOTE — DIETITIAN INITIAL EVALUATION ADULT - OTHER INFO
Breakfast meal tray observed at bedside with good intake (>75%). Denied any factors affecting current appetite and intake

## 2024-05-03 NOTE — DIETITIAN INITIAL EVALUATION ADULT - PERTINENT LABORATORY DATA
05-03    137  |  99  |  49<H>  ----------------------------<  166<H>  4.1   |  24  |  2.36<H>    Ca    8.6      03 May 2024 03:56  Phos  3.8     05-03  Mg     2.00     05-03    TPro  6.4  /  Alb  3.7  /  TBili  0.5  /  DBili  x   /  AST  20  /  ALT  20  /  AlkPhos  154<H>  05-03  POCT Blood Glucose.: 165 mg/dL (05-03-24 @ 08:18)  A1C with Estimated Average Glucose Result: 6.6 % (05-02-24 @ 04:19)  A1C with Estimated Average Glucose Result: 6.0 % (07-18-23 @ 05:51)   05-03    137  |  99  |  49<H>  ----------------------------<  166<H>  4.1   |  24  |  2.36<H>    Ca    8.6      03 May 2024 03:56  Phos  3.8     05-03  Mg     2.00     05-03    TPro  6.4  /  Alb  3.7  /  TBili  0.5  /  DBili  x   /  AST  20  /  ALT  20  /  AlkPhos  154<H>  05-03  A1C with Estimated Average Glucose Result: 6.6 % (05-02-24 @ 04:19)  A1C with Estimated Average Glucose Result: 6.0 % (07-18-23 @ 05:51  CAPILLARY BLOOD GLUCOSE  POCT Blood Glucose.: 225 mg/dL (03 May 2024 12:34)  POCT Blood Glucose.: 165 mg/dL (03 May 2024 08:18)  POCT Blood Glucose.: 263 mg/dL (02 May 2024 21:32)  POCT Blood Glucose.: 147 mg/dL (02 May 2024 18:07)

## 2024-05-03 NOTE — DISCHARGE NOTE PROVIDER - HOSPITAL COURSE
HPI:  Pt is a 68y M w/ hx of HFrEF (EF 20-25% as of 3/2024) s/p cardioMEMS in 3/2024 and Med-tronic CRT-D in 2022, paroxysmal afib/flutter s/p ablation on eliquis, CKD3, T2DM, COPD on 2LNC home O2, CAD s/p CABG in 2014, CVA w/ residual L sided weakness, prior R ankle fracture presenting from cardiology office for 1 day history of dyspnea. Pt was reporting to EP office after recent hospitalization in 3/2024 for decompensated heart failure where he was noted to be dyspneic at rest w/ O2 reading at the time 92% and subsequently sent to ED for more aggressive diuresis. His diuretic regiment is 4mg bumex BID typically but was increased to 6mg AM/4mg PM for elevated MEMS readings >30. Despite such, pt noted to have increase in PA pressure from 31 to 35 in <24h. He endorses 1 episode of palpitations earlier this morning as well as chest pressure w/ pleuritic component, non-radiating and not associated w/ rest or activity. His CP is resolved. Also endorses intermittent cough, not increased over past 5 days and without significant mucus production. He denies fever, chills, abdominal pain, nausea, vomiting, diarrhea, worsening LE edema, worsening orthopnea (remains on 2 pillows), urinary frequency or dysuria.     Of note, pt w/ recent admission for ADHF w/ c/f aflutter as etiology – now s/p BRANDIE (no atrial thrombus) and ablation by EP as of 3/29/2024. Pt was also treated for COPD exacerbation during this admission w/ CTX/azithro and duonebs. Pt was following up w/ EP prior to current presentation. Also underwent RHC 3/26/24 for cardioMEMS placement.      In ED, VS: T 98.2, HR 89, /85, RR 16, O2 99% on 3LNC. Administered CTX/azithro, magnesium, duonebs, solumedrol 40mg x1.  (01 May 2024 22:07)    Hospital Course:  Patient was started on a Bumex drip for volume overload in the setting of heart failure exacerbation. His volume status improved on the drip, and he was transitioned to ___. His home GDMT was continued without issues. EP interrogated his ICD which showed no/minimal AF burden. He was also restarted on home COPD medications and his oxygen requirements improved to baseline 2LNC. Heart failure ultimately recommended__.    Important Medication Changes and Reason:    Active or Pending Issues Requiring Follow-up:    Advanced Directives:   [ x] Full code  [ ] DNR  [ ] Hospice    Discharge Diagnoses:  Heart Failure Exacerbation         HPI:  Pt is a 68y M w/ hx of HFrEF (EF 20-25% as of 3/2024) s/p cardioMEMS in 3/2024 and Med-tronic CRT-D in 2022, paroxysmal afib/flutter s/p ablation on eliquis, CKD3, T2DM, COPD on 2LNC home O2, CAD s/p CABG in 2014, CVA w/ residual L sided weakness, prior R ankle fracture presenting from cardiology office for 1 day history of dyspnea. Pt was reporting to EP office after recent hospitalization in 3/2024 for decompensated heart failure where he was noted to be dyspneic at rest w/ O2 reading at the time 92% and subsequently sent to ED for more aggressive diuresis. His diuretic regiment is 4mg bumex BID typically but was increased to 6mg AM/4mg PM for elevated MEMS readings >30. Despite such, pt noted to have increase in PA pressure from 31 to 35 in <24h. He endorses 1 episode of palpitations earlier this morning as well as chest pressure w/ pleuritic component, non-radiating and not associated w/ rest or activity. His CP is resolved. Also endorses intermittent cough, not increased over past 5 days and without significant mucus production. He denies fever, chills, abdominal pain, nausea, vomiting, diarrhea, worsening LE edema, worsening orthopnea (remains on 2 pillows), urinary frequency or dysuria.     Of note, pt w/ recent admission for ADHF w/ c/f aflutter as etiology – now s/p BRANDIE (no atrial thrombus) and ablation by EP as of 3/29/2024. Pt was also treated for COPD exacerbation during this admission w/ CTX/azithro and duonebs. Pt was following up w/ EP prior to current presentation. Also underwent RHC 3/26/24 for cardioMEMS placement.      In ED, VS: T 98.2, HR 89, /85, RR 16, O2 99% on 3LNC. Administered CTX/azithro, magnesium, duonebs, solumedrol 40mg x1.  (01 May 2024 22:07)    Hospital Course:  Patient was started on a Bumex drip for volume overload in the setting of heart failure exacerbation. His volume status improved on the drip, and he was transitioned off of diuresis. His home GDMT was continued initially but he was found to have low blood pressures. His Coreg was switched to Toprol. EP interrogated his ICD which showed no/minimal AF burden. He was also restarted on home COPD medications and his oxygen requirements improved to baseline 2LNC. Heart failure ultimately recommended continuing GDMT.    Important Medication Changes and Reason:  DISCONTINUE Coreg, start Toprol 50mg BID    Active or Pending Issues Requiring Follow-up:  [ ] f/u with EP and HF within 2 weeks of discharge    Advanced Directives:   [ x] Full code  [ ] DNR  [ ] Hospice    Discharge Diagnoses:  Heart Failure Exacerbation         HPI:  Pt is a 68y M w/ hx of HFrEF (EF 20-25% as of 3/2024) s/p cardioMEMS in 3/2024 and Med-tronic CRT-D in 2022, paroxysmal afib/flutter s/p ablation on eliquis, CKD3, T2DM, COPD on 2LNC home O2, CAD s/p CABG in 2014, CVA w/ residual L sided weakness, prior R ankle fracture presenting from cardiology office for 1 day history of dyspnea. Pt was reporting to EP office after recent hospitalization in 3/2024 for decompensated heart failure where he was noted to be dyspneic at rest w/ O2 reading at the time 92% and subsequently sent to ED for more aggressive diuresis. His diuretic regiment is 4mg bumex BID typically but was increased to 6mg AM/4mg PM for elevated MEMS readings >30. Despite such, pt noted to have increase in PA pressure from 31 to 35 in <24h. He endorses 1 episode of palpitations earlier this morning as well as chest pressure w/ pleuritic component, non-radiating and not associated w/ rest or activity. His CP is resolved. Also endorses intermittent cough, not increased over past 5 days and without significant mucus production. He denies fever, chills, abdominal pain, nausea, vomiting, diarrhea, worsening LE edema, worsening orthopnea (remains on 2 pillows), urinary frequency or dysuria.     Of note, pt w/ recent admission for ADHF w/ c/f aflutter as etiology – now s/p BRANDIE (no atrial thrombus) and ablation by EP as of 3/29/2024. Pt was also treated for COPD exacerbation during this admission w/ CTX/azithro and duonebs. Pt was following up w/ EP prior to current presentation. Also underwent RHC 3/26/24 for cardioMEMS placement.      In ED, VS: T 98.2, HR 89, /85, RR 16, O2 99% on 3LNC. Administered CTX/azithro, magnesium, duonebs, solumedrol 40mg x1.  (01 May 2024 22:07)    Hospital Course:  Patient was admitted for a heart failure exacerbation. Heart failure was consulted. He was started on a bumex drip for volume overload. His volume status improved on the drip, and was transitioned to torsemide 100mg BID. His GDMT were also titrated. He was discontinued on home coreg and started on toprol 75mg qd. His hydralazine was decreased to 75mg TID and     Important Medication Changes and Reason:  - stop home coreg  - start toprol 75mg qd  - decrease hydralazine to 75mg TID  - start torsemide 100mg BID   - continue isordil 30mg TID     Active or Pending Issues Requiring Follow-up:  [ ] f/u with EP and HF within 2 weeks of discharge    Advanced Directives:   [ x] Full code  [ ] DNR  [ ] Hospice    Discharge Diagnoses:  Heart Failure Exacerbation

## 2024-05-03 NOTE — DISCHARGE NOTE PROVIDER - NSDCFUSCHEDAPPT_GEN_ALL_CORE_FT
[facility] [id number]  Scheduled Appointment: 05/08/2024    Garo Dobson  [facility] [address]  Scheduled Appointment: 05/23/2024     Garo Dobson  [facility] [address]  Scheduled Appointment: 05/23/2024    [facility] [id number]  Scheduled Appointment: 06/05/2024     Garo Dobson  [facility] [address]  Scheduled Appointment: 05/21/2024    Garo Dobson  [facility] [address]  Scheduled Appointment: 05/23/2024    [facility]  [facility] [address]  Scheduled Appointment: 06/05/2024

## 2024-05-03 NOTE — DISCHARGE NOTE PROVIDER - CARE PROVIDER_API CALL
Kelvin Schmidt  Internal Medicine  [address] [place] [id number]  Phone: [phone]  Fax: [phone]  Established Patient  Follow Up Time: 2 weeks

## 2024-05-03 NOTE — PROGRESS NOTE ADULT - PROBLEM SELECTOR PLAN 2
On home meds of albuterol inhaler PRN q6h, Dulera, singulair  -c/w Dulera therapeutic interchange, singulair  -wean to home O2 setting of 2LNC  -xopenex/ipratropium q6h for 24 hrs -> albuterol inhaler PRN q6h  -S/p Mag, solu-medrol 40mg x1 , CTX/azithro x1 in ED  - initiate CTX if pt w/ worsening hypoxia, leukocytosis, and/or fever as reason to continue.  -azithro  -pred 40mg qd x4 doses

## 2024-05-03 NOTE — PROGRESS NOTE ADULT - ATTENDING COMMENTS
Patient seen and examined at bedside. In brief, 68y M w/ hx of HFrEF (EF 20-25% as of 3/2024) s/p cardioMEMS in 3/2024 and Med-tronic CRT-D in 2022, paroxysmal afib/flutter s/p ablation on eliquis, CKD3, T2DM, COPD on 2LNC home O2, CAD s/p CABG in 2014, CVA w/ residual L sided weakness, prior R ankle fracture presenting from cardiology office for 1 day history of dyspnea with objective findings of elevated BNP, increased O2 requirement, increased PA pressures on cardioMEMS - consistent w/ acute on chronic systolic congestive heart failure.     #Acute CHF exacerbation: -trops flat and no CP. EKG without STEMI equivalent. Low concern for  ACS  -c/w coreg, hydral  -bumex gtt 2mg/hr  -HF consulted - appreciate recs   -EP c/s in AM to interrogate device, Time in AT/AF since March 29, 2024 :  86 seconds  -defer to cardiology if a  repeat echo is needed       #COPD  Mild wheezing and cough. Possible exacerbation  -levalbuterol, ipratropium  -s/p methylpred in ED, c/w pred 40mg qd x4 doses  -azithromycin    #pAfib  -c/w coreg, eliquis    #DM2  -A1C - 6.6 , ISS    #HTN  -resume coreg, hydral    #CAD  -resume plavix, statin, coreg    #Hypothyroidism: Resume levothyroxine    #BPH: Resume flomax  Bladder scan protocol    #HLD  Resume statin.      Time-based billing (NON-critical care).     50 minutes spent on total encounter. The necessity of the time spent during the encounter on this date of service was due to:     Review of laboratory data, radiology results, consultants' recommendations, documentation in [place], discussion with patient/house staff/ACP and interdisciplinary staff (such as , social workers, etc). Interventions were performed as documented above.

## 2024-05-03 NOTE — DIETITIAN INITIAL EVALUATION ADULT - ADD RECOMMEND
1. Recommend daily multivitamin supplement for wound healing support  2. Monitor renal labs  3. Monitor hydration status, I/Os   4. Consider daily weights  5. Pulm/ respiratory care coordination as appropriate for home O2 supplementation as appropriate 1. Consider daily multivitamin supplement for wound healing support  2. Monitor hydration status, I/Os, consider daily weights   3. Monitor POCT fingersticks, adjust insulin regimen as warranted to maintain optimal BG levels WNLs  4. Monitor bowel function, escalate bowel regimen as needed  5. Pulm/ respiratory care coordination as appropriate for home O2 supplementation/weaning as appropriate or feasible 1. Consider daily multivitamin supplement for wound healing support  2. Monitor hydration status, I/Os, consider daily weights   3. Monitor POCT fingersticks, adjust insulin regimen as warranted to maintain optimal BG levels WNLs  4. Monitor bowel function, escalate bowel regimen as needed

## 2024-05-03 NOTE — DIETITIAN INITIAL EVALUATION ADULT - PERTINENT MEDS FT
MEDICATIONS  (STANDING):  ferrous    sulfate 325 milliGRAM(s) Oral daily  glucagon  Injectable 1 milliGRAM(s) IntraMuscular once  insulin lispro (ADMELOG) corrective regimen sliding scale   SubCutaneous three times a day before meals  insulin lispro (ADMELOG) corrective regimen sliding scale   SubCutaneous at bedtime  levothyroxine 50 MICROGram(s) Oral daily  predniSONE   Tablet 40 milliGRAM(s) Oral daily  senna 2 Tablet(s) Oral at bedtime    MEDICATIONS  (PRN):  dextrose Oral Gel 15 Gram(s) Oral once PRN Blood Glucose LESS THAN 70 milliGRAM(s)/deciliter  polyethylene glycol 3350 17 Gram(s) Oral daily PRN Constipation

## 2024-05-03 NOTE — PROGRESS NOTE ADULT - PROBLEM SELECTOR PLAN 1
- c/w bumex gtt; standing weights daily (prior goal weight 161 pounds but likely is lower)  - c/w hydral 100 mg q8h/isordil 30 mg q8h  - c/w coreg 6.25 mg q12  - trial of Entresto 24/26 twice/day if renal function tolerates  - c/w Eliquis  - aggressive PT/OT.

## 2024-05-04 LAB
ALBUMIN SERPL ELPH-MCNC: 3.6 G/DL — SIGNIFICANT CHANGE UP (ref 3.3–5)
ALP SERPL-CCNC: 139 U/L — HIGH (ref 40–120)
ALT FLD-CCNC: 18 U/L — SIGNIFICANT CHANGE UP (ref 4–41)
ANION GAP SERPL CALC-SCNC: 16 MMOL/L — HIGH (ref 7–14)
ANION GAP SERPL CALC-SCNC: 16 MMOL/L — HIGH (ref 7–14)
AST SERPL-CCNC: 17 U/L — SIGNIFICANT CHANGE UP (ref 4–40)
BASOPHILS # BLD AUTO: 0.02 K/UL — SIGNIFICANT CHANGE UP (ref 0–0.2)
BASOPHILS NFR BLD AUTO: 0.2 % — SIGNIFICANT CHANGE UP (ref 0–2)
BILIRUB SERPL-MCNC: 0.4 MG/DL — SIGNIFICANT CHANGE UP (ref 0.2–1.2)
BUN SERPL-MCNC: 56 MG/DL — HIGH (ref 7–23)
BUN SERPL-MCNC: 57 MG/DL — HIGH (ref 7–23)
CALCIUM SERPL-MCNC: 8 MG/DL — LOW (ref 8.4–10.5)
CALCIUM SERPL-MCNC: 8.1 MG/DL — LOW (ref 8.4–10.5)
CHLORIDE SERPL-SCNC: 96 MMOL/L — LOW (ref 98–107)
CHLORIDE SERPL-SCNC: 99 MMOL/L — SIGNIFICANT CHANGE UP (ref 98–107)
CO2 SERPL-SCNC: 22 MMOL/L — SIGNIFICANT CHANGE UP (ref 22–31)
CO2 SERPL-SCNC: 24 MMOL/L — SIGNIFICANT CHANGE UP (ref 22–31)
CREAT SERPL-MCNC: 2.26 MG/DL — HIGH (ref 0.5–1.3)
CREAT SERPL-MCNC: 2.36 MG/DL — HIGH (ref 0.5–1.3)
EGFR: 29 ML/MIN/1.73M2 — LOW
EGFR: 31 ML/MIN/1.73M2 — LOW
EOSINOPHIL # BLD AUTO: 0.01 K/UL — SIGNIFICANT CHANGE UP (ref 0–0.5)
EOSINOPHIL NFR BLD AUTO: 0.1 % — SIGNIFICANT CHANGE UP (ref 0–6)
GLUCOSE BLDC GLUCOMTR-MCNC: 137 MG/DL — HIGH (ref 70–99)
GLUCOSE BLDC GLUCOMTR-MCNC: 186 MG/DL — HIGH (ref 70–99)
GLUCOSE BLDC GLUCOMTR-MCNC: 249 MG/DL — HIGH (ref 70–99)
GLUCOSE BLDC GLUCOMTR-MCNC: 252 MG/DL — HIGH (ref 70–99)
GLUCOSE SERPL-MCNC: 148 MG/DL — HIGH (ref 70–99)
GLUCOSE SERPL-MCNC: 227 MG/DL — HIGH (ref 70–99)
HCT VFR BLD CALC: 34.3 % — LOW (ref 39–50)
HGB BLD-MCNC: 11.4 G/DL — LOW (ref 13–17)
IANC: 10.86 K/UL — HIGH (ref 1.8–7.4)
IMM GRANULOCYTES NFR BLD AUTO: 0.4 % — SIGNIFICANT CHANGE UP (ref 0–0.9)
LYMPHOCYTES # BLD AUTO: 1.43 K/UL — SIGNIFICANT CHANGE UP (ref 1–3.3)
LYMPHOCYTES # BLD AUTO: 10.8 % — LOW (ref 13–44)
MAGNESIUM SERPL-MCNC: 1.8 MG/DL — SIGNIFICANT CHANGE UP (ref 1.6–2.6)
MAGNESIUM SERPL-MCNC: 1.8 MG/DL — SIGNIFICANT CHANGE UP (ref 1.6–2.6)
MCHC RBC-ENTMCNC: 32.4 PG — SIGNIFICANT CHANGE UP (ref 27–34)
MCHC RBC-ENTMCNC: 33.2 GM/DL — SIGNIFICANT CHANGE UP (ref 32–36)
MCV RBC AUTO: 97.4 FL — SIGNIFICANT CHANGE UP (ref 80–100)
MONOCYTES # BLD AUTO: 0.87 K/UL — SIGNIFICANT CHANGE UP (ref 0–0.9)
MONOCYTES NFR BLD AUTO: 6.6 % — SIGNIFICANT CHANGE UP (ref 2–14)
NEUTROPHILS # BLD AUTO: 10.86 K/UL — HIGH (ref 1.8–7.4)
NEUTROPHILS NFR BLD AUTO: 81.9 % — HIGH (ref 43–77)
NRBC # BLD: 0 /100 WBCS — SIGNIFICANT CHANGE UP (ref 0–0)
NRBC # FLD: 0 K/UL — SIGNIFICANT CHANGE UP (ref 0–0)
PHOSPHATE SERPL-MCNC: 4.5 MG/DL — SIGNIFICANT CHANGE UP (ref 2.5–4.5)
PHOSPHATE SERPL-MCNC: 4.6 MG/DL — HIGH (ref 2.5–4.5)
PLATELET # BLD AUTO: 165 K/UL — SIGNIFICANT CHANGE UP (ref 150–400)
POTASSIUM SERPL-MCNC: 3.3 MMOL/L — LOW (ref 3.5–5.3)
POTASSIUM SERPL-MCNC: 3.6 MMOL/L — SIGNIFICANT CHANGE UP (ref 3.5–5.3)
POTASSIUM SERPL-SCNC: 3.3 MMOL/L — LOW (ref 3.5–5.3)
POTASSIUM SERPL-SCNC: 3.6 MMOL/L — SIGNIFICANT CHANGE UP (ref 3.5–5.3)
PROT SERPL-MCNC: 6.1 G/DL — SIGNIFICANT CHANGE UP (ref 6–8.3)
RBC # BLD: 3.52 M/UL — LOW (ref 4.2–5.8)
RBC # FLD: 17.9 % — HIGH (ref 10.3–14.5)
SODIUM SERPL-SCNC: 134 MMOL/L — LOW (ref 135–145)
SODIUM SERPL-SCNC: 139 MMOL/L — SIGNIFICANT CHANGE UP (ref 135–145)
WBC # BLD: 13.24 K/UL — HIGH (ref 3.8–10.5)
WBC # FLD AUTO: 13.24 K/UL — HIGH (ref 3.8–10.5)

## 2024-05-04 PROCEDURE — 99233 SBSQ HOSP IP/OBS HIGH 50: CPT | Mod: GC

## 2024-05-04 RX ORDER — POTASSIUM CHLORIDE 20 MEQ
20 PACKET (EA) ORAL ONCE
Refills: 0 | Status: COMPLETED | OUTPATIENT
Start: 2024-05-04 | End: 2024-05-04

## 2024-05-04 RX ADMIN — SACUBITRIL AND VALSARTAN 1 TABLET(S): 24; 26 TABLET, FILM COATED ORAL at 05:20

## 2024-05-04 RX ADMIN — Medication 50 MICROGRAM(S): at 05:21

## 2024-05-04 RX ADMIN — Medication 100 MILLIGRAM(S): at 12:19

## 2024-05-04 RX ADMIN — BUMETANIDE 10 MG/HR: 0.25 INJECTION INTRAMUSCULAR; INTRAVENOUS at 07:48

## 2024-05-04 RX ADMIN — CARVEDILOL PHOSPHATE 6.25 MILLIGRAM(S): 80 CAPSULE, EXTENDED RELEASE ORAL at 22:31

## 2024-05-04 RX ADMIN — ISOSORBIDE DINITRATE 30 MILLIGRAM(S): 5 TABLET ORAL at 05:21

## 2024-05-04 RX ADMIN — MONTELUKAST 10 MILLIGRAM(S): 4 TABLET, CHEWABLE ORAL at 12:18

## 2024-05-04 RX ADMIN — TAMSULOSIN HYDROCHLORIDE 0.4 MILLIGRAM(S): 0.4 CAPSULE ORAL at 21:29

## 2024-05-04 RX ADMIN — Medication 1: at 08:51

## 2024-05-04 RX ADMIN — CLOPIDOGREL BISULFATE 75 MILLIGRAM(S): 75 TABLET, FILM COATED ORAL at 12:18

## 2024-05-04 RX ADMIN — BUMETANIDE 10 MG/HR: 0.25 INJECTION INTRAMUSCULAR; INTRAVENOUS at 18:32

## 2024-05-04 RX ADMIN — Medication 100 MILLIGRAM(S): at 21:28

## 2024-05-04 RX ADMIN — ATORVASTATIN CALCIUM 80 MILLIGRAM(S): 80 TABLET, FILM COATED ORAL at 21:28

## 2024-05-04 RX ADMIN — Medication 100 MILLIGRAM(S): at 14:53

## 2024-05-04 RX ADMIN — Medication 20 MILLIEQUIVALENT(S): at 18:32

## 2024-05-04 RX ADMIN — BUDESONIDE AND FORMOTEROL FUMARATE DIHYDRATE 2 PUFF(S): 160; 4.5 AEROSOL RESPIRATORY (INHALATION) at 21:27

## 2024-05-04 RX ADMIN — GABAPENTIN 100 MILLIGRAM(S): 400 CAPSULE ORAL at 21:29

## 2024-05-04 RX ADMIN — SENNA PLUS 2 TABLET(S): 8.6 TABLET ORAL at 21:29

## 2024-05-04 RX ADMIN — CHLORHEXIDINE GLUCONATE 1 APPLICATION(S): 213 SOLUTION TOPICAL at 12:17

## 2024-05-04 RX ADMIN — Medication 40 MILLIGRAM(S): at 05:21

## 2024-05-04 RX ADMIN — Medication 100 MILLIGRAM(S): at 05:21

## 2024-05-04 RX ADMIN — Medication 3: at 18:01

## 2024-05-04 RX ADMIN — BUDESONIDE AND FORMOTEROL FUMARATE DIHYDRATE 2 PUFF(S): 160; 4.5 AEROSOL RESPIRATORY (INHALATION) at 08:50

## 2024-05-04 RX ADMIN — AZITHROMYCIN 250 MILLIGRAM(S): 500 TABLET, FILM COATED ORAL at 12:18

## 2024-05-04 RX ADMIN — Medication 325 MILLIGRAM(S): at 12:17

## 2024-05-04 RX ADMIN — APIXABAN 5 MILLIGRAM(S): 2.5 TABLET, FILM COATED ORAL at 18:04

## 2024-05-04 RX ADMIN — APIXABAN 5 MILLIGRAM(S): 2.5 TABLET, FILM COATED ORAL at 05:21

## 2024-05-04 RX ADMIN — Medication 2: at 12:39

## 2024-05-04 NOTE — PROGRESS NOTE ADULT - PROBLEM SELECTOR PLAN 11
DVT PPx: full AC w/ eliquis  Diet: DASH/CC fluid restriction  Code status: Full code  Communication: Ketty (daughter) contacted 5/1 11PM with regard to plan to which understanding was verbalized by party  Dispo: likely home   PCP: Dr. Robin Schmidt  Pharmacy: [facility] [phone] [facility]

## 2024-05-04 NOTE — PROGRESS NOTE ADULT - SUBJECTIVE AND OBJECTIVE BOX
Patient is a 68y old  Male who presents with a chief complaint of acute on chronic heart failure exacerbation (03 May 2024 16:16)      SUBJECTIVE / OVERNIGHT EVENTS: Patient seen and examined at bedside. Patient without acute complaints this AM. Denies any chest pain, shortness of breath, or abdominal pain.    MEDICATIONS  (STANDING):  allopurinol 100 milliGRAM(s) Oral daily  apixaban 5 milliGRAM(s) Oral two times a day  atorvastatin 80 milliGRAM(s) Oral at bedtime  azithromycin   Tablet 250 milliGRAM(s) Oral daily  budesonide 160 MICROgram(s)/formoterol 4.5 MICROgram(s) Inhaler 2 Puff(s) Inhalation two times a day  buMETAnide Infusion 2 mG/Hr (10 mL/Hr) IV Continuous <Continuous>  carvedilol 6.25 milliGRAM(s) Oral every 12 hours  chlorhexidine 2% Cloths 1 Application(s) Topical daily  clopidogrel Tablet 75 milliGRAM(s) Oral daily  dextrose 10% Bolus 125 milliLiter(s) IV Bolus once  dextrose 5%. 1000 milliLiter(s) (100 mL/Hr) IV Continuous <Continuous>  dextrose 5%. 1000 milliLiter(s) (50 mL/Hr) IV Continuous <Continuous>  dextrose 50% Injectable 12.5 Gram(s) IV Push once  dextrose 50% Injectable 25 Gram(s) IV Push once  ferrous    sulfate 325 milliGRAM(s) Oral daily  gabapentin 100 milliGRAM(s) Oral at bedtime  glucagon  Injectable 1 milliGRAM(s) IntraMuscular once  hydrALAZINE 100 milliGRAM(s) Oral three times a day  influenza  Vaccine (HIGH DOSE) 0.7 milliLiter(s) IntraMuscular once  insulin lispro (ADMELOG) corrective regimen sliding scale   SubCutaneous three times a day before meals  insulin lispro (ADMELOG) corrective regimen sliding scale   SubCutaneous at bedtime  isosorbide   dinitrate Tablet (ISORDIL) 30 milliGRAM(s) Oral three times a day  levothyroxine 50 MICROGram(s) Oral daily  montelukast 10 milliGRAM(s) Oral daily  predniSONE   Tablet 40 milliGRAM(s) Oral daily  sacubitril 24 mG/valsartan 26 mG 1 Tablet(s) Oral two times a day  senna 2 Tablet(s) Oral at bedtime  tamsulosin 0.4 milliGRAM(s) Oral at bedtime    MEDICATIONS  (PRN):  acetaminophen     Tablet .. 650 milliGRAM(s) Oral every 6 hours PRN Temp greater or equal to 38C (100.4F), Mild Pain (1 - 3)  dextrose Oral Gel 15 Gram(s) Oral once PRN Blood Glucose LESS THAN 70 milliGRAM(s)/deciliter  polyethylene glycol 3350 17 Gram(s) Oral daily PRN Constipation      Vital Signs Last 24 Hrs  T(C): 36.9 (04 May 2024 05:20), Max: 36.9 (04 May 2024 05:20)  T(F): 98.4 (04 May 2024 05:20), Max: 98.4 (04 May 2024 05:20)  HR: 84 (04 May 2024 05:20) (84 - 93)  BP: 106/65 (04 May 2024 05:20) (106/65 - 126/58)  BP(mean): --  RR: 17 (04 May 2024 05:20) (16 - 18)  SpO2: 99% (04 May 2024 05:20) (98% - 100%)    Parameters below as of 04 May 2024 05:20  Patient On (Oxygen Delivery Method): nasal cannula  O2 Flow (L/min): 2    CAPILLARY BLOOD GLUCOSE      POCT Blood Glucose.: 131 mg/dL (03 May 2024 22:01)  POCT Blood Glucose.: 172 mg/dL (03 May 2024 17:48)  POCT Blood Glucose.: 225 mg/dL (03 May 2024 12:34)  POCT Blood Glucose.: 165 mg/dL (03 May 2024 08:18)    I&O's Summary    03 May 2024 07:01  -  04 May 2024 07:00  --------------------------------------------------------  IN: 280 mL / OUT: 1000 mL / NET: -720 mL        PHYSICAL EXAM:  GENERAL APPEARANCE: Well developed, NAD  HEENT:  PERRL, EOMI. hearing grossly intact.  CARDIAC: Normal S1 and S2. no mrg. RRR  LUNGS: +Rales  ABDOMEN: Soft , NTND, bowel sounds normal. No guarding or rebound.   MUSCULOSKELETAL: No joint erythema or tenderness.   EXTREMITIES: No edema. Peripheral pulses intact.   NEUROLOGICAL: LUE 3/5 Strength  SKIN: Warm and dry , Well perfused  PSYCHIATRIC: AOx3 , Normal mood and affect      LABS:                        11.4   13.24 )-----------( 165      ( 04 May 2024 04:15 )             34.3     05-04    139  |  99  |  57<H>  ----------------------------<  148<H>  3.3<L>   |  24  |  2.36<H>    Ca    8.1<L>      04 May 2024 04:15  Phos  4.6     05-04  Mg     1.80     05-04    TPro  6.1  /  Alb  3.6  /  TBili  0.4  /  DBili  x   /  AST  17  /  ALT  18  /  AlkPhos  139<H>  05-04          Urinalysis Basic - ( 04 May 2024 04:15 )    Color: x / Appearance: x / SG: x / pH: x  Gluc: 148 mg/dL / Ketone: x  / Bili: x / Urobili: x   Blood: x / Protein: x / Nitrite: x   Leuk Esterase: x / RBC: x / WBC x   Sq Epi: x / Non Sq Epi: x / Bacteria: x        RADIOLOGY & ADDITIONAL TESTS:    Imaging Personally Reviewed:    Consultant(s) Notes Reviewed:      Care Discussed with Consultants/Other Providers:    Leeanna Gutierrez, PGY-3; [facility] Pager: [phone]; [facility] Pager: [phone]   Patient is a 68y old  Male who presents with a chief complaint of acute on chronic heart failure exacerbation (03 May 2024 16:16)      SUBJECTIVE / OVERNIGHT EVENTS: Patient seen and examined at bedside. Patient without acute complaints this AM. Denies any chest pain, shortness of breath, or abdominal pain.    MEDICATIONS  (STANDING):  allopurinol 100 milliGRAM(s) Oral daily  apixaban 5 milliGRAM(s) Oral two times a day  atorvastatin 80 milliGRAM(s) Oral at bedtime  azithromycin   Tablet 250 milliGRAM(s) Oral daily  budesonide 160 MICROgram(s)/formoterol 4.5 MICROgram(s) Inhaler 2 Puff(s) Inhalation two times a day  buMETAnide Infusion 2 mG/Hr (10 mL/Hr) IV Continuous <Continuous>  carvedilol 6.25 milliGRAM(s) Oral every 12 hours  chlorhexidine 2% Cloths 1 Application(s) Topical daily  clopidogrel Tablet 75 milliGRAM(s) Oral daily  dextrose 10% Bolus 125 milliLiter(s) IV Bolus once  dextrose 5%. 1000 milliLiter(s) (100 mL/Hr) IV Continuous <Continuous>  dextrose 5%. 1000 milliLiter(s) (50 mL/Hr) IV Continuous <Continuous>  dextrose 50% Injectable 12.5 Gram(s) IV Push once  dextrose 50% Injectable 25 Gram(s) IV Push once  ferrous    sulfate 325 milliGRAM(s) Oral daily  gabapentin 100 milliGRAM(s) Oral at bedtime  glucagon  Injectable 1 milliGRAM(s) IntraMuscular once  hydrALAZINE 100 milliGRAM(s) Oral three times a day  influenza  Vaccine (HIGH DOSE) 0.7 milliLiter(s) IntraMuscular once  insulin lispro (ADMELOG) corrective regimen sliding scale   SubCutaneous three times a day before meals  insulin lispro (ADMELOG) corrective regimen sliding scale   SubCutaneous at bedtime  isosorbide   dinitrate Tablet (ISORDIL) 30 milliGRAM(s) Oral three times a day  levothyroxine 50 MICROGram(s) Oral daily  montelukast 10 milliGRAM(s) Oral daily  predniSONE   Tablet 40 milliGRAM(s) Oral daily  sacubitril 24 mG/valsartan 26 mG 1 Tablet(s) Oral two times a day  senna 2 Tablet(s) Oral at bedtime  tamsulosin 0.4 milliGRAM(s) Oral at bedtime    MEDICATIONS  (PRN):  acetaminophen     Tablet .. 650 milliGRAM(s) Oral every 6 hours PRN Temp greater or equal to 38C (100.4F), Mild Pain (1 - 3)  dextrose Oral Gel 15 Gram(s) Oral once PRN Blood Glucose LESS THAN 70 milliGRAM(s)/deciliter  polyethylene glycol 3350 17 Gram(s) Oral daily PRN Constipation      Vital Signs Last 24 Hrs  T(C): 36.9 (04 May 2024 05:20), Max: 36.9 (04 May 2024 05:20)  T(F): 98.4 (04 May 2024 05:20), Max: 98.4 (04 May 2024 05:20)  HR: 84 (04 May 2024 05:20) (84 - 93)  BP: 106/65 (04 May 2024 05:20) (106/65 - 126/58)  BP(mean): --  RR: 17 (04 May 2024 05:20) (16 - 18)  SpO2: 99% (04 May 2024 05:20) (98% - 100%)    Parameters below as of 04 May 2024 05:20  Patient On (Oxygen Delivery Method): nasal cannula  O2 Flow (L/min): 2    CAPILLARY BLOOD GLUCOSE      POCT Blood Glucose.: 131 mg/dL (03 May 2024 22:01)  POCT Blood Glucose.: 172 mg/dL (03 May 2024 17:48)  POCT Blood Glucose.: 225 mg/dL (03 May 2024 12:34)  POCT Blood Glucose.: 165 mg/dL (03 May 2024 08:18)    I&O's Summary    03 May 2024 07:01  -  04 May 2024 07:00  --------------------------------------------------------  IN: 280 mL / OUT: 1000 mL / NET: -720 mL        PHYSICAL EXAM:  GENERAL APPEARANCE: Well developed, NAD  HEENT:  PERRL, EOMI. hearing grossly intact.  CARDIAC: Normal S1 and S2. no mrg. RRR  LUNGS: +end expiratory wheezing   ABDOMEN: Soft , NTND, bowel sounds normal. No guarding or rebound.   MUSCULOSKELETAL: No joint erythema or tenderness.   EXTREMITIES: No edema. Peripheral pulses intact.   NEUROLOGICAL: nonfocal   SKIN: Warm and dry , Well perfused  PSYCHIATRIC: AOx3 , Normal mood and affect      LABS:                        11.4   13.24 )-----------( 165      ( 04 May 2024 04:15 )             34.3     05-04    139  |  99  |  57<H>  ----------------------------<  148<H>  3.3<L>   |  24  |  2.36<H>    Ca    8.1<L>      04 May 2024 04:15  Phos  4.6     05-04  Mg     1.80     05-04    TPro  6.1  /  Alb  3.6  /  TBili  0.4  /  DBili  x   /  AST  17  /  ALT  18  /  AlkPhos  139<H>  05-04          Urinalysis Basic - ( 04 May 2024 04:15 )    Color: x / Appearance: x / SG: x / pH: x  Gluc: 148 mg/dL / Ketone: x  / Bili: x / Urobili: x   Blood: x / Protein: x / Nitrite: x   Leuk Esterase: x / RBC: x / WBC x   Sq Epi: x / Non Sq Epi: x / Bacteria: x        RADIOLOGY & ADDITIONAL TESTS:    Imaging Personally Reviewed:    Consultant(s) Notes Reviewed:      Care Discussed with Consultants/Other Providers:    Leeanna Gutierrez, PGY-3; [facility] Pager: [phone]; [facility] Pager: [phone]

## 2024-05-04 NOTE — PROGRESS NOTE ADULT - ATTENDING COMMENTS
68y M w/ hx of HFrEF (EF 20-25% as of 3/2024) s/p cardioMEMS in 3/2024 and Med-tronic CRT-D in 2022, paroxysmal afib/flutter s/p ablation on eliquis, CKD3, T2DM, COPD on 2LNC home O2, CAD s/p CABG in 2014, CVA w/ residual L sided weakness, prior R ankle fracture presenting from cardiology office for 1 day history of dyspnea with objective findings of elevated BNP, increased O2 requirement, increased PA pressures on cardioMEMS - consistent w/ acute on chronic systolic congestive heart failure.     Afebrile  Exam notable for JVD, no RO, bibasilar crackles  cr 2.36    #Acute CHF exacerbation: Remains volume overloaded  - continue bumex gtt 2mg/hr  -HF consulted - appreciate recs, daily weights, I/O   -EP c/s to interrogate device    #COPD: on home 02  -levalbuterol, ipratropium  -c/w pred 40mg qd x4 doses  -azithromycin    #pAfib  -c/w coreg, eliquis    rest as above

## 2024-05-05 LAB
ALBUMIN SERPL ELPH-MCNC: 3.5 G/DL — SIGNIFICANT CHANGE UP (ref 3.3–5)
ALP SERPL-CCNC: 146 U/L — HIGH (ref 40–120)
ALT FLD-CCNC: 22 U/L — SIGNIFICANT CHANGE UP (ref 4–41)
ANION GAP SERPL CALC-SCNC: 13 MMOL/L — SIGNIFICANT CHANGE UP (ref 7–14)
ANION GAP SERPL CALC-SCNC: 17 MMOL/L — HIGH (ref 7–14)
AST SERPL-CCNC: 20 U/L — SIGNIFICANT CHANGE UP (ref 4–40)
BASOPHILS # BLD AUTO: 0.01 K/UL — SIGNIFICANT CHANGE UP (ref 0–0.2)
BASOPHILS NFR BLD AUTO: 0.1 % — SIGNIFICANT CHANGE UP (ref 0–2)
BILIRUB SERPL-MCNC: 0.4 MG/DL — SIGNIFICANT CHANGE UP (ref 0.2–1.2)
BUN SERPL-MCNC: 64 MG/DL — HIGH (ref 7–23)
BUN SERPL-MCNC: 64 MG/DL — HIGH (ref 7–23)
CALCIUM SERPL-MCNC: 7.6 MG/DL — LOW (ref 8.4–10.5)
CALCIUM SERPL-MCNC: 7.7 MG/DL — LOW (ref 8.4–10.5)
CHLORIDE SERPL-SCNC: 100 MMOL/L — SIGNIFICANT CHANGE UP (ref 98–107)
CHLORIDE SERPL-SCNC: 98 MMOL/L — SIGNIFICANT CHANGE UP (ref 98–107)
CO2 SERPL-SCNC: 22 MMOL/L — SIGNIFICANT CHANGE UP (ref 22–31)
CO2 SERPL-SCNC: 25 MMOL/L — SIGNIFICANT CHANGE UP (ref 22–31)
CREAT SERPL-MCNC: 2.35 MG/DL — HIGH (ref 0.5–1.3)
CREAT SERPL-MCNC: 2.44 MG/DL — HIGH (ref 0.5–1.3)
EGFR: 28 ML/MIN/1.73M2 — LOW
EGFR: 29 ML/MIN/1.73M2 — LOW
EOSINOPHIL # BLD AUTO: 0.01 K/UL — SIGNIFICANT CHANGE UP (ref 0–0.5)
EOSINOPHIL NFR BLD AUTO: 0.1 % — SIGNIFICANT CHANGE UP (ref 0–6)
GLUCOSE BLDC GLUCOMTR-MCNC: 229 MG/DL — HIGH (ref 70–99)
GLUCOSE BLDC GLUCOMTR-MCNC: 232 MG/DL — HIGH (ref 70–99)
GLUCOSE BLDC GLUCOMTR-MCNC: 241 MG/DL — HIGH (ref 70–99)
GLUCOSE BLDC GLUCOMTR-MCNC: 251 MG/DL — HIGH (ref 70–99)
GLUCOSE SERPL-MCNC: 125 MG/DL — HIGH (ref 70–99)
GLUCOSE SERPL-MCNC: 218 MG/DL — HIGH (ref 70–99)
HCT VFR BLD CALC: 36 % — LOW (ref 39–50)
HGB BLD-MCNC: 12 G/DL — LOW (ref 13–17)
IANC: 11.37 K/UL — HIGH (ref 1.8–7.4)
IMM GRANULOCYTES NFR BLD AUTO: 0.6 % — SIGNIFICANT CHANGE UP (ref 0–0.9)
LYMPHOCYTES # BLD AUTO: 1.59 K/UL — SIGNIFICANT CHANGE UP (ref 1–3.3)
LYMPHOCYTES # BLD AUTO: 11.3 % — LOW (ref 13–44)
MAGNESIUM SERPL-MCNC: 1.8 MG/DL — SIGNIFICANT CHANGE UP (ref 1.6–2.6)
MAGNESIUM SERPL-MCNC: 1.8 MG/DL — SIGNIFICANT CHANGE UP (ref 1.6–2.6)
MCHC RBC-ENTMCNC: 32.5 PG — SIGNIFICANT CHANGE UP (ref 27–34)
MCHC RBC-ENTMCNC: 33.3 GM/DL — SIGNIFICANT CHANGE UP (ref 32–36)
MCV RBC AUTO: 97.6 FL — SIGNIFICANT CHANGE UP (ref 80–100)
MONOCYTES # BLD AUTO: 0.98 K/UL — HIGH (ref 0–0.9)
MONOCYTES NFR BLD AUTO: 7 % — SIGNIFICANT CHANGE UP (ref 2–14)
NEUTROPHILS # BLD AUTO: 11.37 K/UL — HIGH (ref 1.8–7.4)
NEUTROPHILS NFR BLD AUTO: 80.9 % — HIGH (ref 43–77)
NRBC # BLD: 0 /100 WBCS — SIGNIFICANT CHANGE UP (ref 0–0)
NRBC # FLD: 0 K/UL — SIGNIFICANT CHANGE UP (ref 0–0)
PHOSPHATE SERPL-MCNC: 4.2 MG/DL — SIGNIFICANT CHANGE UP (ref 2.5–4.5)
PHOSPHATE SERPL-MCNC: 4.6 MG/DL — HIGH (ref 2.5–4.5)
PLATELET # BLD AUTO: 194 K/UL — SIGNIFICANT CHANGE UP (ref 150–400)
POTASSIUM SERPL-MCNC: 3.6 MMOL/L — SIGNIFICANT CHANGE UP (ref 3.5–5.3)
POTASSIUM SERPL-MCNC: 4.1 MMOL/L — SIGNIFICANT CHANGE UP (ref 3.5–5.3)
POTASSIUM SERPL-SCNC: 3.6 MMOL/L — SIGNIFICANT CHANGE UP (ref 3.5–5.3)
POTASSIUM SERPL-SCNC: 4.1 MMOL/L — SIGNIFICANT CHANGE UP (ref 3.5–5.3)
PROT SERPL-MCNC: 5.8 G/DL — LOW (ref 6–8.3)
RBC # BLD: 3.69 M/UL — LOW (ref 4.2–5.8)
RBC # FLD: 17.2 % — HIGH (ref 10.3–14.5)
SODIUM SERPL-SCNC: 137 MMOL/L — SIGNIFICANT CHANGE UP (ref 135–145)
SODIUM SERPL-SCNC: 138 MMOL/L — SIGNIFICANT CHANGE UP (ref 135–145)
WBC # BLD: 14.04 K/UL — HIGH (ref 3.8–10.5)
WBC # FLD AUTO: 14.04 K/UL — HIGH (ref 3.8–10.5)

## 2024-05-05 PROCEDURE — 99233 SBSQ HOSP IP/OBS HIGH 50: CPT

## 2024-05-05 RX ORDER — HYDRALAZINE HCL 50 MG
100 TABLET ORAL THREE TIMES A DAY
Refills: 0 | Status: DISCONTINUED | OUTPATIENT
Start: 2024-05-05 | End: 2024-05-14

## 2024-05-05 RX ORDER — ISOSORBIDE DINITRATE 5 MG/1
30 TABLET ORAL THREE TIMES A DAY
Refills: 0 | Status: DISCONTINUED | OUTPATIENT
Start: 2024-05-05 | End: 2024-05-15

## 2024-05-05 RX ORDER — CARVEDILOL PHOSPHATE 80 MG/1
12.5 CAPSULE, EXTENDED RELEASE ORAL EVERY 12 HOURS
Refills: 0 | Status: DISCONTINUED | OUTPATIENT
Start: 2024-05-05 | End: 2024-05-06

## 2024-05-05 RX ADMIN — Medication 100 MILLIGRAM(S): at 21:12

## 2024-05-05 RX ADMIN — GABAPENTIN 100 MILLIGRAM(S): 400 CAPSULE ORAL at 21:13

## 2024-05-05 RX ADMIN — CARVEDILOL PHOSPHATE 12.5 MILLIGRAM(S): 80 CAPSULE, EXTENDED RELEASE ORAL at 17:54

## 2024-05-05 RX ADMIN — Medication 2: at 12:24

## 2024-05-05 RX ADMIN — Medication 50 MICROGRAM(S): at 05:12

## 2024-05-05 RX ADMIN — Medication 2: at 17:46

## 2024-05-05 RX ADMIN — BUMETANIDE 10 MG/HR: 0.25 INJECTION INTRAMUSCULAR; INTRAVENOUS at 05:14

## 2024-05-05 RX ADMIN — TAMSULOSIN HYDROCHLORIDE 0.4 MILLIGRAM(S): 0.4 CAPSULE ORAL at 21:13

## 2024-05-05 RX ADMIN — Medication 325 MILLIGRAM(S): at 13:27

## 2024-05-05 RX ADMIN — APIXABAN 5 MILLIGRAM(S): 2.5 TABLET, FILM COATED ORAL at 05:12

## 2024-05-05 RX ADMIN — Medication 40 MILLIGRAM(S): at 05:13

## 2024-05-05 RX ADMIN — APIXABAN 5 MILLIGRAM(S): 2.5 TABLET, FILM COATED ORAL at 17:52

## 2024-05-05 RX ADMIN — BUDESONIDE AND FORMOTEROL FUMARATE DIHYDRATE 2 PUFF(S): 160; 4.5 AEROSOL RESPIRATORY (INHALATION) at 08:45

## 2024-05-05 RX ADMIN — SENNA PLUS 2 TABLET(S): 8.6 TABLET ORAL at 21:13

## 2024-05-05 RX ADMIN — Medication 3: at 08:32

## 2024-05-05 RX ADMIN — MONTELUKAST 10 MILLIGRAM(S): 4 TABLET, CHEWABLE ORAL at 13:27

## 2024-05-05 RX ADMIN — CHLORHEXIDINE GLUCONATE 1 APPLICATION(S): 213 SOLUTION TOPICAL at 13:28

## 2024-05-05 RX ADMIN — ISOSORBIDE DINITRATE 30 MILLIGRAM(S): 5 TABLET ORAL at 21:13

## 2024-05-05 RX ADMIN — SACUBITRIL AND VALSARTAN 1 TABLET(S): 24; 26 TABLET, FILM COATED ORAL at 17:52

## 2024-05-05 RX ADMIN — SACUBITRIL AND VALSARTAN 1 TABLET(S): 24; 26 TABLET, FILM COATED ORAL at 05:13

## 2024-05-05 RX ADMIN — AZITHROMYCIN 250 MILLIGRAM(S): 500 TABLET, FILM COATED ORAL at 13:27

## 2024-05-05 RX ADMIN — BUMETANIDE 10 MG/HR: 0.25 INJECTION INTRAMUSCULAR; INTRAVENOUS at 20:21

## 2024-05-05 RX ADMIN — BUDESONIDE AND FORMOTEROL FUMARATE DIHYDRATE 2 PUFF(S): 160; 4.5 AEROSOL RESPIRATORY (INHALATION) at 21:11

## 2024-05-05 RX ADMIN — CLOPIDOGREL BISULFATE 75 MILLIGRAM(S): 75 TABLET, FILM COATED ORAL at 13:27

## 2024-05-05 RX ADMIN — ATORVASTATIN CALCIUM 80 MILLIGRAM(S): 80 TABLET, FILM COATED ORAL at 21:13

## 2024-05-05 RX ADMIN — Medication 100 MILLIGRAM(S): at 13:27

## 2024-05-05 NOTE — PROGRESS NOTE ADULT - PROBLEM SELECTOR PLAN 3
CHADSVASc score of 7. S/p ablation 3/29/24. EKG and telemetry w/ ventricular pacing 90s-110s thus far.   -c/w GDMT as above  -c/w eliquis  -device interrogation as above - episode 86s AF

## 2024-05-05 NOTE — PROGRESS NOTE ADULT - PROBLEM SELECTOR PLAN 1
- c/w bumex gtt;  - c/w hydral 100 mg q8h/isordil 30 mg q8h  - increase coreg to 12.5 mg q12  - c/w Entresto 24/26 twice/day   - c/w Eliquis  - aggressive PT/OT.  - monitor electrolytes  - strict I/O  - STANDING weights daily (prior goal weight 161 pounds but likely is lower)

## 2024-05-05 NOTE — PROGRESS NOTE ADULT - PROBLEM SELECTOR PLAN 11
DVT PPx: full AC w/ eliquis  Diet: DASH/CC fluid restriction  Code status: Full code  Communication: Ketty (daughter) contacted 5/1 11PM with regard to plan to which understanding was verbalized by party  Dispo: likely home   PCP: Dr. Robin Schmidt  Pharmacy: [facility] [phone] [facility]

## 2024-05-05 NOTE — PROGRESS NOTE ADULT - ATTENDING COMMENTS
68y M w/ hx of HFrEF (EF 20-25% as of 3/2024) s/p cardioMEMS in 3/2024 and Med-tronic CRT-D in 2022, paroxysmal afib/flutter s/p ablation on eliquis, CKD3, T2DM, COPD on 2LNC home O2, CAD s/p CABG in 2014, CVA w/ residual L sided weakness, prior R ankle fracture presenting from cardiology office for 1 day history of dyspnea with objective findings of elevated BNP, increased O2 requirement, increased PA pressures on cardioMEMS - consistent w/ acute on chronic systolic congestive heart failure.     Afebrile  Exam notable for decreased JVD, no RO, bibasilar crackles  cr 2.35    #Acute CHF exacerbation: Remains volume overloaded  - continue bumex gtt 2mg/hr today  -HF following - appreciate recs, daily weights, I/O     #COPD: on home 02  -levalbuterol, ipratropium  -c/w pred 40mg qd x4 doses  -complete course azithromycin    #pAfib  -c/w coreg, eliquis  -s/p EP interrogation, minimal burden    #DM  on oral medications as o/p  consideration for SGLT2 68y M w/ hx of HFrEF (EF 20-25% as of 3/2024) s/p cardioMEMS in 3/2024 and Med-tronic CRT-D in 2022, paroxysmal afib/flutter s/p ablation on eliquis, CKD3, T2DM, COPD on 2LNC home O2, CAD s/p CABG in 2014, CVA w/ residual L sided weakness, prior R ankle fracture presenting from cardiology office for 1 day history of dyspnea with objective findings of elevated BNP, increased O2 requirement, increased PA pressures on cardioMEMS - consistent w/ acute on chronic systolic congestive heart failure.     Afebrile  Exam notable for decreased JVD, no RO, bibasilar crackles  cr 2.35    #Acute CHF exacerbation: Remains volume overloaded  - continue bumex gtt 2mg/hr today  -HF following - appreciate recs, daily weights, I/O     #COPD: on home 02  -levalbuterol, ipratropium  -c/w pred 40mg qd x4 doses  -complete course azithromycin  -leukocytosis likely due to steroids     #pAfib  -c/w coreg, eliquis  -s/p EP interrogation, minimal burden    #DM  on oral medications as o/p  consideration for SGLT2

## 2024-05-05 NOTE — PROGRESS NOTE ADULT - ASSESSMENT
Briefly, 69 y/o [place] male with h/o HTN, HFrEF/ICM (EF 25%, LVIDD 5.6cm) s/p CRT-D and CardioMEMS, CAD w/ CABG 2014 at [facility], asthma/COPD, CVA with left sided weakness, CKD (b/l Cr 2.0), aflutter s/p ablation (3/29/24) comes in from HF office with worsening dyspnea and fluid overload. Started on bumex gtt 2 mg/hr with improvement. Remains overloaded on exam with elevated JVP and dec air movement b/l.     Pertinent Labs:    Pro-Brain Natriuretic Peptide: 9273  Lactate: 1.7 mmol/L   Troponin  81/84  BUN/Cr 42.2.11    CXR: Moderate pulmonary vascular congestion     EKG: Sinus Rhythm non specific ST-T wave changes      RHC (3/24): RA 13 mmHg. PA 52/20/35 mmHg. PCW 23-25 mmHg. Cardiac index 1.96 L/min/m2. SVR 1712 dsc.

## 2024-05-05 NOTE — PROGRESS NOTE ADULT - SUBJECTIVE AND OBJECTIVE BOX
Patient is a 68y old  Male who presents with a chief complaint of acute on chronic heart failure exacerbation (05 May 2024 07:01)      SUBJECTIVE / OVERNIGHT EVENTS:  No acute events overnight  Patient denies CP, palpitation, SOB, visual disturbance, dizziness, lightheadedness.    Tele: AsVp and ApVp rhythm NSVT 3-4 beats.        MEDICATIONS  (STANDING):  allopurinol 100 milliGRAM(s) Oral daily  apixaban 5 milliGRAM(s) Oral two times a day  atorvastatin 80 milliGRAM(s) Oral at bedtime  azithromycin   Tablet 250 milliGRAM(s) Oral daily  budesonide 160 MICROgram(s)/formoterol 4.5 MICROgram(s) Inhaler 2 Puff(s) Inhalation two times a day  buMETAnide Infusion 2 mG/Hr (10 mL/Hr) IV Continuous <Continuous>  carvedilol 6.25 milliGRAM(s) Oral every 12 hours  chlorhexidine 2% Cloths 1 Application(s) Topical daily  clopidogrel Tablet 75 milliGRAM(s) Oral daily  dextrose 10% Bolus 125 milliLiter(s) IV Bolus once  dextrose 5%. 1000 milliLiter(s) (100 mL/Hr) IV Continuous <Continuous>  dextrose 5%. 1000 milliLiter(s) (50 mL/Hr) IV Continuous <Continuous>  dextrose 50% Injectable 25 Gram(s) IV Push once  dextrose 50% Injectable 12.5 Gram(s) IV Push once  ferrous    sulfate 325 milliGRAM(s) Oral daily  gabapentin 100 milliGRAM(s) Oral at bedtime  glucagon  Injectable 1 milliGRAM(s) IntraMuscular once  hydrALAZINE 100 milliGRAM(s) Oral three times a day  influenza  Vaccine (HIGH DOSE) 0.7 milliLiter(s) IntraMuscular once  insulin lispro (ADMELOG) corrective regimen sliding scale   SubCutaneous three times a day before meals  insulin lispro (ADMELOG) corrective regimen sliding scale   SubCutaneous at bedtime  isosorbide   dinitrate Tablet (ISORDIL) 30 milliGRAM(s) Oral three times a day  levothyroxine 50 MICROGram(s) Oral daily  montelukast 10 milliGRAM(s) Oral daily  predniSONE   Tablet 40 milliGRAM(s) Oral daily  sacubitril 24 mG/valsartan 26 mG 1 Tablet(s) Oral two times a day  senna 2 Tablet(s) Oral at bedtime  tamsulosin 0.4 milliGRAM(s) Oral at bedtime    MEDICATIONS  (PRN):  acetaminophen     Tablet .. 650 milliGRAM(s) Oral every 6 hours PRN Temp greater or equal to 38C (100.4F), Mild Pain (1 - 3)  dextrose Oral Gel 15 Gram(s) Oral once PRN Blood Glucose LESS THAN 70 milliGRAM(s)/deciliter  polyethylene glycol 3350 17 Gram(s) Oral daily PRN Constipation      T(C): 36.7 (05-05-24 @ 08:45), Max: 36.7 (05-04-24 @ 18:00)  HR: 84 (05-05-24 @ 08:45) (82 - 87)  BP: 102/52 (05-05-24 @ 08:45) (92/56 - 105/52)  RR: 18 (05-05-24 @ 08:45) (16 - 18)  SpO2: 100% (05-05-24 @ 08:45) (95% - 100%)  CAPILLARY BLOOD GLUCOSE      POCT Blood Glucose.: 232 mg/dL (05 May 2024 12:23)  POCT Blood Glucose.: 251 mg/dL (05 May 2024 08:19)  POCT Blood Glucose.: 137 mg/dL (04 May 2024 22:30)  POCT Blood Glucose.: 252 mg/dL (04 May 2024 17:46)    I&O's Summary      PHYSICAL EXAM:  Constitutional: NAD.   HEENT: AT/NC, EOMI, Supple neck;  Respiratory: b/l inspiratory wheezing and expiratory crackles. no increase in WOB  Cardiovascular: RRR, S1, S2, no M/R/G. 2+ distal pulses. JVP 8-10 cmH2O + HJR, no LE edema.  Gastrointestinal: soft; NT/ND, +BS  Extremities: no cyanosis; non-tender to palpation, DP and Radial pulses intact.  Neurological: alert and responds to questions appropriately  Psychiatric: normal mood/affect.    LABS:                        12.0   14.04 )-----------( 194      ( 05 May 2024 04:36 )             36.0     05-05    138  |  100  |  64<H>  ----------------------------<  125<H>  3.6   |  25  |  2.35<H>    Ca    7.7<L>      05 May 2024 04:36  Phos  4.6     05-05  Mg     1.80     05-05    TPro  5.8<L>  /  Alb  3.5  /  TBili  0.4  /  DBili  x   /  AST  20  /  ALT  22  /  AlkPhos  146<H>  05-05          Urinalysis Basic - ( 05 May 2024 04:36 )    Color: x / Appearance: x / SG: x / pH: x  Gluc: 125 mg/dL / Ketone: x  / Bili: x / Urobili: x   Blood: x / Protein: x / Nitrite: x   Leuk Esterase: x / RBC: x / WBC x   Sq Epi: x / Non Sq Epi: x / Bacteria: x        RADIOLOGY & ADDITIONAL TESTS:    Imaging Personally Reviewed: ZANE    Consultant(s) Notes Reviewed:  ZANE    Care Discussed with Consultants/Other Providers: ZANE

## 2024-05-05 NOTE — PROGRESS NOTE ADULT - PROBLEM SELECTOR PLAN 1
ACC Stage C/NYHA Class II. TTE 3/2024 w/ EF 20-25%. Possible etiologies for current decompensation include: recurrence of afib despite ablation, COPD exacerbation. Home GDMT: coreg 6.25mg BID, hydralazine 100mg TID, isordil 30mg TID  -defer to cardiology if necessary to repeat echo  -trops flat and no CP. EKG without STEMI equivalent - not ACS  -COPD management as below  -bumex gtt 2mg/hr per HF  -EP for device interrogation to assess for afib/aflutter burden - 86 second episode AF, BiVPaced 92%  -c/w GDMT:  - hydral 100 mg q8h  - isordil 30 mg q8h  - coreg 6.25 mg q12  - entresto 24/26 BID  - strict Is/Os, monitor UOP, daily weights  -Maintain K>4, Mg>2.

## 2024-05-05 NOTE — PROGRESS NOTE ADULT - SUBJECTIVE AND OBJECTIVE BOX
Patient is a 68y old  Male who presents with a chief complaint of acute on chronic heart failure exacerbation (04 May 2024 07:05)      SUBJECTIVE / OVERNIGHT EVENTS:  No acute events overnight. Shortness of breath has improved. Patient seen and examined at bedside.    MEDICATIONS  (STANDING):  allopurinol 100 milliGRAM(s) Oral daily  apixaban 5 milliGRAM(s) Oral two times a day  atorvastatin 80 milliGRAM(s) Oral at bedtime  azithromycin   Tablet 250 milliGRAM(s) Oral daily  budesonide 160 MICROgram(s)/formoterol 4.5 MICROgram(s) Inhaler 2 Puff(s) Inhalation two times a day  buMETAnide Infusion 2 mG/Hr (10 mL/Hr) IV Continuous <Continuous>  carvedilol 6.25 milliGRAM(s) Oral every 12 hours  chlorhexidine 2% Cloths 1 Application(s) Topical daily  clopidogrel Tablet 75 milliGRAM(s) Oral daily  dextrose 10% Bolus 125 milliLiter(s) IV Bolus once  dextrose 5%. 1000 milliLiter(s) (100 mL/Hr) IV Continuous <Continuous>  dextrose 5%. 1000 milliLiter(s) (50 mL/Hr) IV Continuous <Continuous>  dextrose 50% Injectable 12.5 Gram(s) IV Push once  dextrose 50% Injectable 25 Gram(s) IV Push once  ferrous    sulfate 325 milliGRAM(s) Oral daily  gabapentin 100 milliGRAM(s) Oral at bedtime  glucagon  Injectable 1 milliGRAM(s) IntraMuscular once  hydrALAZINE 100 milliGRAM(s) Oral three times a day  influenza  Vaccine (HIGH DOSE) 0.7 milliLiter(s) IntraMuscular once  insulin lispro (ADMELOG) corrective regimen sliding scale   SubCutaneous three times a day before meals  insulin lispro (ADMELOG) corrective regimen sliding scale   SubCutaneous at bedtime  isosorbide   dinitrate Tablet (ISORDIL) 30 milliGRAM(s) Oral three times a day  levothyroxine 50 MICROGram(s) Oral daily  montelukast 10 milliGRAM(s) Oral daily  predniSONE   Tablet 40 milliGRAM(s) Oral daily  sacubitril 24 mG/valsartan 26 mG 1 Tablet(s) Oral two times a day  senna 2 Tablet(s) Oral at bedtime  tamsulosin 0.4 milliGRAM(s) Oral at bedtime    MEDICATIONS  (PRN):  acetaminophen     Tablet .. 650 milliGRAM(s) Oral every 6 hours PRN Temp greater or equal to 38C (100.4F), Mild Pain (1 - 3)  dextrose Oral Gel 15 Gram(s) Oral once PRN Blood Glucose LESS THAN 70 milliGRAM(s)/deciliter  polyethylene glycol 3350 17 Gram(s) Oral daily PRN Constipation      CAPILLARY BLOOD GLUCOSE      POCT Blood Glucose.: 137 mg/dL (04 May 2024 22:30)  POCT Blood Glucose.: 252 mg/dL (04 May 2024 17:46)  POCT Blood Glucose.: 249 mg/dL (04 May 2024 12:11)  POCT Blood Glucose.: 186 mg/dL (04 May 2024 08:19)    I&O's Summary      PHYSICAL EXAM:  Vital Signs Last 24 Hrs  T(C): 36.6 (05 May 2024 05:00), Max: 36.7 (04 May 2024 12:18)  T(F): 97.8 (05 May 2024 05:00), Max: 98.1 (04 May 2024 12:18)  HR: 87 (05 May 2024 05:00) (81 - 87)  BP: 105/52 (05 May 2024 05:00) (92/56 - 105/52)  BP(mean): --  RR: 18 (05 May 2024 05:00) (16 - 18)  SpO2: 98% (05 May 2024 05:00) (95% - 99%)    Parameters below as of 05 May 2024 05:00  Patient On (Oxygen Delivery Method): nasal cannula  O2 Flow (L/min): 2      GENERAL: No acute distress, well-developed  HEAD:  Atraumatic, Normocephalic  EYES: EOMI, PERRLA, conjunctiva and sclera clear  NECK: Supple, no lymphadenopathy, no JVD  CHEST/LUNG: CTAB; mild rhonchi  HEART: Regular rate and rhythm; No murmurs, rubs, or gallops  ABDOMEN: Soft, non-tender, non-distended; normal bowel sounds, no organomegaly  EXTREMITIES:  2+ peripheral pulses b/l, No clubbing, cyanosis, or edema  NEUROLOGY: A&O x 3, no focal deficits  SKIN: No rashes or lesions    LABS:                        12.0   14.04 )-----------( 194      ( 05 May 2024 04:36 )             36.0     05-05    138  |  100  |  64<H>  ----------------------------<  125<H>  3.6   |  25  |  2.35<H>    Ca    7.7<L>      05 May 2024 04:36  Phos  4.6     05-05  Mg     1.80     05-05    TPro  5.8<L>  /  Alb  3.5  /  TBili  0.4  /  DBili  x   /  AST  20  /  ALT  22  /  AlkPhos  146<H>  05-05          Urinalysis Basic - ( 05 May 2024 04:36 )    Color: x / Appearance: x / SG: x / pH: x  Gluc: 125 mg/dL / Ketone: x  / Bili: x / Urobili: x   Blood: x / Protein: x / Nitrite: x   Leuk Esterase: x / RBC: x / WBC x   Sq Epi: x / Non Sq Epi: x / Bacteria: x          RADIOLOGY & ADDITIONAL TESTS:  Results Reviewed:   Imaging Personally Reviewed:  Electrocardiogram Personally Reviewed:    COORDINATION OF CARE:  Care Discussed with Consultants/Other Providers [Y/N]:  Prior or Outpatient Records Reviewed [Y/N]:

## 2024-05-05 NOTE — PROGRESS NOTE ADULT - PROBLEM SELECTOR PLAN 6
SCr 2.02 on admission. Baseline SCr 2.4-2.6.  -hold nephrotoxins   -diuresis as above   -monitor UOP, electrolytes

## 2024-05-06 LAB
ALBUMIN SERPL ELPH-MCNC: 3.3 G/DL — SIGNIFICANT CHANGE UP (ref 3.3–5)
ALP SERPL-CCNC: 140 U/L — HIGH (ref 40–120)
ALT FLD-CCNC: 28 U/L — SIGNIFICANT CHANGE UP (ref 4–41)
ANION GAP SERPL CALC-SCNC: 15 MMOL/L — HIGH (ref 7–14)
ANION GAP SERPL CALC-SCNC: 18 MMOL/L — HIGH (ref 7–14)
AST SERPL-CCNC: 29 U/L — SIGNIFICANT CHANGE UP (ref 4–40)
BASOPHILS # BLD AUTO: 0.01 K/UL — SIGNIFICANT CHANGE UP (ref 0–0.2)
BASOPHILS NFR BLD AUTO: 0.1 % — SIGNIFICANT CHANGE UP (ref 0–2)
BILIRUB SERPL-MCNC: 0.4 MG/DL — SIGNIFICANT CHANGE UP (ref 0.2–1.2)
BUN SERPL-MCNC: 71 MG/DL — HIGH (ref 7–23)
BUN SERPL-MCNC: 72 MG/DL — HIGH (ref 7–23)
CALCIUM SERPL-MCNC: 7.6 MG/DL — LOW (ref 8.4–10.5)
CALCIUM SERPL-MCNC: 7.7 MG/DL — LOW (ref 8.4–10.5)
CHLORIDE SERPL-SCNC: 96 MMOL/L — LOW (ref 98–107)
CHLORIDE SERPL-SCNC: 99 MMOL/L — SIGNIFICANT CHANGE UP (ref 98–107)
CO2 SERPL-SCNC: 22 MMOL/L — SIGNIFICANT CHANGE UP (ref 22–31)
CO2 SERPL-SCNC: 24 MMOL/L — SIGNIFICANT CHANGE UP (ref 22–31)
CREAT SERPL-MCNC: 2.4 MG/DL — HIGH (ref 0.5–1.3)
CREAT SERPL-MCNC: 2.53 MG/DL — HIGH (ref 0.5–1.3)
CULTURE RESULTS: SIGNIFICANT CHANGE UP
CULTURE RESULTS: SIGNIFICANT CHANGE UP
EGFR: 27 ML/MIN/1.73M2 — LOW
EGFR: 28 ML/MIN/1.73M2 — LOW
EOSINOPHIL # BLD AUTO: 0.01 K/UL — SIGNIFICANT CHANGE UP (ref 0–0.5)
EOSINOPHIL NFR BLD AUTO: 0.1 % — SIGNIFICANT CHANGE UP (ref 0–6)
GLUCOSE BLDC GLUCOMTR-MCNC: 177 MG/DL — HIGH (ref 70–99)
GLUCOSE BLDC GLUCOMTR-MCNC: 221 MG/DL — HIGH (ref 70–99)
GLUCOSE BLDC GLUCOMTR-MCNC: 229 MG/DL — HIGH (ref 70–99)
GLUCOSE BLDC GLUCOMTR-MCNC: 318 MG/DL — HIGH (ref 70–99)
GLUCOSE SERPL-MCNC: 152 MG/DL — HIGH (ref 70–99)
GLUCOSE SERPL-MCNC: 221 MG/DL — HIGH (ref 70–99)
HCT VFR BLD CALC: 41.6 % — SIGNIFICANT CHANGE UP (ref 39–50)
HGB BLD-MCNC: 13.6 G/DL — SIGNIFICANT CHANGE UP (ref 13–17)
IANC: 11.07 K/UL — HIGH (ref 1.8–7.4)
IMM GRANULOCYTES NFR BLD AUTO: 0.6 % — SIGNIFICANT CHANGE UP (ref 0–0.9)
LEGIONELLA AG UR QL: NEGATIVE — SIGNIFICANT CHANGE UP
LYMPHOCYTES # BLD AUTO: 1.72 K/UL — SIGNIFICANT CHANGE UP (ref 1–3.3)
LYMPHOCYTES # BLD AUTO: 12.3 % — LOW (ref 13–44)
MAGNESIUM SERPL-MCNC: 1.8 MG/DL — SIGNIFICANT CHANGE UP (ref 1.6–2.6)
MAGNESIUM SERPL-MCNC: 1.8 MG/DL — SIGNIFICANT CHANGE UP (ref 1.6–2.6)
MCHC RBC-ENTMCNC: 32.2 PG — SIGNIFICANT CHANGE UP (ref 27–34)
MCHC RBC-ENTMCNC: 32.7 GM/DL — SIGNIFICANT CHANGE UP (ref 32–36)
MCV RBC AUTO: 98.6 FL — SIGNIFICANT CHANGE UP (ref 80–100)
MONOCYTES # BLD AUTO: 1.06 K/UL — HIGH (ref 0–0.9)
MONOCYTES NFR BLD AUTO: 7.6 % — SIGNIFICANT CHANGE UP (ref 2–14)
NEUTROPHILS # BLD AUTO: 11.07 K/UL — HIGH (ref 1.8–7.4)
NEUTROPHILS NFR BLD AUTO: 79.3 % — HIGH (ref 43–77)
NRBC # BLD: 0 /100 WBCS — SIGNIFICANT CHANGE UP (ref 0–0)
NRBC # FLD: 0 K/UL — SIGNIFICANT CHANGE UP (ref 0–0)
PHOSPHATE SERPL-MCNC: 4.6 MG/DL — HIGH (ref 2.5–4.5)
PHOSPHATE SERPL-MCNC: 4.6 MG/DL — HIGH (ref 2.5–4.5)
PLATELET # BLD AUTO: 192 K/UL — SIGNIFICANT CHANGE UP (ref 150–400)
POTASSIUM SERPL-MCNC: 4.4 MMOL/L — SIGNIFICANT CHANGE UP (ref 3.5–5.3)
POTASSIUM SERPL-MCNC: 4.5 MMOL/L — SIGNIFICANT CHANGE UP (ref 3.5–5.3)
POTASSIUM SERPL-SCNC: 4.4 MMOL/L — SIGNIFICANT CHANGE UP (ref 3.5–5.3)
POTASSIUM SERPL-SCNC: 4.5 MMOL/L — SIGNIFICANT CHANGE UP (ref 3.5–5.3)
PROT SERPL-MCNC: 5.8 G/DL — LOW (ref 6–8.3)
RBC # BLD: 4.22 M/UL — SIGNIFICANT CHANGE UP (ref 4.2–5.8)
RBC # FLD: 17.5 % — HIGH (ref 10.3–14.5)
SODIUM SERPL-SCNC: 135 MMOL/L — SIGNIFICANT CHANGE UP (ref 135–145)
SODIUM SERPL-SCNC: 139 MMOL/L — SIGNIFICANT CHANGE UP (ref 135–145)
SPECIMEN SOURCE: SIGNIFICANT CHANGE UP
SPECIMEN SOURCE: SIGNIFICANT CHANGE UP
WBC # BLD: 13.95 K/UL — HIGH (ref 3.8–10.5)
WBC # FLD AUTO: 13.95 K/UL — HIGH (ref 3.8–10.5)

## 2024-05-06 PROCEDURE — 99233 SBSQ HOSP IP/OBS HIGH 50: CPT | Mod: GC

## 2024-05-06 PROCEDURE — 99233 SBSQ HOSP IP/OBS HIGH 50: CPT

## 2024-05-06 RX ORDER — SODIUM CHLORIDE 9 MG/ML
1000 INJECTION, SOLUTION INTRAVENOUS
Refills: 0 | Status: DISCONTINUED | OUTPATIENT
Start: 2024-05-06 | End: 2024-05-08

## 2024-05-06 RX ORDER — METOPROLOL TARTRATE 50 MG
50 TABLET ORAL EVERY 12 HOURS
Refills: 0 | Status: DISCONTINUED | OUTPATIENT
Start: 2024-05-06 | End: 2024-05-14

## 2024-05-06 RX ADMIN — Medication 4: at 12:26

## 2024-05-06 RX ADMIN — Medication 325 MILLIGRAM(S): at 12:02

## 2024-05-06 RX ADMIN — Medication 50 MICROGRAM(S): at 06:20

## 2024-05-06 RX ADMIN — CHLORHEXIDINE GLUCONATE 1 APPLICATION(S): 213 SOLUTION TOPICAL at 12:03

## 2024-05-06 RX ADMIN — CLOPIDOGREL BISULFATE 75 MILLIGRAM(S): 75 TABLET, FILM COATED ORAL at 12:02

## 2024-05-06 RX ADMIN — BUDESONIDE AND FORMOTEROL FUMARATE DIHYDRATE 2 PUFF(S): 160; 4.5 AEROSOL RESPIRATORY (INHALATION) at 21:34

## 2024-05-06 RX ADMIN — CARVEDILOL PHOSPHATE 12.5 MILLIGRAM(S): 80 CAPSULE, EXTENDED RELEASE ORAL at 06:27

## 2024-05-06 RX ADMIN — ATORVASTATIN CALCIUM 80 MILLIGRAM(S): 80 TABLET, FILM COATED ORAL at 21:34

## 2024-05-06 RX ADMIN — SODIUM CHLORIDE 50 MILLILITER(S): 9 INJECTION, SOLUTION INTRAVENOUS at 16:36

## 2024-05-06 RX ADMIN — Medication 100 MILLIGRAM(S): at 12:02

## 2024-05-06 RX ADMIN — SENNA PLUS 2 TABLET(S): 8.6 TABLET ORAL at 21:35

## 2024-05-06 RX ADMIN — MONTELUKAST 10 MILLIGRAM(S): 4 TABLET, CHEWABLE ORAL at 12:02

## 2024-05-06 RX ADMIN — ISOSORBIDE DINITRATE 30 MILLIGRAM(S): 5 TABLET ORAL at 06:28

## 2024-05-06 RX ADMIN — Medication 50 MILLIGRAM(S): at 19:16

## 2024-05-06 RX ADMIN — Medication 40 MILLIGRAM(S): at 06:20

## 2024-05-06 RX ADMIN — TAMSULOSIN HYDROCHLORIDE 0.4 MILLIGRAM(S): 0.4 CAPSULE ORAL at 21:35

## 2024-05-06 RX ADMIN — Medication 2: at 17:08

## 2024-05-06 RX ADMIN — Medication 100 MILLIGRAM(S): at 06:28

## 2024-05-06 RX ADMIN — ISOSORBIDE DINITRATE 30 MILLIGRAM(S): 5 TABLET ORAL at 19:16

## 2024-05-06 RX ADMIN — APIXABAN 5 MILLIGRAM(S): 2.5 TABLET, FILM COATED ORAL at 17:12

## 2024-05-06 RX ADMIN — GABAPENTIN 100 MILLIGRAM(S): 400 CAPSULE ORAL at 21:35

## 2024-05-06 RX ADMIN — Medication 1: at 08:32

## 2024-05-06 RX ADMIN — Medication 100 MILLIGRAM(S): at 21:34

## 2024-05-06 RX ADMIN — APIXABAN 5 MILLIGRAM(S): 2.5 TABLET, FILM COATED ORAL at 06:20

## 2024-05-06 RX ADMIN — BUDESONIDE AND FORMOTEROL FUMARATE DIHYDRATE 2 PUFF(S): 160; 4.5 AEROSOL RESPIRATORY (INHALATION) at 08:32

## 2024-05-06 NOTE — PROGRESS NOTE ADULT - ASSESSMENT
Briefly, 69 y/o [place] male with h/o HTN, HFrEF/ICM (EF 25%, LVIDD 5.6cm) s/p CRT-D and CardioMEMS, CAD w/ CABG 2014 at [facility], asthma/COPD, CVA with left sided weakness, CKD (b/l Cr 2.0), aflutter s/p ablation (3/29/24) comes in from HF office with worsening dyspnea and fluid overload. Started on bumex gtt 2 mg/hr with improvement. Started on Entresto and recently had coreg increased but now hypotensive, likely 2/2 hypovolemia or medication-induced.     Pertinent Labs:    Pro-Brain Natriuretic Peptide: 9273  Lactate: 1.7 mmol/L   Troponin  81/84  BUN/Cr 42.2.11    CXR: Moderate pulmonary vascular congestion     EKG: Sinus Rhythm non specific ST-T wave changes      RHC (3/24): RA 13 mmHg. PA 52/20/35 mmHg. PCW 23-25 mmHg. Cardiac index 1.96 L/min/m2. SVR 1712 dsc.

## 2024-05-06 NOTE — PROGRESS NOTE ADULT - PROBLEM SELECTOR PLAN 1
ACC Stage C/NYHA Class II. TTE 3/2024 w/ EF 20-25%. Possible etiologies for current decompensation include: recurrence of afib despite ablation, COPD exacerbation. Home GDMT: coreg 6.25mg BID, hydralazine 100mg TID, isordil 30mg TID  -defer to cardiology if necessary to repeat echo  -trops flat and no CP. EKG without STEMI equivalent - not ACS  -COPD management as below  -EP for device interrogation to assess for afib/aflutter burden - 86 second episode AF, BiVPaced 92%  -c/w GDMT:  - hydral 100 mg q8h  - isordil 30 mg q8h  - coreg 6.25 mg q12  - entresto 24/26 BID  - strict Is/Os, monitor UOP, daily weights  -bumex gtt 2mg/hr per HF; BID BMP while on drip  -Maintain K>4, Mg>2.

## 2024-05-06 NOTE — PHYSICAL THERAPY INITIAL EVALUATION ADULT - LEVEL OF INDEPENDENCE: SIT/STAND, REHAB EVAL
Held out of bed mobility due to pt hypotensive throughout the morning and afternoon/unable to perform

## 2024-05-06 NOTE — PROGRESS NOTE ADULT - PROBLEM SELECTOR PLAN 1
- d/c diuretics  - c/w hydral 100 mg q8h/isordil 30 mg q8h  - on coreg; switch to toprol 50 mg twice/day  - d/c Entresto   - give 50 cc/hr x 5 hours  - c/w Eliquis  - aggressive PT/OT.  - check BNP in AM

## 2024-05-06 NOTE — PHYSICAL THERAPY INITIAL EVALUATION ADULT - ADDITIONAL COMMENTS
Pt lives with his family in a house with 3 steps to enter. Pt uses a rollator and was independent with ADLs. Pt reports no falls in the past 6 months.   Pt left semi supine in bed in NAD, all lines intact, call bell in reach, bed alarm on and THOMAS Senior made aware.

## 2024-05-06 NOTE — PROGRESS NOTE ADULT - SUBJECTIVE AND OBJECTIVE BOX
Patient is a 68y old  Male who presents with a chief complaint of acute on chronic heart failure exacerbation (05 May 2024 12:25)      SUBJECTIVE / OVERNIGHT EVENTS:  No acute events overnight. Patient at this time denies fevers, chills, CP, SOB, nausea, vomiting, diarrhea, constipation, dysuria. Patient seen and examined at bedside.    MEDICATIONS  (STANDING):  allopurinol 100 milliGRAM(s) Oral daily  apixaban 5 milliGRAM(s) Oral two times a day  atorvastatin 80 milliGRAM(s) Oral at bedtime  azithromycin   Tablet 250 milliGRAM(s) Oral daily  budesonide 160 MICROgram(s)/formoterol 4.5 MICROgram(s) Inhaler 2 Puff(s) Inhalation two times a day  buMETAnide Infusion 2 mG/Hr (10 mL/Hr) IV Continuous <Continuous>  carvedilol 12.5 milliGRAM(s) Oral every 12 hours  chlorhexidine 2% Cloths 1 Application(s) Topical daily  clopidogrel Tablet 75 milliGRAM(s) Oral daily  dextrose 10% Bolus 125 milliLiter(s) IV Bolus once  dextrose 5%. 1000 milliLiter(s) (100 mL/Hr) IV Continuous <Continuous>  dextrose 5%. 1000 milliLiter(s) (50 mL/Hr) IV Continuous <Continuous>  dextrose 50% Injectable 12.5 Gram(s) IV Push once  dextrose 50% Injectable 25 Gram(s) IV Push once  ferrous    sulfate 325 milliGRAM(s) Oral daily  gabapentin 100 milliGRAM(s) Oral at bedtime  glucagon  Injectable 1 milliGRAM(s) IntraMuscular once  hydrALAZINE 100 milliGRAM(s) Oral three times a day  influenza  Vaccine (HIGH DOSE) 0.7 milliLiter(s) IntraMuscular once  insulin lispro (ADMELOG) corrective regimen sliding scale   SubCutaneous three times a day before meals  insulin lispro (ADMELOG) corrective regimen sliding scale   SubCutaneous at bedtime  isosorbide   dinitrate Tablet (ISORDIL) 30 milliGRAM(s) Oral three times a day  levothyroxine 50 MICROGram(s) Oral daily  montelukast 10 milliGRAM(s) Oral daily  sacubitril 24 mG/valsartan 26 mG 1 Tablet(s) Oral two times a day  senna 2 Tablet(s) Oral at bedtime  tamsulosin 0.4 milliGRAM(s) Oral at bedtime    MEDICATIONS  (PRN):  acetaminophen     Tablet .. 650 milliGRAM(s) Oral every 6 hours PRN Temp greater or equal to 38C (100.4F), Mild Pain (1 - 3)  dextrose Oral Gel 15 Gram(s) Oral once PRN Blood Glucose LESS THAN 70 milliGRAM(s)/deciliter  polyethylene glycol 3350 17 Gram(s) Oral daily PRN Constipation      CAPILLARY BLOOD GLUCOSE      POCT Blood Glucose.: 241 mg/dL (05 May 2024 21:10)  POCT Blood Glucose.: 229 mg/dL (05 May 2024 17:44)  POCT Blood Glucose.: 232 mg/dL (05 May 2024 12:23)  POCT Blood Glucose.: 251 mg/dL (05 May 2024 08:19)    I&O's Summary    05 May 2024 07:01  -  06 May 2024 07:00  --------------------------------------------------------  IN: 600 mL / OUT: 960 mL / NET: -360 mL        PHYSICAL EXAM:  Vital Signs Last 24 Hrs  T(C): 36.7 (06 May 2024 06:15), Max: 36.8 (05 May 2024 13:30)  T(F): 98 (06 May 2024 06:15), Max: 98.3 (05 May 2024 13:30)  HR: 75 (06 May 2024 06:15) (72 - 91)  BP: 98/60 (06 May 2024 06:15) (94/59 - 111/65)  BP(mean): --  RR: 18 (06 May 2024 06:15) (16 - 18)  SpO2: 100% (06 May 2024 06:15) (99% - 100%)    Parameters below as of 06 May 2024 06:15  Patient On (Oxygen Delivery Method): nasal cannula  O2 Flow (L/min): 2      GENERAL: No acute distress, well-developed  HEAD:  Atraumatic, Normocephalic  EYES: EOMI, PERRLA, conjunctiva and sclera clear  NECK: Supple, no lymphadenopathy, no JVD  CHEST/LUNG: CTAB; No wheezes. Some rales at bases.  HEART: Regular rate and rhythm; No murmurs, rubs, or gallops  ABDOMEN: Soft, non-tender, non-distended; normal bowel sounds, no organomegaly  EXTREMITIES:  2+ peripheral pulses b/l, No clubbing, cyanosis, or edema  NEUROLOGY: A&O x 3, no focal deficits  SKIN: No rashes or lesions    LABS:                        12.0   14.04 )-----------( 194      ( 05 May 2024 04:36 )             36.0     05-05    137  |  98  |  64<H>  ----------------------------<  218<H>  4.1   |  22  |  2.44<H>    Ca    7.6<L>      05 May 2024 19:02  Phos  4.2     05-05  Mg     1.80     05-05    TPro  5.8<L>  /  Alb  3.5  /  TBili  0.4  /  DBili  x   /  AST  20  /  ALT  22  /  AlkPhos  146<H>  05-05          Urinalysis Basic - ( 05 May 2024 19:02 )    Color: x / Appearance: x / SG: x / pH: x  Gluc: 218 mg/dL / Ketone: x  / Bili: x / Urobili: x   Blood: x / Protein: x / Nitrite: x   Leuk Esterase: x / RBC: x / WBC x   Sq Epi: x / Non Sq Epi: x / Bacteria: x          RADIOLOGY & ADDITIONAL TESTS:  Results Reviewed:   Imaging Personally Reviewed:  Electrocardiogram Personally Reviewed:    COORDINATION OF CARE:  Care Discussed with Consultants/Other Providers [Y/N]:  Prior or Outpatient Records Reviewed [Y/N]:

## 2024-05-06 NOTE — PHYSICAL THERAPY INITIAL EVALUATION ADULT - PERTINENT HX OF CURRENT PROBLEM, REHAB EVAL
Pt is a 68 year old male with a past medical history of HFrEF (EF 20-25% as of 3/2024) s/p cardioMEMS in 3/2024 and Med-tronic CRT-D in 2022, paroxysmal afib/flutter s/p ablation on eliquis, CKD3, T2DM, COPD on 2LNC home O2, CAD s/p CABG in 2014, CVA w/ residual L sided weakness, prior R ankle fracture presenting from cardiology office for 1 day history of dyspnea with objective findings of elevated BNP, increased O2 requirement, increased PA pressures on cardioMEMS - consistent w/ acute on chronic systolic congestive heart failure.

## 2024-05-06 NOTE — PROGRESS NOTE ADULT - PROBLEM SELECTOR PLAN 2
On home meds of albuterol inhaler PRN q6h, Dulera, singulair  -c/w Dulera therapeutic interchange, singulair  -wean to home O2 setting of 2LNC  -xopenex/ipratropium q6h for 24 hrs -> albuterol inhaler PRN q6h  -S/p Mag, solu-medrol 40mg x1 , CTX/azithro x1 in ED  - initiate CTX if pt w/ worsening hypoxia, leukocytosis, and/or fever as reason to continue.  -azithro  -pred 40mg qd x4 doses completed

## 2024-05-06 NOTE — PROGRESS NOTE ADULT - PROBLEM SELECTOR PLAN 11
DVT PPx: full AC w/ eliquis  Diet: DASH/CC fluid restriction  Code status: Full code  Communication: Ketty (daughter) contacted 5/1 11PM with regard to plan to which understanding was verbalized by party  Dispo: likely home   PCP: Dr. Robin Schmidt  Pharmacy: [facility] [phone] [facility]

## 2024-05-06 NOTE — PROGRESS NOTE ADULT - ATTENDING COMMENTS
68y M w/ hx of HFrEF (EF 20-25% as of 3/2024) s/p cardioMEMS in 3/2024 and Med-tronic CRT-D in 2022, paroxysmal afib/flutter s/p ablation on eliquis, CKD3, T2DM, COPD on 2LNC home O2, CAD s/p CABG in 2014, CVA w/ residual L sided weakness, prior R ankle fracture admitted for acute on chronic systolic congestive heart failure. Hypotensive this am 70s/40s, asymptomatic, in the setting of titration of cardiac meds. Appears euvolemic, stop bumex drip for now; on entresto, hydral/imdur, coreg; awaiting further HF recs, might need to change coreg to metop vs lower hydralazine.

## 2024-05-06 NOTE — PROGRESS NOTE ADULT - SUBJECTIVE AND OBJECTIVE BOX
Interval History:  feels fine  noted to have low BP     Medications:  acetaminophen     Tablet .. 650 milliGRAM(s) Oral every 6 hours PRN  allopurinol 100 milliGRAM(s) Oral daily  apixaban 5 milliGRAM(s) Oral two times a day  atorvastatin 80 milliGRAM(s) Oral at bedtime  budesonide 160 MICROgram(s)/formoterol 4.5 MICROgram(s) Inhaler 2 Puff(s) Inhalation two times a day  carvedilol 12.5 milliGRAM(s) Oral every 12 hours  chlorhexidine 2% Cloths 1 Application(s) Topical daily  clopidogrel Tablet 75 milliGRAM(s) Oral daily  dextrose 10% Bolus 125 milliLiter(s) IV Bolus once  dextrose 5%. 1000 milliLiter(s) IV Continuous <Continuous>  dextrose 5%. 1000 milliLiter(s) IV Continuous <Continuous>  dextrose 50% Injectable 12.5 Gram(s) IV Push once  dextrose 50% Injectable 25 Gram(s) IV Push once  dextrose Oral Gel 15 Gram(s) Oral once PRN  ferrous    sulfate 325 milliGRAM(s) Oral daily  gabapentin 100 milliGRAM(s) Oral at bedtime  glucagon  Injectable 1 milliGRAM(s) IntraMuscular once  hydrALAZINE 100 milliGRAM(s) Oral three times a day  influenza  Vaccine (HIGH DOSE) 0.7 milliLiter(s) IntraMuscular once  insulin lispro (ADMELOG) corrective regimen sliding scale   SubCutaneous three times a day before meals  insulin lispro (ADMELOG) corrective regimen sliding scale   SubCutaneous at bedtime  isosorbide   dinitrate Tablet (ISORDIL) 30 milliGRAM(s) Oral three times a day  levothyroxine 50 MICROGram(s) Oral daily  montelukast 10 milliGRAM(s) Oral daily  polyethylene glycol 3350 17 Gram(s) Oral daily PRN  sacubitril 24 mG/valsartan 26 mG 1 Tablet(s) Oral two times a day  senna 2 Tablet(s) Oral at bedtime  tamsulosin 0.4 milliGRAM(s) Oral at bedtime      Vitals:  T(C): 36.6 (24 @ 12:00), Max: 36.7 (24 @ 06:15)  HR: 72 (24 @ 12:00) (62 - 76)  BP: 80/47 (24 @ 12:00) (70/36 - 111/65)  BP(mean): --  RR: 18 (24 @ 12:00) (16 - 18)  SpO2: 99% (24 @ 12:00) (98% - 100%)    Daily     Daily Weight in k.9 (06 May 2024 06:20)        I&O's Summary    05 May 2024 07:01  -  06 May 2024 07:00  --------------------------------------------------------  IN: 600 mL / OUT: 960 mL / NET: -360 mL    06 May 2024 07:01  -  06 May 2024 15:13  --------------------------------------------------------  IN: 350 mL / OUT: 350 mL / NET: 0 mL    Physical Exam:  Appearance: No Acute Distress  HEENT: PERRL  Neck: No JVD  Cardiovascular: Normal S1 S2, No murmurs/rubs/gallops  Respiratory: Clear to auscultation bilaterally  Gastrointestinal: Soft, Non-tender	  Skin: No cyanosis	  Neurologic: Non-focal  Extremities: No LE edema  Psychiatry: A & O x 3, Mood & affect appropriate    Labs:                        13.6   13.95 )-----------( 192      ( 06 May 2024 06:13 )             41.6     05-06    139  |  99  |  72<H>  ----------------------------<  152<H>  4.4   |  22  |  2.40<H>    Ca    7.7<L>      06 May 2024 06:13  Phos  4.6       Mg     1.80         TPro  5.8<L>  /  Alb  3.3  /  TBili  0.4  /  DBili  x   /  AST  29  /  ALT  28  /  AlkPhos  140<H>

## 2024-05-06 NOTE — PHYSICAL THERAPY INITIAL EVALUATION ADULT - GENERAL OBSERVATIONS, REHAB EVAL
Chart reviewed and cleared for PT by THOMAS Senior. Pt received semi supine in bed in NAD, all lines intact + telemetry, nasal canula, pulse ox, HR 72, /57.

## 2024-05-07 ENCOUNTER — TRANSCRIPTION ENCOUNTER (OUTPATIENT)
Age: 69
End: 2024-05-07

## 2024-05-07 LAB
ALBUMIN SERPL ELPH-MCNC: 3.2 G/DL — LOW (ref 3.3–5)
ALP SERPL-CCNC: 142 U/L — HIGH (ref 40–120)
ALT FLD-CCNC: 34 U/L — SIGNIFICANT CHANGE UP (ref 4–41)
ANION GAP SERPL CALC-SCNC: 18 MMOL/L — HIGH (ref 7–14)
AST SERPL-CCNC: 27 U/L — SIGNIFICANT CHANGE UP (ref 4–40)
BASOPHILS # BLD AUTO: 0.01 K/UL — SIGNIFICANT CHANGE UP (ref 0–0.2)
BASOPHILS NFR BLD AUTO: 0.1 % — SIGNIFICANT CHANGE UP (ref 0–2)
BILIRUB SERPL-MCNC: 0.4 MG/DL — SIGNIFICANT CHANGE UP (ref 0.2–1.2)
BUN SERPL-MCNC: 78 MG/DL — HIGH (ref 7–23)
CALCIUM SERPL-MCNC: 7.6 MG/DL — LOW (ref 8.4–10.5)
CHLORIDE SERPL-SCNC: 97 MMOL/L — LOW (ref 98–107)
CO2 SERPL-SCNC: 20 MMOL/L — LOW (ref 22–31)
CREAT SERPL-MCNC: 2.37 MG/DL — HIGH (ref 0.5–1.3)
EGFR: 29 ML/MIN/1.73M2 — LOW
EOSINOPHIL # BLD AUTO: 0.01 K/UL — SIGNIFICANT CHANGE UP (ref 0–0.5)
EOSINOPHIL NFR BLD AUTO: 0.1 % — SIGNIFICANT CHANGE UP (ref 0–6)
GLUCOSE BLDC GLUCOMTR-MCNC: 147 MG/DL — HIGH (ref 70–99)
GLUCOSE BLDC GLUCOMTR-MCNC: 162 MG/DL — HIGH (ref 70–99)
GLUCOSE BLDC GLUCOMTR-MCNC: 174 MG/DL — HIGH (ref 70–99)
GLUCOSE BLDC GLUCOMTR-MCNC: 223 MG/DL — HIGH (ref 70–99)
GLUCOSE SERPL-MCNC: 145 MG/DL — HIGH (ref 70–99)
HCT VFR BLD CALC: 38.9 % — LOW (ref 39–50)
HGB BLD-MCNC: 13.1 G/DL — SIGNIFICANT CHANGE UP (ref 13–17)
IANC: 12.25 K/UL — HIGH (ref 1.8–7.4)
IMM GRANULOCYTES NFR BLD AUTO: 0.6 % — SIGNIFICANT CHANGE UP (ref 0–0.9)
LYMPHOCYTES # BLD AUTO: 1.67 K/UL — SIGNIFICANT CHANGE UP (ref 1–3.3)
LYMPHOCYTES # BLD AUTO: 11.1 % — LOW (ref 13–44)
MAGNESIUM SERPL-MCNC: 1.7 MG/DL — SIGNIFICANT CHANGE UP (ref 1.6–2.6)
MCHC RBC-ENTMCNC: 32.7 PG — SIGNIFICANT CHANGE UP (ref 27–34)
MCHC RBC-ENTMCNC: 33.7 GM/DL — SIGNIFICANT CHANGE UP (ref 32–36)
MCV RBC AUTO: 97 FL — SIGNIFICANT CHANGE UP (ref 80–100)
MONOCYTES # BLD AUTO: 0.96 K/UL — HIGH (ref 0–0.9)
MONOCYTES NFR BLD AUTO: 6.4 % — SIGNIFICANT CHANGE UP (ref 2–14)
NEUTROPHILS # BLD AUTO: 12.25 K/UL — HIGH (ref 1.8–7.4)
NEUTROPHILS NFR BLD AUTO: 81.7 % — HIGH (ref 43–77)
NRBC # BLD: 0 /100 WBCS — SIGNIFICANT CHANGE UP (ref 0–0)
NRBC # FLD: 0 K/UL — SIGNIFICANT CHANGE UP (ref 0–0)
NT-PROBNP SERPL-SCNC: 7349 PG/ML — HIGH
PHOSPHATE SERPL-MCNC: 4.2 MG/DL — SIGNIFICANT CHANGE UP (ref 2.5–4.5)
PLATELET # BLD AUTO: 188 K/UL — SIGNIFICANT CHANGE UP (ref 150–400)
POTASSIUM SERPL-MCNC: 3.9 MMOL/L — SIGNIFICANT CHANGE UP (ref 3.5–5.3)
POTASSIUM SERPL-SCNC: 3.9 MMOL/L — SIGNIFICANT CHANGE UP (ref 3.5–5.3)
PROT SERPL-MCNC: 5.5 G/DL — LOW (ref 6–8.3)
RBC # BLD: 4.01 M/UL — LOW (ref 4.2–5.8)
RBC # FLD: 16.8 % — HIGH (ref 10.3–14.5)
SODIUM SERPL-SCNC: 135 MMOL/L — SIGNIFICANT CHANGE UP (ref 135–145)
WBC # BLD: 14.99 K/UL — HIGH (ref 3.8–10.5)
WBC # FLD AUTO: 14.99 K/UL — HIGH (ref 3.8–10.5)

## 2024-05-07 PROCEDURE — 99232 SBSQ HOSP IP/OBS MODERATE 35: CPT | Mod: GC

## 2024-05-07 RX ORDER — POTASSIUM CHLORIDE 20 MEQ
20 PACKET (EA) ORAL ONCE
Refills: 0 | Status: COMPLETED | OUTPATIENT
Start: 2024-05-07 | End: 2024-05-07

## 2024-05-07 RX ORDER — CARVEDILOL PHOSPHATE 80 MG/1
1 CAPSULE, EXTENDED RELEASE ORAL
Refills: 0 | DISCHARGE

## 2024-05-07 RX ORDER — METOPROLOL TARTRATE 50 MG
1 TABLET ORAL
Qty: 60 | Refills: 0
Start: 2024-05-07 | End: 2024-06-05

## 2024-05-07 RX ORDER — SODIUM CHLORIDE 9 MG/ML
1000 INJECTION, SOLUTION INTRAVENOUS
Refills: 0 | Status: DISCONTINUED | OUTPATIENT
Start: 2024-05-07 | End: 2024-05-08

## 2024-05-07 RX ADMIN — Medication 50 MILLIGRAM(S): at 18:50

## 2024-05-07 RX ADMIN — Medication 20 MILLIEQUIVALENT(S): at 18:20

## 2024-05-07 RX ADMIN — BUDESONIDE AND FORMOTEROL FUMARATE DIHYDRATE 2 PUFF(S): 160; 4.5 AEROSOL RESPIRATORY (INHALATION) at 22:23

## 2024-05-07 RX ADMIN — MONTELUKAST 10 MILLIGRAM(S): 4 TABLET, CHEWABLE ORAL at 11:24

## 2024-05-07 RX ADMIN — Medication 100 MILLIGRAM(S): at 11:24

## 2024-05-07 RX ADMIN — TAMSULOSIN HYDROCHLORIDE 0.4 MILLIGRAM(S): 0.4 CAPSULE ORAL at 22:24

## 2024-05-07 RX ADMIN — Medication 100 MILLIGRAM(S): at 05:26

## 2024-05-07 RX ADMIN — SENNA PLUS 2 TABLET(S): 8.6 TABLET ORAL at 22:24

## 2024-05-07 RX ADMIN — APIXABAN 5 MILLIGRAM(S): 2.5 TABLET, FILM COATED ORAL at 17:34

## 2024-05-07 RX ADMIN — Medication 2: at 11:43

## 2024-05-07 RX ADMIN — ISOSORBIDE DINITRATE 30 MILLIGRAM(S): 5 TABLET ORAL at 18:49

## 2024-05-07 RX ADMIN — CLOPIDOGREL BISULFATE 75 MILLIGRAM(S): 75 TABLET, FILM COATED ORAL at 11:24

## 2024-05-07 RX ADMIN — Medication 1: at 08:04

## 2024-05-07 RX ADMIN — Medication 50 MILLIGRAM(S): at 05:27

## 2024-05-07 RX ADMIN — ISOSORBIDE DINITRATE 30 MILLIGRAM(S): 5 TABLET ORAL at 13:34

## 2024-05-07 RX ADMIN — Medication 325 MILLIGRAM(S): at 11:24

## 2024-05-07 RX ADMIN — ISOSORBIDE DINITRATE 30 MILLIGRAM(S): 5 TABLET ORAL at 05:26

## 2024-05-07 RX ADMIN — APIXABAN 5 MILLIGRAM(S): 2.5 TABLET, FILM COATED ORAL at 05:26

## 2024-05-07 RX ADMIN — Medication 50 MICROGRAM(S): at 05:26

## 2024-05-07 RX ADMIN — CHLORHEXIDINE GLUCONATE 1 APPLICATION(S): 213 SOLUTION TOPICAL at 11:43

## 2024-05-07 RX ADMIN — GABAPENTIN 100 MILLIGRAM(S): 400 CAPSULE ORAL at 22:23

## 2024-05-07 RX ADMIN — ATORVASTATIN CALCIUM 80 MILLIGRAM(S): 80 TABLET, FILM COATED ORAL at 22:25

## 2024-05-07 RX ADMIN — Medication 100 MILLIGRAM(S): at 13:33

## 2024-05-07 RX ADMIN — BUDESONIDE AND FORMOTEROL FUMARATE DIHYDRATE 2 PUFF(S): 160; 4.5 AEROSOL RESPIRATORY (INHALATION) at 08:06

## 2024-05-07 RX ADMIN — Medication 100 MILLIGRAM(S): at 22:25

## 2024-05-07 NOTE — PROGRESS NOTE ADULT - ASSESSMENT
Briefly, 67 y/o [place] male with h/o HTN, HFrEF/ICM (EF 25%, LVIDD 5.6cm) s/p CRT-D and CardioMEMS, CAD w/ CABG 2014 at [facility], asthma/COPD, CVA with left sided weakness, CKD (b/l Cr 2.0), aflutter s/p ablation (3/29/24) comes in from HF office with worsening dyspnea and fluid overload. Started on bumex gtt 2 mg/hr with improvement. Started on Entresto and recently had coreg increased but was found to be hypotensive, likely 2/2 hypovolemia or medication-induced on 5/6.       RHC (3/24): RA 13 mmHg. PA 52/20/35 mmHg. PCW 23-25 mmHg. Cardiac index 1.96 L/min/m2. SVR 1712 dsc.  5/7 CardioMEMS PAD 18.

## 2024-05-07 NOTE — PROGRESS NOTE ADULT - PROBLEM SELECTOR PLAN 1
ACC Stage C/NYHA Class II. TTE 3/2024 w/ EF 20-25%. Possible etiologies for current decompensation include: recurrence of afib despite ablation, COPD exacerbation. Home GDMT: coreg 6.25mg BID, hydralazine 100mg TID, isordil 30mg TID  -defer to cardiology if necessary to repeat echo  -trops flat and no CP. EKG without STEMI equivalent - not ACS  -COPD management as below  -EP for device interrogation to assess for afib/aflutter burden - 86 second episode AF, BiVPaced 92%  -c/w GDMT:  - hydral 100 mg q8h  - isordil 30 mg q8h  - coreg 6.25 mg q12 switched to toprol 50 BID  - discontinued entresto 24/26 BID  - strict Is/Os, monitor UOP, daily weights  - s/p bumex gtt 2mg/hr per HF; f/u AM BNP  - diuresis now stopped for hypotension, s/p fluids 50 cc/hour x5 hours  - Maintain K>4, Mg>2. ACC Stage C/NYHA Class II. TTE 3/2024 w/ EF 20-25%. Possible etiologies for current decompensation include: recurrence of afib despite ablation, COPD exacerbation. Home GDMT: coreg 6.25mg BID, hydralazine 100mg TID, isordil 30mg TID  -defer to cardiology if necessary to repeat echo  -trops flat and no CP. EKG without STEMI equivalent - not ACS  -COPD management as below  -EP for device interrogation to assess for afib/aflutter burden - 86 second episode AF, BiVPaced 92%  -c/w GDMT:  - hydral 100 mg q8h  - isordil 30 mg q8h  - coreg 6.25 mg q12 switched to toprol 50 BID  - discontinued entresto 24/26 BID  - strict Is/Os, monitor UOP, daily weights  - s/p bumex gtt 2mg/hr per HF - BNP down to 7000 this AM  - diuresis now stopped for hypotension, s/p fluids 50 cc/hour x5 hours  - Maintain K>4, Mg>2. ACC Stage C/NYHA Class II. TTE 3/2024 w/ EF 20-25%. Possible etiologies for current decompensation include: recurrence of afib despite ablation, COPD exacerbation. Home GDMT: coreg 6.25mg BID, hydralazine 100mg TID, isordil 30mg TID  -defer to cardiology if necessary to repeat echo  -trops flat and no CP. EKG without STEMI equivalent - not ACS  -COPD management as below  -EP for device interrogation to assess for afib/aflutter burden - 86 second episode AF, BiVPaced 92%  -c/w GDMT:  - hydral 100 mg q8h  - isordil 30 mg q8h  - coreg 6.25 mg q12 switched to toprol 50 BID  - discontinued entresto 24/26 BID  - strict Is/Os, monitor UOP, daily weights  - s/p bumex gtt 2mg/hr per HF - BNP down to 7000 this AM; HF to check MEMS  - diuresis now stopped for hypotension, will start fluids 50 cc/hour x5 hours  - Maintain K>4, Mg>2.

## 2024-05-07 NOTE — DISCHARGE NOTE PROVIDER - NSDCADMDATE_GEN_ALL_CORE_FT
18-Oct-2021 03:05 OB Provider reported that the vagina was inspected and no foreign body was found/Laps, needles and instrument count was correct

## 2024-05-07 NOTE — DISCHARGE NOTE NURSING/CASE MANAGEMENT/SOCIAL WORK - PATIENT PORTAL LINK FT
You can access the FollowMyHealth Patient Portal offered by [facility] by registering at the following website: [email or web address]. By joining "[employer]’s FollowMyHealth portal, you will also be able to view your health information using other applications (apps) compatible with our system.

## 2024-05-07 NOTE — PROGRESS NOTE ADULT - SUBJECTIVE AND OBJECTIVE BOX
Patient is a 68y old  Male who presents with a chief complaint of acute on chronic heart failure exacerbation (06 May 2024 15:13)      SUBJECTIVE / OVERNIGHT EVENTS:  No acute events overnight. Patient at this time denies fevers, chills, CP, SOB, nausea, vomiting, diarrhea, constipation, dysuria. Patient seen and examined at bedside.    MEDICATIONS  (STANDING):  allopurinol 100 milliGRAM(s) Oral daily  apixaban 5 milliGRAM(s) Oral two times a day  atorvastatin 80 milliGRAM(s) Oral at bedtime  budesonide 160 MICROgram(s)/formoterol 4.5 MICROgram(s) Inhaler 2 Puff(s) Inhalation two times a day  chlorhexidine 2% Cloths 1 Application(s) Topical daily  clopidogrel Tablet 75 milliGRAM(s) Oral daily  dextrose 10% Bolus 125 milliLiter(s) IV Bolus once  dextrose 5%. 1000 milliLiter(s) (100 mL/Hr) IV Continuous <Continuous>  dextrose 5%. 1000 milliLiter(s) (50 mL/Hr) IV Continuous <Continuous>  dextrose 50% Injectable 12.5 Gram(s) IV Push once  dextrose 50% Injectable 25 Gram(s) IV Push once  ferrous    sulfate 325 milliGRAM(s) Oral daily  gabapentin 100 milliGRAM(s) Oral at bedtime  glucagon  Injectable 1 milliGRAM(s) IntraMuscular once  hydrALAZINE 100 milliGRAM(s) Oral three times a day  influenza  Vaccine (HIGH DOSE) 0.7 milliLiter(s) IntraMuscular once  insulin lispro (ADMELOG) corrective regimen sliding scale   SubCutaneous three times a day before meals  insulin lispro (ADMELOG) corrective regimen sliding scale   SubCutaneous at bedtime  isosorbide   dinitrate Tablet (ISORDIL) 30 milliGRAM(s) Oral three times a day  lactated ringers. 1000 milliLiter(s) (50 mL/Hr) IV Continuous <Continuous>  levothyroxine 50 MICROGram(s) Oral daily  metoprolol succinate ER 50 milliGRAM(s) Oral every 12 hours  montelukast 10 milliGRAM(s) Oral daily  senna 2 Tablet(s) Oral at bedtime  tamsulosin 0.4 milliGRAM(s) Oral at bedtime    MEDICATIONS  (PRN):  acetaminophen     Tablet .. 650 milliGRAM(s) Oral every 6 hours PRN Temp greater or equal to 38C (100.4F), Mild Pain (1 - 3)  dextrose Oral Gel 15 Gram(s) Oral once PRN Blood Glucose LESS THAN 70 milliGRAM(s)/deciliter  polyethylene glycol 3350 17 Gram(s) Oral daily PRN Constipation      CAPILLARY BLOOD GLUCOSE      POCT Blood Glucose.: 221 mg/dL (06 May 2024 21:26)  POCT Blood Glucose.: 229 mg/dL (06 May 2024 17:06)  POCT Blood Glucose.: 318 mg/dL (06 May 2024 12:23)  POCT Blood Glucose.: 177 mg/dL (06 May 2024 08:30)    I&O's Summary    06 May 2024 07:01  -  07 May 2024 07:00  --------------------------------------------------------  IN: 850 mL / OUT: 860 mL / NET: -10 mL        PHYSICAL EXAM:  Vital Signs Last 24 Hrs  T(C): 36.7 (07 May 2024 05:10), Max: 36.9 (06 May 2024 16:00)  T(F): 98.1 (07 May 2024 05:10), Max: 98.4 (06 May 2024 16:00)  HR: 73 (07 May 2024 05:10) (62 - 80)  BP: 106/81 (07 May 2024 05:10) (70/36 - 110/68)  BP(mean): --  RR: 18 (07 May 2024 05:10) (16 - 18)  SpO2: 97% (07 May 2024 05:10) (97% - 100%)    Parameters below as of 07 May 2024 05:10  Patient On (Oxygen Delivery Method): nasal cannula  O2 Flow (L/min): 2      GENERAL: No acute distress, well-developed  HEAD:  Atraumatic, Normocephalic  EYES: EOMI, PERRLA, conjunctiva and sclera clear  NECK: Supple, no lymphadenopathy, no JVD  CHEST/LUNG: CTAB; No wheezes, rales, or rhonchi  HEART: Regular rate and rhythm; No murmurs, rubs, or gallops  ABDOMEN: Soft, non-tender, non-distended; normal bowel sounds, no organomegaly  EXTREMITIES:  2+ peripheral pulses b/l, No clubbing, cyanosis, or edema  NEUROLOGY: A&O x 3, no focal deficits  SKIN: No rashes or lesions    LABS:                        13.6   13.95 )-----------( 192      ( 06 May 2024 06:13 )             41.6     05-06    135  |  96<L>  |  71<H>  ----------------------------<  221<H>  4.5   |  24  |  2.53<H>    Ca    7.6<L>      06 May 2024 18:04  Phos  4.6     05-06  Mg     1.80     05-06    TPro  5.8<L>  /  Alb  3.3  /  TBili  0.4  /  DBili  x   /  AST  29  /  ALT  28  /  AlkPhos  140<H>  05-06          Urinalysis Basic - ( 06 May 2024 18:04 )    Color: x / Appearance: x / SG: x / pH: x  Gluc: 221 mg/dL / Ketone: x  / Bili: x / Urobili: x   Blood: x / Protein: x / Nitrite: x   Leuk Esterase: x / RBC: x / WBC x   Sq Epi: x / Non Sq Epi: x / Bacteria: x          RADIOLOGY & ADDITIONAL TESTS:  Results Reviewed:   Imaging Personally Reviewed:  Electrocardiogram Personally Reviewed:    COORDINATION OF CARE:  Care Discussed with Consultants/Other Providers [Y/N]:  Prior or Outpatient Records Reviewed [Y/N]:

## 2024-05-07 NOTE — PROGRESS NOTE ADULT - SUBJECTIVE AND OBJECTIVE BOX
Medications:  acetaminophen     Tablet .. 650 milliGRAM(s) Oral every 6 hours PRN  allopurinol 100 milliGRAM(s) Oral daily  apixaban 5 milliGRAM(s) Oral two times a day  atorvastatin 80 milliGRAM(s) Oral at bedtime  budesonide 160 MICROgram(s)/formoterol 4.5 MICROgram(s) Inhaler 2 Puff(s) Inhalation two times a day  chlorhexidine 2% Cloths 1 Application(s) Topical daily  clopidogrel Tablet 75 milliGRAM(s) Oral daily  dextrose 10% Bolus 125 milliLiter(s) IV Bolus once  dextrose 5%. 1000 milliLiter(s) IV Continuous <Continuous>  dextrose 5%. 1000 milliLiter(s) IV Continuous <Continuous>  dextrose 50% Injectable 12.5 Gram(s) IV Push once  dextrose 50% Injectable 25 Gram(s) IV Push once  dextrose Oral Gel 15 Gram(s) Oral once PRN  ferrous    sulfate 325 milliGRAM(s) Oral daily  gabapentin 100 milliGRAM(s) Oral at bedtime  glucagon  Injectable 1 milliGRAM(s) IntraMuscular once  hydrALAZINE 100 milliGRAM(s) Oral three times a day  influenza  Vaccine (HIGH DOSE) 0.7 milliLiter(s) IntraMuscular once  insulin lispro (ADMELOG) corrective regimen sliding scale   SubCutaneous three times a day before meals  insulin lispro (ADMELOG) corrective regimen sliding scale   SubCutaneous at bedtime  isosorbide   dinitrate Tablet (ISORDIL) 30 milliGRAM(s) Oral three times a day  lactated ringers. 1000 milliLiter(s) IV Continuous <Continuous>  lactated ringers. 1000 milliLiter(s) IV Continuous <Continuous>  levothyroxine 50 MICROGram(s) Oral daily  metoprolol succinate ER 50 milliGRAM(s) Oral every 12 hours  montelukast 10 milliGRAM(s) Oral daily  polyethylene glycol 3350 17 Gram(s) Oral daily PRN  potassium chloride    Tablet ER 20 milliEquivalent(s) Oral once  senna 2 Tablet(s) Oral at bedtime  tamsulosin 0.4 milliGRAM(s) Oral at bedtime      Vitals:  Vital Signs Last 24 Hrs  T(C): 36.7 (07 May 2024 05:10), Max: 36.9 (06 May 2024 16:00)  T(F): 98.1 (07 May 2024 05:10), Max: 98.4 (06 May 2024 16:00)  HR: 73 (07 May 2024 05:10) (71 - 80)  BP: 106/81 (07 May 2024 05:10) (90/55 - 110/68)  BP(mean): --  RR: 18 (07 May 2024 05:10) (16 - 18)  SpO2: 97% (07 May 2024 05:10) (97% - 100%)    Parameters below as of 07 May 2024 05:10  Patient On (Oxygen Delivery Method): nasal cannula  O2 Flow (L/min): 2      Daily     Daily Weight in k.1 (07 May 2024 05:10)    I&O's Detail    06 May 2024 07:01  -  07 May 2024 07:00  --------------------------------------------------------  IN:    Oral Fluid: 850 mL  Total IN: 850 mL    OUT:    Voided (mL): 860 mL  Total OUT: 860 mL    Total NET: -10 mL          Physical Exam:     General: No distress. Comfortable.  HEENT: EOM intact.  Neck: Neck supple. JVP not elevated. No masses  Chest: Clear to auscultation bilaterally  CV: Normal S1 and S2. No murmurs, rub, or gallops. Radial pulses normal.  Abdomen: Soft, non-distended, non-tender  Skin: No rashes or skin breakdown  Neurology: Alert and oriented times three. Sensation intact  Psych: Affect normal    Labs:                        13.1   14.99 )-----------( 188      ( 07 May 2024 05:29 )             38.9     05-07    135  |  97<L>  |  78<H>  ----------------------------<  145<H>  3.9   |  20<L>  |  2.37<H>    Ca    7.6<L>      07 May 2024 05:29  Phos  4.2     05-07  Mg     1.70     05-07    TPro  5.5<L>  /  Alb  3.2<L>  /  TBili  0.4  /  DBili  x   /  AST  27  /  ALT  34  /  AlkPhos  142<H>  05-07           Patient seen and examined. He states he feels better. No SOB at rest, CP, palpitations, dizziness.   Does not ambulate much.       Medications:  acetaminophen     Tablet .. 650 milliGRAM(s) Oral every 6 hours PRN  allopurinol 100 milliGRAM(s) Oral daily  apixaban 5 milliGRAM(s) Oral two times a day  atorvastatin 80 milliGRAM(s) Oral at bedtime  budesonide 160 MICROgram(s)/formoterol 4.5 MICROgram(s) Inhaler 2 Puff(s) Inhalation two times a day  chlorhexidine 2% Cloths 1 Application(s) Topical daily  clopidogrel Tablet 75 milliGRAM(s) Oral daily  dextrose 10% Bolus 125 milliLiter(s) IV Bolus once  dextrose 5%. 1000 milliLiter(s) IV Continuous <Continuous>  dextrose 5%. 1000 milliLiter(s) IV Continuous <Continuous>  dextrose 50% Injectable 12.5 Gram(s) IV Push once  dextrose 50% Injectable 25 Gram(s) IV Push once  dextrose Oral Gel 15 Gram(s) Oral once PRN  ferrous    sulfate 325 milliGRAM(s) Oral daily  gabapentin 100 milliGRAM(s) Oral at bedtime  glucagon  Injectable 1 milliGRAM(s) IntraMuscular once  hydrALAZINE 100 milliGRAM(s) Oral three times a day  influenza  Vaccine (HIGH DOSE) 0.7 milliLiter(s) IntraMuscular once  insulin lispro (ADMELOG) corrective regimen sliding scale   SubCutaneous three times a day before meals  insulin lispro (ADMELOG) corrective regimen sliding scale   SubCutaneous at bedtime  isosorbide   dinitrate Tablet (ISORDIL) 30 milliGRAM(s) Oral three times a day  lactated ringers. 1000 milliLiter(s) IV Continuous <Continuous>  lactated ringers. 1000 milliLiter(s) IV Continuous <Continuous>  levothyroxine 50 MICROGram(s) Oral daily  metoprolol succinate ER 50 milliGRAM(s) Oral every 12 hours  montelukast 10 milliGRAM(s) Oral daily  polyethylene glycol 3350 17 Gram(s) Oral daily PRN  potassium chloride    Tablet ER 20 milliEquivalent(s) Oral once  senna 2 Tablet(s) Oral at bedtime  tamsulosin 0.4 milliGRAM(s) Oral at bedtime      Vitals:  Vital Signs Last 24 Hrs  T(C): 36.7 (07 May 2024 05:10), Max: 36.9 (06 May 2024 16:00)  T(F): 98.1 (07 May 2024 05:10), Max: 98.4 (06 May 2024 16:00)  HR: 73 (07 May 2024 05:10) (71 - 80)  BP: 106/81 (07 May 2024 05:10) (90/55 - 110/68)  BP(mean): --  RR: 18 (07 May 2024 05:10) (16 - 18)  SpO2: 97% (07 May 2024 05:10) (97% - 100%)    Parameters below as of 07 May 2024 05:10  Patient On (Oxygen Delivery Method): nasal cannula  O2 Flow (L/min): 2      Daily     Daily Weight in k.1 (07 May 2024 05:10)    I&O's Detail    06 May 2024 07:01  -  07 May 2024 07:00  --------------------------------------------------------  IN:    Oral Fluid: 850 mL  Total IN: 850 mL    OUT:    Voided (mL): 860 mL  Total OUT: 860 mL    Total NET: -10 mL          Physical Exam:     General: No distress. Comfortable.  HEENT: EOM intact.  Neck: Neck supple. JVP not elevated. No masses  Chest: Clear to auscultation bilaterally  CV: Normal S1 and S2. No murmurs, rub, or gallops. Radial pulses normal. No LE edema and is warm b/l.   Abdomen: Soft, non-distended, non-tender  Skin: No rashes or skin breakdown  Neurology: Alert and oriented times three. Sensation intact  Psych: Affect normal    Labs:                        13.1   14.99 )-----------( 188      ( 07 May 2024 05:29 )             38.9     0507    135  |  97<L>  |  78<H>  ----------------------------<  145<H>  3.9   |  20<L>  |  2.37<H>    Ca    7.6<L>      07 May 2024 05:29  Phos  4.2       Mg     1.70         TPro  5.5<L>  /  Alb  3.2<L>  /  TBili  0.4  /  DBili  x   /  AST  27  /  ALT  34  /  AlkPhos  142<H>

## 2024-05-07 NOTE — PHARMACOTHERAPY INTERVENTION NOTE - COMMENTS
Potential discharge medications were reviewed with the patient. Current medication schedule was discussed in detail including: medication name, indication, dose, administration times, side effects,  and special instructions. In particular, patient was counseled on heart failure medication indications, administration, and side effects. Also discussed fluid and salt restrictions, and maintaining a log of daily weights. In addition to this, discussed warning signs of volume overload (SOB, KINNEY, orthopnea, edema, etc.) and when to seek medical attention. All questions and concerns were answered and addressed. Patient verbalized understanding and was provided with educational drug cards as well as the heart failure booklet.     Sierra Rios, PharmD  Clinical Pharmacy Specialist  Spectra [phone]

## 2024-05-07 NOTE — PROGRESS NOTE ADULT - PROBLEM SELECTOR PLAN 11
DVT PPx: full AC w/ eliquis  Diet: DASH/CC fluid restriction  Code status: Full code  Communication: Ketty (daughter) contacted 5/1 11PM with regard to plan to which understanding was verbalized by party  Dispo: likely home   PCP: Dr. Robin Schmidt  Pharmacy: [facility] [phone] [facility]

## 2024-05-07 NOTE — PROGRESS NOTE ADULT - PROBLEM SELECTOR PLAN 1
- CardioMEMS PAD 18. As d/w Dr. Ching, BUN trending up, give NS 50 ml/hr x 5 hrs.   - c/w hydral 100 mg q8h/isordil 30 mg q8h  - c/w Toprol 50 mg twice/day  - give NS 50 cc/hr x 5 hours  - c/w Eliquis  - aggressive PT/OT.

## 2024-05-07 NOTE — PROGRESS NOTE ADULT - ATTENDING COMMENTS
Patient seen and examined at bedside. In brief, 68y M w/ hx of HFrEF (EF 20-25% as of 3/2024) s/p cardioMEMS in 3/2024 and [employer] CRT-D in 2022, paroxysmal afib/flutter s/p ablation on eliquis, CKD3, T2DM, COPD on 2LNC home O2, CAD s/p CABG in 2014, CVA w/ residual L sided weakness, prior R ankle fracture admitted for acute on chronic systolic congestive heart failure. Hypotensive this am 70s/40s, asymptomatic, in the setting of titration of cardiac meds. Patient bumex gtt. stopped on 5/6. Given some gentle hydration. GDMT adjusted given low BP.  Dispo likely 5/8 pending final HF recs

## 2024-05-08 ENCOUNTER — APPOINTMENT (OUTPATIENT)
Dept: ELECTROPHYSIOLOGY | Facility: CLINIC | Age: 69
End: 2024-05-08

## 2024-05-08 LAB
ALBUMIN SERPL ELPH-MCNC: 3.2 G/DL — LOW (ref 3.3–5)
ALP SERPL-CCNC: 150 U/L — HIGH (ref 40–120)
ALT FLD-CCNC: 47 U/L — HIGH (ref 4–41)
ANION GAP SERPL CALC-SCNC: 16 MMOL/L — HIGH (ref 7–14)
ANION GAP SERPL CALC-SCNC: 16 MMOL/L — HIGH (ref 7–14)
AST SERPL-CCNC: 35 U/L — SIGNIFICANT CHANGE UP (ref 4–40)
BILIRUB SERPL-MCNC: 0.4 MG/DL — SIGNIFICANT CHANGE UP (ref 0.2–1.2)
BUN SERPL-MCNC: 80 MG/DL — HIGH (ref 7–23)
BUN SERPL-MCNC: 83 MG/DL — HIGH (ref 7–23)
CALCIUM SERPL-MCNC: 7.6 MG/DL — LOW (ref 8.4–10.5)
CALCIUM SERPL-MCNC: 7.7 MG/DL — LOW (ref 8.4–10.5)
CHLORIDE SERPL-SCNC: 101 MMOL/L — SIGNIFICANT CHANGE UP (ref 98–107)
CHLORIDE SERPL-SCNC: 101 MMOL/L — SIGNIFICANT CHANGE UP (ref 98–107)
CO2 SERPL-SCNC: 20 MMOL/L — LOW (ref 22–31)
CO2 SERPL-SCNC: 21 MMOL/L — LOW (ref 22–31)
CREAT ?TM UR-MCNC: 60 MG/DL — SIGNIFICANT CHANGE UP
CREAT SERPL-MCNC: 2.51 MG/DL — HIGH (ref 0.5–1.3)
CREAT SERPL-MCNC: 2.55 MG/DL — HIGH (ref 0.5–1.3)
EGFR: 26 ML/MIN/1.73M2 — LOW
EGFR: 27 ML/MIN/1.73M2 — LOW
GLUCOSE BLDC GLUCOMTR-MCNC: 171 MG/DL — HIGH (ref 70–99)
GLUCOSE BLDC GLUCOMTR-MCNC: 183 MG/DL — HIGH (ref 70–99)
GLUCOSE BLDC GLUCOMTR-MCNC: 196 MG/DL — HIGH (ref 70–99)
GLUCOSE BLDC GLUCOMTR-MCNC: 224 MG/DL — HIGH (ref 70–99)
GLUCOSE SERPL-MCNC: 127 MG/DL — HIGH (ref 70–99)
GLUCOSE SERPL-MCNC: 149 MG/DL — HIGH (ref 70–99)
MAGNESIUM SERPL-MCNC: 1.8 MG/DL — SIGNIFICANT CHANGE UP (ref 1.6–2.6)
MAGNESIUM SERPL-MCNC: 1.8 MG/DL — SIGNIFICANT CHANGE UP (ref 1.6–2.6)
PHOSPHATE SERPL-MCNC: 4.7 MG/DL — HIGH (ref 2.5–4.5)
PHOSPHATE SERPL-MCNC: 5 MG/DL — HIGH (ref 2.5–4.5)
POTASSIUM SERPL-MCNC: 3.9 MMOL/L — SIGNIFICANT CHANGE UP (ref 3.5–5.3)
POTASSIUM SERPL-MCNC: 4 MMOL/L — SIGNIFICANT CHANGE UP (ref 3.5–5.3)
POTASSIUM SERPL-SCNC: 3.9 MMOL/L — SIGNIFICANT CHANGE UP (ref 3.5–5.3)
POTASSIUM SERPL-SCNC: 4 MMOL/L — SIGNIFICANT CHANGE UP (ref 3.5–5.3)
PROT ?TM UR-MCNC: 6 MG/DL — SIGNIFICANT CHANGE UP
PROT SERPL-MCNC: 5.2 G/DL — LOW (ref 6–8.3)
PROT/CREAT UR-RTO: 0.1 RATIO — SIGNIFICANT CHANGE UP (ref 0–0.2)
SODIUM SERPL-SCNC: 137 MMOL/L — SIGNIFICANT CHANGE UP (ref 135–145)
SODIUM SERPL-SCNC: 138 MMOL/L — SIGNIFICANT CHANGE UP (ref 135–145)
SODIUM UR-SCNC: <20 MMOL/L — SIGNIFICANT CHANGE UP
UUN UR-MCNC: 656 MG/DL — SIGNIFICANT CHANGE UP

## 2024-05-08 PROCEDURE — 99233 SBSQ HOSP IP/OBS HIGH 50: CPT | Mod: GC

## 2024-05-08 PROCEDURE — 99233 SBSQ HOSP IP/OBS HIGH 50: CPT | Mod: FS

## 2024-05-08 RX ORDER — BUMETANIDE 0.25 MG/ML
4 INJECTION INTRAMUSCULAR; INTRAVENOUS ONCE
Refills: 0 | Status: COMPLETED | OUTPATIENT
Start: 2024-05-08 | End: 2024-05-08

## 2024-05-08 RX ORDER — BUMETANIDE 0.25 MG/ML
1 INJECTION INTRAMUSCULAR; INTRAVENOUS
Qty: 20 | Refills: 0 | Status: DISCONTINUED | OUTPATIENT
Start: 2024-05-08 | End: 2024-05-12

## 2024-05-08 RX ORDER — POTASSIUM CHLORIDE 20 MEQ
20 PACKET (EA) ORAL ONCE
Refills: 0 | Status: COMPLETED | OUTPATIENT
Start: 2024-05-08 | End: 2024-05-08

## 2024-05-08 RX ORDER — SODIUM CHLORIDE 9 MG/ML
250 INJECTION INTRAMUSCULAR; INTRAVENOUS; SUBCUTANEOUS
Refills: 0 | Status: DISCONTINUED | OUTPATIENT
Start: 2024-05-08 | End: 2024-05-08

## 2024-05-08 RX ADMIN — Medication 100 MILLIGRAM(S): at 21:46

## 2024-05-08 RX ADMIN — ISOSORBIDE DINITRATE 30 MILLIGRAM(S): 5 TABLET ORAL at 17:48

## 2024-05-08 RX ADMIN — ISOSORBIDE DINITRATE 30 MILLIGRAM(S): 5 TABLET ORAL at 05:11

## 2024-05-08 RX ADMIN — APIXABAN 5 MILLIGRAM(S): 2.5 TABLET, FILM COATED ORAL at 05:12

## 2024-05-08 RX ADMIN — TAMSULOSIN HYDROCHLORIDE 0.4 MILLIGRAM(S): 0.4 CAPSULE ORAL at 21:47

## 2024-05-08 RX ADMIN — Medication 325 MILLIGRAM(S): at 11:32

## 2024-05-08 RX ADMIN — Medication 100 MILLIGRAM(S): at 05:13

## 2024-05-08 RX ADMIN — Medication 20 MILLIEQUIVALENT(S): at 12:45

## 2024-05-08 RX ADMIN — CLOPIDOGREL BISULFATE 75 MILLIGRAM(S): 75 TABLET, FILM COATED ORAL at 11:32

## 2024-05-08 RX ADMIN — BUDESONIDE AND FORMOTEROL FUMARATE DIHYDRATE 2 PUFF(S): 160; 4.5 AEROSOL RESPIRATORY (INHALATION) at 21:46

## 2024-05-08 RX ADMIN — MONTELUKAST 10 MILLIGRAM(S): 4 TABLET, CHEWABLE ORAL at 11:32

## 2024-05-08 RX ADMIN — BUMETANIDE 5 MG/HR: 0.25 INJECTION INTRAMUSCULAR; INTRAVENOUS at 13:30

## 2024-05-08 RX ADMIN — BUDESONIDE AND FORMOTEROL FUMARATE DIHYDRATE 2 PUFF(S): 160; 4.5 AEROSOL RESPIRATORY (INHALATION) at 09:18

## 2024-05-08 RX ADMIN — Medication 50 MILLIGRAM(S): at 05:12

## 2024-05-08 RX ADMIN — Medication 1: at 17:54

## 2024-05-08 RX ADMIN — Medication 100 MILLIGRAM(S): at 11:32

## 2024-05-08 RX ADMIN — BUMETANIDE 132 MILLIGRAM(S): 0.25 INJECTION INTRAMUSCULAR; INTRAVENOUS at 12:45

## 2024-05-08 RX ADMIN — SENNA PLUS 2 TABLET(S): 8.6 TABLET ORAL at 21:47

## 2024-05-08 RX ADMIN — Medication 1: at 09:18

## 2024-05-08 RX ADMIN — APIXABAN 5 MILLIGRAM(S): 2.5 TABLET, FILM COATED ORAL at 17:48

## 2024-05-08 RX ADMIN — CHLORHEXIDINE GLUCONATE 1 APPLICATION(S): 213 SOLUTION TOPICAL at 11:25

## 2024-05-08 RX ADMIN — ATORVASTATIN CALCIUM 80 MILLIGRAM(S): 80 TABLET, FILM COATED ORAL at 21:48

## 2024-05-08 RX ADMIN — Medication 2: at 13:05

## 2024-05-08 RX ADMIN — Medication 100 MILLIGRAM(S): at 13:31

## 2024-05-08 RX ADMIN — Medication 50 MILLIGRAM(S): at 17:48

## 2024-05-08 RX ADMIN — Medication 50 MICROGRAM(S): at 05:12

## 2024-05-08 RX ADMIN — GABAPENTIN 100 MILLIGRAM(S): 400 CAPSULE ORAL at 21:51

## 2024-05-08 NOTE — PROGRESS NOTE ADULT - PROBLEM SELECTOR PLAN 1
ACC Stage C/NYHA Class II. TTE 3/2024 w/ EF 20-25%. Possible etiologies for current decompensation include: recurrence of afib despite ablation, COPD exacerbation. Home GDMT: coreg 6.25mg BID, hydralazine 100mg TID, isordil 30mg TID  -defer to cardiology if necessary to repeat echo  -trops flat and no CP. EKG without STEMI equivalent - not ACS  -COPD management as below  -EP for device interrogation to assess for afib/aflutter burden - 86 second episode AF, BiVPaced 92%  -c/w GDMT:  - hydral 100 mg q8h  - isordil 30 mg q8h  - toprol 50 BID  - discontinued entresto 24/26 BID  - strict Is/Os, monitor UOP, daily weights  - s/p bumex gtt 2mg/hr per HF - BNP down to 7000, HF to check MEMS  - diuresis now stopped for hypotension, start fluids 50 cc/hour x5 hours  - Maintain K>4, Mg>2. ACC Stage C/NYHA Class II. TTE 3/2024 w/ EF 20-25%. Possible etiologies for current decompensation include: recurrence of afib despite ablation, COPD exacerbation. Home GDMT: coreg 6.25mg BID, hydralazine 100mg TID, isordil 30mg TID  -defer to cardiology if necessary to repeat echo  -trops flat and no CP. EKG without STEMI equivalent - not ACS  -COPD management as below  -EP for device interrogation to assess for afib/aflutter burden - 86 second episode AF, BiVPaced 92%  -c/w GDMT:  - hydral 100 mg q8h  - isordil 30 mg q8h  - toprol 50 BID  - discontinued entresto 24/26 BID  - strict Is/Os, monitor UOP, daily weights  - s/p bumex gtt 2mg/hr per HF - BNP down to 7000, HF to check MEMS  - still volume up with rising BUN - will give 4mg IVPB bumex followed by 1mg/hour bumex  - Maintain K>4, Mg>2.

## 2024-05-08 NOTE — PROGRESS NOTE ADULT - PROBLEM SELECTOR PLAN 5
Prior on aspirin, plavix  - transitioned to plavix eliquis upon afib diagnosis  -c/w plavix On glimepiride 2mg daily. A1c 6.0 in 07/2023.   -A1c 6.6  -ISS while inpatient

## 2024-05-08 NOTE — PROGRESS NOTE ADULT - PROBLEM SELECTOR PLAN 2
On home meds of albuterol inhaler PRN q6h, Dulera, singulair  -c/w Dulera therapeutic interchange, singulair  -wean to home O2 setting of 2LNC  -xopenex/ipratropium q6h for 24 hrs -> albuterol inhaler PRN q6h  -S/p Mag, solu-medrol 40mg x1 , CTX/azithro x1 in ED  - initiate CTX if pt w/ worsening hypoxia, leukocytosis, and/or fever as reason to continue.  -azithro  -pred 40mg qd x4 doses completed SCr 2.02 on admission. Baseline SCr 2.4-2.6.  -hold nephrotoxins   -monitor UOP, electrolytes  -rising BUN - FeUrea 33.6, suggesting pre-renal, but could be cardiorenal - volume up, so giving additional 4mg IVPB bumex and restarting bumex gtt @ 1mg/hr  -nephrology c/s given brittle HF and renal dysfunction, may require HD - appreciate recs

## 2024-05-08 NOTE — PROGRESS NOTE ADULT - PROBLEM SELECTOR PLAN 1
Please start Bumex 1mg/hr and give IV Bumex 4mg x1 now   Toprol 50mg daily (hold SBP<90)  Hydralazine 100mg TID (hold SBP<90)  Isosorbide 30mg TID (hold SBP<90)  Strict I/O  Daily weights  Monitor lytes replete K>4.0 and Mg>2.0  Trend SCr   Repeat BNP in AM  Management per primary team  Pending final recommendations from HF attending

## 2024-05-08 NOTE — PROGRESS NOTE ADULT - PROBLEM SELECTOR PLAN 8
PT IS A/O x4. ON MED SURG. DENIES ANY CHEST PAIN OR PRESSURE. PULSES ARE
PRESENT. TRACE EDEMA ON BLE. LUNGS CLEAR IN ALL FEILDS. ON RA, DENIES ANY SOB.
EQUAL CHEST RISE AND FALL. BOWEL SOUNDS HYPOACTIVE x4. ABD ROUND AND SEMI
FIRM. FIRST DRESSSING POST OP ON MID ABD IS CLEAN AND INTACT. ABD BINDER IN
PLACE. DENIES ANY PAIN AT THIS TIME. SALINE LOCKED ON RFA, INTACT AND PATENT.
BED IS AT LOWEST SETTING. CALL LIGHT WITHIN REACH. WILL CONTINUE TO MONITOR. -c/w home isordil, hydralazine  -now on coreg, entresto

## 2024-05-08 NOTE — PROGRESS NOTE ADULT - SUBJECTIVE AND OBJECTIVE BOX
Patient is a 68y old  Male who presents with a chief complaint of acute on chronic heart failure exacerbation (07 May 2024 07:13)      SUBJECTIVE / OVERNIGHT EVENTS:  No acute events overnight. Patient at this time denies fevers, chills, CP, SOB, nausea, vomiting, diarrhea, constipation, dysuria. Patient seen and examined at bedside.    MEDICATIONS  (STANDING):  allopurinol 100 milliGRAM(s) Oral daily  apixaban 5 milliGRAM(s) Oral two times a day  atorvastatin 80 milliGRAM(s) Oral at bedtime  budesonide 160 MICROgram(s)/formoterol 4.5 MICROgram(s) Inhaler 2 Puff(s) Inhalation two times a day  chlorhexidine 2% Cloths 1 Application(s) Topical daily  clopidogrel Tablet 75 milliGRAM(s) Oral daily  dextrose 10% Bolus 125 milliLiter(s) IV Bolus once  dextrose 5%. 1000 milliLiter(s) (100 mL/Hr) IV Continuous <Continuous>  dextrose 5%. 1000 milliLiter(s) (50 mL/Hr) IV Continuous <Continuous>  dextrose 50% Injectable 25 Gram(s) IV Push once  dextrose 50% Injectable 12.5 Gram(s) IV Push once  ferrous    sulfate 325 milliGRAM(s) Oral daily  gabapentin 100 milliGRAM(s) Oral at bedtime  glucagon  Injectable 1 milliGRAM(s) IntraMuscular once  hydrALAZINE 100 milliGRAM(s) Oral three times a day  influenza  Vaccine (HIGH DOSE) 0.7 milliLiter(s) IntraMuscular once  insulin lispro (ADMELOG) corrective regimen sliding scale   SubCutaneous three times a day before meals  insulin lispro (ADMELOG) corrective regimen sliding scale   SubCutaneous at bedtime  isosorbide   dinitrate Tablet (ISORDIL) 30 milliGRAM(s) Oral three times a day  lactated ringers. 1000 milliLiter(s) (50 mL/Hr) IV Continuous <Continuous>  lactated ringers. 1000 milliLiter(s) (50 mL/Hr) IV Continuous <Continuous>  levothyroxine 50 MICROGram(s) Oral daily  metoprolol succinate ER 50 milliGRAM(s) Oral every 12 hours  montelukast 10 milliGRAM(s) Oral daily  senna 2 Tablet(s) Oral at bedtime  tamsulosin 0.4 milliGRAM(s) Oral at bedtime    MEDICATIONS  (PRN):  acetaminophen     Tablet .. 650 milliGRAM(s) Oral every 6 hours PRN Temp greater or equal to 38C (100.4F), Mild Pain (1 - 3)  dextrose Oral Gel 15 Gram(s) Oral once PRN Blood Glucose LESS THAN 70 milliGRAM(s)/deciliter  polyethylene glycol 3350 17 Gram(s) Oral daily PRN Constipation      CAPILLARY BLOOD GLUCOSE      POCT Blood Glucose.: 162 mg/dL (07 May 2024 22:21)  POCT Blood Glucose.: 147 mg/dL (07 May 2024 17:25)  POCT Blood Glucose.: 223 mg/dL (07 May 2024 11:42)  POCT Blood Glucose.: 174 mg/dL (07 May 2024 08:02)    I&O's Summary      PHYSICAL EXAM:  Vital Signs Last 24 Hrs  T(C): 36.7 (08 May 2024 05:00), Max: 36.9 (07 May 2024 18:40)  T(F): 98.1 (08 May 2024 05:00), Max: 98.4 (07 May 2024 18:40)  HR: 77 (08 May 2024 05:00) (73 - 83)  BP: 97/60 (08 May 2024 05:00) (91/55 - 101/63)  BP(mean): --  RR: 17 (08 May 2024 05:00) (16 - 18)  SpO2: 97% (08 May 2024 05:00) (97% - 98%)    Parameters below as of 08 May 2024 05:00  Patient On (Oxygen Delivery Method): nasal cannula  O2 Flow (L/min): 2      GENERAL: No acute distress, well-developed  HEAD:  Atraumatic, Normocephalic  EYES: EOMI, PERRLA, conjunctiva and sclera clear  NECK: Supple, no lymphadenopathy, no JVD  CHEST/LUNG: CTAB; No wheezes, rales, or rhonchi  HEART: Regular rate and rhythm; No murmurs, rubs, or gallops  ABDOMEN: Soft, non-tender, non-distended; normal bowel sounds, no organomegaly  EXTREMITIES:  2+ peripheral pulses b/l, No clubbing, cyanosis, or edema  NEUROLOGY: A&O x 3, residual L sided deficits  SKIN: No rashes or lesions    LABS:                        13.1   14.99 )-----------( 188      ( 07 May 2024 05:29 )             38.9     05-07    135  |  97<L>  |  78<H>  ----------------------------<  145<H>  3.9   |  20<L>  |  2.37<H>    Ca    7.6<L>      07 May 2024 05:29  Phos  4.2     05-07  Mg     1.70     05-07    TPro  5.5<L>  /  Alb  3.2<L>  /  TBili  0.4  /  DBili  x   /  AST  27  /  ALT  34  /  AlkPhos  142<H>  05-07          Urinalysis Basic - ( 07 May 2024 05:29 )    Color: x / Appearance: x / SG: x / pH: x  Gluc: 145 mg/dL / Ketone: x  / Bili: x / Urobili: x   Blood: x / Protein: x / Nitrite: x   Leuk Esterase: x / RBC: x / WBC x   Sq Epi: x / Non Sq Epi: x / Bacteria: x          RADIOLOGY & ADDITIONAL TESTS:  Results Reviewed:   Imaging Personally Reviewed:  Electrocardiogram Personally Reviewed:    COORDINATION OF CARE:  Care Discussed with Consultants/Other Providers [Y/N]:  Prior or Outpatient Records Reviewed [Y/N]:   Patient is a 68y old  Male who presents with a chief complaint of acute on chronic heart failure exacerbation (07 May 2024 07:13)      SUBJECTIVE / OVERNIGHT EVENTS:  No acute events overnight. Patient at this time denies fevers, chills, CP, SOB, nausea, vomiting, diarrhea, constipation, dysuria. Patient seen and examined at bedside.     MEDICATIONS  (STANDING):  allopurinol 100 milliGRAM(s) Oral daily  apixaban 5 milliGRAM(s) Oral two times a day  atorvastatin 80 milliGRAM(s) Oral at bedtime  budesonide 160 MICROgram(s)/formoterol 4.5 MICROgram(s) Inhaler 2 Puff(s) Inhalation two times a day  chlorhexidine 2% Cloths 1 Application(s) Topical daily  clopidogrel Tablet 75 milliGRAM(s) Oral daily  dextrose 10% Bolus 125 milliLiter(s) IV Bolus once  dextrose 5%. 1000 milliLiter(s) (100 mL/Hr) IV Continuous <Continuous>  dextrose 5%. 1000 milliLiter(s) (50 mL/Hr) IV Continuous <Continuous>  dextrose 50% Injectable 25 Gram(s) IV Push once  dextrose 50% Injectable 12.5 Gram(s) IV Push once  ferrous    sulfate 325 milliGRAM(s) Oral daily  gabapentin 100 milliGRAM(s) Oral at bedtime  glucagon  Injectable 1 milliGRAM(s) IntraMuscular once  hydrALAZINE 100 milliGRAM(s) Oral three times a day  influenza  Vaccine (HIGH DOSE) 0.7 milliLiter(s) IntraMuscular once  insulin lispro (ADMELOG) corrective regimen sliding scale   SubCutaneous three times a day before meals  insulin lispro (ADMELOG) corrective regimen sliding scale   SubCutaneous at bedtime  isosorbide   dinitrate Tablet (ISORDIL) 30 milliGRAM(s) Oral three times a day  lactated ringers. 1000 milliLiter(s) (50 mL/Hr) IV Continuous <Continuous>  lactated ringers. 1000 milliLiter(s) (50 mL/Hr) IV Continuous <Continuous>  levothyroxine 50 MICROGram(s) Oral daily  metoprolol succinate ER 50 milliGRAM(s) Oral every 12 hours  montelukast 10 milliGRAM(s) Oral daily  senna 2 Tablet(s) Oral at bedtime  tamsulosin 0.4 milliGRAM(s) Oral at bedtime    MEDICATIONS  (PRN):  acetaminophen     Tablet .. 650 milliGRAM(s) Oral every 6 hours PRN Temp greater or equal to 38C (100.4F), Mild Pain (1 - 3)  dextrose Oral Gel 15 Gram(s) Oral once PRN Blood Glucose LESS THAN 70 milliGRAM(s)/deciliter  polyethylene glycol 3350 17 Gram(s) Oral daily PRN Constipation      CAPILLARY BLOOD GLUCOSE      POCT Blood Glucose.: 162 mg/dL (07 May 2024 22:21)  POCT Blood Glucose.: 147 mg/dL (07 May 2024 17:25)  POCT Blood Glucose.: 223 mg/dL (07 May 2024 11:42)  POCT Blood Glucose.: 174 mg/dL (07 May 2024 08:02)    I&O's Summary      PHYSICAL EXAM:  Vital Signs Last 24 Hrs  T(C): 36.7 (08 May 2024 05:00), Max: 36.9 (07 May 2024 18:40)  T(F): 98.1 (08 May 2024 05:00), Max: 98.4 (07 May 2024 18:40)  HR: 77 (08 May 2024 05:00) (73 - 83)  BP: 97/60 (08 May 2024 05:00) (91/55 - 101/63)  BP(mean): --  RR: 17 (08 May 2024 05:00) (16 - 18)  SpO2: 97% (08 May 2024 05:00) (97% - 98%)    Parameters below as of 08 May 2024 05:00  Patient On (Oxygen Delivery Method): nasal cannula  O2 Flow (L/min): 2      GENERAL: No acute distress, well-developed  HEAD:  Atraumatic, Normocephalic  EYES: EOMI, PERRLA, conjunctiva and sclera clear  NECK: Supple, no lymphadenopathy, no JVD  CHEST/LUNG: CTAB; No wheezes, rales, or rhonchi  HEART: Regular rate and rhythm; No murmurs, rubs, or gallops  ABDOMEN: Soft, non-tender, non-distended; normal bowel sounds, no organomegaly  EXTREMITIES:  2+ peripheral pulses b/l, No clubbing, cyanosis, or edema  NEUROLOGY: A&O x 3, residual L sided deficits  SKIN: No rashes or lesions    LABS:                        13.1   14.99 )-----------( 188      ( 07 May 2024 05:29 )             38.9     05-07    135  |  97<L>  |  78<H>  ----------------------------<  145<H>  3.9   |  20<L>  |  2.37<H>    Ca    7.6<L>      07 May 2024 05:29  Phos  4.2     05-07  Mg     1.70     05-07    TPro  5.5<L>  /  Alb  3.2<L>  /  TBili  0.4  /  DBili  x   /  AST  27  /  ALT  34  /  AlkPhos  142<H>  05-07          Urinalysis Basic - ( 07 May 2024 05:29 )    Color: x / Appearance: x / SG: x / pH: x  Gluc: 145 mg/dL / Ketone: x  / Bili: x / Urobili: x   Blood: x / Protein: x / Nitrite: x   Leuk Esterase: x / RBC: x / WBC x   Sq Epi: x / Non Sq Epi: x / Bacteria: x          RADIOLOGY & ADDITIONAL TESTS:  Results Reviewed:   Imaging Personally Reviewed:  Electrocardiogram Personally Reviewed:    COORDINATION OF CARE:  Care Discussed with Consultants/Other Providers [Y/N]:  Prior or Outpatient Records Reviewed [Y/N]:

## 2024-05-08 NOTE — PROGRESS NOTE ADULT - PROBLEM SELECTOR PLAN 3
CHADSVASc score of 7. S/p ablation 3/29/24. EKG and telemetry w/ ventricular pacing 90s-110s thus far.   -c/w GDMT as above  -c/w eliquis  -device interrogation as above - episode 86s AF On home meds of albuterol inhaler PRN q6h, Dulera, singulair  -c/w Dulera therapeutic interchange, singulair  -wean to home O2 setting of 2LNC  -xopenex/ipratropium q6h for 24 hrs -> albuterol inhaler PRN q6h  -S/p Mag, solu-medrol 40mg x1 , CTX/azithro x1 in ED  - initiate CTX if pt w/ worsening hypoxia, leukocytosis, and/or fever as reason to continue.  -azithro  -pred 40mg qd x4 doses completed

## 2024-05-08 NOTE — PROGRESS NOTE ADULT - PROBLEM SELECTOR PLAN 6
SCr 2.02 on admission. Baseline SCr 2.4-2.6.  -hold nephrotoxins   -diuresis as above   -monitor UOP, electrolytes SCr 2.02 on admission. Baseline SCr 2.4-2.6.  -hold nephrotoxins   -diuresis as above   -monitor UOP, electrolytes  -rising BUN, obtaining urine studies/bladder scan  - check FeUrea SCr 2.02 on admission. Baseline SCr 2.4-2.6.  -hold nephrotoxins   -monitor UOP, electrolytes  -rising BUN - FeUrea 33.6, suggesting pre-renal, but could be cardiorenal - trialing additional fluids 50cc/5 hours and rechecking in the evening for response, may need additional diuresis vs. fluids Prior on aspirin, plavix  - transitioned to plavix eliquis upon afib diagnosis  -c/w plavix

## 2024-05-08 NOTE — PROGRESS NOTE ADULT - ATTENDING COMMENTS
Patient seen and examined at bedside. In brief, 68y M w/ hx of HFrEF (EF 20-25% as of 3/2024) s/p cardioMEMS in 3/2024 and [employer] CRT-D in 2022, paroxysmal afib/flutter s/p ablation on eliquis, CKD3, T2DM, COPD on 2LNC home O2, CAD s/p CABG in 2014, CVA w/ residual L sided weakness, prior R ankle fracture admitted for acute on chronic systolic congestive heart failure.  Patient blood pressure was low on 5/6, asymptomatic, in the setting of titration of cardiac meds. Patient bumex gtt. stopped on 5/6. Given some gentle hydration on 5/6 order for IVF on 5/7. However, patient unfortunately, never received.  GDMT adjusted given low BP.  Patient creatine and BUN increased on 5/8, bladder scan, repeat urine studies sent. Further treatment IVF vs. Diuresis per further testing.   Dispo pending final HF recs and patient's creatine.     Time-based billing (NON-critical care).     50 minutes spent on total encounter. The necessity of the time spent during the encounter on this date of service was due to:     Review of laboratory data, radiology results, consultants' recommendations, documentation in [place], discussion with patient/house staff/ACP and interdisciplinary staff (such as , social workers, etc). Interventions were performed as documented above.

## 2024-05-08 NOTE — PROGRESS NOTE ADULT - SUBJECTIVE AND OBJECTIVE BOX
Interval History:    Medications:  acetaminophen     Tablet .. 650 milliGRAM(s) Oral every 6 hours PRN  allopurinol 100 milliGRAM(s) Oral daily  apixaban 5 milliGRAM(s) Oral two times a day  atorvastatin 80 milliGRAM(s) Oral at bedtime  budesonide 160 MICROgram(s)/formoterol 4.5 MICROgram(s) Inhaler 2 Puff(s) Inhalation two times a day  chlorhexidine 2% Cloths 1 Application(s) Topical daily  clopidogrel Tablet 75 milliGRAM(s) Oral daily  dextrose 10% Bolus 125 milliLiter(s) IV Bolus once  dextrose 5%. 1000 milliLiter(s) IV Continuous <Continuous>  dextrose 5%. 1000 milliLiter(s) IV Continuous <Continuous>  dextrose 50% Injectable 25 Gram(s) IV Push once  dextrose 50% Injectable 12.5 Gram(s) IV Push once  dextrose Oral Gel 15 Gram(s) Oral once PRN  ferrous    sulfate 325 milliGRAM(s) Oral daily  gabapentin 100 milliGRAM(s) Oral at bedtime  glucagon  Injectable 1 milliGRAM(s) IntraMuscular once  hydrALAZINE 100 milliGRAM(s) Oral three times a day  influenza  Vaccine (HIGH DOSE) 0.7 milliLiter(s) IntraMuscular once  insulin lispro (ADMELOG) corrective regimen sliding scale   SubCutaneous three times a day before meals  insulin lispro (ADMELOG) corrective regimen sliding scale   SubCutaneous at bedtime  isosorbide   dinitrate Tablet (ISORDIL) 30 milliGRAM(s) Oral three times a day  lactated ringers. 1000 milliLiter(s) IV Continuous <Continuous>  lactated ringers. 1000 milliLiter(s) IV Continuous <Continuous>  levothyroxine 50 MICROGram(s) Oral daily  metoprolol succinate ER 50 milliGRAM(s) Oral every 12 hours  montelukast 10 milliGRAM(s) Oral daily  polyethylene glycol 3350 17 Gram(s) Oral daily PRN  potassium chloride    Tablet ER 20 milliEquivalent(s) Oral once  senna 2 Tablet(s) Oral at bedtime  tamsulosin 0.4 milliGRAM(s) Oral at bedtime      Vitals:  T(C): 36.7 (24 @ 05:00), Max: 36.9 (24 @ 18:40)  HR: 77 (24 @ 05:00) (73 - 83)  BP: 97/60 (24 @ 05:00) (91/55 - 101/63)  BP(mean): --  RR: 17 (24 @ 05:00) (16 - 18)  SpO2: 97% (24 @ 05:00) (97% - 98%)    Daily     Daily Weight in k (08 May 2024 05:00)        I&O's Summary    08 May 2024 07:01  -  08 May 2024 10:12  --------------------------------------------------------  IN: 0 mL / OUT: 1000 mL / NET: -1000 mL        Physical Exam:  Appearance: No Acute Distress  HEENT: PERRL  Neck: No JVD  Cardiovascular: Normal S1 S2, No murmurs/rubs/gallops  Respiratory: Clear to auscultation bilaterally  Gastrointestinal: Soft, Non-tender	  Skin: No cyanosis	  Neurologic: Non-focal  Extremities: No LE edema  Psychiatry: A & O x 3, Mood & affect appropriate    Labs:                        13.1   14.99 )-----------( 188      ( 07 May 2024 05:29 )             38.9     08    137  |  101  |  83<H>  ----------------------------<  127<H>  3.9   |  20<L>  |  2.55<H>    Ca    7.6<L>      08 May 2024 05:00  Phos  4.7     05-08  Mg     1.80         TPro  5.2<L>  /  Alb  3.2<L>  /  TBili  0.4  /  DBili  x   /  AST  35  /  ALT  47<H>  /  AlkPhos  150<H>  08            TELEMETRY:    Echocardiogram:   Interval History:  Patient resting comfortably in bed   Denies CP/SOB/palpitations/dizziness  No acute events overnight      Medications:  acetaminophen     Tablet .. 650 milliGRAM(s) Oral every 6 hours PRN  allopurinol 100 milliGRAM(s) Oral daily  apixaban 5 milliGRAM(s) Oral two times a day  atorvastatin 80 milliGRAM(s) Oral at bedtime  budesonide 160 MICROgram(s)/formoterol 4.5 MICROgram(s) Inhaler 2 Puff(s) Inhalation two times a day  chlorhexidine 2% Cloths 1 Application(s) Topical daily  clopidogrel Tablet 75 milliGRAM(s) Oral daily  dextrose 10% Bolus 125 milliLiter(s) IV Bolus once  dextrose 5%. 1000 milliLiter(s) IV Continuous <Continuous>  dextrose 5%. 1000 milliLiter(s) IV Continuous <Continuous>  dextrose 50% Injectable 25 Gram(s) IV Push once  dextrose 50% Injectable 12.5 Gram(s) IV Push once  dextrose Oral Gel 15 Gram(s) Oral once PRN  ferrous    sulfate 325 milliGRAM(s) Oral daily  gabapentin 100 milliGRAM(s) Oral at bedtime  glucagon  Injectable 1 milliGRAM(s) IntraMuscular once  hydrALAZINE 100 milliGRAM(s) Oral three times a day  influenza  Vaccine (HIGH DOSE) 0.7 milliLiter(s) IntraMuscular once  insulin lispro (ADMELOG) corrective regimen sliding scale   SubCutaneous three times a day before meals  insulin lispro (ADMELOG) corrective regimen sliding scale   SubCutaneous at bedtime  isosorbide   dinitrate Tablet (ISORDIL) 30 milliGRAM(s) Oral three times a day  lactated ringers. 1000 milliLiter(s) IV Continuous <Continuous>  lactated ringers. 1000 milliLiter(s) IV Continuous <Continuous>  levothyroxine 50 MICROGram(s) Oral daily  metoprolol succinate ER 50 milliGRAM(s) Oral every 12 hours  montelukast 10 milliGRAM(s) Oral daily  polyethylene glycol 3350 17 Gram(s) Oral daily PRN  potassium chloride    Tablet ER 20 milliEquivalent(s) Oral once  senna 2 Tablet(s) Oral at bedtime  tamsulosin 0.4 milliGRAM(s) Oral at bedtime      Vitals:  T(C): 36.7 (24 @ 05:00), Max: 36.9 (24 @ 18:40)  HR: 77 (24 @ 05:00) (73 - 83)  BP: 97/60 (24 @ 05:00) (91/55 - 101/63)  BP(mean): --  RR: 17 (24 @ 05:00) (16 - 18)  SpO2: 97% (24 @ 05:00) (97% - 98%)    Daily     Daily Weight in k (08 May 2024 05:00)        I&O's Summary    08 May 2024 07:01  -  08 May 2024 10:12  --------------------------------------------------------  IN: 0 mL / OUT: 1000 mL / NET: -1000 mL        Physical Exam:  Appearance: No Acute Distress  Neck: JVP 10-12  Cardiovascular: Normal S1 S2  Respiratory: Clear to auscultation with scattered faint expiratory wheeze   Gastrointestinal: Soft, Non-tender	  Skin: No cyanosis	  Neurologic: Non-focal  Extremities: No LE edema      Labs:                        13.1   14.99 )-----------( 188      ( 07 May 2024 05:29 )             38.9     08    137  |  101  |  83<H>  ----------------------------<  127<H>  3.9   |  20<L>  |  2.55<H>    Ca    7.6<L>      08 May 2024 05:00  Phos  4.7       Mg     1.80         TPro  5.2<L>  /  Alb  3.2<L>  /  TBili  0.4  /  DBili  x   /  AST  35  /  ALT  47<H>  /  AlkPhos  150<H>        TELEMETRY: VPaced@80bpm with occasional PVCs      Echocardiogram:  TTE W or WO Ultrasound Enhancing Agent (24 @ 12:26)   _______________________________________________________________________________________     CONCLUSIONS:      1. Left ventricular systolic function is severely decreased with an ejection fraction visually estimated at 20 to 25 %. Global left ventricular hypokinesis.   2. Normal right ventricular cavity size, with normal wall thickness, and normal systolic function.   3. Device lead is visualized.   4. The left atrium is normal.   5. The right atrium is normal in size.   6. No significant valvular disease.   7. No pericardial effusion seen.   8. Estimated pulmonary artery systolic pressure is 49 mmHg.   9. The inferior vena cava is dilated measuring 2.35 cm in diameter, (dilated >2.1cm) with abnormal inspiratory collapse (abnormal <50%) consistent with elevated right atrialpressure (~15, range 10-20mmHg).    ________________________________________________________________________________________  FINDINGS:     Left Ventricle:  The left ventricular cavity is mildly dilated. Left ventricular systolic function is severelydecreased with an ejection fraction visually estimated at 20 to 25%. There is global left ventricular hypokinesis.     Right Ventricle:  The right ventricular cavity is normal in size, with normal wall thickness and normal systolic function. A devicelead is visualized.     Left Atrium:  The left atrium is normal with an indexed volume of 24.01 ml/m².     Right Atrium:  The right atrium is normal in size with an indexed area of 7.50 cm²/m².     Aortic Valve:  The aortic valve is tricuspid with normal leaflet excursion. There is fibrocalcific aortic valve sclerosis without stenosis. There is no evidence of aortic regurgitation.     Mitral Valve:  Structurally normal mitral valve with normal leaflet excursion. There is symmetric leaflet tethering. There is no mitral valve stenosis. There is mild mitral regurgitation.     Tricuspid Valve:  Structurally normal tricuspid valve with normal leaflet excursion. There is mild tricuspid regurgitation. Estimated pulmonary artery systolic pressure is 49 mmHg.     Pulmonic Valve:  Structurally normal pulmonic valve with normal leaflet excursion. There is trace pulmonic regurgitation.     Aorta:  The aortic root at the sinuses of Valsalva is normal in size, measuring 3.10 cm (indexed 1.60 cm/m²). The ascending aorta diameter is normal in size, measuring 2.80 cm (indexed 1.45 cm/m²).     Pericardium:  No pericardial effusion seen.     Systemic Veins:  The inferior vena cava is dilated measuring 2.35 cm in diameter, (dilated >2.1cm) with abnormal inspiratory collapse (abnormal <50%) consistent with elevated right atrial pressure (~15, range 10-20mmHg).  ____________________________________________________________________  QUANTITATIVE DATA:  Left Ventricle Measurements: (Indexed to BSA)     IVSd (2D):   1.0 cm  LVPWd (2D):  1.0 cm  LVIDd (2D):  6.2 cm  LVIDs (2D):  5.5 cm  LV Mass:     273 g  141.5 g/m²  Visualized LV EF%: 20 to 25%     e' lateral: 7.83 cm/s  e' medial:  7.07 cm/s    Aorta Measurements: (Normal range) (Indexed to BSA)     Sinuses of Valsalva: 3.10 cm (3.1 - 3.7 cm)  Ao Asc prox:         2.80 cm       Left Atrium Measurements: (Indexed to BSA)  LA Diam 2D: 5.20 cm    Right Ventricle Measurements:     RV d, 2D:   3.5 cm  TV Mesha. S': 5.98 cm/s       LVOT / RVOT/ Qp/Qs Data: (Indexed to BSA)  LVOT Diameter: 2.10 cm  LVOT Vmax:     0.80 m/s  LVOT VTI:      11.25 cm  LVOT SV:       39.0 ml  20.16 ml/m²    Aortic Valve Measurements:  AV Vmax:                0.9 m/s  AV Peak Gradient:       3.3 mmHg  AV Mean Gradient: 2.0 mmHg  AV VTI:                 15.6 cm  AV VTI Ratio:           0.72  AoV EOA, Contin:        2.50 cm²  AoV EOA, Contin i:      1.29 cm²/m²  AoV Dimensionless Index 0.72       Tricuspid Valve Measurements:     TR Vmax:          2.9 m/s  TR Peak Gradient: 34.1 mmHg  RA Pressure:      15 mmHg  PASP:             49 mmHg

## 2024-05-08 NOTE — PROGRESS NOTE ADULT - PROBLEM SELECTOR PLAN 11
DVT PPx: full AC w/ eliquis  Diet: DASH/CC fluid restriction  Code status: Full code  Communication: Ketty (daughter) contacted 5/1 11PM with regard to plan to which understanding was verbalized by party  Dispo: likely home   PCP: Dr. Robin Schmidt  Pharmacy: [facility] [phone] [facility]

## 2024-05-08 NOTE — PROGRESS NOTE ADULT - ASSESSMENT
Briefly, 69 y/o [place] male with h/o HTN, HFrEF/ICM (EF 25%, LVIDD 5.6cm) s/p CRT-D and CardioMEMS, CAD w/ CABG 2014 at [facility], asthma/COPD, CVA with left sided weakness, CKD (b/l Cr 2.0), aflutter s/p ablation (3/29/24) comes in from HF office with worsening dyspnea and fluid overload. Started on bumex gtt 2 mg/hr with improvement. Started on Entresto and recently had coreg increased but now hypotensive, likely 2/2 hypovolemia or medication-induced.     Pertinent Labs:    Pro-Brain Natriuretic Peptide: 9273  Lactate: 1.7 mmol/L   Troponin  81/84  BUN/Cr 42.2.11    CXR: Moderate pulmonary vascular congestion     EKG: Sinus Rhythm non specific ST-T wave changes      RHC (3/24): RA 13 mmHg. PA 52/20/35 mmHg. PCW 23-25 mmHg. Cardiac index 1.96 L/min/m2. SVR 1712 dsc.   Briefly, 69 y/o [place] male with h/o HTN, HFrEF/ICM (EF 25%, LVIDD 5.6cm) s/p CRT-D and CardioMEMS, CAD w/ CABG 2014 at [facility], asthma/COPD, CVA with left sided weakness, CKD (b/l Cr 2.0), aflutter s/p ablation (3/29/24) comes in from HF office with worsening dyspnea and fluid overload. Started on bumex gtt 2 mg/hr with improvement. Started on Entresto and recently had coreg increased but now hypotensive, likely 2/2 hypovolemia or medication-induced. Renal function worsening initially thought to be d/t hypovolemia but appears overloaded today.     Pertinent Labs:    Pro-Brain Natriuretic Peptide: 9273  Lactate: 1.7 mmol/L   Troponin  81/84  BUN/Cr 42.2.11    CXR: Moderate pulmonary vascular congestion     EKG: Sinus Rhythm non specific ST-T wave changes      RHC (3/24): RA 13 mmHg. PA 52/20/35 mmHg. PCW 23-25 mmHg. Cardiac index 1.96 L/min/m2. SVR 1712 dsc.

## 2024-05-08 NOTE — PROGRESS NOTE ADULT - PROBLEM SELECTOR PLAN 4
On glimepiride 2mg daily. A1c 6.0 in 07/2023.   -A1c 6.6  -ISS while inpatient CHADSVASc score of 7. S/p ablation 3/29/24. EKG and telemetry w/ ventricular pacing 90s-110s thus far.   -c/w GDMT as above  -c/w eliquis  -device interrogation as above - episode 86s AF

## 2024-05-09 DIAGNOSIS — N18.4 CHRONIC KIDNEY DISEASE, STAGE 4 (SEVERE): ICD-10-CM

## 2024-05-09 LAB
ALBUMIN SERPL ELPH-MCNC: 3.1 G/DL — LOW (ref 3.3–5)
ALP SERPL-CCNC: 149 U/L — HIGH (ref 40–120)
ALT FLD-CCNC: 38 U/L — SIGNIFICANT CHANGE UP (ref 4–41)
ANION GAP SERPL CALC-SCNC: 16 MMOL/L — HIGH (ref 7–14)
AST SERPL-CCNC: 26 U/L — SIGNIFICANT CHANGE UP (ref 4–40)
BASOPHILS # BLD AUTO: 0.03 K/UL — SIGNIFICANT CHANGE UP (ref 0–0.2)
BASOPHILS NFR BLD AUTO: 0.3 % — SIGNIFICANT CHANGE UP (ref 0–2)
BILIRUB SERPL-MCNC: 0.4 MG/DL — SIGNIFICANT CHANGE UP (ref 0.2–1.2)
BUN SERPL-MCNC: 85 MG/DL — HIGH (ref 7–23)
CALCIUM SERPL-MCNC: 7.5 MG/DL — LOW (ref 8.4–10.5)
CHLORIDE SERPL-SCNC: 100 MMOL/L — SIGNIFICANT CHANGE UP (ref 98–107)
CO2 SERPL-SCNC: 21 MMOL/L — LOW (ref 22–31)
CREAT SERPL-MCNC: 2.38 MG/DL — HIGH (ref 0.5–1.3)
EGFR: 29 ML/MIN/1.73M2 — LOW
EOSINOPHIL # BLD AUTO: 0.14 K/UL — SIGNIFICANT CHANGE UP (ref 0–0.5)
EOSINOPHIL NFR BLD AUTO: 1.3 % — SIGNIFICANT CHANGE UP (ref 0–6)
GLUCOSE BLDC GLUCOMTR-MCNC: 143 MG/DL — HIGH (ref 70–99)
GLUCOSE BLDC GLUCOMTR-MCNC: 155 MG/DL — HIGH (ref 70–99)
GLUCOSE BLDC GLUCOMTR-MCNC: 164 MG/DL — HIGH (ref 70–99)
GLUCOSE BLDC GLUCOMTR-MCNC: 210 MG/DL — HIGH (ref 70–99)
GLUCOSE SERPL-MCNC: 149 MG/DL — HIGH (ref 70–99)
HCT VFR BLD CALC: 39.4 % — SIGNIFICANT CHANGE UP (ref 39–50)
HGB BLD-MCNC: 13 G/DL — SIGNIFICANT CHANGE UP (ref 13–17)
IANC: 8.2 K/UL — HIGH (ref 1.8–7.4)
IMM GRANULOCYTES NFR BLD AUTO: 0.5 % — SIGNIFICANT CHANGE UP (ref 0–0.9)
LYMPHOCYTES # BLD AUTO: 1.57 K/UL — SIGNIFICANT CHANGE UP (ref 1–3.3)
LYMPHOCYTES # BLD AUTO: 14.4 % — SIGNIFICANT CHANGE UP (ref 13–44)
MAGNESIUM SERPL-MCNC: 1.7 MG/DL — SIGNIFICANT CHANGE UP (ref 1.6–2.6)
MCHC RBC-ENTMCNC: 32.1 PG — SIGNIFICANT CHANGE UP (ref 27–34)
MCHC RBC-ENTMCNC: 33 GM/DL — SIGNIFICANT CHANGE UP (ref 32–36)
MCV RBC AUTO: 97.3 FL — SIGNIFICANT CHANGE UP (ref 80–100)
MONOCYTES # BLD AUTO: 0.92 K/UL — HIGH (ref 0–0.9)
MONOCYTES NFR BLD AUTO: 8.4 % — SIGNIFICANT CHANGE UP (ref 2–14)
NEUTROPHILS # BLD AUTO: 8.2 K/UL — HIGH (ref 1.8–7.4)
NEUTROPHILS NFR BLD AUTO: 75.1 % — SIGNIFICANT CHANGE UP (ref 43–77)
NRBC # BLD: 0 /100 WBCS — SIGNIFICANT CHANGE UP (ref 0–0)
NRBC # FLD: 0 K/UL — SIGNIFICANT CHANGE UP (ref 0–0)
NT-PROBNP SERPL-SCNC: 9394 PG/ML — HIGH
PHOSPHATE SERPL-MCNC: 4.7 MG/DL — HIGH (ref 2.5–4.5)
PLATELET # BLD AUTO: 183 K/UL — SIGNIFICANT CHANGE UP (ref 150–400)
POTASSIUM SERPL-MCNC: 3.8 MMOL/L — SIGNIFICANT CHANGE UP (ref 3.5–5.3)
POTASSIUM SERPL-SCNC: 3.8 MMOL/L — SIGNIFICANT CHANGE UP (ref 3.5–5.3)
PROT SERPL-MCNC: 5.4 G/DL — LOW (ref 6–8.3)
RBC # BLD: 4.05 M/UL — LOW (ref 4.2–5.8)
RBC # FLD: 17 % — HIGH (ref 10.3–14.5)
SODIUM SERPL-SCNC: 137 MMOL/L — SIGNIFICANT CHANGE UP (ref 135–145)
WBC # BLD: 10.91 K/UL — HIGH (ref 3.8–10.5)
WBC # FLD AUTO: 10.91 K/UL — HIGH (ref 3.8–10.5)

## 2024-05-09 PROCEDURE — 99233 SBSQ HOSP IP/OBS HIGH 50: CPT | Mod: GC

## 2024-05-09 PROCEDURE — 99223 1ST HOSP IP/OBS HIGH 75: CPT | Mod: GC

## 2024-05-09 PROCEDURE — 99233 SBSQ HOSP IP/OBS HIGH 50: CPT | Mod: FS

## 2024-05-09 RX ORDER — SODIUM CHLORIDE 5 G/100ML
150 INJECTION, SOLUTION INTRAVENOUS ONCE
Refills: 0 | Status: COMPLETED | OUTPATIENT
Start: 2024-05-09 | End: 2024-05-09

## 2024-05-09 RX ORDER — MAGNESIUM SULFATE 500 MG/ML
2 VIAL (ML) INJECTION ONCE
Refills: 0 | Status: COMPLETED | OUTPATIENT
Start: 2024-05-09 | End: 2024-05-11

## 2024-05-09 RX ADMIN — SENNA PLUS 2 TABLET(S): 8.6 TABLET ORAL at 21:49

## 2024-05-09 RX ADMIN — Medication 2: at 13:04

## 2024-05-09 RX ADMIN — Medication 100 MILLIGRAM(S): at 05:15

## 2024-05-09 RX ADMIN — APIXABAN 5 MILLIGRAM(S): 2.5 TABLET, FILM COATED ORAL at 17:31

## 2024-05-09 RX ADMIN — Medication 1: at 17:29

## 2024-05-09 RX ADMIN — GABAPENTIN 100 MILLIGRAM(S): 400 CAPSULE ORAL at 21:49

## 2024-05-09 RX ADMIN — BUDESONIDE AND FORMOTEROL FUMARATE DIHYDRATE 2 PUFF(S): 160; 4.5 AEROSOL RESPIRATORY (INHALATION) at 08:22

## 2024-05-09 RX ADMIN — ISOSORBIDE DINITRATE 30 MILLIGRAM(S): 5 TABLET ORAL at 11:55

## 2024-05-09 RX ADMIN — BUDESONIDE AND FORMOTEROL FUMARATE DIHYDRATE 2 PUFF(S): 160; 4.5 AEROSOL RESPIRATORY (INHALATION) at 21:47

## 2024-05-09 RX ADMIN — Medication 50 MICROGRAM(S): at 05:16

## 2024-05-09 RX ADMIN — BUMETANIDE 5 MG/HR: 0.25 INJECTION INTRAMUSCULAR; INTRAVENOUS at 13:04

## 2024-05-09 RX ADMIN — Medication 100 MILLIGRAM(S): at 21:49

## 2024-05-09 RX ADMIN — CHLORHEXIDINE GLUCONATE 1 APPLICATION(S): 213 SOLUTION TOPICAL at 12:40

## 2024-05-09 RX ADMIN — Medication 100 MILLIGRAM(S): at 11:54

## 2024-05-09 RX ADMIN — Medication 1: at 08:22

## 2024-05-09 RX ADMIN — ISOSORBIDE DINITRATE 30 MILLIGRAM(S): 5 TABLET ORAL at 05:16

## 2024-05-09 RX ADMIN — APIXABAN 5 MILLIGRAM(S): 2.5 TABLET, FILM COATED ORAL at 05:15

## 2024-05-09 RX ADMIN — Medication 50 MILLIGRAM(S): at 17:31

## 2024-05-09 RX ADMIN — ISOSORBIDE DINITRATE 30 MILLIGRAM(S): 5 TABLET ORAL at 17:30

## 2024-05-09 RX ADMIN — BUMETANIDE 5 MG/HR: 0.25 INJECTION INTRAMUSCULAR; INTRAVENOUS at 05:19

## 2024-05-09 RX ADMIN — MONTELUKAST 10 MILLIGRAM(S): 4 TABLET, CHEWABLE ORAL at 11:55

## 2024-05-09 RX ADMIN — Medication 325 MILLIGRAM(S): at 11:54

## 2024-05-09 RX ADMIN — TAMSULOSIN HYDROCHLORIDE 0.4 MILLIGRAM(S): 0.4 CAPSULE ORAL at 21:50

## 2024-05-09 RX ADMIN — CLOPIDOGREL BISULFATE 75 MILLIGRAM(S): 75 TABLET, FILM COATED ORAL at 11:54

## 2024-05-09 RX ADMIN — Medication 50 MILLIGRAM(S): at 05:16

## 2024-05-09 RX ADMIN — Medication 100 MILLIGRAM(S): at 13:12

## 2024-05-09 RX ADMIN — ATORVASTATIN CALCIUM 80 MILLIGRAM(S): 80 TABLET, FILM COATED ORAL at 21:49

## 2024-05-09 NOTE — PROGRESS NOTE ADULT - PROBLEM SELECTOR PLAN 11
DVT PPx: full AC w/ eliquis  Diet: DASH/CC fluid restriction  Code status: Full code  Communication: Ketty (daughter) contacted 5/1 11PM with regard to plan to which understanding was verbalized by party  Dispo: likely home   PCP: Dr. Robin Schmidt  Pharmacy: [facility] [phone] [facility]

## 2024-05-09 NOTE — PROGRESS NOTE ADULT - SUBJECTIVE AND OBJECTIVE BOX
Patient is a 68y old  Male who presents with a chief complaint of acute on chronic heart failure exacerbation (08 May 2024 10:12)      SUBJECTIVE / OVERNIGHT EVENTS:  No acute events overnight. Patient at this time denies fevers, chills, CP, SOB, nausea, vomiting, diarrhea, constipation, dysuria. Patient seen and examined at bedside.    MEDICATIONS  (STANDING):  allopurinol 100 milliGRAM(s) Oral daily  apixaban 5 milliGRAM(s) Oral two times a day  atorvastatin 80 milliGRAM(s) Oral at bedtime  budesonide 160 MICROgram(s)/formoterol 4.5 MICROgram(s) Inhaler 2 Puff(s) Inhalation two times a day  buMETAnide Infusion 1 mG/Hr (5 mL/Hr) IV Continuous <Continuous>  chlorhexidine 2% Cloths 1 Application(s) Topical daily  clopidogrel Tablet 75 milliGRAM(s) Oral daily  dextrose 10% Bolus 125 milliLiter(s) IV Bolus once  dextrose 5%. 1000 milliLiter(s) (100 mL/Hr) IV Continuous <Continuous>  dextrose 5%. 1000 milliLiter(s) (50 mL/Hr) IV Continuous <Continuous>  dextrose 50% Injectable 12.5 Gram(s) IV Push once  dextrose 50% Injectable 25 Gram(s) IV Push once  ferrous    sulfate 325 milliGRAM(s) Oral daily  gabapentin 100 milliGRAM(s) Oral at bedtime  glucagon  Injectable 1 milliGRAM(s) IntraMuscular once  hydrALAZINE 100 milliGRAM(s) Oral three times a day  influenza  Vaccine (HIGH DOSE) 0.7 milliLiter(s) IntraMuscular once  insulin lispro (ADMELOG) corrective regimen sliding scale   SubCutaneous three times a day before meals  insulin lispro (ADMELOG) corrective regimen sliding scale   SubCutaneous at bedtime  isosorbide   dinitrate Tablet (ISORDIL) 30 milliGRAM(s) Oral three times a day  levothyroxine 50 MICROGram(s) Oral daily  magnesium sulfate  IVPB 2 Gram(s) IV Intermittent once  metoprolol succinate ER 50 milliGRAM(s) Oral every 12 hours  montelukast 10 milliGRAM(s) Oral daily  senna 2 Tablet(s) Oral at bedtime  tamsulosin 0.4 milliGRAM(s) Oral at bedtime    MEDICATIONS  (PRN):  acetaminophen     Tablet .. 650 milliGRAM(s) Oral every 6 hours PRN Temp greater or equal to 38C (100.4F), Mild Pain (1 - 3)  dextrose Oral Gel 15 Gram(s) Oral once PRN Blood Glucose LESS THAN 70 milliGRAM(s)/deciliter  polyethylene glycol 3350 17 Gram(s) Oral daily PRN Constipation      CAPILLARY BLOOD GLUCOSE      POCT Blood Glucose.: 183 mg/dL (08 May 2024 21:56)  POCT Blood Glucose.: 196 mg/dL (08 May 2024 17:46)  POCT Blood Glucose.: 224 mg/dL (08 May 2024 12:25)  POCT Blood Glucose.: 171 mg/dL (08 May 2024 08:56)    I&O's Summary    08 May 2024 07:01  -  09 May 2024 07:00  --------------------------------------------------------  IN: 8 mL / OUT: 3200 mL / NET: -3192 mL        PHYSICAL EXAM:  Vital Signs Last 24 Hrs  T(C): 36.4 (08 May 2024 21:40), Max: 36.7 (08 May 2024 13:30)  T(F): 97.6 (08 May 2024 21:40), Max: 98 (08 May 2024 13:30)  HR: 88 (08 May 2024 21:40) (75 - 88)  BP: 103/56 (08 May 2024 21:40) (91/62 - 112/57)  BP(mean): --  RR: 18 (08 May 2024 21:40) (18 - 19)  SpO2: 97% (08 May 2024 21:40) (97% - 98%)    Parameters below as of 08 May 2024 21:40  Patient On (Oxygen Delivery Method): nasal cannula  O2 Flow (L/min): 2      GENERAL: No acute distress, well-developed  HEAD:  Atraumatic, Normocephalic  EYES: EOMI, PERRLA, conjunctiva and sclera clear  NECK: Supple, no lymphadenopathy, no JVD  CHEST/LUNG: Mild rhonchi b/l  HEART: Regular rate and rhythm; No murmurs, rubs, or gallops  ABDOMEN: Soft, non-tender, non-distended; normal bowel sounds, no organomegaly  EXTREMITIES:  2+ peripheral pulses b/l, No clubbing, cyanosis, or edema  NEUROLOGY: A&O x 3, no focal deficits  SKIN: No rashes or lesions    LABS:    05-08    138  |  101  |  80<H>  ----------------------------<  149<H>  4.0   |  21<L>  |  2.51<H>    Ca    7.7<L>      08 May 2024 19:07  Phos  5.0     05-08  Mg     1.80     05-08    TPro  5.2<L>  /  Alb  3.2<L>  /  TBili  0.4  /  DBili  x   /  AST  35  /  ALT  47<H>  /  AlkPhos  150<H>  05-08          Urinalysis Basic - ( 08 May 2024 19:07 )    Color: x / Appearance: x / SG: x / pH: x  Gluc: 149 mg/dL / Ketone: x  / Bili: x / Urobili: x   Blood: x / Protein: x / Nitrite: x   Leuk Esterase: x / RBC: x / WBC x   Sq Epi: x / Non Sq Epi: x / Bacteria: x          RADIOLOGY & ADDITIONAL TESTS:  Results Reviewed:   Imaging Personally Reviewed:  Electrocardiogram Personally Reviewed:    COORDINATION OF CARE:  Care Discussed with Consultants/Other Providers [Y/N]:  Prior or Outpatient Records Reviewed [Y/N]:

## 2024-05-09 NOTE — PROGRESS NOTE ADULT - PROBLEM SELECTOR PLAN 1
c/w Bumex gtt  give 3% HT saline x1  Toprol 50mg daily (hold SBP<90)  Hydralazine 100mg TID (hold SBP<90)  Isosorbide 30mg TID (hold SBP<90)  Strict I/O  Daily weights  Monitor lytes replete K>4.0 and Mg>2.0  Trend SCr   Repeat BNP in AM  Management per primary team  Pending final recommendations from HF attending

## 2024-05-09 NOTE — CONSULT NOTE ADULT - SUBJECTIVE AND OBJECTIVE BOX
[facility] DIVISION OF KIDNEY DISEASES AND HYPERTENSION -- [phone]  -- INITIAL CONSULT NOTE  --------------------------------------------------------------------------------  HPI: 68M with CKD IV, as well as hx of HFrEF (EF 20-25% as of 3/2024) s/p cardioMEMS in 3/2024 and [employer]-tr[employer] CRT-D in 2022, paroxysmal afib/flutter s/p ablation on eliquis, T2DM, CAD s/p CABG in 2014, CVA w/ residual L sided weakness, and COPD (on 2L O2 at home) presented from cardiology office on 5/1 for worsening dyspnea and admitted for acute decompensated heart failure and COPD exacerbation. Nephrology was consulted for diuretic management iso advanced kidney disease,    Patient was seen and examined at bedside. Patient was hospitalized in 3/2024 for decompensated heart failure w/ c/f aflutter as etiology – now s/p BRANDIE (no atrial thrombus) and ablation by EP as of 3/29/2024. Per Heart Failure note: RHC (3/24): RA 13 mmHg. PA 52/20/35 mmHg. PCW 23-25 mmHg. During that admission his SCr peaked at 3.01. Patient's baseline SCr is ~2.2-2.6. His diuretic regimen is 4mg bumex BID typically but was increased to 6mg AM/4mg PM for elevated MEMS readings >30. Despite increase in diuretic dose, pt was noted to have increase in PA pressure from 31 to 35 in. He also reports wheezing, but states it is chronic and not any worse. He denies fever, chills, abdominal pain, nausea, vomiting, diarrhea, worsening LE edema, worsening orthopnea (remains on 2 pillows), urinary frequency or dysuria. Patient does not follow with any nephrologist outpatient. Cr on admission 2.02, but overall has been stable and within his baseline. BNP 9273, Lactate: 1.7, serum Na wnl, CXR showed moderate pulmonary vascular congestion.     Patient was started on intermittent IV Bumex 4mg, but had inadequate diuresis and was transitioned to Bumex gtt at 1mg/hr on 5/8. Heart Failure team has been following, pt was started on Entresto and recently had coreg increased but became hypotensive, so meds discontinued. Patient was also given steroids for COPD exacerbation. Currently at his baseline 2L O2, but still wheezing.    PAST HISTORY  --------------------------------------------------------------------------------  PAST MEDICAL & SURGICAL HISTORY:  HTN (hypertension)  CAD (coronary artery disease)  Diabetes  Hyperlipidemia, unspecified hyperlipidemia type  CHF (congestive heart failure)  BPH (benign prostatic hyperplasia)  S/P CABG (coronary artery bypass graft)  S/P appendectomy      FAMILY HISTORY:  Family history of type 2 diabetes mellitus in father (Father)      PAST SOCIAL HISTORY:    ALLERGIES & MEDICATIONS  --------------------------------------------------------------------------------  Allergies    No Known Allergies      Standing Inpatient Medications  allopurinol 100 milliGRAM(s) Oral daily  apixaban 5 milliGRAM(s) Oral two times a day  atorvastatin 80 milliGRAM(s) Oral at bedtime  budesonide 160 MICROgram(s)/formoterol 4.5 MICROgram(s) Inhaler 2 Puff(s) Inhalation two times a day  buMETAnide Infusion 1 mG/Hr IV Continuous <Continuous>  chlorhexidine 2% Cloths 1 Application(s) Topical daily  clopidogrel Tablet 75 milliGRAM(s) Oral daily  dextrose 10% Bolus 125 milliLiter(s) IV Bolus once  dextrose 5%. 1000 milliLiter(s) IV Continuous <Continuous>  dextrose 5%. 1000 milliLiter(s) IV Continuous <Continuous>  dextrose 50% Injectable 25 Gram(s) IV Push once  dextrose 50% Injectable 12.5 Gram(s) IV Push once  ferrous    sulfate 325 milliGRAM(s) Oral daily  gabapentin 100 milliGRAM(s) Oral at bedtime  glucagon  Injectable 1 milliGRAM(s) IntraMuscular once  hydrALAZINE 100 milliGRAM(s) Oral three times a day  influenza  Vaccine (HIGH DOSE) 0.7 milliLiter(s) IntraMuscular once  insulin lispro (ADMELOG) corrective regimen sliding scale   SubCutaneous three times a day before meals  insulin lispro (ADMELOG) corrective regimen sliding scale   SubCutaneous at bedtime  isosorbide   dinitrate Tablet (ISORDIL) 30 milliGRAM(s) Oral three times a day  levothyroxine 50 MICROGram(s) Oral daily  magnesium sulfate  IVPB 2 Gram(s) IV Intermittent once  metoprolol succinate ER 50 milliGRAM(s) Oral every 12 hours  montelukast 10 milliGRAM(s) Oral daily  senna 2 Tablet(s) Oral at bedtime  tamsulosin 0.4 milliGRAM(s) Oral at bedtime    PRN Inpatient Medications  acetaminophen     Tablet .. 650 milliGRAM(s) Oral every 6 hours PRN  dextrose Oral Gel 15 Gram(s) Oral once PRN  polyethylene glycol 3350 17 Gram(s) Oral daily PRN      REVIEW OF SYSTEMS  --------------------------------------------------------------------------------  Constitutional: No fevers, no chills  Neuro: no HA, dizziness  HEENT: No sore throat   Respiratory: +wheezing, dyspnea  Cardiovascular: No chest pain  Gastrointestinal: No abdominal pain, diarrhea, nausea, vomiting  Genitourinary: No dysuria, hematuria, urgency  Extremities: No edema    All other systems were reviewed and are negative, except as noted.    VITALS/PHYSICAL EXAM  --------------------------------------------------------------------------------  T(C): 36.6 (05-09-24 @ 05:10), Max: 36.7 (05-08-24 @ 13:30)  HR: 78 (05-09-24 @ 05:10) (75 - 88)  BP: 98/58 (05-09-24 @ 05:10) (91/62 - 112/57)  RR: 18 (05-09-24 @ 05:10) (18 - 19)  SpO2: 97% (05-09-24 @ 05:10) (97% - 98%)  Wt(kg): --        05-08-24 @ 07:01  -  05-09-24 @ 07:00  --------------------------------------------------------  IN: 8 mL / OUT: 3200 mL / NET: -3192 mL      Physical Exam:                       Gen: NAD                       Pulm: diffuse wheezing                       CV: +S1S2                       Abd: +BS, soft, nondistended/nontender                       Extremities: no bilateral LE edema noted                        Neuro: non-focal    LABS/STUDIES  --------------------------------------------------------------------------------              13.0   10.91 >-----------<  183      [05-09-24 @ 04:39]              39.4     137  |  100  |  85  ----------------------------<  149      [05-09-24 @ 04:39]  3.8   |  21  |  2.38        Ca     7.5     [05-09-24 @ 04:39]      Mg     1.70     [05-09-24 @ 04:39]      Phos  4.7     [05-09-24 @ 04:39]    TPro  5.4  /  Alb  3.1  /  TBili  0.4  /  DBili  x   /  AST  26  /  ALT  38  /  AlkPhos  149  [05-09-24 @ 04:39]      Creatinine Trend:  SCr 2.38 [05-09 @ 04:39]  SCr 2.51 [05-08 @ 19:07]  SCr 2.55 [05-08 @ 05:00]  SCr 2.37 [05-07 @ 05:29]  SCr 2.53 [05-06 @ 18:04]    Urinalysis - [05-09-24 @ 04:39]      Color  / Appearance  / SG  / pH       Gluc 149 / Ketone   / Bili  / Urobili        Blood  / Protein  / Leuk Est  / Nitrite       RBC  / WBC  / Hyaline  / Gran  / Sq Epi  / Non Sq Epi  / Bacteria     Urine Creatinine 60      [05-08-24 @ 09:55]  Urine Protein 6      [05-08-24 @ 09:55]  Urine Sodium <20      [05-08-24 @ 09:55]  Urine Urea Nitrogen 656.0      [05-08-24 @ 09:55]    TSH 4.43      [03-17-24 @ 06:24]    HBsAg Nonreact      [05-22-22 @ 07:20]  HCV 0.34, Nonreact      [05-22-22 @ 07:20]  HIV Nonreact      [05-22-22 @ 07:20]

## 2024-05-09 NOTE — PROGRESS NOTE ADULT - NS ATTEND AMEND GEN_ALL_CORE FT
Denies complaints. Diureseing well but has wheezing. Labs reviewed - BUN/Cr 85/2.38 (uptrend; randell 37/2.0), BNP 7k (from 9k).   - c/w diuretics as above; HT saline x1 via order set  - labs twice/day  - c/w current meds  - appreciate renal consult; may require dialysis in near future given brittle HF and renal dysfunction  - prognosis guarded
Diureseing well but has wheezing. Labs reviewed - BUN/Cr 85/2.38 (uptrend; randell 37/2.0), BNP 7k (from 9k).   - c/w diuretics as above; HT saline x1 via order set  - labs twice/day  - c/w current meds  - appreciate renal consult; may require dialysis in near future given brittle HF and renal dysfunction  - prognosis guarded.
Denies complaints. Worsenign renal function with plan for possible fluids but appears volume overloaded now with wheezing. Labs reviewed - BUN/Cr 83/2.55 (uptrend; randell 37/2.0), BNP 7k (from 9k).   - resume diuretics as above  - repeat labs this evening  - bladder scan patient  - please obtain renal consult; may require dialysis in near future given brittle HF and renal dysfunction  - prognosis guarded

## 2024-05-09 NOTE — PROGRESS NOTE ADULT - SUBJECTIVE AND OBJECTIVE BOX
Interval History:  Patient resting comfortably in bed   Denies CP/SOB/palpitations/dizziness  No acute events overnight    Medications:  acetaminophen     Tablet .. 650 milliGRAM(s) Oral every 6 hours PRN  allopurinol 100 milliGRAM(s) Oral daily  apixaban 5 milliGRAM(s) Oral two times a day  atorvastatin 80 milliGRAM(s) Oral at bedtime  budesonide 160 MICROgram(s)/formoterol 4.5 MICROgram(s) Inhaler 2 Puff(s) Inhalation two times a day  buMETAnide Infusion 1 mG/Hr IV Continuous <Continuous>  chlorhexidine 2% Cloths 1 Application(s) Topical daily  clopidogrel Tablet 75 milliGRAM(s) Oral daily  dextrose 10% Bolus 125 milliLiter(s) IV Bolus once  dextrose 5%. 1000 milliLiter(s) IV Continuous <Continuous>  dextrose 5%. 1000 milliLiter(s) IV Continuous <Continuous>  dextrose 50% Injectable 12.5 Gram(s) IV Push once  dextrose 50% Injectable 25 Gram(s) IV Push once  dextrose Oral Gel 15 Gram(s) Oral once PRN  ferrous    sulfate 325 milliGRAM(s) Oral daily  gabapentin 100 milliGRAM(s) Oral at bedtime  glucagon  Injectable 1 milliGRAM(s) IntraMuscular once  hydrALAZINE 100 milliGRAM(s) Oral three times a day  influenza  Vaccine (HIGH DOSE) 0.7 milliLiter(s) IntraMuscular once  insulin lispro (ADMELOG) corrective regimen sliding scale   SubCutaneous three times a day before meals  insulin lispro (ADMELOG) corrective regimen sliding scale   SubCutaneous at bedtime  isosorbide   dinitrate Tablet (ISORDIL) 30 milliGRAM(s) Oral three times a day  levothyroxine 50 MICROGram(s) Oral daily  magnesium sulfate  IVPB 2 Gram(s) IV Intermittent once  metoprolol succinate ER 50 milliGRAM(s) Oral every 12 hours  montelukast 10 milliGRAM(s) Oral daily  polyethylene glycol 3350 17 Gram(s) Oral daily PRN  senna 2 Tablet(s) Oral at bedtime  tamsulosin 0.4 milliGRAM(s) Oral at bedtime      Vitals:  T(C): 36.6 (24 @ 05:10), Max: 36.7 (24 @ 13:30)  HR: 78 (24 @ 05:10) (75 - 88)  BP: 98/58 (24 @ 05:10) (91/62 - 112/57)  BP(mean): --  RR: 18 (24 @ 05:10) (18 - 19)  SpO2: 97% (24 @ 05:10) (97% - 98%)    Daily     Daily Weight in k.8 (09 May 2024 05:10)        I&O's Summary    08 May 2024 07:01  -  09 May 2024 07:00  --------------------------------------------------------  IN: 8 mL / OUT: 3200 mL / NET: -3192 mL        Physical Exam:  Appearance: No Acute Distress  Neck: JVP  Cardiovascular: Normal S1 S2  Respiratory: Clear to auscultation bilaterally  Gastrointestinal: Soft, Non-tender	  Skin: No cyanosis	  Neurologic: Non-focal  Extremities: No LE edema      Labs:                        13.0   10.91 )-----------( 183      ( 09 May 2024 04:39 )             39.4     05-09    137  |  100  |  85<H>  ----------------------------<  149<H>  3.8   |  21<L>  |  2.38<H>    Ca    7.5<L>      09 May 2024 04:39  Phos  4.7       Mg     1.70         TPro  5.4<L>  /  Alb  3.1<L>  /  TBili  0.4  /  DBili  x   /  AST  26  /  ALT  38  /  AlkPhos  149<H>  05    Pro-Brain Natriuretic Peptide: 9394: 24 @ 04:39)      TELEMETRY: VPaced@80bpm with occasional PVCs     Echocardiogram:               bbb< from: TTE W or WO Ultrasound Enhancing Agent (24 @ 12:26) >  CONCLUSIONS:      1. Left ventricular systolic function is severely decreased with an ejection fraction visually estimated at 20 to 25 %. Global left ventricular hypokinesis.   2. Normal right ventricular cavity size, with normal wall thickness, and normal systolic function.   3. Device lead is visualized.   4. The left atrium is normal.   5. The right atrium is normal in size.   6. No significant valvular disease.   7. No pericardial effusion seen.   8. Estimated pulmonary artery systolic pressure is 49 mmHg.   9. The inferior vena cava is dilated measuring 2.35 cm in diameter, (dilated >2.1cm) with abnormal inspiratory collapse (abnormal <50%) consistent with elevated right atrialpressure (~15, range 10-20mmHg).    ________________________________________________________________________________________  FINDINGS:     Left Ventricle:  The left ventricular cavity is mildly dilated. Left ventricular systolic function is severelydecreased with an ejection fraction visually estimated at 20 to 25%. There is global left ventricular hypokinesis.     Right Ventricle:  The right ventricular cavity is normal in size, with normal wall thickness and normal systolic function. A devicelead is visualized.     Left Atrium:  The left atrium is normal with an indexed volume of 24.01 ml/m².     Right Atrium:  The right atrium is normal in size with an indexed area of 7.50 cm²/m².     Aortic Valve:  The aortic valve is tricuspid with normal leaflet excursion. There is fibrocalcific aortic valve sclerosis without stenosis. There is no evidence of aortic regurgitation.     Mitral Valve:  Structurally normal mitral valve with normal leaflet excursion. There is symmetric leaflet tethering. There is no mitral valve stenosis. There is mild mitral regurgitation.     Tricuspid Valve:  Structurally normal tricuspid valve with normal leaflet excursion. There is mild tricuspid regurgitation. Estimated pulmonary artery systolic pressure is 49 mmHg.     Pulmonic Valve:  Structurally normal pulmonic valve with normal leaflet excursion. There is trace pulmonic regurgitation.     Aorta:  The aortic root at the sinuses of Valsalva is normal in size, measuring 3.10 cm (indexed 1.60 cm/m²). The ascending aorta diameter is normal in size, measuring 2.80 cm (indexed 1.45 cm/m²).     Pericardium:  No pericardial effusion seen.     Systemic Veins:  The inferior vena cava is dilated measuring 2.35 cm in diameter, (dilated >2.1cm) with abnormal inspiratory collapse (abnormal <50%) consistent with elevated right atrial pressure (~15, range 10-20mmHg).  ____________________________________________________________________  QUANTITATIVE DATA:  Left Ventricle Measurements: (Indexed to BSA)     IVSd (2D):   1.0 cm  LVPWd (2D):  1.0 cm  LVIDd (2D):  6.2 cm  LVIDs (2D):  5.5 cm  LV Mass:     273 g  141.5 g/m²  Visualized LV EF%: 20 to 25%     e' lateral: 7.83 cm/s  e' medial:  7.07 cm/s    Aorta Measurements: (Normal range) (Indexed to BSA)     Sinuses of Valsalva: 3.10 cm (3.1 - 3.7 cm)  Ao Asc prox:         2.80 cm       Left Atrium Measurements: (Indexed to BSA)  LA Diam 2D: 5.20 cm    Right Ventricle Measurements:     RV d, 2D:   3.5 cm  TV Mesha. S': 5.98 cm/s       LVOT / RVOT/ Qp/Qs Data: (Indexed to BSA)  LVOT Diameter: 2.10 cm  LVOT Vmax:     0.80 m/s  LVOT VTI:      11.25 cm  LVOT SV:       39.0 ml  20.16 ml/m²    Aortic Valve Measurements:  AV Vmax:                0.9 m/s  AV Peak Gradient:       3.3 mmHg  AV Mean Gradient: 2.0 mmHg  AV VTI:                 15.6 cm  AV VTI Ratio:           0.72  AoV EOA, Contin:        2.50 cm²  AoV EOA, Contin i:      1.29 cm²/m²  AoV Dimensionless Index 0.72       Tricuspid Valve Measurements:     TR Vmax:          2.9 m/s  TR Peak Gradient: 34.1 mmHg  RA Pressure:      15 mmHg  PASP:             49 mmHg       Interval History:  Patient resting comfortably in bed   Denies CP/SOB/palpitations/dizziness  No acute events overnight    Medications:  acetaminophen     Tablet .. 650 milliGRAM(s) Oral every 6 hours PRN  allopurinol 100 milliGRAM(s) Oral daily  apixaban 5 milliGRAM(s) Oral two times a day  atorvastatin 80 milliGRAM(s) Oral at bedtime  budesonide 160 MICROgram(s)/formoterol 4.5 MICROgram(s) Inhaler 2 Puff(s) Inhalation two times a day  buMETAnide Infusion 1 mG/Hr IV Continuous <Continuous>  chlorhexidine 2% Cloths 1 Application(s) Topical daily  clopidogrel Tablet 75 milliGRAM(s) Oral daily  dextrose 10% Bolus 125 milliLiter(s) IV Bolus once  dextrose 5%. 1000 milliLiter(s) IV Continuous <Continuous>  dextrose 5%. 1000 milliLiter(s) IV Continuous <Continuous>  dextrose 50% Injectable 12.5 Gram(s) IV Push once  dextrose 50% Injectable 25 Gram(s) IV Push once  dextrose Oral Gel 15 Gram(s) Oral once PRN  ferrous    sulfate 325 milliGRAM(s) Oral daily  gabapentin 100 milliGRAM(s) Oral at bedtime  glucagon  Injectable 1 milliGRAM(s) IntraMuscular once  hydrALAZINE 100 milliGRAM(s) Oral three times a day  influenza  Vaccine (HIGH DOSE) 0.7 milliLiter(s) IntraMuscular once  insulin lispro (ADMELOG) corrective regimen sliding scale   SubCutaneous three times a day before meals  insulin lispro (ADMELOG) corrective regimen sliding scale   SubCutaneous at bedtime  isosorbide   dinitrate Tablet (ISORDIL) 30 milliGRAM(s) Oral three times a day  levothyroxine 50 MICROGram(s) Oral daily  magnesium sulfate  IVPB 2 Gram(s) IV Intermittent once  metoprolol succinate ER 50 milliGRAM(s) Oral every 12 hours  montelukast 10 milliGRAM(s) Oral daily  polyethylene glycol 3350 17 Gram(s) Oral daily PRN  senna 2 Tablet(s) Oral at bedtime  tamsulosin 0.4 milliGRAM(s) Oral at bedtime      Vitals:  T(C): 36.6 (24 @ 05:10), Max: 36.7 (24 @ 13:30)  HR: 78 (24 @ 05:10) (75 - 88)  BP: 98/58 (24 @ 05:10) (91/62 - 112/57)  BP(mean): --  RR: 18 (24 @ 05:10) (18 - 19)  SpO2: 97% (24 @ 05:10) (97% - 98%)    Daily     Daily Weight in k.8 (09 May 2024 05:10)        I&O's Summary    08 May 2024 07:01  -  09 May 2024 07:00  --------------------------------------------------------  IN: 8 mL / OUT: 3200 mL / NET: -3192 mL        Physical Exam:  Appearance: No Acute Distress  Neck: JVP 10-12  Cardiovascular: Normal S1 S2  Respiratory: Clear to auscultation faint expiratory wheeze bilaterally  Gastrointestinal: Soft, Non-tender	  Skin: No cyanosis	  Neurologic: Non-focal  Extremities: No LE edema; warm to touch       Labs:                        13.0   10.91 )-----------( 183      ( 09 May 2024 04:39 )             39.4         137  |  100  |  85<H>  ----------------------------<  149<H>  3.8   |  21<L>  |  2.38<H>    Ca    7.5<L>      09 May 2024 04:39  Phos  4.7       Mg     1.70         TPro  5.4<L>  /  Alb  3.1<L>  /  TBili  0.4  /  DBili  x   /  AST  26  /  ALT  38  /  AlkPhos  149<H>      Pro-Brain Natriuretic Peptide: 9394: 24 @ 04:39)      TELEMETRY: VPaced@80bpm with occasional PVCs     Echocardiogram:    TTE W or WO Ultrasound Enhancing Agent (24 @ 12:26)   CONCLUSIONS:      1. Left ventricular systolic function is severely decreased with an ejection fraction visually estimated at 20 to 25 %. Global left ventricular hypokinesis.   2. Normal right ventricular cavity size, with normal wall thickness, and normal systolic function.   3. Device lead is visualized.   4. The left atrium is normal.   5. The right atrium is normal in size.   6. No significant valvular disease.   7. No pericardial effusion seen.   8. Estimated pulmonary artery systolic pressure is 49 mmHg.   9. The inferior vena cava is dilated measuring 2.35 cm in diameter, (dilated >2.1cm) with abnormal inspiratory collapse (abnormal <50%) consistent with elevated right atrialpressure (~15, range 10-20mmHg).    ________________________________________________________________________________________  FINDINGS:     Left Ventricle:  The left ventricular cavity is mildly dilated. Left ventricular systolic function is severelydecreased with an ejection fraction visually estimated at 20 to 25%. There is global left ventricular hypokinesis.     Right Ventricle:  The right ventricular cavity is normal in size, with normal wall thickness and normal systolic function. A devicelead is visualized.     Left Atrium:  The left atrium is normal with an indexed volume of 24.01 ml/m².     Right Atrium:  The right atrium is normal in size with an indexed area of 7.50 cm²/m².     Aortic Valve:  The aortic valve is tricuspid with normal leaflet excursion. There is fibrocalcific aortic valve sclerosis without stenosis. There is no evidence of aortic regurgitation.     Mitral Valve:  Structurally normal mitral valve with normal leaflet excursion. There is symmetric leaflet tethering. There is no mitral valve stenosis. There is mild mitral regurgitation.     Tricuspid Valve:  Structurally normal tricuspid valve with normal leaflet excursion. There is mild tricuspid regurgitation. Estimated pulmonary artery systolic pressure is 49 mmHg.     Pulmonic Valve:  Structurally normal pulmonic valve with normal leaflet excursion. There is trace pulmonic regurgitation.     Aorta:  The aortic root at the sinuses of Valsalva is normal in size, measuring 3.10 cm (indexed 1.60 cm/m²). The ascending aorta diameter is normal in size, measuring 2.80 cm (indexed 1.45 cm/m²).     Pericardium:  No pericardial effusion seen.     Systemic Veins:  The inferior vena cava is dilated measuring 2.35 cm in diameter, (dilated >2.1cm) with abnormal inspiratory collapse (abnormal <50%) consistent with elevated right atrial pressure (~15, range 10-20mmHg).  ____________________________________________________________________  QUANTITATIVE DATA:  Left Ventricle Measurements: (Indexed to BSA)     IVSd (2D):   1.0 cm  LVPWd (2D):  1.0 cm  LVIDd (2D):  6.2 cm  LVIDs (2D):  5.5 cm  LV Mass:     273 g  141.5 g/m²  Visualized LV EF%: 20 to 25%     e' lateral: 7.83 cm/s  e' medial:  7.07 cm/s    Aorta Measurements: (Normal range) (Indexed to BSA)     Sinuses of Valsalva: 3.10 cm (3.1 - 3.7 cm)  Ao Asc prox:         2.80 cm       Left Atrium Measurements: (Indexed to BSA)  LA Diam 2D: 5.20 cm    Right Ventricle Measurements:     RV d, 2D:   3.5 cm  TV Mesha. S': 5.98 cm/s       LVOT / RVOT/ Qp/Qs Data: (Indexed to BSA)  LVOT Diameter: 2.10 cm  LVOT Vmax:     0.80 m/s  LVOT VTI:      11.25 cm  LVOT SV:       39.0 ml  20.16 ml/m²    Aortic Valve Measurements:  AV Vmax:                0.9 m/s  AV Peak Gradient:       3.3 mmHg  AV Mean Gradient: 2.0 mmHg  AV VTI:                 15.6 cm  AV VTI Ratio:           0.72  AoV EOA, Contin:        2.50 cm²  AoV EOA, Contin i:      1.29 cm²/m²  AoV Dimensionless Index 0.72       Tricuspid Valve Measurements:     TR Vmax:          2.9 m/s  TR Peak Gradient: 34.1 mmHg  RA Pressure:      15 mmHg  PASP:             49 mmHg

## 2024-05-09 NOTE — PROGRESS NOTE ADULT - SUBJECTIVE AND OBJECTIVE BOX
Interval History:  feels improved  diuresing well     Medications:  acetaminophen     Tablet .. 650 milliGRAM(s) Oral every 6 hours PRN  allopurinol 100 milliGRAM(s) Oral daily  apixaban 5 milliGRAM(s) Oral two times a day  atorvastatin 80 milliGRAM(s) Oral at bedtime  budesonide 160 MICROgram(s)/formoterol 4.5 MICROgram(s) Inhaler 2 Puff(s) Inhalation two times a day  buMETAnide Infusion 1 mG/Hr IV Continuous <Continuous>  chlorhexidine 2% Cloths 1 Application(s) Topical daily  clopidogrel Tablet 75 milliGRAM(s) Oral daily  dextrose 10% Bolus 125 milliLiter(s) IV Bolus once  dextrose 5%. 1000 milliLiter(s) IV Continuous <Continuous>  dextrose 5%. 1000 milliLiter(s) IV Continuous <Continuous>  dextrose 50% Injectable 25 Gram(s) IV Push once  dextrose 50% Injectable 12.5 Gram(s) IV Push once  dextrose Oral Gel 15 Gram(s) Oral once PRN  ferrous    sulfate 325 milliGRAM(s) Oral daily  gabapentin 100 milliGRAM(s) Oral at bedtime  glucagon  Injectable 1 milliGRAM(s) IntraMuscular once  hydrALAZINE 100 milliGRAM(s) Oral three times a day  influenza  Vaccine (HIGH DOSE) 0.7 milliLiter(s) IntraMuscular once  insulin lispro (ADMELOG) corrective regimen sliding scale   SubCutaneous three times a day before meals  insulin lispro (ADMELOG) corrective regimen sliding scale   SubCutaneous at bedtime  isosorbide   dinitrate Tablet (ISORDIL) 30 milliGRAM(s) Oral three times a day  levothyroxine 50 MICROGram(s) Oral daily  magnesium sulfate  IVPB 2 Gram(s) IV Intermittent once  metoprolol succinate ER 50 milliGRAM(s) Oral every 12 hours  montelukast 10 milliGRAM(s) Oral daily  polyethylene glycol 3350 17 Gram(s) Oral daily PRN  senna 2 Tablet(s) Oral at bedtime  sodium chloride 3% Bolus 150 milliLiter(s) IV Bolus once  tamsulosin 0.4 milliGRAM(s) Oral at bedtime      Vitals:  T(C): 36.8 (24 @ 11:51), Max: 36.8 (24 @ 11:51)  HR: 77 (05-09-24 @ 13:11) (77 - 90)  BP: 121/55 (24 @ 13:11) (98/58 - 121/55)  BP(mean): --  RR: 17 (24 @ 11:51) (17 - 18)  SpO2: 100% (24 @ 11:51) (97% - 100%)    Daily     Daily Weight in k.8 (09 May 2024 05:10)        I&O's Summary    08 May 2024 07:01  -  09 May 2024 07:00  --------------------------------------------------------  IN: 8 mL / OUT: 3200 mL / NET: -3192 mL    Physical Exam:  Appearance: No Acute Distress  HEENT: PERRL  Neck: JVD elevated  Cardiovascular: Normal S1 S2, No murmurs/rubs/gallops  Respiratory: wheezing b/l   Gastrointestinal: Soft, Non-tender	  Skin: No cyanosis	  Neurologic: Non-focal  Extremities: No LE edema  Psychiatry: A & O x 3, Mood & affect appropriate    Labs:                        13.0   10.91 )-----------( 183      ( 09 May 2024 04:39 )             39.4         137  |  100  |  85<H>  ----------------------------<  149<H>  3.8   |  21<L>  |  2.38<H>    Ca    7.5<L>      09 May 2024 04:39  Phos  4.7       Mg     1.70         TPro  5.4<L>  /  Alb  3.1<L>  /  TBili  0.4  /  DBili  x   /  AST  26  /  ALT  38  /  AlkPhos  149<H>

## 2024-05-09 NOTE — CONSULT NOTE ADULT - PROBLEM SELECTOR RECOMMENDATION 9
Patient likely has CKD 2/2 longstanding heart failure, HTN, and T2DM. Upon review of [facility], baseline SCr appears to be ~2.2-2.6. Noah on 5/8 is very low. This could be due to hypotension. Patient has been receiving diuretics since admission and a low urine sodium may also indicate insufficient diuretic response, however iso CKD a Noah has limited utility due to inherent impairment of proximal tubular reabsorption of sodium. In addition, increased urine output from diuretic therapy may end up falsely diluting a spot urine, although FeNa is also <1%. On IV intermittent Bumex, pt was urinating ~1L daily. After Bumex gtt 1mg/hr initiated on 5/8, he has had UOP 3.2L/24h. Cr today is 2.38. Serum Na, K, and Cl are wnl. Agree with holding HF meds and gradually restarting them with BP parameters per HF recs. Would continue Bumex gtt and titrate to net negative 1-2L daily (must have accurate Is+Os) and MEMS reading of <20mmHg.     Recommendations are preliminary until attending attestation.    Sandra Leiva, PGY-4  Nephrology Fellow  MS TEAMS preferred  (After 5pm or on weekends, please call the on-call fellow). Patient likely has CKD 2/2 longstanding heart failure, HTN, and T2DM. Upon review of [facility], baseline SCr appears to be ~2.2-2.6. Noah on 5/8 is very low. This could be due to hypotension. Patient has been receiving diuretics since admission and a low urine sodium may also indicate insufficient diuretic response, however iso CKD a Noah has limited utility due to inherent impairment of proximal tubular reabsorption of sodium. In addition, increased urine output from diuretic therapy may end up falsely diluting a spot urine, although FeNa is also <1%. On IV intermittent Bumex, pt was urinating ~1L daily. After Bumex gtt 1mg/hr initiated on 5/8, he has had UOP 3.2L/24h. Cr today is 2.38. Serum Na, K, and Cl are wnl. Agree with holding HF meds and gradually restarting them with BP parameters per HF recs. Would continue Bumex gtt and titrate to net negative 1-2L daily (must have accurate Is+Os) and MEMS reading of <20mmHg. Currently, no indication for dialysis. Please avoid nephrotoxins when possible and dose all meds per eGFR.    Recommendations are preliminary until attending attestation.    Sandra Leiva, PGY-4  Nephrology Fellow  MS TEAMS preferred  (After 5pm or on weekends, please call the on-call fellow).

## 2024-05-09 NOTE — PROGRESS NOTE ADULT - PROBLEM SELECTOR PLAN 1
Bumex 1mg/hr   Toprol 50mg daily (hold SBP<90)  Hydralazine 100mg TID (hold SBP<90)  Isosorbide 30mg TID (hold SBP<90)  Strict I/O  Daily weights  Monitor lytes replete K>4.0 and Mg>2.0  Trend SCr   Repeat BNP 9394 (7349 on 5/6)  Management per primary team  Pending final recommendations from HF attending Bumex 1mg/hr   Toprol 50mg daily (hold SBP<90)  Hydralazine 100mg TID (hold SBP<90)  Isosorbide 30mg TID (hold SBP<90)  Strict I/O  Daily weights  Monitor lytes replete K>4.0 and Mg>2.0  Trend SCr   Repeat BNP 9394 (7349 on 5/6)  Appreciate Nephrology recommendations   Management per primary team  Pending final recommendations from HF attending

## 2024-05-09 NOTE — PROGRESS NOTE ADULT - ASSESSMENT
Briefly, 67 y/o Portuguese male with h/o HTN, HFrEF/ICM (EF 25%, LVIDD 5.6cm) s/p CRT-D and CardioMEMS, CAD w/ CABG 2014 at [facility], asthma/COPD, CVA with left sided weakness, CKD (b/l Cr 2.0), aflutter s/p ablation (3/29/24) comes in from HF office with worsening dyspnea and fluid overload. Started on bumex gtt 2 mg/hr with improvement. Started on Entresto and recently had coreg increased but now hypotensive, likely 2/2 hypovolemia or medication-induced. Renal function worsening initially thought to be d/t hypovolemia but was actually likely cardiorenal and resumed diuretics with improvement.     Pertinent Labs:    Pro-Brain Natriuretic Peptide: 9273  Lactate: 1.7 mmol/L   Troponin  81/84  BUN/Cr 42.2.11    CXR: Moderate pulmonary vascular congestion     EKG: Sinus Rhythm non specific ST-T wave changes      RHC (3/24): RA 13 mmHg. PA 52/20/35 mmHg. PCW 23-25 mmHg. Cardiac index 1.96 L/min/m2. SVR 1712 dsc.

## 2024-05-09 NOTE — PROGRESS NOTE ADULT - PROBLEM SELECTOR PLAN 2
SCr 2.02 on admission. Baseline SCr 2.4-2.6.  -hold nephrotoxins   -monitor UOP, electrolytes  -rising BUN - FeUrea 33.6, suggesting pre-renal, but could be cardiorenal - volume up, so giving additional 4mg IVPB bumex and restarting bumex gtt @ 1mg/hr  -nephrology c/s given brittle HF and renal dysfunction, may require HD - appreciate recs

## 2024-05-09 NOTE — PROGRESS NOTE ADULT - ATTENDING COMMENTS
Patient seen and examined at bedside. In brief, 68y M w/ hx of HFrEF (EF 20-25% as of 3/2024) s/p cardioMEMS in 3/2024 and [employer] CRT-D in 2022, paroxysmal afib/flutter s/p ablation on eliquis, CKD3, T2DM, COPD on 2LNC home O2, CAD s/p CABG in 2014, CVA w/ residual L sided weakness, prior R ankle fracture admitted for acute on chronic systolic congestive heart failure.  Bumex gtt restarted on 5/8 given concern of volume overload. Appreciate renal input - not currently a HD candidate   Dispo pending final HF recs and patient's creatine.     Time-based billing (NON-critical care).     50 minutes spent on total encounter. The necessity of the time spent during the encounter on this date of service was due to:     Review of laboratory data, radiology results, consultants' recommendations, documentation in [place], discussion with patient/house staff/ACP and interdisciplinary staff (such as , social workers, etc). Interventions were performed as documented above.

## 2024-05-09 NOTE — CONSULT NOTE ADULT - ATTENDING COMMENTS
CKD stage 4 with decompensated CHF   continue with diuresis and O>I  keep off ACE-I, ARB, or NSAIDs to allow for sufficient diuresis  imaging reviewed from prior admission.  can consider HTS to assist diuresis if ok with cardiology

## 2024-05-09 NOTE — PROGRESS NOTE ADULT - ASSESSMENT
Briefly, 69 y/o [place] male with h/o HTN, HFrEF/ICM (EF 25%, LVIDD 5.6cm) s/p CRT-D and CardioMEMS, CAD w/ CABG 2014 at [facility], asthma/COPD, CVA with left sided weakness, CKD (b/l Cr 2.0), aflutter s/p ablation (3/29/24) comes in from HF office with worsening dyspnea and fluid overload. Started on bumex gtt 2 mg/hr with improvement. Started on Entresto and recently had coreg increased but now hypotensive, likely 2/2 hypovolemia or medication-induced. Renal function worsening initially thought to be d/t hypovolemia but appears overloaded today.     Pertinent Labs:    Pro-Brain Natriuretic Peptide: 9273  Lactate: 1.7 mmol/L   Troponin  81/84  BUN/Cr 42.2.11    CXR: Moderate pulmonary vascular congestion     EKG: Sinus Rhythm non specific ST-T wave changes      RHC (3/24): RA 13 mmHg. PA 52/20/35 mmHg. PCW 23-25 mmHg. Cardiac index 1.96 L/min/m2. SVR 1712 dsc.

## 2024-05-09 NOTE — PROGRESS NOTE ADULT - PROBLEM SELECTOR PLAN 1
ACC Stage C/NYHA Class II. TTE 3/2024 w/ EF 20-25%. Possible etiologies for current decompensation include: recurrence of afib despite ablation, COPD exacerbation. Home GDMT: coreg 6.25mg BID, hydralazine 100mg TID, isordil 30mg TID  -defer to cardiology if necessary to repeat echo  -trops flat and no CP. EKG without STEMI equivalent - not ACS  -COPD management as below  -EP for device interrogation to assess for afib/aflutter burden - 86 second episode AF, BiVPaced 92%  -c/w GDMT:  - hydral 100 mg q8h  - isordil 30 mg q8h  - toprol 50 BID  - discontinued entresto 24/26 BID  - strict Is/Os, monitor UOP, daily weights  - s/p bumex gtt 2mg/hr per HF - BNP down to 7000, HF to check MEMS  - still volume up with rising BUN - will give 4mg IVPB bumex followed by 1mg/hour bumex  - Maintain K>4, Mg>2. ACC Stage C/NYHA Class II. TTE 3/2024 w/ EF 20-25%. Possible etiologies for current decompensation include: recurrence of afib despite ablation, COPD exacerbation. Home GDMT: coreg 6.25mg BID, hydralazine 100mg TID, isordil 30mg TID  -defer to cardiology if necessary to repeat echo  -trops flat and no CP. EKG without STEMI equivalent - not ACS  -COPD management as below  -EP for device interrogation to assess for afib/aflutter burden - 86 second episode AF, BiVPaced 92%  -c/w GDMT:  - hydral 100 mg q8h  - isordil 30 mg q8h  - toprol 50 BID  - discontinued entresto 24/26 BID  - strict Is/Os, monitor UOP, daily weights  - s/p bumex gtt 2mg/hr per HF - BNP down to 7000, HF to check MEMS  - still volume up with rising BUN, BNP - will give 4mg IVPB bumex followed by 1mg/hour bumex  - Maintain K>4, Mg>2.

## 2024-05-10 ENCOUNTER — RESULT REVIEW (OUTPATIENT)
Age: 69
End: 2024-05-10

## 2024-05-10 LAB
ALBUMIN SERPL ELPH-MCNC: 3.3 G/DL — SIGNIFICANT CHANGE UP (ref 3.3–5)
ALP SERPL-CCNC: 136 U/L — HIGH (ref 40–120)
ALT FLD-CCNC: 35 U/L — SIGNIFICANT CHANGE UP (ref 4–41)
ANION GAP SERPL CALC-SCNC: 16 MMOL/L — HIGH (ref 7–14)
ANION GAP SERPL CALC-SCNC: 18 MMOL/L — HIGH (ref 7–14)
AST SERPL-CCNC: 25 U/L — SIGNIFICANT CHANGE UP (ref 4–40)
BASOPHILS # BLD AUTO: 0.02 K/UL — SIGNIFICANT CHANGE UP (ref 0–0.2)
BASOPHILS NFR BLD AUTO: 0.2 % — SIGNIFICANT CHANGE UP (ref 0–2)
BILIRUB SERPL-MCNC: 0.6 MG/DL — SIGNIFICANT CHANGE UP (ref 0.2–1.2)
BUN SERPL-MCNC: 89 MG/DL — HIGH (ref 7–23)
BUN SERPL-MCNC: 89 MG/DL — HIGH (ref 7–23)
CALCIUM SERPL-MCNC: 7.8 MG/DL — LOW (ref 8.4–10.5)
CALCIUM SERPL-MCNC: 7.9 MG/DL — LOW (ref 8.4–10.5)
CHLORIDE SERPL-SCNC: 101 MMOL/L — SIGNIFICANT CHANGE UP (ref 98–107)
CHLORIDE SERPL-SCNC: 99 MMOL/L — SIGNIFICANT CHANGE UP (ref 98–107)
CO2 SERPL-SCNC: 22 MMOL/L — SIGNIFICANT CHANGE UP (ref 22–31)
CO2 SERPL-SCNC: 23 MMOL/L — SIGNIFICANT CHANGE UP (ref 22–31)
CREAT SERPL-MCNC: 2.36 MG/DL — HIGH (ref 0.5–1.3)
CREAT SERPL-MCNC: 2.41 MG/DL — HIGH (ref 0.5–1.3)
EGFR: 28 ML/MIN/1.73M2 — LOW
EGFR: 29 ML/MIN/1.73M2 — LOW
EOSINOPHIL # BLD AUTO: 0.18 K/UL — SIGNIFICANT CHANGE UP (ref 0–0.5)
EOSINOPHIL NFR BLD AUTO: 1.7 % — SIGNIFICANT CHANGE UP (ref 0–6)
GLUCOSE BLDC GLUCOMTR-MCNC: 134 MG/DL — HIGH (ref 70–99)
GLUCOSE BLDC GLUCOMTR-MCNC: 143 MG/DL — HIGH (ref 70–99)
GLUCOSE BLDC GLUCOMTR-MCNC: 190 MG/DL — HIGH (ref 70–99)
GLUCOSE BLDC GLUCOMTR-MCNC: 219 MG/DL — HIGH (ref 70–99)
GLUCOSE SERPL-MCNC: 134 MG/DL — HIGH (ref 70–99)
GLUCOSE SERPL-MCNC: 157 MG/DL — HIGH (ref 70–99)
HCT VFR BLD CALC: 41.1 % — SIGNIFICANT CHANGE UP (ref 39–50)
HGB BLD-MCNC: 13.2 G/DL — SIGNIFICANT CHANGE UP (ref 13–17)
IANC: 8.25 K/UL — HIGH (ref 1.8–7.4)
IMM GRANULOCYTES NFR BLD AUTO: 0.4 % — SIGNIFICANT CHANGE UP (ref 0–0.9)
LYMPHOCYTES # BLD AUTO: 1.44 K/UL — SIGNIFICANT CHANGE UP (ref 1–3.3)
LYMPHOCYTES # BLD AUTO: 13.2 % — SIGNIFICANT CHANGE UP (ref 13–44)
MAGNESIUM SERPL-MCNC: 1.7 MG/DL — SIGNIFICANT CHANGE UP (ref 1.6–2.6)
MAGNESIUM SERPL-MCNC: 1.7 MG/DL — SIGNIFICANT CHANGE UP (ref 1.6–2.6)
MCHC RBC-ENTMCNC: 31.7 PG — SIGNIFICANT CHANGE UP (ref 27–34)
MCHC RBC-ENTMCNC: 32.1 GM/DL — SIGNIFICANT CHANGE UP (ref 32–36)
MCV RBC AUTO: 98.8 FL — SIGNIFICANT CHANGE UP (ref 80–100)
MONOCYTES # BLD AUTO: 0.97 K/UL — HIGH (ref 0–0.9)
MONOCYTES NFR BLD AUTO: 8.9 % — SIGNIFICANT CHANGE UP (ref 2–14)
NEUTROPHILS # BLD AUTO: 8.25 K/UL — HIGH (ref 1.8–7.4)
NEUTROPHILS NFR BLD AUTO: 75.6 % — SIGNIFICANT CHANGE UP (ref 43–77)
NRBC # BLD: 0 /100 WBCS — SIGNIFICANT CHANGE UP (ref 0–0)
NRBC # FLD: 0 K/UL — SIGNIFICANT CHANGE UP (ref 0–0)
PHOSPHATE SERPL-MCNC: 4.9 MG/DL — HIGH (ref 2.5–4.5)
PHOSPHATE SERPL-MCNC: 5.3 MG/DL — HIGH (ref 2.5–4.5)
PLATELET # BLD AUTO: 170 K/UL — SIGNIFICANT CHANGE UP (ref 150–400)
POTASSIUM SERPL-MCNC: 3.5 MMOL/L — SIGNIFICANT CHANGE UP (ref 3.5–5.3)
POTASSIUM SERPL-MCNC: 3.6 MMOL/L — SIGNIFICANT CHANGE UP (ref 3.5–5.3)
POTASSIUM SERPL-SCNC: 3.5 MMOL/L — SIGNIFICANT CHANGE UP (ref 3.5–5.3)
POTASSIUM SERPL-SCNC: 3.6 MMOL/L — SIGNIFICANT CHANGE UP (ref 3.5–5.3)
PROT SERPL-MCNC: 5.9 G/DL — LOW (ref 6–8.3)
RBC # BLD: 4.16 M/UL — LOW (ref 4.2–5.8)
RBC # FLD: 16.8 % — HIGH (ref 10.3–14.5)
SODIUM SERPL-SCNC: 139 MMOL/L — SIGNIFICANT CHANGE UP (ref 135–145)
SODIUM SERPL-SCNC: 140 MMOL/L — SIGNIFICANT CHANGE UP (ref 135–145)
WBC # BLD: 10.9 K/UL — HIGH (ref 3.8–10.5)
WBC # FLD AUTO: 10.9 K/UL — HIGH (ref 3.8–10.5)

## 2024-05-10 PROCEDURE — 93306 TTE W/DOPPLER COMPLETE: CPT | Mod: 26

## 2024-05-10 PROCEDURE — 99233 SBSQ HOSP IP/OBS HIGH 50: CPT

## 2024-05-10 PROCEDURE — 99233 SBSQ HOSP IP/OBS HIGH 50: CPT | Mod: GC

## 2024-05-10 RX ORDER — SIMETHICONE 80 MG/1
80 TABLET, CHEWABLE ORAL ONCE
Refills: 0 | Status: COMPLETED | OUTPATIENT
Start: 2024-05-10 | End: 2024-05-10

## 2024-05-10 RX ORDER — SODIUM CHLORIDE 5 G/100ML
150 INJECTION, SOLUTION INTRAVENOUS ONCE
Refills: 0 | Status: COMPLETED | OUTPATIENT
Start: 2024-05-10 | End: 2024-05-10

## 2024-05-10 RX ORDER — POTASSIUM CHLORIDE 20 MEQ
40 PACKET (EA) ORAL EVERY 4 HOURS
Refills: 0 | Status: COMPLETED | OUTPATIENT
Start: 2024-05-10 | End: 2024-05-10

## 2024-05-10 RX ADMIN — Medication 40 MILLIEQUIVALENT(S): at 14:09

## 2024-05-10 RX ADMIN — SENNA PLUS 2 TABLET(S): 8.6 TABLET ORAL at 22:30

## 2024-05-10 RX ADMIN — Medication 100 MILLIGRAM(S): at 22:31

## 2024-05-10 RX ADMIN — ISOSORBIDE DINITRATE 30 MILLIGRAM(S): 5 TABLET ORAL at 06:15

## 2024-05-10 RX ADMIN — SIMETHICONE 80 MILLIGRAM(S): 80 TABLET, CHEWABLE ORAL at 22:30

## 2024-05-10 RX ADMIN — Medication 40 MILLIEQUIVALENT(S): at 10:04

## 2024-05-10 RX ADMIN — TAMSULOSIN HYDROCHLORIDE 0.4 MILLIGRAM(S): 0.4 CAPSULE ORAL at 22:31

## 2024-05-10 RX ADMIN — Medication 325 MILLIGRAM(S): at 12:33

## 2024-05-10 RX ADMIN — BUDESONIDE AND FORMOTEROL FUMARATE DIHYDRATE 2 PUFF(S): 160; 4.5 AEROSOL RESPIRATORY (INHALATION) at 20:40

## 2024-05-10 RX ADMIN — MONTELUKAST 10 MILLIGRAM(S): 4 TABLET, CHEWABLE ORAL at 12:33

## 2024-05-10 RX ADMIN — BUMETANIDE 5 MG/HR: 0.25 INJECTION INTRAMUSCULAR; INTRAVENOUS at 15:44

## 2024-05-10 RX ADMIN — Medication 100 MILLIGRAM(S): at 06:15

## 2024-05-10 RX ADMIN — Medication 100 MILLIGRAM(S): at 12:34

## 2024-05-10 RX ADMIN — CLOPIDOGREL BISULFATE 75 MILLIGRAM(S): 75 TABLET, FILM COATED ORAL at 12:33

## 2024-05-10 RX ADMIN — Medication 100 MILLIGRAM(S): at 14:09

## 2024-05-10 RX ADMIN — APIXABAN 5 MILLIGRAM(S): 2.5 TABLET, FILM COATED ORAL at 18:18

## 2024-05-10 RX ADMIN — APIXABAN 5 MILLIGRAM(S): 2.5 TABLET, FILM COATED ORAL at 06:15

## 2024-05-10 RX ADMIN — ISOSORBIDE DINITRATE 30 MILLIGRAM(S): 5 TABLET ORAL at 10:50

## 2024-05-10 RX ADMIN — CHLORHEXIDINE GLUCONATE 1 APPLICATION(S): 213 SOLUTION TOPICAL at 12:32

## 2024-05-10 RX ADMIN — Medication 50 MICROGRAM(S): at 05:10

## 2024-05-10 RX ADMIN — ISOSORBIDE DINITRATE 30 MILLIGRAM(S): 5 TABLET ORAL at 18:19

## 2024-05-10 RX ADMIN — BUDESONIDE AND FORMOTEROL FUMARATE DIHYDRATE 2 PUFF(S): 160; 4.5 AEROSOL RESPIRATORY (INHALATION) at 10:06

## 2024-05-10 RX ADMIN — Medication 50 MILLIGRAM(S): at 20:42

## 2024-05-10 RX ADMIN — Medication 50 MILLIGRAM(S): at 06:15

## 2024-05-10 RX ADMIN — ATORVASTATIN CALCIUM 80 MILLIGRAM(S): 80 TABLET, FILM COATED ORAL at 22:30

## 2024-05-10 RX ADMIN — Medication 1: at 12:30

## 2024-05-10 RX ADMIN — GABAPENTIN 100 MILLIGRAM(S): 400 CAPSULE ORAL at 22:31

## 2024-05-10 NOTE — PROGRESS NOTE ADULT - PROBLEM SELECTOR PLAN 1
c/w Bumex 1mg/hr until Sunday, then switch to torsemide 100 mg twice/day  Toprol 50mg daily (hold SBP<90)  Hydralazine 100mg TID (hold SBP<90)  Isosorbide 30mg TID (hold SBP<90)  Strict I/O  Daily weights  Monitor lytes replete K>4.0 and Mg>2.0  Trend SCr   Repeat BNP 9394 (7349 on 5/6)  Appreciate Nephrology recommendations   Management per primary team

## 2024-05-10 NOTE — PROGRESS NOTE ADULT - SUBJECTIVE AND OBJECTIVE BOX
[facility] Division of Kidney Diseases & Hypertension  FOLLOW UP NOTE  [phone]--------------------------------------------------------------------------------    Reason for consult: CKD IV      24 hour events/subjective: No acute complaints or overnight events.      PAST HISTORY  --------------------------------------------------------------------------------  No significant changes to PMH, PSH, FHx, SHx, unless otherwise noted    ALLERGIES & MEDICATIONS  --------------------------------------------------------------------------------  Allergies    No Known Allergies      Standing Inpatient Medications  allopurinol 100 milliGRAM(s) Oral daily  apixaban 5 milliGRAM(s) Oral two times a day  atorvastatin 80 milliGRAM(s) Oral at bedtime  budesonide 160 MICROgram(s)/formoterol 4.5 MICROgram(s) Inhaler 2 Puff(s) Inhalation two times a day  buMETAnide Infusion 1 mG/Hr IV Continuous <Continuous>  chlorhexidine 2% Cloths 1 Application(s) Topical daily  clopidogrel Tablet 75 milliGRAM(s) Oral daily  dextrose 10% Bolus 125 milliLiter(s) IV Bolus once  dextrose 5%. 1000 milliLiter(s) IV Continuous <Continuous>  dextrose 5%. 1000 milliLiter(s) IV Continuous <Continuous>  dextrose 50% Injectable 25 Gram(s) IV Push once  dextrose 50% Injectable 12.5 Gram(s) IV Push once  ferrous    sulfate 325 milliGRAM(s) Oral daily  gabapentin 100 milliGRAM(s) Oral at bedtime  glucagon  Injectable 1 milliGRAM(s) IntraMuscular once  hydrALAZINE 100 milliGRAM(s) Oral three times a day  influenza  Vaccine (HIGH DOSE) 0.7 milliLiter(s) IntraMuscular once  insulin lispro (ADMELOG) corrective regimen sliding scale   SubCutaneous three times a day before meals  insulin lispro (ADMELOG) corrective regimen sliding scale   SubCutaneous at bedtime  isosorbide   dinitrate Tablet (ISORDIL) 30 milliGRAM(s) Oral three times a day  levothyroxine 50 MICROGram(s) Oral daily  magnesium sulfate  IVPB 2 Gram(s) IV Intermittent once  metoprolol succinate ER 50 milliGRAM(s) Oral every 12 hours  montelukast 10 milliGRAM(s) Oral daily  potassium chloride    Tablet ER 40 milliEquivalent(s) Oral every 4 hours  senna 2 Tablet(s) Oral at bedtime  tamsulosin 0.4 milliGRAM(s) Oral at bedtime    PRN Inpatient Medications  acetaminophen     Tablet .. 650 milliGRAM(s) Oral every 6 hours PRN  dextrose Oral Gel 15 Gram(s) Oral once PRN  polyethylene glycol 3350 17 Gram(s) Oral daily PRN      REVIEW OF SYSTEMS  --------------------------------------------------------------------------------  Constitutional: No fevers, no chills  Neuro: no HA, dizziness  HEENT: No sore throat   Respiratory: +wheezing, dyspnea  Cardiovascular: No chest pain  Gastrointestinal: No abdominal pain, diarrhea, nausea, vomiting  Genitourinary: No dysuria, hematuria, urgency  Extremities: No edema    All other systems were reviewed and are negative, except as noted.    VITALS/PHYSICAL EXAM  --------------------------------------------------------------------------------  T(C): 36.5 (05-10-24 @ 07:30), Max: 37.1 (05-09-24 @ 17:25)  HR: 73 (05-10-24 @ 07:30) (73 - 81)  BP: 100/56 (05-10-24 @ 07:30) (100/54 - 121/55)  RR: 18 (05-10-24 @ 07:30) (17 - 18)  SpO2: 100% (05-10-24 @ 07:30) (98% - 100%)  Wt(kg): --        05-09-24 @ 07:01  -  05-10-24 @ 07:00  --------------------------------------------------------  IN: 720 mL / OUT: 2550 mL / NET: -1830 mL      Physical Exam:                       Gen: NAD                       Pulm: scattered wheezing                       CV: +S1S2                       Abd: +BS, soft, nondistended/nontender                       Extremities: no bilateral LE edema                        Neuro: non-focal    LABS/STUDIES  --------------------------------------------------------------------------------              13.2   10.90 >-----------<  170      [05-10-24 @ 04:30]              41.1     139  |  99  |  89  ----------------------------<  134      [05-10-24 @ 04:30]  3.5   |  22  |  2.36        Ca     7.8     [05-10-24 @ 04:30]      Mg     1.70     [05-10-24 @ 04:30]      Phos  4.9     [05-10-24 @ 04:30]    TPro  5.9  /  Alb  3.3  /  TBili  0.6  /  DBili  x   /  AST  25  /  ALT  35  /  AlkPhos  136  [05-10-24 @ 04:30]      Creatinine Trend:  SCr 2.36 [05-10 @ 04:30]  SCr 2.41 [05-10 @ 02:28]  SCr 2.38 [05-09 @ 04:39]  SCr 2.51 [05-08 @ 19:07]  SCr 2.55 [05-08 @ 05:00]    Urine Creatinine 60      [05-08-24 @ 09:55]  Urine Protein 6      [05-08-24 @ 09:55]  Urine Sodium <20      [05-08-24 @ 09:55]  Urine Urea Nitrogen 656.0      [05-08-24 @ 09:55]

## 2024-05-10 NOTE — PROGRESS NOTE ADULT - SUBJECTIVE AND OBJECTIVE BOX
Patient is a 68y old  Male who presents with a chief complaint of acute on chronic heart failure exacerbation (09 May 2024 09:48)      SUBJECTIVE / OVERNIGHT EVENTS:  No acute events overnight. Patient at this time denies fevers, chills, CP, SOB, nausea, vomiting, diarrhea, constipation, dysuria. Patient seen and examined at bedside.    MEDICATIONS  (STANDING):  allopurinol 100 milliGRAM(s) Oral daily  apixaban 5 milliGRAM(s) Oral two times a day  atorvastatin 80 milliGRAM(s) Oral at bedtime  budesonide 160 MICROgram(s)/formoterol 4.5 MICROgram(s) Inhaler 2 Puff(s) Inhalation two times a day  buMETAnide Infusion 1 mG/Hr (5 mL/Hr) IV Continuous <Continuous>  chlorhexidine 2% Cloths 1 Application(s) Topical daily  clopidogrel Tablet 75 milliGRAM(s) Oral daily  dextrose 10% Bolus 125 milliLiter(s) IV Bolus once  dextrose 5%. 1000 milliLiter(s) (100 mL/Hr) IV Continuous <Continuous>  dextrose 5%. 1000 milliLiter(s) (50 mL/Hr) IV Continuous <Continuous>  dextrose 50% Injectable 25 Gram(s) IV Push once  dextrose 50% Injectable 12.5 Gram(s) IV Push once  ferrous    sulfate 325 milliGRAM(s) Oral daily  gabapentin 100 milliGRAM(s) Oral at bedtime  glucagon  Injectable 1 milliGRAM(s) IntraMuscular once  hydrALAZINE 100 milliGRAM(s) Oral three times a day  influenza  Vaccine (HIGH DOSE) 0.7 milliLiter(s) IntraMuscular once  insulin lispro (ADMELOG) corrective regimen sliding scale   SubCutaneous three times a day before meals  insulin lispro (ADMELOG) corrective regimen sliding scale   SubCutaneous at bedtime  isosorbide   dinitrate Tablet (ISORDIL) 30 milliGRAM(s) Oral three times a day  levothyroxine 50 MICROGram(s) Oral daily  magnesium sulfate  IVPB 2 Gram(s) IV Intermittent once  metoprolol succinate ER 50 milliGRAM(s) Oral every 12 hours  montelukast 10 milliGRAM(s) Oral daily  senna 2 Tablet(s) Oral at bedtime  tamsulosin 0.4 milliGRAM(s) Oral at bedtime    MEDICATIONS  (PRN):  acetaminophen     Tablet .. 650 milliGRAM(s) Oral every 6 hours PRN Temp greater or equal to 38C (100.4F), Mild Pain (1 - 3)  dextrose Oral Gel 15 Gram(s) Oral once PRN Blood Glucose LESS THAN 70 milliGRAM(s)/deciliter  polyethylene glycol 3350 17 Gram(s) Oral daily PRN Constipation      CAPILLARY BLOOD GLUCOSE      POCT Blood Glucose.: 143 mg/dL (09 May 2024 21:45)  POCT Blood Glucose.: 155 mg/dL (09 May 2024 17:18)  POCT Blood Glucose.: 210 mg/dL (09 May 2024 12:24)  POCT Blood Glucose.: 164 mg/dL (09 May 2024 08:20)    I&O's Summary    09 May 2024 07:01  -  10 May 2024 07:00  --------------------------------------------------------  IN: 720 mL / OUT: 2550 mL / NET: -1830 mL        PHYSICAL EXAM:  Vital Signs Last 24 Hrs  T(C): 36.4 (10 May 2024 05:35), Max: 37.1 (09 May 2024 17:25)  T(F): 97.6 (10 May 2024 05:35), Max: 98.8 (09 May 2024 17:25)  HR: 81 (10 May 2024 05:35) (77 - 90)  BP: 113/65 (10 May 2024 05:35) (100/54 - 121/55)  BP(mean): --  RR: 17 (10 May 2024 05:35) (17 - 17)  SpO2: 100% (10 May 2024 05:35) (98% - 100%)    Parameters below as of 10 May 2024 05:35  Patient On (Oxygen Delivery Method): nasal cannula        GENERAL: No acute distress, well-developed  HEAD:  Atraumatic, Normocephalic  EYES: EOMI, PERRLA, conjunctiva and sclera clear  NECK: Supple, no lymphadenopathy, no JVD  CHEST/LUNG: CTAB; No wheezes, rales, or rhonchi  HEART: Regular rate and rhythm; No murmurs, rubs, or gallops  ABDOMEN: Soft, non-tender, non-distended; normal bowel sounds, no organomegaly  EXTREMITIES:  2+ peripheral pulses b/l, No clubbing, cyanosis, or edema  NEUROLOGY: A&O x 3, no focal deficits  SKIN: No rashes or lesions    LABS:                        13.0   10.91 )-----------( 183      ( 09 May 2024 04:39 )             39.4     05-10    140  |  101  |  89<H>  ----------------------------<  157<H>  3.6   |  23  |  2.41<H>    Ca    7.9<L>      10 May 2024 02:28  Phos  5.3     05-10  Mg     1.70     05-10    TPro  5.4<L>  /  Alb  3.1<L>  /  TBili  0.4  /  DBili  x   /  AST  26  /  ALT  38  /  AlkPhos  149<H>  05-09          Urinalysis Basic - ( 10 May 2024 02:28 )    Color: x / Appearance: x / SG: x / pH: x  Gluc: 157 mg/dL / Ketone: x  / Bili: x / Urobili: x   Blood: x / Protein: x / Nitrite: x   Leuk Esterase: x / RBC: x / WBC x   Sq Epi: x / Non Sq Epi: x / Bacteria: x          RADIOLOGY & ADDITIONAL TESTS:  Results Reviewed:   Imaging Personally Reviewed:  Electrocardiogram Personally Reviewed:    COORDINATION OF CARE:  Care Discussed with Consultants/Other Providers [Y/N]:  Prior or Outpatient Records Reviewed [Y/N]:

## 2024-05-10 NOTE — PROGRESS NOTE ADULT - SUBJECTIVE AND OBJECTIVE BOX
Interval History:  feels well  would like to go home   diuresing well  received 3% HT saline     Medications:  acetaminophen     Tablet .. 650 milliGRAM(s) Oral every 6 hours PRN  allopurinol 100 milliGRAM(s) Oral daily  apixaban 5 milliGRAM(s) Oral two times a day  atorvastatin 80 milliGRAM(s) Oral at bedtime  budesonide 160 MICROgram(s)/formoterol 4.5 MICROgram(s) Inhaler 2 Puff(s) Inhalation two times a day  buMETAnide Infusion 1 mG/Hr IV Continuous <Continuous>  chlorhexidine 2% Cloths 1 Application(s) Topical daily  clopidogrel Tablet 75 milliGRAM(s) Oral daily  dextrose 10% Bolus 125 milliLiter(s) IV Bolus once  dextrose 5%. 1000 milliLiter(s) IV Continuous <Continuous>  dextrose 5%. 1000 milliLiter(s) IV Continuous <Continuous>  dextrose 50% Injectable 25 Gram(s) IV Push once  dextrose 50% Injectable 12.5 Gram(s) IV Push once  dextrose Oral Gel 15 Gram(s) Oral once PRN  ferrous    sulfate 325 milliGRAM(s) Oral daily  gabapentin 100 milliGRAM(s) Oral at bedtime  glucagon  Injectable 1 milliGRAM(s) IntraMuscular once  hydrALAZINE 100 milliGRAM(s) Oral three times a day  influenza  Vaccine (HIGH DOSE) 0.7 milliLiter(s) IntraMuscular once  insulin lispro (ADMELOG) corrective regimen sliding scale   SubCutaneous three times a day before meals  insulin lispro (ADMELOG) corrective regimen sliding scale   SubCutaneous at bedtime  isosorbide   dinitrate Tablet (ISORDIL) 30 milliGRAM(s) Oral three times a day  levothyroxine 50 MICROGram(s) Oral daily  magnesium sulfate  IVPB 2 Gram(s) IV Intermittent once  metoprolol succinate ER 50 milliGRAM(s) Oral every 12 hours  montelukast 10 milliGRAM(s) Oral daily  polyethylene glycol 3350 17 Gram(s) Oral daily PRN  senna 2 Tablet(s) Oral at bedtime  tamsulosin 0.4 milliGRAM(s) Oral at bedtime      Vitals:  T(C): 36.2 (05-10-24 @ 10:45), Max: 37.1 (24 @ 17:25)  HR: 74 (05-10-24 @ 10:45) (73 - 81)  BP: 107/55 (05-10-24 @ 10:45) (100/54 - 113/65)  BP(mean): --  RR: 18 (05-10-24 @ 10:45) (17 - 18)  SpO2: 100% (05-10-24 @ 10:45) (98% - 100%)    Daily     Daily Weight in k.3 (10 May 2024 05:35)        I&O's Summary    09 May 2024 07:01  -  10 May 2024 07:00  --------------------------------------------------------  IN: 720 mL / OUT: 2550 mL / NET: -1830 mL    Physical Exam:  Appearance: No Acute Distress  HEENT: PERRL  Neck: JVP improved to 8-10 with HJR  Cardiovascular: Normal S1 S2, No murmurs/rubs/gallops  Respiratory: Clear to auscultation bilaterally  Gastrointestinal: Soft, Non-tender	  Skin: No cyanosis	  Neurologic: Non-focal  Extremities: No LE edema  Psychiatry: A & O x 3, Mood & affect appropriate    Labs:                        13.2   10.90 )-----------( 170      ( 10 May 2024 04:30 )             41.1     05-10    139  |  99  |  89<H>  ----------------------------<  134<H>  3.5   |  22  |  2.36<H>    Ca    7.8<L>      10 May 2024 04:30  Phos  4.9     05-10  Mg     1.70     -10    TPro  5.9<L>  /  Alb  3.3  /  TBili  0.6  /  DBili  x   /  AST  25  /  ALT  35  /  AlkPhos  136<H>  0510

## 2024-05-10 NOTE — PROGRESS NOTE ADULT - PROBLEM SELECTOR PLAN 1
ACC Stage C/NYHA Class II. TTE 3/2024 w/ EF 20-25%. Possible etiologies for current decompensation include: recurrence of afib despite ablation, COPD exacerbation. Home GDMT: coreg 6.25mg BID, hydralazine 100mg TID, isordil 30mg TID  -defer to cardiology if necessary to repeat echo  -trops flat and no CP. EKG without STEMI equivalent - not ACS  -COPD management as below  -EP for device interrogation to assess for afib/aflutter burden - 86 second episode AF, BiVPaced 92%  -c/w GDMT:  - hydral 100 mg q8h  - isordil 30 mg q8h  - toprol 50 BID  - discontinued entresto 24/26 BID  - strict Is/Os, monitor UOP, daily weights  - s/p bumex gtt 2mg/hr per HF  - still volume up with rising BUN, BNP - will give 4mg IVPB bumex followed by 1mg/hour bumex  - trialing HST per HF/nephrology given diuretic refractory volume overload  - Maintain K>4, Mg>2.

## 2024-05-10 NOTE — PROGRESS NOTE ADULT - PROBLEM SELECTOR PLAN 1
Patient likely has CKD 2/2 longstanding heart failure, HTN, and T2DM. Upon review of [facility], baseline SCr appears to be ~2.2-2.6. Noah on 5/8 is very low. This could be due to hypotension. Patient has been receiving diuretics since admission and a low urine sodium may also indicate insufficient diuretic response, however iso CKD a Noah has limited utility due to inherent impairment of proximal tubular reabsorption of sodium. In addition, increased urine output from diuretic therapy may end up falsely diluting a spot urine, although FeNa is also <1%. On IV intermittent Bumex, pt was urinating ~1L daily. After Bumex gtt 1mg/hr initiated on 5/8, he has had increased UOP, 2.4L/24h, total net negative 1.8L. Cr stable at 2.36. Serum Na, K, and Cl are wnl. CHF meds per Heart Failure team, but would consider decreasing Hydral since pt still hypotensive if Heart Failure is ok with it. Would continue Bumex gtt and titrate to net negative 1-2L daily (must have accurate Is+Os) and MEMS reading of <20mmHg. Currently, no indication for dialysis. Please avoid nephrotoxins when possible and dose all meds per eGFR.    Recommendations are preliminary until attending attestation.    Sandra Leiva, PGY-4  Nephrology Fellow  MS TEAMS preferred  (After 5pm or on weekends, please call the on-call fellow).

## 2024-05-10 NOTE — PROGRESS NOTE ADULT - ASSESSMENT
Briefly, 69 y/o [place] citizen male with h/o HTN, HFrEF/ICM (EF 25%, LVIDD 5.6cm) s/p CRT-D and CardioMEMS, CAD w/ CABG 2014 at [facility], asthma/COPD, CVA with left sided weakness, CKD (b/l Cr 2.0), aflutter s/p ablation (3/29/24) comes in from HF office with worsening dyspnea and fluid overload. Started on bumex gtt 2 mg/hr with improvement. Started on Entresto and recently had coreg increased but now hypotensive, likely 2/2 hypovolemia or medication-induced. Renal function worsening initially thought to be d/t hypovolemia but was overloaded and resumed on bumex gtt with improvement.     Pertinent Labs:    Pro-Brain Natriuretic Peptide: 9273  Lactate: 1.7 mmol/L   Troponin  81/84  BUN/Cr 42.2.11    CXR: Moderate pulmonary vascular congestion     EKG: Sinus Rhythm non specific ST-T wave changes      RHC (3/24): RA 13 mmHg. PA 52/20/35 mmHg. PCW 23-25 mmHg. Cardiac index 1.96 L/min/m2. SVR 1712 dsc.

## 2024-05-10 NOTE — PROGRESS NOTE ADULT - ATTENDING COMMENTS
continue with diuretics cs per Cards   HTS today as well to augment diuresis  please consider decreasing Hydral as BP is soft to allow for diuresis. ..if ok with cards   avoid contrast studies, ACE-I, ARB, or NSAIDs

## 2024-05-10 NOTE — PROGRESS NOTE ADULT - ATTENDING COMMENTS
Patient seen and examined at bedside. In brief, 68y M w/ hx of HFrEF (EF 20-25% as of 3/2024) s/p cardioMEMS in 3/2024 and [employer] CRT-D in 2022, paroxysmal afib/flutter s/p ablation on eliquis, CKD3, T2DM, COPD on 2LNC home O2, CAD s/p CABG in 2014, CVA w/ residual L sided weakness, prior R ankle fracture admitted for acute on chronic systolic congestive heart failure.  Bumex gtt restarted on 5/8 given concern of volume overload. Appreciate renal input - patient is not an  HD candidate. Ordered for HTS to assist with Diuresis.   Dispo pending final HF recs and patient's creatine.     Time-based billing (NON-critical care).     50 minutes spent on total encounter. The necessity of the time spent during the encounter on this date of service was due to:     Review of laboratory data, radiology results, consultants' recommendations, documentation in [place], discussion with patient/house staff/ACP and interdisciplinary staff (such as , social workers, etc). Interventions were performed as documented above.

## 2024-05-10 NOTE — PROGRESS NOTE ADULT - PROBLEM SELECTOR PLAN 11
DVT PPx: full AC w/ eliquis  Diet: DASH/CC fluid restriction  Code status: Full code  Communication: Ketty (daughter) contacted 5/1 11PM with regard to plan to which understanding was verbalized by party  Dispo: likely home   PCP: Dr. Robin Schmidt  Pharmacy: [facility] [phone] [facility]

## 2024-05-11 LAB
ALBUMIN SERPL ELPH-MCNC: 3.6 G/DL — SIGNIFICANT CHANGE UP (ref 3.3–5)
ALP SERPL-CCNC: 138 U/L — HIGH (ref 40–120)
ALT FLD-CCNC: 33 U/L — SIGNIFICANT CHANGE UP (ref 4–41)
ANION GAP SERPL CALC-SCNC: 17 MMOL/L — HIGH (ref 7–14)
ANION GAP SERPL CALC-SCNC: 19 MMOL/L — HIGH (ref 7–14)
ANION GAP SERPL CALC-SCNC: 19 MMOL/L — HIGH (ref 7–14)
AST SERPL-CCNC: 35 U/L — SIGNIFICANT CHANGE UP (ref 4–40)
BASOPHILS # BLD AUTO: 0.04 K/UL — SIGNIFICANT CHANGE UP (ref 0–0.2)
BASOPHILS NFR BLD AUTO: 0.4 % — SIGNIFICANT CHANGE UP (ref 0–2)
BILIRUB SERPL-MCNC: 0.6 MG/DL — SIGNIFICANT CHANGE UP (ref 0.2–1.2)
BUN SERPL-MCNC: 74 MG/DL — HIGH (ref 7–23)
BUN SERPL-MCNC: 84 MG/DL — HIGH (ref 7–23)
BUN SERPL-MCNC: 84 MG/DL — HIGH (ref 7–23)
CALCIUM SERPL-MCNC: 8.2 MG/DL — LOW (ref 8.4–10.5)
CALCIUM SERPL-MCNC: 8.2 MG/DL — LOW (ref 8.4–10.5)
CALCIUM SERPL-MCNC: 8.3 MG/DL — LOW (ref 8.4–10.5)
CHLORIDE SERPL-SCNC: 100 MMOL/L — SIGNIFICANT CHANGE UP (ref 98–107)
CHLORIDE SERPL-SCNC: 101 MMOL/L — SIGNIFICANT CHANGE UP (ref 98–107)
CHLORIDE SERPL-SCNC: 101 MMOL/L — SIGNIFICANT CHANGE UP (ref 98–107)
CO2 SERPL-SCNC: 22 MMOL/L — SIGNIFICANT CHANGE UP (ref 22–31)
CO2 SERPL-SCNC: 23 MMOL/L — SIGNIFICANT CHANGE UP (ref 22–31)
CO2 SERPL-SCNC: 23 MMOL/L — SIGNIFICANT CHANGE UP (ref 22–31)
CREAT SERPL-MCNC: 2.12 MG/DL — HIGH (ref 0.5–1.3)
CREAT SERPL-MCNC: 2.12 MG/DL — HIGH (ref 0.5–1.3)
CREAT SERPL-MCNC: 2.42 MG/DL — HIGH (ref 0.5–1.3)
EGFR: 28 ML/MIN/1.73M2 — LOW
EGFR: 33 ML/MIN/1.73M2 — LOW
EGFR: 33 ML/MIN/1.73M2 — LOW
EOSINOPHIL # BLD AUTO: 0.18 K/UL — SIGNIFICANT CHANGE UP (ref 0–0.5)
EOSINOPHIL NFR BLD AUTO: 1.7 % — SIGNIFICANT CHANGE UP (ref 0–6)
GLUCOSE BLDC GLUCOMTR-MCNC: 114 MG/DL — HIGH (ref 70–99)
GLUCOSE BLDC GLUCOMTR-MCNC: 192 MG/DL — HIGH (ref 70–99)
GLUCOSE BLDC GLUCOMTR-MCNC: 224 MG/DL — HIGH (ref 70–99)
GLUCOSE BLDC GLUCOMTR-MCNC: 226 MG/DL — HIGH (ref 70–99)
GLUCOSE SERPL-MCNC: 128 MG/DL — HIGH (ref 70–99)
GLUCOSE SERPL-MCNC: 134 MG/DL — HIGH (ref 70–99)
GLUCOSE SERPL-MCNC: 140 MG/DL — HIGH (ref 70–99)
HCT VFR BLD CALC: 43 % — SIGNIFICANT CHANGE UP (ref 39–50)
HGB BLD-MCNC: 14.3 G/DL — SIGNIFICANT CHANGE UP (ref 13–17)
IANC: 8.01 K/UL — HIGH (ref 1.8–7.4)
IMM GRANULOCYTES NFR BLD AUTO: 0.4 % — SIGNIFICANT CHANGE UP (ref 0–0.9)
LYMPHOCYTES # BLD AUTO: 1.46 K/UL — SIGNIFICANT CHANGE UP (ref 1–3.3)
LYMPHOCYTES # BLD AUTO: 13.8 % — SIGNIFICANT CHANGE UP (ref 13–44)
MAGNESIUM SERPL-MCNC: 2 MG/DL — SIGNIFICANT CHANGE UP (ref 1.6–2.6)
MAGNESIUM SERPL-MCNC: 2.2 MG/DL — SIGNIFICANT CHANGE UP (ref 1.6–2.6)
MAGNESIUM SERPL-MCNC: 2.3 MG/DL — SIGNIFICANT CHANGE UP (ref 1.6–2.6)
MCHC RBC-ENTMCNC: 32.2 PG — SIGNIFICANT CHANGE UP (ref 27–34)
MCHC RBC-ENTMCNC: 33.3 GM/DL — SIGNIFICANT CHANGE UP (ref 32–36)
MCV RBC AUTO: 96.8 FL — SIGNIFICANT CHANGE UP (ref 80–100)
MONOCYTES # BLD AUTO: 0.84 K/UL — SIGNIFICANT CHANGE UP (ref 0–0.9)
MONOCYTES NFR BLD AUTO: 7.9 % — SIGNIFICANT CHANGE UP (ref 2–14)
NEUTROPHILS # BLD AUTO: 8.01 K/UL — HIGH (ref 1.8–7.4)
NEUTROPHILS NFR BLD AUTO: 75.8 % — SIGNIFICANT CHANGE UP (ref 43–77)
NRBC # BLD: 0 /100 WBCS — SIGNIFICANT CHANGE UP (ref 0–0)
NRBC # FLD: 0 K/UL — SIGNIFICANT CHANGE UP (ref 0–0)
PHOSPHATE SERPL-MCNC: 4.3 MG/DL — SIGNIFICANT CHANGE UP (ref 2.5–4.5)
PHOSPHATE SERPL-MCNC: 4.9 MG/DL — HIGH (ref 2.5–4.5)
PHOSPHATE SERPL-MCNC: 4.9 MG/DL — HIGH (ref 2.5–4.5)
PLATELET # BLD AUTO: 165 K/UL — SIGNIFICANT CHANGE UP (ref 150–400)
POTASSIUM SERPL-MCNC: 4 MMOL/L — SIGNIFICANT CHANGE UP (ref 3.5–5.3)
POTASSIUM SERPL-MCNC: 4.3 MMOL/L — SIGNIFICANT CHANGE UP (ref 3.5–5.3)
POTASSIUM SERPL-MCNC: 4.8 MMOL/L — SIGNIFICANT CHANGE UP (ref 3.5–5.3)
POTASSIUM SERPL-SCNC: 4 MMOL/L — SIGNIFICANT CHANGE UP (ref 3.5–5.3)
POTASSIUM SERPL-SCNC: 4.3 MMOL/L — SIGNIFICANT CHANGE UP (ref 3.5–5.3)
POTASSIUM SERPL-SCNC: 4.8 MMOL/L — SIGNIFICANT CHANGE UP (ref 3.5–5.3)
PROT SERPL-MCNC: 6.2 G/DL — SIGNIFICANT CHANGE UP (ref 6–8.3)
RBC # BLD: 4.44 M/UL — SIGNIFICANT CHANGE UP (ref 4.2–5.8)
RBC # FLD: 17.1 % — HIGH (ref 10.3–14.5)
SODIUM SERPL-SCNC: 141 MMOL/L — SIGNIFICANT CHANGE UP (ref 135–145)
SODIUM SERPL-SCNC: 142 MMOL/L — SIGNIFICANT CHANGE UP (ref 135–145)
SODIUM SERPL-SCNC: 142 MMOL/L — SIGNIFICANT CHANGE UP (ref 135–145)
WBC # BLD: 10.57 K/UL — HIGH (ref 3.8–10.5)
WBC # FLD AUTO: 10.57 K/UL — HIGH (ref 3.8–10.5)

## 2024-05-11 PROCEDURE — 99232 SBSQ HOSP IP/OBS MODERATE 35: CPT | Mod: GC

## 2024-05-11 PROCEDURE — 99233 SBSQ HOSP IP/OBS HIGH 50: CPT | Mod: GC

## 2024-05-11 RX ADMIN — SENNA PLUS 2 TABLET(S): 8.6 TABLET ORAL at 22:08

## 2024-05-11 RX ADMIN — TAMSULOSIN HYDROCHLORIDE 0.4 MILLIGRAM(S): 0.4 CAPSULE ORAL at 22:08

## 2024-05-11 RX ADMIN — BUMETANIDE 5 MG/HR: 0.25 INJECTION INTRAMUSCULAR; INTRAVENOUS at 10:47

## 2024-05-11 RX ADMIN — Medication 325 MILLIGRAM(S): at 12:32

## 2024-05-11 RX ADMIN — CLOPIDOGREL BISULFATE 75 MILLIGRAM(S): 75 TABLET, FILM COATED ORAL at 12:32

## 2024-05-11 RX ADMIN — Medication 2: at 12:31

## 2024-05-11 RX ADMIN — CHLORHEXIDINE GLUCONATE 1 APPLICATION(S): 213 SOLUTION TOPICAL at 12:30

## 2024-05-11 RX ADMIN — Medication 50 MILLIGRAM(S): at 17:28

## 2024-05-11 RX ADMIN — ISOSORBIDE DINITRATE 30 MILLIGRAM(S): 5 TABLET ORAL at 05:14

## 2024-05-11 RX ADMIN — Medication 50 MILLIGRAM(S): at 05:14

## 2024-05-11 RX ADMIN — ISOSORBIDE DINITRATE 30 MILLIGRAM(S): 5 TABLET ORAL at 12:30

## 2024-05-11 RX ADMIN — ISOSORBIDE DINITRATE 30 MILLIGRAM(S): 5 TABLET ORAL at 15:47

## 2024-05-11 RX ADMIN — GABAPENTIN 100 MILLIGRAM(S): 400 CAPSULE ORAL at 22:08

## 2024-05-11 RX ADMIN — Medication 100 MILLIGRAM(S): at 05:14

## 2024-05-11 RX ADMIN — Medication 100 MILLIGRAM(S): at 22:07

## 2024-05-11 RX ADMIN — Medication 25 GRAM(S): at 00:16

## 2024-05-11 RX ADMIN — APIXABAN 5 MILLIGRAM(S): 2.5 TABLET, FILM COATED ORAL at 05:14

## 2024-05-11 RX ADMIN — Medication 50 MICROGRAM(S): at 05:14

## 2024-05-11 RX ADMIN — Medication 2: at 08:46

## 2024-05-11 RX ADMIN — BUDESONIDE AND FORMOTEROL FUMARATE DIHYDRATE 2 PUFF(S): 160; 4.5 AEROSOL RESPIRATORY (INHALATION) at 22:08

## 2024-05-11 RX ADMIN — Medication 100 MILLIGRAM(S): at 12:31

## 2024-05-11 RX ADMIN — BUDESONIDE AND FORMOTEROL FUMARATE DIHYDRATE 2 PUFF(S): 160; 4.5 AEROSOL RESPIRATORY (INHALATION) at 08:45

## 2024-05-11 RX ADMIN — Medication 100 MILLIGRAM(S): at 14:06

## 2024-05-11 RX ADMIN — MONTELUKAST 10 MILLIGRAM(S): 4 TABLET, CHEWABLE ORAL at 12:32

## 2024-05-11 RX ADMIN — APIXABAN 5 MILLIGRAM(S): 2.5 TABLET, FILM COATED ORAL at 17:24

## 2024-05-11 RX ADMIN — ATORVASTATIN CALCIUM 80 MILLIGRAM(S): 80 TABLET, FILM COATED ORAL at 22:08

## 2024-05-11 NOTE — PROGRESS NOTE ADULT - PROBLEM SELECTOR PLAN 1
ACC Stage C/NYHA Class II. TTE 3/2024 w/ EF 20-25%. Possible etiologies for current decompensation include: recurrence of afib despite ablation, COPD exacerbation. Home GDMT: coreg 6.25mg BID, hydralazine 100mg TID, isordil 30mg TID  -defer to cardiology if necessary to repeat echo  -trops flat and no CP. EKG without STEMI equivalent - not ACS  -COPD management as below  -EP for device interrogation to assess for afib/aflutter burden - 86 second episode AF, BiVPaced 92%  -c/w GDMT:  - hydral 100 mg q8h  - isordil 30 mg q8h  - toprol 50 BID  - discontinued entresto 24/26 BID  - strict Is/Os, monitor UOP, daily weights  - still volume up with rising BUN, BNP - c/w 1mg/hour bumex gtt through Sunday 5/12, then start torsemide per HF  - trialing HST per HF/nephrology given diuretic refractory volume overload  - repeat TTE 5/10 showing EF 15-20%, no significant changes from 3/2024 TTE  - Maintain K>4, Mg>2.

## 2024-05-11 NOTE — PROGRESS NOTE ADULT - PROBLEM SELECTOR PLAN 1
Patient likely has CKD 2/2 longstanding heart failure, HTN, and T2DM. Upon review of [facility], baseline SCr appears to be ~2.2-2.6. Noah on 5/8 is very low. It could be due to hypotension. Patient has been receiving diuretics since admission and a low urine sodium may also indicate insufficient diuretic response, however iso CKD a Noah has limited utility due to inherent impairment of proximal tubular reabsorption of sodium. In addition, increased urine output from diuretic therapy may end up falsely diluting a spot urine, although FeNa is also <1%. On IV intermittent Bumex, pt was urinating ~1L daily. After Bumex gtt 1mg/hr initiated on 5/8, he has had increased UOP, 2.5L/24h, total net negative ~2.4L. Cr stable at 2.12. Serum Na, K, and Cl are wnl. CHF meds per Heart Failure team. Would continue Bumex gtt and titrate to net negative 1-2L daily (must have accurate Is+Os) and MEMS reading of <20mmHg. Currently, no indication for dialysis. Please avoid nephrotoxins when possible and dose all meds per eGFR.    Recommendations are preliminary until attending attestation.    If you have any questions, please feel free to contact me  Kyree Herrera  Nephrology Fellow  [phone]; Prefer Teams.  (After 5pm or on weekends please page the on-call fellow). Patient likely has CKD 2/2 longstanding heart failure, HTN, and T2DM. Upon review of [facility], baseline SCr appears to be ~2.2-2.6. Noah on 5/8 is very low. It could be due to hypotension. Patient has been receiving diuretics since admission and a low urine sodium may also indicate insufficient diuretic response, however iso CKD a Noah has limited utility due to inherent impairment of proximal tubular reabsorption of sodium. In addition, increased urine output from diuretic therapy may end up falsely diluting a spot urine, although FeNa is also <1%. On IV intermittent Bumex, pt was urinating ~1L daily. After Bumex gtt 1mg/hr initiated on 5/8, he has had increased UOP, 2.5L/24h, total net negative ~2.4L. Cr stable at 2.12. Serum Na, K, and Cl are wnl. CHF meds per Heart Failure team. Would continue Bumex gtt and titrate to net negative 1-2L daily (must have accurate Is+Os) and MEMS reading of <20mmHg.   Initial plan was to give HTS but 3% saline cant be given as bolus on floors. Currently, no indication for dialysis. Please avoid nephrotoxins when possible and dose all meds per eGFR.    Recommendations are preliminary until attending attestation.    If you have any questions, please feel free to contact me  Kyree Herrera  Nephrology Fellow  [phone]; Prefer Teams.  (After 5pm or on weekends please page the on-call fellow).

## 2024-05-11 NOTE — PROGRESS NOTE ADULT - ATTENDING COMMENTS
Patient examined and ROS reviewed. A case of SHERRY on CKD with exacerbation of CHF being diuresed with IV Bumex. Advised to continue diurese with negative fluid balance.

## 2024-05-11 NOTE — PROGRESS NOTE ADULT - PROBLEM SELECTOR PLAN 11
DVT PPx: full AC w/ eliquis  Diet: DASH/CC fluid restriction  Code status: Full code  Communication: Ketty (daughter) contacted 5/1 11PM with regard to plan to which understanding was verbalized by party  Dispo: likely home   PCP: Dr. Robin Schmidt  Pharmacy: [facility] [phone] [facility]

## 2024-05-11 NOTE — PROGRESS NOTE ADULT - SUBJECTIVE AND OBJECTIVE BOX
Patient is a 68y old  Male who presents with a chief complaint of acute on chronic heart failure exacerbation (10 May 2024 16:53)      SUBJECTIVE / OVERNIGHT EVENTS:  No acute events overnight. Patient at this time denies fevers, chills, CP, SOB, nausea, vomiting, diarrhea, constipation, dysuria. Patient seen and examined at bedside.    MEDICATIONS  (STANDING):  allopurinol 100 milliGRAM(s) Oral daily  apixaban 5 milliGRAM(s) Oral two times a day  atorvastatin 80 milliGRAM(s) Oral at bedtime  budesonide 160 MICROgram(s)/formoterol 4.5 MICROgram(s) Inhaler 2 Puff(s) Inhalation two times a day  buMETAnide Infusion 1 mG/Hr (5 mL/Hr) IV Continuous <Continuous>  chlorhexidine 2% Cloths 1 Application(s) Topical daily  clopidogrel Tablet 75 milliGRAM(s) Oral daily  dextrose 10% Bolus 125 milliLiter(s) IV Bolus once  dextrose 5%. 1000 milliLiter(s) (100 mL/Hr) IV Continuous <Continuous>  dextrose 5%. 1000 milliLiter(s) (50 mL/Hr) IV Continuous <Continuous>  dextrose 50% Injectable 25 Gram(s) IV Push once  dextrose 50% Injectable 12.5 Gram(s) IV Push once  ferrous    sulfate 325 milliGRAM(s) Oral daily  gabapentin 100 milliGRAM(s) Oral at bedtime  glucagon  Injectable 1 milliGRAM(s) IntraMuscular once  hydrALAZINE 100 milliGRAM(s) Oral three times a day  influenza  Vaccine (HIGH DOSE) 0.7 milliLiter(s) IntraMuscular once  insulin lispro (ADMELOG) corrective regimen sliding scale   SubCutaneous three times a day before meals  insulin lispro (ADMELOG) corrective regimen sliding scale   SubCutaneous at bedtime  isosorbide   dinitrate Tablet (ISORDIL) 30 milliGRAM(s) Oral three times a day  levothyroxine 50 MICROGram(s) Oral daily  metoprolol succinate ER 50 milliGRAM(s) Oral every 12 hours  montelukast 10 milliGRAM(s) Oral daily  senna 2 Tablet(s) Oral at bedtime  tamsulosin 0.4 milliGRAM(s) Oral at bedtime    MEDICATIONS  (PRN):  acetaminophen     Tablet .. 650 milliGRAM(s) Oral every 6 hours PRN Temp greater or equal to 38C (100.4F), Mild Pain (1 - 3)  dextrose Oral Gel 15 Gram(s) Oral once PRN Blood Glucose LESS THAN 70 milliGRAM(s)/deciliter  polyethylene glycol 3350 17 Gram(s) Oral daily PRN Constipation      CAPILLARY BLOOD GLUCOSE      POCT Blood Glucose.: 219 mg/dL (10 May 2024 22:11)  POCT Blood Glucose.: 143 mg/dL (10 May 2024 17:31)  POCT Blood Glucose.: 190 mg/dL (10 May 2024 12:27)  POCT Blood Glucose.: 134 mg/dL (10 May 2024 08:21)    I&O's Summary    10 May 2024 07:01  -  11 May 2024 07:00  --------------------------------------------------------  IN: 38 mL / OUT: 2500 mL / NET: -2462 mL        PHYSICAL EXAM:  Vital Signs Last 24 Hrs  T(C): 36.6 (11 May 2024 05:00), Max: 36.7 (10 May 2024 14:00)  T(F): 97.9 (11 May 2024 05:00), Max: 98 (10 May 2024 14:00)  HR: 84 (11 May 2024 05:00) (73 - 89)  BP: 119/67 (11 May 2024 05:00) (100/56 - 128/67)  BP(mean): --  RR: 17 (11 May 2024 05:00) (17 - 18)  SpO2: 100% (11 May 2024 05:00) (100% - 100%)    Parameters below as of 11 May 2024 05:00  Patient On (Oxygen Delivery Method): nasal cannula  O2 Flow (L/min): 2      GENERAL: No acute distress, well-developed  HEAD:  Atraumatic, Normocephalic  EYES: EOMI, PERRLA, conjunctiva and sclera clear  NECK: Supple, no lymphadenopathy, no JVD  CHEST/LUNG: CTAB; No wheezes, rales, or rhonchi  HEART: Regular rate and rhythm; No murmurs, rubs, or gallops  ABDOMEN: Soft, non-tender, non-distended; normal bowel sounds, no organomegaly  EXTREMITIES:  2+ peripheral pulses b/l, No clubbing, cyanosis, or edema  NEUROLOGY: A&O x 3, residual L sided deficits  SKIN: No rashes or lesions    LABS:                        14.3   10.57 )-----------( 165      ( 11 May 2024 06:00 )             43.0     05-11    142  |  101  |  84<H>  ----------------------------<  134<H>  4.8   |  22  |  2.12<H>    Ca    8.2<L>      11 May 2024 06:00  Phos  4.9     05-11  Mg     2.30     05-11    TPro  6.2  /  Alb  3.6  /  TBili  0.6  /  DBili  x   /  AST  35  /  ALT  33  /  AlkPhos  138<H>  05-11          Urinalysis Basic - ( 11 May 2024 06:00 )    Color: x / Appearance: x / SG: x / pH: x  Gluc: 134 mg/dL / Ketone: x  / Bili: x / Urobili: x   Blood: x / Protein: x / Nitrite: x   Leuk Esterase: x / RBC: x / WBC x   Sq Epi: x / Non Sq Epi: x / Bacteria: x          RADIOLOGY & ADDITIONAL TESTS:  Results Reviewed:   Imaging Personally Reviewed:  Electrocardiogram Personally Reviewed:    COORDINATION OF CARE:  Care Discussed with Consultants/Other Providers [Y/N]:  Prior or Outpatient Records Reviewed [Y/N]:

## 2024-05-11 NOTE — PROGRESS NOTE ADULT - ATTENDING COMMENTS
Patient seen and examined at bedside. In brief, 68y M w/ hx of HFrEF (EF 20-25% as of 3/2024) s/p cardioMEMS in 3/2024 and [employer] CRT-D in 2022, paroxysmal afib/flutter s/p ablation on eliquis, CKD3, T2DM, COPD on 2LNC home O2, CAD s/p CABG in 2014, CVA w/ residual L sided weakness, prior R ankle fracture admitted for acute on chronic systolic congestive heart failure.  Bumex gtt restarted on 5/8 given concern of volume overload. Appreciate renal input - patient is not an  HD candidate. Unable to give  HTS bolus on floor  to assist with Diuresis. Plan to continue bumex gtt until 5/12, then will switch to torsemide.   Dispo pending final HF recs and patient's creatine.     Time-based billing (NON-critical care).     50 minutes spent on total encounter. The necessity of the time spent during the encounter on this date of service was due to:

## 2024-05-11 NOTE — PROGRESS NOTE ADULT - SUBJECTIVE AND OBJECTIVE BOX
[facility] Division of Kidney Diseases & Hypertension  FOLLOW UP NOTE  [phone]--------------------------------------------------------------------------------  Chief Complaint:Acute on chronic systolic congestive heart failure    24 hour events/subjective:  Pt was seen and examined at the bedside. No acute overnight events. No fresh complaints. Endorsed feeling better. Appetite is better. SOB has improved.       PAST HISTORY  --------------------------------------------------------------------------------  No significant changes to PMH, PSH, FHx, SHx, unless otherwise noted    ALLERGIES & MEDICATIONS  --------------------------------------------------------------------------------  Allergies    No Known Allergies    Intolerances      Standing Inpatient Medications  allopurinol 100 milliGRAM(s) Oral daily  apixaban 5 milliGRAM(s) Oral two times a day  atorvastatin 80 milliGRAM(s) Oral at bedtime  budesonide 160 MICROgram(s)/formoterol 4.5 MICROgram(s) Inhaler 2 Puff(s) Inhalation two times a day  buMETAnide Infusion 1 mG/Hr IV Continuous <Continuous>  chlorhexidine 2% Cloths 1 Application(s) Topical daily  clopidogrel Tablet 75 milliGRAM(s) Oral daily  dextrose 10% Bolus 125 milliLiter(s) IV Bolus once  dextrose 5%. 1000 milliLiter(s) IV Continuous <Continuous>  dextrose 5%. 1000 milliLiter(s) IV Continuous <Continuous>  dextrose 50% Injectable 25 Gram(s) IV Push once  dextrose 50% Injectable 12.5 Gram(s) IV Push once  ferrous    sulfate 325 milliGRAM(s) Oral daily  gabapentin 100 milliGRAM(s) Oral at bedtime  glucagon  Injectable 1 milliGRAM(s) IntraMuscular once  hydrALAZINE 100 milliGRAM(s) Oral three times a day  influenza  Vaccine (HIGH DOSE) 0.7 milliLiter(s) IntraMuscular once  insulin lispro (ADMELOG) corrective regimen sliding scale   SubCutaneous three times a day before meals  insulin lispro (ADMELOG) corrective regimen sliding scale   SubCutaneous at bedtime  isosorbide   dinitrate Tablet (ISORDIL) 30 milliGRAM(s) Oral three times a day  levothyroxine 50 MICROGram(s) Oral daily  metoprolol succinate ER 50 milliGRAM(s) Oral every 12 hours  montelukast 10 milliGRAM(s) Oral daily  senna 2 Tablet(s) Oral at bedtime  tamsulosin 0.4 milliGRAM(s) Oral at bedtime    PRN Inpatient Medications  acetaminophen     Tablet .. 650 milliGRAM(s) Oral every 6 hours PRN  dextrose Oral Gel 15 Gram(s) Oral once PRN  polyethylene glycol 3350 17 Gram(s) Oral daily PRN      REVIEW OF SYSTEMS  --------------------------------------------------------------------------------  Constitutional: No fevers, no chills.  Neuro: no HA, dizziness.  HEENT: No sore throat.  Respiratory: +wheezing, dyspnea with interval improvement.   Cardiovascular: No chest pain  Gastrointestinal: No abdominal pain, diarrhea, nausea, vomiting  Genitourinary: No dysuria, hematuria, urgency  Extremities: No edema    All other systems were reviewed and are negative, except as noted.    VITALS/PHYSICAL EXAM  --------------------------------------------------------------------------------  SONIA): 36.6 (05-11-24 @ 05:00), Max: 36.7 (05-10-24 @ 14:00)  HR: 84 (05-11-24 @ 05:00) (74 - 89)  BP: 119/67 (05-11-24 @ 05:00) (103/64 - 128/67)  RR: 17 (05-11-24 @ 05:00) (17 - 18)  SpO2: 100% (05-11-24 @ 05:00) (100% - 100%)  Wt(kg): --        05-10-24 @ 07:01  -  05-11-24 @ 07:00  --------------------------------------------------------  IN: 38 mL / OUT: 2500 mL / NET: -2462 mL      Physical Exam:  Gen: NAD   Pulm: CTA B/L   CV: +S1S2   Abd: +BS, soft, nondistended/nontender   Extremities: no bilateral LE edema    Neuro: non-focal    LABS/STUDIES  --------------------------------------------------------------------------------              14.3   10.57 >-----------<  165      [05-11-24 @ 06:00]              43.0     142  |  101  |  84  ----------------------------<  134      [05-11-24 @ 06:00]  4.8   |  22  |  2.12        Ca     8.2     [05-11-24 @ 06:00]      Mg     2.30     [05-11-24 @ 06:00]      Phos  4.9     [05-11-24 @ 06:00]    TPro  6.2  /  Alb  3.6  /  TBili  0.6  /  DBili  x   /  AST  35  /  ALT  33  /  AlkPhos  138  [05-11-24 @ 06:00]          Creatinine Trend:  SCr 2.12 [05-11 @ 06:00]  SCr 2.36 [05-10 @ 04:30]  SCr 2.41 [05-10 @ 02:28]  SCr 2.38 [05-09 @ 04:39]  SCr 2.51 [05-08 @ 19:07]    Urinalysis - [05-11-24 @ 06:00]      Color  / Appearance  / SG  / pH       Gluc 134 / Ketone   / Bili  / Urobili        Blood  / Protein  / Leuk Est  / Nitrite       RBC  / WBC  / Hyaline  / Gran  / Sq Epi  / Non Sq Epi  / Bacteria     Urine Creatinine 60      [05-08-24 @ 09:55]  Urine Protein 6      [05-08-24 @ 09:55]  Urine Sodium <20      [05-08-24 @ 09:55]  Urine Urea Nitrogen 656.0      [05-08-24 @ 09:55]

## 2024-05-12 LAB
ALBUMIN SERPL ELPH-MCNC: 3.6 G/DL — SIGNIFICANT CHANGE UP (ref 3.3–5)
ALP SERPL-CCNC: 152 U/L — HIGH (ref 40–120)
ALT FLD-CCNC: 35 U/L — SIGNIFICANT CHANGE UP (ref 4–41)
ANION GAP SERPL CALC-SCNC: 15 MMOL/L — HIGH (ref 7–14)
ANION GAP SERPL CALC-SCNC: 18 MMOL/L — HIGH (ref 7–14)
AST SERPL-CCNC: 29 U/L — SIGNIFICANT CHANGE UP (ref 4–40)
BASOPHILS # BLD AUTO: 0.04 K/UL — SIGNIFICANT CHANGE UP (ref 0–0.2)
BASOPHILS NFR BLD AUTO: 0.3 % — SIGNIFICANT CHANGE UP (ref 0–2)
BILIRUB SERPL-MCNC: 0.8 MG/DL — SIGNIFICANT CHANGE UP (ref 0.2–1.2)
BUN SERPL-MCNC: 77 MG/DL — HIGH (ref 7–23)
BUN SERPL-MCNC: 82 MG/DL — HIGH (ref 7–23)
CALCIUM SERPL-MCNC: 8.4 MG/DL — SIGNIFICANT CHANGE UP (ref 8.4–10.5)
CALCIUM SERPL-MCNC: 8.7 MG/DL — SIGNIFICANT CHANGE UP (ref 8.4–10.5)
CHLORIDE SERPL-SCNC: 102 MMOL/L — SIGNIFICANT CHANGE UP (ref 98–107)
CHLORIDE SERPL-SCNC: 98 MMOL/L — SIGNIFICANT CHANGE UP (ref 98–107)
CO2 SERPL-SCNC: 22 MMOL/L — SIGNIFICANT CHANGE UP (ref 22–31)
CO2 SERPL-SCNC: 24 MMOL/L — SIGNIFICANT CHANGE UP (ref 22–31)
CREAT SERPL-MCNC: 2.33 MG/DL — HIGH (ref 0.5–1.3)
CREAT SERPL-MCNC: 2.34 MG/DL — HIGH (ref 0.5–1.3)
EGFR: 29 ML/MIN/1.73M2 — LOW
EGFR: 30 ML/MIN/1.73M2 — LOW
EOSINOPHIL # BLD AUTO: 0.25 K/UL — SIGNIFICANT CHANGE UP (ref 0–0.5)
EOSINOPHIL NFR BLD AUTO: 1.9 % — SIGNIFICANT CHANGE UP (ref 0–6)
GLUCOSE BLDC GLUCOMTR-MCNC: 141 MG/DL — HIGH (ref 70–99)
GLUCOSE BLDC GLUCOMTR-MCNC: 197 MG/DL — HIGH (ref 70–99)
GLUCOSE BLDC GLUCOMTR-MCNC: 214 MG/DL — HIGH (ref 70–99)
GLUCOSE BLDC GLUCOMTR-MCNC: 218 MG/DL — HIGH (ref 70–99)
GLUCOSE SERPL-MCNC: 123 MG/DL — HIGH (ref 70–99)
GLUCOSE SERPL-MCNC: 124 MG/DL — HIGH (ref 70–99)
HCT VFR BLD CALC: 43.6 % — SIGNIFICANT CHANGE UP (ref 39–50)
HGB BLD-MCNC: 14.5 G/DL — SIGNIFICANT CHANGE UP (ref 13–17)
IANC: 9.41 K/UL — HIGH (ref 1.8–7.4)
IMM GRANULOCYTES NFR BLD AUTO: 0.4 % — SIGNIFICANT CHANGE UP (ref 0–0.9)
LYMPHOCYTES # BLD AUTO: 1.82 K/UL — SIGNIFICANT CHANGE UP (ref 1–3.3)
LYMPHOCYTES # BLD AUTO: 14.1 % — SIGNIFICANT CHANGE UP (ref 13–44)
MAGNESIUM SERPL-MCNC: 1.9 MG/DL — SIGNIFICANT CHANGE UP (ref 1.6–2.6)
MAGNESIUM SERPL-MCNC: 2 MG/DL — SIGNIFICANT CHANGE UP (ref 1.6–2.6)
MCHC RBC-ENTMCNC: 32.2 PG — SIGNIFICANT CHANGE UP (ref 27–34)
MCHC RBC-ENTMCNC: 33.3 GM/DL — SIGNIFICANT CHANGE UP (ref 32–36)
MCV RBC AUTO: 96.7 FL — SIGNIFICANT CHANGE UP (ref 80–100)
MONOCYTES # BLD AUTO: 1.33 K/UL — HIGH (ref 0–0.9)
MONOCYTES NFR BLD AUTO: 10.3 % — SIGNIFICANT CHANGE UP (ref 2–14)
NEUTROPHILS # BLD AUTO: 9.41 K/UL — HIGH (ref 1.8–7.4)
NEUTROPHILS NFR BLD AUTO: 73 % — SIGNIFICANT CHANGE UP (ref 43–77)
NRBC # BLD: 0 /100 WBCS — SIGNIFICANT CHANGE UP (ref 0–0)
NRBC # FLD: 0 K/UL — SIGNIFICANT CHANGE UP (ref 0–0)
PHOSPHATE SERPL-MCNC: 4 MG/DL — SIGNIFICANT CHANGE UP (ref 2.5–4.5)
PHOSPHATE SERPL-MCNC: 4.5 MG/DL — SIGNIFICANT CHANGE UP (ref 2.5–4.5)
PLATELET # BLD AUTO: 170 K/UL — SIGNIFICANT CHANGE UP (ref 150–400)
POTASSIUM SERPL-MCNC: 3.9 MMOL/L — SIGNIFICANT CHANGE UP (ref 3.5–5.3)
POTASSIUM SERPL-MCNC: 4.2 MMOL/L — SIGNIFICANT CHANGE UP (ref 3.5–5.3)
POTASSIUM SERPL-SCNC: 3.9 MMOL/L — SIGNIFICANT CHANGE UP (ref 3.5–5.3)
POTASSIUM SERPL-SCNC: 4.2 MMOL/L — SIGNIFICANT CHANGE UP (ref 3.5–5.3)
PROT SERPL-MCNC: 6.3 G/DL — SIGNIFICANT CHANGE UP (ref 6–8.3)
RBC # BLD: 4.51 M/UL — SIGNIFICANT CHANGE UP (ref 4.2–5.8)
RBC # FLD: 16.6 % — HIGH (ref 10.3–14.5)
SODIUM SERPL-SCNC: 139 MMOL/L — SIGNIFICANT CHANGE UP (ref 135–145)
SODIUM SERPL-SCNC: 140 MMOL/L — SIGNIFICANT CHANGE UP (ref 135–145)
WBC # BLD: 12.9 K/UL — HIGH (ref 3.8–10.5)
WBC # FLD AUTO: 12.9 K/UL — HIGH (ref 3.8–10.5)

## 2024-05-12 PROCEDURE — 99232 SBSQ HOSP IP/OBS MODERATE 35: CPT | Mod: GC

## 2024-05-12 PROCEDURE — 99232 SBSQ HOSP IP/OBS MODERATE 35: CPT

## 2024-05-12 RX ORDER — IPRATROPIUM/ALBUTEROL SULFATE 18-103MCG
3 AEROSOL WITH ADAPTER (GRAM) INHALATION EVERY 6 HOURS
Refills: 0 | Status: DISCONTINUED | OUTPATIENT
Start: 2024-05-12 | End: 2024-05-15

## 2024-05-12 RX ORDER — POTASSIUM CHLORIDE 20 MEQ
20 PACKET (EA) ORAL ONCE
Refills: 0 | Status: COMPLETED | OUTPATIENT
Start: 2024-05-12 | End: 2024-05-12

## 2024-05-12 RX ADMIN — Medication 2: at 13:09

## 2024-05-12 RX ADMIN — ISOSORBIDE DINITRATE 30 MILLIGRAM(S): 5 TABLET ORAL at 17:38

## 2024-05-12 RX ADMIN — Medication 20 MILLIEQUIVALENT(S): at 08:30

## 2024-05-12 RX ADMIN — Medication 50 MICROGRAM(S): at 05:22

## 2024-05-12 RX ADMIN — BUMETANIDE 5 MG/HR: 0.25 INJECTION INTRAMUSCULAR; INTRAVENOUS at 19:49

## 2024-05-12 RX ADMIN — BUDESONIDE AND FORMOTEROL FUMARATE DIHYDRATE 2 PUFF(S): 160; 4.5 AEROSOL RESPIRATORY (INHALATION) at 08:23

## 2024-05-12 RX ADMIN — APIXABAN 5 MILLIGRAM(S): 2.5 TABLET, FILM COATED ORAL at 05:23

## 2024-05-12 RX ADMIN — Medication 100 MILLIGRAM(S): at 05:23

## 2024-05-12 RX ADMIN — Medication 50 MILLIGRAM(S): at 17:34

## 2024-05-12 RX ADMIN — Medication 100 MILLIGRAM(S): at 11:47

## 2024-05-12 RX ADMIN — CLOPIDOGREL BISULFATE 75 MILLIGRAM(S): 75 TABLET, FILM COATED ORAL at 11:47

## 2024-05-12 RX ADMIN — BUMETANIDE 5 MG/HR: 0.25 INJECTION INTRAMUSCULAR; INTRAVENOUS at 11:46

## 2024-05-12 RX ADMIN — Medication 2: at 08:24

## 2024-05-12 RX ADMIN — SENNA PLUS 2 TABLET(S): 8.6 TABLET ORAL at 21:52

## 2024-05-12 RX ADMIN — ATORVASTATIN CALCIUM 80 MILLIGRAM(S): 80 TABLET, FILM COATED ORAL at 21:51

## 2024-05-12 RX ADMIN — Medication 325 MILLIGRAM(S): at 11:47

## 2024-05-12 RX ADMIN — Medication 100 MILLIGRAM(S): at 14:17

## 2024-05-12 RX ADMIN — TAMSULOSIN HYDROCHLORIDE 0.4 MILLIGRAM(S): 0.4 CAPSULE ORAL at 21:52

## 2024-05-12 RX ADMIN — BUMETANIDE 5 MG/HR: 0.25 INJECTION INTRAMUSCULAR; INTRAVENOUS at 05:22

## 2024-05-12 RX ADMIN — CHLORHEXIDINE GLUCONATE 1 APPLICATION(S): 213 SOLUTION TOPICAL at 11:48

## 2024-05-12 RX ADMIN — ISOSORBIDE DINITRATE 30 MILLIGRAM(S): 5 TABLET ORAL at 11:46

## 2024-05-12 RX ADMIN — BUDESONIDE AND FORMOTEROL FUMARATE DIHYDRATE 2 PUFF(S): 160; 4.5 AEROSOL RESPIRATORY (INHALATION) at 21:52

## 2024-05-12 RX ADMIN — Medication 100 MILLIGRAM(S): at 21:52

## 2024-05-12 RX ADMIN — MONTELUKAST 10 MILLIGRAM(S): 4 TABLET, CHEWABLE ORAL at 11:48

## 2024-05-12 RX ADMIN — GABAPENTIN 100 MILLIGRAM(S): 400 CAPSULE ORAL at 21:55

## 2024-05-12 RX ADMIN — Medication 50 MILLIGRAM(S): at 05:23

## 2024-05-12 RX ADMIN — ISOSORBIDE DINITRATE 30 MILLIGRAM(S): 5 TABLET ORAL at 05:23

## 2024-05-12 RX ADMIN — APIXABAN 5 MILLIGRAM(S): 2.5 TABLET, FILM COATED ORAL at 17:34

## 2024-05-12 NOTE — PROGRESS NOTE ADULT - TIME BILLING
- Review of chart and consultation notes  - Exam and discussion with patient  - Review of laboratory and imaging   - Establishing a care plan for patient  - Communication with other providers
Review of laboratory data, radiology results, consultants' recommendations, documentation in [place], discussion with patient/house staff/ACP and interdisciplinary staff (such as , social workers, etc). Interventions were performed as documented above.
Time-based billing (NON-critical care).     50 minutes spent on total encounter; more than 50% of the visit was spent counseling and / or coordinating care by the attending physician.  The necessity of the time spent during the encounter on this date of service was due to:     documentation in [place], reviewing chart and coordinating care with patient/resident and interdisciplinary staff (such as , social workers, etc) as well as reviewing vitals, laboratory data, radiology, medication list, consultants' recommendations and prior records. Interventions were performed as documented above.
discussing care plan, reviewing labs, examining the patient
Review of laboratory data, radiology results, consultants' recommendations, documentation in [place], discussion with patient/house staff/ACP and interdisciplinary staff (such as , social workers, etc). Interventions were performed as documented above.
Review of laboratory data, radiology results, consultants' recommendations, documentation in [place], discussion with patient/house staff/ACP and interdisciplinary staff (such as , social workers, etc). Interventions were performed as documented above.

## 2024-05-12 NOTE — PROGRESS NOTE ADULT - SUBJECTIVE AND OBJECTIVE BOX
Division of Kidney Diseases & Hypertension  FOLLOW UP NOTE  [phone]--------------------------------------------------------------------------------  Chief Complaint:Acute on chronic systolic congestive heart failure    24 hour events/subjective:  Pt was seen and examined at the bedside. No acute overnight events. No fresh complaints. Pt endorsed feeling good. His appetite has improved. Remained on bumex gtt 1mg/hr.   Pt denies any worsening of SOB/ Constipation/ Diarrhea/ Nausea/ Vomiting/ abdominal pain/ chest pain/ tingling/ numbness.       PAST HISTORY  --------------------------------------------------------------------------------  No significant changes to PMH, PSH, FHx, SHx, unless otherwise noted    ALLERGIES & MEDICATIONS  --------------------------------------------------------------------------------  Allergies    No Known Allergies    Intolerances      Standing Inpatient Medications  allopurinol 100 milliGRAM(s) Oral daily  apixaban 5 milliGRAM(s) Oral two times a day  atorvastatin 80 milliGRAM(s) Oral at bedtime  budesonide 160 MICROgram(s)/formoterol 4.5 MICROgram(s) Inhaler 2 Puff(s) Inhalation two times a day  buMETAnide Infusion 1 mG/Hr IV Continuous <Continuous>  chlorhexidine 2% Cloths 1 Application(s) Topical daily  clopidogrel Tablet 75 milliGRAM(s) Oral daily  dextrose 10% Bolus 125 milliLiter(s) IV Bolus once  dextrose 5%. 1000 milliLiter(s) IV Continuous <Continuous>  dextrose 5%. 1000 milliLiter(s) IV Continuous <Continuous>  dextrose 50% Injectable 25 Gram(s) IV Push once  dextrose 50% Injectable 12.5 Gram(s) IV Push once  ferrous    sulfate 325 milliGRAM(s) Oral daily  gabapentin 100 milliGRAM(s) Oral at bedtime  glucagon  Injectable 1 milliGRAM(s) IntraMuscular once  hydrALAZINE 100 milliGRAM(s) Oral three times a day  influenza  Vaccine (HIGH DOSE) 0.7 milliLiter(s) IntraMuscular once  insulin lispro (ADMELOG) corrective regimen sliding scale   SubCutaneous three times a day before meals  insulin lispro (ADMELOG) corrective regimen sliding scale   SubCutaneous at bedtime  isosorbide   dinitrate Tablet (ISORDIL) 30 milliGRAM(s) Oral three times a day  levothyroxine 50 MICROGram(s) Oral daily  metoprolol succinate ER 50 milliGRAM(s) Oral every 12 hours  montelukast 10 milliGRAM(s) Oral daily  senna 2 Tablet(s) Oral at bedtime  tamsulosin 0.4 milliGRAM(s) Oral at bedtime    PRN Inpatient Medications  acetaminophen     Tablet .. 650 milliGRAM(s) Oral every 6 hours PRN  albuterol/ipratropium for Nebulization 3 milliLiter(s) Nebulizer every 6 hours PRN  dextrose Oral Gel 15 Gram(s) Oral once PRN  polyethylene glycol 3350 17 Gram(s) Oral daily PRN      REVIEW OF SYSTEMS  --------------------------------------------------------------------------------  as noted above.     VITALS/PHYSICAL EXAM  --------------------------------------------------------------------------------  T(C): 36.6 (05-12-24 @ 05:00), Max: 36.8 (05-11-24 @ 21:45)  HR: 98 (05-12-24 @ 05:00) (73 - 98)  BP: 113/67 (05-12-24 @ 05:00) (105/62 - 115/67)  RR: 18 (05-12-24 @ 05:00) (18 - 18)  SpO2: 100% (05-12-24 @ 05:00) (100% - 100%)  Wt(kg): --        05-11-24 @ 07:01  -  05-12-24 @ 07:00  --------------------------------------------------------  IN: 0 mL / OUT: 1000 mL / NET: -1000 mL    05-12-24 @ 07:01  -  05-12-24 @ 09:58  --------------------------------------------------------  IN: 0 mL / OUT: 300 mL / NET: -300 mL      Physical Exam:  Gen: NAD   Pulm: CTA B/L   CV: +S1S2   Abd: +BS, soft, nondistended/nontender   Extremities: no bilateral LE edema    Neuro: non-focal    LABS/STUDIES  --------------------------------------------------------------------------------              14.5   12.90 >-----------<  170      [05-12-24 @ 05:00]              43.6     140  |  98  |  82  ----------------------------<  123      [05-12-24 @ 06:51]  3.9   |  24  |  2.33        Ca     8.7     [05-12-24 @ 06:51]      Mg     2.00     [05-12-24 @ 06:51]      Phos  4.5     [05-12-24 @ 06:51]    TPro  6.3  /  Alb  3.6  /  TBili  0.8  /  DBili  x   /  AST  29  /  ALT  35  /  AlkPhos  152  [05-12-24 @ 06:51]          Creatinine Trend:  SCr 2.33 [05-12 @ 06:51]  SCr 2.42 [05-11 @ 19:39]  SCr 2.12 [05-11 @ 06:00]  SCr 2.36 [05-10 @ 04:30]  SCr 2.41 [05-10 @ 02:28]    Urinalysis - [05-12-24 @ 06:51]      Color  / Appearance  / SG  / pH       Gluc 123 / Ketone   / Bili  / Urobili        Blood  / Protein  / Leuk Est  / Nitrite       RBC  / WBC  / Hyaline  / Gran  / Sq Epi  / Non Sq Epi  / Bacteria     Urine Creatinine 60      [05-08-24 @ 09:55]  Urine Protein 6      [05-08-24 @ 09:55]  Urine Sodium <20      [05-08-24 @ 09:55]  Urine Urea Nitrogen 656.0      [05-08-24 @ 09:55]

## 2024-05-12 NOTE — PROGRESS NOTE ADULT - SUBJECTIVE AND OBJECTIVE BOX
Patient seen and examined at bedside.    Overnight Events:   No events. Denies CP, palpitations, SOB.     REVIEW OF SYSTEMS:  Constitutional:     [x ] negative [ ] fevers [ ] chills [ ] weight loss [ ] weight gain  HEENT:                  [x ] negative [ ] dry eyes [ ] eye irritation [ ] postnasal drip [ ] nasal congestion  CV:                         [ x] negative  [ ] chest pain [ ] orthopnea [ ] palpitations [ ] murmur  Resp:                     [x ] negative [ ] cough [ ] shortness of breath [ ] dyspnea [ ] wheezing [ ] sputum [ ]hemoptysis  GI:                          [x ] negative [ ] nausea [ ] vomiting [ ] diarrhea [ ] constipation [ ] abd pain [ ] dysphagia   :                        [ x] negative [ ] dysuria [ ] nocturia [ ] hematuria [ ] increased urinary frequency  Musculoskeletal: [x ] negative [ ] back pain [ ] myalgias [ ] arthralgias [ ] fracture  Skin:                       [ x] negative [ ] rash [ ] itch  Neurological:        [ x] negative [ ] headache [ ] dizziness [ ] syncope [ ] weakness [ ] numbness  Psychiatric:           [ x] negative [ ] anxiety [ ] depression  Endocrine:            [ x] negative [ ] diabetes [ ] thyroid problem  Heme/Lymph:      [ x] negative [ ] anemia [ ] bleeding problem  Allergic/Immune: [ x] negative [ ] itchy eyes [ ] nasal discharge [ ] hives [ ] angioedema    [ x] All other systems negative          Current Meds:  acetaminophen     Tablet .. 650 milliGRAM(s) Oral every 6 hours PRN  albuterol/ipratropium for Nebulization 3 milliLiter(s) Nebulizer every 6 hours PRN  allopurinol 100 milliGRAM(s) Oral daily  apixaban 5 milliGRAM(s) Oral two times a day  atorvastatin 80 milliGRAM(s) Oral at bedtime  budesonide 160 MICROgram(s)/formoterol 4.5 MICROgram(s) Inhaler 2 Puff(s) Inhalation two times a day  buMETAnide Infusion 1 mG/Hr IV Continuous <Continuous>  chlorhexidine 2% Cloths 1 Application(s) Topical daily  clopidogrel Tablet 75 milliGRAM(s) Oral daily  dextrose 10% Bolus 125 milliLiter(s) IV Bolus once  dextrose 5%. 1000 milliLiter(s) IV Continuous <Continuous>  dextrose 5%. 1000 milliLiter(s) IV Continuous <Continuous>  dextrose 50% Injectable 25 Gram(s) IV Push once  dextrose 50% Injectable 12.5 Gram(s) IV Push once  dextrose Oral Gel 15 Gram(s) Oral once PRN  ferrous    sulfate 325 milliGRAM(s) Oral daily  gabapentin 100 milliGRAM(s) Oral at bedtime  glucagon  Injectable 1 milliGRAM(s) IntraMuscular once  hydrALAZINE 100 milliGRAM(s) Oral three times a day  influenza  Vaccine (HIGH DOSE) 0.7 milliLiter(s) IntraMuscular once  insulin lispro (ADMELOG) corrective regimen sliding scale   SubCutaneous three times a day before meals  insulin lispro (ADMELOG) corrective regimen sliding scale   SubCutaneous at bedtime  isosorbide   dinitrate Tablet (ISORDIL) 30 milliGRAM(s) Oral three times a day  levothyroxine 50 MICROGram(s) Oral daily  metoprolol succinate ER 50 milliGRAM(s) Oral every 12 hours  montelukast 10 milliGRAM(s) Oral daily  polyethylene glycol 3350 17 Gram(s) Oral daily PRN  senna 2 Tablet(s) Oral at bedtime  tamsulosin 0.4 milliGRAM(s) Oral at bedtime      Vitals:  T(F): 97.5 (05-12), Max: 98.2 (05-11)  HR: 74 (05-12) (73 - 98)  BP: 107/60 (05-12) (105/65 - 115/67)  RR: 18 (05-12)  SpO2: 100% (05-12)  I&O's Summary    11 May 2024 07:01  -  12 May 2024 07:00  --------------------------------------------------------  IN: 0 mL / OUT: 1000 mL / NET: -1000 mL    12 May 2024 07:01  -  12 May 2024 14:42  --------------------------------------------------------  IN: 0 mL / OUT: 900 mL / NET: -900 mL      PHYSICAL EXAM:  Appearance: No acute distress; well appearing  Eyes: EOMI, no scleral icterus   HEENT: Normal oral mucosa  Cardiovascular: RRR, S1, S2, no murmurs, rubs, or gallops; no edema  Respiratory: Clear to auscultation bilaterally, no auditory stridor, no respiratory distress  Gastrointestinal: soft, non-tender, non-distended  Musculoskeletal: No clubbing; no joint deformity   Neurologic: Moving all 4 extremities spontaneously, sensation grossly intact  Psychiatry: AAOx3, mood & affect appropriate  Skin: No rashes, ecchymoses, or cyanosis                          14.5   12.90 )-----------( 170      ( 12 May 2024 05:00 )             43.6     05-12    140  |  98  |  82<H>  ----------------------------<  123<H>  3.9   |  24  |  2.33<H>    Ca    8.7      12 May 2024 06:51  Phos  4.5     05-12  Mg     2.00     05-12    TPro  6.3  /  Alb  3.6  /  TBili  0.8  /  DBili  x   /  AST  29  /  ALT  35  /  AlkPhos  152<H>  05-12                  DIAGNOSTIC/IMAGING:

## 2024-05-12 NOTE — PROGRESS NOTE ADULT - ASSESSMENT
67yo [place] male with h/o HTN, HFrEF/ICM (EF 25%, LVIDD 5.6cm) s/p CRT-D and CardioMEMS, CAD w/ CABG 2014 at [facility], asthma/COPD, CVA with left sided weakness, CKD (b/l Cr 2.0), aflutter s/p ablation (3/29/24) comes in from HF office with worsening dyspnea and fluid overload. Started on bumex gtt 2 mg/hr with improvement. Started on Entresto and recently had coreg increased but now hypotensive, likely 2/2 hypovolemia or medication-induced. Renal function worsening initially thought to be d/t hypovolemia but was overloaded and resumed on bumex gtt with improvement.     Cardiac Studies:  RHC (3/24): RA 13 mmHg. PA 52/20/35 mmHg. PCW 23-25 mmHg. Cardiac index 1.96 L/min/m2. SVR 1712 dsc.

## 2024-05-12 NOTE — PROGRESS NOTE ADULT - ATTENDING COMMENTS
Patient examined and ROS reviewed. A case of SHERRY on CKD with exacerbation of CHF being diuresed with IV Bumex. Serum creatinine is stable. Advised to continue diuresis.

## 2024-05-12 NOTE — PROGRESS NOTE ADULT - PROBLEM SELECTOR PLAN 1
ACC Stage C/NYHA Class II. TTE 3/2024 w/ EF 20-25%. Possible etiologies for current decompensation include: recurrence of afib despite ablation, COPD exacerbation. Home GDMT: coreg 6.25mg BID, hydralazine 100mg TID, isordil 30mg TID  -defer to cardiology if necessary to repeat echo  -trops flat and no CP. EKG without STEMI equivalent - not ACS  -COPD management as below  -EP for device interrogation to assess for afib/aflutter burden - 86 second episode AF, BiVPaced 92%  -c/w GDMT:  - hydral 100 mg q8h  - isordil 30 mg q8h  - toprol 50 BID  - discontinued entresto 24/26 BID  - strict Is/Os, monitor UOP, daily weights  - still volume up with rising BUN, BNP - c/w 1mg/hour bumex gtt through Sunday 5/12, then start torsemide per HF  - trialing HST per HF/nephrology given diuretic refractory volume overload  - repeat TTE 5/10 showing EF 15-20%, no significant changes from 3/2024 TTE  - Maintain K>4, Mg>2. ACC Stage C/NYHA Class II. TTE 3/2024 w/ EF 20-25%. Possible etiologies for current decompensation include: recurrence of afib despite ablation, COPD exacerbation. Home GDMT: coreg 6.25mg BID, hydralazine 100mg TID, isordil 30mg TID  -defer to cardiology if necessary to repeat echo  -trops flat and no CP. EKG without STEMI equivalent - not ACS  -COPD management as below  -EP for device interrogation to assess for afib/aflutter burden - 86 second episode AF, BiVPaced 92%  -c/w GDMT:  - hydral 100 mg q8h  - isordil 30 mg q8h  - toprol 50 BID  - discontinued entresto 24/26 BID  - strict Is/Os, monitor UOP, daily weights  - still volume up with rising BUN, BNP - c/w 1mg/hour bumex gtt through Sunday 5/12, then start torsemide per HF  - repeat TTE 5/10 showing EF 15-20%, no significant changes from 3/2024 TTE  - Maintain K>4, Mg>2.    - Planned for HTS, but unable to administer given hospital floor protocol

## 2024-05-12 NOTE — PROGRESS NOTE ADULT - SUBJECTIVE AND OBJECTIVE BOX
Deangelo Cardenas | PGY2| Pager: [place] [phone]) -- HAKAN ([phone])  Interval Events: NAEON; patient reports he feels well; repeat TTE showing EF 15-20% with Global LVSDF, Grade 3 DD, Mild MR, UOP of net negative 1000L for the past. Today, he feels well, endorses some new wheezing/mild SOB.      REVIEW OF SYSTEMS:  CONSTITUTIONAL: No weakness/Fevers/Chills  RESPIRATORY: SOB/reports wheezing when season/temperature changes  CARDIOVASCULAR: No chest pain or palpitations  SKIN: No itching, burning, rashes, or lesions   All other review of systems is negative unless indicated above.    OBJECTIVE:  ICU Vital Signs Last 24 Hrs  T(C): 36.6 (12 May 2024 05:00), Max: 36.8 (11 May 2024 21:45)  T(F): 97.9 (12 May 2024 05:00), Max: 98.2 (11 May 2024 21:45)  HR: 98 (12 May 2024 05:00) (73 - 98)  BP: 113/67 (12 May 2024 05:00) (105/62 - 115/67)  BP(mean): --  ABP: --  ABP(mean): --  RR: 18 (12 May 2024 05:00) (18 - 18)  SpO2: 100% (12 May 2024 05:00) (100% - 100%)    O2 Parameters below as of 12 May 2024 07:35  Patient On (Oxygen Delivery Method): nasal cannula              05-11 @ 07:01 - 05-12 @ 07:00  --------------------------------------------------------  IN: 0 mL / OUT: 1000 mL / NET: -1000 mL    05-12 @ 07:01  -  05-12 @ 09:15  --------------------------------------------------------  IN: 0 mL / OUT: 300 mL / NET: -300 mL      CAPILLARY BLOOD GLUCOSE      POCT Blood Glucose.: 218 mg/dL (12 May 2024 08:20)      PHYSICAL EXAM:  General: WN/WD NAD  Neurology: A&Ox3, nonfocal, EDWARDS x 4  Eyes: PERRLA/ EOMI, Gross vision intact  ENT/Neck: No JVD appreciated  Respiratory: insp/exp wheezing appreciated RLL;   CV: RRR, +S1/S2, -S3/S4, no murmurs, rubs or gallops  Abdominal: Soft, NT, ND +BS, No HSM  Extremities: No edema, 2+ peripheral pulses  Skin: No Rashes, Hematoma, Ecchymosis    HOSPITAL MEDICATIONS:  MEDICATIONS  (STANDING):  allopurinol 100 milliGRAM(s) Oral daily  apixaban 5 milliGRAM(s) Oral two times a day  atorvastatin 80 milliGRAM(s) Oral at bedtime  budesonide 160 MICROgram(s)/formoterol 4.5 MICROgram(s) Inhaler 2 Puff(s) Inhalation two times a day  buMETAnide Infusion 1 mG/Hr (5 mL/Hr) IV Continuous <Continuous>  chlorhexidine 2% Cloths 1 Application(s) Topical daily  clopidogrel Tablet 75 milliGRAM(s) Oral daily  dextrose 10% Bolus 125 milliLiter(s) IV Bolus once  dextrose 5%. 1000 milliLiter(s) (100 mL/Hr) IV Continuous <Continuous>  dextrose 5%. 1000 milliLiter(s) (50 mL/Hr) IV Continuous <Continuous>  dextrose 50% Injectable 25 Gram(s) IV Push once  dextrose 50% Injectable 12.5 Gram(s) IV Push once  ferrous    sulfate 325 milliGRAM(s) Oral daily  gabapentin 100 milliGRAM(s) Oral at bedtime  glucagon  Injectable 1 milliGRAM(s) IntraMuscular once  hydrALAZINE 100 milliGRAM(s) Oral three times a day  influenza  Vaccine (HIGH DOSE) 0.7 milliLiter(s) IntraMuscular once  insulin lispro (ADMELOG) corrective regimen sliding scale   SubCutaneous three times a day before meals  insulin lispro (ADMELOG) corrective regimen sliding scale   SubCutaneous at bedtime  isosorbide   dinitrate Tablet (ISORDIL) 30 milliGRAM(s) Oral three times a day  levothyroxine 50 MICROGram(s) Oral daily  metoprolol succinate ER 50 milliGRAM(s) Oral every 12 hours  montelukast 10 milliGRAM(s) Oral daily  senna 2 Tablet(s) Oral at bedtime  tamsulosin 0.4 milliGRAM(s) Oral at bedtime    MEDICATIONS  (PRN):  acetaminophen     Tablet .. 650 milliGRAM(s) Oral every 6 hours PRN Temp greater or equal to 38C (100.4F), Mild Pain (1 - 3)  albuterol/ipratropium for Nebulization 3 milliLiter(s) Nebulizer every 6 hours PRN Wheezing  dextrose Oral Gel 15 Gram(s) Oral once PRN Blood Glucose LESS THAN 70 milliGRAM(s)/deciliter  polyethylene glycol 3350 17 Gram(s) Oral daily PRN Constipation      LABS:                        14.5   12.90 )-----------( 170      ( 12 May 2024 05:00 )             43.6     Hgb Trend: 14.5<--, 14.3<--, 13.2<--, 13.0<--, 13.1<--  05-12    140  |  98  |  82<H>  ----------------------------<  123<H>  3.9   |  24  |  2.33<H>    Ca    8.7      12 May 2024 06:51  Phos  4.5     05-12  Mg     2.00     05-12    TPro  6.3  /  Alb  3.6  /  TBili  0.8  /  DBili  x   /  AST  29  /  ALT  35  /  AlkPhos  152<H>  05-12    Creatinine Trend: 2.33<--, 2.42<--, 2.12<--, 2.36<--, 2.41<--, 2.38<--    Urinalysis Basic - ( 12 May 2024 06:51 )    Color: x / Appearance: x / SG: x / pH: x  Gluc: 123 mg/dL / Ketone: x  / Bili: x / Urobili: x   Blood: x / Protein: x / Nitrite: x   Leuk Esterase: x / RBC: x / WBC x   Sq Epi: x / Non Sq Epi: x / Bacteria: x            MICROBIOLOGY:

## 2024-05-12 NOTE — PROGRESS NOTE ADULT - ATTENDING COMMENTS
Patient seen and examined at bedside. In brief, 68y M w/ hx of HFrEF (EF 20-25% as of 3/2024) s/p cardioMEMS in 3/2024 and Med-tronic CRT-D in 2022, paroxysmal afib/flutter s/p ablation on eliquis, CKD3, T2DM, COPD on 2LNC home O2, CAD s/p CABG in 2014, CVA w/ residual L sided weakness, prior R ankle fracture admitted for acute on chronic systolic congestive heart failure.  Bumex gtt restarted on 5/8 given concern of volume overload. Appreciate renal input - patient is not an  HD candidate. Unable to give  HTS bolus on floor  to assist with Diuresis. Plan to continue bumex gtt until 5/12, then will switch to torsemide.   Dispo pending final HF recs and patient's creatine.

## 2024-05-12 NOTE — PROGRESS NOTE ADULT - PROBLEM SELECTOR PLAN 11
DVT PPx: full AC w/ eliquis  Diet: DASH/CC fluid restriction  Code status: Full code  Communication: Ketty (daughter) contacted 5/1 11PM with regard to plan to which understanding was verbalized by party  Dispo: likely home   PCP: Dr. Robin Schmidt  Pharmacy: [facility] [phone] [facility]

## 2024-05-12 NOTE — PROGRESS NOTE ADULT - PROBLEM SELECTOR PLAN 1
c/w torsemide 100 mg twice/day  Toprol 50mg daily (hold SBP<90)  Hydralazine 100mg TID (hold SBP<90)  Isosorbide 30mg TID (hold SBP<90)  Strict I/O; Daily weights  Monitor lytes replete K>4.0 and Mg>2.0

## 2024-05-12 NOTE — PROGRESS NOTE ADULT - PROBLEM SELECTOR PLAN 1
Patient likely has CKD 2/2 longstanding heart failure, HTN, and T2DM. Upon review of [facility], baseline SCr appears to be ~2.2-2.6. Noah on 5/8 is very low. It could be due to hypotension. Patient has been receiving diuretics since admission and a low urine sodium may also indicate insufficient diuretic response, however iso CKD a Noah has limited utility due to inherent impairment of proximal tubular reabsorption of sodium. In addition, increased urine output from diuretic therapy may end up falsely diluting a spot urine, although FeNa is also <1%. On IV intermittent Bumex, pt was urinating ~1L daily. After Bumex gtt 1mg/hr initiated on 5/8, he has had increased UOP, 1L/24h, total net negative ~1L. Cr slightly worsened to 2.33. Serum Na, K, and Cl are wnl. CHF meds per Heart Failure team. Would continue Bumex gtt and titrate to net negative 1-2L daily (must have accurate Is+Os) and MEMS reading of <20mmHg.   Initial plan was to give HTS but 3% saline cant be given as bolus on floors. Currently, no indication for dialysis. Please avoid nephrotoxins when possible and dose all meds per eGFR.    Recommendations are preliminary until attending attestation.    If you have any questions, please feel free to contact me  Kyree Herrera  Nephrology Fellow  [phone]; Prefer Teams.  (After 5pm or on weekends please page the on-call fellow).

## 2024-05-12 NOTE — PROGRESS NOTE ADULT - ASSESSMENT
INCOMPLETE NOTE    Deangelo Theresa | PGY2| Pager: [place] [phone]) -- HAKAN ([phone])  Interval Events:    REVIEW OF SYSTEMS:  CONSTITUTIONAL: No weakness, fevers or chills  EYES/ENT: No visual changes;  No vertigo or throat pain   NECK: No pain or stiffness  RESPIRATORY: No cough, wheezing, hemoptysis; No shortness of breath  CARDIOVASCULAR: No chest pain or palpitations  GASTROINTESTINAL: No abdominal or epigastric pain. No nausea, vomiting, or hematemesis; No diarrhea or constipation. No melena or hematochezia.  GENITOURINARY: No dysuria, frequency or hematuria  NEUROLOGICAL: No numbness or weakness  SKIN: No itching, burning, rashes, or lesions   All other review of systems is negative unless indicated above.    OBJECTIVE:  ICU Vital Signs Last 24 Hrs  T(C): 36.6 (12 May 2024 05:00), Max: 36.8 (11 May 2024 21:45)  T(F): 97.9 (12 May 2024 05:00), Max: 98.2 (11 May 2024 21:45)  HR: 98 (12 May 2024 05:00) (73 - 98)  BP: 113/67 (12 May 2024 05:00) (105/62 - 115/67)  BP(mean): --  ABP: --  ABP(mean): --  RR: 18 (12 May 2024 05:00) (18 - 18)  SpO2: 100% (12 May 2024 05:00) (100% - 100%)    O2 Parameters below as of 12 May 2024 05:00  Patient On (Oxygen Delivery Method): nasal cannula  O2 Flow (L/min): 2            05-10 @ 07:01  -  05-11 @ 07:00  --------------------------------------------------------  IN: 38 mL / OUT: 2500 mL / NET: -2462 mL    05-11 @ 07:01  -  05-12 @ 06:07  --------------------------------------------------------  IN: 0 mL / OUT: 1000 mL / NET: -1000 mL      CAPILLARY BLOOD GLUCOSE      POCT Blood Glucose.: 192 mg/dL (11 May 2024 21:13)      PHYSICAL EXAM:  General: WN/WD NAD  Neurology: A&Ox3, nonfocal, EDWARDS x 4  Eyes: PERRLA/ EOMI, Gross vision intact  ENT/Neck: Neck supple, trachea midline, No JVD, Gross hearing intact  Respiratory: CTA B/L, No wheezing, rales, rhonchi  CV: RRR, +S1/S2, -S3/S4, no murmurs, rubs or gallops  Abdominal: Soft, NT, ND +BS, No HSM  MSK: 5/5 strength UE/LE bilaterally  Extremities: No edema, 2+ peripheral pulses  Skin: No Rashes, Hematoma, Ecchymosis  Incisions:   Tubes:    HOSPITAL MEDICATIONS:  MEDICATIONS  (STANDING):  allopurinol 100 milliGRAM(s) Oral daily  apixaban 5 milliGRAM(s) Oral two times a day  atorvastatin 80 milliGRAM(s) Oral at bedtime  budesonide 160 MICROgram(s)/formoterol 4.5 MICROgram(s) Inhaler 2 Puff(s) Inhalation two times a day  buMETAnide Infusion 1 mG/Hr (5 mL/Hr) IV Continuous <Continuous>  chlorhexidine 2% Cloths 1 Application(s) Topical daily  clopidogrel Tablet 75 milliGRAM(s) Oral daily  dextrose 10% Bolus 125 milliLiter(s) IV Bolus once  dextrose 5%. 1000 milliLiter(s) (50 mL/Hr) IV Continuous <Continuous>  dextrose 5%. 1000 milliLiter(s) (100 mL/Hr) IV Continuous <Continuous>  dextrose 50% Injectable 25 Gram(s) IV Push once  dextrose 50% Injectable 12.5 Gram(s) IV Push once  ferrous    sulfate 325 milliGRAM(s) Oral daily  gabapentin 100 milliGRAM(s) Oral at bedtime  glucagon  Injectable 1 milliGRAM(s) IntraMuscular once  hydrALAZINE 100 milliGRAM(s) Oral three times a day  influenza  Vaccine (HIGH DOSE) 0.7 milliLiter(s) IntraMuscular once  insulin lispro (ADMELOG) corrective regimen sliding scale   SubCutaneous three times a day before meals  insulin lispro (ADMELOG) corrective regimen sliding scale   SubCutaneous at bedtime  isosorbide   dinitrate Tablet (ISORDIL) 30 milliGRAM(s) Oral three times a day  levothyroxine 50 MICROGram(s) Oral daily  metoprolol succinate ER 50 milliGRAM(s) Oral every 12 hours  montelukast 10 milliGRAM(s) Oral daily  senna 2 Tablet(s) Oral at bedtime  tamsulosin 0.4 milliGRAM(s) Oral at bedtime    MEDICATIONS  (PRN):  acetaminophen     Tablet .. 650 milliGRAM(s) Oral every 6 hours PRN Temp greater or equal to 38C (100.4F), Mild Pain (1 - 3)  dextrose Oral Gel 15 Gram(s) Oral once PRN Blood Glucose LESS THAN 70 milliGRAM(s)/deciliter  polyethylene glycol 3350 17 Gram(s) Oral daily PRN Constipation      LABS:                        14.3   10.57 )-----------( 165      ( 11 May 2024 06:00 )             43.0     Hgb Trend: 14.3<--, 13.2<--, 13.0<--, 13.1<--, 13.6<--  05-11    142  |  100  |  74<H>  ----------------------------<  128<H>  4.0   |  23  |  2.42<H>    Ca    8.3<L>      11 May 2024 19:39  Phos  4.3     05-11  Mg     2.00     05-11    TPro  6.2  /  Alb  3.6  /  TBili  0.6  /  DBili  x   /  AST  35  /  ALT  33  /  AlkPhos  138<H>  05-11    Creatinine Trend: 2.42<--, 2.12<--, 2.36<--, 2.41<--, 2.38<--, 2.51<--    Urinalysis Basic - ( 11 May 2024 19:39 )    Color: x / Appearance: x / SG: x / pH: x  Gluc: 128 mg/dL / Ketone: x  / Bili: x / Urobili: x   Blood: x / Protein: x / Nitrite: x   Leuk Esterase: x / RBC: x / WBC x   Sq Epi: x / Non Sq Epi: x / Bacteria: x            MICROBIOLOGY:          68M w/ hx of HFrEF (EF 20-25% as of 3/2024) s/p cardioMEMS in 3/2024 and Med-tronic CRT-D in 2022, paroxysmal afib/flutter s/p ablation on eliquis, CKD3, T2DM, COPD on 2LNC home O2, CAD s/p CABG in 2014, CVA w/ residual L sided weakness, prior R ankle fracture presenting from cardiology office for 1 day history of dyspnea with objective findings of elevated BNP, increased O2 requirement, increased PA pressures on cardioMEMS - consistent w/ acute on chronic systolic congestive heart failure.

## 2024-05-13 LAB
ALBUMIN SERPL ELPH-MCNC: 3.5 G/DL — SIGNIFICANT CHANGE UP (ref 3.3–5)
ALP SERPL-CCNC: 171 U/L — HIGH (ref 40–120)
ALT FLD-CCNC: 31 U/L — SIGNIFICANT CHANGE UP (ref 4–41)
ANION GAP SERPL CALC-SCNC: 15 MMOL/L — HIGH (ref 7–14)
ANION GAP SERPL CALC-SCNC: 16 MMOL/L — HIGH (ref 7–14)
AST SERPL-CCNC: 26 U/L — SIGNIFICANT CHANGE UP (ref 4–40)
BASOPHILS # BLD AUTO: 0.04 K/UL — SIGNIFICANT CHANGE UP (ref 0–0.2)
BASOPHILS NFR BLD AUTO: 0.3 % — SIGNIFICANT CHANGE UP (ref 0–2)
BILIRUB SERPL-MCNC: 0.7 MG/DL — SIGNIFICANT CHANGE UP (ref 0.2–1.2)
BUN SERPL-MCNC: 73 MG/DL — HIGH (ref 7–23)
BUN SERPL-MCNC: 77 MG/DL — HIGH (ref 7–23)
CALCIUM SERPL-MCNC: 8.6 MG/DL — SIGNIFICANT CHANGE UP (ref 8.4–10.5)
CALCIUM SERPL-MCNC: 8.9 MG/DL — SIGNIFICANT CHANGE UP (ref 8.4–10.5)
CHLORIDE SERPL-SCNC: 100 MMOL/L — SIGNIFICANT CHANGE UP (ref 98–107)
CHLORIDE SERPL-SCNC: 96 MMOL/L — LOW (ref 98–107)
CO2 SERPL-SCNC: 23 MMOL/L — SIGNIFICANT CHANGE UP (ref 22–31)
CO2 SERPL-SCNC: 24 MMOL/L — SIGNIFICANT CHANGE UP (ref 22–31)
CREAT SERPL-MCNC: 2.34 MG/DL — HIGH (ref 0.5–1.3)
CREAT SERPL-MCNC: 2.41 MG/DL — HIGH (ref 0.5–1.3)
EGFR: 28 ML/MIN/1.73M2 — LOW
EGFR: 29 ML/MIN/1.73M2 — LOW
EOSINOPHIL # BLD AUTO: 0.21 K/UL — SIGNIFICANT CHANGE UP (ref 0–0.5)
EOSINOPHIL NFR BLD AUTO: 1.7 % — SIGNIFICANT CHANGE UP (ref 0–6)
GLUCOSE BLDC GLUCOMTR-MCNC: 111 MG/DL — HIGH (ref 70–99)
GLUCOSE BLDC GLUCOMTR-MCNC: 138 MG/DL — HIGH (ref 70–99)
GLUCOSE BLDC GLUCOMTR-MCNC: 170 MG/DL — HIGH (ref 70–99)
GLUCOSE BLDC GLUCOMTR-MCNC: 195 MG/DL — HIGH (ref 70–99)
GLUCOSE SERPL-MCNC: 153 MG/DL — HIGH (ref 70–99)
GLUCOSE SERPL-MCNC: 160 MG/DL — HIGH (ref 70–99)
HCT VFR BLD CALC: 43 % — SIGNIFICANT CHANGE UP (ref 39–50)
HGB BLD-MCNC: 14.1 G/DL — SIGNIFICANT CHANGE UP (ref 13–17)
IANC: 9.35 K/UL — HIGH (ref 1.8–7.4)
IMM GRANULOCYTES NFR BLD AUTO: 0.4 % — SIGNIFICANT CHANGE UP (ref 0–0.9)
LYMPHOCYTES # BLD AUTO: 1.88 K/UL — SIGNIFICANT CHANGE UP (ref 1–3.3)
LYMPHOCYTES # BLD AUTO: 14.9 % — SIGNIFICANT CHANGE UP (ref 13–44)
MAGNESIUM SERPL-MCNC: 2 MG/DL — SIGNIFICANT CHANGE UP (ref 1.6–2.6)
MAGNESIUM SERPL-MCNC: 2 MG/DL — SIGNIFICANT CHANGE UP (ref 1.6–2.6)
MCHC RBC-ENTMCNC: 32 PG — SIGNIFICANT CHANGE UP (ref 27–34)
MCHC RBC-ENTMCNC: 32.8 GM/DL — SIGNIFICANT CHANGE UP (ref 32–36)
MCV RBC AUTO: 97.7 FL — SIGNIFICANT CHANGE UP (ref 80–100)
MONOCYTES # BLD AUTO: 1.05 K/UL — HIGH (ref 0–0.9)
MONOCYTES NFR BLD AUTO: 8.3 % — SIGNIFICANT CHANGE UP (ref 2–14)
NEUTROPHILS # BLD AUTO: 9.35 K/UL — HIGH (ref 1.8–7.4)
NEUTROPHILS NFR BLD AUTO: 74.4 % — SIGNIFICANT CHANGE UP (ref 43–77)
NRBC # BLD: 0 /100 WBCS — SIGNIFICANT CHANGE UP (ref 0–0)
NRBC # FLD: 0 K/UL — SIGNIFICANT CHANGE UP (ref 0–0)
PHOSPHATE SERPL-MCNC: 4.6 MG/DL — HIGH (ref 2.5–4.5)
PHOSPHATE SERPL-MCNC: 4.7 MG/DL — HIGH (ref 2.5–4.5)
PLATELET # BLD AUTO: 168 K/UL — SIGNIFICANT CHANGE UP (ref 150–400)
POTASSIUM SERPL-MCNC: 4 MMOL/L — SIGNIFICANT CHANGE UP (ref 3.5–5.3)
POTASSIUM SERPL-MCNC: 4.7 MMOL/L — SIGNIFICANT CHANGE UP (ref 3.5–5.3)
POTASSIUM SERPL-SCNC: 4 MMOL/L — SIGNIFICANT CHANGE UP (ref 3.5–5.3)
POTASSIUM SERPL-SCNC: 4.7 MMOL/L — SIGNIFICANT CHANGE UP (ref 3.5–5.3)
PROT SERPL-MCNC: 6.1 G/DL — SIGNIFICANT CHANGE UP (ref 6–8.3)
RBC # BLD: 4.4 M/UL — SIGNIFICANT CHANGE UP (ref 4.2–5.8)
RBC # FLD: 16.6 % — HIGH (ref 10.3–14.5)
SODIUM SERPL-SCNC: 135 MMOL/L — SIGNIFICANT CHANGE UP (ref 135–145)
SODIUM SERPL-SCNC: 139 MMOL/L — SIGNIFICANT CHANGE UP (ref 135–145)
WBC # BLD: 12.58 K/UL — HIGH (ref 3.8–10.5)
WBC # FLD AUTO: 12.58 K/UL — HIGH (ref 3.8–10.5)

## 2024-05-13 PROCEDURE — 99232 SBSQ HOSP IP/OBS MODERATE 35: CPT

## 2024-05-13 PROCEDURE — 99232 SBSQ HOSP IP/OBS MODERATE 35: CPT | Mod: GC

## 2024-05-13 RX ORDER — INSULIN LISPRO 100/ML
VIAL (ML) SUBCUTANEOUS
Refills: 0 | Status: DISCONTINUED | OUTPATIENT
Start: 2024-05-13 | End: 2024-05-15

## 2024-05-13 RX ORDER — INSULIN LISPRO 100/ML
VIAL (ML) SUBCUTANEOUS AT BEDTIME
Refills: 0 | Status: DISCONTINUED | OUTPATIENT
Start: 2024-05-13 | End: 2024-05-15

## 2024-05-13 RX ADMIN — APIXABAN 5 MILLIGRAM(S): 2.5 TABLET, FILM COATED ORAL at 17:32

## 2024-05-13 RX ADMIN — ISOSORBIDE DINITRATE 30 MILLIGRAM(S): 5 TABLET ORAL at 05:18

## 2024-05-13 RX ADMIN — ATORVASTATIN CALCIUM 80 MILLIGRAM(S): 80 TABLET, FILM COATED ORAL at 22:37

## 2024-05-13 RX ADMIN — Medication 100 MILLIGRAM(S): at 22:38

## 2024-05-13 RX ADMIN — GABAPENTIN 100 MILLIGRAM(S): 400 CAPSULE ORAL at 22:37

## 2024-05-13 RX ADMIN — MONTELUKAST 10 MILLIGRAM(S): 4 TABLET, CHEWABLE ORAL at 13:13

## 2024-05-13 RX ADMIN — Medication 325 MILLIGRAM(S): at 13:12

## 2024-05-13 RX ADMIN — BUDESONIDE AND FORMOTEROL FUMARATE DIHYDRATE 2 PUFF(S): 160; 4.5 AEROSOL RESPIRATORY (INHALATION) at 08:38

## 2024-05-13 RX ADMIN — CLOPIDOGREL BISULFATE 75 MILLIGRAM(S): 75 TABLET, FILM COATED ORAL at 13:13

## 2024-05-13 RX ADMIN — Medication 50 MICROGRAM(S): at 05:08

## 2024-05-13 RX ADMIN — Medication 100 MILLIGRAM(S): at 22:37

## 2024-05-13 RX ADMIN — TAMSULOSIN HYDROCHLORIDE 0.4 MILLIGRAM(S): 0.4 CAPSULE ORAL at 22:37

## 2024-05-13 RX ADMIN — Medication 100 MILLIGRAM(S): at 15:23

## 2024-05-13 RX ADMIN — Medication 100 MILLIGRAM(S): at 05:11

## 2024-05-13 RX ADMIN — CHLORHEXIDINE GLUCONATE 1 APPLICATION(S): 213 SOLUTION TOPICAL at 13:13

## 2024-05-13 RX ADMIN — Medication 100 MILLIGRAM(S): at 05:08

## 2024-05-13 RX ADMIN — Medication 100 MILLIGRAM(S): at 13:11

## 2024-05-13 RX ADMIN — ISOSORBIDE DINITRATE 30 MILLIGRAM(S): 5 TABLET ORAL at 11:13

## 2024-05-13 RX ADMIN — APIXABAN 5 MILLIGRAM(S): 2.5 TABLET, FILM COATED ORAL at 05:08

## 2024-05-13 RX ADMIN — Medication 1: at 08:38

## 2024-05-13 RX ADMIN — Medication 50 MILLIGRAM(S): at 18:16

## 2024-05-13 RX ADMIN — SENNA PLUS 2 TABLET(S): 8.6 TABLET ORAL at 22:38

## 2024-05-13 RX ADMIN — Medication 2: at 17:31

## 2024-05-13 RX ADMIN — Medication 50 MILLIGRAM(S): at 05:11

## 2024-05-13 NOTE — PROGRESS NOTE ADULT - PROBLEM SELECTOR PLAN 4
CHADSVASc score of 7. S/p ablation 3/29/24. EKG and telemetry w/ ventricular pacing 90s-110s thus far.   -c/w GDMT as above  -c/w eliquis  -device interrogation as above - episode 86s AF CHADSVASc score of 7. S/p ablation 3/29/24. EKG and telemetry w/ ventricular pacing 90s-110s thus far.     -c/w GDMT as above  -c/w eliquis  -device interrogation as above - episode 86s AF

## 2024-05-13 NOTE — PROGRESS NOTE ADULT - SUBJECTIVE AND OBJECTIVE BOX
*******************************  Brooklyn Espino MD (PGY-1)  Internal Medicine  Contact via [employer] TEAMS  *******************************    INGE CARLOS  68y  Male    Patient is a 68y old  Male who presents with a chief complaint of acute on chronic heart failure exacerbation (12 May 2024 14:42)      Subjective:    Objective:  T(C): 36.4 (05-13-24 @ 05:05), Max: 36.7 (05-12-24 @ 21:45)  HR: 74 (05-13-24 @ 05:05) (74 - 84)  BP: 115/64 (05-13-24 @ 05:05) (101/69 - 120/72)  RR: 18 (05-13-24 @ 05:05) (18 - 18)  SpO2: 100% (05-13-24 @ 05:05) (98% - 100%)  I&O's Summary    12 May 2024 07:01  -  13 May 2024 07:00  --------------------------------------------------------  IN: 450 mL / OUT: 2500 mL / NET: -2050 mL        PHYSICAL EXAM:  GENERAL: NAD  HEAD:  Atraumatic, Normocephalic  EYES: EOMI, PERRLA, conjunctiva and sclera clear  ENMT: Moist mucous membranes  NECK: Supple, No JVD, trachea midline   NERVOUS SYSTEM:  Alert & Oriented X3, Good concentration; Motor Strength 5/5 B/L upper and lower extremities; DTRs 2+ intact and symmetric  CHEST/LUNG: Clear to auscultation bilaterally; No rales, rhonchi, wheezing, or rubs  HEART: Regular rate and rhythm; No murmurs, rubs, or gallops  ABDOMEN: Soft, Nontender, Nondistended; Bowel sounds present  EXTREMITIES:  2+ Peripheral Pulses, No clubbing, cyanosis, or edema  LYMPH: No lymphadenopathy noted  SKIN: No rashes or lesions    MEDICATIONS  (STANDING):  allopurinol 100 milliGRAM(s) Oral daily  apixaban 5 milliGRAM(s) Oral two times a day  atorvastatin 80 milliGRAM(s) Oral at bedtime  budesonide 160 MICROgram(s)/formoterol 4.5 MICROgram(s) Inhaler 2 Puff(s) Inhalation two times a day  chlorhexidine 2% Cloths 1 Application(s) Topical daily  clopidogrel Tablet 75 milliGRAM(s) Oral daily  dextrose 10% Bolus 125 milliLiter(s) IV Bolus once  dextrose 5%. 1000 milliLiter(s) (100 mL/Hr) IV Continuous <Continuous>  dextrose 5%. 1000 milliLiter(s) (50 mL/Hr) IV Continuous <Continuous>  dextrose 50% Injectable 25 Gram(s) IV Push once  dextrose 50% Injectable 12.5 Gram(s) IV Push once  ferrous    sulfate 325 milliGRAM(s) Oral daily  gabapentin 100 milliGRAM(s) Oral at bedtime  glucagon  Injectable 1 milliGRAM(s) IntraMuscular once  hydrALAZINE 100 milliGRAM(s) Oral three times a day  influenza  Vaccine (HIGH DOSE) 0.7 milliLiter(s) IntraMuscular once  insulin lispro (ADMELOG) corrective regimen sliding scale   SubCutaneous three times a day before meals  insulin lispro (ADMELOG) corrective regimen sliding scale   SubCutaneous at bedtime  isosorbide   dinitrate Tablet (ISORDIL) 30 milliGRAM(s) Oral three times a day  levothyroxine 50 MICROGram(s) Oral daily  metoprolol succinate ER 50 milliGRAM(s) Oral every 12 hours  montelukast 10 milliGRAM(s) Oral daily  senna 2 Tablet(s) Oral at bedtime  tamsulosin 0.4 milliGRAM(s) Oral at bedtime  torsemide 100 milliGRAM(s) Oral every 12 hours    MEDICATIONS  (PRN):  acetaminophen     Tablet .. 650 milliGRAM(s) Oral every 6 hours PRN Temp greater or equal to 38C (100.4F), Mild Pain (1 - 3)  albuterol/ipratropium for Nebulization 3 milliLiter(s) Nebulizer every 6 hours PRN Wheezing  dextrose Oral Gel 15 Gram(s) Oral once PRN Blood Glucose LESS THAN 70 milliGRAM(s)/deciliter  polyethylene glycol 3350 17 Gram(s) Oral daily PRN Constipation      LABS:        CAPILLARY BLOOD GLUCOSE      POCT Blood Glucose.: 197 mg/dL (12 May 2024 21:49)  POCT Blood Glucose.: 141 mg/dL (12 May 2024 17:31)  POCT Blood Glucose.: 214 mg/dL (12 May 2024 13:07)  POCT Blood Glucose.: 218 mg/dL (12 May 2024 08:20)      RADIOLOGY & ADDITIONAL TESTS:               *******************************  Brooklyn Espino MD (PGY-1)  Internal Medicine  Contact via [employer] TEAMS  *******************************    INGE CARLOS  68y  Male    Patient is a 68y old  Male who presents with a chief complaint of acute on chronic heart failure exacerbation (12 May 2024 14:42)    Subjective: No acute events overnight. Patient seen at bedside this morning. Denied any chest pain, palpitations, sob, ab pain, n/v.     Objective:  T(C): 36.4 (05-13-24 @ 05:05), Max: 36.7 (05-12-24 @ 21:45)  HR: 74 (05-13-24 @ 05:05) (74 - 84)  BP: 115/64 (05-13-24 @ 05:05) (101/69 - 120/72)  RR: 18 (05-13-24 @ 05:05) (18 - 18)  SpO2: 100% (05-13-24 @ 05:05) (98% - 100%)  I&O's Summary    12 May 2024 07:01  -  13 May 2024 07:00  --------------------------------------------------------  IN: 450 mL / OUT: 2500 mL / NET: -2050 mL    PHYSICAL EXAM:  GENERAL: NAD  HEAD:  Atraumatic, Normocephalic  EYES: EOMI, PERRLA, conjunctiva and sclera clear  ENMT: Moist mucous membranes  NECK: Supple, No JVD, trachea midline   NERVOUS SYSTEM:  Alert & Oriented X3, Good concentration  CHEST/LUNG: Clear to auscultation bilaterally; No rales, rhonchi, wheezing, or rubs  HEART: Regular rate and rhythm; No murmurs, rubs, or gallops  ABDOMEN: Soft, Nontender, Nondistended; Bowel sounds present  EXTREMITIES:  2+ Peripheral Pulses, No clubbing, cyanosis, or edema  SKIN: No rashes or lesions    MEDICATIONS  (STANDING):  allopurinol 100 milliGRAM(s) Oral daily  apixaban 5 milliGRAM(s) Oral two times a day  atorvastatin 80 milliGRAM(s) Oral at bedtime  budesonide 160 MICROgram(s)/formoterol 4.5 MICROgram(s) Inhaler 2 Puff(s) Inhalation two times a day  chlorhexidine 2% Cloths 1 Application(s) Topical daily  clopidogrel Tablet 75 milliGRAM(s) Oral daily  dextrose 10% Bolus 125 milliLiter(s) IV Bolus once  dextrose 5%. 1000 milliLiter(s) (100 mL/Hr) IV Continuous <Continuous>  dextrose 5%. 1000 milliLiter(s) (50 mL/Hr) IV Continuous <Continuous>  dextrose 50% Injectable 25 Gram(s) IV Push once  dextrose 50% Injectable 12.5 Gram(s) IV Push once  ferrous    sulfate 325 milliGRAM(s) Oral daily  gabapentin 100 milliGRAM(s) Oral at bedtime  glucagon  Injectable 1 milliGRAM(s) IntraMuscular once  hydrALAZINE 100 milliGRAM(s) Oral three times a day  influenza  Vaccine (HIGH DOSE) 0.7 milliLiter(s) IntraMuscular once  insulin lispro (ADMELOG) corrective regimen sliding scale   SubCutaneous three times a day before meals  insulin lispro (ADMELOG) corrective regimen sliding scale   SubCutaneous at bedtime  isosorbide   dinitrate Tablet (ISORDIL) 30 milliGRAM(s) Oral three times a day  levothyroxine 50 MICROGram(s) Oral daily  metoprolol succinate ER 50 milliGRAM(s) Oral every 12 hours  montelukast 10 milliGRAM(s) Oral daily  senna 2 Tablet(s) Oral at bedtime  tamsulosin 0.4 milliGRAM(s) Oral at bedtime  torsemide 100 milliGRAM(s) Oral every 12 hours    MEDICATIONS  (PRN):  acetaminophen     Tablet .. 650 milliGRAM(s) Oral every 6 hours PRN Temp greater or equal to 38C (100.4F), Mild Pain (1 - 3)  albuterol/ipratropium for Nebulization 3 milliLiter(s) Nebulizer every 6 hours PRN Wheezing  dextrose Oral Gel 15 Gram(s) Oral once PRN Blood Glucose LESS THAN 70 milliGRAM(s)/deciliter  polyethylene glycol 3350 17 Gram(s) Oral daily PRN Constipation    LABS:             14.1   12.58 )-----------( 168      ( 13 May 2024 04:36 )             43.0     05-13    139  |  100  |  77<H>  ----------------------------<  153<H>  4.0   |  23  |  2.34<H>    Ca    8.9      13 May 2024 04:36  Phos  4.7     05-13  Mg     2.00     05-13    TPro  6.1  /  Alb  3.5  /  TBili  0.7  /  DBili  x   /  AST  26  /  ALT  31  /  AlkPhos  171<H>  05-13    Culture Results:   No growth at 5 days (05-01 @ 16:52)  Culture Results:   No growth at 5 days (05-01 @ 16:37)    CAPILLARY BLOOD GLUCOSE  POCT Blood Glucose.: 197 mg/dL (12 May 2024 21:49)  POCT Blood Glucose.: 141 mg/dL (12 May 2024 17:31)  POCT Blood Glucose.: 214 mg/dL (12 May 2024 13:07)  POCT Blood Glucose.: 218 mg/dL (12 May 2024 08:20)    RADIOLOGY & ADDITIONAL TESTS:

## 2024-05-13 NOTE — PROGRESS NOTE ADULT - PROBLEM SELECTOR PLAN 8
-c/w home isordil, hydralazine  -now on coreg, entresto - c/w home isordil, hydralazine  - now on coreg, entresto

## 2024-05-13 NOTE — PROGRESS NOTE ADULT - PROBLEM SELECTOR PLAN 11
DVT PPx: full AC w/ eliquis  Diet: DASH/CC fluid restriction  Code status: Full code  Communication: Ketty (daughter) contacted 5/1 11PM with regard to plan to which understanding was verbalized by party  Dispo: likely home   PCP: Dr. Robin Schmidt  Pharmacy: [facility] [phone] [facility] DVT PPx: full AC w/ eliquis  Diet: DASH/CC fluid restriction  Code status: Full code  Communication: Ketty (daughter)   Dispo: likely home   PCP: Dr. Robin Schmidt  Pharmacy: [facility] [phone] [facility]

## 2024-05-13 NOTE — PROGRESS NOTE ADULT - PROBLEM SELECTOR PLAN 3
On home meds of albuterol inhaler PRN q6h, Dulera, singulair  -c/w Dulera therapeutic interchange, singulair  -wean to home O2 setting of 2LNC  -xopenex/ipratropium q6h for 24 hrs -> albuterol inhaler PRN q6h  -S/p Mag, solu-medrol 40mg x1 , CTX/azithro x1 in ED  - initiate CTX if pt w/ worsening hypoxia, leukocytosis, and/or fever as reason to continue.  -azithro  -pred 40mg qd x4 doses completed S/p Mag, solu-medrol 40mg x1 , CTX/azithro x1 in ED. On home meds of albuterol inhaler PRN q6h, Dulera, singulair. Now back at baseline 2L NC.   Stable, s/p azithro and pred 40mg qd x4 doses completed    - c/w Dulera therapeutic interchange, singulair  - albuterol inhaler PRN q6h  - initiate CTX if pt w/ worsening hypoxia, leukocytosis, and/or fever as reason to continue

## 2024-05-13 NOTE — PROGRESS NOTE ADULT - PROBLEM SELECTOR PLAN 1
Patient likely has CKD 2/2 longstanding heart failure, HTN, and T2DM. Upon review of [facility], baseline SCr appears to be ~2.2-2.6. Noah on 5/8 is very low. It could be due to hypotension. Patient has been receiving diuretics since admission and a low urine sodium may also indicate insufficient diuretic response, however iso CKD a Noah has limited utility due to inherent impairment of proximal tubular reabsorption of sodium. In addition, increased urine output from diuretic therapy may end up falsely diluting a spot urine, although FeNa is also <1%. Pt was on IV intermittent Bumex, which was then escalated to Bumex gtt 4mg/hr initiated on 5/8. Responded well and now on Torsemide 100mg BID. UOP 2.5L/24h, net negative 2L/24h. Cr stable, 2.34 today. Electrolytes wnl. CHF meds per Heart Failure team. BP improved. Currently, no indication for dialysis. Continue strict Is+Os. Please avoid nephrotoxins when possible and dose all meds per eGFR.    Recommendations are preliminary until attending attestation.    Sandra Leiva, PGY-4  Nephrology Fellow  MS TEAMS preferred  (After 5pm or on weekends, please contact the fellow on call).

## 2024-05-13 NOTE — PROGRESS NOTE ADULT - PROBLEM SELECTOR PLAN 1
ACC Stage C/NYHA Class II. TTE 3/2024 w/ EF 20-25%. Possible etiologies for current decompensation include: recurrence of afib despite ablation, COPD exacerbation. Home GDMT: coreg 6.25mg BID, hydralazine 100mg TID, isordil 30mg TID  -defer to cardiology if necessary to repeat echo  -trops flat and no CP. EKG without STEMI equivalent - not ACS  -COPD management as below  -EP for device interrogation to assess for afib/aflutter burden - 86 second episode AF, BiVPaced 92%  -c/w GDMT:  - hydral 100 mg q8h  - isordil 30 mg q8h  - toprol 50 BID  - discontinued entresto 24/26 BID  - strict Is/Os, monitor UOP, daily weights  - still volume up with rising BUN, BNP - c/w 1mg/hour bumex gtt through Sunday 5/12, then start torsemide per HF  - repeat TTE 5/10 showing EF 15-20%, no significant changes from 3/2024 TTE  - Maintain K>4, Mg>2.    - Planned for HTS, but unable to administer given hospital floor protocol ACC Stage C/NYHA Class II. Repeat TTE on 5/10 showing EF 15-20%, no significant changes from 3/2024. Possible etiologies for current decompensation include: recurrence of afib despite ablation vs COPD exacerbation.   Home GDMT: coreg 6.25mg BID, hydralazine 100mg TID, isordil 30mg TID. Trops flat and no CP, EKG without STEMI equivalent -> not ACS. Device interrogation showed 86 second episode AF, BiVPaced 92%    -c/w GDMT:  - hydral 100 mg q8h  - isordil 30 mg q8h  - toprol 50 BID  - s/p 1mg/hour bumex gtt through Sunday 5/12, now on torsemide 100mg BID (5/13- ) per HF  - discontinued entresto 24/26 BID  - strict Is/Os, monitor UOP, daily weights  - repeat TTE 5/10 showing EF 15-20%, no significant changes from 3/2024 TTE  - Maintain K>4, Mg>2.  - Planned for HTS, but unable to administer given hospital floor protocol  - COPD management as below ACC Stage C/NYHA Class II. Repeat TTE on 5/10 showing EF 15-20%, no significant changes from 3/2024. Possible etiologies for current decompensation include: recurrence of afib despite ablation vs COPD exacerbation.   Home GDMT: coreg 6.25mg BID, hydralazine 100mg TID, isordil 30mg TID. Trops flat and no CP, EKG without STEMI equivalent -> not ACS. Device interrogation showed 86 second episode AF, BiVPaced 92%    -c/w GDMT:  - hydral 100 mg q8h  - isordil 30 mg q8h  - toprol 50 BID  - s/p 1mg/hour bumex gtt through Sunday 5/12, now on torsemide 100mg BID (5/13- ) per HF  - discontinued entresto 24/26 BID  - strict Is/Os, monitor UOP, daily weights  - Maintain K>4, Mg>2.  - Planned for HTS, but unable to administer given hospital floor protocol  - COPD management as below

## 2024-05-13 NOTE — PROGRESS NOTE ADULT - PROBLEM SELECTOR PLAN 2
SCr 2.02 on admission. Baseline SCr 2.4-2.6.  -hold nephrotoxins   -monitor UOP, electrolytes  -rising BUN - FeUrea 33.6, suggesting pre-renal, but could be cardiorenal - volume up, so giving additional 4mg IVPB bumex and restarting bumex gtt @ 1mg/hr  -nephrology c/s given brittle HF and renal dysfunction, may require HD - appreciate recs SCr 2.02 on admission. Baseline SCr 2.4-2.6.    - hold nephrotoxins   - monitor UOP, electrolytes  - daily Cr  - nephrology c/s given brittle HF and renal dysfunction, may require HD - appreciate recs

## 2024-05-13 NOTE — PROGRESS NOTE ADULT - ATTENDING COMMENTS
Transitioned pt to oral torsemide.  Will closely monitor.  Repeat BMP this evening.  Increase insulin.

## 2024-05-13 NOTE — PROGRESS NOTE ADULT - SUBJECTIVE AND OBJECTIVE BOX
[facility] Division of Kidney Diseases & Hypertension  FOLLOW UP NOTE  [phone]--------------------------------------------------------------------------------    Reason for consult: SHERRY on CKD      24 hour events/subjective: No acute complaints or overnight events.      PAST HISTORY  --------------------------------------------------------------------------------  No significant changes to PMH, PSH, FHx, SHx, unless otherwise noted    ALLERGIES & MEDICATIONS  --------------------------------------------------------------------------------  Allergies    No Known Allergies      Standing Inpatient Medications  allopurinol 100 milliGRAM(s) Oral daily  apixaban 5 milliGRAM(s) Oral two times a day  atorvastatin 80 milliGRAM(s) Oral at bedtime  budesonide 160 MICROgram(s)/formoterol 4.5 MICROgram(s) Inhaler 2 Puff(s) Inhalation two times a day  chlorhexidine 2% Cloths 1 Application(s) Topical daily  clopidogrel Tablet 75 milliGRAM(s) Oral daily  dextrose 10% Bolus 125 milliLiter(s) IV Bolus once  dextrose 5%. 1000 milliLiter(s) IV Continuous <Continuous>  dextrose 5%. 1000 milliLiter(s) IV Continuous <Continuous>  dextrose 50% Injectable 25 Gram(s) IV Push once  dextrose 50% Injectable 12.5 Gram(s) IV Push once  ferrous    sulfate 325 milliGRAM(s) Oral daily  gabapentin 100 milliGRAM(s) Oral at bedtime  glucagon  Injectable 1 milliGRAM(s) IntraMuscular once  hydrALAZINE 100 milliGRAM(s) Oral three times a day  influenza  Vaccine (HIGH DOSE) 0.7 milliLiter(s) IntraMuscular once  insulin lispro (ADMELOG) corrective regimen sliding scale   SubCutaneous three times a day before meals  insulin lispro (ADMELOG) corrective regimen sliding scale   SubCutaneous at bedtime  isosorbide   dinitrate Tablet (ISORDIL) 30 milliGRAM(s) Oral three times a day  levothyroxine 50 MICROGram(s) Oral daily  metoprolol succinate ER 50 milliGRAM(s) Oral every 12 hours  montelukast 10 milliGRAM(s) Oral daily  senna 2 Tablet(s) Oral at bedtime  tamsulosin 0.4 milliGRAM(s) Oral at bedtime  torsemide 100 milliGRAM(s) Oral every 12 hours    PRN Inpatient Medications  acetaminophen     Tablet .. 650 milliGRAM(s) Oral every 6 hours PRN  albuterol/ipratropium for Nebulization 3 milliLiter(s) Nebulizer every 6 hours PRN  dextrose Oral Gel 15 Gram(s) Oral once PRN  polyethylene glycol 3350 17 Gram(s) Oral daily PRN      REVIEW OF SYSTEMS  --------------------------------------------------------------------------------  Constitutional: No fevers, no chills  Neuro: no HA, dizziness  HEENT: No sore throat   Respiratory: No dyspnea, cough  Cardiovascular: No chest pain  Gastrointestinal: No abdominal pain, diarrhea, nausea, vomiting  Genitourinary: No dysuria, hematuria, urgency  Extremities: No edema    All other systems were reviewed and are negative, except as noted.    VITALS/PHYSICAL EXAM  --------------------------------------------------------------------------------  T(C): 36.4 (05-13-24 @ 05:05), Max: 36.7 (05-12-24 @ 21:45)  HR: 74 (05-13-24 @ 05:05) (74 - 84)  BP: 115/64 (05-13-24 @ 05:05) (101/69 - 120/72)  RR: 18 (05-13-24 @ 05:05) (18 - 18)  SpO2: 100% (05-13-24 @ 05:05) (98% - 100%)  Wt(kg): --        05-12-24 @ 07:01  -  05-13-24 @ 07:00  --------------------------------------------------------  IN: 450 mL / OUT: 2500 mL / NET: -2050 mL      Physical Exam:                       Gen: NAD                       Pulm: faint wheezing                       CV: +S1S2                       Abd: +BS, soft, nondistended/nontender                       : no ya                       Extremities: no bilateral LE edema                       Neuro: non-focal    LABS/STUDIES  --------------------------------------------------------------------------------              14.1   12.58 >-----------<  168      [05-13-24 @ 04:36]              43.0     139  |  100  |  77  ----------------------------<  153      [05-13-24 @ 04:36]  4.0   |  23  |  2.34        Ca     8.9     [05-13-24 @ 04:36]      Mg     2.00     [05-13-24 @ 04:36]      Phos  4.7     [05-13-24 @ 04:36]    TPro  6.1  /  Alb  3.5  /  TBili  0.7  /  DBili  x   /  AST  26  /  ALT  31  /  AlkPhos  171  [05-13-24 @ 04:36]      Creatinine Trend:  SCr 2.34 [05-13 @ 04:36]  SCr 2.34 [05-12 @ 18:30]  SCr 2.33 [05-12 @ 06:51]  SCr 2.42 [05-11 @ 19:39]  SCr 2.12 [05-11 @ 06:00]    Urine Creatinine 60      [05-08-24 @ 09:55]  Urine Protein 6      [05-08-24 @ 09:55]  Urine Sodium <20      [05-08-24 @ 09:55]  Urine Urea Nitrogen 656.0      [05-08-24 @ 09:55]

## 2024-05-13 NOTE — PROGRESS NOTE ADULT - ASSESSMENT
68M w/ hx of HFrEF (EF 20-25% as of 3/2024) s/p cardioMEMS in 3/2024 and [employer] CRT-D in 2022, paroxysmal afib/flutter s/p ablation on eliquis, CKD3, T2DM, COPD on 2LNC home O2, CAD s/p CABG in 2014, CVA w/ residual L sided weakness, prior R ankle fracture presenting from cardiology office for 1 day history of dyspnea with objective findings of elevated BNP, increased O2 requirement, increased PA pressures on cardioMEMS - consistent w/ acute on chronic systolic congestive heart failure.

## 2024-05-14 LAB
ALBUMIN SERPL ELPH-MCNC: 3.5 G/DL — SIGNIFICANT CHANGE UP (ref 3.3–5)
ALP SERPL-CCNC: 150 U/L — HIGH (ref 40–120)
ALT FLD-CCNC: 30 U/L — SIGNIFICANT CHANGE UP (ref 4–41)
ANION GAP SERPL CALC-SCNC: 15 MMOL/L — HIGH (ref 7–14)
ANION GAP SERPL CALC-SCNC: 16 MMOL/L — HIGH (ref 7–14)
AST SERPL-CCNC: 25 U/L — SIGNIFICANT CHANGE UP (ref 4–40)
BASOPHILS # BLD AUTO: 0.05 K/UL — SIGNIFICANT CHANGE UP (ref 0–0.2)
BASOPHILS NFR BLD AUTO: 0.5 % — SIGNIFICANT CHANGE UP (ref 0–2)
BILIRUB SERPL-MCNC: 1 MG/DL — SIGNIFICANT CHANGE UP (ref 0.2–1.2)
BUN SERPL-MCNC: 75 MG/DL — HIGH (ref 7–23)
BUN SERPL-MCNC: 84 MG/DL — HIGH (ref 7–23)
CALCIUM SERPL-MCNC: 8.4 MG/DL — SIGNIFICANT CHANGE UP (ref 8.4–10.5)
CALCIUM SERPL-MCNC: 8.9 MG/DL — SIGNIFICANT CHANGE UP (ref 8.4–10.5)
CHLORIDE SERPL-SCNC: 96 MMOL/L — LOW (ref 98–107)
CHLORIDE SERPL-SCNC: 96 MMOL/L — LOW (ref 98–107)
CO2 SERPL-SCNC: 22 MMOL/L — SIGNIFICANT CHANGE UP (ref 22–31)
CO2 SERPL-SCNC: 24 MMOL/L — SIGNIFICANT CHANGE UP (ref 22–31)
CREAT SERPL-MCNC: 2.4 MG/DL — HIGH (ref 0.5–1.3)
CREAT SERPL-MCNC: 2.52 MG/DL — HIGH (ref 0.5–1.3)
EGFR: 27 ML/MIN/1.73M2 — LOW
EGFR: 28 ML/MIN/1.73M2 — LOW
EOSINOPHIL # BLD AUTO: 0.19 K/UL — SIGNIFICANT CHANGE UP (ref 0–0.5)
EOSINOPHIL NFR BLD AUTO: 1.8 % — SIGNIFICANT CHANGE UP (ref 0–6)
GLUCOSE BLDC GLUCOMTR-MCNC: 108 MG/DL — HIGH (ref 70–99)
GLUCOSE BLDC GLUCOMTR-MCNC: 163 MG/DL — HIGH (ref 70–99)
GLUCOSE BLDC GLUCOMTR-MCNC: 205 MG/DL — HIGH (ref 70–99)
GLUCOSE BLDC GLUCOMTR-MCNC: 82 MG/DL — SIGNIFICANT CHANGE UP (ref 70–99)
GLUCOSE SERPL-MCNC: 106 MG/DL — HIGH (ref 70–99)
GLUCOSE SERPL-MCNC: 172 MG/DL — HIGH (ref 70–99)
HCT VFR BLD CALC: 43.3 % — SIGNIFICANT CHANGE UP (ref 39–50)
HGB BLD-MCNC: 14.5 G/DL — SIGNIFICANT CHANGE UP (ref 13–17)
IANC: 7.43 K/UL — HIGH (ref 1.8–7.4)
IMM GRANULOCYTES NFR BLD AUTO: 0.4 % — SIGNIFICANT CHANGE UP (ref 0–0.9)
LYMPHOCYTES # BLD AUTO: 19.3 % — SIGNIFICANT CHANGE UP (ref 13–44)
LYMPHOCYTES # BLD AUTO: 2.08 K/UL — SIGNIFICANT CHANGE UP (ref 1–3.3)
MAGNESIUM SERPL-MCNC: 1.9 MG/DL — SIGNIFICANT CHANGE UP (ref 1.6–2.6)
MAGNESIUM SERPL-MCNC: 1.9 MG/DL — SIGNIFICANT CHANGE UP (ref 1.6–2.6)
MCHC RBC-ENTMCNC: 32.4 PG — SIGNIFICANT CHANGE UP (ref 27–34)
MCHC RBC-ENTMCNC: 33.5 GM/DL — SIGNIFICANT CHANGE UP (ref 32–36)
MCV RBC AUTO: 96.9 FL — SIGNIFICANT CHANGE UP (ref 80–100)
MONOCYTES # BLD AUTO: 0.99 K/UL — HIGH (ref 0–0.9)
MONOCYTES NFR BLD AUTO: 9.2 % — SIGNIFICANT CHANGE UP (ref 2–14)
NEUTROPHILS # BLD AUTO: 7.43 K/UL — HIGH (ref 1.8–7.4)
NEUTROPHILS NFR BLD AUTO: 68.8 % — SIGNIFICANT CHANGE UP (ref 43–77)
NRBC # BLD: 0 /100 WBCS — SIGNIFICANT CHANGE UP (ref 0–0)
NRBC # FLD: 0 K/UL — SIGNIFICANT CHANGE UP (ref 0–0)
PHOSPHATE SERPL-MCNC: 4.5 MG/DL — SIGNIFICANT CHANGE UP (ref 2.5–4.5)
PHOSPHATE SERPL-MCNC: 5.7 MG/DL — HIGH (ref 2.5–4.5)
PLATELET # BLD AUTO: 162 K/UL — SIGNIFICANT CHANGE UP (ref 150–400)
POTASSIUM SERPL-MCNC: 4 MMOL/L — SIGNIFICANT CHANGE UP (ref 3.5–5.3)
POTASSIUM SERPL-MCNC: 4.4 MMOL/L — SIGNIFICANT CHANGE UP (ref 3.5–5.3)
POTASSIUM SERPL-SCNC: 4 MMOL/L — SIGNIFICANT CHANGE UP (ref 3.5–5.3)
POTASSIUM SERPL-SCNC: 4.4 MMOL/L — SIGNIFICANT CHANGE UP (ref 3.5–5.3)
PROT SERPL-MCNC: 5.8 G/DL — LOW (ref 6–8.3)
RBC # BLD: 4.47 M/UL — SIGNIFICANT CHANGE UP (ref 4.2–5.8)
RBC # FLD: 16.4 % — HIGH (ref 10.3–14.5)
SODIUM SERPL-SCNC: 134 MMOL/L — LOW (ref 135–145)
SODIUM SERPL-SCNC: 135 MMOL/L — SIGNIFICANT CHANGE UP (ref 135–145)
WBC # BLD: 10.78 K/UL — HIGH (ref 3.8–10.5)
WBC # FLD AUTO: 10.78 K/UL — HIGH (ref 3.8–10.5)

## 2024-05-14 PROCEDURE — 99232 SBSQ HOSP IP/OBS MODERATE 35: CPT | Mod: GC

## 2024-05-14 RX ORDER — HYDRALAZINE HCL 50 MG
75 TABLET ORAL THREE TIMES A DAY
Refills: 0 | Status: DISCONTINUED | OUTPATIENT
Start: 2024-05-14 | End: 2024-05-15

## 2024-05-14 RX ORDER — METOPROLOL TARTRATE 50 MG
75 TABLET ORAL DAILY
Refills: 0 | Status: DISCONTINUED | OUTPATIENT
Start: 2024-05-14 | End: 2024-05-15

## 2024-05-14 RX ORDER — METOPROLOL TARTRATE 50 MG
50 TABLET ORAL DAILY
Refills: 0 | Status: DISCONTINUED | OUTPATIENT
Start: 2024-05-14 | End: 2024-05-14

## 2024-05-14 RX ADMIN — CLOPIDOGREL BISULFATE 75 MILLIGRAM(S): 75 TABLET, FILM COATED ORAL at 11:41

## 2024-05-14 RX ADMIN — TAMSULOSIN HYDROCHLORIDE 0.4 MILLIGRAM(S): 0.4 CAPSULE ORAL at 21:43

## 2024-05-14 RX ADMIN — Medication 50 MILLIGRAM(S): at 10:58

## 2024-05-14 RX ADMIN — Medication 325 MILLIGRAM(S): at 11:40

## 2024-05-14 RX ADMIN — BUDESONIDE AND FORMOTEROL FUMARATE DIHYDRATE 2 PUFF(S): 160; 4.5 AEROSOL RESPIRATORY (INHALATION) at 09:14

## 2024-05-14 RX ADMIN — APIXABAN 5 MILLIGRAM(S): 2.5 TABLET, FILM COATED ORAL at 17:32

## 2024-05-14 RX ADMIN — BUDESONIDE AND FORMOTEROL FUMARATE DIHYDRATE 2 PUFF(S): 160; 4.5 AEROSOL RESPIRATORY (INHALATION) at 21:44

## 2024-05-14 RX ADMIN — Medication 100 MILLIGRAM(S): at 10:58

## 2024-05-14 RX ADMIN — MONTELUKAST 10 MILLIGRAM(S): 4 TABLET, CHEWABLE ORAL at 11:40

## 2024-05-14 RX ADMIN — CHLORHEXIDINE GLUCONATE 1 APPLICATION(S): 213 SOLUTION TOPICAL at 11:40

## 2024-05-14 RX ADMIN — ATORVASTATIN CALCIUM 80 MILLIGRAM(S): 80 TABLET, FILM COATED ORAL at 21:42

## 2024-05-14 RX ADMIN — APIXABAN 5 MILLIGRAM(S): 2.5 TABLET, FILM COATED ORAL at 06:44

## 2024-05-14 RX ADMIN — Medication 100 MILLIGRAM(S): at 13:47

## 2024-05-14 RX ADMIN — Medication 100 MILLIGRAM(S): at 11:41

## 2024-05-14 RX ADMIN — ISOSORBIDE DINITRATE 30 MILLIGRAM(S): 5 TABLET ORAL at 17:31

## 2024-05-14 RX ADMIN — Medication 4: at 13:10

## 2024-05-14 RX ADMIN — Medication 50 MICROGRAM(S): at 06:44

## 2024-05-14 NOTE — PROVIDER CONTACT NOTE (OTHER) - ACTION/TREATMENT ORDERED:
provider aware. to be addressed by HF team
provider made aware. Ordered to hold meds for AM.
Provider ordered STAT coreg 6.25mg. Safety maintained.
attending at bedside. plans to hold bumex
provider aware. plans to repeat BP within an hour
no new orders at this time. isosorbide held.
Continue to monitor patient

## 2024-05-14 NOTE — PROVIDER CONTACT NOTE (OTHER) - BACKGROUND
67 y/o M PMHx of AICD, CAD, COPD, CVA, DM, HTN presented with SOB.
Patient admitted for CHF w/ pmhx of ICD, BPH, CAD, HTN, T2DM, CKD3, COPD, and CVA
Patient admitted for CHF w/ pmhx of ICD, BPH, CAD, HTN, T2DM, CKD3, COPD, and CVA

## 2024-05-14 NOTE — PROGRESS NOTE ADULT - ATTENDING SUPERVISION STATEMENT
Fellow
Resident

## 2024-05-14 NOTE — PROVIDER CONTACT NOTE (OTHER) - ASSESSMENT
patient states he is tired. asymptomatic otherwise. alert
patient states he is tired. asymptomatic otherwise. alert
Patient asymptomatic. BP 99/58, HR 87
patient asymptomatic.
Patient asymptomatic. /56, HR 73, T 97.7, SPO2 100, RR 18
patient denies dizziness. states he is tired. other VS WDL. patient A&Ox4 alert
Patient A&O x4, not in any acute distress. No complaints.

## 2024-05-14 NOTE — PROGRESS NOTE ADULT - PROBLEM SELECTOR PLAN 11
DVT PPx: full AC w/ eliquis  Diet: DASH/CC fluid restriction  Code status: Full code  Communication: Ketty (daughter)   Dispo: likely home   PCP: Dr. Robin Schmidt  Pharmacy: [facility] [id number] [facility]

## 2024-05-14 NOTE — PROGRESS NOTE ADULT - SUBJECTIVE AND OBJECTIVE BOX
[facility] DIVISION OF KIDNEY DISEASES AND HYPERTENSION -- FOLLOW UP NOTE  HAKAN Fellow pager # [phone]  Nephrology office # [phone]  Available on [employer] teams--> Hebert Skaggs  -----------------------------------------------------------------------------  Chief Complaint:/subjective:  breathing Better         24 hour events:            ALLERGIES & MEDICATIONS  --------------------------------------------------------------------------------  Allergies    No Known Allergies    Intolerances      Standing Inpatient Medications  allopurinol 100 milliGRAM(s) Oral daily  apixaban 5 milliGRAM(s) Oral two times a day  atorvastatin 80 milliGRAM(s) Oral at bedtime  budesonide 160 MICROgram(s)/formoterol 4.5 MICROgram(s) Inhaler 2 Puff(s) Inhalation two times a day  chlorhexidine 2% Cloths 1 Application(s) Topical daily  clopidogrel Tablet 75 milliGRAM(s) Oral daily  dextrose 10% Bolus 125 milliLiter(s) IV Bolus once  dextrose 5%. 1000 milliLiter(s) IV Continuous <Continuous>  dextrose 5%. 1000 milliLiter(s) IV Continuous <Continuous>  dextrose 50% Injectable 25 Gram(s) IV Push once  dextrose 50% Injectable 12.5 Gram(s) IV Push once  ferrous    sulfate 325 milliGRAM(s) Oral daily  gabapentin 100 milliGRAM(s) Oral at bedtime  glucagon  Injectable 1 milliGRAM(s) IntraMuscular once  hydrALAZINE 100 milliGRAM(s) Oral three times a day  influenza  Vaccine (HIGH DOSE) 0.7 milliLiter(s) IntraMuscular once  insulin lispro (ADMELOG) corrective regimen sliding scale   SubCutaneous three times a day before meals  insulin lispro (ADMELOG) corrective regimen sliding scale   SubCutaneous at bedtime  isosorbide   dinitrate Tablet (ISORDIL) 30 milliGRAM(s) Oral three times a day  levothyroxine 50 MICROGram(s) Oral daily  metoprolol succinate ER 50 milliGRAM(s) Oral daily  montelukast 10 milliGRAM(s) Oral daily  senna 2 Tablet(s) Oral at bedtime  tamsulosin 0.4 milliGRAM(s) Oral at bedtime  torsemide 100 milliGRAM(s) Oral every 12 hours    PRN Inpatient Medications  acetaminophen     Tablet .. 650 milliGRAM(s) Oral every 6 hours PRN  albuterol/ipratropium for Nebulization 3 milliLiter(s) Nebulizer every 6 hours PRN  dextrose Oral Gel 15 Gram(s) Oral once PRN  polyethylene glycol 3350 17 Gram(s) Oral daily PRN            VITALS/PHYSICAL EXAM  --------------------------------------------------------------------------------  T(C): 36.4 (05-14-24 @ 10:40), Max: 36.5 (05-13-24 @ 17:30)  HR: 80 (05-14-24 @ 10:40) (73 - 87)  BP: 110/59 (05-14-24 @ 10:40) (94/60 - 110/59)  RR: 18 (05-14-24 @ 10:40) (18 - 18)  SpO2: 98% (05-14-24 @ 10:40) (98% - 100%)  Wt(kg): --        05-13-24 @ 07:01  -  05-14-24 @ 07:00  --------------------------------------------------------  IN: 300 mL / OUT: 2050 mL / NET: -1750 mL      Physical Exam:  	Gen NAD. O2 NC   	HEENT: no JVD  	Pulm: dec BS at base   	CV: S1S2,  	Abd: Soft,   	Ext:   - edema B/L LE   	Neuro: Awake and alert  	Skin: Warm and dry               LABS/STUDIES  --------------------------------------------------------------------------------              14.5   10.78 >-----------<  162      [05-14-24 @ 06:20]              43.3     Hemoglobin: 14.5 g/dL (05-14-24 @ 06:20)  Hemoglobin: 14.1 g/dL (05-13-24 @ 04:36)    Platelet Count - Automated: 162 K/uL (05-14-24 @ 06:20)  Platelet Count - Automated: 168 K/uL (05-13-24 @ 04:36)    134  |  96  |  75  ----------------------------<  106      [05-14-24 @ 06:20]  4.0   |  22  |  2.40        Ca     8.9     [05-14-24 @ 06:20]      Mg     1.90     [05-14-24 @ 06:20]      Phos  4.5     [05-14-24 @ 06:20]    TPro  5.8  /  Alb  3.5  /  TBili  1.0  /  DBili  x   /  AST  25  /  ALT  30  /  AlkPhos  150  [05-14-24 @ 06:20]          Creatinine: 2.40 mg/dL (05-14-24 @ 06:20)  Creatinine: 2.41 mg/dL (05-13-24 @ 18:59)  Creatinine: 2.34 mg/dL (05-13-24 @ 04:36)  Creatinine: 2.34 mg/dL (05-12-24 @ 18:30)  Creatinine: 2.33 mg/dL (05-12-24 @ 06:51)  Creatinine: 2.42 mg/dL (05-11-24 @ 19:39)  Creatinine: 2.12 mg/dL (05-11-24 @ 06:00)  Creatinine: 2.12 mg/dL (05-11-24 @ 06:00)  Creatinine: 2.36 mg/dL (05-10-24 @ 04:30)  Creatinine: 2.41 mg/dL (05-10-24 @ 02:28)  Creatinine: 2.38 mg/dL (05-09-24 @ 04:39)  Creatinine: 2.51 mg/dL (05-08-24 @ 19:07)  Creatinine: 2.55 mg/dL (05-08-24 @ 05:00)  Creatinine: 2.37 mg/dL (05-07-24 @ 05:29)  Creatinine: 2.53 mg/dL (05-06-24 @ 18:04)  Creatinine: 2.40 mg/dL (05-06-24 @ 06:13)  Creatinine: 2.44 mg/dL (05-05-24 @ 19:02)  Creatinine: 2.35 mg/dL (05-05-24 @ 04:36)  Creatinine: 2.26 mg/dL (05-04-24 @ 18:20)    SODIUM TREND:  Sodium 134 [05-14 @ 06:20]  Sodium 135 [05-13 @ 18:59]  Sodium 139 [05-13 @ 04:36]  Sodium 139 [05-12 @ 18:30]  Sodium 140 [05-12 @ 06:51]  Sodium 142 [05-11 @ 19:39]  Sodium 142 [05-11 @ 06:00]  Sodium 139 [05-10 @ 04:30]  Sodium 140 [05-10 @ 02:28]  Sodium 137 [05-09 @ 04:39]        SCr 2.40 [05-14 @ 06:20]  SCr 2.41 [05-13 @ 18:59]  SCr 2.34 [05-13 @ 04:36]  SCr 2.34 [05-12 @ 18:30]  SCr 2.33 [05-12 @ 06:51]    Urinalysis - [05-14-24 @ 06:20]      Color  / Appearance  / SG  / pH       Gluc 106 / Ketone   / Bili  / Urobili        Blood  / Protein  / Leuk Est  / Nitrite       RBC  / WBC  / Hyaline  / Gran  / Sq Epi  / Non Sq Epi  / Bacteria     Urine Creatinine 60      [05-08-24 @ 09:55]  Urine Protein 6      [05-08-24 @ 09:55]  Urine Sodium <20      [05-08-24 @ 09:55]  Urine Urea Nitrogen 656.0      [05-08-24 @ 09:55]    TSH 4.43      [03-17-24 @ 06:24]

## 2024-05-14 NOTE — PROVIDER CONTACT NOTE (OTHER) - DATE AND TIME:
06-May-2024 12:06
06-May-2024 10:19
10-May-2024 07:38
06-May-2024 07:35
02-May-2024 23:00
06-May-2024 09:25
14-May-2024 06:00

## 2024-05-14 NOTE — PROGRESS NOTE ADULT - PROBLEM SELECTOR PLAN 1
Patient likely has CKD 2/2 longstanding heart failure, HTN, and T2DM. Upon review of [facility], baseline SCr appears to be ~2.2-2.6.  Pt was on IV intermittent Bumex, which was then escalated to Bumex gtt 4mg/hr initiated on 5/8. Responded well and now on Torsemide 100mg BID. UOP 2.5L/24h, net negative 2L/24h. Cr stable, 2.4 today. Electrolytes wnl. CHF meds per Heart Failure team. BP improved. Currently, no indication for dialysis. Continue strict Is+Os. Please avoid nephrotoxins when possible and dose all meds per eGFR.

## 2024-05-14 NOTE — PROGRESS NOTE ADULT - PROBLEM SELECTOR PLAN 2
SCr 2.02 on admission. Baseline SCr 2.4-2.6.    - hold nephrotoxins   - monitor UOP, electrolytes  - daily Cr  - nephrology c/s given brittle HF and renal dysfunction, may require HD - appreciate recs Baseline SCr 2.4-2.6. Stable     - hold nephrotoxins   - monitor UOP, electrolytes  - daily Cr  - nephrology c/s given brittle HF and renal dysfunction -> no plan for HD at this time

## 2024-05-14 NOTE — PROVIDER CONTACT NOTE (OTHER) - SITUATION
Provider order to hold the b/p meds for A.M.
hypotension. manual BP taken,
BP 70/36. other VS wdl
BP 77/47. other VS wdl
HR sustaining in 120's vpaced on tele.
Patient had 3 beats of Vtach
BP 80/47 other VS wdl

## 2024-05-14 NOTE — PROVIDER CONTACT NOTE (OTHER) - REASON
hypotension
Provider order to hold the b/p meds for A.M.
hypotension
HR sustaining in 120's vpaced on tele
Patient had 3 beats of Vtach

## 2024-05-14 NOTE — PROGRESS NOTE ADULT - SUBJECTIVE AND OBJECTIVE BOX
*******************************  Brooklyn Espino MD (PGY-1)  Internal Medicine  Contact via [employer] TEAMS  *******************************    INGE CARLOS  68y  Male    Patient is a 68y old  Male who presents with a chief complaint of acute on chronic heart failure exacerbation (13 May 2024 13:54)      Subjective:    Objective:  T(C): 36.3 (05-14-24 @ 05:00), Max: 36.7 (05-13-24 @ 11:10)  HR: 87 (05-14-24 @ 05:00) (73 - 87)  BP: 99/58 (05-14-24 @ 05:00) (94/60 - 107/67)  RR: 18 (05-14-24 @ 05:00) (18 - 18)  SpO2: 100% (05-14-24 @ 05:00) (100% - 100%)  I&O's Summary    13 May 2024 07:01  -  14 May 2024 07:00  --------------------------------------------------------  IN: 300 mL / OUT: 2050 mL / NET: -1750 mL        PHYSICAL EXAM:  GENERAL: NAD  HEAD:  Atraumatic, Normocephalic  EYES: EOMI, PERRLA, conjunctiva and sclera clear  ENMT: Moist mucous membranes  NECK: Supple, No JVD, trachea midline   NERVOUS SYSTEM:  Alert & Oriented X3, Good concentration; Motor Strength 5/5 B/L upper and lower extremities; DTRs 2+ intact and symmetric  CHEST/LUNG: Clear to auscultation bilaterally; No rales, rhonchi, wheezing, or rubs  HEART: Regular rate and rhythm; No murmurs, rubs, or gallops  ABDOMEN: Soft, Nontender, Nondistended; Bowel sounds present  EXTREMITIES:  2+ Peripheral Pulses, No clubbing, cyanosis, or edema  LYMPH: No lymphadenopathy noted  SKIN: No rashes or lesions    MEDICATIONS  (STANDING):  allopurinol 100 milliGRAM(s) Oral daily  apixaban 5 milliGRAM(s) Oral two times a day  atorvastatin 80 milliGRAM(s) Oral at bedtime  budesonide 160 MICROgram(s)/formoterol 4.5 MICROgram(s) Inhaler 2 Puff(s) Inhalation two times a day  chlorhexidine 2% Cloths 1 Application(s) Topical daily  clopidogrel Tablet 75 milliGRAM(s) Oral daily  dextrose 10% Bolus 125 milliLiter(s) IV Bolus once  dextrose 5%. 1000 milliLiter(s) (100 mL/Hr) IV Continuous <Continuous>  dextrose 5%. 1000 milliLiter(s) (50 mL/Hr) IV Continuous <Continuous>  dextrose 50% Injectable 25 Gram(s) IV Push once  dextrose 50% Injectable 12.5 Gram(s) IV Push once  ferrous    sulfate 325 milliGRAM(s) Oral daily  gabapentin 100 milliGRAM(s) Oral at bedtime  glucagon  Injectable 1 milliGRAM(s) IntraMuscular once  hydrALAZINE 100 milliGRAM(s) Oral three times a day  influenza  Vaccine (HIGH DOSE) 0.7 milliLiter(s) IntraMuscular once  insulin lispro (ADMELOG) corrective regimen sliding scale   SubCutaneous three times a day before meals  insulin lispro (ADMELOG) corrective regimen sliding scale   SubCutaneous at bedtime  isosorbide   dinitrate Tablet (ISORDIL) 30 milliGRAM(s) Oral three times a day  levothyroxine 50 MICROGram(s) Oral daily  metoprolol succinate ER 50 milliGRAM(s) Oral every 12 hours  montelukast 10 milliGRAM(s) Oral daily  senna 2 Tablet(s) Oral at bedtime  tamsulosin 0.4 milliGRAM(s) Oral at bedtime  torsemide 100 milliGRAM(s) Oral every 12 hours    MEDICATIONS  (PRN):  acetaminophen     Tablet .. 650 milliGRAM(s) Oral every 6 hours PRN Temp greater or equal to 38C (100.4F), Mild Pain (1 - 3)  albuterol/ipratropium for Nebulization 3 milliLiter(s) Nebulizer every 6 hours PRN Wheezing  dextrose Oral Gel 15 Gram(s) Oral once PRN Blood Glucose LESS THAN 70 milliGRAM(s)/deciliter  polyethylene glycol 3350 17 Gram(s) Oral daily PRN Constipation      LABS:        CAPILLARY BLOOD GLUCOSE      POCT Blood Glucose.: 111 mg/dL (13 May 2024 22:36)  POCT Blood Glucose.: 195 mg/dL (13 May 2024 17:12)  POCT Blood Glucose.: 138 mg/dL (13 May 2024 12:49)  POCT Blood Glucose.: 170 mg/dL (13 May 2024 08:28)      RADIOLOGY & ADDITIONAL TESTS:               *******************************  Brooklyn Espino MD (PGY-1)  Internal Medicine  Contact via [employer] TEAMS  *******************************    INGE CARLOS  68y  Male    Patient is a 68y old  Male who presents with a chief complaint of acute on chronic heart failure exacerbation (13 May 2024 13:54)    Subjective: Overnight, patient w/ 13 beats NSVT, asymptomatic. Seen at bedside this morning. Denied any fever, chills, chest pain, palpitations, sob, ab pain, n/v.     Objective:  T(C): 36.3 (05-14-24 @ 05:00), Max: 36.7 (05-13-24 @ 11:10)  HR: 87 (05-14-24 @ 05:00) (73 - 87)  BP: 99/58 (05-14-24 @ 05:00) (94/60 - 107/67)  RR: 18 (05-14-24 @ 05:00) (18 - 18)  SpO2: 100% (05-14-24 @ 05:00) (100% - 100%)    tele: 13 beats NSVT around 10pm, otherwise w/ 2-3 beats of NSVT     I&O's Summary    13 May 2024 07:01  -  14 May 2024 07:00  --------------------------------------------------------  IN: 300 mL / OUT: 2050 mL / NET: -1750 mL    PHYSICAL EXAM:  GENERAL: NAD  HEAD:  Atraumatic, Normocephalic  EYES: EOMI, PERRLA, conjunctiva and sclera clear  ENMT: Moist mucous membranes  NECK: Supple, No JVD, trachea midline   NERVOUS SYSTEM:  Alert & Oriented X3, Good concentration  CHEST/LUNG: Clear to auscultation bilaterally; No rales, rhonchi, wheezing, or rubs  HEART: Regular rate and rhythm; No murmurs, rubs, or gallops  ABDOMEN: Soft, Nontender, Nondistended; Bowel sounds present  EXTREMITIES:  2+ Peripheral Pulses, No clubbing, cyanosis, or edema  SKIN: No rashes or lesions    MEDICATIONS  (STANDING):  allopurinol 100 milliGRAM(s) Oral daily  apixaban 5 milliGRAM(s) Oral two times a day  atorvastatin 80 milliGRAM(s) Oral at bedtime  budesonide 160 MICROgram(s)/formoterol 4.5 MICROgram(s) Inhaler 2 Puff(s) Inhalation two times a day  chlorhexidine 2% Cloths 1 Application(s) Topical daily  clopidogrel Tablet 75 milliGRAM(s) Oral daily  dextrose 10% Bolus 125 milliLiter(s) IV Bolus once  dextrose 5%. 1000 milliLiter(s) (100 mL/Hr) IV Continuous <Continuous>  dextrose 5%. 1000 milliLiter(s) (50 mL/Hr) IV Continuous <Continuous>  dextrose 50% Injectable 25 Gram(s) IV Push once  dextrose 50% Injectable 12.5 Gram(s) IV Push once  ferrous    sulfate 325 milliGRAM(s) Oral daily  gabapentin 100 milliGRAM(s) Oral at bedtime  glucagon  Injectable 1 milliGRAM(s) IntraMuscular once  hydrALAZINE 100 milliGRAM(s) Oral three times a day  influenza  Vaccine (HIGH DOSE) 0.7 milliLiter(s) IntraMuscular once  insulin lispro (ADMELOG) corrective regimen sliding scale   SubCutaneous three times a day before meals  insulin lispro (ADMELOG) corrective regimen sliding scale   SubCutaneous at bedtime  isosorbide   dinitrate Tablet (ISORDIL) 30 milliGRAM(s) Oral three times a day  levothyroxine 50 MICROGram(s) Oral daily  metoprolol succinate ER 50 milliGRAM(s) Oral every 12 hours  montelukast 10 milliGRAM(s) Oral daily  senna 2 Tablet(s) Oral at bedtime  tamsulosin 0.4 milliGRAM(s) Oral at bedtime  torsemide 100 milliGRAM(s) Oral every 12 hours    MEDICATIONS  (PRN):  acetaminophen     Tablet .. 650 milliGRAM(s) Oral every 6 hours PRN Temp greater or equal to 38C (100.4F), Mild Pain (1 - 3)  albuterol/ipratropium for Nebulization 3 milliLiter(s) Nebulizer every 6 hours PRN Wheezing  dextrose Oral Gel 15 Gram(s) Oral once PRN Blood Glucose LESS THAN 70 milliGRAM(s)/deciliter  polyethylene glycol 3350 17 Gram(s) Oral daily PRN Constipation    LABS:               14.5   10.78 )-----------( 162      ( 14 May 2024 06:20 )             43.3   05-14    134<L>  |  96<L>  |  75<H>  ----------------------------<  106<H>  4.0   |  22  |  2.40<H>    Ca    8.9      14 May 2024 06:20  Phos  4.5     05-14  Mg     1.90     05-14    TPro  5.8<L>  /  Alb  3.5  /  TBili  1.0  /  DBili  x   /  AST  25  /  ALT  30  /  AlkPhos  150<H>  05-14    Culture Results:   No growth at 5 days (05-01 @ 16:52)  Culture Results:   No growth at 5 days (05-01 @ 16:37)    CAPILLARY BLOOD GLUCOSE  POCT Blood Glucose.: 205 mg/dL (14 May 2024 12:21)  POCT Blood Glucose.: 111 mg/dL (13 May 2024 22:36)  POCT Blood Glucose.: 195 mg/dL (13 May 2024 17:12)  POCT Blood Glucose.: 138 mg/dL (13 May 2024 12:49)  POCT Blood Glucose.: 170 mg/dL (13 May 2024 08:28)    RADIOLOGY & ADDITIONAL TESTS:

## 2024-05-14 NOTE — PROGRESS NOTE ADULT - PROBLEM SELECTOR PLAN 1
ACC Stage C/NYHA Class II. Repeat TTE on 5/10 showing EF 15-20%, no significant changes from 3/2024. Possible etiologies for current decompensation include: recurrence of afib despite ablation vs COPD exacerbation.   Home GDMT: coreg 6.25mg BID, hydralazine 100mg TID, isordil 30mg TID. Trops flat and no CP, EKG without STEMI equivalent -> not ACS. Device interrogation showed 86 second episode AF, BiVPaced 92%    -c/w GDMT:  - hydral 100 mg q8h  - isordil 30 mg q8h  - toprol 50 BID  - s/p 1mg/hour bumex gtt through Sunday 5/12, now on torsemide 100mg BID (5/13- ) per HF  - discontinued entresto 24/26 BID  - strict Is/Os, monitor UOP, daily weights  - Maintain K>4, Mg>2.  - Planned for HTS, but unable to administer given hospital floor protocol  - COPD management as below ACC Stage C/NYHA Class II. Repeat TTE on 5/10 showing EF 15-20%, no significant changes from 3/2024. Possible etiologies for current decompensation include: recurrence of afib despite ablation vs COPD exacerbation.   Home GDMT: coreg 6.25mg BID, hydralazine 100mg TID, isordil 30mg TID. Trops flat and no CP, EKG without STEMI equivalent -> not ACS. Device interrogation showed 86 second episode AF, BiVPaced 92%    - given NSVT, f/u with HF on if any changes to GDMT ideal   -c/w GDMT:  - hydral 100 mg q8h -> consider decrease to hydral 75mg TID  - isordil 30 mg q8h  - toprol 50 -> consider increase to toprol 75mg   - s/p 1mg/hour bumex gtt through Sunday 5/12, now on torsemide 100mg BID (5/13- ) per HF  - discontinued entresto 24/26 BID  - strict Is/Os, monitor UOP, daily weights  - Maintain K>4, Mg>2.  - Planned for HTS, but unable to administer given hospital floor protocol  - COPD management as below

## 2024-05-14 NOTE — PROGRESS NOTE ADULT - PROBLEM SELECTOR PLAN 3
S/p Mag, solu-medrol 40mg x1 , CTX/azithro x1 in ED. On home meds of albuterol inhaler PRN q6h, Dulera, singulair. Now back at baseline 2L NC.   Stable, s/p azithro and pred 40mg qd x4 doses completed    - c/w Dulera therapeutic interchange, singulair  - albuterol inhaler PRN q6h  - initiate CTX if pt w/ worsening hypoxia, leukocytosis, and/or fever as reason to continue

## 2024-05-15 ENCOUNTER — APPOINTMENT (OUTPATIENT)
Dept: ELECTROPHYSIOLOGY | Facility: CLINIC | Age: 69
End: 2024-05-15
Payer: MEDICARE

## 2024-05-15 ENCOUNTER — NON-APPOINTMENT (OUTPATIENT)
Age: 69
End: 2024-05-15

## 2024-05-15 VITALS
RESPIRATION RATE: 18 BRPM | HEART RATE: 76 BPM | DIASTOLIC BLOOD PRESSURE: 56 MMHG | OXYGEN SATURATION: 100 % | SYSTOLIC BLOOD PRESSURE: 107 MMHG | TEMPERATURE: 98 F

## 2024-05-15 LAB
ANION GAP SERPL CALC-SCNC: 18 MMOL/L — HIGH (ref 7–14)
BUN SERPL-MCNC: 82 MG/DL — HIGH (ref 7–23)
CALCIUM SERPL-MCNC: 8.6 MG/DL — SIGNIFICANT CHANGE UP (ref 8.4–10.5)
CHLORIDE SERPL-SCNC: 97 MMOL/L — LOW (ref 98–107)
CO2 SERPL-SCNC: 20 MMOL/L — LOW (ref 22–31)
CREAT SERPL-MCNC: 2.42 MG/DL — HIGH (ref 0.5–1.3)
EGFR: 28 ML/MIN/1.73M2 — LOW
GLUCOSE BLDC GLUCOMTR-MCNC: 163 MG/DL — HIGH (ref 70–99)
GLUCOSE BLDC GLUCOMTR-MCNC: 258 MG/DL — HIGH (ref 70–99)
GLUCOSE SERPL-MCNC: 92 MG/DL — SIGNIFICANT CHANGE UP (ref 70–99)
MAGNESIUM SERPL-MCNC: 2 MG/DL — SIGNIFICANT CHANGE UP (ref 1.6–2.6)
PHOSPHATE SERPL-MCNC: 5.1 MG/DL — HIGH (ref 2.5–4.5)
POTASSIUM SERPL-MCNC: 4.2 MMOL/L — SIGNIFICANT CHANGE UP (ref 3.5–5.3)
POTASSIUM SERPL-SCNC: 4.2 MMOL/L — SIGNIFICANT CHANGE UP (ref 3.5–5.3)
SODIUM SERPL-SCNC: 135 MMOL/L — SIGNIFICANT CHANGE UP (ref 135–145)

## 2024-05-15 PROCEDURE — 99239 HOSP IP/OBS DSCHRG MGMT >30: CPT

## 2024-05-15 PROCEDURE — 99232 SBSQ HOSP IP/OBS MODERATE 35: CPT

## 2024-05-15 PROCEDURE — 93295 DEV INTERROG REMOTE 1/2/MLT: CPT

## 2024-05-15 PROCEDURE — 93296 REM INTERROG EVL PM/IDS: CPT

## 2024-05-15 RX ORDER — HYDRALAZINE HCL 50 MG
3 TABLET ORAL
Qty: 0 | Refills: 0 | DISCHARGE
Start: 2024-05-15

## 2024-05-15 RX ORDER — METOPROLOL TARTRATE 50 MG
3 TABLET ORAL
Qty: 90 | Refills: 0
Start: 2024-05-15 | End: 2024-06-13

## 2024-05-15 RX ORDER — METOPROLOL TARTRATE 50 MG
3 TABLET ORAL
Qty: 0 | Refills: 0 | DISCHARGE
Start: 2024-05-15

## 2024-05-15 RX ORDER — HYDRALAZINE HCL 50 MG
3 TABLET ORAL
Qty: 270 | Refills: 0
Start: 2024-05-15 | End: 2024-06-13

## 2024-05-15 RX ADMIN — Medication 6: at 12:03

## 2024-05-15 RX ADMIN — CLOPIDOGREL BISULFATE 75 MILLIGRAM(S): 75 TABLET, FILM COATED ORAL at 11:44

## 2024-05-15 RX ADMIN — ISOSORBIDE DINITRATE 30 MILLIGRAM(S): 5 TABLET ORAL at 07:01

## 2024-05-15 RX ADMIN — Medication 75 MILLIGRAM(S): at 07:02

## 2024-05-15 RX ADMIN — Medication 325 MILLIGRAM(S): at 11:43

## 2024-05-15 RX ADMIN — Medication 75 MILLIGRAM(S): at 07:01

## 2024-05-15 RX ADMIN — CHLORHEXIDINE GLUCONATE 1 APPLICATION(S): 213 SOLUTION TOPICAL at 11:45

## 2024-05-15 RX ADMIN — Medication 100 MILLIGRAM(S): at 11:43

## 2024-05-15 RX ADMIN — BUDESONIDE AND FORMOTEROL FUMARATE DIHYDRATE 2 PUFF(S): 160; 4.5 AEROSOL RESPIRATORY (INHALATION) at 08:21

## 2024-05-15 RX ADMIN — MONTELUKAST 10 MILLIGRAM(S): 4 TABLET, CHEWABLE ORAL at 11:43

## 2024-05-15 RX ADMIN — Medication 50 MICROGRAM(S): at 05:05

## 2024-05-15 RX ADMIN — ISOSORBIDE DINITRATE 30 MILLIGRAM(S): 5 TABLET ORAL at 11:44

## 2024-05-15 RX ADMIN — Medication 2: at 08:20

## 2024-05-15 RX ADMIN — Medication 100 MILLIGRAM(S): at 11:44

## 2024-05-15 RX ADMIN — APIXABAN 5 MILLIGRAM(S): 2.5 TABLET, FILM COATED ORAL at 05:05

## 2024-05-15 RX ADMIN — Medication 75 MILLIGRAM(S): at 11:43

## 2024-05-15 NOTE — PROGRESS NOTE ADULT - SUBJECTIVE AND OBJECTIVE BOX
*******************************  Brooklyn Espino MD (PGY-1)  Internal Medicine  Contact via [employer] TEAMS  *******************************    INGE CARLOS  68y  Male    Patient is a 68y old  Male who presents with a chief complaint of acute on chronic heart failure exacerbation (14 May 2024 14:00)      Subjective:    Objective:  T(C): 36.6 (05-15-24 @ 05:00), Max: 37.1 (05-14-24 @ 17:20)  HR: 75 (05-15-24 @ 05:00) (75 - 81)  BP: 100/58 (05-15-24 @ 05:00) (100/58 - 110/59)  RR: 18 (05-15-24 @ 05:00) (17 - 18)  SpO2: 100% (05-15-24 @ 05:00) (98% - 100%)  I&O's Summary    14 May 2024 07:01  -  15 May 2024 07:00  --------------------------------------------------------  IN: 1000 mL / OUT: 1000 mL / NET: 0 mL        PHYSICAL EXAM:  GENERAL: NAD  HEAD:  Atraumatic, Normocephalic  EYES: EOMI, PERRLA, conjunctiva and sclera clear  ENMT: Moist mucous membranes  NECK: Supple, No JVD, trachea midline   NERVOUS SYSTEM:  Alert & Oriented X3, Good concentration; Motor Strength 5/5 B/L upper and lower extremities; DTRs 2+ intact and symmetric  CHEST/LUNG: Clear to auscultation bilaterally; No rales, rhonchi, wheezing, or rubs  HEART: Regular rate and rhythm; No murmurs, rubs, or gallops  ABDOMEN: Soft, Nontender, Nondistended; Bowel sounds present  EXTREMITIES:  2+ Peripheral Pulses, No clubbing, cyanosis, or edema  LYMPH: No lymphadenopathy noted  SKIN: No rashes or lesions    MEDICATIONS  (STANDING):  allopurinol 100 milliGRAM(s) Oral daily  apixaban 5 milliGRAM(s) Oral two times a day  atorvastatin 80 milliGRAM(s) Oral at bedtime  budesonide 160 MICROgram(s)/formoterol 4.5 MICROgram(s) Inhaler 2 Puff(s) Inhalation two times a day  chlorhexidine 2% Cloths 1 Application(s) Topical daily  clopidogrel Tablet 75 milliGRAM(s) Oral daily  dextrose 10% Bolus 125 milliLiter(s) IV Bolus once  dextrose 5%. 1000 milliLiter(s) (50 mL/Hr) IV Continuous <Continuous>  dextrose 5%. 1000 milliLiter(s) (100 mL/Hr) IV Continuous <Continuous>  dextrose 50% Injectable 25 Gram(s) IV Push once  dextrose 50% Injectable 12.5 Gram(s) IV Push once  ferrous    sulfate 325 milliGRAM(s) Oral daily  gabapentin 100 milliGRAM(s) Oral at bedtime  glucagon  Injectable 1 milliGRAM(s) IntraMuscular once  hydrALAZINE 75 milliGRAM(s) Oral three times a day  influenza  Vaccine (HIGH DOSE) 0.7 milliLiter(s) IntraMuscular once  insulin lispro (ADMELOG) corrective regimen sliding scale   SubCutaneous three times a day before meals  insulin lispro (ADMELOG) corrective regimen sliding scale   SubCutaneous at bedtime  isosorbide   dinitrate Tablet (ISORDIL) 30 milliGRAM(s) Oral three times a day  levothyroxine 50 MICROGram(s) Oral daily  metoprolol succinate ER 75 milliGRAM(s) Oral daily  montelukast 10 milliGRAM(s) Oral daily  senna 2 Tablet(s) Oral at bedtime  tamsulosin 0.4 milliGRAM(s) Oral at bedtime  torsemide 100 milliGRAM(s) Oral every 12 hours    MEDICATIONS  (PRN):  acetaminophen     Tablet .. 650 milliGRAM(s) Oral every 6 hours PRN Temp greater or equal to 38C (100.4F), Mild Pain (1 - 3)  albuterol/ipratropium for Nebulization 3 milliLiter(s) Nebulizer every 6 hours PRN Wheezing  dextrose Oral Gel 15 Gram(s) Oral once PRN Blood Glucose LESS THAN 70 milliGRAM(s)/deciliter  polyethylene glycol 3350 17 Gram(s) Oral daily PRN Constipation      LABS:        CAPILLARY BLOOD GLUCOSE      POCT Blood Glucose.: 163 mg/dL (14 May 2024 22:21)  POCT Blood Glucose.: 82 mg/dL (14 May 2024 17:33)  POCT Blood Glucose.: 205 mg/dL (14 May 2024 12:21)  POCT Blood Glucose.: 108 mg/dL (14 May 2024 08:10)      RADIOLOGY & ADDITIONAL TESTS:               *******************************  Brooklyn Espino MD (PGY-1)  Internal Medicine  Contact via [employer] TEAMS  *******************************    INGE CARLOS  68y  Male    Patient is a 68y old  Male who presents with a chief complaint of acute on chronic heart failure exacerbation (14 May 2024 14:00)    Subjective: No acute events overnight. Seen at bedside this morning. Denied any dizziness, lightheadedness, chest pain, sob, ab pain, n/v. Reported good urine output.      Objective:  T(C): 36.6 (05-15-24 @ 05:00), Max: 37.1 (05-14-24 @ 17:20)  HR: 75 (05-15-24 @ 05:00) (75 - 81)  BP: 100/58 (05-15-24 @ 05:00) (100/58 - 110/59)  RR: 18 (05-15-24 @ 05:00) (17 - 18)  SpO2: 100% (05-15-24 @ 05:00) (98% - 100%)    tele: frequent 3-4 beats NSVT     I&O's Summary    14 May 2024 07:01  -  15 May 2024 07:00  --------------------------------------------------------  IN: 1000 mL / OUT: 1000 mL / NET: 0 mL    PHYSICAL EXAM:  GENERAL: NAD  HEAD:  Atraumatic, Normocephalic  EYES: EOMI, PERRLA, conjunctiva and sclera clear  ENMT: Moist mucous membranes  NECK: Supple, No JVD, trachea midline   NERVOUS SYSTEM:  Alert & Oriented X3, Good concentration  CHEST/LUNG: Clear to auscultation bilaterally; No rales, rhonchi, wheezing, or rubs  HEART: Regular rate and rhythm; No murmurs, rubs, or gallops  ABDOMEN: Soft, Nontender, Nondistended; Bowel sounds present  EXTREMITIES:  2+ Peripheral Pulses, No clubbing, cyanosis, or edema  SKIN: No rashes or lesions    MEDICATIONS  (STANDING):  allopurinol 100 milliGRAM(s) Oral daily  apixaban 5 milliGRAM(s) Oral two times a day  atorvastatin 80 milliGRAM(s) Oral at bedtime  budesonide 160 MICROgram(s)/formoterol 4.5 MICROgram(s) Inhaler 2 Puff(s) Inhalation two times a day  chlorhexidine 2% Cloths 1 Application(s) Topical daily  clopidogrel Tablet 75 milliGRAM(s) Oral daily  dextrose 10% Bolus 125 milliLiter(s) IV Bolus once  dextrose 5%. 1000 milliLiter(s) (50 mL/Hr) IV Continuous <Continuous>  dextrose 5%. 1000 milliLiter(s) (100 mL/Hr) IV Continuous <Continuous>  dextrose 50% Injectable 25 Gram(s) IV Push once  dextrose 50% Injectable 12.5 Gram(s) IV Push once  ferrous    sulfate 325 milliGRAM(s) Oral daily  gabapentin 100 milliGRAM(s) Oral at bedtime  glucagon  Injectable 1 milliGRAM(s) IntraMuscular once  hydrALAZINE 75 milliGRAM(s) Oral three times a day  influenza  Vaccine (HIGH DOSE) 0.7 milliLiter(s) IntraMuscular once  insulin lispro (ADMELOG) corrective regimen sliding scale   SubCutaneous three times a day before meals  insulin lispro (ADMELOG) corrective regimen sliding scale   SubCutaneous at bedtime  isosorbide   dinitrate Tablet (ISORDIL) 30 milliGRAM(s) Oral three times a day  levothyroxine 50 MICROGram(s) Oral daily  metoprolol succinate ER 75 milliGRAM(s) Oral daily  montelukast 10 milliGRAM(s) Oral daily  senna 2 Tablet(s) Oral at bedtime  tamsulosin 0.4 milliGRAM(s) Oral at bedtime  torsemide 100 milliGRAM(s) Oral every 12 hours    MEDICATIONS  (PRN):  acetaminophen     Tablet .. 650 milliGRAM(s) Oral every 6 hours PRN Temp greater or equal to 38C (100.4F), Mild Pain (1 - 3)  albuterol/ipratropium for Nebulization 3 milliLiter(s) Nebulizer every 6 hours PRN Wheezing  dextrose Oral Gel 15 Gram(s) Oral once PRN Blood Glucose LESS THAN 70 milliGRAM(s)/deciliter  polyethylene glycol 3350 17 Gram(s) Oral daily PRN Constipation    LABS:                   14.5   10.78 )-----------( 162      ( 14 May 2024 06:20 )             43.3     05-15    135  |  97<L>  |  82<H>  ----------------------------<  92  4.2   |  20<L>  |  2.42<H>    Ca    8.6      15 May 2024 05:00  Phos  5.1     05-15  Mg     2.00     05-15    TPro  5.8<L>  /  Alb  3.5  /  TBili  1.0  /  DBili  x   /  AST  25  /  ALT  30  /  AlkPhos  150<H>  05-14    Culture Results:   No growth at 5 days (05-01 @ 16:52)  Culture Results:   No growth at 5 days (05-01 @ 16:37)    CAPILLARY BLOOD GLUCOSE  POCT Blood Glucose.: 163 mg/dL (15 May 2024 08:19)  POCT Blood Glucose.: 163 mg/dL (14 May 2024 22:21)  POCT Blood Glucose.: 82 mg/dL (14 May 2024 17:33)  POCT Blood Glucose.: 205 mg/dL (14 May 2024 12:21)  POCT Blood Glucose.: 108 mg/dL (14 May 2024 08:10)    RADIOLOGY & ADDITIONAL TESTS:

## 2024-05-15 NOTE — PROGRESS NOTE ADULT - PROBLEM SELECTOR PROBLEM 9
BPH (benign prostatic hyperplasia)

## 2024-05-15 NOTE — PROGRESS NOTE ADULT - PROBLEM SELECTOR PROBLEM 1
Acute on chronic systolic congestive heart failure
Acute on chronic systolic congestive heart failure
CKD (chronic kidney disease), stage IV
CKD (chronic kidney disease), stage IV
Acute on chronic systolic congestive heart failure
CKD (chronic kidney disease), stage IV
CKD (chronic kidney disease), stage IV
Acute on chronic systolic congestive heart failure
Acute on chronic systolic congestive heart failure
CKD (chronic kidney disease), stage IV
Acute on chronic systolic congestive heart failure
CKD (chronic kidney disease), stage IV
Acute on chronic systolic congestive heart failure

## 2024-05-15 NOTE — PROGRESS NOTE ADULT - PROBLEM SELECTOR PLAN 1
Patient likely has CKD 2/2 longstanding heart failure, HTN, and T2DM. Upon review of [facility], baseline SCr appears to be ~2.2-2.6.  Pt was on IV intermittent Bumex, which was then escalated to Bumex gtt 4mg/hr initiated on 5/8. Responded well and now on Torsemide 100mg BID. Cr stable and volume status stable today at 2.42 today. Electrolytes wnl. CHF meds per Heart Failure team. BP improved. Currently, no indication for dialysis. Continue strict Is+Os. Please avoid nephrotoxins when possible and dose all meds per eGFR.  Patient will need follow up with nephrology upon discharge.

## 2024-05-15 NOTE — PROGRESS NOTE ADULT - REASON FOR ADMISSION
acute on chronic heart failure exacerbation

## 2024-05-15 NOTE — PROGRESS NOTE ADULT - PROBLEM SELECTOR PROBLEM 7
Hypothyroidism

## 2024-05-15 NOTE — PROGRESS NOTE ADULT - PROBLEM/PLAN-2
Nutrition Assessment   Assessment Type: Initial assessment  Reason for Visit: Consult  Referral Requested By: Physician/Staff  Chart Medications Lab Results Reviewed:  yes     Nutritional Risk Factors: Vent support    Current Diet Order: NPO  Diet Tolerance: n/a  Food Allergies: no  Priority Points: Status 3    Demographic/Anthropometrics Information  Gender: male   Patient Age: 70 year old  Height:    Ht Readings from Last 1 Encounters:   03/21/20 5' 11\" (1.803 m)      Weight:   Wt Readings from Last 1 Encounters:   03/20/20 64.7 kg      BMI:   BMI Readings from Last 1 Encounters:   03/21/20 19.89 kg/m²     Weight Classification: Normal weight (BMI 18.5-24.9)    Estimated Nutritional Needs  Assessment Weight: 78  Kg (IBW)  Energy Needs: 25-30 kcal/kg   8571-8445 kcal/day  Protein Needs: 1.2-1.5 g/kg   grams/day  Fluid Needs: Per MD in ICu setting    Nutrition Diagnosis (PES)  Inadequate oral intake related to Inability to tolerate as evidenced by Need for NPO status    Nutrition Plan  Recommended Nutrition Intervention: enteral nutrition  Monitor: Biochemical data, medical tests, procedures and Enteral nutrition    Discharge Needs: Pending  Care Plan Discussed With: Other: n/a  Goals: Transition to enteral nutrition  Goal Progress: Initiated  Timeframe to Achieve Goal: 1-3 days    Dietitian Notes/Impressions/Recommendations:  NFPE deferred in ICU setting    SAH- cerebral aneurysm, metabolic encephalopathy  Hx CVA, CAD, ESRD on HD, lung transplant  Ventilated    Noted phos depleted, K WNL, creat elevated.      TREATMENT PLAN: Monitoring & Interventions  1. Suggest Osmolite 1.2 goal of 70 ml/hr.(1.2 calorie formula-no fiber)  Provides 2016 kcal and 93g Protein and 1377 ml free H20.    2. If K and/or phos elevated then suggest Nepro goal of 50 ml/hr.(renal formula) Provides 2160 kcal and 97g Protein and 864 ml free H20    3. Replete Phos prior to starting feeding.    4. Monitor labs/tolerance and adjust TF as 
DISPLAY PLAN FREE TEXT
DISPLAY PLAN FREE TEXT
needed    5. Advance TF gradually as tolerated to goal    Follow  Radha Matson RD, LDN      
DISPLAY PLAN FREE TEXT

## 2024-05-15 NOTE — PROGRESS NOTE ADULT - PROBLEM SELECTOR PLAN 10
-c/w Crestor 20mg therapeutic interchange w/ atorvastatin

## 2024-05-15 NOTE — PROGRESS NOTE ADULT - PROBLEM SELECTOR PLAN 1
ACC Stage C/NYHA Class II. Repeat TTE on 5/10 showing EF 15-20%, no significant changes from 3/2024. Possible etiologies for current decompensation include: recurrence of afib despite ablation vs COPD exacerbation.   Home GDMT: coreg 6.25mg BID, hydralazine 100mg TID, isordil 30mg TID. Trops flat and no CP, EKG without STEMI equivalent -> not ACS. Device interrogation showed 86 second episode AF, BiVPaced 92%    - given NSVT, f/u with HF on if any changes to GDMT ideal   -c/w GDMT:  - hydral 100 mg q8h -> consider decrease to hydral 75mg TID  - isordil 30 mg q8h  - toprol 50 -> consider increase to toprol 75mg   - s/p 1mg/hour bumex gtt through Sunday 5/12, now on torsemide 100mg BID (5/13- ) per HF  - discontinued entresto 24/26 BID  - strict Is/Os, monitor UOP, daily weights  - Maintain K>4, Mg>2.  - Planned for HTS, but unable to administer given hospital floor protocol  - COPD management as below ACC Stage C/NYHA Class II. Repeat TTE on 5/10 showing EF 15-20%, no significant changes from 3/2024. Possible etiologies for current decompensation include: recurrence of afib despite ablation vs COPD exacerbation.   Home GDMT: coreg 6.25mg BID, hydralazine 100mg TID, isordil 30mg TID. Trops flat and no CP, EKG without STEMI equivalent -> not ACS. Device interrogation showed 86 second episode AF, BiVPaced 92%    -c/w GDMT:  - hydral 100 mg q8h -> decreased to hydral 75mg TID on 5/14  - isordil 30 mg q8h  - toprol 50 -> increased to toprol 75mg QD on 5/14   - s/p 1mg/hour bumex gtt through Sunday 5/12, now on torsemide 100mg BID (5/13- ) per HF  - discontinued entresto 24/26 BID  - strict Is/Os, monitor UOP, daily weights  - Maintain K>4, Mg>2.  - Planned for HTS, but unable to administer given hospital floor protocol  - COPD management as below

## 2024-05-15 NOTE — PROGRESS NOTE ADULT - PROBLEM SELECTOR PLAN 11
DVT PPx: full AC w/ eliquis  Diet: DASH/CC fluid restriction  Code status: Full code  Communication: Ketty (daughter)   Dispo: likely home   PCP: Dr. Robin Schmidt  Pharmacy: [phone] [facility] DVT PPx: full AC w/ eliquis  Diet: DASH/CC fluid restriction  Code status: Full code  Communication: Ketty (daughter)   Dispo: dc home today   PCP: Dr. Robin Schmidt  Pharmacy: [phone] [facility]

## 2024-05-15 NOTE — PROGRESS NOTE ADULT - SUBJECTIVE AND OBJECTIVE BOX
[facility] Division of Kidney Diseases & Hypertension  FOLLOW UP NOTE  --------------------------------------------------------------------------------  Chief Complaint: Heart failure    24 hour events/subjective: Patient seen and evaluated this morning. Reports feeling ok.  No chest pain no shortness of breath.  Lying flat without complaints.    PAST HISTORY  --------------------------------------------------------------------------------  No significant changes to PMH, PSH, FHx, SHx, unless otherwise noted    ALLERGIES & MEDICATIONS  --------------------------------------------------------------------------------  Allergies    No Known Allergies    Intolerances      Standing Inpatient Medications  allopurinol 100 milliGRAM(s) Oral daily  apixaban 5 milliGRAM(s) Oral two times a day  atorvastatin 80 milliGRAM(s) Oral at bedtime  budesonide 160 MICROgram(s)/formoterol 4.5 MICROgram(s) Inhaler 2 Puff(s) Inhalation two times a day  chlorhexidine 2% Cloths 1 Application(s) Topical daily  clopidogrel Tablet 75 milliGRAM(s) Oral daily  dextrose 10% Bolus 125 milliLiter(s) IV Bolus once  dextrose 5%. 1000 milliLiter(s) IV Continuous <Continuous>  dextrose 5%. 1000 milliLiter(s) IV Continuous <Continuous>  dextrose 50% Injectable 12.5 Gram(s) IV Push once  dextrose 50% Injectable 25 Gram(s) IV Push once  ferrous    sulfate 325 milliGRAM(s) Oral daily  gabapentin 100 milliGRAM(s) Oral at bedtime  glucagon  Injectable 1 milliGRAM(s) IntraMuscular once  hydrALAZINE 75 milliGRAM(s) Oral three times a day  influenza  Vaccine (HIGH DOSE) 0.7 milliLiter(s) IntraMuscular once  insulin lispro (ADMELOG) corrective regimen sliding scale   SubCutaneous three times a day before meals  insulin lispro (ADMELOG) corrective regimen sliding scale   SubCutaneous at bedtime  isosorbide   dinitrate Tablet (ISORDIL) 30 milliGRAM(s) Oral three times a day  levothyroxine 50 MICROGram(s) Oral daily  metoprolol succinate ER 75 milliGRAM(s) Oral daily  montelukast 10 milliGRAM(s) Oral daily  senna 2 Tablet(s) Oral at bedtime  tamsulosin 0.4 milliGRAM(s) Oral at bedtime  torsemide 100 milliGRAM(s) Oral every 12 hours    PRN Inpatient Medications  acetaminophen     Tablet .. 650 milliGRAM(s) Oral every 6 hours PRN  albuterol/ipratropium for Nebulization 3 milliLiter(s) Nebulizer every 6 hours PRN  dextrose Oral Gel 15 Gram(s) Oral once PRN  polyethylene glycol 3350 17 Gram(s) Oral daily PRN      REVIEW OF SYSTEMS  --------------------------------------------------------------------------------  Gen: NAD  Respiratory: no dyspnea  CV: no cp  GI: no ab pain  : no complaints  MSK: no pain    VITALS/PHYSICAL EXAM  --------------------------------------------------------------------------------  T(C): 36.6 (05-15-24 @ 05:00), Max: 37.1 (05-14-24 @ 17:20)  HR: 75 (05-15-24 @ 05:00) (75 - 81)  BP: 100/58 (05-15-24 @ 05:00) (100/58 - 110/59)  ABP: --  ABP(mean): --  RR: 18 (05-15-24 @ 05:00) (17 - 18)  SpO2: 100% (05-15-24 @ 05:00) (98% - 100%)  CVP(mm Hg): --        05-14-24 @ 07:01  -  05-15-24 @ 07:00  --------------------------------------------------------  IN: 1000 mL / OUT: 1000 mL / NET: 0 mL    Physical Exam:  	Gen NAD. O2 NC   	HEENT: no JVD  	Pulm: dec BS at base   	CV: S1S2,  	Abd: Soft,   	Ext:   - edema B/L LE   	Neuro: Awake and alert  	Skin: Warm and dry       LABS/STUDIES  --------------------------------------------------------------------------------              14.5   10.78 >-----------<  162      [05-14-24 @ 06:20]              43.3     135  |  97  |  82  ----------------------------<  92      [05-15-24 @ 05:00]  4.2   |  20  |  2.42        Ca     8.6     [05-15-24 @ 05:00]      Mg     2.00     [05-15-24 @ 05:00]      Phos  5.1     [05-15-24 @ 05:00]    TPro  5.8  /  Alb  3.5  /  TBili  1.0  /  DBili  x   /  AST  25  /  ALT  30  /  AlkPhos  150  [05-14-24 @ 06:20]          Creatinine Trend:  SCr 2.42 [05-15 @ 05:00]  SCr 2.52 [05-14 @ 20:37]  SCr 2.40 [05-14 @ 06:20]  SCr 2.41 [05-13 @ 18:59]  SCr 2.34 [05-13 @ 04:36]    Urinalysis - [05-15-24 @ 05:00]      Color  / Appearance  / SG  / pH       Gluc 92 / Ketone   / Bili  / Urobili        Blood  / Protein  / Leuk Est  / Nitrite       RBC  / WBC  / Hyaline  / Gran  / Sq Epi  / Non Sq Epi  / Bacteria

## 2024-05-15 NOTE — PROGRESS NOTE ADULT - PROBLEM SELECTOR PLAN 2
Baseline SCr 2.4-2.6. Stable     - hold nephrotoxins   - monitor UOP, electrolytes  - daily Cr  - nephrology c/s given brittle HF and renal dysfunction -> no plan for HD at this time

## 2024-05-15 NOTE — PROGRESS NOTE ADULT - PROVIDER SPECIALTY LIST ADULT
Internal Medicine
Heart Failure
Nephrology
Heart Failure
Nephrology
Heart Failure
Internal Medicine
Heart Failure
Internal Medicine

## 2024-05-15 NOTE — PROGRESS NOTE ADULT - PROBLEM SELECTOR PLAN 7
-c/w home synthroid 50mcg daily

## 2024-05-15 NOTE — PROGRESS NOTE ADULT - PROBLEM SELECTOR PROBLEM 10
Hyperlipidemia, unspecified hyperlipidemia type

## 2024-05-17 ENCOUNTER — APPOINTMENT (OUTPATIENT)
Dept: PULMONOLOGY | Facility: CLINIC | Age: 69
End: 2024-05-17

## 2024-05-17 ENCOUNTER — NON-APPOINTMENT (OUTPATIENT)
Age: 69
End: 2024-05-17

## 2024-05-17 NOTE — CHART NOTE - NSCHARTNOTEFT_GEN_A_CORE
69 y/o Ivorian male with PMHX of HTN, HFrEF (TTE 7/18/23 LVEF 25%, LVIDD 5.6cm, LA 4.9cm, mild MR), CAD w/ CABG 2014 at [facility], CRT-D, asthma/COPD on 2 L O2 at home, CVA with left sided weakness seen in [facility] HF clinic and was sent to ED for ADHF. As d/w Dr. Ching, start Bumex 4 mg IVPB x 1 over 30 minutes, followed by Bumex gtt at 2 mg/hr. Official consult to follow tomorrow.
Patient was outreached but did not answer. A voicemail was left for the patient to return our call.
Patient was outreached but did not answer. A voicemail was left for the patient to return our call.

## 2024-05-21 ENCOUNTER — APPOINTMENT (OUTPATIENT)
Dept: HEART FAILURE | Facility: CLINIC | Age: 69
End: 2024-05-21

## 2024-05-23 ENCOUNTER — NON-APPOINTMENT (OUTPATIENT)
Age: 69
End: 2024-05-23

## 2024-05-23 ENCOUNTER — APPOINTMENT (OUTPATIENT)
Dept: HEART FAILURE | Facility: CLINIC | Age: 69
End: 2024-05-23
Payer: MEDICARE

## 2024-05-23 VITALS
OXYGEN SATURATION: 85 % | BODY MASS INDEX: 30.63 KG/M2 | HEIGHT: 60 IN | WEIGHT: 156 LBS | SYSTOLIC BLOOD PRESSURE: 98 MMHG | DIASTOLIC BLOOD PRESSURE: 62 MMHG | HEART RATE: 91 BPM

## 2024-05-23 DIAGNOSIS — I50.22 CHRONIC SYSTOLIC (CONGESTIVE) HEART FAILURE: ICD-10-CM

## 2024-05-23 PROCEDURE — 99214 OFFICE O/P EST MOD 30 MIN: CPT

## 2024-05-23 PROCEDURE — 93000 ELECTROCARDIOGRAM COMPLETE: CPT

## 2024-05-23 RX ORDER — BUMETANIDE 2 MG/1
2 TABLET ORAL
Qty: 270 | Refills: 2 | Status: DISCONTINUED | COMMUNITY
Start: 2021-10-27 | End: 2024-05-23

## 2024-05-23 NOTE — HISTORY OF PRESENT ILLNESS
[FreeTextEntry1] : Joey Lewis is a 68-year-old Telugu male with PMHX of HTN, HFrEF (EF = 14% - 10/17/2022->25%), CAD w/ CABG 2014 at [facility], CRT-D, CVA with left sided weakness (1993), HLD, CKD (baseline SCr 2.3-2.9), COPD (home O2 2L NC), former smoker, recurrent PNA, s/p Medtr[employer] ICD with decline to 14%, now S/P CRT-D upgrade 12/9/22. PCP Dr. Shcmidt ([phone]).  Here for f/u. Feels well. Taking new meds since recent hosp for ADHF. No orthopnea or PND. Now on torsemide 100 bid.

## 2024-05-23 NOTE — ASSESSMENT
[FreeTextEntry1] : ACC/AHA Stage C, NYHA Class II-III. Recent hosp for ADHF. Now appears compensated and normotensive.

## 2024-05-23 NOTE — CARDIOLOGY SUMMARY
[de-identified] : 2/22/24 Atrial sensed with biventricular paced rhythm, rate 95 bpm with PVC 9/7/23  Atrial sensed with biventricular paced rhythm, rate 84 bpm 8/3/23 Atrial sensed with biventricular paced rhythm 3/22/23 Atrial sensed with biventricular paced rhythm rate 90 bpm  6/9/22 NSR 87, IVCD, LAFB,, TWI 1, AVL, NSST with PVC [de-identified] :  7/2023 pharmacologic stress for chest pain which was consistent with infarct inferolateral and apical walls w/ mild kadie-infarct ischemia and infarct in anterior and anterolateral walls. [de-identified] : 7/2023 TTE w/ severe global left ventricular systolic dysfunction (LVEF 25%)  10/17/22 TTE LVEF 14% 5/22/22 TTE: LVEF 15-20%, LVIDD 5.5 cm, mild MR, severe LV systolic dysfunction, mod LAE, mod CHRISTINA, mild TR [de-identified] : 5/26/22  RHC: RA-9 PA 54/27 PCWP 24 CO/CI 3.97/2.22 SVR 1510\par   [de-identified] : 5/21/22 CXR showed hazy right lower lung opacity which may reflect layering effusion,\par  atelectasis, or pneumonia

## 2024-05-23 NOTE — PHYSICAL EXAM
[No Acute Distress] : no acute distress [Lethargic] : lethargic [Normal Venous Pressure] : normal venous pressure [No Carotid Bruit] : no carotid bruit [Normal S1, S2] : normal S1, S2 [No Gallop] : no gallop [No Respiratory Distress] : no respiratory distress  [Soft] : abdomen soft [Non Tender] : non-tender [No Edema] : no edema [de-identified] : Left chest CRT-D; frequent ectopy

## 2024-05-23 NOTE — DISCUSSION/SUMMARY
[Patient] : the patient [___ Month(s)] : in [unfilled] month(s) [FreeTextEntry1] : Continue current meds. F/u for vascular studies. Will f/u with pulm and nephrologist. RTO in 4 months. [EKG obtained to assist in diagnosis and management of assessed problem(s)] : EKG obtained to assist in diagnosis and management of assessed problem(s)

## 2024-05-28 NOTE — ED ADULT TRIAGE NOTE - BP NONINVASIVE SYSTOLIC (MM HG)
121 Patient has no formal PPH; no hx of outpt therapy or use of psych med mgt; receives weekly in school counseling w/ school SW; no hx of inpt psych admissions; no hx of aggression; no hx of self harm or suicide attempt; no legal hx; no hx of trauma or abuse Patient has no formal PPH; no hx of outpt therapy or use of psych med mgt; receives weekly in school counseling w/ school SW; no hx of inpt psych admissions; no hx of aggression; no hx of self harm or suicide attempt

## 2024-05-31 RX ORDER — TORSEMIDE 20 MG/1
20 TABLET ORAL
Qty: 30 | Refills: 3 | Status: ACTIVE | COMMUNITY
Start: 2024-05-31 | End: 1900-01-01

## 2024-06-06 ENCOUNTER — APPOINTMENT (OUTPATIENT)
Dept: NEPHROLOGY | Facility: CLINIC | Age: 69
End: 2024-06-06

## 2024-06-14 ENCOUNTER — NON-APPOINTMENT (OUTPATIENT)
Age: 69
End: 2024-06-14

## 2024-06-19 RX ORDER — CARVEDILOL 6.25 MG/1
6.25 TABLET, FILM COATED ORAL
Qty: 60 | Refills: 5 | Status: DISCONTINUED | COMMUNITY
Start: 2021-10-27 | End: 2024-06-19

## 2024-06-19 RX ORDER — TORSEMIDE 100 MG/1
100 TABLET ORAL DAILY
Qty: 60 | Refills: 3 | Status: DISCONTINUED | COMMUNITY
Start: 2024-05-23 | End: 2024-06-19

## 2024-06-19 RX ORDER — METOPROLOL SUCCINATE 25 MG/1
25 TABLET, EXTENDED RELEASE ORAL DAILY
Qty: 270 | Refills: 0 | Status: ACTIVE | COMMUNITY
Start: 2024-06-19 | End: 1900-01-01

## 2024-06-19 RX ORDER — TORSEMIDE 100 MG/1
100 TABLET ORAL
Qty: 180 | Refills: 0 | Status: ACTIVE | COMMUNITY
Start: 2024-06-19 | End: 1900-01-01

## 2024-07-01 NOTE — H&P ADULT - MLM HIDDEN
/87 (BP Location: Left arm)   Pulse 99   Temp 97.7  F (36.5  C) (Oral)   Resp 16   Wt 97.7 kg (215 lb 6.4 oz)   SpO2 99%   BMI 27.66 kg/m      [id number]  Tachy and hypertensive, OVSS on RA. A+Ox4. On insulin gtt, currently on algorithm 4. Incisional pain treated with prn oxy x1. Regular diet with carb coverage. 1 DEE drain in place and 1 DEE drain removed today. PIV TKO w/insulin gtt. Scheduled suppository given, 2 Bms this shift. Voiding, saving in hat in toilet. Clamshell incision stapled. Up with 1 using GB and walker. Med card and lab book up to date. Transplant business card given. Pt completed transplant medication education with pharmacist today. Endocrine following. Pt up in chair x2 and ambulated in hallsx2/worked with PT today. Will continue to monitor and notify team with changes.    yes

## 2024-07-02 ENCOUNTER — NON-APPOINTMENT (OUTPATIENT)
Age: 69
End: 2024-07-02

## 2024-07-02 NOTE — PROGRESS NOTE ADULT - PROBLEM SELECTOR PLAN 5
"Influenza, Adult  Influenza (“the flu\") is an infection in the lungs, nose, and throat (respiratory tract). It is caused by a virus. The flu causes many common cold symptoms, as well as a high fever and body aches. It can make you feel very sick.  The flu spreads easily from person to person (is contagious). Getting a flu shot (influenza vaccination) every year is the best way to prevent the flu.  Follow these instructions at home:  · Take over-the-counter and prescription medicines only as told by your doctor.  · Use a cool mist humidifier to add moisture (humidity) to the air in your home. This can make it easier to breathe.  · Rest as needed.  · Drink enough fluid to keep your pee (urine) clear or pale yellow.  · Cover your mouth and nose when you cough or sneeze.  · Wash your hands with soap and water often, especially after you cough or sneeze. If you cannot use soap and water, use hand .  · Stay home from work or school as told by your doctor. Unless you are visiting your doctor, try to avoid leaving home until your fever has been gone for 24 hours without the use of medicine.  · Keep all follow-up visits as told by your doctor. This is important.  How is this prevented?  · Getting a yearly (annual) flu shot is the best way to avoid getting the flu. You may get the flu shot in late summer, fall, or winter. Ask your doctor when you should get your flu shot.  · Wash your hands often or use hand  often.  · Avoid contact with people who are sick during cold and flu season.  · Eat healthy foods.  · Drink plenty of fluids.  · Get enough sleep.  · Exercise regularly.  Contact a doctor if:  · You get new symptoms.  · You have:  ¨ Chest pain.  ¨ Watery poop (diarrhea).  ¨ A fever.  · Your cough gets worse.  · You start to have more mucus.  · You feel sick to your stomach (nauseous).  · You throw up (vomit).  Get help right away if:  · You start to be short of breath or have trouble breathing.  · Your " Anesthesia Pre-Procedure Evaluation    Patient: Arvin Britt   MRN: [id number] : 1949        Procedure : Procedure(s):  COMBINED MYRINGOTOMY, INSERT TUBE  INJECTION, STEROID, right ear          Past Medical History:   Diagnosis Date     Coronary artery disease      Dyslipidemia      Gastro-oesophageal reflux disease      Hearing loss of right ear      Hypertension      Meniere's disease      Obesity      Sleep apnea      Stented coronary artery     STENT  & 10/06      Past Surgical History:   Procedure Laterality Date     CARDIAC STENTS[      2006     CHOLECYSTECTOMY       ENDOSCOPY       ENHANCE ENDOLYMPHATIC SAC, DEC SIGMOID SINUS, EXTND FACIAL RECESS APPRCH, MASTOIDECTOMY, BMT, COMBO  2012    Procedure: COMBINED ENHANCE ENDOLYMPHATIC SAC, DECOMPRESS SIGMOID SINUS, EXTENDED FACIAL RECESS APPROACH, COMPLETE MASTOIDECTOMY, MYRINGOTOMY, INSERT TUBE;  Right Endolympatic Sac Enchancement, Sigmoid Sinus Decompression, Extended Facial Recess Approach, Complete Mastoidectomy, Myringotomy &Tubes, Gentamycin Removal of Dermis;  Surgeon: Ricky Mueller MD;  Location: [facility] OR     ENHANCE ENDOLYMPHATIC SAC, DEC SIGMOID SINUS, EXTND FACIAL RECESS APPRCH, MASTOIDECTOMY, BMT, COMBO Right 2018    Procedure: Right Endolymphatic Sac Enhancement, Sigmoid Sinus Decompression, Extended Facial Recess Approach, Complete Mastoidectomy, Removal Of Scar Tissue;  Surgeon: Ricky Mueller MD;  Location: [facility] OR     ENT SURGERY       SURGICAL PROCEDURE FOR MENIERE'S [       AND      [facility] ORAL SURGERY PROCEDURE        Allergies   Allergen Reactions     Adhesive Tape      REDNESS     Amoxicillin      Other reaction(s): GI Upset, Stomach Upset  Augmentin--severe stomach upset  Augmentin--severe stomach upset  Augmentin--severe stomach upset  Augmentin--severe stomach upset       Felodipine Other (See Comments)     Flushing and hot flashes     Lortab [Hydrocodone-Acetaminophen]      AGITATION,  NIGHTMARES      "Oxycodone Other (See Comments)     Patient states he got wired from it       Social History     Tobacco Use     Smoking status: Never     Smokeless tobacco: Never   Substance Use Topics     Alcohol use: Yes     Comment: VERY RARELY      Wt Readings from Last 1 Encounters:   06/24/24 99.3 kg (218 lb 14.7 oz)        Anesthesia Evaluation   Pt has had prior anesthetic.     History of anesthetic complications   Sleep apnea.    ROS/MED HX  ENT/Pulmonary:     (+) sleep apnea,               tobacco use, Past use, 1 packs/day, 20  Pack-Year Hx,                      Neurologic: Comment: Meniere's disease - neg neurologic ROS     Cardiovascular:     (+) Dyslipidemia hypertension- -  CAD -  - stent-2006.                                      METS/Exercise Tolerance:     Hematologic:  - neg hematologic  ROS     Musculoskeletal:  - neg musculoskeletal ROS     GI/Hepatic:     (+) GERD, Asymptomatic on medication,                  Renal/Genitourinary:  - neg Renal ROS     Endo:     (+)               Obesity,       Psychiatric/Substance Use:  - neg psychiatric ROS     Infectious Disease:  - neg infectious disease ROS     Malignancy:  - neg malignancy ROS     Other:  - neg other ROS        Physical Exam    Airway  airway exam normal           Respiratory Devices and Support         Dental       (+) Completely normal teeth      Cardiovascular   cardiovascular exam normal          Pulmonary   pulmonary exam normal            OUTSIDE LABS:  CBC: No results found for: \"WBC\", \"HGB\", \"HCT\", \"PLT\"  BMP:   Lab Results   Component Value Date     06/14/2022    POTASSIUM 3.3 (L) 06/14/2022    POTASSIUM 3.1 (L) 12/31/2018    CHLORIDE 102 06/14/2022    CO2 29 06/14/2022    BUN 16 06/14/2022    CR 0.74 06/14/2022    CR 0.76 12/31/2018    GLC 89 06/14/2022    GLC 94 12/31/2018     COAGS: No results found for: \"PTT\", \"INR\", \"FIBR\"  POC: No results found for: \"BGM\", \"HCG\", \"HCGS\"  HEPATIC:   Lab Results   Component Value Date    ALBUMIN 3.7 " skin or nails turn a bluish color.  · You have very bad pain or stiffness in your neck.  · You get a sudden headache.  · You get sudden pain in your face or ear.  · You cannot stop throwing up.  This information is not intended to replace advice given to you by your health care provider. Make sure you discuss any questions you have with your health care provider.  Document Released: 09/26/2009 Document Revised: 05/25/2017 Document Reviewed: 10/11/2016  [employer] Interactive Patient Education © 2017 [employer]      Hypertension  Hypertension, commonly called high blood pressure, is when the force of blood pumping through your arteries is too strong. Your arteries are the blood vessels that carry blood from your heart throughout your body. A blood pressure reading consists of a higher number over a lower number, such as 110/72. The higher number (systolic) is the pressure inside your arteries when your heart pumps. The lower number (diastolic) is the pressure inside your arteries when your heart relaxes. Ideally you want your blood pressure below 120/80.  Hypertension forces your heart to work harder to pump blood. Your arteries may become narrow or stiff. Having untreated or uncontrolled hypertension can cause heart attack, stroke, kidney disease, and other problems.  What increases the risk?  Some risk factors for high blood pressure are controllable. Others are not.  Risk factors you cannot control include:  · Race. You may be at higher risk if you are .  · Age. Risk increases with age.  · Gender. Men are at higher risk than women before age 45 years. After age 65, women are at higher risk than men.  Risk factors you can control include:  · Not getting enough exercise or physical activity.  · Being overweight.  · Getting too much fat, sugar, calories, or salt in your diet.  · Drinking too much alcohol.  What are the signs or symptoms?  Hypertension does not usually cause signs or symptoms.  "06/14/2022    PROTTOTAL 6.7 (L) 06/14/2022    ALT 31 06/14/2022    AST 22 06/14/2022    ALKPHOS 97 06/14/2022    BILITOTAL 0.9 06/14/2022     OTHER:   Lab Results   Component Value Date    WILLIAM 8.6 06/14/2022       Anesthesia Plan    ASA Status:  3    NPO Status:  NPO Appropriate    Anesthesia Type: MAC.   Induction: Intravenous, Propofol.   Maintenance: TIVA.        Consents    Anesthesia Plan(s) and associated risks, benefits, and realistic alternatives discussed. Questions answered and patient/representative(s) expressed understanding.     - Discussed:     - Discussed with:  Patient      - Extended Intubation/Ventilatory Support Discussed: No.      - Patient is DNR/DNI Status: No     Use of blood products discussed: No .     Postoperative Care    Pain management: IV analgesics, Oral pain medications.   PONV prophylaxis: Ondansetron (or other 5HT-3), Background Propofol Infusion     Comments:             Eber Puente MD    I have reviewed the pertinent notes and labs in the chart from the past 30 days and (re)examined the patient.  Any updates or changes from those notes are reflected in this note.             # Drug Induced Platelet Defect: home medication list includes an antiplatelet medication  # Obesity: Estimated body mass index is 31.41 kg/m  as calculated from the following:    Height as of this encounter: 1.778 m (5' 10\").    Weight as of this encounter: 99.3 kg (218 lb 14.7 oz).      " 101-> 95; without chest pain or worsening dyspnea; EKG showing 2mm VALENTINE V2-V4 iso non-specific IVCD (no previous to compare); however, given pt without CP, trop are likely iso demand ischemia iso CAD; EKG findings likely old given pt without CP and downtrending troponin  - cardiology consult appreciated, resume asa 81mg  -BP soft, resume low dose hydralazine if BP improves  -check Echo Extremely high blood pressure (hypertensive crisis) may cause headache, anxiety, shortness of breath, and nosebleed.  How is this diagnosed?  To check if you have hypertension, your health care provider will measure your blood pressure while you are seated, with your arm held at the level of your heart. It should be measured at least twice using the same arm. Certain conditions can cause a difference in blood pressure between your right and left arms. A blood pressure reading that is higher than normal on one occasion does not mean that you need treatment. If it is not clear whether you have high blood pressure, you may be asked to return on a different day to have your blood pressure checked again. Or, you may be asked to monitor your blood pressure at home for 1 or more weeks.  How is this treated?  Treating high blood pressure includes making lifestyle changes and possibly taking medicine. Living a healthy lifestyle can help lower high blood pressure. You may need to change some of your habits.  Lifestyle changes may include:  · Following the DASH diet. This diet is high in fruits, vegetables, and whole grains. It is low in salt, red meat, and added sugars.  · Keep your sodium intake below 2,300 mg per day.  · Getting at least 30-45 minutes of aerobic exercise at least 4 times per week.  · Losing weight if necessary.  · Not smoking.  · Limiting alcoholic beverages.  · Learning ways to reduce stress.  Your health care provider may prescribe medicine if lifestyle changes are not enough to get your blood pressure under control, and if one of the following is true:  · You are 18-59 years of age and your systolic blood pressure is above 140.  · You are 60 years of age or older, and your systolic blood pressure is above 150.  · Your diastolic blood pressure is above 90.  · You have diabetes, and your systolic blood pressure is over 140 or your diastolic blood pressure is over 90.  · You have kidney disease and your  blood pressure is above 140/90.  · You have heart disease and your blood pressure is above 140/90.  Your personal target blood pressure may vary depending on your medical conditions, your age, and other factors.  Follow these instructions at home:  · Have your blood pressure rechecked as directed by your health care provider.  · Take medicines only as directed by your health care provider. Follow the directions carefully. Blood pressure medicines must be taken as prescribed. The medicine does not work as well when you skip doses. Skipping doses also puts you at risk for problems.  · Do not smoke.  · Monitor your blood pressure at home as directed by your health care provider.  Contact a health care provider if:  · You think you are having a reaction to medicines taken.  · You have recurrent headaches or feel dizzy.  · You have swelling in your ankles.  · You have trouble with your vision.  Get help right away if:  · You develop a severe headache or confusion.  · You have unusual weakness, numbness, or feel faint.  · You have severe chest or abdominal pain.  · You vomit repeatedly.  · You have trouble breathing.  This information is not intended to replace advice given to you by your health care provider. Make sure you discuss any questions you have with your health care provider.  Document Released: 12/18/2006 Document Revised: 05/25/2017 Document Reviewed: 10/10/2014  [employer] Interactive Patient Education © 2017 [employer]     101-> 95; without chest pain or worsening dyspnea; EKG showing 2mm VALENTINE V2-V4 iso non-specific IVCD (no previous to compare); however, given pt without CP, trop are likely iso demand ischemia iso CAD; EKG findings likely old given pt without CP and downtrending troponin  - cardiology consult appreciated, resume asa 81mg  -BP soft, resume low dose hydralazine if BP improve

## 2024-07-15 ENCOUNTER — APPOINTMENT (OUTPATIENT)
Dept: NEPHROLOGY | Facility: CLINIC | Age: 69
End: 2024-07-15
Payer: MEDICARE

## 2024-07-15 ENCOUNTER — LABORATORY RESULT (OUTPATIENT)
Age: 69
End: 2024-07-15

## 2024-07-15 VITALS
OXYGEN SATURATION: 93 % | HEART RATE: 95 BPM | DIASTOLIC BLOOD PRESSURE: 56 MMHG | BODY MASS INDEX: 30.63 KG/M2 | TEMPERATURE: 97.5 F | HEIGHT: 60 IN | WEIGHT: 156 LBS | SYSTOLIC BLOOD PRESSURE: 100 MMHG

## 2024-07-15 DIAGNOSIS — N40.0 BENIGN PROSTATIC HYPERPLASIA WITHOUT LOWER URINARY TRACT SYMPMS: ICD-10-CM

## 2024-07-15 DIAGNOSIS — I10 ESSENTIAL (PRIMARY) HYPERTENSION: ICD-10-CM

## 2024-07-15 DIAGNOSIS — E78.5 HYPERLIPIDEMIA, UNSPECIFIED: ICD-10-CM

## 2024-07-15 DIAGNOSIS — E11.9 TYPE 2 DIABETES MELLITUS W/OUT COMPLICATIONS: ICD-10-CM

## 2024-07-15 DIAGNOSIS — N18.32 CHRONIC KIDNEY DISEASE, STAGE 3B: ICD-10-CM

## 2024-07-15 DIAGNOSIS — J06.9 ACUTE UPPER RESPIRATORY INFECTION, UNSPECIFIED: ICD-10-CM

## 2024-07-15 PROCEDURE — 99215 OFFICE O/P EST HI 40 MIN: CPT

## 2024-07-19 LAB
ALBUMIN SERPL ELPH-MCNC: 4.1 G/DL
ANA SER IF-ACNC: NEGATIVE
ANION GAP SERPL CALC-SCNC: 17 MMOL/L
APPEARANCE: CLEAR
BACTERIA: NEGATIVE /HPF
BILIRUBIN URINE: NEGATIVE
BLOOD URINE: NEGATIVE
BUN SERPL-MCNC: 50 MG/DL
C3 SERPL-MCNC: 89 MG/DL
CALCIUM SERPL-MCNC: 8.9 MG/DL
CAST: 0 /LPF
CHLORIDE SERPL-SCNC: 100 MMOL/L
CO2 SERPL-SCNC: 22 MMOL/L
COLOR: YELLOW
CREAT SERPL-MCNC: 3.18 MG/DL
CREAT SPEC-SCNC: 52 MG/DL
CREAT/PROT UR: 0.2 RATIO
DEPRECATED KAPPA LC FREE/LAMBDA SER: 1.48 RATIO
DSDNA AB SER-ACNC: <1 IU/ML
EGFR: 20 ML/MIN/1.73M2
EPITHELIAL CELLS: 0 /HPF
GLUCOSE QUALITATIVE U: NEGATIVE MG/DL
GLUCOSE SERPL-MCNC: 206 MG/DL
HBV SURFACE AB SER QL: REACTIVE
HBV SURFACE AG SER QL: NONREACTIVE
HCV AB SER QL: NONREACTIVE
HCV S/CO RATIO: 0.08 S/CO
IGA 24H UR QL IFE: NORMAL
IGA SER QL IEP: 162 MG/DL
IGG SER QL IEP: 787 MG/DL
IGM SER QL IEP: 42 MG/DL
KAPPA LC CSF-MCNC: 4.24 MG/DL
KAPPA LC SERPL-MCNC: 6.29 MG/DL
KETONES URINE: NEGATIVE MG/DL
LEUKOCYTE ESTERASE URINE: NEGATIVE
M PROTEIN SPEC IFE-MCNC: NORMAL
MICROSCOPIC-UA: NORMAL
NITRITE URINE: NEGATIVE
PH URINE: 6.5
PHOSPHATE SERPL-MCNC: 3.8 MG/DL
POTASSIUM SERPL-SCNC: 3.6 MMOL/L
PROT UR-MCNC: 8 MG/DL
PROTEIN URINE: NORMAL MG/DL
RED BLOOD CELLS URINE: 0 /HPF
SODIUM SERPL-SCNC: 138 MMOL/L
SPECIFIC GRAVITY URINE: 1.01
UROBILINOGEN URINE: 0.2 MG/DL
WHITE BLOOD CELLS URINE: 0 /HPF

## 2024-07-19 RX ORDER — DAPAGLIFLOZIN 5 MG/1
5 TABLET, FILM COATED ORAL DAILY
Qty: 90 | Refills: 3 | Status: DISCONTINUED | COMMUNITY
Start: 2024-07-19 | End: 2024-07-19

## 2024-07-19 RX ORDER — EMPAGLIFLOZIN 10 MG/1
10 TABLET, FILM COATED ORAL DAILY
Qty: 90 | Refills: 3 | Status: ACTIVE | COMMUNITY
Start: 2024-07-19 | End: 1900-01-01

## 2024-08-14 ENCOUNTER — APPOINTMENT (OUTPATIENT)
Dept: ELECTROPHYSIOLOGY | Facility: CLINIC | Age: 69
End: 2024-08-14
Payer: MEDICARE

## 2024-08-14 ENCOUNTER — NON-APPOINTMENT (OUTPATIENT)
Age: 69
End: 2024-08-14

## 2024-08-14 PROCEDURE — 93295 DEV INTERROG REMOTE 1/2/MLT: CPT

## 2024-08-14 PROCEDURE — 93296 REM INTERROG EVL PM/IDS: CPT

## 2024-08-21 ENCOUNTER — NON-APPOINTMENT (OUTPATIENT)
Age: 69
End: 2024-08-21

## 2024-08-23 ENCOUNTER — NON-APPOINTMENT (OUTPATIENT)
Age: 69
End: 2024-08-23

## 2024-08-26 ENCOUNTER — NON-APPOINTMENT (OUTPATIENT)
Age: 69
End: 2024-08-26

## 2024-08-26 ENCOUNTER — APPOINTMENT (OUTPATIENT)
Dept: HEART FAILURE | Facility: CLINIC | Age: 69
End: 2024-08-26
Payer: MEDICARE

## 2024-08-26 VITALS — BODY MASS INDEX: 30.47 KG/M2 | WEIGHT: 156 LBS

## 2024-08-26 VITALS
SYSTOLIC BLOOD PRESSURE: 101 MMHG | OXYGEN SATURATION: 95 % | HEART RATE: 76 BPM | RESPIRATION RATE: 16 BRPM | DIASTOLIC BLOOD PRESSURE: 63 MMHG

## 2024-08-26 DIAGNOSIS — E11.9 TYPE 2 DIABETES MELLITUS W/OUT COMPLICATIONS: ICD-10-CM

## 2024-08-26 DIAGNOSIS — N18.32 CHRONIC KIDNEY DISEASE, STAGE 3B: ICD-10-CM

## 2024-08-26 DIAGNOSIS — I50.22 CHRONIC SYSTOLIC (CONGESTIVE) HEART FAILURE: ICD-10-CM

## 2024-08-26 DIAGNOSIS — Z95.810 PRESENCE OF AUTOMATIC (IMPLANTABLE) CARDIAC DEFIBRILLATOR: ICD-10-CM

## 2024-08-26 PROCEDURE — 93000 ELECTROCARDIOGRAM COMPLETE: CPT

## 2024-08-26 PROCEDURE — 99214 OFFICE O/P EST MOD 30 MIN: CPT

## 2024-08-26 NOTE — PHYSICAL EXAM
[Lethargic] : lethargic [No Carotid Bruit] : no carotid bruit [Normal S1, S2] : normal S1, S2 [No Respiratory Distress] : no respiratory distress  [Soft] : abdomen soft [Non Tender] : non-tender [Well Nourished] : well nourished [No Acute Distress] : no acute distress [Elevated JVD ____cm] : elevated JVD ~Vcm [No Gallop] : no gallop [No Edema] : no edema [Moves all extremities] : moves all extremities [de-identified] : Left chest CRT-D [de-identified] : Scattered crackles [de-identified] : uses a walker

## 2024-08-26 NOTE — CARDIOLOGY SUMMARY
[de-identified] : 2/22/24 Atrial sensed with biventricular paced rhythm, rate 95 bpm with PVC 9/7/23  Atrial sensed with biventricular paced rhythm, rate 84 bpm 8/3/23 Atrial sensed with biventricular paced rhythm 3/22/23 Atrial sensed with biventricular paced rhythm rate 90 bpm  6/9/22 NSR 87, IVCD, LAFB,, TWI 1, AVL, NSST with PVC [de-identified] :  7/2023 pharmacologic stress for chest pain which was consistent with infarct inferolateral and apical walls w/ mild kadie-infarct ischemia and infarct in anterior and anterolateral walls. [de-identified] : 7/2023 TTE w/ severe global left ventricular systolic dysfunction (LVEF 25%)  10/17/22 TTE LVEF 14% 5/22/22 TTE: LVEF 15-20%, LVIDD 5.5 cm, mild MR, severe LV systolic dysfunction, mod LAE, mod CHRISTINA, mild TR [de-identified] : 5/26/22  RHC: RA-9 PA 54/27 PCWP 24 CO/CI 3.97/2.22 SVR 1510\par   [de-identified] : 5/21/22 CXR showed hazy right lower lung opacity which may reflect layering effusion,\par  atelectasis, or pneumonia

## 2024-08-26 NOTE — HISTORY OF PRESENT ILLNESS
[FreeTextEntry1] : 69M Persian, HTN, HFrEF (EF = 14% - 10/17/2022->25%), CAD w/ CABG 2014 at [facility], CRT-D, CVA with left sided weakness (1993), HLD, CKD (baseline SCr 2.3-2.9), COPD (home O2 2L NC), former smoker, recurrent PNA, s/p Medtronic ICD with CRT-D upgrade 12/9/22, s/p CTI ablation for atrial flutter, here for f/u.  Pt saw Dr. Hicks but did not schedule DANTE. C/o calf pain at rest and with exertion.  Overall, doing quite well. Rare CP. No orthopnea or PND. Uses a rolling walker. Takes all meds as prescribed. On torsemide 100 bid with an extra 20 mg qod.

## 2024-08-26 NOTE — DISCUSSION/SUMMARY
[Patient] : the patient [___ Month(s)] : in [unfilled] month(s) [FreeTextEntry1] : Continue current meds. Daughter will call me to discuss CardioMEMS. [EKG obtained to assist in diagnosis and management of assessed problem(s)] : EKG obtained to assist in diagnosis and management of assessed problem(s)

## 2024-08-26 NOTE — ASSESSMENT
[FreeTextEntry1] : NYHA class III; plan to medically manage CAD as no benefit of revascularization. No signs of fluid OL.

## 2024-09-25 NOTE — ED ADULT TRIAGE NOTE - LOCATION:
Received referral to contact pt for support. Spoke with pt, introduced self and services. Pt did report an underlying depression due to questioning purpose and the loneliness around losing her . Discussed establishing counseling to help with coping and strategies to increase her mood. Pt was agreeable and preferred talking by phone. Established a first session on 10/3/24 at 10:00am.   Left arm;

## 2024-11-13 ENCOUNTER — APPOINTMENT (OUTPATIENT)
Dept: ELECTROPHYSIOLOGY | Facility: CLINIC | Age: 69
End: 2024-11-13
Payer: MEDICARE

## 2024-11-13 ENCOUNTER — NON-APPOINTMENT (OUTPATIENT)
Age: 69
End: 2024-11-13

## 2024-11-13 PROCEDURE — 93296 REM INTERROG EVL PM/IDS: CPT

## 2024-11-13 PROCEDURE — 93295 DEV INTERROG REMOTE 1/2/MLT: CPT

## 2024-11-18 ENCOUNTER — APPOINTMENT (OUTPATIENT)
Dept: NEPHROLOGY | Facility: CLINIC | Age: 69
End: 2024-11-18

## 2024-12-04 NOTE — PROGRESS NOTE ADULT - PROBLEM SELECTOR PROBLEM 6
Instructions:  Continue the same medications  WE will call  you with your echo results   The office will contact you to schedule with Dr. Gomez    Continue to follow these guidelines unless otherwise instructed by your doctor:  2000mg sodium diet with NO added salt to food  Drink no more than 64 ounces of fluid per day, total  Weigh yourself daily at the same time each day and keep a record of it  Report weight gain of 3 lbs in 1 day or 5 lbs or more in 1 week to heart failure clinic     CONTINUE TO TAKE YOUR MEDICATIONS AS PRESCRIBED   BRING YOUR MEDICATION LIST TO EACH VISIT     Please call the Heart Failure Clinic at: [phone] if you are unable to keep your appointment or if you have any questions or concerns.      Non-insulin dependent type 2 diabetes mellitus

## 2025-01-08 NOTE — CONSULT NOTE ADULT - SUBJECTIVE AND OBJECTIVE BOX
PA for Ozempic submitted via [facility].    INGE Gandara is a 63y old  Male who presents with a chief complaint of Left leg pain (14 Jun 2018 23:41)      HPI:  62 y.o. man with history of DM II, CAD s/p CABG, CHF, CVA with left sided residual weakness who came to the ER for evaluation of left knee pain s/p fall. Patient states that while exiting a vehicle with his cane, he slipped and fell and injured his left knee. Thereafter, he started to experience 10/10 sharp knee pain radiating up the leg. In ER, patient had imaging study which showed no acute fractures and patient was admitted to the medicine service for pain control. No additional complaints at this time. (14 Jun 2018 23:41)    Code stroke called for AMS and lethargy. Per nurse when she came in pt was barely opening eyes, not responding. He was placed on nasal canula. Now he was somewhat improved. Unknown LKN      MEDICATIONS  (STANDING):  ALBUTerol/ipratropium for Nebulization 3 milliLiter(s) Nebulizer every 6 hours  aspirin enteric coated 81 milliGRAM(s) Oral daily  atorvastatin 40 milliGRAM(s) Oral at bedtime  carvedilol 12.5 milliGRAM(s) Oral every 12 hours  clopidogrel Tablet 75 milliGRAM(s) Oral daily  dextrose 5%. 1000 milliLiter(s) (50 mL/Hr) IV Continuous <Continuous>  dextrose 50% Injectable 12.5 Gram(s) IV Push once  dextrose 50% Injectable 25 Gram(s) IV Push once  dextrose 50% Injectable 25 Gram(s) IV Push once  docusate sodium 100 milliGRAM(s) Oral three times a day  gabapentin 300 milliGRAM(s) Oral two times a day  hydrALAZINE 100 milliGRAM(s) Oral three times a day  insulin lispro (HumaLOG) corrective regimen sliding scale   SubCutaneous three times a day before meals  isosorbide   mononitrate ER Tablet (IMDUR) 30 milliGRAM(s) Oral daily  montelukast 10 milliGRAM(s) Oral daily  pantoprazole    Tablet 40 milliGRAM(s) Oral before breakfast  polyethylene glycol 3350 17 Gram(s) Oral daily  potassium chloride    Tablet ER 10 milliEquivalent(s) Oral daily  senna 2 Tablet(s) Oral at bedtime  sodium chloride 0.9%. 1000 milliLiter(s) (50 mL/Hr) IV Continuous <Continuous>  sucralfate 1 Gram(s) Oral two times a day  tamsulosin 0.4 milliGRAM(s) Oral at bedtime    MEDICATIONS  (PRN):  acetaminophen   Tablet 650 milliGRAM(s) Oral every 6 hours PRN For Temp greater than 38 C (100.4 F)  acetaminophen   Tablet. 325 milliGRAM(s) Oral every 4 hours PRN Mild Pain (1 - 3)  dextrose 40% Gel 15 Gram(s) Oral once PRN Blood Glucose LESS THAN 70 milliGRAM(s)/deciliter  glucagon  Injectable 1 milliGRAM(s) IntraMuscular once PRN Glucose LESS THAN 70 milligrams/deciliter  oxyCODONE    IR 10 milliGRAM(s) Oral every 4 hours PRN Severe Pain (7 - 10)  oxyCODONE    IR 5 milliGRAM(s) Oral every 4 hours PRN Moderate Pain (4 - 6)    PAST MEDICAL & SURGICAL HISTORY:  BPH (benign prostatic hyperplasia)  CHF (congestive heart failure)  Hyperlipidemia, unspecified hyperlipidemia type  Diabetes  CAD (coronary artery disease)  HTN (hypertension)  S/P appendectomy  S/P CABG (coronary artery bypass graft)    FAMILY HISTORY:  No pertinent family history in first degree relatives    Allergies    No Known Allergies    SHx - No smoking, No ETOH, No drug abuse    REVIEW OF SYSTEMS:    Pt unable to participate    Vital Signs Last 24 Hrs  T(C): 38.7 (18 Jun 2018 18:45), Max: 38.7 (18 Jun 2018 18:45)  T(F): 101.6 (18 Jun 2018 18:45), Max: 101.6 (18 Jun 2018 18:45)  HR: 94 (18 Jun 2018 18:45) (72 - 99)  BP: 102/56 (18 Jun 2018 18:45) (102/56 - 107/56)  BP(mean): --  RR: 17 (18 Jun 2018 18:45) (17 - 17)  SpO2: 97% (18 Jun 2018 18:45) (95% - 97%) 94% on nasal cannula     General Exam:   General appearance: diaphoretic,  Skin hot to touch  Cardiac: tachycardic in 90s  Pulm:  tachypneic to 30, Crackles bilaterally, decreased lung sounds left base, audible wheezing.               Neurological Exam:  Mental Status: Very drowsy, difficulty keeping eyes open, Orientated to self, date and place.  poor attention.  Mild dysarthria. Speech fluent.  Cranial Nerves:   PERRL, EOMI, VFF, no nystagmus.   No facial asymmetry.  Hearing intact bilaterally.  Tongue  midline.  Motor:   Tone: normal.                  Strength:     [] Upper extremity  able to raise both, Left drift                      [] Lower extremity  some movement in feet bilaterally.                               Dysmetria: Unable to assess  Tremor: No resting, postural or action tremor.  No myoclonus.    Sensation: intact to light touch    Gait: unable to assess.      Other:    06-18    131<L>  |  91<L>  |  99<H>  ----------------------------<  126<H>  4.5   |  21<L>  |  5.30<H>    Ca    9.0      18 Jun 2018 19:00  Phos  5.0     06-18  Mg     2.0     06-18    TPro  6.9  /  Alb  3.5  /  TBili  1.2  /  DBili  x   /  AST  25  /  ALT  11  /  AlkPhos  80  06-18                            12.8   16.33 )-----------( 145      ( 18 Jun 2018 19:00 )             39.0       Radiology    CT: INGE Gandara is a 63y old  Male who presents with a chief complaint of Left leg pain (14 Jun 2018 23:41)      HPI:  62 y.o. man with history of DM II, CAD s/p CABG, CHF, CVA with left sided residual weakness who came to the ER for evaluation of left knee pain s/p fall. Patient states that while exiting a vehicle with his cane, he slipped and fell and injured his left knee. Thereafter, he started to experience 10/10 sharp knee pain radiating up the leg. In ER, patient had imaging study which showed no acute fractures and patient was admitted to the medicine service for pain control. No additional complaints at this time. (14 Jun 2018 23:41)    Code stroke called for AMS and lethargy. Per nurse when she came in pt was barely opening eyes, not responding. He was placed on nasal canula. Now he was somewhat improved. Unknown LKN      MEDICATIONS  (STANDING):  ALBUTerol/ipratropium for Nebulization 3 milliLiter(s) Nebulizer every 6 hours  aspirin enteric coated 81 milliGRAM(s) Oral daily  atorvastatin 40 milliGRAM(s) Oral at bedtime  carvedilol 12.5 milliGRAM(s) Oral every 12 hours  clopidogrel Tablet 75 milliGRAM(s) Oral daily  dextrose 5%. 1000 milliLiter(s) (50 mL/Hr) IV Continuous <Continuous>  dextrose 50% Injectable 12.5 Gram(s) IV Push once  dextrose 50% Injectable 25 Gram(s) IV Push once  dextrose 50% Injectable 25 Gram(s) IV Push once  docusate sodium 100 milliGRAM(s) Oral three times a day  gabapentin 300 milliGRAM(s) Oral two times a day  hydrALAZINE 100 milliGRAM(s) Oral three times a day  insulin lispro (HumaLOG) corrective regimen sliding scale   SubCutaneous three times a day before meals  isosorbide   mononitrate ER Tablet (IMDUR) 30 milliGRAM(s) Oral daily  montelukast 10 milliGRAM(s) Oral daily  pantoprazole    Tablet 40 milliGRAM(s) Oral before breakfast  polyethylene glycol 3350 17 Gram(s) Oral daily  potassium chloride    Tablet ER 10 milliEquivalent(s) Oral daily  senna 2 Tablet(s) Oral at bedtime  sodium chloride 0.9%. 1000 milliLiter(s) (50 mL/Hr) IV Continuous <Continuous>  sucralfate 1 Gram(s) Oral two times a day  tamsulosin 0.4 milliGRAM(s) Oral at bedtime    MEDICATIONS  (PRN):  acetaminophen   Tablet 650 milliGRAM(s) Oral every 6 hours PRN For Temp greater than 38 C (100.4 F)  acetaminophen   Tablet. 325 milliGRAM(s) Oral every 4 hours PRN Mild Pain (1 - 3)  dextrose 40% Gel 15 Gram(s) Oral once PRN Blood Glucose LESS THAN 70 milliGRAM(s)/deciliter  glucagon  Injectable 1 milliGRAM(s) IntraMuscular once PRN Glucose LESS THAN 70 milligrams/deciliter  oxyCODONE    IR 10 milliGRAM(s) Oral every 4 hours PRN Severe Pain (7 - 10)  oxyCODONE    IR 5 milliGRAM(s) Oral every 4 hours PRN Moderate Pain (4 - 6)    PAST MEDICAL & SURGICAL HISTORY:  BPH (benign prostatic hyperplasia)  CHF (congestive heart failure)  Hyperlipidemia, unspecified hyperlipidemia type  Diabetes  CAD (coronary artery disease)  HTN (hypertension)  S/P appendectomy  S/P CABG (coronary artery bypass graft)    FAMILY HISTORY:  No pertinent family history in first degree relatives    Allergies    No Known Allergies    SHx - No smoking, No ETOH, No drug abuse    REVIEW OF SYSTEMS:    Pt unable to participate    Vital Signs Last 24 Hrs  T(C): 38.7 (18 Jun 2018 18:45), Max: 38.7 (18 Jun 2018 18:45)  T(F): 101.6 (18 Jun 2018 18:45), Max: 101.6 (18 Jun 2018 18:45)  HR: 94 (18 Jun 2018 18:45) (72 - 99)  BP: 102/56 (18 Jun 2018 18:45) (102/56 - 107/56)  BP(mean): --  RR: 17 (18 Jun 2018 18:45) (17 - 17)  SpO2: 97% (18 Jun 2018 18:45) (95% - 97%) 94% on nasal cannula     General Exam:   General appearance: diaphoretic,  Skin hot to touch  Cardiac: tachycardic in 90s  Pulm:  tachypneic to 30, Crackles bilaterally, decreased lung sounds left base, audible wheezing.               Neurological Exam:  Mental Status: Very drowsy, difficulty keeping eyes open, Orientated to self, date and place.  poor attention.  Mild dysarthria. Speech fluent.  Cranial Nerves:   PERRL, EOMI, VFF, no nystagmus.   No facial asymmetry.  Hearing intact bilaterally.  Tongue  midline.  Motor:   Tone: normal.                  Strength:     [] Upper extremity  able to raise both, Left drift                      [] Lower extremity  some movement in feet bilaterally.                               Dysmetria: Unable to assess  Tremor: No resting, postural or action tremor.  No myoclonus.    Sensation: intact to light touch    Gait: unable to assess.      Other:    06-18    131<L>  |  91<L>  |  99<H>  ----------------------------<  126<H>  4.5   |  21<L>  |  5.30<H>    Ca    9.0      18 Jun 2018 19:00  Phos  5.0     06-18  Mg     2.0     06-18    TPro  6.9  /  Alb  3.5  /  TBili  1.2  /  DBili  x   /  AST  25  /  ALT  11  /  AlkPhos  80  06-18                            12.8   16.33 )-----------( 145      ( 18 Jun 2018 19:00 )             39.0       Radiology    CT: < from: CT Head No Cont (06.18.18 @ 23:47) >  There is no CT evidence of acute intracranial hemorrhage,  mass effect,   or midline shift.    A right middle cerebral artery chronic infarct is demonstrated, with ex   vacuo dilatation of the right lateral ventricle. Remaining ventricles and   sulci demonstrate mild age-appropriate volume loss. Small chronic infarct   also noted along the medial right frontal lobe in the HERNAN territory. A   chronic infarct is seen in the left cerebellar hemisphere. There is   atherosclerosis.

## 2025-01-16 ENCOUNTER — NON-APPOINTMENT (OUTPATIENT)
Age: 70
End: 2025-01-16

## 2025-01-16 ENCOUNTER — APPOINTMENT (OUTPATIENT)
Dept: NEPHROLOGY | Facility: CLINIC | Age: 70
End: 2025-01-16
Payer: MEDICARE

## 2025-01-16 VITALS
HEIGHT: 70 IN | HEART RATE: 89 BPM | OXYGEN SATURATION: 94 % | SYSTOLIC BLOOD PRESSURE: 91 MMHG | BODY MASS INDEX: 22.9 KG/M2 | TEMPERATURE: 98 F | DIASTOLIC BLOOD PRESSURE: 57 MMHG | WEIGHT: 160 LBS

## 2025-01-16 DIAGNOSIS — I95.2 HYPOTENSION DUE TO DRUGS: ICD-10-CM

## 2025-01-16 DIAGNOSIS — N18.32 CHRONIC KIDNEY DISEASE, STAGE 3B: ICD-10-CM

## 2025-01-16 DIAGNOSIS — E11.9 TYPE 2 DIABETES MELLITUS W/OUT COMPLICATIONS: ICD-10-CM

## 2025-01-16 DIAGNOSIS — E78.5 HYPERLIPIDEMIA, UNSPECIFIED: ICD-10-CM

## 2025-01-16 DIAGNOSIS — N40.0 BENIGN PROSTATIC HYPERPLASIA WITHOUT LOWER URINARY TRACT SYMPMS: ICD-10-CM

## 2025-01-16 PROCEDURE — G2211 COMPLEX E/M VISIT ADD ON: CPT

## 2025-01-16 PROCEDURE — 99213 OFFICE O/P EST LOW 20 MIN: CPT

## 2025-01-26 NOTE — DISCHARGE NOTE PROVIDER - NSDCHHATTENDCERT_GEN_ALL_CORE
1.62 My signature below certifies that the above stated patient is homebound and upon completion of the Face-To-Face encounter, has the need for intermittent skilled nursing, physical therapy and/or speech or occupational therapy services in their home for their current diagnosis as outlined in their initial plan of care. These services will continue to be monitored by myself or another physician.

## 2025-01-28 ENCOUNTER — APPOINTMENT (OUTPATIENT)
Dept: HEART FAILURE | Facility: CLINIC | Age: 70
End: 2025-01-28

## 2025-02-10 NOTE — PROGRESS NOTE ADULT - ASSESSMENT
Medication: zetia passed protocol.   Last office visit date: 1/2025  Next appointment scheduled?: Yes   Number of refills given: 3    68 year old male with PMHX of HTN, chronic HFrEF, CAD w/ CABG 2014 at [facility], s/p Medtronic Liberty CRT-D, recently diagnosed PAF-started on Eliquis since March 8, asthma/COPD on 2 L O2 at home, CKD stage III, CVA with left sided weakness, HTN, HLD, DMT2 and hypothyroidism who presents with SOB found to be in acute decompensated heart failure and in AFl and UTI.  Interrogation revealed 4.9% AF/AFl burden since March 8 with biv pacing at 80.7%. No ICD shocks noted.  Patient was started on IV Bumex gtt for diuresis. A repeat TTE showed LVEF 20-25% normal LA.  EKG and Telemetry demonstrate typical AFl with V rate in 'bpm.   UTI now resolved.       ## acute decompensated heart failure   ## newly diagnosed persistent AFL   ## HFrEF w/ CRT-D  ## CAD CABG   ## UTI-resolved (off IV Abx)      -Plan BRANDIE/ AFl (typical) ablation for rhythm control (not DCCV) when patient cleared by HF ?Tuesday/Wed   - Type and Screen X 2 done  -Appreciate HF management, follow up primary cardiology team  -Continue AC Eliquis, CHADS2-VASC2 score is 7   -Resume diet today,  -Optimize K >4, Mg >2   -Discussed with EP attendings   Will follow up

## 2025-02-13 ENCOUNTER — APPOINTMENT (OUTPATIENT)
Dept: ELECTROPHYSIOLOGY | Facility: CLINIC | Age: 70
End: 2025-02-13
Payer: MEDICARE

## 2025-02-13 ENCOUNTER — NON-APPOINTMENT (OUTPATIENT)
Age: 70
End: 2025-02-13

## 2025-02-13 PROCEDURE — 93295 DEV INTERROG REMOTE 1/2/MLT: CPT

## 2025-02-13 PROCEDURE — 93296 REM INTERROG EVL PM/IDS: CPT

## 2025-02-20 NOTE — PHYSICAL THERAPY INITIAL EVALUATION ADULT - PHYSICAL ASSIST/NONPHYSICAL ASSIST: SUPINE/SIT, REHAB EVAL
Forms were received by:_X_Fax         ___MyChart         ___Dropped off         ___Mail    From: Jazmín         Reason for Form Completion: Disability.       BAN Signed    ___Yes       _X__No    Fee Collected    ___Yes       _X__No    Emailed to self and Forms Department.  Sent inner office mail to Forms Department.         verbal cues/1 person assist

## 2025-02-20 NOTE — DISCHARGE NOTE PROVIDER - NSDCQMSTROKE_NEU_ALL_CORE
Brief Operative Note  Department of Obstetrics and Gynecology  [facility]     Patient: Karon Monroy   : 1997  MRN: [id number]       Acct: [id number]   Date of Procedure: 25     Pre-operative Diagnosis: 27 y.o. adult    PCOS   Dysmenorrhea   Heavy menses      Post-operative Diagnosis:   PCOS   Dysmenorrhea   Heavy menses      Procedure: Robotic assisted laparoscopic hysterectomy, bilateral salpingectomy, cystoscopy, right oophorectomy    Surgeon: Dr. Seth     Assistant(s): Yolette Guerrero DO, PGY4;     Anesthesia: general via ET tube    Findings: Normal-appearing external genitalia normal-appearing cervix, normal-appearing uterus with bilateral fallopian tubes, bilateral ovaries polycystic in appearance pelvic adhesions on the left side, intra-abdominal lesions on the upper right quadrant  Total IV fluids/Blood products:  1800 ml crystalloid  Urine Output:  250 ml    Estimated blood loss:  20ml  Drains:  none  Specimens: Uterus, cervix, bilateral fallopian tubes, right ovary  Instrument and Sponge Count: Correct  Complications:  none  Condition:  stable, transferred to post anesthesia recovery    See full operative report for further details.    Yolette Guerrero DO  Ob/Gyn Resident  2025, 9:37 AM          
No

## 2025-03-26 NOTE — CONSULT NOTE ADULT - REASON FOR ADMISSION
[facility]
In our practice, timely communication to you regarding your test results is a priority. We will communicate your results to you, either by phone call, mail or [employer] portal. You will always be contacted with test results, even if they are normal.  Please let us know if we are not meeting your expectations in this regard!     PLEASE NOTE DR THAYER'S OFFICE HOURS ARE AS FOLLOWS:    MON WE ARE OUT OF THE OFFICE  TUES    8:00 A.M. TO 4:00 P.M.  WED     8:00 A.M. TO 4:00 P.M.  THURS 8:00 A.M. TO 4:00 P.M.  FRI        8:00 A.M. TO 3:00 P.M.    If you call our office for a medical reason or a med refill after Friday 12 PM, he will not receive an answer or refill until the following Tuesday. (we are out of the office Saturday, Sunday and Monday)     IF IT IS AN URGENT MESSAGE, WE DO HAVE A TRIAGE NURSE AND AN MD ON CALL.  OFFICE PHONE NUMBER: [phone]  OFFICE FAX NUMBER: [phone]    In the next few weeks, you may receive a [employer] survey regarding your most recent clinic visit with us. Please take a few moments out of your day to accurately evaluate your visit. We strive to provide you with the best medical care and hope you are happy with our services. Again, thank you for your time and we look forward to your next visit.    
[facility]

## 2025-05-15 ENCOUNTER — APPOINTMENT (OUTPATIENT)
Dept: ELECTROPHYSIOLOGY | Facility: CLINIC | Age: 70
End: 2025-05-15
Payer: MEDICARE

## 2025-05-15 ENCOUNTER — NON-APPOINTMENT (OUTPATIENT)
Age: 70
End: 2025-05-15

## 2025-05-15 PROCEDURE — 93295 DEV INTERROG REMOTE 1/2/MLT: CPT

## 2025-05-15 PROCEDURE — 93296 REM INTERROG EVL PM/IDS: CPT

## 2025-06-05 ENCOUNTER — NON-APPOINTMENT (OUTPATIENT)
Age: 70
End: 2025-06-05

## 2025-06-05 ENCOUNTER — APPOINTMENT (OUTPATIENT)
Dept: HEART FAILURE | Facility: CLINIC | Age: 70
End: 2025-06-05
Payer: MEDICARE

## 2025-06-05 VITALS
OXYGEN SATURATION: 91 % | HEIGHT: 70 IN | WEIGHT: 159 LBS | HEART RATE: 75 BPM | SYSTOLIC BLOOD PRESSURE: 90 MMHG | BODY MASS INDEX: 22.76 KG/M2 | TEMPERATURE: 97.1 F | DIASTOLIC BLOOD PRESSURE: 48 MMHG

## 2025-06-05 DIAGNOSIS — J44.9 CHRONIC OBSTRUCTIVE PULMONARY DISEASE, UNSPECIFIED: ICD-10-CM

## 2025-06-05 DIAGNOSIS — E78.5 HYPERLIPIDEMIA, UNSPECIFIED: ICD-10-CM

## 2025-06-05 DIAGNOSIS — I50.20 UNSPECIFIED SYSTOLIC (CONGESTIVE) HEART FAILURE: ICD-10-CM

## 2025-06-05 DIAGNOSIS — N18.9 CHRONIC KIDNEY DISEASE, UNSPECIFIED: ICD-10-CM

## 2025-06-05 PROCEDURE — 36415 COLL VENOUS BLD VENIPUNCTURE: CPT

## 2025-06-05 PROCEDURE — 93000 ELECTROCARDIOGRAM COMPLETE: CPT

## 2025-06-05 PROCEDURE — 99214 OFFICE O/P EST MOD 30 MIN: CPT

## 2025-06-06 ENCOUNTER — NON-APPOINTMENT (OUTPATIENT)
Age: 70
End: 2025-06-06

## 2025-06-12 LAB
ALBUMIN SERPL ELPH-MCNC: 4.4 G/DL
ALP BLD-CCNC: 159 U/L
ALT SERPL-CCNC: 28 U/L
ANION GAP SERPL CALC-SCNC: 30 MMOL/L
AST SERPL-CCNC: 36 U/L
BILIRUB SERPL-MCNC: 0.6 MG/DL
BUN SERPL-MCNC: 72 MG/DL
CALCIUM SERPL-MCNC: 8.6 MG/DL
CHLORIDE SERPL-SCNC: 91 MMOL/L
CHOLEST SERPL-MCNC: 102 MG/DL
CO2 SERPL-SCNC: 13 MMOL/L
CREAT SERPL-MCNC: 3.81 MG/DL
EGFRCR SERPLBLD CKD-EPI 2021: 16 ML/MIN/1.73M2
ESTIMATED AVERAGE GLUCOSE: 123 MG/DL
GLUCOSE SERPL-MCNC: 69 MG/DL
HBA1C MFR BLD HPLC: 5.9 %
HDLC SERPL-MCNC: 46 MG/DL
LDLC SERPL-MCNC: 34 MG/DL
NONHDLC SERPL-MCNC: 56 MG/DL
NT-PROBNP SERPL-MCNC: 7307 PG/ML
POTASSIUM SERPL-SCNC: 3.4 MMOL/L
PROT SERPL-MCNC: 6.8 G/DL
SODIUM SERPL-SCNC: 135 MMOL/L
TRIGL SERPL-MCNC: 122 MG/DL

## 2025-06-18 LAB
ANION GAP SERPL CALC-SCNC: 17 MMOL/L
BUN SERPL-MCNC: 77 MG/DL
CALCIUM SERPL-MCNC: 8.6 MG/DL
CHLORIDE SERPL-SCNC: 100 MMOL/L
CO2 SERPL-SCNC: 23 MMOL/L
CREAT SERPL-MCNC: 4.04 MG/DL
EGFRCR SERPLBLD CKD-EPI 2021: 15 ML/MIN/1.73M2
GLUCOSE SERPL-MCNC: 131 MG/DL
NT-PROBNP SERPL-MCNC: 6826 PG/ML
POTASSIUM SERPL-SCNC: 3.7 MMOL/L
SODIUM SERPL-SCNC: 140 MMOL/L

## 2025-07-24 ENCOUNTER — APPOINTMENT (OUTPATIENT)
Dept: HEART FAILURE | Facility: CLINIC | Age: 70
End: 2025-07-24

## 2025-07-30 NOTE — CHART NOTE - NSCHARTNOTESELECT_GEN_ALL_CORE
Event Note [No Acute Distress] : no acute distress [Normal Oropharynx] : normal oropharynx [Normal Appearance] : normal appearance [No Neck Mass] : no neck mass [Normal Rate/Rhythm] : normal rate/rhythm [Normal S1, S2] : normal s1, s2 [No Murmurs] : no murmurs [No Resp Distress] : no resp distress [Clear to Auscultation Bilaterally] : clear to auscultation bilaterally [No Abnormalities] : no abnormalities [Benign] : benign [Normal Gait] : normal gait [No Clubbing] : no clubbing [No Cyanosis] : no cyanosis [No Edema] : no edema [FROM] : FROM [Normal Color/ Pigmentation] : normal color/ pigmentation [No Focal Deficits] : no focal deficits [Oriented x3] : oriented x3 [Normal Affect] : normal affect

## 2025-08-18 ENCOUNTER — APPOINTMENT (OUTPATIENT)
Dept: ELECTROPHYSIOLOGY | Facility: CLINIC | Age: 70
End: 2025-08-18
Payer: MEDICARE

## 2025-08-18 ENCOUNTER — NON-APPOINTMENT (OUTPATIENT)
Age: 70
End: 2025-08-18

## 2025-08-18 PROCEDURE — 93296 REM INTERROG EVL PM/IDS: CPT

## 2025-08-18 PROCEDURE — 93295 DEV INTERROG REMOTE 1/2/MLT: CPT
